# Patient Record
Sex: FEMALE | Race: ASIAN | NOT HISPANIC OR LATINO | URBAN - METROPOLITAN AREA
[De-identification: names, ages, dates, MRNs, and addresses within clinical notes are randomized per-mention and may not be internally consistent; named-entity substitution may affect disease eponyms.]

---

## 2018-05-11 ENCOUNTER — EMERGENCY (EMERGENCY)
Facility: HOSPITAL | Age: 68
LOS: 1 days | Discharge: HOME | End: 2018-05-11
Attending: EMERGENCY MEDICINE | Admitting: EMERGENCY MEDICINE
Payer: MEDICAID

## 2018-05-11 VITALS
SYSTOLIC BLOOD PRESSURE: 152 MMHG | DIASTOLIC BLOOD PRESSURE: 77 MMHG | TEMPERATURE: 99 F | HEART RATE: 80 BPM | RESPIRATION RATE: 16 BRPM | OXYGEN SATURATION: 98 %

## 2018-05-11 VITALS
OXYGEN SATURATION: 97 % | TEMPERATURE: 98 F | SYSTOLIC BLOOD PRESSURE: 158 MMHG | HEART RATE: 76 BPM | DIASTOLIC BLOOD PRESSURE: 78 MMHG | RESPIRATION RATE: 18 BRPM

## 2018-05-11 LAB
ALBUMIN SERPL ELPH-MCNC: 4.1 G/DL — SIGNIFICANT CHANGE UP (ref 3.3–5)
ALP SERPL-CCNC: 117 U/L — SIGNIFICANT CHANGE UP (ref 40–120)
ALT FLD-CCNC: 29 U/L — SIGNIFICANT CHANGE UP (ref 4–33)
APPEARANCE UR: CLEAR — SIGNIFICANT CHANGE UP
APTT BLD: 31.4 SEC — SIGNIFICANT CHANGE UP (ref 27.5–37.4)
AST SERPL-CCNC: 30 U/L — SIGNIFICANT CHANGE UP (ref 4–32)
BASE EXCESS BLDV CALC-SCNC: 7.6 MMOL/L — SIGNIFICANT CHANGE UP
BASOPHILS # BLD AUTO: 0.07 K/UL — SIGNIFICANT CHANGE UP (ref 0–0.2)
BASOPHILS NFR BLD AUTO: 0.7 % — SIGNIFICANT CHANGE UP (ref 0–2)
BILIRUB SERPL-MCNC: 0.4 MG/DL — SIGNIFICANT CHANGE UP (ref 0.2–1.2)
BILIRUB UR-MCNC: NEGATIVE — SIGNIFICANT CHANGE UP
BLOOD GAS VENOUS - CREATININE: 0.57 MG/DL — SIGNIFICANT CHANGE UP (ref 0.5–1.3)
BLOOD UR QL VISUAL: NEGATIVE — SIGNIFICANT CHANGE UP
BUN SERPL-MCNC: 17 MG/DL — SIGNIFICANT CHANGE UP (ref 7–23)
CALCIUM SERPL-MCNC: 9.4 MG/DL — SIGNIFICANT CHANGE UP (ref 8.4–10.5)
CHLORIDE BLDV-SCNC: 102 MMOL/L — SIGNIFICANT CHANGE UP (ref 96–108)
CHLORIDE SERPL-SCNC: 98 MMOL/L — SIGNIFICANT CHANGE UP (ref 98–107)
CO2 SERPL-SCNC: 33 MMOL/L — HIGH (ref 22–31)
COLOR SPEC: SIGNIFICANT CHANGE UP
CREAT SERPL-MCNC: 0.78 MG/DL — SIGNIFICANT CHANGE UP (ref 0.5–1.3)
EOSINOPHIL # BLD AUTO: 0.58 K/UL — HIGH (ref 0–0.5)
EOSINOPHIL NFR BLD AUTO: 6.2 % — HIGH (ref 0–6)
GAS PNL BLDV: 138 MMOL/L — SIGNIFICANT CHANGE UP (ref 136–146)
GLUCOSE BLDV-MCNC: 145 — HIGH (ref 70–99)
GLUCOSE SERPL-MCNC: 146 MG/DL — HIGH (ref 70–99)
GLUCOSE UR-MCNC: NEGATIVE — SIGNIFICANT CHANGE UP
HBA1C BLD-MCNC: 7.8 % — HIGH (ref 4–5.6)
HCO3 BLDV-SCNC: 27 MMOL/L — SIGNIFICANT CHANGE UP (ref 20–27)
HCT VFR BLD CALC: 44 % — SIGNIFICANT CHANGE UP (ref 34.5–45)
HCT VFR BLDV CALC: 46.7 % — HIGH (ref 34.5–45)
HGB BLD-MCNC: 13.5 G/DL — SIGNIFICANT CHANGE UP (ref 11.5–15.5)
HGB BLDV-MCNC: 15.1 G/DL — SIGNIFICANT CHANGE UP (ref 11.5–15.5)
IMM GRANULOCYTES # BLD AUTO: 0.02 # — SIGNIFICANT CHANGE UP
IMM GRANULOCYTES NFR BLD AUTO: 0.2 % — SIGNIFICANT CHANGE UP (ref 0–1.5)
INR BLD: 0.94 — SIGNIFICANT CHANGE UP (ref 0.88–1.17)
KETONES UR-MCNC: NEGATIVE — SIGNIFICANT CHANGE UP
LACTATE BLDV-MCNC: 1.4 MMOL/L — SIGNIFICANT CHANGE UP (ref 0.5–2)
LEUKOCYTE ESTERASE UR-ACNC: HIGH
LYMPHOCYTES # BLD AUTO: 2.23 K/UL — SIGNIFICANT CHANGE UP (ref 1–3.3)
LYMPHOCYTES # BLD AUTO: 23.8 % — SIGNIFICANT CHANGE UP (ref 13–44)
MCHC RBC-ENTMCNC: 27.2 PG — SIGNIFICANT CHANGE UP (ref 27–34)
MCHC RBC-ENTMCNC: 30.7 % — LOW (ref 32–36)
MCV RBC AUTO: 88.5 FL — SIGNIFICANT CHANGE UP (ref 80–100)
MONOCYTES # BLD AUTO: 0.65 K/UL — SIGNIFICANT CHANGE UP (ref 0–0.9)
MONOCYTES NFR BLD AUTO: 6.9 % — SIGNIFICANT CHANGE UP (ref 2–14)
NEUTROPHILS # BLD AUTO: 5.81 K/UL — SIGNIFICANT CHANGE UP (ref 1.8–7.4)
NEUTROPHILS NFR BLD AUTO: 62.2 % — SIGNIFICANT CHANGE UP (ref 43–77)
NITRITE UR-MCNC: NEGATIVE — SIGNIFICANT CHANGE UP
NON-SQ EPI CELLS # UR AUTO: <1 — SIGNIFICANT CHANGE UP
NRBC # FLD: 0 — SIGNIFICANT CHANGE UP
NT-PROBNP SERPL-SCNC: 558.8 PG/ML — SIGNIFICANT CHANGE UP
PCO2 BLDV: 70 MMHG — HIGH (ref 41–51)
PH BLDV: 7.31 PH — LOW (ref 7.32–7.43)
PH UR: 6 — SIGNIFICANT CHANGE UP (ref 4.6–8)
PLATELET # BLD AUTO: 264 K/UL — SIGNIFICANT CHANGE UP (ref 150–400)
PMV BLD: 10.9 FL — SIGNIFICANT CHANGE UP (ref 7–13)
PO2 BLDV: 23 MMHG — LOW (ref 35–40)
POTASSIUM BLDV-SCNC: 4.5 MMOL/L — SIGNIFICANT CHANGE UP (ref 3.4–4.5)
POTASSIUM SERPL-MCNC: 4.6 MMOL/L — SIGNIFICANT CHANGE UP (ref 3.5–5.3)
POTASSIUM SERPL-SCNC: 4.6 MMOL/L — SIGNIFICANT CHANGE UP (ref 3.5–5.3)
PROT SERPL-MCNC: 7.8 G/DL — SIGNIFICANT CHANGE UP (ref 6–8.3)
PROT UR-MCNC: NEGATIVE MG/DL — SIGNIFICANT CHANGE UP
PROTHROM AB SERPL-ACNC: 10.4 SEC — SIGNIFICANT CHANGE UP (ref 9.8–13.1)
RBC # BLD: 4.97 M/UL — SIGNIFICANT CHANGE UP (ref 3.8–5.2)
RBC # FLD: 12.5 % — SIGNIFICANT CHANGE UP (ref 10.3–14.5)
SAO2 % BLDV: 30.2 % — LOW (ref 60–85)
SODIUM SERPL-SCNC: 140 MMOL/L — SIGNIFICANT CHANGE UP (ref 135–145)
SP GR SPEC: 1.01 — SIGNIFICANT CHANGE UP (ref 1–1.04)
SQUAMOUS # UR AUTO: SIGNIFICANT CHANGE UP
UROBILINOGEN FLD QL: NORMAL MG/DL — SIGNIFICANT CHANGE UP
WBC # BLD: 9.36 K/UL — SIGNIFICANT CHANGE UP (ref 3.8–10.5)
WBC # FLD AUTO: 9.36 K/UL — SIGNIFICANT CHANGE UP (ref 3.8–10.5)
WBC UR QL: SIGNIFICANT CHANGE UP (ref 0–?)

## 2018-05-11 PROCEDURE — 99284 EMERGENCY DEPT VISIT MOD MDM: CPT

## 2018-05-11 PROCEDURE — 93971 EXTREMITY STUDY: CPT | Mod: 26,LT

## 2018-05-11 PROCEDURE — 71046 X-RAY EXAM CHEST 2 VIEWS: CPT | Mod: 26

## 2018-05-11 NOTE — ED ADULT TRIAGE NOTE - CHIEF COMPLAINT QUOTE
returned from [place] 2 weeks ago, c/o shortness of breath, intermittent dizziness, swollen left lower leg x 3 days, no pitting edema noted. Patient appears well in triage, no labor breathing.   Denies: chest pain, HA  Hx: asthma, blockage of the heart, DM.

## 2018-05-11 NOTE — ED PROVIDER NOTE - PMH
Coronary artery disease with other form of angina pectoris    Type 2 diabetes mellitus without complication, without long-term current use of insulin

## 2018-05-11 NOTE — ED ADULT NURSE NOTE - OBJECTIVE STATEMENT
Patient received AA&Ox3 - Sami speaking with daughter at bedside translating - c/o SOB and swelling to LLE x3 days since returning from [place] 2wks ago. VSS on RA. Patient denies chest pain, N/V, fever, chills, dyspnea, weakness, dizziness at this time. Patient ambulates independently, skin intact. 20g PIV in place to right AC, labs drawn. NAD noted - will continue to monitor.

## 2018-05-11 NOTE — ED PROVIDER NOTE - OBJECTIVE STATEMENT
68 yo with history of CAD - evaluated for CABG but declined, DM, HTN, presenting f 68 yo with history of CAD - evaluated for CABG but declined, DM, asthma presenting with SOB x months without acute worsening at baseline and LE swelling x 2 weeks; improved but persistent with calf pain. Pt recently started on DM meds but a new PMD upon arrival from [place] 2 [place] ago. Denies fevers, chills, cough, rhinorrhea, otorrhea, otalgia, nausea, vomiting, constipation, diarrhea, chest pain, shortness of breath or changes in urinary habits.

## 2018-05-11 NOTE — ED PROVIDER NOTE - ATTENDING CONTRIBUTION TO CARE
67F h/o asthma, CAD, DM presents for LLE edema for two weeks. Currently visiting from [place], recently saw a primary physician here and was started on medication for DM.  She also reports ongoing intermittent SOB, that has been unchanged for months, none currently. No cough or fever, no CP. On exam well appearing, nad, lungs clear, rrr, abd soft, 2+ pulses, trace R edema, no rash. CXR clear. U/s negative. Labs reassuring. Symptoms not c/w PE given ongoing and unchanged, no tachycardia or CP. Will need rpt u/s as outpatient.

## 2018-11-10 ENCOUNTER — EMERGENCY (EMERGENCY)
Facility: HOSPITAL | Age: 68
LOS: 0 days | Discharge: HOME | End: 2018-11-10
Attending: EMERGENCY MEDICINE
Payer: MEDICAID

## 2018-11-10 VITALS
DIASTOLIC BLOOD PRESSURE: 86 MMHG | SYSTOLIC BLOOD PRESSURE: 164 MMHG | WEIGHT: 171.96 LBS | HEIGHT: 65 IN | TEMPERATURE: 98 F | HEART RATE: 82 BPM | RESPIRATION RATE: 18 BRPM | OXYGEN SATURATION: 94 %

## 2018-11-10 DIAGNOSIS — B02.29 OTHER POSTHERPETIC NERVOUS SYSTEM INVOLVEMENT: ICD-10-CM

## 2018-11-10 DIAGNOSIS — M25.559 PAIN IN UNSPECIFIED HIP: ICD-10-CM

## 2018-11-10 PROCEDURE — 99283 EMERGENCY DEPT VISIT LOW MDM: CPT

## 2018-11-10 RX ORDER — ACETAMINOPHEN 500 MG
2 TABLET ORAL
Qty: 20 | Refills: 0
Start: 2018-11-10

## 2018-11-10 RX ORDER — IBUPROFEN 200 MG
600 TABLET ORAL ONCE
Qty: 0 | Refills: 0 | Status: COMPLETED | OUTPATIENT
Start: 2018-11-10 | End: 2018-11-10

## 2018-11-10 RX ORDER — GABAPENTIN 400 MG/1
100 CAPSULE ORAL ONCE
Qty: 0 | Refills: 0 | Status: COMPLETED | OUTPATIENT
Start: 2018-11-10 | End: 2018-11-10

## 2018-11-10 RX ORDER — ACETAMINOPHEN 500 MG
650 TABLET ORAL ONCE
Qty: 0 | Refills: 0 | Status: COMPLETED | OUTPATIENT
Start: 2018-11-10 | End: 2018-11-10

## 2018-11-10 RX ADMIN — GABAPENTIN 100 MILLIGRAM(S): 400 CAPSULE ORAL at 20:41

## 2018-11-10 RX ADMIN — Medication 600 MILLIGRAM(S): at 20:41

## 2018-11-10 RX ADMIN — Medication 650 MILLIGRAM(S): at 20:43

## 2018-11-10 NOTE — ED ADULT NURSE NOTE - CHIEF COMPLAINT QUOTE
Patient speaks Khmer only. Per daughter patient with left hip pain for 3 days. Patient was seen by rheumatologist 4 weeks ago for same complaint and got better with medication Denies trauma or injury

## 2018-11-10 NOTE — ED ADULT NURSE NOTE - NSIMPLEMENTINTERV_GEN_ALL_ED
Implemented All Fall Risk Interventions:  [place] to call system. Call bell, personal items and telephone within reach. Instruct patient to call for assistance. Room bathroom lighting operational. Non-slip footwear when patient is off stretcher. Physically safe environment: no spills, clutter or unnecessary equipment. Stretcher in lowest position, wheels locked, appropriate side rails in place. Provide visual cue, wrist band, yellow gown, etc. Monitor gait and stability. Monitor for mental status changes and reorient to person, place, and time. Review medications for side effects contributing to fall risk. Reinforce activity limits and safety measures with patient and family.

## 2018-11-10 NOTE — ED PROVIDER NOTE - OBJECTIVE STATEMENT
translated by daughter with pt consent. 67 y/o female hx htn, dm, cad s/p stents in september, recent diagnosis shingle 3 weeks ago c/o increasing pain to site of shingles over past 4-5 days. Saw dermatologist at the time. Left dermatomal rash lower back around to left groin has been drying up/improving s/p antivirals per daughter. Takes motrin for pain with some relief. Takes gabapentin at night for chronic foot pain. Denies any hip pain (despite triage, pain is along flank along rash), trauma, f/c, n/v, abd pain, cp, sob. No other symptoms. Has pcp f/u closely this week.    limited ros by language.

## 2018-11-10 NOTE — ED ADULT TRIAGE NOTE - CHIEF COMPLAINT QUOTE
Patient speaks Amharic only. Per daughter patient with left hip pain for 3 days. Patient was seen by rheumatologist 4 weeks ago for same complaint and got better with medication Denies trauma or injury

## 2018-11-10 NOTE — ED PROVIDER NOTE - MEDICAL DECISION MAKING DETAILS
pt likely with postherpetic neuralgia in setting of recent shingles. rash improving but with continued neuropathic pain. taking gabapentin at night, told can take in morning as well (drowsiness precautions given), tylenol/motrin and icing area with close f/u. return precautions for superinfection though no sign currently. no sign of hip injury or inflammation.

## 2018-11-11 PROBLEM — E11.9 TYPE 2 DIABETES MELLITUS WITHOUT COMPLICATIONS: Chronic | Status: ACTIVE | Noted: 2018-05-11

## 2018-11-11 PROBLEM — I25.118 ATHEROSCLEROTIC HEART DISEASE OF NATIVE CORONARY ARTERY WITH OTHER FORMS OF ANGINA PECTORIS: Chronic | Status: ACTIVE | Noted: 2018-05-11

## 2019-05-30 ENCOUNTER — EMERGENCY (EMERGENCY)
Facility: HOSPITAL | Age: 69
LOS: 0 days | Discharge: HOME | End: 2019-05-30
Attending: EMERGENCY MEDICINE
Payer: MEDICAID

## 2019-05-30 VITALS
TEMPERATURE: 98 F | HEIGHT: 65 IN | SYSTOLIC BLOOD PRESSURE: 148 MMHG | WEIGHT: 164.91 LBS | DIASTOLIC BLOOD PRESSURE: 72 MMHG | HEART RATE: 64 BPM | OXYGEN SATURATION: 96 % | RESPIRATION RATE: 18 BRPM

## 2019-05-30 VITALS
DIASTOLIC BLOOD PRESSURE: 72 MMHG | SYSTOLIC BLOOD PRESSURE: 143 MMHG | TEMPERATURE: 98 F | HEART RATE: 74 BPM | OXYGEN SATURATION: 98 % | RESPIRATION RATE: 18 BRPM

## 2019-05-30 DIAGNOSIS — S22.32XA FRACTURE OF ONE RIB, LEFT SIDE, INITIAL ENCOUNTER FOR CLOSED FRACTURE: ICD-10-CM

## 2019-05-30 DIAGNOSIS — E11.9 TYPE 2 DIABETES MELLITUS WITHOUT COMPLICATIONS: ICD-10-CM
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
## 2019-05-30 DIAGNOSIS — R07.81 PLEURODYNIA: ICD-10-CM
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
## 2019-05-30 LAB
ALBUMIN SERPL ELPH-MCNC: 2.9 G/DL — LOW (ref 3.3–5)
ALP SERPL-CCNC: 108 U/L — SIGNIFICANT CHANGE UP (ref 40–120)
ALT FLD-CCNC: 27 U/L — SIGNIFICANT CHANGE UP (ref 12–78)
ANION GAP SERPL CALC-SCNC: 6 MMOL/L — SIGNIFICANT CHANGE UP (ref 5–17)
APTT BLD: 25.4 SEC — LOW (ref 28.5–37)
AST SERPL-CCNC: 22 U/L — SIGNIFICANT CHANGE UP (ref 15–37)
BASOPHILS # BLD AUTO: 0.04 K/UL — SIGNIFICANT CHANGE UP (ref 0–0.2)
BASOPHILS NFR BLD AUTO: 0.5 % — SIGNIFICANT CHANGE UP (ref 0–2)
BILIRUB SERPL-MCNC: 0.3 MG/DL — SIGNIFICANT CHANGE UP (ref 0.2–1.2)
BUN SERPL-MCNC: 28 MG/DL — HIGH (ref 7–23)
CALCIUM SERPL-MCNC: 8.7 MG/DL — SIGNIFICANT CHANGE UP (ref 8.5–10.1)
CHLORIDE SERPL-SCNC: 105 MMOL/L — SIGNIFICANT CHANGE UP (ref 96–108)
CK MB BLD-MCNC: 2.5 % — SIGNIFICANT CHANGE UP (ref 0–3.5)
CK MB CFR SERPL CALC: 1.4 NG/ML — SIGNIFICANT CHANGE UP (ref 0.5–3.6)
CK SERPL-CCNC: 55 U/L — SIGNIFICANT CHANGE UP (ref 26–192)
CO2 SERPL-SCNC: 29 MMOL/L — SIGNIFICANT CHANGE UP (ref 22–31)
CREAT SERPL-MCNC: 0.93 MG/DL — SIGNIFICANT CHANGE UP (ref 0.5–1.3)
EOSINOPHIL # BLD AUTO: 0.25 K/UL — SIGNIFICANT CHANGE UP (ref 0–0.5)
EOSINOPHIL NFR BLD AUTO: 3.3 % — SIGNIFICANT CHANGE UP (ref 0–6)
GLUCOSE SERPL-MCNC: 178 MG/DL — HIGH (ref 70–99)
HCT VFR BLD CALC: 35.1 % — SIGNIFICANT CHANGE UP (ref 34.5–45)
HGB BLD-MCNC: 10.8 G/DL — LOW (ref 11.5–15.5)
IMM GRANULOCYTES NFR BLD AUTO: 0.4 % — SIGNIFICANT CHANGE UP (ref 0–1.5)
INR BLD: 0.94 RATIO — SIGNIFICANT CHANGE UP (ref 0.88–1.16)
LYMPHOCYTES # BLD AUTO: 1.71 K/UL — SIGNIFICANT CHANGE UP (ref 1–3.3)
LYMPHOCYTES # BLD AUTO: 22.6 % — SIGNIFICANT CHANGE UP (ref 13–44)
MCHC RBC-ENTMCNC: 28.4 PG — SIGNIFICANT CHANGE UP (ref 27–34)
MCHC RBC-ENTMCNC: 30.8 GM/DL — LOW (ref 32–36)
MCV RBC AUTO: 92.4 FL — SIGNIFICANT CHANGE UP (ref 80–100)
MONOCYTES # BLD AUTO: 0.58 K/UL — SIGNIFICANT CHANGE UP (ref 0–0.9)
MONOCYTES NFR BLD AUTO: 7.7 % — SIGNIFICANT CHANGE UP (ref 2–14)
NEUTROPHILS # BLD AUTO: 4.95 K/UL — SIGNIFICANT CHANGE UP (ref 1.8–7.4)
NEUTROPHILS NFR BLD AUTO: 65.5 % — SIGNIFICANT CHANGE UP (ref 43–77)
NRBC # BLD: 0 /100 WBCS — SIGNIFICANT CHANGE UP (ref 0–0)
PLATELET # BLD AUTO: 230 K/UL — SIGNIFICANT CHANGE UP (ref 150–400)
POTASSIUM SERPL-MCNC: 5.3 MMOL/L — SIGNIFICANT CHANGE UP (ref 3.5–5.3)
POTASSIUM SERPL-SCNC: 5.3 MMOL/L — SIGNIFICANT CHANGE UP (ref 3.5–5.3)
PROT SERPL-MCNC: 7.1 GM/DL — SIGNIFICANT CHANGE UP (ref 6–8.3)
PROTHROM AB SERPL-ACNC: 10.5 SEC — SIGNIFICANT CHANGE UP (ref 10–12.9)
RBC # BLD: 3.8 M/UL — SIGNIFICANT CHANGE UP (ref 3.8–5.2)
RBC # FLD: 13.2 % — SIGNIFICANT CHANGE UP (ref 10.3–14.5)
SODIUM SERPL-SCNC: 140 MMOL/L — SIGNIFICANT CHANGE UP (ref 135–145)
TROPONIN I SERPL-MCNC: 0.54 NG/ML — HIGH (ref 0.01–0.04)
WBC # BLD: 7.56 K/UL — SIGNIFICANT CHANGE UP (ref 3.8–10.5)
WBC # FLD AUTO: 7.56 K/UL — SIGNIFICANT CHANGE UP (ref 3.8–10.5)

## 2019-05-30 PROCEDURE — 71250 CT THORAX DX C-: CPT | Mod: 26

## 2019-05-30 PROCEDURE — 99285 EMERGENCY DEPT VISIT HI MDM: CPT

## 2019-05-30 PROCEDURE — 93010 ELECTROCARDIOGRAM REPORT: CPT

## 2019-05-30 PROCEDURE — 71045 X-RAY EXAM CHEST 1 VIEW: CPT | Mod: 26

## 2019-05-30 RX ORDER — OXYCODONE AND ACETAMINOPHEN 5; 325 MG/1; MG/1
1 TABLET ORAL ONCE
Refills: 0 | Status: DISCONTINUED | OUTPATIENT
Start: 2019-05-30 | End: 2019-05-30

## 2019-05-30 RX ADMIN — OXYCODONE AND ACETAMINOPHEN 1 TABLET(S): 5; 325 TABLET ORAL at 13:25

## 2019-05-30 RX ADMIN — OXYCODONE AND ACETAMINOPHEN 1 TABLET(S): 5; 325 TABLET ORAL at 13:55

## 2019-05-30 NOTE — ED PROVIDER NOTE - CONSTITUTIONAL, MLM
normal... Well appearing, well nourished, awake, alert, oriented to person, place, time/situation and in no apparent distress. Speaking in clear full sentences no nasal flaring no shoulders retractions no diaphoresis, smiling appears very comfortable

## 2019-05-30 NOTE — ED PROVIDER NOTE - NONTENDER LOCATION
left lower quadrant/right lower quadrant/periumbilical/umbilical/left costovertebral angle/right costovertebral angle/right upper quadrant/suprapubic/left upper quadrant

## 2019-05-30 NOTE — ED ADULT NURSE NOTE - OBJECTIVE STATEMENT
pt A&Ox3 c/o pain left ribs area x 2 days onset after getting massage, C/o dizziness. S/p Stent placement on the 12th of may at [facility]. PMH HTN, diabetes, Hyperlipidemia

## 2019-05-30 NOTE — ED ADULT TRIAGE NOTE - CHIEF COMPLAINT QUOTE
c/o pain l ribs area x 2 days onset after getting massage states l arm was pulled and pain began denies pain across chest area or shortness of breath

## 2019-05-30 NOTE — ED PROVIDER NOTE - PROGRESS NOTE DETAILS
Pt has been alert and oriented x 3 ate and tolerated lunch pt and daughter are given and explained all test reports and advised to follow up with her doctor and return if shortness or breaths or worsening pain. Pt had angio cath with two stents placed two weeks ago and trop above normal is unlikely due to the cardiac issue.

## 2019-05-30 NOTE — ED PROVIDER NOTE - OBJECTIVE STATEMENT
68 years old female with daughter c/o left lateral chest wall pain increases pain with breathing and left arm movements one day after went to the massage 3 days ago. Pt also had cardiac 2 stents placed couple of weeks ago. Pt denies headache, dizziness, blurred visions, light sensitivities, focal/distal weakness or numbness, cough, sob, nausea, vomiting, fever, chills, abd pain, dysuria or irregular bowel movements.

## 2019-06-28 ENCOUNTER — INPATIENT (INPATIENT)
Facility: HOSPITAL | Age: 69
LOS: 5 days | Discharge: HOME | End: 2019-07-04
Attending: INTERNAL MEDICINE | Admitting: INTERNAL MEDICINE
Payer: MEDICAID

## 2019-06-28 VITALS
TEMPERATURE: 99 F | OXYGEN SATURATION: 63 % | HEART RATE: 99 BPM | SYSTOLIC BLOOD PRESSURE: 199 MMHG | RESPIRATION RATE: 23 BRPM | DIASTOLIC BLOOD PRESSURE: 76 MMHG

## 2019-06-28 DIAGNOSIS — J45.41 MODERATE PERSISTENT ASTHMA WITH (ACUTE) EXACERBATION: ICD-10-CM

## 2019-06-28 DIAGNOSIS — I21.4 NON-ST ELEVATION (NSTEMI) MYOCARDIAL INFARCTION: ICD-10-CM

## 2019-06-28 DIAGNOSIS — E11.9 TYPE 2 DIABETES MELLITUS WITHOUT COMPLICATIONS: ICD-10-CM

## 2019-06-28 DIAGNOSIS — E55.9 VITAMIN D DEFICIENCY, UNSPECIFIED: ICD-10-CM

## 2019-06-28 DIAGNOSIS — I25.118 ATHEROSCLEROTIC HEART DISEASE OF NATIVE CORONARY ARTERY WITH OTHER FORMS OF ANGINA PECTORIS: ICD-10-CM

## 2019-06-28 DIAGNOSIS — I10 ESSENTIAL (PRIMARY) HYPERTENSION: ICD-10-CM

## 2019-06-28 PROBLEM — Z00.00 ENCOUNTER FOR PREVENTIVE HEALTH EXAMINATION: Status: ACTIVE | Noted: 2019-06-28

## 2019-06-28 LAB
ALBUMIN SERPL ELPH-MCNC: 3.5 G/DL — SIGNIFICANT CHANGE UP (ref 3.3–5)
ALP SERPL-CCNC: 94 U/L — SIGNIFICANT CHANGE UP (ref 40–120)
ALT FLD-CCNC: 26 U/L — SIGNIFICANT CHANGE UP (ref 12–78)
ANION GAP SERPL CALC-SCNC: 8 MMOL/L — SIGNIFICANT CHANGE UP (ref 5–17)
APTT BLD: 29 SEC — SIGNIFICANT CHANGE UP (ref 28.5–37)
AST SERPL-CCNC: 18 U/L — SIGNIFICANT CHANGE UP (ref 15–37)
BASE EXCESS BLDA CALC-SCNC: 4.1 MMOL/L — HIGH (ref -2–2)
BASE EXCESS BLDA CALC-SCNC: 4.1 MMOL/L — HIGH (ref -2–2)
BASOPHILS # BLD AUTO: 0.04 K/UL — SIGNIFICANT CHANGE UP (ref 0–0.2)
BASOPHILS NFR BLD AUTO: 0.3 % — SIGNIFICANT CHANGE UP (ref 0–2)
BILIRUB SERPL-MCNC: 0.4 MG/DL — SIGNIFICANT CHANGE UP (ref 0.2–1.2)
BLOOD GAS COMMENTS: SIGNIFICANT CHANGE UP
BLOOD GAS COMMENTS: SIGNIFICANT CHANGE UP
BLOOD GAS SOURCE: SIGNIFICANT CHANGE UP
BLOOD GAS SOURCE: SIGNIFICANT CHANGE UP
BUN SERPL-MCNC: 18 MG/DL — SIGNIFICANT CHANGE UP (ref 7–23)
CALCIUM SERPL-MCNC: 8.7 MG/DL — SIGNIFICANT CHANGE UP (ref 8.5–10.1)
CHLORIDE SERPL-SCNC: 96 MMOL/L — SIGNIFICANT CHANGE UP (ref 96–108)
CK MB BLD-MCNC: 2.9 % — SIGNIFICANT CHANGE UP (ref 0–3.5)
CK MB CFR SERPL CALC: 1.9 NG/ML — SIGNIFICANT CHANGE UP (ref 0.5–3.6)
CK SERPL-CCNC: 66 U/L — SIGNIFICANT CHANGE UP (ref 26–192)
CO2 SERPL-SCNC: 33 MMOL/L — HIGH (ref 22–31)
CREAT SERPL-MCNC: 1.06 MG/DL — SIGNIFICANT CHANGE UP (ref 0.5–1.3)
EOSINOPHIL # BLD AUTO: 0.1 K/UL — SIGNIFICANT CHANGE UP (ref 0–0.5)
EOSINOPHIL NFR BLD AUTO: 0.8 % — SIGNIFICANT CHANGE UP (ref 0–6)
GLUCOSE BLDC GLUCOMTR-MCNC: 374 MG/DL — HIGH (ref 70–99)
GLUCOSE SERPL-MCNC: 184 MG/DL — HIGH (ref 70–99)
HCO3 BLDA-SCNC: 32 MMOL/L — HIGH (ref 21–29)
HCO3 BLDA-SCNC: 33 MMOL/L — HIGH (ref 21–29)
HCT VFR BLD CALC: 42 % — SIGNIFICANT CHANGE UP (ref 34.5–45)
HGB BLD-MCNC: 12.9 G/DL — SIGNIFICANT CHANGE UP (ref 11.5–15.5)
HOROWITZ INDEX BLDA+IHG-RTO: 100 — SIGNIFICANT CHANGE UP
HOROWITZ INDEX BLDA+IHG-RTO: 40 — SIGNIFICANT CHANGE UP
IMM GRANULOCYTES NFR BLD AUTO: 1.4 % — SIGNIFICANT CHANGE UP (ref 0–1.5)
INR BLD: 0.96 RATIO — SIGNIFICANT CHANGE UP (ref 0.88–1.16)
LACTATE SERPL-SCNC: 2.3 MMOL/L — HIGH (ref 0.7–2)
LYMPHOCYTES # BLD AUTO: 29 % — SIGNIFICANT CHANGE UP (ref 13–44)
LYMPHOCYTES # BLD AUTO: 3.61 K/UL — HIGH (ref 1–3.3)
MCHC RBC-ENTMCNC: 27.8 PG — SIGNIFICANT CHANGE UP (ref 27–34)
MCHC RBC-ENTMCNC: 30.7 GM/DL — LOW (ref 32–36)
MCV RBC AUTO: 90.5 FL — SIGNIFICANT CHANGE UP (ref 80–100)
MONOCYTES # BLD AUTO: 0.88 K/UL — SIGNIFICANT CHANGE UP (ref 0–0.9)
MONOCYTES NFR BLD AUTO: 7.1 % — SIGNIFICANT CHANGE UP (ref 2–14)
NEUTROPHILS # BLD AUTO: 7.63 K/UL — HIGH (ref 1.8–7.4)
NEUTROPHILS NFR BLD AUTO: 61.4 % — SIGNIFICANT CHANGE UP (ref 43–77)
NRBC # BLD: 0 /100 WBCS — SIGNIFICANT CHANGE UP (ref 0–0)
NT-PROBNP SERPL-SCNC: 1349 PG/ML — HIGH (ref 0–125)
PCO2 BLDA: 69 MMHG — HIGH (ref 32–46)
PCO2 BLDA: 75 MMHG — CRITICAL HIGH (ref 32–46)
PH BLD: 7.26 — LOW (ref 7.35–7.45)
PH BLD: 7.29 — LOW (ref 7.35–7.45)
PLATELET # BLD AUTO: 266 K/UL — SIGNIFICANT CHANGE UP (ref 150–400)
PO2 BLDA: 121 MMHG — HIGH (ref 74–108)
PO2 BLDA: 425 MMHG — HIGH (ref 74–108)
POTASSIUM SERPL-MCNC: 4.7 MMOL/L — SIGNIFICANT CHANGE UP (ref 3.5–5.3)
POTASSIUM SERPL-SCNC: 4.7 MMOL/L — SIGNIFICANT CHANGE UP (ref 3.5–5.3)
PROT SERPL-MCNC: 7.9 GM/DL — SIGNIFICANT CHANGE UP (ref 6–8.3)
PROTHROM AB SERPL-ACNC: 10.7 SEC — SIGNIFICANT CHANGE UP (ref 10–12.9)
RBC # BLD: 4.64 M/UL — SIGNIFICANT CHANGE UP (ref 3.8–5.2)
RBC # FLD: 12 % — SIGNIFICANT CHANGE UP (ref 10.3–14.5)
SAO2 % BLDA: 98 % — HIGH (ref 92–96)
SAO2 % BLDA: 99 % — HIGH (ref 92–96)
SODIUM SERPL-SCNC: 137 MMOL/L — SIGNIFICANT CHANGE UP (ref 135–145)
TROPONIN I SERPL-MCNC: 0.15 NG/ML — HIGH (ref 0.01–0.04)
TROPONIN I SERPL-MCNC: 0.17 NG/ML — HIGH (ref 0.01–0.04)
WBC # BLD: 12.43 K/UL — HIGH (ref 3.8–10.5)
WBC # FLD AUTO: 12.43 K/UL — HIGH (ref 3.8–10.5)

## 2019-06-28 PROCEDURE — 93010 ELECTROCARDIOGRAM REPORT: CPT

## 2019-06-28 PROCEDURE — 99223 1ST HOSP IP/OBS HIGH 75: CPT

## 2019-06-28 PROCEDURE — 71045 X-RAY EXAM CHEST 1 VIEW: CPT | Mod: 26

## 2019-06-28 PROCEDURE — 99285 EMERGENCY DEPT VISIT HI MDM: CPT

## 2019-06-28 RX ORDER — AZITHROMYCIN 500 MG/1
500 TABLET, FILM COATED ORAL DAILY
Refills: 0 | Status: COMPLETED | OUTPATIENT
Start: 2019-06-28 | End: 2019-07-02

## 2019-06-28 RX ORDER — GABAPENTIN 400 MG/1
100 CAPSULE ORAL DAILY
Refills: 0 | Status: DISCONTINUED | OUTPATIENT
Start: 2019-06-28 | End: 2019-07-01

## 2019-06-28 RX ORDER — HEPARIN SODIUM 5000 [USP'U]/ML
5000 INJECTION INTRAVENOUS; SUBCUTANEOUS EVERY 12 HOURS
Refills: 0 | Status: DISCONTINUED | OUTPATIENT
Start: 2019-06-28 | End: 2019-07-04

## 2019-06-28 RX ORDER — RANOLAZINE 500 MG/1
1 TABLET, FILM COATED, EXTENDED RELEASE ORAL
Qty: 0 | Refills: 0 | DISCHARGE

## 2019-06-28 RX ORDER — TIOTROPIUM BROMIDE 18 UG/1
1 CAPSULE ORAL; RESPIRATORY (INHALATION) DAILY
Refills: 0 | Status: DISCONTINUED | OUTPATIENT
Start: 2019-06-28 | End: 2019-07-04

## 2019-06-28 RX ORDER — PANTOPRAZOLE SODIUM 20 MG/1
40 TABLET, DELAYED RELEASE ORAL
Refills: 0 | Status: DISCONTINUED | OUTPATIENT
Start: 2019-06-28 | End: 2019-07-04

## 2019-06-28 RX ORDER — CHOLECALCIFEROL (VITAMIN D3) 125 MCG
1000 CAPSULE ORAL DAILY
Refills: 0 | Status: DISCONTINUED | OUTPATIENT
Start: 2019-06-28 | End: 2019-07-04

## 2019-06-28 RX ORDER — METOPROLOL TARTRATE 50 MG
25 TABLET ORAL DAILY
Refills: 0 | Status: DISCONTINUED | OUTPATIENT
Start: 2019-06-28 | End: 2019-07-04

## 2019-06-28 RX ORDER — ATORVASTATIN CALCIUM 80 MG/1
40 TABLET, FILM COATED ORAL AT BEDTIME
Refills: 0 | Status: DISCONTINUED | OUTPATIENT
Start: 2019-06-28 | End: 2019-07-04

## 2019-06-28 RX ORDER — FUROSEMIDE 40 MG
40 TABLET ORAL ONCE
Refills: 0 | Status: COMPLETED | OUTPATIENT
Start: 2019-06-28 | End: 2019-06-28

## 2019-06-28 RX ORDER — MAGNESIUM SULFATE 500 MG/ML
2 VIAL (ML) INJECTION ONCE
Refills: 0 | Status: COMPLETED | OUTPATIENT
Start: 2019-06-28 | End: 2019-06-28

## 2019-06-28 RX ORDER — IPRATROPIUM/ALBUTEROL SULFATE 18-103MCG
3 AEROSOL WITH ADAPTER (GRAM) INHALATION EVERY 8 HOURS
Refills: 0 | Status: DISCONTINUED | OUTPATIENT
Start: 2019-06-28 | End: 2019-06-28

## 2019-06-28 RX ORDER — BUDESONIDE AND FORMOTEROL FUMARATE DIHYDRATE 160; 4.5 UG/1; UG/1
2 AEROSOL RESPIRATORY (INHALATION)
Refills: 0 | Status: DISCONTINUED | OUTPATIENT
Start: 2019-06-28 | End: 2019-07-04

## 2019-06-28 RX ORDER — INSULIN GLARGINE 100 [IU]/ML
22 INJECTION, SOLUTION SUBCUTANEOUS AT BEDTIME
Refills: 0 | Status: DISCONTINUED | OUTPATIENT
Start: 2019-06-28 | End: 2019-06-30

## 2019-06-28 RX ORDER — ASPIRIN/CALCIUM CARB/MAGNESIUM 324 MG
81 TABLET ORAL DAILY
Refills: 0 | Status: DISCONTINUED | OUTPATIENT
Start: 2019-06-28 | End: 2019-07-04

## 2019-06-28 RX ORDER — IPRATROPIUM/ALBUTEROL SULFATE 18-103MCG
3 AEROSOL WITH ADAPTER (GRAM) INHALATION
Refills: 0 | Status: COMPLETED | OUTPATIENT
Start: 2019-06-28 | End: 2019-06-28

## 2019-06-28 RX ORDER — IPRATROPIUM/ALBUTEROL SULFATE 18-103MCG
3 AEROSOL WITH ADAPTER (GRAM) INHALATION EVERY 6 HOURS
Refills: 0 | Status: DISCONTINUED | OUTPATIENT
Start: 2019-06-28 | End: 2019-06-30

## 2019-06-28 RX ADMIN — INSULIN GLARGINE 22 UNIT(S): 100 INJECTION, SOLUTION SUBCUTANEOUS at 22:19

## 2019-06-28 RX ADMIN — Medication 3 MILLILITER(S): at 23:36

## 2019-06-28 RX ADMIN — Medication 2 GRAM(S): at 12:45

## 2019-06-28 RX ADMIN — GABAPENTIN 100 MILLIGRAM(S): 400 CAPSULE ORAL at 22:18

## 2019-06-28 RX ADMIN — Medication 3 MILLILITER(S): at 11:15

## 2019-06-28 RX ADMIN — ATORVASTATIN CALCIUM 40 MILLIGRAM(S): 80 TABLET, FILM COATED ORAL at 22:18

## 2019-06-28 RX ADMIN — Medication 40 MILLIGRAM(S): at 22:19

## 2019-06-28 RX ADMIN — Medication 3 MILLILITER(S): at 14:39

## 2019-06-28 RX ADMIN — Medication 3 MILLILITER(S): at 11:38

## 2019-06-28 RX ADMIN — BUDESONIDE AND FORMOTEROL FUMARATE DIHYDRATE 2 PUFF(S): 160; 4.5 AEROSOL RESPIRATORY (INHALATION) at 18:00

## 2019-06-28 RX ADMIN — Medication 50 GRAM(S): at 11:20

## 2019-06-28 RX ADMIN — Medication 125 MILLIGRAM(S): at 11:20

## 2019-06-28 RX ADMIN — Medication 40 MILLIGRAM(S): at 14:34

## 2019-06-28 RX ADMIN — Medication 3 MILLILITER(S): at 11:23

## 2019-06-28 RX ADMIN — Medication 3 MILLILITER(S): at 17:22

## 2019-06-28 RX ADMIN — HEPARIN SODIUM 5000 UNIT(S): 5000 INJECTION INTRAVENOUS; SUBCUTANEOUS at 17:59

## 2019-06-28 RX ADMIN — Medication 600 MILLIGRAM(S): at 22:19

## 2019-06-28 RX ADMIN — AZITHROMYCIN 500 MILLIGRAM(S): 500 TABLET, FILM COATED ORAL at 17:59

## 2019-06-28 NOTE — ED PROVIDER NOTE - NONTENDER LOCATION
right upper quadrant/right lower quadrant/left upper quadrant/left lower quadrant/periumbilical/umbilical/suprapubic/left costovertebral angle/right costovertebral angle

## 2019-06-28 NOTE — ED PROVIDER NOTE - CONSTITUTIONAL, MLM
normal... Well appearing, well nourished, awake, alert, oriented to person, place, time/situation and + nasal flaring + shoulders retractions unable to speaking in full sentences

## 2019-06-28 NOTE — H&P ADULT - PROBLEM SELECTOR PLAN 1
- may d/c BIPAP and transition to nasal 02  - solumedrol 40 mg Q 8h ours  - Duonebs ~ 8 hours  - Symbicort Q12hrs

## 2019-06-28 NOTE — H&P ADULT - PROBLEM SELECTOR PLAN 3
- trop is mildly elevated likely due to demand ischemia  - would continue to trend  - cardiology eval as above

## 2019-06-28 NOTE — H&P ADULT - NSICDXPASTMEDICALHX_GEN_ALL_CORE_FT
PAST MEDICAL HISTORY:  Coronary artery disease with other form of angina pectoris     Hypertension     Type 2 diabetes mellitus without complication, without long-term current use of insulin

## 2019-06-28 NOTE — H&P ADULT - HISTORY OF PRESENT ILLNESS
67 y/o female with PMHx of Asthma, HLD, and Anxiety that presents to the ED after acute onset of shortness of breath.  Patient on

## 2019-06-28 NOTE — H&P ADULT - PROBLEM SELECTOR PLAN 2
- c/w ASA  - c/w Toprol XL  - obtain cardiac consult as patient has pending appointment with [facility] cardiology

## 2019-06-28 NOTE — H&P ADULT - NSHPPHYSICALEXAM_GEN_ALL_CORE
ICU Vital Signs Last 24 Hrs  T(C): 36.3 (28 Jun 2019 16:47), Max: 37.2 (28 Jun 2019 11:04)  T(F): 97.4 (28 Jun 2019 16:47), Max: 99 (28 Jun 2019 11:04)  HR: 62 (28 Jun 2019 17:50) (62 - 99)  BP: 121/67 (28 Jun 2019 16:47) (121/67 - 199/76)  RR: 18 (28 Jun 2019 16:47) (12 - 23)  SpO2: 96% (28 Jun 2019 17:50) (63% - 99%)

## 2019-06-28 NOTE — ED PROVIDER NOTE - OBJECTIVE STATEMENT
68 years old female here with son c/o productive cough sob for 4 days. Pt had cardiac stent placed 2 months ago. Pt sts sob increases this morning. Pt's son sts  pt has a hx of asthma never being et intubated. Pt denies headache, dizziness, blurred visions, light sensitivities, focal/distal weakness or numbness, chest pain, nausea, vomiting, fever, chills, abd pain, dysuria or irregular bowel movements.

## 2019-06-28 NOTE — ED PROVIDER NOTE - MUSCULOSKELETAL, MLM
Spine appears normal, range of motion is not limited, no muscle or joint tenderness No edema non tender to palp bilateral lower legs

## 2019-06-28 NOTE — ED PROVIDER NOTE - PMH
Coronary artery disease with other form of angina pectoris    Hypertension    Type 2 diabetes mellitus without complication, without long-term current use of insulin

## 2019-06-29 DIAGNOSIS — Z29.9 ENCOUNTER FOR PROPHYLACTIC MEASURES, UNSPECIFIED: ICD-10-CM

## 2019-06-29 DIAGNOSIS — J96.01 ACUTE RESPIRATORY FAILURE WITH HYPOXIA: ICD-10-CM

## 2019-06-29 LAB
ANION GAP SERPL CALC-SCNC: 7 MMOL/L — SIGNIFICANT CHANGE UP (ref 5–17)
BUN SERPL-MCNC: 32 MG/DL — HIGH (ref 7–23)
CALCIUM SERPL-MCNC: 9.1 MG/DL — SIGNIFICANT CHANGE UP (ref 8.5–10.1)
CHLORIDE SERPL-SCNC: 96 MMOL/L — SIGNIFICANT CHANGE UP (ref 96–108)
CO2 SERPL-SCNC: 33 MMOL/L — HIGH (ref 22–31)
CREAT SERPL-MCNC: 1.08 MG/DL — SIGNIFICANT CHANGE UP (ref 0.5–1.3)
GLUCOSE BLDC GLUCOMTR-MCNC: 386 MG/DL — HIGH (ref 70–99)
GLUCOSE BLDC GLUCOMTR-MCNC: 399 MG/DL — HIGH (ref 70–99)
GLUCOSE SERPL-MCNC: 219 MG/DL — HIGH (ref 70–99)
HCT VFR BLD CALC: 37.9 % — SIGNIFICANT CHANGE UP (ref 34.5–45)
HCV AB S/CO SERPL IA: 0.13 S/CO — SIGNIFICANT CHANGE UP (ref 0–0.99)
HCV AB SERPL-IMP: SIGNIFICANT CHANGE UP
HGB BLD-MCNC: 11.8 G/DL — SIGNIFICANT CHANGE UP (ref 11.5–15.5)
MAGNESIUM SERPL-MCNC: 2.5 MG/DL — SIGNIFICANT CHANGE UP (ref 1.6–2.6)
MCHC RBC-ENTMCNC: 27.9 PG — SIGNIFICANT CHANGE UP (ref 27–34)
MCHC RBC-ENTMCNC: 31.1 GM/DL — LOW (ref 32–36)
MCV RBC AUTO: 89.6 FL — SIGNIFICANT CHANGE UP (ref 80–100)
NRBC # BLD: 0 /100 WBCS — SIGNIFICANT CHANGE UP (ref 0–0)
PHOSPHATE SERPL-MCNC: 3.8 MG/DL — SIGNIFICANT CHANGE UP (ref 2.5–4.5)
PLATELET # BLD AUTO: 218 K/UL — SIGNIFICANT CHANGE UP (ref 150–400)
POTASSIUM SERPL-MCNC: 4.6 MMOL/L — SIGNIFICANT CHANGE UP (ref 3.5–5.3)
POTASSIUM SERPL-SCNC: 4.6 MMOL/L — SIGNIFICANT CHANGE UP (ref 3.5–5.3)
RBC # BLD: 4.23 M/UL — SIGNIFICANT CHANGE UP (ref 3.8–5.2)
RBC # FLD: 12 % — SIGNIFICANT CHANGE UP (ref 10.3–14.5)
SODIUM SERPL-SCNC: 136 MMOL/L — SIGNIFICANT CHANGE UP (ref 135–145)
TROPONIN I SERPL-MCNC: 0.14 NG/ML — HIGH (ref 0.01–0.04)
WBC # BLD: 9.25 K/UL — SIGNIFICANT CHANGE UP (ref 3.8–10.5)
WBC # FLD AUTO: 9.25 K/UL — SIGNIFICANT CHANGE UP (ref 3.8–10.5)

## 2019-06-29 PROCEDURE — 99233 SBSQ HOSP IP/OBS HIGH 50: CPT

## 2019-06-29 RX ORDER — DOCUSATE SODIUM 100 MG
100 CAPSULE ORAL THREE TIMES A DAY
Refills: 0 | Status: DISCONTINUED | OUTPATIENT
Start: 2019-06-29 | End: 2019-07-04

## 2019-06-29 RX ORDER — INSULIN LISPRO 100/ML
6 VIAL (ML) SUBCUTANEOUS ONCE
Refills: 0 | Status: COMPLETED | OUTPATIENT
Start: 2019-06-29 | End: 2019-06-29

## 2019-06-29 RX ORDER — HYDRALAZINE HCL 50 MG
10 TABLET ORAL EVERY 6 HOURS
Refills: 0 | Status: DISCONTINUED | OUTPATIENT
Start: 2019-06-29 | End: 2019-07-04

## 2019-06-29 RX ADMIN — GABAPENTIN 100 MILLIGRAM(S): 400 CAPSULE ORAL at 12:25

## 2019-06-29 RX ADMIN — BUDESONIDE AND FORMOTEROL FUMARATE DIHYDRATE 2 PUFF(S): 160; 4.5 AEROSOL RESPIRATORY (INHALATION) at 05:33

## 2019-06-29 RX ADMIN — AZITHROMYCIN 500 MILLIGRAM(S): 500 TABLET, FILM COATED ORAL at 12:25

## 2019-06-29 RX ADMIN — Medication 40 MILLIGRAM(S): at 05:34

## 2019-06-29 RX ADMIN — HEPARIN SODIUM 5000 UNIT(S): 5000 INJECTION INTRAVENOUS; SUBCUTANEOUS at 05:34

## 2019-06-29 RX ADMIN — Medication 1000 UNIT(S): at 12:25

## 2019-06-29 RX ADMIN — Medication 25 MILLIGRAM(S): at 05:35

## 2019-06-29 RX ADMIN — INSULIN GLARGINE 22 UNIT(S): 100 INJECTION, SOLUTION SUBCUTANEOUS at 21:43

## 2019-06-29 RX ADMIN — Medication 6 UNIT(S): at 23:48

## 2019-06-29 RX ADMIN — Medication 40 MILLIGRAM(S): at 17:47

## 2019-06-29 RX ADMIN — ATORVASTATIN CALCIUM 40 MILLIGRAM(S): 80 TABLET, FILM COATED ORAL at 21:42

## 2019-06-29 RX ADMIN — Medication 100 MILLIGRAM(S): at 21:42

## 2019-06-29 RX ADMIN — BUDESONIDE AND FORMOTEROL FUMARATE DIHYDRATE 2 PUFF(S): 160; 4.5 AEROSOL RESPIRATORY (INHALATION) at 17:46

## 2019-06-29 RX ADMIN — Medication 600 MILLIGRAM(S): at 05:34

## 2019-06-29 RX ADMIN — Medication 81 MILLIGRAM(S): at 12:25

## 2019-06-29 RX ADMIN — PANTOPRAZOLE SODIUM 40 MILLIGRAM(S): 20 TABLET, DELAYED RELEASE ORAL at 05:35

## 2019-06-29 RX ADMIN — Medication 3 MILLILITER(S): at 11:28

## 2019-06-29 RX ADMIN — Medication 3 MILLILITER(S): at 17:24

## 2019-06-29 RX ADMIN — Medication 3 MILLILITER(S): at 05:52

## 2019-06-29 RX ADMIN — HEPARIN SODIUM 5000 UNIT(S): 5000 INJECTION INTRAVENOUS; SUBCUTANEOUS at 17:46

## 2019-06-30 LAB
GLUCOSE BLDC GLUCOMTR-MCNC: 387 MG/DL — HIGH (ref 70–99)
GLUCOSE BLDC GLUCOMTR-MCNC: 450 MG/DL — CRITICAL HIGH (ref 70–99)
GLUCOSE BLDC GLUCOMTR-MCNC: 512 MG/DL — CRITICAL HIGH (ref 70–99)
GLUCOSE BLDC GLUCOMTR-MCNC: 546 MG/DL — CRITICAL HIGH (ref 70–99)
GLUCOSE BLDC GLUCOMTR-MCNC: 565 MG/DL — CRITICAL HIGH (ref 70–99)
GLUCOSE BLDC GLUCOMTR-MCNC: 586 MG/DL — CRITICAL HIGH (ref 70–99)

## 2019-06-30 PROCEDURE — 99232 SBSQ HOSP IP/OBS MODERATE 35: CPT

## 2019-06-30 RX ORDER — IPRATROPIUM/ALBUTEROL SULFATE 18-103MCG
3 AEROSOL WITH ADAPTER (GRAM) INHALATION EVERY 6 HOURS
Refills: 0 | Status: DISCONTINUED | OUTPATIENT
Start: 2019-06-30 | End: 2019-07-02

## 2019-06-30 RX ORDER — DEXTROSE 50 % IN WATER 50 %
15 SYRINGE (ML) INTRAVENOUS ONCE
Refills: 0 | Status: DISCONTINUED | OUTPATIENT
Start: 2019-06-30 | End: 2019-07-01

## 2019-06-30 RX ORDER — DEXTROSE 50 % IN WATER 50 %
25 SYRINGE (ML) INTRAVENOUS ONCE
Refills: 0 | Status: DISCONTINUED | OUTPATIENT
Start: 2019-06-30 | End: 2019-07-01

## 2019-06-30 RX ORDER — SODIUM CHLORIDE 9 MG/ML
1000 INJECTION, SOLUTION INTRAVENOUS
Refills: 0 | Status: DISCONTINUED | OUTPATIENT
Start: 2019-06-30 | End: 2019-07-01

## 2019-06-30 RX ORDER — ALBUTEROL 90 UG/1
1 AEROSOL, METERED ORAL EVERY 4 HOURS
Refills: 0 | Status: COMPLETED | OUTPATIENT
Start: 2019-06-30 | End: 2020-05-28

## 2019-06-30 RX ORDER — DEXTROSE 50 % IN WATER 50 %
12.5 SYRINGE (ML) INTRAVENOUS ONCE
Refills: 0 | Status: DISCONTINUED | OUTPATIENT
Start: 2019-06-30 | End: 2019-07-01

## 2019-06-30 RX ORDER — INSULIN GLARGINE 100 [IU]/ML
22 INJECTION, SOLUTION SUBCUTANEOUS
Qty: 0 | Refills: 0 | DISCHARGE

## 2019-06-30 RX ORDER — INSULIN LISPRO 100/ML
12 VIAL (ML) SUBCUTANEOUS ONCE
Refills: 0 | Status: COMPLETED | OUTPATIENT
Start: 2019-06-30 | End: 2019-06-30

## 2019-06-30 RX ORDER — INSULIN GLARGINE 100 [IU]/ML
26 INJECTION, SOLUTION SUBCUTANEOUS EVERY MORNING
Refills: 0 | Status: DISCONTINUED | OUTPATIENT
Start: 2019-06-30 | End: 2019-07-02

## 2019-06-30 RX ORDER — ALBUTEROL 90 UG/1
1 AEROSOL, METERED ORAL EVERY 4 HOURS
Refills: 0 | Status: DISCONTINUED | OUTPATIENT
Start: 2019-06-30 | End: 2019-07-02

## 2019-06-30 RX ORDER — INSULIN LISPRO 100/ML
VIAL (ML) SUBCUTANEOUS
Refills: 0 | Status: DISCONTINUED | OUTPATIENT
Start: 2019-06-30 | End: 2019-07-01

## 2019-06-30 RX ORDER — INSULIN GLARGINE 100 [IU]/ML
28 INJECTION, SOLUTION SUBCUTANEOUS AT BEDTIME
Refills: 0 | Status: DISCONTINUED | OUTPATIENT
Start: 2019-06-30 | End: 2019-07-02

## 2019-06-30 RX ORDER — GLUCAGON INJECTION, SOLUTION 0.5 MG/.1ML
1 INJECTION, SOLUTION SUBCUTANEOUS ONCE
Refills: 0 | Status: DISCONTINUED | OUTPATIENT
Start: 2019-06-30 | End: 2019-07-01

## 2019-06-30 RX ADMIN — ALBUTEROL 1 PUFF(S): 90 AEROSOL, METERED ORAL at 11:17

## 2019-06-30 RX ADMIN — PANTOPRAZOLE SODIUM 40 MILLIGRAM(S): 20 TABLET, DELAYED RELEASE ORAL at 06:16

## 2019-06-30 RX ADMIN — Medication 25 MILLIGRAM(S): at 06:16

## 2019-06-30 RX ADMIN — Medication 3 MILLILITER(S): at 17:51

## 2019-06-30 RX ADMIN — Medication 40 MILLIGRAM(S): at 06:15

## 2019-06-30 RX ADMIN — HEPARIN SODIUM 5000 UNIT(S): 5000 INJECTION INTRAVENOUS; SUBCUTANEOUS at 17:48

## 2019-06-30 RX ADMIN — INSULIN GLARGINE 28 UNIT(S): 100 INJECTION, SOLUTION SUBCUTANEOUS at 22:34

## 2019-06-30 RX ADMIN — BUDESONIDE AND FORMOTEROL FUMARATE DIHYDRATE 2 PUFF(S): 160; 4.5 AEROSOL RESPIRATORY (INHALATION) at 17:48

## 2019-06-30 RX ADMIN — AZITHROMYCIN 500 MILLIGRAM(S): 500 TABLET, FILM COATED ORAL at 11:11

## 2019-06-30 RX ADMIN — BUDESONIDE AND FORMOTEROL FUMARATE DIHYDRATE 2 PUFF(S): 160; 4.5 AEROSOL RESPIRATORY (INHALATION) at 06:16

## 2019-06-30 RX ADMIN — ALBUTEROL 1 PUFF(S): 90 AEROSOL, METERED ORAL at 17:51

## 2019-06-30 RX ADMIN — ATORVASTATIN CALCIUM 40 MILLIGRAM(S): 80 TABLET, FILM COATED ORAL at 22:36

## 2019-06-30 RX ADMIN — Medication 3 MILLILITER(S): at 05:30

## 2019-06-30 RX ADMIN — HEPARIN SODIUM 5000 UNIT(S): 5000 INJECTION INTRAVENOUS; SUBCUTANEOUS at 06:16

## 2019-06-30 RX ADMIN — Medication 100 MILLIGRAM(S): at 21:37

## 2019-06-30 RX ADMIN — Medication 10 MILLIGRAM(S): at 17:50

## 2019-06-30 RX ADMIN — Medication 81 MILLIGRAM(S): at 11:11

## 2019-06-30 RX ADMIN — ALBUTEROL 1 PUFF(S): 90 AEROSOL, METERED ORAL at 14:04

## 2019-06-30 RX ADMIN — Medication 100 MILLIGRAM(S): at 06:16

## 2019-06-30 RX ADMIN — Medication 1000 UNIT(S): at 11:11

## 2019-06-30 RX ADMIN — GABAPENTIN 100 MILLIGRAM(S): 400 CAPSULE ORAL at 11:11

## 2019-06-30 RX ADMIN — Medication 12 UNIT(S): at 21:32

## 2019-06-30 RX ADMIN — Medication 3 MILLILITER(S): at 23:59

## 2019-06-30 RX ADMIN — Medication 100 MILLIGRAM(S): at 14:04

## 2019-06-30 RX ADMIN — ALBUTEROL 1 PUFF(S): 90 AEROSOL, METERED ORAL at 22:49

## 2019-06-30 NOTE — PHYSICAL THERAPY INITIAL EVALUATION ADULT - ADDITIONAL COMMENTS
PT spoke to daughter Queta via phone regarding previous level of function and home environment. Pt lives in a private home with family with 2-3 steps to enter with B handrails, once inside there are no further steps to negotiate. Pt requires assistance with step negotiation as well as all ADLs. Pt has a rolling walker and a shower chair and home O2 (used prn). Pt with a reported history of multiple falls.

## 2019-06-30 NOTE — PHYSICAL THERAPY INITIAL EVALUATION ADULT - GAIT DEVIATIONS NOTED, PT EVAL
increased time in double stance/Poor moustapha, distance limited secondary to desat/decreased step length/decreased stride length/decreased weight-shifting ability/decreased moustapha

## 2019-06-30 NOTE — PHYSICAL THERAPY INITIAL EVALUATION ADULT - ACTIVE RANGE OF MOTION EXAMINATION, REHAB EVAL
bilateral lower extremity Active ROM was WNL (within normal limits)/yogi. upper extremity Active ROM was WNL (within normal limits)

## 2019-06-30 NOTE — PHYSICAL THERAPY INITIAL EVALUATION ADULT - TRANSFER SAFETY CONCERNS NOTED: SIT/STAND, REHAB EVAL
Poor eccentric control/decreased safety awareness/losing balance/decreased step length/decreased weight-shifting ability/decreased balance during turns

## 2019-06-30 NOTE — PHYSICAL THERAPY INITIAL EVALUATION ADULT - PERTINENT HX OF CURRENT PROBLEM, REHAB EVAL
69 y/o female with PMHx of Asthma, HLD, and Anxiety that presents to the ED after acute onset of shortness of breath. CXR no acute findings

## 2019-06-30 NOTE — PHYSICAL THERAPY INITIAL EVALUATION ADULT - PHYSICAL ASSIST/NONPHYSICAL ASSIST: GAIT, REHAB EVAL
supervision/verbal cues/minimal assistance with turns secondary to unable to self correct loss of balance/1 person assist

## 2019-06-30 NOTE — PHYSICAL THERAPY INITIAL EVALUATION ADULT - BALANCE TRAINING, PT EVAL
pt will increase sitting and standing static and dynamic balance by full grade for safety with ambulation, ADLs and transfers x6 weeks

## 2019-06-30 NOTE — PHYSICAL THERAPY INITIAL EVALUATION ADULT - IMPAIRMENTS FOUND, PT EVAL
on nasal canula/gait, locomotion, and balance/muscle strength/poor safety awareness/ventilation and respiration/gas exchange

## 2019-06-30 NOTE — PHYSICAL THERAPY INITIAL EVALUATION ADULT - GAIT TRAINING, PT EVAL
pt will be able to ambulate 600 feet with appropriate device for return to short distances in the community x6 weeks

## 2019-07-01 LAB
ANION GAP SERPL CALC-SCNC: 6 MMOL/L — SIGNIFICANT CHANGE UP (ref 5–17)
BUN SERPL-MCNC: 39 MG/DL — HIGH (ref 7–23)
CALCIUM SERPL-MCNC: 8.9 MG/DL — SIGNIFICANT CHANGE UP (ref 8.5–10.1)
CHLORIDE SERPL-SCNC: 100 MMOL/L — SIGNIFICANT CHANGE UP (ref 96–108)
CO2 SERPL-SCNC: 31 MMOL/L — SIGNIFICANT CHANGE UP (ref 22–31)
CREAT SERPL-MCNC: 1.05 MG/DL — SIGNIFICANT CHANGE UP (ref 0.5–1.3)
GLUCOSE BLDC GLUCOMTR-MCNC: 140 MG/DL — HIGH (ref 70–99)
GLUCOSE BLDC GLUCOMTR-MCNC: 272 MG/DL — HIGH (ref 70–99)
GLUCOSE BLDC GLUCOMTR-MCNC: 284 MG/DL — HIGH (ref 70–99)
GLUCOSE BLDC GLUCOMTR-MCNC: 410 MG/DL — HIGH (ref 70–99)
GLUCOSE BLDC GLUCOMTR-MCNC: 541 MG/DL — CRITICAL HIGH (ref 70–99)
GLUCOSE SERPL-MCNC: 129 MG/DL — HIGH (ref 70–99)
HBA1C BLD-MCNC: 6.6 % — HIGH (ref 4–5.6)
HCT VFR BLD CALC: 37.6 % — SIGNIFICANT CHANGE UP (ref 34.5–45)
HGB BLD-MCNC: 11.7 G/DL — SIGNIFICANT CHANGE UP (ref 11.5–15.5)
MCHC RBC-ENTMCNC: 27.7 PG — SIGNIFICANT CHANGE UP (ref 27–34)
MCHC RBC-ENTMCNC: 31.1 GM/DL — LOW (ref 32–36)
MCV RBC AUTO: 88.9 FL — SIGNIFICANT CHANGE UP (ref 80–100)
NRBC # BLD: 0 /100 WBCS — SIGNIFICANT CHANGE UP (ref 0–0)
PLATELET # BLD AUTO: 210 K/UL — SIGNIFICANT CHANGE UP (ref 150–400)
POTASSIUM SERPL-MCNC: 4.4 MMOL/L — SIGNIFICANT CHANGE UP (ref 3.5–5.3)
POTASSIUM SERPL-SCNC: 4.4 MMOL/L — SIGNIFICANT CHANGE UP (ref 3.5–5.3)
RBC # BLD: 4.23 M/UL — SIGNIFICANT CHANGE UP (ref 3.8–5.2)
RBC # FLD: 12.4 % — SIGNIFICANT CHANGE UP (ref 10.3–14.5)
SODIUM SERPL-SCNC: 137 MMOL/L — SIGNIFICANT CHANGE UP (ref 135–145)
WBC # BLD: 17.63 K/UL — HIGH (ref 3.8–10.5)
WBC # FLD AUTO: 17.63 K/UL — HIGH (ref 3.8–10.5)

## 2019-07-01 PROCEDURE — 99232 SBSQ HOSP IP/OBS MODERATE 35: CPT

## 2019-07-01 RX ORDER — GABAPENTIN 400 MG/1
100 CAPSULE ORAL EVERY 8 HOURS
Refills: 0 | Status: DISCONTINUED | OUTPATIENT
Start: 2019-07-01 | End: 2019-07-03

## 2019-07-01 RX ORDER — DEXTROSE 50 % IN WATER 50 %
25 SYRINGE (ML) INTRAVENOUS ONCE
Refills: 0 | Status: DISCONTINUED | OUTPATIENT
Start: 2019-07-01 | End: 2019-07-04

## 2019-07-01 RX ORDER — DEXTROSE 50 % IN WATER 50 %
12.5 SYRINGE (ML) INTRAVENOUS ONCE
Refills: 0 | Status: DISCONTINUED | OUTPATIENT
Start: 2019-07-01 | End: 2019-07-04

## 2019-07-01 RX ORDER — SODIUM CHLORIDE 9 MG/ML
1000 INJECTION, SOLUTION INTRAVENOUS
Refills: 0 | Status: DISCONTINUED | OUTPATIENT
Start: 2019-07-01 | End: 2019-07-04

## 2019-07-01 RX ORDER — INSULIN LISPRO 100/ML
7 VIAL (ML) SUBCUTANEOUS ONCE
Refills: 0 | Status: DISCONTINUED | OUTPATIENT
Start: 2019-07-01 | End: 2019-07-02

## 2019-07-01 RX ORDER — INSULIN LISPRO 100/ML
VIAL (ML) SUBCUTANEOUS AT BEDTIME
Refills: 0 | Status: DISCONTINUED | OUTPATIENT
Start: 2019-07-01 | End: 2019-07-04

## 2019-07-01 RX ORDER — INSULIN LISPRO 100/ML
VIAL (ML) SUBCUTANEOUS
Refills: 0 | Status: DISCONTINUED | OUTPATIENT
Start: 2019-07-01 | End: 2019-07-04

## 2019-07-01 RX ORDER — DEXTROSE 50 % IN WATER 50 %
15 SYRINGE (ML) INTRAVENOUS ONCE
Refills: 0 | Status: DISCONTINUED | OUTPATIENT
Start: 2019-07-01 | End: 2019-07-04

## 2019-07-01 RX ORDER — GLUCAGON INJECTION, SOLUTION 0.5 MG/.1ML
1 INJECTION, SOLUTION SUBCUTANEOUS ONCE
Refills: 0 | Status: DISCONTINUED | OUTPATIENT
Start: 2019-07-01 | End: 2019-07-04

## 2019-07-01 RX ADMIN — ATORVASTATIN CALCIUM 40 MILLIGRAM(S): 80 TABLET, FILM COATED ORAL at 21:41

## 2019-07-01 RX ADMIN — BUDESONIDE AND FORMOTEROL FUMARATE DIHYDRATE 2 PUFF(S): 160; 4.5 AEROSOL RESPIRATORY (INHALATION) at 05:38

## 2019-07-01 RX ADMIN — INSULIN GLARGINE 26 UNIT(S): 100 INJECTION, SOLUTION SUBCUTANEOUS at 08:38

## 2019-07-01 RX ADMIN — Medication 25 MILLIGRAM(S): at 05:38

## 2019-07-01 RX ADMIN — Medication 1000 UNIT(S): at 12:18

## 2019-07-01 RX ADMIN — Medication 81 MILLIGRAM(S): at 12:16

## 2019-07-01 RX ADMIN — Medication 100 MILLIGRAM(S): at 21:41

## 2019-07-01 RX ADMIN — PANTOPRAZOLE SODIUM 40 MILLIGRAM(S): 20 TABLET, DELAYED RELEASE ORAL at 06:23

## 2019-07-01 RX ADMIN — BUDESONIDE AND FORMOTEROL FUMARATE DIHYDRATE 2 PUFF(S): 160; 4.5 AEROSOL RESPIRATORY (INHALATION) at 17:58

## 2019-07-01 RX ADMIN — Medication 3 MILLILITER(S): at 23:39

## 2019-07-01 RX ADMIN — GABAPENTIN 100 MILLIGRAM(S): 400 CAPSULE ORAL at 21:41

## 2019-07-01 RX ADMIN — HEPARIN SODIUM 5000 UNIT(S): 5000 INJECTION INTRAVENOUS; SUBCUTANEOUS at 17:58

## 2019-07-01 RX ADMIN — Medication 100 MILLIGRAM(S): at 05:38

## 2019-07-01 RX ADMIN — AZITHROMYCIN 500 MILLIGRAM(S): 500 TABLET, FILM COATED ORAL at 12:16

## 2019-07-01 RX ADMIN — Medication 3 MILLILITER(S): at 06:14

## 2019-07-01 RX ADMIN — Medication 100 MILLIGRAM(S): at 13:24

## 2019-07-01 RX ADMIN — Medication 12: at 17:58

## 2019-07-01 RX ADMIN — Medication 2: at 21:41

## 2019-07-01 RX ADMIN — Medication 25 MILLIGRAM(S): at 12:16

## 2019-07-01 RX ADMIN — GABAPENTIN 100 MILLIGRAM(S): 400 CAPSULE ORAL at 13:24

## 2019-07-01 RX ADMIN — ALBUTEROL 1 PUFF(S): 90 AEROSOL, METERED ORAL at 05:37

## 2019-07-01 RX ADMIN — Medication 6: at 12:15

## 2019-07-01 RX ADMIN — Medication 3 MILLILITER(S): at 11:41

## 2019-07-01 RX ADMIN — ALBUTEROL 1 PUFF(S): 90 AEROSOL, METERED ORAL at 13:25

## 2019-07-01 RX ADMIN — INSULIN GLARGINE 28 UNIT(S): 100 INJECTION, SOLUTION SUBCUTANEOUS at 21:41

## 2019-07-01 RX ADMIN — Medication 20 MILLIGRAM(S): at 05:38

## 2019-07-01 RX ADMIN — ALBUTEROL 1 PUFF(S): 90 AEROSOL, METERED ORAL at 09:16

## 2019-07-01 RX ADMIN — ALBUTEROL 1 PUFF(S): 90 AEROSOL, METERED ORAL at 17:59

## 2019-07-01 RX ADMIN — HEPARIN SODIUM 5000 UNIT(S): 5000 INJECTION INTRAVENOUS; SUBCUTANEOUS at 05:38

## 2019-07-01 NOTE — PHARMACY COMMUNICATION NOTE - COMMENTS
Confirmed with MD that he would like to continue with the neurontin and the lyrica, and will monitor pt  closely.

## 2019-07-02 ENCOUNTER — TRANSCRIPTION ENCOUNTER (OUTPATIENT)
Age: 69
End: 2019-07-02

## 2019-07-02 LAB
GLUCOSE BLDC GLUCOMTR-MCNC: 159 MG/DL — HIGH (ref 70–99)
GLUCOSE BLDC GLUCOMTR-MCNC: 231 MG/DL — HIGH (ref 70–99)
GLUCOSE BLDC GLUCOMTR-MCNC: 289 MG/DL — HIGH (ref 70–99)
GLUCOSE BLDC GLUCOMTR-MCNC: 327 MG/DL — HIGH (ref 70–99)

## 2019-07-02 PROCEDURE — 99232 SBSQ HOSP IP/OBS MODERATE 35: CPT

## 2019-07-02 RX ORDER — DOCUSATE SODIUM 100 MG
1 CAPSULE ORAL
Qty: 0 | Refills: 0 | DISCHARGE
Start: 2019-07-02

## 2019-07-02 RX ORDER — INSULIN LISPRO 100/ML
7 VIAL (ML) SUBCUTANEOUS
Refills: 0 | Status: DISCONTINUED | OUTPATIENT
Start: 2019-07-02 | End: 2019-07-04

## 2019-07-02 RX ORDER — INSULIN GLARGINE 100 [IU]/ML
35 INJECTION, SOLUTION SUBCUTANEOUS EVERY MORNING
Refills: 0 | Status: DISCONTINUED | OUTPATIENT
Start: 2019-07-02 | End: 2019-07-04

## 2019-07-02 RX ORDER — BUDESONIDE AND FORMOTEROL FUMARATE DIHYDRATE 160; 4.5 UG/1; UG/1
2 AEROSOL RESPIRATORY (INHALATION)
Qty: 0 | Refills: 0 | DISCHARGE
Start: 2019-07-02

## 2019-07-02 RX ORDER — TIOTROPIUM BROMIDE 18 UG/1
1 CAPSULE ORAL; RESPIRATORY (INHALATION)
Qty: 0 | Refills: 0 | DISCHARGE
Start: 2019-07-02

## 2019-07-02 RX ORDER — FLUTICASONE PROPIONATE 220 MCG
220 AEROSOL WITH ADAPTER (GRAM) INHALATION
Qty: 0 | Refills: 0 | DISCHARGE

## 2019-07-02 RX ORDER — ALBUTEROL 90 UG/1
2 AEROSOL, METERED ORAL EVERY 6 HOURS
Refills: 0 | Status: DISCONTINUED | OUTPATIENT
Start: 2019-07-02 | End: 2019-07-04

## 2019-07-02 RX ADMIN — Medication 2 DROP(S): at 12:35

## 2019-07-02 RX ADMIN — Medication 25 MILLIGRAM(S): at 05:59

## 2019-07-02 RX ADMIN — ALBUTEROL 1 PUFF(S): 90 AEROSOL, METERED ORAL at 05:56

## 2019-07-02 RX ADMIN — Medication 3 MILLILITER(S): at 11:09

## 2019-07-02 RX ADMIN — ATORVASTATIN CALCIUM 40 MILLIGRAM(S): 80 TABLET, FILM COATED ORAL at 21:42

## 2019-07-02 RX ADMIN — Medication 2 DROP(S): at 17:14

## 2019-07-02 RX ADMIN — HEPARIN SODIUM 5000 UNIT(S): 5000 INJECTION INTRAVENOUS; SUBCUTANEOUS at 05:59

## 2019-07-02 RX ADMIN — Medication 3 MILLILITER(S): at 17:11

## 2019-07-02 RX ADMIN — ALBUTEROL 1 PUFF(S): 90 AEROSOL, METERED ORAL at 11:18

## 2019-07-02 RX ADMIN — ALBUTEROL 1 PUFF(S): 90 AEROSOL, METERED ORAL at 14:15

## 2019-07-02 RX ADMIN — Medication 2: at 08:43

## 2019-07-02 RX ADMIN — Medication 100 MILLIGRAM(S): at 14:15

## 2019-07-02 RX ADMIN — GABAPENTIN 100 MILLIGRAM(S): 400 CAPSULE ORAL at 21:42

## 2019-07-02 RX ADMIN — Medication 8: at 11:18

## 2019-07-02 RX ADMIN — HEPARIN SODIUM 5000 UNIT(S): 5000 INJECTION INTRAVENOUS; SUBCUTANEOUS at 17:14

## 2019-07-02 RX ADMIN — AZITHROMYCIN 500 MILLIGRAM(S): 500 TABLET, FILM COATED ORAL at 11:20

## 2019-07-02 RX ADMIN — Medication 7 UNIT(S): at 08:43

## 2019-07-02 RX ADMIN — Medication 100 MILLIGRAM(S): at 21:42

## 2019-07-02 RX ADMIN — ALBUTEROL 1 PUFF(S): 90 AEROSOL, METERED ORAL at 06:43

## 2019-07-02 RX ADMIN — PANTOPRAZOLE SODIUM 40 MILLIGRAM(S): 20 TABLET, DELAYED RELEASE ORAL at 05:59

## 2019-07-02 RX ADMIN — Medication 7 UNIT(S): at 17:15

## 2019-07-02 RX ADMIN — Medication 100 MILLIGRAM(S): at 05:59

## 2019-07-02 RX ADMIN — Medication 6: at 17:15

## 2019-07-02 RX ADMIN — BUDESONIDE AND FORMOTEROL FUMARATE DIHYDRATE 2 PUFF(S): 160; 4.5 AEROSOL RESPIRATORY (INHALATION) at 17:16

## 2019-07-02 RX ADMIN — Medication 1000 UNIT(S): at 11:20

## 2019-07-02 RX ADMIN — Medication 3 MILLILITER(S): at 05:28

## 2019-07-02 RX ADMIN — GABAPENTIN 100 MILLIGRAM(S): 400 CAPSULE ORAL at 14:15

## 2019-07-02 RX ADMIN — Medication 7 UNIT(S): at 11:21

## 2019-07-02 RX ADMIN — INSULIN GLARGINE 35 UNIT(S): 100 INJECTION, SOLUTION SUBCUTANEOUS at 08:43

## 2019-07-02 RX ADMIN — Medication 25 MILLIGRAM(S): at 11:20

## 2019-07-02 RX ADMIN — GABAPENTIN 100 MILLIGRAM(S): 400 CAPSULE ORAL at 05:59

## 2019-07-02 RX ADMIN — Medication 81 MILLIGRAM(S): at 11:20

## 2019-07-02 RX ADMIN — Medication 20 MILLIGRAM(S): at 05:59

## 2019-07-02 NOTE — DISCHARGE NOTE PROVIDER - CARE PROVIDER_API CALL
Scott Penaloza (MD)  Medicine  [address], [address]  [place] [address]  Phone: [phone]  Fax: [phone]  Follow Up Time: 1 week    PMD: Dr. Ivania Orosco,   Phone: (   )    -  Fax: (   )    -  Follow Up Time:     Arya Lindsey)  Internal Medicine  [address] [place] [address]  Phone: [phone]  Fax: [phone]  Follow Up Time: 1 week

## 2019-07-02 NOTE — DISCHARGE NOTE PROVIDER - PROVIDER TOKENS
PROVIDER:[TOKEN:[[id number]],FOLLOWUP:[1 week]],FREE:[LAST:[PMD: Dr. Ivania Orosco],PHONE:[(   )    -],FAX:[(   )    -]],PROVIDER:[TOKEN:[[id number]],FOLLOWUP:[1 week]]

## 2019-07-02 NOTE — CONSULT NOTE ADULT - SUBJECTIVE AND OBJECTIVE BOX
Patient is a 68y old  Female who presents with a chief complaint of shortness of breath (01 Jul 2019 19:27)      HPI:    Pt is a 68 y.o. Female with PMHx of Type 2 DM, HTN, CAD, HLD, Anxiety and Asthma presented w/ SOA. She was admitted for acute Asthma exacerbation and started on steroids. Her blood glucose was running in the 500's yesterday and Endocrine consulted for management of DM. Patient is currently resting, speaks little English. No family at bedside. Pt states of being diagnosed w/ Type 2 DM over 12 years. Per medical records, she is currently taking Lantus 22 units qhs and Metformin  mg daily. Her Hgb A1c was 6.6% on this admission. She denies of new complaints. Unable to obtain further history.     PAST MEDICAL & SURGICAL HISTORY:  Hypertension  Coronary artery disease with other form of angina pectoris  Type 2 diabetes mellitus without complication, without long-term current use of insulin  No significant past surgical history      Diabetes History:    FAMILY HISTORY:        Social History:    Allergies    No Known Allergies    Intolerances        MEDICATIONS  (STANDING):  ALBUTerol    90 MICROgram(s) HFA Inhaler 1 Puff(s) Inhalation every 4 hours  ALBUTerol/ipratropium for Nebulization 3 milliLiter(s) Nebulizer every 6 hours  aspirin enteric coated 81 milliGRAM(s) Oral daily  atorvastatin 40 milliGRAM(s) Oral at bedtime  azithromycin   Tablet 500 milliGRAM(s) Oral daily  buDESOnide 160 MICROgram(s)/formoterol 4.5 MICROgram(s) Inhaler 2 Puff(s) Inhalation two times a day  cholecalciferol 1000 Unit(s) Oral daily  dextrose 5%. 1000 milliLiter(s) (50 mL/Hr) IV Continuous <Continuous>  dextrose 50% Injectable 12.5 Gram(s) IV Push once  dextrose 50% Injectable 25 Gram(s) IV Push once  dextrose 50% Injectable 25 Gram(s) IV Push once  docusate sodium 100 milliGRAM(s) Oral three times a day  gabapentin 100 milliGRAM(s) Oral every 8 hours  heparin  Injectable 5000 Unit(s) SubCutaneous every 12 hours  insulin glargine Injectable (LANTUS) 35 Unit(s) SubCutaneous every morning  insulin lispro (HumaLOG) corrective regimen sliding scale   SubCutaneous three times a day before meals  insulin lispro (HumaLOG) corrective regimen sliding scale   SubCutaneous at bedtime  insulin lispro Injectable (HumaLOG) 7 Unit(s) SubCutaneous three times a day with meals  metoprolol succinate ER 25 milliGRAM(s) Oral daily  pantoprazole    Tablet 40 milliGRAM(s) Oral before breakfast  predniSONE   Tablet 20 milliGRAM(s) Oral daily  pregabalin 25 milliGRAM(s) Oral daily  tiotropium 18 MICROgram(s) Capsule 1 Capsule(s) Inhalation daily    MEDICATIONS  (PRN):  dextrose 40% Gel 15 Gram(s) Oral once PRN Blood Glucose LESS THAN 70 milliGRAM(s)/deciliter  glucagon  Injectable 1 milliGRAM(s) IntraMuscular once PRN Glucose LESS THAN 70 milligrams/deciliter  hydrALAZINE Injectable 10 milliGRAM(s) IV Push every 6 hours PRN SBP > 165bpm      REVIEW OF SYSTEMS:  Unable to obtain ROS.     T(C): 36.7 (07-02-19 @ 05:19), Max: 37.1 (07-01-19 @ 18:48)  HR: 78 (07-02-19 @ 05:28) (63 - 83)  BP: 157/76 (07-02-19 @ 05:19) (134/54 - 157/76)  RR: 18 (07-02-19 @ 05:19) (17 - 18)  SpO2: 98% (07-02-19 @ 05:28) (96% - 100%)  Wt(kg): --    PHYSICAL EXAM:  GENERAL: NAD, well-groomed, well-developed  HEAD:  Atraumatic, Normocephalic  NECK: Supple, No JVD, Normal thyroid  NERVOUS SYSTEM:  Alert & Oriented X3, Good concentration  CHEST/LUNG: Diminished bilateral breath sounds.   HEART: Regular rate and rhythm; No murmurs, rubs, or gallops  ABDOMEN: Soft, Nontender, Nondistended; Bowel sounds present  EXTREMITIES:  2+ Peripheral Pulses, No clubbing, cyanosis, or edema  LYMPH: No lymphadenopathy noted  SKIN: No rashes or lesions    CAPILLARY BLOOD GLUCOSE      POCT Blood Glucose.: 159 mg/dL (02 Jul 2019 08:42)  POCT Blood Glucose.: 272 mg/dL (01 Jul 2019 21:40)  POCT Blood Glucose.: 284 mg/dL (01 Jul 2019 20:00)  POCT Blood Glucose.: 410 mg/dL (01 Jul 2019 17:57)  POCT Blood Glucose.: 541 mg/dL (01 Jul 2019 12:15)                            11.7   17.63 )-----------( 210      ( 01 Jul 2019 07:23 )             37.6       CMP:  07-01 @ 07:23  SGPT --  Albumin --   Alk Phos --   Anion Gap 6   SGOT --   Total Bili --   BUN 39   Calcium Total 8.9   CO2 31   Chloride 100   Creatinine 1.05   eGFR if AA 62   eGFR if non AA 54   Glucose 129   Potassium 4.4   Protein --   Sodium 137      Thyroid Function Tests:      Diabetes Tests: 07-01 @ 10:44 HbA1c 6.6 C-Peptide --       Parathyroids:     Adrenals:       Radiology:
Patient is a 68y old  Female who presents with a chief complaint of sob    HPI: 68 year female with HTN, DM, Obesity, Stented CAD and Asthma(uses Ventolin only on regular basis).  Came with sob, cough, scant phlegm which started like a cold. Reports having sore throat, runny nose for 5-6 days, then sob, cough. Denies fever or chills.   In ED with Respiratory distress and low SPO2, got placed on BIPAP. Non smoker.    PAST MEDICAL & SURGICAL HISTORY:  Hypertension  Coronary artery disease with other form of angina pectoris  Type 2 diabetes mellitus without complication, without long-term current use of insulin  No significant past surgical history    FAMILY HISTORY: denies    SOCIAL HISTORY: non smoker    Allergies  No Known Allergies    MEDICATIONS  (STANDING):  ALBUTerol/ipratropium for Nebulization 3 milliLiter(s) Nebulizer every 8 hours  heparin  Injectable 5000 Unit(s) SubCutaneous every 12 hours  methylPREDNISolone sodium succinate Injectable 40 milliGRAM(s) IV Push every 8 hours  tiotropium 18 MICROgram(s) Capsule 1 Capsule(s) Inhalation daily    REVIEW OF SYSTEMS:    Constitutional:            No fever, weight loss or fatigue  HEENT:                      runny nose  Respiratory:                sob and cough.  Cardiovascular:           No chest pain, palpitations  Gastrointestinal:        No abdominal or epigastric pain. No N/V/diarrhea or hematemesis  Genitourinary:            constipation  SKIN:                           no rash  Musculoskeletal:        No joint pain or swelling  Extremities:                No swelling  Neurological:              No headaches  Psychiatric:                 No depression, anxiety    Vital Signs Last 24 Hrs  T(C): 37.2 (28 Jun 2019 11:04), Max: 37.2 (28 Jun 2019 11:04)  T(F): 99 (28 Jun 2019 11:04), Max: 99 (28 Jun 2019 11:04)  HR: 82 (28 Jun 2019 13:24) (82 - 99)  BP: 164/66 (28 Jun 2019 11:25) (164/66 - 199/76)  BP(mean): --  RR: 21 (28 Jun 2019 11:25) (21 - 23)  SpO2: 98% (28 Jun 2019 13:24) (63% - 99%)    PHYSICAL EXAM:  GEN:         Awake, responsive and comfortable.  HEENT:    Normal.    RESP:         decreased air entry.  CVS:             Regular rate and rhythm.   ABD:         Soft, non-tender, non-distended;   :             No costovertebral angle tenderness  SKIN:           Warm and dry.  EXTR:            No clubbing, cyanosis or edema  CNS:              Intact sensory and motor function.  PSYCH:        cooperative, no anxiety or depression    LABS:                        12.9   12.43 )-----------( 266      ( 28 Jun 2019 11:33 )             42.0     06-28    137  |  96  |  18  ----------------------------<  184<H>  4.7   |  33<H>  |  1.06    Ca    8.7      28 Jun 2019 11:33    TPro  7.9  /  Alb  3.5  /  TBili  0.4  /  DBili  x   /  AST  18  /  ALT  26  /  AlkPhos  94  06-28    PT/INR - ( 28 Jun 2019 11:33 )   PT: 10.7 sec;   INR: 0.96 ratio      PTT - ( 28 Jun 2019 11:33 )  PTT:29.0 sec  06-28 @ 12:07  pH: 7.26  pCO2: 75  pO2: 425  SaO2: 99    EKG: sinus rhythm    RADIOLOGY & ADDITIONAL STUDIES:  < from: Xray Chest 1 View-PORTABLE IMMEDIATE (06.28.19 @ 11:59) >    EXAM:  XR CHEST PORTABLE IMMED 1V                          PROCEDURE DATE:  06/28/2019      INTERPRETATION:  Single AP view of the chest    Comparison:05/30/2019    Clinical Indication:sob    FINDINGS/  IMPRESSION:No focal consolidation or pleural effusion. Calcification   overlies left lung apex. Heart size is within normal limits.    HEYDI DIOP M.D., ATTENDING RADIOLOGIST  This document has been electronically signed. Jun 28 2019 12:09PM      ASSESSMENT AND PLAN:  ·	Acute Hypercarbic Respiratory failure.  ·	Moderate persistent Asthma with acute exacerbation.  ·	Leukocytosis.  ·	Stented CAD.  ·	HTN.  ·	DM.  ·	Obesity.  ·	Suspect XIAO.    Supplemental O2 with PRN BIPAP.  Nebulizer and short course of steroids.  Will add antibiotics.  DVT prophylaxis.  BP and DM control.  PFT, Sleep study out pt.

## 2019-07-02 NOTE — DISCHARGE NOTE PROVIDER - HOSPITAL COURSE
67 y/o female with PMHx of Asthma, HLD, and Anxiety that presents to the ED after acute onset of shortness of breath. Impression was Asthma exacerbation. The patient was admitted to medical bed. Pulmonary was consulted and followed the patient. The patient was treated with oxygen, IV steroids and duonebs. The patient improved and IV steroids was transitioned to oral prednisone. The patient is recommended to have Rehab for unsteady gait and will be discharged to one. 69 y/o female with PMHx of Asthma, HLD, and Anxiety that presents to the ED after acute onset of shortness of breath. Impression was Asthma exacerbation. The patient was admitted to medical bed. Pulmonary was consulted and followed the patient. The patient was treated with oxygen, IV steroids and duonebs. The patient improved and IV steroids was transitioned to oral prednisone. The patient is ambulating without assistance. She will be discharged with outpatient PT.

## 2019-07-02 NOTE — CONSULT NOTE ADULT - ASSESSMENT
Steroid induced Hyperglycemia, Type 2 DM, Hgb A1c of 6.6% on this admission.  On Prednisone 20 mg daily.    - Switch Lantus to 35 units q am.  - Start Humalog 7 units TID AC.  - On Moderate SSI AC/HS.  - Monitor finger sticks and will adjust accordingly.     Thank you for allowing us to participate in patient's care.

## 2019-07-03 DIAGNOSIS — E09.9 DRUG OR CHEMICAL INDUCED DIABETES MELLITUS WITHOUT COMPLICATIONS: ICD-10-CM

## 2019-07-03 LAB
CULTURE RESULTS: SIGNIFICANT CHANGE UP
CULTURE RESULTS: SIGNIFICANT CHANGE UP
GLUCOSE BLDC GLUCOMTR-MCNC: 172 MG/DL — HIGH (ref 70–99)
GLUCOSE BLDC GLUCOMTR-MCNC: 180 MG/DL — HIGH (ref 70–99)
GLUCOSE BLDC GLUCOMTR-MCNC: 199 MG/DL — HIGH (ref 70–99)
GLUCOSE BLDC GLUCOMTR-MCNC: 235 MG/DL — HIGH (ref 70–99)
SPECIMEN SOURCE: SIGNIFICANT CHANGE UP
SPECIMEN SOURCE: SIGNIFICANT CHANGE UP

## 2019-07-03 PROCEDURE — 99232 SBSQ HOSP IP/OBS MODERATE 35: CPT

## 2019-07-03 RX ADMIN — HEPARIN SODIUM 5000 UNIT(S): 5000 INJECTION INTRAVENOUS; SUBCUTANEOUS at 05:46

## 2019-07-03 RX ADMIN — Medication 1000 UNIT(S): at 11:47

## 2019-07-03 RX ADMIN — Medication 2: at 17:08

## 2019-07-03 RX ADMIN — INSULIN GLARGINE 35 UNIT(S): 100 INJECTION, SOLUTION SUBCUTANEOUS at 08:33

## 2019-07-03 RX ADMIN — Medication 81 MILLIGRAM(S): at 11:47

## 2019-07-03 RX ADMIN — GABAPENTIN 100 MILLIGRAM(S): 400 CAPSULE ORAL at 05:46

## 2019-07-03 RX ADMIN — Medication 50 MILLIGRAM(S): at 22:12

## 2019-07-03 RX ADMIN — Medication 20 MILLIGRAM(S): at 05:46

## 2019-07-03 RX ADMIN — Medication 25 MILLIGRAM(S): at 05:46

## 2019-07-03 RX ADMIN — Medication 100 MILLIGRAM(S): at 05:46

## 2019-07-03 RX ADMIN — Medication 4: at 11:47

## 2019-07-03 RX ADMIN — Medication 100 MILLIGRAM(S): at 15:12

## 2019-07-03 RX ADMIN — HEPARIN SODIUM 5000 UNIT(S): 5000 INJECTION INTRAVENOUS; SUBCUTANEOUS at 17:09

## 2019-07-03 RX ADMIN — Medication 7 UNIT(S): at 17:08

## 2019-07-03 RX ADMIN — Medication 2 DROP(S): at 23:17

## 2019-07-03 RX ADMIN — Medication 7 UNIT(S): at 08:33

## 2019-07-03 RX ADMIN — ATORVASTATIN CALCIUM 40 MILLIGRAM(S): 80 TABLET, FILM COATED ORAL at 22:11

## 2019-07-03 RX ADMIN — ALBUTEROL 2 PUFF(S): 90 AEROSOL, METERED ORAL at 05:50

## 2019-07-03 RX ADMIN — Medication 2: at 08:32

## 2019-07-03 RX ADMIN — Medication 2 DROP(S): at 11:47

## 2019-07-03 RX ADMIN — Medication 100 MILLIGRAM(S): at 22:12

## 2019-07-03 RX ADMIN — Medication 7 UNIT(S): at 11:47

## 2019-07-03 RX ADMIN — PANTOPRAZOLE SODIUM 40 MILLIGRAM(S): 20 TABLET, DELAYED RELEASE ORAL at 05:46

## 2019-07-03 RX ADMIN — BUDESONIDE AND FORMOTEROL FUMARATE DIHYDRATE 2 PUFF(S): 160; 4.5 AEROSOL RESPIRATORY (INHALATION) at 17:09

## 2019-07-03 RX ADMIN — BUDESONIDE AND FORMOTEROL FUMARATE DIHYDRATE 2 PUFF(S): 160; 4.5 AEROSOL RESPIRATORY (INHALATION) at 05:53

## 2019-07-03 RX ADMIN — Medication 50 MILLIGRAM(S): at 15:12

## 2019-07-03 RX ADMIN — Medication 2 DROP(S): at 17:09

## 2019-07-03 RX ADMIN — Medication 25 MILLIGRAM(S): at 11:47

## 2019-07-03 RX ADMIN — Medication 2 DROP(S): at 05:45

## 2019-07-04 ENCOUNTER — TRANSCRIPTION ENCOUNTER (OUTPATIENT)
Age: 69
End: 2019-07-04

## 2019-07-04 VITALS
RESPIRATION RATE: 16 BRPM | TEMPERATURE: 99 F | HEART RATE: 74 BPM | DIASTOLIC BLOOD PRESSURE: 69 MMHG | SYSTOLIC BLOOD PRESSURE: 131 MMHG | OXYGEN SATURATION: 95 %

## 2019-07-04 LAB
GLUCOSE BLDC GLUCOMTR-MCNC: 178 MG/DL — HIGH (ref 70–99)
GLUCOSE BLDC GLUCOMTR-MCNC: 196 MG/DL — HIGH (ref 70–99)
GLUCOSE BLDC GLUCOMTR-MCNC: 309 MG/DL — HIGH (ref 70–99)

## 2019-07-04 PROCEDURE — 99239 HOSP IP/OBS DSCHRG MGMT >30: CPT

## 2019-07-04 RX ORDER — GABAPENTIN 400 MG/1
0 CAPSULE ORAL
Qty: 0 | Refills: 0 | DISCHARGE

## 2019-07-04 RX ORDER — ALBUTEROL 90 UG/1
2 AEROSOL, METERED ORAL
Qty: 0 | Refills: 0 | DISCHARGE

## 2019-07-04 RX ORDER — BUDESONIDE AND FORMOTEROL FUMARATE DIHYDRATE 160; 4.5 UG/1; UG/1
2 AEROSOL RESPIRATORY (INHALATION)
Qty: 1 | Refills: 0
Start: 2019-07-04 | End: 2019-08-02

## 2019-07-04 RX ORDER — TIOTROPIUM BROMIDE 18 UG/1
1 CAPSULE ORAL; RESPIRATORY (INHALATION)
Qty: 1 | Refills: 0
Start: 2019-07-04 | End: 2019-08-02

## 2019-07-04 RX ORDER — ALBUTEROL 90 UG/1
2 AEROSOL, METERED ORAL
Qty: 1 | Refills: 0
Start: 2019-07-04 | End: 2019-08-02

## 2019-07-04 RX ADMIN — BUDESONIDE AND FORMOTEROL FUMARATE DIHYDRATE 2 PUFF(S): 160; 4.5 AEROSOL RESPIRATORY (INHALATION) at 05:11

## 2019-07-04 RX ADMIN — Medication 8: at 12:17

## 2019-07-04 RX ADMIN — Medication 2: at 08:24

## 2019-07-04 RX ADMIN — Medication 25 MILLIGRAM(S): at 05:11

## 2019-07-04 RX ADMIN — Medication 81 MILLIGRAM(S): at 12:17

## 2019-07-04 RX ADMIN — Medication 2 DROP(S): at 05:11

## 2019-07-04 RX ADMIN — Medication 7 UNIT(S): at 16:59

## 2019-07-04 RX ADMIN — BUDESONIDE AND FORMOTEROL FUMARATE DIHYDRATE 2 PUFF(S): 160; 4.5 AEROSOL RESPIRATORY (INHALATION) at 17:00

## 2019-07-04 RX ADMIN — Medication 2: at 16:59

## 2019-07-04 RX ADMIN — INSULIN GLARGINE 35 UNIT(S): 100 INJECTION, SOLUTION SUBCUTANEOUS at 08:25

## 2019-07-04 RX ADMIN — HEPARIN SODIUM 5000 UNIT(S): 5000 INJECTION INTRAVENOUS; SUBCUTANEOUS at 17:00

## 2019-07-04 RX ADMIN — Medication 2 DROP(S): at 12:17

## 2019-07-04 RX ADMIN — Medication 2 DROP(S): at 17:00

## 2019-07-04 RX ADMIN — Medication 50 MILLIGRAM(S): at 05:11

## 2019-07-04 RX ADMIN — Medication 7 UNIT(S): at 12:17

## 2019-07-04 RX ADMIN — Medication 50 MILLIGRAM(S): at 14:13

## 2019-07-04 RX ADMIN — Medication 7 UNIT(S): at 08:24

## 2019-07-04 RX ADMIN — Medication 100 MILLIGRAM(S): at 05:11

## 2019-07-04 RX ADMIN — PANTOPRAZOLE SODIUM 40 MILLIGRAM(S): 20 TABLET, DELAYED RELEASE ORAL at 05:12

## 2019-07-04 RX ADMIN — Medication 20 MILLIGRAM(S): at 05:11

## 2019-07-04 RX ADMIN — Medication 100 MILLIGRAM(S): at 14:13

## 2019-07-04 RX ADMIN — HEPARIN SODIUM 5000 UNIT(S): 5000 INJECTION INTRAVENOUS; SUBCUTANEOUS at 05:11

## 2019-07-04 RX ADMIN — Medication 1000 UNIT(S): at 12:17

## 2019-07-04 NOTE — PROGRESS NOTE ADULT - PROBLEM SELECTOR PLAN 1
Secondary to Asthma  pt declines Bipap  Tolerating room air with normal mentation  continue with oxygen as needed.
Secondary to Asthma; resolved now  Tolerating room air with normal mentation
mainly steroid induced hyperglycemia as HbA1C is fair for her age   Continue with the same regimen while inpatient   once steroids are decreased expect better finger sticks   Patient can resume  home regimen upon discharge
steroid induced hyperglycemia   Continue with the same regimen while inpatient   once steroids are decreased expect better finger sticks   Patient should have strict diet control and do exercise as tolerated upon discharge
Secondary to Asthma  Tolerating room air with normal mentation  continue with oxygen as needed.

## 2019-07-04 NOTE — PROGRESS NOTE ADULT - PROBLEM SELECTOR PLAN 3
- c/w ASA  - c/w Toprol XL    Echo: EF 55-60%  -patient has pending appointment with [facility] cardiology
- c/w ASA  - c/w Toprol XL  -f/u Echo  -patient has pending appointment with [facility] cardiology
- c/w ASA  - c/w Toprol XL    Echo: EF 55-60%  -patient has pending appointment with [facility] cardiology
- c/w ASA  - c/w Toprol XL    Echo: EF 55-60%  -patient has pending appointment with [facility] cardiology
- c/w ASA  - c/w Toprol XL    Echo: EF 55-60%  -patient has pending appointment with [facility] cardiology
- c/w ASA  - c/w Toprol XL  -f/u Echo  -patient has pending appointment with [facility] cardiology

## 2019-07-04 NOTE — PROGRESS NOTE ADULT - PROBLEM SELECTOR PLAN 7
- resume oral Vitamin D replacement.

## 2019-07-04 NOTE — PROGRESS NOTE ADULT - PROBLEM SELECTOR PROBLEM 2
Moderate persistent asthma with exacerbation

## 2019-07-04 NOTE — PROGRESS NOTE ADULT - PROBLEM SELECTOR PLAN 6
- emily LIAO AC and HS  - hold metformin  - Lantus 22 U QHS
- emily LIAO AC and HS  - hold metformin  - Lantus 22 U QHS
Hga1c: 6.6%  Uncontrolled in the setting of Steroid treatment.   Endocrine consult appreciated.   continue with Lantus, FS monitoring w/ insulin s/s coverage.  Lyrica for neuropathy
Hga1c: 6.6%  Uncontrolled in the setting of Steroid treatment.   - accuchecks Q AC and HS  - hold metformin  - Lantus 22 U QHS
Hga1c: 6.6%  Uncontrolled in the setting of Steroid treatment.   Endocrine consult appreciated.   continue with Lantus, FS monitoring w/ insulin s/s coverage.
Hga1c: 6.6%  Uncontrolled in the setting of Steroid treatment.   Endocrine consult appreciated.   continue with Lantus, FS monitoring w/ insulin s/s coverage.  Lyrica for neuropathy

## 2019-07-04 NOTE — PROGRESS NOTE ADULT - PROBLEM SELECTOR PLAN 4
- trop is mildly elevated   -Most likely due to demand ischemia  -f/u Echo
- trop is mildly elevated   -Most likely due to demand ischemia  -f/u Echo
- trop is mildly elevated   -presumed demand ischemia

## 2019-07-04 NOTE — PROGRESS NOTE ADULT - PROBLEM SELECTOR PLAN 2
Bipap D/aminta   Pulmonary consult appreciated.   improving, will continue with tapering steroids.   cont w/ Duonebs, Symbicort and Spiriva
Pulmonary consult appreciated.   improving, will continue with tapering steroids.   cont w/ Duonebs, Symbicort and Spiriva

## 2019-07-04 NOTE — PROGRESS NOTE ADULT - SUBJECTIVE AND OBJECTIVE BOX
Patient is a 68y old  Female who presents with a chief complaint of shortness of breath (03 Jul 2019 13:46)      INTERVAL HPI/ OVERNIGHT EVENTS: Pt was seen and examined at bedside today, No significant overnight events, pt denies any wheezing, SOB     MEDICATIONS  (STANDING):  artificial  tears Solution 2 Drop(s) Both EYES four times a day  aspirin enteric coated 81 milliGRAM(s) Oral daily  atorvastatin 40 milliGRAM(s) Oral at bedtime  buDESOnide 160 MICROgram(s)/formoterol 4.5 MICROgram(s) Inhaler 2 Puff(s) Inhalation two times a day  cholecalciferol 1000 Unit(s) Oral daily  dextrose 5%. 1000 milliLiter(s) (50 mL/Hr) IV Continuous <Continuous>  dextrose 50% Injectable 12.5 Gram(s) IV Push once  dextrose 50% Injectable 25 Gram(s) IV Push once  dextrose 50% Injectable 25 Gram(s) IV Push once  docusate sodium 100 milliGRAM(s) Oral three times a day  heparin  Injectable 5000 Unit(s) SubCutaneous every 12 hours  insulin glargine Injectable (LANTUS) 35 Unit(s) SubCutaneous every morning  insulin lispro (HumaLOG) corrective regimen sliding scale   SubCutaneous three times a day before meals  insulin lispro (HumaLOG) corrective regimen sliding scale   SubCutaneous at bedtime  insulin lispro Injectable (HumaLOG) 7 Unit(s) SubCutaneous three times a day with meals  metoprolol succinate ER 25 milliGRAM(s) Oral daily  pantoprazole    Tablet 40 milliGRAM(s) Oral before breakfast  predniSONE   Tablet 20 milliGRAM(s) Oral daily  pregabalin 50 milliGRAM(s) Oral every 8 hours  tiotropium 18 MICROgram(s) Capsule 1 Capsule(s) Inhalation daily    MEDICATIONS  (PRN):  ALBUTerol    90 MICROgram(s) HFA Inhaler 2 Puff(s) Inhalation every 6 hours PRN Shortness of Breath and/or Wheezing  dextrose 40% Gel 15 Gram(s) Oral once PRN Blood Glucose LESS THAN 70 milliGRAM(s)/deciliter  glucagon  Injectable 1 milliGRAM(s) IntraMuscular once PRN Glucose LESS THAN 70 milligrams/deciliter  hydrALAZINE Injectable 10 milliGRAM(s) IV Push every 6 hours PRN SBP > 165bpm      Allergies    No Known Allergies    Intolerances        REVIEW OF SYSTEMS:    Unable to examine due to [ ] Encephalopathy [ ] Advanced Dementia [ ] Expressive Aphasia [ ] Non-verbal patient    CONSTITUTIONAL: No fever, NO generalized weakness/Fatigue, No weight loss  EYES: No eye pain, visual disturbances, or discharge  ENMT:  No difficulty hearing, tinnitus, vertigo; No sinus or throat pain  NECK: No pain or stiffness  RESPIRATORY: No shortness of breath,  cough, wheezing, sputum or hemoptysis   CARDIOVASCULAR: No chest pain, palpitations, or leg swelling  GASTROINTESTINAL: No abdominal pain. No nausea, vomiting, diarrhea or constipation. No melena or hematochezia.  GENITOURINARY: No dysuria, frequency, hematuria, or incontinence  NEUROLOGICAL: peripheral neuropathy, Unsteady gait, No headaches, Dizziness, memory loss, loss of strength, numbness, or tremors  SKIN: No itching, burning, rashes, or lesions   MUSCULOSKELETAL: No joint pain or swelling; No muscle, back, or extremity pain  PSYCHIATRIC: No depression, anxiety, mood swings, or difficulty sleeping  HEME/LYMPH: No easy bruising, or bleeding gums      Vital Signs Last 24 Hrs  T(C): 36.3 (04 Jul 2019 04:56), Max: 36.7 (03 Jul 2019 11:40)  T(F): 97.4 (04 Jul 2019 04:56), Max: 98.1 (03 Jul 2019 17:45)  HR: 67 (04 Jul 2019 04:56) (65 - 75)  BP: 163/79 (04 Jul 2019 04:56) (137/70 - 163/79)  BP(mean): --  RR: 17 (04 Jul 2019 04:56) (17 - 19)  SpO2: 98% (04 Jul 2019 04:56) (96% - 98%)    PHYSICAL EXAM:  GENERAL: NAD, well-developed, well-groomed  HEAD:  Atraumatic, Normocephalic  EYES: conjunctiva and sclera clear  ENMT: Moist mucous membranes  NECK: Supple, No JVD, Normal thyroid  CHEST/LUNG: Clear to Auscultation bilaterally; No rales, rhonchi, wheezing, or rubs  HEART: Regular rate and rhythm; No murmurs, rubs, or gallops  ABDOMEN: Soft, Nontender, Nondistended; Bowel sounds present  EXTREMITIES:  2+ Peripheral Pulses, No clubbing, cyanosis, or edema  SKIN: No rashes or lesions  NERVOUS SYSTEM:  Alert & Oriented X3, Good concentration; Motor Strength 5/5 B/L upper and lower extremities    LABS:              CAPILLARY BLOOD GLUCOSE      POCT Blood Glucose.: 196 mg/dL (04 Jul 2019 07:57)  POCT Blood Glucose.: 199 mg/dL (03 Jul 2019 21:17)  POCT Blood Glucose.: 172 mg/dL (03 Jul 2019 17:05)  POCT Blood Glucose.: 235 mg/dL (03 Jul 2019 11:16)        Culture - Blood (collected 06-28-19)  Source: .Blood  Final Report (07-03-19):    No growth at 5 days.    Culture - Blood (collected 06-28-19)  Source: .Blood  Final Report (07-03-19):    No growth at 5 days.        RADIOLOGY & ADDITIONAL TESTS:          Imaging Personally Reviewed:  [ ] YES  [ ] NO    Consultant(s) Notes Reviewed:  [ ] YES  [ ] NO    Care Discussed with Consultants/Other Providers [x ] YES  [ ] NO
Patient is a 68y old  Female who presents with a chief complaint of shortness of breath (28 Jun 2019 19:50)      INTERVAL HPI/ OVERNIGHT EVENTS: Pt was seen and examined at bedside today, Pt refused Bipap overnight,  pt denies any CP or SoB.      MEDICATIONS  (STANDING):  ALBUTerol/ipratropium for Nebulization 3 milliLiter(s) Nebulizer every 6 hours  aspirin enteric coated 81 milliGRAM(s) Oral daily  atorvastatin 40 milliGRAM(s) Oral at bedtime  azithromycin   Tablet 500 milliGRAM(s) Oral daily  buDESOnide 160 MICROgram(s)/formoterol 4.5 MICROgram(s) Inhaler 2 Puff(s) Inhalation two times a day  cholecalciferol 1000 Unit(s) Oral daily  gabapentin 100 milliGRAM(s) Oral daily  heparin  Injectable 5000 Unit(s) SubCutaneous every 12 hours  insulin glargine Injectable (LANTUS) 22 Unit(s) SubCutaneous at bedtime  methylPREDNISolone sodium succinate Injectable 40 milliGRAM(s) IV Push two times a day  metoprolol succinate ER 25 milliGRAM(s) Oral daily  pantoprazole    Tablet 40 milliGRAM(s) Oral before breakfast  tiotropium 18 MICROgram(s) Capsule 1 Capsule(s) Inhalation daily    MEDICATIONS  (PRN):  hydrALAZINE Injectable 10 milliGRAM(s) IV Push every 6 hours PRN SBP > 165bpm      Allergies    No Known Allergies    Intolerances        REVIEW OF SYSTEMS:    Unable to examine due to [ ] Encephalopathy [ ] Advanced Dementia [ ] Expressive Aphasia [ ] Non-verbal patient    CONSTITUTIONAL: No fever, NO generalized weakness/Fatigue, No weight loss  EYES: No eye pain, visual disturbances, or discharge  ENMT:  No difficulty hearing, tinnitus, vertigo; No sinus or throat pain  NECK: No pain or stiffness  RESPIRATORY: No shortness of breath,  cough, wheezing, sputum or hemoptysis   CARDIOVASCULAR: No chest pain, palpitations, or leg swelling  GASTROINTESTINAL: No abdominal pain. No nausea, vomiting, diarrhea or constipation. No melena or hematochezia.  GENITOURINARY: No dysuria, frequency, hematuria, or incontinence  NEUROLOGICAL: No headaches, Dizziness, memory loss, loss of strength, numbness, or tremors  SKIN: No itching, burning, rashes, or lesions   MUSCULOSKELETAL: No joint pain or swelling; No muscle, back, or extremity pain  PSYCHIATRIC: No depression, anxiety, mood swings, or difficulty sleeping  HEME/LYMPH: No easy bruising, or bleeding gums      Vital Signs Last 24 Hrs  T(C): 36 (29 Jun 2019 05:03), Max: 37.2 (28 Jun 2019 11:04)  T(F): 96.8 (29 Jun 2019 05:03), Max: 99 (28 Jun 2019 11:04)  HR: 64 (29 Jun 2019 05:03) (61 - 99)  BP: 186/68 (29 Jun 2019 05:03) (121/67 - 199/76)  BP(mean): --  RR: 18 (29 Jun 2019 05:03) (12 - 23)  SpO2: 93% (29 Jun 2019 05:03) (63% - 99%)    PHYSICAL EXAM:  GENERAL: NAD, well-developed, well-groomed  HEAD:  Atraumatic, Normocephalic  EYES: conjunctiva and sclera clear  ENMT: Moist mucous membranes  NECK: Supple, No JVD, Normal thyroid  CHEST/LUNG: Clear to Auscultation bilaterally; No rales, rhonchi, wheezing, or rubs  HEART: Regular rate and rhythm; No murmurs, rubs, or gallops  ABDOMEN: Soft, Nontender, Nondistended; Bowel sounds present  EXTREMITIES:  2+ Peripheral Pulses, No clubbing, cyanosis, or edema  SKIN: No rashes or lesions  NERVOUS SYSTEM:  Alert & Oriented X3, Good concentration; Motor Strength 5/5 B/L upper and lower extremities    LABS:                        11.8   9.25  )-----------( 218      ( 29 Jun 2019 07:13 )             37.9     06-29    136  |  96  |  32<H>  ----------------------------<  219<H>  4.6   |  33<H>  |  1.08    Ca    9.1      29 Jun 2019 07:13  Phos  3.8     06-29  Mg     2.5     06-29    TPro  7.9  /  Alb  3.5  /  TBili  0.4  /  DBili  x   /  AST  18  /  ALT  26  /  AlkPhos  94  06-28    PT/INR - ( 28 Jun 2019 11:33 )   PT: 10.7 sec;   INR: 0.96 ratio         PTT - ( 28 Jun 2019 11:33 )  PTT:29.0 sec    CAPILLARY BLOOD GLUCOSE      POCT Blood Glucose.: 374 mg/dL (28 Jun 2019 22:09)          RADIOLOGY & ADDITIONAL TESTS:          Imaging Personally Reviewed:  [ ] YES  [ ] NO    Consultant(s) Notes Reviewed:  [x ] YES  [ ] NO    Care Discussed with Consultants/Other Providers [x ] YES  [ ] NO
Patient is a 68y old  Female who presents with a chief complaint of shortness of breath (29 Jun 2019 18:04)      INTERVAL HPI/ OVERNIGHT EVENTS: Pt was seen and examined at bedside today, No significant overnight events, pt denies any CP, SOB or wheezing      MEDICATIONS  (STANDING):  ALBUTerol    90 MICROgram(s) HFA Inhaler 1 Puff(s) Inhalation every 4 hours  aspirin enteric coated 81 milliGRAM(s) Oral daily  atorvastatin 40 milliGRAM(s) Oral at bedtime  azithromycin   Tablet 500 milliGRAM(s) Oral daily  buDESOnide 160 MICROgram(s)/formoterol 4.5 MICROgram(s) Inhaler 2 Puff(s) Inhalation two times a day  cholecalciferol 1000 Unit(s) Oral daily  docusate sodium 100 milliGRAM(s) Oral three times a day  gabapentin 100 milliGRAM(s) Oral daily  heparin  Injectable 5000 Unit(s) SubCutaneous every 12 hours  insulin glargine Injectable (LANTUS) 22 Unit(s) SubCutaneous at bedtime  metoprolol succinate ER 25 milliGRAM(s) Oral daily  pantoprazole    Tablet 40 milliGRAM(s) Oral before breakfast  tiotropium 18 MICROgram(s) Capsule 1 Capsule(s) Inhalation daily    MEDICATIONS  (PRN):  hydrALAZINE Injectable 10 milliGRAM(s) IV Push every 6 hours PRN SBP > 165bpm      Allergies    No Known Allergies    Intolerances        REVIEW OF SYSTEMS:    Unable to examine due to [ ] Encephalopathy [ ] Advanced Dementia [ ] Expressive Aphasia [ ] Non-verbal patient    CONSTITUTIONAL: No fever, NO generalized weakness/Fatigue, No weight loss  EYES: No eye pain, visual disturbances, or discharge  ENMT:  No difficulty hearing, tinnitus, vertigo; No sinus or throat pain  NECK: No pain or stiffness  RESPIRATORY: No shortness of breath,  cough, wheezing, sputum or hemoptysis   CARDIOVASCULAR: No chest pain, palpitations, or leg swelling  GASTROINTESTINAL: No abdominal pain. No nausea, vomiting, diarrhea or constipation. No melena or hematochezia.  GENITOURINARY: No dysuria, frequency, hematuria, or incontinence  NEUROLOGICAL: No headaches, Dizziness, memory loss, loss of strength, numbness, or tremors  SKIN: No itching, burning, rashes, or lesions   MUSCULOSKELETAL: No joint pain or swelling; No muscle, back, or extremity pain  PSYCHIATRIC: No depression, anxiety, mood swings, or difficulty sleeping  HEME/LYMPH: No easy bruising, or bleeding gums      Vital Signs Last 24 Hrs  T(C): 36 (30 Jun 2019 05:35), Max: 36.4 (29 Jun 2019 12:46)  T(F): 96.8 (30 Jun 2019 05:35), Max: 97.6 (29 Jun 2019 12:46)  HR: 70 (30 Jun 2019 05:54) (61 - 85)  BP: 159/69 (30 Jun 2019 05:35) (127/77 - 160/69)  BP(mean): --  RR: 18 (30 Jun 2019 05:35) (18 - 18)  SpO2: 96% (30 Jun 2019 05:54) (92% - 98%)    PHYSICAL EXAM:  GENERAL: NAD, well-developed, well-groomed  HEAD:  Atraumatic, Normocephalic  EYES: conjunctiva and sclera clear  ENMT: Moist mucous membranes  NECK: Supple, No JVD, Normal thyroid  CHEST/LUNG: Clear to Auscultation bilaterally; No rales, rhonchi, wheezing, or rubs  HEART: Regular rate and rhythm; No murmurs, rubs, or gallops  ABDOMEN: Soft, Nontender, Nondistended; Bowel sounds present  EXTREMITIES:  2+ Peripheral Pulses, No clubbing, cyanosis, or edema  SKIN: No rashes or lesions  NERVOUS SYSTEM:  Alert & Oriented X3, Good concentration; Motor Strength 5/5 B/L upper and lower extremities    LABS:                        11.8   9.25  )-----------( 218      ( 29 Jun 2019 07:13 )             37.9     06-29    136  |  96  |  32<H>  ----------------------------<  219<H>  4.6   |  33<H>  |  1.08    Ca    9.1      29 Jun 2019 07:13  Phos  3.8     06-29  Mg     2.5     06-29          CAPILLARY BLOOD GLUCOSE      POCT Blood Glucose.: 386 mg/dL (29 Jun 2019 23:45)  POCT Blood Glucose.: 399 mg/dL (29 Jun 2019 21:41)        Culture - Blood (collected 06-28-19)  Source: .Blood  Preliminary Report (06-29-19):    No growth to date.    Culture - Blood (collected 06-28-19)  Source: .Blood  Preliminary Report (06-29-19):    No growth to date.        RADIOLOGY & ADDITIONAL TESTS:          Imaging Personally Reviewed:  [ ] YES  [ ] NO    Consultant(s) Notes Reviewed:  [x ] YES  [ ] NO    Care Discussed with Consultants/Other Providers [x ] YES  [ ] NO
INTERVAL HPI:   68 year female with HTN, DM, Obesity, Stented CAD and Asthma(uses Ventolin only on regular basis).  Came with sob, cough, scant phlegm which started like a cold. Reports having sore throat, runny nose for 5-6 days, then sob, cough. Denies fever or chills.   In ED with Respiratory distress and low SPO2, got placed on BIPAP. Non smoker.    OVERNIGHT EVENTS:  Feels better but still with constipation.    Vital Signs Last 24 Hrs  T(C): 36.7 (30 Jun 2019 11:39), Max: 36.7 (30 Jun 2019 11:39)  T(F): 98 (30 Jun 2019 11:39), Max: 98 (30 Jun 2019 11:39)  HR: 69 (30 Jun 2019 11:50) (61 - 82)  BP: 142/53 (30 Jun 2019 11:50) (127/77 - 160/69)  BP(mean): --  RR: 18 (30 Jun 2019 11:50) (16 - 18)  SpO2: 98% (30 Jun 2019 11:50) (92% - 98%)    PHYSICAL EXAM:  GEN:         Awake, responsive and comfortable.  HEENT:    Normal.    RESP:        no wheezing.  CVS:          Regular rate and rhythm.   ABD:         Soft, non-tender, non-distended;     MEDICATIONS  (STANDING):  ALBUTerol    90 MICROgram(s) HFA Inhaler 1 Puff(s) Inhalation every 4 hours  aspirin enteric coated 81 milliGRAM(s) Oral daily  atorvastatin 40 milliGRAM(s) Oral at bedtime  azithromycin   Tablet 500 milliGRAM(s) Oral daily  buDESOnide 160 MICROgram(s)/formoterol 4.5 MICROgram(s) Inhaler 2 Puff(s) Inhalation two times a day  cholecalciferol 1000 Unit(s) Oral daily  docusate sodium 100 milliGRAM(s) Oral three times a day  gabapentin 100 milliGRAM(s) Oral daily  heparin  Injectable 5000 Unit(s) SubCutaneous every 12 hours  insulin glargine Injectable (LANTUS) 22 Unit(s) SubCutaneous at bedtime  metoprolol succinate ER 25 milliGRAM(s) Oral daily  pantoprazole    Tablet 40 milliGRAM(s) Oral before breakfast  tiotropium 18 MICROgram(s) Capsule 1 Capsule(s) Inhalation daily    MEDICATIONS  (PRN):  hydrALAZINE Injectable 10 milliGRAM(s) IV Push every 6 hours PRN SBP > 165bpm    LABS:                        11.8   9.25  )-----------( 218      ( 29 Jun 2019 07:13 )             37.9     06-29    136  |  96  |  32<H>  ----------------------------<  219<H>  4.6   |  33<H>  |  1.08    Ca    9.1      29 Jun 2019 07:13  Phos  3.8     06-29  Mg     2.5     06-29 06-28 @ 15:17  pH: 7.29  pCO2: 69  pO2: 121  SaO2: 98  06-28 @ 12:07  pH: 7.26  pCO2: 75  pO2: 425  SaO2: 99    ASSESSMENT AND PLAN:  ·	Acute Hypercarbic Respiratory failure.  ·	Moderate persistent Asthma with acute exacerbation.  ·	Leukocytosis.  ·	Stented CAD.  ·	HTN.  ·	DM.  ·	Obesity.  ·	Suspect XIAO.    Continue Symbicort and nebulizer.  Continue O2 and antibiotics.  DC BIPAP.
INTERVAL HPI:  68 year female with HTN, DM, Obesity, Stented CAD and Asthma(uses Ventolin only on regular basis).  Came with sob, cough, scant phlegm which started like a cold. Reports having sore throat, runny nose for 5-6 days, then sob, cough. Denies fever or chills.   In ED with Respiratory distress and low SPO2, got placed on BIPAP. Non smoker.    OVERNIGHT EVENTS:  Awake, comfortable and feels better.    Vital Signs Last 24 Hrs  T(C): 36.8 (03 Jul 2019 04:32), Max: 36.9 (03 Jul 2019 00:01)  T(F): 98.2 (03 Jul 2019 04:32), Max: 98.5 (03 Jul 2019 00:01)  HR: 62 (03 Jul 2019 04:32) (62 - 81)  BP: 150/69 (03 Jul 2019 04:32) (132/67 - 156/77)  BP(mean): --  RR: 18 (03 Jul 2019 04:32) (17 - 18)  SpO2: 97% (03 Jul 2019 04:32) (97% - 100%)    PHYSICAL EXAM:  GEN:         Awake, responsive and comfortable.  HEENT:    Normal.    RESP:        no wheezing.  CVS:          Regular rate and rhythm.   ABD:         Soft, non-tender, non-distended;     MEDICATIONS  (STANDING):  artificial  tears Solution 2 Drop(s) Both EYES four times a day  aspirin enteric coated 81 milliGRAM(s) Oral daily  atorvastatin 40 milliGRAM(s) Oral at bedtime  buDESOnide 160 MICROgram(s)/formoterol 4.5 MICROgram(s) Inhaler 2 Puff(s) Inhalation two times a day  cholecalciferol 1000 Unit(s) Oral daily  dextrose 5%. 1000 milliLiter(s) (50 mL/Hr) IV Continuous <Continuous>  dextrose 50% Injectable 12.5 Gram(s) IV Push once  dextrose 50% Injectable 25 Gram(s) IV Push once  dextrose 50% Injectable 25 Gram(s) IV Push once  docusate sodium 100 milliGRAM(s) Oral three times a day  gabapentin 100 milliGRAM(s) Oral every 8 hours  heparin  Injectable 5000 Unit(s) SubCutaneous every 12 hours  insulin glargine Injectable (LANTUS) 35 Unit(s) SubCutaneous every morning  insulin lispro (HumaLOG) corrective regimen sliding scale   SubCutaneous three times a day before meals  insulin lispro (HumaLOG) corrective regimen sliding scale   SubCutaneous at bedtime  insulin lispro Injectable (HumaLOG) 7 Unit(s) SubCutaneous three times a day with meals  metoprolol succinate ER 25 milliGRAM(s) Oral daily  pantoprazole    Tablet 40 milliGRAM(s) Oral before breakfast  predniSONE   Tablet 20 milliGRAM(s) Oral daily  pregabalin 25 milliGRAM(s) Oral daily  tiotropium 18 MICROgram(s) Capsule 1 Capsule(s) Inhalation daily    MEDICATIONS  (PRN):  ALBUTerol    90 MICROgram(s) HFA Inhaler 2 Puff(s) Inhalation every 6 hours PRN Shortness of Breath and/or Wheezing  dextrose 40% Gel 15 Gram(s) Oral once PRN Blood Glucose LESS THAN 70 milliGRAM(s)/deciliter  glucagon  Injectable 1 milliGRAM(s) IntraMuscular once PRN Glucose LESS THAN 70 milligrams/deciliter  hydrALAZINE Injectable 10 milliGRAM(s) IV Push every 6 hours PRN SBP > 165bpm    06-28 @ 15:17  pH: 7.29  pCO2: 69  pO2: 121  SaO2: 98  06-28 @ 12:07  pH: 7.26  pCO2: 75  pO2: 425  SaO2: 99    ASSESSMENT AND PLAN:  ·	Acute Hypercarbic Respiratory failure.  ·	Moderate persistent Asthma with acute exacerbation.  ·	Leukocytosis.  ·	Stented CAD.  ·	HTN.  ·	DM.  ·	Obesity.  ·	Suspect XIAO.    Taper steroids over 5-7 days.  Continue Symbicort and nebulizer.  For YOON discharge.
INTERVAL HPI:  68 year female with HTN, DM, Obesity, Stented CAD and Asthma(uses Ventolin only on regular basis).  Came with sob, cough, scant phlegm which started like a cold. Reports having sore throat, runny nose for 5-6 days, then sob, cough. Denies fever or chills.   In ED with Respiratory distress and low SPO2, got placed on BIPAP. Non smoker.    OVERNIGHT EVENTS:  Feels better.    Vital Signs Last 24 Hrs  T(C): 36.2 (29 Jun 2019 17:20), Max: 36.8 (29 Jun 2019 00:46)  T(F): 97.2 (29 Jun 2019 17:20), Max: 98.2 (29 Jun 2019 00:46)  HR: 78 (29 Jun 2019 17:49) (61 - 85)  BP: 160/69 (29 Jun 2019 17:20) (138/65 - 186/68)  BP(mean): --  RR: 18 (29 Jun 2019 17:20) (18 - 18)  SpO2: 96% (29 Jun 2019 17:49) (93% - 97%)    PHYSICAL EXAM:  GEN:         Awake, responsive and comfortable.  HEENT:    Normal.    RESP:         no wheezing.  CVS:          Regular rate and rhythm.   ABD:         Soft, non-tender, non-distended;     MEDICATIONS  (STANDING):  ALBUTerol/ipratropium for Nebulization 3 milliLiter(s) Nebulizer every 6 hours  aspirin enteric coated 81 milliGRAM(s) Oral daily  atorvastatin 40 milliGRAM(s) Oral at bedtime  azithromycin   Tablet 500 milliGRAM(s) Oral daily  buDESOnide 160 MICROgram(s)/formoterol 4.5 MICROgram(s) Inhaler 2 Puff(s) Inhalation two times a day  cholecalciferol 1000 Unit(s) Oral daily  gabapentin 100 milliGRAM(s) Oral daily  heparin  Injectable 5000 Unit(s) SubCutaneous every 12 hours  insulin glargine Injectable (LANTUS) 22 Unit(s) SubCutaneous at bedtime  methylPREDNISolone sodium succinate Injectable 40 milliGRAM(s) IV Push two times a day  metoprolol succinate ER 25 milliGRAM(s) Oral daily  pantoprazole    Tablet 40 milliGRAM(s) Oral before breakfast  tiotropium 18 MICROgram(s) Capsule 1 Capsule(s) Inhalation daily    MEDICATIONS  (PRN):  hydrALAZINE Injectable 10 milliGRAM(s) IV Push every 6 hours PRN SBP > 165bpm    LABS:                        11.8   9.25  )-----------( 218      ( 29 Jun 2019 07:13 )             37.9     06-29    136  |  96  |  32<H>  ----------------------------<  219<H>  4.6   |  33<H>  |  1.08    Ca    9.1      29 Jun 2019 07:13  Phos  3.8     06-29  Mg     2.5     06-29    TPro  7.9  /  Alb  3.5  /  TBili  0.4  /  DBili  x   /  AST  18  /  ALT  26  /  AlkPhos  94  06-28    PT/INR - ( 28 Jun 2019 11:33 )   PT: 10.7 sec;   INR: 0.96 ratio      PTT - ( 28 Jun 2019 11:33 )  PTT:29.0 sec  06-28 @ 15:17  pH: 7.29  pCO2: 69  pO2: 121  SaO2: 98  06-28 @ 12:07  pH: 7.26  pCO2: 75  pO2: 425  SaO2: 99    ASSESSMENT AND PLAN:  ·	Acute Hypercarbic Respiratory failure.  ·	Moderate persistent Asthma with acute exacerbation.  ·	Leukocytosis.  ·	Stented CAD.  ·	HTN.  ·	DM.  ·	Obesity.  ·	Suspect XIAO.    Will start steroid taper.  DC BIPAP.  Continue O2, antibiotics.  DVT prophylaxis.  BP and DM control.  PFT, Sleep study out pt.
INTERVAL HPI:  68 year female with HTN, DM, Obesity, Stented CAD and Asthma(uses Ventolin only on regular basis).  Came with sob, cough, scant phlegm which started like a cold. Reports having sore throat, runny nose for 5-6 days, then sob, cough. Denies fever or chills.   In ED with Respiratory distress and low SPO2, got placed on BIPAP. Non smoker.    OVERNIGHT EVENTS:  Feels better.    Vital Signs Last 24 Hrs  T(C): 36.7 (02 Jul 2019 16:12), Max: 37.1 (01 Jul 2019 18:48)  T(F): 98 (02 Jul 2019 16:12), Max: 98.7 (01 Jul 2019 18:48)  HR: 78 (02 Jul 2019 16:12) (63 - 84)  BP: 156/77 (02 Jul 2019 16:12) (132/67 - 157/76)  BP(mean): 91 (02 Jul 2019 05:19) (80 - 91)  RR: 18 (02 Jul 2019 16:12) (17 - 18)  SpO2: 100% (02 Jul 2019 16:12) (96% - 100%)    PHYSICAL EXAM:  GEN:         Awake, responsive and comfortable.  HEENT:    Normal.    RESP:        no wheezing.  CVS:          Regular rate and rhythm.   ABD:         Soft, non-tender, non-distended;     MEDICATIONS  (STANDING):  ALBUTerol    90 MICROgram(s) HFA Inhaler 1 Puff(s) Inhalation every 4 hours  ALBUTerol/ipratropium for Nebulization 3 milliLiter(s) Nebulizer every 6 hours  artificial  tears Solution 2 Drop(s) Both EYES four times a day  aspirin enteric coated 81 milliGRAM(s) Oral daily  atorvastatin 40 milliGRAM(s) Oral at bedtime  buDESOnide 160 MICROgram(s)/formoterol 4.5 MICROgram(s) Inhaler 2 Puff(s) Inhalation two times a day  cholecalciferol 1000 Unit(s) Oral daily  dextrose 5%. 1000 milliLiter(s) (50 mL/Hr) IV Continuous <Continuous>  dextrose 50% Injectable 12.5 Gram(s) IV Push once  dextrose 50% Injectable 25 Gram(s) IV Push once  dextrose 50% Injectable 25 Gram(s) IV Push once  docusate sodium 100 milliGRAM(s) Oral three times a day  gabapentin 100 milliGRAM(s) Oral every 8 hours  heparin  Injectable 5000 Unit(s) SubCutaneous every 12 hours  insulin glargine Injectable (LANTUS) 35 Unit(s) SubCutaneous every morning  insulin lispro (HumaLOG) corrective regimen sliding scale   SubCutaneous three times a day before meals  insulin lispro (HumaLOG) corrective regimen sliding scale   SubCutaneous at bedtime  insulin lispro Injectable (HumaLOG) 7 Unit(s) SubCutaneous three times a day with meals  metoprolol succinate ER 25 milliGRAM(s) Oral daily  pantoprazole    Tablet 40 milliGRAM(s) Oral before breakfast  predniSONE   Tablet 20 milliGRAM(s) Oral daily  pregabalin 25 milliGRAM(s) Oral daily  tiotropium 18 MICROgram(s) Capsule 1 Capsule(s) Inhalation daily    MEDICATIONS  (PRN):  dextrose 40% Gel 15 Gram(s) Oral once PRN Blood Glucose LESS THAN 70 milliGRAM(s)/deciliter  glucagon  Injectable 1 milliGRAM(s) IntraMuscular once PRN Glucose LESS THAN 70 milligrams/deciliter  hydrALAZINE Injectable 10 milliGRAM(s) IV Push every 6 hours PRN SBP > 165bpm    LABS:                        11.7   17.63 )-----------( 210      ( 01 Jul 2019 07:23 )             37.6     07-01    137  |  100  |  39<H>  ----------------------------<  129<H>  4.4   |  31  |  1.05    Ca    8.9      01 Jul 2019 07:23    06-28 @ 15:17  pH: 7.29  pCO2: 69  pO2: 121  SaO2: 98  06-28 @ 12:07  pH: 7.26  pCO2: 75  pO2: 425  SaO2: 99    ASSESSMENT AND PLAN:  ·	Acute Hypercarbic Respiratory failure.  ·	Moderate persistent Asthma with acute exacerbation.  ·	Leukocytosis.  ·	Stented CAD.  ·	HTN.  ·	DM.  ·	Obesity.  ·	Suspect XIAO.    Taper steroids over 5-7 days.  Continue Symbicort and nebulizer.  For YOON discharge.
INTERVAL HPI:  68 year female with HTN, DM, Obesity, Stented CAD and Asthma(uses Ventolin only on regular basis).  Came with sob, cough, scant phlegm which started like a cold. Reports having sore throat, runny nose for 5-6 days, then sob, cough. Denies fever or chills.   In ED with Respiratory distress and low SPO2, got placed on BIPAP. Non smoker.    OVERNIGHT EVENTS:  Out of bed to chair, feels better.    Vital Signs Last 24 Hrs  T(C): 37.1 (01 Jul 2019 18:48), Max: 37.1 (01 Jul 2019 18:48)  T(F): 98.7 (01 Jul 2019 18:48), Max: 98.7 (01 Jul 2019 18:48)  HR: 71 (01 Jul 2019 18:48) (61 - 79)  BP: 149/70 (01 Jul 2019 18:48) (139/52 - 166/70)  BP(mean): --  RR: 18 (01 Jul 2019 18:48) (17 - 18)  SpO2: 99% (01 Jul 2019 18:48) (95% - 99%)    PHYSICAL EXAM:  GEN:         Awake, responsive and comfortable.  HEENT:    Normal.    RESP:       no wheezing.  CVS:          Regular rate and rhythm.   ABD:         Soft, non-tender, non-distended;     MEDICATIONS  (STANDING):  ALBUTerol    90 MICROgram(s) HFA Inhaler 1 Puff(s) Inhalation every 4 hours  ALBUTerol/ipratropium for Nebulization 3 milliLiter(s) Nebulizer every 6 hours  aspirin enteric coated 81 milliGRAM(s) Oral daily  atorvastatin 40 milliGRAM(s) Oral at bedtime  azithromycin   Tablet 500 milliGRAM(s) Oral daily  buDESOnide 160 MICROgram(s)/formoterol 4.5 MICROgram(s) Inhaler 2 Puff(s) Inhalation two times a day  cholecalciferol 1000 Unit(s) Oral daily  dextrose 5%. 1000 milliLiter(s) (50 mL/Hr) IV Continuous <Continuous>  dextrose 50% Injectable 12.5 Gram(s) IV Push once  dextrose 50% Injectable 25 Gram(s) IV Push once  dextrose 50% Injectable 25 Gram(s) IV Push once  docusate sodium 100 milliGRAM(s) Oral three times a day  gabapentin 100 milliGRAM(s) Oral every 8 hours  heparin  Injectable 5000 Unit(s) SubCutaneous every 12 hours  insulin glargine Injectable (LANTUS) 26 Unit(s) SubCutaneous every morning  insulin glargine Injectable (LANTUS) 28 Unit(s) SubCutaneous at bedtime  insulin lispro (HumaLOG) corrective regimen sliding scale   SubCutaneous three times a day before meals  insulin lispro (HumaLOG) corrective regimen sliding scale   SubCutaneous at bedtime  metoprolol succinate ER 25 milliGRAM(s) Oral daily  pantoprazole    Tablet 40 milliGRAM(s) Oral before breakfast  predniSONE   Tablet 20 milliGRAM(s) Oral daily  pregabalin 25 milliGRAM(s) Oral daily  tiotropium 18 MICROgram(s) Capsule 1 Capsule(s) Inhalation daily    MEDICATIONS  (PRN):  dextrose 40% Gel 15 Gram(s) Oral once PRN Blood Glucose LESS THAN 70 milliGRAM(s)/deciliter  glucagon  Injectable 1 milliGRAM(s) IntraMuscular once PRN Glucose LESS THAN 70 milligrams/deciliter  hydrALAZINE Injectable 10 milliGRAM(s) IV Push every 6 hours PRN SBP > 165bpm  insulin lispro Injectable (HumaLOG) 7 Unit(s) SubCutaneous once PRN FS>350    LABS:                        11.7   17.63 )-----------( 210      ( 01 Jul 2019 07:23 )             37.6     07-01    137  |  100  |  39<H>  ----------------------------<  129<H>  4.4   |  31  |  1.05    Ca    8.9      01 Jul 2019 07:23    06-28 @ 15:17  pH: 7.29  pCO2: 69  pO2: 121  SaO2: 98  06-28 @ 12:07  pH: 7.26  pCO2: 75  pO2: 425  SaO2: 99    ASSESSMENT AND PLAN:  ·	Acute Hypercarbic Respiratory failure.  ·	Moderate persistent Asthma with acute exacerbation.  ·	Leukocytosis.  ·	Stented CAD.  ·	HTN.  ·	DM.  ·	Obesity.  ·	Suspect XIAO.    Reduce steroids.  Continue Symbicort and nebulizer.  Continue O2 and antibiotics.  Being considered for YOON.
Patient is a 68y old  Female who presents with a chief complaint of shortness of breath (02 Jul 2019 09:17)      INTERVAL HPI/ OVERNIGHT EVENTS: Pt was seen and examined at bedside today, No significant overnight events, pt denies CP or SOB.      MEDICATIONS  (STANDING):  ALBUTerol    90 MICROgram(s) HFA Inhaler 1 Puff(s) Inhalation every 4 hours  ALBUTerol/ipratropium for Nebulization 3 milliLiter(s) Nebulizer every 6 hours  artificial  tears Solution 2 Drop(s) Both EYES four times a day  aspirin enteric coated 81 milliGRAM(s) Oral daily  atorvastatin 40 milliGRAM(s) Oral at bedtime  buDESOnide 160 MICROgram(s)/formoterol 4.5 MICROgram(s) Inhaler 2 Puff(s) Inhalation two times a day  cholecalciferol 1000 Unit(s) Oral daily  dextrose 5%. 1000 milliLiter(s) (50 mL/Hr) IV Continuous <Continuous>  dextrose 50% Injectable 12.5 Gram(s) IV Push once  dextrose 50% Injectable 25 Gram(s) IV Push once  dextrose 50% Injectable 25 Gram(s) IV Push once  docusate sodium 100 milliGRAM(s) Oral three times a day  gabapentin 100 milliGRAM(s) Oral every 8 hours  heparin  Injectable 5000 Unit(s) SubCutaneous every 12 hours  insulin glargine Injectable (LANTUS) 35 Unit(s) SubCutaneous every morning  insulin lispro (HumaLOG) corrective regimen sliding scale   SubCutaneous three times a day before meals  insulin lispro (HumaLOG) corrective regimen sliding scale   SubCutaneous at bedtime  insulin lispro Injectable (HumaLOG) 7 Unit(s) SubCutaneous three times a day with meals  metoprolol succinate ER 25 milliGRAM(s) Oral daily  pantoprazole    Tablet 40 milliGRAM(s) Oral before breakfast  predniSONE   Tablet 20 milliGRAM(s) Oral daily  pregabalin 25 milliGRAM(s) Oral daily  tiotropium 18 MICROgram(s) Capsule 1 Capsule(s) Inhalation daily    MEDICATIONS  (PRN):  dextrose 40% Gel 15 Gram(s) Oral once PRN Blood Glucose LESS THAN 70 milliGRAM(s)/deciliter  glucagon  Injectable 1 milliGRAM(s) IntraMuscular once PRN Glucose LESS THAN 70 milligrams/deciliter  hydrALAZINE Injectable 10 milliGRAM(s) IV Push every 6 hours PRN SBP > 165bpm      Allergies    No Known Allergies    Intolerances        REVIEW OF SYSTEMS:    Unable to examine due to [ ] Encephalopathy [ ] Advanced Dementia [ ] Expressive Aphasia [ ] Non-verbal patient    CONSTITUTIONAL: No fever, NO generalized weakness/Fatigue, No weight loss  EYES: dry eyes, No eye pain, visual disturbances, or discharge  ENMT:  No difficulty hearing, tinnitus, vertigo; No sinus or throat pain  NECK: No pain or stiffness  RESPIRATORY: No shortness of breath,  cough, wheezing, sputum or hemoptysis   CARDIOVASCULAR: No chest pain, palpitations, or leg swelling  GASTROINTESTINAL: No abdominal pain. No nausea, vomiting, diarrhea or constipation. No melena or hematochezia.  GENITOURINARY: No dysuria, frequency, hematuria, or incontinence  NEUROLOGICAL: No headaches, Dizziness, memory loss, loss of strength, numbness, or tremors  SKIN: No itching, burning, rashes, or lesions   MUSCULOSKELETAL: No joint pain or swelling; No muscle, back, or extremity pain  PSYCHIATRIC: No depression, anxiety, mood swings, or difficulty sleeping  HEME/LYMPH: No easy bruising, or bleeding gums      Vital Signs Last 24 Hrs  T(C): 36.6 (02 Jul 2019 15:18), Max: 37.1 (01 Jul 2019 18:48)  T(F): 97.8 (02 Jul 2019 15:18), Max: 98.7 (01 Jul 2019 18:48)  HR: 81 (02 Jul 2019 15:18) (63 - 84)  BP: 132/67 (02 Jul 2019 15:18) (132/67 - 157/76)  BP(mean): 91 (02 Jul 2019 05:19) (80 - 91)  RR: 17 (02 Jul 2019 15:18) (17 - 18)  SpO2: 97% (02 Jul 2019 15:18) (96% - 100%)    PHYSICAL EXAM:  GENERAL: NAD, well-developed, well-groomed  HEAD:  Atraumatic, Normocephalic  EYES: conjunctiva and sclera clear  ENMT: Moist mucous membranes  NECK: Supple, No JVD, Normal thyroid  CHEST/LUNG: Clear to Auscultation bilaterally; No rales, rhonchi, wheezing, or rubs  HEART: Regular rate and rhythm; No murmurs, rubs, or gallops  ABDOMEN: Soft, Nontender, Nondistended; Bowel sounds present  EXTREMITIES:  2+ Peripheral Pulses, No clubbing, cyanosis, or edema  SKIN: No rashes or lesions  NERVOUS SYSTEM:  Alert & Oriented X3, Good concentration; Motor Strength 5/5 B/L upper and lower extremities    LABS:                        11.7   17.63 )-----------( 210      ( 01 Jul 2019 07:23 )             37.6     07-01    137  |  100  |  39<H>  ----------------------------<  129<H>  4.4   |  31  |  1.05    Ca    8.9      01 Jul 2019 07:23          CAPILLARY BLOOD GLUCOSE      POCT Blood Glucose.: 327 mg/dL (02 Jul 2019 11:17)  POCT Blood Glucose.: 159 mg/dL (02 Jul 2019 08:42)  POCT Blood Glucose.: 272 mg/dL (01 Jul 2019 21:40)  POCT Blood Glucose.: 284 mg/dL (01 Jul 2019 20:00)  POCT Blood Glucose.: 410 mg/dL (01 Jul 2019 17:57)        Culture - Blood (collected 06-28-19)  Source: .Blood  Preliminary Report (06-29-19):    No growth to date.    Culture - Blood (collected 06-28-19)  Source: .Blood  Preliminary Report (06-29-19):    No growth to date.        RADIOLOGY & ADDITIONAL TESTS:          Imaging Personally Reviewed:  [ ] YES  [ ] NO    Consultant(s) Notes Reviewed:  [x ] YES  [ ] NO    Care Discussed with Consultants/Other Providers [x ] YES  [ ] NO
Patient is a 68y old  Female who presents with a chief complaint of shortness of breath (03 Jul 2019 11:21)      Interval History: finger sticks are in high 100's and low 200's   transfer to rehab   also on Lantus 35 units and prandial lispro 7 units and Prednisone with taper   dce shortness of breath     MEDICATIONS  (STANDING):  artificial  tears Solution 2 Drop(s) Both EYES four times a day  aspirin enteric coated 81 milliGRAM(s) Oral daily  atorvastatin 40 milliGRAM(s) Oral at bedtime  buDESOnide 160 MICROgram(s)/formoterol 4.5 MICROgram(s) Inhaler 2 Puff(s) Inhalation two times a day  cholecalciferol 1000 Unit(s) Oral daily  dextrose 5%. 1000 milliLiter(s) (50 mL/Hr) IV Continuous <Continuous>  dextrose 50% Injectable 12.5 Gram(s) IV Push once  dextrose 50% Injectable 25 Gram(s) IV Push once  dextrose 50% Injectable 25 Gram(s) IV Push once  docusate sodium 100 milliGRAM(s) Oral three times a day  gabapentin 100 milliGRAM(s) Oral every 8 hours  heparin  Injectable 5000 Unit(s) SubCutaneous every 12 hours  insulin glargine Injectable (LANTUS) 35 Unit(s) SubCutaneous every morning  insulin lispro (HumaLOG) corrective regimen sliding scale   SubCutaneous three times a day before meals  insulin lispro (HumaLOG) corrective regimen sliding scale   SubCutaneous at bedtime  insulin lispro Injectable (HumaLOG) 7 Unit(s) SubCutaneous three times a day with meals  metoprolol succinate ER 25 milliGRAM(s) Oral daily  pantoprazole    Tablet 40 milliGRAM(s) Oral before breakfast  predniSONE   Tablet 20 milliGRAM(s) Oral daily  pregabalin 25 milliGRAM(s) Oral daily  tiotropium 18 MICROgram(s) Capsule 1 Capsule(s) Inhalation daily    MEDICATIONS  (PRN):  ALBUTerol    90 MICROgram(s) HFA Inhaler 2 Puff(s) Inhalation every 6 hours PRN Shortness of Breath and/or Wheezing  dextrose 40% Gel 15 Gram(s) Oral once PRN Blood Glucose LESS THAN 70 milliGRAM(s)/deciliter  glucagon  Injectable 1 milliGRAM(s) IntraMuscular once PRN Glucose LESS THAN 70 milligrams/deciliter  hydrALAZINE Injectable 10 milliGRAM(s) IV Push every 6 hours PRN SBP > 165bpm      Allergies    No Known Allergies    Intolerances        REVIEW OF SYSTEMS:  CONSTITUTIONAL: no changes  EYES: No eye pain, visual disturbances, or discharge  ENMT:  No difficulty hearing, No sinus or throat pain  NECK: No pain or stiffness  RESPIRATORY: No cough, wheezing, chills or hemoptysis; No shortness of breath  CARDIOVASCULAR: No chest pain, palpitations or leg swelling  GASTROINTESTINAL: No abdominal or epigastric pain. No nausea, vomiting, or hematemesis; No diarrhea or constipation. No melena or hematochezia.  GENITOURINARY: No dysuria, frequency, hematuria, or incontinence  NEUROLOGICAL: No headaches, memory loss, loss of strength, numbness, or tremors  SKIN: No itching, burning, rashes, or lesions   ENDOCRINE: No heat or cold intolerance; No hair loss  MUSCULOSKELETAL: No joint pain or swelling; No muscle, back, or extremity pain  PSYCHIATRIC: No depression, anxiety, mood swings, or difficulty sleeping  HEME/LYMPH: No easy bruising, or bleeding gums  ALLERY AND IMMUNOLOGIC: No hives or eczema    Vital Signs Last 24 Hrs  T(C): 36.7 (03 Jul 2019 11:40), Max: 36.9 (03 Jul 2019 00:01)  T(F): 98 (03 Jul 2019 11:40), Max: 98.5 (03 Jul 2019 00:01)  HR: 75 (03 Jul 2019 11:40) (62 - 81)  BP: 147/77 (03 Jul 2019 11:40) (132/67 - 156/77)  BP(mean): --  RR: 18 (03 Jul 2019 11:40) (17 - 18)  SpO2: 97% (03 Jul 2019 11:40) (97% - 100%)    PHYSICAL EXAM:  GENERAL:   HEAD: Atraumatic, Normocephalic  EYES: PERRLA, conjunctiva and sclera clear  ENMT: No tonsillar erythema, exudates, or enlargement; Moist mucous membranes, Good dentition, No lesions  NECK: Supple, No JVD, Normal thyroid  NERVOUS SYSTEM:  Alert & Oriented X3, Good concentration; Motor Strength 5/5 B/L upper and lower extremities  CHEST/LUNG: Clear to auscultaion bilaterally; No rales, rhonchi, wheezing, or rubs  HEART: Regular rate and rhythm; No murmurs, rubs, or gallops  ABDOMEN: Soft, Nontender, Nondistended; Bowel sounds present  EXTREMITIES:  2+ Peripheral Pulses, no edema  SKIN: No rashes or lesions    LABS:        CAPILLARY BLOOD GLUCOSE      POCT Blood Glucose.: 235 mg/dL (03 Jul 2019 11:16)  POCT Blood Glucose.: 180 mg/dL (03 Jul 2019 08:10)  POCT Blood Glucose.: 231 mg/dL (02 Jul 2019 21:38)  POCT Blood Glucose.: 289 mg/dL (02 Jul 2019 17:13)    Lipid panel:           Thyroid:  Diabetes Tests:  Parathyroid Panel:  Adrenals:  RADIOLOGY & ADDITIONAL TESTS:    Imaging Personally Reviewed:  [ ] YES  [ ] NO    Consultant(s) Notes Reviewed:  [ ] YES  [ ] NO    Care Discussed with Consultants/Other Providers [ ] YES  [ ] NO
Patient is a 68y old  Female who presents with a chief complaint of shortness of breath (03 Jul 2019 13:18)      INTERVAL HPI/ OVERNIGHT EVENTS: Pt was seen and examined at bedside today, No significant overnight events, pt denies any CP and SOB. pt c/o neuropathic pain in b/l feet. Daughter at bedside and care plan was discussed.      MEDICATIONS  (STANDING):  artificial  tears Solution 2 Drop(s) Both EYES four times a day  aspirin enteric coated 81 milliGRAM(s) Oral daily  atorvastatin 40 milliGRAM(s) Oral at bedtime  buDESOnide 160 MICROgram(s)/formoterol 4.5 MICROgram(s) Inhaler 2 Puff(s) Inhalation two times a day  cholecalciferol 1000 Unit(s) Oral daily  dextrose 5%. 1000 milliLiter(s) (50 mL/Hr) IV Continuous <Continuous>  dextrose 50% Injectable 12.5 Gram(s) IV Push once  dextrose 50% Injectable 25 Gram(s) IV Push once  dextrose 50% Injectable 25 Gram(s) IV Push once  docusate sodium 100 milliGRAM(s) Oral three times a day  heparin  Injectable 5000 Unit(s) SubCutaneous every 12 hours  insulin glargine Injectable (LANTUS) 35 Unit(s) SubCutaneous every morning  insulin lispro (HumaLOG) corrective regimen sliding scale   SubCutaneous three times a day before meals  insulin lispro (HumaLOG) corrective regimen sliding scale   SubCutaneous at bedtime  insulin lispro Injectable (HumaLOG) 7 Unit(s) SubCutaneous three times a day with meals  metoprolol succinate ER 25 milliGRAM(s) Oral daily  pantoprazole    Tablet 40 milliGRAM(s) Oral before breakfast  predniSONE   Tablet 20 milliGRAM(s) Oral daily  pregabalin 50 milliGRAM(s) Oral every 8 hours  tiotropium 18 MICROgram(s) Capsule 1 Capsule(s) Inhalation daily    MEDICATIONS  (PRN):  ALBUTerol    90 MICROgram(s) HFA Inhaler 2 Puff(s) Inhalation every 6 hours PRN Shortness of Breath and/or Wheezing  dextrose 40% Gel 15 Gram(s) Oral once PRN Blood Glucose LESS THAN 70 milliGRAM(s)/deciliter  glucagon  Injectable 1 milliGRAM(s) IntraMuscular once PRN Glucose LESS THAN 70 milligrams/deciliter  hydrALAZINE Injectable 10 milliGRAM(s) IV Push every 6 hours PRN SBP > 165bpm      Allergies    No Known Allergies    Intolerances        REVIEW OF SYSTEMS:    Unable to examine due to [ ] Encephalopathy [ ] Advanced Dementia [ ] Expressive Aphasia [ ] Non-verbal patient    CONSTITUTIONAL: No fever, NO generalized weakness/Fatigue, No weight loss  EYES: No eye pain, visual disturbances, or discharge  ENMT:  No difficulty hearing, tinnitus, vertigo; No sinus or throat pain  NECK: No pain or stiffness  RESPIRATORY: No shortness of breath,  cough, wheezing, sputum or hemoptysis   CARDIOVASCULAR: No chest pain, palpitations, or leg swelling  GASTROINTESTINAL: No abdominal pain. No nausea, vomiting, diarrhea or constipation. No melena or hematochezia.  GENITOURINARY: No dysuria, frequency, hematuria, or incontinence  NEUROLOGICAL: No headaches, Dizziness, memory loss, loss of strength, numbness, or tremors  SKIN: No itching, burning, rashes, or lesions   MUSCULOSKELETAL: Burning neuropathy in b/l feet, No joint pain or swelling; No muscle, back, or extremity pain  PSYCHIATRIC: No depression, anxiety, mood swings, or difficulty sleeping  HEME/LYMPH: No easy bruising, or bleeding gums      Vital Signs Last 24 Hrs  T(C): 36.7 (03 Jul 2019 11:40), Max: 36.9 (03 Jul 2019 00:01)  T(F): 98 (03 Jul 2019 11:40), Max: 98.5 (03 Jul 2019 00:01)  HR: 75 (03 Jul 2019 11:40) (62 - 81)  BP: 147/77 (03 Jul 2019 11:40) (132/67 - 156/77)  BP(mean): --  RR: 18 (03 Jul 2019 11:40) (17 - 18)  SpO2: 97% (03 Jul 2019 11:40) (97% - 100%)    PHYSICAL EXAM:  GENERAL: NAD, well-developed, well-groomed  HEAD:  Atraumatic, Normocephalic  EYES: conjunctiva and sclera clear  ENMT: Moist mucous membranes  NECK: Supple, No JVD, Normal thyroid  CHEST/LUNG: Clear to Auscultation bilaterally; No rales, rhonchi, wheezing, or rubs  HEART: Regular rate and rhythm; No murmurs, rubs, or gallops  ABDOMEN: Soft, Nontender, Nondistended; Bowel sounds present  EXTREMITIES:  2+ Peripheral Pulses, No clubbing, cyanosis, or edema  SKIN: No rashes or lesions  NERVOUS SYSTEM:  Alert & Oriented X3, Good concentration; Motor Strength 5/5 B/L upper and lower extremities    LABS:              CAPILLARY BLOOD GLUCOSE      POCT Blood Glucose.: 235 mg/dL (03 Jul 2019 11:16)  POCT Blood Glucose.: 180 mg/dL (03 Jul 2019 08:10)  POCT Blood Glucose.: 231 mg/dL (02 Jul 2019 21:38)  POCT Blood Glucose.: 289 mg/dL (02 Jul 2019 17:13)        Culture - Blood (collected 06-28-19)  Source: .Blood  Preliminary Report (06-29-19):    No growth to date.    Culture - Blood (collected 06-28-19)  Source: .Blood  Preliminary Report (06-29-19):    No growth to date.        RADIOLOGY & ADDITIONAL TESTS:          Imaging Personally Reviewed:  [ ] YES  [ ] NO    Consultant(s) Notes Reviewed:  [x ] YES  [ ] NO    Care Discussed with Consultants/Other Providers [x ] YES  [ ] NO
Patient is a 68y old  Female who presents with a chief complaint of shortness of breath (04 Jul 2019 11:09)      Interval History: finger sticks are in low 300's especially after Prednisone   otherwise mainly in low 200's or high 100's   on Lantus 35 units and 7 units prandial lispro     MEDICATIONS  (STANDING):  artificial  tears Solution 2 Drop(s) Both EYES four times a day  aspirin enteric coated 81 milliGRAM(s) Oral daily  atorvastatin 40 milliGRAM(s) Oral at bedtime  buDESOnide 160 MICROgram(s)/formoterol 4.5 MICROgram(s) Inhaler 2 Puff(s) Inhalation two times a day  cholecalciferol 1000 Unit(s) Oral daily  dextrose 5%. 1000 milliLiter(s) (50 mL/Hr) IV Continuous <Continuous>  dextrose 50% Injectable 12.5 Gram(s) IV Push once  dextrose 50% Injectable 25 Gram(s) IV Push once  dextrose 50% Injectable 25 Gram(s) IV Push once  docusate sodium 100 milliGRAM(s) Oral three times a day  heparin  Injectable 5000 Unit(s) SubCutaneous every 12 hours  insulin glargine Injectable (LANTUS) 35 Unit(s) SubCutaneous every morning  insulin lispro (HumaLOG) corrective regimen sliding scale   SubCutaneous three times a day before meals  insulin lispro (HumaLOG) corrective regimen sliding scale   SubCutaneous at bedtime  insulin lispro Injectable (HumaLOG) 7 Unit(s) SubCutaneous three times a day with meals  metoprolol succinate ER 25 milliGRAM(s) Oral daily  pantoprazole    Tablet 40 milliGRAM(s) Oral before breakfast  predniSONE   Tablet 20 milliGRAM(s) Oral daily  pregabalin 50 milliGRAM(s) Oral every 8 hours  tiotropium 18 MICROgram(s) Capsule 1 Capsule(s) Inhalation daily    MEDICATIONS  (PRN):  ALBUTerol    90 MICROgram(s) HFA Inhaler 2 Puff(s) Inhalation every 6 hours PRN Shortness of Breath and/or Wheezing  dextrose 40% Gel 15 Gram(s) Oral once PRN Blood Glucose LESS THAN 70 milliGRAM(s)/deciliter  glucagon  Injectable 1 milliGRAM(s) IntraMuscular once PRN Glucose LESS THAN 70 milligrams/deciliter  hydrALAZINE Injectable 10 milliGRAM(s) IV Push every 6 hours PRN SBP > 165bpm      Allergies    No Known Allergies    Intolerances        REVIEW OF SYSTEMS:  CONSTITUTIONAL: no changes  EYES: No eye pain, visual disturbances, or discharge  ENMT:  No difficulty hearing, No sinus or throat pain  NECK: No pain or stiffness  RESPIRATORY: No cough, wheezing, chills or hemoptysis; No shortness of breath  CARDIOVASCULAR: No chest pain, palpitations or leg swelling  GASTROINTESTINAL: No abdominal or epigastric pain. No nausea, vomiting, or hematemesis; No diarrhea or constipation. No melena or hematochezia.  GENITOURINARY: No dysuria, frequency, hematuria, or incontinence  NEUROLOGICAL: No headaches, memory loss, loss of strength, numbness, or tremors  SKIN: No itching, burning, rashes, or lesions   ENDOCRINE: No heat or cold intolerance; No hair loss  MUSCULOSKELETAL: No joint pain or swelling; No muscle, back, or extremity pain  PSYCHIATRIC: No depression, anxiety, mood swings, or difficulty sleeping  HEME/LYMPH: No easy bruising, or bleeding gums  ALLERY AND IMMUNOLOGIC: No hives or eczema    Vital Signs Last 24 Hrs  T(C): 36.6 (04 Jul 2019 11:34), Max: 36.7 (03 Jul 2019 17:45)  T(F): 97.8 (04 Jul 2019 11:34), Max: 98.1 (03 Jul 2019 17:45)  HR: 64 (04 Jul 2019 11:34) (64 - 67)  BP: 152/65 (04 Jul 2019 11:34) (137/70 - 163/79)  BP(mean): --  RR: 17 (04 Jul 2019 11:34) (17 - 19)  SpO2: 97% (04 Jul 2019 11:34) (96% - 98%)    PHYSICAL EXAM:  GENERAL:   HEAD: Atraumatic, Normocephalic  EYES: PERRLA, conjunctiva and sclera clear  ENMT: No tonsillar erythema, exudates, or enlargement; Moist mucous membranes, Good dentition, No lesions  NECK: Supple, No JVD, Normal thyroid  NERVOUS SYSTEM:  Alert & Oriented X3, Good concentration; Motor Strength 5/5 B/L upper and lower extremities  CHEST/LUNG: Clear to auscultaion bilaterally; No rales, rhonchi, wheezing, or rubs  HEART: Regular rate and rhythm; No murmurs, rubs, or gallops  ABDOMEN: Soft, Nontender, Nondistended; Bowel sounds present  EXTREMITIES:  2+ Peripheral Pulses, no edema  SKIN: No rashes or lesions    LABS:        CAPILLARY BLOOD GLUCOSE      POCT Blood Glucose.: 309 mg/dL (04 Jul 2019 11:49)  POCT Blood Glucose.: 196 mg/dL (04 Jul 2019 07:57)  POCT Blood Glucose.: 199 mg/dL (03 Jul 2019 21:17)  POCT Blood Glucose.: 172 mg/dL (03 Jul 2019 17:05)    Lipid panel:           Thyroid:  Diabetes Tests:  Parathyroid Panel:  Adrenals:  RADIOLOGY & ADDITIONAL TESTS:    Imaging Personally Reviewed:  [ ] YES  [ ] NO    Consultant(s) Notes Reviewed:  [ ] YES  [ ] NO    Care Discussed with Consultants/Other Providers [ ] YES  [ ] NO
Patient is a 68y old  Female who presents with a chief complaint of shortness of breath (30 Jun 2019 15:32)      INTERVAL HPI/ OVERNIGHT EVENTS: Pt was seen and examined at bedside today, No significant overnight events, pt admits that breathing is back to baseline. Pt c/o neuropathy in b/l feet.      MEDICATIONS  (STANDING):  ALBUTerol    90 MICROgram(s) HFA Inhaler 1 Puff(s) Inhalation every 4 hours  ALBUTerol/ipratropium for Nebulization 3 milliLiter(s) Nebulizer every 6 hours  aspirin enteric coated 81 milliGRAM(s) Oral daily  atorvastatin 40 milliGRAM(s) Oral at bedtime  azithromycin   Tablet 500 milliGRAM(s) Oral daily  buDESOnide 160 MICROgram(s)/formoterol 4.5 MICROgram(s) Inhaler 2 Puff(s) Inhalation two times a day  cholecalciferol 1000 Unit(s) Oral daily  dextrose 5%. 1000 milliLiter(s) (50 mL/Hr) IV Continuous <Continuous>  dextrose 50% Injectable 12.5 Gram(s) IV Push once  dextrose 50% Injectable 25 Gram(s) IV Push once  dextrose 50% Injectable 25 Gram(s) IV Push once  docusate sodium 100 milliGRAM(s) Oral three times a day  gabapentin 100 milliGRAM(s) Oral every 8 hours  heparin  Injectable 5000 Unit(s) SubCutaneous every 12 hours  insulin glargine Injectable (LANTUS) 26 Unit(s) SubCutaneous every morning  insulin glargine Injectable (LANTUS) 28 Unit(s) SubCutaneous at bedtime  insulin lispro (HumaLOG) corrective regimen sliding scale   SubCutaneous three times a day before meals  insulin lispro (HumaLOG) corrective regimen sliding scale   SubCutaneous at bedtime  metoprolol succinate ER 25 milliGRAM(s) Oral daily  pantoprazole    Tablet 40 milliGRAM(s) Oral before breakfast  predniSONE   Tablet 20 milliGRAM(s) Oral daily  pregabalin 25 milliGRAM(s) Oral daily  tiotropium 18 MICROgram(s) Capsule 1 Capsule(s) Inhalation daily    MEDICATIONS  (PRN):  dextrose 40% Gel 15 Gram(s) Oral once PRN Blood Glucose LESS THAN 70 milliGRAM(s)/deciliter  glucagon  Injectable 1 milliGRAM(s) IntraMuscular once PRN Glucose LESS THAN 70 milligrams/deciliter  hydrALAZINE Injectable 10 milliGRAM(s) IV Push every 6 hours PRN SBP > 165bpm      Allergies    No Known Allergies    Intolerances        REVIEW OF SYSTEMS:    Unable to examine due to [ ] Encephalopathy [ ] Advanced Dementia [ ] Expressive Aphasia [ ] Non-verbal patient    CONSTITUTIONAL: No fever, NO generalized weakness/Fatigue, No weight loss  EYES: No eye pain, visual disturbances, or discharge  ENMT:  No difficulty hearing, tinnitus, vertigo; No sinus or throat pain  NECK: No pain or stiffness  RESPIRATORY: No shortness of breath,  cough, wheezing, sputum or hemoptysis   CARDIOVASCULAR: No chest pain, palpitations, or leg swelling  GASTROINTESTINAL: No abdominal pain. No nausea, vomiting, diarrhea or constipation. No melena or hematochezia.  GENITOURINARY: No dysuria, frequency, hematuria, or incontinence  NEUROLOGICAL: No headaches, Dizziness, memory loss, loss of strength, numbness, or tremors  SKIN: No itching, burning, rashes, or lesions   MUSCULOSKELETAL: b/l burning pain in b/l feet. No joint pain or swelling; No muscle, back, or extremity pain  PSYCHIATRIC: No depression, anxiety, mood swings, or difficulty sleeping  HEME/LYMPH: No easy bruising, or bleeding gums      Vital Signs Last 24 Hrs  T(C): 36.8 (01 Jul 2019 11:32), Max: 36.8 (30 Jun 2019 17:54)  T(F): 98.2 (01 Jul 2019 11:32), Max: 98.2 (30 Jun 2019 17:54)  HR: 79 (01 Jul 2019 11:32) (55 - 89)  BP: 150/65 (01 Jul 2019 11:32) (139/52 - 171/66)  BP(mean): --  RR: 18 (01 Jul 2019 11:32) (18 - 18)  SpO2: 96% (01 Jul 2019 11:32) (95% - 99%)    PHYSICAL EXAM:  GENERAL: NAD, well-developed, well-groomed  HEAD:  Atraumatic, Normocephalic  EYES: conjunctiva and sclera clear  ENMT: Moist mucous membranes  NECK: Supple, No JVD, Normal thyroid  CHEST/LUNG: Clear to Auscultation bilaterally; No rales, rhonchi, wheezing, or rubs  HEART: Regular rate and rhythm; No murmurs, rubs, or gallops  ABDOMEN: Soft, Nontender, Nondistended; Bowel sounds present  EXTREMITIES:  2+ Peripheral Pulses, No clubbing, cyanosis, or edema  SKIN: No rashes or lesions  NERVOUS SYSTEM:  Alert & Oriented X3, Good concentration; Motor Strength 5/5 B/L upper and lower extremities    LABS:                        11.7   17.63 )-----------( 210      ( 01 Jul 2019 07:23 )             37.6     07-01    137  |  100  |  39<H>  ----------------------------<  129<H>  4.4   |  31  |  1.05    Ca    8.9      01 Jul 2019 07:23          CAPILLARY BLOOD GLUCOSE      POCT Blood Glucose.: 541 mg/dL (01 Jul 2019 12:15)  POCT Blood Glucose.: 140 mg/dL (01 Jul 2019 07:57)  POCT Blood Glucose.: 387 mg/dL (30 Jun 2019 23:27)  POCT Blood Glucose.: 450 mg/dL (30 Jun 2019 23:25)  POCT Blood Glucose.: 512 mg/dL (30 Jun 2019 22:05)  POCT Blood Glucose.: 546 mg/dL (30 Jun 2019 22:04)  POCT Blood Glucose.: 586 mg/dL (30 Jun 2019 21:05)  POCT Blood Glucose.: 565 mg/dL (30 Jun 2019 21:04)        Culture - Blood (collected 06-28-19)  Source: .Blood  Preliminary Report (06-29-19):    No growth to date.    Culture - Blood (collected 06-28-19)  Source: .Blood  Preliminary Report (06-29-19):    No growth to date.        RADIOLOGY & ADDITIONAL TESTS:          Imaging Personally Reviewed:  [ ] YES  [ ] NO    Consultant(s) Notes Reviewed:  [x ] YES  [ ] NO    Care Discussed with Consultants/Other Providers [x ] YES  [ ] NO

## 2019-07-04 NOTE — PROGRESS NOTE ADULT - PROBLEM SELECTOR PLAN 5
- resume cardiac medications with appropriate holds
- resume cardiac medications with appropriate holds
cont w/ same mgmt
- resume cardiac medications with appropriate holds

## 2019-07-04 NOTE — PROGRESS NOTE ADULT - ASSESSMENT
67 y/o female with acute asthma exacerbation
diabetes - steroid induced hyperglycemia
steroid induced hyperglycemia   uncontrolled diabetes
69 y/o female with acute asthma exacerbation

## 2019-07-04 NOTE — PROGRESS NOTE ADULT - PROBLEM SELECTOR PROBLEM 7
Vitamin D deficiency

## 2019-07-04 NOTE — PROGRESS NOTE ADULT - PROBLEM SELECTOR PROBLEM 1
Acute respiratory failure with hypoxia and hypercapnia
Steroid-induced diabetes mellitus
Type 2 diabetes mellitus without complication, without long-term current use of insulin
Acute respiratory failure with hypoxia and hypercapnia

## 2019-07-04 NOTE — PROGRESS NOTE ADULT - PROBLEM SELECTOR PROBLEM 3
Coronary artery disease with other form of angina pectoris

## 2019-07-04 NOTE — PROGRESS NOTE ADULT - PROBLEM SELECTOR PLAN 8
DVTp: Heparin   Disposition: f/u PT consult
DVTp: Heparin   Disposition: STR pending acceptance.
DVTp: Heparin   Disposition: f/u PT consult
DVTp: Heparin   Disposition: STR pending acceptance.

## 2019-07-04 NOTE — PROGRESS NOTE ADULT - PROVIDER SPECIALTY LIST ADULT
Endocrinology
Endocrinology
Hospitalist
Pulmonology
Hospitalist

## 2019-07-04 NOTE — DISCHARGE NOTE NURSING/CASE MANAGEMENT/SOCIAL WORK - NSDCDPATPORTLINK_GEN_ALL_CORE
You can access the [facility] Patient Portal, offered by [facility], by registering with the following website: [email or web address]

## 2019-07-04 NOTE — PROGRESS NOTE ADULT - PROBLEM SELECTOR PROBLEM 4
NSTEMI (non-ST elevated myocardial infarction)

## 2019-07-09 ENCOUNTER — RECORD ABSTRACTING (OUTPATIENT)
Age: 69
End: 2019-07-09

## 2019-07-09 DIAGNOSIS — R06.89 OTHER ABNORMALITIES OF BREATHING: ICD-10-CM

## 2019-07-09 DIAGNOSIS — J96.02 ACUTE RESPIRATORY FAILURE WITH HYPERCAPNIA: ICD-10-CM

## 2019-07-09 DIAGNOSIS — J45.901 UNSPECIFIED ASTHMA WITH (ACUTE) EXACERBATION: ICD-10-CM

## 2019-07-09 DIAGNOSIS — E55.9 VITAMIN D DEFICIENCY, UNSPECIFIED: ICD-10-CM

## 2019-07-10 ENCOUNTER — RECORD ABSTRACTING (OUTPATIENT)
Age: 69
End: 2019-07-10

## 2019-07-10 RX ORDER — METHOCARBAMOL 750 MG/1
750 TABLET, FILM COATED ORAL TWICE DAILY
Refills: 0 | Status: ACTIVE | COMMUNITY

## 2019-07-10 RX ORDER — DOCUSATE SODIUM 100 MG/1
100 CAPSULE, LIQUID FILLED ORAL 3 TIMES DAILY
Refills: 0 | Status: ACTIVE | COMMUNITY

## 2019-07-15 DIAGNOSIS — I10 ESSENTIAL (PRIMARY) HYPERTENSION: ICD-10-CM

## 2019-07-15 DIAGNOSIS — I25.119 ATHEROSCLEROTIC HEART DISEASE OF NATIVE CORONARY ARTERY WITH UNSPECIFIED ANGINA PECTORIS: ICD-10-CM

## 2019-07-15 DIAGNOSIS — Z79.4 LONG TERM (CURRENT) USE OF INSULIN: ICD-10-CM

## 2019-07-15 DIAGNOSIS — J45.41 MODERATE PERSISTENT ASTHMA WITH (ACUTE) EXACERBATION: ICD-10-CM

## 2019-07-15 DIAGNOSIS — F41.9 ANXIETY DISORDER, UNSPECIFIED: ICD-10-CM

## 2019-07-15 DIAGNOSIS — R06.02 SHORTNESS OF BREATH: ICD-10-CM

## 2019-07-15 DIAGNOSIS — J96.01 ACUTE RESPIRATORY FAILURE WITH HYPOXIA: ICD-10-CM

## 2019-07-15 DIAGNOSIS — E66.9 OBESITY, UNSPECIFIED: ICD-10-CM

## 2019-07-15 DIAGNOSIS — E55.9 VITAMIN D DEFICIENCY, UNSPECIFIED: ICD-10-CM

## 2019-07-15 DIAGNOSIS — E78.5 HYPERLIPIDEMIA, UNSPECIFIED: ICD-10-CM

## 2019-07-15 DIAGNOSIS — Z79.82 LONG TERM (CURRENT) USE OF ASPIRIN: ICD-10-CM

## 2019-07-15 DIAGNOSIS — Z95.5 PRESENCE OF CORONARY ANGIOPLASTY IMPLANT AND GRAFT: ICD-10-CM

## 2019-07-15 DIAGNOSIS — J96.02 ACUTE RESPIRATORY FAILURE WITH HYPERCAPNIA: ICD-10-CM

## 2019-07-15 DIAGNOSIS — I24.8 OTHER FORMS OF ACUTE ISCHEMIC HEART DISEASE: ICD-10-CM

## 2019-07-15 DIAGNOSIS — E11.65 TYPE 2 DIABETES MELLITUS WITH HYPERGLYCEMIA: ICD-10-CM

## 2019-07-15 DIAGNOSIS — T38.0X5A ADVERSE EFFECT OF GLUCOCORTICOIDS AND SYNTHETIC ANALOGUES, INITIAL ENCOUNTER: ICD-10-CM

## 2019-07-23 ENCOUNTER — NON-APPOINTMENT (OUTPATIENT)
Age: 69
End: 2019-07-23

## 2019-07-23 ENCOUNTER — APPOINTMENT (OUTPATIENT)
Dept: CARDIOLOGY | Facility: CLINIC | Age: 69
End: 2019-07-23
Payer: MEDICAID

## 2019-07-23 VITALS
DIASTOLIC BLOOD PRESSURE: 65 MMHG | HEIGHT: 65 IN | BODY MASS INDEX: 27.82 KG/M2 | WEIGHT: 167 LBS | SYSTOLIC BLOOD PRESSURE: 99 MMHG | OXYGEN SATURATION: 93 % | HEART RATE: 62 BPM

## 2019-07-23 PROCEDURE — 99205 OFFICE O/P NEW HI 60 MIN: CPT

## 2019-07-23 PROCEDURE — 93000 ELECTROCARDIOGRAM COMPLETE: CPT

## 2019-07-24 PROBLEM — I10 ESSENTIAL (PRIMARY) HYPERTENSION: Chronic | Status: ACTIVE | Noted: 2019-06-28

## 2019-07-24 NOTE — PHYSICAL EXAM
[General Appearance - Well Developed] : well developed [Normal Appearance] : normal appearance [General Appearance - Well Nourished] : well nourished [Well Groomed] : well groomed [General Appearance - In No Acute Distress] : no acute distress [No Deformities] : no deformities [No Oral Pallor] : no oral pallor [Normal Oral Mucosa] : normal oral mucosa [No Oral Cyanosis] : no oral cyanosis [Normal Jugular Venous A Waves Present] : normal jugular venous A waves present [Normal Jugular Venous V Waves Present] : normal jugular venous V waves present [Heart Rate And Rhythm] : heart rate and rhythm were normal [No Jugular Venous Sin A Waves] : no jugular venous sin A waves [Murmurs] : no murmurs present [Heart Sounds] : normal S1 and S2 [Respiration, Rhythm And Depth] : normal respiratory rhythm and effort [Auscultation Breath Sounds / Voice Sounds] : lungs were clear to auscultation bilaterally [Exaggerated Use Of Accessory Muscles For Inspiration] : no accessory muscle use [Abdomen Soft] : soft [Abdomen Tenderness] : non-tender [Gait - Sufficient For Exercise Testing] : the gait was sufficient for exercise testing [Abdomen Mass (___ Cm)] : no abdominal mass palpated [Abnormal Walk] : normal gait [Cyanosis, Localized] : no localized cyanosis [Nail Clubbing] : no clubbing of the fingernails [] : no ischemic changes [Petechial Hemorrhages (___cm)] : no petechial hemorrhages [Oriented To Time, Place, And Person] : oriented to person, place, and time [Affect] : the affect was normal [Mood] : the mood was normal [No Anxiety] : not feeling anxious

## 2019-07-24 NOTE — DISCUSSION/SUMMARY
[FreeTextEntry1] : 68 year old woman with CAD PCI x 3 in May, preserved EF, HTN here to establish care\par \par 1. CAD- Cath report reviewed and she had Impella assisted multivessel PCI, She had Synergy stents placed to distal Left Main, proximal LAD and proximal LCX\par She was discharged home on both ASA and Brilinta 90 mg BID\par However she has not been taking the Brilinta for about 1 month for unclear reason\par Had TTE in June 2019 with normal LV function\par EKG reviewed and normal\par Restart Brilinta 90 mg BID immediately\par Daughter in Law understood she must take both ASA and Brilinta for 1 year\par Also on Ranexa 500 mg BID for chest pain\par 2. HTN- On Metoprolol 25 mg Daily, BP stable Continue present meds\par 3. HLD- On Lipitor 40 mg daily, condition is stable. Continue present meds\par 4. FU in 6 weeks

## 2019-07-24 NOTE — HISTORY OF PRESENT ILLNESS
[FreeTextEntry1] : Patient is a 68 year old woman, very poor historian, accompanied by daughter in law who present s to establish care. She was recently at [facility] after an abnormal stress test and underwent cardiac cath and multivessel PCI at that time\par 1. CAD- Cath report reviewed and she had Impella assisted multivessel PCI, She had Synergy stents placed to distal Left Main, proximal LAD and proximal LCX\par She was discharged home on both ASA and Brilinta 90 mg BID\par However she has not been taking the Brilinta for about 1 month for unclear reason\par Had TTE in June 2019 with normal LV function\par EKG reviewed and normal\par Restart Brilinta 90 mg BID immediately\par Daughter in Law understood she must take both ASA and Brilinta for 1 year\par Also on Ranexa 500 mg BID for chest pain\par 2. HTN- On Metoprolol 25 mg Daily, BP stable Continue present meds\par 3. HLD- On Lipitor 40 mg daily, condition is stable. Continue present meds\par

## 2019-08-05 ENCOUNTER — EMERGENCY (EMERGENCY)
Facility: HOSPITAL | Age: 69
LOS: 0 days | Discharge: HOME | End: 2019-08-05
Attending: EMERGENCY MEDICINE
Payer: MEDICAID

## 2019-08-05 VITALS
HEIGHT: 65 IN | WEIGHT: 169.98 LBS | TEMPERATURE: 99 F | OXYGEN SATURATION: 95 % | RESPIRATION RATE: 20 BRPM | SYSTOLIC BLOOD PRESSURE: 148 MMHG | DIASTOLIC BLOOD PRESSURE: 80 MMHG | HEART RATE: 71 BPM

## 2019-08-05 VITALS
DIASTOLIC BLOOD PRESSURE: 78 MMHG | HEART RATE: 70 BPM | RESPIRATION RATE: 18 BRPM | OXYGEN SATURATION: 99 % | TEMPERATURE: 98 F | SYSTOLIC BLOOD PRESSURE: 137 MMHG

## 2019-08-05 DIAGNOSIS — J45.909 UNSPECIFIED ASTHMA, UNCOMPLICATED: ICD-10-CM

## 2019-08-05 DIAGNOSIS — Z79.82 LONG TERM (CURRENT) USE OF ASPIRIN: ICD-10-CM

## 2019-08-05 DIAGNOSIS — R06.02 SHORTNESS OF BREATH: ICD-10-CM

## 2019-08-05 DIAGNOSIS — I25.10 ATHEROSCLEROTIC HEART DISEASE OF NATIVE CORONARY ARTERY WITHOUT ANGINA PECTORIS: ICD-10-CM

## 2019-08-05 DIAGNOSIS — I10 ESSENTIAL (PRIMARY) HYPERTENSION: ICD-10-CM

## 2019-08-05 DIAGNOSIS — E11.9 TYPE 2 DIABETES MELLITUS WITHOUT COMPLICATIONS: ICD-10-CM

## 2019-08-05 DIAGNOSIS — R05 COUGH: ICD-10-CM

## 2019-08-05 DIAGNOSIS — J45.901 UNSPECIFIED ASTHMA WITH (ACUTE) EXACERBATION: ICD-10-CM

## 2019-08-05 LAB
ALBUMIN SERPL ELPH-MCNC: 3.1 G/DL — LOW (ref 3.3–5)
ALP SERPL-CCNC: 108 U/L — SIGNIFICANT CHANGE UP (ref 40–120)
ALT FLD-CCNC: 29 U/L — SIGNIFICANT CHANGE UP (ref 12–78)
ANION GAP SERPL CALC-SCNC: 5 MMOL/L — SIGNIFICANT CHANGE UP (ref 5–17)
APTT BLD: 36.5 SEC — HIGH (ref 27.5–36.3)
AST SERPL-CCNC: 23 U/L — SIGNIFICANT CHANGE UP (ref 15–37)
BASOPHILS # BLD AUTO: 0.03 K/UL — SIGNIFICANT CHANGE UP (ref 0–0.2)
BASOPHILS NFR BLD AUTO: 0.4 % — SIGNIFICANT CHANGE UP (ref 0–2)
BILIRUB SERPL-MCNC: 0.3 MG/DL — SIGNIFICANT CHANGE UP (ref 0.2–1.2)
BUN SERPL-MCNC: 28 MG/DL — HIGH (ref 7–23)
CALCIUM SERPL-MCNC: 9 MG/DL — SIGNIFICANT CHANGE UP (ref 8.5–10.1)
CHLORIDE SERPL-SCNC: 106 MMOL/L — SIGNIFICANT CHANGE UP (ref 96–108)
CO2 SERPL-SCNC: 32 MMOL/L — HIGH (ref 22–31)
CREAT SERPL-MCNC: 1.06 MG/DL — SIGNIFICANT CHANGE UP (ref 0.5–1.3)
EOSINOPHIL # BLD AUTO: 0.17 K/UL — SIGNIFICANT CHANGE UP (ref 0–0.5)
EOSINOPHIL NFR BLD AUTO: 2.5 % — SIGNIFICANT CHANGE UP (ref 0–6)
GLUCOSE SERPL-MCNC: 116 MG/DL — HIGH (ref 70–99)
HCT VFR BLD CALC: 38.7 % — SIGNIFICANT CHANGE UP (ref 34.5–45)
HGB BLD-MCNC: 11.7 G/DL — SIGNIFICANT CHANGE UP (ref 11.5–15.5)
IMM GRANULOCYTES NFR BLD AUTO: 0.4 % — SIGNIFICANT CHANGE UP (ref 0–1.5)
INR BLD: 0.91 RATIO — SIGNIFICANT CHANGE UP (ref 0.88–1.16)
LYMPHOCYTES # BLD AUTO: 1.24 K/UL — SIGNIFICANT CHANGE UP (ref 1–3.3)
LYMPHOCYTES # BLD AUTO: 17.9 % — SIGNIFICANT CHANGE UP (ref 13–44)
MAGNESIUM SERPL-MCNC: 1.7 MG/DL — SIGNIFICANT CHANGE UP (ref 1.6–2.6)
MCHC RBC-ENTMCNC: 27.5 PG — SIGNIFICANT CHANGE UP (ref 27–34)
MCHC RBC-ENTMCNC: 30.2 GM/DL — LOW (ref 32–36)
MCV RBC AUTO: 90.8 FL — SIGNIFICANT CHANGE UP (ref 80–100)
MONOCYTES # BLD AUTO: 0.51 K/UL — SIGNIFICANT CHANGE UP (ref 0–0.9)
MONOCYTES NFR BLD AUTO: 7.4 % — SIGNIFICANT CHANGE UP (ref 2–14)
NEUTROPHILS # BLD AUTO: 4.94 K/UL — SIGNIFICANT CHANGE UP (ref 1.8–7.4)
NEUTROPHILS NFR BLD AUTO: 71.4 % — SIGNIFICANT CHANGE UP (ref 43–77)
NRBC # BLD: 0 /100 WBCS — SIGNIFICANT CHANGE UP (ref 0–0)
PLATELET # BLD AUTO: 222 K/UL — SIGNIFICANT CHANGE UP (ref 150–400)
POTASSIUM SERPL-MCNC: 5.6 MMOL/L — HIGH (ref 3.5–5.3)
POTASSIUM SERPL-SCNC: 5.6 MMOL/L — HIGH (ref 3.5–5.3)
PROT SERPL-MCNC: 7 GM/DL — SIGNIFICANT CHANGE UP (ref 6–8.3)
PROTHROM AB SERPL-ACNC: 10.2 SEC — SIGNIFICANT CHANGE UP (ref 10–12.9)
RBC # BLD: 4.26 M/UL — SIGNIFICANT CHANGE UP (ref 3.8–5.2)
RBC # FLD: 12.8 % — SIGNIFICANT CHANGE UP (ref 10.3–14.5)
SODIUM SERPL-SCNC: 143 MMOL/L — SIGNIFICANT CHANGE UP (ref 135–145)
WBC # BLD: 6.92 K/UL — SIGNIFICANT CHANGE UP (ref 3.8–10.5)
WBC # FLD AUTO: 6.92 K/UL — SIGNIFICANT CHANGE UP (ref 3.8–10.5)

## 2019-08-05 PROCEDURE — 93010 ELECTROCARDIOGRAM REPORT: CPT

## 2019-08-05 PROCEDURE — 71045 X-RAY EXAM CHEST 1 VIEW: CPT | Mod: 26

## 2019-08-05 PROCEDURE — 99285 EMERGENCY DEPT VISIT HI MDM: CPT

## 2019-08-05 RX ORDER — MAGNESIUM SULFATE 500 MG/ML
2 VIAL (ML) INJECTION ONCE
Refills: 0 | Status: COMPLETED | OUTPATIENT
Start: 2019-08-05 | End: 2019-08-05

## 2019-08-05 RX ORDER — ALBUTEROL 90 UG/1
2 AEROSOL, METERED ORAL
Qty: 1 | Refills: 0
Start: 2019-08-05 | End: 2019-09-03

## 2019-08-05 RX ORDER — IPRATROPIUM/ALBUTEROL SULFATE 18-103MCG
3 AEROSOL WITH ADAPTER (GRAM) INHALATION
Refills: 0 | Status: COMPLETED | OUTPATIENT
Start: 2019-08-05 | End: 2019-08-05

## 2019-08-05 RX ADMIN — Medication 3 MILLILITER(S): at 10:20

## 2019-08-05 RX ADMIN — Medication 125 MILLIGRAM(S): at 10:44

## 2019-08-05 RX ADMIN — Medication 50 GRAM(S): at 10:43

## 2019-08-05 RX ADMIN — Medication 3 MILLILITER(S): at 10:44

## 2019-08-05 NOTE — ED PROVIDER NOTE - MUSCULOSKELETAL, MLM
Spine appears normal, range of motion is not limited, no muscle or joint tenderness No legs swelling or tenderness bilaterally

## 2019-08-05 NOTE — ED PROVIDER NOTE - OBJECTIVE STATEMENT
68 years old female here with daughter c/o white sputum cough, sob, sob for three days. She sts mom has a hx of asthma was admitted for it one month ago. Pt denies headache, dizziness, blurred visions, light sensitivities, focal/distal weakness or numbness, chest pain, nausea, vomiting, fever, chills, abd pain, dysuria or irregular bowel movements. 68 years old female here with daughter c/o white sputum cough, sob, sob for three days. She sts mom has a hx of asthma was admitted for it and treated by Dr. Penaloza pulmonologist one month ago. Pt denies headache, dizziness, blurred visions, light sensitivities, focal/distal weakness or numbness, chest pain, nausea, vomiting, fever, chills, abd pain, dysuria or irregular bowel movements. Pt sts she has never being et intubated.

## 2019-08-05 NOTE — ED PROVIDER NOTE - CONSTITUTIONAL, MLM
normal... Well appearing, well nourished, awake, alert, oriented to person, place, time/situation and in no apparent distress. Speaking in clear full sentences no nasal flaring no shoulders retractions no diaphoresis, smiling appears comfortable sitting up in  the stretcher in a bright light room.

## 2019-08-05 NOTE — ED PROVIDER NOTE - PMH
Asthma    Coronary artery disease with other form of angina pectoris    Hypertension    Type 2 diabetes mellitus without complication, without long-term current use of insulin

## 2019-08-05 NOTE — ED PROVIDER NOTE - PROGRESS NOTE DETAILS
Pt is alert and oriented x 3 smiling sts she is breathing much better breath sounds are clear equal bilaterally, pt's daughter is given and explained all test reports and advised to follow up with her pmd and return if symptoms persist or worsen.

## 2019-08-05 NOTE — ED ADULT TRIAGE NOTE - CHIEF COMPLAINT QUOTE
Pt daughter states, " she has asthma and is having trouble breathing , c/o sore throat and itching with productive cough  "

## 2019-08-18 NOTE — PHYSICAL THERAPY INITIAL EVALUATION ADULT - PHYSICAL ASSIST/NONPHYSICAL ASSIST: SUPINE/SIT, REHAB EVAL
DATE: 8/14/2019 1:28 PM



EXAM: DIGITAL DIAGNOSTIC LT, BREAST LEFT



HISTORY:  further evaluation of a finding noted on her most recent screening

mammographic examination. On that examination an asymmetry was reported within

the left breast



COMPARISON: Prior mammographic imaging 7/22/2019, 7/17/2018, 7/13/2017



Full field left ML view and spot compression MLO views of the left breast were

obtained.



This study was interpreted with the benefit of Computerized Aided Detection

(CAD).





FINDINGS:



Breast Density: HETERO  The breast parenchyma Is heterogeneously dense, which

could reduce sensitivity of mammography. Breast parenchyma level C



Asymmetry persisted on the spot compression images.  Therefore ultrasound was

performed. 



ULTRASOUND FINDINGS: 

Targeted ultrasound of the mammographic area of concern was performed. 



9:30 position, 4 cm from the nipple: A 2 x 0.6 x 1.3 cm mass is seen with

peripheral hypoechoic rim and central echogenicity with trace internal flow,

morphologically suspicious for lymph node.



At the 3:00 position, 5 cm from the nipple a 0.5 x 0.5 x 0.5 cm peripherally

hypoechoic, centrally hyperechoic lesion is also seen, likely lymph node.



Prominent left axillary lymph nodes are also seen.

IMPRESSION: 

Left superior breast asymmetry, findings for which short interval follow-up

imaging is recommended.

Left breast nodular masses seen on ultrasound, likely representing lymph

nodes.  These can also be followed up on short interval follow-up imaging.



BI-RADS CATEGORY: 3 PROBABLY BENIGN FINDING(S)-SHORT INTERVAL FOLLOW-UP

SUGGESTED



RECOMMENDED FOLLOW-UP: 6M 6 MONTH FOLLOW-UP At that time, mammography and

ultrasound can be performed, to include spot compression views of the left

breast.



PQRS compliance statement: Patient information was entered into a reminder

system with a target due date for the next mammogram.



Mammography is a sensitive method for finding small breast cancers, but it

does not detect them all and is not a substitute for careful clinical

examination.  A negative mammogram does not negate a clinically suspicious

finding and should not result in delay in biopsying a clinically suspicious

abnormality.



"Our facility is accredited by the [employer] Mammography

Program."
supervision/verbal cues/1 person assist

## 2019-08-31 ENCOUNTER — RX RENEWAL (OUTPATIENT)
Age: 69
End: 2019-08-31

## 2019-09-01 ENCOUNTER — OUTPATIENT (OUTPATIENT)
Dept: OUTPATIENT SERVICES | Facility: HOSPITAL | Age: 69
LOS: 1 days | End: 2019-09-01

## 2019-09-06 ENCOUNTER — NON-APPOINTMENT (OUTPATIENT)
Age: 69
End: 2019-09-06

## 2019-09-06 ENCOUNTER — APPOINTMENT (OUTPATIENT)
Dept: CARDIOLOGY | Facility: CLINIC | Age: 69
End: 2019-09-06
Payer: MEDICAID

## 2019-09-06 VITALS
WEIGHT: 164 LBS | DIASTOLIC BLOOD PRESSURE: 80 MMHG | SYSTOLIC BLOOD PRESSURE: 125 MMHG | HEART RATE: 67 BPM | HEIGHT: 65 IN | BODY MASS INDEX: 27.32 KG/M2 | OXYGEN SATURATION: 97 %

## 2019-09-06 DIAGNOSIS — I21.4 NON-ST ELEVATION (NSTEMI) MYOCARDIAL INFARCTION: ICD-10-CM

## 2019-09-06 PROCEDURE — 99214 OFFICE O/P EST MOD 30 MIN: CPT

## 2019-09-06 PROCEDURE — 93000 ELECTROCARDIOGRAM COMPLETE: CPT

## 2019-09-06 RX ORDER — ASPIRIN 81 MG/1
81 TABLET, COATED ORAL DAILY
Qty: 90 | Refills: 2 | Status: DISCONTINUED | COMMUNITY
Start: 2019-08-31 | End: 2019-09-06

## 2019-09-06 NOTE — PHYSICAL EXAM
[Normal Appearance] : normal appearance [General Appearance - Well Developed] : well developed [Well Groomed] : well groomed [General Appearance - Well Nourished] : well nourished [No Deformities] : no deformities [General Appearance - In No Acute Distress] : no acute distress [Normal Oral Mucosa] : normal oral mucosa [No Oral Pallor] : no oral pallor [No Oral Cyanosis] : no oral cyanosis [Normal Jugular Venous A Waves Present] : normal jugular venous A waves present [Normal Jugular Venous V Waves Present] : normal jugular venous V waves present [No Jugular Venous Sin A Waves] : no jugular venous sin A waves [Respiration, Rhythm And Depth] : normal respiratory rhythm and effort [Exaggerated Use Of Accessory Muscles For Inspiration] : no accessory muscle use [Auscultation Breath Sounds / Voice Sounds] : lungs were clear to auscultation bilaterally [Heart Rate And Rhythm] : heart rate and rhythm were normal [Heart Sounds] : normal S1 and S2 [Abdomen Soft] : soft [Murmurs] : no murmurs present [Abdomen Tenderness] : non-tender [Abdomen Mass (___ Cm)] : no abdominal mass palpated [Abnormal Walk] : normal gait [Gait - Sufficient For Exercise Testing] : the gait was sufficient for exercise testing [Nail Clubbing] : no clubbing of the fingernails [Cyanosis, Localized] : no localized cyanosis [Petechial Hemorrhages (___cm)] : no petechial hemorrhages [] : no ischemic changes [Oriented To Time, Place, And Person] : oriented to person, place, and time [Affect] : the affect was normal [No Anxiety] : not feeling anxious [Mood] : the mood was normal

## 2019-09-06 NOTE — DISCUSSION/SUMMARY
[FreeTextEntry1] : 69 year old woman with CAD sp PCI normal LV function, HTN HLD here for followup\par \par 1. CAD- PCI to dLM, pLAD and LCX\par EKG reviewed and unchanged.\par Normal LV function\par On ASA and Brilinta. Condition is stable. Continue present meds\par 2. HTN- On Metoprolol- Condition stable. Continue present meds\par 3. HLD- On Lipitor, continue present meds\par 4. FU in 3 months

## 2019-09-06 NOTE — HISTORY OF PRESENT ILLNESS
[FreeTextEntry1] : Here for followup\par No new complaints\par Restarted on Brilinta at last visit\par 1. CAD- PCI to dLM, pLAD and LCX\par EKG reviewed and unchanged.\par Normal LV function\par On ASA and Brilinta. Condition is stable. Continue present meds\par 2. HTN- On Metoprolol- Condition stable. Continue present meds\par 3. HLD- On Lipitor, continue present meds

## 2019-09-09 ENCOUNTER — INPATIENT (INPATIENT)
Facility: HOSPITAL | Age: 69
LOS: 3 days | Discharge: ANOTHER HOSPITAL | End: 2019-09-13
Attending: INTERNAL MEDICINE | Admitting: INTERNAL MEDICINE
Payer: MEDICAID

## 2019-09-09 VITALS
TEMPERATURE: 98 F | SYSTOLIC BLOOD PRESSURE: 167 MMHG | HEIGHT: 64 IN | HEART RATE: 66 BPM | RESPIRATION RATE: 18 BRPM | WEIGHT: 169.98 LBS | DIASTOLIC BLOOD PRESSURE: 84 MMHG | OXYGEN SATURATION: 96 %

## 2019-09-09 DIAGNOSIS — S09.90XA UNSPECIFIED INJURY OF HEAD, INITIAL ENCOUNTER: ICD-10-CM

## 2019-09-09 DIAGNOSIS — I10 ESSENTIAL (PRIMARY) HYPERTENSION: ICD-10-CM

## 2019-09-09 DIAGNOSIS — J45.20 MILD INTERMITTENT ASTHMA, UNCOMPLICATED: ICD-10-CM

## 2019-09-09 DIAGNOSIS — I25.118 ATHEROSCLEROTIC HEART DISEASE OF NATIVE CORONARY ARTERY WITH OTHER FORMS OF ANGINA PECTORIS: ICD-10-CM

## 2019-09-09 DIAGNOSIS — E11.9 TYPE 2 DIABETES MELLITUS WITHOUT COMPLICATIONS: ICD-10-CM

## 2019-09-09 DIAGNOSIS — R55 SYNCOPE AND COLLAPSE: ICD-10-CM

## 2019-09-09 DIAGNOSIS — Z95.5 PRESENCE OF CORONARY ANGIOPLASTY IMPLANT AND GRAFT: Chronic | ICD-10-CM

## 2019-09-09 DIAGNOSIS — R26.81 UNSTEADINESS ON FEET: ICD-10-CM

## 2019-09-09 DIAGNOSIS — E87.5 HYPERKALEMIA: ICD-10-CM

## 2019-09-09 PROBLEM — J45.909 UNSPECIFIED ASTHMA, UNCOMPLICATED: Chronic | Status: ACTIVE | Noted: 2019-08-05

## 2019-09-09 LAB
ALBUMIN SERPL ELPH-MCNC: 3.3 G/DL — SIGNIFICANT CHANGE UP (ref 3.3–5)
ALP SERPL-CCNC: 100 U/L — SIGNIFICANT CHANGE UP (ref 40–120)
ALT FLD-CCNC: 37 U/L — SIGNIFICANT CHANGE UP (ref 12–78)
ANION GAP SERPL CALC-SCNC: 4 MMOL/L — LOW (ref 5–17)
APTT BLD: 32.3 SEC — SIGNIFICANT CHANGE UP (ref 27.5–36.3)
AST SERPL-CCNC: 26 U/L — SIGNIFICANT CHANGE UP (ref 15–37)
BILIRUB SERPL-MCNC: 0.3 MG/DL — SIGNIFICANT CHANGE UP (ref 0.2–1.2)
BUN SERPL-MCNC: 34 MG/DL — HIGH (ref 7–23)
CALCIUM SERPL-MCNC: 8.7 MG/DL — SIGNIFICANT CHANGE UP (ref 8.5–10.1)
CHLORIDE SERPL-SCNC: 105 MMOL/L — SIGNIFICANT CHANGE UP (ref 96–108)
CO2 SERPL-SCNC: 32 MMOL/L — HIGH (ref 22–31)
CREAT SERPL-MCNC: 1.05 MG/DL — SIGNIFICANT CHANGE UP (ref 0.5–1.3)
GLUCOSE BLDC GLUCOMTR-MCNC: 119 MG/DL — HIGH (ref 70–99)
GLUCOSE BLDC GLUCOMTR-MCNC: 149 MG/DL — HIGH (ref 70–99)
GLUCOSE SERPL-MCNC: 148 MG/DL — HIGH (ref 70–99)
HCT VFR BLD CALC: 38.3 % — SIGNIFICANT CHANGE UP (ref 34.5–45)
HGB BLD-MCNC: 11.9 G/DL — SIGNIFICANT CHANGE UP (ref 11.5–15.5)
INR BLD: 0.99 RATIO — SIGNIFICANT CHANGE UP (ref 0.88–1.16)
MCHC RBC-ENTMCNC: 27.4 PG — SIGNIFICANT CHANGE UP (ref 27–34)
MCHC RBC-ENTMCNC: 31.1 GM/DL — LOW (ref 32–36)
MCV RBC AUTO: 88.2 FL — SIGNIFICANT CHANGE UP (ref 80–100)
NRBC # BLD: 0 /100 WBCS — SIGNIFICANT CHANGE UP (ref 0–0)
NT-PROBNP SERPL-SCNC: 415 PG/ML — HIGH (ref 0–125)
PLATELET # BLD AUTO: 235 K/UL — SIGNIFICANT CHANGE UP (ref 150–400)
POTASSIUM SERPL-MCNC: 5.8 MMOL/L — HIGH (ref 3.5–5.3)
POTASSIUM SERPL-SCNC: 5.8 MMOL/L — HIGH (ref 3.5–5.3)
PROT SERPL-MCNC: 7 GM/DL — SIGNIFICANT CHANGE UP (ref 6–8.3)
PROTHROM AB SERPL-ACNC: 11.1 SEC — SIGNIFICANT CHANGE UP (ref 10–12.9)
RBC # BLD: 4.34 M/UL — SIGNIFICANT CHANGE UP (ref 3.8–5.2)
RBC # FLD: 13.2 % — SIGNIFICANT CHANGE UP (ref 10.3–14.5)
SODIUM SERPL-SCNC: 141 MMOL/L — SIGNIFICANT CHANGE UP (ref 135–145)
TROPONIN I SERPL-MCNC: 0.14 NG/ML — HIGH (ref 0.01–0.04)
WBC # BLD: 7.85 K/UL — SIGNIFICANT CHANGE UP (ref 3.8–10.5)
WBC # FLD AUTO: 7.85 K/UL — SIGNIFICANT CHANGE UP (ref 3.8–10.5)

## 2019-09-09 PROCEDURE — 99223 1ST HOSP IP/OBS HIGH 75: CPT

## 2019-09-09 PROCEDURE — 72131 CT LUMBAR SPINE W/O DYE: CPT | Mod: 26

## 2019-09-09 PROCEDURE — 70486 CT MAXILLOFACIAL W/O DYE: CPT | Mod: 26

## 2019-09-09 PROCEDURE — 99285 EMERGENCY DEPT VISIT HI MDM: CPT

## 2019-09-09 PROCEDURE — 71045 X-RAY EXAM CHEST 1 VIEW: CPT | Mod: 26

## 2019-09-09 PROCEDURE — 70450 CT HEAD/BRAIN W/O DYE: CPT | Mod: 26

## 2019-09-09 PROCEDURE — 72170 X-RAY EXAM OF PELVIS: CPT | Mod: 26

## 2019-09-09 PROCEDURE — 93010 ELECTROCARDIOGRAM REPORT: CPT

## 2019-09-09 RX ORDER — METOPROLOL TARTRATE 50 MG
25 TABLET ORAL DAILY
Refills: 0 | Status: DISCONTINUED | OUTPATIENT
Start: 2019-09-09 | End: 2019-09-13

## 2019-09-09 RX ORDER — INSULIN GLARGINE 100 [IU]/ML
28 INJECTION, SOLUTION SUBCUTANEOUS AT BEDTIME
Refills: 0 | Status: DISCONTINUED | OUTPATIENT
Start: 2019-09-09 | End: 2019-09-13

## 2019-09-09 RX ORDER — DOCUSATE SODIUM 100 MG
100 CAPSULE ORAL THREE TIMES A DAY
Refills: 0 | Status: DISCONTINUED | OUTPATIENT
Start: 2019-09-09 | End: 2019-09-13

## 2019-09-09 RX ORDER — PANTOPRAZOLE SODIUM 20 MG/1
40 TABLET, DELAYED RELEASE ORAL
Refills: 0 | Status: DISCONTINUED | OUTPATIENT
Start: 2019-09-09 | End: 2019-09-13

## 2019-09-09 RX ORDER — MORPHINE SULFATE 50 MG/1
2 CAPSULE, EXTENDED RELEASE ORAL ONCE
Refills: 0 | Status: DISCONTINUED | OUTPATIENT
Start: 2019-09-09 | End: 2019-09-09

## 2019-09-09 RX ORDER — ASPIRIN/CALCIUM CARB/MAGNESIUM 324 MG
81 TABLET ORAL DAILY
Refills: 0 | Status: DISCONTINUED | OUTPATIENT
Start: 2019-09-09 | End: 2019-09-13

## 2019-09-09 RX ORDER — BUDESONIDE AND FORMOTEROL FUMARATE DIHYDRATE 160; 4.5 UG/1; UG/1
2 AEROSOL RESPIRATORY (INHALATION)
Refills: 0 | Status: DISCONTINUED | OUTPATIENT
Start: 2019-09-09 | End: 2019-09-13

## 2019-09-09 RX ORDER — METFORMIN HYDROCHLORIDE 850 MG/1
500 TABLET ORAL
Refills: 0 | Status: DISCONTINUED | OUTPATIENT
Start: 2019-09-09 | End: 2019-09-12

## 2019-09-09 RX ORDER — TIOTROPIUM BROMIDE 18 UG/1
1 CAPSULE ORAL; RESPIRATORY (INHALATION) DAILY
Refills: 0 | Status: DISCONTINUED | OUTPATIENT
Start: 2019-09-09 | End: 2019-09-13

## 2019-09-09 RX ORDER — SORBITOL SOLUTION 70 %
30 SOLUTION, ORAL MISCELLANEOUS ONCE
Refills: 0 | Status: COMPLETED | OUTPATIENT
Start: 2019-09-09 | End: 2019-09-10

## 2019-09-09 RX ORDER — METHOCARBAMOL 500 MG/1
750 TABLET, FILM COATED ORAL
Refills: 0 | Status: DISCONTINUED | OUTPATIENT
Start: 2019-09-09 | End: 2019-09-13

## 2019-09-09 RX ORDER — ACETAMINOPHEN 500 MG
650 TABLET ORAL EVERY 6 HOURS
Refills: 0 | Status: DISCONTINUED | OUTPATIENT
Start: 2019-09-09 | End: 2019-09-13

## 2019-09-09 RX ORDER — ATORVASTATIN CALCIUM 80 MG/1
40 TABLET, FILM COATED ORAL AT BEDTIME
Refills: 0 | Status: DISCONTINUED | OUTPATIENT
Start: 2019-09-09 | End: 2019-09-13

## 2019-09-09 RX ORDER — ALBUTEROL 90 UG/1
2 AEROSOL, METERED ORAL EVERY 6 HOURS
Refills: 0 | Status: DISCONTINUED | OUTPATIENT
Start: 2019-09-09 | End: 2019-09-13

## 2019-09-09 RX ORDER — INSULIN GLARGINE 100 [IU]/ML
26 INJECTION, SOLUTION SUBCUTANEOUS EVERY MORNING
Refills: 0 | Status: DISCONTINUED | OUTPATIENT
Start: 2019-09-09 | End: 2019-09-13

## 2019-09-09 RX ADMIN — Medication 100 MILLIGRAM(S): at 23:17

## 2019-09-09 RX ADMIN — Medication 50 MILLIGRAM(S): at 23:17

## 2019-09-09 RX ADMIN — ATORVASTATIN CALCIUM 40 MILLIGRAM(S): 80 TABLET, FILM COATED ORAL at 23:17

## 2019-09-09 RX ADMIN — MORPHINE SULFATE 2 MILLIGRAM(S): 50 CAPSULE, EXTENDED RELEASE ORAL at 17:54

## 2019-09-09 NOTE — H&P ADULT - HISTORY OF PRESENT ILLNESS
The patient is a 69y Female complaining of fall  with no prodromal  ands no loss of conscionsness - she found to have  right periorbital edema and ecchymoses       · The patient is a 69y Female complaining of fall  with no prodromal  ands no loss of conscionsness - she found to have  right periorbital edema and ecchymoses there was a question whenther she had a syncope of not       ·

## 2019-09-09 NOTE — ED PROVIDER NOTE - ENMT, MLM
Chief Complaint   Patient presents with    Sports Physical     History   Gary Kerr is a 10 y.o. male who comes in today for well adolescent and/or school/sports physical. He is seen today accompanied by mother. Patient has not been seen in clinic since 2015 - previous patient of Dr. Olga Morrissey. Problems, doctor visits or illnesses since last visit: No - has been healthy  Parental concerns: Mom notes patient went to orthodontist to evaluate for braces and some suggestion of possible issues   with TMJ. Patient is having some headaches but is not sure if this is related. Follow up on previous concerns: none noted    Nutrition/Elimination   Eats regular meals including adequate fruits and vegetables: yes but doesn't try to eat healthy  Eats breakfast:  yes  Eats dinner with family:  yes  Drinks non-sweetened liquids:  Yes  Sugary Beverages:  yes 1-2 daily  Calcium source:  Yes - milk  Dietary supplements:  no  Elimination: normal    Sleep  Sleeps 9 hours per night  OSAS symptoms:  No    Behavior issues: No    Social/Family History  Changes since last visit:   Teen lives with mother, stepdad, sisters  Relationship with parents/siblings:  normal  Dad lives out of state - in [address] [place] [address] Drive:   Eats meals with family: Yes   Has family member/adult to turn to for help:  Yes   Is permitted and is able to make independent decisions: Yes  Education:   Grade:  10th grade - [employer]    Performance:  normal   Behavior/Attention:  normal   Homework:  normal  Eating:   Has concerns about body or appearance:  No             Attempts to lose weight by dieting, laxatives, or vomiting:  No  Activities:   Has friends:  Yes   At least 1 hour of physical activity/day:  Yes   Sports: Yes - indoor track, lacrosse   Screen time (except for homework) less than 2 hrs/day:  Yes    Has interests/participates in community activities/volunteers: Yes  Drugs (Substance use/abuse):    Uses tobacco/alcohol/drugs: No  Safety:   Home is free of violence:  Yes   Uses safety belts/safety equipment:  Yes   Has relationships free of violence:  Yes   Impaired/Distracted driving:  No  Sexuality   Has had oral sex:  No   Has had sexual intercourse (vaginal, anal): Yes  Suicidality/Mental Health:   Has ways to cope with stress: Yes    Displays self-confidence:  Yes    Has problems with sleep:  No    Gets depressed, anxious, or irritable/has mood swings:    No   Has thought about hurting self or considered suicide:  No     PHQ over the last two weeks 11/9/2018   Little interest or pleasure in doing things Not at all   Feeling down, depressed, irritable, or hopeless Not at all   Total Score PHQ 2 0     Confidentiality discussed:   With Teen:  yes   With Parent(s):  no    Review of Systems  A comprehensive review of systems was negative except for that written in the HPI. Immunization History   Administered Date(s) Administered    DTaP 2002, 2002, 2002, 02/24/2005, 08/14/2006    HPV (9-valent) 11/09/2018    Hep A Vaccine 2 Dose Schedule (Ped/Adol) 09/03/2015, 11/09/2018    Hep B Vaccine 2002, 2002, 01/31/2003    Influenza Vaccine 02/24/2005, 07/16/2006    MMR 01/31/2003, 08/14/2006    Meningococcal (MCV4O) Vaccine 11/09/2018    Meningococcal (MCV4P) Vaccine 08/22/2014    Pneumococcal Vaccine (Unspecified Type) 2002, 03/21/2005    Poliovirus vaccine 2002, 2002, 2002, 02/24/2005, 08/14/2006    Tdap 08/22/2014    Varicella Virus Vaccine 01/31/2003, 08/22/2014     Patient Active Problem List    Diagnosis Date Noted    BMI (body mass index), pediatric, 5% to less than 85% for age 11/09/2018     No Known Allergies  History reviewed. No pertinent family history.     Physical Examination  Vital Signs:    Visit Vitals  /80 (BP 1 Location: Right arm, BP Patient Position: Sitting)   Pulse 79   Temp 97.6 °F (36.4 °C) (Oral)   Ht 5' 8.5\" (1.74 m)   Wt 144 lb 3.2 oz (65.4 kg) SpO2 98%   BMI 21.61 kg/m²     Wt Readings from Last 3 Encounters:   11/09/18 144 lb 3.2 oz (65.4 kg) (55 %, Z= 0.14)*   09/01/15 125 lb 6.4 oz (56.9 kg) (77 %, Z= 0.75)*   08/22/14 (!) 107 lb 3.2 oz (48.6 kg) (71 %, Z= 0.55)*     * Growth percentiles are based on CDC (Boys, 2-20 Years) data. Ht Readings from Last 3 Encounters:   11/09/18 5' 8.5\" (1.74 m) (44 %, Z= -0.14)*   09/01/15 5' 1.42\" (1.56 m) (27 %, Z= -0.60)*   08/22/14 (!) 4' 11.84\" (1.52 m) (45 %, Z= -0.12)*     * Growth percentiles are based on CDC (Boys, 2-20 Years) data. Body mass index is 21.61 kg/m². 57 %ile (Z= 0.18) based on CDC (Boys, 2-20 Years) BMI-for-age based on BMI available as of 11/9/2018.  55 %ile (Z= 0.14) based on CDC (Boys, 2-20 Years) weight-for-age data using vitals from 11/9/2018.  44 %ile (Z= -0.14) based on [facility] (Boys, 2-20 Years) Stature-for-age data based on Stature recorded on 11/9/2018. General appearance: Alert, cooperative, no distress, appears stated age. Head: Normocephalic without obvious abnormality, atraumatic. Eyes: Conjunctivae/corneas clear. PERRL, EOM's intact. Fundi benign. Ears: Normal TM's and external ear canals. Nose: Nares normal. Septum midline. Mucosa normal. No drainage or sinus tenderness. Throat: Lips, mucosa, and tongue normal. Teeth and gums normal.  Oropharynx clear. Neck: Supple, symmetrical, trachea midline, no adenopathy, thyroid not enlarged, symmetric, no tenderness/mass/nodules. Back: Symmetric, no curvature. ROM normal. No CVA tenderness. Lungs: Clear to auscultation bilaterally. Heart: Regular rate and rhythm, S1, S2 normal, no murmur. Abdomen: soft, non-tender. Bowel sounds normal. No masses,  no hepatosplenomegaly. External genitalia:  Normal male. Bilaterally descended testes. No inguinal mass or swelling. Remigio stage 3. Extremities: No gross deformities, no cyanosis or edema.   Pulses: 2+ and symmetric  Skin: Skin color, texture, turgor normal. No rashes or lesions. Lymph nodes: Cervical, supraclavicular, and axillary nodes normal.  Neurologic: Alert and oriented X 3, normal strength and tone. Normal symmetric reflexes. Normal coordination and gait. Results for orders placed or performed in visit on 11/09/18   AMB POC VISUAL ACUITY SCREEN   Result Value Ref Range    Left eye 20/20     Right eye 20/32     Both eyes 20/20      Assessment and Plan:  Healthy 10 y.o. male meeting growth and developmental milestones. ICD-10-CM ICD-9-CM    1. Encounter for routine child health examination without abnormal findings Z00.129 V20.2    2. Vision test Z01.00 V72.0 AMB POC VISUAL ACUITY SCREEN   3. Encounter for immunization Z23 V03.89 MENINGOCOCCAL (MENVEO) CONJUGATE VACCINE, SEROGROUPS A, C, Y AND W-135 (TETRAVALENT), IM      HUMAN PAPILLOMA VIRUS NONAVALENT HPV 3 DOSE IM (GARDASIL 9)      HEPATITIS A VACCINE, PEDIATRIC/ADOLESCENT DOSAGE-2 DOSE SCHED., IM   4. Screening for depression Z13.31 V79.0 BEHAV ASSMT W/SCORE & DOCD/STAND INSTRUMENT   5. BMI (body mass index), pediatric, 5% to less than 85% for age Z76.54 V80.47    6. Bilateral impacted cerumen H61.23 380.4 REMOVE IMPACTED EAR WAX     Anticipatory Guidance: Discussed and/or gave a handout on well teen issues at this age including 9-5-2-1-0 healthy active living, importance of varied diet and minimizing junk food, physical activity, limiting screen time, regular dental care, seat belts/ sports protective gear/ helmet safety/ swimming safety, sunscreen, safe storage of any firearms in the home, healthy sexual awareness/relationships,  tobacco, alcohol and drug dangers, family time, rules/expectations, planning for after high school. Mom to follow up with oral surgeon if any further concerns for TMJ. Information for Dr. Jessica Houston provided. PRN ibuprofen for headache. PHQ, vision wnl today. OK to give vaccines today. Mom refused flu vaccine today despite discussion of benefits.  Form signed and scanned to media. Sports physical questionnaire was reviewed and form was completed. There is no contraindication to participation in sports    identified today. RTC in 2 mths for HPV #2 and 6 mths for HPV #3. RTC in a year for annual [address] [place] [address],[address]. The patient and mother were counseled regarding nutrition and physical activity. BMI is wnl and patient eating well. Will continue to monitor nutritional intake and incorporate physical activity into daily routine. Plan and evaluation (above) reviewed with parent(s) at visit. Parent(s) voiced understanding of plan and provided with time to ask/review questions. After Visit Summary (AVS) provided to parent(s) with additional instructions as needed/reviewed. Follow-up Disposition:  Return in 2 months (on 1/9/2019) for HPV # 2, 6 mths for HPV #3, a year for annual [address] [place] [address]. Airway patent, Nasal mucosa clear. Mouth with normal mucosa. Throat has no vesicles, no oropharyngeal exudates and uvula is midline.

## 2019-09-09 NOTE — ED ADULT NURSE NOTE - PAIN: DESCRIPTION (FREQUENCY/QUALITY)
Patient phoned stated unable to make provider's appointment due to Inpatient admitted to [facility] on 3/31/2018. She spoke with Dr. Meredith Lee over the phone along with the Hospitalist. She stated will call back with update. constant

## 2019-09-09 NOTE — H&P ADULT - NSICDXPASTMEDICALHX_GEN_ALL_CORE_FT
PAST MEDICAL HISTORY:  Asthma     Coronary artery disease with other form of angina pectoris     Hypertension     Type 2 diabetes mellitus without complication, without long-term current use of insulin

## 2019-09-09 NOTE — H&P ADULT - NSHPPHYSICALEXAM_GEN_ALL_CORE
ICU Vital Signs Last 24 Hrs  T(C): 36.8 (09 Sep 2019 15:09), Max: 36.8 (09 Sep 2019 15:09)  T(F): 98.2 (09 Sep 2019 15:09), Max: 98.2 (09 Sep 2019 15:09)  HR: 62 (09 Sep 2019 16:45) (62 - 66)  BP: 188/79 (09 Sep 2019 16:45) (167/84 - 188/79)  BP(mean): --  ABP: --  ABP(mean): --  RR: 18 (09 Sep 2019 16:45) (18 - 18)  SpO2: 96% (09 Sep 2019 16:45) (96% - 96%)  GENERAL: NAD well-developed  HEAD:   Normocephalic  POSITIVE right periorbital edema and ecchymoses   EYES: EOMI, PERRLA, conjunctiva and sclera clear  ENMT: No tonsillar erythema, exudates, or enlargement; Moist mucous membranes, Good dentition, No lesions  NECK: Supple, No JVD, Normal thyroid  NERVOUS SYSTEM:  Alert & Oriented X3, Good concentration; Motor Strength 5/5 B/L upper and lower extremities; DTRs 2+ intact and symmetric  CHEST/LUNG: Clear to percussion bilaterally; No rales, rhonchi, wheezing, or rubs  HEART: Regular rate and rhythm; No murmurs, rubs, or gallops  ABDOMEN: Soft, Nontender, Nondistended; Bowel sounds present  EXTREMITIES:  2+ Peripheral Pulses, No clubbing, cyanosis, or edema  LYMPH: No lymphadenopathy   SKIN: No rashes or lesions

## 2019-09-09 NOTE — ED PROVIDER NOTE - CARE PLAN
Principal Discharge DX:	Near syncope  Secondary Diagnosis:	Closed head injury  Secondary Diagnosis:	Vertigo  Secondary Diagnosis:	Fall

## 2019-09-09 NOTE — H&P ADULT - ASSESSMENT
69f with history of multiple medical problems with fall with no loss of conscionsness or syncope with traumatic eye with no fracture - will get physical therapy eval       IMPROVE VTE Individual Risk Assessment    RISK                                                          Points  [] Previous VTE                                           3  [] Thrombophilia                                        2  [] Lower limb paralysis                              2   [] Current Cancer                                       2   [] Immobilization > 24 hrs                        1  [] ICU/CCU stay > 24 hours                       1  [] Age > 60                                                   1    IMPROVE VTE Score:2

## 2019-09-09 NOTE — ED PROVIDER NOTE - CHPI ED SYMPTOMS NEG
no fever/no loss of consciousness/no numbness/no abrasion/no bleeding/no confusion/no vomiting/no tingling/no weakness

## 2019-09-09 NOTE — ED ADULT TRIAGE NOTE - CHIEF COMPLAINT QUOTE
pt tripped and fall on street today, no LOC, pt has hematoma/ laceration to right eyebrow and right knee pain, pt denies headache or dizziness, on asa 81mg and Plavix.

## 2019-09-09 NOTE — ED ADULT NURSE NOTE - NSIMPLEMENTINTERV_GEN_ALL_ED
Implemented All Fall Risk Interventions:  [place] to call system. Call bell, personal items and telephone within reach. Instruct patient to call for assistance. Room bathroom lighting operational. Non-slip footwear when patient is off stretcher. Physically safe environment: no spills, clutter or unnecessary equipment. Stretcher in lowest position, wheels locked, appropriate side rails in place. Provide visual cue, wrist band, yellow gown, etc. Monitor gait and stability. Monitor for mental status changes and reorient to person, place, and time. Review medications for side effects contributing to fall risk. Reinforce activity limits and safety measures with patient and family.

## 2019-09-10 LAB
GLUCOSE BLDC GLUCOMTR-MCNC: 122 MG/DL — HIGH (ref 70–99)
GLUCOSE BLDC GLUCOMTR-MCNC: 85 MG/DL — SIGNIFICANT CHANGE UP (ref 70–99)
POTASSIUM SERPL-MCNC: 5.1 MMOL/L — SIGNIFICANT CHANGE UP (ref 3.5–5.3)
POTASSIUM SERPL-SCNC: 5.1 MMOL/L — SIGNIFICANT CHANGE UP (ref 3.5–5.3)
TROPONIN I SERPL-MCNC: 0.12 NG/ML — HIGH (ref 0.01–0.04)
TROPONIN I SERPL-MCNC: 0.16 NG/ML — HIGH (ref 0.01–0.04)
TROPONIN I SERPL-MCNC: 0.17 NG/ML — HIGH (ref 0.01–0.04)

## 2019-09-10 PROCEDURE — 99233 SBSQ HOSP IP/OBS HIGH 50: CPT

## 2019-09-10 PROCEDURE — 99223 1ST HOSP IP/OBS HIGH 75: CPT

## 2019-09-10 RX ORDER — REGADENOSON 0.08 MG/ML
0.4 INJECTION, SOLUTION INTRAVENOUS ONCE
Refills: 0 | Status: COMPLETED | OUTPATIENT
Start: 2019-09-10 | End: 2019-09-11

## 2019-09-10 RX ADMIN — METFORMIN HYDROCHLORIDE 500 MILLIGRAM(S): 850 TABLET ORAL at 05:48

## 2019-09-10 RX ADMIN — TIOTROPIUM BROMIDE 1 CAPSULE(S): 18 CAPSULE ORAL; RESPIRATORY (INHALATION) at 14:05

## 2019-09-10 RX ADMIN — BUDESONIDE AND FORMOTEROL FUMARATE DIHYDRATE 2 PUFF(S): 160; 4.5 AEROSOL RESPIRATORY (INHALATION) at 05:47

## 2019-09-10 RX ADMIN — Medication 81 MILLIGRAM(S): at 14:02

## 2019-09-10 RX ADMIN — Medication 100 MILLIGRAM(S): at 05:48

## 2019-09-10 RX ADMIN — METHOCARBAMOL 750 MILLIGRAM(S): 500 TABLET, FILM COATED ORAL at 05:48

## 2019-09-10 RX ADMIN — INSULIN GLARGINE 26 UNIT(S): 100 INJECTION, SOLUTION SUBCUTANEOUS at 09:32

## 2019-09-10 RX ADMIN — Medication 25 MILLIGRAM(S): at 05:48

## 2019-09-10 RX ADMIN — Medication 650 MILLIGRAM(S): at 06:50

## 2019-09-10 RX ADMIN — Medication 50 MILLIGRAM(S): at 05:48

## 2019-09-10 RX ADMIN — INSULIN GLARGINE 28 UNIT(S): 100 INJECTION, SOLUTION SUBCUTANEOUS at 00:12

## 2019-09-10 RX ADMIN — METHOCARBAMOL 750 MILLIGRAM(S): 500 TABLET, FILM COATED ORAL at 17:54

## 2019-09-10 RX ADMIN — Medication 100 MILLIGRAM(S): at 21:10

## 2019-09-10 RX ADMIN — ATORVASTATIN CALCIUM 40 MILLIGRAM(S): 80 TABLET, FILM COATED ORAL at 21:10

## 2019-09-10 RX ADMIN — PANTOPRAZOLE SODIUM 40 MILLIGRAM(S): 20 TABLET, DELAYED RELEASE ORAL at 05:48

## 2019-09-10 RX ADMIN — Medication 50 MILLIGRAM(S): at 14:02

## 2019-09-10 RX ADMIN — INSULIN GLARGINE 28 UNIT(S): 100 INJECTION, SOLUTION SUBCUTANEOUS at 22:58

## 2019-09-10 RX ADMIN — Medication 50 MILLIGRAM(S): at 21:10

## 2019-09-10 RX ADMIN — Medication 650 MILLIGRAM(S): at 05:55

## 2019-09-10 RX ADMIN — Medication 30 MILLILITER(S): at 00:15

## 2019-09-10 RX ADMIN — Medication 100 MILLIGRAM(S): at 14:02

## 2019-09-10 RX ADMIN — BUDESONIDE AND FORMOTEROL FUMARATE DIHYDRATE 2 PUFF(S): 160; 4.5 AEROSOL RESPIRATORY (INHALATION) at 17:55

## 2019-09-10 NOTE — PROGRESS NOTE ADULT - ASSESSMENT
69 yrs old with history of multiple medical problems with fall with no loss of consciousness or syncope with traumatic eye with no fracture.    Fall:  - Mechanical fall  - Stable now, uses came occasionally, will get PT eval.    CVA:  - CT with Left thalamic and basal ganglia lacunar infarcts, possibly old?  - Continue ASA, statin.  - Neuro eval    HTN:  - BP acceptable  - Continue current meds    Elevated troponin:  - Continue ASA, statin.  - Preserved EF from few months ago  - Stress test in am    CAD:  - Continue ASA, statin.  - Preserved EF from few months ago  - Stress test in am      Hyperkalemia:  - Unclear etiology  - Low K diet  - Serum k in am    Closed head injury:  - Stable  - Wound care      DM:  - Continue current meds      Asthma:  - Stable, continue current med.

## 2019-09-10 NOTE — CONSULT NOTE ADULT - ASSESSMENT
70yo lady with a PMH of CAD s/p PCI (?hospital/date), HTN, HL, DM, anxiety, obesity, asthma w/ a hx of hypercarbic resp failure; admitted s/p ?syncope vs mechanical fall; no prodrome and no loss of conscionsness - she found to have  right periorbital edema and ecchymosis.  No tele events.  EKG normal.  Long mildly positive  Echo 6/2019 unremarkable.    -monitor on tele  -cont asa/statin/metoprolol  -nuclear stress test in AM to r/o ischemia  - 68yo lady with a PMH of CAD s/p PCI (?hospital/date), HTN, HL, DM, anxiety, obesity, asthma w/ a hx of hypercarbic resp failure; admitted s/p ?syncope vs mechanical fall; no prodrome and no loss of conscionsness - she found to have  right periorbital edema and ecchymosis.  No tele events.  EKG normal.  Long mildly positive  Echo 6/2019 unremarkable.    -monitor on tele  -cont asa/statin/metoprolol  -nuclear stress test in AM to r/o ischemia

## 2019-09-10 NOTE — PROGRESS NOTE ADULT - SUBJECTIVE AND OBJECTIVE BOX
Patient is a 69y old  Female who presents with a chief complaint of fall (10 Sep 2019 15:26)      INTERVAL HPI/OVERNIGHT EVENTS:  Pt was seen and examined, no acute events.    MEDICATIONS  (STANDING):  aspirin enteric coated 81 milliGRAM(s) Oral daily  atorvastatin 40 milliGRAM(s) Oral at bedtime  buDESOnide 160 MICROgram(s)/formoterol 4.5 MICROgram(s) Inhaler 2 Puff(s) Inhalation two times a day  docusate sodium 100 milliGRAM(s) Oral three times a day  insulin glargine Injectable (LANTUS) 28 Unit(s) SubCutaneous at bedtime  insulin glargine Injectable (LANTUS) 26 Unit(s) SubCutaneous every morning  metFORMIN 500 milliGRAM(s) Oral two times a day  methocarbamol 750 milliGRAM(s) Oral two times a day  metoprolol succinate ER 25 milliGRAM(s) Oral daily  pantoprazole    Tablet 40 milliGRAM(s) Oral before breakfast  pregabalin 50 milliGRAM(s) Oral every 8 hours  regadenoson Injectable 0.4 milliGRAM(s) IV Push once  tiotropium 18 MICROgram(s) Capsule 1 Capsule(s) Inhalation daily    MEDICATIONS  (PRN):  acetaminophen   Tablet .. 650 milliGRAM(s) Oral every 6 hours PRN Temp greater or equal to 38C (100.4F), Mild Pain (1 - 3)  ALBUTerol    90 MICROgram(s) HFA Inhaler 2 Puff(s) Inhalation every 6 hours PRN Shortness of Breath and/or Wheezing      Allergies  No Known Allergies        Vital Signs Last 24 Hrs  T(C): 36.6 (10 Sep 2019 10:57), Max: 36.6 (09 Sep 2019 22:52)  T(F): 97.9 (10 Sep 2019 10:57), Max: 97.9 (10 Sep 2019 10:57)  HR: 72 (10 Sep 2019 10:57) (61 - 72)  BP: 110/54 (10 Sep 2019 10:57) (110/54 - 159/71)  BP(mean): --  RR: 18 (10 Sep 2019 10:57) (17 - 18)  SpO2: 96% (10 Sep 2019 10:57) (95% - 99%)    PHYSICAL EXAM:  GENERAL: NAD  HEAD:  rt eye echymosis  EYES: PERRLA  NERVOUS SYSTEM:  A, O x 3, non focal  CHEST/LUNG: Clear  HEART: RRR  ABDOMEN: Soft, non tender  EXTREMITIES:  no edema      LABS:                        11.9   7.85  )-----------( 235      ( 09 Sep 2019 16:47 )             38.3     09-09    141  |  105  |  34<H>  ----------------------------<  148<H>  5.8<H>   |  32<H>  |  1.05    Ca    8.7      09 Sep 2019 16:47    TPro  7.0  /  Alb  3.3  /  TBili  0.3  /  DBili  x   /  AST  26  /  ALT  37  /  AlkPhos  100  09-09    PT/INR - ( 09 Sep 2019 16:47 )   PT: 11.1 sec;   INR: 0.99 ratio         PTT - ( 09 Sep 2019 16:47 )  PTT:32.3 sec    CAPILLARY BLOOD GLUCOSE      POCT Blood Glucose.: 111 mg/dL (10 Sep 2019 16:51)  POCT Blood Glucose.: 155 mg/dL (10 Sep 2019 11:28)  POCT Blood Glucose.: 85 mg/dL (10 Sep 2019 08:32)  POCT Blood Glucose.: 122 mg/dL (10 Sep 2019 00:14)  POCT Blood Glucose.: 149 mg/dL (09 Sep 2019 23:11)      RADIOLOGY & ADDITIONAL TESTS:    Imaging Personally Reviewed:  [ ] YES  [ ] NO    Consultant(s) Notes Reviewed:  [ ] YES  [ ] NO    Care Discussed with Consultants/Other Providers [ ] YES  [ ] NO

## 2019-09-11 LAB
ANION GAP SERPL CALC-SCNC: 6 MMOL/L — SIGNIFICANT CHANGE UP (ref 5–17)
BUN SERPL-MCNC: 37 MG/DL — HIGH (ref 7–23)
CALCIUM SERPL-MCNC: 8.3 MG/DL — LOW (ref 8.5–10.1)
CHLORIDE SERPL-SCNC: 106 MMOL/L — SIGNIFICANT CHANGE UP (ref 96–108)
CO2 SERPL-SCNC: 29 MMOL/L — SIGNIFICANT CHANGE UP (ref 22–31)
CREAT SERPL-MCNC: 0.96 MG/DL — SIGNIFICANT CHANGE UP (ref 0.5–1.3)
GLUCOSE BLDC GLUCOMTR-MCNC: 177 MG/DL — HIGH (ref 70–99)
GLUCOSE BLDC GLUCOMTR-MCNC: 208 MG/DL — HIGH (ref 70–99)
GLUCOSE SERPL-MCNC: 120 MG/DL — HIGH (ref 70–99)
HCT VFR BLD CALC: 37 % — SIGNIFICANT CHANGE UP (ref 34.5–45)
HGB BLD-MCNC: 11.4 G/DL — LOW (ref 11.5–15.5)
MCHC RBC-ENTMCNC: 27.3 PG — SIGNIFICANT CHANGE UP (ref 27–34)
MCHC RBC-ENTMCNC: 30.8 GM/DL — LOW (ref 32–36)
MCV RBC AUTO: 88.7 FL — SIGNIFICANT CHANGE UP (ref 80–100)
NRBC # BLD: 0 /100 WBCS — SIGNIFICANT CHANGE UP (ref 0–0)
PLATELET # BLD AUTO: 204 K/UL — SIGNIFICANT CHANGE UP (ref 150–400)
POTASSIUM SERPL-MCNC: 4.7 MMOL/L — SIGNIFICANT CHANGE UP (ref 3.5–5.3)
POTASSIUM SERPL-SCNC: 4.7 MMOL/L — SIGNIFICANT CHANGE UP (ref 3.5–5.3)
RBC # BLD: 4.17 M/UL — SIGNIFICANT CHANGE UP (ref 3.8–5.2)
RBC # FLD: 13 % — SIGNIFICANT CHANGE UP (ref 10.3–14.5)
SODIUM SERPL-SCNC: 141 MMOL/L — SIGNIFICANT CHANGE UP (ref 135–145)
WBC # BLD: 7.74 K/UL — SIGNIFICANT CHANGE UP (ref 3.8–10.5)
WBC # FLD AUTO: 7.74 K/UL — SIGNIFICANT CHANGE UP (ref 3.8–10.5)

## 2019-09-11 PROCEDURE — 99232 SBSQ HOSP IP/OBS MODERATE 35: CPT

## 2019-09-11 PROCEDURE — 78452 HT MUSCLE IMAGE SPECT MULT: CPT | Mod: 26

## 2019-09-11 PROCEDURE — 99233 SBSQ HOSP IP/OBS HIGH 50: CPT

## 2019-09-11 RX ORDER — ASPIRIN/CALCIUM CARB/MAGNESIUM 324 MG
81 TABLET ORAL DAILY
Refills: 0 | Status: DISCONTINUED | OUTPATIENT
Start: 2019-09-11 | End: 2019-09-11

## 2019-09-11 RX ORDER — OXYCODONE AND ACETAMINOPHEN 5; 325 MG/1; MG/1
2 TABLET ORAL EVERY 4 HOURS
Refills: 0 | Status: DISCONTINUED | OUTPATIENT
Start: 2019-09-11 | End: 2019-09-13

## 2019-09-11 RX ADMIN — METHOCARBAMOL 750 MILLIGRAM(S): 500 TABLET, FILM COATED ORAL at 17:48

## 2019-09-11 RX ADMIN — Medication 650 MILLIGRAM(S): at 20:50

## 2019-09-11 RX ADMIN — PANTOPRAZOLE SODIUM 40 MILLIGRAM(S): 20 TABLET, DELAYED RELEASE ORAL at 05:52

## 2019-09-11 RX ADMIN — Medication 100 MILLIGRAM(S): at 05:48

## 2019-09-11 RX ADMIN — BUDESONIDE AND FORMOTEROL FUMARATE DIHYDRATE 2 PUFF(S): 160; 4.5 AEROSOL RESPIRATORY (INHALATION) at 05:48

## 2019-09-11 RX ADMIN — Medication 50 MILLIGRAM(S): at 22:45

## 2019-09-11 RX ADMIN — Medication 81 MILLIGRAM(S): at 17:44

## 2019-09-11 RX ADMIN — TIOTROPIUM BROMIDE 1 CAPSULE(S): 18 CAPSULE ORAL; RESPIRATORY (INHALATION) at 17:46

## 2019-09-11 RX ADMIN — METHOCARBAMOL 750 MILLIGRAM(S): 500 TABLET, FILM COATED ORAL at 05:48

## 2019-09-11 RX ADMIN — Medication 25 MILLIGRAM(S): at 05:48

## 2019-09-11 RX ADMIN — BUDESONIDE AND FORMOTEROL FUMARATE DIHYDRATE 2 PUFF(S): 160; 4.5 AEROSOL RESPIRATORY (INHALATION) at 17:47

## 2019-09-11 RX ADMIN — ATORVASTATIN CALCIUM 40 MILLIGRAM(S): 80 TABLET, FILM COATED ORAL at 22:45

## 2019-09-11 RX ADMIN — Medication 100 MILLIGRAM(S): at 17:45

## 2019-09-11 RX ADMIN — REGADENOSON 0.4 MILLIGRAM(S): 0.08 INJECTION, SOLUTION INTRAVENOUS at 11:16

## 2019-09-11 RX ADMIN — Medication 100 MILLIGRAM(S): at 22:45

## 2019-09-11 RX ADMIN — INSULIN GLARGINE 28 UNIT(S): 100 INJECTION, SOLUTION SUBCUTANEOUS at 22:46

## 2019-09-11 RX ADMIN — Medication 50 MILLIGRAM(S): at 05:48

## 2019-09-11 RX ADMIN — METFORMIN HYDROCHLORIDE 500 MILLIGRAM(S): 850 TABLET ORAL at 17:47

## 2019-09-11 RX ADMIN — Medication 650 MILLIGRAM(S): at 19:50

## 2019-09-11 NOTE — PROGRESS NOTE ADULT - ASSESSMENT
69year old lady with a PMH of CAD s/p PCI (?hospital/date), HTN, HL, DM, anxiety, obesity, asthma w/ a hx of hypercarbic resp failure; admitted s/p ?syncope vs mechanical fall; no prodrome and no loss of conscionsness - she found to have  right periorbital edema and ecchymosis.  No tele events.  EKG normal.  Long mildly positive  Echo 6/2019 unremarkable.    -cont tele  -NM pharm stress test with small zone of mild ischemia involving apicoseptal wall. Will evaluate patient in Am and decide if cardiac cath is needed or can be completed as outpatient.  -cont asa/statin/metoprolol  -monitor weight/creat/electrolytes  -TSH  -check orthostatic BP

## 2019-09-11 NOTE — PROGRESS NOTE ADULT - SUBJECTIVE AND OBJECTIVE BOX
Patient is a 69y old  Female who presents with a chief complaint of fall (11 Sep 2019 16:28)      INTERVAL HPI/ OVERNIGHT EVENTS: Pt was seen and examined at bedside today, No significant overnight events, pt c/o pain in eye injury location and shoulder and back pain. No CP or SOB.      MEDICATIONS  (STANDING):  aspirin enteric coated 81 milliGRAM(s) Oral daily  atorvastatin 40 milliGRAM(s) Oral at bedtime  buDESOnide 160 MICROgram(s)/formoterol 4.5 MICROgram(s) Inhaler 2 Puff(s) Inhalation two times a day  docusate sodium 100 milliGRAM(s) Oral three times a day  insulin glargine Injectable (LANTUS) 28 Unit(s) SubCutaneous at bedtime  insulin glargine Injectable (LANTUS) 26 Unit(s) SubCutaneous every morning  metFORMIN 500 milliGRAM(s) Oral two times a day  methocarbamol 750 milliGRAM(s) Oral two times a day  metoprolol succinate ER 25 milliGRAM(s) Oral daily  pantoprazole    Tablet 40 milliGRAM(s) Oral before breakfast  pregabalin 50 milliGRAM(s) Oral every 8 hours  tiotropium 18 MICROgram(s) Capsule 1 Capsule(s) Inhalation daily    MEDICATIONS  (PRN):  acetaminophen   Tablet .. 650 milliGRAM(s) Oral every 6 hours PRN Temp greater or equal to 38C (100.4F), Mild Pain (1 - 3)  ALBUTerol    90 MICROgram(s) HFA Inhaler 2 Puff(s) Inhalation every 6 hours PRN Shortness of Breath and/or Wheezing  oxyCODONE    5 mG/acetaminophen 325 mG 2 Tablet(s) Oral every 4 hours PRN Severe Pain (7 - 10)      Allergies    No Known Allergies    Intolerances        REVIEW OF SYSTEMS:    Unable to examine due to [ ] Encephalopathy [ ] Advanced Dementia [ ] Expressive Aphasia [ ] Non-verbal patient    CONSTITUTIONAL: No fever, NO generalized weakness/Fatigue, No weight loss  EYES: positive right eye pain, No visual disturbances, or discharge  ENMT:  No difficulty hearing, tinnitus, vertigo; No sinus or throat pain  NECK: No pain or stiffness  RESPIRATORY: No shortness of breath,  cough, wheezing, sputum or hemoptysis   CARDIOVASCULAR: No chest pain, palpitations, or leg swelling  GASTROINTESTINAL: No abdominal pain. No nausea, vomiting, diarrhea or constipation. No melena or hematochezia.  GENITOURINARY: No dysuria, frequency, hematuria, or incontinence  NEUROLOGICAL: No headaches, Dizziness, memory loss, loss of strength, numbness, or tremors  SKIN: No itching, burning, rashes, or lesions   MUSCULOSKELETAL: Positive Right facial pain, back and shoulder pain.   PSYCHIATRIC: No depression, anxiety, mood swings, or difficulty sleeping  HEME/LYMPH: No easy bruising, or bleeding gums      Vital Signs Last 24 Hrs  T(C): 35.8 (11 Sep 2019 15:53), Max: 36.9 (11 Sep 2019 13:16)  T(F): 96.5 (11 Sep 2019 15:53), Max: 98.4 (11 Sep 2019 13:16)  HR: 69 (11 Sep 2019 15:53) (62 - 69)  BP: 144/74 (11 Sep 2019 15:53) (127/59 - 145/60)  BP(mean): --  RR: 18 (11 Sep 2019 15:53) (18 - 18)  SpO2: 97% (11 Sep 2019 15:53) (92% - 97%)    PHYSICAL EXAM:  GENERAL: NAD, well-developed, well-groomed  HEAD:  Atraumatic, Normocephalic  EYES: Right black eye, conjunctiva and sclera clear  ENMT: Moist mucous membranes  NECK: Supple, No JVD, Normal thyroid  CHEST/LUNG: Clear to Auscultation bilaterally; No rales, rhonchi, wheezing, or rubs  HEART: Regular rate and rhythm; No murmurs, rubs, or gallops  ABDOMEN: Soft, Nontender, Nondistended; Bowel sounds present  EXTREMITIES:  2+ Peripheral Pulses, No clubbing, cyanosis, or edema  SKIN: No rashes or lesions  NERVOUS SYSTEM:  Alert & Oriented X3, Good concentration; Motor Strength 5/5 B/L upper and lower extremities    LABS:                        11.4   7.74  )-----------( 204      ( 11 Sep 2019 06:29 )             37.0     09-11    141  |  106  |  37<H>  ----------------------------<  120<H>  4.7   |  29  |  0.96    Ca    8.3<L>      11 Sep 2019 06:29          CAPILLARY BLOOD GLUCOSE      POCT Blood Glucose.: 208 mg/dL (11 Sep 2019 17:42)  POCT Blood Glucose.: 148 mg/dL (11 Sep 2019 07:33)  POCT Blood Glucose.: 122 mg/dL (10 Sep 2019 22:57)          RADIOLOGY & ADDITIONAL TESTS:          Imaging Personally Reviewed:  [ ] YES  [ ] NO    Consultant(s) Notes Reviewed:  [x ] YES  [ ] NO    Care Discussed with Consultants/Other Providers [x ] YES  [ ] NO

## 2019-09-11 NOTE — CONSULT NOTE ADULT - SUBJECTIVE AND OBJECTIVE BOX
refer to consult 09/11/2019  no new  treatment recommended  EOM full, perrla, IOL in place and clear ou, retina attached , vision 10/20 od/os/ou
CARDIOLOGY CONSULT NOTE    Patient is a 69y Female with a known history of :  Hyperkalemia (E87.5)  Mild intermittent asthma, unspecified whether complicated (J45.20)  Type 2 diabetes mellitus without complication, without long-term current use of insulin (E11.9)  Coronary artery disease with other form of angina pectoris (I25.118)  Near syncope (R55)  Essential hypertension (I10)  Closed head injury, initial encounter (S09.90XA)  Unstable gait (R26.81)    HPI:  Poor historian... chart review:  70yo lady with a PMH of CAD s/p PCI (?hospital/date), HTN, HL, DM, anxiety, obesity, asthma w/ a hx of hypercarbic resp failure; admitted s/p ?syncope vs mechanical fall; no prodrome and no loss of conscionsness - she found to have  right periorbital edema and ecchymosis.  No tele events.  EKG normal.  Long mildly positive  Echo 6/2019 unremarkable.    REVIEW OF SYSTEMS:    CONSTITUTIONAL: No fever, weight loss, or fatigue  EYES: No eye pain, visual disturbances, or discharge  ENMT:  No difficulty hearing, tinnitus, vertigo; No sinus or throat pain  NECK: No pain or stiffness  BREASTS: No pain, masses, or nipple discharge  RESPIRATORY: No cough, wheezing, chills or hemoptysis; No shortness of breath  CARDIOVASCULAR: No chest pain, palpitations, dizziness, or leg swelling  GASTROINTESTINAL: No abdominal or epigastric pain. No nausea, vomiting, or hematemesis; No diarrhea or constipation. No melena or hematochezia.  GENITOURINARY: No dysuria, frequency, hematuria, or incontinence  NEUROLOGICAL: No headaches, memory loss, loss of strength, numbness, or tremors  SKIN: No itching, burning, rashes, or lesions   LYMPH NODES: No enlarged glands  ENDOCRINE: No heat or cold intolerance; No hair loss  MUSCULOSKELETAL: No joint pain or swelling; No muscle, back, or extremity pain  PSYCHIATRIC: No depression, anxiety, mood swings, or difficulty sleeping  HEME/LYMPH: No easy bruising, or bleeding gums  ALLERGY AND IMMUNOLOGIC: No hives or eczema    MEDICATIONS  (STANDING):  aspirin enteric coated 81 milliGRAM(s) Oral daily  atorvastatin 40 milliGRAM(s) Oral at bedtime  buDESOnide 160 MICROgram(s)/formoterol 4.5 MICROgram(s) Inhaler 2 Puff(s) Inhalation two times a day  docusate sodium 100 milliGRAM(s) Oral three times a day  insulin glargine Injectable (LANTUS) 28 Unit(s) SubCutaneous at bedtime  insulin glargine Injectable (LANTUS) 26 Unit(s) SubCutaneous every morning  metFORMIN 500 milliGRAM(s) Oral two times a day  methocarbamol 750 milliGRAM(s) Oral two times a day  metoprolol succinate ER 25 milliGRAM(s) Oral daily  pantoprazole    Tablet 40 milliGRAM(s) Oral before breakfast  pregabalin 50 milliGRAM(s) Oral every 8 hours  tiotropium 18 MICROgram(s) Capsule 1 Capsule(s) Inhalation daily    MEDICATIONS  (PRN):  acetaminophen   Tablet .. 650 milliGRAM(s) Oral every 6 hours PRN Temp greater or equal to 38C (100.4F), Mild Pain (1 - 3)  ALBUTerol    90 MICROgram(s) HFA Inhaler 2 Puff(s) Inhalation every 6 hours PRN Shortness of Breath and/or Wheezing      ALLERGIES: No Known Allergies      FAMILY HISTORY:      PHYSICAL EXAMINATION:  -----------------------------  T(C): 36.6 (09-10-19 @ 10:57), Max: 36.6 (09-09-19 @ 22:52)  HR: 72 (09-10-19 @ 10:57) (61 - 72)  BP: 110/54 (09-10-19 @ 10:57) (110/54 - 188/79)  RR: 18 (09-10-19 @ 10:57) (17 - 18)  SpO2: 96% (09-10-19 @ 10:57) (95% - 99%)  Wt(kg): --    09-09 @ 07:01  -  09-10 @ 07:00  --------------------------------------------------------  IN:    Oral Fluid: 480 mL  Total IN: 480 mL    OUT:  Total OUT: 0 mL    Total NET: 480 mL        Height (cm): 165.1 (09-09 @ 22:52)  Weight (kg): 74.6 (09-09 @ 22:52)  BMI (kg/m2): 27.4 (09-09 @ 22:52)  BSA (m2): 1.82 (09-09 @ 22:52)    Constitutional:  no acute distress.   Eyes: the conjunctiva exhibited no abnormalities and the eyelids demonstrated no xanthelasmas.   HEENT: normal oral mucosa, no oral pallor and no oral cyanosis.   Neck: normal jugular venous A waves present, normal jugular venous V waves present and no jugular venous nava A waves.   Pulmonary: no respiratory distress, normal respiratory rhythm and effort, no accessory muscle use and lungs were clear to auscultation bilaterally.   Cardiovascular: heart rate and rhythm were normal, normal S1 and S2 and no murmur, gallop, rub, heave or thrill are present.   Abdomen: soft, non-tender, no hepato-splenomegaly and no abdominal mass palpated.   Musculoskeletal: the gait could not be assessed..   Extremities: no clubbing of the fingernails, no localized cyanosis, no petechial hemorrhages and no ischemic changes.   Skin: normal skin color and pigmentation, no rash, no venous stasis, no skin lesions, no skin ulcer and no xanthoma was observed.   Psychiatric: oriented to person, place, and time, the affect was normal, the mood was normal and not feeling anxious.     LABS:   --------  09-09    141  |  105  |  34<H>  ----------------------------<  148<H>  5.8<H>   |  32<H>  |  1.05    Ca    8.7      09 Sep 2019 16:47    TPro  7.0  /  Alb  3.3  /  TBili  0.3  /  DBili  x   /  AST  26  /  ALT  37  /  AlkPhos  100  09-09                         11.9   7.85  )-----------( 235      ( 09 Sep 2019 16:47 )             38.3     PT/INR - ( 09 Sep 2019 16:47 )   PT: 11.1 sec;   INR: 0.99 ratio         PTT - ( 09 Sep 2019 16:47 )  PTT:32.3 sec  09-09 @ 16:47 BNP: 415 pg/mL    09-10 @ 07:52 CPK total:--, CKMB --, Troponin I - .159 ng/mL<H>  09-09 @ 23:48 CPK total:--, CKMB --, Troponin I - .165 ng/mL<H>  09-09 @ 16:47 CPK total:--, CKMB --, Troponin I - .137 ng/mL<H>          RADIOLOGY:  -----------------    ECG: NSR    < from: TTE Echo Doppler w/o Cont (06.30.19 @ 10:05) >   1. Left ventricular ejection fraction, by visual estimation, is 55 to   60%.   2. Normal global left ventricular systolic function.   3. Normal left ventricular internal cavity size.   4. Normal left ventricular size and wall thicknesses, with normal   systolic and diastolic function.   5. Normal right ventricular size and function.   6. The left atrium is normal in size.   7. Normal right atrial size.   8. The right atrium is normal in size.   9. There is no evidence of pericardial effusion.  10. Degenerative mitral valve.  11. Mild mitral annular calcification.  12. Mild mitral valve regurgitation.  13. Structurally normal tricuspid valve, with normal leaflet excursion.  14. Structurally normal pulmonic valve, with normal leaflet excursion.  15. The main pulmonary artery is normal in size.    < end of copied text >      < from: CT Maxillofacial No Cont (09.09.19 @ 17:16) >  CT head: No acute intracranial hemorrhage or mass effect.   CT maxillofacial: No acute facial bone fracture. Right periorbital   hematoma and soft tissue swelling.    < end of copied text >

## 2019-09-11 NOTE — PROGRESS NOTE ADULT - ASSESSMENT
69 yrs old with history of multiple medical problems with fall with no loss of consciousness or syncope with traumatic eye with no fracture.      Elevated troponin:  - Continue ASA, statin.  - Preserved EF from few months ago  - Stress test shows small apical area of ischemia   -Cardiology on board, f/u recommendations. ?Cardiac Cath     Fall:  - Mechanical fall  - Stable now, uses came occasionally,   - fall precautions, f/u PT eval.    Closed head injury:  - Stable, Black Right eye  - Wound care, analgesics as needed.     HTN:  - Continue current meds    DM:  - FS monitoring w/ insulin s/s coverage.     CVA:  - CT with Left thalamic and basal ganglia lacunar infarcts  - Continue ASA, statin.    CAD:  - Continue ASA, statin.    Hyperkalemia:  - Resolved       Asthma:  - Stable, continue current med.    DVTp: Heparin   Disposition: f/u PT evaluation, ?Cardiac Cath

## 2019-09-11 NOTE — PROGRESS NOTE ADULT - SUBJECTIVE AND OBJECTIVE BOX
Patient is a 69y old  Female who presents with a chief complaint of fall (10 Sep 2019 18:38)    PAST MEDICAL & SURGICAL HISTORY:  Asthma  Hypertension  Coronary artery disease with other form of angina pectoris  Type 2 diabetes mellitus without complication, without long-term current use of insulin  History of heart artery stent    INTERVAL HISTORY: Patient in bed, comfortable, alert and oriented, denies chest pain, SOB, palpitations.  	  MEDICATIONS:  MEDICATIONS  (STANDING):  aspirin enteric coated 81 milliGRAM(s) Oral daily  atorvastatin 40 milliGRAM(s) Oral at bedtime  buDESOnide 160 MICROgram(s)/formoterol 4.5 MICROgram(s) Inhaler 2 Puff(s) Inhalation two times a day  docusate sodium 100 milliGRAM(s) Oral three times a day  insulin glargine Injectable (LANTUS) 28 Unit(s) SubCutaneous at bedtime  insulin glargine Injectable (LANTUS) 26 Unit(s) SubCutaneous every morning  metFORMIN 500 milliGRAM(s) Oral two times a day  methocarbamol 750 milliGRAM(s) Oral two times a day  metoprolol succinate ER 25 milliGRAM(s) Oral daily  pantoprazole    Tablet 40 milliGRAM(s) Oral before breakfast  pregabalin 50 milliGRAM(s) Oral every 8 hours  tiotropium 18 MICROgram(s) Capsule 1 Capsule(s) Inhalation daily    MEDICATIONS  (PRN):  acetaminophen   Tablet .. 650 milliGRAM(s) Oral every 6 hours PRN Temp greater or equal to 38C (100.4F), Mild Pain (1 - 3)  ALBUTerol    90 MICROgram(s) HFA Inhaler 2 Puff(s) Inhalation every 6 hours PRN Shortness of Breath and/or Wheezing  oxyCODONE    5 mG/acetaminophen 325 mG 2 Tablet(s) Oral every 4 hours PRN Severe Pain (7 - 10)    Vitals:  T(F): 96.5 (09-11-19 @ 15:53), Max: 98.4 (09-11-19 @ 13:16)  HR: 69 (09-11-19 @ 15:53) (61 - 69)  BP: 144/74 (09-11-19 @ 15:53) (127/59 - 160/70)  RR: 18 (09-11-19 @ 15:53) (18 - 18)  SpO2: 97% (09-11-19 @ 15:53) (92% - 97%)  Wt(kg): --74.8 kg    09-10 @ 07:01  -  09-11 @ 07:00  --------------------------------------------------------  IN:    Oral Fluid: 120 mL  Total IN: 120 mL    OUT:    Voided: 350 mL  Total OUT: 350 mL    Total NET: -230 mL    Weight (kg): 74.6 (09-09 @ 22:52)  BMI (kg/m2): 27.4 (09-09 @ 22:52)    PHYSICAL EXAM:  Neuro: Awake, responsive, Right orbital ecchymosis   CV: S1 S2 RRR  Lungs: CTABL  GI: Soft, BS +, ND, NT  Extremities: mild edema    TELEMETRY: SR/SVT x 1  	    RADIOLOGY:     DIAGNOSTIC TESTING:    [ x] Echocardiogram:< from: TTE Echo Doppler w/o Cont (06.30.19 @ 10:05) >  Summary:   1. Left ventricular ejection fraction, by visual estimation, is 55 to   60%.   2. Normal global left ventricular systolic function.   3. Normal left ventricular internal cavity size.   4. Normal left ventricular size and wall thicknesses, with normal   systolic and diastolic function.   5. Normal right ventricular size and function.   6. The left atrium is normal in size.   7. Normal right atrial size.   8. The right atrium is normal in size.   9. There is no evidence of pericardial effusion.  10. Degenerative mitral valve.  11. Mild mitral annular calcification.  12. Mild mitral valve regurgitation.  13. Structurally normal tricuspid valve, with normal leaflet excursion.  14. Structurally normal pulmonic valve, with normal leaflet excursion.  15. The main pulmonary artery is normal in size.    < end of copied text >       [x ] Stress Test:  < from: NM Pharm Stress Test, Dual Isotope (09.11.19 @ 12:41) >  IMPRESSION: Abnormal SPECT Myocardial Perfusion Imaging.     Normal global left ventricular function with an ejection fraction of 60%   (Normal: 50% or greater).    The apical, apico-anterior, apicoseptal, inferoapical, & apicolateral   walls are akinetic.    There was a moderate zone of severely reduced tracer uptake in a fixed   pattern involving the entire apical, apico-anterior, apicoseptal,   inferoapical, & apicolateral walls consistent with prior myocardial   infarction.      There is a small zone of mild ischemia involving the apicoseptal wall.    < end of copied text >    LABS:	 	    CARDIAC MARKERS:  Troponin I, Serum: .123 ng/mL (09-10 @ 21:38)  Troponin I, Serum: .159 ng/mL (09-10 @ 07:52)  Troponin I, Serum: .165 ng/mL (09-09 @ 23:48)  Troponin I, Serum: .137 ng/mL (09-09 @ 16:47)    11 Sep 2019 06:29    141    |  106    |  37     ----------------------------<  120    4.7     |  29     |  0.96   10 Sep 2019 20:20    x      |  x      |  x      ----------------------------<  x      5.1     |  x      |  x      09 Sep 2019 16:47    141    |  105    |  34     ----------------------------<  148    5.8     |  32     |  1.05     Ca    8.3        11 Sep 2019 06:29    TPro  7.0    /  Alb  3.3    /  TBili  0.3    /  DBili  x      /  AST  26     /  ALT  37     /  AlkPhos  100    09 Sep 2019 16:47                          11.4   7.74  )-----------( 204      ( 11 Sep 2019 06:29 )             37.0 ,                       11.9   7.85  )-----------( 235      ( 09 Sep 2019 16:47 )             38.3   proBNP: Serum Pro-Brain Natriuretic Peptide: 415 pg/mL (09-09 @ 16:47)      INR: 0.99 ratio (09-09 @ 16:47) Patient is a 69y old  Female who presents with a chief complaint of fall (10 Sep 2019 18:38)    PAST MEDICAL & SURGICAL HISTORY:  Asthma  Hypertension  Coronary artery disease with other form of angina pectoris  Type 2 diabetes mellitus without complication, without long-term current use of insulin  History of heart artery stent    INTERVAL HISTORY: Patient in bed, comfortable, alert and oriented, denies chest pain, SOB, palpitations.  	  MEDICATIONS:  MEDICATIONS  (STANDING):  aspirin enteric coated 81 milliGRAM(s) Oral daily  atorvastatin 40 milliGRAM(s) Oral at bedtime  buDESOnide 160 MICROgram(s)/formoterol 4.5 MICROgram(s) Inhaler 2 Puff(s) Inhalation two times a day  docusate sodium 100 milliGRAM(s) Oral three times a day  insulin glargine Injectable (LANTUS) 28 Unit(s) SubCutaneous at bedtime  insulin glargine Injectable (LANTUS) 26 Unit(s) SubCutaneous every morning  metFORMIN 500 milliGRAM(s) Oral two times a day  methocarbamol 750 milliGRAM(s) Oral two times a day  metoprolol succinate ER 25 milliGRAM(s) Oral daily  pantoprazole    Tablet 40 milliGRAM(s) Oral before breakfast  pregabalin 50 milliGRAM(s) Oral every 8 hours  tiotropium 18 MICROgram(s) Capsule 1 Capsule(s) Inhalation daily    MEDICATIONS  (PRN):  acetaminophen   Tablet .. 650 milliGRAM(s) Oral every 6 hours PRN Temp greater or equal to 38C (100.4F), Mild Pain (1 - 3)  ALBUTerol    90 MICROgram(s) HFA Inhaler 2 Puff(s) Inhalation every 6 hours PRN Shortness of Breath and/or Wheezing  oxyCODONE    5 mG/acetaminophen 325 mG 2 Tablet(s) Oral every 4 hours PRN Severe Pain (7 - 10)    Vitals:  T(F): 96.5 (09-11-19 @ 15:53), Max: 98.4 (09-11-19 @ 13:16)  HR: 69 (09-11-19 @ 15:53) (61 - 69)  BP: 144/74 (09-11-19 @ 15:53) (127/59 - 160/70)  RR: 18 (09-11-19 @ 15:53) (18 - 18)  SpO2: 97% (09-11-19 @ 15:53) (92% - 97%)  Wt(kg): --74.8 kg    09-10 @ 07:01  -  09-11 @ 07:00  --------------------------------------------------------  IN:    Oral Fluid: 120 mL  Total IN: 120 mL    OUT:    Voided: 350 mL  Total OUT: 350 mL    Total NET: -230 mL    Weight (kg): 74.6 (09-09 @ 22:52)  BMI (kg/m2): 27.4 (09-09 @ 22:52)    PHYSICAL EXAM:  Neuro: Awake, responsive, Right orbital ecchymosis   CV: S1 S2 RRR  Lungs: CTABL  GI: Soft, BS +, ND, NT  Extremities: mild edema    TELEMETRY: SR/SVT x 1  	    RADIOLOGY:     DIAGNOSTIC TESTING:    [ x] Echocardiogram:< from: TTE Echo Doppler w/o Cont (06.30.19 @ 10:05) >  Summary:   1. Left ventricular ejection fraction, by visual estimation, is 55 to   60%.   2. Normal global left ventricular systolic function.   3. Normal left ventricular internal cavity size.   4. Normal left ventricular size and wall thicknesses, with normal   systolic and diastolic function.   5. Normal right ventricular size and function.   6. The left atrium is normal in size.   7. Normal right atrial size.   8. The right atrium is normal in size.   9. There is no evidence of pericardial effusion.  10. Degenerative mitral valve.  11. Mild mitral annular calcification.  12. Mild mitral valve regurgitation.  13. Structurally normal tricuspid valve, with normal leaflet excursion.  14. Structurally normal pulmonic valve, with normal leaflet excursion.  15. The main pulmonary artery is normal in size.    < end of copied text >     [x ] Stress Test:  < from: NM Pharm Stress Test, Dual Isotope (09.11.19 @ 12:41) >  IMPRESSION: Abnormal SPECT Myocardial Perfusion Imaging.     Normal global left ventricular function with an ejection fraction of 60%   (Normal: 50% or greater).    The apical, apico-anterior, apicoseptal, inferoapical, & apicolateral   walls are akinetic.    There was a moderate zone of severely reduced tracer uptake in a fixed   pattern involving the entire apical, apico-anterior, apicoseptal,   inferoapical, & apicolateral walls consistent with prior myocardial   infarction.      There is a small zone of mild ischemia involving the apicoseptal wall.    < end of copied text >    LABS:	 	    CARDIAC MARKERS:  Troponin I, Serum: .123 ng/mL (09-10 @ 21:38)  Troponin I, Serum: .159 ng/mL (09-10 @ 07:52)  Troponin I, Serum: .165 ng/mL (09-09 @ 23:48)  Troponin I, Serum: .137 ng/mL (09-09 @ 16:47)    11 Sep 2019 06:29    141    |  106    |  37     ----------------------------<  120    4.7     |  29     |  0.96   10 Sep 2019 20:20    x      |  x      |  x      ----------------------------<  x      5.1     |  x      |  x      09 Sep 2019 16:47    141    |  105    |  34     ----------------------------<  148    5.8     |  32     |  1.05     Ca    8.3        11 Sep 2019 06:29    TPro  7.0    /  Alb  3.3    /  TBili  0.3    /  DBili  x      /  AST  26     /  ALT  37     /  AlkPhos  100    09 Sep 2019 16:47                          11.4   7.74  )-----------( 204      ( 11 Sep 2019 06:29 )             37.0 ,                       11.9   7.85  )-----------( 235      ( 09 Sep 2019 16:47 )             38.3   proBNP: Serum Pro-Brain Natriuretic Peptide: 415 pg/mL (09-09 @ 16:47)      INR: 0.99 ratio (09-09 @ 16:47)

## 2019-09-11 NOTE — CONSULT NOTE ADULT - ASSESSMENT
Subjective Complaints:      Consult requested by ER doctor:                  Attending:     History of Present Illness:  Chief Complaint/Reason for Admission:  History of Present Illness:  HPI:  The patient is a 69y Female complaining of fall  with no prodromal  ands no loss of conscionsness - she found to have  right periorbital edema and ecchymoses there was a question whenther she had a syncope of not       · (09 Sep 2019 18:21)        PAST MEDICAL & SURGICAL HISTORY:  Asthma  Hypertension  Coronary artery disease with other form of angina pectoris  Type 2 diabetes mellitus without complication, without long-term current use of insulin  History of heart artery stent  69yFemale    MEDICATIONS  (STANDING):  aspirin enteric coated 81 milliGRAM(s) Oral daily  atorvastatin 40 milliGRAM(s) Oral at bedtime  buDESOnide 160 MICROgram(s)/formoterol 4.5 MICROgram(s) Inhaler 2 Puff(s) Inhalation two times a day  docusate sodium 100 milliGRAM(s) Oral three times a day  insulin glargine Injectable (LANTUS) 28 Unit(s) SubCutaneous at bedtime  insulin glargine Injectable (LANTUS) 26 Unit(s) SubCutaneous every morning  metFORMIN 500 milliGRAM(s) Oral two times a day  methocarbamol 750 milliGRAM(s) Oral two times a day  metoprolol succinate ER 25 milliGRAM(s) Oral daily  pantoprazole    Tablet 40 milliGRAM(s) Oral before breakfast  pregabalin 50 milliGRAM(s) Oral every 8 hours  tiotropium 18 MICROgram(s) Capsule 1 Capsule(s) Inhalation daily    MEDICATIONS  (PRN):  acetaminophen   Tablet .. 650 milliGRAM(s) Oral every 6 hours PRN Temp greater or equal to 38C (100.4F), Mild Pain (1 - 3)  ALBUTerol    90 MICROgram(s) HFA Inhaler 2 Puff(s) Inhalation every 6 hours PRN Shortness of Breath and/or Wheezing  oxyCODONE    5 mG/acetaminophen 325 mG 2 Tablet(s) Oral every 4 hours PRN Severe Pain (7 - 10)      Allergies    No Known Allergies    Intolerances      FAMILY HISTORY:      REVIEW OF SYSTEMS:  General:  No wt loss, fevers, chills, night sweats  Eyes:  Good vision, no reported pain  ENT:  No sore throat, pain, runny nose, dysphagia  CV:  No pain, palpitatioins, hypo/hypertension  Resp:  No dyspnea, cough, tachypnea, wheezing  GI:  No pain, nausea, vomiting, diarrhea, constipatiion  :  No pain, bleeding, incontinence, nocturia  Muscle:  No pain, weakness  Breast:  No pain, abscess, mass, discharge  Neuro:  No weakness, tingling, memory problems  Psych:  No fatigue, insomnia, mood problems, depression  Endocrine:  No polyuria, polydypsia, cold/heat intolerance  Heme:  No petechiae, ecchymosis, easy bruisability  Skin:  No rash, tattoos, scars, edema      Vital Signs Last 24 Hrs  T(C): 35.8 (11 Sep 2019 15:53), Max: 36.9 (11 Sep 2019 13:16)  T(F): 96.5 (11 Sep 2019 15:53), Max: 98.4 (11 Sep 2019 13:16)  HR: 69 (11 Sep 2019 15:53) (62 - 69)  BP: 144/74 (11 Sep 2019 15:53) (127/59 - 145/60)  BP(mean): --  RR: 18 (11 Sep 2019 15:53) (18 - 18)  SpO2: 97% (11 Sep 2019 15:53) (92% - 97%)    GENERAL PHYSICAL EXAM:  General:  Appears stated age, well-groomed, well-nourished, no distress  HEENT:  NC/AT, patent nares w/ pink mucosa, OP clear w/o lesions, PERRL, EOMI, conjunctivae clear, no thyromegaly, nodules, adenopathy, no JVD  Chest:  Full & symmetric excursion, no increased effort, breath sounds clear  Cardiovascular:  Regular rhythm, S1, S2, no murmur/rub/S3/S4, no carotid/femoral/abdominal bruit, radial/pedal pulses 2+, no edema  Abdomen:  Soft, non-tender, non-distended, normoactive bowel sounds, no HSM  Extremities:  Gait & station:   Digits:   Nails:   Joints, Bones, Muscles:   ROM:   Stability:  Skin:  No rash/erythema/ecchymoses/petechiae/wounds/abscess/warm/dry  Musculoskeletal:  Full ROM in all joints w/o swelling/tenderness/effusion    NEUROLOGICAL EXAM:  HENT:  Normocephalic head; atraumatic head.  Neck supple.  ENT: normal looking.  Mental State:    Alert.  Fully oriented to person, place and date.  Coherent.  Speech clear and intact.  Cooperative.  Responds appropriately.    Cranial Nerves:  II-XII:   Pupils round and reactive to light and accommodation.  Extraocular movements full.  Visual fields full (no homonymous hemianopsia).  Visual acuity wnl.  Facial symmetry intact.  Tongue midline.  Motor Functions:  Moves all extremities.  No pronator drift of UE.  Claps hand well.  Hand  intact bilaterally.  Ambulatory.    Sensory Functions:   Intact to touch and pinprick to face and extremities.    Reflexes:  Deep tendon reflexes normoactive to biceps, knees and ankles.  Babinski absent (present).  Cerebellar Testing:    Finger to nose intact.  Nystagmus absent.  Neurovascular: Carotid auscultation full without bruits.      LABS:                        11.4   7.74  )-----------( 204      ( 11 Sep 2019 06:29 )             37.0     09-11    141  |  106  |  37<H>  ----------------------------<  120<H>  4.7   |  29  |  0.96    Ca    8.3<L>      11 Sep 2019 06:29              RADIOLOGY & ADDITIONAL STUDIES:      Assessment & Opinion: events noted s/p fall syncope r face bruised eccymosis  ct head no acute path r  hx of htn dioabets  arm leg 4/5 sesnoory intact for cho doppler  eeg tsh b12 asa 81 mg     Recommendations:  Brain MRI.  Carotid doppler.  Echocardiogram.  EEG.   DVT prophylaxis as ordered.  Medications:

## 2019-09-12 LAB
GLUCOSE BLDC GLUCOMTR-MCNC: 142 MG/DL — HIGH (ref 70–99)
GLUCOSE BLDC GLUCOMTR-MCNC: 188 MG/DL — HIGH (ref 70–99)
GLUCOSE BLDC GLUCOMTR-MCNC: 88 MG/DL — SIGNIFICANT CHANGE UP (ref 70–99)
GLUCOSE BLDC GLUCOMTR-MCNC: 98 MG/DL — SIGNIFICANT CHANGE UP (ref 70–99)

## 2019-09-12 PROCEDURE — 93880 EXTRACRANIAL BILAT STUDY: CPT | Mod: 26

## 2019-09-12 PROCEDURE — 99232 SBSQ HOSP IP/OBS MODERATE 35: CPT

## 2019-09-12 PROCEDURE — 99233 SBSQ HOSP IP/OBS HIGH 50: CPT

## 2019-09-12 RX ADMIN — INSULIN GLARGINE 26 UNIT(S): 100 INJECTION, SOLUTION SUBCUTANEOUS at 12:14

## 2019-09-12 RX ADMIN — BUDESONIDE AND FORMOTEROL FUMARATE DIHYDRATE 2 PUFF(S): 160; 4.5 AEROSOL RESPIRATORY (INHALATION) at 18:40

## 2019-09-12 RX ADMIN — Medication 25 MILLIGRAM(S): at 06:30

## 2019-09-12 RX ADMIN — Medication 100 MILLIGRAM(S): at 06:30

## 2019-09-12 RX ADMIN — Medication 50 MILLIGRAM(S): at 21:53

## 2019-09-12 RX ADMIN — PANTOPRAZOLE SODIUM 40 MILLIGRAM(S): 20 TABLET, DELAYED RELEASE ORAL at 06:30

## 2019-09-12 RX ADMIN — METHOCARBAMOL 750 MILLIGRAM(S): 500 TABLET, FILM COATED ORAL at 18:41

## 2019-09-12 RX ADMIN — Medication 50 MILLIGRAM(S): at 06:30

## 2019-09-12 RX ADMIN — Medication 100 MILLIGRAM(S): at 12:16

## 2019-09-12 RX ADMIN — Medication 81 MILLIGRAM(S): at 12:13

## 2019-09-12 RX ADMIN — BUDESONIDE AND FORMOTEROL FUMARATE DIHYDRATE 2 PUFF(S): 160; 4.5 AEROSOL RESPIRATORY (INHALATION) at 06:30

## 2019-09-12 RX ADMIN — METFORMIN HYDROCHLORIDE 500 MILLIGRAM(S): 850 TABLET ORAL at 06:30

## 2019-09-12 RX ADMIN — ATORVASTATIN CALCIUM 40 MILLIGRAM(S): 80 TABLET, FILM COATED ORAL at 21:53

## 2019-09-12 RX ADMIN — INSULIN GLARGINE 28 UNIT(S): 100 INJECTION, SOLUTION SUBCUTANEOUS at 21:53

## 2019-09-12 RX ADMIN — Medication 100 MILLIGRAM(S): at 21:53

## 2019-09-12 RX ADMIN — METHOCARBAMOL 750 MILLIGRAM(S): 500 TABLET, FILM COATED ORAL at 06:30

## 2019-09-12 RX ADMIN — TIOTROPIUM BROMIDE 1 CAPSULE(S): 18 CAPSULE ORAL; RESPIRATORY (INHALATION) at 12:14

## 2019-09-12 NOTE — PROGRESS NOTE ADULT - ASSESSMENT
69year old lady with a PMH of CAD s/p PCI (?hospital/date), HTN, HL, DM, anxiety, obesity, asthma w/ a hx of hypercarbic resp failure; admitted s/p ?syncope vs mechanical fall; no prodrome and no loss of conscionsness - she found to have  right periorbital edema and ecchymosis.  No tele events.  EKG on admit normal.  Troponin mildly positive, downward trending   Echo: EF normal, mild MR    Plan  -cont tele  -NM pharm stress test with small zone of mild ischemia involving apicoseptal wall.   -cont asa/statin/metoprolol  -monitor weight/creat/electrolytes  -TSH  -check orthostatic BP 69year old lady with a PMH of CAD s/p PCI (?hospital/date), HTN, HL, DM, anxiety, obesity, asthma w/ a hx of hypercarbic resp failure; admitted s/p ?syncope vs mechanical fall; no prodrome and no loss of conscionsness - she found to have  right periorbital edema and ecchymosis.  No tele events.  EKG on admit normal.  Troponin mildly positive, downward trending   Echo: EF normal, mild MR    Plan  -cont tele  -NM pharm stress test with small zone of mild ischemia involving apicoseptal wall.   -cont asa/statin/metoprolol  - For cardiac cath in am, discussed with both pt/pt's daughter and both in agreement 69year old lady with a PMH of CAD s/p PCI (?hospital/date), HTN, HL, DM, anxiety, obesity, asthma w/ a hx of hypercarbic resp failure; admitted s/p ?syncope vs mechanical fall; no prodrome and no loss of conscionsness - she found to have  right periorbital edema and ecchymosis.  No tele events.  EKG on admit normal.  Troponin mildly positive, downward trending   Echo: EF normal, mild MR    Plan  -cont tele  -Nuclear pharm stress test with small zone of mild ischemia involving apicoseptal wall.   -cont asa/statin/metoprolol  - For cardiac cath in am, discussed with both pt/pt's daughter and both in agreement

## 2019-09-12 NOTE — PROGRESS NOTE ADULT - SUBJECTIVE AND OBJECTIVE BOX
Patient is a 69y old  Female who presents with a chief complaint of fall (11 Sep 2019 21:50)    PAST MEDICAL & SURGICAL HISTORY:  Asthma  Hypertension  Coronary artery disease with other form of angina pectoris  Type 2 diabetes mellitus without complication, without long-term current use of insulin  History of heart artery stent    INTERVAL HISTORY: resting in chair, not in any acute distress   	  MEDICATIONS:  MEDICATIONS  (STANDING):  aspirin enteric coated 81 milliGRAM(s) Oral daily  atorvastatin 40 milliGRAM(s) Oral at bedtime  buDESOnide 160 MICROgram(s)/formoterol 4.5 MICROgram(s) Inhaler 2 Puff(s) Inhalation two times a day  docusate sodium 100 milliGRAM(s) Oral three times a day  insulin glargine Injectable (LANTUS) 28 Unit(s) SubCutaneous at bedtime  insulin glargine Injectable (LANTUS) 26 Unit(s) SubCutaneous every morning  metFORMIN 500 milliGRAM(s) Oral two times a day  methocarbamol 750 milliGRAM(s) Oral two times a day  metoprolol succinate ER 25 milliGRAM(s) Oral daily  pantoprazole    Tablet 40 milliGRAM(s) Oral before breakfast  pregabalin 50 milliGRAM(s) Oral every 8 hours  tiotropium 18 MICROgram(s) Capsule 1 Capsule(s) Inhalation daily    MEDICATIONS  (PRN):  acetaminophen   Tablet .. 650 milliGRAM(s) Oral every 6 hours PRN Temp greater or equal to 38C (100.4F), Mild Pain (1 - 3)  ALBUTerol    90 MICROgram(s) HFA Inhaler 2 Puff(s) Inhalation every 6 hours PRN Shortness of Breath and/or Wheezing  oxyCODONE    5 mG/acetaminophen 325 mG 2 Tablet(s) Oral every 4 hours PRN Severe Pain (7 - 10)    Vitals:  T(F): 97.7 (09-12-19 @ 11:04), Max: 98.4 (09-11-19 @ 13:16)  HR: 66 (09-12-19 @ 11:04) (66 - 69)  BP: 138/57 (09-12-19 @ 11:04) (109/47 - 145/60)  RR: 18 (09-12-19 @ 11:04) (16 - 18)  SpO2: 97% (09-12-19 @ 11:04) (95% - 97%)    09-11 @ 07:01  -  09-12 @ 07:00  --------------------------------------------------------  IN:    Oral Fluid: 480 mL  Total IN: 480 mL    OUT:  Total OUT: 0 mL    Total NET: 480 mL    Weight (kg): 74.6 (09-09 @ 22:52)  BMI (kg/m2): 27.4 (09-09 @ 22:52)    PHYSICAL EXAM:  Neuro: Awake, responsive  CV: S1 S2 RRR  Lungs: CTABL  GI: Soft, BS +, ND, NT  Extremities: No edema    TELEMETRY: RSR    RADIOLOGY: < from: US Duplex Carotid Arteries Complete, Bilateral (09.12.19 @ 09:05) >  Mild to moderate calcified intimal plaque is present in both carotid   systems. Elevated blood flow velocities are encountered in the left   internal carotid artery. Antegrade flow is present in both vertebral   arteries.    IMPRESSION:    Greater than 70% LEFT internal carotid artery stenosis.    < end of copied text >  < from: Xray Chest 1 View-PORTABLE IMMEDIATE (09.09.19 @ 18:01) >  IMPRESSION:There are chronic lung changes with increased reticular   opacities. No focal consolidation or pleural effusion. Heart size is   within normal limits. There are multilevel degenerative changes of the   spine.    < end of copied text >    DIAGNOSTIC TESTING:    [x ] Echocardiogram:     < from: TTE Echo Doppler w/o Cont (06.30.19 @ 10:05) >   1. Left ventricular ejection fraction, by visual estimation, is 55 to   60%.   2. Normal global left ventricular systolic function.   3. Normal left ventricular internal cavity size.   4. Normal left ventricular size and wall thicknesses, with normal   systolic and diastolic function.   5. Normal right ventricular size and function.   6. The left atrium is normal in size.   7. Normal right atrial size.   8. The right atrium is normal in size.   9. There is no evidence of pericardial effusion.  10. Degenerative mitral valve.  11. Mild mitral annular calcification.  12. Mild mitral valve regurgitation.  13. Structurally normal tricuspid valve, with normal leaflet excursion.  14. Structurally normal pulmonic valve, with normal leaflet excursion.  15. The main pulmonary artery is normal in size.    < end of copied text >  [x ] Stress Test:  < from: NM Pharm Stress Test, Dual Isotope (09.11.19 @ 12:41) >  Abnormal SPECT Myocardial Perfusion Imaging.     Normal global left ventricular function with an ejection fraction of 60%   (Normal: 50% or greater).    The apical, apico-anterior, apicoseptal, inferoapical, & apicolateral   walls are akinetic.    There was a moderate zone of severely reduced tracer uptake in a fixed   pattern involving the entire apical, apico-anterior, apicoseptal,   inferoapical, & apicolateral walls consistent with prior myocardial   infarction.      There is a small zone of mild ischemia involving the apicoseptal wall.      < end of copied text >    LABS:	 	    CARDIAC MARKERS:  Troponin I, Serum: .123 ng/mL (09-10 @ 21:38)  Troponin I, Serum: .159 ng/mL (09-10 @ 07:52)  Troponin I, Serum: .165 ng/mL (09-09 @ 23:48)  Troponin I, Serum: .137 ng/mL (09-09 @ 16:47)    11 Sep 2019 06:29    141    |  106    |  37     ----------------------------<  120    4.7     |  29     |  0.96   10 Sep 2019 20:20    x      |  x      |  x      ----------------------------<  x      5.1     |  x      |  x      09 Sep 2019 16:47    141    |  105    |  34     ----------------------------<  148    5.8     |  32     |  1.05     Ca    8.3        11 Sep 2019 06:29                        11.4   7.74  )-----------( 204      ( 11 Sep 2019 06:29 )             37.0 ,                       11.9   7.85  )-----------( 235      ( 09 Sep 2019 16:47 )             38.3   proBNP: Serum Pro-Brain Natriuretic Peptide: 415 pg/mL (09-09 @ 16:47)    INR: 0.99 ratio (09-09 @ 16:47) Patient is a 69y old  Female who presents with a chief complaint of fall (11 Sep 2019 21:50)    PAST MEDICAL & SURGICAL HISTORY:  Asthma  Hypertension  Coronary artery disease with other form of angina pectoris  Type 2 diabetes mellitus without complication, without long-term current use of insulin  History of heart artery stent    INTERVAL HISTORY: resting in chair, not in any acute distress   	  MEDICATIONS:  MEDICATIONS  (STANDING):  aspirin enteric coated 81 milliGRAM(s) Oral daily  atorvastatin 40 milliGRAM(s) Oral at bedtime  buDESOnide 160 MICROgram(s)/formoterol 4.5 MICROgram(s) Inhaler 2 Puff(s) Inhalation two times a day  docusate sodium 100 milliGRAM(s) Oral three times a day  insulin glargine Injectable (LANTUS) 28 Unit(s) SubCutaneous at bedtime  insulin glargine Injectable (LANTUS) 26 Unit(s) SubCutaneous every morning  metFORMIN 500 milliGRAM(s) Oral two times a day  methocarbamol 750 milliGRAM(s) Oral two times a day  metoprolol succinate ER 25 milliGRAM(s) Oral daily  pantoprazole    Tablet 40 milliGRAM(s) Oral before breakfast  pregabalin 50 milliGRAM(s) Oral every 8 hours  tiotropium 18 MICROgram(s) Capsule 1 Capsule(s) Inhalation daily    MEDICATIONS  (PRN):  acetaminophen   Tablet .. 650 milliGRAM(s) Oral every 6 hours PRN Temp greater or equal to 38C (100.4F), Mild Pain (1 - 3)  ALBUTerol    90 MICROgram(s) HFA Inhaler 2 Puff(s) Inhalation every 6 hours PRN Shortness of Breath and/or Wheezing  oxyCODONE    5 mG/acetaminophen 325 mG 2 Tablet(s) Oral every 4 hours PRN Severe Pain (7 - 10)    Vitals:  T(F): 97.7 (09-12-19 @ 11:04), Max: 98.4 (09-11-19 @ 13:16)  HR: 66 (09-12-19 @ 11:04) (66 - 69)  BP: 138/57 (09-12-19 @ 11:04) (109/47 - 145/60)  RR: 18 (09-12-19 @ 11:04) (16 - 18)  SpO2: 97% (09-12-19 @ 11:04) (95% - 97%)    09-11 @ 07:01  -  09-12 @ 07:00  --------------------------------------------------------  IN:    Oral Fluid: 480 mL  Total IN: 480 mL    OUT:  Total OUT: 0 mL    Total NET: 480 mL    Weight (kg): 74.6 (09-09 @ 22:52)  BMI (kg/m2): 27.4 (09-09 @ 22:52)    PHYSICAL EXAM:  Neuro: Awake, responsive, right periorbital edema and ecchymosis.  CV: S1 S2 RRR  Lungs: CTABL  GI: Soft, BS +, ND, NT  Extremities: No edema    TELEMETRY: RSR    RADIOLOGY: < from: US Duplex Carotid Arteries Complete, Bilateral (09.12.19 @ 09:05) >  Mild to moderate calcified intimal plaque is present in both carotid   systems. Elevated blood flow velocities are encountered in the left   internal carotid artery. Antegrade flow is present in both vertebral   arteries.    IMPRESSION:    Greater than 70% LEFT internal carotid artery stenosis.    < end of copied text >  < from: Xray Chest 1 View-PORTABLE IMMEDIATE (09.09.19 @ 18:01) >  IMPRESSION:There are chronic lung changes with increased reticular   opacities. No focal consolidation or pleural effusion. Heart size is   within normal limits. There are multilevel degenerative changes of the   spine.    < end of copied text >    DIAGNOSTIC TESTING:    [x ] Echocardiogram:     < from: TTE Echo Doppler w/o Cont (06.30.19 @ 10:05) >   1. Left ventricular ejection fraction, by visual estimation, is 55 to   60%.   2. Normal global left ventricular systolic function.   3. Normal left ventricular internal cavity size.   4. Normal left ventricular size and wall thicknesses, with normal   systolic and diastolic function.   5. Normal right ventricular size and function.   6. The left atrium is normal in size.   7. Normal right atrial size.   8. The right atrium is normal in size.   9. There is no evidence of pericardial effusion.  10. Degenerative mitral valve.  11. Mild mitral annular calcification.  12. Mild mitral valve regurgitation.  13. Structurally normal tricuspid valve, with normal leaflet excursion.  14. Structurally normal pulmonic valve, with normal leaflet excursion.  15. The main pulmonary artery is normal in size.    < end of copied text >  [x ] Stress Test:  < from: NM Pharm Stress Test, Dual Isotope (09.11.19 @ 12:41) >  Abnormal SPECT Myocardial Perfusion Imaging.     Normal global left ventricular function with an ejection fraction of 60%   (Normal: 50% or greater).    The apical, apico-anterior, apicoseptal, inferoapical, & apicolateral   walls are akinetic.    There was a moderate zone of severely reduced tracer uptake in a fixed   pattern involving the entire apical, apico-anterior, apicoseptal,   inferoapical, & apicolateral walls consistent with prior myocardial   infarction.      There is a small zone of mild ischemia involving the apicoseptal wall.      < end of copied text >    LABS:	 	    CARDIAC MARKERS:  Troponin I, Serum: .123 ng/mL (09-10 @ 21:38)  Troponin I, Serum: .159 ng/mL (09-10 @ 07:52)  Troponin I, Serum: .165 ng/mL (09-09 @ 23:48)  Troponin I, Serum: .137 ng/mL (09-09 @ 16:47)    11 Sep 2019 06:29    141    |  106    |  37     ----------------------------<  120    4.7     |  29     |  0.96   10 Sep 2019 20:20    x      |  x      |  x      ----------------------------<  x      5.1     |  x      |  x      09 Sep 2019 16:47    141    |  105    |  34     ----------------------------<  148    5.8     |  32     |  1.05     Ca    8.3        11 Sep 2019 06:29                        11.4   7.74  )-----------( 204      ( 11 Sep 2019 06:29 )             37.0 ,                       11.9   7.85  )-----------( 235      ( 09 Sep 2019 16:47 )             38.3   proBNP: Serum Pro-Brain Natriuretic Peptide: 415 pg/mL (09-09 @ 16:47)    INR: 0.99 ratio (09-09 @ 16:47)

## 2019-09-12 NOTE — PROGRESS NOTE ADULT - ASSESSMENT
69 yrs old with history of multiple medical problems with fall with no loss of consciousness or syncope with traumatic eye with no fracture.      Elevated troponin:  - Continue ASA, statin.  - Preserved EF from few months ago  - Stress test shows small apical area of ischemia   -Cardiology on board  -Pt will be transferred for cardiac cath     Fall:  - Mechanical fall  - Stable now, uses cane occasionally,   - fall precautions    Closed head injury:  - Stable, Black Right eye  - opthalmology consult appreciated.   - EEG done, result pending   -  analgesics as needed.     HTN:  - Continue current meds    DM:  - FS monitoring w/ insulin s/s coverage.     CVA:  - CT with Left thalamic and basal ganglia lacunar infarcts  - Continue ASA, statin.    CAD:  - Continue ASA, statin.    PVD:   -Left Carotid occlusion 70%  -cont asa, statin    Hyperkalemia:  - Resolved     Asthma:  - Stable, continue current med.    DVTp: Heparin   Disposition: [facility] for Cardiac Cath

## 2019-09-12 NOTE — PROGRESS NOTE ADULT - ASSESSMENT
Subjective Complaints:  Historian:             Vital Signs Last 24 Hrs  T(C): 36.4 (12 Sep 2019 17:17), Max: 36.8 (11 Sep 2019 23:56)  T(F): 97.5 (12 Sep 2019 17:17), Max: 98.3 (11 Sep 2019 23:56)  HR: 63 (12 Sep 2019 17:17) (63 - 66)  BP: 146/71 (12 Sep 2019 17:17) (109/47 - 146/71)  BP(mean): --  RR: 18 (12 Sep 2019 17:17) (16 - 18)  SpO2: 95% (12 Sep 2019 17:17) (95% - 97%)    GENERAL PHYSICAL EXAM:  General:  Appears stated age, well-groomed, well-nourished, no distress  HEENT:  NC/AT, patent nares w/ pink mucosa, OP clear w/o lesions, PERRL, EOMI, conjunctivae clear, no thyromegaly, nodules, adenopathy, no JVD  Chest:  Full & symmetric excursion, no increased effort, breath sounds clear  Cardiovascular:  Regular rhythm, S1, S2, no murmur/rub/S3/S4, no carotid/femoral/abdominal bruit, radial/pedal pulses 2+, no edema  Abdomen:  Soft, non-tender, non-distended, normoactive bowel sounds, no HSM  Extremities:  Gait & station:   Digits:   Nails:   Joints, Bones, Muscles:   ROM:   Stability:  Skin:  No rash/erythema/ecchymoses/petechiae/wounds/abscess/warm/dry  Musculoskeletal:  Full ROM in all joints w/o swelling/tenderness/effusion        LABS:                        11.4   7.74  )-----------( 204      ( 11 Sep 2019 06:29 )             37.0     09-11    141  |  106  |  37<H>  ----------------------------<  120<H>  4.7   |  29  |  0.96    Ca    8.3<L>      11 Sep 2019 06:29            RADIOLOGY & ADDITIONAL STUDIES:        Neurology Progress Note:      Mental Status: awaek alert  spech fouent s/p  fall       Cranial Nerves: s/p fall r face bruised  no seziure eom intact      Motor:   arm leg 4/5         Sensory:intact      Cerebellar: intact      Gait:no ataxia       Assesment/Plan: s/p fall syncope r face bruised no fx  for cardiac cath, at j in am no headache  no seizure

## 2019-09-12 NOTE — PROGRESS NOTE ADULT - SUBJECTIVE AND OBJECTIVE BOX
Patient is a 69y old  Female who presents with a chief complaint of fall (12 Sep 2019 12:28)      INTERVAL HPI/ OVERNIGHT EVENTS: Pt was seen and examined at bedside today, No significant overnight events, pt admits to be unsteady on her feet, denies CP or SOB.      MEDICATIONS  (STANDING):  aspirin enteric coated 81 milliGRAM(s) Oral daily  atorvastatin 40 milliGRAM(s) Oral at bedtime  buDESOnide 160 MICROgram(s)/formoterol 4.5 MICROgram(s) Inhaler 2 Puff(s) Inhalation two times a day  docusate sodium 100 milliGRAM(s) Oral three times a day  insulin glargine Injectable (LANTUS) 28 Unit(s) SubCutaneous at bedtime  insulin glargine Injectable (LANTUS) 26 Unit(s) SubCutaneous every morning  methocarbamol 750 milliGRAM(s) Oral two times a day  metoprolol succinate ER 25 milliGRAM(s) Oral daily  pantoprazole    Tablet 40 milliGRAM(s) Oral before breakfast  pregabalin 50 milliGRAM(s) Oral every 8 hours  tiotropium 18 MICROgram(s) Capsule 1 Capsule(s) Inhalation daily    MEDICATIONS  (PRN):  acetaminophen   Tablet .. 650 milliGRAM(s) Oral every 6 hours PRN Temp greater or equal to 38C (100.4F), Mild Pain (1 - 3)  ALBUTerol    90 MICROgram(s) HFA Inhaler 2 Puff(s) Inhalation every 6 hours PRN Shortness of Breath and/or Wheezing  oxyCODONE    5 mG/acetaminophen 325 mG 2 Tablet(s) Oral every 4 hours PRN Severe Pain (7 - 10)      Allergies    No Known Allergies    Intolerances        REVIEW OF SYSTEMS:    Unable to examine due to [ ] Encephalopathy [ ] Advanced Dementia [ ] Expressive Aphasia [ ] Non-verbal patient    CONSTITUTIONAL: No fever, NO generalized weakness/Fatigue, No weight loss  EYES: No eye pain, visual disturbances, or discharge  ENMT:  No difficulty hearing, tinnitus, vertigo; No sinus or throat pain  NECK: No pain or stiffness  RESPIRATORY: No shortness of breath,  cough, wheezing, sputum or hemoptysis   CARDIOVASCULAR: No chest pain, palpitations, or leg swelling  GASTROINTESTINAL: No abdominal pain. No nausea, vomiting, diarrhea or constipation. No melena or hematochezia.  GENITOURINARY: No dysuria, frequency, hematuria, or incontinence  NEUROLOGICAL: unsteady gait, No headaches, Dizziness, memory loss, loss of strength, numbness, or tremors  SKIN: No itching, burning, rashes, or lesions   MUSCULOSKELETAL: No joint pain or swelling; No muscle, back, or extremity pain  PSYCHIATRIC: No depression, anxiety, mood swings, or difficulty sleeping  HEME/LYMPH: No easy bruising, or bleeding gums      Vital Signs Last 24 Hrs  T(C): 36.5 (12 Sep 2019 11:04), Max: 36.8 (11 Sep 2019 23:56)  T(F): 97.7 (12 Sep 2019 11:04), Max: 98.3 (11 Sep 2019 23:56)  HR: 66 (12 Sep 2019 11:04) (66 - 66)  BP: 138/57 (12 Sep 2019 11:04) (109/47 - 141/68)  BP(mean): --  RR: 18 (12 Sep 2019 11:04) (16 - 18)  SpO2: 97% (12 Sep 2019 11:04) (95% - 97%)    PHYSICAL EXAM:  GENERAL: NAD, well-developed, well-groomed  HEAD:  Atraumatic, Normocephalic  EYES: Right black eye, conjunctiva and sclera clear  ENMT: Moist mucous membranes  NECK: Supple, No JVD, Normal thyroid  CHEST/LUNG: Clear to Auscultation bilaterally; No rales, rhonchi, wheezing, or rubs  HEART: Regular rate and rhythm; No murmurs, rubs, or gallops  ABDOMEN: Soft, Nontender, Nondistended; Bowel sounds present  EXTREMITIES:  2+ Peripheral Pulses, No clubbing, cyanosis, or edema  SKIN: No rashes or lesions  NERVOUS SYSTEM:  Alert & Oriented X3, Good concentration; Motor Strength 5/5 B/L upper and lower extremities    LABS:                        11.4   7.74  )-----------( 204      ( 11 Sep 2019 06:29 )             37.0     09-11    141  |  106  |  37<H>  ----------------------------<  120<H>  4.7   |  29  |  0.96    Ca    8.3<L>      11 Sep 2019 06:29          CAPILLARY BLOOD GLUCOSE      POCT Blood Glucose.: 88 mg/dL (12 Sep 2019 16:28)  POCT Blood Glucose.: 188 mg/dL (12 Sep 2019 11:31)  POCT Blood Glucose.: 98 mg/dL (12 Sep 2019 07:51)  POCT Blood Glucose.: 177 mg/dL (11 Sep 2019 22:41)  POCT Blood Glucose.: 208 mg/dL (11 Sep 2019 17:42)          RADIOLOGY & ADDITIONAL TESTS:          Imaging Personally Reviewed:  [ ] YES  [ ] NO    Consultant(s) Notes Reviewed:  [x ] YES  [ ] NO    Care Discussed with Consultants/Other Providers [x ] YES  [ ] NO

## 2019-09-13 ENCOUNTER — INPATIENT (INPATIENT)
Facility: HOSPITAL | Age: 69
LOS: 2 days | Discharge: HOME | DRG: 282 | End: 2019-09-16
Attending: INTERNAL MEDICINE | Admitting: INTERNAL MEDICINE
Payer: MEDICAID

## 2019-09-13 ENCOUNTER — TRANSCRIPTION ENCOUNTER (OUTPATIENT)
Age: 69
End: 2019-09-13

## 2019-09-13 VITALS
DIASTOLIC BLOOD PRESSURE: 54 MMHG | TEMPERATURE: 98 F | RESPIRATION RATE: 18 BRPM | OXYGEN SATURATION: 95 % | SYSTOLIC BLOOD PRESSURE: 119 MMHG | HEART RATE: 67 BPM

## 2019-09-13 VITALS
HEIGHT: 65 IN | TEMPERATURE: 99 F | DIASTOLIC BLOOD PRESSURE: 74 MMHG | WEIGHT: 167.99 LBS | OXYGEN SATURATION: 99 % | SYSTOLIC BLOOD PRESSURE: 144 MMHG | RESPIRATION RATE: 17 BRPM | HEART RATE: 72 BPM

## 2019-09-13 DIAGNOSIS — Z95.5 PRESENCE OF CORONARY ANGIOPLASTY IMPLANT AND GRAFT: Chronic | ICD-10-CM

## 2019-09-13 DIAGNOSIS — I25.10 ATHEROSCLEROTIC HEART DISEASE OF NATIVE CORONARY ARTERY WITHOUT ANGINA PECTORIS: ICD-10-CM

## 2019-09-13 LAB
ANION GAP SERPL CALC-SCNC: 7 MMOL/L — SIGNIFICANT CHANGE UP (ref 5–17)
APTT BLD: 30.8 SEC — SIGNIFICANT CHANGE UP (ref 27.5–36.3)
BUN SERPL-MCNC: 50 MG/DL — HIGH (ref 7–23)
CALCIUM SERPL-MCNC: 8.4 MG/DL — LOW (ref 8.5–10.1)
CHLORIDE SERPL-SCNC: 109 MMOL/L — HIGH (ref 96–108)
CO2 SERPL-SCNC: 26 MMOL/L — SIGNIFICANT CHANGE UP (ref 22–31)
CREAT SERPL-MCNC: 1.11 MG/DL — SIGNIFICANT CHANGE UP (ref 0.5–1.3)
GLUCOSE BLDC GLUCOMTR-MCNC: 121 MG/DL — HIGH (ref 70–99)
GLUCOSE BLDC GLUCOMTR-MCNC: 151 MG/DL — HIGH (ref 70–99)
GLUCOSE BLDC GLUCOMTR-MCNC: 283 MG/DL — HIGH (ref 70–99)
GLUCOSE BLDC GLUCOMTR-MCNC: 89 MG/DL — SIGNIFICANT CHANGE UP (ref 70–99)
GLUCOSE SERPL-MCNC: 126 MG/DL — HIGH (ref 70–99)
HCT VFR BLD CALC: 36.8 % — SIGNIFICANT CHANGE UP (ref 34.5–45)
HGB BLD-MCNC: 11.4 G/DL — LOW (ref 11.5–15.5)
INR BLD: 0.94 RATIO — SIGNIFICANT CHANGE UP (ref 0.88–1.16)
MCHC RBC-ENTMCNC: 27.5 PG — SIGNIFICANT CHANGE UP (ref 27–34)
MCHC RBC-ENTMCNC: 31 GM/DL — LOW (ref 32–36)
MCV RBC AUTO: 88.9 FL — SIGNIFICANT CHANGE UP (ref 80–100)
NRBC # BLD: 0 /100 WBCS — SIGNIFICANT CHANGE UP (ref 0–0)
PLATELET # BLD AUTO: 196 K/UL — SIGNIFICANT CHANGE UP (ref 150–400)
POTASSIUM SERPL-MCNC: 4.9 MMOL/L — SIGNIFICANT CHANGE UP (ref 3.5–5.3)
POTASSIUM SERPL-SCNC: 4.9 MMOL/L — SIGNIFICANT CHANGE UP (ref 3.5–5.3)
PROTHROM AB SERPL-ACNC: 10.5 SEC — SIGNIFICANT CHANGE UP (ref 10–12.9)
RBC # BLD: 4.14 M/UL — SIGNIFICANT CHANGE UP (ref 3.8–5.2)
RBC # FLD: 13.3 % — SIGNIFICANT CHANGE UP (ref 10.3–14.5)
SODIUM SERPL-SCNC: 142 MMOL/L — SIGNIFICANT CHANGE UP (ref 135–145)
WBC # BLD: 7.82 K/UL — SIGNIFICANT CHANGE UP (ref 3.8–10.5)
WBC # FLD AUTO: 7.82 K/UL — SIGNIFICANT CHANGE UP (ref 3.8–10.5)

## 2019-09-13 PROCEDURE — 99239 HOSP IP/OBS DSCHRG MGMT >30: CPT

## 2019-09-13 PROCEDURE — 93010 ELECTROCARDIOGRAM REPORT: CPT

## 2019-09-13 PROCEDURE — 93454 CORONARY ARTERY ANGIO S&I: CPT | Mod: 26

## 2019-09-13 RX ORDER — INSULIN LISPRO 100/ML
VIAL (ML) SUBCUTANEOUS
Refills: 0 | Status: DISCONTINUED | OUTPATIENT
Start: 2019-09-13 | End: 2019-09-16

## 2019-09-13 RX ORDER — ATORVASTATIN CALCIUM 80 MG/1
1 TABLET, FILM COATED ORAL
Qty: 0 | Refills: 0 | DISCHARGE

## 2019-09-13 RX ORDER — ASPIRIN/CALCIUM CARB/MAGNESIUM 324 MG
81 TABLET ORAL DAILY
Refills: 0 | Status: DISCONTINUED | OUTPATIENT
Start: 2019-09-13 | End: 2019-09-16

## 2019-09-13 RX ORDER — INSULIN GLARGINE 100 [IU]/ML
12 INJECTION, SOLUTION SUBCUTANEOUS AT BEDTIME
Refills: 0 | Status: DISCONTINUED | OUTPATIENT
Start: 2019-09-13 | End: 2019-09-16

## 2019-09-13 RX ORDER — GABAPENTIN 400 MG/1
300 CAPSULE ORAL
Refills: 0 | Status: DISCONTINUED | OUTPATIENT
Start: 2019-09-13 | End: 2019-09-16

## 2019-09-13 RX ORDER — INFLUENZA VIRUS VACCINE 15; 15; 15; 15 UG/.5ML; UG/.5ML; UG/.5ML; UG/.5ML
0.5 SUSPENSION INTRAMUSCULAR ONCE
Refills: 0 | Status: DISCONTINUED | OUTPATIENT
Start: 2019-09-13 | End: 2019-09-16

## 2019-09-13 RX ORDER — FAMOTIDINE 10 MG/ML
20 INJECTION INTRAVENOUS EVERY 24 HOURS
Refills: 0 | Status: DISCONTINUED | OUTPATIENT
Start: 2019-09-13 | End: 2019-09-13

## 2019-09-13 RX ORDER — GLUCAGON INJECTION, SOLUTION 0.5 MG/.1ML
1 INJECTION, SOLUTION SUBCUTANEOUS ONCE
Refills: 0 | Status: DISCONTINUED | OUTPATIENT
Start: 2019-09-13 | End: 2019-09-16

## 2019-09-13 RX ORDER — INSULIN GLARGINE 100 [IU]/ML
0 INJECTION, SOLUTION SUBCUTANEOUS
Qty: 0 | Refills: 0 | DISCHARGE

## 2019-09-13 RX ORDER — METFORMIN HYDROCHLORIDE 850 MG/1
1 TABLET ORAL
Qty: 0 | Refills: 0 | DISCHARGE

## 2019-09-13 RX ORDER — FAMOTIDINE 10 MG/ML
20 INJECTION INTRAVENOUS DAILY
Refills: 0 | Status: DISCONTINUED | OUTPATIENT
Start: 2019-09-13 | End: 2019-09-16

## 2019-09-13 RX ORDER — ALBUTEROL 90 UG/1
2 AEROSOL, METERED ORAL EVERY 6 HOURS
Refills: 0 | Status: DISCONTINUED | OUTPATIENT
Start: 2019-09-13 | End: 2019-09-16

## 2019-09-13 RX ORDER — METOPROLOL TARTRATE 50 MG
1 TABLET ORAL
Qty: 0 | Refills: 0 | DISCHARGE

## 2019-09-13 RX ORDER — PANTOPRAZOLE SODIUM 20 MG/1
40 TABLET, DELAYED RELEASE ORAL
Refills: 0 | Status: DISCONTINUED | OUTPATIENT
Start: 2019-09-13 | End: 2019-09-16

## 2019-09-13 RX ORDER — TIOTROPIUM BROMIDE 18 UG/1
1 CAPSULE ORAL; RESPIRATORY (INHALATION) DAILY
Refills: 0 | Status: DISCONTINUED | OUTPATIENT
Start: 2019-09-13 | End: 2019-09-16

## 2019-09-13 RX ORDER — ASPIRIN/CALCIUM CARB/MAGNESIUM 324 MG
1 TABLET ORAL
Qty: 0 | Refills: 0 | DISCHARGE

## 2019-09-13 RX ORDER — METOPROLOL TARTRATE 50 MG
25 TABLET ORAL DAILY
Refills: 0 | Status: DISCONTINUED | OUTPATIENT
Start: 2019-09-13 | End: 2019-09-16

## 2019-09-13 RX ORDER — CHOLECALCIFEROL (VITAMIN D3) 125 MCG
1 CAPSULE ORAL
Qty: 0 | Refills: 0 | DISCHARGE

## 2019-09-13 RX ORDER — OMEPRAZOLE 10 MG/1
1 CAPSULE, DELAYED RELEASE ORAL
Qty: 0 | Refills: 0 | DISCHARGE

## 2019-09-13 RX ORDER — TICAGRELOR 90 MG/1
90 TABLET ORAL
Refills: 0 | Status: COMPLETED | OUTPATIENT
Start: 2019-09-13 | End: 2019-09-14

## 2019-09-13 RX ORDER — LISINOPRIL 2.5 MG/1
2.5 TABLET ORAL DAILY
Refills: 0 | Status: DISCONTINUED | OUTPATIENT
Start: 2019-09-13 | End: 2019-09-16

## 2019-09-13 RX ORDER — RANITIDINE HYDROCHLORIDE 150 MG/1
1 TABLET, FILM COATED ORAL
Qty: 0 | Refills: 0 | DISCHARGE

## 2019-09-13 RX ORDER — ATORVASTATIN CALCIUM 80 MG/1
40 TABLET, FILM COATED ORAL AT BEDTIME
Refills: 0 | Status: DISCONTINUED | OUTPATIENT
Start: 2019-09-13 | End: 2019-09-16

## 2019-09-13 RX ORDER — BUDESONIDE AND FORMOTEROL FUMARATE DIHYDRATE 160; 4.5 UG/1; UG/1
2 AEROSOL RESPIRATORY (INHALATION)
Refills: 0 | Status: DISCONTINUED | OUTPATIENT
Start: 2019-09-13 | End: 2019-09-16

## 2019-09-13 RX ORDER — SODIUM CHLORIDE 9 MG/ML
1000 INJECTION, SOLUTION INTRAVENOUS
Refills: 0 | Status: DISCONTINUED | OUTPATIENT
Start: 2019-09-13 | End: 2019-09-16

## 2019-09-13 RX ORDER — DEXTROSE 50 % IN WATER 50 %
15 SYRINGE (ML) INTRAVENOUS ONCE
Refills: 0 | Status: DISCONTINUED | OUTPATIENT
Start: 2019-09-13 | End: 2019-09-16

## 2019-09-13 RX ORDER — SODIUM CHLORIDE 9 MG/ML
250 INJECTION INTRAMUSCULAR; INTRAVENOUS; SUBCUTANEOUS ONCE
Refills: 0 | Status: COMPLETED | OUTPATIENT
Start: 2019-09-13 | End: 2019-09-14

## 2019-09-13 RX ORDER — DEXTROSE 50 % IN WATER 50 %
12.5 SYRINGE (ML) INTRAVENOUS ONCE
Refills: 0 | Status: DISCONTINUED | OUTPATIENT
Start: 2019-09-13 | End: 2019-09-16

## 2019-09-13 RX ORDER — METHOCARBAMOL 500 MG/1
1 TABLET, FILM COATED ORAL
Qty: 0 | Refills: 0 | DISCHARGE

## 2019-09-13 RX ADMIN — BUDESONIDE AND FORMOTEROL FUMARATE DIHYDRATE 2 PUFF(S): 160; 4.5 AEROSOL RESPIRATORY (INHALATION) at 17:40

## 2019-09-13 RX ADMIN — ATORVASTATIN CALCIUM 40 MILLIGRAM(S): 80 TABLET, FILM COATED ORAL at 23:33

## 2019-09-13 RX ADMIN — Medication 25 MILLIGRAM(S): at 05:17

## 2019-09-13 RX ADMIN — Medication 50 MILLIGRAM(S): at 05:17

## 2019-09-13 RX ADMIN — TICAGRELOR 90 MILLIGRAM(S): 90 TABLET ORAL at 17:40

## 2019-09-13 RX ADMIN — GABAPENTIN 300 MILLIGRAM(S): 400 CAPSULE ORAL at 17:40

## 2019-09-13 RX ADMIN — Medication 50 MILLIGRAM(S): at 23:33

## 2019-09-13 RX ADMIN — LISINOPRIL 2.5 MILLIGRAM(S): 2.5 TABLET ORAL at 17:40

## 2019-09-13 RX ADMIN — INSULIN GLARGINE 12 UNIT(S): 100 INJECTION, SOLUTION SUBCUTANEOUS at 23:33

## 2019-09-13 RX ADMIN — METHOCARBAMOL 750 MILLIGRAM(S): 500 TABLET, FILM COATED ORAL at 05:17

## 2019-09-13 RX ADMIN — FAMOTIDINE 20 MILLIGRAM(S): 10 INJECTION INTRAVENOUS at 17:40

## 2019-09-13 RX ADMIN — PANTOPRAZOLE SODIUM 40 MILLIGRAM(S): 20 TABLET, DELAYED RELEASE ORAL at 05:17

## 2019-09-13 RX ADMIN — Medication 100 MILLIGRAM(S): at 05:17

## 2019-09-13 RX ADMIN — Medication 81 MILLIGRAM(S): at 17:40

## 2019-09-13 NOTE — H&P CARDIOLOGY - REVIEW OF SYSTEMS
CONSTITUTIONAL: reports pain on right side of face, shoulders, and knees s/p fall 9/9/19  EYES/ENT: No visual changes;  No vertigo or throat pain   NECK: No pain or stiffness  RESPIRATORY: No cough, wheezing, hemoptysis; No shortness of breath  CARDIOVASCULAR: No chest pain or palpitations  GASTROINTESTINAL: No abdominal or epigastric pain. No nausea, vomiting, or hematemesis; No diarrhea or constipation. No melena or hematochezia.  GENITOURINARY: No dysuria, frequency or hematuria  NEUROLOGICAL: No numbness or weakness  SKIN: No itching, rashes

## 2019-09-13 NOTE — DISCHARGE NOTE PROVIDER - CARE PROVIDER_API CALL
Evette Rojo)  Cardiology; Interventional Cardiology  [address], [place] [id number]  Phone: [phone]  Fax: [phone]  Follow Up Time:

## 2019-09-13 NOTE — DISCHARGE NOTE PROVIDER - NSDCCPCAREPLAN_GEN_ALL_CORE_FT
PRINCIPAL DISCHARGE DIAGNOSIS  Diagnosis: Near syncope  Assessment and Plan of Treatment: Head/Facial CT: no acute changes.   EEG results pending.  Echo: Benign.   Presumed secondary to NSTEMI      SECONDARY DISCHARGE DIAGNOSES  Diagnosis: Cerebrovascular accident (CVA)  Assessment and Plan of Treatment: - CT with Left thalamic and basal ganglia lacunar infarcts  - Continue ASA, statin.    Diagnosis: NSTEMI (non-ST elevated myocardial infarction)  Assessment and Plan of Treatment: hx of CAD w/ Stents   - Continue ASA, statin.  - Echo: Preserved EF   - Stress test shows small apical area of ischemia   -Pt will be transferred for cardiac cath    Diagnosis: Fall  Assessment and Plan of Treatment: - fall precautions    Diagnosis: Closed head injury  Assessment and Plan of Treatment: Right eye evalutated with Opthalmology   No intervention indicated at this time.    Diagnosis: PVD (peripheral vascular disease)  Assessment and Plan of Treatment: -Left Carotid occlusion 70%  -cont asa, statin  -Vascular Surgery evaluation    Diagnosis: Hyperkalemia  Assessment and Plan of Treatment: Resolved.    Diagnosis: Type 2 diabetes mellitus without complication, without long-term current use of insulin  Assessment and Plan of Treatment: continue with glucose monitoring.   Lantus in am and qhs    Diagnosis: Mild intermittent asthma, unspecified whether complicated  Assessment and Plan of Treatment: Albuterol as needed.

## 2019-09-13 NOTE — DISCHARGE NOTE PROVIDER - HOSPITAL COURSE
69 yrs old with history of multiple medical problems with fall with no loss of consciousness or syncope with traumatic eye with no fracture.        The patient was admitted to Telemetry bed. Head and Face CT showed no acute changes. Neurology and Opthalmology were consulted and followed the patient. As per opthalmology retina is attached, no intervention required. EEG was done and results pending. Troponin were elevated on workup. Cardiology was consulted and followed the patient. The patient was further evaluated with Stress test that was significant for apical ischemia; Cardiac Catheterization was recommended; the patient will be transferred.

## 2019-09-13 NOTE — H&P CARDIOLOGY - RADIOLOGY RESULTS AND INTERPRETATION
< from: CT Head No Cont (09.09.19 @ 17:15) >    IMPRESSION:   CT head: No acute intracranial hemorrhage or mass effect.   CT maxillofacial: No acute facial bone fracture. Right periorbital   hematoma and soft tissue swelling.    < end of copied text >    < from: US Duplex Carotid Arteries Complete, Bilateral (09.12.19 @ 09:05) >  IMPRESSION:  Greater than 70% LEFT internal carotid artery stenosis.  < end of copied text >

## 2019-09-13 NOTE — H&P CARDIOLOGY - HISTORY OF PRESENT ILLNESS
This is a 69 yr old Chinese speaking female with PMH of HTN, Type 2 DM, CAD w/stents (dLM, pLAD and LCx per Allscripts note), asthma, vertigo who presented to [facility] 9/9/19 s/p fall. Per patient she did not lose consciousness. Right side of face ecchymotic and slightly swollen s/p fall. At [place], CT of head and facial bones were negative. Troponin trend 0.137//0.165//0.159//0.123. Patient had nuclear stress test performed 9/11 revealing EF 60%, apical, apico-anterior, apicoseptal, inferoapical and apicolateral wall akinesis as well as fixed defects in these areas consistent with prior MI; small zone of apicoseptal mild ischemia. Patient transferred to [facility] for LHC today in setting of NSTEMI. This is a 69 yr old Khmer speaking female with PMH of HTN, Type 2 DM, CAD w/stents (dLM, pLAD and LCx per Allscripts note), asthma, vertigo who presented to [facility] 9/9/19 s/p fall. Per patient she did not lose consciousness. Right side of face ecchymotic and slightly swollen s/p fall. At [place], CT of head and facial bones were negative. Troponin trend 0.137//0.165//0.159//0.123. Patient had nuclear stress test performed 9/11 revealing EF 60%, apical, apico-anterior, apicoseptal, inferoapical and apicolateral wall akinesis as well as fixed defects in these areas consistent with prior MI; small zone of apicoseptal mild ischemia. Patient transferred to [facility] for LHC today in setting of NSTEMI. Of note-patient has not received Brilinta since 9/9/19 AM dose.     service used:  #[id number] as well as daughter at bedside. This is a 69 yr old Estonian speaking female with PMH of HTN, Type 2 DM, CAD w/stents (dLM, pLAD and LCx per Allscripts note), asthma, vertigo, (no implantable devices) who presented to [facility] 9/9/19 s/p fall. Per patient she did not lose consciousness. Right side of face ecchymotic and slightly swollen s/p fall. At [place], CT of head and facial bones were negative. Troponin trend 0.137//0.165//0.159//0.123. Patient had nuclear stress test performed 9/11 revealing EF 60%, apical, apico-anterior, apicoseptal, inferoapical and apicolateral wall akinesis as well as fixed defects in these areas consistent with prior MI; small zone of apicoseptal mild ischemia. Patient transferred to [facility] for LHC today in setting of NSTEMI. Of note-patient has not received Brilinta since 9/9/19 AM dose.     service used:  #[id number] as well as daughter at bedside. This is a 69 yr old Tajik speaking female with PMH of HTN, Type 2 DM, CAD w/stents (dLM, pLAD and LCx per Allscripts note), asthma, vertigo, (no implantable devices) who presented to [facility] 9/9/19 s/p fall. Per patient she did not lose consciousness. Right side of face ecchymotic and slightly swollen s/p fall. At [place], CT of head and facial bones were negative. Troponin trend 0.137//0.165//0.159//0.123. Patient had nuclear stress test performed 9/11 revealing EF 60%, apical, apico-anterior, apicoseptal, inferoapical and apicolateral wall akinesis as well as fixed defects in these areas consistent with prior MI; small zone of apicoseptal mild ischemia. Patient transferred to [facility] for LHC today in setting of NSTEMI. Of note-patient has not received Brilinta since 9/9/19 AM dose.    [place]  service used:  #[id number] as well as daughter at bedside.

## 2019-09-14 LAB
ANION GAP SERPL CALC-SCNC: 11 MMOL/L — SIGNIFICANT CHANGE UP (ref 5–17)
BUN SERPL-MCNC: 38 MG/DL — HIGH (ref 7–23)
CALCIUM SERPL-MCNC: 9 MG/DL — SIGNIFICANT CHANGE UP (ref 8.4–10.5)
CHLORIDE SERPL-SCNC: 104 MMOL/L — SIGNIFICANT CHANGE UP (ref 96–108)
CO2 SERPL-SCNC: 25 MMOL/L — SIGNIFICANT CHANGE UP (ref 22–31)
CREAT SERPL-MCNC: 0.87 MG/DL — SIGNIFICANT CHANGE UP (ref 0.5–1.3)
GLUCOSE BLDC GLUCOMTR-MCNC: 176 MG/DL — HIGH (ref 70–99)
GLUCOSE BLDC GLUCOMTR-MCNC: 235 MG/DL — HIGH (ref 70–99)
GLUCOSE BLDC GLUCOMTR-MCNC: 257 MG/DL — HIGH (ref 70–99)
GLUCOSE SERPL-MCNC: 139 MG/DL — HIGH (ref 70–99)
HCT VFR BLD CALC: 36 % — SIGNIFICANT CHANGE UP (ref 34.5–45)
HGB BLD-MCNC: 11.2 G/DL — LOW (ref 11.5–15.5)
MCHC RBC-ENTMCNC: 27.7 PG — SIGNIFICANT CHANGE UP (ref 27–34)
MCHC RBC-ENTMCNC: 31.1 GM/DL — LOW (ref 32–36)
MCV RBC AUTO: 89.1 FL — SIGNIFICANT CHANGE UP (ref 80–100)
PLATELET # BLD AUTO: 201 K/UL — SIGNIFICANT CHANGE UP (ref 150–400)
POTASSIUM SERPL-MCNC: 4.8 MMOL/L — SIGNIFICANT CHANGE UP (ref 3.5–5.3)
POTASSIUM SERPL-SCNC: 4.8 MMOL/L — SIGNIFICANT CHANGE UP (ref 3.5–5.3)
RBC # BLD: 4.04 M/UL — SIGNIFICANT CHANGE UP (ref 3.8–5.2)
RBC # FLD: 13.3 % — SIGNIFICANT CHANGE UP (ref 10.3–14.5)
SODIUM SERPL-SCNC: 140 MMOL/L — SIGNIFICANT CHANGE UP (ref 135–145)
WBC # BLD: 7.64 K/UL — SIGNIFICANT CHANGE UP (ref 3.8–10.5)
WBC # FLD AUTO: 7.64 K/UL — SIGNIFICANT CHANGE UP (ref 3.8–10.5)

## 2019-09-14 PROCEDURE — 99254 IP/OBS CNSLTJ NEW/EST MOD 60: CPT

## 2019-09-14 RX ORDER — IPRATROPIUM/ALBUTEROL SULFATE 18-103MCG
3 AEROSOL WITH ADAPTER (GRAM) INHALATION EVERY 6 HOURS
Refills: 0 | Status: DISCONTINUED | OUTPATIENT
Start: 2019-09-14 | End: 2019-09-16

## 2019-09-14 RX ADMIN — FAMOTIDINE 20 MILLIGRAM(S): 10 INJECTION INTRAVENOUS at 11:29

## 2019-09-14 RX ADMIN — PANTOPRAZOLE SODIUM 40 MILLIGRAM(S): 20 TABLET, DELAYED RELEASE ORAL at 05:40

## 2019-09-14 RX ADMIN — SODIUM CHLORIDE 83.33 MILLILITER(S): 9 INJECTION INTRAMUSCULAR; INTRAVENOUS; SUBCUTANEOUS at 11:43

## 2019-09-14 RX ADMIN — LISINOPRIL 2.5 MILLIGRAM(S): 2.5 TABLET ORAL at 05:40

## 2019-09-14 RX ADMIN — Medication 3: at 11:49

## 2019-09-14 RX ADMIN — GABAPENTIN 300 MILLIGRAM(S): 400 CAPSULE ORAL at 17:03

## 2019-09-14 RX ADMIN — TIOTROPIUM BROMIDE 1 CAPSULE(S): 18 CAPSULE ORAL; RESPIRATORY (INHALATION) at 16:38

## 2019-09-14 RX ADMIN — BUDESONIDE AND FORMOTEROL FUMARATE DIHYDRATE 2 PUFF(S): 160; 4.5 AEROSOL RESPIRATORY (INHALATION) at 17:03

## 2019-09-14 RX ADMIN — TICAGRELOR 90 MILLIGRAM(S): 90 TABLET ORAL at 17:03

## 2019-09-14 RX ADMIN — Medication 1: at 16:39

## 2019-09-14 RX ADMIN — INSULIN GLARGINE 12 UNIT(S): 100 INJECTION, SOLUTION SUBCUTANEOUS at 21:31

## 2019-09-14 RX ADMIN — Medication 3 MILLILITER(S): at 17:02

## 2019-09-14 RX ADMIN — ATORVASTATIN CALCIUM 40 MILLIGRAM(S): 80 TABLET, FILM COATED ORAL at 21:31

## 2019-09-14 RX ADMIN — TICAGRELOR 90 MILLIGRAM(S): 90 TABLET ORAL at 05:40

## 2019-09-14 RX ADMIN — Medication 81 MILLIGRAM(S): at 11:29

## 2019-09-14 RX ADMIN — Medication 3 MILLILITER(S): at 23:17

## 2019-09-14 RX ADMIN — BUDESONIDE AND FORMOTEROL FUMARATE DIHYDRATE 2 PUFF(S): 160; 4.5 AEROSOL RESPIRATORY (INHALATION) at 05:40

## 2019-09-14 RX ADMIN — GABAPENTIN 300 MILLIGRAM(S): 400 CAPSULE ORAL at 05:40

## 2019-09-14 RX ADMIN — Medication 50 MILLIGRAM(S): at 21:31

## 2019-09-14 RX ADMIN — Medication 25 MILLIGRAM(S): at 05:40

## 2019-09-14 RX ADMIN — Medication 50 MILLIGRAM(S): at 05:40

## 2019-09-14 RX ADMIN — Medication 50 MILLIGRAM(S): at 16:14

## 2019-09-14 NOTE — PROGRESS NOTE ADULT - ASSESSMENT
69 yrs old with history of multiple medical problems with fall with no loss of consciousness or syncope with traumatic eye with no fracture.      Elevated troponin: s/p cardiac cath     Fall:  - Mechanical fall  - Stable now, uses cane occasionally,   - fall precautions, PT eval     Closed head injury:  - Stable,   -   HTN:  - Continue current meds    DM:  - FS monitoring w/ insulin s/s coverage.     CVA:  - CT with Left thalamic and basal ganglia lacunar infarcts  - Continue ASA, statin.    CAD:  - Continue ASA, statin.    PVD:   -Left Carotid occlusion 70%  -cont asa, statin    Hyperkalemia:  - Resolved     Asthma:  - Stable, continue current med.    DVTp: Heparin   Disposition: [place] for Cardiac Cath

## 2019-09-14 NOTE — CONSULT NOTE ADULT - SUBJECTIVE AND OBJECTIVE BOX
Pulmonary Consult Note    EMMANUEL GONZALEZ  MRN-[id number]    Chief Complaint: Patient is a 69y old  Female who presents with a chief complaint of     HPI:  69yFemale   Per chart review:  This is a 69 yr old Khmer speaking female with PMH of HTN, Type 2 DM, CAD w/stents (dLM, pLAD and LCx per Allscripts note), asthma, vertigo, (no implantable devices) who presented to [facility] 9/9/19 s/p fall. Per patient she did not lose consciousness. Right side of face ecchymotic and slightly swollen s/p fall. At [place], CT of head and facial bones were negative. Troponin trend 0.137//0.165//0.159//0.123. Patient had nuclear stress test performed 9/11 revealing EF 60%, apical, apico-anterior, apicoseptal, inferoapical and apicolateral wall akinesis as well as fixed defects in these areas consistent with prior MI; small zone of apicoseptal mild ischemia. Patient transferred to [facility] for LHC today in setting of NSTEMI. Of note-patient has not received Brilinta since 9/9/19 AM dose.      Patient feeling ok today, denies cp or sob, has some dry cough.    ROS:  -concerned about her blood sugar  All other systems reviewed and negative    PAST MEDICAL HISTORY: HEALTH ISSUES - PROBLEM Dx:  Asthma  obesity  DM  HTN    SOCIAL HISTORY: never smoker    ACTIVE MEDICATION LIST:  MEDICATIONS  (STANDING):  aspirin  chewable 81 milliGRAM(s) Oral daily  atorvastatin 40 milliGRAM(s) Oral at bedtime  buDESOnide 160 MICROgram(s)/formoterol 4.5 MICROgram(s) Inhaler 2 Puff(s) Inhalation two times a day  dextrose 5%. 1000 milliLiter(s) (50 mL/Hr) IV Continuous <Continuous>  dextrose 50% Injectable 12.5 Gram(s) IV Push once  famotidine    Tablet 20 milliGRAM(s) Oral daily  gabapentin 300 milliGRAM(s) Oral two times a day  influenza   Vaccine 0.5 milliLiter(s) IntraMuscular once  insulin glargine Injectable (LANTUS) 12 Unit(s) SubCutaneous at bedtime  insulin lispro (HumaLOG) corrective regimen sliding scale   SubCutaneous three times a day before meals  lisinopril 2.5 milliGRAM(s) Oral daily  metoprolol succinate ER 25 milliGRAM(s) Oral daily  pantoprazole    Tablet 40 milliGRAM(s) Oral before breakfast  pregabalin 50 milliGRAM(s) Oral every 8 hours  sodium chloride 0.9% Bolus 250 milliLiter(s) IV Bolus once  ticagrelor 90 milliGRAM(s) Oral two times a day  tiotropium 18 MICROgram(s) Capsule 1 Capsule(s) Inhalation daily    MEDICATIONS  (PRN):  ALBUTerol    90 MICROgram(s) HFA Inhaler 2 Puff(s) Inhalation every 6 hours PRN Shortness of Breath and/or Wheezing  dextrose 40% Gel 15 Gram(s) Oral once PRN Blood Glucose LESS THAN 70 milliGRAM(s)/deciliter  glucagon  Injectable 1 milliGRAM(s) IntraMuscular once PRN Glucose LESS THAN 70 milligrams/deciliter      EXAM:  Vital Signs Last 24 Hrs  T(C): 36.8 (14 Sep 2019 04:40), Max: 37.1 (13 Sep 2019 11:40)  T(F): 98.3 (14 Sep 2019 04:40), Max: 98.7 (13 Sep 2019 11:40)  HR: 62 (14 Sep 2019 04:40) (55 - 76)  BP: 122/68 (14 Sep 2019 04:40) (112/72 - 152/88)  BP(mean): 97 (13 Sep 2019 11:43) (97 - 97)  RR: 18 (14 Sep 2019 04:40) (15 - 18)  SpO2: 96% (14 Sep 2019 04:40) (95% - 99%)  GENERAL: No acute distress  NEURO: Alert and oriented x 3  LUNGS: Clear to auscultation bilaterally, no rales or wheezes  CV: S1/S2  ABDOMEN: +BS, nontender  EXTREMITIES: no clubbing, cyanosis    LABS/IMAGING: reviewed                        11.4   7.82  )-----------( 196      ( 13 Sep 2019 09:24 )             36.8   09-14    140  |  104  |  38<H>  ----------------------------<  139<H>  4.8   |  25  |  0.87    Ca    9.0      14 Sep 2019 06:48    < from: Xray Chest 1 View-PORTABLE IMMEDIATE (08.05.19 @ 12:20) >  EXAM:  XR CHEST PORTABLE IMMED 1V                            PROCEDURE DATE:  08/05/2019          INTERPRETATION:  PROCEDURE: AP view of the chest.    CLINICAL INFORMATION: Wheezing.    COMPARISON: 6/28/2019.    FINDINGS:    Lungs: The lungs are clear. Calcified granuloma is noted.  Heart: The heart is normal in size.  Mediastinum: The mediastinum is within normal limits.    IMPRESSION:    Clear lungs.    < end of copied text >      PROBLEM LIST:  69yFemale with HEALTH ISSUES - PROBLEM Dx:  Asthma  obesity  DM  HTN    RECS:  -Asthma not currently in exacerbation, cont symbicort and spiriva, will add duonebs Q6  -cardiology follow up  -incentive DARLIN bolton to chair    Thank you for this consultation, please feel free to call with any questions [phone]  Sarah Patel MD

## 2019-09-15 LAB
ANION GAP SERPL CALC-SCNC: 14 MMOL/L — SIGNIFICANT CHANGE UP (ref 5–17)
BUN SERPL-MCNC: 34 MG/DL — HIGH (ref 7–23)
CALCIUM SERPL-MCNC: 9 MG/DL — SIGNIFICANT CHANGE UP (ref 8.4–10.5)
CHLORIDE SERPL-SCNC: 104 MMOL/L — SIGNIFICANT CHANGE UP (ref 96–108)
CO2 SERPL-SCNC: 24 MMOL/L — SIGNIFICANT CHANGE UP (ref 22–31)
CREAT SERPL-MCNC: 1.04 MG/DL — SIGNIFICANT CHANGE UP (ref 0.5–1.3)
GLUCOSE BLDC GLUCOMTR-MCNC: 200 MG/DL — HIGH (ref 70–99)
GLUCOSE BLDC GLUCOMTR-MCNC: 210 MG/DL — HIGH (ref 70–99)
GLUCOSE BLDC GLUCOMTR-MCNC: 220 MG/DL — HIGH (ref 70–99)
GLUCOSE BLDC GLUCOMTR-MCNC: 302 MG/DL — HIGH (ref 70–99)
GLUCOSE BLDC GLUCOMTR-MCNC: 321 MG/DL — HIGH (ref 70–99)
GLUCOSE SERPL-MCNC: 166 MG/DL — HIGH (ref 70–99)
HCT VFR BLD CALC: 37.7 % — SIGNIFICANT CHANGE UP (ref 34.5–45)
HGB BLD-MCNC: 11.4 G/DL — LOW (ref 11.5–15.5)
MCHC RBC-ENTMCNC: 27.4 PG — SIGNIFICANT CHANGE UP (ref 27–34)
MCHC RBC-ENTMCNC: 30.2 GM/DL — LOW (ref 32–36)
MCV RBC AUTO: 90.6 FL — SIGNIFICANT CHANGE UP (ref 80–100)
PLATELET # BLD AUTO: 134 K/UL — LOW (ref 150–400)
POTASSIUM SERPL-MCNC: 4.9 MMOL/L — SIGNIFICANT CHANGE UP (ref 3.5–5.3)
POTASSIUM SERPL-SCNC: 4.9 MMOL/L — SIGNIFICANT CHANGE UP (ref 3.5–5.3)
RBC # BLD: 4.16 M/UL — SIGNIFICANT CHANGE UP (ref 3.8–5.2)
RBC # FLD: 13.4 % — SIGNIFICANT CHANGE UP (ref 10.3–14.5)
SODIUM SERPL-SCNC: 142 MMOL/L — SIGNIFICANT CHANGE UP (ref 135–145)
WBC # BLD: 7.32 K/UL — SIGNIFICANT CHANGE UP (ref 3.8–10.5)
WBC # FLD AUTO: 7.32 K/UL — SIGNIFICANT CHANGE UP (ref 3.8–10.5)

## 2019-09-15 PROCEDURE — 99232 SBSQ HOSP IP/OBS MODERATE 35: CPT

## 2019-09-15 RX ORDER — ACETAMINOPHEN 500 MG
650 TABLET ORAL ONCE
Refills: 0 | Status: COMPLETED | OUTPATIENT
Start: 2019-09-15 | End: 2019-09-15

## 2019-09-15 RX ADMIN — GABAPENTIN 300 MILLIGRAM(S): 400 CAPSULE ORAL at 17:17

## 2019-09-15 RX ADMIN — Medication 3 MILLILITER(S): at 17:17

## 2019-09-15 RX ADMIN — FAMOTIDINE 20 MILLIGRAM(S): 10 INJECTION INTRAVENOUS at 12:09

## 2019-09-15 RX ADMIN — Medication 50 MILLIGRAM(S): at 21:36

## 2019-09-15 RX ADMIN — Medication 3 MILLILITER(S): at 05:32

## 2019-09-15 RX ADMIN — Medication 3 MILLILITER(S): at 23:02

## 2019-09-15 RX ADMIN — Medication 25 MILLIGRAM(S): at 05:50

## 2019-09-15 RX ADMIN — PANTOPRAZOLE SODIUM 40 MILLIGRAM(S): 20 TABLET, DELAYED RELEASE ORAL at 05:33

## 2019-09-15 RX ADMIN — INSULIN GLARGINE 12 UNIT(S): 100 INJECTION, SOLUTION SUBCUTANEOUS at 23:05

## 2019-09-15 RX ADMIN — TIOTROPIUM BROMIDE 1 CAPSULE(S): 18 CAPSULE ORAL; RESPIRATORY (INHALATION) at 12:08

## 2019-09-15 RX ADMIN — LISINOPRIL 2.5 MILLIGRAM(S): 2.5 TABLET ORAL at 05:33

## 2019-09-15 RX ADMIN — Medication 81 MILLIGRAM(S): at 12:08

## 2019-09-15 RX ADMIN — GABAPENTIN 300 MILLIGRAM(S): 400 CAPSULE ORAL at 05:33

## 2019-09-15 RX ADMIN — Medication 2: at 12:09

## 2019-09-15 RX ADMIN — Medication 50 MILLIGRAM(S): at 05:32

## 2019-09-15 RX ADMIN — Medication 3 MILLILITER(S): at 12:08

## 2019-09-15 RX ADMIN — ATORVASTATIN CALCIUM 40 MILLIGRAM(S): 80 TABLET, FILM COATED ORAL at 21:36

## 2019-09-15 RX ADMIN — Medication 1: at 17:16

## 2019-09-15 RX ADMIN — Medication 50 MILLIGRAM(S): at 13:10

## 2019-09-15 RX ADMIN — Medication 2: at 08:04

## 2019-09-15 RX ADMIN — BUDESONIDE AND FORMOTEROL FUMARATE DIHYDRATE 2 PUFF(S): 160; 4.5 AEROSOL RESPIRATORY (INHALATION) at 17:17

## 2019-09-15 RX ADMIN — BUDESONIDE AND FORMOTEROL FUMARATE DIHYDRATE 2 PUFF(S): 160; 4.5 AEROSOL RESPIRATORY (INHALATION) at 05:33

## 2019-09-15 NOTE — PROGRESS NOTE ADULT - ASSESSMENT
69 yrs old with history of multiple medical problems with fall with no loss of consciousness or syncope with traumatic eye with no fracture.      Elevated troponin: s/p cardiac cath     Fall:  - Mechanical fall  - Stable now, uses cane occasionally,   - fall precautions, PT eval     Closed head injury:  - Stable,   -   HTN:  - Continue current meds    DM:  - FS monitoring w/ insulin s/s coverage.     CVA:  - CT with Left thalamic and basal ganglia lacunar infarcts  - Continue ASA, statin.    CAD:  - Continue ASA, statin.    PVD:   -Left Carotid occlusion 70%  -cont asa, statin    Hyperkalemia:  - Resolved     Asthma:  - Stable, continue current med.    DVTp: Heparin   Disposition: [place] for Cardiac Cath

## 2019-09-15 NOTE — PHYSICAL THERAPY INITIAL EVALUATION ADULT - GENERAL OBSERVATIONS, REHAB EVAL
68 y/o Swedish speaking F [facility] with no LOC, where nuclear stress test performed on 9/11 revealing EF 60%, apical, apico-anterior, apicoseptal, inferoapical and apicolateral wall akinesis as well as fixed defects in these areas consistent with prior MI; small zone of apicoseptal mild ischemia. Pt was transferred to [facility] for LHC in setting of NSTEM, s/p LHC on 9/13.

## 2019-09-15 NOTE — PHYSICAL THERAPY INITIAL EVALUATION ADULT - ADDITIONAL COMMENTS
Pt lives with her daughter in a private home which has 3 steps to enter with b/l HR. Pt was independent all ADLs and ambulation. Occasionally uses a cane for ambulation.

## 2019-09-15 NOTE — PHYSICAL THERAPY INITIAL EVALUATION ADULT - BALANCE TRAINING, PT EVAL
GOAL: Pt will improve static & dynamic standing balance by full grade to restore independence with mobility & related ADLs.

## 2019-09-15 NOTE — PHYSICAL THERAPY INITIAL EVALUATION ADULT - PERTINENT HX OF CURRENT PROBLEM, REHAB EVAL
xray 9/19 chest with chronic lung changes with increased reticular opacities; pelvis w/ atypical sclerosis overlying the L mid sacrum. xray 9/19 chest with chronic lung changes with increased reticular opacities; pelvis w/ atypical sclerosis overlying the L mid sacrum. As per SANDI Boggs PT can work with the pt.

## 2019-09-16 ENCOUNTER — TRANSCRIPTION ENCOUNTER (OUTPATIENT)
Age: 69
End: 2019-09-16

## 2019-09-16 VITALS
OXYGEN SATURATION: 97 % | DIASTOLIC BLOOD PRESSURE: 74 MMHG | RESPIRATION RATE: 18 BRPM | SYSTOLIC BLOOD PRESSURE: 118 MMHG | HEART RATE: 73 BPM | TEMPERATURE: 98 F

## 2019-09-16 LAB
GLUCOSE BLDC GLUCOMTR-MCNC: 163 MG/DL — HIGH (ref 70–99)
GLUCOSE BLDC GLUCOMTR-MCNC: 189 MG/DL — HIGH (ref 70–99)
GLUCOSE BLDC GLUCOMTR-MCNC: 299 MG/DL — HIGH (ref 70–99)

## 2019-09-16 PROCEDURE — 93454 CORONARY ARTERY ANGIO S&I: CPT

## 2019-09-16 PROCEDURE — 93306 TTE W/DOPPLER COMPLETE: CPT

## 2019-09-16 PROCEDURE — 82962 GLUCOSE BLOOD TEST: CPT

## 2019-09-16 PROCEDURE — 93005 ELECTROCARDIOGRAM TRACING: CPT

## 2019-09-16 PROCEDURE — 85027 COMPLETE CBC AUTOMATED: CPT

## 2019-09-16 PROCEDURE — C1887: CPT

## 2019-09-16 PROCEDURE — C1894: CPT

## 2019-09-16 PROCEDURE — 94640 AIRWAY INHALATION TREATMENT: CPT

## 2019-09-16 PROCEDURE — 93306 TTE W/DOPPLER COMPLETE: CPT | Mod: 26

## 2019-09-16 PROCEDURE — C1769: CPT

## 2019-09-16 PROCEDURE — 80048 BASIC METABOLIC PNL TOTAL CA: CPT

## 2019-09-16 PROCEDURE — 97162 PT EVAL MOD COMPLEX 30 MIN: CPT

## 2019-09-16 RX ORDER — ACETAMINOPHEN 500 MG
2 TABLET ORAL
Qty: 0 | Refills: 0 | DISCHARGE
Start: 2019-09-16

## 2019-09-16 RX ORDER — TICAGRELOR 90 MG/1
90 TABLET ORAL EVERY 12 HOURS
Refills: 0 | Status: DISCONTINUED | OUTPATIENT
Start: 2019-09-16 | End: 2019-09-16

## 2019-09-16 RX ORDER — ACETAMINOPHEN 500 MG
650 TABLET ORAL EVERY 6 HOURS
Refills: 0 | Status: DISCONTINUED | OUTPATIENT
Start: 2019-09-16 | End: 2019-09-16

## 2019-09-16 RX ADMIN — GABAPENTIN 300 MILLIGRAM(S): 400 CAPSULE ORAL at 06:02

## 2019-09-16 RX ADMIN — Medication 50 MILLIGRAM(S): at 06:02

## 2019-09-16 RX ADMIN — BUDESONIDE AND FORMOTEROL FUMARATE DIHYDRATE 2 PUFF(S): 160; 4.5 AEROSOL RESPIRATORY (INHALATION) at 17:29

## 2019-09-16 RX ADMIN — Medication 650 MILLIGRAM(S): at 13:30

## 2019-09-16 RX ADMIN — Medication 50 MILLIGRAM(S): at 14:20

## 2019-09-16 RX ADMIN — Medication 81 MILLIGRAM(S): at 12:17

## 2019-09-16 RX ADMIN — FAMOTIDINE 20 MILLIGRAM(S): 10 INJECTION INTRAVENOUS at 12:17

## 2019-09-16 RX ADMIN — Medication 3 MILLILITER(S): at 06:02

## 2019-09-16 RX ADMIN — Medication 1: at 17:25

## 2019-09-16 RX ADMIN — PANTOPRAZOLE SODIUM 40 MILLIGRAM(S): 20 TABLET, DELAYED RELEASE ORAL at 06:02

## 2019-09-16 RX ADMIN — Medication 25 MILLIGRAM(S): at 06:02

## 2019-09-16 RX ADMIN — TIOTROPIUM BROMIDE 1 CAPSULE(S): 18 CAPSULE ORAL; RESPIRATORY (INHALATION) at 12:17

## 2019-09-16 RX ADMIN — Medication 1: at 07:53

## 2019-09-16 RX ADMIN — Medication 3 MILLILITER(S): at 12:17

## 2019-09-16 RX ADMIN — GABAPENTIN 300 MILLIGRAM(S): 400 CAPSULE ORAL at 17:29

## 2019-09-16 RX ADMIN — BUDESONIDE AND FORMOTEROL FUMARATE DIHYDRATE 2 PUFF(S): 160; 4.5 AEROSOL RESPIRATORY (INHALATION) at 06:02

## 2019-09-16 RX ADMIN — Medication 650 MILLIGRAM(S): at 12:49

## 2019-09-16 RX ADMIN — LISINOPRIL 2.5 MILLIGRAM(S): 2.5 TABLET ORAL at 06:02

## 2019-09-16 RX ADMIN — Medication 3 MILLILITER(S): at 17:27

## 2019-09-16 RX ADMIN — Medication 3: at 12:17

## 2019-09-16 RX ADMIN — TICAGRELOR 90 MILLIGRAM(S): 90 TABLET ORAL at 17:29

## 2019-09-16 NOTE — DISCHARGE NOTE PROVIDER - HOSPITAL COURSE
This is a 69 yr old Georgian speaking female with PMH of HTN, Type 2 DM, CAD w/stents (dLM, pLAD and LCx per Allscripts note), asthma, vertigo, (no implantable devices) who presented to [facility] 9/9/19 s/p fall. Per patient she did not lose consciousness. Right side of face ecchymotic and slightly swollen s/p fall. At [place], CT of head and facial bones were negative. Troponin trend 0.137//0.165//0.159//0.123. Patient had nuclear stress test performed 9/11 revealing EF 60%, apical, apico-anterior, apicoseptal, inferoapical and apicolateral wall akinesis as well as fixed defects in these areas consistent with prior MI; small zone of apicoseptal mild ischemia. Patient transferred to [facility] for LHC today in setting of NSTEMI. S/P cath with triple vessel disease. refusing intervention/CABG.  Seen by pulm for asthma exac.  D/C to home with PT and rolling walker.

## 2019-09-16 NOTE — DISCHARGE NOTE PROVIDER - NSDCHC_MEDRECSTATUS_GEN_ALL_CORE
1930 Bedside and Verbal shift change report given to Baker Cabot, RN & Pedrito Schmitz RN (oncoming nurse) by Juan Francisco Reynolds RN (offgoing nurse). Report included the following information SBAR, Kardex, Intake/Output, MAR and Recent Results. Admission Reconciliation is Completed  Discharge Reconciliation is Completed

## 2019-09-16 NOTE — DISCHARGE NOTE NURSING/CASE MANAGEMENT/SOCIAL WORK - PATIENT PORTAL LINK FT
You can access the FollowMyHealth Patient Portal offered by [facility] by registering at the following website: [email or web address]. By joining [facility]’s FollowMyHealth portal, you will also be able to view your health information using other applications (apps) compatible with our system.

## 2019-09-16 NOTE — DISCHARGE NOTE PROVIDER - NSDCCPCAREPLAN_GEN_ALL_CORE_FT
PRINCIPAL DISCHARGE DIAGNOSIS  Diagnosis: Non-ST elevation MI (NSTEMI)  Assessment and Plan of Treatment: Call your doctor if you have unusual chest pain, pressure, or discomfort, shortness of breath, nausea, vomiting, burping, heartburn, tingling upper body parts, sweating, cold, clammy sking, racing heartbeat  Call 911 if you think you are having a heart attack  Take all cardiac medications as prescribed - notify your doctor if you have any side effects  Follow cardiac diet - avoid fatty & fried foods, don't eat too much red meat, eat lots of fruits & vegetables, dairy products should be low fat  Lose weight if you are overweight  Become more active with walking, gardening, or any other activity that gets you to move      SECONDARY DISCHARGE DIAGNOSES  Diagnosis: Asthma with COPD  Assessment and Plan of Treatment: Call your Health Care provider upon arrival home to make a follow up appointment within one week.  Take all inhalers as prescribed by your Health Care Provider.  Take steroids as prescribed by your Health Care Provider.  If your cough increases infrequency and severity and/or you have shortness of breath or increased shortness of breath call your Health Care Provider.  If you develop fever, chills, night sweats, malaise, and/or change in mental status call your Health care Provider.  Nutrition is very important.  Eat small frequent meals.  Increase your activity as tolerated.  Do not stay in bed all day  Pt. verbalized an understanding of all instructions.    Diagnosis: Fall  Assessment and Plan of Treatment: Discharge to home with PT and rolling walker.

## 2019-09-17 DIAGNOSIS — Z95.5 PRESENCE OF CORONARY ANGIOPLASTY IMPLANT AND GRAFT: ICD-10-CM

## 2019-09-17 DIAGNOSIS — E11.51 TYPE 2 DIABETES MELLITUS WITH DIABETIC PERIPHERAL ANGIOPATHY WITHOUT GANGRENE: ICD-10-CM

## 2019-09-17 DIAGNOSIS — Z86.73 PERSONAL HISTORY OF TRANSIENT ISCHEMIC ATTACK (TIA), AND CEREBRAL INFARCTION WITHOUT RESIDUAL DEFICITS: ICD-10-CM
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
## 2019-09-17 DIAGNOSIS — E66.9 OBESITY, UNSPECIFIED: ICD-10-CM

## 2019-09-17 DIAGNOSIS — R55 SYNCOPE AND COLLAPSE: ICD-10-CM

## 2019-09-17 DIAGNOSIS — I21.4 NON-ST ELEVATION (NSTEMI) MYOCARDIAL INFARCTION: ICD-10-CM

## 2019-09-17 DIAGNOSIS — I25.10 ATHEROSCLEROTIC HEART DISEASE OF NATIVE CORONARY ARTERY WITHOUT ANGINA PECTORIS: ICD-10-CM

## 2019-09-17 DIAGNOSIS — J45.20 MILD INTERMITTENT ASTHMA, UNCOMPLICATED: ICD-10-CM

## 2019-09-17 DIAGNOSIS — E87.5 HYPERKALEMIA: ICD-10-CM

## 2019-09-17 DIAGNOSIS — F41.9 ANXIETY DISORDER, UNSPECIFIED: ICD-10-CM

## 2019-09-17 DIAGNOSIS — Z79.4 LONG TERM (CURRENT) USE OF INSULIN: ICD-10-CM

## 2019-09-17 DIAGNOSIS — Z79.82 LONG TERM (CURRENT) USE OF ASPIRIN: ICD-10-CM

## 2019-09-17 DIAGNOSIS — Z71.89 OTHER SPECIFIED COUNSELING: ICD-10-CM

## 2019-09-17 DIAGNOSIS — I10 ESSENTIAL (PRIMARY) HYPERTENSION: ICD-10-CM

## 2019-09-17 DIAGNOSIS — I73.9 PERIPHERAL VASCULAR DISEASE, UNSPECIFIED: ICD-10-CM

## 2019-09-17 DIAGNOSIS — S00.83XA CONTUSION OF OTHER PART OF HEAD, INITIAL ENCOUNTER: ICD-10-CM

## 2019-11-04 ENCOUNTER — INPATIENT (INPATIENT)
Facility: HOSPITAL | Age: 69
LOS: 1 days | Discharge: HOME | End: 2019-11-06
Attending: INTERNAL MEDICINE | Admitting: INTERNAL MEDICINE
Payer: MEDICAID

## 2019-11-04 VITALS
OXYGEN SATURATION: 97 % | DIASTOLIC BLOOD PRESSURE: 64 MMHG | HEART RATE: 76 BPM | RESPIRATION RATE: 18 BRPM | SYSTOLIC BLOOD PRESSURE: 121 MMHG | TEMPERATURE: 98 F

## 2019-11-04 DIAGNOSIS — Z95.5 PRESENCE OF CORONARY ANGIOPLASTY IMPLANT AND GRAFT: Chronic | ICD-10-CM

## 2019-11-04 DIAGNOSIS — E11.40 TYPE 2 DIABETES MELLITUS WITH DIABETIC NEUROPATHY, UNSPECIFIED: ICD-10-CM

## 2019-11-04 DIAGNOSIS — E87.5 HYPERKALEMIA: ICD-10-CM

## 2019-11-04 DIAGNOSIS — R55 SYNCOPE AND COLLAPSE: ICD-10-CM

## 2019-11-04 DIAGNOSIS — I10 ESSENTIAL (PRIMARY) HYPERTENSION: ICD-10-CM

## 2019-11-04 DIAGNOSIS — R42 DIZZINESS AND GIDDINESS: ICD-10-CM

## 2019-11-04 DIAGNOSIS — I25.118 ATHEROSCLEROTIC HEART DISEASE OF NATIVE CORONARY ARTERY WITH OTHER FORMS OF ANGINA PECTORIS: ICD-10-CM

## 2019-11-04 DIAGNOSIS — J45.909 UNSPECIFIED ASTHMA, UNCOMPLICATED: ICD-10-CM

## 2019-11-04 DIAGNOSIS — Z29.9 ENCOUNTER FOR PROPHYLACTIC MEASURES, UNSPECIFIED: ICD-10-CM

## 2019-11-04 LAB
ALBUMIN SERPL ELPH-MCNC: 3.6 G/DL — SIGNIFICANT CHANGE UP (ref 3.3–5)
ALP SERPL-CCNC: 87 U/L — SIGNIFICANT CHANGE UP (ref 40–120)
ALT FLD-CCNC: 22 U/L — SIGNIFICANT CHANGE UP (ref 4–33)
ANION GAP SERPL CALC-SCNC: 12 MMO/L — SIGNIFICANT CHANGE UP (ref 7–14)
ANION GAP SERPL CALC-SCNC: 8 MMO/L — SIGNIFICANT CHANGE UP (ref 7–14)
APPEARANCE UR: CLEAR — SIGNIFICANT CHANGE UP
APTT BLD: 32.7 SEC — SIGNIFICANT CHANGE UP (ref 27.5–36.3)
AST SERPL-CCNC: 24 U/L — SIGNIFICANT CHANGE UP (ref 4–32)
BASOPHILS # BLD AUTO: 0.04 K/UL — SIGNIFICANT CHANGE UP (ref 0–0.2)
BASOPHILS NFR BLD AUTO: 0.5 % — SIGNIFICANT CHANGE UP (ref 0–2)
BILIRUB SERPL-MCNC: < 0.2 MG/DL — LOW (ref 0.2–1.2)
BILIRUB UR-MCNC: NEGATIVE — SIGNIFICANT CHANGE UP
BLOOD UR QL VISUAL: NEGATIVE — SIGNIFICANT CHANGE UP
BUN SERPL-MCNC: 24 MG/DL — HIGH (ref 7–23)
BUN SERPL-MCNC: 26 MG/DL — HIGH (ref 7–23)
CALCIUM SERPL-MCNC: 8.4 MG/DL — SIGNIFICANT CHANGE UP (ref 8.4–10.5)
CALCIUM SERPL-MCNC: 8.8 MG/DL — SIGNIFICANT CHANGE UP (ref 8.4–10.5)
CHLORIDE SERPL-SCNC: 102 MMOL/L — SIGNIFICANT CHANGE UP (ref 98–107)
CHLORIDE SERPL-SCNC: 103 MMOL/L — SIGNIFICANT CHANGE UP (ref 98–107)
CO2 SERPL-SCNC: 27 MMOL/L — SIGNIFICANT CHANGE UP (ref 22–31)
CO2 SERPL-SCNC: 31 MMOL/L — SIGNIFICANT CHANGE UP (ref 22–31)
COLOR SPEC: YELLOW — SIGNIFICANT CHANGE UP
CREAT SERPL-MCNC: 0.89 MG/DL — SIGNIFICANT CHANGE UP (ref 0.5–1.3)
CREAT SERPL-MCNC: 0.89 MG/DL — SIGNIFICANT CHANGE UP (ref 0.5–1.3)
EOSINOPHIL # BLD AUTO: 0.19 K/UL — SIGNIFICANT CHANGE UP (ref 0–0.5)
EOSINOPHIL NFR BLD AUTO: 2.5 % — SIGNIFICANT CHANGE UP (ref 0–6)
GLUCOSE BLDC GLUCOMTR-MCNC: 139 MG/DL — HIGH (ref 70–99)
GLUCOSE BLDC GLUCOMTR-MCNC: 240 MG/DL — HIGH (ref 70–99)
GLUCOSE SERPL-MCNC: 102 MG/DL — HIGH (ref 70–99)
GLUCOSE SERPL-MCNC: 140 MG/DL — HIGH (ref 70–99)
GLUCOSE UR-MCNC: NEGATIVE — SIGNIFICANT CHANGE UP
HCT VFR BLD CALC: 35.9 % — SIGNIFICANT CHANGE UP (ref 34.5–45)
HGB BLD-MCNC: 11 G/DL — LOW (ref 11.5–15.5)
IMM GRANULOCYTES NFR BLD AUTO: 0.3 % — SIGNIFICANT CHANGE UP (ref 0–1.5)
INR BLD: 0.97 — SIGNIFICANT CHANGE UP (ref 0.88–1.17)
KETONES UR-MCNC: NEGATIVE — SIGNIFICANT CHANGE UP
LEUKOCYTE ESTERASE UR-ACNC: NEGATIVE — SIGNIFICANT CHANGE UP
LYMPHOCYTES # BLD AUTO: 1.81 K/UL — SIGNIFICANT CHANGE UP (ref 1–3.3)
LYMPHOCYTES # BLD AUTO: 23.6 % — SIGNIFICANT CHANGE UP (ref 13–44)
MAGNESIUM SERPL-MCNC: 2 MG/DL — SIGNIFICANT CHANGE UP (ref 1.6–2.6)
MCHC RBC-ENTMCNC: 27.9 PG — SIGNIFICANT CHANGE UP (ref 27–34)
MCHC RBC-ENTMCNC: 30.6 % — LOW (ref 32–36)
MCV RBC AUTO: 91.1 FL — SIGNIFICANT CHANGE UP (ref 80–100)
MONOCYTES # BLD AUTO: 0.56 K/UL — SIGNIFICANT CHANGE UP (ref 0–0.9)
MONOCYTES NFR BLD AUTO: 7.3 % — SIGNIFICANT CHANGE UP (ref 2–14)
NEUTROPHILS # BLD AUTO: 5.04 K/UL — SIGNIFICANT CHANGE UP (ref 1.8–7.4)
NEUTROPHILS NFR BLD AUTO: 65.8 % — SIGNIFICANT CHANGE UP (ref 43–77)
NITRITE UR-MCNC: NEGATIVE — SIGNIFICANT CHANGE UP
NRBC # FLD: 0 K/UL — SIGNIFICANT CHANGE UP (ref 0–0)
NT-PROBNP SERPL-SCNC: 391.2 PG/ML — SIGNIFICANT CHANGE UP
PH UR: 6.5 — SIGNIFICANT CHANGE UP (ref 5–8)
PHOSPHATE SERPL-MCNC: 3 MG/DL — SIGNIFICANT CHANGE UP (ref 2.5–4.5)
PLATELET # BLD AUTO: 208 K/UL — SIGNIFICANT CHANGE UP (ref 150–400)
PMV BLD: 10.7 FL — SIGNIFICANT CHANGE UP (ref 7–13)
POTASSIUM SERPL-MCNC: 4.1 MMOL/L — SIGNIFICANT CHANGE UP (ref 3.5–5.3)
POTASSIUM SERPL-MCNC: 6.1 MMOL/L — HIGH (ref 3.5–5.3)
POTASSIUM SERPL-SCNC: 4.1 MMOL/L — SIGNIFICANT CHANGE UP (ref 3.5–5.3)
POTASSIUM SERPL-SCNC: 6.1 MMOL/L — HIGH (ref 3.5–5.3)
PROT SERPL-MCNC: 7.1 G/DL — SIGNIFICANT CHANGE UP (ref 6–8.3)
PROT UR-MCNC: NEGATIVE — SIGNIFICANT CHANGE UP
PROTHROM AB SERPL-ACNC: 10.8 SEC — SIGNIFICANT CHANGE UP (ref 9.8–13.1)
RBC # BLD: 3.94 M/UL — SIGNIFICANT CHANGE UP (ref 3.8–5.2)
RBC # FLD: 12.8 % — SIGNIFICANT CHANGE UP (ref 10.3–14.5)
SODIUM SERPL-SCNC: 141 MMOL/L — SIGNIFICANT CHANGE UP (ref 135–145)
SODIUM SERPL-SCNC: 142 MMOL/L — SIGNIFICANT CHANGE UP (ref 135–145)
SP GR SPEC: 1.01 — SIGNIFICANT CHANGE UP (ref 1–1.04)
TROPONIN T, HIGH SENSITIVITY: 8 NG/L — SIGNIFICANT CHANGE UP (ref ?–14)
TROPONIN T, HIGH SENSITIVITY: 9 NG/L — SIGNIFICANT CHANGE UP (ref ?–14)
UROBILINOGEN FLD QL: NORMAL — SIGNIFICANT CHANGE UP
WBC # BLD: 7.66 K/UL — SIGNIFICANT CHANGE UP (ref 3.8–10.5)
WBC # FLD AUTO: 7.66 K/UL — SIGNIFICANT CHANGE UP (ref 3.8–10.5)

## 2019-11-04 PROCEDURE — 71046 X-RAY EXAM CHEST 2 VIEWS: CPT | Mod: 26

## 2019-11-04 RX ORDER — ASPIRIN/CALCIUM CARB/MAGNESIUM 324 MG
81 TABLET ORAL DAILY
Refills: 0 | Status: DISCONTINUED | OUTPATIENT
Start: 2019-11-04 | End: 2019-11-06

## 2019-11-04 RX ORDER — GLUCAGON INJECTION, SOLUTION 0.5 MG/.1ML
1 INJECTION, SOLUTION SUBCUTANEOUS ONCE
Refills: 0 | Status: DISCONTINUED | OUTPATIENT
Start: 2019-11-04 | End: 2019-11-06

## 2019-11-04 RX ORDER — SODIUM CHLORIDE 9 MG/ML
1000 INJECTION INTRAMUSCULAR; INTRAVENOUS; SUBCUTANEOUS ONCE
Refills: 0 | Status: COMPLETED | OUTPATIENT
Start: 2019-11-04 | End: 2019-11-04

## 2019-11-04 RX ORDER — LISINOPRIL 2.5 MG/1
2.5 TABLET ORAL DAILY
Refills: 0 | Status: DISCONTINUED | OUTPATIENT
Start: 2019-11-04 | End: 2019-11-06

## 2019-11-04 RX ORDER — RANOLAZINE 500 MG/1
500 TABLET, FILM COATED, EXTENDED RELEASE ORAL
Refills: 0 | Status: DISCONTINUED | OUTPATIENT
Start: 2019-11-04 | End: 2019-11-06

## 2019-11-04 RX ORDER — INSULIN LISPRO 100/ML
VIAL (ML) SUBCUTANEOUS AT BEDTIME
Refills: 0 | Status: DISCONTINUED | OUTPATIENT
Start: 2019-11-04 | End: 2019-11-06

## 2019-11-04 RX ORDER — PANTOPRAZOLE SODIUM 20 MG/1
1 TABLET, DELAYED RELEASE ORAL
Qty: 0 | Refills: 0 | DISCHARGE

## 2019-11-04 RX ORDER — INSULIN GLARGINE 100 [IU]/ML
15 INJECTION, SOLUTION SUBCUTANEOUS EVERY MORNING
Refills: 0 | Status: DISCONTINUED | OUTPATIENT
Start: 2019-11-04 | End: 2019-11-06

## 2019-11-04 RX ORDER — INSULIN LISPRO 100/ML
VIAL (ML) SUBCUTANEOUS
Refills: 0 | Status: DISCONTINUED | OUTPATIENT
Start: 2019-11-04 | End: 2019-11-06

## 2019-11-04 RX ORDER — POLYETHYLENE GLYCOL 3350 17 G/17G
0 POWDER, FOR SOLUTION ORAL
Qty: 0 | Refills: 0 | DISCHARGE

## 2019-11-04 RX ORDER — METOPROLOL TARTRATE 50 MG
25 TABLET ORAL DAILY
Refills: 0 | Status: DISCONTINUED | OUTPATIENT
Start: 2019-11-04 | End: 2019-11-06

## 2019-11-04 RX ORDER — DEXTROSE 50 % IN WATER 50 %
15 SYRINGE (ML) INTRAVENOUS ONCE
Refills: 0 | Status: DISCONTINUED | OUTPATIENT
Start: 2019-11-04 | End: 2019-11-06

## 2019-11-04 RX ORDER — DEXTROSE 50 % IN WATER 50 %
50 SYRINGE (ML) INTRAVENOUS ONCE
Refills: 0 | Status: COMPLETED | OUTPATIENT
Start: 2019-11-04 | End: 2019-11-04

## 2019-11-04 RX ORDER — INSULIN GLARGINE 100 [IU]/ML
15 INJECTION, SOLUTION SUBCUTANEOUS AT BEDTIME
Refills: 0 | Status: DISCONTINUED | OUTPATIENT
Start: 2019-11-04 | End: 2019-11-06

## 2019-11-04 RX ORDER — ENOXAPARIN SODIUM 100 MG/ML
40 INJECTION SUBCUTANEOUS EVERY 24 HOURS
Refills: 0 | Status: DISCONTINUED | OUTPATIENT
Start: 2019-11-04 | End: 2019-11-06

## 2019-11-04 RX ORDER — INSULIN GLARGINE 100 [IU]/ML
30 INJECTION, SOLUTION SUBCUTANEOUS
Qty: 0 | Refills: 0 | DISCHARGE

## 2019-11-04 RX ORDER — ATORVASTATIN CALCIUM 80 MG/1
40 TABLET, FILM COATED ORAL AT BEDTIME
Refills: 0 | Status: DISCONTINUED | OUTPATIENT
Start: 2019-11-04 | End: 2019-11-06

## 2019-11-04 RX ORDER — DEXTROSE 50 % IN WATER 50 %
25 SYRINGE (ML) INTRAVENOUS ONCE
Refills: 0 | Status: DISCONTINUED | OUTPATIENT
Start: 2019-11-04 | End: 2019-11-06

## 2019-11-04 RX ORDER — BUDESONIDE AND FORMOTEROL FUMARATE DIHYDRATE 160; 4.5 UG/1; UG/1
2 AEROSOL RESPIRATORY (INHALATION)
Qty: 0 | Refills: 0 | DISCHARGE

## 2019-11-04 RX ORDER — SODIUM CHLORIDE 9 MG/ML
1000 INJECTION INTRAMUSCULAR; INTRAVENOUS; SUBCUTANEOUS
Refills: 0 | Status: COMPLETED | OUTPATIENT
Start: 2019-11-04 | End: 2019-11-04

## 2019-11-04 RX ORDER — ALBUTEROL 90 UG/1
2 AEROSOL, METERED ORAL
Qty: 0 | Refills: 0 | DISCHARGE

## 2019-11-04 RX ORDER — DEXTROSE 50 % IN WATER 50 %
12.5 SYRINGE (ML) INTRAVENOUS ONCE
Refills: 0 | Status: DISCONTINUED | OUTPATIENT
Start: 2019-11-04 | End: 2019-11-06

## 2019-11-04 RX ORDER — RANITIDINE HYDROCHLORIDE 150 MG/1
1 TABLET, FILM COATED ORAL
Qty: 0 | Refills: 0 | DISCHARGE

## 2019-11-04 RX ORDER — TICAGRELOR 90 MG/1
90 TABLET ORAL EVERY 12 HOURS
Refills: 0 | Status: DISCONTINUED | OUTPATIENT
Start: 2019-11-04 | End: 2019-11-06

## 2019-11-04 RX ORDER — ALBUTEROL 90 UG/1
2.5 AEROSOL, METERED ORAL ONCE
Refills: 0 | Status: COMPLETED | OUTPATIENT
Start: 2019-11-04 | End: 2019-11-04

## 2019-11-04 RX ORDER — ATORVASTATIN CALCIUM 80 MG/1
1 TABLET, FILM COATED ORAL
Qty: 0 | Refills: 0 | DISCHARGE

## 2019-11-04 RX ORDER — LIDOCAINE 4 G/100G
1 CREAM TOPICAL EVERY 24 HOURS
Refills: 0 | Status: DISCONTINUED | OUTPATIENT
Start: 2019-11-04 | End: 2019-11-06

## 2019-11-04 RX ORDER — INSULIN HUMAN 100 [IU]/ML
10 INJECTION, SOLUTION SUBCUTANEOUS ONCE
Refills: 0 | Status: COMPLETED | OUTPATIENT
Start: 2019-11-04 | End: 2019-11-04

## 2019-11-04 RX ORDER — TIOTROPIUM BROMIDE 18 UG/1
1 CAPSULE ORAL; RESPIRATORY (INHALATION)
Qty: 0 | Refills: 0 | DISCHARGE

## 2019-11-04 RX ORDER — GABAPENTIN 400 MG/1
300 CAPSULE ORAL EVERY 8 HOURS
Refills: 0 | Status: DISCONTINUED | OUTPATIENT
Start: 2019-11-04 | End: 2019-11-06

## 2019-11-04 RX ADMIN — Medication 25 MILLIGRAM(S): at 22:45

## 2019-11-04 RX ADMIN — ALBUTEROL 2.5 MILLIGRAM(S): 90 AEROSOL, METERED ORAL at 15:24

## 2019-11-04 RX ADMIN — GABAPENTIN 300 MILLIGRAM(S): 400 CAPSULE ORAL at 22:42

## 2019-11-04 RX ADMIN — Medication 81 MILLIGRAM(S): at 22:41

## 2019-11-04 RX ADMIN — LIDOCAINE 1 PATCH: 4 CREAM TOPICAL at 22:42

## 2019-11-04 RX ADMIN — INSULIN GLARGINE 15 UNIT(S): 100 INJECTION, SOLUTION SUBCUTANEOUS at 22:41

## 2019-11-04 RX ADMIN — ATORVASTATIN CALCIUM 40 MILLIGRAM(S): 80 TABLET, FILM COATED ORAL at 22:45

## 2019-11-04 RX ADMIN — SODIUM CHLORIDE 1000 MILLILITER(S): 9 INJECTION INTRAMUSCULAR; INTRAVENOUS; SUBCUTANEOUS at 15:37

## 2019-11-04 RX ADMIN — Medication 50 MILLILITER(S): at 15:24

## 2019-11-04 RX ADMIN — ENOXAPARIN SODIUM 40 MILLIGRAM(S): 100 INJECTION SUBCUTANEOUS at 22:45

## 2019-11-04 RX ADMIN — INSULIN HUMAN 10 UNIT(S): 100 INJECTION, SOLUTION SUBCUTANEOUS at 15:24

## 2019-11-04 RX ADMIN — TICAGRELOR 90 MILLIGRAM(S): 90 TABLET ORAL at 18:56

## 2019-11-04 RX ADMIN — Medication 0: at 22:42

## 2019-11-04 NOTE — H&P ADULT - RS GEN PE MLT RESP DETAILS PC
clear to auscultation bilaterally/respirations non-labored/breath sounds equal/airway patent/chest wall tenderness

## 2019-11-04 NOTE — ED PROVIDER NOTE - CLINICAL SUMMARY MEDICAL DECISION MAKING FREE TEXT BOX
70 yo female with CAD with stents presenting with lightheadedness for 1 mo, will evaluate with cardiac and electrolyte etiologies with cbc cmp trop, bnp, cxr, ekg, will reassess

## 2019-11-04 NOTE — ED ADULT NURSE REASSESSMENT NOTE - NS ED NURSE REASSESS COMMENT FT1
Pt a&o x4 denies pain or discomfort at this time.  Pt US guided IV not functioning. Pt currently refusing placement of new peripheral IV. MAR MD notified and made aware; NADINE MCINTOSH states no need for peripheral IV at this time.  Fluids unable to be administered pt provided PO fluids. NADINE MCINTOSH notified and aware. Pt admitted and awaiting bed assignment at this time. Vital signs as noted, call bell in reach, comfort measures provided. Will continue to monitor for comfort and safety.

## 2019-11-04 NOTE — ED PROVIDER NOTE - CARDIAC, MLM
Normal rate, regular rhythm.  Heart sounds S1, S2.  No murmurs, rubs or gallops. 2+ pitting edema BL LE

## 2019-11-04 NOTE — H&P ADULT - HISTORY OF PRESENT ILLNESS
70yo female with PMHx of HTN, DMT2, Asthma, CAD and PSHx of 3-4 stents presents with dizziness x 1month, worsening in the past 4-5days. Yesterday she stated that she felt lightheaded and like she was going to faint, but did not.  Pt stated that the dizziness is worse in the morning and with walking, but gets better with laying down. She had 1 syncopal episode last month d/t her dizziness and was admitted to [facility]. Pt also states that she experiences ESPINOSA with walking about 25ft. Pt also states that she has had Left arm pain x 4-5 months and a tingling, burning sensation in her hand and feet b/l.   Pt denies CP, SOB, HA, n/v, blurriness in vision, dizziness associated with micturition, abdominal pain 68yo female with PMHx of HTN, DMT2, Asthma, CAD and PSHx of 3-4 stents presents with dizziness x 1month, worsening in the past 4-5days. Yesterday she stated that she felt lightheaded and like she was going to faint, but did not.  Pt stated that the dizziness is worse in the morning and with walking, but gets better with laying down. She had 1 syncopal episode last month d/t her dizziness and was admitted to [facility]. Pt also states that she experiences ESPINOSA with walking about 25ft. Pt also states that she has had Left arm pain x 4-5 months and a tingling, burning sensation in her hand and feet b/l.   Pt denies CP (chest pain,) SOB, HA, n/v, blurriness in vision, dizziness associated with micturition, abdominal pain

## 2019-11-04 NOTE — ED ADULT NURSE NOTE - OBJECTIVE STATEMENT
Pt received to room 27. Pt comes to ED c/o dizziness X1 month and "frequent falls," last fall was 1 month ago due to syncope. Pt's daughter at bedside endorses pt has been dizzy for "a long time." States she took pt to her PMD and was told pt has a "bad heart." Pt has hx of "4-5 stents." Daughter also endorses pt's PMD stated pt's potassium was 5.8 and recommended them coming to ED. Pt's respirations are even & unlabored, lung sounds clear, heart sounds normal, abd is soft, non-distended. Pt has leg swelling, which has been stable. Denies chest pain, dyspnea, N/V/D, chills, fever, headache, palpitations. Pt is A&Ox4.

## 2019-11-04 NOTE — H&P ADULT - PROBLEM SELECTOR PLAN 2
tele monitor  Not a true hyperkalemia since first sample was hemolyzed. Repeat K+ 4.1 with NO EKG changes.

## 2019-11-04 NOTE — PROVIDER CONTACT NOTE (OTHER) - ASSESSMENT
VSS. Pt speaks Welsh.  Toni ID #[id number] translated and explained importance of having IV access. Pt replied they tried putting an IV 4x but could not get one in and refuses for another attempt at this time.

## 2019-11-04 NOTE — H&P ADULT - NSHPLABSRESULTS_GEN_ALL_CORE
EKG: NSR @75bpm, Q waves in Lead III, and AVF               11.0   7.66  )-----------( 208      ( 04 Nov 2019 11:45 )             35.9     11-04    141  |  102  |  26<H>  ----------------------------<  140<H>  6.1<H>   |  31  |  0.89    Ca    8.8      04 Nov 2019 11:45  Phos  3.0     11-04  Mg     2.0     11-04    TPro  7.1  /  Alb  3.6  /  TBili  < 0.2<L>  /  DBili  x   /  AST  24  /  ALT  22  /  AlkPhos  87  11-04    EXAM:  XR CHEST PA LAT 2V    PROCEDURE DATE:  Nov 4 2019   INTERPRETATION:  CLINICAL INDICATION: syncope  EXAM:  Frontal and lateral chest from 11/4/2019 at 1119. Compared to prior study   from 9/9/2019.  IMPRESSION:  No focal consolidations, pleural effusions, pneumothorax, or pulmonary   edema.  Stable cardiac and mediastinal silhouettes including aortic   calcifications and coronary stents.  Trachea midline.  Stable osteopenic osseous structures. Spinal degenerative change again   noted. EKG: NSR @ 75bpm Poor R-wave V1-V6, Q-waves III, AVF.             11.0   7.66  )-----------( 208      ( 04 Nov 2019 11:45 )             35.9     11-04    141  |  102  |  26<H>  ----------------------------<  140<H>  6.1<H>   |  31  |  0.89    Ca    8.8      04 Nov 2019 11:45  Phos  3.0     11-04  Mg     2.0     11-04    TPro  7.1  /  Alb  3.6  /  TBili  < 0.2<L>  /  DBili  x   /  AST  24  /  ALT  22  /  AlkPhos  87  11-04    EXAM:  XR CHEST PA LAT 2V    PROCEDURE DATE:  Nov 4 2019   INTERPRETATION:  CLINICAL INDICATION: syncope  EXAM:  Frontal and lateral chest from 11/4/2019 at 1119. Compared to prior study   from 9/9/2019.  IMPRESSION:  No focal consolidations, pleural effusions, pneumothorax, or pulmonary   edema.  Stable cardiac and mediastinal silhouettes including aortic   calcifications and coronary stents.  Trachea midline.  Stable osteopenic osseous structures. Spinal degenerative change again   noted.

## 2019-11-04 NOTE — ED ADULT NURSE NOTE - ED STAT RN HANDOFF DETAILS
Report given RN chanmi.  Pt a&ox4 and ambulatory with cane. Pt admitted to tele and has ESSU 2 assignment.  Pt is aware of plan of care and in NAD. Pt transported to essu 2.

## 2019-11-04 NOTE — ED PROVIDER NOTE - ATTENDING CONTRIBUTION TO CARE
I, Jennifer Cabot, MD, have performed a history and physical exam of the patient and discussed their management with the resident. I reviewed the resident's note and agree with the documented findings and plan of care. My medical decision making and observations are found above.    Cabot:  69F with PMH of HTN, HLD, DM, CAD with 4-5 stents, recent NSTEMI, asthma, vertigo here with 1 month of lightheadedness (not vertigo), with episodes of near syncope.  Reports LUE pain but no CP, no N/V/diaph, no F/C/D/cough.  + dysuria.  on exam, HDS, NAD, MMM but pale, eyes clear, lungs CTAB, heart sounds normal, abd soft, NT, ND, no CVAT, LEs without edema, wwp, skin normal temperature and mildly pale, neuro: alert and oriented, no focal deficits, radial pulses 2+.  DDx includes arrhythmia, infection, anemia, less likely ACS.  Will check basic labs, trop, UA, CXR, reassess, likely admit for cardiac monitoring.

## 2019-11-04 NOTE — ED PROVIDER NOTE - OBJECTIVE STATEMENT
68 yo female with pmhx HTN HLD DM CAD with 4-5 stents presenting with dizziness for 1 mo. Patient has had lightheaded for 1 mo, and has had syncopal episodes for 1-2 min. Saw PMD 2 weeks ago where K was 5.8, recommended to go to ED but did not at that time. Came in today due to worsening lightheadedness, feeling that she will fall/pass out. Was evaluated 2 mo ago for similar episode, was hospitalized for possible nstemi. Last cardio visit was 1 mo ago, where cardiologist stated left coronary was 60% blocked. Has leg swelling which has been stable.    Denies CP, SOB, recent travel, fevers, sick contacts, N/V

## 2019-11-04 NOTE — H&P ADULT - BACK EXAM
sacroiliac tenderness L/(pt has chronic back pain)/sacroiliac tenderness R normal/sacroiliac tenderness L/sacroiliac tenderness R/(pt has chronic back pain)

## 2019-11-04 NOTE — ED ADULT NURSE REASSESSMENT NOTE - NS ED NURSE REASSESS COMMENT FT1
Pt established IV not patent. Multiple IV insertion attempts by RN. MD at bedside placing US guided IV access.  Will medicate when IV is established.

## 2019-11-04 NOTE — H&P ADULT - ASSESSMENT
70yo female with PMHx of HTN, DMT2, Asthma, CAD and PSHx of 3-4 stents presents to the ED with dizziness x 1month, worsening in the past 4-5days and admitted to tele for dizziness w/u. 70yo female with PMHx of HTN, DMT2, Asthma, CAD and PSHx of 3-4 stents presents to the ED with dizziness x 1month, worsening in the past 4-5days admitted to tele for near syncope, r/o ACS.

## 2019-11-04 NOTE — H&P ADULT - NEUROLOGICAL DETAILS
normal strength/responds to verbal commands/sensation intact normal strength/responds to pain/alert and oriented x 3/sensation intact/responds to verbal commands

## 2019-11-05 LAB
ANION GAP SERPL CALC-SCNC: 9 MMO/L — SIGNIFICANT CHANGE UP (ref 7–14)
BUN SERPL-MCNC: 23 MG/DL — SIGNIFICANT CHANGE UP (ref 7–23)
CALCIUM SERPL-MCNC: 8.8 MG/DL — SIGNIFICANT CHANGE UP (ref 8.4–10.5)
CHLORIDE SERPL-SCNC: 106 MMOL/L — SIGNIFICANT CHANGE UP (ref 98–107)
CHOLEST SERPL-MCNC: 125 MG/DL — SIGNIFICANT CHANGE UP (ref 120–199)
CO2 SERPL-SCNC: 27 MMOL/L — SIGNIFICANT CHANGE UP (ref 22–31)
CREAT SERPL-MCNC: 0.84 MG/DL — SIGNIFICANT CHANGE UP (ref 0.5–1.3)
GLUCOSE BLDC GLUCOMTR-MCNC: 108 MG/DL — HIGH (ref 70–99)
GLUCOSE BLDC GLUCOMTR-MCNC: 118 MG/DL — HIGH (ref 70–99)
GLUCOSE BLDC GLUCOMTR-MCNC: 154 MG/DL — HIGH (ref 70–99)
GLUCOSE BLDC GLUCOMTR-MCNC: 243 MG/DL — HIGH (ref 70–99)
GLUCOSE BLDC GLUCOMTR-MCNC: 89 MG/DL — SIGNIFICANT CHANGE UP (ref 70–99)
GLUCOSE SERPL-MCNC: 101 MG/DL — HIGH (ref 70–99)
HBA1C BLD-MCNC: 6.2 % — HIGH (ref 4–5.6)
HCT VFR BLD CALC: 33.5 % — LOW (ref 34.5–45)
HDLC SERPL-MCNC: 74 MG/DL — HIGH (ref 45–65)
HGB BLD-MCNC: 10.3 G/DL — LOW (ref 11.5–15.5)
LIPID PNL WITH DIRECT LDL SERPL: 50 MG/DL — SIGNIFICANT CHANGE UP
MCHC RBC-ENTMCNC: 27.7 PG — SIGNIFICANT CHANGE UP (ref 27–34)
MCHC RBC-ENTMCNC: 30.7 % — LOW (ref 32–36)
MCV RBC AUTO: 90.1 FL — SIGNIFICANT CHANGE UP (ref 80–100)
NRBC # FLD: 0 K/UL — SIGNIFICANT CHANGE UP (ref 0–0)
PLATELET # BLD AUTO: 190 K/UL — SIGNIFICANT CHANGE UP (ref 150–400)
PMV BLD: 10.4 FL — SIGNIFICANT CHANGE UP (ref 7–13)
POTASSIUM SERPL-MCNC: 5 MMOL/L — SIGNIFICANT CHANGE UP (ref 3.5–5.3)
POTASSIUM SERPL-SCNC: 5 MMOL/L — SIGNIFICANT CHANGE UP (ref 3.5–5.3)
RBC # BLD: 3.72 M/UL — LOW (ref 3.8–5.2)
RBC # FLD: 12.8 % — SIGNIFICANT CHANGE UP (ref 10.3–14.5)
SODIUM SERPL-SCNC: 142 MMOL/L — SIGNIFICANT CHANGE UP (ref 135–145)
SPECIMEN SOURCE: SIGNIFICANT CHANGE UP
TRIGL SERPL-MCNC: 79 MG/DL — SIGNIFICANT CHANGE UP (ref 10–149)
TSH SERPL-MCNC: 2.03 UIU/ML — SIGNIFICANT CHANGE UP (ref 0.27–4.2)
WBC # BLD: 7.71 K/UL — SIGNIFICANT CHANGE UP (ref 3.8–10.5)
WBC # FLD AUTO: 7.71 K/UL — SIGNIFICANT CHANGE UP (ref 3.8–10.5)

## 2019-11-05 PROCEDURE — 99223 1ST HOSP IP/OBS HIGH 75: CPT

## 2019-11-05 RX ORDER — INFLUENZA VIRUS VACCINE 15; 15; 15; 15 UG/.5ML; UG/.5ML; UG/.5ML; UG/.5ML
0.5 SUSPENSION INTRAMUSCULAR ONCE
Refills: 0 | Status: DISCONTINUED | OUTPATIENT
Start: 2019-11-05 | End: 2019-11-06

## 2019-11-05 RX ADMIN — TICAGRELOR 90 MILLIGRAM(S): 90 TABLET ORAL at 18:00

## 2019-11-05 RX ADMIN — LIDOCAINE 1 PATCH: 4 CREAM TOPICAL at 06:22

## 2019-11-05 RX ADMIN — GABAPENTIN 300 MILLIGRAM(S): 400 CAPSULE ORAL at 06:21

## 2019-11-05 RX ADMIN — Medication 25 MILLIGRAM(S): at 06:22

## 2019-11-05 RX ADMIN — GABAPENTIN 300 MILLIGRAM(S): 400 CAPSULE ORAL at 21:59

## 2019-11-05 RX ADMIN — Medication 1: at 12:20

## 2019-11-05 RX ADMIN — RANOLAZINE 500 MILLIGRAM(S): 500 TABLET, FILM COATED, EXTENDED RELEASE ORAL at 06:21

## 2019-11-05 RX ADMIN — INSULIN GLARGINE 15 UNIT(S): 100 INJECTION, SOLUTION SUBCUTANEOUS at 21:59

## 2019-11-05 RX ADMIN — RANOLAZINE 500 MILLIGRAM(S): 500 TABLET, FILM COATED, EXTENDED RELEASE ORAL at 18:00

## 2019-11-05 RX ADMIN — ENOXAPARIN SODIUM 40 MILLIGRAM(S): 100 INJECTION SUBCUTANEOUS at 21:59

## 2019-11-05 RX ADMIN — LIDOCAINE 1 PATCH: 4 CREAM TOPICAL at 21:59

## 2019-11-05 RX ADMIN — Medication 81 MILLIGRAM(S): at 12:20

## 2019-11-05 RX ADMIN — GABAPENTIN 300 MILLIGRAM(S): 400 CAPSULE ORAL at 12:21

## 2019-11-05 RX ADMIN — LISINOPRIL 2.5 MILLIGRAM(S): 2.5 TABLET ORAL at 06:22

## 2019-11-05 RX ADMIN — INSULIN GLARGINE 15 UNIT(S): 100 INJECTION, SOLUTION SUBCUTANEOUS at 12:00

## 2019-11-05 RX ADMIN — ATORVASTATIN CALCIUM 40 MILLIGRAM(S): 80 TABLET, FILM COATED ORAL at 21:59

## 2019-11-05 RX ADMIN — Medication 50 MILLIGRAM(S): at 12:20

## 2019-11-05 RX ADMIN — TICAGRELOR 90 MILLIGRAM(S): 90 TABLET ORAL at 06:22

## 2019-11-05 NOTE — CONSULT NOTE ADULT - SUBJECTIVE AND OBJECTIVE BOX
Vascular Medicine Inpatient Consultation Note    HISTORY OF PRESENT ILLNESS:  Patient is a 68 yo woman with HTN, DM2, asthma, CAD s/p prior 3-4 stents presents with dizziness x 1 month.     worsening in the past 4-5days. Yesterday she stated that she felt lightheaded and like she was going to faint, but did not.  Pt stated that the dizziness is worse in the morning and with walking, but gets better with laying down. She had 1 syncopal episode last month d/t her dizziness and was admitted to [facility]. Pt also states that she experiences ESPINOSA with walking about 25ft. Pt also states that she has had Left arm pain x 4-5 months and a tingling, burning sensation in her hand and feet b/l.   Pt denies CP (chest pain,) SOB, HA, n/v, blurriness in vision, dizziness associated with micturition, abdominal pain (04 Nov 2019 16:44)          Allergies  No Known Drug Allergies    MEDICATIONS:  aspirin enteric coated 81 milliGRAM(s) Oral daily  enoxaparin Injectable 40 milliGRAM(s) SubCutaneous every 24 hours  lisinopril 2.5 milliGRAM(s) Oral daily  metoprolol succinate ER 25 milliGRAM(s) Oral daily  ranolazine 500 milliGRAM(s) Oral two times a day  ticagrelor 90 milliGRAM(s) Oral every 12 hours  gabapentin 300 milliGRAM(s) Oral every 8 hours  pregabalin 50 milliGRAM(s) Oral daily  atorvastatin 40 milliGRAM(s) Oral at bedtime  dextrose 40% Gel 15 Gram(s) Oral once PRN  dextrose 50% Injectable 12.5 Gram(s) IV Push once  dextrose 50% Injectable 25 Gram(s) IV Push once  dextrose 50% Injectable 25 Gram(s) IV Push once  glucagon  Injectable 1 milliGRAM(s) IntraMuscular once PRN  insulin glargine Injectable (LANTUS) 15 Unit(s) SubCutaneous every morning  insulin glargine Injectable (LANTUS) 15 Unit(s) SubCutaneous at bedtime  insulin lispro (HumaLOG) corrective regimen sliding scale   SubCutaneous three times a day before meals  insulin lispro (HumaLOG) corrective regimen sliding scale   SubCutaneous at bedtime  lidocaine   Patch 1 Patch Transdermal every 24 hours    PAST MEDICAL & SURGICAL HISTORY:  Asthma  Hypertension  Coronary artery disease with other form of angina pectoris  Type 2 diabetes mellitus without complication, without long-term current use of insulin  History of heart artery stent    FAMILY HISTORY:  FHx: myocardial infarction: Father    SOCIAL HISTORY:    [place]    REVIEW OF SYSTEMS:  As per HPI    PHYSICAL EXAM:  T(C): 36.8 (11-05-19 @ 13:45), Max: 36.9 (11-05-19 @ 07:30)  HR: 70 (11-05-19 @ 13:45) (66 - 82)  BP: 158/76 (11-05-19 @ 13:45) (104/49 - 171/77)  RR: 19 (11-05-19 @ 13:45) (19 - 24)  SpO2: 98% (11-05-19 @ 13:45) (98% - 99%)    Appearance: Normal	  HEENT:   Normal oral mucosa, PERRL, EOMI	  Lymphatic: No lymphadenopathy  Cardiovascular: Normal S1 S2, No JVD, No murmurs, No edema  Respiratory: Lungs clear to auscultation	  Psychiatry: A & O x 3, Mood & affect appropriate  Gastrointestinal:  Soft, Non-tender, + BS	  Skin: No rashes, No ecchymoses, No cyanosis	  Neurologic: Non-focal  Extremities: Normal range of motion, No clubbing, cyanosis or edema  Vascular: Peripheral pulses palpable 2+ bilaterally      LABS:	 	                          10.3   7.71  )-----------( 190      ( 05 Nov 2019 05:30 )             33.5     11-05    142  |  106  |  23  ----------------------------<  101<H>  5.0   |  27  |  0.84  11-04    142  |  103  |  24<H>  ----------------------------<  102<H>  4.1   |  27  |  0.89    Ca    8.8      05 Nov 2019 05:30  Ca    8.4      04 Nov 2019 16:45  Phos  3.0     11-04  Mg     2.0     11-04    TPro  7.1  /  Alb  3.6  /  TBili  < 0.2<L>  /  DBili  x   /  AST  24  /  ALT  22  /  AlkPhos  87  11-04      proBNP:   Lipid Profile: Cholesterol, [id number] mg/dL [120 - 199]  Direct LDL50 mg/dL  HDL Cholesterol, serum74 mg/dL<H> [45 - 65]  Triglycerides, serum79 mg/dL [10 - 149]    HgA1c: Hemoglobin A1C, Whole Blood: 6.2 % (11-05 @ 05:30)    TSH: Thyroid Stimulating Hormone, Serum: 2.03 uIU/mL (11-05-19 @ 05:30)    < from: Xray Chest 2 Views PA/Lat (11.04.19 @ 11:18) >  EXAM:  XR CHEST PA LAT 2V        PROCEDURE DATE:  Nov 4 2019         INTERPRETATION:  CLINICAL INDICATION: syncope    EXAM:  Frontal and lateral chest from 11/4/2019 at 1119. Compared to prior study   from 9/9/2019.    IMPRESSION:  No focal consolidations, pleural effusions, pneumothorax, or pulmonary   edema.    Stable cardiac and mediastinal silhouettes including aortic   calcifications and coronary stents.    Trachea midline.    Stable osteopenic osseous structures. Spinal degenerative change again   noted.      WANDY ARGUELLO M.D., ATTENDING RADIOLOGIST  This document has been electronically signed. Nov 4 2019  1:04PM      < from: Transthoracic Echocardiogram (09.16.19 @ 08:41) >  Patient name: EMMANUEL GONZALEZ  YOB: 1950   Age: 69 (F)   MR#: [id number]  Study Date: 9/16/2019  Location: [address] [place], [place] [id number]: Zacarias Porter RDCS  Study quality: Technically fair  Referring Physician: Asim Medina MD  Blood Pressure: 128/72 mmHg  Height: 165 cm  Weight: 75 kg  BSA: 1.8 m2  Heart Rate: 74 mmHg  ------------------------------------------------------------------------  PROCEDURE: Transthoracic echocardiogram with 2-D, M-Mode  and complete spectral and color flow Doppler.  INDICATION: Essential (primary) hypertension (I10)  ------------------------------------------------------------------------  Dimensions:    Normal Values:  LA:     3.5    2.0 - 4.0 cm  Ao:     3.2    2.0 - 3.8 cm  SEPTUM: 1.1    0.6 - 1.2 cm  PWT:    1.1    0.6 - 1.1 cm  LVIDd:  4.2    3.0 - 5.6 cm  LVIDs:  3.7    1.8 - 4.0 cm  Derived variables:  LVMI: 86 g/m2  RWT: 0.52  Fractional short: 12 %  EF (Visual Estimate): 70-75 %  Doppler Peak Velocity (m/sec): AoV=1.5  ------------------------------------------------------------------------  Observations:  Mitral Valve: Mitral annular calcification, otherwise  normal mitral valve. Minimal mitral regurgitation.  Aortic Valve/Aorta: Normal trileaflet aortic valve. Peak  transaortic valve gradient equals 10 mm Hg, estimated  aortic valve area equals 2.1 sqcm. Minimal aortic  regurgitation.  Peak left ventricular outflow tract  gradient equals 7 mm Hg.  Aortic Root: 3.2 cm.  Left Atrium: Normal left atrium.  LA volume index = 24  cc/m2.  Left Ventricle: Normal left ventricular systolic function.  No segmental wall motion abnormalities. Increased relative  wall thickness with normal left ventricular mass index,  consistent with concentric left ventricular remodeling.  Normal diastolic function  Right Heart: Normal right atrium. Normal right ventricular  size and function. Normal tricuspid valve. Minimal  tricuspid regurgitation. Pulmonic valve not well  visualized, probably normal. Minimal pulmonic  regurgitation.  Pericardium/Pleura: Normal pericardium with no pericardial  effusion.  Hemodynamic: Estimated right atrial pressure is 8 mm Hg.  Color Doppler demonstrates no evidence of a patent foramen  ovale.  ------------------------------------------------------------------------  Conclusions:  1. Mitral annular calcification, otherwise normal mitral  valve. Minimal mitral regurgitation.  2. Normal trileaflet aortic valve. Minimal aortic  regurgitation.  3. Normal left ventricular systolic function. No segmental  wallmotion abnormalities.  4. Normal diastolic function  5. Normal right ventricular size and function.  *** No previous Echo exam.  ------------------------------------------------------------------------  Confirmed on  9/16/2019 - 10:28:48 by Jude Orellana M.D.  ------------------------------------------------------------------------    < end of copied text >      < from: US Duplex Carotid Arteries Complete, Bilateral (09.12.19 @ 09:05) >    EXAM:  US DPLX CAROTIDS COMPL BI                            PROCEDURE DATE:  09/12/2019          INTERPRETATION:  CLINICAL INFORMATION: Syncope.    There is no prior ultrasound available for comparison at the time of this   report.    TECHNIQUE:    Color and spectral Doppler evaluation of the carotid arteries.    FINDINGS:    Blood flow velocities are as follows:    RIGHT (PSV in cm/s):        PROX CCA = 108; DIST CCA = 101; PROX ICA = 122; DIST ICA = 118; ECA   = 180          LEFT (PSV in cm/s):       PROX CCA = 143; DIST CCA = 208; PROX ICA = 310; DIST ICA = 68; ECA =   200     Measurement of carotid stenosis is based on velocity parameters that   correlate the residual internal carotid diameter with that of the more   distal vessel in accordance with a method such as the [place] American   Symptomatic Carotid Endarterectomy Trial (NASCET).    Mild to moderate calcified intimal plaque is present in both carotid   systems. Elevated blood flow velocities are encountered in the left   internal carotid artery. Antegrade flow is present in both vertebral   arteries.    IMPRESSION:    Greater than 70% LEFT internal carotid artery stenosis.      KADE LEDEZMA M.D., ATTENDING RADIOLOGIST  This document has been electronically signed. Sep 12 2019  9:12AM        < from: CT Head No Cont (09.09.19 @ 17:15) >    EXAM:  CT MAXILLOFACIAL                          EXAM:  CT BRAIN                            PROCEDURE DATE:  09/09/2019          INTERPRETATION:  CLINICAL INFORMATION: fall. . .    TECHNIQUE:   CT head: Sequential axial images were obtained from the vertex to the   skull base without intravenous contrast. Coronal and sagittal   reformations were obtained.   CT maxillofacial: Noncontrast CT scan of the maxillofacial bones was   performed. Thin section axial images with sagittal and coronal   reformations were obtained.    COMPARISON: None.    FINDINGS:    CT HEAD:  Left thalamic and basal ganglia lacunar infarcts. There is no acute   intracranial hemorrhage or mass effect. There are areas of hypodensity in   the bilateral hemispheric white matter suggesting chronic white matter   microvascular ischemic change. The ventricles and sulci are normal in   size for patient's age.     There is no extraaxial fluid collection.     There is no displaced calvarial fracture. The mastoid air cells are well   aerated.    CT MAXILLOFACIAL:    Right periorbital hematoma and soft tissue swelling.  No acute facial bone fractures are visualized.     Mild mucosal thickening in the paranasal sinuses.    Status post bilateral intraocular lens implants.     The soft tissues of the visualized aerodigestive tract are grossly   unremarkable.    IMPRESSION:   CT head: No acute intracranial hemorrhage or mass effect.   CT maxillofacial: No acute facial bone fracture. Right periorbital   hematoma and soft tissue swelling.      MARY JANE SILVA   This document has been electronically signed. Sep  9 2019  5:41PM

## 2019-11-05 NOTE — PROGRESS NOTE ADULT - ASSESSMENT
68yo female with PMHx of HTN, DMT2, Asthma, CAD and PSHx of 3-4 stents presents to the ED with dizziness x 1month, worsening in the past 4-5days admitted to tele for near syncope, r/o ACS.

## 2019-11-05 NOTE — PHYSICAL THERAPY INITIAL EVALUATION ADULT - PERTINENT HX OF CURRENT PROBLEM, REHAB EVAL
68yo female with PMHx of HTN, DMT2, Asthma, CAD and PSHx of 3-4 stents presents with dizziness x 1month, worsening in the past 4-5days.

## 2019-11-05 NOTE — PROGRESS NOTE ADULT - SUBJECTIVE AND OBJECTIVE BOX
SUBJECTIVE / OVERNIGHT EVENTS:pt denies chest pain, sob      MEDICATIONS  (STANDING):  aspirin enteric coated 81 milliGRAM(s) Oral daily  atorvastatin 40 milliGRAM(s) Oral at bedtime  dextrose 50% Injectable 12.5 Gram(s) IV Push once  dextrose 50% Injectable 25 Gram(s) IV Push once  dextrose 50% Injectable 25 Gram(s) IV Push once  enoxaparin Injectable 40 milliGRAM(s) SubCutaneous every 24 hours  gabapentin 300 milliGRAM(s) Oral every 8 hours  influenza   Vaccine 0.5 milliLiter(s) IntraMuscular once  insulin glargine Injectable (LANTUS) 15 Unit(s) SubCutaneous every morning  insulin glargine Injectable (LANTUS) 15 Unit(s) SubCutaneous at bedtime  insulin lispro (HumaLOG) corrective regimen sliding scale   SubCutaneous three times a day before meals  insulin lispro (HumaLOG) corrective regimen sliding scale   SubCutaneous at bedtime  lidocaine   Patch 1 Patch Transdermal every 24 hours  lisinopril 2.5 milliGRAM(s) Oral daily  metoprolol succinate ER 25 milliGRAM(s) Oral daily  pregabalin 50 milliGRAM(s) Oral daily  ranolazine 500 milliGRAM(s) Oral two times a day  ticagrelor 90 milliGRAM(s) Oral every 12 hours    MEDICATIONS  (PRN):  dextrose 40% Gel 15 Gram(s) Oral once PRN Blood Glucose LESS THAN 70 milliGRAM(s)/deciliter  glucagon  Injectable 1 milliGRAM(s) IntraMuscular once PRN Glucose LESS THAN 70 milligrams/deciliter    Vital Signs Last 24 Hrs  T(C): 36.8 (2019 17:45), Max: 36.9 (2019 07:30)  T(F): 98.2 (2019 17:45), Max: 98.5 (2019 07:30)  HR: 70 (2019 13:45) (66 - 70)  BP: 158/76 (2019 13:45) (104/49 - 171/77)  BP(mean): --  RR: 17 (2019 17:45) (17 - 22)  SpO2: 98% (2019 17:45) (98% - 99%)    CAPILLARY BLOOD GLUCOSE      POCT Blood Glucose.: 108 mg/dL (2019 21:32)  POCT Blood Glucose.: 243 mg/dL (2019 17:50)  POCT Blood Glucose.: 118 mg/dL (2019 16:56)  POCT Blood Glucose.: 154 mg/dL (2019 11:35)  POCT Blood Glucose.: 89 mg/dL (2019 07:30)    I&O's Summary      Constitutional: No fever, fatigue  Skin: No rash.  Eyes: No recent vision problems or eye pain.  ENT: No congestion, ear pain, or sore throat.  Cardiovascular: No chest pain or palpation.  Respiratory: No cough, shortness of breath, congestion, or wheezing.  Gastrointestinal: No abdominal pain, nausea, vomiting, or diarrhea.  Genitourinary: No dysuria.  Musculoskeletal: No joint swelling.  Neurologic: No headache.    PHYSICAL EXAM:  GENERAL: NAD  EYES: EOMI, PERRLA  NECK: Supple, No JVD  CHEST/LUNG: dec breath sounds rtbase   HEART:  S1 , S2 +  ABDOMEN: soft , b+  EXTREMITIES:  trace edema  NEUROLOGY:alert awake       LABS:                        10.3   7.71  )-----------( 190      ( 2019 05:30 )             33.5     11-05    142  |  106  |  23  ----------------------------<  101<H>  5.0   |  27  |  0.84    Ca    8.8      2019 05:30  Phos  3.0     11-  Mg     2.0     11-    TPro  7.1  /  Alb  3.6  /  TBili  < 0.2<L>  /  DBili  x   /  AST  24  /  ALT  22  /  AlkPhos  87  11-04    PT/INR - ( 2019 11:45 )   PT: 10.8 SEC;   INR: 0.97          PTT - ( 2019 11:45 )  PTT:32.7 SEC      Urinalysis Basic - ( 2019 14:11 )    Color: YELLOW / Appearance: CLEAR / S.015 / pH: 6.5  Gluc: NEGATIVE / Ketone: NEGATIVE  / Bili: NEGATIVE / Urobili: NORMAL   Blood: NEGATIVE / Protein: NEGATIVE / Nitrite: NEGATIVE   Leuk Esterase: NEGATIVE / RBC: x / WBC x   Sq Epi: x / Non Sq Epi: x / Bacteria: x        RADIOLOGY & ADDITIONAL TESTS:    Imaging Personally Reviewed:    Consultant(s) Notes Reviewed:      Care Discussed with Consultants/Other Providers:

## 2019-11-06 ENCOUNTER — TRANSCRIPTION ENCOUNTER (OUTPATIENT)
Age: 69
End: 2019-11-06

## 2019-11-06 VITALS
RESPIRATION RATE: 17 BRPM | SYSTOLIC BLOOD PRESSURE: 132 MMHG | DIASTOLIC BLOOD PRESSURE: 60 MMHG | TEMPERATURE: 98 F | HEART RATE: 70 BPM | OXYGEN SATURATION: 99 %

## 2019-11-06 LAB
BACTERIA UR CULT: SIGNIFICANT CHANGE UP
GLUCOSE BLDC GLUCOMTR-MCNC: 114 MG/DL — HIGH (ref 70–99)
GLUCOSE BLDC GLUCOMTR-MCNC: 121 MG/DL — HIGH (ref 70–99)
GLUCOSE BLDC GLUCOMTR-MCNC: 184 MG/DL — HIGH (ref 70–99)
GLUCOSE BLDC GLUCOMTR-MCNC: 48 MG/DL — LOW (ref 70–99)
GLUCOSE BLDC GLUCOMTR-MCNC: 82 MG/DL — SIGNIFICANT CHANGE UP (ref 70–99)

## 2019-11-06 RX ADMIN — Medication 81 MILLIGRAM(S): at 11:41

## 2019-11-06 RX ADMIN — Medication 25 MILLIGRAM(S): at 06:17

## 2019-11-06 RX ADMIN — RANOLAZINE 500 MILLIGRAM(S): 500 TABLET, FILM COATED, EXTENDED RELEASE ORAL at 06:19

## 2019-11-06 RX ADMIN — Medication 50 MILLIGRAM(S): at 11:41

## 2019-11-06 RX ADMIN — GABAPENTIN 300 MILLIGRAM(S): 400 CAPSULE ORAL at 06:17

## 2019-11-06 RX ADMIN — LISINOPRIL 2.5 MILLIGRAM(S): 2.5 TABLET ORAL at 06:18

## 2019-11-06 RX ADMIN — LIDOCAINE 1 PATCH: 4 CREAM TOPICAL at 06:44

## 2019-11-06 RX ADMIN — TICAGRELOR 90 MILLIGRAM(S): 90 TABLET ORAL at 06:17

## 2019-11-06 NOTE — DISCHARGE NOTE PROVIDER - NSFOLLOWUPCLINICS_GEN_ALL_ED_FT
[facility]  Endocrinology  [address] [place] [address]  Phone: [phone]  Fax:   Follow Up Time:

## 2019-11-06 NOTE — DIETITIAN INITIAL EVALUATION ADULT. - ADD RECOMMEND
1. Encourage & assist Pt with meals; Monitor PO diet tolerance; Honor food preferences;          2. Monitor labs, hydration status;

## 2019-11-06 NOTE — DISCHARGE NOTE PROVIDER - CARE PROVIDER_API CALL
Rocco Zeng (MD; PhD)  Cardiology; Internal Medicine; Vascular Medicine  [address] [place] [address], [facility] [address] [place] [address]  Phone: [phone]  Fax: [phone]  Follow Up Time: 2 weeks    Your Primary Care Provider,   Phone: (   )    -  Fax: (   )    -  Follow Up Time: 2 weeks Rocco Zeng; PhD)  Cardiology; Internal Medicine; Vascular Medicine  [address] [place] [address], [facility] [place] [address]  Phone: [phone]  Fax: [phone]  Follow Up Time: 2 weeks    Your Primary Care Provider,   Phone: (   )    -  Fax: (   )    -  Follow Up Time: 2 weeks    Finn Butt)  PhysicalRehab Medicine  [address] [place] [address]  Phone: [phone]  Fax: [phone]  Follow Up Time:

## 2019-11-06 NOTE — DISCHARGE NOTE PROVIDER - NSDCCPCAREPLAN_GEN_ALL_CORE_FT
PRINCIPAL DISCHARGE DIAGNOSIS  Diagnosis: Near syncope  Assessment and Plan of Treatment: Your symptoms were likely secondary to orthostatic hypotension, a drop in your pressure      SECONDARY DISCHARGE DIAGNOSES  Diagnosis: Hyperkalemia  Assessment and Plan of Treatment: Your potassium level was elevated on admission. Your labs were monitored closely and your levels trended down. PRINCIPAL DISCHARGE DIAGNOSIS  Diagnosis: Near syncope  Assessment and Plan of Treatment: Your symptoms were likely secondary to orthostatic hypotension, a drop in your pressure when you stand. You were given IV fluids and your symptoms resolved.  **Please follow up closely with your primary care provider and a cardiologist within 2 weeks of discharge for further care and management recommendations.      SECONDARY DISCHARGE DIAGNOSES  Diagnosis: Hypertension, unspecified type  Assessment and Plan of Treatment: Continue your blood pressure medication regimen as directed. Monitor for any visual changes, headaches or dizziness.  Monitor blood pressure regularly.  Follow up with your [facility] provider and a cardiologist for further management for high blood pressure.    Diagnosis: Hyperkalemia  Assessment and Plan of Treatment: Your potassium level was elevated on admission. Your labs were monitored closely and your levels trended down.

## 2019-11-06 NOTE — DISCHARGE NOTE PROVIDER - NSDCFUADDAPPT_GEN_ALL_CORE_FT
**Please follow up closely with your primary care provider and a cardiologist within 2 weeks of discharge for further care and management recommendations. If you do not have a cardiologist you can follow up with Dr. Rocco Zeng. You can call, [phone], to schedule an appointment. **Please follow up closely with your primary care provider and a cardiologist within 2 weeks of discharge for further care and management recommendations. If you do not have a cardiologist you can follow up with Dr. Rocco Zeng. You can call, [phone], to schedule an appointment.    **You can follow up with Dr. Finn Patten (Physiatrist) for pain management consultation and care recommendations. If you would like to schedule an appointment you can call, [phone].    **It is recommended that you follow up with an endocrinologist as an outpatient. You can call, [phone], to schedule and appointment at the endocrinology clinic and to establish care.

## 2019-11-06 NOTE — DIETITIAN INITIAL EVALUATION ADULT. - OTHER INFO
Pt 70 yo female with PMHx of HTN, DMT2, Asthma, CAD and PMHx of 3-4 stents presents to the ED with dizziness x 1month, worsening in the past 4-5days admitted to tele for near syncope, r/o ACS - per chart review. At time of visit Pt appears alert, oriented; Pt speaks Portuguese; Pt's daughter @ bed side worked as  & answered questions during interview. Pt's appetite not well for past several months reported. Food preferences discussed as well. No report of chewing/swallowing difficulty; no report of nausea, vomiting or diarrhea @ this time. Per daughter Pt's height: ~65" & Pt's weight: ~165#. No report of weight loss or weight changes PTA.  Of note Pt's HbA1c level 6.2% (11/5). At home Pt avoids salt & regular sugar reported. Better food options discussed; written materials provided as well. RDN answered concerns related to diet. RDN remains available, Pt & her daughter made aware.

## 2019-11-06 NOTE — DISCHARGE NOTE PROVIDER - PROVIDER TOKENS
PROVIDER:[TOKEN:[[id number]],FOLLOWUP:[2 weeks]],FREE:[LAST:[Your Primary Care Provider],PHONE:[(   )    -],FAX:[(   )    -],FOLLOWUP:[2 weeks]] PROVIDER:[TOKEN:[[id number]],FOLLOWUP:[2 weeks]],FREE:[LAST:[Your Primary Care Provider],PHONE:[(   )    -],FAX:[(   )    -],FOLLOWUP:[2 weeks]],PROVIDER:[TOKEN:[[id number]]]

## 2019-11-06 NOTE — DISCHARGE NOTE PROVIDER - NSDCMRMEDTOKEN_GEN_ALL_CORE_FT
alendronate 70 mg oral tablet: 1 tab(s) orally once a week  home  Aspirin Enteric Coated 81 mg oral delayed release tablet: 1 tab(s) orally once a day  home  Brilinta (ticagrelor) 90 mg oral tablet: 1 tab(s) orally 2 times a day  home  gabapentin 300 mg oral tablet: 1 tab(s) orally 2 times a day  home  Lantus 100 units/mL subcutaneous solution: 15 unit(s) subcutaneous 2 times a day  Lipitor 80 mg oral tablet: 1 tab(s) orally once a day  lisinopril 2.5 mg oral tablet: 1 tab(s) orally once a day  home  Lyrica 50 mg oral capsule: 1 cap(s) orally once a day  metFORMIN 500 mg oral tablet, extended release: 1 tab(s) orally 2 times a day  metoprolol succinate 25 mg oral tablet, extended release: 1 tab(s) orally once a day  home  Ranexa 500 mg oral tablet, extended release: 1 tab(s) orally 2 times a day

## 2019-11-06 NOTE — DIETITIAN INITIAL EVALUATION ADULT. - PROBLEM SELECTOR PLAN 1
tele monitor   cardiac enzymes x 2 neg  Orthostatic +, UA NEG: STARTED on IV NS @ 75cc/hr  DASH 1800 ADA diet   continue ASA, Brillinta, Metoprolol and Lisinopril.  PT eval

## 2019-11-06 NOTE — DISCHARGE NOTE PROVIDER - CARE PROVIDERS DIRECT ADDRESSES
,[email or web address],DirectAddress_Unknown ,[email or web address],DirectAddress_Unknown,DirectAddress_Unknown

## 2019-11-06 NOTE — DISCHARGE NOTE PROVIDER - HOSPITAL COURSE
68yo female with PMHx of HTN, DMT2, Asthma, CAD and PSHx of 3-4 stents presents to the ED with dizziness x 1month, worsening in the past 4-5days admitted to tele for near syncope, r/o ACS. Cardiac enzymes were negative and the patient was not hyperglycemic. Orthostatics were done and noted to be positive, which resolved with IVF. CXR neg, UA negative and HTN stable. All home medications were continued during admission. The patient was seen by cardiology and they determined that there is low suspicion for ACS at this time. On 11/6 this case was reviewed with Dr. Medina, the patient is medically stable and optimized for discharge. All medications were reviewed and there were no need for refills at this time. Patient instructed to have close follow up with cardiology and PMD.

## 2019-11-06 NOTE — DISCHARGE NOTE NURSING/CASE MANAGEMENT/SOCIAL WORK - NSDCFUADDAPPT_GEN_ALL_CORE_FT
**Please follow up closely with your primary care provider and a cardiologist within 2 weeks of discharge for further care and management recommendations. If you do not have a cardiologist you can follow up with Dr. Rocco Zeng. You can call, [phone], to schedule an appointment.

## 2019-11-06 NOTE — CHART NOTE - NSCHARTNOTEFT_GEN_A_CORE
On 11/6 this case was reviewed with Dr. Medina, the patient is medically stable and optimized for discharge. All medications were reviewed and prescriptions were sent to mutually agreed upon pharmacy.

## 2019-11-06 NOTE — PROVIDER CONTACT NOTE (OTHER) - ACTION/TREATMENT ORDERED:
OK to hold Lantus as per SANDI Ulloa. Give oral 4oz apple juice. Recheck sugar in 15mins. Will continue to monitor.

## 2019-11-06 NOTE — DIETITIAN INITIAL EVALUATION ADULT. - DIET TYPE
regular/DASH/TLC (sodium and cholesterol restricted diet)/Halal/consistent carbohydrate (no snacks) PO diet DASH/TLC (sodium and cholesterol restricted diet)/Glucerna Therapeutic Nutrition 8oz. 1x daily (will provide additional ~220kcals, ~10g protein);/Halal/supplement (specify)/consistent carbohydrate (no snacks)/regular

## 2019-11-06 NOTE — DISCHARGE NOTE NURSING/CASE MANAGEMENT/SOCIAL WORK - PATIENT PORTAL LINK FT
You can access the FollowMyHealth Patient Portal offered by [facility] by registering at the following website: [email or web address]. By joining [employer]’s FollowMyHealth portal, you will also be able to view your health information using other applications (apps) compatible with our system.

## 2019-11-06 NOTE — PROVIDER CONTACT NOTE (OTHER) - ACTION/TREATMENT ORDERED:
Awaiting provider call back. Will continue to monitor. OK as per Artemio JUNIOR. Will continue to monitor.

## 2019-12-18 ENCOUNTER — RX RENEWAL (OUTPATIENT)
Age: 69
End: 2019-12-18

## 2020-02-07 ENCOUNTER — APPOINTMENT (OUTPATIENT)
Dept: ENDOCRINOLOGY | Facility: CLINIC | Age: 70
End: 2020-02-07

## 2020-06-11 NOTE — ED PROVIDER NOTE - INTERPRETATION SERVICES DECLINED
-- DO NOT REPLY / DO NOT REPLY ALL --  -- Message is from the [facility]--    COVID-19 Universal Screening: Negative    Result Request  Type of Test / Lab: Blood   Date Test / Lab: 6/5/2020  Location:   Test / Lab ordered/authorized by: Dr. Fierro    Other comments: Patient called to check results; advised to speak with Becka isabel, please give patient a call back.    Preferred Delivery Method   Call back patient with results.  Phone number:  [phone]    Caller Information       Type Contact Phone    06/11/2020 10:15 AM Phone (Incoming) [place] (Self) [phone] (M)          Alternative phone number:     Turnaround time given to caller:   \"This message will be sent to [state Provider's name]. The clinical team will fulfill your request as soon as they review your message.\"   Patient/Caregiver requests family/friend to interpret.

## 2020-11-23 NOTE — PATIENT PROFILE ADULT - FUNCTIONAL SCREEN CURRENT LEVEL: BATHING, MLM
"Brooks Summers is a 73 year old male who is being evaluated via a billable video visit.      The patient has been notified of following:     \"This video visit will be conducted via a call between you and your physician/provider. We have found that certain health care needs can be provided without the need for a physical exam.  This service lets us provide the care you need with a short phone conversation.  If a prescription is necessary we can send it directly to your pharmacy.  If lab work is needed we can place an order for that and you can then stop by our lab to have the test done at a later time.    Video visits are billed at different rates depending on your insurance coverage. During this emergency period, for some insurers they may be billed the same as an in-person visit.  Please reach out to your insurance provider with any questions.    If during the course of the call the physician/provider feels a telephone visit is not appropriate, you will not be charged for this service.\"    Patient has given verbal consent for Telephone visit?  Yes    What phone number would you like to be contacted at? [phone]    How would you like to obtain your AVS? [facility]        CHIEF COMPLAINT:  Follow up for new onset seizures.      HISTORY OF PRESENT ILLNESS:  Video call for follow up. This patient is a 73-year-old male with a history of bicuspid valve, severe stenosis, status post AVR with prosthetic valve endocarditis, then mitral valve repair and pacemaker placed in 2013, on Coumadin, paroxysmal AFib, chronic thrombocytopenia, gout.  He presented with new-onset seizure in May 2020 and another seizure on 8/10/2020. The seizure was GTC with tongue biting then post ictal confusion. No incontinence. The seizures likely provoked by recent stress.       Interval history:  Since his last visit on 8/10/2020 he has not had any seizure. He started Keppra 250 mg bid. He was supposed to increase the Keppra to 500 mg bid but he " did not do so. Now he is on 250 mg bid. He denies any side effects of Keppra. He states he has what is seems to be restless leg syndrome for long time. He asked if the Keppra increased it. His wife was with him during this video visit. She states that his short memory is not as good as before. He is asking questions repeatedly and forget what they talks about. His mother had dementia. She passed away at her 90s.      Current Outpatient Medications   Medication Sig Dispense Refill     allopurinol (ZYLOPRIM) 100 MG tablet Take 1 tablet (100 mg) by mouth every evening 90 tablet 3     levETIRAcetam (KEPPRA) 500 MG tablet Take 1 tablet (500 mg) by mouth 2 times daily Start with 250 mg twice daily for 2 weeks, then 500 mg twice daily. 60 tablet 11     losartan (COZAAR) 25 MG tablet Take 0.5 tablets (12.5 mg) by mouth daily Take (1/2) tab daily 45 tablet 3     metoprolol succinate ER (TOPROL XL) 50 MG 24 hr tablet Take 1 tablet (50 mg) by mouth daily 90 tablet 3     Multiple Vitamins-Minerals (MULTIVITAMIN ADULTS 50+) TABS        PROVIDER DOSED MANAGED WARFARIN, COUMADIN, OUTPATIENT Per coumadin clinic       simvastatin (ZOCOR) 40 MG tablet TAKE 1 TABLET(40 MG) BY MOUTH AT BEDTIME 90 tablet 2     VITAMIN D, CHOLECALCIFEROL, PO Take 1,000 Units by mouth daily       warfarin ANTICOAGULANT (COUMADIN) 5 MG tablet 7.5 mg Tues/Thurs/Sun; 10 mg all other days or as directed by the Anticoagulation Clinic 160 tablet 0         PHYSICAL EXAMINATION: Is limited because of the nature of the video visits    Alert and oriented x3.  Speech fluent, appropriate.  EOMI, No facial weakness. No arm drift     REVIEW OF SYSTEMS: 8-point review of systems is essentially negative except what is mentioned in the HPI.      IMPRESSION:      New onset seizure, etiology unclear at this time.  He presented with new-onset seizure in May 2020 and another seizure in August 2020. He started on Keppra 250 mg bid. He did not increase the Keppra as scheduled  because they were confused about the dosing and the time of increase. His wife is concerned about his memory and that he is more forgetful. They are agreeable to more formal testing of the memory. He is doing well with no more seizures or side effect from the Keppra.      PLAN:   1.  increase Keppra to 500 mg bid.  2. Neuropsychological test for assessment of the memory  3.  Return to clinic in 3 months.     Korey Goldstein MD  PGY 3 neurology            Neurology Attending Note:    I was on the video with the patient and with the resident.  I have reviewed the labs and imaging results available.  I agree with the assessment and plan.    Pierce Westbrook MD   0 = independent

## 2021-01-07 ENCOUNTER — OUTPATIENT (OUTPATIENT)
Dept: OUTPATIENT SERVICES | Facility: HOSPITAL | Age: 71
LOS: 1 days | End: 2021-01-07
Payer: MEDICAID

## 2021-01-07 ENCOUNTER — APPOINTMENT (OUTPATIENT)
Dept: CARDIOLOGY | Facility: HOSPITAL | Age: 71
End: 2021-01-07

## 2021-01-07 ENCOUNTER — NON-APPOINTMENT (OUTPATIENT)
Age: 71
End: 2021-01-07

## 2021-01-07 VITALS — DIASTOLIC BLOOD PRESSURE: 83 MMHG | OXYGEN SATURATION: 95 % | SYSTOLIC BLOOD PRESSURE: 140 MMHG | HEART RATE: 66 BPM

## 2021-01-07 DIAGNOSIS — I25.10 ATHEROSCLEROTIC HEART DISEASE OF NATIVE CORONARY ARTERY WITHOUT ANGINA PECTORIS: ICD-10-CM

## 2021-01-07 DIAGNOSIS — Z95.5 PRESENCE OF CORONARY ANGIOPLASTY IMPLANT AND GRAFT: Chronic | ICD-10-CM

## 2021-01-07 PROCEDURE — 93005 ELECTROCARDIOGRAM TRACING: CPT

## 2021-01-07 PROCEDURE — G0463: CPT

## 2021-01-08 NOTE — ASSESSMENT
[FreeTextEntry1] : 70F w/ DM (last HgbA1c 6.2), asthma, HLD, HTN, CAD (2018 [facility] PCI to dLM, pLAD and LCX), preserved LV systolic function (2019), presented last year w/ syncope and underwent ischemic workup revealing areas of stenoses in marylou of prior stent. She declined CABG at that time and was medically managed. Currently, she has had intermittent shortness of breath that is attributed to her asthma as she has relief of her symptoms after inhaler use. She does not appear to have active chest pain or signs of heart failure.\par - con't medical management of her CAD, patient and family have declined surgical evaluation\par - increase lisinopril to 5mg daily\par - con't ASA, Brillinta, Statin, beta blocker\par - repeat HgA1c, BMP and lipid panel\par - f/u in 3 months; will consider escalating beta blocker dosage if tolerated

## 2021-01-08 NOTE — REVIEW OF SYSTEMS
[see HPI] : see HPI [Negative] : Heme/Lymph [Chest  Pressure] : no chest pressure [Chest Pain] : no chest pain [Lower Ext Edema] : no extremity edema [Leg Claudication] : no intermittent leg claudication [Palpitations] : no palpitations

## 2021-01-08 NOTE — PHYSICAL EXAM
[General Appearance - Well Developed] : well developed [Normal Appearance] : normal appearance [Well Groomed] : well groomed [General Appearance - Well Nourished] : well nourished [No Deformities] : no deformities [General Appearance - In No Acute Distress] : no acute distress [Normal Conjunctiva] : the conjunctiva exhibited no abnormalities [Eyelids - No Xanthelasma] : the eyelids demonstrated no xanthelasmas [Normal Jugular Venous A Waves Present] : normal jugular venous A waves present [Normal Jugular Venous V Waves Present] : normal jugular venous V waves present [No Jugular Venous Sin A Waves] : no jugular venous sin A waves [Heart Rate And Rhythm] : heart rate and rhythm were normal [Heart Sounds] : normal S1 and S2 [Murmurs] : no murmurs present [Respiration, Rhythm And Depth] : normal respiratory rhythm and effort [Exaggerated Use Of Accessory Muscles For Inspiration] : no accessory muscle use [Auscultation Breath Sounds / Voice Sounds] : lungs were clear to auscultation bilaterally [Abdomen Soft] : soft [Abdomen Tenderness] : non-tender [Abdomen Mass (___ Cm)] : no abdominal mass palpated [Abnormal Walk] : normal gait [Nail Clubbing] : no clubbing of the fingernails [Cyanosis, Localized] : no localized cyanosis [Petechial Hemorrhages (___cm)] : no petechial hemorrhages [Skin Color & Pigmentation] : normal skin color and pigmentation [] : no rash [No Venous Stasis] : no venous stasis [Skin Lesions] : no skin lesions [No Skin Ulcers] : no skin ulcer [No Xanthoma] : no  xanthoma was observed [Oriented To Time, Place, And Person] : oriented to person, place, and time [Affect] : the affect was normal [FreeTextEntry1] : Left arm SOLANGE due to pain w/ movement

## 2021-01-08 NOTE — REASON FOR VISIT
[Initial Evaluation] : an initial evaluation of [Family Member] : family member [FreeTextEntry1] : 70F w/ DM (last HgbA1c 6.2), asthma, HLD, HTN, CAD (2018 [facility] PCI to dLM, pLAD and LCX), presented last year w/ syncope and underwent ischemic workup revealing areas of stenoses in marylou of prior stent. She declined CABG at that time and was medically managed. She restarted DAPT last year w/ ASA and Brillinta and has been overall medically compliant. Throughout the year, she has had shortness of breath with minimal exertion, but resolved w/ inhalers. She has had left shoulder pain thought to be from arthritis. It is not associated w/ chest pain and is not made worse w/ exertion. She otherwise does not have further complaints. \par \par ECG today: NSR w/ q wave in V3\par Echo in 2019: normal LV systolic function, without RWMA

## 2021-01-08 NOTE — ASSESSMENT
[FreeTextEntry1] : 70F w/ DM (last HgbA1c 6.2), asthma, HLD, HTN, CAD (2018 [facility] PCI to dLM, pLAD and LCX), preserved LV systolic function (2019), presented last year w/ syncope and underwent ischemic workup revealing areas of stenoses in marylou of prior stent. She declined CABG at that time and was medically managed. Currently, she has had intermittent shortness of breath that is attributed to her asthma as she has relief of her symptoms after inhaler use. She does not appear to have active chest pain or signs of heart failure.\par - con't medical management of her CAD, patient and family have declined surgical evaluation\par - increase lisinopril to 5mg daily\par - con't ASA, Brillinta, Statin, beta blocker\par - repeat HgA1c, BMP and lipid panel\par - f/u in 3 months; will consider escalating beta blocker dosage if tolerated

## 2021-01-25 ENCOUNTER — OUTPATIENT (OUTPATIENT)
Dept: OUTPATIENT SERVICES | Facility: HOSPITAL | Age: 71
LOS: 1 days | End: 2021-01-25
Payer: MEDICAID

## 2021-01-25 ENCOUNTER — APPOINTMENT (OUTPATIENT)
Dept: PODIATRY | Facility: HOSPITAL | Age: 71
End: 2021-01-25
Payer: MEDICAID

## 2021-01-25 VITALS
HEIGHT: 65 IN | TEMPERATURE: 97.9 F | SYSTOLIC BLOOD PRESSURE: 136 MMHG | DIASTOLIC BLOOD PRESSURE: 82 MMHG | HEART RATE: 82 BPM | RESPIRATION RATE: 14 BRPM | WEIGHT: 162 LBS | BODY MASS INDEX: 26.99 KG/M2

## 2021-01-25 DIAGNOSIS — Z95.5 PRESENCE OF CORONARY ANGIOPLASTY IMPLANT AND GRAFT: Chronic | ICD-10-CM

## 2021-01-25 DIAGNOSIS — E11.9 TYPE 2 DIABETES MELLITUS WITHOUT COMPLICATIONS: ICD-10-CM

## 2021-01-25 DIAGNOSIS — M79.609 PAIN IN UNSPECIFIED LIMB: ICD-10-CM

## 2021-01-25 PROCEDURE — G0463: CPT

## 2021-01-25 PROCEDURE — 99202 OFFICE O/P NEW SF 15 MIN: CPT

## 2021-01-25 RX ORDER — PREGABALIN 50 MG/1
50 CAPSULE ORAL 3 TIMES DAILY
Refills: 0 | Status: DISCONTINUED | COMMUNITY
End: 2021-01-25

## 2021-01-25 NOTE — HISTORY OF PRESENT ILLNESS
[FreeTextEntry1] : Pt presents to clinic for diabetic foot eval. Has been a diabetic for a while. Complaining of neuropathy pain (burning and tingling) in her feet worse at night time. Denies any history of open wounds. Denies any other pedal complaints.

## 2021-02-17 ENCOUNTER — APPOINTMENT (OUTPATIENT)
Dept: PODIATRY | Facility: HOSPITAL | Age: 71
End: 2021-02-17

## 2021-03-10 ENCOUNTER — OUTPATIENT (OUTPATIENT)
Dept: OUTPATIENT SERVICES | Facility: HOSPITAL | Age: 71
LOS: 1 days | End: 2021-03-10
Payer: MEDICAID

## 2021-03-10 ENCOUNTER — APPOINTMENT (OUTPATIENT)
Dept: PODIATRY | Facility: HOSPITAL | Age: 71
End: 2021-03-10
Payer: MEDICAID

## 2021-03-10 VITALS
SYSTOLIC BLOOD PRESSURE: 123 MMHG | HEIGHT: 65 IN | WEIGHT: 165 LBS | TEMPERATURE: 97.1 F | DIASTOLIC BLOOD PRESSURE: 73 MMHG | HEART RATE: 79 BPM | BODY MASS INDEX: 27.49 KG/M2 | RESPIRATION RATE: 16 BRPM

## 2021-03-10 DIAGNOSIS — L60.0 INGROWING NAIL: ICD-10-CM

## 2021-03-10 DIAGNOSIS — M22.40 CHONDROMALACIA PATELLAE, UNSPECIFIED KNEE: ICD-10-CM

## 2021-03-10 DIAGNOSIS — E11.9 TYPE 2 DIABETES MELLITUS WITHOUT COMPLICATIONS: ICD-10-CM

## 2021-03-10 DIAGNOSIS — M79.609 PAIN IN UNSPECIFIED LIMB: ICD-10-CM

## 2021-03-10 DIAGNOSIS — Z95.5 PRESENCE OF CORONARY ANGIOPLASTY IMPLANT AND GRAFT: Chronic | ICD-10-CM

## 2021-03-10 DIAGNOSIS — M20.40 OTHER HAMMER TOE(S) (ACQUIRED), UNSPECIFIED FOOT: ICD-10-CM

## 2021-03-10 PROCEDURE — 99212 OFFICE O/P EST SF 10 MIN: CPT

## 2021-03-10 PROCEDURE — G0463: CPT

## 2021-03-10 RX ORDER — RANITIDINE 150 MG/1
150 TABLET ORAL
Refills: 0 | Status: DISCONTINUED | COMMUNITY
End: 2021-03-10

## 2021-03-10 NOTE — PHYSICAL EXAM
[Skin Color & Pigmentation] : normal skin color and pigmentation [2+] : left foot dorsalis pedis 2+ [de-identified] : no open wounds

## 2021-03-10 NOTE — HISTORY OF PRESENT ILLNESS
[FreeTextEntry1] : Pt returns to clinic for diabetic foot eval. Has been a diabetic for a while. Complaining of neuropathy pain (burning and tingling) in her feet worse at night time. Denies any history of open wounds. Denies any other pedal complaints. Patient would like to have diabetic shoes.

## 2021-03-10 NOTE — ASSESSMENT
[FreeTextEntry1] : 71 yo female patient with dystrophic toenails\par - pt seen and evaluated\par - pt given diabetic shoe forms to be filled out by patient \par - nails clipped x 10\par - Rx Ladies diabetic shoes with multidensity inserts\par - RTC 3 months

## 2021-03-16 ENCOUNTER — APPOINTMENT (OUTPATIENT)
Dept: OPHTHALMOLOGY | Facility: CLINIC | Age: 71
End: 2021-03-16

## 2021-04-07 NOTE — ED PROVIDER NOTE - LANGUAGE ASSISTANCE NEEDED
Left second message for patient to call back. Schedule an OV appt with [facility] for colon consult. Sent TTRL   No-Patient/Caregiver offered and refused free interpretation services.

## 2021-04-08 ENCOUNTER — OUTPATIENT (OUTPATIENT)
Dept: OUTPATIENT SERVICES | Facility: HOSPITAL | Age: 71
LOS: 1 days | End: 2021-04-08
Payer: MEDICAID

## 2021-04-08 ENCOUNTER — APPOINTMENT (OUTPATIENT)
Dept: CARDIOLOGY | Facility: HOSPITAL | Age: 71
End: 2021-04-08

## 2021-04-08 ENCOUNTER — NON-APPOINTMENT (OUTPATIENT)
Age: 71
End: 2021-04-08

## 2021-04-08 VITALS
WEIGHT: 160 LBS | OXYGEN SATURATION: 95 % | SYSTOLIC BLOOD PRESSURE: 154 MMHG | HEIGHT: 65 IN | DIASTOLIC BLOOD PRESSURE: 87 MMHG | BODY MASS INDEX: 26.66 KG/M2 | HEART RATE: 69 BPM

## 2021-04-08 DIAGNOSIS — Z95.5 PRESENCE OF CORONARY ANGIOPLASTY IMPLANT AND GRAFT: Chronic | ICD-10-CM

## 2021-04-08 DIAGNOSIS — I25.10 ATHEROSCLEROTIC HEART DISEASE OF NATIVE CORONARY ARTERY WITHOUT ANGINA PECTORIS: ICD-10-CM

## 2021-04-08 PROCEDURE — G0463: CPT

## 2021-04-08 PROCEDURE — 93005 ELECTROCARDIOGRAM TRACING: CPT

## 2021-04-08 RX ORDER — ERGOCALCIFEROL 1.25 MG/1
1.25 MG CAPSULE, LIQUID FILLED ORAL
Qty: 30 | Refills: 3 | Status: ACTIVE | COMMUNITY
Start: 1900-01-01 | End: 1900-01-01

## 2021-04-11 NOTE — REASON FOR VISIT
[Follow-Up - Clinic] : a clinic follow-up of [FreeTextEntry1] : 70F w/ DM (last HgbA1c 6.2), asthma, HLD, HTN, CAD (2018 [facility] PCI to dLM, pLAD and LCX), presented last year w/ syncope and underwent ischemic workup revealing areas of stenoses in marylou of prior stent. She declined CABG at that time and was medically managed. She restarted DAPT last year w/ ASA and Brillinta and has been overall medically compliant. Throughout the year, she has had shortness of breath with minimal exertion, but resolved w/ inhalers. She has had left shoulder pain thought to be from arthritis. It is not associated w/ chest pain and is not made worse w/ exertion. She presents for follow up from January. Her symptoms have not changed significantly, but she has noted that her SOB is more persistent. It again resolves with inhalers, but occurs more in the evening. She has to sleep with 3 pillows, but denies weight gain or leg swelling.\par \par Echo in 2019: normal LV systolic function, without RWMA \par Patient accompanied by family member.

## 2021-04-11 NOTE — ASSESSMENT
[FreeTextEntry1] : 70F w/ DM (last HgbA1c 6.2), asthma, HLD, HTN, [facility] ([facility], [facility]AD and LCX), presented last year w/ syncope and underwent ischemic workup revealing areas of stenoses in marylou of prior stent. She declined CABG at that time and was medically managed. She continues to be symptomatic, specifically with SOB. Although there is a component of asthma, she likely is in mild heart failure. We continued to discuss intervention on her coronary artery disease, but the patient and family would like to optimize medical therapy prior to discussion about surgery. \par - lasix 20mg \par - chem/bmp\par - RTC 2 weeks\par - con't remainder of GDMT; BP today elevated, but on home measurements, it has been in normal limits

## 2021-04-19 ENCOUNTER — APPOINTMENT (OUTPATIENT)
Dept: PODIATRY | Facility: HOSPITAL | Age: 71
End: 2021-04-19

## 2021-04-22 ENCOUNTER — APPOINTMENT (OUTPATIENT)
Dept: CARDIOLOGY | Facility: HOSPITAL | Age: 71
End: 2021-04-22

## 2021-07-13 ENCOUNTER — NON-APPOINTMENT (OUTPATIENT)
Age: 71
End: 2021-07-13

## 2021-07-13 ENCOUNTER — OUTPATIENT (OUTPATIENT)
Dept: OUTPATIENT SERVICES | Facility: HOSPITAL | Age: 71
LOS: 1 days | End: 2021-07-13
Payer: MEDICAID

## 2021-07-13 ENCOUNTER — APPOINTMENT (OUTPATIENT)
Dept: CARDIOLOGY | Facility: HOSPITAL | Age: 71
End: 2021-07-13

## 2021-07-13 VITALS
WEIGHT: 170 LBS | HEART RATE: 65 BPM | HEIGHT: 65 IN | OXYGEN SATURATION: 98 % | DIASTOLIC BLOOD PRESSURE: 94 MMHG | BODY MASS INDEX: 28.32 KG/M2 | SYSTOLIC BLOOD PRESSURE: 166 MMHG

## 2021-07-13 DIAGNOSIS — Z95.5 PRESENCE OF CORONARY ANGIOPLASTY IMPLANT AND GRAFT: Chronic | ICD-10-CM

## 2021-07-13 DIAGNOSIS — I25.10 ATHEROSCLEROTIC HEART DISEASE OF NATIVE CORONARY ARTERY WITHOUT ANGINA PECTORIS: ICD-10-CM

## 2021-07-13 DIAGNOSIS — J45.990 EXERCISE INDUCED BRONCHOSPASM: ICD-10-CM

## 2021-07-13 PROCEDURE — G0463: CPT

## 2021-07-13 PROCEDURE — 93005 ELECTROCARDIOGRAM TRACING: CPT

## 2021-07-13 NOTE — PHYSICAL EXAM
[No Acute Distress] : no acute distress [Frail] : frail [Normal Conjunctiva] : normal conjunctiva [Normal] : normal venous pressure, no carotid bruit [Normal S1, S2] : normal S1, S2 [Clear Lung Fields] : clear lung fields [Soft] : abdomen soft [Non Tender] : non-tender [Moves all extremities] : moves all extremities [Alert and Oriented] : alert and oriented

## 2021-07-14 LAB
ALBUMIN SERPL ELPH-MCNC: 4 G/DL
ALP BLD-CCNC: 128 U/L
ALT SERPL-CCNC: 47 U/L
ANION GAP SERPL CALC-SCNC: 14 MMOL/L
AST SERPL-CCNC: 39 U/L
BILIRUB SERPL-MCNC: 0.3 MG/DL
BUN SERPL-MCNC: 27 MG/DL
CALCIUM SERPL-MCNC: 9.8 MG/DL
CHLORIDE SERPL-SCNC: 104 MMOL/L
CO2 SERPL-SCNC: 25 MMOL/L
CREAT SERPL-MCNC: 0.93 MG/DL
GLUCOSE SERPL-MCNC: 134 MG/DL
POTASSIUM SERPL-SCNC: 6 MMOL/L
PROT SERPL-MCNC: 7.4 G/DL
SODIUM SERPL-SCNC: 143 MMOL/L

## 2021-07-16 ENCOUNTER — RX RENEWAL (OUTPATIENT)
Age: 71
End: 2021-07-16

## 2021-07-21 ENCOUNTER — LABORATORY RESULT (OUTPATIENT)
Age: 71
End: 2021-07-21

## 2021-07-26 ENCOUNTER — APPOINTMENT (OUTPATIENT)
Dept: PODIATRY | Facility: HOSPITAL | Age: 71
End: 2021-07-26

## 2021-07-26 ENCOUNTER — OUTPATIENT (OUTPATIENT)
Dept: OUTPATIENT SERVICES | Facility: HOSPITAL | Age: 71
LOS: 1 days | End: 2021-07-26
Payer: MEDICAID

## 2021-07-26 VITALS
BODY MASS INDEX: 28.32 KG/M2 | WEIGHT: 170 LBS | HEART RATE: 83 BPM | SYSTOLIC BLOOD PRESSURE: 151 MMHG | HEIGHT: 65 IN | DIASTOLIC BLOOD PRESSURE: 82 MMHG | RESPIRATION RATE: 16 BRPM | TEMPERATURE: 97 F

## 2021-07-26 DIAGNOSIS — J45.909 UNSPECIFIED ASTHMA, UNCOMPLICATED: ICD-10-CM

## 2021-07-26 DIAGNOSIS — Z87.898 PERSONAL HISTORY OF OTHER SPECIFIED CONDITIONS: ICD-10-CM

## 2021-07-26 DIAGNOSIS — M06.9 RHEUMATOID ARTHRITIS, UNSPECIFIED: ICD-10-CM

## 2021-07-26 DIAGNOSIS — L60.0 INGROWING NAIL: ICD-10-CM

## 2021-07-26 DIAGNOSIS — E11.9 TYPE 2 DIABETES MELLITUS WITHOUT COMPLICATIONS: ICD-10-CM

## 2021-07-26 DIAGNOSIS — I10 ESSENTIAL (PRIMARY) HYPERTENSION: ICD-10-CM

## 2021-07-26 DIAGNOSIS — Z95.5 PRESENCE OF CORONARY ANGIOPLASTY IMPLANT AND GRAFT: Chronic | ICD-10-CM

## 2021-07-26 DIAGNOSIS — J45.990 EXERCISE INDUCED BRONCHOSPASM: ICD-10-CM

## 2021-07-26 PROCEDURE — G0463: CPT

## 2021-07-26 RX ORDER — OMEPRAZOLE 40 MG/1
40 CAPSULE, DELAYED RELEASE ORAL DAILY
Refills: 0 | Status: DISCONTINUED | COMMUNITY
End: 2021-07-26

## 2021-07-30 ENCOUNTER — APPOINTMENT (OUTPATIENT)
Dept: CARDIOLOGY | Facility: CLINIC | Age: 71
End: 2021-07-30
Payer: MEDICAID

## 2021-07-30 DIAGNOSIS — I10 ESSENTIAL (PRIMARY) HYPERTENSION: ICD-10-CM

## 2021-07-30 DIAGNOSIS — E11.9 TYPE 2 DIABETES MELLITUS W/OUT COMPLICATIONS: ICD-10-CM

## 2021-07-30 DIAGNOSIS — R06.02 SHORTNESS OF BREATH: ICD-10-CM

## 2021-07-30 DIAGNOSIS — Z87.898 PERSONAL HISTORY OF OTHER SPECIFIED CONDITIONS: ICD-10-CM

## 2021-07-30 PROCEDURE — 93306 TTE W/DOPPLER COMPLETE: CPT

## 2021-08-03 LAB
MAGNESIUM SERPL-MCNC: 1.8 MG/DL
PHOSPHATE SERPL-MCNC: 3.9 MG/DL

## 2021-08-11 ENCOUNTER — APPOINTMENT (OUTPATIENT)
Age: 71
End: 2021-08-11
Payer: MEDICAID

## 2021-08-11 VITALS
RESPIRATION RATE: 16 BRPM | DIASTOLIC BLOOD PRESSURE: 70 MMHG | SYSTOLIC BLOOD PRESSURE: 110 MMHG | OXYGEN SATURATION: 97 % | TEMPERATURE: 97.7 F | WEIGHT: 150 LBS | HEIGHT: 65 IN | BODY MASS INDEX: 24.99 KG/M2 | HEART RATE: 78 BPM

## 2021-08-11 DIAGNOSIS — J45.40 MODERATE PERSISTENT ASTHMA, UNCOMPLICATED: ICD-10-CM

## 2021-08-11 PROCEDURE — 94618 PULMONARY STRESS TESTING: CPT

## 2021-08-11 PROCEDURE — 94727 GAS DIL/WSHOT DETER LNG VOL: CPT

## 2021-08-11 PROCEDURE — ZZZZZ: CPT

## 2021-08-11 PROCEDURE — 94729 DIFFUSING CAPACITY: CPT

## 2021-08-11 PROCEDURE — 99204 OFFICE O/P NEW MOD 45 MIN: CPT | Mod: 25

## 2021-08-11 PROCEDURE — 94010 BREATHING CAPACITY TEST: CPT

## 2021-08-11 NOTE — COUNSELING
[Needs reinforcement, provided] : Patient needs reinforcement on understanding of lifestyle changes and steps needed to achieve self management goal; reinforcement was provided
lower R/upper L

## 2021-08-11 NOTE — ASSESSMENT
[FreeTextEntry1] : 70 year old female presents for evaluation asthma, moderate persistent\par \par Trial of Trelegy Ellipta 100 daily \par Albuterol rescue 2 puffs only if needed\par \par Follow up 4-6 weeks

## 2021-08-11 NOTE — HISTORY OF PRESENT ILLNESS
[Never] : never [TextBox_4] : Patient is a 70 year old [place] female Hx asthma,. CAD s/p stent placement x 3, DM, HTN presents to [facility] for evaluation.  is accompanied by her daughter. Patient daughter reports she has had asthma her entire life.  She reports she does not understand how to use medications.  She has been using albuterol rescue inhaler around the clock.  She only uses Symbicort 160/4.5 when needed.  She is here for increased symptoms shortness of breath, cough.

## 2021-08-11 NOTE — PROCEDURE
[FreeTextEntry1] : PFT 8/11/21 personally reviewed moderate obstructive and restrictive ventilatory defect with moderate gas exchange abnormality\par \par 6 minute walk 8/11/21 personally reviewed no desaturation below 94%

## 2021-09-01 ENCOUNTER — APPOINTMENT (OUTPATIENT)
Dept: PULMONOLOGY | Facility: CLINIC | Age: 71
End: 2021-09-01

## 2021-09-02 NOTE — REVIEW OF SYSTEMS
[SOB] : shortness of breath [Dyspnea on exertion] : dyspnea during exertion [Lower Ext Edema] : lower extremity edema [Negative] : Psychiatric [Leg Claudication] : no intermittent leg claudication [Palpitations] : no palpitations [Orthopnea] : no orthopnea [PND] : no PND [Syncope] : no syncope [Cough] : no cough [Wheezing] : no wheezing [Coughing Up Blood] : no hemoptysis

## 2021-09-02 NOTE — HISTORY OF PRESENT ILLNESS
[FreeTextEntry1] : 70F w/ DM (last HgbA1c 6.2), asthma, HLD, HTN, CAD (2018 [facility] PCI to dLM, pLAD and LCX), presented last year w/ syncope and underwent ischemic workup revealing areas of stenoses in marylou of prior stent. She declined CABG at that time and was medically managed. She restarted DAPT last year w/ ASA and Brillinta and has been overall medically compliant. Throughout the year, she has had shortness of breath with minimal exertion, but resolved w/ inhalers. She has had left shoulder pain thought to be from arthritis. It is not associated w/ chest pain and is not made worse w/ exertion. She presents for follow up from April when she saw Dr. Rea at which time she was started on furosemide 20 for symptoms.\par \par Today she is accompanied by her daughter Javid who has agreed to interpret. She states that she ran out of the lasix as she was lost to follow up after april's visit. She states that she has a stable exercise tolerance of about 20 feet before she gets short of breath, and chest pressure. She does take inhalers and has been using them two times a day which helps with her shortness of breath. According to her daughter her symptoms havent changed too much. She needs to sleep with 2-3 pillows at night, which is stable from  April. She endorses palpitations in the morning when she wakes up. States that she hasn’t noticed worsening swelling of the legs and that her weight has been more or less neutral. We discussed CABG and she is again declining.\par \par Echo in 2019: normal LV systolic function, without RWMA \par Notably blood pressure 166/94 today. on repeat 154/90.

## 2021-09-02 NOTE — ADDENDUM
[FreeTextEntry1] : Patients K resulted in 6.0, ordered repeat and advised to come in immediately to have redrawn\par \par 7/23: Patient new result K 5.0, improved from 6.0. Will see in clinic

## 2021-09-07 PROBLEM — E11.9 DIABETES MELLITUS, TYPE 2: Status: ACTIVE | Noted: 2019-07-09

## 2021-09-07 PROBLEM — I10 HTN (HYPERTENSION): Status: ACTIVE | Noted: 2019-07-09

## 2021-09-07 PROBLEM — R06.02 SHORTNESS OF BREATH: Status: ACTIVE | Noted: 2021-09-07

## 2021-09-07 PROBLEM — Z87.898 HISTORY OF SHORTNESS OF BREATH: Status: RESOLVED | Noted: 2019-07-09 | Resolved: 2021-09-07

## 2021-09-21 RX ORDER — ASPIRIN ENTERIC COATED TABLETS 81 MG 81 MG/1
81 TABLET, DELAYED RELEASE ORAL DAILY
Qty: 90 | Refills: 3 | Status: ACTIVE | COMMUNITY
Start: 1900-01-01 | End: 1900-01-01

## 2021-09-21 RX ORDER — RANOLAZINE 500 MG/1
500 TABLET, EXTENDED RELEASE ORAL
Qty: 180 | Refills: 1 | Status: ACTIVE | COMMUNITY
Start: 2019-09-06 | End: 1900-01-01

## 2021-09-21 RX ORDER — METOPROLOL SUCCINATE 25 MG/1
25 TABLET, EXTENDED RELEASE ORAL DAILY
Qty: 90 | Refills: 1 | Status: ACTIVE | COMMUNITY
Start: 1900-01-01 | End: 1900-01-01

## 2021-09-21 RX ORDER — ATORVASTATIN CALCIUM 40 MG/1
40 TABLET, FILM COATED ORAL DAILY
Qty: 90 | Refills: 2 | Status: ACTIVE | COMMUNITY
Start: 1900-01-01 | End: 1900-01-01

## 2021-09-22 ENCOUNTER — APPOINTMENT (OUTPATIENT)
Dept: PULMONOLOGY | Facility: CLINIC | Age: 71
End: 2021-09-22

## 2021-09-27 NOTE — PHYSICAL EXAM
[2+] : left foot dorsalis pedis 2+ [Normal Foot/Ankle] : Both lower extremities were exposed and visualized. Standing exam demonstrates normal foot posture and alignment. Hindfoot exam shows no hindfoot valgus or varus [Skin Color & Pigmentation] : normal skin color and pigmentation [Skin Lesions] : no skin lesions [Ankle Swelling (On Exam)] : not present [Varicose Veins Of Lower Extremities] : not present [] : not present [de-identified] : no open wounds [FreeTextEntry1] : Diminished sensation to b/l feet a the level of midfoot

## 2021-09-27 NOTE — HISTORY OF PRESENT ILLNESS
[FreeTextEntry1] : Pt returns to clinic for diabetic foot eval. Has been a diabetic for a while. Complaining of neuropathy pain (burning and tingling) in her feet worse at night time- currently on Gabapentin. Denies any history of open wounds. Denies any other pedal complaints.

## 2021-09-27 NOTE — ASSESSMENT
[FreeTextEntry1] : 71 yo female patient with dystrophic toenails\par \par - pt seen and evaluated\par - aseptic nail debridement x 10 w/ sterile nippers\par - Rx Ladies diabetic shoes with multidensity inserts. Not covered by [facility] barb[facility], will contact insurance company for list of providers \par - educated on proper diabetic foot care \par - RTC 3 months

## 2021-10-18 ENCOUNTER — APPOINTMENT (OUTPATIENT)
Dept: PODIATRY | Facility: HOSPITAL | Age: 71
End: 2021-10-18

## 2021-11-08 NOTE — ED ADULT NURSE NOTE - SKIN TEMPERATURE MOISTURE
Detail Level: Zone
Initiate Treatment: Scar cream
Plan: Reminded pt to keep sunscreen on the area and moisturize regularly to help skin heal back to original color.
warm

## 2022-04-26 ENCOUNTER — RX RENEWAL (OUTPATIENT)
Age: 72
End: 2022-04-26

## 2022-04-27 ENCOUNTER — RX RENEWAL (OUTPATIENT)
Age: 72
End: 2022-04-27

## 2022-04-27 RX ORDER — LISINOPRIL 5 MG/1
5 TABLET ORAL
Qty: 90 | Refills: 1 | Status: ACTIVE | COMMUNITY
Start: 2019-09-06 | End: 1900-01-01

## 2022-04-27 RX ORDER — FLUTICASONE FUROATE, UMECLIDINIUM BROMIDE AND VILANTEROL TRIFENATATE 100; 62.5; 25 UG/1; UG/1; UG/1
100-62.5-25 POWDER RESPIRATORY (INHALATION)
Qty: 1 | Refills: 2 | Status: ACTIVE | COMMUNITY
Start: 2021-09-22 | End: 1900-01-01

## 2022-06-01 NOTE — PROGRESS NOTE ADULT - PROBLEM/PLAN-1
Chief Complaint   Patient presents with     Recheck Medication     Re-establishing care     Blood pressure 136/80, pulse 70, temperature 98.1  F (36.7  C), temperature source Oral, weight 63.3 kg (139 lb 8 oz), SpO2 97 %, not currently breastfeeding.    Raul Davis on 6/1/2022 at 2:37 PM    
DISPLAY PLAN FREE TEXT

## 2022-06-05 ENCOUNTER — INPATIENT (INPATIENT)
Facility: HOSPITAL | Age: 72
LOS: 7 days | Discharge: HOME | End: 2022-06-13
Attending: INTERNAL MEDICINE | Admitting: INTERNAL MEDICINE
Payer: MEDICAID

## 2022-06-05 VITALS
HEART RATE: 79 BPM | OXYGEN SATURATION: 96 % | DIASTOLIC BLOOD PRESSURE: 74 MMHG | RESPIRATION RATE: 20 BRPM | HEIGHT: 65 IN | SYSTOLIC BLOOD PRESSURE: 128 MMHG | TEMPERATURE: 101 F | WEIGHT: 169.98 LBS

## 2022-06-05 DIAGNOSIS — R41.82 ALTERED MENTAL STATUS, UNSPECIFIED: ICD-10-CM

## 2022-06-05 DIAGNOSIS — Z95.5 PRESENCE OF CORONARY ANGIOPLASTY IMPLANT AND GRAFT: Chronic | ICD-10-CM

## 2022-06-05 DIAGNOSIS — I10 ESSENTIAL (PRIMARY) HYPERTENSION: ICD-10-CM

## 2022-06-05 DIAGNOSIS — I25.10 ATHEROSCLEROTIC HEART DISEASE OF NATIVE CORONARY ARTERY WITHOUT ANGINA PECTORIS: ICD-10-CM

## 2022-06-05 DIAGNOSIS — G62.9 POLYNEUROPATHY, UNSPECIFIED: ICD-10-CM

## 2022-06-05 DIAGNOSIS — E11.9 TYPE 2 DIABETES MELLITUS WITHOUT COMPLICATIONS: ICD-10-CM

## 2022-06-05 DIAGNOSIS — N39.0 URINARY TRACT INFECTION, SITE NOT SPECIFIED: ICD-10-CM

## 2022-06-05 LAB
ALBUMIN SERPL ELPH-MCNC: 3 G/DL — LOW (ref 3.3–5)
ALP SERPL-CCNC: 92 U/L — SIGNIFICANT CHANGE UP (ref 40–120)
ALT FLD-CCNC: 43 U/L — SIGNIFICANT CHANGE UP (ref 12–78)
ANION GAP SERPL CALC-SCNC: 6 MMOL/L — SIGNIFICANT CHANGE UP (ref 5–17)
APPEARANCE UR: CLEAR — SIGNIFICANT CHANGE UP
AST SERPL-CCNC: 40 U/L — HIGH (ref 15–37)
BACTERIA # UR AUTO: ABNORMAL
BASOPHILS # BLD AUTO: 0.05 K/UL — SIGNIFICANT CHANGE UP (ref 0–0.2)
BASOPHILS NFR BLD AUTO: 0.4 % — SIGNIFICANT CHANGE UP (ref 0–2)
BILIRUB SERPL-MCNC: 0.5 MG/DL — SIGNIFICANT CHANGE UP (ref 0.2–1.2)
BILIRUB UR-MCNC: NEGATIVE — SIGNIFICANT CHANGE UP
BUN SERPL-MCNC: 25 MG/DL — HIGH (ref 7–23)
CALCIUM SERPL-MCNC: 9.1 MG/DL — SIGNIFICANT CHANGE UP (ref 8.5–10.1)
CHLORIDE SERPL-SCNC: 103 MMOL/L — SIGNIFICANT CHANGE UP (ref 96–108)
CO2 SERPL-SCNC: 25 MMOL/L — SIGNIFICANT CHANGE UP (ref 22–31)
COLOR SPEC: YELLOW — SIGNIFICANT CHANGE UP
CREAT SERPL-MCNC: 0.99 MG/DL — SIGNIFICANT CHANGE UP (ref 0.5–1.3)
DIFF PNL FLD: ABNORMAL
EGFR: 60 ML/MIN/1.73M2 — SIGNIFICANT CHANGE UP
EOSINOPHIL # BLD AUTO: 0.03 K/UL — SIGNIFICANT CHANGE UP (ref 0–0.5)
EOSINOPHIL NFR BLD AUTO: 0.2 % — SIGNIFICANT CHANGE UP (ref 0–6)
EPI CELLS # UR: SIGNIFICANT CHANGE UP
FLUAV AG NPH QL: SIGNIFICANT CHANGE UP
FLUBV AG NPH QL: SIGNIFICANT CHANGE UP
GLUCOSE BLDC GLUCOMTR-MCNC: 75 MG/DL — SIGNIFICANT CHANGE UP (ref 70–99)
GLUCOSE SERPL-MCNC: 156 MG/DL — HIGH (ref 70–99)
GLUCOSE UR QL: NEGATIVE MG/DL — SIGNIFICANT CHANGE UP
HCT VFR BLD CALC: 41.2 % — SIGNIFICANT CHANGE UP (ref 34.5–45)
HGB BLD-MCNC: 13.1 G/DL — SIGNIFICANT CHANGE UP (ref 11.5–15.5)
IMM GRANULOCYTES NFR BLD AUTO: 0.4 % — SIGNIFICANT CHANGE UP (ref 0–1.5)
KETONES UR-MCNC: NEGATIVE — SIGNIFICANT CHANGE UP
LACTATE SERPL-SCNC: 0.8 MMOL/L — SIGNIFICANT CHANGE UP (ref 0.7–2)
LEUKOCYTE ESTERASE UR-ACNC: ABNORMAL
LYMPHOCYTES # BLD AUTO: 1.03 K/UL — SIGNIFICANT CHANGE UP (ref 1–3.3)
LYMPHOCYTES # BLD AUTO: 7.5 % — LOW (ref 13–44)
MCHC RBC-ENTMCNC: 27.9 PG — SIGNIFICANT CHANGE UP (ref 27–34)
MCHC RBC-ENTMCNC: 31.8 G/DL — LOW (ref 32–36)
MCV RBC AUTO: 87.7 FL — SIGNIFICANT CHANGE UP (ref 80–100)
MONOCYTES # BLD AUTO: 0.79 K/UL — SIGNIFICANT CHANGE UP (ref 0–0.9)
MONOCYTES NFR BLD AUTO: 5.7 % — SIGNIFICANT CHANGE UP (ref 2–14)
NEUTROPHILS # BLD AUTO: 11.86 K/UL — HIGH (ref 1.8–7.4)
NEUTROPHILS NFR BLD AUTO: 85.8 % — HIGH (ref 43–77)
NITRITE UR-MCNC: POSITIVE
NRBC # BLD: 0 /100 WBCS — SIGNIFICANT CHANGE UP (ref 0–0)
PH UR: 7 — SIGNIFICANT CHANGE UP (ref 5–8)
PLATELET # BLD AUTO: 254 K/UL — SIGNIFICANT CHANGE UP (ref 150–400)
POTASSIUM SERPL-MCNC: 5.3 MMOL/L — SIGNIFICANT CHANGE UP (ref 3.5–5.3)
POTASSIUM SERPL-SCNC: 5.3 MMOL/L — SIGNIFICANT CHANGE UP (ref 3.5–5.3)
PROT SERPL-MCNC: 7.4 GM/DL — SIGNIFICANT CHANGE UP (ref 6–8.3)
PROT UR-MCNC: 30 MG/DL
RBC # BLD: 4.7 M/UL — SIGNIFICANT CHANGE UP (ref 3.8–5.2)
RBC # FLD: 13.2 % — SIGNIFICANT CHANGE UP (ref 10.3–14.5)
RBC CASTS # UR COMP ASSIST: ABNORMAL /HPF (ref 0–4)
SARS-COV-2 RNA SPEC QL NAA+PROBE: SIGNIFICANT CHANGE UP
SODIUM SERPL-SCNC: 134 MMOL/L — LOW (ref 135–145)
SP GR SPEC: 1 — LOW (ref 1.01–1.02)
UROBILINOGEN FLD QL: NEGATIVE MG/DL — SIGNIFICANT CHANGE UP
WBC # BLD: 13.81 K/UL — HIGH (ref 3.8–10.5)
WBC # FLD AUTO: 13.81 K/UL — HIGH (ref 3.8–10.5)
WBC UR QL: SIGNIFICANT CHANGE UP

## 2022-06-05 PROCEDURE — 70450 CT HEAD/BRAIN W/O DYE: CPT | Mod: 26,MC

## 2022-06-05 PROCEDURE — 71045 X-RAY EXAM CHEST 1 VIEW: CPT | Mod: 26

## 2022-06-05 PROCEDURE — 99285 EMERGENCY DEPT VISIT HI MDM: CPT

## 2022-06-05 RX ORDER — INSULIN GLARGINE 100 [IU]/ML
15 INJECTION, SOLUTION SUBCUTANEOUS AT BEDTIME
Refills: 0 | Status: DISCONTINUED | OUTPATIENT
Start: 2022-06-05 | End: 2022-06-13

## 2022-06-05 RX ORDER — GABAPENTIN 400 MG/1
100 CAPSULE ORAL THREE TIMES A DAY
Refills: 0 | Status: DISCONTINUED | OUTPATIENT
Start: 2022-06-05 | End: 2022-06-06

## 2022-06-05 RX ORDER — TICAGRELOR 90 MG/1
90 TABLET ORAL AT BEDTIME
Refills: 0 | Status: DISCONTINUED | OUTPATIENT
Start: 2022-06-05 | End: 2022-06-13

## 2022-06-05 RX ORDER — INSULIN LISPRO 100/ML
VIAL (ML) SUBCUTANEOUS
Refills: 0 | Status: DISCONTINUED | OUTPATIENT
Start: 2022-06-05 | End: 2022-06-13

## 2022-06-05 RX ORDER — CEFTRIAXONE 500 MG/1
1000 INJECTION, POWDER, FOR SOLUTION INTRAMUSCULAR; INTRAVENOUS ONCE
Refills: 0 | Status: COMPLETED | OUTPATIENT
Start: 2022-06-05 | End: 2022-06-05

## 2022-06-05 RX ORDER — CEFTRIAXONE 500 MG/1
1000 INJECTION, POWDER, FOR SOLUTION INTRAMUSCULAR; INTRAVENOUS EVERY 24 HOURS
Refills: 0 | Status: COMPLETED | OUTPATIENT
Start: 2022-06-05 | End: 2022-06-12

## 2022-06-05 RX ORDER — RANOLAZINE 500 MG/1
500 TABLET, FILM COATED, EXTENDED RELEASE ORAL
Refills: 0 | Status: DISCONTINUED | OUTPATIENT
Start: 2022-06-05 | End: 2022-06-13

## 2022-06-05 RX ORDER — ASPIRIN/CALCIUM CARB/MAGNESIUM 324 MG
81 TABLET ORAL DAILY
Refills: 0 | Status: DISCONTINUED | OUTPATIENT
Start: 2022-06-05 | End: 2022-06-13

## 2022-06-05 RX ORDER — GLUCAGON INJECTION, SOLUTION 0.5 MG/.1ML
1 INJECTION, SOLUTION SUBCUTANEOUS ONCE
Refills: 0 | Status: DISCONTINUED | OUTPATIENT
Start: 2022-06-05 | End: 2022-06-13

## 2022-06-05 RX ORDER — DEXTROSE 50 % IN WATER 50 %
25 SYRINGE (ML) INTRAVENOUS ONCE
Refills: 0 | Status: DISCONTINUED | OUTPATIENT
Start: 2022-06-05 | End: 2022-06-13

## 2022-06-05 RX ORDER — DEXTROSE 50 % IN WATER 50 %
12.5 SYRINGE (ML) INTRAVENOUS ONCE
Refills: 0 | Status: DISCONTINUED | OUTPATIENT
Start: 2022-06-05 | End: 2022-06-13

## 2022-06-05 RX ORDER — DEXTROSE 50 % IN WATER 50 %
15 SYRINGE (ML) INTRAVENOUS ONCE
Refills: 0 | Status: DISCONTINUED | OUTPATIENT
Start: 2022-06-05 | End: 2022-06-13

## 2022-06-05 RX ORDER — METOPROLOL TARTRATE 50 MG
25 TABLET ORAL DAILY
Refills: 0 | Status: DISCONTINUED | OUTPATIENT
Start: 2022-06-05 | End: 2022-06-13

## 2022-06-05 RX ORDER — SODIUM CHLORIDE 9 MG/ML
1000 INJECTION, SOLUTION INTRAVENOUS
Refills: 0 | Status: DISCONTINUED | OUTPATIENT
Start: 2022-06-05 | End: 2022-06-13

## 2022-06-05 RX ORDER — ATORVASTATIN CALCIUM 80 MG/1
80 TABLET, FILM COATED ORAL AT BEDTIME
Refills: 0 | Status: DISCONTINUED | OUTPATIENT
Start: 2022-06-05 | End: 2022-06-13

## 2022-06-05 RX ORDER — LISINOPRIL 2.5 MG/1
2.5 TABLET ORAL DAILY
Refills: 0 | Status: DISCONTINUED | OUTPATIENT
Start: 2022-06-05 | End: 2022-06-05

## 2022-06-05 RX ORDER — ACETAMINOPHEN 500 MG
650 TABLET ORAL ONCE
Refills: 0 | Status: COMPLETED | OUTPATIENT
Start: 2022-06-05 | End: 2022-06-05

## 2022-06-05 RX ORDER — LISINOPRIL 2.5 MG/1
2.5 TABLET ORAL DAILY
Refills: 0 | Status: DISCONTINUED | OUTPATIENT
Start: 2022-06-05 | End: 2022-06-13

## 2022-06-05 RX ADMIN — Medication 650 MILLIGRAM(S): at 14:56

## 2022-06-05 RX ADMIN — CEFTRIAXONE 100 MILLIGRAM(S): 500 INJECTION, POWDER, FOR SOLUTION INTRAMUSCULAR; INTRAVENOUS at 16:11

## 2022-06-05 RX ADMIN — GABAPENTIN 100 MILLIGRAM(S): 400 CAPSULE ORAL at 21:14

## 2022-06-05 RX ADMIN — TICAGRELOR 90 MILLIGRAM(S): 90 TABLET ORAL at 21:14

## 2022-06-05 RX ADMIN — ATORVASTATIN CALCIUM 80 MILLIGRAM(S): 80 TABLET, FILM COATED ORAL at 21:14

## 2022-06-05 NOTE — ED ADULT NURSE NOTE - CHIEF COMPLAINT QUOTE
Altered and increase urination H/O DM , given lisinopril, metoprolol prior to leaving house, stopped DM x 5 days, speaks Faroese and daughter  Queta Joshi

## 2022-06-05 NOTE — H&P ADULT - NSHPLABSRESULTS_GEN_ALL_CORE
LABS:                        13.1   13.81 )-----------( 254      ( 2022 14:38 )             41.2     06-05    134<L>  |  103  |  25<H>  ----------------------------<  156<H>  5.3   |  25  |  0.99    Ca    9.1      2022 14:38    TPro  7.4  /  Alb  3.0<L>  /  TBili  0.5  /  DBili  x   /  AST  40<H>  /  ALT  43  /  AlkPhos  92  06-05      Urinalysis Basic - ( 2022 15:40 )    Color: Yellow / Appearance: Clear / S.005 / pH: x  Gluc: x / Ketone: Negative  / Bili: Negative / Urobili: Negative mg/dL   Blood: x / Protein: 30 mg/dL / Nitrite: Positive   Leuk Esterase: Small / RBC: 3-5 /HPF / WBC 3-5   Sq Epi: x / Non Sq Epi: Occasional / Bacteria: TNTC

## 2022-06-05 NOTE — ED PROVIDER NOTE - PROGRESS NOTE DETAILS
W/u significant for: + leukocytosis to 13.8, lactate WNL, UA w/ + infection. CT brain w/o acute pathology. Given IV abx. Will admit to medicine (d/w Dr Clark). Pt, daughter updated to results, admission. They understand / agree w/ this plan.

## 2022-06-05 NOTE — ED PROVIDER NOTE - OBJECTIVE STATEMENT
71F w/ PMH HTN, DM, CAD BIB family d/t AMS, fever, UTI sx x 1 day. Pt speaks Arabic, son at bedside. Per son, pt lives w/ him but was staying w/ sister over weekend. She noted pt to be less alert / responsive than usual, and spending longer than usual in bathroom, having to call out and go check on her. He reports pt BP was elevated this afternoon, , they gave her medication and BP improved. Pt FS also elevated to 300s. He states pt typically takes her own medications, but believes she has not taken appropriately over past day or two. Pt w/ fever to 101F on ED arrival. Endorses + mild dysuria. Denies CP, SOB, cough, N/V/D/C, h/a, dizziness.     PMH as above, PSH PCI, NKDA, meds as listed. Pt is vaccinated against COVID. + recent travel.

## 2022-06-05 NOTE — H&P ADULT - HISTORY OF PRESENT ILLNESS
71F w/ PMH HTN, DM, CAD s/p s tent  palcement Neuropathy  BIB family d/t AMS, fever, UTI sx x 1 day. Pt speaks Tajik, son at bedside. Per son, pt lives w/ him but was staying w/ sister over weekend. She noted pt to be less alert / responsive than usual, and spending longer than usual in bathroom, having to call out and go check on her. He reports pt BP was elevated this afternoon, , they gave her medication and BP improved. Pt FS also elevated to 300s. He states pt typically takes her own medications, but believes she has not taken appropriately over past day or two. Pt w/ fever to 101F on ED arrival. Endorses + mild dysuria. Denies CP, SOB, cough, N/V/D/C, h/a, dizziness.     	PMH as above, PSH PCI, NKDA, meds as listed. Pt is vaccinated against COVID. + recent travel.    discussed  with  pt's  daughter  at  bedside  confirms  above  Hx   patient  with  leucocytosis  suspected  uti  admitted  for  further w/u  and  Rx

## 2022-06-05 NOTE — ED ADULT TRIAGE NOTE - CHIEF COMPLAINT QUOTE
Altered and increase urination H/O DM , given lisinopril, metoprolol prior to leaving house, stopped DM x 5 days, speaks Yi and daughter  Queta Joshi

## 2022-06-05 NOTE — H&P ADULT - NSHPPHYSICALEXAM_GEN_ALL_CORE
PHYSICAL EXAM:    GENERAL: NAD, well-groomed, well-developed  HEAD:  Atraumatic, Normocephalic  EYES: EOMI, PERRLA, conjunctiva and sclera clear  ENMT: No tonsillar erythema, exudates, or enlargement; Moist mucous membranes, , No lesions  NECK: Supple, No JVD, Normal thyroid  NERVOUS SYSTEM:  Alert & Oriented   no  foal deficits  CHEST/LUNG: Clear  bilaterally; No rales, rhonchi, wheezing, or rubs  HEART: Regular rate and rhythm; No murmurs, rubs, or gallops  ABDOMEN: Soft, mild  tenderness    LLQ Nondistended  no  guarding    EXTREMITIES:  + Peripheral Pulses, No clubbing, cyanosis, or edema  LYMPH: No lymphadenopathy noted   RECTAL: deferred     SKIN: No rashes or lesions

## 2022-06-05 NOTE — ED PROVIDER NOTE - CLINICAL SUMMARY MEDICAL DECISION MAKING FREE TEXT BOX
71F w/ PMH HTN, DM, CAD BIB family d/t fever, AMS and UTI sx x 1 day. + febrile on ED arrival. Well appearing, in NAD. Plan: Sepsis w/u + CT brain. Re-eval. 71F w/ PMH HTN, DM, CAD BIB family d/t fever, AMS and UTI sx x 1 day. + febrile on ED arrival. Well appearing, in NAD. Plan: Sepsis w/u + CT brain. Re-eval. Anticipate admission.

## 2022-06-05 NOTE — H&P ADULT - ASSESSMENT
IMPROVE VTE Individual Risk Assessment    RISK                                                                Points    [  ] Previous VTE                                                  3    [  ] Thrombophilia                                               2    [  ] Lower limb paralysis                                      2        (unable to hold up >15 seconds)      [  ] Current Cancer                                              2         (within 6 months)    [x  ] Immobilization > 24 hrs                                1    [  ] ICU/CCU stay > 24 hours                              1    [  x] Age > 60                                                      1    IMPROVE VTE Score __2_______    IMPROVE Score 0-1: Low Risk, No VTE prophylaxis required for most patients, encourage ambulation.   IMPROVE Score 2-3: At risk, pharmacologic VTE prophylaxis is indicated for most patients (in the absence of a contraindication)  IMPROVE Score > or = 4: High Risk, pharmacologic VTE prophylaxis is indicated for most patients (in the absence of a contraindication)

## 2022-06-06 LAB
A1C WITH ESTIMATED AVERAGE GLUCOSE RESULT: 8.4 % — HIGH (ref 4–5.6)
ALBUMIN SERPL ELPH-MCNC: 3 G/DL — LOW (ref 3.3–5)
ALP SERPL-CCNC: 89 U/L — SIGNIFICANT CHANGE UP (ref 40–120)
ALT FLD-CCNC: 43 U/L — SIGNIFICANT CHANGE UP (ref 12–78)
ANION GAP SERPL CALC-SCNC: 9 MMOL/L — SIGNIFICANT CHANGE UP (ref 5–17)
AST SERPL-CCNC: 43 U/L — HIGH (ref 15–37)
BILIRUB SERPL-MCNC: 0.5 MG/DL — SIGNIFICANT CHANGE UP (ref 0.2–1.2)
BUN SERPL-MCNC: 24 MG/DL — HIGH (ref 7–23)
CALCIUM SERPL-MCNC: 8.9 MG/DL — SIGNIFICANT CHANGE UP (ref 8.5–10.1)
CHLORIDE SERPL-SCNC: 100 MMOL/L — SIGNIFICANT CHANGE UP (ref 96–108)
CK SERPL-CCNC: 41 U/L — SIGNIFICANT CHANGE UP (ref 26–192)
CO2 SERPL-SCNC: 28 MMOL/L — SIGNIFICANT CHANGE UP (ref 22–31)
CREAT SERPL-MCNC: 1.01 MG/DL — SIGNIFICANT CHANGE UP (ref 0.5–1.3)
CULTURE RESULTS: SIGNIFICANT CHANGE UP
EGFR: 59 ML/MIN/1.73M2 — LOW
ESTIMATED AVERAGE GLUCOSE: 194 MG/DL — HIGH (ref 68–114)
GLUCOSE BLDC GLUCOMTR-MCNC: 112 MG/DL — HIGH (ref 70–99)
GLUCOSE BLDC GLUCOMTR-MCNC: 123 MG/DL — HIGH (ref 70–99)
GLUCOSE BLDC GLUCOMTR-MCNC: 176 MG/DL — HIGH (ref 70–99)
GLUCOSE BLDC GLUCOMTR-MCNC: 274 MG/DL — HIGH (ref 70–99)
GLUCOSE SERPL-MCNC: 139 MG/DL — HIGH (ref 70–99)
HCT VFR BLD CALC: 41.4 % — SIGNIFICANT CHANGE UP (ref 34.5–45)
HGB BLD-MCNC: 13 G/DL — SIGNIFICANT CHANGE UP (ref 11.5–15.5)
MCHC RBC-ENTMCNC: 27.8 PG — SIGNIFICANT CHANGE UP (ref 27–34)
MCHC RBC-ENTMCNC: 31.4 G/DL — LOW (ref 32–36)
MCV RBC AUTO: 88.5 FL — SIGNIFICANT CHANGE UP (ref 80–100)
NRBC # BLD: 0 /100 WBCS — SIGNIFICANT CHANGE UP (ref 0–0)
PLATELET # BLD AUTO: 235 K/UL — SIGNIFICANT CHANGE UP (ref 150–400)
POTASSIUM SERPL-MCNC: 4.5 MMOL/L — SIGNIFICANT CHANGE UP (ref 3.5–5.3)
POTASSIUM SERPL-SCNC: 4.5 MMOL/L — SIGNIFICANT CHANGE UP (ref 3.5–5.3)
PROT SERPL-MCNC: 7.4 GM/DL — SIGNIFICANT CHANGE UP (ref 6–8.3)
RBC # BLD: 4.68 M/UL — SIGNIFICANT CHANGE UP (ref 3.8–5.2)
RBC # FLD: 13.2 % — SIGNIFICANT CHANGE UP (ref 10.3–14.5)
SODIUM SERPL-SCNC: 137 MMOL/L — SIGNIFICANT CHANGE UP (ref 135–145)
SPECIMEN SOURCE: SIGNIFICANT CHANGE UP
WBC # BLD: 12.24 K/UL — HIGH (ref 3.8–10.5)
WBC # FLD AUTO: 12.24 K/UL — HIGH (ref 3.8–10.5)

## 2022-06-06 PROCEDURE — 76770 US EXAM ABDO BACK WALL COMP: CPT | Mod: 26

## 2022-06-06 RX ORDER — BUDESONIDE AND FORMOTEROL FUMARATE DIHYDRATE 160; 4.5 UG/1; UG/1
2 AEROSOL RESPIRATORY (INHALATION)
Refills: 0 | Status: DISCONTINUED | OUTPATIENT
Start: 2022-06-06 | End: 2022-06-13

## 2022-06-06 RX ORDER — INFLUENZA VIRUS VACCINE 15; 15; 15; 15 UG/.5ML; UG/.5ML; UG/.5ML; UG/.5ML
0.7 SUSPENSION INTRAMUSCULAR ONCE
Refills: 0 | Status: DISCONTINUED | OUTPATIENT
Start: 2022-06-06 | End: 2022-06-13

## 2022-06-06 RX ORDER — TIOTROPIUM BROMIDE 18 UG/1
1 CAPSULE ORAL; RESPIRATORY (INHALATION) DAILY
Refills: 0 | Status: DISCONTINUED | OUTPATIENT
Start: 2022-06-06 | End: 2022-06-13

## 2022-06-06 RX ADMIN — ATORVASTATIN CALCIUM 80 MILLIGRAM(S): 80 TABLET, FILM COATED ORAL at 22:01

## 2022-06-06 RX ADMIN — Medication 50 MILLIGRAM(S): at 14:49

## 2022-06-06 RX ADMIN — INSULIN GLARGINE 15 UNIT(S): 100 INJECTION, SOLUTION SUBCUTANEOUS at 22:01

## 2022-06-06 RX ADMIN — Medication 25 MILLIGRAM(S): at 05:11

## 2022-06-06 RX ADMIN — Medication 3: at 12:41

## 2022-06-06 RX ADMIN — Medication 81 MILLIGRAM(S): at 12:49

## 2022-06-06 RX ADMIN — BUDESONIDE AND FORMOTEROL FUMARATE DIHYDRATE 2 PUFF(S): 160; 4.5 AEROSOL RESPIRATORY (INHALATION) at 18:26

## 2022-06-06 RX ADMIN — CEFTRIAXONE 100 MILLIGRAM(S): 500 INJECTION, POWDER, FOR SOLUTION INTRAMUSCULAR; INTRAVENOUS at 12:49

## 2022-06-06 RX ADMIN — RANOLAZINE 500 MILLIGRAM(S): 500 TABLET, FILM COATED, EXTENDED RELEASE ORAL at 05:11

## 2022-06-06 RX ADMIN — LISINOPRIL 2.5 MILLIGRAM(S): 2.5 TABLET ORAL at 05:11

## 2022-06-06 RX ADMIN — TICAGRELOR 90 MILLIGRAM(S): 90 TABLET ORAL at 22:01

## 2022-06-06 RX ADMIN — TIOTROPIUM BROMIDE 1 CAPSULE(S): 18 CAPSULE ORAL; RESPIRATORY (INHALATION) at 18:26

## 2022-06-06 RX ADMIN — RANOLAZINE 500 MILLIGRAM(S): 500 TABLET, FILM COATED, EXTENDED RELEASE ORAL at 18:26

## 2022-06-06 RX ADMIN — GABAPENTIN 100 MILLIGRAM(S): 400 CAPSULE ORAL at 05:11

## 2022-06-06 NOTE — PROGRESS NOTE ADULT - SUBJECTIVE AND OBJECTIVE BOX
INTERVAL HPI/OVERNIGHT EVENTS:        REVIEW OF SYSTEMS:  CONSTITUTIONAL:  feels  well no  complaints via son translating  on phone)    NECK: No pain or stiffnes  RESPIRATORY: No SOB   CARDIOVASCULAR: No chest pain, palpitations, dizziness,   GASTROINTESTINAL: No abdominal pain. No nausea, vomiting,   NEUROLOGICAL: No headaches, no  blurry  vision no  dizziness  SKIN: No itching,   MUSCULOSKELETAL: No pain    MEDICATION:  aspirin enteric coated 81 milliGRAM(s) Oral daily  atorvastatin 80 milliGRAM(s) Oral at bedtime  cefTRIAXone   IVPB 1000 milliGRAM(s) IV Intermittent every 24 hours  dextrose 5%. 1000 milliLiter(s) IV Continuous <Continuous>  dextrose 5%. 1000 milliLiter(s) IV Continuous <Continuous>  dextrose 50% Injectable 25 Gram(s) IV Push once  dextrose 50% Injectable 12.5 Gram(s) IV Push once  dextrose 50% Injectable 25 Gram(s) IV Push once  dextrose Oral Gel 15 Gram(s) Oral once PRN  gabapentin 100 milliGRAM(s) Oral three times a day  glucagon  Injectable 1 milliGRAM(s) IntraMuscular once  influenza  Vaccine (HIGH DOSE) 0.7 milliLiter(s) IntraMuscular once  insulin glargine Injectable (LANTUS) 15 Unit(s) SubCutaneous at bedtime  insulin lispro (ADMELOG) corrective regimen sliding scale   SubCutaneous three times a day before meals  lisinopril 2.5 milliGRAM(s) Oral daily  metoprolol succinate ER 25 milliGRAM(s) Oral daily  pregabalin 50 milliGRAM(s) Oral daily  ranolazine 500 milliGRAM(s) Oral two times a day  ticagrelor 90 milliGRAM(s) Oral at bedtime    Vital Signs Last 24 Hrs  T(C): 37.2 (2022 05:06), Max: 38.4 (2022 13:09)  T(F): 99 (2022 05:06), Max: 101.1 (2022 13:09)  HR: 72 (2022 05:06) (62 - 79)  BP: 153/81 (2022 05:06) (107/62 - 153/81)  BP(mean): --  RR: 18 (2022 05:06) (17 - 20)  SpO2: 95% (2022 05:06) (95% - 98%)    PHYSICAL EXAM:  GENERAL: NAD, well-groomed, well-developed  HEENT : Conjuntivae  clear sclerae anicteric  NECK: Supple, No JVD, Normal thyroid  NERVOUS SYSTEM:  Alert oriented   no  focal  deficits;   CHEST/LUNG: Clear    HEART: Regular rate and rhythm; No murmurs, rubs, or gallops  ABDOMEN: Soft, Nontender, Nondistended; Bowel sounds present  EXTREMITIES:  no  edema no  tenderness  SKIN: No rashes   LABS:                        13.0   12.24 )-----------( 235      ( 2022 06:32 )             41.4         137  |  100  |  24<H>  ----------------------------<  139<H>  4.5   |  28  |  1.01    Ca    8.9      2022 06:32    TPro  7.4  /  Alb  3.0<L>  /  TBili  0.5  /  DBili  x   /  AST  43<H>  /  ALT  43  /  AlkPhos  89        Urinalysis Basic - ( 2022 15:40 )    Color: Yellow / Appearance: Clear / S.005 / pH: x  Gluc: x / Ketone: Negative  / Bili: Negative / Urobili: Negative mg/dL   Blood: x / Protein: 30 mg/dL / Nitrite: Positive   Leuk Esterase: Small / RBC: 3-5 /HPF / WBC 3-5   Sq Epi: x / Non Sq Epi: Occasional / Bacteria: TNTC      CAPILLARY BLOOD GLUCOSE      POCT Blood Glucose.: 112 mg/dL (2022 07:52)  POCT Blood Glucose.: 75 mg/dL (2022 21:23)      RADIOLOGY & ADDITIONAL TESTS:    Imaging reports  Personally Reviewed:  [ ] YES  [ ] NO    Consultant(s) Notes Reviewed:  [x ] YES  [ ] NO    Care Discussed with Consultants/Other Providers [x ] YES  [ ] NO  Problem/Plan - 1:  ·  Problem: AMS (altered mental status).   ·  Plan: check  tfts  urine  c/s. for  kidney bladder  sono    Problem/Plan - 2:  ·  Problem: Acute UTI.   ·  Plan: rocephin  empirically  check  kidney bladder  sono.    Problem/Plan - 3:  ·  Problem: CAD S/P percutaneous coronary angioplasty.   ·  Plan: brillinta asa metoprolol ranexa.    Problem/Plan - 4:  ·  Problem: HTN (hypertension).   ·  Plan: metoprolol  lisinopril.    Problem/Plan - 5:  ·  Problem: Acute UTI.   ·  Plan: rocephin kidney  bladder  sono.    Problem/Plan - 6:  ·  Problem: DM (diabetes mellitus).   ·  Plan: lantus  short  acting insulin  coverage  hold  metformin.    Problem/Plan - 7:  ·  Problem: Neuropathy.   ·  Plan: lyrica.

## 2022-06-06 NOTE — PATIENT PROFILE ADULT - FALL HARM RISK - HARM RISK INTERVENTIONS

## 2022-06-06 NOTE — PATIENT PROFILE ADULT - TELEPHONIC ID NUMBER OF THE INTERPRETER
Norah called clinic stating that telephone visit with Dr. Watts on Friday 3/5/21 at 1100 will work for her. She stated that she was calling to let DEBBIE Alcantar know that. Melisa Hutchins RN,BSN,OCN     [place]

## 2022-06-06 NOTE — PATIENT PROFILE ADULT - NSPROPOAURINARYCATHETER_GEN_A_NUR
no Followup with your Primary Care doctor and Pulmonologist, Dr. Maravilla ([phone])    Followup with Dr. Dunn, has percutaneous pinning and would need to followup in 2weeks for dressing change and pins will stay in 4 wks ([phone])

## 2022-06-07 LAB
ALBUMIN SERPL ELPH-MCNC: 2.6 G/DL — LOW (ref 3.3–5)
ALP SERPL-CCNC: 77 U/L — SIGNIFICANT CHANGE UP (ref 40–120)
ALT FLD-CCNC: 37 U/L — SIGNIFICANT CHANGE UP (ref 12–78)
ANION GAP SERPL CALC-SCNC: 7 MMOL/L — SIGNIFICANT CHANGE UP (ref 5–17)
AST SERPL-CCNC: 42 U/L — HIGH (ref 15–37)
BILIRUB SERPL-MCNC: 0.5 MG/DL — SIGNIFICANT CHANGE UP (ref 0.2–1.2)
BUN SERPL-MCNC: 28 MG/DL — HIGH (ref 7–23)
CALCIUM SERPL-MCNC: 8.6 MG/DL — SIGNIFICANT CHANGE UP (ref 8.5–10.1)
CHLORIDE SERPL-SCNC: 102 MMOL/L — SIGNIFICANT CHANGE UP (ref 96–108)
CO2 SERPL-SCNC: 27 MMOL/L — SIGNIFICANT CHANGE UP (ref 22–31)
CREAT SERPL-MCNC: 1.2 MG/DL — SIGNIFICANT CHANGE UP (ref 0.5–1.3)
EGFR: 48 ML/MIN/1.73M2 — LOW
GLUCOSE BLDC GLUCOMTR-MCNC: 155 MG/DL — HIGH (ref 70–99)
GLUCOSE BLDC GLUCOMTR-MCNC: 188 MG/DL — HIGH (ref 70–99)
GLUCOSE BLDC GLUCOMTR-MCNC: 189 MG/DL — HIGH (ref 70–99)
GLUCOSE BLDC GLUCOMTR-MCNC: 280 MG/DL — HIGH (ref 70–99)
GLUCOSE SERPL-MCNC: 150 MG/DL — HIGH (ref 70–99)
HCT VFR BLD CALC: 37.4 % — SIGNIFICANT CHANGE UP (ref 34.5–45)
HGB BLD-MCNC: 11.9 G/DL — SIGNIFICANT CHANGE UP (ref 11.5–15.5)
MCHC RBC-ENTMCNC: 28.2 PG — SIGNIFICANT CHANGE UP (ref 27–34)
MCHC RBC-ENTMCNC: 31.8 G/DL — LOW (ref 32–36)
MCV RBC AUTO: 88.6 FL — SIGNIFICANT CHANGE UP (ref 80–100)
NRBC # BLD: 0 /100 WBCS — SIGNIFICANT CHANGE UP (ref 0–0)
PLATELET # BLD AUTO: 205 K/UL — SIGNIFICANT CHANGE UP (ref 150–400)
POTASSIUM SERPL-MCNC: 4.3 MMOL/L — SIGNIFICANT CHANGE UP (ref 3.5–5.3)
POTASSIUM SERPL-SCNC: 4.3 MMOL/L — SIGNIFICANT CHANGE UP (ref 3.5–5.3)
PROT SERPL-MCNC: 6.8 GM/DL — SIGNIFICANT CHANGE UP (ref 6–8.3)
RBC # BLD: 4.22 M/UL — SIGNIFICANT CHANGE UP (ref 3.8–5.2)
RBC # FLD: 13.2 % — SIGNIFICANT CHANGE UP (ref 10.3–14.5)
SODIUM SERPL-SCNC: 136 MMOL/L — SIGNIFICANT CHANGE UP (ref 135–145)
WBC # BLD: 12.57 K/UL — HIGH (ref 3.8–10.5)
WBC # FLD AUTO: 12.57 K/UL — HIGH (ref 3.8–10.5)

## 2022-06-07 RX ORDER — LACTOBACILLUS ACIDOPHILUS 100MM CELL
1 CAPSULE ORAL
Refills: 0 | Status: DISCONTINUED | OUTPATIENT
Start: 2022-06-07 | End: 2022-06-13

## 2022-06-07 RX ADMIN — RANOLAZINE 500 MILLIGRAM(S): 500 TABLET, FILM COATED, EXTENDED RELEASE ORAL at 17:26

## 2022-06-07 RX ADMIN — Medication 1: at 17:24

## 2022-06-07 RX ADMIN — TIOTROPIUM BROMIDE 1 CAPSULE(S): 18 CAPSULE ORAL; RESPIRATORY (INHALATION) at 12:14

## 2022-06-07 RX ADMIN — Medication 1: at 12:05

## 2022-06-07 RX ADMIN — Medication 50 MILLIGRAM(S): at 12:14

## 2022-06-07 RX ADMIN — Medication 1: at 08:41

## 2022-06-07 RX ADMIN — Medication 81 MILLIGRAM(S): at 12:14

## 2022-06-07 RX ADMIN — CEFTRIAXONE 100 MILLIGRAM(S): 500 INJECTION, POWDER, FOR SOLUTION INTRAMUSCULAR; INTRAVENOUS at 12:14

## 2022-06-07 RX ADMIN — Medication 25 MILLIGRAM(S): at 05:21

## 2022-06-07 RX ADMIN — ATORVASTATIN CALCIUM 80 MILLIGRAM(S): 80 TABLET, FILM COATED ORAL at 21:15

## 2022-06-07 RX ADMIN — TICAGRELOR 90 MILLIGRAM(S): 90 TABLET ORAL at 21:16

## 2022-06-07 RX ADMIN — RANOLAZINE 500 MILLIGRAM(S): 500 TABLET, FILM COATED, EXTENDED RELEASE ORAL at 05:21

## 2022-06-07 RX ADMIN — INSULIN GLARGINE 15 UNIT(S): 100 INJECTION, SOLUTION SUBCUTANEOUS at 21:16

## 2022-06-07 RX ADMIN — BUDESONIDE AND FORMOTEROL FUMARATE DIHYDRATE 2 PUFF(S): 160; 4.5 AEROSOL RESPIRATORY (INHALATION) at 17:24

## 2022-06-07 RX ADMIN — BUDESONIDE AND FORMOTEROL FUMARATE DIHYDRATE 2 PUFF(S): 160; 4.5 AEROSOL RESPIRATORY (INHALATION) at 05:22

## 2022-06-07 RX ADMIN — LISINOPRIL 2.5 MILLIGRAM(S): 2.5 TABLET ORAL at 05:21

## 2022-06-07 NOTE — PHYSICAL THERAPY INITIAL EVALUATION ADULT - BALANCE TRAINING, PT EVAL
Pt will increase static/dynamic standing balance to good- to perform all functional mobility without LOB, by 2 weeks
no

## 2022-06-07 NOTE — PHYSICAL THERAPY INITIAL EVALUATION ADULT - ADDITIONAL COMMENTS
Per Pt, she ambulates with cane at home. Usually perform most ADL's without assist, requires assist for showering. Pt lives with son in private house.

## 2022-06-07 NOTE — PHYSICAL THERAPY INITIAL EVALUATION ADULT - PERTINENT HX OF CURRENT PROBLEM, REHAB EVAL
Pt vomited 2-3L approx of emesis into basin. Pt cleaned up, stated he feels 
much better. Per H&P, Pt is a 71F w/ PMH HTN, DM, CAD s/p stent  placement Neuropathy  BIB family d/t AMS, fever, UTI sx x 1 day.

## 2022-06-07 NOTE — PROGRESS NOTE ADULT - SUBJECTIVE AND OBJECTIVE BOX
INTERVAL HPI/OVERNIGHT EVENTS:        REVIEW OF SYSTEMS:  CONSTITUTIONAL: feels  improved no  complaints    NECK: No pain or stiffnes  RESPIRATORY: No SOB   CARDIOVASCULAR: No chest pain, palpitations, dizziness,   GASTROINTESTINAL: No abdominal pain. No nausea, vomiting,   NEUROLOGICAL: No headaches, no  blurry  vision no  dizziness  SKIN: No itching,   MUSCULOSKELETAL: No pain    MEDICATION:  aspirin enteric coated 81 milliGRAM(s) Oral daily  atorvastatin 80 milliGRAM(s) Oral at bedtime  budesonide  80 MICROgram(s)/formoterol 4.5 MICROgram(s) Inhaler 2 Puff(s) Inhalation two times a day  cefTRIAXone   IVPB 1000 milliGRAM(s) IV Intermittent every 24 hours  dextrose 5%. 1000 milliLiter(s) IV Continuous <Continuous>  dextrose 5%. 1000 milliLiter(s) IV Continuous <Continuous>  dextrose 50% Injectable 25 Gram(s) IV Push once  dextrose 50% Injectable 12.5 Gram(s) IV Push once  dextrose 50% Injectable 25 Gram(s) IV Push once  dextrose Oral Gel 15 Gram(s) Oral once PRN  glucagon  Injectable 1 milliGRAM(s) IntraMuscular once  influenza  Vaccine (HIGH DOSE) 0.7 milliLiter(s) IntraMuscular once  insulin glargine Injectable (LANTUS) 15 Unit(s) SubCutaneous at bedtime  insulin lispro (ADMELOG) corrective regimen sliding scale   SubCutaneous three times a day before meals  lisinopril 2.5 milliGRAM(s) Oral daily  metoprolol succinate ER 25 milliGRAM(s) Oral daily  pregabalin 50 milliGRAM(s) Oral daily  ranolazine 500 milliGRAM(s) Oral two times a day  ticagrelor 90 milliGRAM(s) Oral at bedtime  tiotropium 18 MICROgram(s) Capsule 1 Capsule(s) Inhalation daily    Vital Signs Last 24 Hrs  T(C): 36.9 (2022 04:51), Max: 37.5 (2022 17:09)  T(F): 98.4 (2022 04:51), Max: 99.5 (2022 17:09)  HR: 70 (2022 04:51) (66 - 77)  BP: 135/79 (2022 04:51) (105/67 - 135/79)  BP(mean): --  RR: 18 (2022 04:51) (18 - 18)  SpO2: 95% (2022 04:51) (94% - 96%)    PHYSICAL EXAM:  GENERAL: NAD, well-groomed, well-developed  HEENT : Conjuntivae  clear sclerae anicteric  NECK: Supple, No JVD, Normal thyroid  NERVOUS SYSTEM:  Alert oriented   no  focal  deficits;   CHEST/LUNG: Clear    HEART: Regular rate and rhythm; No murmurs, rubs, or gallops  ABDOMEN: Soft, Nontender, Nondistended; Bowel sounds present  EXTREMITIES:  no  edema no  tenderness  SKIN: No rashes   LABS:                        11.9   12.57 )-----------( 205      ( 2022 07:13 )             37.4     06-    136  |  102  |  28<H>  ----------------------------<  150<H>  4.3   |  27  |  1.20    Ca    8.6      2022 07:13    TPro  6.8  /  Alb  2.6<L>  /  TBili  0.5  /  DBili  x   /  AST  42<H>  /  ALT  37  /  AlkPhos  77  06-07      Urinalysis Basic - ( 2022 15:40 )    Color: Yellow / Appearance: Clear / S.005 / pH: x  Gluc: x / Ketone: Negative  / Bili: Negative / Urobili: Negative mg/dL   Blood: x / Protein: 30 mg/dL / Nitrite: Positive   Leuk Esterase: Small / RBC: 3-5 /HPF / WBC 3-5   Sq Epi: x / Non Sq Epi: Occasional / Bacteria: TNTC      CAPILLARY BLOOD GLUCOSE      POCT Blood Glucose.: 155 mg/dL (2022 07:46)  POCT Blood Glucose.: 176 mg/dL (2022 21:05)  POCT Blood Glucose.: 123 mg/dL (2022 16:17)  POCT Blood Glucose.: 274 mg/dL (2022 11:16)      RADIOLOGY & ADDITIONAL TESTS:    Imaging reports  Personally Reviewed:  [ ] YES  [ ] NO    Consultant(s) Notes Reviewed:  [ ] YES  [ ] NO    Care Discussed with Consultants/Other Providers [x ] YES  [ ] NO  Problem/Plan - 1:  ·  Problem: AMS (altered mental status).   ·      Problem/Plan - 2:  ·  Problem: Acute UTI.   ·  Plan: continue  rocephin  empirically  for  ID  evaluation    Problem/Plan - 3:  ·  Problem: CAD S/P percutaneous coronary angioplasty.   ·  Plan: brillinta asa metoprolol ranexa.    Problem/Plan - 4:  ·  Problem: HTN (hypertension).   ·  Plan: metoprolol  lisinopril.    Problem/Plan - 6:  ·  Problem: DM (diabetes mellitus).   ·  Plan: lantus  short  acting insulin  coverage  hold  metformin.    Problem/Plan - 7:  ·  Problem: Neuropathy.   ·  Plan: lyrica.       INTERVAL HPI/OVERNIGHT EVENTS:        REVIEW OF SYSTEMS:  CONSTITUTIONAL: feels  improved no  complaints    NECK: No pain or stiffnes  RESPIRATORY: No SOB   CARDIOVASCULAR: No chest pain, palpitations, dizziness,   GASTROINTESTINAL: No abdominal pain. No nausea, vomiting,   NEUROLOGICAL: No headaches, no  blurry  vision no  dizziness  SKIN: No itching,   MUSCULOSKELETAL: No pain    MEDICATION:  aspirin enteric coated 81 milliGRAM(s) Oral daily  atorvastatin 80 milliGRAM(s) Oral at bedtime  budesonide  80 MICROgram(s)/formoterol 4.5 MICROgram(s) Inhaler 2 Puff(s) Inhalation two times a day  cefTRIAXone   IVPB 1000 milliGRAM(s) IV Intermittent every 24 hours  dextrose 5%. 1000 milliLiter(s) IV Continuous <Continuous>  dextrose 5%. 1000 milliLiter(s) IV Continuous <Continuous>  dextrose 50% Injectable 25 Gram(s) IV Push once  dextrose 50% Injectable 12.5 Gram(s) IV Push once  dextrose 50% Injectable 25 Gram(s) IV Push once  dextrose Oral Gel 15 Gram(s) Oral once PRN  glucagon  Injectable 1 milliGRAM(s) IntraMuscular once  influenza  Vaccine (HIGH DOSE) 0.7 milliLiter(s) IntraMuscular once  insulin glargine Injectable (LANTUS) 15 Unit(s) SubCutaneous at bedtime  insulin lispro (ADMELOG) corrective regimen sliding scale   SubCutaneous three times a day before meals  lisinopril 2.5 milliGRAM(s) Oral daily  metoprolol succinate ER 25 milliGRAM(s) Oral daily  pregabalin 50 milliGRAM(s) Oral daily  ranolazine 500 milliGRAM(s) Oral two times a day  ticagrelor 90 milliGRAM(s) Oral at bedtime  tiotropium 18 MICROgram(s) Capsule 1 Capsule(s) Inhalation daily    Vital Signs Last 24 Hrs  T(C): 36.9 (2022 04:51), Max: 37.5 (2022 17:09)  T(F): 98.4 (2022 04:51), Max: 99.5 (2022 17:09)  HR: 70 (2022 04:51) (66 - 77)  BP: 135/79 (2022 04:51) (105/67 - 135/79)  BP(mean): --  RR: 18 (2022 04:51) (18 - 18)  SpO2: 95% (2022 04:51) (94% - 96%)    PHYSICAL EXAM:  GENERAL: NAD, well-groomed, well-developed  HEENT : Conjuntivae  clear sclerae anicteric  NECK: Supple, No JVD, Normal thyroid  NERVOUS SYSTEM:  Alert oriented   no  focal  deficits;   CHEST/LUNG: Clear    HEART: Regular rate and rhythm; No murmurs, rubs, or gallops  ABDOMEN: Soft, Nontender, Nondistended; Bowel sounds present  EXTREMITIES:  no  edema no  tenderness  SKIN: No rashes   LABS:                        11.9   12.57 )-----------( 205      ( 2022 07:13 )             37.4     06-    136  |  102  |  28<H>  ----------------------------<  150<H>  4.3   |  27  |  1.20    Ca    8.6      2022 07:13    TPro  6.8  /  Alb  2.6<L>  /  TBili  0.5  /  DBili  x   /  AST  42<H>  /  ALT  37  /  AlkPhos  77  06-07      Urinalysis Basic - ( 2022 15:40 )    Color: Yellow / Appearance: Clear / S.005 / pH: x  Gluc: x / Ketone: Negative  / Bili: Negative / Urobili: Negative mg/dL   Blood: x / Protein: 30 mg/dL / Nitrite: Positive   Leuk Esterase: Small / RBC: 3-5 /HPF / WBC 3-5   Sq Epi: x / Non Sq Epi: Occasional / Bacteria: TNTC      CAPILLARY BLOOD GLUCOSE      POCT Blood Glucose.: 155 mg/dL (2022 07:46)  POCT Blood Glucose.: 176 mg/dL (2022 21:05)  POCT Blood Glucose.: 123 mg/dL (2022 16:17)  POCT Blood Glucose.: 274 mg/dL (2022 11:16)      RADIOLOGY & ADDITIONAL TESTS:    Imaging reports  Personally Reviewed:  [ ] YES  [ ] NO    Consultant(s) Notes Reviewed:  [ ] YES  [ ] NO    Care Discussed with Consultants/Other Providers [x ] YES  [ ] NO  Problem/Plan - 1:  ·  Problem: AMS (altered mental status).   ·  improved  with  AB  ivf    Problem/Plan - 2:  ·  Problem: Acute UTI.   ·  Plan: continue  rocephin  empirically  for  ID  evaluation    Problem/Plan - 3:  ·  Problem: CAD S/P percutaneous coronary angioplasty.   ·  Plan: brillinta asa metoprolol ranexa.    Problem/Plan - 4:  ·  Problem: HTN (hypertension).   ·  Plan: metoprolol  lisinopril.    Problem/Plan - 6:  ·  Problem: DM (diabetes mellitus).   ·  Plan: lantus  short  acting insulin  coverage  hold  metformin.    Problem/Plan - 7:  ·  Problem: Neuropathy.   ·  Plan: lyrica.

## 2022-06-07 NOTE — CONSULT NOTE ADULT - SUBJECTIVE AND OBJECTIVE BOX
HPI:  72 y/o female with hx of Asthma, DM, Peripheral Neuropathy, HTN, CAD, s/p Angioplasty and Stent placement,               Allergies:    No Known Drug Allergies  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 (Short breath)    Intolerances - none        Social History:  Tobacco: negative  Alcohol: negative  Recent Travel:  Immunizations:  Lives at home with her son          MEDICATIONS  (STANDING):  aspirin enteric coated 81 milliGRAM(s) Oral daily  atorvastatin 80 milliGRAM(s) Oral at bedtime  budesonide  80 MICROgram(s)/formoterol 4.5 MICROgram(s) Inhaler 2 Puff(s) Inhalation two times a day  cefTRIAXone   IVPB 1000 milliGRAM(s) IV Intermittent every 24 hours  dextrose 5%. 1000 milliLiter(s) (50 mL/Hr) IV Continuous <Continuous>  dextrose 5%. 1000 milliLiter(s) (100 mL/Hr) IV Continuous <Continuous>  dextrose 50% Injectable 25 Gram(s) IV Push once  dextrose 50% Injectable 12.5 Gram(s) IV Push once  dextrose 50% Injectable 25 Gram(s) IV Push once  glucagon  Injectable 1 milliGRAM(s) IntraMuscular once  influenza  Vaccine (HIGH DOSE) 0.7 milliLiter(s) IntraMuscular once  insulin glargine Injectable (LANTUS) 15 Unit(s) SubCutaneous at bedtime  insulin lispro (ADMELOG) corrective regimen sliding scale   SubCutaneous three times a day before meals  lisinopril 2.5 milliGRAM(s) Oral daily  metoprolol succinate ER 25 milliGRAM(s) Oral daily  pregabalin 50 milliGRAM(s) Oral daily  ranolazine 500 milliGRAM(s) Oral two times a day  ticagrelor 90 milliGRAM(s) Oral at bedtime  tiotropium 18 MICROgram(s) Capsule 1 Capsule(s) Inhalation daily    MEDICATIONS  (PRN):  dextrose Oral Gel 15 Gram(s) Oral once PRN Blood Glucose LESS THAN 70 milliGRAM(s)/deciliter      LABS:  CBC Full  -  ( 2022 07:13 )  WBC Count : 12.57 K/uL  RBC Count : 4.22 M/uL  Hemoglobin : 11.9 g/dL  Hematocrit : 37.4 %  Platelet Count - Automated : 205 K/uL  Mean Cell Volume : 88.6 fl  Mean Cell Hemoglobin : 28.2 pg  Mean Cell Hemoglobin Concentration : 31.8 g/dL  Auto Neutrophil # : x  Auto Lymphocyte # : x  Auto Monocyte # : x  Auto Eosinophil # : x  Auto Basophil # : x  Auto Neutrophil % : x  Auto Lymphocyte % : x  Auto Monocyte % : x  Auto Eosinophil % : x  Auto Basophil % : x        136  |  102  |  28<H>  ----------------------------<  150<H>  4.3   |  27  |  1.20    Ca    8.6      2022 07:13    TPro  6.8  /  Alb  2.6<L>  /  TBili  0.5  /  DBili  x   /  AST  42<H>  /  ALT  37  /  AlkPhos  77  06-07    LIVER FUNCTIONS - ( 2022 07:13 )  Alb: 2.6 g/dL / Pro: 6.8 gm/dL / ALK PHOS: 77 U/L / ALT: 37 U/L / AST: 42 U/L / GGT: x           Urinalysis Basic - ( 2022 15:40 )  Color: Yellow / Appearance: Clear / S.005 / pH: x  Gluc: x / Ketone: Negative  / Bili: Negative / Urobili: Negative mg/dL   Blood: x / Protein: 30 mg/dL / Nitrite: Positive   Leuk Esterase: Small / RBC: 3-5 /HPF / WBC 3-5   Sq Epi: x / Non Sq Epi: Occasional / Bacteria: TNTC    CARDIAC MARKERS ( 2022 06:32 )  x     / x     / 41 U/L / x     / x          Lactate, Blood (22 @ 14:38)    Lactate, Blood: 0.8 mmol/L    A1C with Estimated Average Glucose (22 @ 09:34)    A1C with Estimated Average Glucose Result: 8.4: Method: Immunoassay    Estimated Average Glucose: 194:       MICROBIOLOGY:  Specimen Source: .Blood Blood-Peripheral ( @ 02:40)  Culture Results:   No growth to date. ( @ 02:40)    Specimen Source: Clean Catch Clean Catch (Midstream) ( @ 01:19)  Culture Results:   <10,000 CFU/mL Normal Urogenital Milli ( @ 01:19)    Specimen Source: .Blood Blood-Peripheral ( @ 21:08)  Culture Results:   No growth to date. ( @ 21:08)      Flu With COVID-19 By MOISES (22 @ 15:41)    SARS-CoV-2 Result: NotDetec: EUA/IVD    Influenza A Result: NotDetec: EUA/IVD    Influenza B Result: NotDetec: EUA/IVD    RADIOLOGY:    < from: US Kidney and Bladder (22 @ 10:29) >  ACC: [id number] EXAM:  US KIDNEYS AND BLADDER                        PROCEDURE DATE:  2022    INTERPRETATION:  CLINICAL INFORMATION: Urinary tract infection  COMPARISON: None available.  TECHNIQUE: Sonography of the kidneys and bladder.  FINDINGS:  Right kidney: 9.4 cm. No renal mass, hydronephrosis or calculi. 1.9 x 1.7   x 1.7 cm cyst.  Left kidney: 9.7 cm. No renal mass, hydronephrosis or calculi. 1.2 x 1.3   x 1.2 cm cyst.  Urinary bladder: Within normal limits. [id number] mL.  IMPRESSION:  No hydronephrosis.    < from: Xray Chest 1 View- PORTABLE-Urgent (22 @ 15:00) >  ACC: [id number] EXAM:  XR CHEST PORTABLE URGENT 1V                        PROCEDURE DATE:  2022    INTERPRETATION:  AP semierect chest on 2022 at 2:44 PM. Patient   has sepsis.  Shallow inspiration crowds the chest.  Heart magnified by technique.  Small granulomas in the apices again seen.  No acute finding or change from 2019.  IMPRESSION: No acute finding or change.      < from: CT Head No Cont (22 @ 14:48) >  ACC: [id number] EXAM:  CT BRAIN                        PROCEDURE DATE:  2022    INTERPRETATION:  INDICATIONS:  AMS  .  TECHNIQUE:  Serial axial images were obtained from the skull base to the   vertex without intravenous contrast. Coronal and sagittal reformatted   images were obtained.  COMPARISON EXAMINATION: 2019  FINDINGS:  VENTRICLES AND SULCI:  Prominent in size compatible with age-appropriate   volume loss  INTRA-AXIAL:  Bilateral basal ganglia and left thalamic chronic lacunae   are stable. Mild microvascular type white matter changes are again seen.   No mass effect or hemorrhage. No new areas of abnormal attenuation.  EXTRA-AXIAL:  No mass or collection is seen.  VISUALIZED SINUSES:  Mild mucosal thickening  VISUALIZED MASTOIDS:  Clear.  CALVARIUM:  Normal.  MISCELLANEOUS:  None.  IMPRESSION:  No mass effect, hemorrhage or evidence of acute intracranial pathology.        Vital Signs Last 24 Hrs  T(C): 36.9 (:), Max: 37.5 (2022 17:09)  T(F): 98.4 (:), Max: 99.5 (2022 17:09)  HR: 68 (:) (66 - 73)  BP: 96/60 (:) (96/60 - 135/79)  BP(mean): --  RR: 18 (:) (18 - 18)  SpO2: 94% (:) (94% - 96%)  Supplemental O2:    PHYSICAL EXAM    General:  72 y/o   female   HEENT:   Neck:  Heart:  Lungs:  Abdomen:  Extremities:  Skin:          I&O's Summary :    2022 07:01  -  2022 07:00  --------------------------------------------------------  IN: 540 mL / OUT: 0 mL / NET: 540 mL        Impression:    Suggestions: HPI:  72 y/o female with hx of Asthma, DM, Peripheral Neuropathy, HTN, CAD, s/p Angioplasty and Stent placement, admitted via the ER on 22 with reported altered MS and fever noted to be 101.1 in the ER.  According to patient's family she had spent the weekend with her daughter and was noted to be spending an increased amount of time in the [place] over the prior 2 days and c/o some dysuria.  She appeared less responsive to her family and thus they brought her to the ER to be evaluated.  She was noted to have fever to 101.1 and WBC's increased to 13,810 and Blood and Urine Cx's were obtained and she was begun empirically on Rocephin.  Further w/u with CT of the Head was done and Negative for any Acute changes and CXR was negative for any infiltrates.  Renal Sono was also done and reported with no obvious hydronephrosis or calculi but with Bilateral Renal Cysts.  U/a revealed only 3-5 WBC's, LE Positive, but many Bacteria noted and now Urine Cx is reported with <10,000 cfu.          Allergies:  No Known Drug Allergies  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 (Short breath)    Intolerances - none        Social History:  Tobacco: negative  Alcohol: negative  Recent Travel: ?  Immunizations:  Rec'd the COVID-19 Vaccine and a Booster dose  Lives at home with her son  Born in [place] - speaks Sinhala      MEDICATIONS  (STANDING):  aspirin enteric coated 81 milliGRAM(s) Oral daily  atorvastatin 80 milliGRAM(s) Oral at bedtime  budesonide  80 MICROgram(s)/formoterol 4.5 MICROgram(s) Inhaler 2 Puff(s) Inhalation two times a day  cefTRIAXone   IVPB 1000 milliGRAM(s) IV Intermittent every 24 hours  dextrose 5%. 1000 milliLiter(s) (50 mL/Hr) IV Continuous <Continuous>  dextrose 5%. 1000 milliLiter(s) (100 mL/Hr) IV Continuous <Continuous>  dextrose 50% Injectable 25 Gram(s) IV Push once  dextrose 50% Injectable 12.5 Gram(s) IV Push once  dextrose 50% Injectable 25 Gram(s) IV Push once  glucagon  Injectable 1 milliGRAM(s) IntraMuscular once  influenza  Vaccine (HIGH DOSE) 0.7 milliLiter(s) IntraMuscular once  insulin glargine Injectable (LANTUS) 15 Unit(s) SubCutaneous at bedtime  insulin lispro (ADMELOG) corrective regimen sliding scale   SubCutaneous three times a day before meals  lisinopril 2.5 milliGRAM(s) Oral daily  metoprolol succinate ER 25 milliGRAM(s) Oral daily  pregabalin 50 milliGRAM(s) Oral daily  ranolazine 500 milliGRAM(s) Oral two times a day  ticagrelor 90 milliGRAM(s) Oral at bedtime  tiotropium 18 MICROgram(s) Capsule 1 Capsule(s) Inhalation daily    MEDICATIONS  (PRN):  dextrose Oral Gel 15 Gram(s) Oral once PRN Blood Glucose LESS THAN 70 milliGRAM(s)/deciliter      LABS:  CBC Full  -  ( 2022 07:13 )  WBC Count : 12.57 K/uL  RBC Count : 4.22 M/uL  Hemoglobin : 11.9 g/dL  Hematocrit : 37.4 %  Platelet Count - Automated : 205 K/uL  Mean Cell Volume : 88.6 fl  Mean Cell Hemoglobin : 28.2 pg  Mean Cell Hemoglobin Concentration : 31.8 g/dL  Auto Neutrophil # : x  Auto Lymphocyte # : x  Auto Monocyte # : x  Auto Eosinophil # : x  Auto Basophil # : x  Auto Neutrophil % : x  Auto Lymphocyte % : x  Auto Monocyte % : x  Auto Eosinophil % : x  Auto Basophil % : x    06-07    136  |  102  |  28<H>  ----------------------------<  150<H>  4.3   |  27  |  1.20    Ca    8.6      2022 07:13    TPro  6.8  /  Alb  2.6<L>  /  TBili  0.5  /  DBili  x   /  AST  42<H>  /  ALT  37  /  AlkPhos  77  06-07    LIVER FUNCTIONS - ( 2022 07:13 )  Alb: 2.6 g/dL / Pro: 6.8 gm/dL / ALK PHOS: 77 U/L / ALT: 37 U/L / AST: 42 U/L / GGT: x           Urinalysis Basic - ( 2022 15:40 )  Color: Yellow / Appearance: Clear / S.005 / pH: x  Gluc: x / Ketone: Negative  / Bili: Negative / Urobili: Negative mg/dL   Blood: x / Protein: 30 mg/dL / Nitrite: Positive   Leuk Esterase: Small / RBC: 3-5 /HPF / WBC 3-5   Sq Epi: x / Non Sq Epi: Occasional / Bacteria: TNTC    CARDIAC MARKERS ( 2022 06:32 )  x     / x     / 41 U/L / x     / x          Lactate, Blood (22 @ 14:38)    Lactate, Blood: 0.8 mmol/L    A1C with Estimated Average Glucose (22 @ 09:34)    A1C with Estimated Average Glucose Result: 8.4: Method: Immunoassay    Estimated Average Glucose: 194:       MICROBIOLOGY:  Specimen Source: .Blood Blood-Peripheral ( @ 02:40)  Culture Results:   No growth to date. ( @ 02:40)    Specimen Source: Clean Catch Clean Catch (Midstream) ( @ 01:19)  Culture Results:   <10,000 CFU/mL Normal Urogenital Milli ( @ 01:19)    Specimen Source: .Blood Blood-Peripheral ( @ 21:08)  Culture Results:   No growth to date. ( @ 21:08)      Flu With COVID-19 By MOISES (22 @ 15:41)    SARS-CoV-2 Result: NotDetec: EUA/IVD    Influenza A Result: NotDetec: EUA/IVD    Influenza B Result: NotDetec: EUA/IVD    RADIOLOGY:    < from: US Kidney and Bladder (22 @ 10:29) >  ACC: [id number] EXAM:  US KIDNEYS AND BLADDER                        PROCEDURE DATE:  2022    INTERPRETATION:  CLINICAL INFORMATION: Urinary tract infection  COMPARISON: None available.  TECHNIQUE: Sonography of the kidneys and bladder.  FINDINGS:  Right kidney: 9.4 cm. No renal mass, hydronephrosis or calculi. 1.9 x 1.7   x 1.7 cm cyst.  Left kidney: 9.7 cm. No renal mass, hydronephrosis or calculi. 1.2 x 1.3   x 1.2 cm cyst.  Urinary bladder: Within normal limits. [id number] mL.  IMPRESSION:  No hydronephrosis.    < from: Xray Chest 1 View- PORTABLE-Urgent (22 @ 15:00) >  ACC: [id number] EXAM:  XR CHEST PORTABLE URGENT 1V                        PROCEDURE DATE:  2022    INTERPRETATION:  AP semierect chest on 2022 at 2:44 PM. Patient   has sepsis.  Shallow inspiration crowds the chest.  Heart magnified by technique.  Small granulomas in the apices again seen.  No acute finding or change from 2019.  IMPRESSION: No acute finding or change.      < from: CT Head No Cont (22 @ 14:48) >  ACC: [id number] EXAM:  CT BRAIN                        PROCEDURE DATE:  2022    INTERPRETATION:  INDICATIONS:  AMS  .  TECHNIQUE:  Serial axial images were obtained from the skull base to the   vertex without intravenous contrast. Coronal and sagittal reformatted   images were obtained.  COMPARISON EXAMINATION: 2019  FINDINGS:  VENTRICLES AND SULCI:  Prominent in size compatible with age-appropriate   volume loss  INTRA-AXIAL:  Bilateral basal ganglia and left thalamic chronic lacunae   are stable. Mild microvascular type white matter changes are again seen.   No mass effect or hemorrhage. No new areas of abnormal attenuation.  EXTRA-AXIAL:  No mass or collection is seen.  VISUALIZED SINUSES:  Mild mucosal thickening  VISUALIZED MASTOIDS:  Clear.  CALVARIUM:  Normal.  MISCELLANEOUS:  None.  IMPRESSION:  No mass effect, hemorrhage or evidence of acute intracranial pathology.        Vital Signs Last 24 Hrs  T(C): 36.9 (2022 11:26), Max: 37.5 (2022 17:09)  T(F): 98.4 (2022 11:26), Max: 99.5 (2022 17:09)  HR: 68 (2022 11:26) (66 - 73)  BP: 96/60 (2022 11:26) (96/60 - 135/79)  BP(mean): --  RR: 18 (2022 11:26) (18 - 18)  SpO2: 94% (2022 11:26) (94% - 96%)  Supplemental O2: on RA    PHYSICAL EXAM    General:  72 y/o  female awake, alert, pleasant and cooperative, in NAD  HEENT: conj pink, sclerae anicteric, PERRLA, no oral lesions noted  Neck: supple, no nodes noted  Heart: RR  Lungs: clear bilaterally  Abdomen: soft, BS+, nontender, no masses or HS -megaly detected  Back: no CVA or Spinal tenderness noted to palpation  Extremities: no edema LE's                    no calf tenderness noted  Skin:  warm, dry, no rash seen      I&O's Summary :    2022 07:01  -  2022 07:00  --------------------------------------------------------  IN: 540 mL / OUT: 0 mL / NET: 540 mL        Impression:  72 y/o female with hx of Asthma, HTN, CAD, s/o Angioplasty and Stent placemen. DM, Peripheral Neuropathy, admitted via the ER on 22 with          Suggestions:  Will therefore continue current ab rx withe Rocephin and follow-up Blood Cx's.  Monitor temps and labs closely.  Will add Bacid to rx while on ab's.  Thanks, will follow with you. HPI:  70 y/o female with hx of Asthma, DM, Peripheral Neuropathy, HTN, CAD, s/p Angioplasty and Stent placement, admitted via the ER on 22 with reported altered MS and fever noted to be 101.1 in the ER.  According to patient's family she had spent the weekend with her daughter and was noted to be spending an increased amount of time in the [place] over the prior 2 days and c/o some dysuria.  She appeared less responsive to her family and thus they brought her to the ER to be evaluated.  She was noted to have fever to 101.1 and WBC's increased to 13,810 and Blood and Urine Cx's were obtained and she was begun empirically on Rocephin.  Further w/u with CT of the Head was done and Negative for any Acute changes and CXR was negative for any infiltrates.  Renal Sono was also done and reported with no obvious hydronephrosis or calculi but with Bilateral Renal Cysts.  U/a revealed only 3-5 WBC's, LE Positive, but many Bacteria noted and now Urine Cx is reported with <10,000 cfu.          Allergies:  No Known Drug Allergies  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 (Short breath)    Intolerances - none        Social History:  Tobacco: negative  Alcohol: negative  Recent Travel: ?  Immunizations:  Rec'd the COVID-19 Vaccine and a Booster dose  Lives at home with her son  Born in [place] - speaks [place]ic      MEDICATIONS  (STANDING):  aspirin enteric coated 81 milliGRAM(s) Oral daily  atorvastatin 80 milliGRAM(s) Oral at bedtime  budesonide  80 MICROgram(s)/formoterol 4.5 MICROgram(s) Inhaler 2 Puff(s) Inhalation two times a day  cefTRIAXone   IVPB 1000 milliGRAM(s) IV Intermittent every 24 hours  dextrose 5%. 1000 milliLiter(s) (50 mL/Hr) IV Continuous <Continuous>  dextrose 5%. 1000 milliLiter(s) (100 mL/Hr) IV Continuous <Continuous>  dextrose 50% Injectable 25 Gram(s) IV Push once  dextrose 50% Injectable 12.5 Gram(s) IV Push once  dextrose 50% Injectable 25 Gram(s) IV Push once  glucagon  Injectable 1 milliGRAM(s) IntraMuscular once  influenza  Vaccine (HIGH DOSE) 0.7 milliLiter(s) IntraMuscular once  insulin glargine Injectable (LANTUS) 15 Unit(s) SubCutaneous at bedtime  insulin lispro (ADMELOG) corrective regimen sliding scale   SubCutaneous three times a day before meals  lisinopril 2.5 milliGRAM(s) Oral daily  metoprolol succinate ER 25 milliGRAM(s) Oral daily  pregabalin 50 milliGRAM(s) Oral daily  ranolazine 500 milliGRAM(s) Oral two times a day  ticagrelor 90 milliGRAM(s) Oral at bedtime  tiotropium 18 MICROgram(s) Capsule 1 Capsule(s) Inhalation daily    MEDICATIONS  (PRN):  dextrose Oral Gel 15 Gram(s) Oral once PRN Blood Glucose LESS THAN 70 milliGRAM(s)/deciliter      LABS:  CBC Full  -  ( 2022 07:13 )  WBC Count : 12.57 K/uL  RBC Count : 4.22 M/uL  Hemoglobin : 11.9 g/dL  Hematocrit : 37.4 %  Platelet Count - Automated : 205 K/uL  Mean Cell Volume : 88.6 fl  Mean Cell Hemoglobin : 28.2 pg  Mean Cell Hemoglobin Concentration : 31.8 g/dL  Auto Neutrophil # : x  Auto Lymphocyte # : x  Auto Monocyte # : x  Auto Eosinophil # : x  Auto Basophil # : x  Auto Neutrophil % : x  Auto Lymphocyte % : x  Auto Monocyte % : x  Auto Eosinophil % : x  Auto Basophil % : x    06-07    136  |  102  |  28<H>  ----------------------------<  150<H>  4.3   |  27  |  1.20    Ca    8.6      2022 07:13    TPro  6.8  /  Alb  2.6<L>  /  TBili  0.5  /  DBili  x   /  AST  42<H>  /  ALT  37  /  AlkPhos  77  06-07    LIVER FUNCTIONS - ( 2022 07:13 )  Alb: 2.6 g/dL / Pro: 6.8 gm/dL / ALK PHOS: 77 U/L / ALT: 37 U/L / AST: 42 U/L / GGT: x           Urinalysis Basic - ( 2022 15:40 )  Color: Yellow / Appearance: Clear / S.005 / pH: x  Gluc: x / Ketone: Negative  / Bili: Negative / Urobili: Negative mg/dL   Blood: x / Protein: 30 mg/dL / Nitrite: Positive   Leuk Esterase: Small / RBC: 3-5 /HPF / WBC 3-5   Sq Epi: x / Non Sq Epi: Occasional / Bacteria: TNTC    CARDIAC MARKERS ( 2022 06:32 )  x     / x     / 41 U/L / x     / x          Lactate, Blood (22 @ 14:38)    Lactate, Blood: 0.8 mmol/L    A1C with Estimated Average Glucose (22 @ 09:34)    A1C with Estimated Average Glucose Result: 8.4: Method: Immunoassay    Estimated Average Glucose: 194:       MICROBIOLOGY:  Specimen Source: .Blood Blood-Peripheral ( @ 02:40)  Culture Results:   No growth to date. ( @ 02:40)    Specimen Source: Clean Catch Clean Catch (Midstream) ( @ 01:19)  Culture Results:   <10,000 CFU/mL Normal Urogenital Milli ( @ 01:19)    Specimen Source: .Blood Blood-Peripheral ( @ 21:08)  Culture Results:   No growth to date. ( @ 21:08)      Flu With COVID-19 By MOISES (22 @ 15:41)    SARS-CoV-2 Result: NotDetec: EUA/IVD    Influenza A Result: NotDetec: EUA/IVD    Influenza B Result: NotDetec: EUA/IVD    RADIOLOGY:    < from: US Kidney and Bladder (22 @ 10:29) >  ACC: [id number] EXAM:  US KIDNEYS AND BLADDER                        PROCEDURE DATE:  2022    INTERPRETATION:  CLINICAL INFORMATION: Urinary tract infection  COMPARISON: None available.  TECHNIQUE: Sonography of the kidneys and bladder.  FINDINGS:  Right kidney: 9.4 cm. No renal mass, hydronephrosis or calculi. 1.9 x 1.7   x 1.7 cm cyst.  Left kidney: 9.7 cm. No renal mass, hydronephrosis or calculi. 1.2 x 1.3   x 1.2 cm cyst.  Urinary bladder: Within normal limits. [id number] mL.  IMPRESSION:  No hydronephrosis.    < from: Xray Chest 1 View- PORTABLE-Urgent (22 @ 15:00) >  ACC: [id number] EXAM:  XR CHEST PORTABLE URGENT 1V                        PROCEDURE DATE:  2022    INTERPRETATION:  AP semierect chest on 2022 at 2:44 PM. Patient   has sepsis.  Shallow inspiration crowds the chest.  Heart magnified by technique.  Small granulomas in the apices again seen.  No acute finding or change from 2019.  IMPRESSION: No acute finding or change.      < from: CT Head No Cont (22 @ 14:48) >  ACC: [id number] EXAM:  CT BRAIN                        PROCEDURE DATE:  2022    INTERPRETATION:  INDICATIONS:  AMS  .  TECHNIQUE:  Serial axial images were obtained from the skull base to the   vertex without intravenous contrast. Coronal and sagittal reformatted   images were obtained.  COMPARISON EXAMINATION: 2019  FINDINGS:  VENTRICLES AND SULCI:  Prominent in size compatible with age-appropriate   volume loss  INTRA-AXIAL:  Bilateral basal ganglia and left thalamic chronic lacunae   are stable. Mild microvascular type white matter changes are again seen.   No mass effect or hemorrhage. No new areas of abnormal attenuation.  EXTRA-AXIAL:  No mass or collection is seen.  VISUALIZED SINUSES:  Mild mucosal thickening  VISUALIZED MASTOIDS:  Clear.  CALVARIUM:  Normal.  MISCELLANEOUS:  None.  IMPRESSION:  No mass effect, hemorrhage or evidence of acute intracranial pathology.        Vital Signs Last 24 Hrs  T(C): 36.9 (2022 11:26), Max: 37.5 (2022 17:09)  T(F): 98.4 (2022 11:26), Max: 99.5 (2022 17:09)  HR: 68 (2022 11:26) (66 - 73)  BP: 96/60 (2022 11:26) (96/60 - 135/79)  BP(mean): --  RR: 18 (2022 11:26) (18 - 18)  SpO2: 94% (2022 11:26) (94% - 96%)  Supplemental O2: on RA    PHYSICAL EXAM    General:  70 y/o  female awake, alert, pleasant and cooperative, in NAD  HEENT: conj pink, sclerae anicteric, PERRLA, no oral lesions noted  Neck: supple, no nodes noted  Heart: RR  Lungs: clear bilaterally  Abdomen: soft, BS+, nontender, no masses or HS -megaly detected  Back: no CVA or Spinal tenderness noted to palpation  Extremities: no edema LE's                    no calf tenderness noted  Skin:  warm, dry, no rash seen      I&O's Summary :    2022 07:01  -  2022 07:00  --------------------------------------------------------  IN: 540 mL / OUT: 0 mL / NET: 540 mL        Impression:  70 y/o female with hx of Asthma, HTN, CAD, s/o Angioplasty and Stent placement, DM, Peripheral Neuropathy, admitted via the ER on 22 with reported altered MS.  According to patient's family she had spent the weekend with her daughter and was noted to be going to the [place] frequently over the prior 2 days and c/o some dysuria.  She appeared less responsive to her family and they brought her to the ER to be evaluated.  She was noted to be febrile to 101.1 and WBC's were increased to 13,810 and Blood and Urine Cx's were obtained and she was begun empirically on Rocephin.  Further w/u with CT of the Head was done and reported negative for any acute changes and CXR was negative for any infiltrate.  Renal Sono was also done and reported with no obvious hydronephrosis or calculi but with Bilateral Renal Cysts.  U/a revealed 3-5 WBC's and LE Positive and was noted with many bacteria although the Urine Cx subsequently was reported with <10,000 cfu. - ? specimen obtained after Ab rx was initiated ??    Suggestions:  Will therefore continue current ab rx withe Rocephin  for Sepsis due to presumed  source and follow-up Blood Cx's.  Monitor temps and labs closely.  Will add Bacid to rx while on ab's.  Thanks, will follow with you.

## 2022-06-07 NOTE — PHYSICAL THERAPY INITIAL EVALUATION ADULT - PHYSICAL ASSIST/NONPHYSICAL ASSIST: STAND/SIT, REHAB EVAL
PROCEDURES:  Closed reduction, dislocation, hip, with anesthesia 04-May-2020 14:09:50  Chava Raygoza
PROCEDURES:  Repair, dislocation, hip, prosthetic, with anesthesia 01-May-2020 11:26:03  Camelia Teixeira
supervision/verbal cues

## 2022-06-08 LAB
ALBUMIN SERPL ELPH-MCNC: 2.5 G/DL — LOW (ref 3.3–5)
ALP SERPL-CCNC: 121 U/L — HIGH (ref 40–120)
ALT FLD-CCNC: 35 U/L — SIGNIFICANT CHANGE UP (ref 12–78)
ANION GAP SERPL CALC-SCNC: 6 MMOL/L — SIGNIFICANT CHANGE UP (ref 5–17)
AST SERPL-CCNC: 36 U/L — SIGNIFICANT CHANGE UP (ref 15–37)
BILIRUB SERPL-MCNC: 0.5 MG/DL — SIGNIFICANT CHANGE UP (ref 0.2–1.2)
BUN SERPL-MCNC: 36 MG/DL — HIGH (ref 7–23)
CALCIUM SERPL-MCNC: 8.4 MG/DL — LOW (ref 8.5–10.1)
CHLORIDE SERPL-SCNC: 105 MMOL/L — SIGNIFICANT CHANGE UP (ref 96–108)
CO2 SERPL-SCNC: 27 MMOL/L — SIGNIFICANT CHANGE UP (ref 22–31)
CREAT SERPL-MCNC: 1.1 MG/DL — SIGNIFICANT CHANGE UP (ref 0.5–1.3)
CRP SERPL-MCNC: 6 MG/L — HIGH
EGFR: 53 ML/MIN/1.73M2 — LOW
ERYTHROCYTE [SEDIMENTATION RATE] IN BLOOD: 25 MM/HR — HIGH (ref 0–20)
GLUCOSE BLDC GLUCOMTR-MCNC: 172 MG/DL — HIGH (ref 70–99)
GLUCOSE BLDC GLUCOMTR-MCNC: 182 MG/DL — HIGH (ref 70–99)
GLUCOSE BLDC GLUCOMTR-MCNC: 189 MG/DL — HIGH (ref 70–99)
GLUCOSE BLDC GLUCOMTR-MCNC: 212 MG/DL — HIGH (ref 70–99)
GLUCOSE SERPL-MCNC: 179 MG/DL — HIGH (ref 70–99)
HCT VFR BLD CALC: 35.8 % — SIGNIFICANT CHANGE UP (ref 34.5–45)
HGB BLD-MCNC: 11.4 G/DL — LOW (ref 11.5–15.5)
MCHC RBC-ENTMCNC: 28.2 PG — SIGNIFICANT CHANGE UP (ref 27–34)
MCHC RBC-ENTMCNC: 31.8 G/DL — LOW (ref 32–36)
MCV RBC AUTO: 88.6 FL — SIGNIFICANT CHANGE UP (ref 80–100)
NRBC # BLD: 0 /100 WBCS — SIGNIFICANT CHANGE UP (ref 0–0)
PLATELET # BLD AUTO: 203 K/UL — SIGNIFICANT CHANGE UP (ref 150–400)
POTASSIUM SERPL-MCNC: 4.5 MMOL/L — SIGNIFICANT CHANGE UP (ref 3.5–5.3)
POTASSIUM SERPL-SCNC: 4.5 MMOL/L — SIGNIFICANT CHANGE UP (ref 3.5–5.3)
PROCALCITONIN SERPL-MCNC: 0.13 NG/ML — HIGH (ref 0.02–0.1)
PROT SERPL-MCNC: 6.6 GM/DL — SIGNIFICANT CHANGE UP (ref 6–8.3)
RBC # BLD: 4.04 M/UL — SIGNIFICANT CHANGE UP (ref 3.8–5.2)
RBC # FLD: 12.9 % — SIGNIFICANT CHANGE UP (ref 10.3–14.5)
SODIUM SERPL-SCNC: 138 MMOL/L — SIGNIFICANT CHANGE UP (ref 135–145)
WBC # BLD: 8.3 K/UL — SIGNIFICANT CHANGE UP (ref 3.8–10.5)
WBC # FLD AUTO: 8.3 K/UL — SIGNIFICANT CHANGE UP (ref 3.8–10.5)

## 2022-06-08 RX ADMIN — CEFTRIAXONE 100 MILLIGRAM(S): 500 INJECTION, POWDER, FOR SOLUTION INTRAMUSCULAR; INTRAVENOUS at 11:51

## 2022-06-08 RX ADMIN — Medication 25 MILLIGRAM(S): at 05:15

## 2022-06-08 RX ADMIN — BUDESONIDE AND FORMOTEROL FUMARATE DIHYDRATE 2 PUFF(S): 160; 4.5 AEROSOL RESPIRATORY (INHALATION) at 17:18

## 2022-06-08 RX ADMIN — Medication 1: at 11:28

## 2022-06-08 RX ADMIN — ATORVASTATIN CALCIUM 80 MILLIGRAM(S): 80 TABLET, FILM COATED ORAL at 21:34

## 2022-06-08 RX ADMIN — TIOTROPIUM BROMIDE 1 CAPSULE(S): 18 CAPSULE ORAL; RESPIRATORY (INHALATION) at 11:52

## 2022-06-08 RX ADMIN — Medication 50 MILLIGRAM(S): at 11:51

## 2022-06-08 RX ADMIN — TICAGRELOR 90 MILLIGRAM(S): 90 TABLET ORAL at 21:35

## 2022-06-08 RX ADMIN — INSULIN GLARGINE 15 UNIT(S): 100 INJECTION, SOLUTION SUBCUTANEOUS at 21:35

## 2022-06-08 RX ADMIN — Medication 1 TABLET(S): at 17:18

## 2022-06-08 RX ADMIN — RANOLAZINE 500 MILLIGRAM(S): 500 TABLET, FILM COATED, EXTENDED RELEASE ORAL at 17:18

## 2022-06-08 RX ADMIN — Medication 1: at 08:30

## 2022-06-08 RX ADMIN — RANOLAZINE 500 MILLIGRAM(S): 500 TABLET, FILM COATED, EXTENDED RELEASE ORAL at 05:15

## 2022-06-08 RX ADMIN — Medication 1 TABLET(S): at 05:15

## 2022-06-08 RX ADMIN — Medication 81 MILLIGRAM(S): at 11:51

## 2022-06-08 RX ADMIN — Medication 1: at 16:48

## 2022-06-08 RX ADMIN — LISINOPRIL 2.5 MILLIGRAM(S): 2.5 TABLET ORAL at 05:15

## 2022-06-08 RX ADMIN — BUDESONIDE AND FORMOTEROL FUMARATE DIHYDRATE 2 PUFF(S): 160; 4.5 AEROSOL RESPIRATORY (INHALATION) at 05:15

## 2022-06-08 NOTE — PROGRESS NOTE ADULT - SUBJECTIVE AND OBJECTIVE BOX
INTERVAL HPI/OVERNIGHT EVENTS:        REVIEW OF SYSTEMS:  CONSTITUTIONAL:  c/o L  shoulder  pain    NECK: No pain or stiffnes  RESPIRATORY: No SOB   CARDIOVASCULAR: No chest pain, palpitations, dizziness,   GASTROINTESTINAL: No abdominal pain. No nausea, vomiting,   NEUROLOGICAL: No headaches, no  blurry  vision no  dizziness  SKIN: No itching,   MUSCULOSKELETAL:     MEDICATION:  aspirin enteric coated 81 milliGRAM(s) Oral daily  atorvastatin 80 milliGRAM(s) Oral at bedtime  budesonide  80 MICROgram(s)/formoterol 4.5 MICROgram(s) Inhaler 2 Puff(s) Inhalation two times a day  cefTRIAXone   IVPB 1000 milliGRAM(s) IV Intermittent every 24 hours  dextrose 5%. 1000 milliLiter(s) IV Continuous <Continuous>  dextrose 5%. 1000 milliLiter(s) IV Continuous <Continuous>  dextrose 50% Injectable 25 Gram(s) IV Push once  dextrose 50% Injectable 12.5 Gram(s) IV Push once  dextrose 50% Injectable 25 Gram(s) IV Push once  dextrose Oral Gel 15 Gram(s) Oral once PRN  glucagon  Injectable 1 milliGRAM(s) IntraMuscular once  influenza  Vaccine (HIGH DOSE) 0.7 milliLiter(s) IntraMuscular once  insulin glargine Injectable (LANTUS) 15 Unit(s) SubCutaneous at bedtime  insulin lispro (ADMELOG) corrective regimen sliding scale   SubCutaneous three times a day before meals  lactobacillus acidophilus 1 Tablet(s) Oral two times a day  lisinopril 2.5 milliGRAM(s) Oral daily  metoprolol succinate ER 25 milliGRAM(s) Oral daily  pregabalin 50 milliGRAM(s) Oral daily  ranolazine 500 milliGRAM(s) Oral two times a day  ticagrelor 90 milliGRAM(s) Oral at bedtime  tiotropium 18 MICROgram(s) Capsule 1 Capsule(s) Inhalation daily    Vital Signs Last 24 Hrs  T(C): 36.4 (08 Jun 2022 05:25), Max: 36.9 (07 Jun 2022 11:26)  T(F): 97.6 (08 Jun 2022 05:25), Max: 98.4 (07 Jun 2022 11:26)  HR: 61 (08 Jun 2022 05:25) (61 - 77)  BP: 110/74 (08 Jun 2022 05:25) (96/60 - 153/81)  BP(mean): --  RR: 18 (08 Jun 2022 05:25) (18 - 18)  SpO2: 95% (08 Jun 2022 05:25) (94% - 96%)    PHYSICAL EXAM:  GENERAL: NAD, well-groomed, well-developed  HEENT : Conjuntivae  clear sclerae anicteric  NECK: Supple, No JVD, Normal thyroid  NERVOUS SYSTEM:  Alert oriented   no  focal  deficits;   CHEST/LUNG: Clear    HEART: Regular rate and rhythm; No murmurs, rubs, or gallops  ABDOMEN: Soft, Nontender, Nondistended; Bowel sounds present  EXTREMITIES:   tenderness  L  shoulder  laterally  decreased  ROM  in  abduction  SKIN: No rashes   LABS:                        11.4   8.30  )-----------( 203      ( 08 Jun 2022 07:01 )             35.8     06-08    138  |  105  |  36<H>  ----------------------------<  179<H>  4.5   |  27  |  1.10    Ca    8.4<L>      08 Jun 2022 07:01    TPro  6.6  /  Alb  2.5<L>  /  TBili  0.5  /  DBili  x   /  AST  36  /  ALT  35  /  AlkPhos  121<H>  06-08        CAPILLARY BLOOD GLUCOSE      POCT Blood Glucose.: 172 mg/dL (08 Jun 2022 08:15)  POCT Blood Glucose.: 280 mg/dL (07 Jun 2022 21:04)  POCT Blood Glucose.: 188 mg/dL (07 Jun 2022 16:33)  POCT Blood Glucose.: 189 mg/dL (07 Jun 2022 11:12)      RADIOLOGY & ADDITIONAL TESTS:    Imaging reports  Personally Reviewed:  [ ] YES  [ ] NO    Consultant(s) Notes Reviewed:  [ x] YES  [ ] NO    Care Discussed with Consultants/Other Providers [ x] YES  [ ] NO  Problem/Plan - 1:  ·  Problem: AMS (altered mental status).   ·  improved  with  AB  ivf    Problem/Plan - 2:  ·  Problem: Acute UTI.   ·  Plan: continue  rocephin  empirically  for  ID  evaluation appreciated    Problem/Plan - 3:  ·  Problem: CAD S/P percutaneous coronary angioplasty.   ·  Plan: brillinta asa metoprolol ranexa.    Problem/Plan - 4:  ·  Problem: HTN (hypertension).   ·  Plan: metoprolol  lisinopril.    Problem/Plan - 6:  ·  Problem: DM (diabetes mellitus).   ·  Plan: lantus  short  acting insulin  coverage  hold  metformin.    Problem/Plan - 7:  ·  Problem: Neuropathy.   ·  Plan: lyrica.   rt  shoulder  pain  tylenol  check  XR

## 2022-06-08 NOTE — DIETITIAN INITIAL EVALUATION ADULT - PERTINENT LABORATORY DATA
06-08    138  |  105  |  36<H>  ----------------------------<  179<H>  4.5   |  27  |  1.10    Ca    8.4<L>      08 Jun 2022 07:01    TPro  6.6  /  Alb  2.5<L>  /  TBili  0.5  /  DBili  x   /  AST  36  /  ALT  35  /  AlkPhos  121<H>  06-08  POCT Blood Glucose.: 189 mg/dL (06-08-22); 06-07 POCT: 155, 189, 188, 280  A1C with Estimated Average Glucose Result: 8.4 %, 194(06-06-22)

## 2022-06-08 NOTE — DIETITIAN INITIAL EVALUATION ADULT - OTHER INFO
Pt lives c son & daughter-in-law; also has supportive daughter who is involved. Able to obtain limited information from pt via audio ; Gerber #[id number]; as pt confused; also spoke to daughter Queta via phone ([phone]).  Pt lives c son & daughter-in-law; also has supportive daughter who is involved.  Pt takes Metformin & basal insulin to control BG at home.  No reports of any N/V/C/D or chew/swallowing difficulty.  Wt has been stable & appetite is usually good.  Reviewed CHO foods & beverages & importance of CHO portion control c daughter.

## 2022-06-08 NOTE — PROGRESS NOTE ADULT - SUBJECTIVE AND OBJECTIVE BOX
TMAX - 98.3    On day # 4 Rocephin    Vital Signs Last 24 Hrs  T(C): 36.8 (08 Jun 2022 17:04), Max: 36.8 (07 Jun 2022 23:08)  T(F): 98.3 (08 Jun 2022 17:04), Max: 98.3 (07 Jun 2022 23:08)  HR: 62 (08 Jun 2022 17:04) (61 - 77)  BP: 139/73 (08 Jun 2022 17:04) (108/68 - 139/73)  RR: 18 (08 Jun 2022 17:04) (17 - 18)  SpO2: 96% (08 Jun 2022 17:04) (95% - 98%)  Supplemental O2:  on RA     Awake, alert, no c/o pain or SOB.  Appetite is reportedly good.    PHYSICAL EXAM  General:  70 y/o  female awake, alert, pleasant and cooperative, in NAD  HEENT: conj pink, sclerae anicteric, PERRLA, no oral lesions noted, but mucosa slightly dry  Neck: supple, no nodes noted  Heart: RR  Lungs: clear bilaterally  Abdomen: soft, BS+, nontender, no masses or HS -megaly detected  Back: no CVA or Spinal tenderness noted to palpation  Extremities: no edema LE's                    no calf tenderness noted  Skin:  warm, dry, no rash seen       I&O's Summary :    07 Jun 2022 07:01  -  08 Jun 2022 07:00  --------------------------------------------------------  IN: 240 mL / OUT: 0 mL / NET: 240 mL      LABS:  CBC Full  -  ( 08 Jun 2022 07:01 )  WBC Count : 8.30 K/uL  RBC Count : 4.04 M/uL  Hemoglobin : 11.4 g/dL  Hematocrit : 35.8 %  Platelet Count - Automated : 203 K/uL  Mean Cell Volume : 88.6 fl  Mean Cell Hemoglobin : 28.2 pg  Mean Cell Hemoglobin Concentration : 31.8 g/dL  Auto Neutrophil # : x  Auto Lymphocyte # : x  Auto Monocyte # : x  Auto Eosinophil # : x  Auto Basophil # : x  Auto Neutrophil % : x  Auto Lymphocyte % : x  Auto Monocyte % : x  Auto Eosinophil % : x  Auto Basophil % : x    06-08    138  |  105  |  36<H>  ----------------------------<  179<H>  4.5   |  27  |  1.10    Ca    8.4<L>      08 Jun 2022 07:01    TPro  6.6  /  Alb  2.5<L>  /  TBili  0.5  /  DBili  x   /  AST  36  /  ALT  35  /  AlkPhos  121<H>  06-08    LIVER FUNCTIONS - ( 08 Jun 2022 07:01 )  Alb: 2.5 g/dL / Pro: 6.6 gm/dL / ALK PHOS: 121 U/L / ALT: 35 U/L / AST: 36 U/L / GGT: x           Sedimentation Rate, Erythrocyte: 25 mm/hr (06-08 @ 07:01)    C-Reactive Protein, Serum (06.08.22 @ 09:20)    C-Reactive Protein, Serum: 6 mg/L    Procalcitonin, Serum (06.08.22 @ 09:20)    Procalcitonin, Serum: 0.13:     Lactate, Blood (06.05.22 @ 14:38)    Lactate, Blood: 0.8 mmol/L    A1C with Estimated Average Glucose (06.06.22 @ 09:34)    A1C with Estimated Average Glucose Result: 8.4: Method: Immunoassay    Estimated Average Glucose: 194:       MICROBIOLOGY:  Specimen Source: .Blood Blood-Peripheral (06-06 @ 02:40)  Culture Results:   No growth to date. (06-06 @ 02:40)    Specimen Source: Clean Catch Clean Catch (Midstream) (06-06 @ 01:19)  Culture Results:   <10,000 CFU/mL Normal Urogenital Milli (06-06 @ 01:19)    Specimen Source: .Blood Blood-Peripheral (06-05 @ 21:08)  Culture Results:   No growth to date. (06-05 @ 21:08)      Flu With COVID-19 By MOISES (06.05.22 @ 15:41)    SARS-CoV-2 Result: NotDetec: EUA/IVD    Influenza A Result: NotDetec: EUA/IVD    Influenza B Result: NotDetec: EUA/IVD      RADIOLOGY:  < from: US Kidney and Bladder (06.06.22 @ 10:29) >  ACC: [id number] EXAM:  US KIDNEYS AND BLADDER                        PROCEDURE DATE:  06/06/2022    INTERPRETATION:  CLINICAL INFORMATION: Urinary tract infection  COMPARISON: None available.  TECHNIQUE: Sonography of the kidneys and bladder.  FINDINGS:  Right kidney: 9.4 cm. No renal mass, hydronephrosis or calculi. 1.9 x 1.7   x 1.7 cm cyst.  Left kidney: 9.7 cm. No renal mass, hydronephrosis or calculi. 1.2 x 1.3   x 1.2 cm cyst.  Urinary bladder: Within normal limits. [id number] mL.  IMPRESSION:  No hydronephrosis.    < from: Xray Chest 1 View- PORTABLE-Urgent (06.05.22 @ 15:00) >  ACC: [id number] EXAM:  XR CHEST PORTABLE URGENT 1V                        PROCEDURE DATE:  06/05/2022    INTERPRETATION:  AP semierect chest on June 5, 2022 at 2:44 PM. Patient   has sepsis.  Shallow inspiration crowds the chest.  Heart magnified by technique.  Small granulomas in the apices again seen.  No acute finding or change from November 4, 2019.  IMPRESSION: No acute finding or change.      < from: CT Head No Cont (06.05.22 @ 14:48) >  ACC: [id number] EXAM:  CT BRAIN                        PROCEDURE DATE:  06/05/2022    INTERPRETATION:  INDICATIONS:  AMS  .  TECHNIQUE:  Serial axial images were obtained from the skull base to the   vertex without intravenous contrast. Coronal and sagittal reformatted   images were obtained.  COMPARISON EXAMINATION: 9/9/2019  FINDINGS:  VENTRICLES AND SULCI:  Prominent in size compatible with age-appropriate   volume loss  INTRA-AXIAL:  Bilateral basal ganglia and left thalamic chronic lacunae   are stable. Mild microvascular type white matter changes are again seen.   No mass effect or hemorrhage. No new areas of abnormal attenuation.  EXTRA-AXIAL:  No mass or collection is seen.  VISUALIZED SINUSES:  Mild mucosal thickening  VISUALIZED MASTOIDS:  Clear.  CALVARIUM:  Normal.  MISCELLANEOUS:  None.  IMPRESSION:  No mass effect, hemorrhage or evidence of acute intracranial pathology.      Impression:  70 y/o female with hx of Asthma, HTN, HLD, CAD, s/p Angioplasty and Stent placement, DM, Peripheral Neuropathy, admitted via the ER on 6/5/22 with reported altered MS and fever noted to be 101.1 in the ER.  According to patient's family she had spent the weekend with her daughter and was noted to be spending an increased amount of time in the BR over the prior 2 days and c/o some dysuria.  She appeared less responsive to her family and thus they brought her to the ER to be evaluated.  She was noted to have fever to 101.1 and WBC's increased to 13,810 and Blood and Urine Cx's were obtained and she was begun empirically on Rocephin.  Further w/u with CT of the Head was done and Negative for any acute changes and CXR was negative for any infiltrates.  Renal Sono was also done and reported with no obvious hydronephrosis or calculi but with Bilateral Renal Cysts.  U/a revealed only 3-5 WBC's, but is LE Positive and  with many Bacteria noted although Urine Cx subsequently was reported with <10,000 cfu - ? specimen obtained after Ab rx initiated ?   Patient remains afebrile now and Blood Cx's are negative thus far. WBC's have decreased now.      Suggestions:   Will therefore continue current ab rx with Rocephin and follow-up Blood Cx's.  Monitor temps and labs closely.  Continue Bacid  rx while on ab's.

## 2022-06-08 NOTE — DIETITIAN INITIAL EVALUATION ADULT - PERTINENT MEDS FT
Brilinta, Rocephin, Lantus, Admelog sliding scale, Bacid, Lipitor, Zestril, Toprol XL, Lyrica, Ranexa

## 2022-06-08 NOTE — DIETITIAN INITIAL EVALUATION ADULT - OTHER CALCULATIONS
Ht (cm):  165.1  Wt (kg):   72.4 (6/5)  BMI:  26.6  IBW:  56.8 kg  %IBW:  127%  UBW: stable  %UBW: 100%

## 2022-06-09 LAB
GLUCOSE BLDC GLUCOMTR-MCNC: 183 MG/DL — HIGH (ref 70–99)
GLUCOSE BLDC GLUCOMTR-MCNC: 206 MG/DL — HIGH (ref 70–99)
GLUCOSE BLDC GLUCOMTR-MCNC: 206 MG/DL — HIGH (ref 70–99)
GLUCOSE BLDC GLUCOMTR-MCNC: 273 MG/DL — HIGH (ref 70–99)
HCT VFR BLD CALC: 36.6 % — SIGNIFICANT CHANGE UP (ref 34.5–45)
HGB BLD-MCNC: 12.1 G/DL — SIGNIFICANT CHANGE UP (ref 11.5–15.5)
MCHC RBC-ENTMCNC: 28.8 PG — SIGNIFICANT CHANGE UP (ref 27–34)
MCHC RBC-ENTMCNC: 33.1 G/DL — SIGNIFICANT CHANGE UP (ref 32–36)
MCV RBC AUTO: 87.1 FL — SIGNIFICANT CHANGE UP (ref 80–100)
NRBC # BLD: 0 /100 WBCS — SIGNIFICANT CHANGE UP (ref 0–0)
PLATELET # BLD AUTO: 251 K/UL — SIGNIFICANT CHANGE UP (ref 150–400)
RBC # BLD: 4.2 M/UL — SIGNIFICANT CHANGE UP (ref 3.8–5.2)
RBC # FLD: 12.9 % — SIGNIFICANT CHANGE UP (ref 10.3–14.5)
WBC # BLD: 7.7 K/UL — SIGNIFICANT CHANGE UP (ref 3.8–10.5)
WBC # FLD AUTO: 7.7 K/UL — SIGNIFICANT CHANGE UP (ref 3.8–10.5)

## 2022-06-09 RX ADMIN — RANOLAZINE 500 MILLIGRAM(S): 500 TABLET, FILM COATED, EXTENDED RELEASE ORAL at 17:29

## 2022-06-09 RX ADMIN — Medication 2: at 17:28

## 2022-06-09 RX ADMIN — TIOTROPIUM BROMIDE 1 CAPSULE(S): 18 CAPSULE ORAL; RESPIRATORY (INHALATION) at 12:01

## 2022-06-09 RX ADMIN — INSULIN GLARGINE 15 UNIT(S): 100 INJECTION, SOLUTION SUBCUTANEOUS at 21:50

## 2022-06-09 RX ADMIN — Medication 25 MILLIGRAM(S): at 05:20

## 2022-06-09 RX ADMIN — TICAGRELOR 90 MILLIGRAM(S): 90 TABLET ORAL at 21:47

## 2022-06-09 RX ADMIN — CEFTRIAXONE 100 MILLIGRAM(S): 500 INJECTION, POWDER, FOR SOLUTION INTRAMUSCULAR; INTRAVENOUS at 11:58

## 2022-06-09 RX ADMIN — LISINOPRIL 2.5 MILLIGRAM(S): 2.5 TABLET ORAL at 05:20

## 2022-06-09 RX ADMIN — Medication 1: at 08:06

## 2022-06-09 RX ADMIN — Medication 1 TABLET(S): at 17:29

## 2022-06-09 RX ADMIN — Medication 50 MILLIGRAM(S): at 12:01

## 2022-06-09 RX ADMIN — BUDESONIDE AND FORMOTEROL FUMARATE DIHYDRATE 2 PUFF(S): 160; 4.5 AEROSOL RESPIRATORY (INHALATION) at 17:30

## 2022-06-09 RX ADMIN — RANOLAZINE 500 MILLIGRAM(S): 500 TABLET, FILM COATED, EXTENDED RELEASE ORAL at 05:20

## 2022-06-09 RX ADMIN — ATORVASTATIN CALCIUM 80 MILLIGRAM(S): 80 TABLET, FILM COATED ORAL at 21:47

## 2022-06-09 RX ADMIN — Medication 2: at 11:58

## 2022-06-09 RX ADMIN — Medication 1 TABLET(S): at 05:20

## 2022-06-09 RX ADMIN — Medication 81 MILLIGRAM(S): at 12:02

## 2022-06-09 RX ADMIN — BUDESONIDE AND FORMOTEROL FUMARATE DIHYDRATE 2 PUFF(S): 160; 4.5 AEROSOL RESPIRATORY (INHALATION) at 05:20

## 2022-06-09 NOTE — PROGRESS NOTE ADULT - SUBJECTIVE AND OBJECTIVE BOX
INTERVAL HPI/OVERNIGHT EVENTS:        REVIEW OF SYSTEMS:  CONSTITUTIONAL:  no  complaints    NECK: No pain or stiffnes  RESPIRATORY: No SOB   CARDIOVASCULAR: No chest pain, palpitations, dizziness,   GASTROINTESTINAL: No abdominal pain. No nausea, vomiting,   NEUROLOGICAL: No headaches, no  blurry  vision no  dizziness  SKIN: No itching,   MUSCULOSKELETAL: mild  l  shoulder  pain    MEDICATION:  aspirin enteric coated 81 milliGRAM(s) Oral daily  atorvastatin 80 milliGRAM(s) Oral at bedtime  budesonide  80 MICROgram(s)/formoterol 4.5 MICROgram(s) Inhaler 2 Puff(s) Inhalation two times a day  cefTRIAXone   IVPB 1000 milliGRAM(s) IV Intermittent every 24 hours  dextrose 5%. 1000 milliLiter(s) IV Continuous <Continuous>  dextrose 5%. 1000 milliLiter(s) IV Continuous <Continuous>  dextrose 50% Injectable 25 Gram(s) IV Push once  dextrose 50% Injectable 12.5 Gram(s) IV Push once  dextrose 50% Injectable 25 Gram(s) IV Push once  dextrose Oral Gel 15 Gram(s) Oral once PRN  glucagon  Injectable 1 milliGRAM(s) IntraMuscular once  influenza  Vaccine (HIGH DOSE) 0.7 milliLiter(s) IntraMuscular once  insulin glargine Injectable (LANTUS) 15 Unit(s) SubCutaneous at bedtime  insulin lispro (ADMELOG) corrective regimen sliding scale   SubCutaneous three times a day before meals  lactobacillus acidophilus 1 Tablet(s) Oral two times a day  lisinopril 2.5 milliGRAM(s) Oral daily  metoprolol succinate ER 25 milliGRAM(s) Oral daily  pregabalin 50 milliGRAM(s) Oral daily  ranolazine 500 milliGRAM(s) Oral two times a day  ticagrelor 90 milliGRAM(s) Oral at bedtime  tiotropium 18 MICROgram(s) Capsule 1 Capsule(s) Inhalation daily    Vital Signs Last 24 Hrs  T(C): 37.1 (09 Jun 2022 05:06), Max: 37.1 (09 Jun 2022 05:06)  T(F): 98.8 (09 Jun 2022 05:06), Max: 98.8 (09 Jun 2022 05:06)  HR: 70 (09 Jun 2022 05:06) (62 - 71)  BP: 144/76 (09 Jun 2022 05:06) (108/68 - 144/76)  BP(mean): --  RR: 18 (09 Jun 2022 05:06) (17 - 18)  SpO2: 98% (09 Jun 2022 05:06) (96% - 98%)    PHYSICAL EXAM:  GENERAL: NAD, well-groomed, well-developed  HEENT : Conjuntivae  clear sclerae anicteric  NECK: Supple, No JVD, Normal thyroid  NERVOUS SYSTEM:  Alert oriented   no  focal  deficits;   CHEST/LUNG: Clear    HEART: Regular rate and rhythm; No murmurs, rubs, or gallops  ABDOMEN: Soft, Nontender, Nondistended; Bowel sounds present  EXTREMITIES:  no  edema no  tenderness  SKIN: No rashes   LABS:                        12.1   7.70  )-----------( 251      ( 09 Jun 2022 07:08 )             36.6     06-08    138  |  105  |  36<H>  ----------------------------<  179<H>  4.5   |  27  |  1.10    Ca    8.4<L>      08 Jun 2022 07:01    TPro  6.6  /  Alb  2.5<L>  /  TBili  0.5  /  DBili  x   /  AST  36  /  ALT  35  /  AlkPhos  121<H>  06-08        CAPILLARY BLOOD GLUCOSE      POCT Blood Glucose.: 183 mg/dL (09 Jun 2022 07:35)  POCT Blood Glucose.: 212 mg/dL (08 Jun 2022 21:16)  POCT Blood Glucose.: 182 mg/dL (08 Jun 2022 16:32)  POCT Blood Glucose.: 189 mg/dL (08 Jun 2022 11:06)      RADIOLOGY & ADDITIONAL TESTS:    Imaging reports  Personally Reviewed:  [ ] YES  [ ] NO    Consultant(s) Notes Reviewed:  [x ] YES  [ ] NO    Care Discussed with Consultants/Other Providers [x ] YES  [ ] NO  Problem/Plan - 1:  ·  Problem: AMS (altered mental status).   ·  improved  with  AB  ivf    Problem/Plan - 2:  ·  Problem: Acute UTI.   ·  Plan: continue  rocephin  empirically  for  ID  evaluation appreciated    Problem/Plan - 3:  ·  Problem: CAD S/P percutaneous coronary angioplasty.   ·  Plan: brillinta asa metoprolol ranexa.    Problem/Plan - 4:  ·  Problem: HTN (hypertension).   ·  Plan: metoprolol  lisinopril.    Problem/Plan - 6:  ·  Problem: DM (diabetes mellitus).   ·  Plan: lantus  short  acting insulin  coverage  hold  metformin.    Problem/Plan - 7:  ·  Problem: Neuropathy.   ·  Plan: lyrica.   rt  shoulder  pain  tylenol  check  XR

## 2022-06-09 NOTE — PROGRESS NOTE ADULT - SUBJECTIVE AND OBJECTIVE BOX
TMAX - 98.8    On day # 5 Rocephin    Vital Signs Last 24 Hrs  T(C): 37 (09 Jun 2022 11:16), Max: 37.1 (09 Jun 2022 05:06)  T(F): 98.6 (09 Jun 2022 11:16), Max: 98.8 (09 Jun 2022 05:06)  HR: 66 (09 Jun 2022 14:40) (62 - 71)  BP: 123/63 (09 Jun 2022 14:40) (106/63 - 144/76)  RR: 17 (09 Jun 2022 11:16) (17 - 18)  SpO2: 97% (09 Jun 2022 14:40) (95% - 98%)  Supplemental O2:  on RA    Awake, alert, no c/o cp or SOB but using her inhaler.  C/o Lt Shoulder pain now and with difficulty raising her Lt arm.  No c/o abdominal pain and no N/V/D.    PHYSICAL EXAM  General:  72 y/o  female awake, alert, pleasant and cooperative, in NAD  HEENT: conj pink, sclerae anicteric, PERRLA, no oral lesions noted, but mucosa slightly dry  Neck: supple, no nodes noted  Heart: RR  Lungs: clear bilaterally  Abdomen: soft, BS+, nontender, no masses or HS -megaly detected  Back: no CVA or Spinal tenderness noted to palpation  Extremities: no edema LE's                    no calf tenderness noted                     Limited ROM of the LUE now  with inability to raise the Lt arm and with some tenderness to palpation over the Lt shoulder and Lt upper arm - no erythema noted and no swelling noted  Skin:  warm, dry, no rash seen    I&O's Summary :    08 Jun 2022 07:01  -  09 Jun 2022 07:00  --------------------------------------------------------  IN: 280 mL / OUT: 0 mL / NET: 280 mL    09 Jun 2022 07:01  -  09 Jun 2022 16:00  --------------------------------------------------------  IN: 660 mL / OUT: 0 mL / NET: 660 mL      LABS:  CBC Full  -  ( 09 Jun 2022 07:08 )  WBC Count : 7.70 K/uL  RBC Count : 4.20 M/uL  Hemoglobin : 12.1 g/dL  Hematocrit : 36.6 %  Platelet Count - Automated : 251 K/uL  Mean Cell Volume : 87.1 fl  Mean Cell Hemoglobin : 28.8 pg  Mean Cell Hemoglobin Concentration : 33.1 g/dL  Auto Neutrophil # : x  Auto Lymphocyte # : x  Auto Monocyte # : x  Auto Eosinophil # : x  Auto Basophil # : x  Auto Neutrophil % : x  Auto Lymphocyte % : x  Auto Monocyte % : x  Auto Eosinophil % : x  Auto Basophil % : x    06-08    138  |  105  |  36<H>  ----------------------------<  179<H>  4.5   |  27  |  1.10    Ca    8.4<L>      08 Jun 2022 07:01    TPro  6.6  /  Alb  2.5<L>  /  TBili  0.5  /  DBili  x   /  AST  36  /  ALT  35  /  AlkPhos  121<H>  06-08    LIVER FUNCTIONS - ( 08 Jun 2022 07:01 )  Alb: 2.5 g/dL / Pro: 6.6 gm/dL / ALK PHOS: 121 U/L / ALT: 35 U/L / AST: 36 U/L / GGT: x           Sedimentation Rate, Erythrocyte: 25 mm/hr (06-08 @ 07:01)    C-Reactive Protein, Serum (06.08.22 @ 09:20)    C-Reactive Protein, Serum: 6 mg/L    Procalcitonin, Serum (06.08.22 @ 09:20)    Procalcitonin, Serum: 0.13:     Lactate, Blood (06.05.22 @ 14:38)    Lactate, Blood: 0.8 mmol/L    A1C with Estimated Average Glucose (06.06.22 @ 09:34)    A1C with Estimated Average Glucose Result: 8.4: Method: Immunoassay    Estimated Average Glucose: 194:       MICROBIOLOGY:  Specimen Source: .Blood Blood-Peripheral (06-06 @ 02:40)  Culture Results:   No growth to date. (06-06 @ 02:40)    Specimen Source: Clean Catch Clean Catch (Midstream) (06-06 @ 01:19)  Culture Results:   <10,000 CFU/mL Normal Urogenital Milli (06-06 @ 01:19)    Specimen Source: .Blood Blood-Peripheral (06-05 @ 21:08)  Culture Results:   No growth to date. (06-05 @ 21:08)      Flu With COVID-19 By MOISES (06.05.22 @ 15:41)    SARS-CoV-2 Result: NotDetec: EUA/IVD    Influenza A Result: NotDetec: EUA/IVD    Influenza B Result: NotDetec: EUA/IVD      RADIOLOGY:  < from: US Kidney and Bladder (06.06.22 @ 10:29) >  ACC: [id number] EXAM:  US KIDNEYS AND BLADDER                        PROCEDURE DATE:  06/06/2022    INTERPRETATION:  CLINICAL INFORMATION: Urinary tract infection  COMPARISON: None available.  TECHNIQUE: Sonography of the kidneys and bladder.  FINDINGS:  Right kidney: 9.4 cm. No renal mass, hydronephrosis or calculi. 1.9 x 1.7   x 1.7 cm cyst.  Left kidney: 9.7 cm. No renal mass, hydronephrosis or calculi. 1.2 x 1.3   x 1.2 cm cyst.  Urinary bladder: Within normal limits. Plqoxd12 mL.  IMPRESSION:  No hydronephrosis.    < from: Xray Chest 1 View- PORTABLE-Urgent (06.05.22 @ 15:00) >  ACC: [id number] EXAM:  XR CHEST PORTABLE URGENT 1V                        PROCEDURE DATE:  06/05/2022    INTERPRETATION:  AP semierect chest on June 5, 2022 at 2:44 PM. Patient   has sepsis.  Shallow inspiration crowds the chest.  Heart magnified by technique.  Small granulomas in the apices again seen.  No acute finding or change from November 4, 2019.  IMPRESSION: No acute finding or change.      < from: CT Head No Cont (06.05.22 @ 14:48) >  ACC: [id number] EXAM:  CT BRAIN                        PROCEDURE DATE:  06/05/2022    INTERPRETATION:  INDICATIONS:  AMS  .  TECHNIQUE:  Serial axial images were obtained from the skull base to the   vertex without intravenous contrast. Coronal and sagittal reformatted   images were obtained.  COMPARISON EXAMINATION: 9/9/2019  FINDINGS:  VENTRICLES AND SULCI:  Prominent in size compatible with age-appropriate   volume loss  INTRA-AXIAL:  Bilateral basal ganglia and left thalamic chronic lacunae   are stable. Mild microvascular type white matter changes are again seen.   No mass effect or hemorrhage. No new areas of abnormal attenuation.  EXTRA-AXIAL:  No mass or collection is seen.  VISUALIZED SINUSES:  Mild mucosal thickening  VISUALIZED MASTOIDS:  Clear.  CALVARIUM:  Normal.  MISCELLANEOUS:  None.  IMPRESSION:  No mass effect, hemorrhage or evidence of acute intracranial pathology.    Impression: 72 y/o female with hx of Asthma, HTN, HLD, CAD, s/p Angioplasty and Stent placement, DM, Peripheral Neuropathy, admitted via the ER on 6/5/22 with reported altered MS and fever noted to be 101.1 in the ER.  According to patient's family she had spent the weekend with her daughter and was noted to be spending an increased amount of time in the [place] over the prior 2 days and c/o some dysuria.  She appeared less responsive to her family and thus they brought her to the ER to be evaluated.  She was noted to have fever to 101.1 and WBC's increased to 13,810 and Blood and Urine Cx's were obtained and she was begun empirically on Rocephin.  Further w/u with CT of the Head was done and Negative for any acute changes and CXR was negative for any infiltrates.  Renal Sono was also done and reported with no obvious hydronephrosis or calculi but with Bilateral Renal Cysts.  U/a revealed only 3-5 WBC's, but is LE Positive and  with many Bacteria noted although Urine Cx subsequently was reported with <10,000 cfu - ? specimen obtained after Ab rx initiated ?   Patient remains afebrile now and Blood Cx's are negative thus far. WBC's have decreased now.  Today with c/o Lt shoulder pain and noted limited ROM of the LUE.    Suggestions:  Will therefore continue current ab rx with Rocephin for presumed UTI and follow-up Blood Cx's.  Monitor temps and labs closely.  Continue Bacid  rx while on ab's.   Will request Xray of the Lt shoulder to further evaluate c/o pain and limited ROM.  Discussed with patient at the bedside.

## 2022-06-10 LAB
ALBUMIN SERPL ELPH-MCNC: 2.5 G/DL — LOW (ref 3.3–5)
ALP SERPL-CCNC: 154 U/L — HIGH (ref 40–120)
ALT FLD-CCNC: 37 U/L — SIGNIFICANT CHANGE UP (ref 12–78)
ANION GAP SERPL CALC-SCNC: 6 MMOL/L — SIGNIFICANT CHANGE UP (ref 5–17)
AST SERPL-CCNC: 35 U/L — SIGNIFICANT CHANGE UP (ref 15–37)
BILIRUB SERPL-MCNC: 0.3 MG/DL — SIGNIFICANT CHANGE UP (ref 0.2–1.2)
BUN SERPL-MCNC: 31 MG/DL — HIGH (ref 7–23)
CALCIUM SERPL-MCNC: 8.2 MG/DL — LOW (ref 8.5–10.1)
CHLORIDE SERPL-SCNC: 106 MMOL/L — SIGNIFICANT CHANGE UP (ref 96–108)
CO2 SERPL-SCNC: 25 MMOL/L — SIGNIFICANT CHANGE UP (ref 22–31)
CREAT SERPL-MCNC: 1.07 MG/DL — SIGNIFICANT CHANGE UP (ref 0.5–1.3)
CRP SERPL-MCNC: <3 MG/L — SIGNIFICANT CHANGE UP
CULTURE RESULTS: SIGNIFICANT CHANGE UP
EGFR: 55 ML/MIN/1.73M2 — LOW
ERYTHROCYTE [SEDIMENTATION RATE] IN BLOOD: 23 MM/HR — HIGH (ref 0–20)
GLUCOSE BLDC GLUCOMTR-MCNC: 157 MG/DL — HIGH (ref 70–99)
GLUCOSE BLDC GLUCOMTR-MCNC: 197 MG/DL — HIGH (ref 70–99)
GLUCOSE BLDC GLUCOMTR-MCNC: 253 MG/DL — HIGH (ref 70–99)
GLUCOSE BLDC GLUCOMTR-MCNC: 264 MG/DL — HIGH (ref 70–99)
GLUCOSE SERPL-MCNC: 270 MG/DL — HIGH (ref 70–99)
HCT VFR BLD CALC: 36.1 % — SIGNIFICANT CHANGE UP (ref 34.5–45)
HGB BLD-MCNC: 11.2 G/DL — LOW (ref 11.5–15.5)
MCHC RBC-ENTMCNC: 27.7 PG — SIGNIFICANT CHANGE UP (ref 27–34)
MCHC RBC-ENTMCNC: 31 G/DL — LOW (ref 32–36)
MCV RBC AUTO: 89.1 FL — SIGNIFICANT CHANGE UP (ref 80–100)
NRBC # BLD: 0 /100 WBCS — SIGNIFICANT CHANGE UP (ref 0–0)
PLATELET # BLD AUTO: 244 K/UL — SIGNIFICANT CHANGE UP (ref 150–400)
POTASSIUM SERPL-MCNC: 4.8 MMOL/L — SIGNIFICANT CHANGE UP (ref 3.5–5.3)
POTASSIUM SERPL-SCNC: 4.8 MMOL/L — SIGNIFICANT CHANGE UP (ref 3.5–5.3)
PROT SERPL-MCNC: 6.8 GM/DL — SIGNIFICANT CHANGE UP (ref 6–8.3)
RBC # BLD: 4.05 M/UL — SIGNIFICANT CHANGE UP (ref 3.8–5.2)
RBC # FLD: 12.8 % — SIGNIFICANT CHANGE UP (ref 10.3–14.5)
SODIUM SERPL-SCNC: 137 MMOL/L — SIGNIFICANT CHANGE UP (ref 135–145)
SPECIMEN SOURCE: SIGNIFICANT CHANGE UP
WBC # BLD: 8.14 K/UL — SIGNIFICANT CHANGE UP (ref 3.8–10.5)
WBC # FLD AUTO: 8.14 K/UL — SIGNIFICANT CHANGE UP (ref 3.8–10.5)

## 2022-06-10 PROCEDURE — 73030 X-RAY EXAM OF SHOULDER: CPT | Mod: 26,LT

## 2022-06-10 RX ADMIN — RANOLAZINE 500 MILLIGRAM(S): 500 TABLET, FILM COATED, EXTENDED RELEASE ORAL at 05:46

## 2022-06-10 RX ADMIN — Medication 1: at 12:36

## 2022-06-10 RX ADMIN — TICAGRELOR 90 MILLIGRAM(S): 90 TABLET ORAL at 21:43

## 2022-06-10 RX ADMIN — RANOLAZINE 500 MILLIGRAM(S): 500 TABLET, FILM COATED, EXTENDED RELEASE ORAL at 17:19

## 2022-06-10 RX ADMIN — Medication 1 TABLET(S): at 17:19

## 2022-06-10 RX ADMIN — Medication 50 MILLIGRAM(S): at 12:35

## 2022-06-10 RX ADMIN — Medication 3: at 08:32

## 2022-06-10 RX ADMIN — BUDESONIDE AND FORMOTEROL FUMARATE DIHYDRATE 2 PUFF(S): 160; 4.5 AEROSOL RESPIRATORY (INHALATION) at 17:21

## 2022-06-10 RX ADMIN — INSULIN GLARGINE 15 UNIT(S): 100 INJECTION, SOLUTION SUBCUTANEOUS at 21:49

## 2022-06-10 RX ADMIN — BUDESONIDE AND FORMOTEROL FUMARATE DIHYDRATE 2 PUFF(S): 160; 4.5 AEROSOL RESPIRATORY (INHALATION) at 05:46

## 2022-06-10 RX ADMIN — Medication 1: at 17:17

## 2022-06-10 RX ADMIN — Medication 81 MILLIGRAM(S): at 12:35

## 2022-06-10 RX ADMIN — LISINOPRIL 2.5 MILLIGRAM(S): 2.5 TABLET ORAL at 05:46

## 2022-06-10 RX ADMIN — CEFTRIAXONE 100 MILLIGRAM(S): 500 INJECTION, POWDER, FOR SOLUTION INTRAMUSCULAR; INTRAVENOUS at 12:36

## 2022-06-10 RX ADMIN — Medication 25 MILLIGRAM(S): at 05:46

## 2022-06-10 RX ADMIN — Medication 1 TABLET(S): at 05:46

## 2022-06-10 RX ADMIN — ATORVASTATIN CALCIUM 80 MILLIGRAM(S): 80 TABLET, FILM COATED ORAL at 21:45

## 2022-06-10 RX ADMIN — TIOTROPIUM BROMIDE 1 CAPSULE(S): 18 CAPSULE ORAL; RESPIRATORY (INHALATION) at 12:40

## 2022-06-10 NOTE — PROGRESS NOTE ADULT - SUBJECTIVE AND OBJECTIVE BOX
INTERVAL HPI/OVERNIGHT EVENTS:    patient  off floor  for  testing    REVIEW OF SYSTEMS:  CONSTITUTIONAL:  reported  with  continued  L  shoulder  pain       MEDICATION:  aspirin enteric coated 81 milliGRAM(s) Oral daily  atorvastatin 80 milliGRAM(s) Oral at bedtime  budesonide  80 MICROgram(s)/formoterol 4.5 MICROgram(s) Inhaler 2 Puff(s) Inhalation two times a day  cefTRIAXone   IVPB 1000 milliGRAM(s) IV Intermittent every 24 hours  dextrose 5%. 1000 milliLiter(s) IV Continuous <Continuous>  dextrose 5%. 1000 milliLiter(s) IV Continuous <Continuous>  dextrose 50% Injectable 25 Gram(s) IV Push once  dextrose 50% Injectable 12.5 Gram(s) IV Push once  dextrose 50% Injectable 25 Gram(s) IV Push once  dextrose Oral Gel 15 Gram(s) Oral once PRN  glucagon  Injectable 1 milliGRAM(s) IntraMuscular once  influenza  Vaccine (HIGH DOSE) 0.7 milliLiter(s) IntraMuscular once  insulin glargine Injectable (LANTUS) 15 Unit(s) SubCutaneous at bedtime  insulin lispro (ADMELOG) corrective regimen sliding scale   SubCutaneous three times a day before meals  lactobacillus acidophilus 1 Tablet(s) Oral two times a day  lisinopril 2.5 milliGRAM(s) Oral daily  metoprolol succinate ER 25 milliGRAM(s) Oral daily  pregabalin 50 milliGRAM(s) Oral daily  ranolazine 500 milliGRAM(s) Oral two times a day  ticagrelor 90 milliGRAM(s) Oral at bedtime  tiotropium 18 MICROgram(s) Capsule 1 Capsule(s) Inhalation daily    Vital Signs Last 24 Hrs  T(C): 36.4 (10 Seamus 2022 05:07), Max: 37 (09 Jun 2022 11:16)  T(F): 97.5 (10 Seamus 2022 05:07), Max: 98.6 (09 Jun 2022 11:16)  HR: 64 (10 Seamus 2022 05:07) (64 - 66)  BP: 125/72 (10 Seamus 2022 05:07) (106/63 - 129/75)  BP(mean): --  RR: 17 (10 Seamus 2022 05:07) (17 - 18)  SpO2: 95% (10 Seamus 2022 05:07) (94% - 97%)    LABS:                        11.2   8.14  )-----------( 244      ( 10 Seamus 2022 06:49 )             36.1     06-10    137  |  106  |  31<H>  ----------------------------<  270<H>  4.8   |  25  |  1.07    Ca    8.2<L>      10 Seamus 2022 06:49    TPro  6.8  /  Alb  2.5<L>  /  TBili  0.3  /  DBili  x   /  AST  35  /  ALT  37  /  AlkPhos  154<H>  06-10        CAPILLARY BLOOD GLUCOSE      POCT Blood Glucose.: 264 mg/dL (10 Seamus 2022 07:47)  POCT Blood Glucose.: 273 mg/dL (09 Jun 2022 21:25)  POCT Blood Glucose.: 206 mg/dL (09 Jun 2022 16:39)  POCT Blood Glucose.: 206 mg/dL (09 Jun 2022 11:21)      RADIOLOGY & ADDITIONAL TESTS:    Imaging reports  Personally Reviewed:  [ ] YES  [ ] NO    Consultant(s) Notes Reviewed:  [x ] YES  [ ] NO  Problem/Plan - 1:  ·  Problem: AMS (altered mental status).   ·  improved  with  AB  ivf    Problem/Plan - 2:  ·  Problem: Acute UTI.   ·  Plan: continue  rocephin  empirically  for  ID  evaluation appreciated    Problem/Plan - 3:  ·  Problem: CAD S/P percutaneous coronary angioplasty.   ·  Plan: brillinta asa metoprolol ranexa.    Problem/Plan - 4:  ·  Problem: HTN (hypertension).   ·  Plan: metoprolol  lisinopril.    Problem/Plan - 6:  ·  Problem: DM (diabetes mellitus).   ·  Plan: lantus  short  acting insulin  coverage  hold  metformin.    Problem/Plan - 7:  ·  Problem: Neuropathy.   ·  Plan: lyrica.   rt  shoulder  pain  tylenol  check  XR      Care Discussed with Consultants/Other Providers [ ] YES  [ ] NO

## 2022-06-10 NOTE — PROGRESS NOTE ADULT - SUBJECTIVE AND OBJECTIVE BOX
TMAX - 98.8    On day # 6 Rocephin    Vital Signs Last 24 Hrs  T(C): 36.4 (10 Seamus 2022 11:11), Max: 36.7 (09 Jun 2022 17:04)  T(F): 97.5 (10 Saemus 2022 11:11), Max: 98 (09 Jun 2022 17:04)  HR: 61 (10 Seamus 2022 11:11) (61 - 65)  BP: 113/81 (10 Seamus 2022 11:11) (113/81 - 129/75)  RR: 17 (10 Seamus 2022 11:11) (17 - 18)  SpO2: 97% (10 Seamus 2022 11:11) (94% - 97%)  Supplemental O2:  on RA    Awake, alert, still with c/o pain in the Lt shoulder and difficulty raising the LUE, but otherwise feels better.    PHYSICAL EXAM  General:  70 y/o  female awake, alert, pleasant and cooperative, in NAD  HEENT: conj pink, sclerae anicteric, PERRLA, no oral lesions noted, but mucosa slightly dry  Neck: supple, no nodes noted  Heart: RR  Lungs: clear bilaterally  Abdomen: soft, BS+, nontender, no masses or HS -megaly detected  Back: no CVA or Spinal tenderness noted to palpation  Extremities: no edema LE's                    no calf tenderness noted                     Limited ROM of the LUE now  with inability to raise the Lt arm and with some tenderness to palpation over the Lt shoulder and Lt upper arm - no erythema noted and no swelling noted  Skin:  warm, dry, no rash seen    I&O's Summary :    09 Jun 2022 07:01  -  10 Seamus 2022 07:00  --------------------------------------------------------  IN: 900 mL / OUT: 0 mL / NET: 900 mL      LABS:  CBC Full  -  ( 10 Seamus 2022 06:49 )  WBC Count : 8.14 K/uL  RBC Count : 4.05 M/uL  Hemoglobin : 11.2 g/dL  Hematocrit : 36.1 %  Platelet Count - Automated : 244 K/uL  Mean Cell Volume : 89.1 fl  Mean Cell Hemoglobin : 27.7 pg  Mean Cell Hemoglobin Concentration : 31.0 g/dL  Auto Neutrophil # : x  Auto Lymphocyte # : x  Auto Monocyte # : x  Auto Eosinophil # : x  Auto Basophil # : x  Auto Neutrophil % : x  Auto Lymphocyte % : x  Auto Monocyte % : x  Auto Eosinophil % : x  Auto Basophil % : x    06-10    137  |  106  |  31<H>  ----------------------------<  270<H>  4.8   |  25  |  1.07    Ca    8.2<L>      10 Seamus 2022 06:49    TPro  6.8  /  Alb  2.5<L>  /  TBili  0.3  /  DBili  x   /  AST  35  /  ALT  37  /  AlkPhos  154<H>  06-10    LIVER FUNCTIONS - ( 10 Seamus 2022 06:49 )  Alb: 2.5 g/dL / Pro: 6.8 gm/dL / ALK PHOS: 154 U/L / ALT: 37 U/L / AST: 35 U/L / GGT: x           Sedimentation Rate, Erythrocyte: 23 mm/hr (06-10 @ 06:49)  Sedimentation Rate, Erythrocyte: 25 mm/hr (06-08 @ 07:01)    C-Reactive Protein, Serum (06.10.22 @ 08:58)    C-Reactive Protein, Serum: <3: Test Repeated mg/L    C-Reactive Protein, Serum (06.08.22 @ 09:20)    C-Reactive Protein, Serum: 6 mg/L      Procalcitonin, Serum (06.08.22 @ 09:20)    Procalcitonin, Serum: 0.13:     Lactate, Blood (06.05.22 @ 14:38)    Lactate, Blood: 0.8 mmol/L    A1C with Estimated Average Glucose (06.06.22 @ 09:34)    A1C with Estimated Average Glucose Result: 8.4: Method: Immunoassay    Estimated Average Glucose: 194:     MICROBIOLOGY:  Specimen Source: .Blood Blood-Peripheral (06-06 @ 02:40)  Culture Results:   No growth to date. (06-06 @ 02:40)    Specimen Source: Clean Catch Clean Catch (Midstream) (06-06 @ 01:19)  Culture Results:   <10,000 CFU/mL Normal Urogenital Milli (06-06 @ 01:19)    Specimen Source: .Blood Blood-Peripheral (06-05 @ 21:08)  Culture Results:   No growth to date. (06-05 @ 21:08)      Flu With COVID-19 By MOISES (06.05.22 @ 15:41)    SARS-CoV-2 Result: NotDetec: EUA/IVD    Influenza A Result: NotDetec: EUA/IVD    Influenza B Result: NotDetec: EUA/IVD    RADIOLOGY:  < from: US Kidney and Bladder (06.06.22 @ 10:29) >  ACC: [id number] EXAM:  US KIDNEYS AND BLADDER                        PROCEDURE DATE:  06/06/2022    INTERPRETATION:  CLINICAL INFORMATION: Urinary tract infection  COMPARISON: None available.  TECHNIQUE: Sonography of the kidneys and bladder.  FINDINGS:  Right kidney: 9.4 cm. No renal mass, hydronephrosis or calculi. 1.9 x 1.7   x 1.7 cm cyst.  Left kidney: 9.7 cm. No renal mass, hydronephrosis or calculi. 1.2 x 1.3   x 1.2 cm cyst.  Urinary bladder: Within normal limits. [id number] mL.  IMPRESSION:  No hydronephrosis.    < from: Xray Chest 1 View- PORTABLE-Urgent (06.05.22 @ 15:00) >  ACC: [id number] EXAM:  XR CHEST PORTABLE URGENT 1V                        PROCEDURE DATE:  06/05/2022    INTERPRETATION:  AP semierect chest on June 5, 2022 at 2:44 PM. Patient   has sepsis.  Shallow inspiration crowds the chest.  Heart magnified by technique.  Small granulomas in the apices again seen.  No acute finding or change from November 4, 2019.  IMPRESSION: No acute finding or change.      < from: CT Head No Cont (06.05.22 @ 14:48) >  ACC: [id number] EXAM:  CT BRAIN                        PROCEDURE DATE:  06/05/2022    INTERPRETATION:  INDICATIONS:  AMS  .  TECHNIQUE:  Serial axial images were obtained from the skull base to the   vertex without intravenous contrast. Coronal and sagittal reformatted   images were obtained.  COMPARISON EXAMINATION: 9/9/2019  FINDINGS:  VENTRICLES AND SULCI:  Prominent in size compatible with age-appropriate   volume loss  INTRA-AXIAL:  Bilateral basal ganglia and left thalamic chronic lacunae   are stable. Mild microvascular type white matter changes are again seen.   No mass effect or hemorrhage. No new areas of abnormal attenuation.  EXTRA-AXIAL:  No mass or collection is seen.  VISUALIZED SINUSES:  Mild mucosal thickening  VISUALIZED MASTOIDS:  Clear.  CALVARIUM:  Normal.  MISCELLANEOUS:  None.  IMPRESSION:  No mass effect, hemorrhage or evidence of acute intracranial pathology.    Impression: 70 y/o female with hx of Asthma, HTN, HLD, CAD, s/p Angioplasty and Stent placement, DM, Peripheral Neuropathy, admitted via the ER on 6/5/22 with reported altered MS and fever noted to be 101.1 in the ER.  According to patient's family she had spent the weekend with her daughter and was noted to be spending an increased amount of time in the BR over the prior 2 days and c/o some dysuria.  She appeared less responsive to her family and thus they brought her to the ER to be evaluated.  She was noted to have fever to 101.1 and WBC's increased to 13,810 and Blood and Urine Cx's were obtained and she was begun empirically on Rocephin.  Further w/u with CT of the Head was done and Negative for any acute changes and CXR was negative for any infiltrates.  Renal Sono was also done and reported with no obvious hydronephrosis or calculi but with Bilateral Renal Cysts.  U/a revealed only 3-5 WBC's, but is LE Positive and  with many Bacteria noted although Urine Cx subsequently was reported with <10,000 cfu - ? specimen obtained after Ab rx initiated ?   Patient remains afebrile now and Blood Cx's are negative thus far. WBC's have decreased now.  Onset of Lt shoulder pain noted on 6/9/22  and noted with limited ROM of the LUE.    Suggestions:  Will therefore continue current ab rx with Rocephin for presumed UTI  and plan to complete 7 days of rx then likely d/c and observe.  Follow-up Blood Cx's.  Monitor temps and labs closely.  Continue Bacid  rx while on ab's.   Xray of the Lt shoulder done today with results pending.  Discussed with patient at the bedside.

## 2022-06-11 LAB
CULTURE RESULTS: SIGNIFICANT CHANGE UP
GLUCOSE BLDC GLUCOMTR-MCNC: 114 MG/DL — HIGH (ref 70–99)
GLUCOSE BLDC GLUCOMTR-MCNC: 183 MG/DL — HIGH (ref 70–99)
GLUCOSE BLDC GLUCOMTR-MCNC: 211 MG/DL — HIGH (ref 70–99)
GLUCOSE BLDC GLUCOMTR-MCNC: 301 MG/DL — HIGH (ref 70–99)
SPECIMEN SOURCE: SIGNIFICANT CHANGE UP

## 2022-06-11 RX ADMIN — Medication 1 TABLET(S): at 05:49

## 2022-06-11 RX ADMIN — Medication 2: at 09:14

## 2022-06-11 RX ADMIN — INSULIN GLARGINE 15 UNIT(S): 100 INJECTION, SOLUTION SUBCUTANEOUS at 22:10

## 2022-06-11 RX ADMIN — BUDESONIDE AND FORMOTEROL FUMARATE DIHYDRATE 2 PUFF(S): 160; 4.5 AEROSOL RESPIRATORY (INHALATION) at 05:52

## 2022-06-11 RX ADMIN — RANOLAZINE 500 MILLIGRAM(S): 500 TABLET, FILM COATED, EXTENDED RELEASE ORAL at 19:06

## 2022-06-11 RX ADMIN — LISINOPRIL 2.5 MILLIGRAM(S): 2.5 TABLET ORAL at 05:50

## 2022-06-11 RX ADMIN — Medication 50 MILLIGRAM(S): at 13:16

## 2022-06-11 RX ADMIN — RANOLAZINE 500 MILLIGRAM(S): 500 TABLET, FILM COATED, EXTENDED RELEASE ORAL at 05:50

## 2022-06-11 RX ADMIN — Medication 25 MILLIGRAM(S): at 05:50

## 2022-06-11 RX ADMIN — CEFTRIAXONE 100 MILLIGRAM(S): 500 INJECTION, POWDER, FOR SOLUTION INTRAMUSCULAR; INTRAVENOUS at 13:16

## 2022-06-11 RX ADMIN — TIOTROPIUM BROMIDE 1 CAPSULE(S): 18 CAPSULE ORAL; RESPIRATORY (INHALATION) at 13:09

## 2022-06-11 RX ADMIN — Medication 1 TABLET(S): at 19:06

## 2022-06-11 RX ADMIN — Medication 4: at 11:00

## 2022-06-11 RX ADMIN — ATORVASTATIN CALCIUM 80 MILLIGRAM(S): 80 TABLET, FILM COATED ORAL at 22:09

## 2022-06-11 RX ADMIN — Medication 81 MILLIGRAM(S): at 13:10

## 2022-06-11 RX ADMIN — TICAGRELOR 90 MILLIGRAM(S): 90 TABLET ORAL at 22:08

## 2022-06-11 NOTE — PROGRESS NOTE ADULT - SUBJECTIVE AND OBJECTIVE BOX
TMAX - 98.3    On day # 7 Rocephin    Vital Signs Last 24 Hrs  T(C): 36.8 (11 Jun 2022 12:49), Max: 36.8 (11 Jun 2022 12:49)  T(F): 98.3 (11 Jun 2022 12:49), Max: 98.3 (11 Jun 2022 12:49)  HR: 63 (11 Jun 2022 12:49) (61 - 64)  BP: 143/82 (11 Jun 2022 12:49) (133/85 - 150/85)  RR: 18 (11 Jun 2022 12:49) (18 - 18)  SpO2: 96% (11 Jun 2022 12:49) (96% - 97%)  Supplemental O2:  on RA    Awake, alert, still with c/o Lt shoulder pain and limited ROM but otherwise comfortable.  Appears to be voiding frequently though.  O2 Sat's remain between 95 - 97% on RA.    PHYSICAL EXAM  General:  70 y/o  female awake, alert, pleasant and cooperative, in NAD  HEENT: conj pink, sclerae anicteric, PERRLA, no oral lesions noted, but mucosa slightly dry  Neck: supple, no nodes noted  Heart: RR  Lungs: clear bilaterally  Abdomen: soft, BS+, nontender, no masses or HS -megaly detected  Back: no CVA or Spinal tenderness noted to palpation  Extremities: no edema LE's                    no calf tenderness noted                     Limited ROM of the LUE now  with inability to raise the Lt arm and with some tenderness to palpation over the Lt shoulder and Lt upper arm - no erythema noted and no swelling noted  Skin:  warm, dry, no rash seen    I&O's Summary :    10 Seamus 2022 07:01  -  11 Jun 2022 07:00  --------------------------------------------------------  IN: 880 mL / OUT: 7 mL / NET: 873 mL      LABS:  CBC Full  -  ( 10 Seamus 2022 06:49 )  WBC Count : 8.14 K/uL  RBC Count : 4.05 M/uL  Hemoglobin : 11.2 g/dL  Hematocrit : 36.1 %  Platelet Count - Automated : 244 K/uL  Mean Cell Volume : 89.1 fl  Mean Cell Hemoglobin : 27.7 pg  Mean Cell Hemoglobin Concentration : 31.0 g/dL  Auto Neutrophil # : x  Auto Lymphocyte # : x  Auto Monocyte # : x  Auto Eosinophil # : x  Auto Basophil # : x  Auto Neutrophil % : x  Auto Lymphocyte % : x  Auto Monocyte % : x  Auto Eosinophil % : x  Auto Basophil % : x    06-10  137  |  106  |  31<H>  ----------------------------<  270<H>  4.8   |  25  |  1.07    Ca    8.2<L>      10 Seamus 2022 06:49    TPro  6.8  /  Alb  2.5<L>  /  TBili  0.3  /  DBili  x   /  AST  35  /  ALT  37  /  AlkPhos  154<H>  06-10    LIVER FUNCTIONS - ( 10 Seamus 2022 06:49 )  Alb: 2.5 g/dL / Pro: 6.8 gm/dL / ALK PHOS: 154 U/L / ALT: 37 U/L / AST: 35 U/L / GGT: x           Sedimentation Rate, Erythrocyte: 23 mm/hr (06-10 @ 06:49)  Sedimentation Rate, Erythrocyte: 25 mm/hr (06-08 @ 07:01)    C-Reactive Protein, Serum (06.10.22 @ 08:58)    C-Reactive Protein, Serum: <3: Test Repeated mg/L    C-Reactive Protein, Serum (06.08.22 @ 09:20)    C-Reactive Protein, Serum: 6 mg/L      Procalcitonin, Serum (06.08.22 @ 09:20)    Procalcitonin, Serum: 0.13:     Lactate, Blood (06.05.22 @ 14:38)    Lactate, Blood: 0.8 mmol/L    A1C with Estimated Average Glucose (06.06.22 @ 09:34)    A1C with Estimated Average Glucose Result: 8.4: Method: Immunoassay    Estimated Average Glucose: 194:       MICROBIOLOGY:  Specimen Source: .Blood Blood-Peripheral (06-06 @ 02:40)  Culture Results:   No Growth Final (06-06 @ 02:40)    Specimen Source: Clean Catch Clean Catch (Midstream) (06-06 @ 01:19)  Culture Results:   <10,000 CFU/mL Normal Urogenital Milli (06-06 @ 01:19)    Specimen Source: .Blood Blood-Peripheral (06-05 @ 21:08)  Culture Results:   No Growth Final (06-05 @ 21:08)      Flu With COVID-19 By MOISES (06.05.22 @ 15:41)    SARS-CoV-2 Result: NotDetec: EUA/IVD    Influenza A Result: NotDetec: EUA/IVD    Influenza B Result: NotDetec: EUA/IVD        RADIOLOGY:  < from: Xray Shoulder 2 Views, Left (06.10.22 @ 09:51) >  ACC: [id number] EXAM:  XR SHOULDER COMP MIN 2V LT                        PROCEDURE DATE:  06/10/2022    INTERPRETATION:  Radiographs of the LEFT shoulder  CLINICAL INFORMATION: LEFT shoulder Pain.  TECHNIQUE:  Frontal views in internal and external rotation. Shoulder  FINDINGS:   No prior examinations are available for review.  No fracture, dislocation or destructive bone lesion.  The humeral head is located within glenoid fossa.  The acromioclavicular joint is aligned and intact.  No pathologic calcifications or soft tissue abnormalities.  The visualized lung upper zone and ribs are within normal limits..  IMPRESSION:    No acute radiographic osseous pathology.  If pain persists despite conservative therapy and soft tissue internal   derangement or occult fracture is clinically suspected follow-up MRI   recommended.    < from: US Kidney and Bladder (06.06.22 @ 10:29) >  ACC: [id number] EXAM:  US KIDNEYS AND BLADDER                        PROCEDURE DATE:  06/06/2022    INTERPRETATION:  CLINICAL INFORMATION: Urinary tract infection  COMPARISON: None available.  TECHNIQUE: Sonography of the kidneys and bladder.  FINDINGS:  Right kidney: 9.4 cm. No renal mass, hydronephrosis or calculi. 1.9 x 1.7   x 1.7 cm cyst.  Left kidney: 9.7 cm. No renal mass, hydronephrosis or calculi. 1.2 x 1.3   x 1.2 cm cyst.  Urinary bladder: Within normal limits. [id number] mL.  IMPRESSION:  No hydronephrosis.    < from: Xray Chest 1 View- PORTABLE-Urgent (06.05.22 @ 15:00) >  ACC: [id number] EXAM:  XR CHEST PORTABLE URGENT 1V                        PROCEDURE DATE:  06/05/2022    INTERPRETATION:  AP semierect chest on June 5, 2022 at 2:44 PM. Patient   has sepsis.  Shallow inspiration crowds the chest.  Heart magnified by technique.  Small granulomas in the apices again seen.  No acute finding or change from November 4, 2019.  IMPRESSION: No acute finding or change.    < from: CT Head No Cont (06.05.22 @ 14:48) >  ACC: [id number] EXAM:  CT BRAIN                        PROCEDURE DATE:  06/05/2022    INTERPRETATION:  INDICATIONS:  AMS  .  TECHNIQUE:  Serial axial images were obtained from the skull base to the   vertex without intravenous contrast. Coronal and sagittal reformatted   images were obtained.  COMPARISON EXAMINATION: 9/9/2019  FINDINGS:  VENTRICLES AND SULCI:  Prominent in size compatible with age-appropriate   volume loss  INTRA-AXIAL:  Bilateral basal ganglia and left thalamic chronic lacunae   are stable. Mild microvascular type white matter changes are again seen.   No mass effect or hemorrhage. No new areas of abnormal attenuation.  EXTRA-AXIAL:  No mass or collection is seen.  VISUALIZED SINUSES:  Mild mucosal thickening  VISUALIZED MASTOIDS:  Clear.  CALVARIUM:  Normal.  MISCELLANEOUS:  None.  IMPRESSION:  No mass effect, hemorrhage or evidence of acute intracranial pathology.    Impression:  70 y/o female with hx of Asthma, HTN, HLD, CAD, s/p Angioplasty and Stent placement, DM, Peripheral Neuropathy, admitted via the ER on 6/5/22 with reported altered MS and fever noted to be 101.1 in the ER.  According to patient's family she had spent the weekend with her daughter and was noted to be spending an increased amount of time in the [place] over the prior 2 days and c/o some dysuria.  She appeared less responsive to her family and thus they brought her to the ER to be evaluated.  She was noted to have fever to 101.1 and WBC's increased to 13,810 and Blood and Urine Cx's were obtained and she was begun empirically on Rocephin.  Further w/u with CT of the Head was done and Negative for any acute changes and CXR was negative for any infiltrates.  Renal Sono was also done and reported with no obvious hydronephrosis or calculi but with Bilateral Renal Cysts.  U/a revealed only 3-5 WBC's, but is LE Positive and  with many Bacteria noted although Urine Cx subsequently was reported with <10,000 cfu - ? specimen obtained after Ab rx initiated ?   Patient remains afebrile now and Blood Cx's are finalized now as negative.  WBC's have decreased.  Onset of Lt shoulder pain noted on 6/9/22  and noted with limited ROM of the LUE.   XRay of the Lt Shoulder done 6/10/22  reported with no arturo  abnormality seen.    Suggestions:  Will therefore continue current ab rx with Rocephin for presumed UTI  and plan to complete 7 days of rx then likely d/c and observe.  Follow-up Blood Cx's.  Monitor temps and labs closely.  Continue Bacid  rx while on ab's.    Consider further w/u with ? MRI Lt Shoulder / ? Ortho evaluation ?

## 2022-06-12 ENCOUNTER — TRANSCRIPTION ENCOUNTER (OUTPATIENT)
Age: 72
End: 2022-06-12

## 2022-06-12 LAB
FLUAV AG NPH QL: SIGNIFICANT CHANGE UP
FLUBV AG NPH QL: SIGNIFICANT CHANGE UP
GLUCOSE BLDC GLUCOMTR-MCNC: 190 MG/DL — HIGH (ref 70–99)
GLUCOSE BLDC GLUCOMTR-MCNC: 211 MG/DL — HIGH (ref 70–99)
GLUCOSE BLDC GLUCOMTR-MCNC: 243 MG/DL — HIGH (ref 70–99)
GLUCOSE BLDC GLUCOMTR-MCNC: 316 MG/DL — HIGH (ref 70–99)
SARS-COV-2 RNA SPEC QL NAA+PROBE: SIGNIFICANT CHANGE UP

## 2022-06-12 RX ORDER — GABAPENTIN 400 MG/1
1 CAPSULE ORAL
Qty: 0 | Refills: 0 | DISCHARGE

## 2022-06-12 RX ORDER — ACETAMINOPHEN 500 MG
2 TABLET ORAL
Qty: 20 | Refills: 0
Start: 2022-06-12 | End: 2022-06-16

## 2022-06-12 RX ADMIN — RANOLAZINE 500 MILLIGRAM(S): 500 TABLET, FILM COATED, EXTENDED RELEASE ORAL at 18:07

## 2022-06-12 RX ADMIN — Medication 1 TABLET(S): at 18:07

## 2022-06-12 RX ADMIN — ATORVASTATIN CALCIUM 80 MILLIGRAM(S): 80 TABLET, FILM COATED ORAL at 21:32

## 2022-06-12 RX ADMIN — Medication 2: at 18:04

## 2022-06-12 RX ADMIN — TICAGRELOR 90 MILLIGRAM(S): 90 TABLET ORAL at 21:32

## 2022-06-12 RX ADMIN — Medication 1 TABLET(S): at 05:22

## 2022-06-12 RX ADMIN — Medication 81 MILLIGRAM(S): at 12:32

## 2022-06-12 RX ADMIN — BUDESONIDE AND FORMOTEROL FUMARATE DIHYDRATE 2 PUFF(S): 160; 4.5 AEROSOL RESPIRATORY (INHALATION) at 05:22

## 2022-06-12 RX ADMIN — INSULIN GLARGINE 15 UNIT(S): 100 INJECTION, SOLUTION SUBCUTANEOUS at 21:32

## 2022-06-12 RX ADMIN — BUDESONIDE AND FORMOTEROL FUMARATE DIHYDRATE 2 PUFF(S): 160; 4.5 AEROSOL RESPIRATORY (INHALATION) at 18:08

## 2022-06-12 RX ADMIN — Medication 50 MILLIGRAM(S): at 12:34

## 2022-06-12 RX ADMIN — LISINOPRIL 2.5 MILLIGRAM(S): 2.5 TABLET ORAL at 05:22

## 2022-06-12 RX ADMIN — Medication 2: at 12:36

## 2022-06-12 RX ADMIN — TIOTROPIUM BROMIDE 1 CAPSULE(S): 18 CAPSULE ORAL; RESPIRATORY (INHALATION) at 13:21

## 2022-06-12 RX ADMIN — Medication 1: at 08:13

## 2022-06-12 RX ADMIN — CEFTRIAXONE 100 MILLIGRAM(S): 500 INJECTION, POWDER, FOR SOLUTION INTRAMUSCULAR; INTRAVENOUS at 13:21

## 2022-06-12 NOTE — DISCHARGE NOTE PROVIDER - HOSPITAL COURSE
patient  treated  empirically  for  sepsis  with  suspected  uti  had  good  clinical improvement  with  resolution  of  leucocytosis  and normalization  of  mental  status  to  baseline   hopital  course  +  for  L  shoulder  pain  with  negative  CXR  and  relief  with  tylenol appetite  good  ambulating  freely  no  chest pain no  siob  diascharged  home patient  treated  empirically  for  sepsis  with  suspected  uti  had  good  clinical improvement  with  resolution  of  leucocytosis  and normalization  of  mental  status  to  baseline   hopital  course  +  for  L  shoulder  pain  with  negative  CXR  and  relief  with  tylenol appetite  good  ambulating  freely  no  chest pain no  sob  discharged  home .

## 2022-06-12 NOTE — PROGRESS NOTE ADULT - SUBJECTIVE AND OBJECTIVE BOX
INTERVAL HPI/OVERNIGHT EVENTS:        REVIEW OF SYSTEMS:  CONSTITUTIONAL:feels  well   no  complaints    NECK: No pain or stiffnes  RESPIRATORY: No SOB   CARDIOVASCULAR: No chest pain, palpitations, dizziness,   GASTROINTESTINAL: No abdominal pain. No nausea, vomiting,   NEUROLOGICAL: No headaches, no  blurry  vision no  dizziness  SKIN: No itching,   MUSCULOSKELETAL: No pain    MEDICATION:  aspirin enteric coated 81 milliGRAM(s) Oral daily  atorvastatin 80 milliGRAM(s) Oral at bedtime  budesonide  80 MICROgram(s)/formoterol 4.5 MICROgram(s) Inhaler 2 Puff(s) Inhalation two times a day  cefTRIAXone   IVPB 1000 milliGRAM(s) IV Intermittent every 24 hours  dextrose 5%. 1000 milliLiter(s) IV Continuous <Continuous>  dextrose 5%. 1000 milliLiter(s) IV Continuous <Continuous>  dextrose 50% Injectable 25 Gram(s) IV Push once  dextrose 50% Injectable 12.5 Gram(s) IV Push once  dextrose 50% Injectable 25 Gram(s) IV Push once  dextrose Oral Gel 15 Gram(s) Oral once PRN  glucagon  Injectable 1 milliGRAM(s) IntraMuscular once  influenza  Vaccine (HIGH DOSE) 0.7 milliLiter(s) IntraMuscular once  insulin glargine Injectable (LANTUS) 15 Unit(s) SubCutaneous at bedtime  insulin lispro (ADMELOG) corrective regimen sliding scale   SubCutaneous three times a day before meals  lactobacillus acidophilus 1 Tablet(s) Oral two times a day  lisinopril 2.5 milliGRAM(s) Oral daily  metoprolol succinate ER 25 milliGRAM(s) Oral daily  pregabalin 50 milliGRAM(s) Oral daily  ranolazine 500 milliGRAM(s) Oral two times a day  ticagrelor 90 milliGRAM(s) Oral at bedtime  tiotropium 18 MICROgram(s) Capsule 1 Capsule(s) Inhalation daily    Vital Signs Last 24 Hrs  T(C): 36.9 (12 Jun 2022 05:15), Max: 37.1 (11 Jun 2022 21:00)  T(F): 98.5 (12 Jun 2022 05:15), Max: 98.7 (11 Jun 2022 21:00)  HR: 56 (12 Jun 2022 05:15) (53 - 65)  BP: 125/84 (12 Jun 2022 05:15) (100/63 - 174/84)  BP(mean): --  RR: 18 (12 Jun 2022 05:15) (18 - 18)  SpO2: 97% (12 Jun 2022 05:15) (95% - 98%)    PHYSICAL EXAM:  GENERAL: NAD, well-groomed, well-developed  HEENT : Conjuntivae  clear sclerae anicteric  NECK: Supple, No JVD, Normal thyroid  NERVOUS SYSTEM:  Alert oriented   no  focal  deficits;   CHEST/LUNG: Clear    HEART: Regular rate and rhythm; No murmurs, rubs, or gallops  ABDOMEN: Soft, Nontender, Nondistended; Bowel sounds present  EXTREMITIES:  no  edema mild  tenderness  laterally   from  SKIN: No rashes   LABS:              CAPILLARY BLOOD GLUCOSE      POCT Blood Glucose.: 211 mg/dL (12 Jun 2022 11:31)  POCT Blood Glucose.: 190 mg/dL (12 Jun 2022 08:01)  POCT Blood Glucose.: 183 mg/dL (11 Jun 2022 21:07)  POCT Blood Glucose.: 114 mg/dL (11 Jun 2022 16:32)  POCT Blood Glucose.: 301 mg/dL (11 Jun 2022 12:00)      RADIOLOGY & ADDITIONAL TESTS:    Imaging reports  Personally Reviewed:  [ ] YES  [ ] NO    Consultant(s) Notes Reviewed:  [x ] YES  [ ] NO    Care Discussed with Consultants/Other Providers [ x] YES  [ ] NO  Problem/Plan - 1:  ·  Problem: AMS (altered mental status).   ·  improved  with  AB  ivf    Problem/Plan - 2:  ·  Problem: Acute UTI.   ·  Plan: continue  rocephin  empirically  for  ID  evaluation appreciated    Problem/Plan - 3:  ·  Problem: CAD S/P percutaneous coronary angioplasty.   ·  Plan: brillinta asa metoprolol ranexa.    Problem/Plan - 4:  ·  Problem: HTN (hypertension).   ·  Plan: metoprolol  lisinopril.    Problem/Plan - 6:  ·  Problem: DM (diabetes mellitus).   ·  Plan: lantus  short  acting insulin  coverage  hold  metformin.    Problem/Plan - 7:  ·  Problem: Neuropathy.   ·  Plan: lyrica.   rt  shoulder  pain  tylenol  XR no  fx  no  acute  findings           INTERVAL HPI/OVERNIGHT EVENTS:        REVIEW OF SYSTEMS:  CONSTITUTIONAL:feels  well   no  complaints    NECK: No pain or stiffnes  RESPIRATORY: No SOB   CARDIOVASCULAR: No chest pain, palpitations, dizziness,   GASTROINTESTINAL: No abdominal pain. No nausea, vomiting,   NEUROLOGICAL: No headaches, no  blurry  vision no  dizziness  SKIN: No itching,   MUSCULOSKELETAL: No pain    MEDICATION:  aspirin enteric coated 81 milliGRAM(s) Oral daily  atorvastatin 80 milliGRAM(s) Oral at bedtime  budesonide  80 MICROgram(s)/formoterol 4.5 MICROgram(s) Inhaler 2 Puff(s) Inhalation two times a day  cefTRIAXone   IVPB 1000 milliGRAM(s) IV Intermittent every 24 hours  dextrose 5%. 1000 milliLiter(s) IV Continuous <Continuous>  dextrose 5%. 1000 milliLiter(s) IV Continuous <Continuous>  dextrose 50% Injectable 25 Gram(s) IV Push once  dextrose 50% Injectable 12.5 Gram(s) IV Push once  dextrose 50% Injectable 25 Gram(s) IV Push once  dextrose Oral Gel 15 Gram(s) Oral once PRN  glucagon  Injectable 1 milliGRAM(s) IntraMuscular once  influenza  Vaccine (HIGH DOSE) 0.7 milliLiter(s) IntraMuscular once  insulin glargine Injectable (LANTUS) 15 Unit(s) SubCutaneous at bedtime  insulin lispro (ADMELOG) corrective regimen sliding scale   SubCutaneous three times a day before meals  lactobacillus acidophilus 1 Tablet(s) Oral two times a day  lisinopril 2.5 milliGRAM(s) Oral daily  metoprolol succinate ER 25 milliGRAM(s) Oral daily  pregabalin 50 milliGRAM(s) Oral daily  ranolazine 500 milliGRAM(s) Oral two times a day  ticagrelor 90 milliGRAM(s) Oral at bedtime  tiotropium 18 MICROgram(s) Capsule 1 Capsule(s) Inhalation daily    Vital Signs Last 24 Hrs  T(C): 36.9 (12 Jun 2022 05:15), Max: 37.1 (11 Jun 2022 21:00)  T(F): 98.5 (12 Jun 2022 05:15), Max: 98.7 (11 Jun 2022 21:00)  HR: 56 (12 Jun 2022 05:15) (53 - 65)  BP: 125/84 (12 Jun 2022 05:15) (100/63 - 174/84)  BP(mean): --  RR: 18 (12 Jun 2022 05:15) (18 - 18)  SpO2: 97% (12 Jun 2022 05:15) (95% - 98%)    PHYSICAL EXAM:  GENERAL: NAD, well-groomed, well-developed  HEENT : Conjuntivae  clear sclerae anicteric  NECK: Supple, No JVD, Normal thyroid  NERVOUS SYSTEM:  Alert oriented   no  focal  deficits;   CHEST/LUNG: Clear    HEART: Regular rate and rhythm; No murmurs, rubs, or gallops  ABDOMEN: Soft, Nontender, Nondistended; Bowel sounds present  EXTREMITIES:  no  edema mild  tenderness  laterally   from  SKIN: No rashes   LABS:              CAPILLARY BLOOD GLUCOSE      POCT Blood Glucose.: 211 mg/dL (12 Jun 2022 11:31)  POCT Blood Glucose.: 190 mg/dL (12 Jun 2022 08:01)  POCT Blood Glucose.: 183 mg/dL (11 Jun 2022 21:07)  POCT Blood Glucose.: 114 mg/dL (11 Jun 2022 16:32)  POCT Blood Glucose.: 301 mg/dL (11 Jun 2022 12:00)      RADIOLOGY & ADDITIONAL TESTS:    Imaging reports  Personally Reviewed:  [ ] YES  [ ] NO    Consultant(s) Notes Reviewed:  [x ] YES  [ ] NO    Care Discussed with Consultants/Other Providers [ x] YES  [ ] NO  Problem/Plan - 1:  ·  Problem: AMS (altered mental status).   ·  improved  with  AB  ivf    Problem/Plan - 2:  ·  Problem: Acute UTI.   ·  Plan: continue  rocephin  empirically  for  ID  evaluation appreciated    Problem/Plan - 3:  ·  Problem: CAD S/P percutaneous coronary angioplasty.   ·  Plan: brillinta asa metoprolol ranexa.    Problem/Plan - 4:  ·  Problem: HTN (hypertension).   ·  Plan: metoprolol  lisinopril.    Problem/Plan - 6:  ·  Problem: DM (diabetes mellitus).   ·  Plan: lantus  short  acting insulin  coverage  hold  metformin.    Problem/Plan - 7:  ·  Problem: Neuropathy.   ·  Plan: lyrica.   rt  shoulder  pain  tylenol  XR no  fx  no  acute  findings  discharge  home      add  was not  able  to  examine  patient  6/11/22 as  patient  was  off  floor  for  tesitng  chart  reviewed  discussed  with  RN

## 2022-06-12 NOTE — DISCHARGE NOTE PROVIDER - CARE PROVIDER_API CALL
ALPHONSO eMERG I get medical clearanceENCY dEPARTMENT encounter:    Formerly named [facility] EMERGENCY DEPARTMENT  [address] [facility] [address]  [phone]    CHIEF COMPLAINT:    Chief Complaint   Patient presents with   • Chest Pain (Adult)       HPI:    Maisha Collins is a 55 year old female presenting with episodes of palpitations over the last week. She will feel dizzy during this and feeling a fluttering in her chest as well as a \"fullness.\" She states 15 years ago she had 2 episodes of this that self resolved and head negative investigation by her physicians. She states she stopped the herbal she was taking and it had resolved and she felt this was the original etiology. One week ago mostly when she was sitting for roughly 3 hours the symptoms were intermittent and would last seconds. They were nonexertional. She had some sensation of this this morning when she was packing her close for a trip. When she was driving on the highway she felt as if she was about to syncopize and pulled over. She felt extremely weak and again these symptoms ensued. During the time she spoke with me she states she felt very mild flutter symptoms. She denies any chest pain, vomiting, diarrhea, fevers. Only herbal she takes is tumeric.    ALLERGIES:    ALLERGIES:  No Known Allergies    CURRENT MEDICATIONS:    No current facility-administered medications for this encounter.      Current Outpatient Prescriptions   Medication Sig Dispense Refill   • levothyroxine (SYNTHROID, LEVOTHROID) 50 MCG tablet Take 1 tablet by mouth daily. 90 tablet 1   • Probiotic Product (PROBIOTIC DAILY PO) Take  by mouth.     • fish oil-omega-3 fatty acids 1000 MG CAPS Take  by mouth.     • vitamin - therapeutic multivitamins w/minerals (CENTRUM SILVER,THERA-M) TABS Take 1 tablet by mouth daily.     • MAGNESIUM OXIDE PO Take  by mouth.          PROBLEM LIST:  Patient Active Problem List   Diagnosis   • Mitral valve prolapse   •  Cough   • Plantar fascial fibromatosis   • Borderline high cholesterol   • Acquired hypothyroidism        PAST MEDICAL HISTORY:    Past Medical History:   Diagnosis Date   • Acquired hypothyroidism 2/20/2017   • Borderline high cholesterol 2/20/2017   • Cerebrospinal fluid leak 01/01/1999    self-corrected   • Colon polyp 02/18/2013   • Internal hemorrhoid     mild   • Menopausal state    • Mitral valve prolapse    • Plantar fasciitis of right foot    • Thyroid nodule 01/01/2000       SURGICAL HISTORY:    Past Surgical History:   Procedure Laterality Date   • COLONOSCOPY  02/18/2013       SOCIAL HISTORY:    Social History     Social History   • Marital status:      Spouse name: N/A   • Number of children: N/A   • Years of education: N/A     Social History Main Topics   • Smoking status: Never Smoker   • Smokeless tobacco: Never Used   • Alcohol use 1.0 - 1.5 oz/week     2 - 3 Standard drinks or equivalent per week   • Drug use: No   • Sexual activity: Not on file     Other Topics Concern   • Not on file     Social History Narrative       FAMILY HISTORY:    Family History   Problem Relation Age of Onset   • Arthritis Mother    • Heart disease Mother    • High blood pressure Mother    • Heart disease Father    • High blood pressure Father    • Vision loss Father      macular degeneration   • Heart disease Brother      arrhythmia       REVIEW OF SYSTEMS:    As above per the HPI.    Constitutional: Negative for fever and chills.   Skin: Negative for rash.   HEENT: Negative for eye drainage, ear pain, sore throat.  Respiratory: Negative cough, wheezing or shortness of breath.    Cardiovascular: Negative for chest pain or chest pressure. See history of present illness  Gastrointestinal: Non tender, no melena, hematochezia  Genitourinary: No dysuria, hematuria  Extremities:  Negative for joint swelling or joint pain.  Endocrine: Negative for heat or cold intolerance.    Neurologic:  No focal weakness or  pmd,   Phone: (   )    -  Fax: (   )    -  Follow Up Time:    numbness.      PHYSICAL EXAM    ED Triage Vitals   BP --    Pulse --    Resp --    Temp --    SpO2 --      Constitutional:   Alert, cooperative, conversive in no acute distress.   Integument:  No rash or lesion.   HEENT:  Sclera and conjunctiva are normal bilaterally.   Neck: Trachea is midline.  C-Spine: FROM.  No posterior midline tenderness, paraspinal tenderness or spasm.   Respiratory:  CTA.  No rales, rhonchi or wheezes.  Cardiovascular:  RRR without murmur, rub, gallop.    Abdomen: NDNT, no hepatosplenomegaly, NABS  Musculoskeletal: Ext full ROM, non tender, no bony abnormalities  Back:  Normal alignment. No CVA or midline bony tenderness.    Neurologic:  Cranial nerve II-XII grossly intact, no focal weakness or numbness    RADIOLOGY AND LAB RESULTS:  No results found for this visit on 05/18/17.  No orders to display       There were no vitals filed for this visit.   Medications - No data to display  Labs Reviewed - No data to display    EKG INTERPRETATION:  Time performed: 13:22  Interpreted by: ED physician  Rhythm: a run of PVCs mixed in with a normal sinus rhythm.  Rate: Normal / min  ST Segments: No significant ST elevation or depression            ED COURSE & MEDICAL DECISION MAKING:   Patient with palpitations and during her ER stay had what appears to be a short run of ventricular tachycardia that repeated itself.She was hemodynamically within normal limits with very minimal symptoms during this episode.    Investigation was within normal limits. I spoke with Dr. Saez who agrees admission is appropriate for further cardiac trending, and potential further investigation such as echocardiogram. I endorsed the patient to Dr. Blake who will continue care in the hospital. Currently she is admitted as an observation admission. Pacer pads placed.      Decision to admit time was time that admission order was placed.        DIAGNOSIS:  Ventricular tachycardia             Nic Ruvalcaba MD  05/18/17 1935        Nic Ruvalcaba MD  05/18/17 1736     pmd,   Phone: (   )    -  Fax: (   )    -  Follow Up Time: 2 weeks

## 2022-06-12 NOTE — DISCHARGE NOTE PROVIDER - PROVIDER TOKENS
FREE:[LAST:[pmd],PHONE:[(   )    -],FAX:[(   )    -]] FREE:[LAST:[pmd],PHONE:[(   )    -],FAX:[(   )    -],FOLLOWUP:[2 weeks]]

## 2022-06-12 NOTE — DISCHARGE NOTE PROVIDER - NSDCFUADDAPPT_GEN_ALL_CORE_FT
It is important to see your primary physician as well as the physicians noted below within the next week to perform a comprehensive medical review.  Call their offices for an appointment as soon as you leave the hospital.  You will also need to see them for renewal of your medications.  If you do not have a primary physician, contact the [facility] Physician Referral Service at [phone].  To obtain your results, you can access the Follow"[employer] Patient Portal at [email or web address] Company Limited.  Your medical issues appear to be stable at this time, but if your symptoms recur or worsen, contact your physicians and/or return to the hospital if necessary.  If you encounter any issues or questions with your medication, call your physicians before stopping the medication.

## 2022-06-12 NOTE — DISCHARGE NOTE PROVIDER - NSDCCPCAREPLAN_GEN_ALL_CORE_FT
PRINCIPAL DISCHARGE DIAGNOSIS  Diagnosis: Sepsis secondary to UTI  Assessment and Plan of Treatment:        PRINCIPAL DISCHARGE DIAGNOSIS  Diagnosis: Sepsis secondary to UTI  Assessment and Plan of Treatment: Completed course of antibiotics while in hospital. Please follow up with your primary care provider upon discharge.

## 2022-06-12 NOTE — DISCHARGE NOTE PROVIDER - NSDCMRMEDTOKEN_GEN_ALL_CORE_FT
alendronate 70 mg oral tablet: 1 tab(s) orally once a week  home  Aspirin Enteric Coated 81 mg oral delayed release tablet: 1 tab(s) orally once a day  home  Brilinta (ticagrelor) 90 mg oral tablet: 1 tab(s) orally 2 times a day  home  Lantus 100 units/mL subcutaneous solution: 15 unit(s) subcutaneous 2 times a day  Lipitor 80 mg oral tablet: 1 tab(s) orally once a day  lisinopril 2.5 mg oral tablet: 1 tab(s) orally once a day  home  Lyrica 50 mg oral capsule: 1 cap(s) orally once a day  metFORMIN 500 mg oral tablet, extended release: 1 tab(s) orally 2 times a day  metoprolol succinate 25 mg oral tablet, extended release: 1 tab(s) orally once a day  home  Ranexa 500 mg oral tablet, extended release: 1 tab(s) orally 2 times a day  Tylenol 8 Hour 650 mg oral tablet, extended release: 2 tab(s) orally 2 times a day    alendronate 70 mg oral tablet: 1 tab(s) orally once a week  home  Aspirin Enteric Coated 81 mg oral delayed release tablet: 1 tab(s) orally once a day  home  Brilinta (ticagrelor) 90 mg oral tablet: 1 tab(s) orally 2 times a day  home  capsaicin 0.025% topical cream: Apply topically to affected area once a day  Lantus 100 units/mL subcutaneous solution: 15 unit(s) subcutaneous 2 times a day  Lipitor 80 mg oral tablet: 1 tab(s) orally once a day  lisinopril 2.5 mg oral tablet: 1 tab(s) orally once a day  home  Lyrica 50 mg oral capsule: 1 cap(s) orally once a day  metFORMIN 500 mg oral tablet, extended release: 1 tab(s) orally 2 times a day  metoprolol succinate 25 mg oral tablet, extended release: 1 tab(s) orally once a day  home  Ranexa 500 mg oral tablet, extended release: 1 tab(s) orally 2 times a day  Tylenol 8 Hour 650 mg oral tablet, extended release: 2 tab(s) orally 2 times a day

## 2022-06-13 ENCOUNTER — TRANSCRIPTION ENCOUNTER (OUTPATIENT)
Age: 72
End: 2022-06-13

## 2022-06-13 VITALS
OXYGEN SATURATION: 96 % | RESPIRATION RATE: 17 BRPM | SYSTOLIC BLOOD PRESSURE: 156 MMHG | HEART RATE: 66 BPM | DIASTOLIC BLOOD PRESSURE: 79 MMHG | TEMPERATURE: 100 F

## 2022-06-13 LAB
GLUCOSE BLDC GLUCOMTR-MCNC: 238 MG/DL — HIGH (ref 70–99)
GLUCOSE BLDC GLUCOMTR-MCNC: 259 MG/DL — HIGH (ref 70–99)
GLUCOSE BLDC GLUCOMTR-MCNC: 352 MG/DL — HIGH (ref 70–99)

## 2022-06-13 RX ORDER — LIDOCAINE AND PRILOCAINE CREAM 25; 25 MG/G; MG/G
1 CREAM TOPICAL DAILY
Refills: 0 | Status: DISCONTINUED | OUTPATIENT
Start: 2022-06-13 | End: 2022-06-13

## 2022-06-13 RX ORDER — CAPSAICIN 0.025 %
1 CREAM (GRAM) TOPICAL
Qty: 0 | Refills: 0 | DISCHARGE
Start: 2022-06-13

## 2022-06-13 RX ORDER — CAPSAICIN 0.025 %
1 CREAM (GRAM) TOPICAL
Refills: 0 | Status: DISCONTINUED | OUTPATIENT
Start: 2022-06-13 | End: 2022-06-13

## 2022-06-13 RX ADMIN — Medication 81 MILLIGRAM(S): at 11:47

## 2022-06-13 RX ADMIN — Medication 25 MILLIGRAM(S): at 05:54

## 2022-06-13 RX ADMIN — TIOTROPIUM BROMIDE 1 CAPSULE(S): 18 CAPSULE ORAL; RESPIRATORY (INHALATION) at 11:47

## 2022-06-13 RX ADMIN — Medication 1 TABLET(S): at 17:23

## 2022-06-13 RX ADMIN — LISINOPRIL 2.5 MILLIGRAM(S): 2.5 TABLET ORAL at 05:53

## 2022-06-13 RX ADMIN — Medication 1 APPLICATION(S): at 11:46

## 2022-06-13 RX ADMIN — RANOLAZINE 500 MILLIGRAM(S): 500 TABLET, FILM COATED, EXTENDED RELEASE ORAL at 05:53

## 2022-06-13 RX ADMIN — Medication 5: at 11:45

## 2022-06-13 RX ADMIN — Medication 1 TABLET(S): at 05:53

## 2022-06-13 RX ADMIN — BUDESONIDE AND FORMOTEROL FUMARATE DIHYDRATE 2 PUFF(S): 160; 4.5 AEROSOL RESPIRATORY (INHALATION) at 05:55

## 2022-06-13 RX ADMIN — BUDESONIDE AND FORMOTEROL FUMARATE DIHYDRATE 2 PUFF(S): 160; 4.5 AEROSOL RESPIRATORY (INHALATION) at 17:22

## 2022-06-13 RX ADMIN — Medication 2: at 16:58

## 2022-06-13 RX ADMIN — Medication 3: at 08:20

## 2022-06-13 RX ADMIN — RANOLAZINE 500 MILLIGRAM(S): 500 TABLET, FILM COATED, EXTENDED RELEASE ORAL at 17:23

## 2022-06-13 NOTE — DISCHARGE NOTE NURSING/CASE MANAGEMENT/SOCIAL WORK - NSDCFUADDAPPT_GEN_ALL_CORE_FT
It is important to see your primary physician as well as the physicians noted below within the next week to perform a comprehensive medical review.  Call their offices for an appointment as soon as you leave the hospital.  You will also need to see them for renewal of your medications.  If you do not have a primary physician, contact the [facility] Physician Referral Service at [phone].  To obtain your results, you can access the [employer] Patient Portal at [email or web address].  Your medical issues appear to be stable at this time, but if your symptoms recur or worsen, contact your physicians and/or return to the hospital if necessary.  If you encounter any issues or questions with your medication, call your physicians before stopping the medication.

## 2022-06-13 NOTE — PROGRESS NOTE ADULT - PROVIDER SPECIALTY LIST ADULT
Infectious Disease
Internal Medicine
Infectious Disease
Internal Medicine

## 2022-06-13 NOTE — PROGRESS NOTE ADULT - SUBJECTIVE AND OBJECTIVE BOX
INTERVAL HPI/OVERNIGHT EVENTS:    not  able  to  be  discharged  6/11/22    REVIEW OF SYSTEMS:  CONSTITUTIONAL:   continuea  with  L  shoulder  pain requests  "cream"    NECK: No pain or stiffnes  RESPIRATORY: No SOB   CARDIOVASCULAR: No chest pain, palpitations, dizziness,   GASTROINTESTINAL: No abdominal pain. No nausea, vomiting,   NEUROLOGICAL: No headaches, no  blurry  vision no  dizziness  SKIN: No itching,   MUSCULOSKELETAL: No pain    MEDICATION:  aspirin enteric coated 81 milliGRAM(s) Oral daily  atorvastatin 80 milliGRAM(s) Oral at bedtime  budesonide  80 MICROgram(s)/formoterol 4.5 MICROgram(s) Inhaler 2 Puff(s) Inhalation two times a day  dextrose 5%. 1000 milliLiter(s) IV Continuous <Continuous>  dextrose 5%. 1000 milliLiter(s) IV Continuous <Continuous>  dextrose 50% Injectable 25 Gram(s) IV Push once  dextrose 50% Injectable 12.5 Gram(s) IV Push once  dextrose 50% Injectable 25 Gram(s) IV Push once  dextrose Oral Gel 15 Gram(s) Oral once PRN  glucagon  Injectable 1 milliGRAM(s) IntraMuscular once  influenza  Vaccine (HIGH DOSE) 0.7 milliLiter(s) IntraMuscular once  insulin glargine Injectable (LANTUS) 15 Unit(s) SubCutaneous at bedtime  insulin lispro (ADMELOG) corrective regimen sliding scale   SubCutaneous three times a day before meals  lactobacillus acidophilus 1 Tablet(s) Oral two times a day  lisinopril 2.5 milliGRAM(s) Oral daily  metoprolol succinate ER 25 milliGRAM(s) Oral daily  ranolazine 500 milliGRAM(s) Oral two times a day  ticagrelor 90 milliGRAM(s) Oral at bedtime  tiotropium 18 MICROgram(s) Capsule 1 Capsule(s) Inhalation daily    Vital Signs Last 24 Hrs  T(C): 36.9 (13 Jun 2022 05:00), Max: 36.9 (13 Jun 2022 05:00)  T(F): 98.4 (13 Jun 2022 05:00), Max: 98.4 (13 Jun 2022 05:00)  HR: 63 (13 Jun 2022 05:00) (55 - 66)  BP: 149/77 (13 Jun 2022 05:00) (149/77 - 166/81)  BP(mean): --  RR: 18 (13 Jun 2022 05:00) (17 - 18)  SpO2: 95% (13 Jun 2022 05:00) (95% - 97%)    PHYSICAL EXAM:  GENERAL: NAD, well-groomed, well-developed  HEENT : Conjuntivae  clear sclerae anicteric  NECK: Supple, No JVD, Normal thyroid  NERVOUS SYSTEM:  Alert oriented   no  focal  deficits;   CHEST/LUNG: Clear    HEART: Regular rate and rhythm; No murmurs, rubs, or gallops  ABDOMEN: Soft, Nontender, Nondistended; Bowel sounds present  EXTREMITIES:  no  edema no  tenderness  SKIN: No rashes   LABS:              CAPILLARY BLOOD GLUCOSE      POCT Blood Glucose.: 259 mg/dL (13 Jun 2022 07:48)  POCT Blood Glucose.: 316 mg/dL (12 Jun 2022 21:07)  POCT Blood Glucose.: 243 mg/dL (12 Jun 2022 16:21)  POCT Blood Glucose.: 211 mg/dL (12 Jun 2022 11:31)      RADIOLOGY & ADDITIONAL TESTS:    Imaging reports  Personally Reviewed:  [ ] YES  [ ] NO    Consultant(s) Notes Reviewed:  [x ] YES  [ ] NO    Care Discussed with Consultants/Other Providers [ x] YES  [ ] NO  Problem/Plan - 1:  ·  Problem: AMS (altered mental status).   ·  improved  with  AB  ivf    Problem/Plan - 2:  ·  Problem: Acute UTI.   ·  Plan: continue  rocephin  empirically  for  ID  evaluation appreciated    Problem/Plan - 3:  ·  Problem: CAD S/P percutaneous coronary angioplasty.   ·  Plan: brillinta asa metoprolol ranexa.    Problem/Plan - 4:  ·  Problem: HTN (hypertension).   ·  Plan: metoprolol  lisinopril.    Problem/Plan - 6:  ·  Problem: DM (diabetes mellitus).   ·  Plan: lantus  short  acting insulin  coverage  hold  metformin.    Problem/Plan - 7:  ·  Problem: Neuropathy.   ·  Plan: lyrica.   rt  shoulder  pain  tylenol  XR no  fx  no  acute  findings  lidocaine  creawmm    discharge  home

## 2022-06-13 NOTE — DISCHARGE NOTE NURSING/CASE MANAGEMENT/SOCIAL WORK - PATIENT PORTAL LINK FT
You can access the FollowMyHealth Patient Portal offered by [facility] by registering at the following website: [email or web address]. By joining [employer]’s FollowMyHealth portal, you will also be able to view your health information using other applications (apps) compatible with our system.

## 2022-06-13 NOTE — DISCHARGE NOTE NURSING/CASE MANAGEMENT/SOCIAL WORK - NSDCPEFALRISK_GEN_ALL_CORE
For information on Fall & Injury Prevention, visit: [email or web address] OR  [email or web address] OR  https://www.cdc.gov/steadi/patient.html

## 2022-06-15 ENCOUNTER — NON-APPOINTMENT (OUTPATIENT)
Age: 72
End: 2022-06-15

## 2022-06-17 DIAGNOSIS — E11.40 TYPE 2 DIABETES MELLITUS WITH DIABETIC NEUROPATHY, UNSPECIFIED: ICD-10-CM

## 2022-06-17 DIAGNOSIS — Z79.82 LONG TERM (CURRENT) USE OF ASPIRIN: ICD-10-CM

## 2022-06-17 DIAGNOSIS — I25.10 ATHEROSCLEROTIC HEART DISEASE OF NATIVE CORONARY ARTERY WITHOUT ANGINA PECTORIS: ICD-10-CM

## 2022-06-17 DIAGNOSIS — R50.9 FEVER, UNSPECIFIED: ICD-10-CM

## 2022-06-17 DIAGNOSIS — J45.909 UNSPECIFIED ASTHMA, UNCOMPLICATED: ICD-10-CM

## 2022-06-17 DIAGNOSIS — I10 ESSENTIAL (PRIMARY) HYPERTENSION: ICD-10-CM

## 2022-06-17 DIAGNOSIS — Z79.84 LONG TERM (CURRENT) USE OF ORAL HYPOGLYCEMIC DRUGS: ICD-10-CM

## 2022-06-17 DIAGNOSIS — N39.0 URINARY TRACT INFECTION, SITE NOT SPECIFIED: ICD-10-CM

## 2022-06-17 DIAGNOSIS — A41.9 SEPSIS, UNSPECIFIED ORGANISM: ICD-10-CM

## 2022-06-17 DIAGNOSIS — Z95.5 PRESENCE OF CORONARY ANGIOPLASTY IMPLANT AND GRAFT: ICD-10-CM

## 2022-06-30 ENCOUNTER — APPOINTMENT (OUTPATIENT)
Dept: CARDIOLOGY | Facility: HOSPITAL | Age: 72
End: 2022-06-30

## 2022-06-30 ENCOUNTER — NON-APPOINTMENT (OUTPATIENT)
Age: 72
End: 2022-06-30

## 2022-06-30 ENCOUNTER — OUTPATIENT (OUTPATIENT)
Dept: OUTPATIENT SERVICES | Facility: HOSPITAL | Age: 72
LOS: 1 days | End: 2022-06-30
Payer: MEDICAID

## 2022-06-30 VITALS
HEART RATE: 69 BPM | DIASTOLIC BLOOD PRESSURE: 80 MMHG | WEIGHT: 165 LBS | HEIGHT: 65 IN | SYSTOLIC BLOOD PRESSURE: 128 MMHG | RESPIRATION RATE: 14 BRPM | BODY MASS INDEX: 27.49 KG/M2 | OXYGEN SATURATION: 97 %

## 2022-06-30 DIAGNOSIS — Z95.5 PRESENCE OF CORONARY ANGIOPLASTY IMPLANT AND GRAFT: Chronic | ICD-10-CM

## 2022-06-30 DIAGNOSIS — I25.10 ATHEROSCLEROTIC HEART DISEASE OF NATIVE CORONARY ARTERY WITHOUT ANGINA PECTORIS: ICD-10-CM

## 2022-06-30 PROCEDURE — G0463: CPT

## 2022-06-30 PROCEDURE — 93005 ELECTROCARDIOGRAM TRACING: CPT

## 2022-06-30 RX ORDER — FUROSEMIDE 20 MG/1
20 TABLET ORAL DAILY
Qty: 30 | Refills: 1 | Status: DISCONTINUED | COMMUNITY
Start: 2021-04-08 | End: 2022-06-30

## 2022-06-30 RX ORDER — TICAGRELOR 90 MG/1
90 TABLET ORAL
Qty: 180 | Refills: 2 | Status: ACTIVE | COMMUNITY
Start: 2019-09-06 | End: 1900-01-01

## 2022-06-30 NOTE — REASON FOR VISIT
[FreeTextEntry1] : 70F w/ DM, CAD (2018 w/ multiple PCIs, cath here showing in-stent stenosis at multiple locations, did not want CABG) who was recently admitted for UTI and AMS. She is here for follow up for multiple falls. They are not associated with lightheadedness, palpitations, chest pain, sob, orthopnea, PND. Her home SBP readings have been between 120-130. Echo from 2021 reviewed, showed normal LV function and mildly thickened AV valve.

## 2022-06-30 NOTE — ASSESSMENT
[FreeTextEntry1] : 70F w/ DM, CAD (2018 w/ multiple PCIs, cath here showing in-stent stenosis at multiple locations, did not want CABG) who was recently admitted for UTI and AMS. She is here for follow up for multiple falls, which are likely related to weakness in the setting of her recent UTI. She otherwise is doing well and tolerating her GDMT.\par \par - stopped lasix since her discharge; fluid management per PCP\par - con't DAPT, statin, ACE-i\par - RTC in 1 year or PRN

## 2022-08-10 ENCOUNTER — INPATIENT (INPATIENT)
Facility: HOSPITAL | Age: 72
LOS: 1 days | Discharge: HOME | End: 2022-08-12
Attending: HOSPITALIST | Admitting: HOSPITALIST

## 2022-08-10 VITALS
TEMPERATURE: 97 F | HEART RATE: 65 BPM | SYSTOLIC BLOOD PRESSURE: 177 MMHG | RESPIRATION RATE: 18 BRPM | WEIGHT: 154.98 LBS | OXYGEN SATURATION: 97 % | DIASTOLIC BLOOD PRESSURE: 104 MMHG | HEIGHT: 65 IN

## 2022-08-10 DIAGNOSIS — Z95.5 PRESENCE OF CORONARY ANGIOPLASTY IMPLANT AND GRAFT: Chronic | ICD-10-CM

## 2022-08-10 LAB
ALBUMIN SERPL ELPH-MCNC: 3.2 G/DL — LOW (ref 3.3–5)
ALP SERPL-CCNC: 127 U/L — HIGH (ref 40–120)
ALT FLD-CCNC: 48 U/L — SIGNIFICANT CHANGE UP (ref 12–78)
AMMONIA BLD-MCNC: 19 UMOL/L — SIGNIFICANT CHANGE UP (ref 11–32)
ANION GAP SERPL CALC-SCNC: 7 MMOL/L — SIGNIFICANT CHANGE UP (ref 5–17)
APPEARANCE UR: CLEAR — SIGNIFICANT CHANGE UP
APTT BLD: 26 SEC — LOW (ref 27.5–35.5)
AST SERPL-CCNC: 38 U/L — HIGH (ref 15–37)
BACTERIA # UR AUTO: ABNORMAL
BASOPHILS # BLD AUTO: 0.04 K/UL — SIGNIFICANT CHANGE UP (ref 0–0.2)
BASOPHILS NFR BLD AUTO: 0.5 % — SIGNIFICANT CHANGE UP (ref 0–2)
BILIRUB SERPL-MCNC: 0.4 MG/DL — SIGNIFICANT CHANGE UP (ref 0.2–1.2)
BILIRUB UR-MCNC: NEGATIVE — SIGNIFICANT CHANGE UP
BUN SERPL-MCNC: 50 MG/DL — HIGH (ref 7–23)
CALCIUM SERPL-MCNC: 8.8 MG/DL — SIGNIFICANT CHANGE UP (ref 8.5–10.1)
CHLORIDE SERPL-SCNC: 110 MMOL/L — HIGH (ref 96–108)
CO2 SERPL-SCNC: 24 MMOL/L — SIGNIFICANT CHANGE UP (ref 22–31)
COLOR SPEC: YELLOW — SIGNIFICANT CHANGE UP
CREAT SERPL-MCNC: 1.32 MG/DL — HIGH (ref 0.5–1.3)
DIFF PNL FLD: NEGATIVE — SIGNIFICANT CHANGE UP
EGFR: 43 ML/MIN/1.73M2 — LOW
EOSINOPHIL # BLD AUTO: 0.22 K/UL — SIGNIFICANT CHANGE UP (ref 0–0.5)
EOSINOPHIL NFR BLD AUTO: 2.8 % — SIGNIFICANT CHANGE UP (ref 0–6)
FLUAV AG NPH QL: SIGNIFICANT CHANGE UP
FLUBV AG NPH QL: SIGNIFICANT CHANGE UP
GLUCOSE BLDC GLUCOMTR-MCNC: 108 MG/DL — HIGH (ref 70–99)
GLUCOSE BLDC GLUCOMTR-MCNC: 114 MG/DL — HIGH (ref 70–99)
GLUCOSE BLDC GLUCOMTR-MCNC: 166 MG/DL — HIGH (ref 70–99)
GLUCOSE BLDC GLUCOMTR-MCNC: 170 MG/DL — HIGH (ref 70–99)
GLUCOSE BLDC GLUCOMTR-MCNC: 185 MG/DL — HIGH (ref 70–99)
GLUCOSE BLDC GLUCOMTR-MCNC: 58 MG/DL — LOW (ref 70–99)
GLUCOSE BLDC GLUCOMTR-MCNC: 61 MG/DL — LOW (ref 70–99)
GLUCOSE SERPL-MCNC: 73 MG/DL — SIGNIFICANT CHANGE UP (ref 70–99)
GLUCOSE UR QL: NEGATIVE MG/DL — SIGNIFICANT CHANGE UP
HCT VFR BLD CALC: 37.7 % — SIGNIFICANT CHANGE UP (ref 34.5–45)
HGB BLD-MCNC: 12 G/DL — SIGNIFICANT CHANGE UP (ref 11.5–15.5)
IMM GRANULOCYTES NFR BLD AUTO: 0.5 % — SIGNIFICANT CHANGE UP (ref 0–1.5)
INR BLD: 0.96 RATIO — SIGNIFICANT CHANGE UP (ref 0.88–1.16)
KETONES UR-MCNC: NEGATIVE — SIGNIFICANT CHANGE UP
LACTATE SERPL-SCNC: 0.8 MMOL/L — SIGNIFICANT CHANGE UP (ref 0.7–2)
LEUKOCYTE ESTERASE UR-ACNC: ABNORMAL
LYMPHOCYTES # BLD AUTO: 1.26 K/UL — SIGNIFICANT CHANGE UP (ref 1–3.3)
LYMPHOCYTES # BLD AUTO: 16 % — SIGNIFICANT CHANGE UP (ref 13–44)
MCHC RBC-ENTMCNC: 28.6 PG — SIGNIFICANT CHANGE UP (ref 27–34)
MCHC RBC-ENTMCNC: 31.8 G/DL — LOW (ref 32–36)
MCV RBC AUTO: 89.8 FL — SIGNIFICANT CHANGE UP (ref 80–100)
MONOCYTES # BLD AUTO: 0.54 K/UL — SIGNIFICANT CHANGE UP (ref 0–0.9)
MONOCYTES NFR BLD AUTO: 6.8 % — SIGNIFICANT CHANGE UP (ref 2–14)
NEUTROPHILS # BLD AUTO: 5.79 K/UL — SIGNIFICANT CHANGE UP (ref 1.8–7.4)
NEUTROPHILS NFR BLD AUTO: 73.4 % — SIGNIFICANT CHANGE UP (ref 43–77)
NITRITE UR-MCNC: POSITIVE
NRBC # BLD: 0 /100 WBCS — SIGNIFICANT CHANGE UP (ref 0–0)
PH UR: 6 — SIGNIFICANT CHANGE UP (ref 5–8)
PLATELET # BLD AUTO: 249 K/UL — SIGNIFICANT CHANGE UP (ref 150–400)
POTASSIUM SERPL-MCNC: 4.8 MMOL/L — SIGNIFICANT CHANGE UP (ref 3.5–5.3)
POTASSIUM SERPL-SCNC: 4.8 MMOL/L — SIGNIFICANT CHANGE UP (ref 3.5–5.3)
PROT SERPL-MCNC: 7.9 GM/DL — SIGNIFICANT CHANGE UP (ref 6–8.3)
PROT UR-MCNC: 15 MG/DL
PROTHROM AB SERPL-ACNC: 11.5 SEC — SIGNIFICANT CHANGE UP (ref 10.5–13.4)
RBC # BLD: 4.2 M/UL — SIGNIFICANT CHANGE UP (ref 3.8–5.2)
RBC # FLD: 13.2 % — SIGNIFICANT CHANGE UP (ref 10.3–14.5)
RBC CASTS # UR COMP ASSIST: SIGNIFICANT CHANGE UP /HPF (ref 0–4)
SARS-COV-2 RNA SPEC QL NAA+PROBE: SIGNIFICANT CHANGE UP
SODIUM SERPL-SCNC: 141 MMOL/L — SIGNIFICANT CHANGE UP (ref 135–145)
SP GR SPEC: 1.01 — SIGNIFICANT CHANGE UP (ref 1.01–1.02)
TROPONIN I, HIGH SENSITIVITY RESULT: 148.6 NG/L — HIGH
TROPONIN I, HIGH SENSITIVITY RESULT: 157 NG/L — HIGH
UROBILINOGEN FLD QL: NEGATIVE MG/DL — SIGNIFICANT CHANGE UP
WBC # BLD: 7.89 K/UL — SIGNIFICANT CHANGE UP (ref 3.8–10.5)
WBC # FLD AUTO: 7.89 K/UL — SIGNIFICANT CHANGE UP (ref 3.8–10.5)
WBC UR QL: SIGNIFICANT CHANGE UP

## 2022-08-10 PROCEDURE — 71045 X-RAY EXAM CHEST 1 VIEW: CPT | Mod: 26

## 2022-08-10 PROCEDURE — 99222 1ST HOSP IP/OBS MODERATE 55: CPT

## 2022-08-10 PROCEDURE — 70450 CT HEAD/BRAIN W/O DYE: CPT | Mod: 26,MC

## 2022-08-10 PROCEDURE — 99285 EMERGENCY DEPT VISIT HI MDM: CPT

## 2022-08-10 PROCEDURE — 93010 ELECTROCARDIOGRAM REPORT: CPT

## 2022-08-10 RX ORDER — HYDRALAZINE HCL 50 MG
10 TABLET ORAL THREE TIMES A DAY
Refills: 0 | Status: DISCONTINUED | OUTPATIENT
Start: 2022-08-10 | End: 2022-08-11

## 2022-08-10 RX ORDER — DEXTROSE 50 % IN WATER 50 %
25 SYRINGE (ML) INTRAVENOUS ONCE
Refills: 0 | Status: DISCONTINUED | OUTPATIENT
Start: 2022-08-10 | End: 2022-08-12

## 2022-08-10 RX ORDER — LISINOPRIL 2.5 MG/1
2.5 TABLET ORAL DAILY
Refills: 0 | Status: DISCONTINUED | OUTPATIENT
Start: 2022-08-10 | End: 2022-08-12

## 2022-08-10 RX ORDER — ATORVASTATIN CALCIUM 80 MG/1
1 TABLET, FILM COATED ORAL
Qty: 0 | Refills: 0 | DISCHARGE

## 2022-08-10 RX ORDER — DEXTROSE 50 % IN WATER 50 %
15 SYRINGE (ML) INTRAVENOUS ONCE
Refills: 0 | Status: DISCONTINUED | OUTPATIENT
Start: 2022-08-10 | End: 2022-08-12

## 2022-08-10 RX ORDER — SENNOSIDES/DOCUSATE SODIUM 8.6MG-50MG
0 TABLET ORAL
Qty: 0 | Refills: 0 | DISCHARGE

## 2022-08-10 RX ORDER — DEXTROSE 50 % IN WATER 50 %
12.5 SYRINGE (ML) INTRAVENOUS ONCE
Refills: 0 | Status: COMPLETED | OUTPATIENT
Start: 2022-08-10 | End: 2022-08-10

## 2022-08-10 RX ORDER — TICAGRELOR 90 MG/1
90 TABLET ORAL
Refills: 0 | Status: DISCONTINUED | OUTPATIENT
Start: 2022-08-10 | End: 2022-08-10

## 2022-08-10 RX ORDER — GLUCAGON INJECTION, SOLUTION 0.5 MG/.1ML
1 INJECTION, SOLUTION SUBCUTANEOUS ONCE
Refills: 0 | Status: DISCONTINUED | OUTPATIENT
Start: 2022-08-10 | End: 2022-08-12

## 2022-08-10 RX ORDER — INSULIN LISPRO 100/ML
VIAL (ML) SUBCUTANEOUS
Refills: 0 | Status: DISCONTINUED | OUTPATIENT
Start: 2022-08-10 | End: 2022-08-11

## 2022-08-10 RX ORDER — TICAGRELOR 90 MG/1
60 TABLET ORAL EVERY 12 HOURS
Refills: 0 | Status: DISCONTINUED | OUTPATIENT
Start: 2022-08-10 | End: 2022-08-12

## 2022-08-10 RX ORDER — METOPROLOL TARTRATE 50 MG
1 TABLET ORAL
Qty: 0 | Refills: 0 | DISCHARGE

## 2022-08-10 RX ORDER — POLYETHYLENE GLYCOL 3350 17 G/17G
17 POWDER, FOR SOLUTION ORAL
Refills: 0 | Status: DISCONTINUED | OUTPATIENT
Start: 2022-08-10 | End: 2022-08-12

## 2022-08-10 RX ORDER — SODIUM CHLORIDE 9 MG/ML
1000 INJECTION, SOLUTION INTRAVENOUS
Refills: 0 | Status: DISCONTINUED | OUTPATIENT
Start: 2022-08-10 | End: 2022-08-12

## 2022-08-10 RX ORDER — TICAGRELOR 90 MG/1
60 TABLET ORAL EVERY 12 HOURS
Refills: 0 | Status: DISCONTINUED | OUTPATIENT
Start: 2022-08-10 | End: 2022-08-10

## 2022-08-10 RX ORDER — METOPROLOL TARTRATE 50 MG
12.5 TABLET ORAL
Refills: 0 | Status: DISCONTINUED | OUTPATIENT
Start: 2022-08-10 | End: 2022-08-12

## 2022-08-10 RX ORDER — METOPROLOL TARTRATE 50 MG
0.5 TABLET ORAL
Qty: 0 | Refills: 0 | DISCHARGE

## 2022-08-10 RX ORDER — GABAPENTIN 400 MG/1
0 CAPSULE ORAL
Qty: 0 | Refills: 0 | DISCHARGE

## 2022-08-10 RX ORDER — CEFTRIAXONE 500 MG/1
1000 INJECTION, POWDER, FOR SOLUTION INTRAMUSCULAR; INTRAVENOUS ONCE
Refills: 0 | Status: COMPLETED | OUTPATIENT
Start: 2022-08-10 | End: 2022-08-10

## 2022-08-10 RX ORDER — INSULIN GLARGINE 100 [IU]/ML
8 INJECTION, SOLUTION SUBCUTANEOUS AT BEDTIME
Refills: 0 | Status: DISCONTINUED | OUTPATIENT
Start: 2022-08-10 | End: 2022-08-11

## 2022-08-10 RX ORDER — ASPIRIN/CALCIUM CARB/MAGNESIUM 324 MG
81 TABLET ORAL DAILY
Refills: 0 | Status: DISCONTINUED | OUTPATIENT
Start: 2022-08-10 | End: 2022-08-12

## 2022-08-10 RX ORDER — DEXTROSE 50 % IN WATER 50 %
12.5 SYRINGE (ML) INTRAVENOUS ONCE
Refills: 0 | Status: DISCONTINUED | OUTPATIENT
Start: 2022-08-10 | End: 2022-08-12

## 2022-08-10 RX ORDER — HEPARIN SODIUM 5000 [USP'U]/ML
5000 INJECTION INTRAVENOUS; SUBCUTANEOUS EVERY 12 HOURS
Refills: 0 | Status: DISCONTINUED | OUTPATIENT
Start: 2022-08-10 | End: 2022-08-12

## 2022-08-10 RX ORDER — METOPROLOL TARTRATE 50 MG
0 TABLET ORAL
Qty: 0 | Refills: 0 | DISCHARGE

## 2022-08-10 RX ORDER — LISINOPRIL 2.5 MG/1
1 TABLET ORAL
Qty: 0 | Refills: 0 | DISCHARGE

## 2022-08-10 RX ORDER — RANOLAZINE 500 MG/1
500 TABLET, FILM COATED, EXTENDED RELEASE ORAL
Refills: 0 | Status: DISCONTINUED | OUTPATIENT
Start: 2022-08-10 | End: 2022-08-12

## 2022-08-10 RX ORDER — RANOLAZINE 500 MG/1
1 TABLET, FILM COATED, EXTENDED RELEASE ORAL
Qty: 0 | Refills: 0 | DISCHARGE

## 2022-08-10 RX ORDER — ATORVASTATIN CALCIUM 80 MG/1
80 TABLET, FILM COATED ORAL AT BEDTIME
Refills: 0 | Status: DISCONTINUED | OUTPATIENT
Start: 2022-08-10 | End: 2022-08-12

## 2022-08-10 RX ORDER — ATORVASTATIN CALCIUM 80 MG/1
40 TABLET, FILM COATED ORAL AT BEDTIME
Refills: 0 | Status: DISCONTINUED | OUTPATIENT
Start: 2022-08-10 | End: 2022-08-10

## 2022-08-10 RX ORDER — METOPROLOL TARTRATE 50 MG
12.5 TABLET ORAL DAILY
Refills: 0 | Status: DISCONTINUED | OUTPATIENT
Start: 2022-08-10 | End: 2022-08-10

## 2022-08-10 RX ORDER — SODIUM CHLORIDE 9 MG/ML
1000 INJECTION INTRAMUSCULAR; INTRAVENOUS; SUBCUTANEOUS
Refills: 0 | Status: DISCONTINUED | OUTPATIENT
Start: 2022-08-10 | End: 2022-08-12

## 2022-08-10 RX ORDER — METFORMIN HYDROCHLORIDE 850 MG/1
1 TABLET ORAL
Qty: 0 | Refills: 0 | DISCHARGE

## 2022-08-10 RX ORDER — GABAPENTIN 400 MG/1
100 CAPSULE ORAL AT BEDTIME
Refills: 0 | Status: DISCONTINUED | OUTPATIENT
Start: 2022-08-10 | End: 2022-08-12

## 2022-08-10 RX ADMIN — Medication 12.5 GRAM(S): at 06:51

## 2022-08-10 RX ADMIN — HEPARIN SODIUM 5000 UNIT(S): 5000 INJECTION INTRAVENOUS; SUBCUTANEOUS at 19:34

## 2022-08-10 RX ADMIN — SODIUM CHLORIDE 50 MILLILITER(S): 9 INJECTION INTRAMUSCULAR; INTRAVENOUS; SUBCUTANEOUS at 20:17

## 2022-08-10 RX ADMIN — INSULIN GLARGINE 8 UNIT(S): 100 INJECTION, SOLUTION SUBCUTANEOUS at 21:23

## 2022-08-10 RX ADMIN — Medication 1: at 12:11

## 2022-08-10 RX ADMIN — LISINOPRIL 2.5 MILLIGRAM(S): 2.5 TABLET ORAL at 12:11

## 2022-08-10 RX ADMIN — RANOLAZINE 500 MILLIGRAM(S): 500 TABLET, FILM COATED, EXTENDED RELEASE ORAL at 21:24

## 2022-08-10 RX ADMIN — Medication 12.5 MILLIGRAM(S): at 19:02

## 2022-08-10 RX ADMIN — TICAGRELOR 60 MILLIGRAM(S): 90 TABLET ORAL at 21:28

## 2022-08-10 RX ADMIN — Medication 81 MILLIGRAM(S): at 12:11

## 2022-08-10 RX ADMIN — GABAPENTIN 100 MILLIGRAM(S): 400 CAPSULE ORAL at 21:24

## 2022-08-10 RX ADMIN — Medication 12.5 MILLIGRAM(S): at 09:17

## 2022-08-10 RX ADMIN — CEFTRIAXONE 100 MILLIGRAM(S): 500 INJECTION, POWDER, FOR SOLUTION INTRAMUSCULAR; INTRAVENOUS at 07:31

## 2022-08-10 RX ADMIN — POLYETHYLENE GLYCOL 3350 17 GRAM(S): 17 POWDER, FOR SOLUTION ORAL at 19:07

## 2022-08-10 RX ADMIN — ATORVASTATIN CALCIUM 80 MILLIGRAM(S): 80 TABLET, FILM COATED ORAL at 21:24

## 2022-08-10 RX ADMIN — Medication 10 MILLIGRAM(S): at 19:07

## 2022-08-10 NOTE — H&P ADULT - NSHPLABSRESULTS_GEN_ALL_CORE
LABS:                        12.0   7.89  )-----------( 249      ( 10 Aug 2022 04:50 )             37.7     08-10    141  |  110<H>  |  50<H>  ----------------------------<  73  4.8   |  24  |  1.32<H>    Ca    8.8      10 Aug 2022 04:50    TPro  7.9  /  Alb  3.2<L>  /  TBili  0.4  /  DBili  x   /  AST  38<H>  /  ALT  48  /  AlkPhos  127<H>  08-10    PT/INR - ( 10 Aug 2022 06:20 )   PT: 11.5 sec;   INR: 0.96 ratio         PTT - ( 10 Aug 2022 06:20 )  PTT:26.0 sec      Urinalysis Basic - ( 10 Aug 2022 06:07 )    Color: Yellow / Appearance: Clear / S.010 / pH: x  Gluc: x / Ketone: Negative  / Bili: Negative / Urobili: Negative mg/dL   Blood: x / Protein: 15 mg/dL / Nitrite: Positive   Leuk Esterase: Trace / RBC: 0-2 /HPF / WBC 3-5   Sq Epi: x / Non Sq Epi: x / Bacteria: TNTC      Lactate, Blood: 0.8 mmol/L (08-10 @ 04:50)

## 2022-08-10 NOTE — ED ADULT NURSE NOTE - OBJECTIVE STATEMENT
BIBA with daughter at bedside. As per daughter, pt has a hx of T2DM and was unarousable approximately  03:20. As per report fs 77 IN FLIED. On arrival to ED, Pt A&Ox4, moving all extremities, ambulates with assist x2 and follows commands.

## 2022-08-10 NOTE — H&P ADULT - HISTORY OF PRESENT ILLNESS
71 yr  w/      PMH DM,  HTN/  HLD, CAD, cath i 2019,.  with 3  vessel disease       arrived  at er with  lethargy    Per daughter, pt typically gets up to pray around 0300, but this AM, did not get up like normal and seemed more confused / lethargic, less responsive.    Hx similar  event  a few months ago 2/2 UTI.   pt  has  c/c dizziness, intermittent,   c/c    L shoulder pain  and   chronic   sob       Denies F/C, h/a, cough, abd pain, vomiting, diarrhea,  dysuria  . Recent diagnosis of Hep C, has not yet had f/u appt

## 2022-08-10 NOTE — ED ADULT NURSE NOTE - CHIEF COMPLAINT QUOTE
Patient BIB [facility]. Family states she was non verbal. FS in field was 77. 2 cups of orange juice were given. Denies pain. PMH 4 cardiac stents, hepatitis C, htn, hld, dm. Triage fs108.

## 2022-08-10 NOTE — ED ADULT TRIAGE NOTE - NSWEIGHTCALCTOOLDRUG_GEN_A_CORE
Psychotherapy Provided: Individual Psychotherapy 45 minutes     Length of time in session: 45 minutes, follow up in 2 week    Encounter Diagnosis     ICD-10-CM    1  Bipolar I disorder, most recent episode depressed, in [facility] ([facility]  F31 [id number]        Goals addressed in session: Goal 1     Pain:      none    0    Current suicide risk : Low     D- ct shared that she and  are still having ups and downs  Ct  accused her of cheating and ct was going to move out but  did not want that too happen  Ct reassured  that she is not cheating but she is spending too much time with best friend who is male  Ct is emotionally connected to him and does not see anything wrong with that  Ct is encouraged to let best friend get more independent because he relies on ct too much  A- ct presented with increased anxiety  Ct will go on vacation in 2 weeks  Ct was verbal and engaged in session  Ct sleep is inconsistent  P- ct will attend session, take med's as instructed, and use techniques to manage moods  Behavioral Health Treatment Plan [facility]: Diagnosis and Treatment Plan explained to yde Medicine relates understanding diagnosis and is agreeable to Treatment Plan   Yes
 used

## 2022-08-10 NOTE — ED PROVIDER NOTE - PROGRESS NOTE DETAILS
W/u significant for: + UTI, + trop 157, ECG w/o evidence acute ischemic changes, BUN / Cr 50/1.3 (baseline Cr 1.2-0.9) Will admit to medicine (d/w Dr Laws) Pt, daughter updated to results, admission. They understand / agree w/ this plan.

## 2022-08-10 NOTE — H&P ADULT - NSHPPHYSICALEXAM_GEN_ALL_CORE
PHYSICAL EXAMINATION:  Vital Signs Last 24 Hrs  T(C): 36.4 (10 Aug 2022 07:05), Max: 36.4 (10 Aug 2022 07:05)  T(F): 97.5 (10 Aug 2022 07:05), Max: 97.5 (10 Aug 2022 07:05)  HR: 63 (10 Aug 2022 07:05) (53 - 65)  BP: 173/85 (10 Aug 2022 07:05) (140/74 - 177/104)  BP(mean): --  RR: 18 (10 Aug 2022 07:05) (17 - 18)  SpO2: 100% (10 Aug 2022 07:05) (97% - 100%)    Parameters below as of 10 Aug 2022 07:05  Patient On (Oxygen Delivery Method): room air      CAPILLARY BLOOD GLUCOSE      POCT Blood Glucose.: 114 mg/dL (10 Aug 2022 08:19)  POCT Blood Glucose.: 61 mg/dL (10 Aug 2022 07:49)  POCT Blood Glucose.: 58 mg/dL (10 Aug 2022 07:47)  POCT Blood Glucose.: 108 mg/dL (10 Aug 2022 04:15)        GENERAL: NAD, well-groomed,  HEAD:  atraumatic, normocephalic  EYES: sclera anicteric  ENMT: mucous membranes moist  NECK: supple, No JVD  CHEST/LUNG: clear to auscultation bilaterally;    no      rales   ,   no rhonchi,   HEART: normal S1, S2  ABDOMEN: BS+, soft, ND, NT   EXTREMITIES:    no    edema    b/l LEs  NEURO: awake, ,     moves all extremities  SKIN: no     rash

## 2022-08-10 NOTE — PATIENT PROFILE ADULT - FALL HARM RISK - UNIVERSAL INTERVENTIONS
Bed in lowest position, wheels locked, appropriate side rails in place/Call bell, personal items and telephone in reach/Instruct patient to call for assistance before getting out of bed or chair/Non-slip footwear when patient is out of bed/[place] to call system/Physically safe environment - no spills, clutter or unnecessary equipment/Purposeful Proactive Rounding/Room/bathroom lighting operational, light cord in reach

## 2022-08-10 NOTE — PATIENT PROFILE ADULT - STATED REASON FOR ADMISSION
found lethargic with low blood sugar at home found lethargic with low blood sugar at homeprofile translated via daughter  Queta

## 2022-08-10 NOTE — ED ADULT NURSE NOTE - NSIMPLEMENTINTERV_GEN_ALL_ED
Implemented All Fall Risk Interventions:  [place] to call system. Call bell, personal items and telephone within reach. Instruct patient to call for assistance. Room bathroom lighting operational. Non-slip footwear when patient is off stretcher. Physically safe environment: no spills, clutter or unnecessary equipment. Stretcher in lowest position, wheels locked, appropriate side rails in place. Provide visual cue, wrist band, yellow gown, etc. Monitor gait and stability. Monitor for mental status changes and reorient to person, place, and time. Review medications for side effects contributing to fall risk. Reinforce activity limits and safety measures with patient and family.

## 2022-08-10 NOTE — ED ADULT TRIAGE NOTE - CHIEF COMPLAINT QUOTE
Patient BIB [facility]. Family states she was non verbal. FS in field was 77. 2 cups of orange juice were given. Denies pain. PMH 4 cardiac stents, hepatitis C, htn, hld, dm. Triage fs108.

## 2022-08-10 NOTE — ED PROVIDER NOTE - OBJECTIVE STATEMENT
71F w/ PMH DM, CAD BIBEMS accompanied by daughter d/t AMS. Per daughter, pt typically gets up to pray around 0300, but this AM, did not get up like normal and seemed more confused / lethargic, less responsive. Hx similar a few months ago 2/2 UTI. Per daughter, pt complains of dizziness, intermittent, x some time. Pt also complains of L shoulder pain, x some time. Pt w/ chronic SOB 2/2 asthma hx, per daughter. Denies F/C, h/a, cough, abd pain, vomiting, diarrhea, foul smelling urine. Recent diagnosis of Hep C, has not yet had f/u appt.     PMH as above, PSH none, NKDA, meds as listed. 71F w/ PMH DM, CAD BIBEMS accompanied by daughter d/t AMS. Per daughter, pt typically gets up to pray around 0300, but this AM, did not get up like normal and seemed more confused / lethargic, less responsive. Hx similar a few months ago 2/2 UTI. Per daughter, pt complains of dizziness, intermittent, x some time. Pt also complains of L shoulder pain, x some time. Pt w/ chronic SOB 2/2 asthma hx, per daughter. Denies F/C, h/a, cough, abd pain, vomiting, diarrhea, foul smelling urine. Recent diagnosis of Hep C, has not yet had f/u appt. Daughter reports constipation, no BM x 4 days.     PMH as above, PSH none, NKDA, meds as listed.

## 2022-08-10 NOTE — ED PROVIDER NOTE - CLINICAL SUMMARY MEDICAL DECISION MAKING FREE TEXT BOX
71F w/ PMH DM, CAD, Hep C, asthma BIBEMS d/t AMS noted by family this AM. AF, VSS. Well appearing, in NAD. Plan: CBC, CMP, trop, lactate, ammonia, CT brain, CXR, ECG, coag panel, Flu/COVID, UA/C. Re-eval.

## 2022-08-10 NOTE — H&P ADULT - ASSESSMENT
71 yr  w/      PMH DM,  HTN/  HLD, CAD, cath i 2019,.  with 3  vessel disease       arrived  at er with  lethargy    Per daughter, pt typically gets up to pray around 0300, but this AM, did not get up like normal and seemed more confused / lethargic, less responsive.    Hx similar  event  a few months ago 2/2 UTI.   pt  has  c/c dizziness, intermittent,   c/c    L shoulder pain  and   chronic   sob       Denies F/C, h/a, cough, abd pain, vomiting, diarrhea,  dysuria  . Recent diagnosis of Hep C, has not yet had f/u appt        admitted  with  weakness  CT head. normal   elevated troponin . not from mi/  from  c/c ischemia   HTN/  HLD/ CAD.  with 3 vessel  disease    on asa/ lipitor, Brilinta /  ranexa/  ranexa/  hydralazine   DM,  follow  fs   on  lantus,  will  lower  dose  mild  ALY,   on  iv fluids   on dvt  ppx   echo  ordered/  card  eval    see   below, cardiac cath  report  from 2019       rad< from: Cardiac Cath Lab - Adult (09.13.19 @ 13:41) >  VENTRICLES: EF by echo was 55 %.  CORONARY VESSELS: The coronarycirculation is right dominant.  LM:   --  Distal left main: There was a 20 % stenosis at the site of a  prior stent.  LAD:   --  Ostial LAD: There was a stenosis at the site of a prior stent.  --  Proximal LAD: There was a discrete 30 % stenosis at the site of a prior  stent.  --  Mid LAD: There was a 100 % stenosis at the site of a prior stent.  CX:   --  Ostial circumflex: There was a 80 % stenosis at the site of a  prior stent.  --  Proximal circumflex: There was a stenosis at the site of a prior stent.  --  Mid circumflex: There was a diffuse 80 % stenosis.  --  OM1: There was a tubular 70 % stenosis.  --  OM2: There was a tubular 70 % stenosis.  RCA:   --  Ostial RCA: There was a stenosis at the site of a prior stent.  --  Proximal RCA: There was a tubular 30 % stenosis at the site of a prior  stent.  --  Mid RCA: There was a stenosis at the site of a prior stent.  --  Distal RCA: There was a stenosis at the site of a prior stent.  COMPLICATIONS: There were no complications.  DIAGNOSTIC RECOMMENDATIONS: Continue aggressive medical therapy for triple  vessel CAD.  Strongly encourage Brilinta compliance.  < end of copied text >   71 yr  w/      PMH DM,  HTN/  HLD, CAD, cath i 2019,.  with 3  vessel disease       arrived  at er with  lethargy    Per daughter, pt typically gets up to pray around 0300, but this AM, did not get up like normal and seemed more confused / lethargic, less responsive.    Hx similar  event  a few months ago 2/2 UTI.   pt  has  c/c dizziness, intermittent,   c/c    L shoulder pain  and   chronic   sob       Denies   fevers/ chills/  h/a, cough, abd pain, vomiting, diarrhea,  dysuria   Recent diagnosis of Hep C, has not yet had f/u appt            *admitted  with  weakness  CT head. normal    *  elevated troponin . not from mi/  from  c/c ischemia  will trend  troponin    *  HTN/  HLD    *  CAD.  with 3 vessel  disease  per  daughter  p twas  advised to  have  CABG, however  pt   has  refused and  opted  for med  rx    on asa/ lipitor, Brilinta /  ranexa/  loptressor/   hydralazine   *  DM,  follow  fs   on  lantus,  40  u  given  at  10.30 pm. last  night /  will  lower  dose   monitor  fs.  as  she  had  hypogycemia/  will  titatre  accordingly,  when fs   is gigher   * mild  ALY,   on  iv fluids   *  h/o Hep C + ,  with  lild  lft  elevation    has  appt with  gi  in  sept   *  on dvt  ppx/  s/q  heparin    echo  ordered/  card  eval      see   below, cardiac cath  report  from 2019     rad< from: Cardiac Cath Lab - Adult (09.13.19 @ 13:41) >  VENTRICLES: EF by echo was 55 %.  CORONARY VESSELS: The coronarycirculation is right dominant.  LM:   --  Distal left main: There was a 20 % stenosis at the site of a  prior stent.  LAD:   --  Ostial LAD: There was a stenosis at the site of a prior stent.  --  Proximal LAD: There was a discrete 30 % stenosis at the site of a prior  stent.  --  Mid LAD: There was a 100 % stenosis at the site of a prior stent.  CX:   --  Ostial circumflex: There was a 80 % stenosis at the site of a  prior stent.  --  Proximal circumflex: There was a stenosis at the site of a prior stent.  --  Mid circumflex: There was a diffuse 80 % stenosis.  --  OM1: There was a tubular 70 % stenosis.  --  OM2: There was a tubular 70 % stenosis.  RCA:   --  Ostial RCA: There was a stenosis at the site of a prior stent.  --  Proximal RCA: There was a tubular 30 % stenosis at the site of a prior  stent.  --  Mid RCA: There was a stenosis at the site of a prior stent.  --  Distal RCA: There was a stenosis at the site of a prior stent.  COMPLICATIONS: There were no complications.  DIAGNOSTIC RECOMMENDATIONS: Continue aggressive medical therapy for triple  vessel CAD.  Strongly encourage Brilinta compliance.  < end of copied text >   71 yr  w/      PMH DM,  HTN/  HLD, CAD, cath i 2019,.  with 3  vessel disease       arrived  at er with  lethargy    Per daughter, pt typically gets up to pray around 0300, but this AM, did not get up like normal and seemed more confused / lethargic, less responsive.    Hx similar  event  a few months ago 2/2 UTI.   pt  has  c/c dizziness, intermittent,   c/c    L shoulder pain  and   chronic   sob       Denies   fevers/ chills/  h/a, cough, abd pain, vomiting, diarrhea,  dysuria   Recent diagnosis of Hep C, has not yet had f/u appt            *admitted  with  weakness  denies  cp/sob  CT head. normal    *  elevated troponin . not from mi/  from  c/c ischemia  will trend  troponin    *  HTN/  HLD    *  CAD.  with 3 vessel  disease  per  daughter  p twas  advised to  have  CABG, however  pt   has  refused and  opted  for med  rx    on asa/ lipitor, Brilinta /  ranexa/  loptressor/   hydralazine   *  DM,  follow  fs   on  lantus,  40  u  given  at  10.30 pm. last  night /  will  lower  dose   monitor  fs.  as  she  had  hypogycemia/  will  titatre  accordingly,  when fs   is gigher   * mild  ALY,   on  iv fluids   *  h/o Hep C + ,  with  lild  lft  elevation    has  appt with  gi  in  sept   *  on dvt  ppx/  s/q  heparin    echo  ordered/  card  eval      see   below, cardiac cath  report  from 2019     rad< from: Cardiac Cath Lab - Adult (09.13.19 @ 13:41) >  VENTRICLES: EF by echo was 55 %.  CORONARY VESSELS: The coronarycirculation is right dominant.  LM:   --  Distal left main: There was a 20 % stenosis at the site of a  prior stent.  LAD:   --  Ostial LAD: There was a stenosis at the site of a prior stent.  --  Proximal LAD: There was a discrete 30 % stenosis at the site of a prior  stent.  --  Mid LAD: There was a 100 % stenosis at the site of a prior stent.  CX:   --  Ostial circumflex: There was a 80 % stenosis at the site of a  prior stent.  --  Proximal circumflex: There was a stenosis at the site of a prior stent.  --  Mid circumflex: There was a diffuse 80 % stenosis.  --  OM1: There was a tubular 70 % stenosis.  --  OM2: There was a tubular 70 % stenosis.  RCA:   --  Ostial RCA: There was a stenosis at the site of a prior stent.  --  Proximal RCA: There was a tubular 30 % stenosis at the site of a prior  stent.  --  Mid RCA: There was a stenosis at the site of a prior stent.  --  Distal RCA: There was a stenosis at the site of a prior stent.  COMPLICATIONS: There were no complications.  DIAGNOSTIC RECOMMENDATIONS: Continue aggressive medical therapy for triple  vessel CAD.  Strongly encourage Brilinta compliance.  < end of copied text >

## 2022-08-10 NOTE — H&P ADULT - NSHPREVIEWOFSYSTEMS_GEN_ALL_CORE
REVIEW OF SYSTEMS:  GEN: no fever,    no chills  RESP: no SOB,   no cough  CVS: no chest pain,   no palpitations  GI: no abdominal pain,   no nausea,   no vomiting,    constipation,   no diarrhea  : no dysuria,   no frequency  NEURO: no headache,   no dizziness  PSYCH: no depression,   not anxious  Derm : no rash

## 2022-08-10 NOTE — ED ADULT NURSE REASSESSMENT NOTE - NS ED NURSE REASSESS COMMENT FT1
pt received awake, alert, orient x 3, fs- 58, dextrose 12.5mg ivp given /79, hr- 64, PA Tish made aware.

## 2022-08-11 ENCOUNTER — TRANSCRIPTION ENCOUNTER (OUTPATIENT)
Age: 72
End: 2022-08-11

## 2022-08-11 LAB
A1C WITH ESTIMATED AVERAGE GLUCOSE RESULT: 6.6 % — HIGH (ref 4–5.6)
ANION GAP SERPL CALC-SCNC: 4 MMOL/L — LOW (ref 5–17)
BUN SERPL-MCNC: 44 MG/DL — HIGH (ref 7–23)
CALCIUM SERPL-MCNC: 8.6 MG/DL — SIGNIFICANT CHANGE UP (ref 8.5–10.1)
CHLORIDE SERPL-SCNC: 109 MMOL/L — HIGH (ref 96–108)
CO2 SERPL-SCNC: 27 MMOL/L — SIGNIFICANT CHANGE UP (ref 22–31)
CREAT SERPL-MCNC: 1.07 MG/DL — SIGNIFICANT CHANGE UP (ref 0.5–1.3)
EGFR: 55 ML/MIN/1.73M2 — LOW
ESTIMATED AVERAGE GLUCOSE: 143 MG/DL — HIGH (ref 68–114)
GLUCOSE BLDC GLUCOMTR-MCNC: 106 MG/DL — HIGH (ref 70–99)
GLUCOSE BLDC GLUCOMTR-MCNC: 130 MG/DL — HIGH (ref 70–99)
GLUCOSE BLDC GLUCOMTR-MCNC: 135 MG/DL — HIGH (ref 70–99)
GLUCOSE BLDC GLUCOMTR-MCNC: 155 MG/DL — HIGH (ref 70–99)
GLUCOSE BLDC GLUCOMTR-MCNC: 52 MG/DL — CRITICAL LOW (ref 70–99)
GLUCOSE SERPL-MCNC: 49 MG/DL — CRITICAL LOW (ref 70–99)
HCT VFR BLD CALC: 35.3 % — SIGNIFICANT CHANGE UP (ref 34.5–45)
HGB BLD-MCNC: 11.1 G/DL — LOW (ref 11.5–15.5)
MCHC RBC-ENTMCNC: 28.4 PG — SIGNIFICANT CHANGE UP (ref 27–34)
MCHC RBC-ENTMCNC: 31.4 G/DL — LOW (ref 32–36)
MCV RBC AUTO: 90.3 FL — SIGNIFICANT CHANGE UP (ref 80–100)
NRBC # BLD: 0 /100 WBCS — SIGNIFICANT CHANGE UP (ref 0–0)
PLATELET # BLD AUTO: 230 K/UL — SIGNIFICANT CHANGE UP (ref 150–400)
POTASSIUM SERPL-MCNC: 4.6 MMOL/L — SIGNIFICANT CHANGE UP (ref 3.5–5.3)
POTASSIUM SERPL-SCNC: 4.6 MMOL/L — SIGNIFICANT CHANGE UP (ref 3.5–5.3)
RBC # BLD: 3.91 M/UL — SIGNIFICANT CHANGE UP (ref 3.8–5.2)
RBC # FLD: 13.1 % — SIGNIFICANT CHANGE UP (ref 10.3–14.5)
SODIUM SERPL-SCNC: 140 MMOL/L — SIGNIFICANT CHANGE UP (ref 135–145)
WBC # BLD: 7.08 K/UL — SIGNIFICANT CHANGE UP (ref 3.8–10.5)
WBC # FLD AUTO: 7.08 K/UL — SIGNIFICANT CHANGE UP (ref 3.8–10.5)

## 2022-08-11 PROCEDURE — 99239 HOSP IP/OBS DSCHRG MGMT >30: CPT

## 2022-08-11 RX ORDER — CEFTRIAXONE 500 MG/1
1000 INJECTION, POWDER, FOR SOLUTION INTRAMUSCULAR; INTRAVENOUS EVERY 24 HOURS
Refills: 0 | Status: DISCONTINUED | OUTPATIENT
Start: 2022-08-11 | End: 2022-08-12

## 2022-08-11 RX ORDER — INSULIN GLARGINE 100 [IU]/ML
15 INJECTION, SOLUTION SUBCUTANEOUS
Qty: 0 | Refills: 0 | DISCHARGE

## 2022-08-11 RX ORDER — INSULIN GLARGINE 100 [IU]/ML
10 INJECTION, SOLUTION SUBCUTANEOUS
Qty: 0 | Refills: 0 | DISCHARGE

## 2022-08-11 RX ORDER — FLUTICASONE PROPIONATE 220 MCG
0 AEROSOL WITH ADAPTER (GRAM) INHALATION
Qty: 0 | Refills: 0 | DISCHARGE

## 2022-08-11 RX ORDER — DEXTROSE 50 % IN WATER 50 %
12.5 SYRINGE (ML) INTRAVENOUS ONCE
Refills: 0 | Status: COMPLETED | OUTPATIENT
Start: 2022-08-11 | End: 2022-08-11

## 2022-08-11 RX ADMIN — TICAGRELOR 60 MILLIGRAM(S): 90 TABLET ORAL at 09:51

## 2022-08-11 RX ADMIN — RANOLAZINE 500 MILLIGRAM(S): 500 TABLET, FILM COATED, EXTENDED RELEASE ORAL at 17:55

## 2022-08-11 RX ADMIN — LISINOPRIL 2.5 MILLIGRAM(S): 2.5 TABLET ORAL at 05:37

## 2022-08-11 RX ADMIN — ATORVASTATIN CALCIUM 80 MILLIGRAM(S): 80 TABLET, FILM COATED ORAL at 21:54

## 2022-08-11 RX ADMIN — Medication 10 MILLIGRAM(S): at 05:37

## 2022-08-11 RX ADMIN — POLYETHYLENE GLYCOL 3350 17 GRAM(S): 17 POWDER, FOR SOLUTION ORAL at 05:36

## 2022-08-11 RX ADMIN — TICAGRELOR 60 MILLIGRAM(S): 90 TABLET ORAL at 18:47

## 2022-08-11 RX ADMIN — SODIUM CHLORIDE 50 MILLILITER(S): 9 INJECTION INTRAMUSCULAR; INTRAVENOUS; SUBCUTANEOUS at 09:49

## 2022-08-11 RX ADMIN — Medication 12.5 GRAM(S): at 07:20

## 2022-08-11 RX ADMIN — CEFTRIAXONE 100 MILLIGRAM(S): 500 INJECTION, POWDER, FOR SOLUTION INTRAMUSCULAR; INTRAVENOUS at 14:52

## 2022-08-11 RX ADMIN — RANOLAZINE 500 MILLIGRAM(S): 500 TABLET, FILM COATED, EXTENDED RELEASE ORAL at 05:37

## 2022-08-11 RX ADMIN — GABAPENTIN 100 MILLIGRAM(S): 400 CAPSULE ORAL at 22:06

## 2022-08-11 RX ADMIN — HEPARIN SODIUM 5000 UNIT(S): 5000 INJECTION INTRAVENOUS; SUBCUTANEOUS at 05:36

## 2022-08-11 RX ADMIN — POLYETHYLENE GLYCOL 3350 17 GRAM(S): 17 POWDER, FOR SOLUTION ORAL at 17:54

## 2022-08-11 RX ADMIN — Medication 12.5 MILLIGRAM(S): at 17:55

## 2022-08-11 RX ADMIN — Medication 81 MILLIGRAM(S): at 12:06

## 2022-08-11 RX ADMIN — Medication 12.5 MILLIGRAM(S): at 05:37

## 2022-08-11 RX ADMIN — HEPARIN SODIUM 5000 UNIT(S): 5000 INJECTION INTRAVENOUS; SUBCUTANEOUS at 17:54

## 2022-08-11 NOTE — PROGRESS NOTE ADULT - SUBJECTIVE AND OBJECTIVE BOX
Patient seen and  examined at bedside  Indonesian  used  patient reports right leg pain, left shoulder pain down to her hand, reports chest pain and shortness of breath resolved  reports constipation  vitals stable  diet advanced  afebrile  xr of  shoulder noted  patient follows with Dr. lake cardiology at [facility]    Review of Systems:  General:denies fever chills, headache, weakness  HEENT: denies blurry vision,diffculty swallowing, difficulty hearing, tinnitus  Cardiovascular: denies chest pain  ,palpitations  Pulmonary:denies shortness of breath, cough, wheezing, hemoptysis  Gastrointestinal: denies abdominal pain, constipation, diarrhea,nausea , vomiting, hematochezia  : denies hematuria, dysuria, or incontinence  Neurological: denies weakness, numbness , tingling, dizziness, tremors  MSK: denies muscle pain, difficulty ambulating, swelling, back pain +left shoulder pain  skin: denies skin rash, itching, burning, or  skin lesions  Psychiatrical: denies mood disturbances, anxierty, feeling depressed, depression , or difficulty sleeping    Objective:  Vitals  T(C): 36.7 (08-11-22 @ 11:00), Max: 36.8 (08-10-22 @ 14:35)  HR: 62 (08-11-22 @ 11:00) (55 - 64)  BP: 109/72 (08-11-22 @ 11:00) (109/72 - 156/87)  RR: 18 (08-11-22 @ 11:00) (16 - 18)  SpO2: 99% (08-11-22 @ 11:00) (96% - 99%)    Physical Exam:  General: comfortable, no acute distress, well nourished  HEENT: Atraumatic, no LAD, trachea midline, PERRLA  Cardiovascular: normal s1s2, no murmurs, gallops or fricition rubs  Pulmonary: clear to ausculation Bilaterally, no wheezing , rhonchi  Gastrointestinal: soft non tender non distended, no masses felt, no organomegally  Muscloskeletal: no lower extremity edema, intact bilateral lower extremity pulses +++ left shoulder tenderness  Neurological: CN II-12 intact. No focal weakness  Psychiatrical: normal mood, cooperative  SKIN: no rash, lesions or ulcers    Labs:                          11.1   7.08  )-----------( 230      ( 11 Aug 2022 05:20 )             35.3     08-11    140  |  109<H>  |  44<H>  ----------------------------<  49<LL>  4.6   |  27  |  1.07    Ca    8.6      11 Aug 2022 05:20    TPro  7.9  /  Alb  3.2<L>  /  TBili  0.4  /  DBili  x   /  AST  38<H>  /  ALT  48  /  AlkPhos  127<H>  08-10    LIVER FUNCTIONS - ( 10 Aug 2022 04:50 )  Alb: 3.2 g/dL / Pro: 7.9 gm/dL / ALK PHOS: 127 U/L / ALT: 48 U/L / AST: 38 U/L / GGT: x           PT/INR - ( 10 Aug 2022 06:20 )   PT: 11.5 sec;   INR: 0.96 ratio         PTT - ( 10 Aug 2022 06:20 )  PTT:26.0 sec      Active Medications  MEDICATIONS  (STANDING):  aspirin enteric coated 81 milliGRAM(s) Oral daily  atorvastatin 80 milliGRAM(s) Oral at bedtime  dextrose 5%. 1000 milliLiter(s) (100 mL/Hr) IV Continuous <Continuous>  dextrose 5%. 1000 milliLiter(s) (50 mL/Hr) IV Continuous <Continuous>  dextrose 50% Injectable 25 Gram(s) IV Push once  dextrose 50% Injectable 12.5 Gram(s) IV Push once  dextrose 50% Injectable 25 Gram(s) IV Push once  gabapentin 100 milliGRAM(s) Oral at bedtime  glucagon  Injectable 1 milliGRAM(s) IntraMuscular once  heparin   Injectable 5000 Unit(s) SubCutaneous every 12 hours  lisinopril 2.5 milliGRAM(s) Oral daily  metoprolol tartrate 12.5 milliGRAM(s) Oral two times a day  polyethylene glycol 3350 17 Gram(s) Oral two times a day  ranolazine 500 milliGRAM(s) Oral two times a day  sodium chloride 0.9%. 1000 milliLiter(s) (50 mL/Hr) IV Continuous <Continuous>  ticagrelor 60 milliGRAM(s) Oral every 12 hours    MEDICATIONS  (PRN):  dextrose Oral Gel 15 Gram(s) Oral once PRN Blood Glucose LESS THAN 70 milliGRAM(s)/deciliter  dextrose Oral Gel 15 Gram(s) Oral once PRN Blood Glucose LESS THAN 70 milliGRAM(s)/deciliter

## 2022-08-11 NOTE — DISCHARGE NOTE PROVIDER - NSDCFUADDAPPT_GEN_ALL_CORE_FT
Please followup with Dr. Rea  The absolute treatment you need is a CABG Please followup with Dr. Rea  The absolute treatment you need is a CABG    It is important to see your primary physician as well as the physicians noted below within the next week to perform a comprehensive medical review.  Call their offices for an appointment as soon as you leave the hospital.  You will also need to see them for renewal of your medications.  If you do not have a primary physician, contact the [facility] Physician Referral Service at [phone].  To obtain your results, you can access the [email or web address] Patient Portal at [email or web address].  Your medical issues appear to be stable at this time, but if your symptoms recur or worsen, contact your physicians and/or return to the hospital if necessary.  If you encounter any issues or questions with your medication, call your physicians before stopping the medication.

## 2022-08-11 NOTE — DISCHARGE NOTE PROVIDER - NSDCMRMEDTOKEN_GEN_ALL_CORE_FT
alendronate 70 mg oral tablet: 1 tab(s) orally once a week  home  Aspirin Enteric Coated 81 mg oral delayed release tablet: 1 tab(s) orally once a day  home  atorvastatin 40 mg oral tablet: 1 tab(s) orally once a day  Brilinta (ticagrelor) 90 mg oral tablet: 1 tab(s) orally 2 times a day  home  gabapentin 300 mg oral capsule: orally 2 times a day  Lantus 100 units/mL subcutaneous solution: 10 unit(s) subcutaneous 2 times a day  Lipitor 80 mg oral tablet: 1 tab(s) orally once a day  lisinopril 2.5 mg oral tablet: 1 tab(s) orally once a day  home  Lyrica 50 mg oral capsule: 1 cap(s) orally once a day  metFORMIN 500 mg oral tablet, extended release: 1 tab(s) orally 2 times a day  Metoprolol Tartrate 25 mg oral tablet: 0.5 tab  orally once a day  ranolazine 500 mg oral tablet, extended release: 1 tab(s) orally 2 times a day

## 2022-08-11 NOTE — DISCHARGE NOTE PROVIDER - HOSPITAL COURSE
71 yr  w/PMH DM,  HTN/  HLD, CAD, cath i 2019,.  with 3  vessel disease arrived  at er with  lethargy.  dizziness, intermittent,   c/c    L shoulder pain  and   chronic   sob   Recent diagnosis of Hep C, has not yet had f/u appt    Weakness unresponsive likely due to hypoglycemia  sugars noted at 53   patient reports chrnoically low blood sugars  trops plateau  ct head negative  plan:  with cardiac hx will check echocardiogram also if patient refused cabg do not utility in this test    HTN/  HLD  CAD.  with 3 vessel  disease  per  daughter  p twas  advised to  have  CABG, however  pt   has  refused and  opted  for med  rx   on asa/ lipitor, Brilinta /  ranexa/  loptressor/   hydralazine    hypoglycemia  hb 1ac is 6.6 will need to lower lantus      h/o Hep C + ,  with  lild  lft  elevation   has  appt with  gi  in  sept   on dvt  ppx/  s/q  heparin       PATIENT  AND DAUGHTER REFUSE CABG

## 2022-08-11 NOTE — DISCHARGE NOTE PROVIDER - NSDCCPCAREPLAN_GEN_ALL_CORE_FT
PRINCIPAL DISCHARGE DIAGNOSIS  Diagnosis: Altered mental status  Assessment and Plan of Treatment: your confusion was likely due to dehydration and low blood sugar  please ensure you eat enough to cover your insulin  if you are planning to eat less, please take less insulin

## 2022-08-11 NOTE — PROGRESS NOTE ADULT - ASSESSMENT
71 yr  w/PMH DM,  HTN/  HLD, CAD, cath i 2019,.  with 3  vessel disease arrived  at er with  lethargy.  dizziness, intermittent,   c/c    L shoulder pain  and   chronic   sob   Recent diagnosis of Hep C, has not yet had f/u appt    Weakness unresponsive likely due to hypoglycemia  sugars noted at 53   patient reports chrnoically low blood sugars  trops plateau  ct head negative  plan:  with cardiac hx will check echocardiogram also if patient refused cabg do not utility in this test    HTN/  HLD  CAD.  with 3 vessel  disease  per  daughter  p twas  advised to  have  CABG, however  pt   has  refused and  opted  for med  rx   on asa/ lipitor, Brilinta /  ranexa/  loptressor/   hydralazine    hypoglycemia  hb 1ac is 6.6 will need to lower lantus      h/o Hep C + ,  with  lild  lft  elevation   has  appt with  gi  in  sept   on dvt  ppx/  s/q  heparin    echo  ordered/  card  eval

## 2022-08-12 ENCOUNTER — TRANSCRIPTION ENCOUNTER (OUTPATIENT)
Age: 72
End: 2022-08-12

## 2022-08-12 VITALS
TEMPERATURE: 98 F | DIASTOLIC BLOOD PRESSURE: 83 MMHG | SYSTOLIC BLOOD PRESSURE: 147 MMHG | OXYGEN SATURATION: 98 % | HEART RATE: 60 BPM | RESPIRATION RATE: 18 BRPM

## 2022-08-12 LAB
-  AMIKACIN: SIGNIFICANT CHANGE UP
-  AMOXICILLIN/CLAVULANIC ACID: SIGNIFICANT CHANGE UP
-  AMPICILLIN/SULBACTAM: SIGNIFICANT CHANGE UP
-  AMPICILLIN: SIGNIFICANT CHANGE UP
-  AZTREONAM: SIGNIFICANT CHANGE UP
-  CEFAZOLIN: SIGNIFICANT CHANGE UP
-  CEFEPIME: SIGNIFICANT CHANGE UP
-  CEFOXITIN: SIGNIFICANT CHANGE UP
-  CEFTRIAXONE: SIGNIFICANT CHANGE UP
-  CIPROFLOXACIN: SIGNIFICANT CHANGE UP
-  ERTAPENEM: SIGNIFICANT CHANGE UP
-  GENTAMICIN: SIGNIFICANT CHANGE UP
-  IMIPENEM: SIGNIFICANT CHANGE UP
-  LEVOFLOXACIN: SIGNIFICANT CHANGE UP
-  MEROPENEM: SIGNIFICANT CHANGE UP
-  NITROFURANTOIN: SIGNIFICANT CHANGE UP
-  PIPERACILLIN/TAZOBACTAM: SIGNIFICANT CHANGE UP
-  TIGECYCLINE: SIGNIFICANT CHANGE UP
-  TOBRAMYCIN: SIGNIFICANT CHANGE UP
-  TRIMETHOPRIM/SULFAMETHOXAZOLE: SIGNIFICANT CHANGE UP
CULTURE RESULTS: SIGNIFICANT CHANGE UP
GLUCOSE BLDC GLUCOMTR-MCNC: 121 MG/DL — HIGH (ref 70–99)
METHOD TYPE: SIGNIFICANT CHANGE UP
ORGANISM # SPEC MICROSCOPIC CNT: SIGNIFICANT CHANGE UP
ORGANISM # SPEC MICROSCOPIC CNT: SIGNIFICANT CHANGE UP
SPECIMEN SOURCE: SIGNIFICANT CHANGE UP

## 2022-08-12 PROCEDURE — 99239 HOSP IP/OBS DSCHRG MGMT >30: CPT

## 2022-08-12 RX ADMIN — Medication 81 MILLIGRAM(S): at 10:24

## 2022-08-12 RX ADMIN — TICAGRELOR 60 MILLIGRAM(S): 90 TABLET ORAL at 05:14

## 2022-08-12 RX ADMIN — HEPARIN SODIUM 5000 UNIT(S): 5000 INJECTION INTRAVENOUS; SUBCUTANEOUS at 05:13

## 2022-08-12 RX ADMIN — RANOLAZINE 500 MILLIGRAM(S): 500 TABLET, FILM COATED, EXTENDED RELEASE ORAL at 05:15

## 2022-08-12 NOTE — DISCHARGE NOTE NURSING/CASE MANAGEMENT/SOCIAL WORK - NSDCPEFALRISK_GEN_ALL_CORE
For information on Fall & Injury Prevention, visit: [email or web address] OR  [email or web address] OR  https://www.cdc.gov/steadi/patient.html

## 2022-08-12 NOTE — DISCHARGE NOTE NURSING/CASE MANAGEMENT/SOCIAL WORK - PATIENT PORTAL LINK FT
You can access the FollowMyHealth Patient Portal offered by [facility] by registering at the following website: [email or web address]. By joining [facility]’s FollowMyHealth portal, you will also be able to view your health information using other applications (apps) compatible with our system.

## 2022-08-16 NOTE — DISCHARGE NOTE PROVIDER - CARE PROVIDERS DIRECT ADDRESSES
,DirectAddress_Unknown
Pt w/ anterior loss of bolus to the chin with liquid consistencies. Poor bolus containment throughout due to poor coordination, with spillage also to the floor of mouth

## 2022-08-18 DIAGNOSIS — I10 ESSENTIAL (PRIMARY) HYPERTENSION: ICD-10-CM

## 2022-08-18 DIAGNOSIS — E86.0 DEHYDRATION: ICD-10-CM

## 2022-08-18 DIAGNOSIS — E11.649 TYPE 2 DIABETES MELLITUS WITH HYPOGLYCEMIA WITHOUT COMA: ICD-10-CM

## 2022-08-18 DIAGNOSIS — G93.41 METABOLIC ENCEPHALOPATHY: ICD-10-CM

## 2022-08-18 DIAGNOSIS — I25.10 ATHEROSCLEROTIC HEART DISEASE OF NATIVE CORONARY ARTERY WITHOUT ANGINA PECTORIS: ICD-10-CM

## 2022-08-18 DIAGNOSIS — J45.909 UNSPECIFIED ASTHMA, UNCOMPLICATED: ICD-10-CM

## 2022-08-18 DIAGNOSIS — N17.9 ACUTE KIDNEY FAILURE, UNSPECIFIED: ICD-10-CM

## 2022-08-18 DIAGNOSIS — R41.82 ALTERED MENTAL STATUS, UNSPECIFIED: ICD-10-CM

## 2022-08-18 DIAGNOSIS — E78.5 HYPERLIPIDEMIA, UNSPECIFIED: ICD-10-CM

## 2022-09-18 ENCOUNTER — EMERGENCY (EMERGENCY)
Facility: HOSPITAL | Age: 72
LOS: 0 days | Discharge: HOME | End: 2022-09-18
Attending: EMERGENCY MEDICINE

## 2022-09-18 VITALS
TEMPERATURE: 98 F | DIASTOLIC BLOOD PRESSURE: 79 MMHG | OXYGEN SATURATION: 95 % | SYSTOLIC BLOOD PRESSURE: 115 MMHG | RESPIRATION RATE: 17 BRPM | HEART RATE: 83 BPM

## 2022-09-18 VITALS
OXYGEN SATURATION: 99 % | RESPIRATION RATE: 16 BRPM | HEART RATE: 79 BPM | TEMPERATURE: 98 F | SYSTOLIC BLOOD PRESSURE: 140 MMHG | DIASTOLIC BLOOD PRESSURE: 70 MMHG | HEIGHT: 63 IN

## 2022-09-18 DIAGNOSIS — Z79.84 LONG TERM (CURRENT) USE OF ORAL HYPOGLYCEMIC DRUGS: ICD-10-CM

## 2022-09-18 DIAGNOSIS — R42 DIZZINESS AND GIDDINESS: ICD-10-CM

## 2022-09-18 DIAGNOSIS — Z79.4 LONG TERM (CURRENT) USE OF INSULIN: ICD-10-CM

## 2022-09-18 DIAGNOSIS — J45.909 UNSPECIFIED ASTHMA, UNCOMPLICATED: ICD-10-CM

## 2022-09-18 DIAGNOSIS — S00.86XA INSECT BITE (NONVENOMOUS) OF OTHER PART OF HEAD, INITIAL ENCOUNTER: ICD-10-CM
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
## 2022-09-18 DIAGNOSIS — Z91.013 ALLERGY TO SEAFOOD: ICD-10-CM

## 2022-09-18 DIAGNOSIS — R07.89 OTHER CHEST PAIN: ICD-10-CM
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
## 2022-09-18 DIAGNOSIS — Z79.82 LONG TERM (CURRENT) USE OF ASPIRIN: ICD-10-CM

## 2022-09-18 DIAGNOSIS — R79.89 OTHER SPECIFIED ABNORMAL FINDINGS OF BLOOD CHEMISTRY: ICD-10-CM

## 2022-09-18 DIAGNOSIS — I10 ESSENTIAL (PRIMARY) HYPERTENSION: ICD-10-CM

## 2022-09-18 DIAGNOSIS — Z95.5 PRESENCE OF CORONARY ANGIOPLASTY IMPLANT AND GRAFT: ICD-10-CM

## 2022-09-18 DIAGNOSIS — Z95.5 PRESENCE OF CORONARY ANGIOPLASTY IMPLANT AND GRAFT: Chronic | ICD-10-CM

## 2022-09-18 DIAGNOSIS — E11.9 TYPE 2 DIABETES MELLITUS WITHOUT COMPLICATIONS: ICD-10-CM

## 2022-09-18 DIAGNOSIS — Z20.822 CONTACT WITH AND (SUSPECTED) EXPOSURE TO COVID-19: ICD-10-CM

## 2022-09-18 DIAGNOSIS — E78.00 PURE HYPERCHOLESTEROLEMIA, UNSPECIFIED: ICD-10-CM

## 2022-09-18 DIAGNOSIS — I25.10 ATHEROSCLEROTIC HEART DISEASE OF NATIVE CORONARY ARTERY WITHOUT ANGINA PECTORIS: ICD-10-CM

## 2022-09-18 LAB
ALBUMIN SERPL ELPH-MCNC: 3.3 G/DL — SIGNIFICANT CHANGE UP (ref 3.3–5)
ALP SERPL-CCNC: 95 U/L — SIGNIFICANT CHANGE UP (ref 40–120)
ALT FLD-CCNC: 46 U/L — SIGNIFICANT CHANGE UP (ref 12–78)
ANION GAP SERPL CALC-SCNC: 4 MMOL/L — LOW (ref 5–17)
APTT BLD: 32.2 SEC — SIGNIFICANT CHANGE UP (ref 27.5–35.5)
AST SERPL-CCNC: 36 U/L — SIGNIFICANT CHANGE UP (ref 15–37)
BASOPHILS # BLD AUTO: 0.03 K/UL — SIGNIFICANT CHANGE UP (ref 0–0.2)
BASOPHILS NFR BLD AUTO: 0.4 % — SIGNIFICANT CHANGE UP (ref 0–2)
BILIRUB SERPL-MCNC: 0.4 MG/DL — SIGNIFICANT CHANGE UP (ref 0.2–1.2)
BUN SERPL-MCNC: 27 MG/DL — HIGH (ref 7–23)
CALCIUM SERPL-MCNC: 9.1 MG/DL — SIGNIFICANT CHANGE UP (ref 8.5–10.1)
CHLORIDE SERPL-SCNC: 106 MMOL/L — SIGNIFICANT CHANGE UP (ref 96–108)
CO2 SERPL-SCNC: 27 MMOL/L — SIGNIFICANT CHANGE UP (ref 22–31)
CREAT SERPL-MCNC: 1.1 MG/DL — SIGNIFICANT CHANGE UP (ref 0.5–1.3)
EGFR: 53 ML/MIN/1.73M2 — LOW
EOSINOPHIL # BLD AUTO: 0.24 K/UL — SIGNIFICANT CHANGE UP (ref 0–0.5)
EOSINOPHIL NFR BLD AUTO: 3.5 % — SIGNIFICANT CHANGE UP (ref 0–6)
FLUAV AG NPH QL: SIGNIFICANT CHANGE UP
FLUBV AG NPH QL: SIGNIFICANT CHANGE UP
GLUCOSE SERPL-MCNC: 88 MG/DL — SIGNIFICANT CHANGE UP (ref 70–99)
HCT VFR BLD CALC: 38.8 % — SIGNIFICANT CHANGE UP (ref 34.5–45)
HGB BLD-MCNC: 12.3 G/DL — SIGNIFICANT CHANGE UP (ref 11.5–15.5)
IMM GRANULOCYTES NFR BLD AUTO: 0.1 % — SIGNIFICANT CHANGE UP (ref 0–0.9)
INR BLD: 1.02 RATIO — SIGNIFICANT CHANGE UP (ref 0.88–1.16)
LYMPHOCYTES # BLD AUTO: 2.12 K/UL — SIGNIFICANT CHANGE UP (ref 1–3.3)
LYMPHOCYTES # BLD AUTO: 31 % — SIGNIFICANT CHANGE UP (ref 13–44)
MAGNESIUM SERPL-MCNC: 1.7 MG/DL — SIGNIFICANT CHANGE UP (ref 1.6–2.6)
MCHC RBC-ENTMCNC: 28.2 PG — SIGNIFICANT CHANGE UP (ref 27–34)
MCHC RBC-ENTMCNC: 31.7 G/DL — LOW (ref 32–36)
MCV RBC AUTO: 89 FL — SIGNIFICANT CHANGE UP (ref 80–100)
MONOCYTES # BLD AUTO: 0.54 K/UL — SIGNIFICANT CHANGE UP (ref 0–0.9)
MONOCYTES NFR BLD AUTO: 7.9 % — SIGNIFICANT CHANGE UP (ref 2–14)
NEUTROPHILS # BLD AUTO: 3.9 K/UL — SIGNIFICANT CHANGE UP (ref 1.8–7.4)
NEUTROPHILS NFR BLD AUTO: 57.1 % — SIGNIFICANT CHANGE UP (ref 43–77)
NRBC # BLD: 0 /100 WBCS — SIGNIFICANT CHANGE UP (ref 0–0)
PLATELET # BLD AUTO: 219 K/UL — SIGNIFICANT CHANGE UP (ref 150–400)
POTASSIUM SERPL-MCNC: 5 MMOL/L — SIGNIFICANT CHANGE UP (ref 3.5–5.3)
POTASSIUM SERPL-SCNC: 5 MMOL/L — SIGNIFICANT CHANGE UP (ref 3.5–5.3)
PROT SERPL-MCNC: 7.9 GM/DL — SIGNIFICANT CHANGE UP (ref 6–8.3)
PROTHROM AB SERPL-ACNC: 12.2 SEC — SIGNIFICANT CHANGE UP (ref 10.5–13.4)
RBC # BLD: 4.36 M/UL — SIGNIFICANT CHANGE UP (ref 3.8–5.2)
RBC # FLD: 12.6 % — SIGNIFICANT CHANGE UP (ref 10.3–14.5)
SARS-COV-2 RNA SPEC QL NAA+PROBE: SIGNIFICANT CHANGE UP
SODIUM SERPL-SCNC: 137 MMOL/L — SIGNIFICANT CHANGE UP (ref 135–145)
TROPONIN I, HIGH SENSITIVITY RESULT: 145.8 NG/L — HIGH
TROPONIN I, HIGH SENSITIVITY RESULT: 162 NG/L — HIGH
WBC # BLD: 6.84 K/UL — SIGNIFICANT CHANGE UP (ref 3.8–10.5)
WBC # FLD AUTO: 6.84 K/UL — SIGNIFICANT CHANGE UP (ref 3.8–10.5)

## 2022-09-18 PROCEDURE — 99285 EMERGENCY DEPT VISIT HI MDM: CPT

## 2022-09-18 PROCEDURE — 70450 CT HEAD/BRAIN W/O DYE: CPT | Mod: 26,MA

## 2022-09-18 PROCEDURE — 93010 ELECTROCARDIOGRAM REPORT: CPT

## 2022-09-18 PROCEDURE — 71046 X-RAY EXAM CHEST 2 VIEWS: CPT | Mod: 26

## 2022-09-18 RX ORDER — TRAMADOL HYDROCHLORIDE 50 MG/1
50 TABLET ORAL ONCE
Refills: 0 | Status: DISCONTINUED | OUTPATIENT
Start: 2022-09-18 | End: 2022-09-18

## 2022-09-18 RX ORDER — ASPIRIN/CALCIUM CARB/MAGNESIUM 324 MG
162 TABLET ORAL ONCE
Refills: 0 | Status: COMPLETED | OUTPATIENT
Start: 2022-09-18 | End: 2022-09-18

## 2022-09-18 RX ORDER — CEPHALEXIN 500 MG
1 CAPSULE ORAL
Qty: 28 | Refills: 0
Start: 2022-09-18 | End: 2022-09-24

## 2022-09-18 RX ADMIN — Medication 162 MILLIGRAM(S): at 19:35

## 2022-09-18 RX ADMIN — TRAMADOL HYDROCHLORIDE 50 MILLIGRAM(S): 50 TABLET ORAL at 17:54

## 2022-09-18 NOTE — ED ADULT NURSE NOTE - OBJECTIVE STATEMENT
Pt pw continuous bleeding to pin size opening on face ( On Brilinta for 1 stent), pt also c/o passing out and dizziness. h/o DM2, STENT

## 2022-09-18 NOTE — ED PROVIDER NOTE - ATTENDING APP SHARED VISIT CONTRIBUTION OF CARE
I have personally seen and examined this pt I have participated in the care of this pt I have made amendments to the documentation where appropriate and otherwise hx, exam and plan as documented by the ACP. Pt is here for a wound to the right face and now pt sts she is also c/o left upper back, radiates to left shoulder left chest and left arm increases with movements for 10 days without sob.
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
## 2022-09-18 NOTE — ED PROVIDER NOTE - NSFOLLOWUPINSTRUCTIONS_ED_ALL_ED_FT
Rest, drink plenty of fluids.  Advance activity as tolerated.  Continue all previously prescribed medications as directed.  Follow up with your primary care physician in 48-72 hours- bring copies of your results.  Return to the ER for worsening or persistent symptoms, and/or ANY NEW OR CONCERNING SYMPTOMS. If you have issues obtaining follow up, please call: [phone] to obtain a doctor or specialist who takes your insurance in your area.  You may call [phone] to make an appointment with the internal medicine clinic.

## 2022-09-18 NOTE — ED PROVIDER NOTE - PATIENT PORTAL LINK FT
You can access the FollowMyHealth Patient Portal offered by [facility] by registering at the following website: [email or web address]. By joining [facility]’s FollowMyHealth portal, you will also be able to view your health information using other applications (apps) compatible with our system.

## 2022-09-18 NOTE — ED PROVIDER NOTE - CLINICAL SUMMARY MEDICAL DECISION MAKING FREE TEXT BOX
73 y/o female with DM, HTn, high chol, asthma, cad with stents here with right cheek bleeding since last night possibly due to insect bite. Pt also reports having dizziness and chest pain x 10 days.   Will check labs including troponin, ekg, cxr ordered. 73 y/o female with DM, HTn, high chol, asthma, cad with stents here with right cheek bleeding since last night possibly due to insect bite. Bleeding control with surgicel.  Pt also reports having dizziness and chest pain x 10 days.   Will check labs including troponin, ekg, cxr ordered.  Labs reviewed, pt notable with elevated troponin of 162. Reviewed previous troponins in the past and pt had history of elevated troponin ranging from 140s-150s. EKG no ischemic. pt currently denies any chest pain, dyspnea. NAD. Will repeat second troponin.   Second troponin 145.8. 71 y/o female with DM, HTn, high chol, asthma, cad with stents here with right cheek bleeding since last night possibly due to insect bite. Bleeding control with surgicel.  Pt also reports having dizziness and chest pain x 10 days.   Will check labs including troponin, ekg, cxr ordered.  Labs reviewed, pt notable with elevated troponin of 162. Reviewed previous troponin in the past and pt had history of elevated troponin ranging from 140s-150s. EKG no ischemic. CXR reviewed.  pt currently denies any chest pain, dyspnea. NAD. Will repeat second troponin.   Second troponin 145.8 which is lower than prior.  Pt NAD. Will discharge pt home and advised to follow up with PMD. will cover with abx for insect bite.
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
## 2022-09-18 NOTE — ED ADULT NURSE REASSESSMENT NOTE - NS ED NURSE REASSESS COMMENT FT1
covering for primary nurse, Pt is siting up on the bed, in no sign of distress, wound seen on the right cheek, in no sign of distress
Pt awaiting placement delay do to acuity and volume. Pts son left pt states he was tired of waiting. Dsg to face in place D&I

## 2022-09-18 NOTE — ED PROVIDER NOTE - NS ED ROS FT
CONSTITUTIONAL: No fever, no chills, no fatigue  EYES: No visual changes  ENT: No ear pain, no sore throat  CARDIOVASCULAR: No chest pain, no palpitations  RESPIRATORY: No cough, no SOB  GI: No abdominal pain, no nausea, no vomiting, no constipation, no diarrhea  GENITOURINARY: No dysuria, no frequency, no hematuria  MUSKULOSKELETAL: No backpain, no joint pain, no myalgias  SKIN: No rash  NEURO: No headache    ALL OTHER SYSTEMS NEGATIVE.

## 2022-09-18 NOTE — ED PROVIDER NOTE - CARE PLAN
Principal Discharge DX:	Chest pain   1 Principal Discharge DX:	Chest pain  Secondary Diagnosis:	Insect bite

## 2022-09-18 NOTE — ED PROVIDER NOTE - PHYSICAL EXAMINATION
GEN: Awake, alert, interactive, NAD.  HEAD AND NECK: NC/AT. Airway patent. Neck supple.   EYES:  Clear b/l. EOMI. PERRL.   ENT: Moist mucus membranes. (+) mild bleeding from puncture wound on right cheek  CARDIAC: Regular rate, regular rhythm. No evident pedal edema.    RESP/CHEST: Normal respiratory effort with no use of accessory muscles or retractions. Clear throughout on auscultation.  ABD: soft, non-distended, non-tender. No rebound, no guarding.   BACK: No midline spinal TTP. No CVAT.   EXTREMITIES: Moving all extremities with no apparent deformities.   SKIN: Warm, dry, intact normal color. No rash.   NEURO: AOx3, CN II-XII grossly intact, no focal deficits.   PSYCH: Appropriate mood and affect.

## 2022-09-18 NOTE — ED PROVIDER NOTE - NSDCPRINTRESULTS_ED_ALL_ED
Oxybutynin refill approved and e-scribed to [facility] per request.   Vaginal Estrogen refill approved and sent to [facility] pharmacy.   Patient requests all Lab, Cardiology, and Radiology Results on their Discharge Instructions

## 2022-09-18 NOTE — ED PROVIDER NOTE - OBJECTIVE STATEMENT
73 y/o female with DM, HTN, high chol, Asthma, CAD with stents on asa and brillanta here with right sided cheek bleeding noted last night. Pt states she woke up last night to go to the bathroom and noted bleeding from her right cheek. Denies any trauma, pain , or itchiness . Pt thinks she might have been bit by mosquito. Pt states has been bleeding nonstop since last night. Pt also reports having dizziness since last night  and having chest pain radiating to the left for the past 10 days. Dizziness is when she gets up to walk. Denies headache, vomiting, abdominal pain

## 2022-09-27 NOTE — PATIENT PROFILE ADULT - BRADEN NUTRITION
Thank you! Thank you for allowing me to care for you in the emergency department. It is my goal to provide you with excellent care. If you have not received excellent quality care, please ask to speak to the nurse manager. Please fill out the survey that will come to you by mail or email since we listen to your feedback! Below you will find a list of your tests from today's visit. Should you have any questions, please do not hesitate to call the emergency department. Labs  No results found for this or any previous visit (from the past 12 hour(s)). Radiologic Studies  No orders to display     CT Results  (Last 48 hours)      None          CXR Results  (Last 48 hours)      None          ------------------------------------------------------------------------------------------------------------  The exam and treatment you received in the Emergency Department were for an urgent problem and are not intended as complete care. It is important that you follow-up with a doctor, nurse practitioner, or physician assistant to:  (1) confirm your diagnosis,  (2) re-evaluation of changes in your illness and treatment, and  (3) for ongoing care. Please take your discharge instructions with you when you go to your follow-up appointment. If you have any problem arranging a follow-up appointment, contact the Emergency Department. If your symptoms become worse or you do not improve as expected and you are unable to reach your health care provider, please return to the Emergency Department. We are available 24 hours a day. If a prescription has been provided, please have it filled as soon as possible to prevent a delay in treatment. If you have any questions or reservations about taking the medication due to side effects or interactions with other medications, please call your primary care provider or contact the ER.
(3) adequate

## 2022-10-18 NOTE — ED ADULT TRIAGE NOTE - TEMPERATURE IN CELSIUS (DEGREES C)
36.9 [Well-appearing] : well-appearing [Normocephalic] : normocephalic [No dysmorphic facial features] : no dysmorphic facial features [No ocular abnormalities] : no ocular abnormalities [Neck supple] : neck supple [Lungs clear] : lungs clear [Heart sounds regular in rate and rhythm] : heart sounds regular in rate and rhythm [Soft] : soft [No organomegaly] : no organomegaly [Straight] : straight [No deformities] : no deformities [Alert] : alert [Well related, good eye contact] : well related, good eye contact [Conversant] : conversant [Normal speech and language] : normal speech and language [Follows instructions well] : follows instructions well [VFF] : VFF [Pupils reactive to light and accommodation] : pupils reactive to light and accommodation [Full extraocular movements] : full extraocular movements [No nystagmus] : no nystagmus [No papilledema] : no papilledema [Normal facial sensation to light touch] : normal facial sensation to light touch [No facial asymmetry or weakness] : no facial asymmetry or weakness [Gross hearing intact] : gross hearing intact [Equal palate elevation] : equal palate elevation [Good shoulder shrug] : good shoulder shrug [Normal tongue movement] : normal tongue movement [Midline tongue, no fasciculations] : midline tongue, no fasciculations [R handed] : R handed [Normal axial and appendicular muscle tone] : normal axial and appendicular muscle tone [Gets up on table without difficulty] : gets up on table without difficulty [No pronator drift] : no pronator drift [Normal finger tapping and fine finger movements] : normal finger tapping and fine finger movements [No abnormal involuntary movements] : no abnormal involuntary movements [5/5 strength in proximal and distal muscles of arms and legs] : 5/5 strength in proximal and distal muscles of arms and legs [Walks and runs well] : walks and runs well [Able to walk on heels] : able to walk on heels [Able to walk on toes] : able to walk on toes [2+ biceps] : 2+ biceps [Triceps] : triceps [Knee jerks] : knee jerks [Ankle jerks] : ankle jerks [No ankle clonus] : no ankle clonus [Bilaterally] : bilaterally [Localizes LT and temperature] : localizes LT and temperature [No dysmetria on FTNT] : no dysmetria on FTNT [Good walking balance] : good walking balance [Normal gait] : normal gait [Able to tandem well] : able to tandem well [Negative Romberg] : negative Romberg [de-identified] : cooperative, pleasant [de-identified] : patches of  round hair loss: 1 in forehead, several in arms and legs and back where she has hair [de-identified] : Fluent

## 2022-11-07 NOTE — PROVIDER CONTACT NOTE (CRITICAL VALUE NOTIFICATION) - NAME OF MD/NP/PA/DO NOTIFIED:
dr [email or web address]
SANDI Perez
Mustarde Flap Text: The defect edges were debeveled with a #15 scalpel blade.  Given the size, depth and location of the defect and the proximity to free margins a Mustarde flap was deemed most appropriate.  Using a sterile surgical marker, an appropriate flap was drawn incorporating the defect. The area thus outlined was incised with a #15 scalpel blade.  The skin margins were undermined to an appropriate distance in all directions utilizing iris scissors.
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
## 2023-02-09 ENCOUNTER — APPOINTMENT (OUTPATIENT)
Dept: GASTROENTEROLOGY | Facility: CLINIC | Age: 73
End: 2023-02-09
Payer: COMMERCIAL

## 2023-02-09 PROCEDURE — 99204 OFFICE O/P NEW MOD 45 MIN: CPT

## 2023-02-09 PROCEDURE — 99072 ADDL SUPL MATRL&STAF TM PHE: CPT

## 2023-02-09 NOTE — ASSESSMENT
[FreeTextEntry1] : Hep C tx explained at length \par \par To get labs/ TB/Hep B \par \par The symptoms of IBS-C include abdominal pain and discomfort, along with changes in bowel function.  Bloating and/or gas also may happen. Changes in bowel function may include straining, infrequent stools, hard or lumpy stools, and/or a feeling that the bowel does not empty completely. Some people may feel as if there is a “blockage” preventing them from passing stools. They may need to press on a part of their body or change body position to help them complete their bowel movement. How often a person passes stool, or the way it appears, may be different when abdominal discomfort is happening. With IBS-C, abdominal discomfort often improves after a bowel movement. In most cases, symptoms are ongoing (chronic), but they may come and go.\par \par \par The cause of IBS-C is not known. Some experts think that it relates to changes in how the intestines move and contract, or changes in how the gut senses pain. In some patients, IBS-C may happen after a past infection in the gut. It could also be related to changes in the messages between the brain and the intestines. There is evidence that bacteria which are normally found in the gut, or changes to the composition of those bacteria, play a role. In addition, researchers are looking into possible roles of genetics and/or changes in the immune system.\par \par We discussed this at length.\par \par \par Trial Linzess 72\par \par I spent 45 minutes reviewing the patients records prior to arrival, with patient , and reviewing records after visit. All prior testing reviewed at length. All questions were answered.\par \par

## 2023-02-09 NOTE — PHYSICAL EXAM

## 2023-02-09 NOTE — HISTORY OF PRESENT ILLNESS
[FreeTextEntry1] : Apperantly found to have hep C \par \par Long IBS-C \par \par Refusing ANY colon screening - risk of Cancer explained at length to daughter /PT

## 2023-02-15 LAB
HBV CORE IGG+IGM SER QL: REACTIVE
HBV CORE IGM SER QL: NONREACTIVE
HBV SURFACE AB SER QL: NONREACTIVE
HBV SURFACE AB SERPL IA-ACNC: <3 MIU/ML
HBV SURFACE AG SER QL: NONREACTIVE
HCV RNA SERPL NAA DL=5-ACNC: NORMAL IU/ML
HCV RNA SERPL NAA+PROBE-LOG IU: 5.44 LOGIU/ML
HEPB DNA PCR INT: NOT DETECTED
HEPB DNA PCR LOG: NOT DETECTED LOGIU/ML
HEPC RNA INTERP: DETECTED
M TB IFN-G BLD-IMP: NEGATIVE
QUANTIFERON TB PLUS MITOGEN MINUS NIL: >10 IU/ML
QUANTIFERON TB PLUS NIL: 0.02 IU/ML
QUANTIFERON TB PLUS TB1 MINUS NIL: 0.02 IU/ML
QUANTIFERON TB PLUS TB2 MINUS NIL: 0.02 IU/ML

## 2023-02-16 LAB — HCV GENTYP BLD NAA+PROBE: 3

## 2023-03-08 ENCOUNTER — APPOINTMENT (OUTPATIENT)
Dept: HEPATOLOGY | Facility: CLINIC | Age: 73
End: 2023-03-08

## 2023-03-15 ENCOUNTER — APPOINTMENT (OUTPATIENT)
Dept: GASTROENTEROLOGY | Facility: CLINIC | Age: 73
End: 2023-03-15
Payer: COMMERCIAL

## 2023-03-15 VITALS
TEMPERATURE: 97.8 F | DIASTOLIC BLOOD PRESSURE: 81 MMHG | HEART RATE: 81 BPM | SYSTOLIC BLOOD PRESSURE: 119 MMHG | BODY MASS INDEX: 25.99 KG/M2 | WEIGHT: 156 LBS | HEIGHT: 65 IN | OXYGEN SATURATION: 95 %

## 2023-03-15 PROCEDURE — 99214 OFFICE O/P EST MOD 30 MIN: CPT

## 2023-03-15 PROCEDURE — 99072 ADDL SUPL MATRL&STAF TM PHE: CPT

## 2023-03-15 NOTE — PHYSICAL EXAM
[Alert] : alert [Normal Voice/Communication] : normal voice/communication [Healthy Appearing] : healthy appearing [No Acute Distress] : no acute distress [Sclera] : the sclera and conjunctiva were normal [Hearing Threshold Finger Rub Not Hand] : hearing was normal [Normal Lips/Gums] : the lips and gums were normal [Oropharynx] : the oropharynx was normal [Normal Appearance] : the appearance of the neck was normal [No Neck Mass] : no neck mass was observed [No Respiratory Distress] : no respiratory distress [No Acc Muscle Use] : no accessory muscle use [Respiration, Rhythm And Depth] : normal respiratory rhythm and effort [Auscultation Breath Sounds / Voice Sounds] : lungs were clear to auscultation bilaterally [Heart Rate And Rhythm] : heart rate was normal and rhythm regular [Normal S1, S2] : normal S1 and S2 [Murmurs] : no murmurs [Abdomen Tenderness] : non-tender [Bowel Sounds] : normal bowel sounds [No Masses] : no abdominal mass palpated [Abdomen Soft] : soft [] : no hepatosplenomegaly [Oriented To Time, Place, And Person] : oriented to person, place, and time

## 2023-03-15 NOTE — ASSESSMENT
[FreeTextEntry1] : Hep C tx explained at length \par \par Hep C positive she will begin treatment \par \par The symptoms of IBS-C include abdominal pain and discomfort, along with changes in bowel function. Bloating and/or gas also may happen. Changes in bowel function may include straining, infrequent stools, hard or lumpy stools, and/or a feeling that the bowel does not empty completely. Some people may feel as if there is a “blockage” preventing them from passing stools. They may need to press on a part of their body or change body position to help them complete their bowel movement. How often a person passes stool, or the way it appears, may be different when abdominal discomfort is happening. With IBS-C, abdominal discomfort often improves after a bowel movement. In most cases, symptoms are ongoing (chronic), but they may come and go.\par \par \par The cause of IBS-C is not known. Some experts think that it relates to changes in how the intestines move and contract, or changes in how the gut senses pain. In some patients, IBS-C may happen after a past infection in the gut. It could also be related to changes in the messages between the brain and the intestines. There is evidence that bacteria which are normally found in the gut, or changes to the composition of those bacteria, play a role. In addition, researchers are looking into possible roles of genetics and/or changes in the immune system.\par \par We discussed this at length.\par \par Patient will continue to take Linzess 72\par \par I spent 30  minutes reviewing the patients records prior to arrival, with patient , and reviewing records after visit. All prior testing reviewed at length. All questions were answered.\par \par

## 2023-03-15 NOTE — HISTORY OF PRESENT ILLNESS
[FreeTextEntry1] : Apperantly found to have hep C \par \par Long IBS-C \par \par Refusing ANY colon screening - risk of Cancer explained at length to daughter /PT \par

## 2023-05-19 NOTE — DISCHARGE NOTE NURSING/CASE MANAGEMENT/SOCIAL WORK - NSDCFUADDAPPT_GEN_ALL_CORE_FT
Follow up with your doctor in 1 week.
If you are a smoker, it is important for your health to stop smoking. Please be aware that second hand smoke is also harmful.

## 2023-05-25 NOTE — PATIENT PROFILE ADULT - NSPROHMDIABETBLDGLCTARGET_GEN_A_NUR
[Family Member] : family member [de-identified] :  Aortic valve replacement utilizing a 23-mm OnX mechanical prosthetic valve,  Exclusion of left atrial appendage with an AtriClip  [de-identified] : 5/12/23  known

## 2023-06-03 NOTE — ED CLERICAL - NS ED CLERK UNITS
ATTENDING NOTE      I have personally seen and examined this patient. I have fully participated in the care of this patient. I have reviewed and agree with all pertinent clinical information including history, physical exam, labs, radiographic studies and the plan. I have also reviewed and agree with the medications, allergies and past medical history sections for this patient.    HPI: 34 year old female with no known past medical history presents to the ED complaining of foot pain. Patient states that around 4:00am today she tripped on the pavement and now has pain and swelling to her left ankle.     Physical Exam    Vitals:    06/03/23 0953 06/03/23 1254   BP: (!) 145/98 (!) 140/75   BP Location: RUE - Right upper extremity    Patient Position: Sitting/High-Felder's    Pulse: (!) 105 89   Resp: 18 17   Temp: 98.1 °F (36.7 °C)    TempSrc: Oral    SpO2: 95% 97%   LMP: 05/23/2023        Labs & Imaging:    Admission on 06/03/2023, Discharged on 06/03/2023   Component Date Value   • COLOR - POINT OF CARE 06/03/2023 Yellow    • APPEARANCE, URINALYSIS -* 06/03/2023 Clear    • GLUCOSE, URINALYSIS - PO* 06/03/2023 Negative    • BILIRUBIN, URINALYSIS - * 06/03/2023 Negative    • KETONES, URINALYSIS - PO* 06/03/2023 Trace (A)    • SPECIFIC GRAVITY, URINAL* 06/03/2023 1.020    • OCCULT BLOOD, URINALYSIS* 06/03/2023 Negative    • PH, URINALYSIS - POINT O* 06/03/2023 7.0    • PROTEIN, URINALYSIS - PO* 06/03/2023 Trace (A)    • UROBILINOGEN, URINALYSIS* 06/03/2023 1.0    • NITRITE, URINALYSIS - PO* 06/03/2023 Negative    • WBC ESTERASE, URINALYSIS* 06/03/2023 Negative    • HCG, URINE - POINT OF CA* 06/03/2023 Negative       XR TIBIA FIBULA 2 VIEWS LEFT   Final Result    FINDINGS with IMPRESSION:   Normal appearance of proximal tibia and fibula, normal knee.                  Electronically Signed by: RILEY CASAS M.D.    Signed on: 6/3/2023 12:41 PM    Workstation ID: [id number]      XR ANKLE MIN 3 VIEWS LEFT   Final Result    FINDINGS with IMPRESSION:    2 mm displaced oblique acute fracture of distal fibula and associated   lateral soft tissue swelling.  Normal distal tibia and talus.         Consider x-ray of tibia and fibula to exclude occult fracture more   proximally, which sometimes accompanies this type of fracture.            Electronically Signed by: RILEY CASAS M.D.    Signed on: 6/3/2023 10:27 AM    Workstation ID: [id number]          ED Course  ED Course as of 06/03/23 1403   Sat Jun 03, 2023   1300 Sent message to on call ortho Dr. Price, awaiting response. [EC]   1313 Pt states pain has improved with toradol [EC]   1315 Dr. Price agrees that post mold splint and f/u outpatient ortho is appropriate. [EC]      ED Course User Index  [EC] Lori Marroquin PA-C         Medical Decision Making:   MDM    Pertinent Labs & Imaging studies reviewed.    Multiple differential diagnoses were considered. The patient / caregivers were apprised of diagnostic / treatment options including alternate modes of care, in addition to risks and benefits, for this medical condition. Based on this discussion the patient / caregiver agrees with this chosen diagnostic and treatment plan.    Diagnosis:  1. Closed fracture of distal end of left fibula, unspecified fracture morphology, initial encounter    2. Fall on same level from slipping, tripping or stumbling, initial encounter        Disposition  Disposition        Discharge 6/3/2023  1:34 PM  Sharonda Ashley discharge to home/self care.        Plan:  I have reviewed the notes and assessments performed by the NP and agree with their documentation of the patient's care.    On 6/3/2023, ISUN scribed the services personally performed by Dr. Hearn    The documentation recorded by the scribe accurately and completely reflects the service(s) I personally performed and the decisions made by me.          Ihsan Hearn MD  06/03/23 7820     tele [id number]

## 2023-08-30 ENCOUNTER — RX RENEWAL (OUTPATIENT)
Age: 73
End: 2023-08-30

## 2023-09-14 ENCOUNTER — EMERGENCY (EMERGENCY)
Facility: HOSPITAL | Age: 73
LOS: 0 days | Discharge: HOME | End: 2023-09-14
Attending: EMERGENCY MEDICINE
Payer: COMMERCIAL

## 2023-09-14 VITALS
TEMPERATURE: 98 F | OXYGEN SATURATION: 96 % | HEART RATE: 67 BPM | WEIGHT: 164.91 LBS | DIASTOLIC BLOOD PRESSURE: 69 MMHG | SYSTOLIC BLOOD PRESSURE: 135 MMHG | HEIGHT: 65 IN

## 2023-09-14 VITALS
HEART RATE: 64 BPM | RESPIRATION RATE: 16 BRPM | DIASTOLIC BLOOD PRESSURE: 76 MMHG | OXYGEN SATURATION: 97 % | SYSTOLIC BLOOD PRESSURE: 151 MMHG

## 2023-09-14 DIAGNOSIS — S92.515A NONDISPLACED FRACTURE OF PROXIMAL PHALANX OF LEFT LESSER TOE(S), INITIAL ENCOUNTER FOR CLOSED FRACTURE: ICD-10-CM

## 2023-09-14 DIAGNOSIS — Z86.79 PERSONAL HISTORY OF OTHER DISEASES OF THE CIRCULATORY SYSTEM: ICD-10-CM

## 2023-09-14 DIAGNOSIS — I10 ESSENTIAL (PRIMARY) HYPERTENSION: ICD-10-CM

## 2023-09-14 DIAGNOSIS — Z79.4 LONG TERM (CURRENT) USE OF INSULIN: ICD-10-CM
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
## 2023-09-14 DIAGNOSIS — E11.9 TYPE 2 DIABETES MELLITUS WITHOUT COMPLICATIONS: ICD-10-CM

## 2023-09-14 DIAGNOSIS — M79.675 PAIN IN LEFT TOE(S): ICD-10-CM

## 2023-09-14 DIAGNOSIS — Z79.82 LONG TERM (CURRENT) USE OF ASPIRIN: ICD-10-CM

## 2023-09-14 DIAGNOSIS — Z91.013 ALLERGY TO SEAFOOD: ICD-10-CM
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
## 2023-09-14 DIAGNOSIS — Z95.5 PRESENCE OF CORONARY ANGIOPLASTY IMPLANT AND GRAFT: ICD-10-CM

## 2023-09-14 DIAGNOSIS — J45.909 UNSPECIFIED ASTHMA, UNCOMPLICATED: ICD-10-CM

## 2023-09-14 DIAGNOSIS — Z79.84 LONG TERM (CURRENT) USE OF ORAL HYPOGLYCEMIC DRUGS: ICD-10-CM

## 2023-09-14 DIAGNOSIS — Z95.5 PRESENCE OF CORONARY ANGIOPLASTY IMPLANT AND GRAFT: Chronic | ICD-10-CM

## 2023-09-14 DIAGNOSIS — M79.89 OTHER SPECIFIED SOFT TISSUE DISORDERS: ICD-10-CM

## 2023-09-14 PROCEDURE — 73660 X-RAY EXAM OF TOE(S): CPT | Mod: 26,59,LT

## 2023-09-14 PROCEDURE — 99284 EMERGENCY DEPT VISIT MOD MDM: CPT | Mod: 25

## 2023-09-14 PROCEDURE — 73700 CT LOWER EXTREMITY W/O DYE: CPT | Mod: 26,LT,MA

## 2023-09-14 PROCEDURE — 29505 APPLICATION LONG LEG SPLINT: CPT | Mod: LT

## 2023-09-14 PROCEDURE — 73630 X-RAY EXAM OF FOOT: CPT | Mod: 26,LT

## 2023-09-14 RX ORDER — IBUPROFEN 200 MG
600 TABLET ORAL ONCE
Refills: 0 | Status: COMPLETED | OUTPATIENT
Start: 2023-09-14 | End: 2023-09-14

## 2023-09-14 RX ORDER — OXYCODONE HYDROCHLORIDE 5 MG/1
1 TABLET ORAL
Qty: 15 | Refills: 0
Start: 2023-09-14 | End: 2023-09-18

## 2023-09-14 RX ADMIN — Medication 600 MILLIGRAM(S): at 19:02

## 2023-09-14 NOTE — ED PROVIDER NOTE - OBJECTIVE STATEMENT
This patient is a 73 year old woman who presents to the ER c/o left foot pain and pain to toes s/p fall 2 days ago.  She fell while standing on a flat surface in the bathroom.  No LOC.  No head strike.  Patient c/o 6/10 pain at rest but worsens when bearing weight to 10/10.  Her daughter who is at bedside translating states that the patient has been unable to ambulate.  They have tried applying tumeric to the foot for swelling.  Patient also recently did emma.  No fever.  Daughter has been giving advil for pain last dose was last night.

## 2023-09-14 NOTE — ED PROVIDER NOTE - NSFOLLOWUPINSTRUCTIONS_ED_ALL_ED_FT
1) Take tylenol or motrin for pain  2) Take prescription medication as instructed  3) Follow-up with podiatry  4) Follow up with your primary care doctor  5) Return to the ER for worsening or concerning symptoms

## 2023-09-14 NOTE — ED ADULT NURSE REASSESSMENT NOTE - NS ED NURSE REASSESS COMMENT FT1
Pt returned from CT via stretcher, resting in bed in NAD. VSS as documented, family at bedside. Awaiting results, plan of care ongoing. Safety measures maintained.

## 2023-09-14 NOTE — ED ADULT NURSE NOTE - OBJECTIVE STATEMENT
patient alert and oriented x4, came in for foot pain. pt family member at bedside states pt fell 2x days ago in the bathroom injuring her left foot. upon assessment left foot is swollen with ecchymosis. pt states is has become difficult to ambulate due to discomfort. pt denies losing consciousness, hitting head or blood thinner use. pmh of CAD, HTN, DM, HLD and arthritis. pt denies numbness, tingling, dizziness, chest pain, SOB.

## 2023-09-14 NOTE — ED PROVIDER NOTE - CLINICAL SUMMARY MEDICAL DECISION MAKING FREE TEXT BOX
Foot and toe pain s/p fall unable to bear weight or ambulate significant bruising to dorsum and sole of foot and toes.  R/O Fracture.  Will give ice pack, medication for pain, check Xray and Re-eval.

## 2023-09-14 NOTE — ED PROVIDER NOTE - PATIENT PORTAL LINK FT
You can access the FollowMyHealth Patient Portal offered by [facility] by registering at the following website: [email or web address]. By joining [employer]’s FollowMyHealth portal, you will also be able to view your health information using other applications (apps) compatible with our system.

## 2023-09-14 NOTE — ED ADULT NURSE NOTE - NSFALLRISKINTERV_ED_ALL_ED
Assistance OOB with selected safe patient handling equipment if applicable/Assistance with ambulation/Communicate fall risk and risk factors to all staff, patient, and family/Monitor gait and stability/Provide visual cue: yellow wristband, yellow gown, etc/Reinforce activity limits and safety measures with patient and family/Call bell, personal items and telephone in reach/Instruct patient to call for assistance before getting out of bed/chair/stretcher/Non-slip footwear applied when patient is off stretcher/[place] to call system/Physically safe environment - no spills, clutter or unnecessary equipment/Purposeful Proactive Rounding/Room/bathroom lighting operational, light cord in reach

## 2023-09-14 NOTE — ED ADULT TRIAGE NOTE - CHIEF COMPLAINT QUOTE
pt states she tripped and fell in the bathroom 2 days ago and injured her left foot. denies head injury or loc. c/o left foot pain and swelling. c/o difficulty walking. history of htn, dm and arthritis.

## 2023-09-14 NOTE — ED PROVIDER NOTE - CARE PROVIDER_API CALL
Kevin Herbert  Podiatric Medicine and Surgery  [address] [place] [address], [address]  [place] [address]  Phone: [phone]  Fax: [phone]  Follow Up Time:

## 2023-09-26 ENCOUNTER — RX RENEWAL (OUTPATIENT)
Age: 73
End: 2023-09-26

## 2023-10-27 ENCOUNTER — RX RENEWAL (OUTPATIENT)
Age: 73
End: 2023-10-27

## 2023-12-05 NOTE — ED ADULT TRIAGE NOTE - AS TEMP SITE
Subjective:    Patient ID:  Frank Gay Jr. is a 65 y.o. male patient here for evaluation Follow-up      History of Present Illness:  Cardiology follow-up, sinus bradycardia, asymptomatic.  Echo in the past with EF of 65%, grade 2 diastolic dysfunction.  Moderate MR.  Patient remains asymptomatic.  No significant dyspnea.  No dizziness no syncope presyncope.     Undergoing recent treatment for prostate cancer.        Review of patient's allergies indicates:   Allergen Reactions    Celebrex  [celecoxib] Blisters, Dermatitis, Edema, Hallucinations, Hives, Itching and Swelling    Dairy aid  [lactase]      Other reaction(s): stomach cramps  Other reaction(s): Itching  Other reaction(s): Diarrhea    Eliquis  [apixaban] Blisters, Edema, Hallucinations, Other (See Comments) and Swelling    Influenza a (h1n1) vac 09 (pf)     Lactose        Past Medical History:   Diagnosis Date    Arthritis     Cancer     PROSTATE    Elevated PSA     Gout, unspecified     Personal history of venous thrombosis and embolism     After trauma in the 70's, he had a DVT with a PE    Pulmonary embolism     Sleep apnea     RESOLVED WITH UPPP AND SEPTOPLASTY/ NO MACHINE     Past Surgical History:   Procedure Laterality Date    ADENOIDECTOMY      APPENDECTOMY      BIOPSY, PROSTATE, USING PROSTATE MAPPING N/A 11/8/2023    Procedure: BIOPSY, PROSTATE, USING PROSTATE MAPPING;  Surgeon: Juan Calhoun MD;  Location: [facility];  Service: Urology;  Laterality: N/A;    ELBOW SURGERY  05/2016    EYE SURGERY      Eyelid    JOINT REPLACEMENT Left 2013    KNEE    KNEE SURGERY      left knee    periodontal  03/2018    PROSTATE BIOPSY  2018    TONSILLECTOMY      TOTAL KNEE ARTHROPLASTY Right 12/18/2019    Procedure: ARTHROPLASTY, KNEE, TOTAL-DEPUY-SIGMA;  Surgeon: Ken Amezcua III, MD;  Location: [facility];  Service: Orthopedics;  Laterality: Right;    UVULOPALATOPHARYNGOPLASTY      AND SEPTOPLASTY     Social History     Tobacco Use    Smoking status: Never     Smokeless tobacco: Former     Quit date: 7/1/2017   Substance Use Topics    Alcohol use: No    Drug use: No        Review of Systems:    As noted in HPI in addition      REVIEW OF SYSTEMS  Review of Systems   Constitutional: Negative for decreased appetite, diaphoresis, night sweats, weight gain and weight loss.   HENT:  Negative for nosebleeds and odynophagia.    Eyes:  Negative for double vision and photophobia.   Cardiovascular:  Negative for chest pain, claudication, cyanosis, dyspnea on exertion, irregular heartbeat, leg swelling, near-syncope, orthopnea, palpitations, paroxysmal nocturnal dyspnea and syncope.   Respiratory:  Negative for cough, hemoptysis, shortness of breath and wheezing.    Hematologic/Lymphatic: Negative for adenopathy.   Skin:  Negative for flushing, skin cancer and suspicious lesions.   Musculoskeletal:  Negative for gout, myalgias and neck pain.   Gastrointestinal:  Negative for abdominal pain, heartburn, hematemesis and hematochezia.   Genitourinary:  Negative for bladder incontinence, hesitancy and nocturia.   Neurological:  Negative for focal weakness, headaches, light-headedness and paresthesias.   Psychiatric/Behavioral:  Negative for memory loss and substance abuse.               Objective:        Vitals:    12/05/23 0956   BP: 111/60   Pulse: (!) 48       Lab Results   Component Value Date    WBC 8.48 11/22/2023    HGB 12.6 (L) 11/22/2023    HCT 37.7 (L) 11/22/2023     11/22/2023    CHOL 188 07/22/2023    TRIG 78 07/22/2023    HDL 50 07/22/2023    ALT 12 11/22/2023    AST 14 11/22/2023     11/22/2023    K 3.9 11/22/2023     11/22/2023    CREATININE 0.8 11/22/2023    CREATININE 0.8 11/22/2023    BUN 17 11/22/2023    CO2 30 (H) 11/22/2023    TSH 1.10 06/08/2009    PSA 5.7 (H) 01/02/2020    INR 1.0 10/16/2023    HGBA1C 5.4 07/22/2023        ECHOCARDIOGRAM RESULTS  Results for orders placed in visit on 10/31/23    Echo    Interpretation Summary    Left Ventricle:  The left ventricle is normal in size. Normal wall thickness. Normal wall motion. There is normal systolic function with a visually estimated ejection fraction of 60 - 65%. Grade II diastolic dysfunction.    Right Ventricle: Normal right ventricular cavity size. Wall thickness is normal. Right ventricle wall motion  is normal. Systolic function is normal.    Left Atrium: Left atrium is mildly dilated.    Mitral Valve: There is moderate regurgitation.    Pulmonary Artery: There is borderline elevated pulmonary hypertension. The estimated pulmonary artery systolic pressure is 30 mmHg.    IVC/SVC: Normal venous pressure at 3 mmHg.        CURRENT/PREVIOUS VISIT EKG  Results for orders placed or performed in visit on 10/20/23   IN OFFICE EKG 12-LEAD (to [facility])    Collection Time: 10/20/23  8:32 AM    Narrative    Test Reason : Z01.818,R00.1,    Vent. Rate : 046 BPM     Atrial Rate : 046 BPM     P-R Int : 166 ms          QRS Dur : 090 ms      QT Int : 460 ms       P-R-T Axes : 064 039 064 degrees     QTc Int : 402 ms    Sinus bradycardia  Otherwise normal ECG  When compared with ECG of 16-OCT-2023 09:38,  No significant change was found  Confirmed by Brittany Berger MD ([id number]) on 10/21/2023 7:48:27 AM    Referred By: ZORAIDA QUINTANILLA           Confirmed By:Brittany Berger MD     No valid procedures specified.   No results found for this or any previous visit.    No valid procedures specified.    PHYSICAL EXAM  CONSTITUTIONAL: Well built, well nourished in no apparent distress  NECK: no carotid bruit, no JVD  LUNGS: CTA  CHEST WALL: no tenderness,  HEART: regular rate and rhythm, S1, S2 normal, no murmur, click, rub or gallop   ABDOMEN: soft, non-tender; bowel sounds normal; no masses,  no organomegaly  EXTREMITIES: Extremities normal, no edema, no calf tenderness noted  NEURO: AAO X 3    I HAVE REVIEWED :    The vital signs, nurses notes, and all the pertinent radiology and labs.         Current Outpatient Medications    Medication Instructions    abiraterone (ZYTIGA) 250 mg, Oral, Daily    ammonium lactate (LAC-HYDRIN) 12 % lotion Topical (Top), 2 times daily    bicalutamide (CASODEX) 50 MG Tab TAKE 1 TABLET(50 MG) BY MOUTH EVERY DAY. START TAKING NOW TO LOWER TESTOSTERONE    LIDOcaine-TETRAcaine 7-7 % Crea APPLY 2 GRAMS TO THE AFFECTED AREAS 3-4 TIMES DAILY,    predniSONE (DELTASONE) 5 mg, Oral, Daily, Start with Abiraterone    triamcinolone acetonide 0.1% (KENALOG) 0.1 % cream Topical (Top), 2 times daily          Assessment:   Sinus bradycardia, asymptomatic.  Echo and Holter monitor otherwise stable, EF preserved, grade 2 diastolic dysfunction, moderate MR.  No evidence of high-grade SA AV marly block.  Patient denies dizziness, syncope/presyncope    Recent diagnosis prostate cancer    Plan:   Meds reviewed and reconciled.  Recommend no changes.  Labs follow-up primary care, urology.  Six-month          No follow-ups on file.        oral

## 2023-12-29 ENCOUNTER — RX RENEWAL (OUTPATIENT)
Age: 73
End: 2023-12-29

## 2024-01-02 ENCOUNTER — EMERGENCY (EMERGENCY)
Facility: HOSPITAL | Age: 74
LOS: 0 days | Discharge: HOME | End: 2024-01-02
Attending: EMERGENCY MEDICINE
Payer: COMMERCIAL

## 2024-01-02 VITALS
OXYGEN SATURATION: 99 % | RESPIRATION RATE: 18 BRPM | SYSTOLIC BLOOD PRESSURE: 172 MMHG | TEMPERATURE: 98 F | DIASTOLIC BLOOD PRESSURE: 78 MMHG | HEIGHT: 66 IN | HEART RATE: 86 BPM | WEIGHT: 169.98 LBS

## 2024-01-02 VITALS
OXYGEN SATURATION: 99 % | HEART RATE: 67 BPM | RESPIRATION RATE: 20 BRPM | TEMPERATURE: 98 F | DIASTOLIC BLOOD PRESSURE: 88 MMHG | SYSTOLIC BLOOD PRESSURE: 171 MMHG

## 2024-01-02 DIAGNOSIS — Z95.5 PRESENCE OF CORONARY ANGIOPLASTY IMPLANT AND GRAFT: Chronic | ICD-10-CM
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
## 2024-01-02 DIAGNOSIS — S22.32XA FRACTURE OF ONE RIB, LEFT SIDE, INITIAL ENCOUNTER FOR CLOSED FRACTURE: ICD-10-CM

## 2024-01-02 DIAGNOSIS — R07.81 PLEURODYNIA: ICD-10-CM
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
## 2024-01-02 DIAGNOSIS — M25.512 PAIN IN LEFT SHOULDER: ICD-10-CM

## 2024-01-02 DIAGNOSIS — S42.192A FRACTURE OF OTHER PART OF SCAPULA, LEFT SHOULDER, INITIAL ENCOUNTER FOR CLOSED FRACTURE: ICD-10-CM

## 2024-01-02 PROCEDURE — 71250 CT THORAX DX C-: CPT | Mod: 26,MA

## 2024-01-02 PROCEDURE — 73030 X-RAY EXAM OF SHOULDER: CPT | Mod: 26,LT

## 2024-01-02 PROCEDURE — 99285 EMERGENCY DEPT VISIT HI MDM: CPT

## 2024-01-02 PROCEDURE — 71101 X-RAY EXAM UNILAT RIBS/CHEST: CPT | Mod: 26,LT

## 2024-01-02 PROCEDURE — 73200 CT UPPER EXTREMITY W/O DYE: CPT | Mod: 26,LT,MA

## 2024-01-02 RX ORDER — IBUPROFEN 200 MG
1 TABLET ORAL
Qty: 15 | Refills: 0
Start: 2024-01-02 | End: 2024-01-06

## 2024-01-02 RX ORDER — ATORVASTATIN CALCIUM 80 MG/1
1 TABLET, FILM COATED ORAL
Qty: 0 | Refills: 0 | DISCHARGE

## 2024-01-02 RX ORDER — ASPIRIN/CALCIUM CARB/MAGNESIUM 324 MG
1 TABLET ORAL
Qty: 0 | Refills: 0 | DISCHARGE

## 2024-01-02 RX ORDER — ALENDRONATE SODIUM 70 MG/1
1 TABLET ORAL
Qty: 0 | Refills: 0 | DISCHARGE

## 2024-01-02 RX ORDER — TICAGRELOR 90 MG/1
1 TABLET ORAL
Qty: 0 | Refills: 0 | DISCHARGE

## 2024-01-02 NOTE — ED PROVIDER NOTE - PROGRESS NOTE DETAILS
Pt was seen and treated by Dr. Siegel ct chest and left shoulder + questionable 7 the rib fx and left inferior scapular fx. Pt remains alert and oriented x 3 daughter are given and explained all test reports and advised to follow up with the orthopedist and return if symptoms persist or worsen. Pt has good, clear equal bilaterally no distress left arm sling is placed. pt is smiling appears very comfortable.

## 2024-01-02 NOTE — ED PROCEDURE NOTE - CPROC ED TIME OUT STATEMENT1
“Patient's name, , procedure and correct site were confirmed during the [place] Timeout.” “Patient's name, , procedure and correct site were confirmed during the [place] Timeout.”

## 2024-01-02 NOTE — ED PROVIDER NOTE - CLINICAL SUMMARY MEDICAL DECISION MAKING FREE TEXT BOX
Left shoulder pain and left sided rib pain s/p fall.  No crepitus on exam, limited ROM of left arm and reproducible left chest wall tenderness.  R/O Rib fracture, shoulder fracture.  Will give medication for pain, check Xray and Re-eval.

## 2024-01-02 NOTE — ED ADULT TRIAGE NOTE - CHIEF COMPLAINT QUOTE
pt states she fell out of  her walker yesterday. injured her left shoulder. c/o shoulder pain and left rib pain. denies head injury or loc.  take asprin daily. history of htn, dm.

## 2024-01-02 NOTE — ED PROVIDER NOTE - OBJECTIVE STATEMENT
This patient is a 73 year old woman right hand dominant who presents to the ER c/o left shoulder pain and left sided rib pain s/p fall yesterday.  Patient states that she fell out of her seated walker.  No LOC no head strike,  She is not on anticoagulation medication.  She has not taken any medication for pain.  Patient currently reporting constant 10/10 pain.

## 2024-01-02 NOTE — ED PROVIDER NOTE - CARE PROVIDER_API CALL
Anthony Jordan  Orthopaedic Surgery  [address], [address]  [place] [address]  Phone: [phone]  Fax: [phone]  Follow Up Time: 1-3 Days   Anthony Jordan  Orthopaedic Surgery  [address] [facility], [address]  [place] [address]  Phone: [phone]  Fax: [phone]  Follow Up Time: 1-3 Days

## 2024-01-02 NOTE — ED PROCEDURE NOTE - NS_EDPROVIDERDISPOUSERTYPE_ED_A_ED
[FreeTextEntry1] : COVID 19 testing was performed.\par  \par Advised test results may take 3-7 days to return. \par Discussed diagnostic accuracy and possibility of false negative results\par Instructed to self quarantine and must remain quarantined x 7 days since sy/sx first appeared and no fever for 3 days without antifever medications.  \par Stated that self quarantine means to avoid all public areas and people. \par All patients close contacts should also self quarantine.  \par Close contacts with comorbidities at higher risk of COVID disease.  \par Social distancing once quarantine is completed.\par  \par If high risk symptoms of chest discomfort, severe shortness of breath at rest, worsening shortness of breath with minimal exertion, new or worsening wheezing, dizziness then instructed to seek immediate medical evaluation\par  \par A letter for work and patient education form was provided.\par  \par The above plan was reviewed with the patient.\par All questions have been answered.\par Instructed to call if any new symptoms\par \par LYDIA Voiced understanding.\par   Attending Attestation (For Attendings USE Only)...

## 2024-01-02 NOTE — ED ADULT NURSE NOTE - NSFALLUNIVINTERV_ED_ALL_ED
Bed/Stretcher in lowest position, wheels locked, appropriate side rails in place/Call bell, personal items and telephone in reach/Instruct patient to call for assistance before getting out of bed/chair/stretcher/Non-slip footwear applied when patient is off stretcher/[place] to call system/Physically safe environment - no spills, clutter or unnecessary equipment/Purposeful proactive rounding/Room/bathroom lighting operational, light cord in reach Bed/Stretcher in lowest position, wheels locked, appropriate side rails in place/Call bell, personal items and telephone in reach/Instruct patient to call for assistance before getting out of bed/chair/stretcher/Non-slip footwear applied when patient is off stretcher/[place] to call system/Physically safe environment - no spills, clutter or unnecessary equipment/Purposeful proactive rounding/Room/bathroom lighting operational, light cord in reach

## 2024-01-02 NOTE — ED ADULT NURSE NOTE - OBJECTIVE STATEMENT
Patient s/p mechanical fall yesterday, c/o L shoulder pain and L rib pain. Denies head injury or LOC.

## 2024-01-02 NOTE — ED PROVIDER NOTE - PATIENT PORTAL LINK FT
You can access the FollowMyHealth Patient Portal offered by [facility] by registering at the following website: [email or web address]. By joining [employer]’s FollowMyHealth portal, you will also be able to view your health information using other applications (apps) compatible with our system. You can access the FollowMyHealth Patient Portal offered by [facility] by registering at the following website: [email or web address]. By joining [employer]’s FollowMyHealth portal, you will also be able to view your health information using other applications (apps) compatible with our system.

## 2024-01-02 NOTE — ED PROVIDER NOTE - PROVIDER TOKENS
PROVIDER:[TOKEN:[[id number]],FOLLOWUP:[1-3 Days]] PROVIDER:[TOKEN:[[id number]],FOLLOWUP:[1-3 Days]]

## 2024-01-05 NOTE — PATIENT PROFILE ADULT - HAS THE PATIENT BEEN ADMITTED FROM SHORT TERM REHAB?
The access site was successfully closed using a (DEVICE ANGSL VIP 6FR 70CM HMST BIOABSORBABLE [facility] - [id number]) closure device. no

## 2024-01-14 NOTE — H&P ADULT - PROBLEM SELECTOR PLAN 1
pt c/o throat pain since monday denies fever
None known in lifetime
tele monitor   cardiac enzymes x 2 neg  Orthostatic +, UA NEG: STARTED on IV NS @ 75cc/hr  DASH 1800 ADA diet   continue ASA, Brillinta, Metoprolol and Lisinopril.  PT eval

## 2024-01-29 ENCOUNTER — APPOINTMENT (OUTPATIENT)
Dept: ORTHOPEDIC SURGERY | Facility: CLINIC | Age: 74
End: 2024-01-29

## 2024-01-30 ENCOUNTER — APPOINTMENT (OUTPATIENT)
Dept: ORTHOPEDIC SURGERY | Facility: CLINIC | Age: 74
End: 2024-01-30
Payer: COMMERCIAL

## 2024-01-30 VITALS — HEIGHT: 65 IN | WEIGHT: 156 LBS | BODY MASS INDEX: 25.99 KG/M2

## 2024-01-30 DIAGNOSIS — R29.898 OTHER SYMPTOMS AND SIGNS INVOLVING THE MUSCULOSKELETAL SYSTEM: ICD-10-CM

## 2024-01-30 DIAGNOSIS — S42.115A NONDISPLACED FRACTURE OF BODY OF SCAPULA, LEFT SHOULDER, INITIAL ENCOUNTER FOR CLOSED FRACTURE: ICD-10-CM

## 2024-01-30 DIAGNOSIS — S49.92XA UNSPECIFIED INJURY OF LEFT SHOULDER AND UPPER ARM, INITIAL ENCOUNTER: ICD-10-CM

## 2024-01-30 PROCEDURE — 99204 OFFICE O/P NEW MOD 45 MIN: CPT

## 2024-01-30 RX ORDER — METFORMIN HYDROCHLORIDE 625 MG/1
TABLET ORAL
Refills: 0 | Status: ACTIVE | COMMUNITY

## 2024-01-30 NOTE — HISTORY OF PRESENT ILLNESS
[Sudden] : sudden [4] : 4 [Sharp] : sharp [Frequent] : frequent [de-identified] : This is Ms. EMMANUEL GONZALEZ  a LHD 73 year old female who comes in today complaining of left shoulder pain since falling on 1/1/24. Pain is anterior, difficulty lifting overhead.  [] : no [FreeTextEntry3] : 1/1/24 [de-identified] : [facility] [de-identified] : xrays

## 2024-01-30 NOTE — DATA REVIEWED
[CT Scan] : CT scan [Left] : left [Shoulder] : shoulder [Report was reviewed and noted in the chart] : The report was reviewed and noted in the chart [I independently reviewed and interpreted images and report] : I independently reviewed and interpreted images and report [I reviewed the films/CD] : I reviewed the films/CD [FreeTextEntry1] : proximal  humerus migration, scap fx

## 2024-01-30 NOTE — IMAGING
[de-identified] :   ----------------------------------------------------------------------------   Left shoulder exam:    Skin: no significant pertinent finding  Inspection: no obvious deformity, no obvious masses, no swelling, no effusion, no atrophy  ROM:     FF: 80 A vs 160 P     ER: 70p     IR: s1  Tenderness:     (+) Anterior/Biceps:     (+) Posterior     (neg) Lateral     (neg) Trapezius     (+) Scapula     (neg) AC joint     (neg) Crepitus with ROM  Additional tests:  + drop arm     (neg) Neer's     (neg)Hawkin's     (neg) Crespo's     (neg) Speed     (neg) Cross chest adduction  Strength:     FF: 3/5     ER: 3/5     IR: 5-/5     Biceps: 5/5     Triceps: 5/5     Distal: 5/5  Neuro: In tact to light touch throughout  Vascularity: Extremity warm and well perfused   [Left] : left shoulder [Outside films reviewed] : Outside films reviewed [There are no fractures, subluxations or dislocations. No significant abnormalities are seen] : There are no fractures, subluxations or dislocations. No significant abnormalities are seen [AC Joint Arthrosis] : AC Joint Arthrosis

## 2024-01-30 NOTE — DISCUSSION/SUMMARY
[de-identified] : Discussed options, - likely acute on chronic left shoulder rotator cuff tear - mri to eval sling Tylenol prn Obtain MRI left shoulder f/u p MRI  ----------------------------------------------------------------------------  The patient was advised that an advanced diagnostic/imaging study (MRI/CT/etc) will be ordered to evaluate potential pathology in the affected area(s).  The patient was further advised to follow up in the office to review the results of the study and determine further management that may be indicated.  ----------------------------------------------------------------------------  All relevant imaging studies pertinent to today's visit, including x-rays, MRI's and/or other advanced imaging studies (CT/etc) were independently interpreted and reviewed with the patient as needed. Implications of the studies together with the patient's clinical picture were discussed to formulate a working diagnosis and management options were detailed.  The patient was advised of the diagnosis.  The natural history of the pathology was explained in full. All questions were answered.  The risks and benefits of conservative and interventional treatment alternatives were explained to the patient

## 2024-02-02 ENCOUNTER — RX RENEWAL (OUTPATIENT)
Age: 74
End: 2024-02-02

## 2024-02-07 NOTE — ED CLERICAL - NS ED CLERK UNITS
SHEY Christianson   [facility]  [address] [address] [address]  [phone]      PATIENT NAME: Ana Maria  : 1979  DATE: 24  MRN: [id number]      Billing Provider: SHEY Christianson  Level of Service:   Patient PCP Information       Provider PCP Type    Oliva Echeverria NP General            Reason for Visit / Chief Complaint: Nasal Congestion and Headache (Symptoms X2 wks.)       Update PCP  Update Chief Complaint         History of Present Illness / Problem Focused Workflow     44 year old female presents with complaints of head congestion, HA, sinus pressure  Symptoms started about 2 weeks ago and have gotten worse      Review of Systems     Review of Systems   Constitutional:  Positive for fatigue. Negative for fever.   HENT:  Positive for congestion and sinus pressure. Negative for ear pain and sore throat.    Respiratory:  Positive for cough. Negative for shortness of breath.    Cardiovascular:  Negative for palpitations.   Gastrointestinal:  Negative for abdominal pain, constipation and diarrhea.   Endocrine: Negative for polydipsia and polyuria.   Musculoskeletal:  Negative for gait problem.   Neurological:  Negative for dizziness, weakness and headaches.   Psychiatric/Behavioral:  Negative for agitation and dysphoric mood.        Medical / Social / Family History     Past Medical History:   Diagnosis Date    Depression     Hyperlipidemia     Hypertension     Migraine        Past Surgical History:   Procedure Laterality Date    HERNIA REPAIR  2019    umbilical hernia repair:     HYSTERECTOMY  2018    total    PRE-MALIGNANT / BENIGN SKIN LESION EXCISION      sebaceous cyst    TUBAL LIGATION         Social History  Ms.  reports that she has never smoked. She has never been exposed to tobacco smoke. She has never used smokeless tobacco. She reports that she does not drink alcohol and does not use drugs.    Family  History  Ms.'s family history includes COPD in her maternal grandfather; Hypertension in her father and mother; Lung cancer in her paternal grandfather; Migraines in her daughter, maternal grandmother, mother, and sister; No Known Problems in her daughter, daughter, and paternal grandmother.    Medications and Allergies     Medications  Outpatient Medications Marked as Taking for the 2/5/24 encounter (Office Visit) with Abbie Palm FNP   Medication Sig Dispense Refill    chlorthalidone (HYGROTEN) 25 MG Tab Take 1 tablet (25 mg total) by mouth every morning. With food 90 tablet 1    FLUoxetine 20 MG capsule Take 20 mg by mouth once daily.      ibuprofen (ADVIL,MOTRIN) 800 MG tablet Take 1 tablet (800 mg total) by mouth 2 (two) times daily as needed for Pain. 40 tablet 0    metFORMIN (GLUCOPHAGE-XR) 500 MG ER 24hr tablet Take 500 mg by mouth once daily.      ondansetron (ZOFRAN-ODT) 8 MG TbDL Take 8 mg by mouth daily as needed.      rizatriptan (MAXALT-MLT) 10 MG disintegrating tablet TAKE 1 TABLET BY MOUTH at onset OF HEADACHE AS DIRECTED, MAY REPEAT every 2 hours UNTIL RELIEF UP TO a maximum OF 3 tablets in 24 HOURS 12 tablet 5    rosuvastatin (CRESTOR) 5 MG tablet Take 1 tablet (5 mg total) by mouth once daily. 90 tablet 1    telmisartan (MICARDIS) 40 MG Tab Take 1 tablet (40 mg total) by mouth once daily. 90 tablet 1    topiramate (TOPAMAX) 50 MG tablet          Allergies  Review of patient's allergies indicates:   Allergen Reactions    Azithromycin Itching    Betadine antiseptic gauze Hives       Physical Examination     Vitals:    02/05/24 1544   BP: 122/80   Pulse: 75   Resp: 18   Temp: 97.8 °F (36.6 °C)     Physical Exam  Constitutional:       General: She is not in acute distress.  HENT:      Head: Normocephalic.      Ears:      Comments: Redness to bilat TM     Nose: Congestion present.      Mouth/Throat:      Mouth: Mucous membranes are moist.      Pharynx: Posterior oropharyngeal erythema present. No  RF oropharyngeal exudate.   Eyes:      Extraocular Movements: Extraocular movements intact.   Cardiovascular:      Rate and Rhythm: Normal rate.   Pulmonary:      Effort: Pulmonary effort is normal. No respiratory distress.      Breath sounds: No wheezing.   Abdominal:      General: Bowel sounds are normal.      Palpations: Abdomen is soft.   Musculoskeletal:         General: Normal range of motion.      Cervical back: Normal range of motion.   Skin:     General: Skin is warm.   Neurological:      Mental Status: She is alert.   Psychiatric:         Behavior: Behavior normal.           Imaging / Labs     Office Visit on 02/05/2024   Component Date Value Ref Range Status    SARS Coronavirus 2 Antigen 02/05/2024 Positive (A)  Negative Final    Rapid Influenza A Ag 02/05/2024 Negative  Negative Final    Rapid Influenza B Ag 02/05/2024 Negative  Negative Final     Acceptable 02/05/2024 Yes   Final     X-ray Shoulder 2 or More Views Right  Narrative: EXAMINATION:  XR SHOULDER COMPLETE 2 OR MORE VIEWS RIGHT    CLINICAL HISTORY:  Pain in right shoulder    COMPARISON:  None    TECHNIQUE:  Frontal views of the right shoulder were obtained in internal and external rotation as well as axillary view of the right shoulder.    FINDINGS:  No convincing acute fracture or dislocation demonstrated. No concerning radiopaque foreign body visualized.  Impression: As above.    Point of Service: [facility]    Electronically signed by: Richard Cano  Date:    11/08/2023  Time:    16:23  X-Ray Forearm Right  Narrative: EXAMINATION:  XR FOREARM RIGHT    CLINICAL HISTORY:  Pain in right arm    COMPARISON:  None    TECHNIQUE:  Frontal and lateral views of the right forearm.    FINDINGS:  No convincing acute fracture or dislocation demonstrated. No concerning radiopaque foreign body visualized.  Impression: As above.    Point of Service: [facility]    Electronically signed by: Richard  [id number] Rachael  Date:    11/08/2023  Time:    16:09  X-Ray Elbow 2 Views Right  Narrative: EXAMINATION:  XR ELBOW 2 VIEWS RIGHT    CLINICAL HISTORY:  Pain in right arm    COMPARISON:  None    TECHNIQUE:  Frontal and lateral views of the right elbow.    FINDINGS:  No convincing acute fracture or dislocation demonstrated. No concerning radiopaque foreign body visualized.  Impression: As above.    Point of Service: [facility]    Electronically signed by: Richard Cano  Date:    11/08/2023  Time:    16:07      Assessment and Plan (including Health Maintenance)      Problem List  Smart Sets  Document Outside HM   :    Health Maintenance Due   Topic Date Due    Hepatitis C Screening  Never done    COVID-19 Vaccine (1) Never done    HIV Screening  Never done    Mammogram  Never done    Hemoglobin A1c (Diabetic Prevention Screening)  Never done    Colorectal Cancer Screening  Never done    Influenza Vaccine (1) Never done       Problem List Items Addressed This Visit          ENT    Pansinusitis    Current Assessment & Plan     Head congestion, sinus pressure  Start augmentin po BID  Rest. Increase fluids as tolerated          Relevant Medications    methylPREDNISolone (MEDROL DOSEPACK) 4 mg tablet    amoxicillin-clavulanate 875-125mg (AUGMENTIN) 875-125 mg per tablet    dextromethorphan-guaiFENesin (MUCINEX DM) 60-1,200 mg per 12 hr tablet       ID    COVID - Primary    Current Assessment & Plan     Head congestion, sinus pressure, cough  Positive for covid  Start OTC cold meds that are safe for HTN  Rest. Increase fluids as tolerated  Isolate from others          Other Visit Diagnoses       Nasal congestion        Relevant Medications    methylPREDNISolone (MEDROL DOSEPACK) 4 mg tablet    dextromethorphan-guaiFENesin (MUCINEX DM) 60-1,200 mg per 12 hr tablet    Other Relevant Orders    POCT SARS-COV2 (COVID) with Flu Antigen (Completed)            Health Maintenance Topics with due status: Not Due       Topic Last  261-W/2D Completion Date    TETANUS VACCINE 06/28/2022    Lipid Panel 10/24/2022       No future appointments.       Signature:  SHEY Christianson  [facility] [address] [place] [address]  [phone]    Date of encounter: 2/5/24

## 2024-02-23 ENCOUNTER — APPOINTMENT (OUTPATIENT)
Dept: ORTHOPEDIC SURGERY | Facility: CLINIC | Age: 74
End: 2024-02-23

## 2024-02-27 ENCOUNTER — RX RENEWAL (OUTPATIENT)
Age: 74
End: 2024-02-27

## 2024-03-27 ENCOUNTER — APPOINTMENT (OUTPATIENT)
Dept: GASTROENTEROLOGY | Facility: CLINIC | Age: 74
End: 2024-03-27
Payer: COMMERCIAL

## 2024-03-27 VITALS
BODY MASS INDEX: 26.33 KG/M2 | SYSTOLIC BLOOD PRESSURE: 131 MMHG | OXYGEN SATURATION: 95 % | RESPIRATION RATE: 15 BRPM | DIASTOLIC BLOOD PRESSURE: 66 MMHG | HEIGHT: 65 IN | WEIGHT: 158 LBS | HEART RATE: 77 BPM | TEMPERATURE: 97.3 F

## 2024-03-27 DIAGNOSIS — K59.09 OTHER CONSTIPATION: ICD-10-CM

## 2024-03-27 DIAGNOSIS — K58.1 IRRITABLE BOWEL SYNDROME WITH CONSTIPATION: ICD-10-CM

## 2024-03-27 PROCEDURE — G2211 COMPLEX E/M VISIT ADD ON: CPT

## 2024-03-27 PROCEDURE — 99214 OFFICE O/P EST MOD 30 MIN: CPT

## 2024-03-27 NOTE — REASON FOR VISIT
[Follow-up] : a follow-up of an existing diagnosis [Family Member] : family member [FreeTextEntry1] : Hepatitis C

## 2024-03-27 NOTE — PHYSICAL EXAM
[Alert] : alert [Normal Voice/Communication] : normal voice/communication [Healthy Appearing] : healthy appearing [No Acute Distress] : no acute distress [Sclera] : the sclera and conjunctiva were normal [Hearing Threshold Finger Rub Not [place]] : hearing was normal [Normal Lips/Gums] : the lips and gums were normal [Oropharynx] : the oropharynx was normal [Normal Appearance] : the appearance of the neck was normal [No Neck Mass] : no neck mass was observed [No Respiratory Distress] : no respiratory distress [No Acc Muscle Use] : no accessory muscle use [Respiration, Rhythm And Depth] : normal respiratory rhythm and effort [Auscultation Breath Sounds / Voice Sounds] : lungs were clear to auscultation bilaterally [Heart Rate And Rhythm] : heart rate was normal and rhythm regular [Normal S1, S2] : normal S1 and S2 [Murmurs] : no murmurs [Bowel Sounds] : normal bowel sounds [Abdomen Tenderness] : non-tender [No Masses] : no abdominal mass palpated [Abdomen Soft] : soft [] : no hepatosplenomegaly [Oriented To Time, Place, And Person] : oriented to person, place, and time

## 2024-03-27 NOTE — ASSESSMENT
[FreeTextEntry1] : Will arrange for pt to have Hepatitis C treatment. Patient strongly advised to consider diagnostic colonoscopy. Patient adamantly expressed that Cologuard is the only test that she will do for screening. Educated patient that Cologuard is of limited use for routine polyps. Despite discussion in great length of risks and benefits, patient continues to refuse the screening colonoscopy. Will arrange for pt to have cologuard. Pt expressed understanding and agrees with the plan.  The symptoms of IBS-C include abdominal pain and discomfort, along with changes in bowel function. Bloating and/or gas also may happen. Changes in bowel function may include straining, infrequent stools, hard or lumpy tools, and/or a feeling that the bowel does not empty completely. Some people may feel as if there is a "blockage" preventing them from passing stools. They may need to press on a part of their body or change body position to help them complete their bowel movement. How often a person passes stool, or the way it appears, may be different when abdominal discomfort is happening. With IBS-C, abdominal discomfort often improves after a bowel movement. In most cases, symptoms are ongoing (chronic), but they may come and go. The cause of IBS-C is not known. Some experts think that it relates to changes in how the intestines move and contract, or changes in how the gut senses pain. In some patients, IBS-C may happen after a past infection in the gut. It could also be related to changes in the messages between the brain and the intestines. There is evidence that bacteria which are normally found in the gut, or changes to the composition of those bacteria, play a role. In addition, researchers are looking into possible roles of genetics and/or changes in the immune system. We discussed this at length.  The causes of constipation were discussed at length. We discussed: Eat three meals each day. Do not skip meals. Gradually increase the amount of FIBER in your diet. Choose more whole grain breads, cereals, and rice. Select more raw fruits and vegetables and eat the peel, if appropriate. Read food labels and look for the "dietary fiber" content of foods. Good sources have 2 grams of fiber or more. Drink six to eight glasses of water each day. Limit highly refined and processed foods.  I spent 35 minutes reviewing the patient's records prior to arrival, with the patient, and reviewing records after the visit. All prior testing reviewed at length. Patient verbalized understanding of all information provided. All questions answered and reviewed.  Robert Brunner, MD

## 2024-03-27 NOTE — HISTORY OF PRESENT ILLNESS
I spoke with the patient he says that his breathing is feeling much better with albuterol.  He cannot tell much difference when he is taking the Lasix, however.    I recommend that he take Lasix only as needed for swelling.  Instructed him to contact us if his breathing gets worse, and at that point I would consider heart catheterization given his mildly abnormal stress test.    SORAYA Singer MD FAC, [facility]  Interventional and General Cardiology     [FreeTextEntry1] : Patient is a 74 y/o female with a PMHx of Respiratory Failure, Asthma, CAD, IBS-C (currently on Linzess), Chronic Constipation, Type 2 Diabetes Mellitus, Hepatitis C, Hypertension, NSTEMI, and hypercapnia, BIB daughter who presents for Hepatitis C treatment. Daughter reports that the pharmacy never received the order for the Hepatitis C treatment. Bloodwork performed on 2/9/2023 was positive for Hepatitis C Virus. Pt denies abdominal pain, constipation. Pt has never had an EGD or screening colonoscopy. Pt declines screening colonoscopy at this time and would like to have a Cologuard instead.

## 2024-04-03 ENCOUNTER — OUTPATIENT (OUTPATIENT)
Dept: OUTPATIENT SERVICES | Facility: HOSPITAL | Age: 74
LOS: 1 days | End: 2024-04-03

## 2024-04-03 ENCOUNTER — APPOINTMENT (OUTPATIENT)
Dept: TRANSPLANT | Facility: CLINIC | Age: 74
End: 2024-04-03

## 2024-04-03 ENCOUNTER — NON-APPOINTMENT (OUTPATIENT)
Age: 74
End: 2024-04-03

## 2024-04-04 ENCOUNTER — APPOINTMENT (OUTPATIENT)
Dept: PULMONOLOGY | Facility: CLINIC | Age: 74
End: 2024-04-04
Payer: COMMERCIAL

## 2024-04-04 VITALS
BODY MASS INDEX: 26.82 KG/M2 | DIASTOLIC BLOOD PRESSURE: 82 MMHG | HEART RATE: 78 BPM | WEIGHT: 161 LBS | RESPIRATION RATE: 16 BRPM | SYSTOLIC BLOOD PRESSURE: 146 MMHG | OXYGEN SATURATION: 95 % | HEIGHT: 65 IN

## 2024-04-04 DIAGNOSIS — J45.909 UNSPECIFIED ASTHMA, UNCOMPLICATED: ICD-10-CM

## 2024-04-04 DIAGNOSIS — I25.10 ATHEROSCLEROTIC HEART DISEASE OF NATIVE CORONARY ARTERY W/OUT ANGINA PECTORIS: ICD-10-CM

## 2024-04-04 PROCEDURE — 99203 OFFICE O/P NEW LOW 30 MIN: CPT

## 2024-04-04 NOTE — PHYSICAL EXAM
[No Acute Distress] : no acute distress [Normal Rate/Rhythm] : normal rate/rhythm [Normal S1, S2] : normal s1, s2 [No Resp Distress] : no resp distress [Clear to Auscultation Bilaterally] : clear to auscultation bilaterally [No Edema] : no edema [Oriented x3] : oriented x3 [Normal Affect] : normal affect

## 2024-04-04 NOTE — ASSESSMENT
[FreeTextEntry1] : 73F with HTN, DM, CAD s/p 3 stents, doesnt follow [facility] cardio, hx of asthma who presents to [facility] care. last seen by Dr. Eric in 2021, She is on trelegy 100 once a week and albuterol prn. Not adherent to nasal sprays or allergy meds.  Typically, asthma exacerbates during allergy season and in the wintertime. lungs are clear on exam and recent CT is ok.  Plan: Trelegy once a day and albuterol prn post nasal drip and allergic rhinorrhea should be treated with antihistamine and flonase/azelastine sprays.  Refer to Dr. Jay Lisker for cardiac management follow up annually or sooner as needed call us if there is an exacerbation of sx.

## 2024-04-04 NOTE — REASON FOR VISIT
[Initial] : an initial visit [Asthma] : asthma [Shortness of Breath] : shortness of breath [Family Member] : family member

## 2024-04-04 NOTE — HISTORY OF PRESENT ILLNESS
[TextBox_4] : 73F with hx of Asthma, CAD 3 stents, DM, HTN who presents to establish care. Previously saw Dr. Eric in 2021 for pulm management. trelegy once per week and albuterol. she has seasonal allergies but is not using antihistamines or nasal sprays, previously prescrived by pcp. she has not followed with cardio for a few yrs. reports chest pains and tightness at rest, dyspnea on minimal exertion. ambulates with walker no cough  or sputum.   Daughter provided translation.

## 2024-04-09 RX ORDER — VELPATASVIR AND SOFOSBUVIR 100; 400 MG/1; MG/1
400-100 TABLET, FILM COATED ORAL DAILY
Qty: 30 | Refills: 2 | Status: ACTIVE | COMMUNITY
Start: 2024-04-03 | End: 1900-01-01

## 2024-04-16 ENCOUNTER — APPOINTMENT (OUTPATIENT)
Dept: UROLOGY | Facility: CLINIC | Age: 74
End: 2024-04-16
Payer: COMMERCIAL

## 2024-04-16 DIAGNOSIS — N39.41 URGE INCONTINENCE: ICD-10-CM

## 2024-04-16 PROCEDURE — 99204 OFFICE O/P NEW MOD 45 MIN: CPT

## 2024-04-16 PROCEDURE — 51798 US URINE CAPACITY MEASURE: CPT

## 2024-04-16 RX ORDER — MIRABEGRON 50 MG/1
50 TABLET, FILM COATED, EXTENDED RELEASE ORAL
Qty: 1 | Refills: 6 | Status: ACTIVE | COMMUNITY
Start: 2024-04-16 | End: 1900-01-01

## 2024-04-16 NOTE — REASON FOR VISIT
[TextEntry] : 73-year-old female who presents for urinary frequency, urgency, urge incontinence  Patient is with her daughter today who provides translation.  She has been dealing with these issues for the last year.  She wears a brief period she voids every 1 hour and has nocturia 2-3 times.  She reports urinary urgency and urge incontinence.  She denies gross hematuria.  She denies straining or incomplete bladder emptying.  She gets about 2 UTIs per year which resolved with antibiotics.  She drinks minimal water throughout the day taking sips with her medicines and she does drink tea  She is a G5, P5 via vaginal delivery.  She had a hysterectomy in the 1970s as she was done with childbearing.  She denies a vaginal bulge.  She denies any vaginal symptoms.  She has severe constipation for which she was prescribed Linzess.  She will have bowel movements every 4 to 5 days.  Her daughter makes her a tea drink which is the main thing that helps her with her bowels.  Her history is remarkable for lower back pain with weakness in her legs that require use of a cane.  She is limited from a mobility standpoint.  She is a type II diabetic and it is well-controlled.  She also has a history of CAD status post PCI with stent.  She is on Brilinta  PVR minimal

## 2024-04-16 NOTE — PHYSICAL EXAM
[Normal Appearance] : normal appearance [Well Groomed] : well groomed [General Appearance - In No Acute Distress] : no acute distress [Abdomen Soft] : soft [Abdomen Tenderness] : non-tender [] : no rash [No Focal Deficits] : no focal deficits [Oriented To Time, Place, And Person] : oriented to person, place, and time [Affect] : the affect was normal [Mood] : the mood was normal

## 2024-04-16 NOTE — ASSESSMENT
[FreeTextEntry1] : 73-year-old female who presents for OAB with urge incontinence  We had a long discussion about the impact of her bowels on her urinary symptoms.  She does not drink fluids throughout the day which is likely contributing to her constipation.  She will try to find things that she enjoys to drink, suggested trying a flavored seltzer but has no added sugar.  We also discussed the role of medications and I sent mirabegron 50 mg to the pharmacy for her.  She will let me know if there is an issue with insurance coverage.  Return to clinic in 6 weeks to reassess

## 2024-05-06 ENCOUNTER — NON-APPOINTMENT (OUTPATIENT)
Age: 74
End: 2024-05-06

## 2024-05-14 ENCOUNTER — RX RENEWAL (OUTPATIENT)
Age: 74
End: 2024-05-14

## 2024-06-13 ENCOUNTER — RX RENEWAL (OUTPATIENT)
Age: 74
End: 2024-06-13

## 2024-06-13 RX ORDER — LINACLOTIDE 72 UG/1
72 CAPSULE, GELATIN COATED ORAL
Qty: 30 | Refills: 0 | Status: ACTIVE | COMMUNITY
Start: 2023-02-28 | End: 1900-01-01

## 2024-06-26 DIAGNOSIS — B19.20 UNSPECIFIED VIRAL HEPATITIS C W/OUT HEPATIC COMA: ICD-10-CM

## 2024-07-18 ENCOUNTER — RX RENEWAL (OUTPATIENT)
Age: 74
End: 2024-07-18

## 2024-08-04 ENCOUNTER — INPATIENT (INPATIENT)
Facility: HOSPITAL | Age: 74
LOS: 1 days | Discharge: HOME | End: 2024-08-06
Attending: INTERNAL MEDICINE | Admitting: INTERNAL MEDICINE
Payer: MEDICAID

## 2024-08-04 VITALS
DIASTOLIC BLOOD PRESSURE: 72 MMHG | SYSTOLIC BLOOD PRESSURE: 116 MMHG | RESPIRATION RATE: 17 BRPM | HEIGHT: 65 IN | TEMPERATURE: 99 F | OXYGEN SATURATION: 94 % | WEIGHT: 166.89 LBS | HEART RATE: 84 BPM

## 2024-08-04 DIAGNOSIS — I10 ESSENTIAL (PRIMARY) HYPERTENSION: ICD-10-CM

## 2024-08-04 DIAGNOSIS — I25.10 ATHEROSCLEROTIC HEART DISEASE OF NATIVE CORONARY ARTERY WITHOUT ANGINA PECTORIS: ICD-10-CM

## 2024-08-04 DIAGNOSIS — E78.5 HYPERLIPIDEMIA, UNSPECIFIED: ICD-10-CM

## 2024-08-04 DIAGNOSIS — N39.0 URINARY TRACT INFECTION, SITE NOT SPECIFIED: ICD-10-CM

## 2024-08-04 DIAGNOSIS — M19.90 UNSPECIFIED OSTEOARTHRITIS, UNSPECIFIED SITE: ICD-10-CM

## 2024-08-04 DIAGNOSIS — Z79.84 LONG TERM (CURRENT) USE OF ORAL HYPOGLYCEMIC DRUGS: ICD-10-CM

## 2024-08-04 DIAGNOSIS — J45.909 UNSPECIFIED ASTHMA, UNCOMPLICATED: ICD-10-CM

## 2024-08-04 DIAGNOSIS — Z95.5 PRESENCE OF CORONARY ANGIOPLASTY IMPLANT AND GRAFT: ICD-10-CM

## 2024-08-04 DIAGNOSIS — Z79.82 LONG TERM (CURRENT) USE OF ASPIRIN: ICD-10-CM

## 2024-08-04 DIAGNOSIS — R19.7 DIARRHEA, UNSPECIFIED: ICD-10-CM

## 2024-08-04 DIAGNOSIS — Z95.5 PRESENCE OF CORONARY ANGIOPLASTY IMPLANT AND GRAFT: Chronic | ICD-10-CM

## 2024-08-04 DIAGNOSIS — K52.9 NONINFECTIVE GASTROENTERITIS AND COLITIS, UNSPECIFIED: ICD-10-CM

## 2024-08-04 DIAGNOSIS — E11.9 TYPE 2 DIABETES MELLITUS WITHOUT COMPLICATIONS: ICD-10-CM

## 2024-08-04 DIAGNOSIS — Z79.4 LONG TERM (CURRENT) USE OF INSULIN: ICD-10-CM

## 2024-08-04 LAB
ALBUMIN SERPL ELPH-MCNC: 3.2 G/DL — LOW (ref 3.3–5)
ALP SERPL-CCNC: 92 U/L — SIGNIFICANT CHANGE UP (ref 40–120)
ALT FLD-CCNC: 17 U/L — SIGNIFICANT CHANGE UP (ref 12–78)
ANION GAP SERPL CALC-SCNC: 6 MMOL/L — SIGNIFICANT CHANGE UP (ref 5–17)
APPEARANCE UR: CLEAR — SIGNIFICANT CHANGE UP
AST SERPL-CCNC: 16 U/L — SIGNIFICANT CHANGE UP (ref 15–37)
BACTERIA # UR AUTO: ABNORMAL /HPF
BASOPHILS # BLD AUTO: 0.03 K/UL — SIGNIFICANT CHANGE UP (ref 0–0.2)
BASOPHILS NFR BLD AUTO: 0.3 % — SIGNIFICANT CHANGE UP (ref 0–2)
BILIRUB SERPL-MCNC: 0.4 MG/DL — SIGNIFICANT CHANGE UP (ref 0.2–1.2)
BILIRUB UR-MCNC: NEGATIVE — SIGNIFICANT CHANGE UP
BUN SERPL-MCNC: 31 MG/DL — HIGH (ref 7–23)
CALCIUM SERPL-MCNC: 9.3 MG/DL — SIGNIFICANT CHANGE UP (ref 8.5–10.1)
CHLORIDE SERPL-SCNC: 104 MMOL/L — SIGNIFICANT CHANGE UP (ref 96–108)
CO2 SERPL-SCNC: 28 MMOL/L — SIGNIFICANT CHANGE UP (ref 22–31)
COLOR SPEC: YELLOW — SIGNIFICANT CHANGE UP
CREAT SERPL-MCNC: 1.14 MG/DL — SIGNIFICANT CHANGE UP (ref 0.5–1.3)
DIFF PNL FLD: NEGATIVE — SIGNIFICANT CHANGE UP
EGFR: 50 ML/MIN/1.73M2 — LOW
EOSINOPHIL # BLD AUTO: 0.04 K/UL — SIGNIFICANT CHANGE UP (ref 0–0.5)
EOSINOPHIL NFR BLD AUTO: 0.4 % — SIGNIFICANT CHANGE UP (ref 0–6)
EPI CELLS # UR: PRESENT
GLUCOSE SERPL-MCNC: 75 MG/DL — SIGNIFICANT CHANGE UP (ref 70–99)
GLUCOSE UR QL: NEGATIVE MG/DL — SIGNIFICANT CHANGE UP
HCT VFR BLD CALC: 37.2 % — SIGNIFICANT CHANGE UP (ref 34.5–45)
HGB BLD-MCNC: 11.7 G/DL — SIGNIFICANT CHANGE UP (ref 11.5–15.5)
IMM GRANULOCYTES NFR BLD AUTO: 0.3 % — SIGNIFICANT CHANGE UP (ref 0–0.9)
KETONES UR-MCNC: NEGATIVE MG/DL — SIGNIFICANT CHANGE UP
LACTATE SERPL-SCNC: 1 MMOL/L — SIGNIFICANT CHANGE UP (ref 0.7–2)
LEUKOCYTE ESTERASE UR-ACNC: ABNORMAL
LYMPHOCYTES # BLD AUTO: 1.5 K/UL — SIGNIFICANT CHANGE UP (ref 1–3.3)
LYMPHOCYTES # BLD AUTO: 13.6 % — SIGNIFICANT CHANGE UP (ref 13–44)
MAGNESIUM SERPL-MCNC: 1.7 MG/DL — SIGNIFICANT CHANGE UP (ref 1.6–2.6)
MCHC RBC-ENTMCNC: 28.6 PG — SIGNIFICANT CHANGE UP (ref 27–34)
MCHC RBC-ENTMCNC: 31.5 G/DL — LOW (ref 32–36)
MCV RBC AUTO: 91 FL — SIGNIFICANT CHANGE UP (ref 80–100)
MONOCYTES # BLD AUTO: 0.7 K/UL — SIGNIFICANT CHANGE UP (ref 0–0.9)
MONOCYTES NFR BLD AUTO: 6.4 % — SIGNIFICANT CHANGE UP (ref 2–14)
NEUTROPHILS # BLD AUTO: 8.71 K/UL — HIGH (ref 1.8–7.4)
NEUTROPHILS NFR BLD AUTO: 79 % — HIGH (ref 43–77)
NITRITE UR-MCNC: POSITIVE
NRBC # BLD: 0 /100 WBCS — SIGNIFICANT CHANGE UP (ref 0–0)
PH UR: 6 — SIGNIFICANT CHANGE UP (ref 5–8)
PLATELET # BLD AUTO: 155 K/UL — SIGNIFICANT CHANGE UP (ref 150–400)
POTASSIUM SERPL-MCNC: 4.7 MMOL/L — SIGNIFICANT CHANGE UP (ref 3.5–5.3)
POTASSIUM SERPL-SCNC: 4.7 MMOL/L — SIGNIFICANT CHANGE UP (ref 3.5–5.3)
PROT SERPL-MCNC: 7.2 GM/DL — SIGNIFICANT CHANGE UP (ref 6–8.3)
PROT UR-MCNC: NEGATIVE MG/DL — SIGNIFICANT CHANGE UP
RBC # BLD: 4.09 M/UL — SIGNIFICANT CHANGE UP (ref 3.8–5.2)
RBC # FLD: 12.6 % — SIGNIFICANT CHANGE UP (ref 10.3–14.5)
RBC CASTS # UR COMP ASSIST: 0 /HPF — SIGNIFICANT CHANGE UP (ref 0–4)
SODIUM SERPL-SCNC: 138 MMOL/L — SIGNIFICANT CHANGE UP (ref 135–145)
SP GR SPEC: 1.02 — SIGNIFICANT CHANGE UP (ref 1–1.03)
UROBILINOGEN FLD QL: 0.2 MG/DL — SIGNIFICANT CHANGE UP (ref 0.2–1)
WBC # BLD: 11.01 K/UL — HIGH (ref 3.8–10.5)
WBC # FLD AUTO: 11.01 K/UL — HIGH (ref 3.8–10.5)
WBC UR QL: 6 /HPF — HIGH (ref 0–5)

## 2024-08-04 PROCEDURE — 99222 1ST HOSP IP/OBS MODERATE 55: CPT

## 2024-08-04 PROCEDURE — 99223 1ST HOSP IP/OBS HIGH 75: CPT

## 2024-08-04 PROCEDURE — 74177 CT ABD & PELVIS W/CONTRAST: CPT | Mod: 26,MC

## 2024-08-04 PROCEDURE — 99285 EMERGENCY DEPT VISIT HI MDM: CPT

## 2024-08-04 PROCEDURE — 93010 ELECTROCARDIOGRAM REPORT: CPT

## 2024-08-04 RX ORDER — INSULIN LISPRO 100/ML
VIAL (ML) SUBCUTANEOUS
Refills: 0 | Status: DISCONTINUED | OUTPATIENT
Start: 2024-08-04 | End: 2024-08-06

## 2024-08-04 RX ORDER — BACTERIOSTATIC SODIUM CHLORIDE 0.9 %
1000 VIAL (ML) INJECTION ONCE
Refills: 0 | Status: COMPLETED | OUTPATIENT
Start: 2024-08-04 | End: 2024-08-04

## 2024-08-04 RX ORDER — DEXTROSE 4 G
15 TABLET,CHEWABLE ORAL ONCE
Refills: 0 | Status: DISCONTINUED | OUTPATIENT
Start: 2024-08-04 | End: 2024-08-06

## 2024-08-04 RX ORDER — ONDANSETRON HCL/PF 4 MG/2 ML
4 VIAL (ML) INJECTION EVERY 8 HOURS
Refills: 0 | Status: DISCONTINUED | OUTPATIENT
Start: 2024-08-04 | End: 2024-08-06

## 2024-08-04 RX ORDER — MELATONIN 3 MG
3 TABLET ORAL AT BEDTIME
Refills: 0 | Status: DISCONTINUED | OUTPATIENT
Start: 2024-08-04 | End: 2024-08-06

## 2024-08-04 RX ORDER — INSULIN GLARGINE-YFGN 100 [IU]/ML
30 INJECTION, SOLUTION SUBCUTANEOUS
Refills: 0 | DISCHARGE

## 2024-08-04 RX ORDER — MAGNESIUM, ALUMINUM HYDROXIDE 200-225/5
30 SUSPENSION, ORAL (FINAL DOSE FORM) ORAL EVERY 4 HOURS
Refills: 0 | Status: DISCONTINUED | OUTPATIENT
Start: 2024-08-04 | End: 2024-08-06

## 2024-08-04 RX ORDER — LISINOPRIL 10 MG/1
1 TABLET ORAL
Refills: 0 | DISCHARGE

## 2024-08-04 RX ORDER — DEXTROSE MONOHYDRATE, SODIUM CHLORIDE, SODIUM LACTATE, CALCIUM CHLORIDE, MAGNESIUM CHLORIDE 1.5; 538; 448; 18.4; 5.08 G/100ML; MG/100ML; MG/100ML; MG/100ML; MG/100ML
1000 SOLUTION INTRAPERITONEAL
Refills: 0 | Status: DISCONTINUED | OUTPATIENT
Start: 2024-08-04 | End: 2024-08-06

## 2024-08-04 RX ORDER — BACTERIOSTATIC SODIUM CHLORIDE 0.9 %
1000 VIAL (ML) INJECTION
Refills: 0 | Status: DISCONTINUED | OUTPATIENT
Start: 2024-08-04 | End: 2024-08-06

## 2024-08-04 RX ORDER — GLUCAGON INJECTION, SOLUTION 0.5 MG/.1ML
1 INJECTION, SOLUTION SUBCUTANEOUS ONCE
Refills: 0 | Status: DISCONTINUED | OUTPATIENT
Start: 2024-08-04 | End: 2024-08-06

## 2024-08-04 RX ORDER — ACETAMINOPHEN 500 MG
650 TABLET ORAL EVERY 6 HOURS
Refills: 0 | Status: DISCONTINUED | OUTPATIENT
Start: 2024-08-04 | End: 2024-08-06

## 2024-08-04 RX ORDER — HEPARIN SODIUM 1000 [USP'U]/ML
5000 INJECTION, SOLUTION INTRAVENOUS; SUBCUTANEOUS EVERY 12 HOURS
Refills: 0 | Status: DISCONTINUED | OUTPATIENT
Start: 2024-08-04 | End: 2024-08-06

## 2024-08-04 RX ORDER — DEXTROSE 4 G
25 TABLET,CHEWABLE ORAL ONCE
Refills: 0 | Status: DISCONTINUED | OUTPATIENT
Start: 2024-08-04 | End: 2024-08-06

## 2024-08-04 RX ORDER — INSULIN GLARGINE-YFGN 100 [IU]/ML
30 INJECTION, SOLUTION SUBCUTANEOUS AT BEDTIME
Refills: 0 | Status: DISCONTINUED | OUTPATIENT
Start: 2024-08-05 | End: 2024-08-06

## 2024-08-04 RX ADMIN — Medication 75 MILLILITER(S): at 23:31

## 2024-08-04 RX ADMIN — Medication 1000 MILLILITER(S): at 18:59

## 2024-08-04 NOTE — ED PROVIDER NOTE - CLINICAL SUMMARY MEDICAL DECISION MAKING FREE TEXT BOX
73F PMH HTN, HLD, DM, CAD s/p PCI, asthma, arthritis pw constant bowel movements x1 day as well as reported high K+ on lab draw w/ PMD 2wks ago. Afebrile, VSS. Exam as noted in PE. Plan for CBC, CMP, mag, lipase, lactate, UA/C, CT AP. Give IVF. Re-eval. 73F PMH HTN, HLD, DM, CAD s/p PCI, asthma, arthritis pw constant bowel movements x1 day as well as reported high K+ on lab draw w/ PMD 2wks ago. Afebrile, VSS. Exam as noted in PE. ECG NSR w/o evidence hyperkalemic changes. Plan for CBC, CMP, mag, lipase, lactate, UA/C, CT AP. Give IVF. Re-eval.  W/u significant for: K+ WNL. CT AP w/ + pancolitis. Pt w/ soiled diaper w/ diarrheal BMs x6 since ED arrival. Will admit to medicine (d/w Dr Beth). Pt, daughter updated to results, admission. They understand / agree w/ this plan. 73F PMH HTN, HLD, DM, CAD s/p PCI, asthma, arthritis pw constant bowel movements x1 day as well as reported high K+ on lab draw w/ PMD 2wks ago. Afebrile, VSS. Exam as noted in PE. ECG NSR w/o evidence hyperkalemic changes. Plan for CBC, CMP, mag, lipase, lactate, UA/C, CT AP. Give IVF. Re-eval.  W/u significant for: K+ WNL. + mild leukocytosis to 11, CT AP w/ + pancolitis. Pt w/ soiled diaper w/ diarrheal BMs x6 since ED arrival. Will admit to medicine (d/w Dr Beth). Pt, daughter updated to results, admission. They understand / agree w/ this plan.

## 2024-08-04 NOTE — ED ADULT NURSE NOTE - ED STAT RN HANDOFF DETAILS 2
Report given 1C RN Krista. Report endorsed to oncoming RN. Safety checks compld this shift/Safety rounds completed hourly.  IV site checked Q2+remains WDL. Meds given as ord with no s/s of adverse RXNs. Fall +skin precs in place. Any issues endorsed to oncoming RN for follow up.

## 2024-08-04 NOTE — ED ADULT TRIAGE NOTE - CHIEF COMPLAINT QUOTE
Patient sent to ED from PCP for abnormal lab for high potassium. Patient also constipation.   hx HTN, stents on aspirin, HLD

## 2024-08-04 NOTE — ED ADULT NURSE REASSESSMENT NOTE - NS ED NURSE REASSESS COMMENT FT1
Pt returned from CT via stretcher in NAD. Pt with 4 episodes of diarrhea on this shift, cleaned and changed accordingly. Continuous SPO2 and cardiac monitoring in place, pending results.

## 2024-08-04 NOTE — ED ADULT NURSE NOTE - OBJECTIVE STATEMENT
Patient sent to ED from PCP for abnormal lab for high potassium. Patient also constipation.   hx HTN, stents on aspirin, HLD Patient sent to ED from PCP for abnormal lab for high potassium. Patient also constipation. as stated in triage  hx HTN, stents on aspirin, HLD  Patient and daughter arrived and going to bathroom on multiple occasions. Daughter demanding diapers, towels, using clorox wipes to clean hand Patient sent to ED from PCP for abnormal lab for high potassium. Patient also constipation. as stated in triage  hx HTN, stents on aspirin, HLD  Patient and daughter arrived --- going to bathroom on multiple occasions. Daughter demanding diapers, towels, using Clorox wipes to clean hand and reoriented on risks. Daughter upset and screaming "why did I bring her to the hospital if I have to clean her". Attempted to complete stat ECG and cardiac monitor but demanding to be cleaned and washed multiple times prior to allowing us to care for her. Explained emergency priorities. ECG/cardiac monitor completed. Further discussion of reasons for coming to ED daughter states was advised to come 2 weeks ago for abnormal potassium but didn't want to because wanted to wait for endocrine appointment next month.  but now that she is going and going all day she brought her to hospital. After speaking to doctor daughter left stating to admit her because she won't be back until tomorrow  Dr. Shin explained plan of care and labs/exams  patient has cell phone if she needs to communicate with family

## 2024-08-04 NOTE — ED ADULT NURSE REASSESSMENT NOTE - NS ED NURSE REASSESS COMMENT FT1
Straight cath performed per order, output of ~300mL clear yellow urine. Samples sent per order. Pt provided with food and water, changed and repositioned in bed. Spoke with pt daughter regarding plan of care. Awaiting further orders for dispo.

## 2024-08-04 NOTE — ED PROVIDER NOTE - CROS ED ROS STATEMENT
Date of Service: 07/24/2018    SUBJECTIVE: A 68-year-old gentleman with:  1.  Pan ulcerative colitis.  2.  Large polypoid mass at 35 cm from the anal verge in the left colon.    Please see my detailed note.  The patient since then has essentially done well in addition to his Lialda, he is on 40 mg of Prednisone.  His inflammatory markers including both CRP and sed rate are completely normalized a few days ago.  He is completely asymptomatic at this stage.  I am tapering his Prednisone down to 30 mg a day for 4 weeks and then will keep him on 20 mg.  Will talk to him again and see how things are.  Once he is better in the next 6-8 weeks we will repeat a colonoscopy on him to assess disease activity and possibly even polypectomy.  The patient is in agreement with this plan.      Dictated By: Kee James MD  Signing Provider: MD LETICIA Ley/brian ([id number])  DD: 07/24/2018 16:03:55 TD: 07/25/2018 12:22:45    Copy Sent To:     cc: Pedro Pelletier MD  
all other ROS negative except as per HPI

## 2024-08-04 NOTE — ED ADULT NURSE NOTE - NSFALLRISKINTERV_ED_ALL_ED
Assistance OOB with selected safe patient handling equipment if applicable/Assistance with ambulation/Communicate fall risk and risk factors to all staff, patient, and family/Monitor gait and stability/Provide visual cue: yellow wristband, yellow gown, etc/Reinforce activity limits and safety measures with patient and family/Call bell, personal items and telephone in reach/Instruct patient to call for assistance before getting out of bed/chair/stretcher/Non-slip footwear applied when patient is off stretcher/[place] to call system/Physically safe environment - no spills, clutter or unnecessary equipment/Purposeful Proactive Rounding/Room/bathroom lighting operational, light cord in reach

## 2024-08-04 NOTE — ED PROVIDER NOTE - OBJECTIVE STATEMENT
73F PMH HTN, HLD, DM, CAD s/p PCI, asthma, arthritis pw constant bowel movements x 1 day. Per daughter, pt w/ hx constipation, but w/ normal BM past week. Since this AM, pt on toilet 'every 2min' moving bowels. Non-diarrheal, w/o blood. Pt denies F/C, h/a, dizziness, CP, SOB, cough, abd pain, rectal pain, back pain, N/V, UTI sx. Daughter reports normal PO intake yesterday, no new / spoiled foods. States pt has not yet eaten today. Denies giving laxatives / enemas. Daughter reports saw PMD 2wks ago, called b/c K+ elevated and told to go to ED. Daughter decided to wait to f/u w/ Endocrine next week. Per daughter, pt w/ long hx rectal prolapse, but has been 'out all day' d/t constant BMs.     PMH as above, PSH as above, NKDA, Meds as listed.

## 2024-08-05 DIAGNOSIS — R19.7 DIARRHEA, UNSPECIFIED: ICD-10-CM

## 2024-08-05 DIAGNOSIS — I10 ESSENTIAL (PRIMARY) HYPERTENSION: ICD-10-CM

## 2024-08-05 DIAGNOSIS — E11.9 TYPE 2 DIABETES MELLITUS WITHOUT COMPLICATIONS: ICD-10-CM

## 2024-08-05 DIAGNOSIS — N39.0 URINARY TRACT INFECTION, SITE NOT SPECIFIED: ICD-10-CM

## 2024-08-05 LAB
A1C WITH ESTIMATED AVERAGE GLUCOSE RESULT: 7.5 % — HIGH (ref 4–5.6)
ALBUMIN SERPL ELPH-MCNC: 2.8 G/DL — LOW (ref 3.3–5)
ALP SERPL-CCNC: 79 U/L — SIGNIFICANT CHANGE UP (ref 40–120)
ALT FLD-CCNC: 16 U/L — SIGNIFICANT CHANGE UP (ref 12–78)
ANION GAP SERPL CALC-SCNC: 8 MMOL/L — SIGNIFICANT CHANGE UP (ref 5–17)
AST SERPL-CCNC: 15 U/L — SIGNIFICANT CHANGE UP (ref 15–37)
BILIRUB SERPL-MCNC: 0.3 MG/DL — SIGNIFICANT CHANGE UP (ref 0.2–1.2)
BUN SERPL-MCNC: 25 MG/DL — HIGH (ref 7–23)
CALCIUM SERPL-MCNC: 8.6 MG/DL — SIGNIFICANT CHANGE UP (ref 8.5–10.1)
CHLORIDE SERPL-SCNC: 107 MMOL/L — SIGNIFICANT CHANGE UP (ref 96–108)
CHOLEST SERPL-MCNC: 124 MG/DL — SIGNIFICANT CHANGE UP
CO2 SERPL-SCNC: 24 MMOL/L — SIGNIFICANT CHANGE UP (ref 22–31)
CREAT SERPL-MCNC: 1.03 MG/DL — SIGNIFICANT CHANGE UP (ref 0.5–1.3)
EGFR: 57 ML/MIN/1.73M2 — LOW
ESTIMATED AVERAGE GLUCOSE: 169 MG/DL — HIGH (ref 68–114)
GLUCOSE BLDC GLUCOMTR-MCNC: 120 MG/DL — HIGH (ref 70–99)
GLUCOSE BLDC GLUCOMTR-MCNC: 133 MG/DL — HIGH (ref 70–99)
GLUCOSE BLDC GLUCOMTR-MCNC: 162 MG/DL — HIGH (ref 70–99)
GLUCOSE BLDC GLUCOMTR-MCNC: 174 MG/DL — HIGH (ref 70–99)
GLUCOSE BLDC GLUCOMTR-MCNC: 69 MG/DL — LOW (ref 70–99)
GLUCOSE BLDC GLUCOMTR-MCNC: 78 MG/DL — SIGNIFICANT CHANGE UP (ref 70–99)
GLUCOSE BLDC GLUCOMTR-MCNC: 91 MG/DL — SIGNIFICANT CHANGE UP (ref 70–99)
GLUCOSE SERPL-MCNC: 199 MG/DL — HIGH (ref 70–99)
HCT VFR BLD CALC: 37.6 % — SIGNIFICANT CHANGE UP (ref 34.5–45)
HDLC SERPL-MCNC: 65 MG/DL — SIGNIFICANT CHANGE UP
HGB BLD-MCNC: 11.6 G/DL — SIGNIFICANT CHANGE UP (ref 11.5–15.5)
LIPID PNL WITH DIRECT LDL SERPL: 46 MG/DL — SIGNIFICANT CHANGE UP
MCHC RBC-ENTMCNC: 28.4 PG — SIGNIFICANT CHANGE UP (ref 27–34)
MCHC RBC-ENTMCNC: 30.9 G/DL — LOW (ref 32–36)
MCV RBC AUTO: 92.2 FL — SIGNIFICANT CHANGE UP (ref 80–100)
NON HDL CHOLESTEROL: 60 MG/DL — SIGNIFICANT CHANGE UP
NRBC # BLD: 0 /100 WBCS — SIGNIFICANT CHANGE UP (ref 0–0)
PLATELET # BLD AUTO: 150 K/UL — SIGNIFICANT CHANGE UP (ref 150–400)
POTASSIUM SERPL-MCNC: 4.2 MMOL/L — SIGNIFICANT CHANGE UP (ref 3.5–5.3)
POTASSIUM SERPL-SCNC: 4.2 MMOL/L — SIGNIFICANT CHANGE UP (ref 3.5–5.3)
PROT SERPL-MCNC: 6.7 GM/DL — SIGNIFICANT CHANGE UP (ref 6–8.3)
RBC # BLD: 4.08 M/UL — SIGNIFICANT CHANGE UP (ref 3.8–5.2)
RBC # FLD: 12.8 % — SIGNIFICANT CHANGE UP (ref 10.3–14.5)
SODIUM SERPL-SCNC: 139 MMOL/L — SIGNIFICANT CHANGE UP (ref 135–145)
TRIGL SERPL-MCNC: 64 MG/DL — SIGNIFICANT CHANGE UP
WBC # BLD: 7.67 K/UL — SIGNIFICANT CHANGE UP (ref 3.8–10.5)
WBC # FLD AUTO: 7.67 K/UL — SIGNIFICANT CHANGE UP (ref 3.8–10.5)

## 2024-08-05 PROCEDURE — 99232 SBSQ HOSP IP/OBS MODERATE 35: CPT

## 2024-08-05 RX ORDER — ATORVASTATIN CALCIUM 40 MG/1
40 TABLET, FILM COATED ORAL AT BEDTIME
Refills: 0 | Status: DISCONTINUED | OUTPATIENT
Start: 2024-08-05 | End: 2024-08-06

## 2024-08-05 RX ORDER — ASPIRIN 500 MG
81 TABLET ORAL DAILY
Refills: 0 | Status: DISCONTINUED | OUTPATIENT
Start: 2024-08-05 | End: 2024-08-06

## 2024-08-05 RX ORDER — DEXTROSE 4 G
15 TABLET,CHEWABLE ORAL ONCE
Refills: 0 | Status: COMPLETED | OUTPATIENT
Start: 2024-08-05 | End: 2024-08-05

## 2024-08-05 RX ORDER — GABAPENTIN 400 MG/1
300 CAPSULE ORAL
Refills: 0 | Status: DISCONTINUED | OUTPATIENT
Start: 2024-08-05 | End: 2024-08-06

## 2024-08-05 RX ORDER — DEXTROSE 4 G
25 TABLET,CHEWABLE ORAL ONCE
Refills: 0 | Status: COMPLETED | OUTPATIENT
Start: 2024-08-05 | End: 2024-08-05

## 2024-08-05 RX ORDER — LISINOPRIL 10 MG/1
10 TABLET ORAL DAILY
Refills: 0 | Status: DISCONTINUED | OUTPATIENT
Start: 2024-08-05 | End: 2024-08-06

## 2024-08-05 RX ORDER — LOPERAMIDE HYDROCHLORIDE 2 MG/1
2 CAPSULE ORAL ONCE
Refills: 0 | Status: DISCONTINUED | OUTPATIENT
Start: 2024-08-05 | End: 2024-08-06

## 2024-08-05 RX ADMIN — INSULIN GLARGINE-YFGN 30 UNIT(S): 100 INJECTION, SOLUTION SUBCUTANEOUS at 21:47

## 2024-08-05 RX ADMIN — LISINOPRIL 10 MILLIGRAM(S): 10 TABLET ORAL at 05:29

## 2024-08-05 RX ADMIN — GABAPENTIN 300 MILLIGRAM(S): 400 CAPSULE ORAL at 17:00

## 2024-08-05 RX ADMIN — GABAPENTIN 300 MILLIGRAM(S): 400 CAPSULE ORAL at 05:29

## 2024-08-05 RX ADMIN — Medication 15 GRAM(S): at 04:36

## 2024-08-05 RX ADMIN — HEPARIN SODIUM 5000 UNIT(S): 1000 INJECTION, SOLUTION INTRAVENOUS; SUBCUTANEOUS at 05:23

## 2024-08-05 RX ADMIN — Medication 2: at 17:01

## 2024-08-05 RX ADMIN — Medication 100 MILLIGRAM(S): at 05:23

## 2024-08-05 RX ADMIN — Medication 2: at 08:20

## 2024-08-05 RX ADMIN — HEPARIN SODIUM 5000 UNIT(S): 1000 INJECTION, SOLUTION INTRAVENOUS; SUBCUTANEOUS at 17:00

## 2024-08-05 RX ADMIN — Medication 15 GRAM(S): at 03:40

## 2024-08-05 RX ADMIN — Medication 81 MILLIGRAM(S): at 10:56

## 2024-08-05 RX ADMIN — ATORVASTATIN CALCIUM 40 MILLIGRAM(S): 40 TABLET, FILM COATED ORAL at 21:47

## 2024-08-05 RX ADMIN — Medication 75 MILLILITER(S): at 21:48

## 2024-08-05 NOTE — H&P ADULT - PROBLEM SELECTOR PLAN 1
1 day history of multiple episodes of diarrhea  CT-ABD: Pancolitis.  - IV-NS   - Loperamide   - F/U Stool studies

## 2024-08-05 NOTE — PROGRESS NOTE ADULT - ASSESSMENT
73 year old female with a PMH of HTN, DM, CAD s/p PCI asthma, arthritis presents to the ED for diarrhea. As per daughter, acute onset of multiple non-bloody diarrhea started in the morning. Denies trying any new or spoiled food, recent antibiotic use or recent travel. Only change, has been taking lokelma  10mg for the past 3 days which was prescribed by PMD for hyperkalemia. In the ED patient continue to have multiple episodes of diarrhea. Patient has a history of constipation, but has not been using any laxatives, enemas or any other stool softeners. Denies abdominal pain, nausea, vomiting, fever, chills, chest pain, palpitation, headache, dizziness or any other acute symptoms. Labs remarkable for WBC:11. UA (+) nitrite, trace-Leuk esterase, WBC:6, too many bacteria. Vitals stable. Afebrile    CT-ABD: Pancolitis.      Problem/Plan - 1:  ·  Problem: Intractable diarrhea from pancolitis.    cont IVF and loperamide.   Pending stool studies.   encourage oral fluids.   Trend labs.     Problem/Plan - 2:  ·  Problem: Acute UTI. no dysuria.   ·  Plan: UA (+) nitrite, trace-Leuk esterase, WBC:6, too many bacteria.  Mild Leukocytosis. Afebrile   cont antibiotic. Follow urine culture.     Problem/Plan - 3:  ·  Problem: Hypertension.   controlled. cont current management. Monitor.     Problem/Plan - 4:  ·  Problem: DM type 2.   Low normal morning blood sugars and then improved.   cont current regimen. Cont correctional insulin. Follow finger sticks     FC  DVT ppx: HSQ    Time spent by me managing the patient including but not limited to reviewing the chart, discussion with the nurse, physical exam and assessment and plan is 38 mins

## 2024-08-05 NOTE — H&P ADULT - NSHPPHYSICALEXAM_GEN_ALL_CORE
GENERAL: NAD, comfortable in bed   HEAD:  Atraumatic, Normocephalic  EYES: EOMI, PERRL, conjunctiva and sclera clear  LUNG: Clear to auscultation bilaterally; No wheezes, rales or rhonchi  HEART: Regular rate and rhythm; No murmurs, rubs, or gallops  ABDOMEN: +BS, Soft, Nontender, Nondistended  EXTREMITIES:  2+ Peripheral Pulses, No clubbing, cyanosis, or edema  PSYCH: normal mood and affect  NEUROLOGY: AAOx3, non-focal

## 2024-08-05 NOTE — H&P ADULT - NSHPLABSRESULTS_GEN_ALL_CORE
11.7   11.01 )-----------( 155      ( 04 Aug 2024 18:45 )             37.2     138  |  104  |  31<H>  ----------------------------<  75     08-04  4.7   |  28  |  1.14    Ca    9.3      04 Aug 2024 18:45  Mg     1.7     08-    TPro  7.2  /  Alb  3.2<L>  /  TBili  0.4  /  DBili  x   /  AST  16  /  ALT  17  /  AlkPhos  92  08-        Urinalysis Basic - ( 04 Aug 2024 22:36 )  Color: Yellow / Appearance: Clear / S.022 / pH: 6.0  Gluc: Negative mg/dL / Ketone: Negative mg/dL  / Bili: Negative / Urobili: 0.2 mg/dL   Blood: Negative / Protein: Negative mg/dL / Nitrite: Positive   Leuk Esterase: Trace / RBC: 0 /HPF / WBC 6 /HPF   Sq Epi: x / Bacteria: Too Numerous to count /HPF  Hyaline Casts: x/WBC Casts: x        LOWER CHEST: Within normal limits.    LIVER: Within normal limits.  BILE DUCTS: Normal caliber.  GALLBLADDER: Within normal limits.  SPLEEN: Within normal limits.  PANCREAS: Within normal limits.  ADRENALS: Within normal limits.  KIDNEYS/URETERS: Symmetric enhancement without hydronephrosis or significant perinephric stranding. Tiny nonobstructing left intrarenal calculus and bilateral renal cyst.    BLADDER: Within normal limits.  REPRODUCTIVE ORGANS: Hysterectomy.    BOWEL: No bowel obstruction. Appendix there is normal. Diffuse colonic wall thickening with stranding in the pericolonic fat most notable on the left. No pneumatosis.  PERITONEUM: No ascites. No free air or intraperitoneal collection.  VESSELS: Atherosclerotic changes of the aorta and iliac vessels. Patent mesenteric vasculature.  RETROPERITONEUM/LYMPH NODES: No lymphadenopathy.  ABDOMINAL WALL: Within normal limits.  BONES: Generalized osteopenia. Degenerative changes.    IMPRESSION:  Pancolitis.

## 2024-08-05 NOTE — H&P ADULT - ASSESSMENT
73 year old female with a PMH of HTN, DM, CAD s/p PCI asthma, arthritis presents to the ED for diarrhea. As per daughter, acute onset of multiple non-bloody diarrhea started in the morning. Denies trying any new or spoiled food, recent antibiotic use or recent travel. Only change, has been taking lokelma  10mg for the past 3 days which was prescribed by PMD for hyperkalemia. In the ED patient continue to have multiple episodes of diarrhea. Patient has a history of constipation, but has not been using any laxatives, enemas or any other stool softeners. Denies abdominal pain, nausea, vomiting, fever, chills, chest pain, palpitation, headache, dizziness or any other acute symptoms. Labs remarkable for WBC:11. UA (+) nitrite, trace-Leuk esterase, WBC:6, too many bacteria. Vitals stable. Afebrile    CT-ABD: Pancolitis.

## 2024-08-05 NOTE — H&P ADULT - PROBLEM SELECTOR PLAN 2
UA (+) nitrite, trace-Leuk esterase, WBC:6, too many bacteria.  Mild Leukocytosis. Afebrile   - IV-NS   - Ceftriaxone   - F/U culture

## 2024-08-05 NOTE — PROGRESS NOTE ADULT - SUBJECTIVE AND OBJECTIVE BOX
CHIEF COMPLAINT: Abd pain and diarrhea reported  no vomiting  no fever  no chest pain or sob  no active gross bleeding   no report of any dysuria.   (communicated with the patient in [place])    PHYSICAL EXAM:    GENERAL: Moderately built, no acute distress   CHEST/LUNG:  No wheezing, no crackles   HEART: Regular rate and rhythm; No murmurs  ABDOMEN: Soft, Nontender, Nondistended; Bowel sounds present  EXTREMITIES:  No clubbing, cyanosis, or edema  NERVOUS SYSTEM:  Grossly non focal.  Psychiatry: AAO x 3, mood is appropriate       OBJECTIVE DATA:   Vital Signs Last 24 Hrs  T(C): 37 (05 Aug 2024 11:38), Max: 37.1 (04 Aug 2024 17:14)  T(F): 98.6 (05 Aug 2024 11:38), Max: 98.8 (04 Aug 2024 17:14)  HR: 66 (05 Aug 2024 11:38) (60 - 84)  BP: 150/51 (05 Aug 2024 11:38) (116/72 - 153/57)  BP(mean): --  RR: 28 (05 Aug 2024 11:38) (17 - 28)  SpO2: 99% (05 Aug 2024 11:38) (94% - 99%)    Parameters below as of 05 Aug 2024 08:23  Patient On (Oxygen Delivery Method): room air     Daily Height in cm: 165.1 (04 Aug 2024 17:14)    LABS:                        11.6   7.67  )-----------( 150      ( 05 Aug 2024 07:36 )             37.6             08-05    139  |  107  |  25<H>  ----------------------------<  199<H>  4.2   |  24  |  1.03    Ca    8.6      05 Aug 2024 07:36  Mg     1.7     08-04    TPro  6.7  /  Alb  2.8<L>  /  TBili  0.3  /  DBili  x   /  AST  15  /  ALT  16  /  AlkPhos  79  08-05                Urinalysis Basic - ( 05 Aug 2024 07:36 )    Color: x / Appearance: x / SG: x / pH: x  Gluc: 199 mg/dL / Ketone: x  / Bili: x / Urobili: x   Blood: x / Protein: x / Nitrite: x   Leuk Esterase: x / RBC: x / WBC x   Sq Epi: x / Non Sq Epi: x / Bacteria: x            CAPILLARY BLOOD GLUCOSE      POCT Blood Glucose.: 120 mg/dL (05 Aug 2024 12:40)    < from: CT Abdomen and Pelvis w/ IV Cont (08.04.24 @ 20:17) >  IMPRESSION:  Pancolitis.    < end of copied text >      MEDICATIONS  (STANDING):  aspirin enteric coated 81 milliGRAM(s) Oral daily  atorvastatin 40 milliGRAM(s) Oral at bedtime  cefTRIAXone   IVPB 1000 milliGRAM(s) IV Intermittent every 24 hours  dextrose 5%. 1000 milliLiter(s) (50 mL/Hr) IV Continuous <Continuous>  dextrose 50% Injectable 25 Gram(s) IV Push once  gabapentin 300 milliGRAM(s) Oral two times a day  glucagon  Injectable 1 milliGRAM(s) IntraMuscular once  heparin   Injectable 5000 Unit(s) SubCutaneous every 12 hours  insulin glargine Injectable (LANTUS) 30 Unit(s) SubCutaneous at bedtime  insulin lispro (ADMELOG) corrective regimen sliding scale   SubCutaneous three times a day before meals  lisinopril 10 milliGRAM(s) Oral daily  loperamide 2 milliGRAM(s) Oral once  sodium chloride 0.9%. 1000 milliLiter(s) (75 mL/Hr) IV Continuous <Continuous>    MEDICATIONS  (PRN):  acetaminophen     Tablet .. 650 milliGRAM(s) Oral every 6 hours PRN Temp greater or equal to 38C (100.4F), Mild Pain (1 - 3)  aluminum hydroxide/magnesium hydroxide/simethicone Suspension 30 milliLiter(s) Oral every 4 hours PRN Dyspepsia  dextrose Oral Gel 15 Gram(s) Oral once PRN Blood Glucose LESS THAN 70 milliGRAM(s)/deciliter  melatonin 3 milliGRAM(s) Oral at bedtime PRN Insomnia  ondansetron Injectable 4 milliGRAM(s) IV Push every 8 hours PRN Nausea and/or Vomiting

## 2024-08-06 ENCOUNTER — TRANSCRIPTION ENCOUNTER (OUTPATIENT)
Age: 74
End: 2024-08-06

## 2024-08-06 VITALS
OXYGEN SATURATION: 96 % | DIASTOLIC BLOOD PRESSURE: 82 MMHG | HEART RATE: 68 BPM | TEMPERATURE: 99 F | SYSTOLIC BLOOD PRESSURE: 159 MMHG | RESPIRATION RATE: 18 BRPM

## 2024-08-06 LAB
ALBUMIN SERPL ELPH-MCNC: 2.7 G/DL — LOW (ref 3.3–5)
ALP SERPL-CCNC: 98 U/L — SIGNIFICANT CHANGE UP (ref 40–120)
ALT FLD-CCNC: 17 U/L — SIGNIFICANT CHANGE UP (ref 12–78)
ANION GAP SERPL CALC-SCNC: 4 MMOL/L — LOW (ref 5–17)
AST SERPL-CCNC: 17 U/L — SIGNIFICANT CHANGE UP (ref 15–37)
BILIRUB SERPL-MCNC: 0.3 MG/DL — SIGNIFICANT CHANGE UP (ref 0.2–1.2)
BUN SERPL-MCNC: 19 MG/DL — SIGNIFICANT CHANGE UP (ref 7–23)
CALCIUM SERPL-MCNC: 9 MG/DL — SIGNIFICANT CHANGE UP (ref 8.5–10.1)
CHLORIDE SERPL-SCNC: 110 MMOL/L — HIGH (ref 96–108)
CO2 SERPL-SCNC: 28 MMOL/L — SIGNIFICANT CHANGE UP (ref 22–31)
CREAT SERPL-MCNC: 0.81 MG/DL — SIGNIFICANT CHANGE UP (ref 0.5–1.3)
EGFR: 76 ML/MIN/1.73M2 — SIGNIFICANT CHANGE UP
GLUCOSE BLDC GLUCOMTR-MCNC: 100 MG/DL — HIGH (ref 70–99)
GLUCOSE BLDC GLUCOMTR-MCNC: 170 MG/DL — HIGH (ref 70–99)
GLUCOSE BLDC GLUCOMTR-MCNC: 205 MG/DL — HIGH (ref 70–99)
GLUCOSE SERPL-MCNC: 96 MG/DL — SIGNIFICANT CHANGE UP (ref 70–99)
HCT VFR BLD CALC: 35.8 % — SIGNIFICANT CHANGE UP (ref 34.5–45)
HGB BLD-MCNC: 11 G/DL — LOW (ref 11.5–15.5)
MCHC RBC-ENTMCNC: 28.1 PG — SIGNIFICANT CHANGE UP (ref 27–34)
MCHC RBC-ENTMCNC: 30.7 G/DL — LOW (ref 32–36)
MCV RBC AUTO: 91.3 FL — SIGNIFICANT CHANGE UP (ref 80–100)
NRBC # BLD: 0 /100 WBCS — SIGNIFICANT CHANGE UP (ref 0–0)
PLATELET # BLD AUTO: 140 K/UL — LOW (ref 150–400)
POTASSIUM SERPL-MCNC: 4.5 MMOL/L — SIGNIFICANT CHANGE UP (ref 3.5–5.3)
POTASSIUM SERPL-SCNC: 4.5 MMOL/L — SIGNIFICANT CHANGE UP (ref 3.5–5.3)
PROT SERPL-MCNC: 6.5 GM/DL — SIGNIFICANT CHANGE UP (ref 6–8.3)
RBC # BLD: 3.92 M/UL — SIGNIFICANT CHANGE UP (ref 3.8–5.2)
RBC # FLD: 12.7 % — SIGNIFICANT CHANGE UP (ref 10.3–14.5)
SODIUM SERPL-SCNC: 142 MMOL/L — SIGNIFICANT CHANGE UP (ref 135–145)
WBC # BLD: 5.77 K/UL — SIGNIFICANT CHANGE UP (ref 3.8–10.5)
WBC # FLD AUTO: 5.77 K/UL — SIGNIFICANT CHANGE UP (ref 3.8–10.5)

## 2024-08-06 PROCEDURE — 99239 HOSP IP/OBS DSCHRG MGMT >30: CPT

## 2024-08-06 RX ORDER — CIPROFLOXACIN 500 MG/1
1 TABLET, FILM COATED ORAL
Qty: 10 | Refills: 0
Start: 2024-08-06 | End: 2024-08-10

## 2024-08-06 RX ORDER — METRONIDAZOLE 500 MG/1
1 TABLET ORAL
Qty: 10 | Refills: 0
Start: 2024-08-06 | End: 2024-08-10

## 2024-08-06 RX ORDER — METRONIDAZOLE 500 MG/1
500 TABLET ORAL EVERY 12 HOURS
Refills: 0 | Status: DISCONTINUED | OUTPATIENT
Start: 2024-08-06 | End: 2024-08-06

## 2024-08-06 RX ORDER — CIPROFLOXACIN 500 MG/1
500 TABLET, FILM COATED ORAL EVERY 12 HOURS
Refills: 0 | Status: DISCONTINUED | OUTPATIENT
Start: 2024-08-06 | End: 2024-08-06

## 2024-08-06 RX ADMIN — CIPROFLOXACIN 500 MILLIGRAM(S): 500 TABLET, FILM COATED ORAL at 11:37

## 2024-08-06 RX ADMIN — Medication 81 MILLIGRAM(S): at 11:37

## 2024-08-06 RX ADMIN — Medication 4: at 11:57

## 2024-08-06 RX ADMIN — HEPARIN SODIUM 5000 UNIT(S): 1000 INJECTION, SOLUTION INTRAVENOUS; SUBCUTANEOUS at 06:01

## 2024-08-06 RX ADMIN — Medication 100 MILLIGRAM(S): at 08:31

## 2024-08-06 RX ADMIN — Medication 2: at 17:13

## 2024-08-06 RX ADMIN — GABAPENTIN 300 MILLIGRAM(S): 400 CAPSULE ORAL at 06:02

## 2024-08-06 RX ADMIN — LISINOPRIL 10 MILLIGRAM(S): 10 TABLET ORAL at 06:02

## 2024-08-06 NOTE — DISCHARGE NOTE NURSING/CASE MANAGEMENT/SOCIAL WORK - NSDCPEFALRISK_GEN_ALL_CORE
For information on Fall & Injury Prevention, visit: [email or web address] OR  [email or web address] OR  https://www.cdc.gov/steadi/patient.html

## 2024-08-06 NOTE — DISCHARGE NOTE NURSING/CASE MANAGEMENT/SOCIAL WORK - PATIENT PORTAL LINK FT
You can access the FollowMyHealth Patient Portal offered by [facility] by registering at the following website: [email or web address]. By joining [facility]’s FollowMyHealth portal, you will also be able to view your health information using other applications (apps) compatible with our system.

## 2024-08-06 NOTE — DISCHARGE NOTE PROVIDER - HOSPITAL COURSE
73 year old female with a PMH of HTN, DM, CAD s/p PCI asthma, arthritis presents to the ED for diarrhea. As per daughter, acute onset of multiple non-bloody diarrhea started in the morning. Denies trying any new or spoiled food, recent antibiotic use or recent travel. Only change, has been taking lokelma  10mg for the past 3 days which was prescribed by PMD for hyperkalemia. In the ED patient continue to have multiple episodes of diarrhea. Patient has a history of constipation, but has not been using any laxatives, enemas or any other stool softeners. Denies abdominal pain, nausea, vomiting, fever, chills, chest pain, palpitation, headache, dizziness or any other acute symptoms. Labs remarkable for WBC:11. UA (+) nitrite, trace-Leuk esterase, WBC:6, too many bacteria. Vitals stable. Afebrile    CT-ABD: Pancolitis.      Problem/Plan - 1:  ·  Problem: Intractable diarrhea from pancolitis.    diarrhea improved. abd pain improved.   tolerating oral diet and no n/v.   No fever  No stool sample available to be sent to the lab.   Poor iv access. received 2 doses of iv ceftriaxone.   Give 5 days of cipro and flagyl . called and updated daughter on phone about discharge plan and advised to have PCP follow up in a week.     Problem/Plan - 2:  ·  Problem: Acute UTI. no dysuria.   urine culture in progress.   Will give oral antibiotic to finish course at home.     Problem/Plan - 3:  ·  Problem: Hypertension.   controlled. cont current management. Monitor.     Problem/Plan - 4:  ·  Problem: DM type 2.   controlled. cont home regimen.     Seen and examined by me today. Vitals stable.   I have discussed all the inpatient radiographic findings with the patient and stressed that patient follows with the PCP for further outpatient care. I have educated patient to use [employer] patient portal (as instructed on the discharge paperwork) to access medical records outside the hospital.   All questions welcomed and answered appropriately. Patient verbalized understanding of post discharge physician's follows up and discharge instructions.   DC time spent by me face to face excluding billable procedures 38 mins

## 2024-08-06 NOTE — DISCHARGE NOTE PROVIDER - NSDCMRMEDTOKEN_GEN_ALL_CORE_FT
alendronate 70 mg oral tablet: 1 tab(s) orally once a week  home  Aspirin Enteric Coated 81 mg oral delayed release tablet: 1 tab(s) orally once a day  home  atorvastatin 40 mg oral tablet: 1 tab(s) orally once a day  Brilinta (ticagrelor) 90 mg oral tablet: 1 tab(s) orally 2 times a day  home  ciprofloxacin 500 mg oral tablet: 1 tab(s) orally every 12 hours  gabapentin 300 mg oral capsule: orally 2 times a day  Insulin Glargine: 30 unit(s) once a day (at bedtime)  lisinopril 10 mg oral tablet: 1 tab(s) orally once a day  metroNIDAZOLE 500 mg oral tablet: 1 tab(s) orally every 12 hours

## 2024-08-06 NOTE — DISCHARGE NOTE PROVIDER - NSDCFUADDINST_GEN_ALL_CORE_FT
1) It is important to see your primary physician within next 7-10 days to perform a comprehensive medical review.  Call their offices for an appointment as soon as you leave the hospital.  If you do not have a primary physician or unable to reach your PCP, contact the [facility] Physician Referral Service at [phone].  Your medical issues appear to be stable at this time, but if your symptoms recur or worsen, contact your physicians and/or return to the hospital if necessary.  If you encounter any issues or questions with your medication, call your physicians before stopping the medication.    2) Please access [facility] Patient portal (as instructed on the discharge paperwork) to access your medical records at any time after discharge.

## 2024-08-08 LAB
-  AMPICILLIN/SULBACTAM: SIGNIFICANT CHANGE UP
-  AMPICILLIN: SIGNIFICANT CHANGE UP
-  AZTREONAM: SIGNIFICANT CHANGE UP
-  CEFAZOLIN: SIGNIFICANT CHANGE UP
-  CEFEPIME: SIGNIFICANT CHANGE UP
-  CEFTRIAXONE: SIGNIFICANT CHANGE UP
-  CEFUROXIME: SIGNIFICANT CHANGE UP
-  CIPROFLOXACIN: SIGNIFICANT CHANGE UP
-  ERTAPENEM: SIGNIFICANT CHANGE UP
-  GENTAMICIN: SIGNIFICANT CHANGE UP
-  IMIPENEM: SIGNIFICANT CHANGE UP
-  LEVOFLOXACIN: SIGNIFICANT CHANGE UP
-  MEROPENEM: SIGNIFICANT CHANGE UP
-  NITROFURANTOIN: SIGNIFICANT CHANGE UP
-  PIPERACILLIN/TAZOBACTAM: SIGNIFICANT CHANGE UP
-  TOBRAMYCIN: SIGNIFICANT CHANGE UP
-  TRIMETHOPRIM/SULFAMETHOXAZOLE: SIGNIFICANT CHANGE UP
CULTURE RESULTS: ABNORMAL
METHOD TYPE: SIGNIFICANT CHANGE UP
ORGANISM # SPEC MICROSCOPIC CNT: ABNORMAL
ORGANISM # SPEC MICROSCOPIC CNT: SIGNIFICANT CHANGE UP
SPECIMEN SOURCE: SIGNIFICANT CHANGE UP

## 2024-08-12 RX ORDER — SULFAMETHOXAZOLE AND TRIMETHOPRIM 400; 80 MG/1; MG/1
1 TABLET ORAL
Qty: 6 | Refills: 0
Start: 2024-08-12 | End: 2024-08-14

## 2024-08-14 ENCOUNTER — RX RENEWAL (OUTPATIENT)
Age: 74
End: 2024-08-14

## 2024-09-10 ENCOUNTER — RX RENEWAL (OUTPATIENT)
Age: 74
End: 2024-09-10

## 2024-09-24 ENCOUNTER — INPATIENT (INPATIENT)
Facility: HOSPITAL | Age: 74
LOS: 5 days | Discharge: HOME | End: 2024-09-30
Attending: INTERNAL MEDICINE | Admitting: INTERNAL MEDICINE
Payer: MEDICAID

## 2024-09-24 VITALS
SYSTOLIC BLOOD PRESSURE: 136 MMHG | DIASTOLIC BLOOD PRESSURE: 76 MMHG | WEIGHT: 164.91 LBS | HEART RATE: 66 BPM | OXYGEN SATURATION: 98 % | RESPIRATION RATE: 18 BRPM | HEIGHT: 65 IN

## 2024-09-24 DIAGNOSIS — Z95.5 PRESENCE OF CORONARY ANGIOPLASTY IMPLANT AND GRAFT: Chronic | ICD-10-CM

## 2024-09-24 DIAGNOSIS — I25.10 ATHEROSCLEROTIC HEART DISEASE OF NATIVE CORONARY ARTERY WITHOUT ANGINA PECTORIS: ICD-10-CM

## 2024-09-24 DIAGNOSIS — E11.649 TYPE 2 DIABETES MELLITUS WITH HYPOGLYCEMIA WITHOUT COMA: ICD-10-CM

## 2024-09-24 DIAGNOSIS — I10 ESSENTIAL (PRIMARY) HYPERTENSION: ICD-10-CM

## 2024-09-24 DIAGNOSIS — E78.5 HYPERLIPIDEMIA, UNSPECIFIED: ICD-10-CM

## 2024-09-24 DIAGNOSIS — R41.82 ALTERED MENTAL STATUS, UNSPECIFIED: ICD-10-CM

## 2024-09-24 LAB
ALBUMIN SERPL ELPH-MCNC: 3 G/DL — LOW (ref 3.3–5)
ALP SERPL-CCNC: 117 U/L — SIGNIFICANT CHANGE UP (ref 40–120)
ALT FLD-CCNC: 20 U/L — SIGNIFICANT CHANGE UP (ref 12–78)
ANION GAP SERPL CALC-SCNC: 5 MMOL/L — SIGNIFICANT CHANGE UP (ref 5–17)
APPEARANCE UR: CLEAR — SIGNIFICANT CHANGE UP
AST SERPL-CCNC: 18 U/L — SIGNIFICANT CHANGE UP (ref 15–37)
BACTERIA # UR AUTO: ABNORMAL /HPF
BASOPHILS # BLD AUTO: 0.02 K/UL — SIGNIFICANT CHANGE UP (ref 0–0.2)
BASOPHILS NFR BLD AUTO: 0.2 % — SIGNIFICANT CHANGE UP (ref 0–2)
BILIRUB SERPL-MCNC: 0.4 MG/DL — SIGNIFICANT CHANGE UP (ref 0.2–1.2)
BILIRUB UR-MCNC: NEGATIVE — SIGNIFICANT CHANGE UP
BUN SERPL-MCNC: 33 MG/DL — HIGH (ref 7–23)
CALCIUM SERPL-MCNC: 9.5 MG/DL — SIGNIFICANT CHANGE UP (ref 8.5–10.1)
CHLORIDE SERPL-SCNC: 106 MMOL/L — SIGNIFICANT CHANGE UP (ref 96–108)
CO2 SERPL-SCNC: 27 MMOL/L — SIGNIFICANT CHANGE UP (ref 22–31)
COLOR SPEC: YELLOW — SIGNIFICANT CHANGE UP
CREAT SERPL-MCNC: 1.14 MG/DL — SIGNIFICANT CHANGE UP (ref 0.5–1.3)
DIFF PNL FLD: NEGATIVE — SIGNIFICANT CHANGE UP
EGFR: 50 ML/MIN/1.73M2 — LOW
EOSINOPHIL # BLD AUTO: 0.04 K/UL — SIGNIFICANT CHANGE UP (ref 0–0.5)
EOSINOPHIL NFR BLD AUTO: 0.4 % — SIGNIFICANT CHANGE UP (ref 0–6)
FLUAV AG NPH QL: SIGNIFICANT CHANGE UP
FLUBV AG NPH QL: SIGNIFICANT CHANGE UP
GLUCOSE BLDC GLUCOMTR-MCNC: 35 MG/DL — CRITICAL LOW (ref 70–99)
GLUCOSE SERPL-MCNC: 170 MG/DL — HIGH (ref 70–99)
GLUCOSE UR QL: NEGATIVE MG/DL — SIGNIFICANT CHANGE UP
HCT VFR BLD CALC: 38 % — SIGNIFICANT CHANGE UP (ref 34.5–45)
HGB BLD-MCNC: 11.5 G/DL — SIGNIFICANT CHANGE UP (ref 11.5–15.5)
IMM GRANULOCYTES NFR BLD AUTO: 0.4 % — SIGNIFICANT CHANGE UP (ref 0–0.9)
KETONES UR-MCNC: NEGATIVE MG/DL — SIGNIFICANT CHANGE UP
LEUKOCYTE ESTERASE UR-ACNC: ABNORMAL
LYMPHOCYTES # BLD AUTO: 1.19 K/UL — SIGNIFICANT CHANGE UP (ref 1–3.3)
LYMPHOCYTES # BLD AUTO: 11.5 % — LOW (ref 13–44)
MCHC RBC-ENTMCNC: 28.3 PG — SIGNIFICANT CHANGE UP (ref 27–34)
MCHC RBC-ENTMCNC: 30.3 G/DL — LOW (ref 32–36)
MCV RBC AUTO: 93.4 FL — SIGNIFICANT CHANGE UP (ref 80–100)
MONOCYTES # BLD AUTO: 0.53 K/UL — SIGNIFICANT CHANGE UP (ref 0–0.9)
MONOCYTES NFR BLD AUTO: 5.1 % — SIGNIFICANT CHANGE UP (ref 2–14)
NEUTROPHILS # BLD AUTO: 8.57 K/UL — HIGH (ref 1.8–7.4)
NEUTROPHILS NFR BLD AUTO: 82.4 % — HIGH (ref 43–77)
NITRITE UR-MCNC: POSITIVE
NRBC # BLD: 0 /100 WBCS — SIGNIFICANT CHANGE UP (ref 0–0)
PH UR: 6 — SIGNIFICANT CHANGE UP (ref 5–8)
PLATELET # BLD AUTO: 126 K/UL — LOW (ref 150–400)
POTASSIUM SERPL-MCNC: 5.1 MMOL/L — SIGNIFICANT CHANGE UP (ref 3.5–5.3)
POTASSIUM SERPL-SCNC: 5.1 MMOL/L — SIGNIFICANT CHANGE UP (ref 3.5–5.3)
PROT SERPL-MCNC: 7 GM/DL — SIGNIFICANT CHANGE UP (ref 6–8.3)
PROT UR-MCNC: 30 MG/DL
RBC # BLD: 4.07 M/UL — SIGNIFICANT CHANGE UP (ref 3.8–5.2)
RBC # FLD: 13.4 % — SIGNIFICANT CHANGE UP (ref 10.3–14.5)
RBC CASTS # UR COMP ASSIST: 1 /HPF — SIGNIFICANT CHANGE UP (ref 0–4)
SARS-COV-2 RNA SPEC QL NAA+PROBE: SIGNIFICANT CHANGE UP
SODIUM SERPL-SCNC: 138 MMOL/L — SIGNIFICANT CHANGE UP (ref 135–145)
SP GR SPEC: 1.01 — SIGNIFICANT CHANGE UP (ref 1–1.03)
UROBILINOGEN FLD QL: 0.2 MG/DL — SIGNIFICANT CHANGE UP (ref 0.2–1)
WBC # BLD: 10.39 K/UL — SIGNIFICANT CHANGE UP (ref 3.8–10.5)
WBC # FLD AUTO: 10.39 K/UL — SIGNIFICANT CHANGE UP (ref 3.8–10.5)
WBC UR QL: 3 /HPF — SIGNIFICANT CHANGE UP (ref 0–5)

## 2024-09-24 PROCEDURE — 99285 EMERGENCY DEPT VISIT HI MDM: CPT

## 2024-09-24 PROCEDURE — 99222 1ST HOSP IP/OBS MODERATE 55: CPT

## 2024-09-24 RX ORDER — FLUTICASONE FUROATE, UMECLIDINIUM BROMIDE AND VILANTEROL TRIFENATATE 100; 62.5; 25 UG/1; UG/1; UG/1
1 POWDER RESPIRATORY (INHALATION)
Refills: 0 | DISCHARGE

## 2024-09-24 RX ORDER — ALCOHOL ANTISEPTIC PADS
12.5 PADS, MEDICATED (EA) TOPICAL ONCE
Refills: 0 | Status: COMPLETED | OUTPATIENT
Start: 2024-09-24 | End: 2024-09-25

## 2024-09-24 RX ORDER — ALCOHOL ANTISEPTIC PADS
15 PADS, MEDICATED (EA) TOPICAL ONCE
Refills: 0 | Status: DISCONTINUED | OUTPATIENT
Start: 2024-09-24 | End: 2024-09-30

## 2024-09-24 RX ORDER — SODIUM CHLORIDE IRRIG SOLUTION 0.9 %
1000 SOLUTION, IRRIGATION IRRIGATION
Refills: 0 | Status: COMPLETED | OUTPATIENT
Start: 2024-09-24 | End: 2024-09-24

## 2024-09-24 RX ORDER — ALCOHOL ANTISEPTIC PADS
12.5 PADS, MEDICATED (EA) TOPICAL ONCE
Refills: 0 | Status: DISCONTINUED | OUTPATIENT
Start: 2024-09-24 | End: 2024-09-30

## 2024-09-24 RX ORDER — ASPIRIN 325 MG
81 TABLET ORAL DAILY
Refills: 0 | Status: DISCONTINUED | OUTPATIENT
Start: 2024-09-24 | End: 2024-09-30

## 2024-09-24 RX ORDER — RANOLAZINE 500 MG/1
500 TABLET, EXTENDED RELEASE ORAL
Refills: 0 | DISCHARGE

## 2024-09-24 RX ORDER — RANOLAZINE 500 MG/1
500 TABLET, EXTENDED RELEASE ORAL
Refills: 0 | Status: DISCONTINUED | OUTPATIENT
Start: 2024-09-24 | End: 2024-09-30

## 2024-09-24 RX ORDER — FLUTICASONE PROPION/SALMETEROL 100-50 MCG
1 BLISTER, WITH INHALATION DEVICE INHALATION
Refills: 0 | Status: DISCONTINUED | OUTPATIENT
Start: 2024-09-24 | End: 2024-09-30

## 2024-09-24 RX ORDER — SODIUM CHLORIDE IRRIG SOLUTION 0.9 %
1000 SOLUTION, IRRIGATION IRRIGATION
Refills: 0 | Status: DISCONTINUED | OUTPATIENT
Start: 2024-09-24 | End: 2024-09-30

## 2024-09-24 RX ORDER — ACETAMINOPHEN 325 MG
650 TABLET ORAL EVERY 6 HOURS
Refills: 0 | Status: DISCONTINUED | OUTPATIENT
Start: 2024-09-24 | End: 2024-09-30

## 2024-09-24 RX ORDER — METFORMIN HCL 500 MG
1 TABLET ORAL
Refills: 0 | DISCHARGE

## 2024-09-24 RX ORDER — ALCOHOL ANTISEPTIC PADS
25 PADS, MEDICATED (EA) TOPICAL ONCE
Refills: 0 | Status: DISCONTINUED | OUTPATIENT
Start: 2024-09-24 | End: 2024-09-30

## 2024-09-24 RX ORDER — GABAPENTIN 800 MG/1
300 TABLET, FILM COATED ORAL
Refills: 0 | Status: DISCONTINUED | OUTPATIENT
Start: 2024-09-24 | End: 2024-09-30

## 2024-09-24 RX ORDER — ENOXAPARIN SODIUM 150 MG/ML
40 INJECTION SUBCUTANEOUS EVERY 24 HOURS
Refills: 0 | Status: DISCONTINUED | OUTPATIENT
Start: 2024-09-24 | End: 2024-09-27

## 2024-09-24 RX ORDER — 5-HYDROXYTRYPTOPHAN (5-HTP) 100 MG
3 TABLET,DISINTEGRATING ORAL AT BEDTIME
Refills: 0 | Status: DISCONTINUED | OUTPATIENT
Start: 2024-09-24 | End: 2024-09-30

## 2024-09-24 RX ORDER — GLUCAGON INJECTION, SOLUTION 0.5 MG/.1ML
1 INJECTION, SOLUTION SUBCUTANEOUS ONCE
Refills: 0 | Status: COMPLETED | OUTPATIENT
Start: 2024-09-24

## 2024-09-24 RX ORDER — TIOTROPIUM BROMIDE INHALATION SPRAY 3.12 UG/1
2 SPRAY, METERED RESPIRATORY (INHALATION) DAILY
Refills: 0 | Status: DISCONTINUED | OUTPATIENT
Start: 2024-09-24 | End: 2024-09-30

## 2024-09-24 RX ORDER — ATORVASTATIN CALCIUM 10 MG/1
40 TABLET, FILM COATED ORAL AT BEDTIME
Refills: 0 | Status: DISCONTINUED | OUTPATIENT
Start: 2024-09-24 | End: 2024-09-30

## 2024-09-24 RX ORDER — GLUCAGON INJECTION, SOLUTION 0.5 MG/.1ML
1 INJECTION, SOLUTION SUBCUTANEOUS ONCE
Refills: 0 | Status: COMPLETED | OUTPATIENT
Start: 2024-09-24 | End: 2024-09-24

## 2024-09-24 RX ADMIN — TIOTROPIUM BROMIDE INHALATION SPRAY 2 PUFF(S): 3.12 SPRAY, METERED RESPIRATORY (INHALATION) at 18:19

## 2024-09-24 RX ADMIN — Medication 1 TABLET(S): at 09:38

## 2024-09-24 RX ADMIN — GABAPENTIN 300 MILLIGRAM(S): 800 TABLET, FILM COATED ORAL at 18:18

## 2024-09-24 RX ADMIN — Medication 20 MILLIGRAM(S): at 12:17

## 2024-09-24 RX ADMIN — Medication 1000 MILLILITER(S): at 10:28

## 2024-09-24 RX ADMIN — ATORVASTATIN CALCIUM 40 MILLIGRAM(S): 10 TABLET, FILM COATED ORAL at 22:46

## 2024-09-24 RX ADMIN — Medication 1 DOSE(S): at 18:19

## 2024-09-24 RX ADMIN — RANOLAZINE 500 MILLIGRAM(S): 500 TABLET, EXTENDED RELEASE ORAL at 18:16

## 2024-09-24 RX ADMIN — Medication 81 MILLIGRAM(S): at 12:17

## 2024-09-24 RX ADMIN — Medication 500 MILLILITER(S): at 06:08

## 2024-09-24 NOTE — H&P ADULT - ASSESSMENT
74 years old female with h/o HTN, HLD, CAD s/p PCI, asthma, DM was brought in to ED with low blood sugar with AMS. Per daughter, patient has been having low blood sugar in early monring for last 3 days with confusion. No recent change in insulin regimens ( on Semglee 18 unit qAM). No nausea, vomiting or diarrhea. No fever or chills  Hemodynamically stable, afebrile, sat well at RA. Hypoglycemic to 55 in triage. No leukocytosis, plt 126, Cr 1.14. Flu/COVID negative

## 2024-09-24 NOTE — ED ADULT TRIAGE NOTE - CHIEF COMPLAINT QUOTE
BIBA, AMS,  per EMS FS53, EMS gave 1mg glucagon po at 0335.    pt received Lantus-18units at 8pm,  pt has been eating/drinking.  (PMH-DM, asthma,  HTN, stents x 3),  per son,  pt is at baseline mental status at this time.  FS in triage-55, 2 juices given.

## 2024-09-24 NOTE — H&P ADULT - HISTORY OF PRESENT ILLNESS
talked to daughter Queta ( [phone])  74 years old female with h/o HTN, HLD, CAD s/p PCI, asthma, DM was brought in to ED with low blood sugar with AMS. Per daughter, patient has been having low blood sugar in early monring for last 3 days with confusion. No recent change in insulin regimens ( on Semglee 18 unit qAM). No nausea, vomiting or diarrhea. No fever or chills  Hemodynamically stable, afebrile, sat well at RA. Hypoglycemic to 55 in triage. No leukocytosis, plt 126, Cr 1.14. Flu/COVID negative

## 2024-09-24 NOTE — H&P ADULT - NSHPPHYSICALEXAM_GEN_ALL_CORE
CONSTITUTIONAL: alert and cooperative, no acute distress  EYES: PERRL, no scleral icterus  ENT: Mucosa moist, tongue normal  NECK: Neck supple, trachea midline, non-tender  CARDIAC: Normal S1 and S2. Regular rate and rhythms. No Pedal edema. Peripheral pulses intact  LUNGS: Equal air entry both lungs. No rales, rhonchi, wheezing. Normal respiratory effort.   ABDOMEN: Soft, nondistended, nontender. No guarding or rebound tenderness. No hepatomegaly or splenomegaly. Bowel sound normal.   MUSCULOSKELETAL: Normocephalic, atraumatic. No significant deformity or joint abnormality  NEUROLOGICAL: No gross motor or sensory deficits  SKIN: no lesions or eruptions. Normal turgor  PSYCHIATRIC: A&O x 3, appropriate mood and affect

## 2024-09-24 NOTE — ED PROVIDER NOTE - CLINICAL SUMMARY MEDICAL DECISION MAKING FREE TEXT BOX
75 yo F With hypoglycemia, possibly secondary to accidental insulin dose before bed, possible uti, doubt viral syndrome  -finger stick q hr, cbc, cmp, ua/cx, flu/covid, d5 1/2 ns drip  -f/u results, reeval

## 2024-09-24 NOTE — PATIENT PROFILE ADULT - FALL HARM RISK - UNIVERSAL INTERVENTIONS
Bed in lowest position, wheels locked, appropriate side rails in place/Call bell, personal items and telephone in reach/Instruct patient to call for assistance before getting out of bed or chair/Non-slip footwear when patient is out of bed/[place] to call system/Physically safe environment - no spills, clutter or unnecessary equipment/Purposeful Proactive Rounding/Room/bathroom lighting operational, light cord in reach

## 2024-09-24 NOTE — ED ADULT NURSE NOTE - OBJECTIVE STATEMENT
BIBA, AMS,  per EMS FS53, EMS gave 1mg glucagon po at 0335.    pt received Lantus-18units at 8pm,   pt is at baseline mental status at this time.  FS in triage-55, 2 juices given. PMH-DM, asthma,  HTN, stents x 3

## 2024-09-24 NOTE — ED PROVIDER NOTE - PROGRESS NOTE DETAILS
pt signed out to me from Dr Awad, pt presented with confusion and hypoglycemia, given dextrose in the field, currently on dextrose infusion, pending ua, bs improved, will reasess and dispo ua positive for UTI, antibiotics ordered, pt's daughter is at the bedside, both updated, her daughter does express concern that for the last three mornings pt has been waking up confused and hypoglycemic, last night pt reports that she may have accidently taken extra insulin

## 2024-09-24 NOTE — ED PROVIDER NOTE - OBJECTIVE STATEMENT
Pt. prefers that  translate Italian:  73 yo F with hypoglycemia.  Pt. thinks she may have taken her nightly insulin twice tonight by accident.  Pt. has no specific complaints.  EMS was called because family found her in bed confused, and EMS found sugar to be 53 and glucagon was given with improvement.  Pt. is currently drinking juice and eating in ER without problem, back to normal MS,  at bedside corroborates history.  ROS: negative for fever, cough, headache, chest pain, shortness of breath, abd pain, nausea, vomiting, diarrhea, rash, paresthesia, and weakness--all other systems reviewed are negative.   PMH: HTN, DM, CAD s/p PCI asthma, arthritis; Meds: See EMR for list; SH: Denies smoking/drinking/drug use

## 2024-09-24 NOTE — H&P ADULT - PROBLEM SELECTOR PLAN 2
on Semglee 18 unit qAM at home  hypoglycemic for last 3 days in early morning ( glucose 40s)  Hold lantus. Check A1c, if A1c is acceptable, patient might not need insulin outpatient. Also on metformin and pioglitazone at home  POC glucose q6hr and hypoglycemia protocol  Outpatient Endo follow up ( following with Endo in clinic)

## 2024-09-24 NOTE — ED PROVIDER NOTE - PHYSICAL EXAMINATION
Vitals: WNL  Gen: AAOx3, NAD, sitting comfortably in stretcher, responsive to questions,  at bedside   Head: ncat, perrla, eomi b/l  Neck: supple, no lymphadenopathy, no midline deviation  Heart: rrr, no m/r/g  Lungs: CTA b/l, no rales/ronchi/wheezes  Abd: soft, nontender, non-distended, no rebound or guarding  Ext: no clubbing/cyanosis/edema  Neuro: sensation and muscle strength intact b/l

## 2024-09-24 NOTE — H&P ADULT - PROBLEM SELECTOR PLAN 1
metabolic encephalopathy due to hypoglycemia  mental status at baseline  monitor glucose q6hr, hypoglycemia protocol

## 2024-09-25 DIAGNOSIS — E87.5 HYPERKALEMIA: ICD-10-CM

## 2024-09-25 DIAGNOSIS — J45.909 UNSPECIFIED ASTHMA, UNCOMPLICATED: ICD-10-CM

## 2024-09-25 LAB
ALBUMIN SERPL ELPH-MCNC: 2.7 G/DL — LOW (ref 3.3–5)
ALP SERPL-CCNC: 115 U/L — SIGNIFICANT CHANGE UP (ref 40–120)
ALT FLD-CCNC: 19 U/L — SIGNIFICANT CHANGE UP (ref 12–78)
ANION GAP SERPL CALC-SCNC: 4 MMOL/L — LOW (ref 5–17)
AST SERPL-CCNC: 18 U/L — SIGNIFICANT CHANGE UP (ref 15–37)
BILIRUB SERPL-MCNC: 0.3 MG/DL — SIGNIFICANT CHANGE UP (ref 0.2–1.2)
BUN SERPL-MCNC: 27 MG/DL — HIGH (ref 7–23)
CALCIUM SERPL-MCNC: 9.2 MG/DL — SIGNIFICANT CHANGE UP (ref 8.5–10.1)
CHLORIDE SERPL-SCNC: 108 MMOL/L — SIGNIFICANT CHANGE UP (ref 96–108)
CO2 SERPL-SCNC: 26 MMOL/L — SIGNIFICANT CHANGE UP (ref 22–31)
CREAT SERPL-MCNC: 1.24 MG/DL — SIGNIFICANT CHANGE UP (ref 0.5–1.3)
EGFR: 45 ML/MIN/1.73M2 — LOW
GLUCOSE BLDC GLUCOMTR-MCNC: 100 MG/DL — HIGH (ref 70–99)
GLUCOSE BLDC GLUCOMTR-MCNC: 132 MG/DL — HIGH (ref 70–99)
GLUCOSE BLDC GLUCOMTR-MCNC: 158 MG/DL — HIGH (ref 70–99)
GLUCOSE SERPL-MCNC: 91 MG/DL — SIGNIFICANT CHANGE UP (ref 70–99)
HCT VFR BLD CALC: 35.8 % — SIGNIFICANT CHANGE UP (ref 34.5–45)
HGB BLD-MCNC: 11 G/DL — LOW (ref 11.5–15.5)
MAGNESIUM SERPL-MCNC: 1.6 MG/DL — SIGNIFICANT CHANGE UP (ref 1.6–2.6)
MCHC RBC-ENTMCNC: 28.1 PG — SIGNIFICANT CHANGE UP (ref 27–34)
MCHC RBC-ENTMCNC: 30.7 G/DL — LOW (ref 32–36)
MCV RBC AUTO: 91.3 FL — SIGNIFICANT CHANGE UP (ref 80–100)
NRBC # BLD: 0 /100 WBCS — SIGNIFICANT CHANGE UP (ref 0–0)
PHOSPHATE SERPL-MCNC: 4.7 MG/DL — HIGH (ref 2.5–4.5)
PLATELET # BLD AUTO: 93 K/UL — LOW (ref 150–400)
POTASSIUM SERPL-MCNC: 5.5 MMOL/L — HIGH (ref 3.5–5.3)
POTASSIUM SERPL-SCNC: 5.5 MMOL/L — HIGH (ref 3.5–5.3)
PROT SERPL-MCNC: 6.6 GM/DL — SIGNIFICANT CHANGE UP (ref 6–8.3)
RBC # BLD: 3.92 M/UL — SIGNIFICANT CHANGE UP (ref 3.8–5.2)
RBC # FLD: 13.5 % — SIGNIFICANT CHANGE UP (ref 10.3–14.5)
SODIUM SERPL-SCNC: 138 MMOL/L — SIGNIFICANT CHANGE UP (ref 135–145)
TSH SERPL-MCNC: 2.45 UIU/ML — SIGNIFICANT CHANGE UP (ref 0.36–3.74)
WBC # BLD: 5.5 K/UL — SIGNIFICANT CHANGE UP (ref 3.8–10.5)
WBC # FLD AUTO: 5.5 K/UL — SIGNIFICANT CHANGE UP (ref 3.8–10.5)

## 2024-09-25 PROCEDURE — 99233 SBSQ HOSP IP/OBS HIGH 50: CPT

## 2024-09-25 RX ORDER — SODIUM ZIRCONIUM CYCLOSILICATE 10 G/10G
10 POWDER, FOR SUSPENSION ORAL ONCE
Refills: 0 | Status: COMPLETED | OUTPATIENT
Start: 2024-09-25 | End: 2024-09-25

## 2024-09-25 RX ADMIN — TIOTROPIUM BROMIDE INHALATION SPRAY 2 PUFF(S): 3.12 SPRAY, METERED RESPIRATORY (INHALATION) at 11:23

## 2024-09-25 RX ADMIN — Medication 1 DOSE(S): at 06:58

## 2024-09-25 RX ADMIN — ATORVASTATIN CALCIUM 40 MILLIGRAM(S): 10 TABLET, FILM COATED ORAL at 21:44

## 2024-09-25 RX ADMIN — RANOLAZINE 500 MILLIGRAM(S): 500 TABLET, EXTENDED RELEASE ORAL at 17:29

## 2024-09-25 RX ADMIN — SODIUM ZIRCONIUM CYCLOSILICATE 10 GRAM(S): 10 POWDER, FOR SUSPENSION ORAL at 11:18

## 2024-09-25 RX ADMIN — ENOXAPARIN SODIUM 40 MILLIGRAM(S): 150 INJECTION SUBCUTANEOUS at 05:21

## 2024-09-25 RX ADMIN — Medication 81 MILLIGRAM(S): at 11:19

## 2024-09-25 RX ADMIN — Medication 20 MILLIGRAM(S): at 05:17

## 2024-09-25 RX ADMIN — RANOLAZINE 500 MILLIGRAM(S): 500 TABLET, EXTENDED RELEASE ORAL at 05:17

## 2024-09-25 RX ADMIN — GABAPENTIN 300 MILLIGRAM(S): 800 TABLET, FILM COATED ORAL at 17:27

## 2024-09-25 RX ADMIN — GABAPENTIN 300 MILLIGRAM(S): 800 TABLET, FILM COATED ORAL at 05:17

## 2024-09-25 RX ADMIN — Medication 12.5 GRAM(S): at 00:12

## 2024-09-25 NOTE — PROGRESS NOTE ADULT - PROBLEM SELECTOR PLAN 1
metabolic encephalopathy due to hypoglycemia  Denies urinary symptoms. Denies shortness of breath/cough  mental status at baseline  continue to monitor glucose q6hr, hypoglycemia protocol No

## 2024-09-25 NOTE — PROGRESS NOTE ADULT - PROBLEM SELECTOR PLAN 3
monitor BP and titrate BP med  C/w with lisinopril and ranexa  f/u BMP, if remains hyperkalemic, recommend to discontinue ACE

## 2024-09-25 NOTE — PROGRESS NOTE ADULT - PROBLEM SELECTOR PLAN 2
on Semglee 18 unit qAM at home. Also on metformin and pioglitazone at home  hypoglycemic for last 3 days in early morning ( glucose 40s)  A1C 7.5 (8/24)  Hold lantus.   Review glucose for next 24hrs. If tolerating PO and FS within acceptable range, can discontinue on PO regimen, will dc insulin  POC glucose q6hr and hypoglycemia protocol  Outpatient Endo follow up ( following with Endo in clinic)

## 2024-09-26 LAB
ANION GAP SERPL CALC-SCNC: 2 MMOL/L — LOW (ref 5–17)
BUN SERPL-MCNC: 27 MG/DL — HIGH (ref 7–23)
CALCIUM SERPL-MCNC: 8.9 MG/DL — SIGNIFICANT CHANGE UP (ref 8.5–10.1)
CHLORIDE SERPL-SCNC: 107 MMOL/L — SIGNIFICANT CHANGE UP (ref 96–108)
CO2 SERPL-SCNC: 29 MMOL/L — SIGNIFICANT CHANGE UP (ref 22–31)
CREAT SERPL-MCNC: 1 MG/DL — SIGNIFICANT CHANGE UP (ref 0.5–1.3)
EGFR: 58 ML/MIN/1.73M2 — LOW
GLUCOSE BLDC GLUCOMTR-MCNC: 134 MG/DL — HIGH (ref 70–99)
GLUCOSE BLDC GLUCOMTR-MCNC: 145 MG/DL — HIGH (ref 70–99)
GLUCOSE BLDC GLUCOMTR-MCNC: 177 MG/DL — HIGH (ref 70–99)
GLUCOSE BLDC GLUCOMTR-MCNC: 184 MG/DL — HIGH (ref 70–99)
GLUCOSE BLDC GLUCOMTR-MCNC: 225 MG/DL — HIGH (ref 70–99)
GLUCOSE SERPL-MCNC: 152 MG/DL — HIGH (ref 70–99)
HCT VFR BLD CALC: 35.7 % — SIGNIFICANT CHANGE UP (ref 34.5–45)
HCV RNA SPEC NAA+PROBE-LOG IU: SIGNIFICANT CHANGE UP
HCV RNA SPEC NAA+PROBE-LOG IU: SIGNIFICANT CHANGE UP LOGIU/ML
HGB BLD-MCNC: 11 G/DL — LOW (ref 11.5–15.5)
MAGNESIUM SERPL-MCNC: 1.7 MG/DL — SIGNIFICANT CHANGE UP (ref 1.6–2.6)
MCHC RBC-ENTMCNC: 28.4 PG — SIGNIFICANT CHANGE UP (ref 27–34)
MCHC RBC-ENTMCNC: 30.8 G/DL — LOW (ref 32–36)
MCV RBC AUTO: 92.2 FL — SIGNIFICANT CHANGE UP (ref 80–100)
NRBC # BLD: 0 /100 WBCS — SIGNIFICANT CHANGE UP (ref 0–0)
PHOSPHATE SERPL-MCNC: 3.9 MG/DL — SIGNIFICANT CHANGE UP (ref 2.5–4.5)
PLATELET # BLD AUTO: 122 K/UL — LOW (ref 150–400)
POTASSIUM SERPL-MCNC: 5.4 MMOL/L — HIGH (ref 3.5–5.3)
POTASSIUM SERPL-MCNC: 5.8 MMOL/L — HIGH (ref 3.5–5.3)
POTASSIUM SERPL-SCNC: 5.4 MMOL/L — HIGH (ref 3.5–5.3)
POTASSIUM SERPL-SCNC: 5.8 MMOL/L — HIGH (ref 3.5–5.3)
RBC # BLD: 3.87 M/UL — SIGNIFICANT CHANGE UP (ref 3.8–5.2)
RBC # FLD: 13.5 % — SIGNIFICANT CHANGE UP (ref 10.3–14.5)
SODIUM SERPL-SCNC: 138 MMOL/L — SIGNIFICANT CHANGE UP (ref 135–145)
WBC # BLD: 5.1 K/UL — SIGNIFICANT CHANGE UP (ref 3.8–10.5)
WBC # FLD AUTO: 5.1 K/UL — SIGNIFICANT CHANGE UP (ref 3.8–10.5)

## 2024-09-26 PROCEDURE — 99232 SBSQ HOSP IP/OBS MODERATE 35: CPT

## 2024-09-26 RX ORDER — SENNOSIDES 8.6 MG
2 TABLET ORAL AT BEDTIME
Refills: 0 | Status: DISCONTINUED | OUTPATIENT
Start: 2024-09-26 | End: 2024-09-30

## 2024-09-26 RX ORDER — SODIUM ZIRCONIUM CYCLOSILICATE 10 G/10G
10 POWDER, FOR SUSPENSION ORAL ONCE
Refills: 0 | Status: COMPLETED | OUTPATIENT
Start: 2024-09-26 | End: 2024-09-27

## 2024-09-26 RX ORDER — SODIUM ZIRCONIUM CYCLOSILICATE 10 G/10G
10 POWDER, FOR SUSPENSION ORAL ONCE
Refills: 0 | Status: COMPLETED | OUTPATIENT
Start: 2024-09-26 | End: 2024-09-26

## 2024-09-26 RX ORDER — SODIUM CHLORIDE 0.9 % (FLUSH) 0.9 %
1000 SYRINGE (ML) INJECTION
Refills: 0 | Status: DISCONTINUED | OUTPATIENT
Start: 2024-09-26 | End: 2024-09-30

## 2024-09-26 RX ADMIN — ENOXAPARIN SODIUM 40 MILLIGRAM(S): 150 INJECTION SUBCUTANEOUS at 06:03

## 2024-09-26 RX ADMIN — Medication 1 DOSE(S): at 06:03

## 2024-09-26 RX ADMIN — TIOTROPIUM BROMIDE INHALATION SPRAY 2 PUFF(S): 3.12 SPRAY, METERED RESPIRATORY (INHALATION) at 13:11

## 2024-09-26 RX ADMIN — ATORVASTATIN CALCIUM 40 MILLIGRAM(S): 10 TABLET, FILM COATED ORAL at 21:15

## 2024-09-26 RX ADMIN — Medication 81 MILLIGRAM(S): at 11:09

## 2024-09-26 RX ADMIN — RANOLAZINE 500 MILLIGRAM(S): 500 TABLET, EXTENDED RELEASE ORAL at 06:02

## 2024-09-26 RX ADMIN — GABAPENTIN 300 MILLIGRAM(S): 800 TABLET, FILM COATED ORAL at 17:17

## 2024-09-26 RX ADMIN — Medication 20 MILLIGRAM(S): at 06:01

## 2024-09-26 RX ADMIN — GABAPENTIN 300 MILLIGRAM(S): 800 TABLET, FILM COATED ORAL at 06:02

## 2024-09-26 RX ADMIN — Medication 2 TABLET(S): at 21:15

## 2024-09-26 RX ADMIN — Medication 1 DOSE(S): at 17:17

## 2024-09-26 RX ADMIN — Medication 17 GRAM(S): at 18:37

## 2024-09-26 RX ADMIN — SODIUM ZIRCONIUM CYCLOSILICATE 10 GRAM(S): 10 POWDER, FOR SUSPENSION ORAL at 09:36

## 2024-09-26 RX ADMIN — RANOLAZINE 500 MILLIGRAM(S): 500 TABLET, EXTENDED RELEASE ORAL at 17:16

## 2024-09-26 NOTE — PROGRESS NOTE ADULT - ASSESSMENT
74 years old female with h/o HTN, HLD, CAD s/p PCI, asthma, DM was brought in to ED with low blood sugar with AMS. Per daughter, patient has been having low blood sugar in early monring for last 3 days with confusion. No recent change in insulin regimens ( on Semglee 18 unit qAM). No nausea, vomiting or diarrhea. No fever or chills  Hemodynamically stable, afebrile, sat well at RA. Hypoglycemic to 55 in triage. No leukocytosis, plt 126, Cr 1.14. Flu/COVID negative    # Acute metabolic encephalopathy due to hypoglycemia - Denies urinary symptoms. Denies shortness of breath/cough  mental status at baseline per friends at bedside.  continue to monitor glucose q6hr, hypoglycemia protocol.  FS stable without standing insulin.  Would expect eventual d/c home on SSI      # Type 2 diabetes mellitus with hypoglycemia.   ·  Plan: on Semglee 18 unit qAM at home. Also on metformin and pioglitazone at home  hypoglycemic for last 3 days in early morning ( glucose 40s)  A1C 7.5 (8/24)  Hold lantus.   Review glucose for next 24hrs. If tolerating PO and FS within acceptable range, can discontinue on PO regimen, will dc insulin  POC glucose q6hr and hypoglycemia protocol  Outpatient Endo follow up ( following with Endo in clinic).    #  Benign essential HTN.   ·  Plan: monitor BP and titrate BP med.  D/c ACEI in setting of hyperkalemia    # Asymptomatic Bacteruria - pt asymptomatic.  UA with few WBCs.  UCx noted.  As pt asymptomatic, would observe off Abx.  If she develops fever or s/s of active infection, will reconsider.      #  Hyperkalemia.   ·  Plan: on ACEi  lokelma 10G X1  f/u BMP in AM     Problem/Plan - 5:  ·  Problem: Hyperlipidemia, unspecified.   ·  Plan: on atorvastatin 40mg hs.     Problem/Plan - 6:  ·  Problem: CAD (coronary artery disease).   ·  Plan: on aspirin, statin, BB,  also on ranolazine 500mg bid.     Problem/Plan - 7:  ·  Problem: Asthma.   ·  Plan: c/w advair/spiriva.    # Inpatient DVT Prophylaxis - Lovenox subcut    Probable d/c home 9/27 if FS remain stable, and K wnl

## 2024-09-27 LAB
-  AMPICILLIN/SULBACTAM: SIGNIFICANT CHANGE UP
-  AMPICILLIN: SIGNIFICANT CHANGE UP
-  AZTREONAM: SIGNIFICANT CHANGE UP
-  CEFAZOLIN: SIGNIFICANT CHANGE UP
-  CEFEPIME: SIGNIFICANT CHANGE UP
-  CEFTRIAXONE: SIGNIFICANT CHANGE UP
-  CEFUROXIME: SIGNIFICANT CHANGE UP
-  CIPROFLOXACIN: SIGNIFICANT CHANGE UP
-  ERTAPENEM: SIGNIFICANT CHANGE UP
-  GENTAMICIN: SIGNIFICANT CHANGE UP
-  IMIPENEM: SIGNIFICANT CHANGE UP
-  LEVOFLOXACIN: SIGNIFICANT CHANGE UP
-  MEROPENEM: SIGNIFICANT CHANGE UP
-  NITROFURANTOIN: SIGNIFICANT CHANGE UP
-  PIPERACILLIN/TAZOBACTAM: SIGNIFICANT CHANGE UP
-  TOBRAMYCIN: SIGNIFICANT CHANGE UP
-  TRIMETHOPRIM/SULFAMETHOXAZOLE: SIGNIFICANT CHANGE UP
ANION GAP SERPL CALC-SCNC: 5 MMOL/L — SIGNIFICANT CHANGE UP (ref 5–17)
BUN SERPL-MCNC: 27 MG/DL — HIGH (ref 7–23)
CALCIUM SERPL-MCNC: 9 MG/DL — SIGNIFICANT CHANGE UP (ref 8.5–10.1)
CHLORIDE SERPL-SCNC: 106 MMOL/L — SIGNIFICANT CHANGE UP (ref 96–108)
CO2 SERPL-SCNC: 28 MMOL/L — SIGNIFICANT CHANGE UP (ref 22–31)
CREAT SERPL-MCNC: 1.07 MG/DL — SIGNIFICANT CHANGE UP (ref 0.5–1.3)
CULTURE RESULTS: ABNORMAL
EGFR: 54 ML/MIN/1.73M2 — LOW
GLUCOSE BLDC GLUCOMTR-MCNC: 181 MG/DL — HIGH (ref 70–99)
GLUCOSE BLDC GLUCOMTR-MCNC: 196 MG/DL — HIGH (ref 70–99)
GLUCOSE BLDC GLUCOMTR-MCNC: 282 MG/DL — HIGH (ref 70–99)
GLUCOSE BLDC GLUCOMTR-MCNC: 290 MG/DL — HIGH (ref 70–99)
GLUCOSE BLDC GLUCOMTR-MCNC: 295 MG/DL — HIGH (ref 70–99)
GLUCOSE SERPL-MCNC: 201 MG/DL — HIGH (ref 70–99)
HCT VFR BLD CALC: 35.6 % — SIGNIFICANT CHANGE UP (ref 34.5–45)
HGB BLD-MCNC: 10.8 G/DL — LOW (ref 11.5–15.5)
MCHC RBC-ENTMCNC: 28.2 PG — SIGNIFICANT CHANGE UP (ref 27–34)
MCHC RBC-ENTMCNC: 30.3 G/DL — LOW (ref 32–36)
MCV RBC AUTO: 93 FL — SIGNIFICANT CHANGE UP (ref 80–100)
METHOD TYPE: SIGNIFICANT CHANGE UP
NRBC # BLD: 0 /100 WBCS — SIGNIFICANT CHANGE UP (ref 0–0)
ORGANISM # SPEC MICROSCOPIC CNT: ABNORMAL
ORGANISM # SPEC MICROSCOPIC CNT: SIGNIFICANT CHANGE UP
PLATELET # BLD AUTO: 131 K/UL — LOW (ref 150–400)
POTASSIUM SERPL-MCNC: 5.2 MMOL/L — SIGNIFICANT CHANGE UP (ref 3.5–5.3)
POTASSIUM SERPL-MCNC: 5.6 MMOL/L — HIGH (ref 3.5–5.3)
POTASSIUM SERPL-SCNC: 5.2 MMOL/L — SIGNIFICANT CHANGE UP (ref 3.5–5.3)
POTASSIUM SERPL-SCNC: 5.6 MMOL/L — HIGH (ref 3.5–5.3)
RBC # BLD: 3.83 M/UL — SIGNIFICANT CHANGE UP (ref 3.8–5.2)
RBC # FLD: 13.4 % — SIGNIFICANT CHANGE UP (ref 10.3–14.5)
SODIUM SERPL-SCNC: 139 MMOL/L — SIGNIFICANT CHANGE UP (ref 135–145)
SPECIMEN SOURCE: SIGNIFICANT CHANGE UP
WBC # BLD: 4.8 K/UL — SIGNIFICANT CHANGE UP (ref 3.8–10.5)
WBC # FLD AUTO: 4.8 K/UL — SIGNIFICANT CHANGE UP (ref 3.8–10.5)

## 2024-09-27 PROCEDURE — 93010 ELECTROCARDIOGRAM REPORT: CPT

## 2024-09-27 PROCEDURE — 99232 SBSQ HOSP IP/OBS MODERATE 35: CPT

## 2024-09-27 RX ORDER — SODIUM ZIRCONIUM CYCLOSILICATE 10 G/10G
10 POWDER, FOR SUSPENSION ORAL ONCE
Refills: 0 | Status: COMPLETED | OUTPATIENT
Start: 2024-09-27 | End: 2024-09-27

## 2024-09-27 RX ADMIN — Medication 100 MILLILITER(S): at 05:39

## 2024-09-27 RX ADMIN — TIOTROPIUM BROMIDE INHALATION SPRAY 2 PUFF(S): 3.12 SPRAY, METERED RESPIRATORY (INHALATION) at 11:47

## 2024-09-27 RX ADMIN — GABAPENTIN 300 MILLIGRAM(S): 800 TABLET, FILM COATED ORAL at 17:25

## 2024-09-27 RX ADMIN — Medication 1 DOSE(S): at 05:23

## 2024-09-27 RX ADMIN — Medication 81 MILLIGRAM(S): at 11:46

## 2024-09-27 RX ADMIN — ATORVASTATIN CALCIUM 40 MILLIGRAM(S): 10 TABLET, FILM COATED ORAL at 23:22

## 2024-09-27 RX ADMIN — Medication 17 GRAM(S): at 11:46

## 2024-09-27 RX ADMIN — RANOLAZINE 500 MILLIGRAM(S): 500 TABLET, EXTENDED RELEASE ORAL at 17:25

## 2024-09-27 RX ADMIN — GABAPENTIN 300 MILLIGRAM(S): 800 TABLET, FILM COATED ORAL at 05:23

## 2024-09-27 RX ADMIN — SODIUM ZIRCONIUM CYCLOSILICATE 10 GRAM(S): 10 POWDER, FOR SUSPENSION ORAL at 09:39

## 2024-09-27 RX ADMIN — Medication 2 TABLET(S): at 23:22

## 2024-09-27 RX ADMIN — ENOXAPARIN SODIUM 40 MILLIGRAM(S): 150 INJECTION SUBCUTANEOUS at 05:22

## 2024-09-27 RX ADMIN — SODIUM ZIRCONIUM CYCLOSILICATE 10 GRAM(S): 10 POWDER, FOR SUSPENSION ORAL at 05:22

## 2024-09-27 RX ADMIN — RANOLAZINE 500 MILLIGRAM(S): 500 TABLET, EXTENDED RELEASE ORAL at 05:22

## 2024-09-27 RX ADMIN — Medication 1 DOSE(S): at 17:25

## 2024-09-27 NOTE — PROGRESS NOTE ADULT - ASSESSMENT
74 years old female with h/o HTN, HLD, CAD s/p PCI, asthma, DM was brought in to ED with low blood sugar with AMS. Per daughter, patient has been having low blood sugar in early monring for last 3 days with confusion. No recent change in insulin regimens ( on Semglee 18 unit qAM). No nausea, vomiting or diarrhea. No fever or chills  Hemodynamically stable, afebrile, sat well at RA. Hypoglycemic to 55 in triage. No leukocytosis, plt 126, Cr 1.14. Flu/COVID negative    # Acute metabolic encephalopathy due to hypoglycemia - Denies urinary symptoms. Denies shortness of breath/cough  mental status at baseline per friends at bedside.  continue to monitor glucose q6hr, hypoglycemia protocol.  FS stable without standing insulin.  Would expect eventual d/c home on SSI      # Type 2 diabetes mellitus with hypoglycemia.   ·  Plan: on Semglee 18 unit qAM at home. Also on metformin and pioglitazone at home  hypoglycemic for last 3 days in early morning ( glucose 40s)  A1C 7.5 (8/24)  Hold lantus.   Review glucose for next 24hrs. If tolerating PO and FS within acceptable range, can discontinue on PO regimen, will dc insulin  POC glucose q6hr and hypoglycemia protocol  Outpatient Endo follow up ( following with Endo in clinic).    #  Benign essential HTN.   ·  Plan: monitor BP and titrate BP med.  D/c ACEI in setting of hyperkalemia    # Asymptomatic Bacteruria - pt asymptomatic.  UA with few WBCs.  UCx noted.  As pt asymptomatic, would observe off Abx.  If she develops fever or s/s of active infection, will reconsider.      #  Hyperkalemia - persistent hyperkalemia discussed with nephrology.  LIkely multifactorial with ACEI, Lovenox, Bactrim.  D/c'd ACEI, Lovenox.  REpeat K stable.    f/u BMP in AM     Problem/Plan - 5:  ·  Problem: Hyperlipidemia, unspecified.   ·  Plan: on atorvastatin 40mg hs.     Problem/Plan - 6:  ·  Problem: CAD (coronary artery disease).   ·  Plan: on aspirin, statin, BB,  also on ranolazine 500mg bid.     Problem/Plan - 7:  ·  Problem: Asthma.   ·  Plan: c/w advair/spiriva.    # Inpatient DVT Prophylaxis - ICDs    Possible d/c in am if K stable

## 2024-09-27 NOTE — PHYSICAL THERAPY INITIAL EVALUATION ADULT - PERTINENT HX OF CURRENT PROBLEM, REHAB EVAL
74 years old female with h/o HTN, HLD, CAD s/p PCI, asthma, DM was brought in to ED with low blood sugar with AMS. Per daughter, patient has been having low blood sugar in early monring for last 3 days with confusion. No recent change in insulin regimens ( on Semglee 18 unit qAM). No nausea, vomiting or diarrhea. No fever or chills

## 2024-09-27 NOTE — CONSULT NOTE ADULT - SUBJECTIVE AND OBJECTIVE BOX
Patient chart reviewed, full consult to follow.   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         Patient chart reviewed, full consult to follow.   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 [facility] NEPHROLOGY SERVICES, [facility]  NEPHROLOGY AND HYPERTENSION  [address]  [address]  [place] [address]  [phone]    MD DELMIS BARRIGA MD YELENA ROSENBERG, MD BINNY KOSHY, MD CHRISTOPHER CAPUTO, MD EDWARD BOVER, MD      Information from chart:  "Patient is a 74y old  Female who presents with a chief complaint of hypoglycemia, AMS (27 Sep 2024 09:05)    HPI:  talked to daughter Queta ( [phone])  74 years old female with h/o HTN, HLD, CAD s/p PCI, asthma, DM was brought in to ED with low blood sugar with AMS. Per daughter, patient has been having low blood sugar in early monring for last 3 days with confusion. No recent change in insulin regimens ( on Semglee 18 unit qAM). No nausea, vomiting or diarrhea. No fever or chills  Hemodynamically stable, afebrile, sat well at RA. Hypoglycemic to 55 in triage. No leukocytosis, plt 126, Cr 1.14. Flu/COVID negative (24 Sep 2024 11:35)   "    PAST MEDICAL & SURGICAL HISTORY:  Type 2 diabetes mellitus without complication, without long-term current use of insulin      Coronary artery disease with other form of angina pectoris      Hypertension      Asthma      History of heart artery stent        FAMILY HISTORY:  FHx: myocardial infarction  Father      Allergies    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 (Short breath)  No Known Drug Allergies    Intolerances      Home Medications:  alendronate 70 mg oral tablet: 1 tab(s) orally once a week  home (04 Aug 2024 23:05)  Aspirin Enteric Coated 81 mg oral delayed release tablet: 1 tab(s) orally once a day  home (04 Aug 2024 23:05)  atorvastatin 40 mg oral tablet: 1 tab(s) orally once a day (04 Aug 2024 23:05)  gabapentin 300 mg oral capsule: orally 2 times a day (04 Aug 2024 23:05)  Insulin Glargine: 18 unit(s) once a day (24 Sep 2024 10:28)  lisinopril 20 mg oral tablet: 1 tab(s) orally once a day (24 Sep 2024 10:31)  MetFORMIN (Eqv-Glucophage XR) 500 mg oral tablet, extended release: 1 tab(s) orally once a day (24 Sep 2024 10:31)  pioglitazone 15 mg oral tablet: 1 tab(s) orally once a day (24 Sep 2024 10:31)  ranolazine 500 mg oral granule, extended release: 500 milligram(s) orally 2 times a day (24 Sep 2024 10:31)  Trelegy Ellipta 100 mcg-62.5 mcg-25 mcg/inh inhalation powder: 1 puff(s) inhaled once a day (24 Sep 2024 10:31)    MEDICATIONS  (STANDING):  aspirin enteric coated 81 milliGRAM(s) Oral daily  atorvastatin 40 milliGRAM(s) Oral at bedtime  dextrose 5%. 1000 milliLiter(s) (100 mL/Hr) IV Continuous <Continuous>  dextrose 5%. 1000 milliLiter(s) (50 mL/Hr) IV Continuous <Continuous>  dextrose 50% Injectable 25 Gram(s) IV Push once  dextrose 50% Injectable 12.5 Gram(s) IV Push once  dextrose 50% Injectable 25 Gram(s) IV Push once  dextrose Oral Gel 15 Gram(s) Oral once  enoxaparin Injectable 40 milliGRAM(s) SubCutaneous every 24 hours  fluticasone propionate/ salmeterol 100-50 MICROgram(s) Diskus 1 Dose(s) Inhalation two times a day  gabapentin 300 milliGRAM(s) Oral two times a day  polyethylene glycol 3350 17 Gram(s) Oral daily  ranolazine 500 milliGRAM(s) Oral two times a day  senna 2 Tablet(s) Oral at bedtime  sodium chloride 0.9%. 1000 milliLiter(s) (100 mL/Hr) IV Continuous <Continuous>  tiotropium 2.5 MICROgram(s) Inhaler 2 Puff(s) Inhalation daily    MEDICATIONS  (PRN):  acetaminophen     Tablet .. 650 milliGRAM(s) Oral every 6 hours PRN Mild Pain (1 - 3), Moderate Pain (4 - 6)  melatonin 3 milliGRAM(s) Oral at bedtime PRN Insomnia    Vital Signs Last 24 Hrs  T(C): 36.9 (27 Sep 2024 16:34), Max: 37.1 (27 Sep 2024 12:01)  T(F): 98.5 (27 Sep 2024 16:34), Max: 98.8 (27 Sep 2024 12:01)  HR: 75 (27 Sep 2024 16:34) (65 - 75)  BP: 120/70 (27 Sep 2024 16:34) (116/61 - 172/80)  BP(mean): --  RR: 18 (27 Sep 2024 16:34) (17 - 18)  SpO2: 95% (27 Sep 2024 16:34) (95% - 99%)    Parameters below as of 27 Sep 2024 12:01  Patient On (Oxygen Delivery Method): room air        Daily     Daily     09-27-24 @ 07:01  -  09-27-24 @ 19:08  --------------------------------------------------------  IN: 640 mL / OUT: 0 mL / NET: 640 mL      CAPILLARY BLOOD GLUCOSE      POCT Blood Glucose.: 295 mg/dL (27 Sep 2024 15:49)  POCT Blood Glucose.: 282 mg/dL (27 Sep 2024 11:51)  POCT Blood Glucose.: 196 mg/dL (27 Sep 2024 08:15)  POCT Blood Glucose.: 181 mg/dL (27 Sep 2024 05:32)  POCT Blood Glucose.: 225 mg/dL (26 Sep 2024 23:42)    PHYSICAL EXAM:      T(C): 36.9 (09-27-24 @ 16:34), Max: 37.1 (09-27-24 @ 12:01)  HR: 75 (09-27-24 @ 16:34) (65 - 75)  BP: 120/70 (09-27-24 @ 16:34) (116/61 - 172/80)  RR: 18 (09-27-24 @ 16:34) (17 - 18)  SpO2: 95% (09-27-24 @ 16:34) (95% - 99%)  Wt(kg): --  Lungs clear  Heart S1S2  Abd soft NT ND  Extremities:   tr edema              09-27    x   |  x   |  x   ----------------------------<  x   5.2   |  x   |  x     Ca    9.0      27 Sep 2024 06:07  Phos  3.9     09-26  Mg     1.7     09-26                            10.8   4.80  )-----------( 131      ( 27 Sep 2024 06:07 )             35.6     Creatinine Trend: 1.07<--, 1.00<--, 1.24<--, 1.14<--  Urinalysis Basic - ( 27 Sep 2024 06:07 )    Color: x / Appearance: x / SG: x / pH: x  Gluc: 201 mg/dL / Ketone: x  / Bili: x / Urobili: x   Blood: x / Protein: x / Nitrite: x   Leuk Esterase: x / RBC: x / WBC x   Sq Epi: x / Non Sq Epi: x / Bacteria: x            Assessment   Hyperkalemia with relatively preserved GFR;   Synergistic Lisinopril, Lovenox and bactrim with 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

## 2024-09-27 NOTE — PHYSICAL THERAPY INITIAL EVALUATION ADULT - ADDITIONAL COMMENTS
Pt lives in a pvt house with no stairs to negotiate. Daughter helps with adls. Pt ambulates with use of a SC.

## 2024-09-27 NOTE — PHYSICAL THERAPY INITIAL EVALUATION ADULT - DISCHARGE PLANNER MADE AWARE
Mirvaso Counseling: Mirvaso is a topical medication which can decrease superficial blood flow where applied. Side effects are uncommon and include stinging, redness and allergic reactions. yes

## 2024-09-28 ENCOUNTER — TRANSCRIPTION ENCOUNTER (OUTPATIENT)
Age: 74
End: 2024-09-28

## 2024-09-28 LAB
ANION GAP SERPL CALC-SCNC: 5 MMOL/L — SIGNIFICANT CHANGE UP (ref 5–17)
BUN SERPL-MCNC: 27 MG/DL — HIGH (ref 7–23)
CALCIUM SERPL-MCNC: 8.8 MG/DL — SIGNIFICANT CHANGE UP (ref 8.5–10.1)
CHLORIDE SERPL-SCNC: 106 MMOL/L — SIGNIFICANT CHANGE UP (ref 96–108)
CO2 SERPL-SCNC: 28 MMOL/L — SIGNIFICANT CHANGE UP (ref 22–31)
CREAT ?TM UR-MCNC: 57 MG/DL — SIGNIFICANT CHANGE UP
CREAT SERPL-MCNC: 1.01 MG/DL — SIGNIFICANT CHANGE UP (ref 0.5–1.3)
EGFR: 58 ML/MIN/1.73M2 — LOW
GLUCOSE BLDC GLUCOMTR-MCNC: 207 MG/DL — HIGH (ref 70–99)
GLUCOSE BLDC GLUCOMTR-MCNC: 216 MG/DL — HIGH (ref 70–99)
GLUCOSE BLDC GLUCOMTR-MCNC: 251 MG/DL — HIGH (ref 70–99)
GLUCOSE BLDC GLUCOMTR-MCNC: 284 MG/DL — HIGH (ref 70–99)
GLUCOSE BLDC GLUCOMTR-MCNC: 305 MG/DL — HIGH (ref 70–99)
GLUCOSE BLDC GLUCOMTR-MCNC: 314 MG/DL — HIGH (ref 70–99)
GLUCOSE SERPL-MCNC: 254 MG/DL — HIGH (ref 70–99)
POTASSIUM SERPL-MCNC: 4.9 MMOL/L — SIGNIFICANT CHANGE UP (ref 3.5–5.3)
POTASSIUM SERPL-SCNC: 4.9 MMOL/L — SIGNIFICANT CHANGE UP (ref 3.5–5.3)
POTASSIUM UR-SCNC: 51.1 MMOL/L — SIGNIFICANT CHANGE UP
SODIUM SERPL-SCNC: 139 MMOL/L — SIGNIFICANT CHANGE UP (ref 135–145)

## 2024-09-28 PROCEDURE — 99232 SBSQ HOSP IP/OBS MODERATE 35: CPT

## 2024-09-28 RX ORDER — INSULIN LISPRO 100/ML
4 VIAL (ML) SUBCUTANEOUS
Qty: 1 | Refills: 0
Start: 2024-09-28

## 2024-09-28 RX ORDER — INSULIN LISPRO 100/ML
4 VIAL (ML) SUBCUTANEOUS
Refills: 0 | Status: DISCONTINUED | OUTPATIENT
Start: 2024-09-28 | End: 2024-09-30

## 2024-09-28 RX ORDER — PIOGLITAZONE HYDROCHLORIDE 15 MG/1
1 TABLET ORAL
Refills: 0 | DISCHARGE

## 2024-09-28 RX ORDER — INSULIN LISPRO 100/ML
VIAL (ML) SUBCUTANEOUS
Refills: 0 | Status: DISCONTINUED | OUTPATIENT
Start: 2024-09-28 | End: 2024-09-30

## 2024-09-28 RX ORDER — INSULIN GLARGINE 300 U/ML
8 INJECTION, SOLUTION SUBCUTANEOUS ONCE
Refills: 0 | Status: COMPLETED | OUTPATIENT
Start: 2024-09-28 | End: 2024-09-28

## 2024-09-28 RX ORDER — GLUCAGON INJECTION, SOLUTION 0.5 MG/.1ML
1 INJECTION, SOLUTION SUBCUTANEOUS ONCE
Refills: 0 | Status: DISCONTINUED | OUTPATIENT
Start: 2024-09-28 | End: 2024-09-30

## 2024-09-28 RX ORDER — INSULIN GLARGINE 300 U/ML
8 INJECTION, SOLUTION SUBCUTANEOUS EVERY MORNING
Refills: 0 | Status: DISCONTINUED | OUTPATIENT
Start: 2024-09-29 | End: 2024-09-29

## 2024-09-28 RX ADMIN — GABAPENTIN 300 MILLIGRAM(S): 800 TABLET, FILM COATED ORAL at 18:10

## 2024-09-28 RX ADMIN — TIOTROPIUM BROMIDE INHALATION SPRAY 2 PUFF(S): 3.12 SPRAY, METERED RESPIRATORY (INHALATION) at 11:20

## 2024-09-28 RX ADMIN — Medication 2: at 15:41

## 2024-09-28 RX ADMIN — RANOLAZINE 500 MILLIGRAM(S): 500 TABLET, EXTENDED RELEASE ORAL at 06:03

## 2024-09-28 RX ADMIN — Medication 1 DOSE(S): at 06:06

## 2024-09-28 RX ADMIN — Medication 81 MILLIGRAM(S): at 11:19

## 2024-09-28 RX ADMIN — Medication 650 MILLIGRAM(S): at 10:20

## 2024-09-28 RX ADMIN — ATORVASTATIN CALCIUM 40 MILLIGRAM(S): 10 TABLET, FILM COATED ORAL at 22:13

## 2024-09-28 RX ADMIN — RANOLAZINE 500 MILLIGRAM(S): 500 TABLET, EXTENDED RELEASE ORAL at 18:11

## 2024-09-28 RX ADMIN — Medication 650 MILLIGRAM(S): at 09:13

## 2024-09-28 RX ADMIN — GABAPENTIN 300 MILLIGRAM(S): 800 TABLET, FILM COATED ORAL at 06:03

## 2024-09-28 RX ADMIN — Medication 4 UNIT(S): at 15:41

## 2024-09-28 RX ADMIN — Medication 2 TABLET(S): at 22:13

## 2024-09-28 RX ADMIN — Medication 1 DOSE(S): at 18:11

## 2024-09-28 RX ADMIN — Medication 4: at 11:20

## 2024-09-28 RX ADMIN — INSULIN GLARGINE 8 UNIT(S): 300 INJECTION, SOLUTION SUBCUTANEOUS at 13:26

## 2024-09-28 NOTE — DISCHARGE NOTE PROVIDER - NSDCFUADDINST_GEN_ALL_CORE_FT
It is important to see your primary physician as well as any specialty physicians within the next week to perform a comprehensive medical review.  Call their offices for an appointment as soon as you leave the hospital.  You will also need to see them for renewal of your medications.  If have any difficulty following with a physician, contact the [facility] [phone] or via [email or web address].   To obtain your results, you can access the [facility] [facility] Patient Portal at [email or web address].  Your medical issues appear to be stable at this time, but if your symptoms recur or worsen, contact your physicians and/or return to the hospital if necessary.  If you encounter any issues or questions with your medication, call your physicians before stopping the medication.  Do not drive.  Limit your diet to 2 grams of sodium daily.

## 2024-09-28 NOTE — DISCHARGE NOTE PROVIDER - NSDCCPCAREPLAN_GEN_ALL_CORE_FT
PRINCIPAL DISCHARGE DIAGNOSIS  Diagnosis: Acute metabolic encephalopathy  Assessment and Plan of Treatment:       SECONDARY DISCHARGE DIAGNOSES  Diagnosis: Benign essential HTN  Assessment and Plan of Treatment:     Diagnosis: Asthma  Assessment and Plan of Treatment:     Diagnosis: Type 2 diabetes mellitus with hypoglycemia  Assessment and Plan of Treatment:     Diagnosis: CAD (coronary artery disease)  Assessment and Plan of Treatment:     Diagnosis: Hypokalemia  Assessment and Plan of Treatment:

## 2024-09-28 NOTE — DISCHARGE NOTE PROVIDER - NSDCFUADDAPPT_GEN_ALL_CORE_FT
APPTS ARE READY TO BE MADE: [X] YES    Best Family or Patient Contact (if needed):    Additional Information about above appointments (if needed):    1:   2:   3:     Other comments or requests:    APPTS ARE READY TO BE MADE: [X] YES    Best Family or Patient Contact (if needed):    Additional Information about above appointments (if needed):    1:   2:   3:     Other comments or requests:           Patient was outreached but did not answer. A voicemail was left for the patient to return our call.    10/7-Patient was outreached but did not answer. A voicemail was left for the patient to return our call.    10/8-Patient was outreached 3 times all numbers but did not answer. A voicemail was left for the patient to return our call

## 2024-09-28 NOTE — DISCHARGE NOTE PROVIDER - HOSPITAL COURSE
74 years old female with h/o HTN, HLD, CAD s/p PCI, asthma, DM was brought in to ED with low blood sugar with AMS. Per daughter, patient has been having low blood sugar in early monring for last 3 days with confusion. No recent change in insulin regimens ( on Semglee 18 unit qAM). No nausea, vomiting or diarrhea. No fever or chills  Hemodynamically stable, afebrile, sat well at RA. Hypoglycemic to 55 in triage. No leukocytosis, plt 126, Cr 1.14. Flu/COVID negative    #  Hyperkalemia - persistent hyperkalemia discussed with nephrology.  LIkely multifactorial with ACEI, Lovenox, Bactrim.  D/c'd ACEI, Lovenox.  REpeat K stable.  f/u BMP   # Acute metabolic encephalopathy due to hypoglycemia - Denies urinary symptoms. Denies shortness of breath/cough  # Type 2 diabetes mellitus with hypoglycemia.   ·  Plan: on Semglee 18 unit qAM at home. Also on metformin and pioglitazone at home.  Will decrease Semglee to 10 units qam.  D/c Pioglitazone due to risk of hypoglycemia.   Outpatient Endo follow up ( following with Endo in clinic).  #  Benign essential HTN.  ·  Plan: monitor BP and titrate BP med.  D/c ACEI in setting of hyperkalemia  # Asymptomatic Bacteruria - pt asymptomatic.  UA with few WBCs.  UCx noted.  As pt asymptomatic, would observe off Abx.  If she develops fever or s/s of active infection, will reconsider.    # Hyperlipidemia - diet modification.  Continue Statin.  # CAD (coronary artery disease).  ·  Plan: on aspirin, statin, BB, also on ranolazine 500mg bid.  # Asthma -not in exacerbation.  ·  Plan: c/w advair/spiriva.  # Inpatient DVT Prophylaxis - ICDs    Possible d/c if K stable  Disposition: Stable for discharge.  Outpatient followup discussed.  Total time spent on discharge is  35  minutes.   74 years old female with h/o HTN, HLD, CAD s/p PCI, asthma, DM was brought in to ED with low blood sugar with AMS. Per daughter, patient has been having low blood sugar in early monring for last 3 days with confusion. No recent change in insulin regimens ( on Semglee 18 unit qAM). No nausea, vomiting or diarrhea. No fever or chills  Hemodynamically stable, afebrile, sat well at RA. Hypoglycemic to 55 in triage. No leukocytosis, plt 126, Cr 1.14. Flu/COVID negative    #  Hyperkalemia - persistent hyperkalemia discussed with nephrology.  LIkely multifactorial with ACEI, Lovenox, Bactrim.  D/c'd ACEI, Lovenox.  REpeat K stable.  # Acute metabolic encephalopathy due to hypoglycemia - Denies urinary symptoms. Denies shortness of breath/cough  # Type 2 diabetes mellitus with hypoglycemia.   ·  Plan: on Semglee 18 unit qAM at home. Also on metformin and pioglitazone at home. Adjusted insulin.  D/c Pioglitazone due to risk of hypoglycemia.   Outpatient Endo follow up ( following with Endo in clinic).  #  Benign essential HTN.  ·  Plan: monitor BP and titrate BP med.  D/c ACEI in setting of hyperkalemia  # Asymptomatic Bacteruria - pt asymptomatic.  UA with few WBCs.  UCx noted.  As pt asymptomatic, would observe off Abx.  If she develops fever or s/s of active infection, will reconsider.    # Hyperlipidemia - diet modification.  Continue Statin.  # CAD (coronary artery disease).  ·  Plan: on aspirin, statin, BB, also on ranolazine 500mg bid.  # Asthma -not in exacerbation.  ·  Plan: c/w advair/spiriva.  # Inpatient DVT Prophylaxis - ICDs    Stable for d/c home.    Disposition: Stable for discharge.  Outpatient followup discussed.  Total time spent on discharge is  35  minutes.

## 2024-09-28 NOTE — DISCHARGE NOTE PROVIDER - CARE PROVIDER_API CALL
Primary MD,   Phone: (   )    -  Fax: (   )    -  Follow Up Time:     Endocrinology,   Phone: (   )    -  Fax: (   )    -  Follow Up Time:

## 2024-09-28 NOTE — DISCHARGE NOTE PROVIDER - NSDCMRMEDTOKEN_GEN_ALL_CORE_FT
alendronate 70 mg oral tablet: 1 tab(s) orally once a week  home  Aspirin Enteric Coated 81 mg oral delayed release tablet: 1 tab(s) orally once a day  home  atorvastatin 40 mg oral tablet: 1 tab(s) orally once a day  gabapentin 300 mg oral capsule: orally 2 times a day  HumaLOG KwikPen 100 units/mL injectable solution: 4 unit(s) subcutaneous 3 times a day (before meals) as needed for only if fingerstick greater than 250 4 units subcut  Insulin Glargine: 10 unit(s) subcutaneous once a day  MetFORMIN (Eqv-Glucophage XR) 500 mg oral tablet, extended release: 1 tab(s) orally once a day  ranolazine 500 mg oral granule, extended release: 500 milligram(s) orally 2 times a day  Trelegy Ellipta 100 mcg-62.5 mcg-25 mcg/inh inhalation powder: 1 puff(s) inhaled once a day   alendronate 70 mg oral tablet: 1 tab(s) orally once a week  home  Aspirin Enteric Coated 81 mg oral delayed release tablet: 1 tab(s) orally once a day  home  atorvastatin 40 mg oral tablet: 1 tab(s) orally once a day  gabapentin 300 mg oral capsule: orally 2 times a day  HumaLOG KwikPen 100 units/mL injectable solution: 4 unit(s) subcutaneous 3 times a day (before meals) as needed for only if fingerstick greater than 250 4 units subcut  Insulin Glargine: 12 unit(s) subcutaneous once a day  MetFORMIN (Eqv-Glucophage XR) 500 mg oral tablet, extended release: 1 tab(s) orally once a day  ranolazine 500 mg oral granule, extended release: 500 milligram(s) orally 2 times a day  Trelegy Ellipta 100 mcg-62.5 mcg-25 mcg/inh inhalation powder: 1 puff(s) inhaled once a day   alendronate 70 mg oral tablet: 1 tab(s) orally once a week  home  Aspirin Enteric Coated 81 mg oral delayed release tablet: 1 tab(s) orally once a day  home  atorvastatin 40 mg oral tablet: 1 tab(s) orally once a day  gabapentin 300 mg oral capsule: orally 2 times a day  HumaLOG KwikPen 100 units/mL injectable solution: 4 unit(s) subcutaneous 3 times a day (before meals) as needed for only if fingerstick greater than 250 4 units subcut  Insulin Aspart PenFill 100 units/mL injectable solution: 4 unit(s) subcutaneous 4 times a day (before meals and at bedtime) only if fingerstick greater than 250  Insulin Glargine: 12 unit(s) subcutaneous once a day  MetFORMIN (Eqv-Glucophage XR) 500 mg oral tablet, extended release: 1 tab(s) orally once a day  ranolazine 500 mg oral granule, extended release: 500 milligram(s) orally 2 times a day  Trelegy Ellipta 100 mcg-62.5 mcg-25 mcg/inh inhalation powder: 1 puff(s) inhaled once a day

## 2024-09-28 NOTE — DISCHARGE NOTE PROVIDER - PROVIDER TOKENS
FREE:[LAST:[Primary MD],PHONE:[(   )    -],FAX:[(   )    -]],FREE:[LAST:[Endocrinology],PHONE:[(   )    -],FAX:[(   )    -]]

## 2024-09-28 NOTE — PROGRESS NOTE ADULT - ASSESSMENT
74 years old female with h/o HTN, HLD, CAD s/p PCI, asthma, DM was brought in to ED with low blood sugar with AMS. Per daughter, patient has been having low blood sugar in early monring for last 3 days with confusion. No recent change in insulin regimens ( on Semglee 18 unit qAM). No nausea, vomiting or diarrhea. No fever or chills  Hemodynamically stable, afebrile, sat well at RA. Hypoglycemic to 55 in triage. No leukocytosis, plt 126, Cr 1.14. Flu/COVID negative    #  Hyperkalemia - persistent hyperkalemia discussed with nephrology.  LIkely multifactorial with ACEI, Lovenox, Bactrim.  D/c'd ACEI, Lovenox.  REpeat K stable.  f/u BMP   # Acute metabolic encephalopathy due to hypoglycemia - Denies urinary symptoms. Denies shortness of breath/cough  # Type 2 diabetes mellitus with hypoglycemia.   ·  Plan: on Semglee 18 unit qAM at home. Also on metformin and pioglitazone at home.  Will decrease Semglee to 10 units qam.  D/c Pioglitazone due to risk of hypoglycemia.   Outpatient Endo follow up ( following with Endo in clinic).  #  Benign essential HTN.  ·  Plan: monitor BP and titrate BP med.  D/c ACEI in setting of hyperkalemia  # Asymptomatic Bacteruria - pt asymptomatic.  UA with few WBCs.  UCx noted.  As pt asymptomatic, would observe off Abx.  If she develops fever or s/s of active infection, will reconsider.    # Hyperlipidemia - diet modification.  Continue Statin.  # CAD (coronary artery disease).  ·  Plan: on aspirin, statin, BB, also on ranolazine 500mg bid.  # Asthma -not in exacerbation.  ·  Plan: c/w advair/spiriva.  # Inpatient DVT Prophylaxis - ICDs    Possible d/c if K stable 74 years old female with h/o HTN, HLD, CAD s/p PCI, asthma, DM was brought in to ED with low blood sugar with AMS. Per daughter, patient has been having low blood sugar in early monring for last 3 days with confusion. No recent change in insulin regimens ( on Semglee 18 unit qAM). No nausea, vomiting or diarrhea. No fever or chills  Hemodynamically stable, afebrile, sat well at RA. Hypoglycemic to 55 in triage. No leukocytosis, plt 126, Cr 1.14. Flu/COVID negative    #  Hyperkalemia - persistent hyperkalemia discussed with nephrology.  LIkely multifactorial with ACEI, Lovenox, Bactrim.  D/c'd ACEI, Lovenox.  REpeat K stable.  f/u BMP   # Acute metabolic encephalopathy due to hypoglycemia - Denies urinary symptoms. Denies shortness of breath/cough  # Type 2 diabetes mellitus with hypoglycemia.   ·  Plan: on Semglee 18 unit qAM at home. Also on metformin and pioglitazone at home.  Will decrease Semglee to 10 units qam.  D/c Pioglitazone due to risk of hypoglycemia.   Outpatient Endo follow up ( following with Endo in clinic).  #  Benign essential HTN.  ·  Plan: monitor BP and titrate BP med.  D/c ACEI in setting of hyperkalemia  # Asymptomatic Bacteruria - pt asymptomatic.  UA with few WBCs.  UCx noted.  As pt asymptomatic, would observe off Abx.  If she develops fever or s/s of active infection, will reconsider.    # Hyperlipidemia - diet modification.  Continue Statin.  # CAD (coronary artery disease).  ·  Plan: on aspirin, statin, BB, also on ranolazine 500mg bid.  # Asthma -not in exacerbation.  ·  Plan: c/w advair/spiriva.  # Inpatient DVT Prophylaxis - ICDs    I was unable to leave a message with Queta at [phone]

## 2024-09-29 LAB
GLUCOSE BLDC GLUCOMTR-MCNC: 113 MG/DL — HIGH (ref 70–99)
GLUCOSE BLDC GLUCOMTR-MCNC: 246 MG/DL — HIGH (ref 70–99)
GLUCOSE BLDC GLUCOMTR-MCNC: 282 MG/DL — HIGH (ref 70–99)
GLUCOSE BLDC GLUCOMTR-MCNC: 285 MG/DL — HIGH (ref 70–99)

## 2024-09-29 PROCEDURE — 99232 SBSQ HOSP IP/OBS MODERATE 35: CPT

## 2024-09-29 RX ORDER — INSULIN GLARGINE 300 U/ML
12 INJECTION, SOLUTION SUBCUTANEOUS
Refills: 0 | Status: DISCONTINUED | OUTPATIENT
Start: 2024-09-29 | End: 2024-09-30

## 2024-09-29 RX ORDER — CETIRIZINE HYDROCHLORIDE 10 MG/1
10 TABLET ORAL DAILY
Refills: 0 | Status: DISCONTINUED | OUTPATIENT
Start: 2024-09-29 | End: 2024-09-30

## 2024-09-29 RX ADMIN — ATORVASTATIN CALCIUM 40 MILLIGRAM(S): 10 TABLET, FILM COATED ORAL at 22:22

## 2024-09-29 RX ADMIN — Medication 4 UNIT(S): at 11:57

## 2024-09-29 RX ADMIN — Medication 3: at 11:57

## 2024-09-29 RX ADMIN — CETIRIZINE HYDROCHLORIDE 10 MILLIGRAM(S): 10 TABLET ORAL at 11:57

## 2024-09-29 RX ADMIN — INSULIN GLARGINE 8 UNIT(S): 300 INJECTION, SOLUTION SUBCUTANEOUS at 08:51

## 2024-09-29 RX ADMIN — GABAPENTIN 300 MILLIGRAM(S): 800 TABLET, FILM COATED ORAL at 17:58

## 2024-09-29 RX ADMIN — RANOLAZINE 500 MILLIGRAM(S): 500 TABLET, EXTENDED RELEASE ORAL at 17:58

## 2024-09-29 RX ADMIN — Medication 2: at 08:58

## 2024-09-29 RX ADMIN — Medication 4 UNIT(S): at 08:50

## 2024-09-29 RX ADMIN — RANOLAZINE 500 MILLIGRAM(S): 500 TABLET, EXTENDED RELEASE ORAL at 05:34

## 2024-09-29 RX ADMIN — Medication 1 DOSE(S): at 17:58

## 2024-09-29 RX ADMIN — TIOTROPIUM BROMIDE INHALATION SPRAY 2 PUFF(S): 3.12 SPRAY, METERED RESPIRATORY (INHALATION) at 12:02

## 2024-09-29 RX ADMIN — GABAPENTIN 300 MILLIGRAM(S): 800 TABLET, FILM COATED ORAL at 05:34

## 2024-09-29 RX ADMIN — Medication 1 DOSE(S): at 05:34

## 2024-09-29 RX ADMIN — Medication 81 MILLIGRAM(S): at 11:57

## 2024-09-29 RX ADMIN — Medication 2 TABLET(S): at 22:22

## 2024-09-29 NOTE — PROGRESS NOTE ADULT - ASSESSMENT
74 years old female with h/o HTN, HLD, CAD s/p PCI, asthma, DM was brought in to ED with low blood sugar with AMS. Per daughter, patient has been having low blood sugar in early monring for last 3 days with confusion. No recent change in insulin regimens ( on Semglee 18 unit qAM). No nausea, vomiting or diarrhea. No fever or chills  Hemodynamically stable, afebrile, sat well at RA. Hypoglycemic to 55 in triage. No leukocytosis, plt 126, Cr 1.14. Flu/COVID negative    #  Hyperkalemia - persistent hyperkalemia discussed with nephrology.  LIkely multifactorial with ACEI, Lovenox, Bactrim.  D/c'd ACEI, Lovenox.  REpeat K stable.  f/u BMP   # Acute metabolic encephalopathy due to hypoglycemia - Denies urinary symptoms. Denies shortness of breath/cough  # Type 2 diabetes mellitus with hypoglycemia.   ·  Plan: on Semglee 18 unit qAM at home. Also on metformin and pioglitazone at home. Semglee to 10 units qam.  D/c Pioglitazone due to risk of hypoglycemia.   Outpatient Endo follow up ( following with Endo in clinic).  #  Benign essential HTN.  ·  Plan: monitor BP and titrate BP med.  D/c ACEI in setting of hyperkalemia  # Asymptomatic Bacteruria - pt asymptomatic.  UA with few WBCs.  UCx noted.  As pt asymptomatic, would observe off Abx.  If she develops fever or s/s of active infection, will reconsider.    # Hyperlipidemia - diet modification.  Continue Statin.  # CAD (coronary artery disease).  ·  Plan: on aspirin, statin, BB, also on ranolazine 500mg bid.  # Asthma -not in exacerbation.  ·  Plan: c/w advair/spiriva.  # Inpatient DVT Prophylaxis - ICDs    I was unable to leave a message with Queta at [phone] on 9/28

## 2024-09-30 ENCOUNTER — TRANSCRIPTION ENCOUNTER (OUTPATIENT)
Age: 74
End: 2024-09-30

## 2024-09-30 VITALS
OXYGEN SATURATION: 97 % | RESPIRATION RATE: 17 BRPM | DIASTOLIC BLOOD PRESSURE: 64 MMHG | SYSTOLIC BLOOD PRESSURE: 155 MMHG | TEMPERATURE: 98 F | HEART RATE: 68 BPM

## 2024-09-30 LAB
GLUCOSE BLDC GLUCOMTR-MCNC: 200 MG/DL — HIGH (ref 70–99)
GLUCOSE BLDC GLUCOMTR-MCNC: 203 MG/DL — HIGH (ref 70–99)

## 2024-09-30 PROCEDURE — 99232 SBSQ HOSP IP/OBS MODERATE 35: CPT

## 2024-09-30 RX ADMIN — Medication 4 UNIT(S): at 12:09

## 2024-09-30 RX ADMIN — Medication 17 GRAM(S): at 12:10

## 2024-09-30 RX ADMIN — Medication 1 DOSE(S): at 06:08

## 2024-09-30 RX ADMIN — Medication 1: at 12:08

## 2024-09-30 RX ADMIN — RANOLAZINE 500 MILLIGRAM(S): 500 TABLET, EXTENDED RELEASE ORAL at 06:07

## 2024-09-30 RX ADMIN — INSULIN GLARGINE 12 UNIT(S): 300 INJECTION, SOLUTION SUBCUTANEOUS at 08:34

## 2024-09-30 RX ADMIN — Medication 2: at 08:35

## 2024-09-30 RX ADMIN — TIOTROPIUM BROMIDE INHALATION SPRAY 2 PUFF(S): 3.12 SPRAY, METERED RESPIRATORY (INHALATION) at 12:16

## 2024-09-30 RX ADMIN — Medication 81 MILLIGRAM(S): at 12:11

## 2024-09-30 RX ADMIN — Medication 4 UNIT(S): at 08:34

## 2024-09-30 RX ADMIN — GABAPENTIN 300 MILLIGRAM(S): 800 TABLET, FILM COATED ORAL at 06:07

## 2024-09-30 NOTE — PROGRESS NOTE ADULT - REASON FOR ADMISSION
hypoglycemia, AMS

## 2024-09-30 NOTE — PROGRESS NOTE ADULT - SUBJECTIVE AND OBJECTIVE BOX
Patient: EMMANUEL GONZALEZ [id number] 74y Female                            Hospitalist Attending Note    No complaints.  Taking po intake.    ____________________PHYSICAL EXAM:  GENERAL:  NAD alert, interactive.   HEENT: NCAT  CARDIOVASCULAR:  S1, S2  LUNGS: CTAB  ABDOMEN:  soft, (-) tenderness, (-) distension, (+) bowel sounds, (-) guarding, (-) rebound (-) rigidity  EXTREMITIES:  no cyanosis / clubbing / edema.   ____________________    VITALS:  Vital Signs Last 24 Hrs  T(C): 36.8 (29 Sep 2024 11:50), Max: 36.9 (28 Sep 2024 17:01)  T(F): 98.3 (29 Sep 2024 11:50), Max: 98.5 (28 Sep 2024 17:01)  HR: 62 (29 Sep 2024 11:50) (62 - 71)  BP: 150/79 (29 Sep 2024 11:50) (129/74 - 150/79)  BP(mean): --  RR: 17 (29 Sep 2024 11:50) (17 - 18)  SpO2: 95% (29 Sep 2024 11:50) (94% - 96%)    Parameters below as of 29 Sep 2024 11:50  Patient On (Oxygen Delivery Method): room air     Daily     Daily   CAPILLARY BLOOD GLUCOSE      POCT Blood Glucose.: 282 mg/dL (29 Sep 2024 11:09)  POCT Blood Glucose.: 246 mg/dL (29 Sep 2024 07:45)  POCT Blood Glucose.: 305 mg/dL (28 Sep 2024 22:18)    I&O's Summary      LABS:    09-28    139  |  106  |  27[H]  ----------------------------<  254[H]  4.9   |  28  |  1.01    Ca    8.8      28 Sep 2024 13:29          Urinalysis Basic - ( 28 Sep 2024 13:29 )    Color: x / Appearance: x / SG: x / pH: x  Gluc: 254 mg/dL / Ketone: x  / Bili: x / Urobili: x   Blood: x / Protein: x / Nitrite: x   Leuk Esterase: x / RBC: x / WBC x   Sq Epi: x / Non Sq Epi: x / Bacteria: x              MEDICATIONS:  acetaminophen     Tablet .. 650 milliGRAM(s) Oral every 6 hours PRN  aspirin enteric coated 81 milliGRAM(s) Oral daily  atorvastatin 40 milliGRAM(s) Oral at bedtime  cetirizine 10 milliGRAM(s) Oral daily  dextrose 5%. 1000 milliLiter(s) IV Continuous <Continuous>  dextrose 5%. 1000 milliLiter(s) IV Continuous <Continuous>  dextrose 50% Injectable 25 Gram(s) IV Push once  dextrose 50% Injectable 12.5 Gram(s) IV Push once  dextrose 50% Injectable 25 Gram(s) IV Push once  dextrose Oral Gel 15 Gram(s) Oral once  fluticasone propionate/ salmeterol 100-50 MICROgram(s) Diskus 1 Dose(s) Inhalation two times a day  gabapentin 300 milliGRAM(s) Oral two times a day  glucagon  Injectable 1 milliGRAM(s) IntraMuscular once  insulin glargine Injectable (LANTUS) 8 Unit(s) SubCutaneous every morning  insulin lispro (ADMELOG) corrective regimen sliding scale   SubCutaneous three times a day before meals  insulin lispro Injectable (ADMELOG) 4 Unit(s) SubCutaneous before lunch  insulin lispro Injectable (ADMELOG) 4 Unit(s) SubCutaneous before dinner  insulin lispro Injectable (ADMELOG) 4 Unit(s) SubCutaneous before breakfast  melatonin 3 milliGRAM(s) Oral at bedtime PRN  polyethylene glycol 3350 17 Gram(s) Oral daily  ranolazine 500 milliGRAM(s) Oral two times a day  senna 2 Tablet(s) Oral at bedtime  sodium chloride 0.9%. 1000 milliLiter(s) IV Continuous <Continuous>  tiotropium 2.5 MICROgram(s) Inhaler 2 Puff(s) Inhalation daily    
                          Patient: EMMANUEL GONZALEZ [id number] 74y Female                            Hospitalist Attending Note    No complaints.  Taking po intake.  Seen with  [id number]    ____________________PHYSICAL EXAM:  GENERAL:  NAD alert, interactive.   HEENT: NCAT  CARDIOVASCULAR:  S1, S2  LUNGS: CTAB  ABDOMEN:  soft, (-) tenderness, (-) distension, (+) bowel sounds, (-) guarding, (-) rebound (-) rigidity  EXTREMITIES:  no cyanosis / clubbing / edema.   ____________________    VITALS:  Vital Signs Last 24 Hrs  T(C): 36.8 (29 Sep 2024 11:50), Max: 36.9 (28 Sep 2024 17:01)  T(F): 98.3 (29 Sep 2024 11:50), Max: 98.5 (28 Sep 2024 17:01)  HR: 62 (29 Sep 2024 11:50) (62 - 71)  BP: 150/79 (29 Sep 2024 11:50) (129/74 - 150/79)  BP(mean): --  RR: 17 (29 Sep 2024 11:50) (17 - 18)  SpO2: 95% (29 Sep 2024 11:50) (94% - 96%)    Parameters below as of 29 Sep 2024 11:50  Patient On (Oxygen Delivery Method): room air     Daily     Daily   CAPILLARY BLOOD GLUCOSE      POCT Blood Glucose.: 282 mg/dL (29 Sep 2024 11:09)  POCT Blood Glucose.: 246 mg/dL (29 Sep 2024 07:45)  POCT Blood Glucose.: 305 mg/dL (28 Sep 2024 22:18)    I&O's Summary      LABS:    09-28    139  |  106  |  27[H]  ----------------------------<  254[H]  4.9   |  28  |  1.01    Ca    8.8      28 Sep 2024 13:29          Urinalysis Basic - ( 28 Sep 2024 13:29 )    Color: x / Appearance: x / SG: x / pH: x  Gluc: 254 mg/dL / Ketone: x  / Bili: x / Urobili: x   Blood: x / Protein: x / Nitrite: x   Leuk Esterase: x / RBC: x / WBC x   Sq Epi: x / Non Sq Epi: x / Bacteria: x              MEDICATIONS:  acetaminophen     Tablet .. 650 milliGRAM(s) Oral every 6 hours PRN  aspirin enteric coated 81 milliGRAM(s) Oral daily  atorvastatin 40 milliGRAM(s) Oral at bedtime  cetirizine 10 milliGRAM(s) Oral daily  dextrose 5%. 1000 milliLiter(s) IV Continuous <Continuous>  dextrose 5%. 1000 milliLiter(s) IV Continuous <Continuous>  dextrose 50% Injectable 25 Gram(s) IV Push once  dextrose 50% Injectable 12.5 Gram(s) IV Push once  dextrose 50% Injectable 25 Gram(s) IV Push once  dextrose Oral Gel 15 Gram(s) Oral once  fluticasone propionate/ salmeterol 100-50 MICROgram(s) Diskus 1 Dose(s) Inhalation two times a day  gabapentin 300 milliGRAM(s) Oral two times a day  glucagon  Injectable 1 milliGRAM(s) IntraMuscular once  insulin glargine Injectable (LANTUS) 8 Unit(s) SubCutaneous every morning  insulin lispro (ADMELOG) corrective regimen sliding scale   SubCutaneous three times a day before meals  insulin lispro Injectable (ADMELOG) 4 Unit(s) SubCutaneous before lunch  insulin lispro Injectable (ADMELOG) 4 Unit(s) SubCutaneous before dinner  insulin lispro Injectable (ADMELOG) 4 Unit(s) SubCutaneous before breakfast  melatonin 3 milliGRAM(s) Oral at bedtime PRN  polyethylene glycol 3350 17 Gram(s) Oral daily  ranolazine 500 milliGRAM(s) Oral two times a day  senna 2 Tablet(s) Oral at bedtime  sodium chloride 0.9%. 1000 milliLiter(s) IV Continuous <Continuous>  tiotropium 2.5 MICROgram(s) Inhaler 2 Puff(s) Inhalation daily    
                          Patient: EMMANUEL GONZALEZ [id number] 74y Female                            Hospitalist Attending Note    No complaints.  Taking po intake.    ____________________PHYSICAL EXAM:  GENERAL:  NAD alert, interactive.   HEENT: NCAT  CARDIOVASCULAR:  S1, S2  LUNGS: CTAB  ABDOMEN:  soft, (-) tenderness, (-) distension, (+) bowel sounds, (-) guarding, (-) rebound (-) rigidity  EXTREMITIES:  no cyanosis / clubbing / edema.   ____________________      VITALS:  Vital Signs Last 24 Hrs  T(C): 36.5 (28 Sep 2024 11:38), Max: 36.9 (27 Sep 2024 16:34)  T(F): 97.7 (28 Sep 2024 11:38), Max: 98.5 (27 Sep 2024 16:34)  HR: 67 (28 Sep 2024 11:38) (63 - 75)  BP: 151/79 (28 Sep 2024 11:38) (120/70 - 151/79)  BP(mean): --  RR: 17 (28 Sep 2024 11:38) (17 - 18)  SpO2: 95% (28 Sep 2024 11:38) (95% - 95%)    Parameters below as of 28 Sep 2024 11:38  Patient On (Oxygen Delivery Method): room air     Daily     Daily   CAPILLARY BLOOD GLUCOSE      POCT Blood Glucose.: 251 mg/dL (28 Sep 2024 13:20)  POCT Blood Glucose.: 314 mg/dL (28 Sep 2024 10:58)  POCT Blood Glucose.: 216 mg/dL (28 Sep 2024 06:07)  POCT Blood Glucose.: 284 mg/dL (28 Sep 2024 00:16)  POCT Blood Glucose.: 290 mg/dL (27 Sep 2024 21:29)  POCT Blood Glucose.: 295 mg/dL (27 Sep 2024 15:49)    I&O's Summary    27 Sep 2024 07:01  -  28 Sep 2024 07:00  --------------------------------------------------------  IN: 740 mL / OUT: 0 mL / NET: 740 mL        LABS:                        10.8   4.80  )-----------( 131      ( 27 Sep 2024 06:07 )             35.6     09-27    x   |  x   |  x   ----------------------------<  x   5.2   |  x   |  x     Ca    9.0      27 Sep 2024 06:07          Urinalysis Basic - ( 27 Sep 2024 06:07 )    Color: x / Appearance: x / SG: x / pH: x  Gluc: 201 mg/dL / Ketone: x  / Bili: x / Urobili: x   Blood: x / Protein: x / Nitrite: x   Leuk Esterase: x / RBC: x / WBC x   Sq Epi: x / Non Sq Epi: x / Bacteria: x              MEDICATIONS:  acetaminophen     Tablet .. 650 milliGRAM(s) Oral every 6 hours PRN  aspirin enteric coated 81 milliGRAM(s) Oral daily  atorvastatin 40 milliGRAM(s) Oral at bedtime  dextrose 5%. 1000 milliLiter(s) IV Continuous <Continuous>  dextrose 5%. 1000 milliLiter(s) IV Continuous <Continuous>  dextrose 50% Injectable 12.5 Gram(s) IV Push once  dextrose 50% Injectable 25 Gram(s) IV Push once  dextrose 50% Injectable 25 Gram(s) IV Push once  dextrose Oral Gel 15 Gram(s) Oral once  fluticasone propionate/ salmeterol 100-50 MICROgram(s) Diskus 1 Dose(s) Inhalation two times a day  gabapentin 300 milliGRAM(s) Oral two times a day  glucagon  Injectable 1 milliGRAM(s) IntraMuscular once  insulin glargine Injectable (LANTUS) 8 Unit(s) SubCutaneous every morning  insulin lispro (ADMELOG) corrective regimen sliding scale   SubCutaneous three times a day before meals  insulin lispro Injectable (ADMELOG) 4 Unit(s) SubCutaneous before lunch  insulin lispro Injectable (ADMELOG) 4 Unit(s) SubCutaneous before dinner  insulin lispro Injectable (ADMELOG) 4 Unit(s) SubCutaneous before breakfast  melatonin 3 milliGRAM(s) Oral at bedtime PRN  polyethylene glycol 3350 17 Gram(s) Oral daily  ranolazine 500 milliGRAM(s) Oral two times a day  senna 2 Tablet(s) Oral at bedtime  sodium chloride 0.9%. 1000 milliLiter(s) IV Continuous <Continuous>  tiotropium 2.5 MICROgram(s) Inhaler 2 Puff(s) Inhalation daily    
PROGRESS NOTE:     Patient is a 74y old  Female who presents with a chief complaint of hypoglycemia, AMS (24 Sep 2024 11:35)          SUBJECTIVE & OBJECTIVE:   Pt seen and examined at bedside in AM    no overnight events.       REVIEW OF SYSTEMS: remaining ROS negative     PHYSICAL EXAM:  VITALS:  Vital Signs Last 24 Hrs  T(C): 36.8 (25 Sep 2024 05:02), Max: 37.1 (24 Sep 2024 13:28)  T(F): 98.3 (25 Sep 2024 05:02), Max: 98.8 (24 Sep 2024 13:28)  HR: 76 (25 Sep 2024 05:02) (76 - 84)  BP: 137/79 (25 Sep 2024 05:02) (137/79 - 174/86)  BP(mean): --  RR: 17 (25 Sep 2024 05:02) (17 - 19)  SpO2: 97% (25 Sep 2024 05:02) (95% - 98%)    Parameters below as of 25 Sep 2024 05:02  Patient On (Oxygen Delivery Method): room air          GENERAL: NAD,  no increased WOB  HEAD:  Atraumatic, Normocephalic  EYES: EOMI, conjunctiva and sclera clear  ENMT: Moist mucous membranes  NECK: Supple, No JVD  NERVOUS SYSTEM:  Alert & Oriented  CHEST/LUNG: Clear to auscultation bilaterally; No rales, rhonchi, wheezing  HEART: Regular rate and rhythm  ABDOMEN: Soft, Nontender, Nondistended; Bowel sounds present  EXTREMITIES:  No clubbing, cyanosis, calf tenderness or edema b/l      MEDICATIONS  (STANDING):  aspirin enteric coated 81 milliGRAM(s) Oral daily  atorvastatin 40 milliGRAM(s) Oral at bedtime  dextrose 5%. 1000 milliLiter(s) (50 mL/Hr) IV Continuous <Continuous>  dextrose 5%. 1000 milliLiter(s) (100 mL/Hr) IV Continuous <Continuous>  dextrose 50% Injectable 12.5 Gram(s) IV Push once  dextrose 50% Injectable 25 Gram(s) IV Push once  dextrose 50% Injectable 25 Gram(s) IV Push once  dextrose Oral Gel 15 Gram(s) Oral once  enoxaparin Injectable 40 milliGRAM(s) SubCutaneous every 24 hours  fluticasone propionate/ salmeterol 100-50 MICROgram(s) Diskus 1 Dose(s) Inhalation two times a day  gabapentin 300 milliGRAM(s) Oral two times a day  lisinopril 20 milliGRAM(s) Oral daily  ranolazine 500 milliGRAM(s) Oral two times a day  tiotropium 2.5 MICROgram(s) Inhaler 2 Puff(s) Inhalation daily    MEDICATIONS  (PRN):  acetaminophen     Tablet .. 650 milliGRAM(s) Oral every 6 hours PRN Mild Pain (1 - 3), Moderate Pain (4 - 6)  melatonin 3 milliGRAM(s) Oral at bedtime PRN Insomnia      Allergies    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 (Short breath)  No Known Drug Allergies    Intolerances              LABS:                           11.0   5.50  )-----------( 93       ( 25 Sep 2024 06:17 )             35.8     09-25    138  |  108  |  27[H]  ----------------------------<  91  5.5[H]   |  26  |  1.24    Ca    9.2      25 Sep 2024 06:17  Phos  4.7     09-25  Mg     1.6     09-25    TPro  6.6  /  Alb  2.7[L]  /  TBili  0.3  /  DBili  x   /  AST  18  /  ALT  19  /  AlkPhos  115  09-25      Urinalysis Basic - ( 25 Sep 2024 06:17 )    Color: x / Appearance: x / SG: x / pH: x  Gluc: 91 mg/dL / Ketone: x  / Bili: x / Urobili: x   Blood: x / Protein: x / Nitrite: x   Leuk Esterase: x / RBC: x / WBC x   Sq Epi: x / Non Sq Epi: x / Bacteria: x      CAPILLARY BLOOD GLUCOSE      POCT Blood Glucose.: 100 mg/dL (25 Sep 2024 06:06)  POCT Blood Glucose.: 158 mg/dL (25 Sep 2024 00:15)  POCT Blood Glucose.: 35 mg/dL (24 Sep 2024 23:54)                RECENT CULTURES:          RADIOLOGY & ADDITIONAL TESTS:    
                          Patient: EMMANUEL GONZALEZ [id number] 74y Female                            Hospitalist Attending Note    Seen this afternoon with [place]  [id number].  Several friends at bedside.   No complaints.     ____________________PHYSICAL EXAM:  GENERAL:  NAD Alert and Oriented x to person, place  HEENT: NCAT  CARDIOVASCULAR:  S1, S2  LUNGS: CTAB  ABDOMEN:  soft, (-) tenderness, (-) distension, (+) bowel sounds, (-) guarding, (-) rebound (-) rigidity  EXTREMITIES:  no cyanosis / clubbing / edema.   ____________________       VITALS:  Vital Signs Last 24 Hrs  T(C): 36.7 (26 Sep 2024 17:56), Max: 36.7 (26 Sep 2024 17:56)  T(F): 98 (26 Sep 2024 17:56), Max: 98 (26 Sep 2024 17:56)  HR: 70 (26 Sep 2024 17:56) (67 - 71)  BP: 159/88 (26 Sep 2024 17:56) (115/90 - 159/88)  BP(mean): --  RR: 17 (26 Sep 2024 17:56) (17 - 18)  SpO2: 96% (26 Sep 2024 17:56) (95% - 96%)    Parameters below as of 26 Sep 2024 11:27  Patient On (Oxygen Delivery Method): room air     Daily     Daily   CAPILLARY BLOOD GLUCOSE      POCT Blood Glucose.: 177 mg/dL (26 Sep 2024 17:27)  POCT Blood Glucose.: 184 mg/dL (26 Sep 2024 11:35)  POCT Blood Glucose.: 145 mg/dL (26 Sep 2024 05:59)  POCT Blood Glucose.: 134 mg/dL (26 Sep 2024 00:24)    I&O's Summary      HISTORY:  PAST MEDICAL & SURGICAL HISTORY:  Type 2 diabetes mellitus without complication, without long-term current use of insulin      Coronary artery disease with other form of angina pectoris      Hypertension      Asthma      History of heart artery stent      Allergies    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 (Short breath)  No Known Drug Allergies    Intolerances       LABS:                        11.0   5.10  )-----------( 122      ( 26 Sep 2024 07:07 )             35.7       Culture - Urine (collected 24 Sep 2024 07:35)  Source: Clean Catch Clean Catch (Midstream)  Preliminary Report (25 Sep 2024 15:24):    >100,000 CFU/ml Escherichia coli    <10,000 CFU/ml Normal Urogenital lina present    09-26    x   |  x   |  x   ----------------------------<  x   5.4[H]   |  x   |  x     Ca    8.9      26 Sep 2024 07:07  Phos  3.9     09-26  Mg     1.7     09-26    TPro  6.6  /  Alb  2.7[L]  /  TBili  0.3  /  DBili  x   /  AST  18  /  ALT  19  /  AlkPhos  115  09-25      LIVER FUNCTIONS - ( 25 Sep 2024 06:17 )  Alb: 2.7 g/dL / Pro: 6.6 gm/dL / ALK PHOS: 115 U/L / ALT: 19 U/L / AST: 18 U/L / GGT: x           Urinalysis Basic - ( 26 Sep 2024 07:07 )    Color: x / Appearance: x / SG: x / pH: x  Gluc: 152 mg/dL / Ketone: x  / Bili: x / Urobili: x   Blood: x / Protein: x / Nitrite: x   Leuk Esterase: x / RBC: x / WBC x   Sq Epi: x / Non Sq Epi: x / Bacteria: x          Culture - Urine (collected 24 Sep 2024 07:35)  Source: Clean Catch Clean Catch (Midstream)  Preliminary Report (25 Sep 2024 15:24):    >100,000 CFU/ml Escherichia coli    <10,000 CFU/ml Normal Urogenital lina present          MEDICATIONS:  MEDICATIONS  (STANDING):  aspirin enteric coated 81 milliGRAM(s) Oral daily  atorvastatin 40 milliGRAM(s) Oral at bedtime  dextrose 5%. 1000 milliLiter(s) (50 mL/Hr) IV Continuous <Continuous>  dextrose 5%. 1000 milliLiter(s) (100 mL/Hr) IV Continuous <Continuous>  dextrose 50% Injectable 12.5 Gram(s) IV Push once  dextrose 50% Injectable 25 Gram(s) IV Push once  dextrose 50% Injectable 25 Gram(s) IV Push once  dextrose Oral Gel 15 Gram(s) Oral once  enoxaparin Injectable 40 milliGRAM(s) SubCutaneous every 24 hours  fluticasone propionate/ salmeterol 100-50 MICROgram(s) Diskus 1 Dose(s) Inhalation two times a day  gabapentin 300 milliGRAM(s) Oral two times a day  polyethylene glycol 3350 17 Gram(s) Oral daily  ranolazine 500 milliGRAM(s) Oral two times a day  senna 2 Tablet(s) Oral at bedtime  sodium chloride 0.9%. 1000 milliLiter(s) (100 mL/Hr) IV Continuous <Continuous>  sodium zirconium cyclosilicate 10 Gram(s) Oral once  tiotropium 2.5 MICROgram(s) Inhaler 2 Puff(s) Inhalation daily    MEDICATIONS  (PRN):  acetaminophen     Tablet .. 650 milliGRAM(s) Oral every 6 hours PRN Mild Pain (1 - 3), Moderate Pain (4 - 6)  melatonin 3 milliGRAM(s) Oral at bedtime PRN Insomnia  
                          Patient: EMMANUEL GONZALEZ [id number] 74y Female                            Hospitalist Attending Note    Seen with [place]  [id number].  Several friends at bedside.   No complaints.     ____________________PHYSICAL EXAM:  GENERAL:  NAD Alert and Oriented x to person, place  HEENT: NCAT  CARDIOVASCULAR:  S1, S2  LUNGS: CTAB  ABDOMEN:  soft, (-) tenderness, (-) distension, (+) bowel sounds, (-) guarding, (-) rebound (-) rigidity  EXTREMITIES:  no cyanosis / clubbing / edema.   ____________________    VITALS:  Vital Signs Last 24 Hrs  T(C): 36.9 (27 Sep 2024 16:34), Max: 37.1 (27 Sep 2024 12:01)  T(F): 98.5 (27 Sep 2024 16:34), Max: 98.8 (27 Sep 2024 12:01)  HR: 75 (27 Sep 2024 16:34) (65 - 75)  BP: 120/70 (27 Sep 2024 16:34) (116/61 - 172/80)  BP(mean): --  RR: 18 (27 Sep 2024 16:34) (17 - 18)  SpO2: 95% (27 Sep 2024 16:34) (95% - 99%)    Parameters below as of 27 Sep 2024 12:01  Patient On (Oxygen Delivery Method): room air     Daily     Daily   CAPILLARY BLOOD GLUCOSE      POCT Blood Glucose.: 295 mg/dL (27 Sep 2024 15:49)  POCT Blood Glucose.: 282 mg/dL (27 Sep 2024 11:51)  POCT Blood Glucose.: 196 mg/dL (27 Sep 2024 08:15)  POCT Blood Glucose.: 181 mg/dL (27 Sep 2024 05:32)  POCT Blood Glucose.: 225 mg/dL (26 Sep 2024 23:42)    I&O's Summary    27 Sep 2024 07:01  -  27 Sep 2024 20:36  --------------------------------------------------------  IN: 640 mL / OUT: 0 mL / NET: 640 mL        LABS:                        10.8   4.80  )-----------( 131      ( 27 Sep 2024 06:07 )             35.6     09-27    x   |  x   |  x   ----------------------------<  x   5.2   |  x   |  x     Ca    9.0      27 Sep 2024 06:07  Phos  3.9     09-26  Mg     1.7     09-26          Urinalysis Basic - ( 27 Sep 2024 06:07 )    Color: x / Appearance: x / SG: x / pH: x  Gluc: 201 mg/dL / Ketone: x  / Bili: x / Urobili: x   Blood: x / Protein: x / Nitrite: x   Leuk Esterase: x / RBC: x / WBC x   Sq Epi: x / Non Sq Epi: x / Bacteria: x              MEDICATIONS:  acetaminophen     Tablet .. 650 milliGRAM(s) Oral every 6 hours PRN  aspirin enteric coated 81 milliGRAM(s) Oral daily  atorvastatin 40 milliGRAM(s) Oral at bedtime  dextrose 5%. 1000 milliLiter(s) IV Continuous <Continuous>  dextrose 5%. 1000 milliLiter(s) IV Continuous <Continuous>  dextrose 50% Injectable 25 Gram(s) IV Push once  dextrose 50% Injectable 25 Gram(s) IV Push once  dextrose 50% Injectable 12.5 Gram(s) IV Push once  dextrose Oral Gel 15 Gram(s) Oral once  enoxaparin Injectable 40 milliGRAM(s) SubCutaneous every 24 hours  fluticasone propionate/ salmeterol 100-50 MICROgram(s) Diskus 1 Dose(s) Inhalation two times a day  gabapentin 300 milliGRAM(s) Oral two times a day  melatonin 3 milliGRAM(s) Oral at bedtime PRN  polyethylene glycol 3350 17 Gram(s) Oral daily  ranolazine 500 milliGRAM(s) Oral two times a day  senna 2 Tablet(s) Oral at bedtime  sodium chloride 0.9%. 1000 milliLiter(s) IV Continuous <Continuous>  tiotropium 2.5 MICROgram(s) Inhaler 2 Puff(s) Inhalation daily

## 2024-09-30 NOTE — PROGRESS NOTE ADULT - ASSESSMENT
74 years old female with h/o HTN, HLD, CAD s/p PCI, asthma, DM was brought in to ED with low blood sugar with AMS. Per daughter, patient has been having low blood sugar in early monring for last 3 days with confusion. No recent change in insulin regimens ( on Semglee 18 unit qAM). No nausea, vomiting or diarrhea. No fever or chills  Hemodynamically stable, afebrile, sat well at RA. Hypoglycemic to 55 in triage. No leukocytosis, plt 126, Cr 1.14. Flu/COVID negative    #  Hyperkalemia - persistent hyperkalemia discussed with nephrology.  LIkely multifactorial with ACEI, Lovenox, Bactrim.  D/c'd ACEI, Lovenox.  REpeat K stable.  # Acute metabolic encephalopathy due to hypoglycemia - Denies urinary symptoms. Denies shortness of breath/cough  # Type 2 diabetes mellitus with hypoglycemia.   ·  Plan: on Semglee 18 unit qAM at home. Also on metformin and pioglitazone at home. Adjusted insulin.  D/c Pioglitazone due to risk of hypoglycemia.   Outpatient Endo follow up ( following with Endo in clinic).  #  Benign essential HTN.  ·  Plan: monitor BP and titrate BP med.  D/c ACEI in setting of hyperkalemia  # Asymptomatic Bacteruria - pt asymptomatic.  UA with few WBCs.  UCx noted.  As pt asymptomatic, would observe off Abx.  If she develops fever or s/s of active infection, will reconsider.    # Hyperlipidemia - diet modification.  Continue Statin.  # CAD (coronary artery disease).  ·  Plan: on aspirin, statin, BB, also on ranolazine 500mg bid.  # Asthma -not in exacerbation.  ·  Plan: c/w advair/spiriva.  # Inpatient DVT Prophylaxis - ICDs    Stable for d/c home.

## 2024-09-30 NOTE — DISCHARGE NOTE NURSING/CASE MANAGEMENT/SOCIAL WORK - PATIENT PORTAL LINK FT
You can access the FollowMyHealth Patient Portal offered by Health system by registering at the following website: [email or web address]. By joining [employer]’s FollowMyHealth portal, you will also be able to view your health information using other applications (apps) compatible with our system.

## 2024-10-01 RX ORDER — INSULIN ASPART INJECTION 100 [IU]/ML
4 INJECTION, SOLUTION SUBCUTANEOUS
Qty: 1 | Refills: 0
Start: 2024-10-01

## 2024-10-07 NOTE — ED ADULT TRIAGE NOTE - ESI TRIAGE ACUITY LEVEL, MLM
----- Message from Penny K sent at 10/7/2024 11:36 AM CDT -----  Regarding: stat pacemaker pt  Patient is an Inpatient. An Inpatient MRI is needed and tentatively scheduled for ASAP today . Please place MRI Cardiology orders using Order Set EP CAR MRI Cardiac Implantable Electronic Device (CIED).   Patient has a Biotron[employer] device.   The MRI department has In person programming.    Thank you please let me know when orders are in  
Order is in  
Patient is an MRI conditional per [employer]. Cam will be in the area around 2-3pm and cam be there for the MRI  
3

## 2024-10-08 DIAGNOSIS — E87.5 HYPERKALEMIA: ICD-10-CM

## 2024-10-08 DIAGNOSIS — I10 ESSENTIAL (PRIMARY) HYPERTENSION: ICD-10-CM

## 2024-10-08 DIAGNOSIS — E11.649 TYPE 2 DIABETES MELLITUS WITH HYPOGLYCEMIA WITHOUT COMA: ICD-10-CM

## 2024-10-08 DIAGNOSIS — R82.71 BACTERIURIA: ICD-10-CM

## 2024-10-08 DIAGNOSIS — Z98.61 CORONARY ANGIOPLASTY STATUS: ICD-10-CM

## 2024-10-08 DIAGNOSIS — J45.909 UNSPECIFIED ASTHMA, UNCOMPLICATED: ICD-10-CM

## 2024-10-08 DIAGNOSIS — Z79.4 LONG TERM (CURRENT) USE OF INSULIN: ICD-10-CM

## 2024-10-08 DIAGNOSIS — Z11.52 ENCOUNTER FOR SCREENING FOR COVID-19: ICD-10-CM

## 2024-10-08 DIAGNOSIS — I25.10 ATHEROSCLEROTIC HEART DISEASE OF NATIVE CORONARY ARTERY WITHOUT ANGINA PECTORIS: ICD-10-CM

## 2024-10-08 DIAGNOSIS — G93.41 METABOLIC ENCEPHALOPATHY: ICD-10-CM

## 2024-10-08 DIAGNOSIS — Z79.82 LONG TERM (CURRENT) USE OF ASPIRIN: ICD-10-CM

## 2024-10-08 DIAGNOSIS — E78.5 HYPERLIPIDEMIA, UNSPECIFIED: ICD-10-CM

## 2024-10-28 ENCOUNTER — RX RENEWAL (OUTPATIENT)
Age: 74
End: 2024-10-28

## 2024-10-29 NOTE — PATIENT PROFILE ADULT - NSPROSPHOSPCHAPLAINYN_GEN_A_NUR
Patient is readmission, previously discharged home with [facility]   Patient states she no longer has [facility] and feels like she does not need it. Therapy to see pt while admitted      ** do not discharge without speaking to care coordination**     10/29/24 1007   Discharge Planning   Living Arrangements Spouse/significant other   Support Systems Spouse/significant other;Children;Family members   Assistance Needed walker and rolator   Type of Residence Private residence   Number of Stairs to Enter Residence 0   Number of Stairs Within Residence 0   Do you have animals or pets at home? No   Who is requesting discharge planning? Provider   Expected Discharge Disposition Home Health   Does the patient need discharge transport arranged? No   Financial Resource Strain   How hard is it for you to pay for the very basics like food, housing, medical care, and heating? Not hard   Housing Stability   In the last 12 months, was there a time when you were not able to pay the mortgage or rent on time? N   In the past 12 months, how many times have you moved where you were living? 0   At any time in the past 12 months, were you homeless or living in a shelter (including now)? N   Transportation Needs   In the past 12 months, has lack of transportation kept you from medical appointments or from getting medications? no   In the past 12 months, has lack of transportation kept you from meetings, work, or from getting things needed for daily living? No   Patient Choice   Provider Choice list and CMS website (https://medicare.gov/care-compare#search) for post-acute Quality and Resource Measure Data were provided and reviewed with: Patient   Patient / Family choosing to utilize agency / facility established prior to hospitalization Yes        no

## 2024-12-04 NOTE — ED ADULT NURSE NOTE - PAIN: PRESENCE, MLM
I called and left a message for the patient explaining that I reviewed the HIDA scan.  It is essentially normal with an ejection fraction of 65%.  The CT she had initially suggested some possible hyperenhancement, but the ultrasound was unremarkable outside of a little bit of sludge within the gallbladder.  Clinically no real symptoms consistent with this and as such I do not think there is any reason to pursue cholecystectomy at this time.  If she is having further problems she may follow-up in the office for further evaluation.    Martin Calvo MD  General and Endoscopic Surgery  [facility]    [address], [address]  [place], [place], [address]  P: [phone]  F: [phone]     complains of pain/discomfort

## 2024-12-30 ENCOUNTER — RX RENEWAL (OUTPATIENT)
Age: 74
End: 2024-12-30

## 2025-01-31 ENCOUNTER — RX RENEWAL (OUTPATIENT)
Age: 75
End: 2025-01-31

## 2025-02-07 NOTE — PHYSICAL THERAPY INITIAL EVALUATION ADULT - AMBULATION SKILLS, REHAB EVAL
1-2 muscle (51783)     Aquatic Therex (86057)    Dry Needle 3+ muscle (62536)     Iontophoresis (60752)    VASO (91932)     Ultrasound (09887)    Group Therapy (80377)     Estim Attended (31143)    Canalith Repositioning (77699)     Physical Performance Test (52136)    Custom orthotic ()     Other:    Other:    Total Timed Code Tx Minutes 38 3       Total Treatment Minutes 38        Charge Justification:  (76116) THERAPEUTIC EXERCISE - Provided verbal/tactile cueing for activities related to strengthening, flexibility, endurance, ROM performed to prevent loss of range of motion, maintain or improve muscular strength or increase flexibility, following either an injury or surgery.   (57732) HOME EXERCISE PROGRAM - Reviewed/Progressed HEP activities related to strengthening, flexibility, endurance, ROM performed to prevent loss of range of motion, maintain or improve muscular strength or increase flexibility, following either an injury or surgery.  (19009) MANUAL THERAPY -  Manual therapy techniques, 1 or more regions, each 15 minutes (Mobilization/manipulation, manual lymphatic drainage, manual traction) for the purpose of modulating pain, promoting relaxation,  increasing ROM, reducing/eliminating soft tissue swelling/inflammation/restriction, improving soft tissue extensibility and allowing for proper ROM for normal function with self care, mobility, lifting and ambulation    GOALS     Patient stated goal: to feel better, more function out of L arm  [] Progressing: [x] Met: [] Not Met: [] Adjusted    Therapist goals for Patient:   Short Term Goals: To be achieved in: 2 weeks  1. Independent in HEP and progression per patient tolerance, in order to prevent re-injury.   [] Progressing: [x] Met: [] Not Met: [] Adjusted  2. Patient will have a decrease in pain to <2-3/10 to facilitate improvement in movement, function, and ADLs as indicated by Functional Deficits.  [] Progressing: [x] Met: [] Not Met: []  needs device/independent

## 2025-03-28 NOTE — DIETITIAN INITIAL EVALUATION ADULT - NSPROEDALEARNER_GEN_A_NUR
prosthesis. AC joint arthrosis is slightly  more severe than previous. Type I acromion again identified.     IMPRESSION:  1.  Unchanged appearance of the well located reverse total shoulder arthroplasty  without evidence of hardware complication.  2.  Similar slender rim of heterotopic ossifications about the superior to  lateral aspect of the humeral prosthesis  3.  AC joint arthrosis is slightly more severe.  4.  Aortic and coronary artery calcifications.             Glenoid looks stable.  No evidence of significant notching.  There is radiolucency around the humeral prosthesis and cement mantle.  Indicating some loosening there.  Severe AC arthritis is also noted.     Lab Results   Component Value Date    WBC 7.4 03/14/2025    HGB 12.6 03/14/2025    HCT 38.5 03/14/2025    MCV 88.3 03/14/2025     03/14/2025     Lab Results   Component Value Date/Time     03/14/2025 09:17 AM    K 4.4 03/14/2025 09:17 AM     03/14/2025 09:17 AM    CO2 23 03/14/2025 09:17 AM    BUN 20 03/14/2025 09:17 AM    CREATININE 0.80 03/14/2025 09:17 AM    GLUCOSE 106 03/14/2025 09:17 AM    CALCIUM 9.3 03/14/2025 09:17 AM        A/Plan: No diagnosis found. 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 *      I had a long discussion with the patient regarding the natural history of the problem, as well as treatment options.     Treatment:   The patient desires a revision left reverse total shoulder replacement including humeral component and possible glenosphere exchange.  Cannot rule out the possibility of removal of hardware and cement spacer placement..  I talked with them extensively about the risks, benefits, reasonable expectations and expected recovery time as well as possible complications including but not limited to bleeding, infection, wear of the components requiring revision, neurovascular injury,  family

## 2025-04-04 ENCOUNTER — EMERGENCY (EMERGENCY)
Facility: HOSPITAL | Age: 75
LOS: 0 days | Discharge: ANOTHER HOSPITAL | End: 2025-04-04
Attending: EMERGENCY MEDICINE
Payer: MEDICAID

## 2025-04-04 ENCOUNTER — TRANSCRIPTION ENCOUNTER (OUTPATIENT)
Age: 75
End: 2025-04-04

## 2025-04-04 ENCOUNTER — INPATIENT (INPATIENT)
Facility: HOSPITAL | Age: 75
LOS: 0 days | Discharge: LEFT BEFORE DISCHARGE | DRG: 948 | End: 2025-04-04
Attending: INTERNAL MEDICINE | Admitting: INTERNAL MEDICINE
Payer: MEDICAID

## 2025-04-04 VITALS
TEMPERATURE: 98 F | HEART RATE: 66 BPM | DIASTOLIC BLOOD PRESSURE: 92 MMHG | OXYGEN SATURATION: 98 % | RESPIRATION RATE: 23 BRPM | SYSTOLIC BLOOD PRESSURE: 196 MMHG

## 2025-04-04 VITALS
SYSTOLIC BLOOD PRESSURE: 160 MMHG | DIASTOLIC BLOOD PRESSURE: 89 MMHG | RESPIRATION RATE: 16 BRPM | OXYGEN SATURATION: 93 % | WEIGHT: 169.98 LBS | HEART RATE: 76 BPM | TEMPERATURE: 98 F | HEIGHT: 66 IN

## 2025-04-04 VITALS
OXYGEN SATURATION: 99 % | RESPIRATION RATE: 19 BRPM | HEIGHT: 66 IN | WEIGHT: 169.76 LBS | SYSTOLIC BLOOD PRESSURE: 215 MMHG | TEMPERATURE: 98 F | DIASTOLIC BLOOD PRESSURE: 99 MMHG | HEART RATE: 67 BPM

## 2025-04-04 VITALS
HEART RATE: 78 BPM | OXYGEN SATURATION: 99 % | RESPIRATION RATE: 17 BRPM | TEMPERATURE: 99 F | SYSTOLIC BLOOD PRESSURE: 211 MMHG | DIASTOLIC BLOOD PRESSURE: 97 MMHG

## 2025-04-04 DIAGNOSIS — E16.2 HYPOGLYCEMIA, UNSPECIFIED: ICD-10-CM

## 2025-04-04 DIAGNOSIS — E11.649 TYPE 2 DIABETES MELLITUS WITH HYPOGLYCEMIA WITHOUT COMA: ICD-10-CM

## 2025-04-04 DIAGNOSIS — R79.89 OTHER SPECIFIED ABNORMAL FINDINGS OF BLOOD CHEMISTRY: ICD-10-CM

## 2025-04-04 DIAGNOSIS — E78.5 HYPERLIPIDEMIA, UNSPECIFIED: ICD-10-CM

## 2025-04-04 DIAGNOSIS — Z79.4 LONG TERM (CURRENT) USE OF INSULIN: ICD-10-CM

## 2025-04-04 DIAGNOSIS — I25.2 OLD MYOCARDIAL INFARCTION: ICD-10-CM

## 2025-04-04 DIAGNOSIS — Z95.5 PRESENCE OF CORONARY ANGIOPLASTY IMPLANT AND GRAFT: Chronic | ICD-10-CM

## 2025-04-04 DIAGNOSIS — I24.9 ACUTE ISCHEMIC HEART DISEASE, UNSPECIFIED: ICD-10-CM

## 2025-04-04 LAB
ALBUMIN SERPL ELPH-MCNC: 3.5 G/DL — SIGNIFICANT CHANGE UP (ref 3.3–5)
ALBUMIN SERPL ELPH-MCNC: 3.5 G/DL — SIGNIFICANT CHANGE UP (ref 3.3–5)
ALP SERPL-CCNC: 112 U/L — SIGNIFICANT CHANGE UP (ref 40–120)
ALP SERPL-CCNC: 126 U/L — HIGH (ref 40–120)
ALT FLD-CCNC: 12 U/L — SIGNIFICANT CHANGE UP (ref 10–45)
ALT FLD-CCNC: 16 U/L — SIGNIFICANT CHANGE UP (ref 12–78)
ANION GAP SERPL CALC-SCNC: 19 MMOL/L — HIGH (ref 5–17)
ANION GAP SERPL CALC-SCNC: 6 MMOL/L — SIGNIFICANT CHANGE UP (ref 5–17)
APPEARANCE UR: CLEAR — SIGNIFICANT CHANGE UP
APTT BLD: 148.7 SEC — CRITICAL HIGH (ref 24.5–35.6)
APTT BLD: 32.5 SEC — SIGNIFICANT CHANGE UP (ref 24.5–35.6)
AST SERPL-CCNC: 19 U/L — SIGNIFICANT CHANGE UP (ref 15–37)
AST SERPL-CCNC: 27 U/L — SIGNIFICANT CHANGE UP (ref 10–40)
BACTERIA # UR AUTO: ABNORMAL /HPF
BASOPHILS # BLD AUTO: 0.05 K/UL — SIGNIFICANT CHANGE UP (ref 0–0.2)
BASOPHILS # BLD AUTO: 0.05 K/UL — SIGNIFICANT CHANGE UP (ref 0–0.2)
BASOPHILS NFR BLD AUTO: 0.5 % — SIGNIFICANT CHANGE UP (ref 0–2)
BASOPHILS NFR BLD AUTO: 0.6 % — SIGNIFICANT CHANGE UP (ref 0–2)
BILIRUB SERPL-MCNC: 0.4 MG/DL — SIGNIFICANT CHANGE UP (ref 0.2–1.2)
BILIRUB SERPL-MCNC: 0.5 MG/DL — SIGNIFICANT CHANGE UP (ref 0.2–1.2)
BILIRUB UR-MCNC: NEGATIVE — SIGNIFICANT CHANGE UP
BUN SERPL-MCNC: 30 MG/DL — HIGH (ref 7–23)
BUN SERPL-MCNC: 31 MG/DL — HIGH (ref 7–23)
CALCIUM SERPL-MCNC: 9.3 MG/DL — SIGNIFICANT CHANGE UP (ref 8.4–10.5)
CALCIUM SERPL-MCNC: 9.4 MG/DL — SIGNIFICANT CHANGE UP (ref 8.5–10.1)
CHLORIDE SERPL-SCNC: 101 MMOL/L — SIGNIFICANT CHANGE UP (ref 96–108)
CHLORIDE SERPL-SCNC: 108 MMOL/L — SIGNIFICANT CHANGE UP (ref 96–108)
CO2 SERPL-SCNC: 15 MMOL/L — LOW (ref 22–31)
CO2 SERPL-SCNC: 27 MMOL/L — SIGNIFICANT CHANGE UP (ref 22–31)
COLOR SPEC: YELLOW — SIGNIFICANT CHANGE UP
CREAT SERPL-MCNC: 0.82 MG/DL — SIGNIFICANT CHANGE UP (ref 0.5–1.3)
CREAT SERPL-MCNC: 1.09 MG/DL — SIGNIFICANT CHANGE UP (ref 0.5–1.3)
DIFF PNL FLD: NEGATIVE — SIGNIFICANT CHANGE UP
EGFR: 53 ML/MIN/1.73M2 — LOW
EGFR: 53 ML/MIN/1.73M2 — LOW
EGFR: 74 ML/MIN/1.73M2 — SIGNIFICANT CHANGE UP
EGFR: 74 ML/MIN/1.73M2 — SIGNIFICANT CHANGE UP
EOSINOPHIL # BLD AUTO: 0.08 K/UL — SIGNIFICANT CHANGE UP (ref 0–0.5)
EOSINOPHIL # BLD AUTO: 0.17 K/UL — SIGNIFICANT CHANGE UP (ref 0–0.5)
EOSINOPHIL NFR BLD AUTO: 0.9 % — SIGNIFICANT CHANGE UP (ref 0–6)
EOSINOPHIL NFR BLD AUTO: 1.6 % — SIGNIFICANT CHANGE UP (ref 0–6)
EPI CELLS # UR: PRESENT
FLUAV AG NPH QL: SIGNIFICANT CHANGE UP
FLUBV AG NPH QL: SIGNIFICANT CHANGE UP
GAS PNL BLDV: SIGNIFICANT CHANGE UP
GLUCOSE SERPL-MCNC: 149 MG/DL — HIGH (ref 70–99)
GLUCOSE SERPL-MCNC: 25 MG/DL — CRITICAL LOW (ref 70–99)
GLUCOSE UR QL: NEGATIVE MG/DL — SIGNIFICANT CHANGE UP
HCT VFR BLD CALC: 40.5 % — SIGNIFICANT CHANGE UP (ref 34.5–45)
HCT VFR BLD CALC: 42.1 % — SIGNIFICANT CHANGE UP (ref 34.5–45)
HGB BLD-MCNC: 12.8 G/DL — SIGNIFICANT CHANGE UP (ref 11.5–15.5)
HGB BLD-MCNC: 12.8 G/DL — SIGNIFICANT CHANGE UP (ref 11.5–15.5)
IMM GRANULOCYTES NFR BLD AUTO: 0.6 % — SIGNIFICANT CHANGE UP (ref 0–0.9)
IMM GRANULOCYTES NFR BLD AUTO: 0.6 % — SIGNIFICANT CHANGE UP (ref 0–0.9)
INR BLD: 0.93 RATIO — SIGNIFICANT CHANGE UP (ref 0.85–1.16)
INR BLD: 0.97 RATIO — SIGNIFICANT CHANGE UP (ref 0.85–1.16)
KETONES UR-MCNC: NEGATIVE MG/DL — SIGNIFICANT CHANGE UP
LEUKOCYTE ESTERASE UR-ACNC: ABNORMAL
LYMPHOCYTES # BLD AUTO: 1.99 K/UL — SIGNIFICANT CHANGE UP (ref 1–3.3)
LYMPHOCYTES # BLD AUTO: 19.7 % — SIGNIFICANT CHANGE UP (ref 13–44)
LYMPHOCYTES # BLD AUTO: 2.11 K/UL — SIGNIFICANT CHANGE UP (ref 1–3.3)
LYMPHOCYTES # BLD AUTO: 22 % — SIGNIFICANT CHANGE UP (ref 13–44)
MCHC RBC-ENTMCNC: 28.1 PG — SIGNIFICANT CHANGE UP (ref 27–34)
MCHC RBC-ENTMCNC: 28.1 PG — SIGNIFICANT CHANGE UP (ref 27–34)
MCHC RBC-ENTMCNC: 30.4 G/DL — LOW (ref 32–36)
MCHC RBC-ENTMCNC: 31.6 G/DL — LOW (ref 32–36)
MCV RBC AUTO: 89 FL — SIGNIFICANT CHANGE UP (ref 80–100)
MCV RBC AUTO: 92.3 FL — SIGNIFICANT CHANGE UP (ref 80–100)
MONOCYTES # BLD AUTO: 0.67 K/UL — SIGNIFICANT CHANGE UP (ref 0–0.9)
MONOCYTES # BLD AUTO: 0.85 K/UL — SIGNIFICANT CHANGE UP (ref 0–0.9)
MONOCYTES NFR BLD AUTO: 7.4 % — SIGNIFICANT CHANGE UP (ref 2–14)
MONOCYTES NFR BLD AUTO: 7.9 % — SIGNIFICANT CHANGE UP (ref 2–14)
NEUTROPHILS # BLD AUTO: 6.19 K/UL — SIGNIFICANT CHANGE UP (ref 1.8–7.4)
NEUTROPHILS # BLD AUTO: 7.49 K/UL — HIGH (ref 1.8–7.4)
NEUTROPHILS NFR BLD AUTO: 68.5 % — SIGNIFICANT CHANGE UP (ref 43–77)
NEUTROPHILS NFR BLD AUTO: 69.7 % — SIGNIFICANT CHANGE UP (ref 43–77)
NRBC BLD AUTO-RTO: 0 /100 WBCS — SIGNIFICANT CHANGE UP (ref 0–0)
NRBC BLD AUTO-RTO: 0 /100 WBCS — SIGNIFICANT CHANGE UP (ref 0–0)
PH UR: 6 — SIGNIFICANT CHANGE UP (ref 5–8)
PLATELET # BLD AUTO: 275 K/UL — SIGNIFICANT CHANGE UP (ref 150–400)
POTASSIUM SERPL-MCNC: 4.9 MMOL/L — SIGNIFICANT CHANGE UP (ref 3.5–5.3)
POTASSIUM SERPL-MCNC: 7.3 MMOL/L — CRITICAL HIGH (ref 3.5–5.3)
POTASSIUM SERPL-SCNC: 4.9 MMOL/L — SIGNIFICANT CHANGE UP (ref 3.5–5.3)
POTASSIUM SERPL-SCNC: 7.3 MMOL/L — CRITICAL HIGH (ref 3.5–5.3)
PROT SERPL-MCNC: 7.7 G/DL — SIGNIFICANT CHANGE UP (ref 6–8.3)
PROT SERPL-MCNC: 8.7 GM/DL — HIGH (ref 6–8.3)
PROT UR-MCNC: 100 MG/DL
PROTHROM AB SERPL-ACNC: 10.5 SEC — SIGNIFICANT CHANGE UP (ref 9.9–13.4)
PROTHROM AB SERPL-ACNC: 11.1 SEC — SIGNIFICANT CHANGE UP (ref 9.9–13.4)
RBC # BLD: 4.55 M/UL — SIGNIFICANT CHANGE UP (ref 3.8–5.2)
RBC # BLD: 4.56 M/UL — SIGNIFICANT CHANGE UP (ref 3.8–5.2)
RBC # FLD: 12.6 % — SIGNIFICANT CHANGE UP (ref 10.3–14.5)
RBC # FLD: 12.9 % — SIGNIFICANT CHANGE UP (ref 10.3–14.5)
RSV RNA NPH QL NAA+NON-PROBE: SIGNIFICANT CHANGE UP
SARS-COV-2 RNA SPEC QL NAA+PROBE: SIGNIFICANT CHANGE UP
SODIUM SERPL-SCNC: 135 MMOL/L — SIGNIFICANT CHANGE UP (ref 135–145)
SODIUM SERPL-SCNC: 141 MMOL/L — SIGNIFICANT CHANGE UP (ref 135–145)
SOURCE RESPIRATORY: SIGNIFICANT CHANGE UP
SP GR SPEC: 1.01 — SIGNIFICANT CHANGE UP (ref 1–1.03)
TROPONIN I, HIGH SENSITIVITY RESULT: 255.6 NG/L — HIGH
TROPONIN I, HIGH SENSITIVITY RESULT: 847.6 NG/L — HIGH
TROPONIN T, HIGH SENSITIVITY RESULT: 72 NG/L — HIGH (ref 0–51)
UROBILINOGEN FLD QL: 0.2 MG/DL — SIGNIFICANT CHANGE UP (ref 0.2–1)
WBC # BLD: 10.73 K/UL — HIGH (ref 3.8–10.5)
WBC # BLD: 9.03 K/UL — SIGNIFICANT CHANGE UP (ref 3.8–10.5)
WBC # FLD AUTO: 10.73 K/UL — HIGH (ref 3.8–10.5)
WBC # FLD AUTO: 9.03 K/UL — SIGNIFICANT CHANGE UP (ref 3.8–10.5)
WBC UR QL: 5 /HPF — SIGNIFICANT CHANGE UP (ref 0–5)

## 2025-04-04 PROCEDURE — 85730 THROMBOPLASTIN TIME PARTIAL: CPT

## 2025-04-04 PROCEDURE — 99291 CRITICAL CARE FIRST HOUR: CPT

## 2025-04-04 PROCEDURE — 82330 ASSAY OF CALCIUM: CPT

## 2025-04-04 PROCEDURE — 96374 THER/PROPH/DIAG INJ IV PUSH: CPT

## 2025-04-04 PROCEDURE — 71045 X-RAY EXAM CHEST 1 VIEW: CPT | Mod: 26

## 2025-04-04 PROCEDURE — 85014 HEMATOCRIT: CPT

## 2025-04-04 PROCEDURE — 71045 X-RAY EXAM CHEST 1 VIEW: CPT

## 2025-04-04 PROCEDURE — 82947 ASSAY GLUCOSE BLOOD QUANT: CPT

## 2025-04-04 PROCEDURE — 83605 ASSAY OF LACTIC ACID: CPT

## 2025-04-04 PROCEDURE — 84295 ASSAY OF SERUM SODIUM: CPT

## 2025-04-04 PROCEDURE — 99223 1ST HOSP IP/OBS HIGH 75: CPT | Mod: GC

## 2025-04-04 PROCEDURE — 80053 COMPREHEN METABOLIC PANEL: CPT

## 2025-04-04 PROCEDURE — 93010 ELECTROCARDIOGRAM REPORT: CPT

## 2025-04-04 PROCEDURE — 99285 EMERGENCY DEPT VISIT HI MDM: CPT

## 2025-04-04 PROCEDURE — 84484 ASSAY OF TROPONIN QUANT: CPT

## 2025-04-04 PROCEDURE — 99291 CRITICAL CARE FIRST HOUR: CPT | Mod: 25

## 2025-04-04 PROCEDURE — 93005 ELECTROCARDIOGRAM TRACING: CPT

## 2025-04-04 PROCEDURE — T1013: CPT

## 2025-04-04 PROCEDURE — 85610 PROTHROMBIN TIME: CPT

## 2025-04-04 PROCEDURE — 84132 ASSAY OF SERUM POTASSIUM: CPT

## 2025-04-04 PROCEDURE — 85025 COMPLETE CBC W/AUTO DIFF WBC: CPT

## 2025-04-04 PROCEDURE — 85018 HEMOGLOBIN: CPT

## 2025-04-04 PROCEDURE — 82803 BLOOD GASES ANY COMBINATION: CPT

## 2025-04-04 PROCEDURE — 82435 ASSAY OF BLOOD CHLORIDE: CPT

## 2025-04-04 PROCEDURE — 99053 MED SERV 10PM-8AM 24 HR FAC: CPT

## 2025-04-04 PROCEDURE — 71045 X-RAY EXAM CHEST 1 VIEW: CPT | Mod: 26,77

## 2025-04-04 PROCEDURE — 93010 ELECTROCARDIOGRAM REPORT: CPT | Mod: 76

## 2025-04-04 RX ORDER — HEPARIN SODIUM 1000 [USP'U]/ML
4700 INJECTION INTRAVENOUS; SUBCUTANEOUS ONCE
Refills: 0 | Status: COMPLETED | OUTPATIENT
Start: 2025-04-04 | End: 2025-04-04

## 2025-04-04 RX ORDER — FUROSEMIDE 10 MG/ML
40 INJECTION INTRAMUSCULAR; INTRAVENOUS ONCE
Refills: 0 | Status: COMPLETED | OUTPATIENT
Start: 2025-04-04 | End: 2025-04-04

## 2025-04-04 RX ORDER — B1/B2/B3/B5/B6/B12/VIT C/FOLIC 500-0.5 MG
1 TABLET ORAL
Refills: 0 | DISCHARGE

## 2025-04-04 RX ORDER — HYDROCHLOROTHIAZIDE 50 MG/1
1 TABLET ORAL
Refills: 0 | DISCHARGE

## 2025-04-04 RX ORDER — TICAGRELOR 90 MG/1
180 TABLET ORAL ONCE
Refills: 0 | Status: COMPLETED | OUTPATIENT
Start: 2025-04-04 | End: 2025-04-04

## 2025-04-04 RX ORDER — RANOLAZINE 1000 MG/1
500 TABLET, FILM COATED, EXTENDED RELEASE ORAL
Refills: 0 | Status: DISCONTINUED | OUTPATIENT
Start: 2025-04-04 | End: 2025-04-04

## 2025-04-04 RX ORDER — DEXTROSE 50 % IN WATER 50 %
50 SYRINGE (ML) INTRAVENOUS ONCE
Refills: 0 | Status: COMPLETED | OUTPATIENT
Start: 2025-04-04 | End: 2025-04-04

## 2025-04-04 RX ORDER — LINACLOTIDE 290 UG/1
1 CAPSULE, GELATIN COATED ORAL
Refills: 0 | DISCHARGE

## 2025-04-04 RX ORDER — METOPROLOL SUCCINATE 50 MG/1
1 TABLET, EXTENDED RELEASE ORAL
Refills: 0 | DISCHARGE

## 2025-04-04 RX ORDER — FLUTICASONE FUROATE, UMECLIDINIUM BROMIDE AND VILANTEROL TRIFENATATE 100; 62.5; 25 UG/1; UG/1; UG/1
1 POWDER RESPIRATORY (INHALATION)
Refills: 0 | DISCHARGE

## 2025-04-04 RX ORDER — HEPARIN SODIUM 1000 [USP'U]/ML
INJECTION INTRAVENOUS; SUBCUTANEOUS
Qty: 25000 | Refills: 0 | Status: DISCONTINUED | OUTPATIENT
Start: 2025-04-04 | End: 2025-04-04

## 2025-04-04 RX ORDER — LISINOPRIL 5 MG/1
1 TABLET ORAL
Refills: 0 | DISCHARGE

## 2025-04-04 RX ORDER — NITROGLYCERIN 20 MG/G
0.4 OINTMENT TOPICAL
Refills: 0 | Status: DISCONTINUED | OUTPATIENT
Start: 2025-04-04 | End: 2025-04-04

## 2025-04-04 RX ORDER — INSULIN ASPART 100 [IU]/ML
15 INJECTION, SOLUTION INTRAVENOUS; SUBCUTANEOUS
Refills: 0 | DISCHARGE

## 2025-04-04 RX ORDER — GABAPENTIN 400 MG/1
1 CAPSULE ORAL
Refills: 0 | DISCHARGE

## 2025-04-04 RX ORDER — ERGOCALCIFEROL 1.25 MG/1
1 CAPSULE ORAL
Refills: 0 | DISCHARGE

## 2025-04-04 RX ORDER — RANOLAZINE 1000 MG/1
1 TABLET, FILM COATED, EXTENDED RELEASE ORAL
Refills: 0 | DISCHARGE

## 2025-04-04 RX ORDER — METFORMIN HYDROCHLORIDE 850 MG/1
1 TABLET ORAL
Refills: 0 | DISCHARGE

## 2025-04-04 RX ORDER — ASPIRIN 325 MG
1 TABLET ORAL
Refills: 0 | DISCHARGE

## 2025-04-04 RX ORDER — ASPIRIN 325 MG
324 TABLET ORAL ONCE
Refills: 0 | Status: COMPLETED | OUTPATIENT
Start: 2025-04-04 | End: 2025-04-04

## 2025-04-04 RX ORDER — ALBUTEROL SULFATE 2.5 MG/3ML
2 VIAL, NEBULIZER (ML) INHALATION
Refills: 0 | DISCHARGE

## 2025-04-04 RX ORDER — HEPARIN SODIUM 1000 [USP'U]/ML
4700 INJECTION INTRAVENOUS; SUBCUTANEOUS EVERY 6 HOURS
Refills: 0 | Status: DISCONTINUED | OUTPATIENT
Start: 2025-04-04 | End: 2025-04-04

## 2025-04-04 RX ORDER — CARVEDILOL 3.12 MG/1
12.5 TABLET, FILM COATED ORAL EVERY 12 HOURS
Refills: 0 | Status: DISCONTINUED | OUTPATIENT
Start: 2025-04-04 | End: 2025-04-04

## 2025-04-04 RX ORDER — ISOSORBDIE DINITRATE 30 MG/1
20 TABLET ORAL THREE TIMES A DAY
Refills: 0 | Status: DISCONTINUED | OUTPATIENT
Start: 2025-04-04 | End: 2025-04-04

## 2025-04-04 RX ORDER — HEPARIN SODIUM 1000 [USP'U]/ML
4400 INJECTION INTRAVENOUS; SUBCUTANEOUS EVERY 6 HOURS
Refills: 0 | Status: DISCONTINUED | OUTPATIENT
Start: 2025-04-04 | End: 2025-04-04

## 2025-04-04 RX ADMIN — NITROGLYCERIN 0.4 MILLIGRAM(S): 20 OINTMENT TOPICAL at 14:25

## 2025-04-04 RX ADMIN — TICAGRELOR 180 MILLIGRAM(S): 90 TABLET ORAL at 12:15

## 2025-04-04 RX ADMIN — Medication 324 MILLIGRAM(S): at 10:08

## 2025-04-04 RX ADMIN — HEPARIN SODIUM 4700 UNIT(S): 1000 INJECTION INTRAVENOUS; SUBCUTANEOUS at 12:15

## 2025-04-04 RX ADMIN — Medication 50 MILLILITER(S): at 06:38

## 2025-04-04 RX ADMIN — HEPARIN SODIUM 900 UNIT(S)/HR: 1000 INJECTION INTRAVENOUS; SUBCUTANEOUS at 15:03

## 2025-04-04 RX ADMIN — FUROSEMIDE 40 MILLIGRAM(S): 10 INJECTION INTRAMUSCULAR; INTRAVENOUS at 14:23

## 2025-04-04 RX ADMIN — HEPARIN SODIUM 950 UNIT(S)/HR: 1000 INJECTION INTRAVENOUS; SUBCUTANEOUS at 12:15

## 2025-04-04 NOTE — CONSULT NOTE ADULT - SUBJECTIVE AND OBJECTIVE BOX
Cardiology Consult Note   [Please check [email or web address] password: [email or web address]" for cardiology service schedule and contact information]    HPI:      PAST MEDICAL & SURGICAL HISTORY:  Type 2 diabetes mellitus without complication, without long-term current use of insulin      Coronary artery disease with other form of angina pectoris      Hypertension      Asthma      History of heart artery stent        FAMILY HISTORY:  FHx: myocardial infarction  Father      SOCIAL HISTORY:  unchanged    MEDICATIONS:                    -------------------------------------------------------------------------------------------  PHYSICAL EXAM:  T(C): 36.5 (04-04-25 @ 13:20), Max: 36.5 (04-04-25 @ 12:27)  HR: 67 (04-04-25 @ 13:20) (58 - 76)  BP: 215/99 (04-04-25 @ 13:20) (112/65 - 215/99)  RR: 19 (04-04-25 @ 13:20) (16 - 28)  SpO2: 99% (04-04-25 @ 13:20) (93% - 99%)  Wt(kg): --  I&O's Summary      GENERAL: NAD  HEAD: Atraumatic, Normocephalic.  ENT: Moist mucous membranes.  NECK: Supple, No JVD.  CHEST/LUNG: Clear to auscultation bilaterally; No rales, rhonchi, wheezing, or rubs. Unlabored respirations.  HEART: Regular rate and rhythm; No murmurs, rubs, or gallops.  ABDOMEN: Bowel sounds present; Soft, Nontender, Nondistended.   EXTREMITIES:  2+ Peripheral Pulses, brisk capillary refill. No clubbing, cyanosis, or edema.    -------------------------------------------------------------------------------------------  LABS:                          12.8   10.73 )-----------( 287      ( 04 Apr 2025 06:35 )             40.5     04-04    141  |  108  |  31[H]  ----------------------------<  25[LL]  4.9   |  27  |  1.09    Ca    9.4      04 Apr 2025 06:35    TPro  8.7[H]  /  Alb  3.5  /  TBili  0.5  /  DBili  x   /  AST  19  /  ALT  16  /  AlkPhos  126[H]  04-04    PT/INR - ( 04 Apr 2025 06:35 )   PT: 10.5 sec;   INR: 0.93 ratio         PTT - ( 04 Apr 2025 06:35 )  PTT:32.5 sec         HPI: 75 yo F w/ PMHx of CAD s/p stents 5 years ago, HLD, HTN, asthma, and T2DM on insulin presenting as a transfer from [facility] for evaluation for unstable angina versus NSTEMI. Patient arrived to [facility] after her daughter found her unresponsive. She had taken an extra dose of her insulin last night  (4/3, ~11 PM) without checking her blood glucose. EMS found patient hypoglycemic and administered D10. Patient arrived diaphoretic with blood glucose of 39, but had improvement in mental status after receiving D50.  Patient started complaining of ongoing chest pain and was found to have troponin I 255.6 (07:41) uptrending to 847.6 (11:10). She was started on heparin (bolus/drip), loaded on Brilinta, and loaded on aspirin.  Patient was transferred as an NSTEMI. On arrival to the [facility] ED, patient was alert and oriented with /99. She was unable to provide a complete history but stated that her chest pain has been ongoing for 6 months to a year. She currently has chest discomfort and endorses shortness of breath. No headache.     In 2019, patient had a cath showing:   LM:   --  Distal left main: There was a 20 % stenosis at the site of a  prior stent.  LAD:   --  Ostial LAD: There was a stenosis at the site of a prior stent.  --  Proximal LAD: There was a discrete 30 % stenosis at the site of a prior  stent.  --  Mid LAD: There was a 100 % stenosis at the site of a prior stent.  CX:   --  Ostial circumflex: There was a 80 % stenosis at the site of a  prior stent.  --  Proximal circumflex: There was a stenosis at the site of a prior stent.  --  Mid circumflex: There was a diffuse 80 % stenosis.  --  OM1: There was a tubular 70 % stenosis.  --  OM2: There was a tubular 70 % stenosis.  RCA:   --  Ostial RCA: There was a stenosis at the site of a prior stent.  --  Proximal RCA: There was a tubular 30 % stenosis at the site of a prior  stent.  --  Mid RCA: There was a stenosis at the site of a prior stent.  --  Distal RCA: There was a stenosis at the site of a prior stent.      PMH: CAD, HTN, HLD, T2DM, asthma     PSH: cardiac stents placed 5 years ago     Home meds: insulin aspart 4 units 4x/day, humalog 4 units 3x/day, glargine 12 units daily, metformin 500mg daily, ASA 81mg daily, atorvastatin 40mg daily, ranolazine 500mg BID, gabapentin 300mg BID, alendronate 70mg once per week, Trelegy Ellipta 1 puff daily     Allergies: NKDA    Family history: myocardial infarction (father)     Social history: lives with daughter, uses a wheelchair at baseline, has never smoked     ROS:   General: no fever, chills  HEENT: no nasal congestion, cough, rhinorrhea, sore throat, headache, changes in vision  Cardio: + chest discomfort, no palpitations, pallor  Pulm: + shortness of breath  GI: + constipation, no vomiting, diarrhea, abdominal pain  /Renal: no dysuria, foul smelling urine, increased frequency, flank pain  MSK: no back or extremity pain, no edema, joint pain or swelling, gait changes  Endo: no temperature intolerance  Heme: no bruising or abnormal bleeding  Skin: no rash    -------------------------------------------------------------------------------------------  PHYSICAL EXAM:  T(C): 36.5 (04-04-25 @ 13:20), Max: 36.5 (04-04-25 @ 12:27)  HR: 67 (04-04-25 @ 13:20) (58 - 76)  BP: 215/99 (04-04-25 @ 13:20) (112/65 - 215/99)  RR: 19 (04-04-25 @ 13:20) (16 - 28)  SpO2: 99% (04-04-25 @ 13:20) (93% - 99%)  Wt(kg): --  I&O's Summary    GENERAL: NAD  HEAD: Atraumatic, Normocephalic.  ENT: Moist mucous membranes.  NECK: Supple, No JVD.  CHEST/LUNG: Clear to auscultation bilaterally; No rales, rhonchi, wheezing, or rubs. Unlabored respirations.  HEART: Regular rate and rhythm; No murmurs, rubs, or gallops.  ABDOMEN: Soft, Nontender, Nondistended.   EXTREMITIES:  2+ Peripheral Pulses, brisk capillary refill. No clubbing, cyanosis, or edema.  NEURO: A&Ox3, no focal deficits     -------------------------------------------------------------------------------------------  LABS:                          12.8   10.73 )-----------( 287      ( 04 Apr 2025 06:35 )             40.5     04-04    141  |  108  |  31[H]  ----------------------------<  25[LL]  4.9   |  27  |  1.09    Ca    9.4      04 Apr 2025 06:35    TPro  8.7[H]  /  Alb  3.5  /  TBili  0.5  /  DBili  x   /  AST  19  /  ALT  16  /  AlkPhos  126[H]  04-04    PT/INR - ( 04 Apr 2025 06:35 )   PT: 10.5 sec;   INR: 0.93 ratio         PTT - ( 04 Apr 2025 06:35 )  PTT:32.5 sec         HPI: 73 yo F w/ PMHx of CAD s/p stents 5 years ago, HLD, HTN, asthma, and T2DM on insulin presenting as a transfer from [facility] for evaluation for unstable angina versus NSTEMI. Patient arrived to [facility] after her daughter found her unresponsive. She had taken an extra dose of her insulin last night  (4/3, ~11 PM) without checking her blood glucose. EMS found patient hypoglycemic and administered D10. Patient arrived diaphoretic with blood glucose of 39, but had improvement in mental status after receiving D50.  Patient started complaining of ongoing chest pain and was found to have troponin I 255.6 (07:41) uptrending to 847.6 (11:10). She was started on heparin (bolus/drip), loaded on Brilinta, and loaded on aspirin.  Patient was transferred as an NSTEMI. On arrival to the [facility] ED, patient was alert and oriented with /99. She was unable to provide a complete history but stated that her chest pain has been ongoing for 6 months to a year. She currently has chest discomfort and endorses shortness of breath. No headache.     In 2019, patient had a cath showing:   LM:   --  Distal left main: There was a 20 % stenosis at the site of a  prior stent.  LAD:   --  Ostial LAD: There was a stenosis at the site of a prior stent.  --  Proximal LAD: There was a discrete 30 % stenosis at the site of a prior  stent.  --  Mid LAD: There was a 100 % stenosis at the site of a prior stent.  CX:   --  Ostial circumflex: There was a 80 % stenosis at the site of a  prior stent.  --  Proximal circumflex: There was a stenosis at the site of a prior stent.  --  Mid circumflex: There was a diffuse 80 % stenosis.  --  OM1: There was a tubular 70 % stenosis.  --  OM2: There was a tubular 70 % stenosis.  RCA:   --  Ostial RCA: There was a stenosis at the site of a prior stent.  --  Proximal RCA: There was a tubular 30 % stenosis at the site of a prior  stent.  --  Mid RCA: There was a stenosis at the site of a prior stent.  --  Distal RCA: There was a stenosis at the site of a prior stent.      PMH: CAD, HTN, HLD, T2DM, asthma     PSH: cardiac stents placed 5 years ago     Home meds: insulin aspart 4 units 4x/day, humalog 4 units 3x/day, glargine 12 units daily, metformin 500mg daily, ASA 81mg daily, atorvastatin 40mg daily, ranolazine 500mg BID, gabapentin 300mg BID, alendronate 70mg once per week, Trelegy Ellipta 1 puff daily     Allergies: NKDA    Family history: myocardial infarction (father)     Social history: lives with daughter, uses a wheelchair at baseline, has never smoked     ROS:   General: no fever, chills  HEENT: no nasal congestion, cough, rhinorrhea, sore throat, headache, changes in vision  Cardio: + chest discomfort, no palpitations, pallor  Pulm: + shortness of breath  GI: + constipation, no vomiting, diarrhea, abdominal pain  /Renal: no dysuria, foul smelling urine, increased frequency, flank pain  MSK: no back or extremity pain, no edema, joint pain or swelling, gait changes  Endo: no temperature intolerance  Heme: no bruising or abnormal bleeding  Skin: no rash    -------------------------------------------------------------------------------------------  PHYSICAL EXAM:  T(C): 36.5 (04-04-25 @ 13:20), Max: 36.5 (04-04-25 @ 12:27)  HR: 67 (04-04-25 @ 13:20) (58 - 76)  BP: 215/99 (04-04-25 @ 13:20) (112/65 - 215/99)  RR: 19 (04-04-25 @ 13:20) (16 - 28)  SpO2: 99% (04-04-25 @ 13:20) (93% - 99%)  Wt(kg): --  I&O's Summary    GENERAL: NAD  HEAD: Atraumatic, Normocephalic.  ENT: Moist mucous membranes.  NECK: Supple, No JVD.  CHEST/LUNG: Clear to auscultation bilaterally; No rales, rhonchi, wheezing, or rubs. Soft basilar crackles at base . Unlabored respirations.  HEART: Regular rate and rhythm; No murmurs, rubs, or gallops.  ABDOMEN: Soft, Nontender, Nondistended.   EXTREMITIES:  2+ Peripheral Pulses, brisk capillary refill. No clubbing, cyanosis, trace edema.  NEURO: A&Ox3, no focal deficits     -------------------------------------------------------------------------------------------  LABS:                          12.8   10.73 )-----------( 287      ( 04 Apr 2025 06:35 )             40.5     04-04    141  |  108  |  31[H]  ----------------------------<  25[LL]  4.9   |  27  |  1.09    Ca    9.4      04 Apr 2025 06:35    TPro  8.7[H]  /  Alb  3.5  /  TBili  0.5  /  DBili  x   /  AST  19  /  ALT  16  /  AlkPhos  126[H]  04-04    PT/INR - ( 04 Apr 2025 06:35 )   PT: 10.5 sec;   INR: 0.93 ratio         PTT - ( 04 Apr 2025 06:35 )  PTT:32.5 sec

## 2025-04-04 NOTE — ED ADULT TRIAGE NOTE - CHIEF COMPLAINT QUOTE
BIBA from home as per EMS pt Blood sugar was 41 , diaphoretic and unresponsive, D10W was given 150 ml by EMS, FS in triage 39. pt awake. Romanian speaking. IV g 18 right arm h/o DM

## 2025-04-04 NOTE — ED PROVIDER NOTE - CLINICAL SUMMARY MEDICAL DECISION MAKING FREE TEXT BOX
Pt with hypoglycemia  -otherwise evaluated in ED  - awaiting results on labs and signed out to Dr Michele pending results and workup for hypoglycemia.

## 2025-04-04 NOTE — CONSULT NOTE ADULT - ASSESSMENT
73 y/o F with PMH of CAD s/p stents 5 years ago, HTN, HLD, T2DM, and asthma presenting with ongoing chest discomfort and shortness of breath following an episode of loss of consciousness secondary to hypoglycemia due to overdose of insulin. Found to have elevated troponins of 255 -> 847, elevated BP of 215/99, and EKG ___. Cardiology consulted for evaluation of unstable angina vs NSTEMI.     EKG:   Cath: September 2019: 20% distal LM, 30% prox LAD, 100% mid LAD, 80% ostial Cx, 80% mid Cx, 70% OM1, 70% OM2, 30% prox RCA   JEWEL: 5  FE: 124    Impression: Patient with elevated troponin (255 ->847) w/ EKG showing ____ concerning for NSTEMI vs cardiac injury secondary to hypoglycemic episode or HTN. Patient's persistently elevated blood pressures (SBP>200) could be contributing to her elevated troponin. Patient will likely need cardiac catheterization during admission, will obtain TTE and monitor to see if her chest pain improves with BP control.     1. NSTEMI     Recommendations:   --Please start isosorbide dinitrate 30mg TID  --Please start carvedilol 12.5mg BID  --Please start lasix 40mg   --Please start ranolazine 500mg BID   --Please ensure patient loaded with   --Please ensure patient loaded with 180 of Brillinta, then 90 BID to start 24 hours later (Can do 600 of Plavix if patients bleeding risk is higher)  --c/w heparin gtt, please bolus to achieve ptt per ACS protcol  --Please start atorvastatin 80mg qhs   --Please trend troponin and CKMB until downtrending, please repeat EKG with every troponin   --TTE ordered    --Check lipid panel, lipoprotein a and a1c.    --Please monitor on telemetry  --K>4, Mg>2  --Strict ins and outs  --Daily standing weights       Marlene Ovidio MS3   75 y/o F with PMH of CAD s/p stents 5 years ago, HTN, HLD, T2DM, and asthma presenting with ongoing chest discomfort and shortness of breath following an episode of loss of consciousness secondary to hypoglycemia due to overdose of insulin. Found to have elevated troponins of 255 -> 847, elevated BP of 215/99, and EKG with ST depressions in I, AVL, V5, V6. Cardiology consulted for evaluation of unstable angina vs NSTEMI.     EKG: NSR, ST depressions in leads I, AVL, V5, and V6  Cath: September 2019: 20% distal LM, 30% prox LAD, 100% mid LAD, 80% ostial Cx, 80% mid Cx, 70% OM1, 70% OM2, 30% prox RCA   JEWEL: 5  FE: 124    Impression: Patient with elevated troponin (255 ->847) w/ EKG showing ST depressions in I, AVL, V5, V6 concerning for acute myocardial injury secondary to hypoglycemic episode or severe HTN. Plaque rupture cannot be excluded so will monitor to see if her chest pain improves with better BP control and determine need for cardiac catheterization.     1. NSTEMI     Recommendations:   --Please start isosorbide dinitrate 30mg TID  --Please start carvedilol 12.5mg BID  --Please start lasix 40mg   --Please start ranolazine 500mg BID   --Please ensure patient loaded with   --Please ensure patient loaded with 180 of Brillinta, then 90 BID to start 24 hours later (Can do 600 of Plavix if patients bleeding risk is higher)  --c/w heparin gtt, please bolus to achieve ptt per ACS protcol  --Please start atorvastatin 80mg qhs   --Please trend troponin and CKMB until downtrending, please repeat EKG with every troponin   --TTE ordered    --Check lipid panel, lipoprotein a and a1c.    --Please monitor on telemetry  --K>4, Mg>2  --Strict ins and outs  --Daily standing weights       Marlene Ovidio MS3   73 y/o F with PMH of CAD s/p stents 5 years ago, HTN, HLD, T2DM, and asthma presenting with ongoing chest discomfort and shortness of breath following an episode of loss of consciousness secondary to hypoglycemia due to overdose of insulin. Found to have elevated troponins of 255 -> 847, elevated BP of 215/99, and EKG with ST depressions in I, AVL, V5, V6. Cardiology consulted for evaluation of elevated troponins.     EKG: NSR, ST depressions in leads I, AVL, V5, and V6  Cath: September 2019: 20% distal LM, 30% prox LAD, 100% mid LAD, 80% ostial Cx, 80% mid Cx, 70% OM1, 70% OM2, 30% prox RCA   JEWEL: 5  FE: 124    Impression: Patient with elevated troponin (255 ->847) w/ EKG showing ST depressions in I, AVL, V5, V6 concerning for acute myocardial injury secondary to hypoglycemic episode or severe HTN. Plaque rupture cannot be excluded so will monitor to see if her chest pain improves with better BP control and at this time no urgent indication for left heart catheterization.     1. Elevated troponin  2. Chest pain  3. Shortness of breath  4. Hypertension     Recommendations:   --Please obtain home med rec and start home nitrate and beta blocker   --S/p 1 dose Lasix 40mg stat, will reassess volume need daily   --Please start home ranolazine 500mg BID   --Please ensure patient loaded with   --Please ensure patient loaded with 180 of Brillinta, then 90 BID to start 24 hours later (Can do 600 of Plavix if patients bleeding risk is higher)  --c/w heparin gtt, please bolus to achieve ptt per ACS protcol  --Please start atorvastatin 80mg qhs   --Please trend troponin and CKMB until downtrending, please repeat EKG with every troponin   --TTE ordered    --Check lipid panel and a1c.    --Please monitor on telemetry  --K>4, Mg>2  --Strict ins and outs  --Daily standing weights       Recommendations are not final until cosigned by an Attending  Please contact Cardiology with any questions or clinical concerns.   Please check [email or web address] password: [email or web address]" for cardiology service schedule and contact information    Marlene Nolan MS3  Angel Jimenez MD   Cardiology Fellow

## 2025-04-04 NOTE — CONSULT NOTE ADULT - ATTENDING COMMENTS
74 year old woman with known extensive coronary artery disease not well suited to revascularization few years ago, managed on anti-anginal medications presented to [facility] after daughter found unresponsive and in ER demonstrated profound hypoglycemia and improved with IV glucose. Then complained of chest pain and troponin elevated and transferred to this ER. She is agitated and hypertensive to 215/99. Suspect troponin elevation related to non-ischemic myocardial injury in association with profound hypoglycemia and hypertension and there is considerable potential for demand ischemia as she has extensive multivessel coronary disease. Seek to calm agitation, control blood pressure, but would not aggressively administer medications. Determine what home medications and adjust if needed, but avoid major changes. Obtain cardiac echo. Would not pursue early invasive procedures.    To contact call Cardiology Fellow or Attending as listed on [email or web address] password: [email or web address].

## 2025-04-04 NOTE — ED ADULT NURSE NOTE - NSFALLRISKINTERV_ED_ALL_ED
Assistance OOB with selected safe patient handling equipment if applicable/Assistance with ambulation/Communicate fall risk and risk factors to all staff, patient, and family/Monitor gait and stability/Monitor for mental status changes and reorient to person, place, and time, as needed/Move patient closer to nursing station/within visual sight of ED staff/Provide patient with walking aids/Provide visual cue: yellow wristband, yellow gown, etc/Reinforce activity limits and safety measures with patient and family/Toileting schedule using arm’s reach rule for commode and bathroom/Use of alarms - bed, stretcher, chair and/or video monitoring/Call bell, personal items and telephone in reach/Instruct patient to call for assistance before getting out of bed/chair/stretcher/Non-slip footwear applied when patient is off stretcher/[place] to call system/Physically safe environment - no spills, clutter or unnecessary equipment/Purposeful Proactive Rounding/Room/bathroom lighting operational, light cord in reach

## 2025-04-04 NOTE — DISCHARGE NOTE PROVIDER - NSDCMRMEDTOKEN_GEN_ALL_CORE_FT
Albuterol (Eqv-Proventil HFA) 90 mcg/inh inhalation aerosol: 2 puff(s) inhaled every 6 hours as needed for  shortness of breath and/or wheezing  aspirin 81 mg oral tablet, chewable: 1 tab(s) chewed once a day  atorvastatin 40 mg oral tablet: 1 tab(s) orally once a day  gabapentin 300 mg oral capsule: 1 cap(s) orally 2 times a day  hydroCHLOROthiazide 12.5 mg oral capsule: 1 cap(s) orally once a day  insulin aspart 100 units/mL subcutaneous solution: 15 unit(s) subcutaneous once a day (at bedtime)  Linzess 145 mcg oral capsule: 1 cap(s) orally once a day  lisinopril 10 mg oral tablet: 1 tab(s) orally once a day  MetFORMIN (Eqv-Glucophage XR) 500 mg oral tablet, extended release: 1 tab(s) orally 2 times a day  metoprolol succinate 25 mg oral tablet, extended release: 1 tab(s) orally once a day  Multiple Vitamins oral tablet: 1 tab(s) orally once a day  ranolazine 500 mg oral tablet, extended release: 1 tab(s) orally 2 times a day  Trelegy Ellipta 100 mcg-62.5 mcg-25 mcg/inh inhalation powder: 1 puff(s) inhaled once a day  Vitamin D2 1.25 mg (50,000 intl units) oral capsule: 1 cap(s) orally once a week

## 2025-04-04 NOTE — ED ADULT NURSE NOTE - NS ED NURSE DISCH DISPOSITION
normal...
AMA (saw a physician/midlevel provider and clinician was able to provide reasons for staying for treatment & form is signed)

## 2025-04-04 NOTE — ED PROVIDER NOTE - CLINICAL SUMMARY MEDICAL DECISION MAKING FREE TEXT BOX
73 yo F w/ PMHx of diabetes on insulin, HLD, HTN (unspecified medications), CAD s/p stent (5 years ago) presents as a transfer from [facility] evaluation for concern for unstable angina versus NSTEMI.  [facility] daughter found patient unresponsive.  Last night, patient had taken an extra dose of her insulin (4/3, ~11 PM) because she felt that she was hypoglycemic (did not check her blood glucose).  EMS found patient hypoglycemic and received D10.  However blood glucose was 39 and patient received D50.  Patient arrived diaphoretic and responsive, but had improvement in mental status after receiving D50.  Patient started complaining of ongoing chest pain and was found to have troponin I 255.6 (07:41) uptrending to 847.6 (11:10).  Patient was started on heparin (bolus/drip), loaded on Brilinta, and loaded on aspirin.  Patient was transferred as an NSTEMI.  EMS reports that patient's blood pressures were elevated (SBP greater than 200s) throughout transit that was not treated by any antihypertensives at [place].  On arrival to the ED, patient is not able to provide adequate history, but is alert and oriented.    Vital signs remarkable for /99.    GENERAL: mild acute distress, endomorphic body habitus  HEENT: atraumatic, normocephalic, vision grossly intact, EOMI, no conjunctivitis or discharge, hearing grossly intact, no nasal discharge or epistaxis, clear pharynx  CV: regular rate, normal rhythm, normal S1/S2, no murmurs/rubs, no cyanosis  PULM: normal work of breathing, normal O2 saturation on RA, clear breath sounds in b/l upper/lower lung fields, no crackles/rales/rhonchi/wheezing  GI: soft/non-tender/nondistended abdomen, no guarding or rebound tenderness, no palpable masses  : no CVA tenderness  NEURO: A&Ox3, follows commands, normal speech, no focal motor or sensory deficits  MSK: no joint tenderness/swelling/erythema, ranging all extremities with no appreciable loss of ROM  EXT: no peripheral edema, no calf tenderness, no redness or swelling  SKIN: warm, dry, and intact, no rashes  PSYCH: appropriate mood and affect    Concern for ACS versus cardiac injury secondary to hypoglycemia/hypotension.  Plan to continue heparin drip, call cardiology, and obtain repeat set of labs including coags.

## 2025-04-04 NOTE — ED ADULT NURSE NOTE - OBJECTIVE STATEMENT
Patient presents to ED for hypoglycemia from home. As per EMS, Blood sugar was 41, and patient was diaphoretic and unresponsive, D10W was given by EMS. FS in triage 39. Patient lethargic but arousable, minimally verbal, primarily Hungarian speaking. After administration of D50, patient able to verbalize giving herself extra insulin because "she felt like her sugar was high". She reports she did not check BGL and did not eat anything. PMH:DM

## 2025-04-04 NOTE — CHART NOTE - NSCHARTNOTEFT_GEN_A_CORE
Was called by RN due to patient wanting to sign out AMA. Asked patient why she wanted to leave, reports she wants to go home    Patient is AAO x 3. I explained at length to the patient the risks of signing out AMA including but not limited to harm, injury or death. I explained the risks, benefits and alternatives to treatment as well as the attendant risks of refusing treatment at this time to patient, son, and daughter in law. I offered to answer any questions and fully answered any such questions with colleague present to witness. We believe that the patient fully understands what has been explained and answered. I informed hospitalist Dr. Best of this, aware. Patient signed form to sign out AMA and accepts responsibility for any and all results of this decision.

## 2025-04-04 NOTE — DISCHARGE NOTE PROVIDER - PROVIDER TOKENS
FREE:[LAST:[PCP/cardiologist],PHONE:[(   )    -],FAX:[(   )    -],ADDRESS:[PCP/cardiologist],FOLLOWUP:[1-3 days]]

## 2025-04-04 NOTE — DISCHARGE NOTE PROVIDER - NSDCCPCAREPLAN_GEN_ALL_CORE_FT
PRINCIPAL DISCHARGE DIAGNOSIS  Diagnosis: Elevated troponin  Assessment and Plan of Treatment: Transferred to [facility] for cardiac angiogram. Pt decided to leave AMA. Followup with PCP or cardiologist as soon as possible for cardiac problems including uncontrolled high blood pressure. If these issues go untreated, you are at risk for complications not limited to fatal heart rhythms and death.

## 2025-04-04 NOTE — ED PROVIDER NOTE - PROGRESS NOTE DETAILS
Signout from Dr. Hassan, hypoglycemia concern secondary to insulin overdose. Plan to monitor labs and vitals. Abnormal troponins, repeat increased, ongoing chest pain. Plan to transfer for NSTEMI vs Unstable angina concern. Abnormal troponins, repeat increased, ongoing chest pain. Plan to transfer for NSTEMI vs Unstable angina concern. Discussed plan with patient ( Francisca Contreras [id number]) and daughter over phone ([phone]).    73yo F Hx of MI 6 years prior in [place], diabetes, HLD coming in with low sugar levels secondary to accidental insulin overdose concern. Patient reports syncopal episode initially believed to be related to insulin overdose. Reports ongoing chest pain for 1 year.

## 2025-04-04 NOTE — ED PROVIDER NOTE - WR ORDER ID 1
Physical Therapy    Visit Type: initial evaluation and treatment  SUBJECTIVE  Patient agreed to participate in therapy this date.  Patient reports she has been using a cane the last 3-4 weeks due to episodes of spinning. She notes spinning is worst when washing hair, tying shoes, rolling in/out of bed and when standing with her eyes closed. Spinning sensation lasts a few seconds and resolves. She is avoiding activities that make her dizzy to try to avoid falling.      Patient noting intense itching sensation intermittently throughout session. RN came to assess, no rash and itching seems to resolve, then return in waves. Denies Shortness of breath, difficulty swallowing. RN aware and continuing to monitor.  Patient / Family Goal: return home and return to previous functional status    Pain   Patient reports pain is not an issue/concern.     OBJECTIVE     Cognitive Status   Functional Communication   - Overall Status: within functional limits    Patient Activity Tolerance: 1 to 2 activity to rest         Bed Mobility  - Sit to supine: moderate assist, 2 persons  Patient suddenly feeling unwell after vertebral artery screen, ortho PA notified, RN present to assess, and total assist to return to supine.   Transfers  Assistive devices: gait belt, 1 person, straight cane  - Sit to stand: minimal assist  - Stand to sit: minimal assist  Unsteady, denies dizziness.     Ambulation / Gait  - Assistive device: gait belt, 1 person, single point cane and no assistive device  - Distance (feet unless otherwise indicated): 20, 30  - Assist Level: minimal assist  - Surface: even  - Description: unsteady  Minimal assist, unsteady, short step length, reports feeling unsteady and a little lightheaded intermittently. /10mmHg.     Interventions     Neuromuscular Re-Education: Given reports of dizzy spells, writer started screening for BPPV. Saccadic movements noted to be delayed. Smooth pursuits intact.    Modified sharp oscar:  negative  Alar ligament test: negative bilaterally  Vertebral artery screen: positive on both sides    In vertebral artery screen position (max rotation and extension of neck), patient asked to count backwards from fifteen. When counting, patient suddenly appeared vacant, kept trailing off, forgetting numbers, reports feeling unwell but cannot tell writer why. Starts to get postural drift to left side and posteriorly, then reports intense feeling of fatigue and needs writer to repeatedly remind her to open her eyes. Same occurred on repeat testing and on contralateral testing. Patient notes sudden difficulty counting and notes she normally does not have a hard time. Test tried in Welsh and English counting in case of language difficulty with same result. Patient then reports she needs to lay down. Once laying down, RN in room to assess and complete stroke screen. Patient again asked to count backwards from 10 and completes with a little extra time but can fully finish task accurately. MD and ortho PA notified.     Further vestibular testing deferred due to concern for positive vertebral artery test.   Skilled input: Verbal instruction/cues and tactile instruction/cues  Verbal Consent: Writer verbally educated and received verbal consent for hand placement, positioning of patient, and techniques to be performed today from patient for clothing adjustments for techniques, hand placement and palpation for techniques and therapist position for techniques as described above and how they are pertinent to the patient's plan of care.  Home Exercise Program/Education Materials: Patient educated on Positioning, AD use, Assist level, Pain response and management techniques, relevant anatomy and role in symptoms, Benefits of mobility, Therapy goals/progress to date, Discharge recommendation, Therapy role/purpose, Normal tissue healing/remodeling and Activity recommendations.         ASSESSMENT   Patient will benefit from  skilled therapy to address listed impairments and functional limitations.  Interferring components: decreased activity tolerance, medical status limitations, weight bearing status and surgical precautions    Summary of function and discharge needs based on today's assessment:   - Current level of function: slightly below baseline level of function   - Therapy needs at discharge: therapy 5 or more times per week   - Activities of daily living (ADLs) requiring support at discharge: bed mobility, transfers, ambulation and stairs   - Impairments that require further therapy intervention: activity tolerance, balance, strength, pain, safety awareness, coordination and ROM    AM-PAC  - Generalized Prior Level of Function: IND/MOD I ([facility] 22-24)       Key: MOD A=moderate assistance, IND/MOD I=independent/modified independent  - Generalized Current Level of Function     - Current Mobility Score: 18       AM-PAC Scoring Key= >21 Modified Independent; 20-21 Supervision; 18-19 Minimal assist; 13-18 Moderate assist; 9-12 Max assist; <9 Total assist    Patient s/p right shoulder revision, also with concern for possible vestibular dysfunction based on history. During vertebral artery screen, patient with red flag symptoms, testing deferred and MD notified. Also concern for possible PE as patient hypoxic, tachycardic and recent surgery. MD notified and will assess. Patient unsafe for home currently as she lives alone and needs assist for walking and transfers. She does not feel safe returning alone. We discussed IRP vs subacute rehab facility. Patient prefers subacute rehab facility for slower pacing and lower intensity. Writer reached out to ortho PA to clarify conflicting orders, per Nino PA, limit shoulder ROM, wear sling always. Writer requested orders be placed to avoid confusion.        • Predicted patient presentation: Moderate (evolving) - Patient comorbidities and complexities, as defined above, may have varying impact on  steady progress for prescribed plan of care.    PLAN   Suggestions for next session as indicated: Progress gait with cane, transfers, and standing balance  PT Frequency: 3-5 x per week      PT/OT Mobility Equipment for Discharge: owns cane        GOALS  Review Date: 7/21/2023  Long Term Goals: (to be met by time of discharge from hospital)  Sit to supine: Patient will complete sit to supine modified independent.  Supine to sit: Patient will complete supine to sit modified independent.  Sit to stand: Patient will complete sit to stand transfer with 2-wheeled walker, modified independent.   Stand to sit: Patient will complete stand to sit transfer with 2-wheeled walker, modified independent.   Ambulation (even): Patient will ambulate on even surface for 120 feet with 2-wheeled walker, modified independent.     Documented in the chart in the following areas: Assessment/Plan.      Patient at End of Session:   Location: in bed  Safety measures: alarm system in place/re-engaged and lines intact  Handoff to: nurse      Therapy procedure time and total treatment time can be found documented on the Time Entry flowsheet   [id number]

## 2025-04-04 NOTE — ED ADULT NURSE NOTE - NSFALLHARMRISKINTERV_ED_ALL_ED
Assistance OOB with selected safe patient handling equipment if applicable/Assistance with ambulation/Communicate risk of Fall with Harm to all staff, patient, and family/Monitor gait and stability/Provide visual cue: red socks, yellow wristband, yellow gown, etc/Reinforce activity limits and safety measures with patient and family/Bed in lowest position, wheels locked, appropriate side rails in place/Call bell, personal items and telephone in reach/Instruct patient to call for assistance before getting out of bed/chair/stretcher/Non-slip footwear applied when patient is off stretcher/[place] to call system/Physically safe environment - no spills, clutter or unnecessary equipment/Purposeful Proactive Rounding/Room/bathroom lighting operational, light cord in reach

## 2025-04-04 NOTE — ED PROVIDER NOTE - OBJECTIVE STATEMENT
74-year-old female with past medical history of diabetes on insulin, hyperlipidemia, otherwise presenting to the ED due to hypoglycemia.  Daughter states that mother had insulin late last night around 11 PM and has not eaten since had insulin dose.  Otherwise has not had any major complaints other than some bodyaches recently.

## 2025-04-04 NOTE — CONSULT NOTE ADULT - TIME BILLING
troponin elevation, known extensive coronary disease, hypertensive urgency, congestive heart failure with previously known preserved systolic function troponin elevation, known extensive coronary disease, hypertensive urgency

## 2025-04-04 NOTE — ED PROVIDER NOTE - PROGRESS NOTE DETAILS
Yamilka PGY3: Cardiology concern for unstable angina/NSTEMI versus cardiomyopathy secondary to hypoglycemic episode.  Also concern for persistently elevated blood pressures (SBP greater than 200s) contributing to elevated/uptrending troponins.  Patient last had cardiac catheterization 5 years ago and has been having 6 months of intermittent chest pain.  Patient will likely need cardiac catheterization during admission -urgency based on echocardiogram.  Plan to continue heparin drip.  Per EMS, patient already received aspirin load, Brilinta 180 mg, and heparin bolus prior to starting heparin drip on arrival.  Admitted to unattached hospitalist. Patient declines the second intravenous access by the resident.  He explained everything to her as well as the reason. Yamilka PGY3: Patient agitated removing pulse oximetry stating that a doctor told her she is ready to go home. Refusing US IV placement for blood work as she already has IV in RUE (even though it does not pull back). Patient asking for inhaler treatment as she is feeling SOB. After discussions w/ cardiology, recommending against albuterol inhaler as trying to avoid inducing tachycardia in the setting of uptredning troponins. Patient is satting >95% on room air after replacing pulse oximeter. Patient was given nitro and lasix for trace B lines on lung fields, active chest pain, and elevated blood pressures.     Called daughter and son over the phone who were unable to convince patient to allow US IV placement in case disagreement was based on language barrier. Family requesting some water for patient that was given, but patient still refusing US IV for blood work. Decision made to wait for son to arrive so that patient would be less agitated and amenable to receiving a second IV for blood work and more appropriate access in the setting of heparin ggt. Pending bed placement based on urgent TTE.

## 2025-04-04 NOTE — ED ADULT NURSE NOTE - CHIEF COMPLAINT QUOTE
BIBA from home as per EMS pt Blood sugar was 41 , diaphoretic and unresponsive, D10W was given 150 ml by EMS, FS in triage 39. pt awake. Spanish speaking. IV g 18 right arm h/o DM

## 2025-04-04 NOTE — ED PROVIDER NOTE - ATTENDING CONTRIBUTION TO CARE
At this is 74-year-old female accepted as a transfer by cardiology.  Last cath she had multivessel disease and is on Ranexa.  She went to the other hospital for confusion related to hypoglycemia and she also endorsed months of chest discomfort and they did a cardiac enzyme which is uptrending.  Awake alert talking no acute distress.  Regular rate and rhythm clear lungs nontender abdomen.  No leg edema.  It is really unclear what the cause of the patient's uptrending cardiac enzymes has been.  She is a very bad historian despite the [employer] .  I do not suspect that the hypoglycemia was an external cause and the thing that caused her to have myocardial injury however it is also possible that the chest pain and the hypoglycemia completely independent.  I had a very lengthy discussion with the cardiology fellow and cardiology attending and they were concerned about the patient's elevated blood pressure and recommended diuresis and nitroglycerin.  Will do so.  Continue the heparin drip.  She received antiplatelet agents at the sending facility.  Admit to the telemetry and stat echo.

## 2025-04-04 NOTE — ED ADULT NURSE NOTE - OBJECTIVE STATEMENT
74 yr old female with h/o diabetes, cardiac, high bp Cardiology concern for unstable angina/NSTEMI versus cardiomyopathy secondary to hypoglycemic episode.  Also concern for persistently elevated blood pressures (SBP greater than 200s) contributing to elevated/uptrending troponins.  Patient last had cardiac catheterization 5 years ago and has been having 6 months of intermittent chest pain.  Patient will likely need cardiac catheterization during admission -urgency based on echocardiogram.  Plan to continue heparin drip.  Per EMS, patient already received aspirin load, Brilinta 180 mg, and heparin bolus prior to starting heparin drip on. pt being very difficult refusing another IV was tx from [place]. not being very cooperative.

## 2025-04-04 NOTE — DISCHARGE NOTE PROVIDER - CARE PROVIDER_API CALL
PCP/cardiologist,   PCP/cardiologist  Phone: (   )    -  Fax: (   )    -  Follow Up Time: 1-3 days

## 2025-04-04 NOTE — ED PROVIDER NOTE - NS ED MD DISPO SPECIAL CONSIDERATION1
Brief Postoperative Note  ______________________________________________________________    Patient: Indigo Arrioal  YOB: 1983  MRN: [id number]  Date of Procedure: 3/28/2018    Pre-Op Diagnosis: RIGHT ANKLE FRACTURE    Post-Op Diagnosis: Same       Procedure(s):  RIGHT ANKLE OPEN REDUCTION INTERNAL FIXATION -- SYNTHES    Anesthesia: General    Surgeon(s):  DO Mickey Nicholson DO Fay Ape DO [place], None

## 2025-05-05 NOTE — PATIENT PROFILE ADULT - FUNCTIONAL ASSESSMENT - BASIC MOBILITY SCORE.
What Type Of Note Output Would You Prefer (Optional)?: Standard Output Hpi Title: Evaluation of Skin Lesions How Severe Are Your Spot(S)?: mild Have Your Spot(S) Been Treated In The Past?: has not been treated 24

## 2025-05-16 NOTE — ED ADULT TRIAGE NOTE - PAIN RATING/NUMBER SCALE (0-10): ACTIVITY
05/16/25 1710   Readmission Assessment   Number of Days since last admission? 8-30 days   Previous Disposition Home with Family   Who is being Interviewed Caregiver  (wife)   What was the patient's/caregiver's perception as to why they think they needed to return back to the hospital? Other (Comment)  (Patient woke up on May 15th and could not get out of bed)   Did you visit your Primary Care Physician after you left the hospital, before you returned this time? Yes   Did you see a specialist, such as Cardiac, Pulmonary, Orthopedic Physician, etc. after you left the hospital? No   Who advised the patient to return to the hospital? Self-referral   Does the patient report anything that got in the way of taking their medications? No   In our efforts to provide the best possible care to you and others like you, can you think of anything that we could have done to help you after you left the hospital the first time, so that you might not have needed to return so soon? Other (Comment)  (\"probably not\" or \"maybe they took him off anitbiotics too soon\")        3

## 2025-05-30 ENCOUNTER — INPATIENT (INPATIENT)
Facility: HOSPITAL | Age: 75
LOS: 40 days | Discharge: NURSING, REHAB OR LONG-TERM CARE | End: 2025-07-10
Attending: STUDENT IN AN ORGANIZED HEALTH CARE EDUCATION/TRAINING PROGRAM | Admitting: STUDENT IN AN ORGANIZED HEALTH CARE EDUCATION/TRAINING PROGRAM
Payer: MEDICAID

## 2025-05-30 VITALS
HEIGHT: 62 IN | SYSTOLIC BLOOD PRESSURE: 121 MMHG | RESPIRATION RATE: 19 BRPM | HEART RATE: 80 BPM | DIASTOLIC BLOOD PRESSURE: 68 MMHG | TEMPERATURE: 98 F | OXYGEN SATURATION: 100 % | WEIGHT: 154.98 LBS

## 2025-05-30 DIAGNOSIS — Z95.5 PRESENCE OF CORONARY ANGIOPLASTY IMPLANT AND GRAFT: Chronic | ICD-10-CM

## 2025-05-30 LAB
ALBUMIN SERPL ELPH-MCNC: 3.2 G/DL — LOW (ref 3.3–5)
ALP SERPL-CCNC: 90 U/L — SIGNIFICANT CHANGE UP (ref 40–120)
ALT FLD-CCNC: 15 U/L — SIGNIFICANT CHANGE UP (ref 12–78)
ANION GAP SERPL CALC-SCNC: 8 MMOL/L — SIGNIFICANT CHANGE UP (ref 5–17)
ANION GAP SERPL CALC-SCNC: 8 MMOL/L — SIGNIFICANT CHANGE UP (ref 5–17)
AST SERPL-CCNC: 12 U/L — LOW (ref 15–37)
BASOPHILS # BLD AUTO: 0.03 K/UL — SIGNIFICANT CHANGE UP (ref 0–0.2)
BASOPHILS # BLD AUTO: 0.04 K/UL — SIGNIFICANT CHANGE UP (ref 0–0.2)
BASOPHILS NFR BLD AUTO: 0.3 % — SIGNIFICANT CHANGE UP (ref 0–2)
BASOPHILS NFR BLD AUTO: 0.4 % — SIGNIFICANT CHANGE UP (ref 0–2)
BILIRUB SERPL-MCNC: 0.5 MG/DL — SIGNIFICANT CHANGE UP (ref 0.2–1.2)
BUN SERPL-MCNC: 69 MG/DL — HIGH (ref 7–23)
BUN SERPL-MCNC: 70 MG/DL — HIGH (ref 7–23)
CALCIUM SERPL-MCNC: 8.2 MG/DL — LOW (ref 8.5–10.1)
CALCIUM SERPL-MCNC: 8.5 MG/DL — SIGNIFICANT CHANGE UP (ref 8.5–10.1)
CHLORIDE SERPL-SCNC: 100 MMOL/L — SIGNIFICANT CHANGE UP (ref 96–108)
CHLORIDE SERPL-SCNC: 105 MMOL/L — SIGNIFICANT CHANGE UP (ref 96–108)
CO2 SERPL-SCNC: 24 MMOL/L — SIGNIFICANT CHANGE UP (ref 22–31)
CO2 SERPL-SCNC: 24 MMOL/L — SIGNIFICANT CHANGE UP (ref 22–31)
CREAT SERPL-MCNC: 1.85 MG/DL — HIGH (ref 0.5–1.3)
CREAT SERPL-MCNC: 2.02 MG/DL — HIGH (ref 0.5–1.3)
EGFR: 25 ML/MIN/1.73M2 — LOW
EGFR: 25 ML/MIN/1.73M2 — LOW
EGFR: 28 ML/MIN/1.73M2 — LOW
EGFR: 28 ML/MIN/1.73M2 — LOW
EOSINOPHIL # BLD AUTO: 0.08 K/UL — SIGNIFICANT CHANGE UP (ref 0–0.5)
EOSINOPHIL # BLD AUTO: 0.08 K/UL — SIGNIFICANT CHANGE UP (ref 0–0.5)
EOSINOPHIL NFR BLD AUTO: 0.8 % — SIGNIFICANT CHANGE UP (ref 0–6)
EOSINOPHIL NFR BLD AUTO: 0.9 % — SIGNIFICANT CHANGE UP (ref 0–6)
FLUAV AG NPH QL: SIGNIFICANT CHANGE UP
FLUBV AG NPH QL: SIGNIFICANT CHANGE UP
GAS PNL BLDV: SIGNIFICANT CHANGE UP
GLUCOSE SERPL-MCNC: 305 MG/DL — HIGH (ref 70–99)
GLUCOSE SERPL-MCNC: 510 MG/DL — CRITICAL HIGH (ref 70–99)
HCT VFR BLD CALC: 32.4 % — LOW (ref 34.5–45)
HCT VFR BLD CALC: 32.9 % — LOW (ref 34.5–45)
HGB BLD-MCNC: 10.1 G/DL — LOW (ref 11.5–15.5)
HGB BLD-MCNC: 10.3 G/DL — LOW (ref 11.5–15.5)
IMM GRANULOCYTES NFR BLD AUTO: 0.6 % — SIGNIFICANT CHANGE UP (ref 0–0.9)
IMM GRANULOCYTES NFR BLD AUTO: 0.6 % — SIGNIFICANT CHANGE UP (ref 0–0.9)
LYMPHOCYTES # BLD AUTO: 1.38 K/UL — SIGNIFICANT CHANGE UP (ref 1–3.3)
LYMPHOCYTES # BLD AUTO: 1.57 K/UL — SIGNIFICANT CHANGE UP (ref 1–3.3)
LYMPHOCYTES # BLD AUTO: 14.9 % — SIGNIFICANT CHANGE UP (ref 13–44)
LYMPHOCYTES # BLD AUTO: 16.2 % — SIGNIFICANT CHANGE UP (ref 13–44)
MAGNESIUM SERPL-MCNC: 2.9 MG/DL — HIGH (ref 1.6–2.6)
MCHC RBC-ENTMCNC: 28.3 PG — SIGNIFICANT CHANGE UP (ref 27–34)
MCHC RBC-ENTMCNC: 28.3 PG — SIGNIFICANT CHANGE UP (ref 27–34)
MCHC RBC-ENTMCNC: 31.2 G/DL — LOW (ref 32–36)
MCHC RBC-ENTMCNC: 31.3 G/DL — LOW (ref 32–36)
MCV RBC AUTO: 90.4 FL — SIGNIFICANT CHANGE UP (ref 80–100)
MCV RBC AUTO: 90.8 FL — SIGNIFICANT CHANGE UP (ref 80–100)
MONOCYTES # BLD AUTO: 0.72 K/UL — SIGNIFICANT CHANGE UP (ref 0–0.9)
MONOCYTES # BLD AUTO: 0.91 K/UL — HIGH (ref 0–0.9)
MONOCYTES NFR BLD AUTO: 7.8 % — SIGNIFICANT CHANGE UP (ref 2–14)
MONOCYTES NFR BLD AUTO: 9.4 % — SIGNIFICANT CHANGE UP (ref 2–14)
NEUTROPHILS # BLD AUTO: 6.97 K/UL — SIGNIFICANT CHANGE UP (ref 1.8–7.4)
NEUTROPHILS # BLD AUTO: 7.06 K/UL — SIGNIFICANT CHANGE UP (ref 1.8–7.4)
NEUTROPHILS NFR BLD AUTO: 72.6 % — SIGNIFICANT CHANGE UP (ref 43–77)
NEUTROPHILS NFR BLD AUTO: 75.5 % — SIGNIFICANT CHANGE UP (ref 43–77)
NRBC BLD AUTO-RTO: 0 /100 WBCS — SIGNIFICANT CHANGE UP (ref 0–0)
NRBC BLD AUTO-RTO: 0 /100 WBCS — SIGNIFICANT CHANGE UP (ref 0–0)
PLATELET # BLD AUTO: 217 K/UL — SIGNIFICANT CHANGE UP (ref 150–400)
PLATELET # BLD AUTO: 218 K/UL — SIGNIFICANT CHANGE UP (ref 150–400)
POTASSIUM SERPL-MCNC: 5.4 MMOL/L — HIGH (ref 3.5–5.3)
POTASSIUM SERPL-MCNC: 6.3 MMOL/L — CRITICAL HIGH (ref 3.5–5.3)
POTASSIUM SERPL-SCNC: 5.4 MMOL/L — HIGH (ref 3.5–5.3)
POTASSIUM SERPL-SCNC: 6.3 MMOL/L — CRITICAL HIGH (ref 3.5–5.3)
PROT SERPL-MCNC: 8 GM/DL — SIGNIFICANT CHANGE UP (ref 6–8.3)
RBC # BLD: 3.57 M/UL — LOW (ref 3.8–5.2)
RBC # BLD: 3.64 M/UL — LOW (ref 3.8–5.2)
RBC # FLD: 13.1 % — SIGNIFICANT CHANGE UP (ref 10.3–14.5)
RBC # FLD: 13.2 % — SIGNIFICANT CHANGE UP (ref 10.3–14.5)
RSV RNA NPH QL NAA+NON-PROBE: SIGNIFICANT CHANGE UP
SARS-COV-2 RNA SPEC QL NAA+PROBE: SIGNIFICANT CHANGE UP
SODIUM SERPL-SCNC: 132 MMOL/L — LOW (ref 135–145)
SODIUM SERPL-SCNC: 137 MMOL/L — SIGNIFICANT CHANGE UP (ref 135–145)
SOURCE RESPIRATORY: SIGNIFICANT CHANGE UP
WBC # BLD: 9.24 K/UL — SIGNIFICANT CHANGE UP (ref 3.8–10.5)
WBC # BLD: 9.72 K/UL — SIGNIFICANT CHANGE UP (ref 3.8–10.5)
WBC # FLD AUTO: 9.24 K/UL — SIGNIFICANT CHANGE UP (ref 3.8–10.5)
WBC # FLD AUTO: 9.72 K/UL — SIGNIFICANT CHANGE UP (ref 3.8–10.5)

## 2025-05-30 PROCEDURE — 99222 1ST HOSP IP/OBS MODERATE 55: CPT

## 2025-05-30 PROCEDURE — 71045 X-RAY EXAM CHEST 1 VIEW: CPT | Mod: 26

## 2025-05-30 PROCEDURE — 99285 EMERGENCY DEPT VISIT HI MDM: CPT

## 2025-05-30 PROCEDURE — 93010 ELECTROCARDIOGRAM REPORT: CPT

## 2025-05-30 PROCEDURE — 70450 CT HEAD/BRAIN W/O DYE: CPT | Mod: 26

## 2025-05-30 PROCEDURE — 72170 X-RAY EXAM OF PELVIS: CPT | Mod: 26

## 2025-05-30 RX ORDER — DEXTROSE 50 % IN WATER 50 %
12.5 SYRINGE (ML) INTRAVENOUS ONCE
Refills: 0 | Status: DISCONTINUED | OUTPATIENT
Start: 2025-05-30 | End: 2025-06-02

## 2025-05-30 RX ORDER — B1/B2/B3/B5/B6/B12/VIT C/FOLIC 500-0.5 MG
1 TABLET ORAL DAILY
Refills: 0 | Status: DISCONTINUED | OUTPATIENT
Start: 2025-05-30 | End: 2025-07-03

## 2025-05-30 RX ORDER — MAGNESIUM, ALUMINUM HYDROXIDE 200-200 MG
30 TABLET,CHEWABLE ORAL EVERY 4 HOURS
Refills: 0 | Status: DISCONTINUED | OUTPATIENT
Start: 2025-05-30 | End: 2025-06-02

## 2025-05-30 RX ORDER — DEXTROSE 50 % IN WATER 50 %
15 SYRINGE (ML) INTRAVENOUS ONCE
Refills: 0 | Status: DISCONTINUED | OUTPATIENT
Start: 2025-05-30 | End: 2025-06-02

## 2025-05-30 RX ORDER — ALBUTEROL SULFATE 2.5 MG/3ML
2 VIAL, NEBULIZER (ML) INHALATION EVERY 6 HOURS
Refills: 0 | Status: DISCONTINUED | OUTPATIENT
Start: 2025-05-30 | End: 2025-06-02

## 2025-05-30 RX ORDER — SODIUM CHLORIDE 9 G/1000ML
1000 INJECTION, SOLUTION INTRAVENOUS
Refills: 0 | Status: DISCONTINUED | OUTPATIENT
Start: 2025-05-30 | End: 2025-06-02

## 2025-05-30 RX ORDER — ONDANSETRON HCL/PF 4 MG/2 ML
4 VIAL (ML) INJECTION EVERY 8 HOURS
Refills: 0 | Status: DISCONTINUED | OUTPATIENT
Start: 2025-05-30 | End: 2025-07-03

## 2025-05-30 RX ORDER — INSULIN GLARGINE-YFGN 100 [IU]/ML
15 INJECTION, SOLUTION SUBCUTANEOUS AT BEDTIME
Refills: 0 | Status: DISCONTINUED | OUTPATIENT
Start: 2025-05-30 | End: 2025-06-05

## 2025-05-30 RX ORDER — GABAPENTIN 400 MG/1
300 CAPSULE ORAL
Refills: 0 | Status: DISCONTINUED | OUTPATIENT
Start: 2025-05-30 | End: 2025-06-11

## 2025-05-30 RX ORDER — GLUCAGON 3 MG/1
1 POWDER NASAL ONCE
Refills: 0 | Status: DISCONTINUED | OUTPATIENT
Start: 2025-05-30 | End: 2025-06-02

## 2025-05-30 RX ORDER — INSULIN LISPRO 100 U/ML
INJECTION, SOLUTION INTRAVENOUS; SUBCUTANEOUS
Refills: 0 | Status: DISCONTINUED | OUTPATIENT
Start: 2025-05-30 | End: 2025-06-02

## 2025-05-30 RX ORDER — ASPIRIN 325 MG
81 TABLET ORAL DAILY
Refills: 0 | Status: DISCONTINUED | OUTPATIENT
Start: 2025-05-30 | End: 2025-06-02

## 2025-05-30 RX ORDER — INSULIN LISPRO 100 U/ML
INJECTION, SOLUTION INTRAVENOUS; SUBCUTANEOUS AT BEDTIME
Refills: 0 | Status: DISCONTINUED | OUTPATIENT
Start: 2025-05-30 | End: 2025-06-02

## 2025-05-30 RX ORDER — DEXTROSE 50 % IN WATER 50 %
25 SYRINGE (ML) INTRAVENOUS ONCE
Refills: 0 | Status: DISCONTINUED | OUTPATIENT
Start: 2025-05-30 | End: 2025-06-02

## 2025-05-30 RX ORDER — ACETAMINOPHEN 500 MG/5ML
650 LIQUID (ML) ORAL EVERY 6 HOURS
Refills: 0 | Status: DISCONTINUED | OUTPATIENT
Start: 2025-05-30 | End: 2025-07-10

## 2025-05-30 RX ORDER — OXYCODONE HYDROCHLORIDE 30 MG/1
5 TABLET ORAL ONCE
Refills: 0 | Status: DISCONTINUED | OUTPATIENT
Start: 2025-05-30 | End: 2025-05-30

## 2025-05-30 RX ORDER — MELATONIN 5 MG
3 TABLET ORAL AT BEDTIME
Refills: 0 | Status: DISCONTINUED | OUTPATIENT
Start: 2025-05-30 | End: 2025-06-02

## 2025-05-30 RX ORDER — METOPROLOL SUCCINATE 50 MG/1
25 TABLET, EXTENDED RELEASE ORAL DAILY
Refills: 0 | Status: DISCONTINUED | OUTPATIENT
Start: 2025-05-30 | End: 2025-06-02

## 2025-05-30 RX ORDER — SODIUM CHLORIDE 9 G/1000ML
1000 INJECTION, SOLUTION INTRAVENOUS ONCE
Refills: 0 | Status: COMPLETED | OUTPATIENT
Start: 2025-05-30 | End: 2025-05-30

## 2025-05-30 RX ORDER — RANOLAZINE 1000 MG/1
500 TABLET, FILM COATED, EXTENDED RELEASE ORAL
Refills: 0 | Status: DISCONTINUED | OUTPATIENT
Start: 2025-05-30 | End: 2025-07-10

## 2025-05-30 RX ORDER — TIOTROPIUM BROMIDE INHALATION SPRAY 3.12 UG/1
2 SPRAY, METERED RESPIRATORY (INHALATION) DAILY
Refills: 0 | Status: DISCONTINUED | OUTPATIENT
Start: 2025-05-30 | End: 2025-06-02

## 2025-05-30 RX ORDER — ATORVASTATIN CALCIUM 80 MG/1
40 TABLET, FILM COATED ORAL AT BEDTIME
Refills: 0 | Status: DISCONTINUED | OUTPATIENT
Start: 2025-05-30 | End: 2025-07-10

## 2025-05-30 RX ADMIN — SODIUM CHLORIDE 1000 MILLILITER(S): 9 INJECTION, SOLUTION INTRAVENOUS at 18:28

## 2025-05-30 RX ADMIN — Medication 1000 MILLILITER(S): at 19:55

## 2025-05-30 RX ADMIN — Medication 10 UNIT(S): at 20:06

## 2025-05-30 RX ADMIN — Medication 1000 MILLILITER(S): at 20:55

## 2025-05-30 RX ADMIN — OXYCODONE HYDROCHLORIDE 5 MILLIGRAM(S): 30 TABLET ORAL at 21:58

## 2025-05-30 RX ADMIN — OXYCODONE HYDROCHLORIDE 5 MILLIGRAM(S): 30 TABLET ORAL at 21:28

## 2025-05-30 NOTE — ED PROVIDER NOTE - PROGRESS NOTE DETAILS
Labs reviewed, +for hyperglycemia and hyperkalemia. Patient given 10mg IVP reg insulin with improvement in sugar and potassium on repeat BMP. Also with improving ALY. Still having difficulty ambulating and significant pain from shoulder fx. Will plan for admission for IVF and supportive care, possible YOON. Bryn Finley, DO

## 2025-05-30 NOTE — ED ADULT NURSE NOTE - OBJECTIVE STATEMENT
As per pt's daughter she fell on tuesday and "broke right arm" - presents to ED for being more emotional since fall and daughter states she is scared to ambulate since falling, not tolerating PO as well. Pt in R sling with extensive bruising.

## 2025-05-30 NOTE — H&P ADULT - NSICDXPASTMEDICALHX_GEN_ALL_CORE_FT
PAST MEDICAL HISTORY:  CAD S/P percutaneous coronary angioplasty     HLD (hyperlipidemia)     HTN (hypertension)     Insulin dependent type 2 diabetes mellitus

## 2025-05-30 NOTE — H&P ADULT - NSHPLABSRESULTS_GEN_ALL_CORE
10.3   9.24  )-----------( 218      ( 30 May 2025 19:00 )             32.9     137  |  105  |  69[H]  ----------------------------<  305[H]     05-30  5.4[H]   |  24  |  1.85[H]    Ca    8.2[L]      30 May 2025 21:24  Mg     2.9     05-30    TPro  8.0  /  Alb  3.2[L]  /  TBili  0.5  /  DBili  x   /  AST  12[L]  /  ALT  15  /  AlkPhos  90  05-30    18:15 - VBG - pH: 7.26  | pCO2: 58    | pO2: 34    | Lactate: 1.60     CT head without contrast 5/30/25  IMPRESSION:  Motion limited.   No gross acute intracranial hemorrhage, mass effect, or midline shift.

## 2025-05-30 NOTE — ED PROVIDER NOTE - CLINICAL SUMMARY MEDICAL DECISION MAKING FREE TEXT BOX
CHLOE Carrion: Patient is 74-year-old female PMH HTN, DM on metformin, CAD with stents, CVA arrives via EMS 9 1 called by daughter with complaint of low blood pressure at home.  Patient was seen at [facility] 3 days ago after a fall where she was found to have a "right arm impacted fracture of the right humeral surgical neck with mild comminution" per xray read from [facility].  Daughter states patient has been taking Oxycodone around-the-clock for pain, when the oxy wears off she screams with any movement.  Daughter also states that patient is refusing to walk, has been incontinent of urine in the bed, normally walks with walker but when family tries to stand her up she refuses to bear weight on bilateral legs.  Daughter states only imaging @ [facility] was x-ray of the chest and x-ray of the arm.  Patient currently denies HA, dizziness, CP, SOB, ABD pain.   Patient is nontoxic but uncomfortable appearing.  PE as above pertinent for lungs CTA B/L, heart RRR, abdomen soft, nondistended, non-TTP.  Right upper arm is ecchymotic with edema. No abrasions, bruising, step offs to back.   DDx concerning for but not limited to DKA vs HHNKS vs dehydration vs UTI vs viral syndrome.  Low concern for ACS in the absence of chest pain.  Low concern for pneumonia in the absence of adventitious breath sounds.  Will order EKG, labs, chest x-ray, pelvic x-ray 2/2 patient refusing to ambulate, flu/COVID, and reassess.  Disposition Home vs admission pending results and reassessment.

## 2025-05-30 NOTE — H&P ADULT - TIME BILLING
coordination of care with ER physician, ER RN, and pharmacist, obtaining history, performing a physical examination, reviewing and interpreting labs and imaging, ordering further studies and tests, explaining the diagnosis and treatment plan to patient and daughter-in-law at bedside, attempting to complete medication reconciliation to the best of my ability, and documentation as above.

## 2025-05-30 NOTE — H&P ADULT - HISTORY OF PRESENT ILLNESS
Tiffanie Joshi is a 74 year old female with PMHx of HTN, HLD, IDDM2, CAD (s/p PCI), and recent right humerus fracture (placed in sling 3 days ago) who presented to the ED on 5/30/25 for complaints of elevated blood glucose. Tiffanie Joshi is a 74 year old female with PMHx of HTN, HLD, IDDM2, CAD (s/p PCI), and recent right humerus fracture (placed in sling 3 days ago) who presented to the ED on 5/30/25 for complaints of elevated blood glucose.    Patient is Yakut-speaking but declined  services and preferred for daughter-in-law at bedside to translate. As per daughter-in-law, patient fell four days ago and landed on her right arm. Went to [facility] at that time and found to have right humerus fracture and placed in sling. Discharged home. Since then, patient has been refusing to get out of bed and not eating much. Will drink a little juice every now and then. Daughter checked her blood glucose around 5PM and it was > 400 so brought to the ER for further evaluation. Baseline functional status is ambulates with walker and dependent with all ADLs. Lives at home with daughter.    In the ED, VSS. Hgb 10.3, sodium 132, potassium 6.3, BUN 70, Cr 2.02, blood glucose 510. VBG 7.29 / 58 / 34 / 26. COVID/influenza/RSV negative. CT head without acute intracranial findings. Received 1L NS bolus, 1L LR bolus, and insulin 20 U IV.  Tiffanie Joshi is a 74 year old female with PMHx of HTN, HLD, IDDM2, CAD (s/p PCI), and recent right humerus fracture (placed in sling 3 days ago) who presented to the ED on 5/30/25 for complaints of elevated blood glucose.    Patient is Kyrgyz-speaking but declined  services and preferred for daughter-in-law at bedside to translate. As per daughter-in-law, patient fell three days ago and landed on her right shoulder. Went to [facility] at that time and found to have right humerus fracture and placed in sling. Discharged home with outpatient orthopedic surgery follow up. Since then, patient has been refusing to get out of bed and not eating much. Will drink a little juice every now and then. Daughter checked her blood glucose around 5PM and it was > 400 so brought to the ER for further evaluation. Baseline functional status is ambulates with walker and dependent with all ADLs. Lives at home with daughter.    In the ED, VSS. Hgb 10.3, sodium 132, potassium 6.3, BUN 70, Cr 2.02, blood glucose 510. VBG 7.29 / 58 / 34 / 26. COVID/influenza/RSV negative. CT head without acute intracranial findings. Received 1L NS bolus, 1L LR bolus, and insulin 20 U IV.

## 2025-05-30 NOTE — ED ADULT NURSE NOTE - NSICDXFAMILYHX_GEN_ALL_CORE_FT
Patient called stating she did the [employer] and it was mailed yesterday 8/7/21   FAMILY HISTORY:  FHx: myocardial infarction, Father

## 2025-05-30 NOTE — ED ADULT TRIAGE NOTE - CHIEF COMPLAINT QUOTE
Pt biba from home with c/o hypotension and hyperglycemia ( EMS [facility])  H/O: HTN, DM, STENTS, STROKE. R arm fx (pw with sling)  EMS: [facility] NS ( 20 l HAND)

## 2025-05-30 NOTE — ED PROVIDER NOTE - ATTENDING APP SHARED VISIT CONTRIBUTION OF CARE
73 y/o F with PMH HTN, DM, CAD s/p stents, CVA here today s/p hypotensive episode. Patient recently at OSH after a fall and found to have  R humeral fracture. Since then, she has been taking oxy for pain but is has not been helping and she is mostly bedbound and refusing to walk.     In the ED, she is HD stable. Her fingerstick is elevated.    On exam, she appears to have some dry mucus membranes. R upper arm swollen with bruising secondary to fracture. Heart RRR. Lungs CTA. She appears to be ranging lower extremities.    Exam as above.    Patient appears dehydrated, will bolus fluids. Fingerstick elevated. Plan for labs to r/o DKA, CXR to r/o infiltrate, urine to eval for infectious sources.    Plan for admission as patient not ambulating and likely needs YOON placement.

## 2025-05-30 NOTE — ED PROVIDER NOTE - PHYSICAL EXAMINATION
CONSTITUTIONAL: A&O x3, uncomfortable appearing  HEAD: Normocephalic, atraumatic.   EYES: clear sclera and conjunctiva, PERRL, EOM intact.   NECK:  Airway patent. Neck Supple.   CARDIAC: RRR, normal S1/2, no murmurs, rubs, gallops. CW non-TTP, no CW deformity.  RESPIRATORY: CTA B/L, good aeration. No wheezing, rales, adventitious breath sounds. No accessory muscle use.    ABDOMEN: soft, non-distended; non-TTP, No RUQ/RLQ/LUQ/LLQ pain, no rebound tenderness or guarding,  BS + x4 quadrants, no pulsating masses, scars. No CVA tenderness.  PERIPHERAL VASCULAR: No edema of feet and ankles.  No calf tenderness. Pulses 2+ B/L.   MSK: Moving all extremities. Right upper arm is ecchymotic with edema. No C/T/L spinal pain, no abrasions, bruising, step offs to back. Pelvis nontender to palpation,   SKIN: Warm, dry, color WNL, no turgor, erythema or rashes. Cap refill < 2 sec.  NEURO: A&Ox3,  no focal deficits.

## 2025-05-30 NOTE — H&P ADULT - NSHPPHYSICALEXAM_GEN_ALL_CORE
T(C): 36.7 (05-30-25 @ 23:22), Max: 36.7 (05-30-25 @ 17:45)  HR: 84 (05-30-25 @ 23:22) (80 - 89)  BP: 109/57 (05-30-25 @ 23:22) (109/57 - 149/86)  RR: 17 (05-30-25 @ 23:22) (14 - 19)  SpO2: 93% (05-30-25 @ 23:22) (93% - 100%)    CONSTITUTIONAL: Well groomed  EYES: PERRLA and symmetric, EOMI  ENMT: Oral mucosa with moist membranes  RESP: No respiratory distress, no use of accessory muscles; CTA b/l  CV: RRR  GI: Soft, NT, ND  MSK: R. shoulder in sling  SKIN: R. shoulder extending down to forearm with extensive ecchymosis (unchanged since fall as per daughter-in-law)

## 2025-05-30 NOTE — H&P ADULT - ASSESSMENT
Medication reconciliation completed using discharge med rec from [facility] on 4/4/25 as patient does not know med list. Please call her pharmacy for med list in AM.    Plan of care discussed with daughter-in-law at bedside. Tiffanie Joshi is a 74 year old female with PMHx of HTN, HLD, IDDM2, CAD (s/p PCI), and recent right humerus fracture (placed in sling 3 days ago) who presented to the ED on 5/30/25 for complaints of elevated blood glucose and admitted for ALY and physical deconditioning.    ALY, suspect etiology prerenal secondary to decreased PO intake, improving  Hyperkalemia secondary to above, improving  Daughter-in-law reports patient has not been eating much x  4 days, will drink a little juice every now and then   Potassium 6.3, BUN 70, and Cr 2.02 on admission, baseline Cr ~1  S/p 1L NS bolus and 1L LR bolus in the ED  Avoid nephrotoxins, renally dose meds  Encourage PO hydration  Monitor potassium and renal function    IDDM2 with hyperglycemia  Last known A1c 7.5 (on 8/5/24), blood glucose 510 on admission, VBG 7.29 / 58 / 34 / 26  S/p 1L NS bolus, 1L LR bolus and insulin 20 U IV in the ED  POC qac and qhs, PTA insulin aspart 15 U qhs reordered as Lantus 15 U qhs after discussion with pharmacist, moderate dose SSI qac and qhs started, metformin 500 mg BID held due to ALY  Blood glucose goal < 180, f/u A1c    Hypertonic hyponatremia secondary to hyperglycemia  Corrected sodium 139 on admission  Anticipate improvement in sodium levels with tight glycemic control    Physical deconditioning secondary to R. humerus fracture  Daughter-in-law reports patient fell at home and landed on her right arm four days ago  Went to [facility] at that time, found to have right humerus fracture and arm placed in sling  Has not gotten out of bed since then  Baseline functional status is ambulates with walker and dependent with all ADLs  COVID/influenza/RSV negative, CT head without acute intracranial findings  PT/OT consulted  Attempted to call orthopedic surgery x 4 in regards to possible intervention which can be provided to patient but did not receive call back      Chronic medical conditions:  HTN: PTA metoprolol succinate ER 25 mg, lisinopril 10 mg and HCTZ 12.5 mg held due to ALY  HLD: PTA atorvastatin 40 mg  CAD (s/p PCI): PTA ASA 81 mg, ranolazine  mg BID  Asthma: PTA Trelegy reordered as Advair and Spiriva  Normocytic anemia, chronic: hgb 10.3 on admission, baseline appears to fluctuate, no signs of active bleeding    Medication reconciliation completed using discharge med rec from [facility] on 4/4/25 as patient does not know med list. Please call her pharmacy for med list in AM.    Plan of care discussed with daughter-in-law at bedside. Tiffanie Joshi is a 74 year old female with PMHx of HTN, HLD, IDDM2, CAD (s/p PCI), and recent right humerus fracture (placed in sling 3 days ago) who presented to the ED on 5/30/25 for complaints of elevated blood glucose and admitted for ALY and physical deconditioning.    ALY, suspect etiology prerenal secondary to decreased PO intake, improving  Hyperkalemia secondary to above, improving  Daughter-in-law reports patient has not been eating much x  4 days, will drink a little juice every now and then   Potassium 6.3, BUN 70, and Cr 2.02 on admission, baseline Cr ~1  S/p 1L NS bolus and 1L LR bolus in the ED  Avoid nephrotoxins, renally dose meds  Encourage PO hydration  Monitor potassium and renal function    IDDM2 with hyperglycemia  Last known A1c 7.5 (on 8/5/24), blood glucose 510 on admission, VBG 7.29 / 58 / 34 / 26  S/p 1L NS bolus, 1L LR bolus and insulin 20 U IV in the ED  POC qac and qhs, PTA insulin aspart 15 U qhs reordered as Lantus 15 U qhs after discussion with pharmacist, moderate dose SSI qac and qhs started, metformin 500 mg BID held due to ALY  Blood glucose goal < 180, f/u A1c    Hypertonic hyponatremia secondary to hyperglycemia  Corrected sodium 139 on admission  Anticipate improvement in sodium levels with tight glycemic control    Physical deconditioning secondary to R. humerus fracture  Daughter-in-law reports patient fell at home and landed on her right arm three days ago  Went to [facility] at that time, found to have right humerus fracture and arm placed in sling  Has not gotten out of bed since then  Baseline functional status is ambulates with walker and dependent with all ADLs  COVID/influenza/RSV negative, CT head without acute intracranial findings  PT/OT consulted  Attempted to call orthopedic surgery x 4 in regards to possible intervention which can be provided to patient but did not receive call back      Chronic medical conditions:  HTN: PTA metoprolol succinate ER 25 mg, lisinopril 10 mg and HCTZ 12.5 mg held due to ALY  HLD: PTA atorvastatin 40 mg  CAD (s/p PCI): PTA ASA 81 mg, ranolazine  mg BID  Asthma: PTA Trelegy reordered as Advair and Spiriva  Normocytic anemia, chronic: hgb 10.3 on admission, baseline appears to fluctuate, no signs of active bleeding    Medication reconciliation completed using discharge med rec from [facility] on 4/4/25 as patient does not know med list. Please call her pharmacy for med list in AM.    Plan of care discussed with daughter-in-law at bedside. Tiffanie Joshi is a 74 year old female with PMHx of HTN, HLD, IDDM2, CAD (s/p PCI), and recent right humerus fracture (placed in sling 3 days ago) who presented to the ED on 5/30/25 for complaints of elevated blood glucose and admitted for ALY and physical deconditioning.    ALY, suspect etiology prerenal secondary to decreased PO intake, improving  Hyperkalemia secondary to above, improving  Daughter-in-law reports patient has not been eating much x  4 days, will drink a little juice every now and then   Potassium 6.3, BUN 70, and Cr 2.02 on admission, baseline Cr ~1  S/p 1L NS bolus and 1L LR bolus in the ED  Avoid nephrotoxins, renally dose meds  Encourage PO hydration  Monitor potassium and renal function    IDDM2 with hyperglycemia  Last known A1c 7.5 (on 8/5/24), blood glucose 510 on admission, VBG 7.29 / 58 / 34 / 26  S/p 1L NS bolus, 1L LR bolus and insulin 20 U IV in the ED  POC qac and qhs, PTA insulin aspart 15 U qhs reordered as Lantus 15 U qhs after discussion with pharmacist, moderate dose SSI qac and qhs started, metformin 500 mg BID held due to ALY  Blood glucose goal < 180, f/u A1c    Hypertonic hyponatremia secondary to hyperglycemia  Corrected sodium 139 on admission  Anticipate improvement in sodium levels with tight glycemic control    Physical deconditioning secondary to R. humerus fracture  Daughter-in-law reports patient fell at home and landed on her right arm three days ago  Went to [facility] at that time, found to have right humerus fracture and arm placed in sling  Has not gotten out of bed since then  Baseline functional status is ambulates with walker and dependent with all ADLs  COVID/influenza/RSV negative, CT head without acute intracranial findings  F/u CT R. shoulder  PT/OT consulted  Attempted to call orthopedic surgery x 4 in regards to possible intervention which can be provided to patient but did not receive call back      Chronic medical conditions:  HTN: PTA metoprolol succinate ER 25 mg, lisinopril 10 mg and HCTZ 12.5 mg held due to ALY  HLD: PTA atorvastatin 40 mg  CAD (s/p PCI): PTA ASA 81 mg, ranolazine  mg BID  Asthma: PTA Trelegy reordered as Advair and Spiriva  Normocytic anemia, chronic: hgb 10.3 on admission, baseline appears to fluctuate, no signs of active bleeding    Medication reconciliation completed using discharge med rec from [facility] on 4/4/25 as patient does not know med list. Please call her pharmacy for med list in AM.    Plan of care discussed with daughter-in-law at bedside.

## 2025-05-31 DIAGNOSIS — E87.1 HYPO-OSMOLALITY AND HYPONATREMIA: ICD-10-CM
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
## 2025-05-31 DIAGNOSIS — E11.65 TYPE 2 DIABETES MELLITUS WITH HYPERGLYCEMIA: ICD-10-CM

## 2025-05-31 DIAGNOSIS — I25.10 ATHEROSCLEROTIC HEART DISEASE OF NATIVE CORONARY ARTERY WITHOUT ANGINA PECTORIS: ICD-10-CM

## 2025-05-31 DIAGNOSIS — E78.5 HYPERLIPIDEMIA, UNSPECIFIED: ICD-10-CM

## 2025-05-31 DIAGNOSIS — I10 ESSENTIAL (PRIMARY) HYPERTENSION: ICD-10-CM

## 2025-05-31 DIAGNOSIS — R53.81 OTHER MALAISE: ICD-10-CM

## 2025-05-31 DIAGNOSIS — N17.9 ACUTE KIDNEY FAILURE, UNSPECIFIED: ICD-10-CM

## 2025-05-31 DIAGNOSIS — E87.5 HYPERKALEMIA: ICD-10-CM

## 2025-05-31 LAB
A1C WITH ESTIMATED AVERAGE GLUCOSE RESULT: 10.2 % — HIGH (ref 4–5.6)
ANION GAP SERPL CALC-SCNC: 5 MMOL/L — SIGNIFICANT CHANGE UP (ref 5–17)
APPEARANCE UR: CLEAR — SIGNIFICANT CHANGE UP
BILIRUB UR-MCNC: NEGATIVE — SIGNIFICANT CHANGE UP
BUN SERPL-MCNC: 64 MG/DL — HIGH (ref 7–23)
CALCIUM SERPL-MCNC: 8.9 MG/DL — SIGNIFICANT CHANGE UP (ref 8.5–10.1)
CHLORIDE SERPL-SCNC: 107 MMOL/L — SIGNIFICANT CHANGE UP (ref 96–108)
CO2 SERPL-SCNC: 21 MMOL/L — LOW (ref 22–31)
COLOR SPEC: YELLOW — SIGNIFICANT CHANGE UP
CREAT SERPL-MCNC: 1.54 MG/DL — HIGH (ref 0.5–1.3)
DIFF PNL FLD: NEGATIVE — SIGNIFICANT CHANGE UP
EGFR: 35 ML/MIN/1.73M2 — LOW
EGFR: 35 ML/MIN/1.73M2 — LOW
ESTIMATED AVERAGE GLUCOSE: 246 MG/DL — HIGH (ref 68–114)
FLUAV AG NPH QL: SIGNIFICANT CHANGE UP
FLUBV AG NPH QL: SIGNIFICANT CHANGE UP
GLUCOSE BLDC GLUCOMTR-MCNC: 165 MG/DL — HIGH (ref 70–99)
GLUCOSE BLDC GLUCOMTR-MCNC: 172 MG/DL — HIGH (ref 70–99)
GLUCOSE BLDC GLUCOMTR-MCNC: 221 MG/DL — HIGH (ref 70–99)
GLUCOSE BLDC GLUCOMTR-MCNC: 234 MG/DL — HIGH (ref 70–99)
GLUCOSE BLDC GLUCOMTR-MCNC: 307 MG/DL — HIGH (ref 70–99)
GLUCOSE SERPL-MCNC: 262 MG/DL — HIGH (ref 70–99)
GLUCOSE UR QL: >=1000 MG/DL
HCT VFR BLD CALC: 29.6 % — LOW (ref 34.5–45)
HGB BLD-MCNC: 9.5 G/DL — LOW (ref 11.5–15.5)
KETONES UR QL: NEGATIVE MG/DL — SIGNIFICANT CHANGE UP
LEUKOCYTE ESTERASE UR-ACNC: ABNORMAL
MAGNESIUM SERPL-MCNC: 2.8 MG/DL — HIGH (ref 1.6–2.6)
MCHC RBC-ENTMCNC: 29 PG — SIGNIFICANT CHANGE UP (ref 27–34)
MCHC RBC-ENTMCNC: 32.1 G/DL — SIGNIFICANT CHANGE UP (ref 32–36)
MCV RBC AUTO: 90.2 FL — SIGNIFICANT CHANGE UP (ref 80–100)
NITRITE UR-MCNC: POSITIVE
NRBC BLD AUTO-RTO: 0 /100 WBCS — SIGNIFICANT CHANGE UP (ref 0–0)
PH UR: 5.5 — SIGNIFICANT CHANGE UP (ref 5–8)
PHOSPHATE SERPL-MCNC: 3.7 MG/DL — SIGNIFICANT CHANGE UP (ref 2.5–4.5)
PLATELET # BLD AUTO: 215 K/UL — SIGNIFICANT CHANGE UP (ref 150–400)
POTASSIUM SERPL-MCNC: 5.7 MMOL/L — HIGH (ref 3.5–5.3)
POTASSIUM SERPL-SCNC: 5.7 MMOL/L — HIGH (ref 3.5–5.3)
PROT UR-MCNC: SIGNIFICANT CHANGE UP MG/DL
RBC # BLD: 3.28 M/UL — LOW (ref 3.8–5.2)
RBC # FLD: 13.2 % — SIGNIFICANT CHANGE UP (ref 10.3–14.5)
RSV RNA NPH QL NAA+NON-PROBE: SIGNIFICANT CHANGE UP
SARS-COV-2 RNA SPEC QL NAA+PROBE: SIGNIFICANT CHANGE UP
SODIUM SERPL-SCNC: 133 MMOL/L — LOW (ref 135–145)
SOURCE RESPIRATORY: SIGNIFICANT CHANGE UP
SP GR SPEC: 1.02 — SIGNIFICANT CHANGE UP (ref 1–1.03)
UROBILINOGEN FLD QL: 0.2 MG/DL — SIGNIFICANT CHANGE UP (ref 0.2–1)
WBC # BLD: 9.54 K/UL — SIGNIFICANT CHANGE UP (ref 3.8–10.5)
WBC # FLD AUTO: 9.54 K/UL — SIGNIFICANT CHANGE UP (ref 3.8–10.5)

## 2025-05-31 PROCEDURE — 99232 SBSQ HOSP IP/OBS MODERATE 35: CPT

## 2025-05-31 RX ORDER — OLANZAPINE 10 MG/1
2.5 TABLET ORAL ONCE
Refills: 0 | Status: COMPLETED | OUTPATIENT
Start: 2025-05-31 | End: 2025-05-31

## 2025-05-31 RX ORDER — OXYCODONE HYDROCHLORIDE 30 MG/1
5 TABLET ORAL EVERY 8 HOURS
Refills: 0 | Status: DISCONTINUED | OUTPATIENT
Start: 2025-05-31 | End: 2025-06-02

## 2025-05-31 RX ORDER — BISACODYL 5 MG
10 TABLET, DELAYED RELEASE (ENTERIC COATED) ORAL ONCE
Refills: 0 | Status: COMPLETED | OUTPATIENT
Start: 2025-05-31 | End: 2025-05-31

## 2025-05-31 RX ORDER — IOHEXOL 350 MG/ML
30 INJECTION, SOLUTION INTRAVENOUS ONCE
Refills: 0 | Status: COMPLETED | OUTPATIENT
Start: 2025-05-31 | End: 2025-06-01

## 2025-05-31 RX ORDER — POLYETHYLENE GLYCOL 3350 17 G/17G
17 POWDER, FOR SOLUTION ORAL DAILY
Refills: 0 | Status: DISCONTINUED | OUTPATIENT
Start: 2025-05-31 | End: 2025-07-10

## 2025-05-31 RX ORDER — SODIUM CHLORIDE 9 G/1000ML
1000 INJECTION, SOLUTION INTRAVENOUS
Refills: 0 | Status: DISCONTINUED | OUTPATIENT
Start: 2025-05-31 | End: 2025-06-02

## 2025-05-31 RX ORDER — SODIUM ZIRCONIUM CYCLOSILICATE 5 G/5G
10 POWDER, FOR SUSPENSION ORAL ONCE
Refills: 0 | Status: COMPLETED | OUTPATIENT
Start: 2025-05-31 | End: 2025-05-31

## 2025-05-31 RX ORDER — SENNA 187 MG
2 TABLET ORAL AT BEDTIME
Refills: 0 | Status: DISCONTINUED | OUTPATIENT
Start: 2025-05-31 | End: 2025-07-10

## 2025-05-31 RX ADMIN — ATORVASTATIN CALCIUM 40 MILLIGRAM(S): 80 TABLET, FILM COATED ORAL at 21:20

## 2025-05-31 RX ADMIN — INSULIN GLARGINE-YFGN 15 UNIT(S): 100 INJECTION, SOLUTION SUBCUTANEOUS at 00:24

## 2025-05-31 RX ADMIN — Medication 1 TABLET(S): at 11:48

## 2025-05-31 RX ADMIN — Medication 3 MILLIGRAM(S): at 21:20

## 2025-05-31 RX ADMIN — RANOLAZINE 500 MILLIGRAM(S): 1000 TABLET, FILM COATED, EXTENDED RELEASE ORAL at 17:01

## 2025-05-31 RX ADMIN — Medication 650 MILLIGRAM(S): at 09:27

## 2025-05-31 RX ADMIN — Medication 2 TABLET(S): at 21:20

## 2025-05-31 RX ADMIN — SODIUM ZIRCONIUM CYCLOSILICATE 10 GRAM(S): 5 POWDER, FOR SUSPENSION ORAL at 15:12

## 2025-05-31 RX ADMIN — Medication 10 MILLIGRAM(S): at 11:47

## 2025-05-31 RX ADMIN — Medication 81 MILLIGRAM(S): at 11:47

## 2025-05-31 RX ADMIN — SODIUM CHLORIDE 75 MILLILITER(S): 9 INJECTION, SOLUTION INTRAVENOUS at 11:47

## 2025-05-31 RX ADMIN — SODIUM CHLORIDE 75 MILLILITER(S): 9 INJECTION, SOLUTION INTRAVENOUS at 21:18

## 2025-05-31 RX ADMIN — POLYETHYLENE GLYCOL 3350 17 GRAM(S): 17 POWDER, FOR SOLUTION ORAL at 11:47

## 2025-05-31 RX ADMIN — INSULIN LISPRO 2: 100 INJECTION, SOLUTION INTRAVENOUS; SUBCUTANEOUS at 16:51

## 2025-05-31 RX ADMIN — INSULIN LISPRO 4: 100 INJECTION, SOLUTION INTRAVENOUS; SUBCUTANEOUS at 08:33

## 2025-05-31 RX ADMIN — METOPROLOL SUCCINATE 25 MILLIGRAM(S): 50 TABLET, EXTENDED RELEASE ORAL at 07:01

## 2025-05-31 RX ADMIN — GABAPENTIN 300 MILLIGRAM(S): 400 CAPSULE ORAL at 17:01

## 2025-05-31 RX ADMIN — Medication 1 DOSE(S): at 17:01

## 2025-05-31 RX ADMIN — Medication 650 MILLIGRAM(S): at 11:00

## 2025-05-31 RX ADMIN — GABAPENTIN 300 MILLIGRAM(S): 400 CAPSULE ORAL at 07:01

## 2025-05-31 RX ADMIN — RANOLAZINE 500 MILLIGRAM(S): 1000 TABLET, FILM COATED, EXTENDED RELEASE ORAL at 07:03

## 2025-05-31 RX ADMIN — TIOTROPIUM BROMIDE INHALATION SPRAY 2 PUFF(S): 3.12 SPRAY, METERED RESPIRATORY (INHALATION) at 14:47

## 2025-05-31 RX ADMIN — OLANZAPINE 2.5 MILLIGRAM(S): 10 TABLET ORAL at 02:09

## 2025-05-31 RX ADMIN — INSULIN LISPRO 4: 100 INJECTION, SOLUTION INTRAVENOUS; SUBCUTANEOUS at 11:48

## 2025-05-31 RX ADMIN — INSULIN GLARGINE-YFGN 15 UNIT(S): 100 INJECTION, SOLUTION SUBCUTANEOUS at 21:18

## 2025-05-31 RX ADMIN — Medication 1 DOSE(S): at 07:05

## 2025-05-31 NOTE — PATIENT PROFILE ADULT - FALL HARM RISK - HARM RISK INTERVENTIONS
Assistance with ambulation/Assistance OOB with selected safe patient handling equipment/Communicate Risk of Fall with Harm to all staff/Reinforce activity limits and safety measures with patient and family/Tailored Fall Risk Interventions/Visual Cue: Yellow wristband and red socks/Bed in lowest position, wheels locked, appropriate side rails in place/Call bell, personal items and telephone in reach/Instruct patient to call for assistance before getting out of bed or chair/Non-slip footwear when patient is out of bed/[place] to call system/Physically safe environment - no spills, clutter or unnecessary equipment/Purposeful Proactive Rounding/Room/bathroom lighting operational, light cord in reach Assistance with ambulation/Assistance OOB with selected safe patient handling equipment/Communicate Risk of Fall with Harm to all staff/Discuss with provider need for PT consult/Monitor for mental status changes/Monitor gait and stability/Move patient closer to nurses' station/Provide patient with walking aids - walker, cane, crutches/Reinforce activity limits and safety measures with patient and family/Reorient to person, place and time as needed/Tailored Fall Risk Interventions/Toileting schedule using arm’s reach rule for commode and bathroom/Use of alarms - bed, chair and/or voice tab/Visual Cue: Yellow wristband and red socks/Bed in lowest position, wheels locked, appropriate side rails in place/Call bell, personal items and telephone in reach/Instruct patient to call for assistance before getting out of bed or chair/Non-slip footwear when patient is out of bed/[place] to call system/Physically safe environment - no spills, clutter or unnecessary equipment/Purposeful Proactive Rounding/Room/bathroom lighting operational, light cord in reach

## 2025-05-31 NOTE — OCCUPATIONAL THERAPY INITIAL EVALUATION ADULT - STRENGTHENING, PT EVAL
Pt will show improvement in BUE and LUE strength by 1-2 grades in order to improve balance and the performance of ADLs and functional transfers.

## 2025-05-31 NOTE — PROGRESS NOTE ADULT - ASSESSMENT
Tiffanie Joshi is a 74 year old female with PMHx of HTN, HLD, IDDM2, CAD (s/p PCI), and recent right humerus fracture (placed in sling 3 days ago) who presented to the ED on 5/30/25 for complaints of elevated blood glucose and admitted for ALY and physical deconditioning.    #LAY, suspect etiology prerenal secondary to decreased PO intake, improving  #Hyperkalemia secondary to above, improving  Daughter-in-law reports patient has not been eating much x  4 days, will drink a little juice every now and then   Potassium 6.3, BUN 70, and Cr 2.02 on admission, baseline Cr ~1  S/p 1L NS bolus and 1L LR bolus in the ED  Avoid nephrotoxins, renally dose meds  Encourage PO hydration  Monitor potassium and renal function    #low grade fever   T 100.5 5/31, CXR without acute findings, UA not c/w UTI, flu/covid negative   Will try to add on RVP  Monitor off antibiotics     #IDDM2 with hyperglycemia  Last known A1c 7.5 (on 8/5/24), blood glucose 510 on admission, VBG 7.29 / 58 / 34 / 26  S/p 1L NS bolus, 1L LR bolus and insulin 20 U IV in the ED  POC qac and qhs, PTA insulin aspart 15 U qhs reordered as Lantus 15 U qhs after discussion with pharmacist, moderate dose SSI qac and qhs started, metformin 500 mg BID held due to ALY  Blood glucose goal < 180, f/u A1c    #Hypertonic hyponatremia secondary to hyperglycemia  Corrected sodium 139 on admission  Anticipate improvement in sodium levels with tight glycemic control    #Physical deconditioning secondary to R. humerus fracture  Daughter-in-law reports patient fell at home and landed on her right arm three days ago  Went to [facility] at that time, found to have right humerus fracture and arm placed in sling  Has not gotten out of bed since then  Baseline functional status is ambulates with walker and dependent with all ADLs  COVID/influenza/RSV negative, CT head without acute intracranial findings  F/u CT R. shoulder  PT/OT consulted      Chronic medical conditions:  HTN: PTA metoprolol succinate ER 25 mg, lisinopril 10 mg and HCTZ 12.5 mg held due to ALY  HLD: PTA atorvastatin 40 mg  CAD (s/p PCI): PTA ASA 81 mg, ranolazine  mg BID  Asthma: PTA Trelegy reordered as Advair and Spiriva  Normocytic anemia, chronic: hgb 10.3 on admission, baseline appears to fluctuate, no signs of active bleeding    Medication reconciliation completed using discharge med rec from [facility] on 4/4/25 as patient does not know med list. Please call her pharmacy for med list in AM.    Plan of care discussed with daughter-in-law at bedside. Tiffanie Joshi is a 74 year old female with PMHx of HTN, HLD, IDDM2, CAD (s/p PCI), and recent right humerus fracture (placed in sling 3 days ago) who presented to the ED on 5/30/25 for complaints of elevated blood glucose and admitted for ALY and physical deconditioning.    #ALY, suspect etiology prerenal secondary to decreased PO intake, improving  #Hyperkalemia secondary to above, improving  Daughter-in-law reports patient has not been eating much x  4 days, will drink a little juice every now and then   Potassium 6.3, BUN 70, and Cr 2.02 on admission, baseline Cr ~1  S/p 1L NS bolus and 1L LR bolus in the ED  MIVF for now as patient has decreased PO    #Constipation   Per dtr, no BM x 10 days. Abdomen soft non tender   Will get CTAP to r/o obstruction   Will try suppository as patient has decreased PO and family does not think she'll drink lactulose     #low grade fever   T 100.5 5/31, CXR without acute findings, UA not c/w UTI, flu/covid negative   Will try to add on RVP--called lab they will try with a new swab  Will get CTAP to r/o stercoral colitis given constipation  Monitor off antibiotics     #IDDM2 with hyperglycemia  Last known A1c 7.5 (on 8/5/24), blood glucose 510 on admission, VBG 7.29 / 58 / 34 / 26  S/p 1L NS bolus, 1L LR bolus and insulin 20 U IV in the ED  POC qac and qhs, PTA insulin aspart 15 U qhs reordered as Lantus 15 U qhs after discussion with pharmacist, moderate dose SSI qac and qhs started, metformin 500 mg BID held due to ALY  Blood glucose goal < 180, f/u A1c    #Hypertonic hyponatremia secondary to hyperglycemia  Corrected sodium 139 on admission  Anticipate improvement in sodium levels with tight glycemic control    #Physical deconditioning secondary to R. humerus fracture  Daughter-in-law reports patient fell at home and landed on her right arm three days ago  Went to [facility] at that time, found to have right humerus fracture and arm placed in sling  Has not gotten out of bed since then  Baseline functional status is ambulates with walker and dependent with all ADLs  COVID/influenza/RSV negative, CT head without acute intracranial findings  F/u CT R. shoulder  PT/OT consulted      Chronic medical conditions:  HTN: PTA metoprolol succinate ER 25 mg, lisinopril 10 mg and HCTZ 12.5 mg held due to ALY  HLD: PTA atorvastatin 40 mg  CAD (s/p PCI): PTA ASA 81 mg, ranolazine  mg BID  Asthma: PTA Trelegy reordered as Advair and Spiriva  Normocytic anemia, chronic: hgb 10.3 on admission, baseline appears to fluctuate, no signs of active bleeding    Medication reconciliation completed using discharge med rec from [facility] on 4/4/25 as patient does not know med list. Please call her pharmacy for med list in AM.    Plan of care discussed with daughter and son at bedside

## 2025-05-31 NOTE — CHART NOTE - NSCHARTNOTEFT_GEN_A_CORE
Confidential Drug Utilization Report  Search Terms: Tiffanie Joshi, 1950Search Date: 05/31/2025 04:04:02 AM  Searching on behalf of: Myself  The Drug Utilization Report below displays all of the controlled substance prescriptions, if any, that your patient has filled in the last twelve months. The information displayed on this report is compiled from pharmacy submissions to the Department, and accurately reflects the information as submitted by the pharmacies.    This report was requested by: Mary Kemp | Reference #: [id number]    Practitioner Count: 1  Pharmacy Count: 1  Current Opioid Prescriptions: 1  Current Benzodiazepine Prescriptions: 0  Current Stimulant Prescriptions: 0      Patient Demographic Information (PDI)       PDI	First Name	Last Name	Birth Date	Gender	Street Address	[facility]	Zip Code  [place]	Tiffanie Joshi	1950	Female	[address]	[id number]    Prescription Information      PDI Filter:    PDI	My Rx	Current Rx	Drug Type	Rx Written	Rx Dispensed	Drug	Quantity	Days Supply	Prescriber Name	Prescriber JOSE #  A	N	Y	O	05/28/2025	05/28/2025	oxycodone hcl (ir) 5 mg tablet	15	4	Cherelle Delgado	[id number]  Payment Method Insurance  Dispenser [facility]    * - Details of Drug Type : O = Opioid, B = Benzodiazepine, S = Stimulant    * - Drugs marked with an asterisk are compound drugs. If the compound drug is made up of more than one controlled substance, then each controlled substance will be a separate row in the table.

## 2025-05-31 NOTE — PROGRESS NOTE ADULT - SUBJECTIVE AND OBJECTIVE BOX
Patient is a 74y old  Female who presents with a chief complaint of ALY, physical deconditioning (30 May 2025 23:43)      INTERVAL HPI/OVERNIGHT EVENTS:  low grade fever 100.5 this AM  seen and examined at bedside     MEDICATIONS  (STANDING):  aspirin  chewable 81 milliGRAM(s) Oral daily  atorvastatin 40 milliGRAM(s) Oral at bedtime  dextrose 5%. 1000 milliLiter(s) (50 mL/Hr) IV Continuous <Continuous>  dextrose 5%. 1000 milliLiter(s) (100 mL/Hr) IV Continuous <Continuous>  dextrose 50% Injectable 25 Gram(s) IV Push once  dextrose 50% Injectable 12.5 Gram(s) IV Push once  dextrose 50% Injectable 25 Gram(s) IV Push once  fluticasone propionate/ salmeterol 100-50 MICROgram(s) Diskus 1 Dose(s) Inhalation two times a day  gabapentin 300 milliGRAM(s) Oral two times a day  glucagon  Injectable 1 milliGRAM(s) IntraMuscular once  insulin glargine Injectable (LANTUS) 15 Unit(s) SubCutaneous at bedtime  insulin lispro (ADMELOG) corrective regimen sliding scale   SubCutaneous three times a day before meals  insulin lispro (ADMELOG) corrective regimen sliding scale   SubCutaneous at bedtime  metoprolol succinate ER 25 milliGRAM(s) Oral daily  multivitamin 1 Tablet(s) Oral daily  ranolazine 500 milliGRAM(s) Oral two times a day  senna 2 Tablet(s) Oral at bedtime  tiotropium 2.5 MICROgram(s) Inhaler 2 Puff(s) Inhalation daily    MEDICATIONS  (PRN):  acetaminophen     Tablet .. 650 milliGRAM(s) Oral every 6 hours PRN Temp greater or equal to 38C (100.4F), Mild Pain (1 - 3)  albuterol    90 MICROgram(s) HFA Inhaler 2 Puff(s) Inhalation every 6 hours PRN for shortness of breath and/or wheezing  aluminum hydroxide/magnesium hydroxide/simethicone Suspension 30 milliLiter(s) Oral every 4 hours PRN Dyspepsia  dextrose Oral Gel 15 Gram(s) Oral once PRN Blood Glucose LESS THAN 70 milliGRAM(s)/deciliter  melatonin 3 milliGRAM(s) Oral at bedtime PRN Insomnia  ondansetron Injectable 4 milliGRAM(s) IV Push every 8 hours PRN Nausea and/or Vomiting  oxyCODONE    IR 5 milliGRAM(s) Oral every 8 hours PRN for severe pain  polyethylene glycol 3350 17 Gram(s) Oral daily PRN Constipation      Allergies    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 (Short breath)  No Known Drug Allergies    Intolerances        REVIEW OF SYSTEMS:  CONSTITUTIONAL: No fever, weight loss, or fatigue  EYES: No eye pain, visual disturbances, or discharge  ENMT:  No difficulty hearing, tinnitus, vertigo; No sinus or throat pain  NECK: No pain or stiffness  BREASTS: No pain, masses, or nipple discharge  RESPIRATORY: No cough, wheezing, chills or hemoptysis; No shortness of breath  CARDIOVASCULAR: No chest pain, palpitations, dizziness, or leg swelling  GASTROINTESTINAL: No abdominal or epigastric pain. No nausea, vomiting, or hematemesis; No diarrhea or constipation. No melena or hematochezia.  GENITOURINARY: No dysuria, frequency, hematuria, or incontinence  NEUROLOGICAL: No headaches, memory loss, loss of strength, numbness, or tremors  SKIN: No itching, burning, rashes, or lesions   LYMPH NODES: No enlarged glands  ENDOCRINE: No heat or cold intolerance; No hair loss  MUSCULOSKELETAL: No joint pain or swelling; No muscle, back, or extremity pain  PSYCHIATRIC: No depression, anxiety, mood swings, or difficulty sleeping  HEME/LYMPH: No easy bruising, or bleeding gums  ALLERGY AND IMMUNOLOGIC: No hives or eczema    Vital Signs Last 24 Hrs  T(C): 38.1 (31 May 2025 09:28), Max: 38.1 (31 May 2025 09:28)  T(F): 100.5 (31 May 2025 09:28), Max: 100.5 (31 May 2025 09:28)  HR: 74 (31 May 2025 09:05) (58 - 89)  BP: 113/65 (31 May 2025 09:05) (109/57 - 160/84)  BP(mean): 73 (31 May 2025 00:31) (73 - 102)  RR: 18 (31 May 2025 09:05) (14 - 19)  SpO2: 94% (31 May 2025 06:46) (93% - 100%)    Parameters below as of 31 May 2025 00:31  Patient On (Oxygen Delivery Method): room air        PHYSICAL EXAM:  GENERAL: NAD, well-groomed, well-developed  HEAD:  Atraumatic, Normocephalic  EYES: EOMI, PERRLA, conjunctiva and sclera clear  ENMT: No tonsillar erythema, exudates, or enlargement; Moist mucous membranes, Good dentition, No lesions  NECK: Supple, No JVD, Normal thyroid  NERVOUS SYSTEM:  Alert & Oriented X3, Good concentration; Motor Strength 5/5 B/L upper and lower extremities; DTRs 2+ intact and symmetric  CHEST/LUNG: Clear to percussion bilaterally; No rales, rhonchi, wheezing, or rubs  HEART: Regular rate and rhythm; No murmurs, rubs, or gallops  ABDOMEN: Soft, Nontender, Nondistended; Bowel sounds present  EXTREMITIES:  2+ Peripheral Pulses, No clubbing, cyanosis, or edema  LYMPH: No lymphadenopathy noted  SKIN: No rashes or lesions    LABS:                        10.3   9.24  )-----------( 218      ( 30 May 2025 19:00 )             32.9         137  |  105  |  69[H]  ----------------------------<  305[H]  5.4[H]   |  24  |  1.85[H]    Ca    8.2[L]      30 May 2025 21:24  Mg     2.9         TPro  8.0  /  Alb  3.2[L]  /  TBili  0.5  /  DBili  x   /  AST  12[L]  /  ALT  15  /  AlkPhos  90        Urinalysis Basic - ( 31 May 2025 04:30 )    Color: Yellow / Appearance: Clear / S.024 / pH: x  Gluc: x / Ketone: x  / Bili: Negative / Urobili: 0.2 mg/dL   Blood: x / Protein: Trace mg/dL / Nitrite: Positive   Leuk Esterase: Trace / RBC: 1 /HPF / WBC 2 /HPF   Sq Epi: x / Non Sq Epi: x / Bacteria: Many /HPF      CAPILLARY BLOOD GLUCOSE      POCT Blood Glucose.: 234 mg/dL (31 May 2025 08:17)  POCT Blood Glucose.: 307 mg/dL (31 May 2025 00:03)  POCT Blood Glucose.: 447 mg/dL (30 May 2025 19:57)  POCT Blood Glucose.: 472 mg/dL (30 May 2025 19:54)  POCT Blood Glucose.: 486 mg/dL (30 May 2025 17:47)  POCT Blood Glucose.: 490 mg/dL (30 May 2025 17:46)      RADIOLOGY & ADDITIONAL TESTS:    Imaging Personally Reviewed:  [ X] YES  [ ] NO    Consultant(s) Notes Reviewed:  [ X] YES  [ ] NO    Care Discussed with Consultants/Other Providers [X ] YES  [ ] NO Patient is a 74y old  Female who presents with a chief complaint of ALY, physical deconditioning (30 May 2025 23:43)      INTERVAL HPI/OVERNIGHT EVENTS:  low grade fever 100.5 this AM  seen and examined at bedside   daughter and son updated at bedside     MEDICATIONS  (STANDING):  aspirin  chewable 81 milliGRAM(s) Oral daily  atorvastatin 40 milliGRAM(s) Oral at bedtime  dextrose 5%. 1000 milliLiter(s) (50 mL/Hr) IV Continuous <Continuous>  dextrose 5%. 1000 milliLiter(s) (100 mL/Hr) IV Continuous <Continuous>  dextrose 50% Injectable 25 Gram(s) IV Push once  dextrose 50% Injectable 12.5 Gram(s) IV Push once  dextrose 50% Injectable 25 Gram(s) IV Push once  fluticasone propionate/ salmeterol 100-50 MICROgram(s) Diskus 1 Dose(s) Inhalation two times a day  gabapentin 300 milliGRAM(s) Oral two times a day  glucagon  Injectable 1 milliGRAM(s) IntraMuscular once  insulin glargine Injectable (LANTUS) 15 Unit(s) SubCutaneous at bedtime  insulin lispro (ADMELOG) corrective regimen sliding scale   SubCutaneous three times a day before meals  insulin lispro (ADMELOG) corrective regimen sliding scale   SubCutaneous at bedtime  metoprolol succinate ER 25 milliGRAM(s) Oral daily  multivitamin 1 Tablet(s) Oral daily  ranolazine 500 milliGRAM(s) Oral two times a day  senna 2 Tablet(s) Oral at bedtime  tiotropium 2.5 MICROgram(s) Inhaler 2 Puff(s) Inhalation daily    MEDICATIONS  (PRN):  acetaminophen     Tablet .. 650 milliGRAM(s) Oral every 6 hours PRN Temp greater or equal to 38C (100.4F), Mild Pain (1 - 3)  albuterol    90 MICROgram(s) HFA Inhaler 2 Puff(s) Inhalation every 6 hours PRN for shortness of breath and/or wheezing  aluminum hydroxide/magnesium hydroxide/simethicone Suspension 30 milliLiter(s) Oral every 4 hours PRN Dyspepsia  dextrose Oral Gel 15 Gram(s) Oral once PRN Blood Glucose LESS THAN 70 milliGRAM(s)/deciliter  melatonin 3 milliGRAM(s) Oral at bedtime PRN Insomnia  ondansetron Injectable 4 milliGRAM(s) IV Push every 8 hours PRN Nausea and/or Vomiting  oxyCODONE    IR 5 milliGRAM(s) Oral every 8 hours PRN for severe pain  polyethylene glycol 3350 17 Gram(s) Oral daily PRN Constipation      Allergies    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 (Short breath)  No Known Drug Allergies    Intolerances        REVIEW OF SYSTEMS:  sleeping, unable to assess    Vital Signs Last 24 Hrs  T(C): 38.1 (31 May 2025 09:28), Max: 38.1 (31 May 2025 09:28)  T(F): 100.5 (31 May 2025 09:28), Max: 100.5 (31 May 2025 09:28)  HR: 74 (31 May 2025 09:05) (58 - 89)  BP: 113/65 (31 May 2025 09:05) (109/57 - 160/84)  BP(mean): 73 (31 May 2025 00:31) (73 - 102)  RR: 18 (31 May 2025 09:05) (14 - 19)  SpO2: 94% (31 May 2025 06:46) (93% - 100%)    Parameters below as of 31 May 2025 00:31  Patient On (Oxygen Delivery Method): room air        PHYSICAL EXAM:  GENERAL: NAD, well-groomed, well-developed  HEAD:  Atraumatic, Normocephalic  EYES: EOMI, sclera non-icteric  ENMT:  dry mucous membrnes  NERVOUS SYSTEM:  Sleeping, awakens to name   CHEST/LUNG: Clear to percussion bilaterally; No rales, rhonchi, wheezing, or rubs  HEART: Regular rate and rhythm; No murmurs, rubs, or gallops  ABDOMEN: Soft, Nontender, Nondistended; Bowel sounds present  EXTREMITIES:  no Le edema   SKIN: +healing bruises R shoulder     LABS:                        10.3   9.24  )-----------( 218      ( 30 May 2025 19:00 )             32.9     05-30    137  |  105  |  69[H]  ----------------------------<  305[H]  5.4[H]   |  24  |  1.85[H]    Ca    8.2[L]      30 May 2025 21:24  Mg     2.9         TPro  8.0  /  Alb  3.2[L]  /  TBili  0.5  /  DBili  x   /  AST  12[L]  /  ALT  15  /  AlkPhos  90  05-30      Urinalysis Basic - ( 31 May 2025 04:30 )    Color: Yellow / Appearance: Clear / S.024 / pH: x  Gluc: x / Ketone: x  / Bili: Negative / Urobili: 0.2 mg/dL   Blood: x / Protein: Trace mg/dL / Nitrite: Positive   Leuk Esterase: Trace / RBC: 1 /HPF / WBC 2 /HPF   Sq Epi: x / Non Sq Epi: x / Bacteria: Many /HPF      CAPILLARY BLOOD GLUCOSE      POCT Blood Glucose.: 234 mg/dL (31 May 2025 08:17)  POCT Blood Glucose.: 307 mg/dL (31 May 2025 00:03)  POCT Blood Glucose.: 447 mg/dL (30 May 2025 19:57)  POCT Blood Glucose.: 472 mg/dL (30 May 2025 19:54)  POCT Blood Glucose.: 486 mg/dL (30 May 2025 17:47)  POCT Blood Glucose.: 490 mg/dL (30 May 2025 17:46)      RADIOLOGY & ADDITIONAL TESTS:    Imaging Personally Reviewed:  [ X] YES  [ ] NO    Consultant(s) Notes Reviewed:  [ X] YES  [ ] NO    Care Discussed with Consultants/Other Providers [X ] YES  [ ] NO

## 2025-05-31 NOTE — OCCUPATIONAL THERAPY INITIAL EVALUATION ADULT - GENERAL OBSERVATIONS, REHAB EVAL
Chart reviewed. Pt encountered semi-supine in bed, NAD, +LESLEYE radha dugan. A&Ox1. Pt's son Emily Valentin present at bed side provided Icelandic Interpretation. PT Arya present. Family agreeable to OT bri.

## 2025-06-01 LAB
ANION GAP SERPL CALC-SCNC: 0 MMOL/L — LOW (ref 5–17)
APPEARANCE UR: ABNORMAL
BACTERIA # UR AUTO: ABNORMAL /HPF
BILIRUB UR-MCNC: NEGATIVE — SIGNIFICANT CHANGE UP
BUN SERPL-MCNC: 49 MG/DL — HIGH (ref 7–23)
CALCIUM SERPL-MCNC: 8.8 MG/DL — SIGNIFICANT CHANGE UP (ref 8.5–10.1)
CHLORIDE SERPL-SCNC: 107 MMOL/L — SIGNIFICANT CHANGE UP (ref 96–108)
CO2 SERPL-SCNC: 28 MMOL/L — SIGNIFICANT CHANGE UP (ref 22–31)
COLOR SPEC: YELLOW — SIGNIFICANT CHANGE UP
CREAT SERPL-MCNC: 1.15 MG/DL — SIGNIFICANT CHANGE UP (ref 0.5–1.3)
DIFF PNL FLD: NEGATIVE — SIGNIFICANT CHANGE UP
EGFR: 49 ML/MIN/1.73M2 — LOW
EGFR: 49 ML/MIN/1.73M2 — LOW
EPI CELLS # UR: PRESENT
GLUCOSE BLDC GLUCOMTR-MCNC: 145 MG/DL — HIGH (ref 70–99)
GLUCOSE BLDC GLUCOMTR-MCNC: 156 MG/DL — HIGH (ref 70–99)
GLUCOSE BLDC GLUCOMTR-MCNC: 164 MG/DL — HIGH (ref 70–99)
GLUCOSE BLDC GLUCOMTR-MCNC: 167 MG/DL — HIGH (ref 70–99)
GLUCOSE BLDC GLUCOMTR-MCNC: 197 MG/DL — HIGH (ref 70–99)
GLUCOSE BLDC GLUCOMTR-MCNC: 209 MG/DL — HIGH (ref 70–99)
GLUCOSE SERPL-MCNC: 175 MG/DL — HIGH (ref 70–99)
GLUCOSE UR QL: >=1000 MG/DL
HCT VFR BLD CALC: 33.5 % — LOW (ref 34.5–45)
HGB BLD-MCNC: 10.6 G/DL — LOW (ref 11.5–15.5)
KETONES UR QL: NEGATIVE MG/DL — SIGNIFICANT CHANGE UP
LEUKOCYTE ESTERASE UR-ACNC: ABNORMAL
MAGNESIUM SERPL-MCNC: 2.6 MG/DL — SIGNIFICANT CHANGE UP (ref 1.6–2.6)
MCHC RBC-ENTMCNC: 28.4 PG — SIGNIFICANT CHANGE UP (ref 27–34)
MCHC RBC-ENTMCNC: 31.6 G/DL — LOW (ref 32–36)
MCV RBC AUTO: 89.8 FL — SIGNIFICANT CHANGE UP (ref 80–100)
NITRITE UR-MCNC: NEGATIVE — SIGNIFICANT CHANGE UP
NRBC BLD AUTO-RTO: 0 /100 WBCS — SIGNIFICANT CHANGE UP (ref 0–0)
PH UR: 5 — SIGNIFICANT CHANGE UP (ref 5–8)
PHOSPHATE SERPL-MCNC: 3.5 MG/DL — SIGNIFICANT CHANGE UP (ref 2.5–4.5)
PLATELET # BLD AUTO: 225 K/UL — SIGNIFICANT CHANGE UP (ref 150–400)
POTASSIUM SERPL-MCNC: 5.2 MMOL/L — SIGNIFICANT CHANGE UP (ref 3.5–5.3)
POTASSIUM SERPL-SCNC: 5.2 MMOL/L — SIGNIFICANT CHANGE UP (ref 3.5–5.3)
PROT UR-MCNC: NEGATIVE MG/DL — SIGNIFICANT CHANGE UP
RBC # BLD: 3.73 M/UL — LOW (ref 3.8–5.2)
RBC # FLD: 13.4 % — SIGNIFICANT CHANGE UP (ref 10.3–14.5)
RBC CASTS # UR COMP ASSIST: 0 /HPF — SIGNIFICANT CHANGE UP (ref 0–4)
SODIUM SERPL-SCNC: 135 MMOL/L — SIGNIFICANT CHANGE UP (ref 135–145)
SP GR SPEC: 1.02 — SIGNIFICANT CHANGE UP (ref 1–1.03)
UROBILINOGEN FLD QL: 1 MG/DL — SIGNIFICANT CHANGE UP (ref 0.2–1)
WBC # BLD: 9.31 K/UL — SIGNIFICANT CHANGE UP (ref 3.8–10.5)
WBC # FLD AUTO: 9.31 K/UL — SIGNIFICANT CHANGE UP (ref 3.8–10.5)
WBC UR QL: 20 /HPF — HIGH (ref 0–5)

## 2025-06-01 PROCEDURE — 71045 X-RAY EXAM CHEST 1 VIEW: CPT | Mod: 26

## 2025-06-01 PROCEDURE — 99232 SBSQ HOSP IP/OBS MODERATE 35: CPT

## 2025-06-01 PROCEDURE — 74176 CT ABD & PELVIS W/O CONTRAST: CPT | Mod: 26

## 2025-06-01 PROCEDURE — 73200 CT UPPER EXTREMITY W/O DYE: CPT | Mod: 26,RT

## 2025-06-01 PROCEDURE — 99291 CRITICAL CARE FIRST HOUR: CPT

## 2025-06-01 PROCEDURE — 36415 COLL VENOUS BLD VENIPUNCTURE: CPT

## 2025-06-01 RX ORDER — MAGNESIUM CITRATE
296 SOLUTION, ORAL ORAL ONCE
Refills: 0 | Status: COMPLETED | OUTPATIENT
Start: 2025-06-01 | End: 2025-06-01

## 2025-06-01 RX ORDER — ACETAMINOPHEN 500 MG/5ML
1000 LIQUID (ML) ORAL ONCE
Refills: 0 | Status: COMPLETED | OUTPATIENT
Start: 2025-06-01 | End: 2025-06-01

## 2025-06-01 RX ORDER — CEFTRIAXONE 500 MG/1
1000 INJECTION, POWDER, FOR SOLUTION INTRAMUSCULAR; INTRAVENOUS EVERY 24 HOURS
Refills: 0 | Status: DISCONTINUED | OUTPATIENT
Start: 2025-06-01 | End: 2025-06-02

## 2025-06-01 RX ORDER — VANCOMYCIN HCL IN 5 % DEXTROSE 1.5G/250ML
1000 PLASTIC BAG, INJECTION (ML) INTRAVENOUS ONCE
Refills: 0 | Status: COMPLETED | OUTPATIENT
Start: 2025-06-01 | End: 2025-06-02

## 2025-06-01 RX ADMIN — Medication 81 MILLIGRAM(S): at 11:30

## 2025-06-01 RX ADMIN — Medication 1000 MILLILITER(S): at 23:40

## 2025-06-01 RX ADMIN — Medication 1 DOSE(S): at 17:14

## 2025-06-01 RX ADMIN — CEFTRIAXONE 100 MILLIGRAM(S): 500 INJECTION, POWDER, FOR SOLUTION INTRAMUSCULAR; INTRAVENOUS at 22:47

## 2025-06-01 RX ADMIN — Medication 1 DOSE(S): at 05:11

## 2025-06-01 RX ADMIN — Medication 400 MILLIGRAM(S): at 21:33

## 2025-06-01 RX ADMIN — METOPROLOL SUCCINATE 25 MILLIGRAM(S): 50 TABLET, EXTENDED RELEASE ORAL at 05:11

## 2025-06-01 RX ADMIN — INSULIN LISPRO 2: 100 INJECTION, SOLUTION INTRAVENOUS; SUBCUTANEOUS at 08:25

## 2025-06-01 RX ADMIN — POLYETHYLENE GLYCOL 3350 17 GRAM(S): 17 POWDER, FOR SOLUTION ORAL at 07:30

## 2025-06-01 RX ADMIN — Medication 650 MILLIGRAM(S): at 18:13

## 2025-06-01 RX ADMIN — Medication 650 MILLIGRAM(S): at 17:13

## 2025-06-01 RX ADMIN — Medication 1000 MILLIGRAM(S): at 22:30

## 2025-06-01 RX ADMIN — GABAPENTIN 300 MILLIGRAM(S): 400 CAPSULE ORAL at 05:11

## 2025-06-01 RX ADMIN — INSULIN LISPRO 2: 100 INJECTION, SOLUTION INTRAVENOUS; SUBCUTANEOUS at 11:37

## 2025-06-01 RX ADMIN — INSULIN LISPRO 4: 100 INJECTION, SOLUTION INTRAVENOUS; SUBCUTANEOUS at 17:11

## 2025-06-01 RX ADMIN — TIOTROPIUM BROMIDE INHALATION SPRAY 2 PUFF(S): 3.12 SPRAY, METERED RESPIRATORY (INHALATION) at 11:31

## 2025-06-01 RX ADMIN — GABAPENTIN 300 MILLIGRAM(S): 400 CAPSULE ORAL at 17:11

## 2025-06-01 RX ADMIN — RANOLAZINE 500 MILLIGRAM(S): 1000 TABLET, FILM COATED, EXTENDED RELEASE ORAL at 05:11

## 2025-06-01 RX ADMIN — Medication 1 TABLET(S): at 11:30

## 2025-06-01 RX ADMIN — RANOLAZINE 500 MILLIGRAM(S): 1000 TABLET, FILM COATED, EXTENDED RELEASE ORAL at 17:11

## 2025-06-01 RX ADMIN — Medication 296 MILLILITER(S): at 16:20

## 2025-06-01 RX ADMIN — IOHEXOL 30 MILLILITER(S): 350 INJECTION, SOLUTION INTRAVENOUS at 11:30

## 2025-06-01 NOTE — PROGRESS NOTE ADULT - ASSESSMENT
Tiffanie Joshi is a 74 year old female with PMHx of HTN, HLD, IDDM2, CAD (s/p PCI), and recent right humerus fracture (placed in sling 3 days ago) who presented to the ED on 5/30/25 for complaints of elevated blood glucose and admitted for ALY and physical deconditioning.    #ALY, suspect etiology prerenal secondary to decreased PO intake, improving  #Hyperkalemia secondary to above, improving  Daughter-in-law reports patient has not been eating much x  4 days, will drink a little juice every now and then   Potassium 6.3, BUN 70, and Cr 2.02 on admission, baseline Cr ~1  S/p 1L NS bolus and 1L LR bolus in the ED  MIVF for now as patient has decreased PO    #Constipation   Per dtr, no BM x 10 days. Abdomen soft non tender   Will get CTAP to r/o obstruction   Will try suppository as patient has decreased PO and family does not think she'll drink lactulose     #low grade fever   T 100.5 5/31, CXR without acute findings, UA not c/w UTI, flu/covid negative   Will try to add on RVP--called lab they will try with a new swab  Will get CTAP to r/o stercoral colitis given constipation  Monitor off antibiotics     #IDDM2 with hyperglycemia  Last known A1c 7.5 (on 8/5/24), blood glucose 510 on admission, VBG 7.29 / 58 / 34 / 26  S/p 1L NS bolus, 1L LR bolus and insulin 20 U IV in the ED  POC qac and qhs, PTA insulin aspart 15 U qhs reordered as Lantus 15 U qhs after discussion with pharmacist, moderate dose SSI qac and qhs started, metformin 500 mg BID held due to ALY  Blood glucose goal < 180, f/u A1c    #Hypertonic hyponatremia secondary to hyperglycemia  Corrected sodium 139 on admission  Anticipate improvement in sodium levels with tight glycemic control    #Physical deconditioning secondary to R. humerus fracture  Daughter-in-law reports patient fell at home and landed on her right arm three days ago  Went to [facility] at that time, found to have right humerus fracture and arm placed in sling  Has not gotten out of bed since then  Baseline functional status is ambulates with walker and dependent with all ADLs  COVID/influenza/RSV negative, CT head without acute intracranial findings  F/u CT R. shoulder  PT/OT consulted      Chronic medical conditions:  HTN: PTA metoprolol succinate ER 25 mg, lisinopril 10 mg and HCTZ 12.5 mg held due to ALY  HLD: PTA atorvastatin 40 mg  CAD (s/p PCI): PTA ASA 81 mg, ranolazine  mg BID  Asthma: PTA Trelegy reordered as Advair and Spiriva  Normocytic anemia, chronic: hgb 10.3 on admission, baseline appears to fluctuate, no signs of active bleeding    Medication reconciliation completed using discharge med rec from [facility] on 4/4/25 as patient does not know med list. Please call her pharmacy for med list in AM.    Plan of care discussed with daughter and son at bedside Tiffanie Joshi is a 74 year old female with PMHx of HTN, HLD, IDDM2, CAD (s/p PCI), and recent right humerus fracture (placed in sling 3 days ago) who presented to the ED on 5/30/25 for complaints of elevated blood glucose and admitted for ALY and physical deconditioning.    #ALY, suspect etiology prerenal secondary to decreased PO intake, improving  #Hyperkalemia secondary to above, improving  Daughter-in-law reports patient has not been eating much x  4 days, will drink a little juice every now and then   Potassium 6.3, BUN 70, and Cr 2.02 on admission, baseline Cr ~1  S/p 1L NS bolus and 1L LR bolus in the ED  MIVF for now as patient has decreased PO    #Constipation   Per dtr, no BM x 10 days. Abdomen soft non tender   Will get CTAP to r/o obstruction   Will try mag citrate today     #low grade fever   T 100.5 5/31, CXR without acute findings, UA not c/w UTI, flu/covid negative   Will try to add on RVP--called lab they will try with a new swab  Will get CTAP to r/o stercoral colitis given constipation  Monitor off antibiotics     #IDDM2 with hyperglycemia  Last known A1c 7.5 (on 8/5/24), blood glucose 510 on admission, VBG 7.29 / 58 / 34 / 26  S/p 1L NS bolus, 1L LR bolus and insulin 20 U IV in the ED  POC qac and qhs, PTA insulin aspart 15 U qhs reordered as Lantus 15 U qhs after discussion with pharmacist, moderate dose SSI qac and qhs started, metformin 500 mg BID held due to ALY  Blood glucose goal < 180, f/u A1c    #Hypertonic hyponatremia secondary to hyperglycemia  Corrected sodium 139 on admission  Anticipate improvement in sodium levels with tight glycemic control    #Physical deconditioning secondary to R. humerus fracture  Daughter-in-law reports patient fell at home and landed on her right arm three days ago  Went to [facility] at that time, found to have right humerus fracture and arm placed in sling  Has not gotten out of bed since then  Baseline functional status is ambulates with walker and dependent with all ADLs  COVID/influenza/RSV negative, CT head without acute intracranial findings  F/u CT R. shoulder  PT/OT consulted      Chronic medical conditions:  HTN: PTA metoprolol succinate ER 25 mg, lisinopril 10 mg and HCTZ 12.5 mg held due to ALY  HLD: PTA atorvastatin 40 mg  CAD (s/p PCI): PTA ASA 81 mg, ranolazine  mg BID  Asthma: PTA Trelegy reordered as Advair and Spiriva  Normocytic anemia, chronic: hgb 10.3 on admission, baseline appears to fluctuate, no signs of active bleeding      Plan of care discussed with daughter at bedside

## 2025-06-01 NOTE — CHART NOTE - NSCHARTNOTEFT_GEN_A_CORE
Medicine Hospitalist PA    Requested by RN to obtain blood specimen from patient. Pt difficult stick, unsuccessful attempts from phlebotomist. Obtained blood from left dorsal hand.  Pt tolerated procedure well. No hematoma or bleeding noted. Specimen labeled at bedside and given to the RN.

## 2025-06-01 NOTE — PROGRESS NOTE ADULT - SUBJECTIVE AND OBJECTIVE BOX
Patient is a 74y old  Female who presents with a chief complaint of ALY, physical deconditioning (31 May 2025 10:10)      INTERVAL HPI/OVERNIGHT EVENTS:  afebrile nothing acute overnight   seen and examined at bedside     MEDICATIONS  (STANDING):  aspirin  chewable 81 milliGRAM(s) Oral daily  atorvastatin 40 milliGRAM(s) Oral at bedtime  dextrose 5%. 1000 milliLiter(s) (100 mL/Hr) IV Continuous <Continuous>  dextrose 5%. 1000 milliLiter(s) (50 mL/Hr) IV Continuous <Continuous>  dextrose 50% Injectable 25 Gram(s) IV Push once  dextrose 50% Injectable 12.5 Gram(s) IV Push once  dextrose 50% Injectable 25 Gram(s) IV Push once  fluticasone propionate/ salmeterol 100-50 MICROgram(s) Diskus 1 Dose(s) Inhalation two times a day  gabapentin 300 milliGRAM(s) Oral two times a day  glucagon  Injectable 1 milliGRAM(s) IntraMuscular once  insulin glargine Injectable (LANTUS) 15 Unit(s) SubCutaneous at bedtime  insulin lispro (ADMELOG) corrective regimen sliding scale   SubCutaneous three times a day before meals  insulin lispro (ADMELOG) corrective regimen sliding scale   SubCutaneous at bedtime  iohexol 300 mG (iodine)/mL Oral Solution 30 milliLiter(s) Oral once  lactated ringers. 1000 milliLiter(s) (75 mL/Hr) IV Continuous <Continuous>  metoprolol succinate ER 25 milliGRAM(s) Oral daily  multivitamin 1 Tablet(s) Oral daily  ranolazine 500 milliGRAM(s) Oral two times a day  senna 2 Tablet(s) Oral at bedtime  tiotropium 2.5 MICROgram(s) Inhaler 2 Puff(s) Inhalation daily    MEDICATIONS  (PRN):  acetaminophen     Tablet .. 650 milliGRAM(s) Oral every 6 hours PRN Temp greater or equal to 38C (100.4F), Mild Pain (1 - 3)  albuterol    90 MICROgram(s) HFA Inhaler 2 Puff(s) Inhalation every 6 hours PRN for shortness of breath and/or wheezing  aluminum hydroxide/magnesium hydroxide/simethicone Suspension 30 milliLiter(s) Oral every 4 hours PRN Dyspepsia  dextrose Oral Gel 15 Gram(s) Oral once PRN Blood Glucose LESS THAN 70 milliGRAM(s)/deciliter  melatonin 3 milliGRAM(s) Oral at bedtime PRN Insomnia  ondansetron Injectable 4 milliGRAM(s) IV Push every 8 hours PRN Nausea and/or Vomiting  oxyCODONE    IR 5 milliGRAM(s) Oral every 8 hours PRN for severe pain  polyethylene glycol 3350 17 Gram(s) Oral daily PRN Constipation      Allergies    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 (Short breath)  No Known Drug Allergies    Intolerances        REVIEW OF SYSTEMS:  CONSTITUTIONAL: No fever, weight loss, or fatigue  EYES: No eye pain, visual disturbances, or discharge  ENMT:  No difficulty hearing, tinnitus, vertigo; No sinus or throat pain  NECK: No pain or stiffness  BREASTS: No pain, masses, or nipple discharge  RESPIRATORY: No cough, wheezing, chills or hemoptysis; No shortness of breath  CARDIOVASCULAR: No chest pain, palpitations, dizziness, or leg swelling  GASTROINTESTINAL: No abdominal or epigastric pain. No nausea, vomiting, or hematemesis; No diarrhea or constipation. No melena or hematochezia.  GENITOURINARY: No dysuria, frequency, hematuria, or incontinence  NEUROLOGICAL: No headaches, memory loss, loss of strength, numbness, or tremors  SKIN: No itching, burning, rashes, or lesions   LYMPH NODES: No enlarged glands  ENDOCRINE: No heat or cold intolerance; No hair loss  MUSCULOSKELETAL: No joint pain or swelling; No muscle, back, or extremity pain  PSYCHIATRIC: No depression, anxiety, mood swings, or difficulty sleeping  HEME/LYMPH: No easy bruising, or bleeding gums  ALLERGY AND IMMUNOLOGIC: No hives or eczema    Vital Signs Last 24 Hrs  T(C): 36.9 (01 Jun 2025 05:40), Max: 37.2 (31 May 2025 23:35)  T(F): 98.4 (01 Jun 2025 05:40), Max: 99 (31 May 2025 23:35)  HR: 68 (31 May 2025 23:35) (68 - 69)  BP: 141/64 (01 Jun 2025 05:40) (105/66 - 141/64)  BP(mean): --  RR: 18 (01 Jun 2025 05:40) (18 - 18)  SpO2: 97% (01 Jun 2025 05:40) (94% - 97%)    Parameters below as of 01 Jun 2025 05:40  Patient On (Oxygen Delivery Method): room air        PHYSICAL EXAM:  GENERAL: NAD, well-groomed, well-developed  HEAD:  Atraumatic, Normocephalic  EYES: EOMI, sclera non-icteric  ENMT:  dry mucous membrnes  NERVOUS SYSTEM:  Sleeping, awakens to name   CHEST/LUNG: Clear to percussion bilaterally; No rales, rhonchi, wheezing, or rubs  HEART: Regular rate and rhythm; No murmurs, rubs, or gallops  ABDOMEN: Soft, Nontender, Nondistended; Bowel sounds present  EXTREMITIES:  no Le edema   SKIN: +healing bruises R shoulder     LABS:                        9.5    9.54  )-----------( 215      ( 31 May 2025 12:05 )             29.6     05-31    133[L]  |  107  |  64[H]  ----------------------------<  262[H]  5.7[H]   |  21[L]  |  1.54[H]    Ca    8.9      31 May 2025 05:55  Phos  3.7     05-31  Mg     2.8     05-31    TPro  8.0  /  Alb  3.2[L]  /  TBili  0.5  /  DBili  x   /  AST  12[L]  /  ALT  15  /  AlkPhos  90  05-30      Urinalysis Basic - ( 31 May 2025 05:55 )    Color: x / Appearance: x / SG: x / pH: x  Gluc: 262 mg/dL / Ketone: x  / Bili: x / Urobili: x   Blood: x / Protein: x / Nitrite: x   Leuk Esterase: x / RBC: x / WBC x   Sq Epi: x / Non Sq Epi: x / Bacteria: x      CAPILLARY BLOOD GLUCOSE      POCT Blood Glucose.: 164 mg/dL (01 Jun 2025 08:09)  POCT Blood Glucose.: 172 mg/dL (31 May 2025 21:12)  POCT Blood Glucose.: 165 mg/dL (31 May 2025 16:46)  POCT Blood Glucose.: 221 mg/dL (31 May 2025 11:19)      RADIOLOGY & ADDITIONAL TESTS:    Imaging Personally Reviewed:  [ X] YES  [ ] NO    Consultant(s) Notes Reviewed:  [ X] YES  [ ] NO    Care Discussed with Consultants/Other Providers [X ] YES  [ ] NO Patient is a 74y old  Female who presents with a chief complaint of ALY, physical deconditioning (31 May 2025 10:10)      INTERVAL HPI/OVERNIGHT EVENTS:  afebrile nothing acute overnight   seen and examined at bedside   dtr at bedside prefers to interpret, states patient is now at baseline mental status       MEDICATIONS  (STANDING):  aspirin  chewable 81 milliGRAM(s) Oral daily  atorvastatin 40 milliGRAM(s) Oral at bedtime  dextrose 5%. 1000 milliLiter(s) (100 mL/Hr) IV Continuous <Continuous>  dextrose 5%. 1000 milliLiter(s) (50 mL/Hr) IV Continuous <Continuous>  dextrose 50% Injectable 25 Gram(s) IV Push once  dextrose 50% Injectable 12.5 Gram(s) IV Push once  dextrose 50% Injectable 25 Gram(s) IV Push once  fluticasone propionate/ salmeterol 100-50 MICROgram(s) Diskus 1 Dose(s) Inhalation two times a day  gabapentin 300 milliGRAM(s) Oral two times a day  glucagon  Injectable 1 milliGRAM(s) IntraMuscular once  insulin glargine Injectable (LANTUS) 15 Unit(s) SubCutaneous at bedtime  insulin lispro (ADMELOG) corrective regimen sliding scale   SubCutaneous three times a day before meals  insulin lispro (ADMELOG) corrective regimen sliding scale   SubCutaneous at bedtime  iohexol 300 mG (iodine)/mL Oral Solution 30 milliLiter(s) Oral once  lactated ringers. 1000 milliLiter(s) (75 mL/Hr) IV Continuous <Continuous>  metoprolol succinate ER 25 milliGRAM(s) Oral daily  multivitamin 1 Tablet(s) Oral daily  ranolazine 500 milliGRAM(s) Oral two times a day  senna 2 Tablet(s) Oral at bedtime  tiotropium 2.5 MICROgram(s) Inhaler 2 Puff(s) Inhalation daily    MEDICATIONS  (PRN):  acetaminophen     Tablet .. 650 milliGRAM(s) Oral every 6 hours PRN Temp greater or equal to 38C (100.4F), Mild Pain (1 - 3)  albuterol    90 MICROgram(s) HFA Inhaler 2 Puff(s) Inhalation every 6 hours PRN for shortness of breath and/or wheezing  aluminum hydroxide/magnesium hydroxide/simethicone Suspension 30 milliLiter(s) Oral every 4 hours PRN Dyspepsia  dextrose Oral Gel 15 Gram(s) Oral once PRN Blood Glucose LESS THAN 70 milliGRAM(s)/deciliter  melatonin 3 milliGRAM(s) Oral at bedtime PRN Insomnia  ondansetron Injectable 4 milliGRAM(s) IV Push every 8 hours PRN Nausea and/or Vomiting  oxyCODONE    IR 5 milliGRAM(s) Oral every 8 hours PRN for severe pain  polyethylene glycol 3350 17 Gram(s) Oral daily PRN Constipation      Allergies    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 (Short breath)  No Known Drug Allergies    Intolerances        REVIEW OF SYSTEMS:  +R arm pain   +burning R leg chronic   no n/v/d no CP SOBn     Vital Signs Last 24 Hrs  T(C): 36.9 (01 Jun 2025 05:40), Max: 37.2 (31 May 2025 23:35)  T(F): 98.4 (01 Jun 2025 05:40), Max: 99 (31 May 2025 23:35)  HR: 68 (31 May 2025 23:35) (68 - 69)  BP: 141/64 (01 Jun 2025 05:40) (105/66 - 141/64)  BP(mean): --  RR: 18 (01 Jun 2025 05:40) (18 - 18)  SpO2: 97% (01 Jun 2025 05:40) (94% - 97%)    Parameters below as of 01 Jun 2025 05:40  Patient On (Oxygen Delivery Method): room air        PHYSICAL EXAM:  GENERAL: NAD, well-groomed, well-developed  HEAD:  Atraumatic, Normocephalic  EYES: EOMI, sclera non-icteric  ENMT:  dry mucous membranes  NERVOUS SYSTEM:  alert and oriented to name and situation, answers questions appropriately   CHEST/LUNG: Clear to percussion bilaterally; No rales, rhonchi, wheezing, or rubs  HEART: Regular rate and rhythm; No murmurs, rubs, or gallops  ABDOMEN: Soft, Nontender, Nondistended; Bowel sounds present  EXTREMITIES:  no Le edema   SKIN: +healing bruises R shoulder     LABS:                        9.5    9.54  )-----------( 215      ( 31 May 2025 12:05 )             29.6     05-31    133[L]  |  107  |  64[H]  ----------------------------<  262[H]  5.7[H]   |  21[L]  |  1.54[H]    Ca    8.9      31 May 2025 05:55  Phos  3.7     05-31  Mg     2.8     05-31    TPro  8.0  /  Alb  3.2[L]  /  TBili  0.5  /  DBili  x   /  AST  12[L]  /  ALT  15  /  AlkPhos  90  05-30      Urinalysis Basic - ( 31 May 2025 05:55 )    Color: x / Appearance: x / SG: x / pH: x  Gluc: 262 mg/dL / Ketone: x  / Bili: x / Urobili: x   Blood: x / Protein: x / Nitrite: x   Leuk Esterase: x / RBC: x / WBC x   Sq Epi: x / Non Sq Epi: x / Bacteria: x      CAPILLARY BLOOD GLUCOSE      POCT Blood Glucose.: 164 mg/dL (01 Jun 2025 08:09)  POCT Blood Glucose.: 172 mg/dL (31 May 2025 21:12)  POCT Blood Glucose.: 165 mg/dL (31 May 2025 16:46)  POCT Blood Glucose.: 221 mg/dL (31 May 2025 11:19)      RADIOLOGY & ADDITIONAL TESTS:    Imaging Personally Reviewed:  [ X] YES  [ ] NO    Consultant(s) Notes Reviewed:  [ X] YES  [ ] NO    Care Discussed with Consultants/Other Providers [X ] YES  [ ] NO

## 2025-06-01 NOTE — CHART NOTE - NSCHARTNOTEFT_GEN_A_CORE
EVENT: Received telephone call from RN that pt's temperature is 103.2 Rectally    HPI: Tiffanie Joshi is a 74 year old female with PMHx of HTN, HLD, IDDM2, CAD (s/p PCI), and recent right humerus fracture (placed in sling 3 days ago) who presented to the ED on 5/30/25 for complaints of elevated blood glucose and admitted for ALY and physical deconditioning.    SUBJECTIVE: n/a    OBJECTIVE:  Vital Signs Last 24 Hrs  T(C): 39.6 (01 Jun 2025 21:09), Max: 39.6 (01 Jun 2025 21:09)  T(F): 103.2 (01 Jun 2025 21:09), Max: 103.2 (01 Jun 2025 21:09)  HR: 83 (01 Jun 2025 21:09) (64 - 83)  BP: 108/55 (01 Jun 2025 21:09) (102/64 - 150/69)  BP(mean): --  RR: 20 (01 Jun 2025 21:09) (18 - 20)  SpO2: 95% (01 Jun 2025 21:09) (70% - 100%)    Parameters below as of 01 Jun 2025 21:09  Patient On (Oxygen Delivery Method): nasal cannula  O2 Flow (L/min): 2      PHYSICAL EXAM:  Neuro: Awake, in NAD  Cardiovascular: + S1, S2, no murmurs, rubs, or bruits  Respiratory: clear to auscultation bilaterally with good air entry   GI: Abdomen soft, non-tender, bowel sounds present   : Non distended;   Skin: warm and dry; no rash      LABS:                        10.6   9.31  )-----------( 225      ( 01 Jun 2025 13:24 )             33.5     06-01    135  |  107  |  49[H]  ----------------------------<  175[H]  5.2   |  28  |  1.15    Ca    8.8      01 Jun 2025 13:24  Phos  3.5     06-01  Mg     2.6     06-01          EKG:   IMAGING:    ASSESSMENT/PROBLEM: Fever of 103.2 degree rectally        PLAN:   1. Blood culture x 2, UA with microscopic, CXR urgent, cooling blanket ordered stat  2. Ofirmev 1000 mg, IV x 1 dose ordered  3. Case discussed with nocturnist, Dr Kemp  4. Ceftriaxone 1000 mg, & Vancomycin 1000 mg, IV x 1 dose ordered  5. Cont to monitor temperature  6. Will endorse to day NP/PA to f/u on above Labs  7. Cont present care/treatment  8. Supportive care EVENT: Received telephone call from RN that pt's temperature is 103.2 Rectally    HPI: Tiffanie Joshi is a 74 year old female with PMHx of HTN, HLD, IDDM2, CAD (s/p PCI), and recent right humerus fracture (placed in sling 3 days ago) who presented to the ED on 5/30/25 for complaints of elevated blood glucose and admitted for ALY and physical deconditioning.    SUBJECTIVE: n/a    OBJECTIVE:  Vital Signs Last 24 Hrs  T(C): 39.6 (01 Jun 2025 21:09), Max: 39.6 (01 Jun 2025 21:09)  T(F): 103.2 (01 Jun 2025 21:09), Max: 103.2 (01 Jun 2025 21:09)  HR: 83 (01 Jun 2025 21:09) (64 - 83)  BP: 108/55 (01 Jun 2025 21:09) (102/64 - 150/69)  BP(mean): --  RR: 20 (01 Jun 2025 21:09) (18 - 20)  SpO2: 95% (01 Jun 2025 21:09) (70% - 100%)    Parameters below as of 01 Jun 2025 21:09  Patient On (Oxygen Delivery Method): nasal cannula  O2 Flow (L/min): 2      PHYSICAL EXAM:  Neuro: Awake, in NAD  Cardiovascular: + S1, S2, no murmurs, rubs, or bruits  Respiratory: clear to auscultation bilaterally with good air entry   GI: Abdomen soft, non-tender, bowel sounds present   : Non distended;   Skin: warm and dry; no rash      LABS:                        10.6   9.31  )-----------( 225      ( 01 Jun 2025 13:24 )             33.5     06-01    135  |  107  |  49[H]  ----------------------------<  175[H]  5.2   |  28  |  1.15    Ca    8.8      01 Jun 2025 13:24  Phos  3.5     06-01  Mg     2.6     06-01          EKG:   IMAGING:    ASSESSMENT/PROBLEM: Fever of 103.2 degree rectally        PLAN:   1. Blood culture x 2, UA with microscopic, CXR urgent, cooling blanket ordered stat  2. Ofirmev 1000 mg, IV x 1 dose ordered  3. Case discussed with nocturnist, Dr Kemp  4. Ceftriaxone 1000 mg, & Vancomycin 1000 mg, IV x 1 dose ordered  5. Cont to monitor temperature  6. Will endorse to day NP/PA to f/u on above Labs  7. Cont present care/treatment  8. Supportive care    . Total time spent was > than  35 mins assessing presenting problems of acute illness that poses high probability of life threatening deterioration or end organ damage/dysfunction.  Medical decision making including Initiating plan of care, reviewing data, reviewing radiology, direct patient bedside evaluation and interpretation of vital signs, ordering medication/s and writing this note.

## 2025-06-01 NOTE — RAPID RESPONSE TEAM SUMMARY - NSSITUATIONBACKGROUNDRRT_GEN_ALL_CORE
Tiffanie Joshi is a 74 year old female with PMHx of HTN, HLD, IDDM2, CAD (s/p PCI), and recent right humerus fracture (placed in sling 3 days ago) who presented to the ED on 5/30/25 for complaints of elevated blood glucose and admitted for ALY and physical deconditioning. On 6/01 @ 21:09 hours pt had a rectal temperature of 103.2. Blood Bc x 2, UA & UCx , CXR was ordered stat & pt was started on Ceftriaxone & Vancomycin. Ofermev 1000 mg, IV x 1 dose was also given. Temperature was decreased to 102.2.Ice packs in place

## 2025-06-02 LAB
-  CTX-M RESISTANCE MARKER: SIGNIFICANT CHANGE UP
-  ESBL: SIGNIFICANT CHANGE UP
ALBUMIN SERPL ELPH-MCNC: 2.2 G/DL — LOW (ref 3.3–5)
ALP SERPL-CCNC: 72 U/L — SIGNIFICANT CHANGE UP (ref 40–120)
ALT FLD-CCNC: 15 U/L — SIGNIFICANT CHANGE UP (ref 12–78)
AMMONIA BLD-MCNC: 42 UMOL/L — HIGH (ref 11–32)
ANION GAP SERPL CALC-SCNC: 10 MMOL/L — SIGNIFICANT CHANGE UP (ref 5–17)
ANION GAP SERPL CALC-SCNC: 5 MMOL/L — SIGNIFICANT CHANGE UP (ref 5–17)
ANION GAP SERPL CALC-SCNC: 8 MMOL/L — SIGNIFICANT CHANGE UP (ref 5–17)
ANION GAP SERPL CALC-SCNC: 9 MMOL/L — SIGNIFICANT CHANGE UP (ref 5–17)
ANISOCYTOSIS BLD QL: SLIGHT — SIGNIFICANT CHANGE UP
APTT BLD: 26.7 SEC — SIGNIFICANT CHANGE UP (ref 26.1–36.8)
APTT BLD: 74.6 SEC — HIGH (ref 26.1–36.8)
APTT BLD: 96.2 SEC — HIGH (ref 26.1–36.8)
AST SERPL-CCNC: 30 U/L — SIGNIFICANT CHANGE UP (ref 15–37)
BASE EXCESS BLDA CALC-SCNC: -6.5 MMOL/L — LOW (ref -2–3)
BASOPHILS # BLD AUTO: 0 K/UL — SIGNIFICANT CHANGE UP (ref 0–0.2)
BASOPHILS # BLD AUTO: 0.04 K/UL — SIGNIFICANT CHANGE UP (ref 0–0.2)
BASOPHILS NFR BLD AUTO: 0 % — SIGNIFICANT CHANGE UP (ref 0–2)
BASOPHILS NFR BLD AUTO: 0.4 % — SIGNIFICANT CHANGE UP (ref 0–2)
BILIRUB SERPL-MCNC: 1.4 MG/DL — HIGH (ref 0.2–1.2)
BLOOD GAS COMMENTS ARTERIAL: SIGNIFICANT CHANGE UP
BUN SERPL-MCNC: 55 MG/DL — HIGH (ref 7–23)
BUN SERPL-MCNC: 56 MG/DL — HIGH (ref 7–23)
BUN SERPL-MCNC: 58 MG/DL — HIGH (ref 7–23)
BUN SERPL-MCNC: 59 MG/DL — HIGH (ref 7–23)
CALCIUM SERPL-MCNC: 7.8 MG/DL — LOW (ref 8.5–10.1)
CALCIUM SERPL-MCNC: 8 MG/DL — LOW (ref 8.5–10.1)
CALCIUM SERPL-MCNC: 8.3 MG/DL — LOW (ref 8.5–10.1)
CALCIUM SERPL-MCNC: 9.8 MG/DL — SIGNIFICANT CHANGE UP (ref 8.5–10.1)
CHLORIDE SERPL-SCNC: 107 MMOL/L — SIGNIFICANT CHANGE UP (ref 96–108)
CHLORIDE SERPL-SCNC: 107 MMOL/L — SIGNIFICANT CHANGE UP (ref 96–108)
CHLORIDE SERPL-SCNC: 109 MMOL/L — HIGH (ref 96–108)
CHLORIDE SERPL-SCNC: 110 MMOL/L — HIGH (ref 96–108)
CO2 BLDA-SCNC: 25 MMOL/L — HIGH (ref 19–24)
CO2 SERPL-SCNC: 20 MMOL/L — LOW (ref 22–31)
CO2 SERPL-SCNC: 20 MMOL/L — LOW (ref 22–31)
CO2 SERPL-SCNC: 21 MMOL/L — LOW (ref 22–31)
CO2 SERPL-SCNC: 24 MMOL/L — SIGNIFICANT CHANGE UP (ref 22–31)
CREAT SERPL-MCNC: 1.7 MG/DL — HIGH (ref 0.5–1.3)
CREAT SERPL-MCNC: 1.71 MG/DL — HIGH (ref 0.5–1.3)
CREAT SERPL-MCNC: 1.8 MG/DL — HIGH (ref 0.5–1.3)
CREAT SERPL-MCNC: 1.95 MG/DL — HIGH (ref 0.5–1.3)
E COLI DNA BLD POS QL NAA+NON-PROBE: SIGNIFICANT CHANGE UP
EGFR: 26 ML/MIN/1.73M2 — LOW
EGFR: 26 ML/MIN/1.73M2 — LOW
EGFR: 29 ML/MIN/1.73M2 — LOW
EGFR: 29 ML/MIN/1.73M2 — LOW
EGFR: 31 ML/MIN/1.73M2 — LOW
EOSINOPHIL # BLD AUTO: 0 K/UL — SIGNIFICANT CHANGE UP (ref 0–0.5)
EOSINOPHIL # BLD AUTO: 0.21 K/UL — SIGNIFICANT CHANGE UP (ref 0–0.5)
EOSINOPHIL NFR BLD AUTO: 0 % — SIGNIFICANT CHANGE UP (ref 0–6)
EOSINOPHIL NFR BLD AUTO: 2.1 % — SIGNIFICANT CHANGE UP (ref 0–6)
GAS PNL BLDA: SIGNIFICANT CHANGE UP
GLUCOSE BLDC GLUCOMTR-MCNC: 109 MG/DL — HIGH (ref 70–99)
GLUCOSE BLDC GLUCOMTR-MCNC: 151 MG/DL — HIGH (ref 70–99)
GLUCOSE BLDC GLUCOMTR-MCNC: 180 MG/DL — HIGH (ref 70–99)
GLUCOSE BLDC GLUCOMTR-MCNC: 249 MG/DL — HIGH (ref 70–99)
GLUCOSE BLDC GLUCOMTR-MCNC: 330 MG/DL — HIGH (ref 70–99)
GLUCOSE SERPL-MCNC: 139 MG/DL — HIGH (ref 70–99)
GLUCOSE SERPL-MCNC: 174 MG/DL — HIGH (ref 70–99)
GLUCOSE SERPL-MCNC: 191 MG/DL — HIGH (ref 70–99)
GLUCOSE SERPL-MCNC: 307 MG/DL — HIGH (ref 70–99)
GRAM STN FLD: ABNORMAL
GRAM STN FLD: ABNORMAL
HCO3 BLDA-SCNC: 23 MMOL/L — SIGNIFICANT CHANGE UP (ref 21–28)
HCT VFR BLD CALC: 30.9 % — LOW (ref 34.5–45)
HCT VFR BLD CALC: 31.8 % — LOW (ref 34.5–45)
HCT VFR BLD CALC: 32 % — LOW (ref 34.5–45)
HCT VFR BLD CALC: 32.1 % — LOW (ref 34.5–45)
HCT VFR BLD CALC: 33.5 % — LOW (ref 34.5–45)
HGB BLD-MCNC: 10.2 G/DL — LOW (ref 11.5–15.5)
HGB BLD-MCNC: 9.5 G/DL — LOW (ref 11.5–15.5)
HGB BLD-MCNC: 9.5 G/DL — LOW (ref 11.5–15.5)
HGB BLD-MCNC: 9.7 G/DL — LOW (ref 11.5–15.5)
HGB BLD-MCNC: 9.8 G/DL — LOW (ref 11.5–15.5)
HOROWITZ INDEX BLDA+IHG-RTO: 60 — SIGNIFICANT CHANGE UP
IMM GRANULOCYTES NFR BLD AUTO: 0.4 % — SIGNIFICANT CHANGE UP (ref 0–0.9)
INR BLD: 1.15 RATIO — SIGNIFICANT CHANGE UP (ref 0.85–1.16)
INR BLD: 1.29 RATIO — HIGH (ref 0.85–1.16)
LACTATE SERPL-SCNC: 1.8 MMOL/L — SIGNIFICANT CHANGE UP (ref 0.7–2)
LACTATE SERPL-SCNC: 2.8 MMOL/L — HIGH (ref 0.7–2)
LACTATE SERPL-SCNC: 6.6 MMOL/L — CRITICAL HIGH (ref 0.7–2)
LEGIONELLA AG UR QL: NEGATIVE — SIGNIFICANT CHANGE UP
LG PLATELETS BLD QL AUTO: SLIGHT — SIGNIFICANT CHANGE UP
LYMPHOCYTES # BLD AUTO: 0.23 K/UL — LOW (ref 1–3.3)
LYMPHOCYTES # BLD AUTO: 0.33 K/UL — LOW (ref 1–3.3)
LYMPHOCYTES # BLD AUTO: 1 % — LOW (ref 13–44)
LYMPHOCYTES # BLD AUTO: 3.3 % — LOW (ref 13–44)
MACROCYTES BLD QL: SLIGHT — SIGNIFICANT CHANGE UP
MAGNESIUM SERPL-MCNC: 2.4 MG/DL — SIGNIFICANT CHANGE UP (ref 1.6–2.6)
MAGNESIUM SERPL-MCNC: 2.4 MG/DL — SIGNIFICANT CHANGE UP (ref 1.6–2.6)
MAGNESIUM SERPL-MCNC: 2.5 MG/DL — SIGNIFICANT CHANGE UP (ref 1.6–2.6)
MANUAL SMEAR VERIFICATION: SIGNIFICANT CHANGE UP
MCHC RBC-ENTMCNC: 27.5 PG — SIGNIFICANT CHANGE UP (ref 27–34)
MCHC RBC-ENTMCNC: 28.1 PG — SIGNIFICANT CHANGE UP (ref 27–34)
MCHC RBC-ENTMCNC: 28.3 PG — SIGNIFICANT CHANGE UP (ref 27–34)
MCHC RBC-ENTMCNC: 28.3 PG — SIGNIFICANT CHANGE UP (ref 27–34)
MCHC RBC-ENTMCNC: 28.4 PG — SIGNIFICANT CHANGE UP (ref 27–34)
MCHC RBC-ENTMCNC: 29.6 G/DL — LOW (ref 32–36)
MCHC RBC-ENTMCNC: 30.3 G/DL — LOW (ref 32–36)
MCHC RBC-ENTMCNC: 30.4 G/DL — LOW (ref 32–36)
MCHC RBC-ENTMCNC: 30.7 G/DL — LOW (ref 32–36)
MCHC RBC-ENTMCNC: 30.8 G/DL — LOW (ref 32–36)
MCV RBC AUTO: 91.4 FL — SIGNIFICANT CHANGE UP (ref 80–100)
MCV RBC AUTO: 91.9 FL — SIGNIFICANT CHANGE UP (ref 80–100)
MCV RBC AUTO: 93 FL — SIGNIFICANT CHANGE UP (ref 80–100)
MCV RBC AUTO: 93.3 FL — SIGNIFICANT CHANGE UP (ref 80–100)
MCV RBC AUTO: 93.3 FL — SIGNIFICANT CHANGE UP (ref 80–100)
METHOD TYPE: SIGNIFICANT CHANGE UP
MICROCYTES BLD QL: SLIGHT — SIGNIFICANT CHANGE UP
MONOCYTES # BLD AUTO: 0.05 K/UL — SIGNIFICANT CHANGE UP (ref 0–0.9)
MONOCYTES # BLD AUTO: 0.7 K/UL — SIGNIFICANT CHANGE UP (ref 0–0.9)
MONOCYTES NFR BLD AUTO: 0.5 % — LOW (ref 2–14)
MONOCYTES NFR BLD AUTO: 3 % — SIGNIFICANT CHANGE UP (ref 2–14)
MRSA PCR RESULT.: SIGNIFICANT CHANGE UP
NEUTROPHILS # BLD AUTO: 22.46 K/UL — HIGH (ref 1.8–7.4)
NEUTROPHILS # BLD AUTO: 9.41 K/UL — HIGH (ref 1.8–7.4)
NEUTROPHILS NFR BLD AUTO: 93 % — HIGH (ref 43–77)
NEUTROPHILS NFR BLD AUTO: 93.3 % — HIGH (ref 43–77)
NEUTS BAND # BLD: 3 % — SIGNIFICANT CHANGE UP (ref 0–8)
NEUTS BAND NFR BLD: 3 % — SIGNIFICANT CHANGE UP (ref 0–8)
NEUTS HYPERSEG # BLD: PRESENT — SIGNIFICANT CHANGE UP
NRBC # BLD: 0 /100 WBCS — SIGNIFICANT CHANGE UP (ref 0–0)
NRBC BLD AUTO-RTO: 0 /100 WBCS — SIGNIFICANT CHANGE UP (ref 0–0)
NRBC BLD AUTO-RTO: SIGNIFICANT CHANGE UP /100 WBCS (ref 0–0)
NRBC BLD-RTO: 0 /100 WBCS — SIGNIFICANT CHANGE UP (ref 0–0)
PCO2 BLDA: 63 MMHG — HIGH (ref 32–46)
PH BLDA: 7.17 — CRITICAL LOW (ref 7.35–7.45)
PHOSPHATE SERPL-MCNC: 3.9 MG/DL — SIGNIFICANT CHANGE UP (ref 2.5–4.5)
PHOSPHATE SERPL-MCNC: 4.6 MG/DL — HIGH (ref 2.5–4.5)
PLAT MORPH BLD: NORMAL — SIGNIFICANT CHANGE UP
PLATELET # BLD AUTO: 195 K/UL — SIGNIFICANT CHANGE UP (ref 150–400)
PLATELET # BLD AUTO: 208 K/UL — SIGNIFICANT CHANGE UP (ref 150–400)
PLATELET # BLD AUTO: 214 K/UL — SIGNIFICANT CHANGE UP (ref 150–400)
PLATELET # BLD AUTO: 223 K/UL — SIGNIFICANT CHANGE UP (ref 150–400)
PLATELET # BLD AUTO: 238 K/UL — SIGNIFICANT CHANGE UP (ref 150–400)
PO2 BLDA: 86 MMHG — SIGNIFICANT CHANGE UP (ref 83–108)
POTASSIUM SERPL-MCNC: 5.3 MMOL/L — SIGNIFICANT CHANGE UP (ref 3.5–5.3)
POTASSIUM SERPL-MCNC: 5.3 MMOL/L — SIGNIFICANT CHANGE UP (ref 3.5–5.3)
POTASSIUM SERPL-MCNC: 5.4 MMOL/L — HIGH (ref 3.5–5.3)
POTASSIUM SERPL-MCNC: 5.8 MMOL/L — HIGH (ref 3.5–5.3)
POTASSIUM SERPL-SCNC: 5.3 MMOL/L — SIGNIFICANT CHANGE UP (ref 3.5–5.3)
POTASSIUM SERPL-SCNC: 5.3 MMOL/L — SIGNIFICANT CHANGE UP (ref 3.5–5.3)
POTASSIUM SERPL-SCNC: 5.4 MMOL/L — HIGH (ref 3.5–5.3)
POTASSIUM SERPL-SCNC: 5.8 MMOL/L — HIGH (ref 3.5–5.3)
PROT SERPL-MCNC: 6.2 GM/DL — SIGNIFICANT CHANGE UP (ref 6–8.3)
PROTHROM AB SERPL-ACNC: 13.3 SEC — SIGNIFICANT CHANGE UP (ref 9.9–13.4)
PROTHROM AB SERPL-ACNC: 14.5 SEC — HIGH (ref 9.9–13.4)
RBC # BLD: 3.38 M/UL — LOW (ref 3.8–5.2)
RBC # BLD: 3.43 M/UL — LOW (ref 3.8–5.2)
RBC # BLD: 3.45 M/UL — LOW (ref 3.8–5.2)
RBC # BLD: 3.46 M/UL — LOW (ref 3.8–5.2)
RBC # BLD: 3.59 M/UL — LOW (ref 3.8–5.2)
RBC # FLD: 13.5 % — SIGNIFICANT CHANGE UP (ref 10.3–14.5)
RBC # FLD: 13.6 % — SIGNIFICANT CHANGE UP (ref 10.3–14.5)
RBC # FLD: 13.7 % — SIGNIFICANT CHANGE UP (ref 10.3–14.5)
RBC BLD AUTO: ABNORMAL
S AUREUS DNA NOSE QL NAA+PROBE: SIGNIFICANT CHANGE UP
S PNEUM AG UR QL: NEGATIVE — SIGNIFICANT CHANGE UP
SAO2 % BLDA: 97.1 % — SIGNIFICANT CHANGE UP (ref 94–98)
SODIUM SERPL-SCNC: 135 MMOL/L — SIGNIFICANT CHANGE UP (ref 135–145)
SODIUM SERPL-SCNC: 136 MMOL/L — SIGNIFICANT CHANGE UP (ref 135–145)
SODIUM SERPL-SCNC: 138 MMOL/L — SIGNIFICANT CHANGE UP (ref 135–145)
SODIUM SERPL-SCNC: 141 MMOL/L — SIGNIFICANT CHANGE UP (ref 135–145)
SPECIMEN SOURCE: SIGNIFICANT CHANGE UP
SPECIMEN SOURCE: SIGNIFICANT CHANGE UP
TOXIC GRANULES BLD QL SMEAR: PRESENT — SIGNIFICANT CHANGE UP
TROPONIN I, HIGH SENSITIVITY RESULT: 3228.9 NG/L — HIGH
TROPONIN I, HIGH SENSITIVITY RESULT: 3927.6 NG/L — HIGH
WBC # BLD: 10.08 K/UL — SIGNIFICANT CHANGE UP (ref 3.8–10.5)
WBC # BLD: 23.4 K/UL — HIGH (ref 3.8–10.5)
WBC # BLD: 29.38 K/UL — HIGH (ref 3.8–10.5)
WBC # BLD: 29.95 K/UL — HIGH (ref 3.8–10.5)
WBC # BLD: 32.85 K/UL — HIGH (ref 3.8–10.5)
WBC # FLD AUTO: 10.08 K/UL — SIGNIFICANT CHANGE UP (ref 3.8–10.5)
WBC # FLD AUTO: 23.4 K/UL — HIGH (ref 3.8–10.5)
WBC # FLD AUTO: 29.38 K/UL — HIGH (ref 3.8–10.5)
WBC # FLD AUTO: 29.95 K/UL — HIGH (ref 3.8–10.5)
WBC # FLD AUTO: 32.85 K/UL — HIGH (ref 3.8–10.5)

## 2025-06-02 PROCEDURE — 71045 X-RAY EXAM CHEST 1 VIEW: CPT | Mod: 26,76

## 2025-06-02 PROCEDURE — 99223 1ST HOSP IP/OBS HIGH 75: CPT

## 2025-06-02 PROCEDURE — 99222 1ST HOSP IP/OBS MODERATE 55: CPT

## 2025-06-02 PROCEDURE — 93010 ELECTROCARDIOGRAM REPORT: CPT | Mod: 76

## 2025-06-02 PROCEDURE — 92950 HEART/LUNG RESUSCITATION CPR: CPT

## 2025-06-02 PROCEDURE — 99291 CRITICAL CARE FIRST HOUR: CPT | Mod: 25

## 2025-06-02 RX ORDER — ASPIRIN 325 MG
81 TABLET ORAL DAILY
Refills: 0 | Status: DISCONTINUED | OUTPATIENT
Start: 2025-06-02 | End: 2025-07-10

## 2025-06-02 RX ORDER — FUROSEMIDE 10 MG/ML
20 INJECTION INTRAMUSCULAR; INTRAVENOUS ONCE
Refills: 0 | Status: COMPLETED | OUTPATIENT
Start: 2025-06-02 | End: 2025-06-02

## 2025-06-02 RX ORDER — INSULIN LISPRO 100 U/ML
INJECTION, SOLUTION INTRAVENOUS; SUBCUTANEOUS EVERY 6 HOURS
Refills: 0 | Status: DISCONTINUED | OUTPATIENT
Start: 2025-06-02 | End: 2025-06-05

## 2025-06-02 RX ORDER — MEROPENEM 1 G/30ML
1000 INJECTION INTRAVENOUS EVERY 12 HOURS
Refills: 0 | Status: DISCONTINUED | OUTPATIENT
Start: 2025-06-02 | End: 2025-06-04

## 2025-06-02 RX ORDER — PIPERACILLIN-TAZO-DEXTROSE,ISO 3.375G/5
3.38 IV SOLUTION, PIGGYBACK PREMIX FROZEN(ML) INTRAVENOUS ONCE
Refills: 0 | Status: COMPLETED | OUTPATIENT
Start: 2025-06-02 | End: 2025-06-02

## 2025-06-02 RX ORDER — INSULIN LISPRO 100 U/ML
INJECTION, SOLUTION INTRAVENOUS; SUBCUTANEOUS
Refills: 0 | Status: DISCONTINUED | OUTPATIENT
Start: 2025-06-02 | End: 2025-06-02

## 2025-06-02 RX ORDER — PIPERACILLIN-TAZO-DEXTROSE,ISO 3.375G/5
3.38 IV SOLUTION, PIGGYBACK PREMIX FROZEN(ML) INTRAVENOUS EVERY 8 HOURS
Refills: 0 | Status: DISCONTINUED | OUTPATIENT
Start: 2025-06-02 | End: 2025-06-02

## 2025-06-02 RX ORDER — HEPARIN SODIUM 1000 [USP'U]/ML
INJECTION INTRAVENOUS; SUBCUTANEOUS
Qty: 25000 | Refills: 0 | Status: DISCONTINUED | OUTPATIENT
Start: 2025-06-02 | End: 2025-06-03

## 2025-06-02 RX ORDER — DEXTROSE 50 % IN WATER 50 %
50 SYRINGE (ML) INTRAVENOUS ONCE
Refills: 0 | Status: COMPLETED | OUTPATIENT
Start: 2025-06-02 | End: 2025-06-02

## 2025-06-02 RX ORDER — FENTANYL CITRATE-0.9 % NACL/PF 100MCG/2ML
0.5 SYRINGE (ML) INTRAVENOUS
Qty: 2500 | Refills: 0 | Status: DISCONTINUED | OUTPATIENT
Start: 2025-06-02 | End: 2025-06-04

## 2025-06-02 RX ORDER — PROPOFOL 10 MG/ML
10 INJECTION, EMULSION INTRAVENOUS
Qty: 1000 | Refills: 0 | Status: DISCONTINUED | OUTPATIENT
Start: 2025-06-02 | End: 2025-06-04

## 2025-06-02 RX ORDER — CALCIUM GLUCONATE 20 MG/ML
1 INJECTION, SOLUTION INTRAVENOUS ONCE
Refills: 0 | Status: COMPLETED | OUTPATIENT
Start: 2025-06-02 | End: 2025-06-02

## 2025-06-02 RX ORDER — INSULIN LISPRO 100 U/ML
INJECTION, SOLUTION INTRAVENOUS; SUBCUTANEOUS EVERY 6 HOURS
Refills: 0 | Status: DISCONTINUED | OUTPATIENT
Start: 2025-06-02 | End: 2025-06-02

## 2025-06-02 RX ORDER — HEPARIN SODIUM 1000 [USP'U]/ML
3800 INJECTION INTRAVENOUS; SUBCUTANEOUS EVERY 6 HOURS
Refills: 0 | Status: DISCONTINUED | OUTPATIENT
Start: 2025-06-02 | End: 2025-06-04

## 2025-06-02 RX ORDER — CALCIUM GLUCONATE 20 MG/ML
1 INJECTION, SOLUTION INTRAVENOUS ONCE
Refills: 0 | Status: DISCONTINUED | OUTPATIENT
Start: 2025-06-02 | End: 2025-06-02

## 2025-06-02 RX ORDER — PIPERACILLIN-TAZO-DEXTROSE,ISO 3.375G/5
3.38 IV SOLUTION, PIGGYBACK PREMIX FROZEN(ML) INTRAVENOUS ONCE
Refills: 0 | Status: DISCONTINUED | OUTPATIENT
Start: 2025-06-02 | End: 2025-06-02

## 2025-06-02 RX ORDER — NOREPINEPHRINE BITARTRATE 8 MG
0.04 SOLUTION INTRAVENOUS
Qty: 8 | Refills: 0 | Status: DISCONTINUED | OUTPATIENT
Start: 2025-06-02 | End: 2025-06-03

## 2025-06-02 RX ADMIN — INSULIN LISPRO 4: 100 INJECTION, SOLUTION INTRAVENOUS; SUBCUTANEOUS at 05:35

## 2025-06-02 RX ADMIN — Medication 200 GRAM(S): at 05:04

## 2025-06-02 RX ADMIN — Medication 5 UNIT(S): at 03:48

## 2025-06-02 RX ADMIN — Medication 1 TABLET(S): at 11:41

## 2025-06-02 RX ADMIN — PROPOFOL 4.22 MICROGRAM(S)/KG/MIN: 10 INJECTION, EMULSION INTRAVENOUS at 01:30

## 2025-06-02 RX ADMIN — HEPARIN SODIUM 750 UNIT(S)/HR: 1000 INJECTION INTRAVENOUS; SUBCUTANEOUS at 07:28

## 2025-06-02 RX ADMIN — Medication 81 MILLIGRAM(S): at 11:41

## 2025-06-02 RX ADMIN — MEROPENEM 100 MILLIGRAM(S): 1 INJECTION INTRAVENOUS at 17:16

## 2025-06-02 RX ADMIN — Medication 25 GRAM(S): at 12:42

## 2025-06-02 RX ADMIN — HEPARIN SODIUM 750 UNIT(S)/HR: 1000 INJECTION INTRAVENOUS; SUBCUTANEOUS at 04:03

## 2025-06-02 RX ADMIN — NOREPINEPHRINE BITARTRATE 5.27 MICROGRAM(S)/KG/MIN: 8 SOLUTION at 01:29

## 2025-06-02 RX ADMIN — HEPARIN SODIUM 550 UNIT(S)/HR: 1000 INJECTION INTRAVENOUS; SUBCUTANEOUS at 12:40

## 2025-06-02 RX ADMIN — ATORVASTATIN CALCIUM 40 MILLIGRAM(S): 80 TABLET, FILM COATED ORAL at 22:06

## 2025-06-02 RX ADMIN — CALCIUM GLUCONATE 100 GRAM(S): 20 INJECTION, SOLUTION INTRAVENOUS at 03:50

## 2025-06-02 RX ADMIN — HEPARIN SODIUM 0 UNIT(S)/HR: 1000 INJECTION INTRAVENOUS; SUBCUTANEOUS at 11:38

## 2025-06-02 RX ADMIN — Medication 1 APPLICATION(S): at 05:23

## 2025-06-02 RX ADMIN — PROPOFOL 4.22 MICROGRAM(S)/KG/MIN: 10 INJECTION, EMULSION INTRAVENOUS at 18:54

## 2025-06-02 RX ADMIN — INSULIN GLARGINE-YFGN 15 UNIT(S): 100 INJECTION, SOLUTION SUBCUTANEOUS at 22:15

## 2025-06-02 RX ADMIN — GABAPENTIN 300 MILLIGRAM(S): 400 CAPSULE ORAL at 22:06

## 2025-06-02 RX ADMIN — INSULIN LISPRO 2: 100 INJECTION, SOLUTION INTRAVENOUS; SUBCUTANEOUS at 17:19

## 2025-06-02 RX ADMIN — NOREPINEPHRINE BITARTRATE 5.27 MICROGRAM(S)/KG/MIN: 8 SOLUTION at 17:19

## 2025-06-02 RX ADMIN — Medication 3.24 MICROGRAM(S)/KG/HR: at 20:03

## 2025-06-02 RX ADMIN — Medication 15 MILLILITER(S): at 05:15

## 2025-06-02 RX ADMIN — Medication 250 MILLIGRAM(S): at 03:51

## 2025-06-02 RX ADMIN — Medication 3.24 MICROGRAM(S)/KG/HR: at 05:23

## 2025-06-02 RX ADMIN — HEPARIN SODIUM 500 UNIT(S)/HR: 1000 INJECTION INTRAVENOUS; SUBCUTANEOUS at 19:39

## 2025-06-02 RX ADMIN — FUROSEMIDE 20 MILLIGRAM(S): 10 INJECTION INTRAMUSCULAR; INTRAVENOUS at 05:04

## 2025-06-02 RX ADMIN — Medication 50 MILLILITER(S): at 03:50

## 2025-06-02 RX ADMIN — PROPOFOL 4.22 MICROGRAM(S)/KG/MIN: 10 INJECTION, EMULSION INTRAVENOUS at 20:03

## 2025-06-02 RX ADMIN — HEPARIN SODIUM 550 UNIT(S)/HR: 1000 INJECTION INTRAVENOUS; SUBCUTANEOUS at 19:17

## 2025-06-02 RX ADMIN — PROPOFOL 4.22 MICROGRAM(S)/KG/MIN: 10 INJECTION, EMULSION INTRAVENOUS at 07:36

## 2025-06-02 RX ADMIN — Medication 15 MILLILITER(S): at 19:00

## 2025-06-02 RX ADMIN — INSULIN LISPRO 4: 100 INJECTION, SOLUTION INTRAVENOUS; SUBCUTANEOUS at 11:57

## 2025-06-02 RX ADMIN — NOREPINEPHRINE BITARTRATE 5.27 MICROGRAM(S)/KG/MIN: 8 SOLUTION at 20:03

## 2025-06-02 NOTE — PROCEDURE NOTE - ADDITIONAL PROCEDURE DETAILS
Date of entry of this note is equal to the date of services rendered.  Procedural time noninclusive of other E/M time  Dx: Septic shock, UTI, AMS

## 2025-06-02 NOTE — CONSULT NOTE ADULT - ASSESSMENT
A/P-   74 year old female with PMHx of HTN, HLD, IDDM2, CAD (s/p PCI), and recent right humerus fracture (placed in sling 3 days ago) who presented to the ED on 5/30/25 for complaints of elevated blood glucose.   As per med history provided by family  patient fell three days ago and landed on her right shoulder. Went to [facility] at that time and found to have right humerus fracture and placed in sling. Discharged home with outpatient orthopedic surgery follow up. Since then, patient has been refusing to get out of bed and not eating much.   In the ED, VSS. Hgb 10.3, sodium 132, potassium 6.3, BUN 70, Cr 2.02, blood glucose 510. VBG 7.29 / 58 / 34 / 26. COVID/influenza/RSV negative. CT head without acute intracranial findings. Received 1L NS bolus, 1L LR bolus, and insulin 20 U IV.  (30 May 2025 23:43)    s/p  RRT last night   for AMS, hypotension, fever of 103  and was started on sepsis w/u given IVF bolus, Abx with CTX and Vanco. Persistently hypotensive and obtunded. Started on levophed infusion. Urgently transferred to ICU.   was also intubated for airway protection    Infectious Disease consultation requested this afternoon 6/2 to help with antibiotic management  for ESBL bacteremia    Intubated   sedated  on pressors for hypotention  afebrile today  yesterday 103 t-max  + leukocytosis of 29K  Blood cx- ESBL GNR by PCR x 2  UA from 5/31-pos for nitrates  urine cx-pending  DUANE  elevated cardiac enzymes    per med records had ESBL e.coli UTI in august and sept 2024 and ID was not consulted       Impression-  septic shock on pressors  E.coli ESBL bacteremia source most likely  in light of positive nitrates in UA   DUANE  stercoral colitis   Right humerous fracture with hematoma    Plan-  advise meropenem for the ESBL bacteremia and sepsis  check 2 more blood cx   follow ID and sens of the blood cx  follow urine cx result  trend wbc  as per Ortho no surgical intervention at this time  vent and BP management as per ICU team.  cardiology is on board as well  rest of the medical management as per ICU team.    All labs and imaging and chart notes reviewed.     All above discussed with patient's family and they verbalize full understanding of all above and agree with above plan of care.    Thank you for this consultation.    Time spent including chart review and patient exam and review of labs and imaging and discussions with medical team and  patient's family members 60 minutes.    Tariq Fraga MD  Infectious Disease Attending    for any questions please do not hesitate to contact me either via teams or by calling [phone]

## 2025-06-02 NOTE — CONSULT NOTE ADULT - SUBJECTIVE AND OBJECTIVE BOX
Date of entry of this note is equal to date of services rendered    BRIEF HOSPITAL COURSE: 74F with PMHx of HTN, HLD, DM2, CAD s/p PCI and recent right humerus fracture (placed in sling 3 days ago) presented to the ED 5/30 with hyperglycemia, admitted with ALY to F suspected pre-renal d/t poor PO intake and physical deconditioning s/p humerus Fx.       INTERVAL EVENTS:   RRT for AMS, hypotension. Pt obtunded on my arrival, hypotensive 70s/40s, Marty stick 4cc push, LR pressure bagging in Urgently XF to ICU and intubated for airway protection.     PAST MEDICAL & SURGICAL HISTORY:  HTN (hypertension)      HLD (hyperlipidemia)      CAD S/P percutaneous coronary angioplasty      Insulin dependent type 2 diabetes mellitus      History of heart artery stent        Allergies    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 (Short breath)  No Known Drug Allergies    Intolerances      FAMILY HISTORY:  FHx: myocardial infarction  Father        REVIEW OF SYSTEMS:     [ ] A ten-point review of systems was otherwise negative except as noted.  [] Due to altered mental status/intubation, subjective information were not able to be obtained from the patient. History was obtained, to the extent possible, from review of the chart and collateral sources of information.      Mode: AC/ CMV (Assist Control/ Continuous Mandatory Ventilation)  RR (machine): 14  TV (machine): 350  FiO2: 50  PEEP: 6  ITime: 1  MAP: 11  PIP: 29    Vital Signs Last 24 Hrs  T(C): 37.6 (02 Jun 2025 00:14), Max: 39.6 (01 Jun 2025 21:09)  T(F): 99.7 (02 Jun 2025 00:14), Max: 103.2 (01 Jun 2025 21:09)  HR: 81 (02 Jun 2025 02:30) (64 - 88)  BP: 101/47 (02 Jun 2025 02:30) (51/35 - 150/69)  BP(mean): 65 (02 Jun 2025 02:30) (40 - 79)  RR: 28 (02 Jun 2025 02:30) (15 - 37)  SpO2: 98% (02 Jun 2025 02:30) (70% - 100%)    Parameters below as of 01 Jun 2025 23:25  Patient On (Oxygen Delivery Method): nasal cannula, 2L      ABG - ( 02 Jun 2025 01:37 )  pH, Arterial: 7.32  pH, Blood: x     /  pCO2: 46    /  pO2: 120   / HCO3: 24    / Base Excess: -2.5  /  SaO2: 98.6              I&O's Detail    31 May 2025 07:01  -  01 Jun 2025 07:00  --------------------------------------------------------  IN:    Lactated Ringers: 900 mL    Oral Fluid: 400 mL  Total IN: 1300 mL    OUT:    Voided (mL): 1050 mL  Total OUT: 1050 mL    Total NET: 250 mL    LABS:                        9.5    23.40 )-----------( 208      ( 02 Jun 2025 02:30 )             30.9     06-02    138  |  109[H]  |  55[H]  ----------------------------<  174[H]  5.4[H]   |  21[L]  |  1.71[H]    Ca    8.0[L]      02 Jun 2025 02:30  Phos  3.5     06-01  Mg     2.6     06-01        PT/INR - ( 01 Jun 2025 00:20 )   PT: 13.3 sec;   INR: 1.15 ratio       PTT - ( 01 Jun 2025 00:20 )  PTT:26.7 sec Urinalysis Basic - ( 02 Jun 2025 02:30 )    Color: x / Appearance: x / SG: x / pH: x  Gluc: 174 mg/dL / Ketone: x  / Bili: x / Urobili: x   Blood: x / Protein: x / Nitrite: x   Leuk Esterase: x / RBC: x / WBC x   Sq Epi: x / Non Sq Epi: x / Bacteria: x      CULTURES:      PHYSICAL EXAM:  General: Ill appearing  HEENT: Pupils equal, reactive to light.  Symmetric.  PULM: Clear to auscultation bilaterally  CVS: Regular rate and rhythm  ABD: Soft, nondistended, nontender  EXT: No edema, nontender, Warm   NEURO: Alert, oriented. Sensation intact. No focal deficits.      RADIOLOGY:                    Date of entry of this note is equal to date of services rendered    BRIEF HOSPITAL COURSE: 74F with PMHx of HTN, HLD, DM2, CAD s/p PCI and recent right humerus fracture (placed in sling 3 days ago) presented to the ED 5/30 with hyperglycemia, admitted with ALY to F suspected pre-renal d/t poor PO intake and physical deconditioning s/p humerus Fx.     INTERVAL EVENTS:   RRT for AMS, hypotension. Pt obtunded on my arrival, hypotensive 70s/40s, Marty stick 4cc push, LR pressure bagging in Urgently XF to ICU and intubated for airway protection.     PAST MEDICAL & SURGICAL HISTORY:  HTN (hypertension)  HLD (hyperlipidemia)  CAD S/P percutaneous coronary angioplasty  Insulin dependent type 2 diabetes mellitus  History of heart artery stent    Allergies  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 (Short breath)  No Known Drug Allergies    Intolerances    FAMILY HISTORY:  FHx: myocardial infarction  Father    REVIEW OF SYSTEMS:     [ ] A ten-point review of systems was otherwise negative except as noted.  [X] Due to altered mental status/intubation, subjective information were not able to be obtained from the patient. History was obtained, to the extent possible, from review of the chart and collateral sources of information.    Mode: AC/ CMV (Assist Control/ Continuous Mandatory Ventilation)  RR (machine): 14  TV (machine): 350  FiO2: 50  PEEP: 6  ITime: 1  MAP: 11  PIP: 29    Vital Signs Last 24 Hrs  T(C): 37.6 (02 Jun 2025 00:14), Max: 39.6 (01 Jun 2025 21:09)  T(F): 99.7 (02 Jun 2025 00:14), Max: 103.2 (01 Jun 2025 21:09)  HR: 81 (02 Jun 2025 02:30) (64 - 88)  BP: 101/47 (02 Jun 2025 02:30) (51/35 - 150/69)  BP(mean): 65 (02 Jun 2025 02:30) (40 - 79)  RR: 28 (02 Jun 2025 02:30) (15 - 37)  SpO2: 98% (02 Jun 2025 02:30) (70% - 100%)    Parameters below as of 01 Jun 2025 23:25  Patient On (Oxygen Delivery Method): nasal cannula, 2L    ABG - ( 02 Jun 2025 01:37 )  pH, Arterial: 7.32  pH, Blood: x     /  pCO2: 46    /  pO2: 120   / HCO3: 24    / Base Excess: -2.5  /  SaO2: 98.6        I&O's Detail    31 May 2025 07:01  -  01 Jun 2025 07:00  --------------------------------------------------------  IN:    Lactated Ringers: 900 mL    Oral Fluid: 400 mL  Total IN: 1300 mL    OUT:    Voided (mL): 1050 mL  Total OUT: 1050 mL    Total NET: 250 mL    LABS:                        9.5    23.40 )-----------( 208      ( 02 Jun 2025 02:30 )             30.9     06-02    138  |  109[H]  |  55[H]  ----------------------------<  174[H]  5.4[H]   |  21[L]  |  1.71[H]    Ca    8.0[L]      02 Jun 2025 02:30  Phos  3.5     06-01  Mg     2.6     06-01        PT/INR - ( 01 Jun 2025 00:20 )   PT: 13.3 sec;   INR: 1.15 ratio       PTT - ( 01 Jun 2025 00:20 )  PTT:26.7 sec Urinalysis Basic - ( 02 Jun 2025 02:30 )    Color: x / Appearance: x / SG: x / pH: x  Gluc: 174 mg/dL / Ketone: x  / Bili: x / Urobili: x   Blood: x / Protein: x / Nitrite: x   Leuk Esterase: x / RBC: x / WBC x   Sq Epi: x / Non Sq Epi: x / Bacteria: x      CULTURES:      PHYSICAL EXAM:  General: Ill appearing  HEENT: Pupils equal, reactive to light.  Symmetric.  PULM: Clear to auscultation bilaterally  CVS: Regular rate and rhythm  ABD: Soft, nondistended, nontender  EXT: No edema, nontender, Warm   NEURO: Alert, oriented. Sensation intact. No focal deficits.      RADIOLOGY:   < from: CT Abdomen and Pelvis w/ Oral Cont (06.01.25 @ 14:37) >  IMPRESSION:    Large amount retained fecal material in the rectum with mild rectal wall   thickening and mild infiltration of the perirectal fat suspicious for   developing stercoral colitis.    --- End of Report ---    < end of copied text >

## 2025-06-02 NOTE — CONSULT NOTE ADULT - SUBJECTIVE AND OBJECTIVE BOX
Patient is a 74yFemale RHD community ambulator with assistive devices who presents to  ED w/ a c/o of hyperglycemia and ALY after MF and R proximal humerus fracture 3 days ago.   Family state jona was not moving or eating much after the fall, she is deconditioned and was admitted for hyperglycemai, ALY and failure to thrive  On 6/1 patient was intubated for airway protection, sepsis workup initiated and CT shoulder performed showing expected hematoma, bleeding s/p prox humerus fx and being on heparin at present  Denies HS/LOC.   Denies any numbness or tingling. Denies having any other pain elsewhere.   No other orthopedic concerns at this time.      Type 2 diabetes mellitus without complication, without long-term current use of insulin    Coronary artery disease with other form of angina pectoris    Hypertension    Asthma    Aortic stenosis    Mitral regurgitation    HTN (hypertension)    HLD (hyperlipidemia)    CAD S/P percutaneous coronary angioplasty    Insulin dependent type 2 diabetes mellitus            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 (Short breath)  No Known Drug Allergies      PHYSICAL EXAM:  T(C): 35.9 (06-02-25 @ 08:00), Max: 39.6 (06-01-25 @ 21:09)  HR: 76 (06-02-25 @ 09:30) (72 - 88)  BP: 128/63 (06-02-25 @ 09:30) (51/35 - 150/69)  RR: 16 (06-02-25 @ 09:30) (15 - 37)  SpO2: 100% (06-02-25 @ 09:30) (70% - 100%)    Gen: NAD, Resting comfortably          RUE:  Skin intact, with diffuse ecchymosis and edam about eh right shoulder and proximal humerus 2/2 fall, prox humerus fx and AC  unable to assess, motor, nerve or AROM due to intubated and sedated status  + Radial Pulse  Compartments soft and compressible  No forearm or arm tenderness    Secondary Survey:   unable to assess pain or sensation to the other extremities due to intubated status, per nursing patient moves all extremities without pain and will respond when on sedation break. Prior to intubation no complaints of other pain per nursing team    Spine: No bony tenderness. No palpable stepoffs.       A/P: 74F with 4 part proximal humerus fracture s/p MF 3 days ago and now newly septic, intubated and sedated, low suspicion of septic joint; the noted bleeding, edema on the CT scan likely secondary to her fall, bone bleeding and the heparin that she requires at this time due to illness  Analgesia  NWB in Sling as able given condition and need for appropriate access by ICU team, once extubated would advice sling to the right shoulder  Ice and elevate as tolerated  DVT ppx- SCD/heparin per the icu  Advanced imaging: CT shoulder reviewed, plain films ordered to be completed once patient is stable enough to have them do  PT/OT as tolerated once patient is stable condition to do so  No acute orthopedic surgical intervention indicated at this time  Orthopedically stable for Discharge once medically cleared  D/w attending in regard to the above plan; will updated as needed

## 2025-06-02 NOTE — PROCEDURE NOTE - ADDITIONAL PROCEDURE DETAILS
Date of entry of this note is equal to the date of services rendered.  Procedural time noninclusive of other E/M time  Dx: AMS, Septic Shock Date of entry of this note is equal to the date of services rendered.  Procedural time noninclusive of other E/M time  Dx: AMS, Septic Shock, UTI

## 2025-06-02 NOTE — CONSULT NOTE ADULT - ASSESSMENT
RRT tonight for AMS, hypotension, fever of 103 rectally. Pt admitted to [facility] with recent R humurus Fx, FFT...  Tonight pt febrile, started on sepsis w/u given IVF bolus, Abx with CTX and Vanco. Persistently hypotensive and obtunded. Started on levophed infusion. Urgently transferred to ICU.     Intubated for airway protection. Vent settings 350/14/+6/50%. Will get ABG and assess oxygenation/ventilation.   Moderate dose levophed infusion for septic shock. Septic w/u tonight with UA grossly positive, pyuria.   Started on empiric broad spectrum Abx  Propofol for sedation with RASS -1 to -2.  Hollis placed for strict I/Os    ***          RRT tonight for AMS, hypotension, fever of 103 rectally. Pt admitted to F with recent R humurus Fx, ALY, dehydration, hyperkalemia.   Tonight pt febrile 103 rectally, started on sepsis w/u given IVF bolus, Abx with CTX and Vanco. Persistently hypotensive and obtunded. Started on levophed infusion. Urgently transferred to ICU.     Intubated for airway protection. Vent settings 350/14/+6/50%. Will get ABG and assess oxygenation/ventilation.   Moderate dose levophed infusion for septic shock. Septic w/u tonight with UA grossly positive, pyuria. CXR with right opacity? aspiration?  Started on empiric abx with CTX and Vanco x1 dose. Will escalate to Zosyn. BCX pending, UCx pending. RVP negative. MRSA sent.   WBC uptrending tonight, Lactate 2.8, Septic shock 2/2 uro sepsis? Escalating doses with poor access LIJ CVC placed.   Propofol for sedation with RASS -1 to -2. Multimodal pain control.   NGT to IWS.  Pre-renal ALY with hyperkalemia Rx with insulin/dextrose/Cagluc. Hollis placed for strict I/Os, dark concentrated urine. Will continue maintenance IVF. POCUS A line predominant, IVC collapsable   If shock worsens will consider adding stress dose steroids. Fixed dose vasopressin.  Troponin leak from 800 -> 3927 tonight, EKG without DEXTER. Cardio called overnight. Will start Heparin gtt for NSTEMI for now  NPO with Protonix for ppx  Insulin with lantus for hyperglycemia    Full Code confirmed with patients daughter. Called Queta and updated her on the events of tonight.   Case and plan discussed with Dr. Munir BERG      CRITICAL CARE TIME SPENT: 34 minutes of critical care time spent providing medical care for patient's acute illness/conditions that impairs at least one vital organ system and/or poses a high risk of imminent or life threatening deterioration in the patient's condition. It includes time spent evaluating and treating the patient's acute illness as well as time spent reviewing labs, radiology, discussing goals of care with patient and/or patient's family, and discussing the case with a multidisciplinary team, in an effort to prevent further life threatening deterioration or end organ damage. This time is independent of any procedures performed.          RRT tonight for AMS, hypotension, fever of 103 rectally. Pt admitted to F with recent R humurus Fx, ALY, dehydration, hyperkalemia.   Tonight pt febrile 103 rectally, started on sepsis w/u given IVF bolus, Abx with CTX and Vanco. Persistently hypotensive and obtunded. Started on levophed infusion. Urgently transferred to ICU.     Intubated for airway protection. Vent settings 350/14/+6/50%. Will get ABG and assess oxygenation/ventilation.   Moderate dose levophed infusion for septic shock. Septic w/u tonight with UA grossly positive, pyuria. CXR with right opacity? aspiration?  Started on empiric abx with CTX and Vanco x1 dose. Will escalate to Zosyn. BCX pending, UCx pending. RVP negative. MRSA sent.   WBC uptrending tonight, Lactate 2.8, Septic shock 2/2 uro sepsis? Escalating doses with poor access LIJ CVC placed.   Propofol for sedation with RASS -1 to -2. Will add fentanyl gtt for analgesia. Was on multimodal pain control for humerus Fx previously  NGT to IWS.  Pre-renal ALY with hyperkalemia Rx with insulin/dextrose/Cagluc. Hollis placed for strict I/Os, dark concentrated urine. Will continue maintenance IVF. POCUS A line predominant, IVC collapsable   If shock worsens will consider adding stress dose steroids. Fixed dose vasopressin.  Troponin leak from 800 -> 3927 tonight, EKG without DEXTER. Cardio called overnight. Will start Heparin gtt for NSTEMI for now  NPO with Protonix for ppx  Insulin with lantus for hyperglycemia    Full Code confirmed with patients daughter. Called Queta and updated her on the events of tonight.   Case and plan discussed with Dr. Munir BERG      CRITICAL CARE TIME SPENT: 34 minutes of critical care time spent providing medical care for patient's acute illness/conditions that impairs at least one vital organ system and/or poses a high risk of imminent or life threatening deterioration in the patient's condition. It includes time spent evaluating and treating the patient's acute illness as well as time spent reviewing labs, radiology, discussing goals of care with patient and/or patient's family, and discussing the case with a multidisciplinary team, in an effort to prevent further life threatening deterioration or end organ damage. This time is independent of any procedures performed.

## 2025-06-02 NOTE — CONSULT NOTE ADULT - SUBJECTIVE AND OBJECTIVE BOX
HPI:  Tiffanie Joshi is a 74 year old female with PMHx of HTN, HLD, IDDM2, CAD (s/p PCI), and recent right humerus fracture (placed in sling 3 days ago) who presented to the ED on 5/30/25 for complaints of elevated blood glucose.    Patient is Armenian-speaking but declined  services and preferred for daughter-in-law at bedside to translate. As per daughter-in-law, patient fell three days ago and landed on her right shoulder. Went to [facility] at that time and found to have right humerus fracture and placed in sling. Discharged home with outpatient orthopedic surgery follow up. Since then, patient has been refusing to get out of bed and not eating much. Will drink a little juice every now and then. Daughter checked her blood glucose around 5PM and it was > 400 so brought to the ER for further evaluation. Baseline functional status is ambulates with walker and dependent with all ADLs. Lives at home with daughter.    In the ED, VSS. Hgb 10.3, sodium 132, potassium 6.3, BUN 70, Cr 2.02, blood glucose 510. VBG 7.29 / 58 / 34 / 26. COVID/influenza/RSV negative. CT head without acute intracranial findings. Received 1L NS bolus, 1L LR bolus, and insulin 20 U IV.  (30 May 2025 23:43)      PAST MEDICAL & SURGICAL HISTORY:  HTN (hypertension)      HLD (hyperlipidemia)      CAD S/P percutaneous coronary angioplasty      Insulin dependent type 2 diabetes mellitus      History of heart artery stent          Allergies    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 (Short breath)  No Known Drug Allergies    Intolerances        ANTIMICROBIALS:  meropenem  IVPB 1000 every 12 hours      OTHER MEDS:  acetaminophen     Tablet .. 650 milliGRAM(s) Oral every 6 hours PRN  aspirin  chewable 81 milliGRAM(s) Oral daily  atorvastatin 40 milliGRAM(s) Oral at bedtime  chlorhexidine 0.12% Liquid 15 milliLiter(s) Oral Mucosa every 12 hours  chlorhexidine 2% Cloths 1 Application(s) Topical <User Schedule>  fentaNYL   Infusion. 0.5 MICROgram(s)/kG/Hr IV Continuous <Continuous>  gabapentin 300 milliGRAM(s) Oral two times a day  heparin   Injectable 3800 Unit(s) IV Push every 6 hours PRN  heparin  Infusion.  Unit(s)/Hr IV Continuous <Continuous>  insulin glargine Injectable (LANTUS) 15 Unit(s) SubCutaneous at bedtime  insulin lispro (ADMELOG) corrective regimen sliding scale   SubCutaneous every 6 hours  multivitamin 1 Tablet(s) Oral daily  norepinephrine Infusion 0.04 MICROgram(s)/kG/Min IV Continuous <Continuous>  ondansetron Injectable 4 milliGRAM(s) IV Push every 8 hours PRN  polyethylene glycol 3350 17 Gram(s) Oral daily PRN  propofol Infusion 10 MICROgram(s)/kG/Min IV Continuous <Continuous>  ranolazine 500 milliGRAM(s) Oral two times a day  senna 2 Tablet(s) Oral at bedtime      SOCIAL HISTORY:    Marital Status:    Occupation:   Lives with:     Substance Use (street drugs):   Tobacco Usage:    Alcohol Usage: Social EtOH    FAMILY HISTORY:  FHx: myocardial infarction  Father        ROS:  Unobtainable because:   All other systems negative     Constitutional: no fever, no chills, no weight loss, no night sweats  Eye: no eye pain, no redness, no vision changes  ENT:  no sore throat, no rhinorrhea  Cardiovascular:  no chest pain, no palpitation  Respiratory:  no SOB, no cough  GI:  no abd pain, no vomiting, no diarrhea  urinary: no dysuria, no hematuria, no flank pain  : no  discharge or bleeding  musculoskeletal:  no joint pain, no joint swelling  skin:  no rash  neurology:  no headache, no seizure, no change in mental status  psych: no anxiety, no depression     Physical Exam:    General:    NAD, non toxic  Head: atraumatic, normocephalic  Eyes: normal sclera and conjunctiva  ENT:   no oropharyngeal lesions, no LAD, neck supple  Cardio:    regular S1,S2, no murmur  Respiratory:   clear to auscultation b/l, no wheezing  abd:   soft, BS +, not tender, no hepatosplenomegaly  :     no CVAT, no suprapubic tenderness, no marcus  Musculoskeletal : no joint swelling, no edema  Skin:    no rash  vascular:  normal pulses  Neurologic:     no focal deficits  psych: normal affect, no suicidal ideation      Drug Dosing Weight  Height (cm): 157.5 (30 May 2025 17:45)  Weight (kg): 64.8 (02 Jun 2025 00:30)  BMI (kg/m2): 26.1 (02 Jun 2025 00:30)  BSA (m2): 1.66 (02 Jun 2025 00:30)    Vital Signs Last 24 Hrs  T(F): 96.3 (06-02-25 @ 12:00), Max: 103.2 (06-01-25 @ 21:09)    Vital Signs Last 24 Hrs  HR: 82 (06-02-25 @ 13:06) (72 - 88)  BP: 104/70 (06-02-25 @ 13:06) (51/35 - 150/69)  RR: 14 (06-02-25 @ 13:06)  SpO2: 100% (06-02-25 @ 13:06) (70% - 100%)  Wt(kg): --                          10.2   29.95 )-----------( 223      ( 02 Jun 2025 15:20 )             33.5       06-02    136  |  107  |  56[H]  ----------------------------<  307[H]  5.3   |  20[L]  |  1.80[H]    Ca    8.3[L]      02 Jun 2025 04:30  Phos  4.6     06-02  Mg     2.4     06-02    TPro  6.2  /  Alb  2.2[L]  /  TBili  1.4[H]  /  DBili  x   /  AST  30  /  ALT  15  /  AlkPhos  72  06-02      Urinalysis Basic - ( 02 Jun 2025 04:30 )    Color: x / Appearance: x / SG: x / pH: x  Gluc: 307 mg/dL / Ketone: x  / Bili: x / Urobili: x   Blood: x / Protein: x / Nitrite: x   Leuk Esterase: x / RBC: x / WBC x   Sq Epi: x / Non Sq Epi: x / Bacteria: x        MICROBIOLOGY:  v  Blood Blood-Peripheral  06-01-25   Growth in aerobic bottle: Gram Negative Rods  Growth in anaerobic bottle: Gram Negative Rods  --    Growth in aerobic bottle: Gram Negative Rods  Growth in anaerobic bottle: Gram Negative Rods      Blood Blood-Peripheral  06-01-25   Growth in aerobic bottle: Gram Negative Rods  Growth in anaerobic bottle: Gram Negative Rods  Direct identification is available within approximately 3-5  hours either by Blood Panel Multiplexed PCR or Direct  MALDI-TOF. Details: [email or web address]  --  Blood Culture PCR                  RADIOLOGY:       History obtained from medical chart and ICU team as patient is currently intubated in ICU.    HPI:  PAtient  is a 74 year old female with PMHx of HTN, HLD, IDDM2, CAD (s/p PCI), and recent right humerus fracture (placed in sling 3 days ago) who presented to the ED on 5/30/25 for complaints of elevated blood glucose.   As per med history provided by family  patient fell three days ago and landed on her right shoulder. Went to [facility] at that time and found to have right humerus fracture and placed in sling. Discharged home with outpatient orthopedic surgery follow up. Since then, patient has been refusing to get out of bed and not eating much. Will drink a little juice every now and then. Daughter checked her blood glucose around 5PM and it was > 400 so brought to the ER for further evaluation. Baseline functional status is ambulates with walker and dependent with all ADLs. Lives at home with daughter.  In the ED, VSS. Hgb 10.3, sodium 132, potassium 6.3, BUN 70, Cr 2.02, blood glucose 510. VBG 7.29 / 58 / 34 / 26. COVID/influenza/RSV negative. CT head without acute intracranial findings. Received 1L NS bolus, 1L LR bolus, and insulin 20 U IV.  (30 May 2025 23:43)      Infectious Disease consultation requested this afternoon 6/2 to help with antibiotic management  for ESBL bacteremia    Patient seen and examined in ICU  her family is at her bedside  she is intubated     as per med records and ICU team RRT last night  for AMS, hypotension, fever of 103 rectally and was started on sepsis w/u given IVF bolus, Abx with CTX and Vanco. Persistently hypotensive and obtunded. Started on levophed infusion. Urgently transferred to ICU.   was also intubated for airway protection    she had  another RRT today while in cat scan     currently she is on the vent  has OGT   has left IJ TLC  is on pressors for hypotention          PAST MEDICAL & SURGICAL HISTORY:  HTN (hypertension)      HLD (hyperlipidemia)      CAD S/P percutaneous coronary angioplasty      Insulin dependent type 2 diabetes mellitus      History of heart artery stent          Allergies    No Known Drug Allergies    Intolerances        ANTIMICROBIALS:  meropenem  IVPB 1000 every 12 hours      OTHER MEDS:  acetaminophen     Tablet .. 650 milliGRAM(s) Oral every 6 hours PRN  aspirin  chewable 81 milliGRAM(s) Oral daily  atorvastatin 40 milliGRAM(s) Oral at bedtime  chlorhexidine 0.12% Liquid 15 milliLiter(s) Oral Mucosa every 12 hours  chlorhexidine 2% Cloths 1 Application(s) Topical <User Schedule>  fentaNYL   Infusion. 0.5 MICROgram(s)/kG/Hr IV Continuous <Continuous>  gabapentin 300 milliGRAM(s) Oral two times a day  heparin   Injectable 3800 Unit(s) IV Push every 6 hours PRN  heparin  Infusion.  Unit(s)/Hr IV Continuous <Continuous>  insulin glargine Injectable (LANTUS) 15 Unit(s) SubCutaneous at bedtime  insulin lispro (ADMELOG) corrective regimen sliding scale   SubCutaneous every 6 hours  multivitamin 1 Tablet(s) Oral daily  norepinephrine Infusion 0.04 MICROgram(s)/kG/Min IV Continuous <Continuous>  ondansetron Injectable 4 milliGRAM(s) IV Push every 8 hours PRN  polyethylene glycol 3350 17 Gram(s) Oral daily PRN  propofol Infusion 10 MICROgram(s)/kG/Min IV Continuous <Continuous>  ranolazine 500 milliGRAM(s) Oral two times a day  senna 2 Tablet(s) Oral at bedtime      SOCIAL HISTORY:    Lives with: family      FAMILY HISTORY:  FHx: myocardial infarction  Father        ROS:  Unobtainable because:   unable as patient is intubated and sedated    Physical Exam:    General:    on the vent and sedated  Head: atraumatic, normocephalic  Eyes: normal sclera and conjunctiva  ENT:   ET and OGT present   Cardio:    regular S1,S2 tachycardic  Respiratory:   breath sounds heard b/l is on the vent  abd:   soft, BS +, no distention  :     + marcus  Musculoskeletal : right shoulder and arm with echymosis   Skin:    no rash  Neurologic:     sedated      Drug Dosing Weight  Height (cm): 157.5 (30 May 2025 17:45)  Weight (kg): 64.8 (02 Jun 2025 00:30)  BMI (kg/m2): 26.1 (02 Jun 2025 00:30)  BSA (m2): 1.66 (02 Jun 2025 00:30)    Vital Signs Last 24 Hrs  T(F): 96.3 (06-02-25 @ 12:00), Max: 103.2 (06-01-25 @ 21:09)    Vital Signs Last 24 Hrs  HR: 82 (06-02-25 @ 13:06) (72 - 88)  BP: 104/70 (06-02-25 @ 13:06) (51/35 - 150/69)  RR: 14 (06-02-25 @ 13:06)  SpO2: 100% (06-02-25 @ 13:06) (70% - 100%)  Wt(kg): --                          10.2   29.95 )-----------( 223      ( 02 Jun 2025 15:20 )             33.5       06-02    136  |  107  |  56[H]  ----------------------------<  307[H]  5.3   |  20[L]  |  1.80[H]    Ca    8.3[L]      02 Jun 2025 04:30  Phos  4.6     06-02  Mg     2.4     06-02    TPro  6.2  /  Alb  2.2[L]  /  TBili  1.4[H]  /  DBili  x   /  AST  30  /  ALT  15  /  AlkPhos  72  06-02      Urinalysis Basic - ( 02 Jun 2025 04:30 )    Color: x / Appearance: x / SG: x / pH: x  Gluc: 307 mg/dL / Ketone: x  / Bili: x / Urobili: x   Blood: x / Protein: x / Nitrite: x   Leuk Esterase: x / RBC: x / WBC x   Sq Epi: x / Non Sq Epi: x / Bacteria: x        MICROBIOLOGY:    Blood Blood-Peripheral  06-01-25   Growth in aerobic bottle: Gram Negative Rods  Growth in anaerobic bottle: Gram Negative Rods  --    Growth in aerobic bottle: Gram Negative Rods  Growth in anaerobic bottle: Gram Negative Rods      Blood Blood-Peripheral  06-01-25   Growth in aerobic bottle: Gram Negative Rods  Growth in anaerobic bottle: Gram Negative Rods  Direct identification is available within approximately 3-5  hours either by Blood Panel Multiplexed PCR or Direct  MALDI-TOF. Details: [email or web address]  --  Blood Culture PCR        RADIOLOGY:      < from: Xray Chest 1 View- PORTABLE-Urgent (Xray Chest 1 View- PORTABLE-Urgent .) (06.01.25 @ 22:08) >  ACC: [id number] EXAM:  XR CHEST PORTABLE URGENT 1V   ORDERED BY: MANISH AGUILAR     PROCEDURE DATE:  06/01/2025          INTERPRETATION:  DATE OF STUDY: 6/1/25    PRIOR:5/30/25    CLINICAL INDICATION: Fever    TECHNIQUE: AP radiograph of the chest.    FINDINGS:  Heart size is difficult to assess on this AP projection.  No focal consolidation. No pleural effusion or pneumothorax.    IMPRESSION:    No radiographic evidence for acute cardiopulmonary disease.    --- End of Report ---      REGINALD MCINTOSH; Attending Radiologist  This document has been electronically signed. Jun 2 2025  8:03AM      < from: CT Abdomen and Pelvis w/ Oral Cont (06.01.25 @ 14:37) >    ACC: [id number] EXAM:  CT ABDOMEN AND PELVIS OC   ORDERED BY: TIBURCIO GUTHRIE     PROCEDURE DATE:  06/01/2025          INTERPRETATION:  CLINICAL INFORMATION: Constipation for 10 days with new   low-grade fever. Evaluate for obstruction versus ileus.    COMPARISON: CT abdomen/pelvis 8/4/2024    CONTRAST/COMPLICATIONS:  IV Contrast: NONE  Oral Contrast: Omnipaque 300  .    PROCEDURE:  CT of the Abdomen and Pelvis was performed.  Sagittal and coronal reformats were performed.    FINDINGS:  LOWER CHEST: Trace bilateral pleural effusions. Linear atelectasis or   scarring at the lung bases. Small hiatal hernia. Coronary artery   calcifications.    LIVER: Within normal limits.  BILE DUCTS: Normal caliber.  GALLBLADDER: Within normal limits.  SPLEEN: Within normal limits.  PANCREAS: Within normal limits.  ADRENALS: Within normal limits.  KIDNEYS/URETERS: Fullness of the renal collecting systems which can be   secondary to a distended urinary bladder. 5 mm nonobstructing calculus in   the lower pole of the left kidney. Bilateral renal cysts.    BLADDER: Distended with a normal caliber wall.  REPRODUCTIVE ORGANS: Hysterectomy.    BOWEL: Large amount retained fecal material in the rectum with a small to   moderate amount retained fecal material in the remainder of the colon.   Mild infiltration of the perirectal fat posteriorly. Small bowel loops   normal in caliber. Mild rectal wall thickening. Appendix is normal.  PERITONEUM/RETROPERITONEUM: Small amount of free fluid at the anterior   aspect of the left lower quadrant.  VESSELS: Atherosclerotic changes. Abdominal aorta normal in caliber.  LYMPH NODES: No lymphadenopathy.  ABDOMINAL WALL: Unremarkable.  BONES: Degenerative changes in the spine. Old bilateral rib fractures.    IMPRESSION:    Large amount retained fecal material in the rectum with mild rectal wall   thickening and mild infiltration of the perirectal fat suspicious for   developing stercoral colitis.    --- End of Report ---      JESS CRUZ MD  This document has been electronically signed. Jun 1 2025  3:27PM    < end of copied text >      < from: CT Shoulder No Cont, Right (06.01.25 @ 14:37) >    ACC: [id number] EXAM:  CT SHOULDER ONLY RT   ORDERED BY: STU GARCIA     PROCEDURE DATE:  06/01/2025          INTERPRETATION:  CT of the right shoulder    CLINICAL INFORMATION: Severe pain. Recent humerus fracture. Extensive   ecchymosis.  TECHNIQUE:Axial CT images were obtained of the right shoulder with   coronal and sagittal reconstructions. No contrast was administered. Three   dimensional reconstructions were performed.    FINDINGS:    Comminuted multipart proximal humerus fracture with impaction of the   humeral diaphyseal fracture fragment within anteriorly aligned with the   articulating humeral head which is mildly rotated medially and   posteriorly. There are displaced greater and lesser tuberosity fracture   fragments. There is a glenohumeral joint effusion. There is diffuse   surrounding ill-defined hemorrhage and edema about the glenohumeral joint   and within the adjacent musculature. Acromioclavicular joint is intact.   There is mild cervical and thoracic spondylosis. There are vascular   calcifications. Partially imaged lungs are intact.    IMPRESSION:    Comminuted proximal humeral fracture with surrounding ill-defined   hemorrhage and edema.    --- End of Report ---            SARAH KASPER MD; Attending Radiologist  This document has been electronically signed. Jun 2 2025  9:27AM    < end of copied text >

## 2025-06-02 NOTE — CONSULT NOTE ADULT - ASSESSMENT
Septic Shock  Elevated cardiac enzymes in setting of severe hypotension/ARF, likely type II MI.    The chart has been reviewed but the patient has not yet been examined.  Full note to follow. Septic Shock  Elevated cardiac enzymes in setting of severe hypotension/ARF, likely type II MI.  Known CAD (s/p PCI many years ago)  HTN  HLD    Suggest:  Supportive measures and antibiotics as per critical care team.  On ASA, statin for known ASHD.  On pressors and IVF, check TTE to assess LVEF.  Consider ischemia evaluation after medical stabilization and extubation.

## 2025-06-02 NOTE — CONSULT NOTE ADULT - SUBJECTIVE AND OBJECTIVE BOX
CARDIOLOGY CONSULTATION NOTE                                                                             EMMANUEL GONZALEZ is a 74y Female with PMHx of HTN, HLD, IDDM2, CAD (s/p PCI in 2020?), and recent right humerus fracture (placed in sling 3 days ago) who presented to the ED on 5/30/25 for complaints of elevated blood glucose.  Patient is Arabic-speaking. As per chart notes, patient fell three days prior to admission and landed on her right shoulder. Went to [facility] at that time and found to have right humerus fracture and placed in sling. Discharged home with outpatient orthopedic surgery follow up. After that, patient refused to get out of bed or eat. Daughter checked her blood glucose around 5PM and it was > 400 so brought to the ER for further evaluation. Baseline functional status is ambulates with walker and dependent with all ADLs. Lives at home with daughter.  In the ED, VSS. Hgb 10.3, sodium 132, potassium 6.3, BUN 70, Cr 2.02, blood glucose 510. VBG 7.29 / 58 / 34 / 26. COVID/influenza/RSV negative. CT head without acute intracranial findings. Received 1L NS bolus, 1L LR bolus, and insulin 20 U IV.    On evening of 6/1, spiked temp to 103.2 and became acutely hypotensive despite IVF. RRT was called due to acute obtundation and she was intubated and resuscitated..  Review of chart reveals similar events in 4/25 when patient was transferred to [facility] as an NSTEMI with chest pain and elevated cardiac enzymes. Patient left hospital several hours after arrival, AMA.  REVIEW OF SYSTEMS: -----------------------------  CONSTITUTIONAL: No fever, weight loss, or fatigue EYES: No eye pain, visual disturbances, or discharge ENMT:  No difficulty hearing, tinnitus, vertigo; No sinus or throat pain NECK: No pain or stiffness BREASTS: No pain, masses, or nipple discharge RESPIRATORY: No cough, wheezing, chills or hemoptysis; No shortness of breath CARDIOVASCULAR: See HPI GASTROINTESTINAL: No abdominal or epigastric pain. No nausea, vomiting, or hematemesis; No diarrhea or constipation. No melena or hematochezia. GENITOURINARY: No dysuria, frequency, hematuria, or incontinence NEUROLOGICAL: No headaches, memory loss, loss of strength, numbness, or tremors SKIN: No itching, burning, rashes, or lesions  LYMPH NODES: No enlarged glands ENDOCRINE: No heat or cold intolerance; No hair loss MUSCULOSKELETAL: No joint pain or swelling; No muscle, back, or extremity pain PSYCHIATRIC: No depression, anxiety, mood swings, or difficulty sleeping HEME/LYMPH: No easy bruising, or bleeding gums ALLERGY AND IMMUNOLOGIC: No hives or eczema  Home Medications: Albuterol (Eqv-Proventil HFA) 90 mcg/inh inhalation aerosol: 2 puff(s) inhaled every 6 hours as needed for  shortness of breath and/or wheezing (04 Apr 2025 16:21) aspirin 81 mg oral tablet, chewable: 1 tab(s) chewed once a day (04 Apr 2025 16:21) atorvastatin 40 mg oral tablet: 1 tab(s) orally once a day (04 Apr 2025 16:21) gabapentin 300 mg oral capsule: 1 cap(s) orally 2 times a day (04 Apr 2025 16:21) hydroCHLOROthiazide 12.5 mg oral capsule: 1 cap(s) orally once a day (04 Apr 2025 16:21) insulin aspart 100 units/mL subcutaneous solution: 15 unit(s) subcutaneous once a day (at bedtime) (04 Apr 2025 16:21) Linzess 145 mcg oral capsule: 1 cap(s) orally once a day (04 Apr 2025 16:21) lisinopril 10 mg oral tablet: 1 tab(s) orally once a day (04 Apr 2025 16:21) MetFORMIN (Eqv-Glucophage XR) 500 mg oral tablet, extended release: 1 tab(s) orally 2 times a day (04 Apr 2025 16:21) metoprolol succinate 25 mg oral tablet, extended release: 1 tab(s) orally once a day (04 Apr 2025 16:21) Multiple Vitamins oral tablet: 1 tab(s) orally once a day (04 Apr 2025 16:21) oxyCODONE 5 mg oral tablet: 1 tab(s) orally every 8 hours as needed for  severe pain written on 5/28/25, total of 15 pills (31 May 2025 04:05) ranolazine 500 mg oral tablet, extended release: 1 tab(s) orally 2 times a day (04 Apr 2025 16:21) Trelegy Ellipta 100 mcg-62.5 mcg-25 mcg/inh inhalation powder: 1 puff(s) inhaled once a day (04 Apr 2025 16:21) Vitamin D2 1.25 mg (50,000 intl units) oral capsule: 1 cap(s) orally once a week (04 Apr 2025 16:21)   MEDICATIONS  (STANDING): aspirin  chewable 81 milliGRAM(s) Oral daily atorvastatin 40 milliGRAM(s) Oral at bedtime chlorhexidine 0.12% Liquid 15 milliLiter(s) Oral Mucosa every 12 hours chlorhexidine 2% Cloths 1 Application(s) Topical <User Schedule> fentaNYL   Infusion. 0.5 MICROgram(s)/kG/Hr (3.24 mL/Hr) IV Continuous <Continuous> gabapentin 300 milliGRAM(s) Oral two times a day heparin  Infusion.  Unit(s)/Hr (7.5 mL/Hr) IV Continuous <Continuous> insulin glargine Injectable (LANTUS) 15 Unit(s) SubCutaneous at bedtime insulin lispro (ADMELOG) corrective regimen sliding scale   SubCutaneous every 6 hours multivitamin 1 Tablet(s) Oral daily norepinephrine Infusion 0.04 MICROgram(s)/kG/Min (5.27 mL/Hr) IV Continuous <Continuous> piperacillin/tazobactam IVPB.. 3.375 Gram(s) IV Intermittent every 8 hours propofol Infusion 10 MICROgram(s)/kG/Min (4.22 mL/Hr) IV Continuous <Continuous> ranolazine 500 milliGRAM(s) Oral two times a day senna 2 Tablet(s) Oral at bedtime   ALLERGIES: 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 (Short breath) No Known Drug Allergies   FAMILY HISTORY: FHx: myocardial infarction Father    PHYSICAL EXAMINATION: ----------------------------- T(C): 35.9 (06-02-25 @ 08:00), Max: 39.6 (06-01-25 @ 21:09) HR: 76 (06-02-25 @ 09:30) (72 - 88) BP: 128/63 (06-02-25 @ 09:30) (51/35 - 150/69) RR: 16 (06-02-25 @ 09:30) (15 - 37) SpO2: 100% (06-02-25 @ 09:30) (70% - 100%) Wt(kg): --  06-01 @ 07:01  -  06-02 @ 07:00 -------------------------------------------------------- IN: Total IN: 0 mL  OUT:   Indwelling Catheter - Urethral (mL): 340 mL   Voided (mL): 550 mL Total OUT: 890 mL  Total NET: -890 mL   06-02 @ 07:01  -  06-02 @ 10:55 -------------------------------------------------------- IN:   FentaNYL: 19.5 mL   Heparin Infusion: 22.5 mL   Norepinephrine: 28.8 mL   Propofol: 35.1 mL Total IN: 105.9 mL  OUT:   Indwelling Catheter - Urethral (mL): 475 mL Total OUT: 475 mL  Total NET: -369.1 mL     Weight (kg): 64.8 (06-02 @ 00:30)  Constitutional: well developed, normal appearance, well groomed, well nourished, no deformities and no acute distress.  Eyes: the conjunctiva exhibited no abnormalities and the eyelids demonstrated no xanthelasmas.  HEENT: normal oral mucosa, no oral pallor and no oral cyanosis.  Neck: normal jugular venous A waves present, normal jugular venous V waves present and no jugular venous nava A waves.  Pulmonary: no respiratory distress, normal respiratory rhythm and effort, no accessory muscle use and lungs were clear to auscultation bilaterally.  Cardiovascular: heart rate and rhythm were normal, normal S1 and S2 and no murmur, gallop, rub, heave or thrill are present.  Abdomen: soft, non-tender, no hepato-splenomegaly and no abdominal mass palpated.  Musculoskeletal: the gait could not be assessed..  Extremities: no clubbing of the fingernails, no localized cyanosis, no petechial hemorrhages and no ischemic changes.  Skin: normal skin color and pigmentation, no rash, no venous stasis, no skin lesions, no skin ulcer and no xanthoma was observed.  Psychiatric: oriented to person, place, and time, the affect was normal, the mood was normal and not feeling anxious.   ECG: ------- < from: 12 Lead ECG (06.02.25 @ 03:45) >  Ventricular Rate 82 BPM  Atrial Rate 82 BPM  P-R Interval 156 ms  QRS Duration 96 ms  Q-T Interval 376 ms  QTC Calculation(Bazett) 439 ms  P Axis 83 degrees  R Axis 18 degrees  T Axis 109 degrees  Diagnosis Line Normal sinus rhythm Inferior infarct , age undetermined Cannot rule out Anterior infarct , age undetermined T wave abnormality, consider lateral ischemia Abnormal ECG  < end of copied text >    LABS:  -------- 06-02  136  |  107  |  56[H] ----------------------------<  307[H] 5.3   |  20[L]  |  1.80[H]  Ca    8.3[L]      02 Jun 2025 04:30 Phos  4.6     06-02 Mg     2.4     06-02  TPro  6.2  /  Alb  2.2[L]  /  TBili  1.4[H]  /  DBili  x   /  AST  30  /  ALT  15  /  AlkPhos  72  06-02                       9.8   32.85 )-----------( 238      ( 02 Jun 2025 10:00 )            31.8    PT/INR - ( 01 Jun 2025 00:20 )   PT: 13.3 sec;   INR: 1.15 ratio      PTT - ( 02 Jun 2025 10:00 )  PTT:96.2 sec      RADIOLOGY REPORTS: ----------------------------- < from: Xray Chest 1 View- PORTABLE-Urgent (Xray Chest 1 View- PORTABLE-Urgent .) (06.01.25 @ 22:08) > ACC: [id number] EXAM:  XR CHEST PORTABLE URGENT 1V   ORDERED BY: MANISH AGUILAR   PROCEDURE DATE:  06/01/2025      INTERPRETATION:  DATE OF STUDY: 6/1/25  PRIOR:5/30/25  CLINICAL INDICATION: Fever  TECHNIQUE: AP radiograph of the chest.  FINDINGS: Heart size is difficult to assess on this AP projection. No focal consolidation. No pleural effusion or pneumothorax.  IMPRESSION:  No radiographic evidence for acute cardiopulmonary disease.  < end of copied text >  PRIOR CARDIAC CATHETERIZATION: ------------------------------------------  In 2019, patient had a cath showing:  LM:   --  Distal left main: There was a 20 % stenosis at the site of a prior stent. LAD:   --  Ostial LAD: There was a stenosis at the site of a prior stent. --  Proximal LAD: There was a discrete 30 % stenosis at the site of a prior stent. --  Mid LAD: There was a 100 % stenosis at the site of a prior stent. CX:   --  Ostial circumflex: There was a 80 % stenosis at the site of a prior stent. --  Proximal circumflex: There was a stenosis at the site of a prior stent. --  Mid circumflex: There was a diffuse 80 % stenosis. --  OM1: There was a tubular 70 % stenosis. --  OM2: There was a tubular 70 % stenosis. RCA:   --  Ostial RCA: There was a stenosis at the site of a prior stent. --  Proximal RCA: There was a tubular 30 % stenosis at the site of a prior stent. --  Mid RCA: There was a stenosis at the site of a prior stent. --  Distal RCA: There was a stenosis at the site of a prior stent.   ECHOCARDIOGRAM: ---------------------------      CARDIOLOGY CONSULTATION NOTE                                                                             EMMANUEL GONZALEZ is a 74y Female with PMHx of HTN, HLD, IDDM2, CAD (s/p PCI in 2020?), and recent right humerus fracture (placed in sling 3 days ago) who presented to the ED on 5/30/25 for complaints of elevated blood glucose.  Patient is Hebrew-speaking. As per chart notes, patient fell three days prior to admission and landed on her right shoulder. Went to [facility] at that time and found to have right humerus fracture and placed in sling. Discharged home with outpatient orthopedic surgery follow up. After that, patient refused to get out of bed or eat. Daughter checked her blood glucose around 5PM and it was > 400 so brought to the ER for further evaluation. Baseline functional status is ambulates with walker and dependent with all ADLs. Lives at home with daughter.  In the ED, VSS. Hgb 10.3, sodium 132, potassium 6.3, BUN 70, Cr 2.02, blood glucose 510. VBG 7.29 / 58 / 34 / 26. COVID/influenza/RSV negative. CT head without acute intracranial findings. Received 1L NS bolus, 1L LR bolus, and insulin 20 U IV.    On evening of 6/1, spiked temp to 103.2 and became acutely hypotensive despite IVF. RRT was called due to acute obtundation and she was intubated and resuscitated..  Review of chart reveals similar events in 4/25 when patient was transferred to [facility] as an NSTEMI with chest pain and elevated cardiac enzymes. Patient left hospital several hours after arrival, AMA.  REVIEW OF SYSTEMS: -----------------------------  CONSTITUTIONAL: No fever, weight loss, or fatigue EYES: No eye pain, visual disturbances, or discharge ENMT:  No difficulty hearing, tinnitus, vertigo; No sinus or throat pain NECK: No pain or stiffness BREASTS: No pain, masses, or nipple discharge RESPIRATORY: No cough, wheezing, chills or hemoptysis; No shortness of breath CARDIOVASCULAR: See HPI GASTROINTESTINAL: No abdominal or epigastric pain. No nausea, vomiting, or hematemesis; No diarrhea or constipation. No melena or hematochezia. GENITOURINARY: No dysuria, frequency, hematuria, or incontinence NEUROLOGICAL: No headaches, memory loss, loss of strength, numbness, or tremors SKIN: No itching, burning, rashes, or lesions  LYMPH NODES: No enlarged glands ENDOCRINE: No heat or cold intolerance; No hair loss MUSCULOSKELETAL: No joint pain or swelling; No muscle, back, or extremity pain PSYCHIATRIC: No depression, anxiety, mood swings, or difficulty sleeping HEME/LYMPH: No easy bruising, or bleeding gums ALLERGY AND IMMUNOLOGIC: No hives or eczema  Home Medications: Albuterol (Eqv-Proventil HFA) 90 mcg/inh inhalation aerosol: 2 puff(s) inhaled every 6 hours as needed for  shortness of breath and/or wheezing (04 Apr 2025 16:21) aspirin 81 mg oral tablet, chewable: 1 tab(s) chewed once a day (04 Apr 2025 16:21) atorvastatin 40 mg oral tablet: 1 tab(s) orally once a day (04 Apr 2025 16:21) gabapentin 300 mg oral capsule: 1 cap(s) orally 2 times a day (04 Apr 2025 16:21) hydroCHLOROthiazide 12.5 mg oral capsule: 1 cap(s) orally once a day (04 Apr 2025 16:21) insulin aspart 100 units/mL subcutaneous solution: 15 unit(s) subcutaneous once a day (at bedtime) (04 Apr 2025 16:21) Linzess 145 mcg oral capsule: 1 cap(s) orally once a day (04 Apr 2025 16:21) lisinopril 10 mg oral tablet: 1 tab(s) orally once a day (04 Apr 2025 16:21) MetFORMIN (Eqv-Glucophage XR) 500 mg oral tablet, extended release: 1 tab(s) orally 2 times a day (04 Apr 2025 16:21) metoprolol succinate 25 mg oral tablet, extended release: 1 tab(s) orally once a day (04 Apr 2025 16:21) Multiple Vitamins oral tablet: 1 tab(s) orally once a day (04 Apr 2025 16:21) oxyCODONE 5 mg oral tablet: 1 tab(s) orally every 8 hours as needed for  severe pain written on 5/28/25, total of 15 pills (31 May 2025 04:05) ranolazine 500 mg oral tablet, extended release: 1 tab(s) orally 2 times a day (04 Apr 2025 16:21) Trelegy Ellipta 100 mcg-62.5 mcg-25 mcg/inh inhalation powder: 1 puff(s) inhaled once a day (04 Apr 2025 16:21) Vitamin D2 1.25 mg (50,000 intl units) oral capsule: 1 cap(s) orally once a week (04 Apr 2025 16:21)   MEDICATIONS  (STANDING): aspirin  chewable 81 milliGRAM(s) Oral daily atorvastatin 40 milliGRAM(s) Oral at bedtime chlorhexidine 0.12% Liquid 15 milliLiter(s) Oral Mucosa every 12 hours chlorhexidine 2% Cloths 1 Application(s) Topical <User Schedule> fentaNYL   Infusion. 0.5 MICROgram(s)/kG/Hr (3.24 mL/Hr) IV Continuous <Continuous> gabapentin 300 milliGRAM(s) Oral two times a day heparin  Infusion.  Unit(s)/Hr (7.5 mL/Hr) IV Continuous <Continuous> insulin glargine Injectable (LANTUS) 15 Unit(s) SubCutaneous at bedtime insulin lispro (ADMELOG) corrective regimen sliding scale   SubCutaneous every 6 hours multivitamin 1 Tablet(s) Oral daily norepinephrine Infusion 0.04 MICROgram(s)/kG/Min (5.27 mL/Hr) IV Continuous <Continuous> piperacillin/tazobactam IVPB.. 3.375 Gram(s) IV Intermittent every 8 hours propofol Infusion 10 MICROgram(s)/kG/Min (4.22 mL/Hr) IV Continuous <Continuous> ranolazine 500 milliGRAM(s) Oral two times a day senna 2 Tablet(s) Oral at bedtime   ALLERGIES: 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 (Short breath) No Known Drug Allergies   FAMILY HISTORY: FHx: myocardial infarction Father    PHYSICAL EXAMINATION: ----------------------------- T(C): 35.9 (06-02-25 @ 08:00), Max: 39.6 (06-01-25 @ 21:09) HR: 76 (06-02-25 @ 09:30) (72 - 88) BP: 128/63 (06-02-25 @ 09:30) (51/35 - 150/69) RR: 16 (06-02-25 @ 09:30) (15 - 37) SpO2: 100% (06-02-25 @ 09:30) (70% - 100%) Wt(kg): --  06-01 @ 07:01  -  06-02 @ 07:00 -------------------------------------------------------- IN: Total IN: 0 mL  OUT:   Indwelling Catheter - Urethral (mL): 340 mL   Voided (mL): 550 mL Total OUT: 890 mL  Total NET: -890 mL   06-02 @ 07:01  -  06-02 @ 10:55 -------------------------------------------------------- IN:   FentaNYL: 19.5 mL   Heparin Infusion: 22.5 mL   Norepinephrine: 28.8 mL   Propofol: 35.1 mL Total IN: 105.9 mL  OUT:   Indwelling Catheter - Urethral (mL): 475 mL Total OUT: 475 mL  Total NET: -369.1 mL     Weight (kg): 64.8 (06-02 @ 00:30)  Constitutional: well developed, normal appearance, well groomed, well nourished, no deformities and no acute distress.  Eyes: the conjunctiva exhibited no abnormalities and the eyelids demonstrated no xanthelasmas.  HEENT: normal oral mucosa, no oral pallor and no oral cyanosis.  Neck: normal jugular venous A waves present, normal jugular venous V waves present and no jugular venous nava A waves.  Pulmonary: no respiratory distress, normal respiratory rhythm and effort, no accessory muscle use and lungs were clear to auscultation bilaterally.  Cardiovascular: heart rate and rhythm were normal, normal S1 and S2 and no murmur, gallop, rub, heave or thrill are present.  Abdomen: soft, non-tender, no hepato-splenomegaly and no abdominal mass palpated.  Musculoskeletal: the gait could not be assessed..  Extremities: no clubbing of the fingernails, no localized cyanosis, no petechial hemorrhages and no ischemic changes.  Skin: normal skin color and pigmentation, no rash, no venous stasis, no skin lesions, no skin ulcer and no xanthoma was observed.  Psychiatric: oriented to person, place, and time, the affect was normal, the mood was normal and not feeling anxious.   ECG: ------- < from: 12 Lead ECG (06.02.25 @ 03:45) >  Ventricular Rate 82 BPM  Atrial Rate 82 BPM  P-R Interval 156 ms  QRS Duration 96 ms  Q-T Interval 376 ms  QTC Calculation(Bazett) 439 ms  P Axis 83 degrees  R Axis 18 degrees  T Axis 109 degrees  Diagnosis Line Normal sinus rhythm Inferior infarct , age undetermined Cannot rule out Anterior infarct , age undetermined T wave abnormality, consider lateral ischemia Abnormal ECG  < end of copied text >    LABS:  -------- 06-02  136  |  107  |  56[H] ----------------------------<  307[H] 5.3   |  20[L]  |  1.80[H]  Ca    8.3[L]      02 Jun 2025 04:30 Phos  4.6     06-02 Mg     2.4     06-02  TPro  6.2  /  Alb  2.2[L]  /  TBili  1.4[H]  /  DBili  x   /  AST  30  /  ALT  15  /  AlkPhos  72  06-02                       9.8   32.85 )-----------( 238      ( 02 Jun 2025 10:00 )            31.8    PT/INR - ( 01 Jun 2025 00:20 )   PT: 13.3 sec;   INR: 1.15 ratio      PTT - ( 02 Jun 2025 10:00 )  PTT:96.2 sec      RADIOLOGY REPORTS: ----------------------------- < from: Xray Chest 1 View- PORTABLE-Urgent (Xray Chest 1 View- PORTABLE-Urgent .) (06.01.25 @ 22:08) > ACC: [id number] EXAM:  XR CHEST PORTABLE URGENT 1V   ORDERED BY: MANISH AGUILAR   PROCEDURE DATE:  06/01/2025      INTERPRETATION:  DATE OF STUDY: 6/1/25  PRIOR:5/30/25  CLINICAL INDICATION: Fever  TECHNIQUE: AP radiograph of the chest.  FINDINGS: Heart size is difficult to assess on this AP projection. No focal consolidation. No pleural effusion or pneumothorax.  IMPRESSION:  No radiographic evidence for acute cardiopulmonary disease.  < end of copied text >  PRIOR CARDIAC CATHETERIZATION: ------------------------------------------  In 2019, patient had a cath showing:  LM:   --  Distal left main: There was a 20 % stenosis at the site of a prior stent. LAD:   --  Ostial LAD: There was a stenosis at the site of a prior stent. --  Proximal LAD: There was a discrete 30 % stenosis at the site of a prior stent. --  Mid LAD: There was a 100 % stenosis at the site of a prior stent. CX:   --  Ostial circumflex: There was a 80 % stenosis at the site of a prior stent. --  Proximal circumflex: There was a stenosis at the site of a prior stent. --  Mid circumflex: There was a diffuse 80 % stenosis. --  OM1: There was a tubular 70 % stenosis. --  OM2: There was a tubular 70 % stenosis. RCA:   --  Ostial RCA: There was a stenosis at the site of a prior stent. --  Proximal RCA: There was a tubular 30 % stenosis at the site of a prior stent. --  Mid RCA: There was a stenosis at the site of a prior stent. --  Distal RCA: There was a stenosis at the site of a prior stent.   ECHOCARDIOGRAM: --------------------------- pending

## 2025-06-02 NOTE — CHART NOTE - NSCHARTNOTEFT_GEN_A_CORE
Code Blue called overhead to CT scan, alerted by staff that it is most likely our patient in ICU 2 during her CT abd/pelvis. Myself and attending went downstairs to assess  situation. Upon arrival hospitalist and team running code at this time. See code sheet/note by team running code. When arrived active ACLS in place with 10 mins in progress, appeared to be in narrow complex during pulse check at one point but pulse was not felt by team. Airway checked; at proper lip line, easy to ambu and +etco2 and chest rise. Requested US from IR to look for cardiac contractility during next pulse check. Next pulse check with same narrow complex, US showing cardiac contractility. Patient with ROSC, suggestive worsening progression into cardiac event. CT scan aborted, transferred back up to ICU. STAT labs, EKG, CXR ordered. Family updated.

## 2025-06-02 NOTE — CHART NOTE - NSCHARTNOTEFT_GEN_A_CORE
Received call from primary RN around 9:30PM regarding Temp 103.2. Other vitals: HR 83, /55, RR 20, SpO2 95% on 2L NC. At that time, sepsis work up ordered including blood cultures x 2, U/A/ urine culture, and chest x-ray. Cooling blanket ordered and ice packs to be placed on patient. Empirically started on ceftriaxone 1 g IV and vancomycin 1 g IV x 1 for now. Acetaminophen 1 g IV administered.     Urinalysis Basic - ( 2025 22:20 )  Color: Yellow / Appearance: Cloudy / S.018 / pH: 5.0  Gluc: >=1000 mg/dL / Ketone: x  / Bili: Negative / Urobili: 1.0 mg/dL   Blood: Negative / Protein: Negative mg/dL / Nitrite: Negative   Leuk Esterase: Moderate / RBC: 0 /HPF / WBC 20 /HPF   Sq Epi: x / Bacteria: Many /HPF  Hyaline Casts: x/WBC Casts: x    Responded to RRT called at 11:31PM for hypotension. BP 72/50 MAP 57 with repeat BP 65/41 MAP 49 despite IVF running wide open. Patient known to me from admission on 25. Chart reviewed. Patient seen and examined at bedside. Lethargic, not responsive to sternal rub or painful stimuli, and does not open eyes to name call. Discussed with ICU PA who accepted patient for higher level of care. Transferred to ICU with myself, medicine PA, ICU PAs, ICU RN, and floor RN at bedside. Phenylephrine push administered and norepinephrine gtt started by ICU team at this time. Plan to intubate for airway protection. Daughter updated. Received call from primary RN around 9:30PM regarding Temp 103.2. Other vitals: HR 83, /55, RR 20, SpO2 95% on 2L NC. At that time, sepsis work up ordered including blood cultures x 2, U/A, urine culture, and chest x-ray. Cooling blanket ordered and ice packs to be placed on patient. Empirically started on ceftriaxone 1 g IV and vancomycin 1 g IV x 1 for now. Acetaminophen 1 g IV administered.     Urinalysis Basic - ( 2025 22:20 )  Color: Yellow / Appearance: Cloudy / S.018 / pH: 5.0  Gluc: >=1000 mg/dL / Ketone: x  / Bili: Negative / Urobili: 1.0 mg/dL   Blood: Negative / Protein: Negative mg/dL / Nitrite: Negative   Leuk Esterase: Moderate / RBC: 0 /HPF / WBC 20 /HPF   Sq Epi: x / Bacteria: Many /HPF  Hyaline Casts: x/WBC Casts: x    Responded to RRT called at 11:31PM for hypotension. BP 72/50 MAP 57 with repeat BP 65/41 MAP 49 despite IVF running wide open. Patient known to me from admission on 25. Chart reviewed. Patient seen and examined at bedside. Lethargic, not responsive to sternal rub or painful stimuli, and does not open eyes to name call. Discussed with ICU PA who accepted patient for higher level of care. Transferred to ICU with myself, medicine PA, ICU PAs, ICU RN, and floor RN at bedside. Phenylephrine push administered and norepinephrine gtt started by ICU team at this time. Plan to intubate for airway protection. Daughter updated. Received call from primary RN around 9:30PM regarding Temp 103.2. Other vitals: HR 83, /55, RR 20, SpO2 95% on 2L NC. At that time, sepsis work up ordered including blood cultures x 2, U/A, urine culture, and chest x-ray. Cooling blanket ordered and ice packs to be placed on patient. Empirically started on ceftriaxone 1 g IV and vancomycin 1 g IV x 1 for now. Acetaminophen 1 g IV administered.     Urinalysis Basic - ( 2025 22:20 )  Color: Yellow / Appearance: Cloudy / S.018 / pH: 5.0  Gluc: >=1000 mg/dL / Ketone: x  / Bili: Negative / Urobili: 1.0 mg/dL   Blood: Negative / Protein: Negative mg/dL / Nitrite: Negative   Leuk Esterase: Moderate / RBC: 0 /HPF / WBC 20 /HPF   Sq Epi: x / Bacteria: Many /HPF  Hyaline Casts: x/WBC Casts: x    Responded to RRT called at 11:31PM for hypotension. BP 72/50 MAP 57 with repeat BP 65/41 MAP 49 despite IVF running wide open. Patient known to me from admission on 25. Chart reviewed. Patient seen and examined at bedside. Obtunded, not responsive to sternal rub or painful stimuli, and does not open eyes to name call. Discussed with ICU PA who accepted patient for higher level of care. Transferred to ICU with myself, medicine PA, ICU PAs, ICU RN, and floor RN at bedside. Phenylephrine push administered and norepinephrine gtt started by ICU team at this time. Plan to intubate for airway protection. Daughter updated.

## 2025-06-02 NOTE — CONSULT NOTE ADULT - CRITICAL CARE ATTENDING COMMENT
74-year-old female with hypertension, hyperlipidemia, 2 diabetes, CAD status post multiple stents with a recent right humerus fracture roughly 3 days ago following a traumatic fall admitted to the medical ICU for altered mental status and hypotension.  Concern for septic shock secondary to colitis found to have ESBL E. coli bacteremia.  Hospital course further complicated by cardiac arrest of unclear origin with ROSC after 10 minutes of CPR.    Neuro: Patient currently sedated with propofol.  Maintain RASS between 0 to -1.  Will plan for CT head in the next 48 hours to assess for anoxic brain injury  Cardiovascular: Patient currently in distributive shock secondary to E. coli bacteremia most likely from colitis.  Continue with vasopressors maintain MAP greater than 65.  Elevated troponin concerning for NSTEMI type II however given cardiac arrest concerned that patient's NSTEMI has progressed to worsening myocardial infarction.  Will continue to trend troponins and repeat EKG.  Follow cardiology input.  Continue with heparin drip for ACS treatment  Pulmonary: Patient intubated for airway protection.  Titrate ventilator settings based on your blood gas.  Maintain O2 saturation above 90%  GI: Concern for stercoral colitis based on CT abdomen pelvis.  Continue with aggressive bowel regimen.  Concern for possible source of the patient's E. coli bacteremia.  Monitor for bowel movements.  Renal: Patient in ALY most likely ATN from shock state.  Strict I's and O's.  Monitor electrolytes and replete as needed.  ID: Change antibiotics to meropenem given ESBL coli in the blood.  Repeat blood cultures in 48 hours.  Endo: Insulin sliding scale  Heme: Continue with heparin drip  MSK: Patient with a known history of right humeral fracture consulted orthopedics no plan for intervention at this time.  Ethics: Patient is full code overall prognosis is poor

## 2025-06-03 LAB
ALBUMIN SERPL ELPH-MCNC: 1.9 G/DL — LOW (ref 3.3–5)
ALBUMIN SERPL ELPH-MCNC: 1.9 G/DL — LOW (ref 3.3–5)
ALBUMIN SERPL ELPH-MCNC: 2 G/DL — LOW (ref 3.3–5)
ALP SERPL-CCNC: 102 U/L — SIGNIFICANT CHANGE UP (ref 40–120)
ALP SERPL-CCNC: 102 U/L — SIGNIFICANT CHANGE UP (ref 40–120)
ALP SERPL-CCNC: 77 U/L — SIGNIFICANT CHANGE UP (ref 40–120)
ALT FLD-CCNC: 25 U/L — SIGNIFICANT CHANGE UP (ref 12–78)
ALT FLD-CCNC: 27 U/L — SIGNIFICANT CHANGE UP (ref 12–78)
ALT FLD-CCNC: 28 U/L — SIGNIFICANT CHANGE UP (ref 12–78)
ANION GAP SERPL CALC-SCNC: 10 MMOL/L — SIGNIFICANT CHANGE UP (ref 5–17)
ANION GAP SERPL CALC-SCNC: 7 MMOL/L — SIGNIFICANT CHANGE UP (ref 5–17)
ANION GAP SERPL CALC-SCNC: 7 MMOL/L — SIGNIFICANT CHANGE UP (ref 5–17)
APTT BLD: 65.6 SEC — HIGH (ref 26.1–36.8)
APTT BLD: 68.4 SEC — HIGH (ref 26.1–36.8)
AST SERPL-CCNC: 47 U/L — HIGH (ref 15–37)
AST SERPL-CCNC: 50 U/L — HIGH (ref 15–37)
AST SERPL-CCNC: 54 U/L — HIGH (ref 15–37)
BASE EXCESS BLDA CALC-SCNC: -3.9 MMOL/L — LOW (ref -2–3)
BASOPHILS # BLD AUTO: 0.13 K/UL — SIGNIFICANT CHANGE UP (ref 0–0.2)
BASOPHILS NFR BLD AUTO: 0.6 % — SIGNIFICANT CHANGE UP (ref 0–2)
BILIRUB DIRECT SERPL-MCNC: 0.4 MG/DL — HIGH (ref 0–0.3)
BILIRUB INDIRECT FLD-MCNC: 0.5 MG/DL — SIGNIFICANT CHANGE UP (ref 0.2–1)
BILIRUB SERPL-MCNC: 0.9 MG/DL — SIGNIFICANT CHANGE UP (ref 0.2–1.2)
BILIRUB SERPL-MCNC: 1.1 MG/DL — SIGNIFICANT CHANGE UP (ref 0.2–1.2)
BILIRUB SERPL-MCNC: 1.2 MG/DL — SIGNIFICANT CHANGE UP (ref 0.2–1.2)
BLOOD GAS COMMENTS ARTERIAL: SIGNIFICANT CHANGE UP
BUN SERPL-MCNC: 56 MG/DL — HIGH (ref 7–23)
BUN SERPL-MCNC: 61 MG/DL — HIGH (ref 7–23)
BUN SERPL-MCNC: 62 MG/DL — HIGH (ref 7–23)
CALCIUM SERPL-MCNC: 8.6 MG/DL — SIGNIFICANT CHANGE UP (ref 8.5–10.1)
CALCIUM SERPL-MCNC: 8.7 MG/DL — SIGNIFICANT CHANGE UP (ref 8.5–10.1)
CALCIUM SERPL-MCNC: 8.8 MG/DL — SIGNIFICANT CHANGE UP (ref 8.5–10.1)
CHLORIDE SERPL-SCNC: 106 MMOL/L — SIGNIFICANT CHANGE UP (ref 96–108)
CHLORIDE SERPL-SCNC: 107 MMOL/L — SIGNIFICANT CHANGE UP (ref 96–108)
CHLORIDE SERPL-SCNC: 109 MMOL/L — HIGH (ref 96–108)
CK SERPL-CCNC: 246 U/L — HIGH (ref 26–192)
CO2 BLDA-SCNC: 23 MMOL/L — SIGNIFICANT CHANGE UP (ref 19–24)
CO2 SERPL-SCNC: 22 MMOL/L — SIGNIFICANT CHANGE UP (ref 22–31)
CO2 SERPL-SCNC: 24 MMOL/L — SIGNIFICANT CHANGE UP (ref 22–31)
CO2 SERPL-SCNC: 25 MMOL/L — SIGNIFICANT CHANGE UP (ref 22–31)
CREAT SERPL-MCNC: 1.68 MG/DL — HIGH (ref 0.5–1.3)
CREAT SERPL-MCNC: 1.8 MG/DL — HIGH (ref 0.5–1.3)
CREAT SERPL-MCNC: 1.85 MG/DL — HIGH (ref 0.5–1.3)
EGFR: 28 ML/MIN/1.73M2 — LOW
EGFR: 28 ML/MIN/1.73M2 — LOW
EGFR: 29 ML/MIN/1.73M2 — LOW
EGFR: 29 ML/MIN/1.73M2 — LOW
EGFR: 31 ML/MIN/1.73M2 — LOW
EGFR: 31 ML/MIN/1.73M2 — LOW
EOSINOPHIL # BLD AUTO: 0.08 K/UL — SIGNIFICANT CHANGE UP (ref 0–0.5)
EOSINOPHIL NFR BLD AUTO: 0.4 % — SIGNIFICANT CHANGE UP (ref 0–6)
GAS PNL BLDA: SIGNIFICANT CHANGE UP
GLUCOSE BLDC GLUCOMTR-MCNC: 117 MG/DL — HIGH (ref 70–99)
GLUCOSE BLDC GLUCOMTR-MCNC: 153 MG/DL — HIGH (ref 70–99)
GLUCOSE BLDC GLUCOMTR-MCNC: 178 MG/DL — HIGH (ref 70–99)
GLUCOSE BLDC GLUCOMTR-MCNC: 198 MG/DL — HIGH (ref 70–99)
GLUCOSE BLDC GLUCOMTR-MCNC: 217 MG/DL — HIGH (ref 70–99)
GLUCOSE BLDC GLUCOMTR-MCNC: 258 MG/DL — HIGH (ref 70–99)
GLUCOSE SERPL-MCNC: 148 MG/DL — HIGH (ref 70–99)
GLUCOSE SERPL-MCNC: 169 MG/DL — HIGH (ref 70–99)
GLUCOSE SERPL-MCNC: 247 MG/DL — HIGH (ref 70–99)
HCO3 BLDA-SCNC: 22 MMOL/L — SIGNIFICANT CHANGE UP (ref 21–28)
HCT VFR BLD CALC: 32.7 % — LOW (ref 34.5–45)
HGB BLD-MCNC: 9.9 G/DL — LOW (ref 11.5–15.5)
HOROWITZ INDEX BLDA+IHG-RTO: 40 — SIGNIFICANT CHANGE UP
IMM GRANULOCYTES NFR BLD AUTO: 4.4 % — HIGH (ref 0–0.9)
LACTATE SERPL-SCNC: 2.1 MMOL/L — HIGH (ref 0.7–2)
LYMPHOCYTES # BLD AUTO: 0.72 K/UL — LOW (ref 1–3.3)
LYMPHOCYTES # BLD AUTO: 3.6 % — LOW (ref 13–44)
MAGNESIUM SERPL-MCNC: 2.5 MG/DL — SIGNIFICANT CHANGE UP (ref 1.6–2.6)
MAGNESIUM SERPL-MCNC: 2.5 MG/DL — SIGNIFICANT CHANGE UP (ref 1.6–2.6)
MCHC RBC-ENTMCNC: 27.8 PG — SIGNIFICANT CHANGE UP (ref 27–34)
MCHC RBC-ENTMCNC: 30.3 G/DL — LOW (ref 32–36)
MCV RBC AUTO: 91.9 FL — SIGNIFICANT CHANGE UP (ref 80–100)
MONOCYTES # BLD AUTO: 0.63 K/UL — SIGNIFICANT CHANGE UP (ref 0–0.9)
MONOCYTES NFR BLD AUTO: 3.1 % — SIGNIFICANT CHANGE UP (ref 2–14)
NEUTROPHILS # BLD AUTO: 17.6 K/UL — HIGH (ref 1.8–7.4)
NEUTROPHILS NFR BLD AUTO: 87.9 % — HIGH (ref 43–77)
NRBC BLD AUTO-RTO: 0 /100 WBCS — SIGNIFICANT CHANGE UP (ref 0–0)
PCO2 BLDA: 40 MMHG — SIGNIFICANT CHANGE UP (ref 32–46)
PH BLDA: 7.34 — LOW (ref 7.35–7.45)
PHOSPHATE SERPL-MCNC: 3.9 MG/DL — SIGNIFICANT CHANGE UP (ref 2.5–4.5)
PHOSPHATE SERPL-MCNC: 5.1 MG/DL — HIGH (ref 2.5–4.5)
PLATELET # BLD AUTO: 226 K/UL — SIGNIFICANT CHANGE UP (ref 150–400)
PO2 BLDA: 132 MMHG — HIGH (ref 83–108)
POTASSIUM SERPL-MCNC: 5 MMOL/L — SIGNIFICANT CHANGE UP (ref 3.5–5.3)
POTASSIUM SERPL-MCNC: 6.1 MMOL/L — HIGH (ref 3.5–5.3)
POTASSIUM SERPL-MCNC: 6.4 MMOL/L — CRITICAL HIGH (ref 3.5–5.3)
POTASSIUM SERPL-SCNC: 5 MMOL/L — SIGNIFICANT CHANGE UP (ref 3.5–5.3)
POTASSIUM SERPL-SCNC: 6.1 MMOL/L — HIGH (ref 3.5–5.3)
POTASSIUM SERPL-SCNC: 6.4 MMOL/L — CRITICAL HIGH (ref 3.5–5.3)
PROT SERPL-MCNC: 6.5 GM/DL — SIGNIFICANT CHANGE UP (ref 6–8.3)
PROT SERPL-MCNC: 6.5 GM/DL — SIGNIFICANT CHANGE UP (ref 6–8.3)
PROT SERPL-MCNC: 6.6 GM/DL — SIGNIFICANT CHANGE UP (ref 6–8.3)
RBC # BLD: 3.56 M/UL — LOW (ref 3.8–5.2)
RBC # FLD: 13.8 % — SIGNIFICANT CHANGE UP (ref 10.3–14.5)
SAO2 % BLDA: 98.9 % — HIGH (ref 94–98)
SODIUM SERPL-SCNC: 138 MMOL/L — SIGNIFICANT CHANGE UP (ref 135–145)
SODIUM SERPL-SCNC: 139 MMOL/L — SIGNIFICANT CHANGE UP (ref 135–145)
SODIUM SERPL-SCNC: 140 MMOL/L — SIGNIFICANT CHANGE UP (ref 135–145)
TROPONIN I, HIGH SENSITIVITY RESULT: 4451.4 NG/L — HIGH
WBC # BLD: 20.05 K/UL — HIGH (ref 3.8–10.5)
WBC # FLD AUTO: 20.05 K/UL — HIGH (ref 3.8–10.5)

## 2025-06-03 PROCEDURE — 36410 VNPNXR 3YR/> PHY/QHP DX/THER: CPT | Mod: 59

## 2025-06-03 PROCEDURE — 93010 ELECTROCARDIOGRAM REPORT: CPT

## 2025-06-03 PROCEDURE — 36620 INSERTION CATHETER ARTERY: CPT

## 2025-06-03 PROCEDURE — 99233 SBSQ HOSP IP/OBS HIGH 50: CPT

## 2025-06-03 PROCEDURE — 99291 CRITICAL CARE FIRST HOUR: CPT | Mod: 25

## 2025-06-03 PROCEDURE — 76937 US GUIDE VASCULAR ACCESS: CPT | Mod: 26,59

## 2025-06-03 RX ORDER — CALCIUM GLUCONATE 20 MG/ML
1 INJECTION, SOLUTION INTRAVENOUS ONCE
Refills: 0 | Status: COMPLETED | OUTPATIENT
Start: 2025-06-03 | End: 2025-06-03

## 2025-06-03 RX ORDER — SODIUM ZIRCONIUM CYCLOSILICATE 5 G/5G
5 POWDER, FOR SUSPENSION ORAL EVERY 8 HOURS
Refills: 0 | Status: DISCONTINUED | OUTPATIENT
Start: 2025-06-03 | End: 2025-06-04

## 2025-06-03 RX ORDER — DEXTROSE 50 % IN WATER 50 %
50 SYRINGE (ML) INTRAVENOUS ONCE
Refills: 0 | Status: COMPLETED | OUTPATIENT
Start: 2025-06-03 | End: 2025-06-03

## 2025-06-03 RX ORDER — DEXTROSE 50 % IN WATER 50 %
25 SYRINGE (ML) INTRAVENOUS ONCE
Refills: 0 | Status: DISCONTINUED | OUTPATIENT
Start: 2025-06-03 | End: 2025-06-05

## 2025-06-03 RX ORDER — NOREPINEPHRINE BITARTRATE 8 MG
0.05 SOLUTION INTRAVENOUS
Qty: 16 | Refills: 0 | Status: DISCONTINUED | OUTPATIENT
Start: 2025-06-03 | End: 2025-06-06

## 2025-06-03 RX ORDER — VASOPRESSIN 20 [USP'U]/ML
0.04 INJECTION INTRAVENOUS
Qty: 40 | Refills: 0 | Status: DISCONTINUED | OUTPATIENT
Start: 2025-06-03 | End: 2025-06-04

## 2025-06-03 RX ORDER — SODIUM ZIRCONIUM CYCLOSILICATE 5 G/5G
10 POWDER, FOR SUSPENSION ORAL ONCE
Refills: 0 | Status: COMPLETED | OUTPATIENT
Start: 2025-06-03 | End: 2025-06-03

## 2025-06-03 RX ORDER — SODIUM CHLORIDE 9 G/1000ML
1000 INJECTION, SOLUTION INTRAVENOUS
Refills: 0 | Status: DISCONTINUED | OUTPATIENT
Start: 2025-06-03 | End: 2025-06-05

## 2025-06-03 RX ADMIN — Medication 3.24 MICROGRAM(S)/KG/HR: at 19:29

## 2025-06-03 RX ADMIN — PROPOFOL 4.22 MICROGRAM(S)/KG/MIN: 10 INJECTION, EMULSION INTRAVENOUS at 07:55

## 2025-06-03 RX ADMIN — MEROPENEM 100 MILLIGRAM(S): 1 INJECTION INTRAVENOUS at 05:00

## 2025-06-03 RX ADMIN — Medication 650 MILLIGRAM(S): at 12:45

## 2025-06-03 RX ADMIN — NOREPINEPHRINE BITARTRATE 5.27 MICROGRAM(S)/KG/MIN: 8 SOLUTION at 04:52

## 2025-06-03 RX ADMIN — SODIUM ZIRCONIUM CYCLOSILICATE 5 GRAM(S): 5 POWDER, FOR SUSPENSION ORAL at 20:12

## 2025-06-03 RX ADMIN — Medication 1 TABLET(S): at 07:55

## 2025-06-03 RX ADMIN — INSULIN LISPRO 2: 100 INJECTION, SOLUTION INTRAVENOUS; SUBCUTANEOUS at 23:07

## 2025-06-03 RX ADMIN — Medication 5 UNIT(S): at 04:46

## 2025-06-03 RX ADMIN — HEPARIN SODIUM 500 UNIT(S)/HR: 1000 INJECTION INTRAVENOUS; SUBCUTANEOUS at 19:28

## 2025-06-03 RX ADMIN — Medication 50 MILLILITER(S): at 11:30

## 2025-06-03 RX ADMIN — VASOPRESSIN 6 UNIT(S)/MIN: 20 INJECTION INTRAVENOUS at 19:29

## 2025-06-03 RX ADMIN — Medication 81 MILLIGRAM(S): at 07:54

## 2025-06-03 RX ADMIN — HEPARIN SODIUM 500 UNIT(S)/HR: 1000 INJECTION INTRAVENOUS; SUBCUTANEOUS at 07:58

## 2025-06-03 RX ADMIN — RANOLAZINE 500 MILLIGRAM(S): 1000 TABLET, FILM COATED, EXTENDED RELEASE ORAL at 17:14

## 2025-06-03 RX ADMIN — Medication 50 MILLILITER(S): at 04:43

## 2025-06-03 RX ADMIN — PROPOFOL 4.22 MICROGRAM(S)/KG/MIN: 10 INJECTION, EMULSION INTRAVENOUS at 23:13

## 2025-06-03 RX ADMIN — Medication 650 MILLIGRAM(S): at 05:54

## 2025-06-03 RX ADMIN — PROPOFOL 4.22 MICROGRAM(S)/KG/MIN: 10 INJECTION, EMULSION INTRAVENOUS at 19:29

## 2025-06-03 RX ADMIN — NOREPINEPHRINE BITARTRATE 5.27 MICROGRAM(S)/KG/MIN: 8 SOLUTION at 20:12

## 2025-06-03 RX ADMIN — INSULIN GLARGINE-YFGN 15 UNIT(S): 100 INJECTION, SOLUTION SUBCUTANEOUS at 23:03

## 2025-06-03 RX ADMIN — GABAPENTIN 300 MILLIGRAM(S): 400 CAPSULE ORAL at 17:14

## 2025-06-03 RX ADMIN — NOREPINEPHRINE BITARTRATE 3.04 MICROGRAM(S)/KG/MIN: 8 SOLUTION at 22:17

## 2025-06-03 RX ADMIN — Medication 1 APPLICATION(S): at 05:54

## 2025-06-03 RX ADMIN — MEROPENEM 100 MILLIGRAM(S): 1 INJECTION INTRAVENOUS at 17:13

## 2025-06-03 RX ADMIN — Medication 650 MILLIGRAM(S): at 13:47

## 2025-06-03 RX ADMIN — SODIUM ZIRCONIUM CYCLOSILICATE 5 GRAM(S): 5 POWDER, FOR SUSPENSION ORAL at 12:45

## 2025-06-03 RX ADMIN — CALCIUM GLUCONATE 100 GRAM(S): 20 INJECTION, SOLUTION INTRAVENOUS at 04:48

## 2025-06-03 RX ADMIN — GABAPENTIN 300 MILLIGRAM(S): 400 CAPSULE ORAL at 05:04

## 2025-06-03 RX ADMIN — ATORVASTATIN CALCIUM 40 MILLIGRAM(S): 80 TABLET, FILM COATED ORAL at 21:19

## 2025-06-03 RX ADMIN — VASOPRESSIN 6 UNIT(S)/MIN: 20 INJECTION INTRAVENOUS at 17:14

## 2025-06-03 RX ADMIN — INSULIN LISPRO 6: 100 INJECTION, SOLUTION INTRAVENOUS; SUBCUTANEOUS at 12:59

## 2025-06-03 RX ADMIN — Medication 15 MILLILITER(S): at 17:15

## 2025-06-03 RX ADMIN — Medication 3.24 MICROGRAM(S)/KG/HR: at 18:06

## 2025-06-03 RX ADMIN — HEPARIN SODIUM 500 UNIT(S)/HR: 1000 INJECTION INTRAVENOUS; SUBCUTANEOUS at 11:34

## 2025-06-03 RX ADMIN — Medication 5 UNIT(S): at 11:29

## 2025-06-03 RX ADMIN — RANOLAZINE 500 MILLIGRAM(S): 1000 TABLET, FILM COATED, EXTENDED RELEASE ORAL at 05:00

## 2025-06-03 RX ADMIN — Medication 650 MILLIGRAM(S): at 07:14

## 2025-06-03 RX ADMIN — Medication 15 MILLILITER(S): at 04:53

## 2025-06-03 RX ADMIN — VASOPRESSIN 6 UNIT(S)/MIN: 20 INJECTION INTRAVENOUS at 06:28

## 2025-06-03 RX ADMIN — HEPARIN SODIUM 500 UNIT(S)/HR: 1000 INJECTION INTRAVENOUS; SUBCUTANEOUS at 02:00

## 2025-06-03 RX ADMIN — NOREPINEPHRINE BITARTRATE 5.27 MICROGRAM(S)/KG/MIN: 8 SOLUTION at 19:29

## 2025-06-03 RX ADMIN — SODIUM ZIRCONIUM CYCLOSILICATE 10 GRAM(S): 5 POWDER, FOR SUSPENSION ORAL at 04:43

## 2025-06-03 RX ADMIN — NOREPINEPHRINE BITARTRATE 5.27 MICROGRAM(S)/KG/MIN: 8 SOLUTION at 11:29

## 2025-06-03 NOTE — CONSULT NOTE ADULT - SUBJECTIVE AND OBJECTIVE BOX
[facility] NEPHROLOGY SERVICES, [facility]  NEPHROLOGY AND HYPERTENSION  [address]  [address]  [place] [address]  [phone]    MD DELMIS BARRIGA MD YELENA ROSENBERG, MD BINNY KOSHY, MD CHRISTOPHER CAPUTO, MD EDWARD BOVER, MD          Patient events noted    MEDICATIONS  (STANDING):  aspirin  chewable 81 milliGRAM(s) Oral daily  atorvastatin 40 milliGRAM(s) Oral at bedtime  chlorhexidine 0.12% Liquid 15 milliLiter(s) Oral Mucosa every 12 hours  chlorhexidine 2% Cloths 1 Application(s) Topical <User Schedule>  dextrose 5%. 1000 milliLiter(s) (100 mL/Hr) IV Continuous <Continuous>  dextrose 50% Injectable 25 Gram(s) IV Push once  fentaNYL   Infusion. 0.5 MICROgram(s)/kG/Hr (3.24 mL/Hr) IV Continuous <Continuous>  gabapentin 300 milliGRAM(s) Oral two times a day  heparin  Infusion.  Unit(s)/Hr (7.5 mL/Hr) IV Continuous <Continuous>  insulin glargine Injectable (LANTUS) 15 Unit(s) SubCutaneous at bedtime  insulin lispro (ADMELOG) corrective regimen sliding scale   SubCutaneous every 6 hours  meropenem  IVPB 1000 milliGRAM(s) IV Intermittent every 12 hours  multivitamin 1 Tablet(s) Oral daily  norepinephrine Infusion 0.05 MICROgram(s)/kG/Min (3.04 mL/Hr) IV Continuous <Continuous>  propofol Infusion 10 MICROgram(s)/kG/Min (4.22 mL/Hr) IV Continuous <Continuous>  ranolazine 500 milliGRAM(s) Oral two times a day  senna 2 Tablet(s) Oral at bedtime  sodium zirconium cyclosilicate 5 Gram(s) Oral every 8 hours  vasopressin Infusion. 0.04 Unit(s)/Min (6 mL/Hr) IV Continuous <Continuous>    MEDICATIONS  (PRN):  acetaminophen     Tablet .. 650 milliGRAM(s) Oral every 6 hours PRN Temp greater or equal to 38C (100.4F), Mild Pain (1 - 3)  heparin   Injectable 3800 Unit(s) IV Push every 6 hours PRN For aPTT less than 40  ondansetron Injectable 4 milliGRAM(s) IV Push every 8 hours PRN Nausea and/or Vomiting  polyethylene glycol 3350 17 Gram(s) Oral daily PRN Constipation      06-02-25 @ 07:01  -  06-03-25 @ 07:00  --------------------------------------------------------  IN: 1550.1 mL / OUT: 1855 mL / NET: -304.9 mL    06-03-25 @ 07:01  -  06-03-25 @ 22:08  --------------------------------------------------------  IN: 1097.2 mL / OUT: 1280 mL / NET: -182.8 mL      PHYSICAL EXAM:      T(C): 37.6 (06-03-25 @ 20:00), Max: 38.6 (06-03-25 @ 08:05)  HR: 97 (06-03-25 @ 22:00) (97 - 126)  BP: 107/68 (06-03-25 @ 13:30) (64/44 - 170/83)  RR: 22 (06-03-25 @ 22:00) (3 - 26)  SpO2: 100% (06-03-25 @ 22:00) (79% - 100%)  Wt(kg): --  Lungs clear  Heart S1S2  Abd soft NT ND  Extremities:   tr edema                                    9.9    20.05 )-----------( 226      ( 03 Jun 2025 03:50 )             32.7     06-03    140  |  109[H]  |  56[H]  ----------------------------<  169[H]  5.0   |  24  |  1.68[H]    Ca    8.7      03 Jun 2025 15:00  Phos  3.9     06-03  Mg     2.5     06-03    TPro  6.6  /  Alb  1.9[L]  /  TBili  0.9  /  DBili  0.4[H]  /  AST  47[H]  /  ALT  25  /  AlkPhos  102  06-03    ABG - ( 03 Jun 2025 09:00 )  pH, Arterial: 7.34  pH, Blood: x     /  pCO2: 40    /  pO2: 132   / HCO3: 22    / Base Excess: -3.9  /  SaO2: 98.9              LIVER FUNCTIONS - ( 03 Jun 2025 15:00 )  Alb: 1.9 g/dL / Pro: 6.6 gm/dL / ALK PHOS: 102 U/L / ALT: 25 U/L / AST: 47 U/L / GGT: x           Creatinine Trend: 1.68<--, 1.80<--, 1.85<--, 1.95<--, 1.80<--, 1.71<--  Mode: AC/ CMV (Assist Control/ Continuous Mandatory Ventilation)  RR (machine): 22  TV (machine): 350  FiO2: 40  PEEP: 6  ITime: 1  MAP: 10  PIP: 22        Assessment   ALY ischemic ATN  Hyperkalemia     Plan:  Medical rx K  Will follow course  Discussed with family at bedside     Randy Lunsford MD

## 2025-06-03 NOTE — PROGRESS NOTE ADULT - SUBJECTIVE AND OBJECTIVE BOX
Patient is a 74y old  Female who presents with a chief complaint of ALY, physical deconditioning (03 Jun 2025 10:20)      Subjective: No new complaints. Denies HA, lightheadedness, dizziness, CP, palpitation, SOB, abdominal pain, and N/V.        MEDICATIONS  (STANDING):  aspirin  chewable 81 milliGRAM(s) Oral daily  atorvastatin 40 milliGRAM(s) Oral at bedtime  chlorhexidine 0.12% Liquid 15 milliLiter(s) Oral Mucosa every 12 hours  chlorhexidine 2% Cloths 1 Application(s) Topical <User Schedule>  dextrose 5%. 1000 milliLiter(s) (100 mL/Hr) IV Continuous <Continuous>  dextrose 50% Injectable 25 Gram(s) IV Push once  fentaNYL   Infusion. 0.5 MICROgram(s)/kG/Hr (3.24 mL/Hr) IV Continuous <Continuous>  gabapentin 300 milliGRAM(s) Oral two times a day  heparin  Infusion.  Unit(s)/Hr (7.5 mL/Hr) IV Continuous <Continuous>  insulin glargine Injectable (LANTUS) 15 Unit(s) SubCutaneous at bedtime  insulin lispro (ADMELOG) corrective regimen sliding scale   SubCutaneous every 6 hours  meropenem  IVPB 1000 milliGRAM(s) IV Intermittent every 12 hours  multivitamin 1 Tablet(s) Oral daily  norepinephrine Infusion 0.04 MICROgram(s)/kG/Min (5.27 mL/Hr) IV Continuous <Continuous>  propofol Infusion 10 MICROgram(s)/kG/Min (4.22 mL/Hr) IV Continuous <Continuous>  ranolazine 500 milliGRAM(s) Oral two times a day  senna 2 Tablet(s) Oral at bedtime  sodium zirconium cyclosilicate 5 Gram(s) Oral every 8 hours  vasopressin Infusion. 0.04 Unit(s)/Min (6 mL/Hr) IV Continuous <Continuous>    MEDICATIONS  (PRN):  acetaminophen     Tablet .. 650 milliGRAM(s) Oral every 6 hours PRN Temp greater or equal to 38C (100.4F), Mild Pain (1 - 3)  heparin   Injectable 3800 Unit(s) IV Push every 6 hours PRN For aPTT less than 40  ondansetron Injectable 4 milliGRAM(s) IV Push every 8 hours PRN Nausea and/or Vomiting  polyethylene glycol 3350 17 Gram(s) Oral daily PRN Constipation      Vital Signs Last 24 Hrs  T(C): 38 (03 Jun 2025 12:03), Max: 38.6 (03 Jun 2025 08:05)  T(F): 100.4 (03 Jun 2025 12:03), Max: 101.5 (03 Jun 2025 08:05)  HR: 115 (03 Jun 2025 13:10) (73 - 132)  BP: 107/65 (03 Jun 2025 13:10) (64/44 - 170/83)  BP(mean): 79 (03 Jun 2025 13:10) (52 - 130)  RR: 24 (03 Jun 2025 13:10) (6 - 28)  SpO2: 98% (03 Jun 2025 13:10) (79% - 100%)    Parameters below as of 03 Jun 2025 07:30  Patient On (Oxygen Delivery Method): ventilator      I&O's Detail    02 Jun 2025 07:01  -  03 Jun 2025 07:00  --------------------------------------------------------  IN:    Enteral Tube Flush: 225 mL    FentaNYL: 87 mL    Glucerna 1.5: 300 mL    Heparin Infusion: 123 mL    IV PiggyBack: 250 mL    Norepinephrine: 406.5 mL    Propofol: 146.6 mL    Vasopressin (Organ Donation): 12 mL  Total IN: 1550.1 mL    OUT:    Indwelling Catheter - Urethral (mL): 1855 mL  Total OUT: 1855 mL    Total NET: -304.9 mL      03 Jun 2025 07:01  -  03 Jun 2025 13:44  --------------------------------------------------------  IN:    FentaNYL: 45.5 mL    Glucerna 1.5: 120 mL    Heparin Infusion: 35 mL    Norepinephrine: 182.3 mL    Propofol: 26.6 mL    Vasopressin (Organ Donation): 42 mL  Total IN: 451.4 mL    OUT:    Indwelling Catheter - Urethral (mL): 250 mL  Total OUT: 250 mL    Total NET: 201.4 mL          Physical Exam:  GENERAL: Lying in bed, intubated, sedated +ET tube  HEART: +tachycardia, distant heart sound  PULMONARY: ventilator respirations, good airways movement  Abd: soft, non distended    TELEMETRY: -120s                            9.9    20.05 )-----------( 226      ( 03 Jun 2025 03:50 )             32.7     PT/INR - ( 02 Jun 2025 15:20 )   PT: 14.5 sec;   INR: 1.29 ratio         PTT - ( 03 Jun 2025 09:00 )  PTT:68.4 sec  06-03    138  |  106  |  62[H]  ----------------------------<  247[H]  6.1[H]   |  22  |  1.80[H]    Ca    8.8      03 Jun 2025 09:00  Phos  5.1     06-03  Mg     2.5     06-03    TPro  6.5  /  Alb  1.9[L]  /  TBili  1.1  /  DBili  x   /  AST  50[H]  /  ALT  27  /  AlkPhos  102  06-03               Patient is a 74y old  Female who presents with a chief complaint of ALY, physical deconditioning (03 Jun 2025 10:20)      Subjective: patient intubated on ventilator support, currently sedated      MEDICATIONS  (STANDING):  aspirin  chewable 81 milliGRAM(s) Oral daily  atorvastatin 40 milliGRAM(s) Oral at bedtime  chlorhexidine 0.12% Liquid 15 milliLiter(s) Oral Mucosa every 12 hours  chlorhexidine 2% Cloths 1 Application(s) Topical <User Schedule>  dextrose 5%. 1000 milliLiter(s) (100 mL/Hr) IV Continuous <Continuous>  dextrose 50% Injectable 25 Gram(s) IV Push once  fentaNYL   Infusion. 0.5 MICROgram(s)/kG/Hr (3.24 mL/Hr) IV Continuous <Continuous>  gabapentin 300 milliGRAM(s) Oral two times a day  heparin  Infusion.  Unit(s)/Hr (7.5 mL/Hr) IV Continuous <Continuous>  insulin glargine Injectable (LANTUS) 15 Unit(s) SubCutaneous at bedtime  insulin lispro (ADMELOG) corrective regimen sliding scale   SubCutaneous every 6 hours  meropenem  IVPB 1000 milliGRAM(s) IV Intermittent every 12 hours  multivitamin 1 Tablet(s) Oral daily  norepinephrine Infusion 0.04 MICROgram(s)/kG/Min (5.27 mL/Hr) IV Continuous <Continuous>  propofol Infusion 10 MICROgram(s)/kG/Min (4.22 mL/Hr) IV Continuous <Continuous>  ranolazine 500 milliGRAM(s) Oral two times a day  senna 2 Tablet(s) Oral at bedtime  sodium zirconium cyclosilicate 5 Gram(s) Oral every 8 hours  vasopressin Infusion. 0.04 Unit(s)/Min (6 mL/Hr) IV Continuous <Continuous>    MEDICATIONS  (PRN):  acetaminophen     Tablet .. 650 milliGRAM(s) Oral every 6 hours PRN Temp greater or equal to 38C (100.4F), Mild Pain (1 - 3)  heparin   Injectable 3800 Unit(s) IV Push every 6 hours PRN For aPTT less than 40  ondansetron Injectable 4 milliGRAM(s) IV Push every 8 hours PRN Nausea and/or Vomiting  polyethylene glycol 3350 17 Gram(s) Oral daily PRN Constipation      Vital Signs Last 24 Hrs  T(C): 38 (03 Jun 2025 12:03), Max: 38.6 (03 Jun 2025 08:05)  T(F): 100.4 (03 Jun 2025 12:03), Max: 101.5 (03 Jun 2025 08:05)  HR: 115 (03 Jun 2025 13:10) (73 - 132)  BP: 107/65 (03 Jun 2025 13:10) (64/44 - 170/83)  BP(mean): 79 (03 Jun 2025 13:10) (52 - 130)  RR: 24 (03 Jun 2025 13:10) (6 - 28)  SpO2: 98% (03 Jun 2025 13:10) (79% - 100%)    Parameters below as of 03 Jun 2025 07:30  Patient On (Oxygen Delivery Method): ventilator      I&O's Detail    02 Jun 2025 07:01  -  03 Jun 2025 07:00  --------------------------------------------------------  IN:    Enteral Tube Flush: 225 mL    FentaNYL: 87 mL    Glucerna 1.5: 300 mL    Heparin Infusion: 123 mL    IV PiggyBack: 250 mL    Norepinephrine: 406.5 mL    Propofol: 146.6 mL    Vasopressin (Organ Donation): 12 mL  Total IN: 1550.1 mL    OUT:    Indwelling Catheter - Urethral (mL): 1855 mL  Total OUT: 1855 mL    Total NET: -304.9 mL      03 Jun 2025 07:01  -  03 Jun 2025 13:44  --------------------------------------------------------  IN:    FentaNYL: 45.5 mL    Glucerna 1.5: 120 mL    Heparin Infusion: 35 mL    Norepinephrine: 182.3 mL    Propofol: 26.6 mL    Vasopressin (Organ Donation): 42 mL  Total IN: 451.4 mL    OUT:    Indwelling Catheter - Urethral (mL): 250 mL  Total OUT: 250 mL    Total NET: 201.4 mL          Physical Exam:  GENERAL: Lying in bed, intubated, sedated +ET tube  HEART: +tachycardia, distant heart sound  PULMONARY: ventilator respirations, good airways movement  Abd: soft, non distended    TELEMETRY: -120s                            9.9    20.05 )-----------( 226      ( 03 Jun 2025 03:50 )             32.7     PT/INR - ( 02 Jun 2025 15:20 )   PT: 14.5 sec;   INR: 1.29 ratio         PTT - ( 03 Jun 2025 09:00 )  PTT:68.4 sec  06-03    138  |  106  |  62[H]  ----------------------------<  247[H]  6.1[H]   |  22  |  1.80[H]    Ca    8.8      03 Jun 2025 09:00  Phos  5.1     06-03  Mg     2.5     06-03    TPro  6.5  /  Alb  1.9[L]  /  TBili  1.1  /  DBili  x   /  AST  50[H]  /  ALT  27  /  AlkPhos  102  06-03               Patient is a 74y old  Female who presents with a chief complaint of ALY, physical deconditioning (03 Jun 2025 10:20)      Subjective: patient intubated on ventilator support, currently sedated on multiple pressors.      MEDICATIONS  (STANDING):  aspirin  chewable 81 milliGRAM(s) Oral daily  atorvastatin 40 milliGRAM(s) Oral at bedtime  chlorhexidine 0.12% Liquid 15 milliLiter(s) Oral Mucosa every 12 hours  chlorhexidine 2% Cloths 1 Application(s) Topical <User Schedule>  dextrose 5%. 1000 milliLiter(s) (100 mL/Hr) IV Continuous <Continuous>  dextrose 50% Injectable 25 Gram(s) IV Push once  fentaNYL   Infusion. 0.5 MICROgram(s)/kG/Hr (3.24 mL/Hr) IV Continuous <Continuous>  gabapentin 300 milliGRAM(s) Oral two times a day  heparin  Infusion.  Unit(s)/Hr (7.5 mL/Hr) IV Continuous <Continuous>  insulin glargine Injectable (LANTUS) 15 Unit(s) SubCutaneous at bedtime  insulin lispro (ADMELOG) corrective regimen sliding scale   SubCutaneous every 6 hours  meropenem  IVPB 1000 milliGRAM(s) IV Intermittent every 12 hours  multivitamin 1 Tablet(s) Oral daily  norepinephrine Infusion 0.04 MICROgram(s)/kG/Min (5.27 mL/Hr) IV Continuous <Continuous>  propofol Infusion 10 MICROgram(s)/kG/Min (4.22 mL/Hr) IV Continuous <Continuous>  ranolazine 500 milliGRAM(s) Oral two times a day  senna 2 Tablet(s) Oral at bedtime  sodium zirconium cyclosilicate 5 Gram(s) Oral every 8 hours  vasopressin Infusion. 0.04 Unit(s)/Min (6 mL/Hr) IV Continuous <Continuous>    MEDICATIONS  (PRN):  acetaminophen     Tablet .. 650 milliGRAM(s) Oral every 6 hours PRN Temp greater or equal to 38C (100.4F), Mild Pain (1 - 3)  heparin   Injectable 3800 Unit(s) IV Push every 6 hours PRN For aPTT less than 40  ondansetron Injectable 4 milliGRAM(s) IV Push every 8 hours PRN Nausea and/or Vomiting  polyethylene glycol 3350 17 Gram(s) Oral daily PRN Constipation      Vital Signs Last 24 Hrs  T(C): 38 (03 Jun 2025 12:03), Max: 38.6 (03 Jun 2025 08:05)  T(F): 100.4 (03 Jun 2025 12:03), Max: 101.5 (03 Jun 2025 08:05)  HR: 115 (03 Jun 2025 13:10) (73 - 132)  BP: 107/65 (03 Jun 2025 13:10) (64/44 - 170/83)  BP(mean): 79 (03 Jun 2025 13:10) (52 - 130)  RR: 24 (03 Jun 2025 13:10) (6 - 28)  SpO2: 98% (03 Jun 2025 13:10) (79% - 100%)    Parameters below as of 03 Jun 2025 07:30  Patient On (Oxygen Delivery Method): ventilator      I&O's Detail    02 Jun 2025 07:01  -  03 Jun 2025 07:00  --------------------------------------------------------  IN:    Enteral Tube Flush: 225 mL    FentaNYL: 87 mL    Glucerna 1.5: 300 mL    Heparin Infusion: 123 mL    IV PiggyBack: 250 mL    Norepinephrine: 406.5 mL    Propofol: 146.6 mL    Vasopressin (Organ Donation): 12 mL  Total IN: 1550.1 mL    OUT:    Indwelling Catheter - Urethral (mL): 1855 mL  Total OUT: 1855 mL    Total NET: -304.9 mL      03 Jun 2025 07:01  -  03 Jun 2025 13:44  --------------------------------------------------------  IN:    FentaNYL: 45.5 mL    Glucerna 1.5: 120 mL    Heparin Infusion: 35 mL    Norepinephrine: 182.3 mL    Propofol: 26.6 mL    Vasopressin (Organ Donation): 42 mL  Total IN: 451.4 mL    OUT:    Indwelling Catheter - Urethral (mL): 250 mL  Total OUT: 250 mL    Total NET: 201.4 mL          Physical Exam:  GENERAL: Lying in bed, intubated, sedated +ET tube  HEART: +tachycardia, distant heart sound  PULMONARY: ventilator respirations, good airways movement  Abd: soft, non distended    TELEMETRY: -120s                            9.9    20.05 )-----------( 226      ( 03 Jun 2025 03:50 )             32.7     PT/INR - ( 02 Jun 2025 15:20 )   PT: 14.5 sec;   INR: 1.29 ratio         PTT - ( 03 Jun 2025 09:00 )  PTT:68.4 sec  06-03    138  |  106  |  62[H]  ----------------------------<  247[H]  6.1[H]   |  22  |  1.80[H]    Ca    8.8      03 Jun 2025 09:00  Phos  5.1     06-03  Mg     2.5     06-03    TPro  6.5  /  Alb  1.9[L]  /  TBili  1.1  /  DBili  x   /  AST  50[H]  /  ALT  27  /  AlkPhos  102  06-03

## 2025-06-03 NOTE — PROGRESS NOTE ADULT - SUBJECTIVE AND OBJECTIVE BOX
CHIEF COMPLAINT:    Interval Events: No acute events overnight     REVIEW OF SYSTEMS:  CONSTITUTIONAL: No fever, chills, night sweats, or fatigue  EYES: No eye pain, visual disturbances, or discharge  ENMT:  No difficulty hearing, tinnitus, vertigo; No sinus or throat pain  NECK: No pain or stiffness  BREASTS: No pain, masses, or nipple discharge  RESPIRATORY: No cough, wheezing, or hemoptysis; No shortness of breath  CARDIOVASCULAR: No chest pain, palpitations, dizziness, or leg swelling  GASTROINTESTINAL: No abdominal or epigastric pain. No nausea, vomiting, or hematemesis; No diarrhea or constipation. No melena or hematochezia.  GENITOURINARY: No dysuria, frequency, hematuria, or incontinence  NEUROLOGICAL: No headaches, memory loss, loss of strength, numbness, or tremors  SKIN: No itching, burning, rashes, or lesions   LYMPH NODES: No enlarged glands  ENDOCRINE: No heat or cold intolerance; No hair loss  MUSCULOSKELETAL: No joint pain or swelling; No muscle, back, or extremity pain  PSYCHIATRIC: No depression, anxiety, mood swings, or difficulty sleeping  HEME/LYMPH: No easy bruising, or bleeding gums  ALLERY AND IMMUNOLOGIC: No hives or eczema          OBJECTIVE:  ICU Vital Signs Last 24 Hrs  T(C): 38 (03 Jun 2025 12:03), Max: 38.6 (03 Jun 2025 08:05)  T(F): 100.4 (03 Jun 2025 12:03), Max: 101.5 (03 Jun 2025 08:05)  HR: 115 (03 Jun 2025 14:30) (104 - 132)  BP: 107/68 (03 Jun 2025 13:30) (64/44 - 170/83)  BP(mean): 79 (03 Jun 2025 13:30) (52 - 130)  ABP: 139/51 (03 Jun 2025 14:30) (106/42 - 139/51)  ABP(mean): 80 (03 Jun 2025 14:30) (62 - 80)  RR: 22 (03 Jun 2025 14:30) (6 - 28)  SpO2: 96% (03 Jun 2025 14:30) (79% - 100%)    O2 Parameters below as of 03 Jun 2025 07:30  Patient On (Oxygen Delivery Method): ventilator          Mode: AC/ CMV (Assist Control/ Continuous Mandatory Ventilation), RR (machine): 22, TV (machine): 350, FiO2: 40, PEEP: 6, ITime: 0.9, MAP: 9, PIP: 20    06-02 @ 07:01  -  06-03 @ 07:00  --------------------------------------------------------  IN: 1550.1 mL / OUT: 1855 mL / NET: -304.9 mL    06-03 @ 07:01 - 06-03 @ 15:18  --------------------------------------------------------  IN: 485.5 mL / OUT: 600 mL / NET: -114.5 mL      CAPILLARY BLOOD GLUCOSE      POCT Blood Glucose.: 258 mg/dL (03 Jun 2025 12:48)      PHYSICAL EXAM:  GENERAL: NAD, well-groomed, well-developed  EYES: EOMI, PERRLA, conjunctiva and sclera clear  ENMT: ET tube in place   NECK: Supple, No JVD, Normal thyroid  CHEST/LUNG: Clear to auscultation bilaterally; No rales, rhonchi, wheezing, or rubs  HEART: Regular rate and rhythm; No murmurs, rubs, or gallops  ABDOMEN: Soft, Nontender, Nondistended; Bowel sounds present  VASCULAR:  2+ Peripheral Pulses, No clubbing, cyanosis, or edema  LYMPH: No lymphadenopathy noted  SKIN: No rashes or lesions  NERVOUS SYSTEM:  Intubated and Sedated     LINES:    HOSPITAL MEDICATIONS:  aspirin  chewable 81 milliGRAM(s) Oral daily  heparin  Infusion.  Unit(s)/Hr IV Continuous <Continuous>    meropenem  IVPB 1000 milliGRAM(s) IV Intermittent every 12 hours    norepinephrine Infusion 0.04 MICROgram(s)/kG/Min IV Continuous <Continuous>  ranolazine 500 milliGRAM(s) Oral two times a day    atorvastatin 40 milliGRAM(s) Oral at bedtime  dextrose 50% Injectable 25 Gram(s) IV Push once  insulin glargine Injectable (LANTUS) 15 Unit(s) SubCutaneous at bedtime  insulin lispro (ADMELOG) corrective regimen sliding scale   SubCutaneous every 6 hours  vasopressin Infusion. 0.04 Unit(s)/Min IV Continuous <Continuous>      acetaminophen     Tablet .. 650 milliGRAM(s) Oral every 6 hours PRN  fentaNYL   Infusion. 0.5 MICROgram(s)/kG/Hr IV Continuous <Continuous>  gabapentin 300 milliGRAM(s) Oral two times a day  ondansetron Injectable 4 milliGRAM(s) IV Push every 8 hours PRN  propofol Infusion 10 MICROgram(s)/kG/Min IV Continuous <Continuous>    polyethylene glycol 3350 17 Gram(s) Oral daily PRN  senna 2 Tablet(s) Oral at bedtime        dextrose 5%. 1000 milliLiter(s) IV Continuous <Continuous>  multivitamin 1 Tablet(s) Oral daily      chlorhexidine 0.12% Liquid 15 milliLiter(s) Oral Mucosa every 12 hours  chlorhexidine 2% Cloths 1 Application(s) Topical <User Schedule>    sodium zirconium cyclosilicate 5 Gram(s) Oral every 8 hours      LABS:                        9.9    20.05 )-----------( 226      ( 03 Jun 2025 03:50 )             32.7     Hgb Trend: 9.9<--, 10.2<--, 9.8<--, 9.7<--, 9.5<--  06-03    138  |  106  |  62[H]  ----------------------------<  247[H]  6.1[H]   |  22  |  1.80[H]    Ca    8.8      03 Jun 2025 09:00  Phos  5.1     06-03  Mg     2.5     06-03    TPro  6.5  /  Alb  1.9[L]  /  TBili  1.1  /  DBili  x   /  AST  50[H]  /  ALT  27  /  AlkPhos  102  06-03    Creatinine Trend: 1.80<--, 1.85<--, 1.95<--, 1.80<--, 1.71<--, 1.15<--  PT/INR - ( 02 Jun 2025 15:20 )   PT: 14.5 sec;   INR: 1.29 ratio         PTT - ( 03 Jun 2025 09:00 )  PTT:68.4 sec  Urinalysis Basic - ( 03 Jun 2025 09:00 )    Color: x / Appearance: x / SG: x / pH: x  Gluc: 247 mg/dL / Ketone: x  / Bili: x / Urobili: x   Blood: x / Protein: x / Nitrite: x   Leuk Esterase: x / RBC: x / WBC x   Sq Epi: x / Non Sq Epi: x / Bacteria: x      Arterial Blood Gas:  06-03 @ 09:00  7.34/40/132/22/98.9/-3.9  ABG lactate: --  Arterial Blood Gas:  06-02 @ 19:47  7.37/45/179/26/99.0/0.4  ABG lactate: --  Arterial Blood Gas:  06-02 @ 15:13  7.17/63/86/23/97.1/-6.5  ABG lactate: --  Arterial Blood Gas:  06-02 @ 01:37  7.32/46/120/24/98.6/-2.5  ABG lactate: --        MICROBIOLOGY:     RADIOLOGY:  [ ] Reviewed and interpreted by me    EKG:

## 2025-06-03 NOTE — PROGRESS NOTE ADULT - ASSESSMENT
A/P-   74 year old female with PMHx of HTN, HLD, IDDM2, CAD (s/p PCI), and recent right humerus fracture (placed in sling 3 days ago) who presented to the ED on 5/30/25 for complaints of elevated blood glucose.   As per med history provided by family  patient fell three days ago and landed on her right shoulder. Went to [facility] at that time and found to have right humerus fracture and placed in sling. Discharged home with outpatient orthopedic surgery follow up. Since then, patient has been refusing to get out of bed and not eating much.   In the ED, VSS. Hgb 10.3, sodium 132, potassium 6.3, BUN 70, Cr 2.02, blood glucose 510. VBG 7.29 / 58 / 34 / 26. COVID/influenza/RSV negative. CT head without acute intracranial findings. Received 1L NS bolus, 1L LR bolus, and insulin 20 U IV.  (30 May 2025 23:43)    s/p  RRT last night   for AMS, hypotension, fever of 103  and was started on sepsis w/u given IVF bolus, Abx with CTX and Vanco. Persistently hypotensive and obtunded. Started on levophed infusion. Urgently transferred to ICU.   was also intubated for airway protection    Infectious Disease consultation requested this afternoon 6/2 to help with antibiotic management  for ESBL bacteremia    Intubated   sedated  on pressors for hypotention  afebrile today  yesterday 103 t-max  + leukocytosis of 29K  Blood cx- ESBL GNR by PCR x 2  UA from 5/31-pos for nitrates  urine cx-pending  DUANE  elevated cardiac enzymes    per med records had ESBL e.coli UTI in august and sept 2024 and ID was not consulted     6/3-  remains Intubated and sedated  on pressors for hypotention  temp of 101.5 today  + leukocytosis trending down to 20k  creatinine slightly better today  Blood cx- ESBL GNR by PCR x 2  UA from 5/31-pos for nitrates  urine cx->100K e.coli  DUANE  elevated cardiac enzymes        Impression-  septic shock on pressors  E.coli ESBL bacteremia source most likely  in light of positive nitrates in UA and urine cx is E.coli   DUANE  stercoral colitis   Right humerous fracture with hematoma    Plan-  continue with  meropenem for the ESBL bacteremia and sepsis day #2  await 2 more blood cx.  follow ID and sens of the blood cx and urine cx.  trend wbc  as per Ortho no surgical intervention at this time for the right humerus fracture and hematoma  vent and BP management as per ICU team.  cardiology is on board as well  rest of the medical management as per ICU team.    All labs and imaging and chart notes reviewed.     All above discussed with patient's family and they verbalize full understanding of all above and agree with above plan of care.    Thank you for this consultation.    Time spent including chart review and patient exam and review of labs and imaging and discussions with medical team and  patient's family members 60 minutes.    D/W Intensivist .    Tariq Fraga MD  Infectious Disease Attending    for any questions please do not hesitate to contact me either via teams or by calling [phone]

## 2025-06-03 NOTE — PROGRESS NOTE ADULT - ASSESSMENT
74 year old female with PMHx of HTN, HLD, IDDM2, CAD (s/p PCI), and recent right humerus fracture (placed in sling 3 days ago) who presented to the ED on 5/30/25 for complaints of elevated blood glucose.  As per med history provided by family  patient fell three days ago and landed on her right shoulder. Went to [facility] at that time and found to have right humerus fracture and placed in sling. Discharged home with outpatient orthopedic surgery follow up. Since then, patient has been refusing to get out of bed and not eating much.   In the ED, VSS. Hgb 10.3, sodium 132, potassium 6.3, BUN 70, Cr 2.02, blood glucose 510. VBG 7.29 / 58 / 34 / 26. COVID/influenza/RSV negative. CT head without acute intracranial findings. Received 1L NS bolus, 1L LR bolus, and insulin 20 U IV.  (30 May 2025 23:43)    s/p code blue 6/2/25  s/p  RRT 6/1/25 23:58 last night for AMS, hypotension SBP 62, fever of 103 and was started on sepsis w/u given IVF bolus, Abx with CTX and Vanco. Persistently hypotensive and obtunded. Started on levophed infusion. Urgently transferred to ICU.   was also intubated for airway protection      1) s/p Cardiac Arrest   2) Septic Shock ESBL E.Coli  3) Elevated cardiac enzymes in setting of severe hypotension/ARF, likely type II MI.  4) Known CAD (s/p PCI many years ago)  5) HTN  6) HLD    Suggest:    On ASA, statin for known ASHD.  On pressors and IVF, check TTE to assess LVEF.  Consider ischemia evaluation after medical stabilization and extubation.  Supportive measures and antibiotics as per critical care team.     74 year old female with PMHx of HTN, HLD, IDDM2, CAD (s/p PCI), and recent right humerus fracture (placed in sling 3 days ago) who presented to the ED on 5/30/25 for complaints of elevated blood glucose.  As per med history provided by family  patient fell three days ago and landed on her right shoulder. Went to [facility] at that time and found to have right humerus fracture and placed in sling. Discharged home with outpatient orthopedic surgery follow up. Since then, patient has been refusing to get out of bed and not eating much.   In the ED, VSS. Hgb 10.3, sodium 132, potassium 6.3, BUN 70, Cr 2.02, blood glucose 510. VBG 7.29 / 58 / 34 / 26. COVID/influenza/RSV negative. CT head without acute intracranial findings. Received 1L NS bolus, 1L LR bolus, and insulin 20 U IV.  (30 May 2025 23:43)    s/p code blue 6/2/25 w/ ROSC after 10mins of CPR  s/p  RRT 6/1/25 23:58 last night for AMS, hypotension SBP 62, fever of 103 and was started on sepsis w/u given IVF bolus, Abx with CTX and Vanco. Persistently hypotensive and obtunded. Started on levophed infusion. Urgently transferred to ICU.   was also intubated for airway protection      1) s/p Cardiac Arrest 6/2/25  2) Septic Shock ESBL E.Coli  3) Elevated cardiac enzymes in setting of severe hypotension/ARF, likely type II MI.  4) Known CAD (s/p PCI many years ago)  5) HTN  6) HLD    Suggest:    On ASA, statin for known ASHD.  On pressors and IVF, check TTE to assess LVEF.  Consider ischemia evaluation after medical stabilization and extubation.  Supportive measures and antibiotics as per critical care team.     74 year old female with PMHx of HTN, HLD, IDDM2, CAD (s/p PCI), and recent right humerus fracture (placed in sling 3 days ago) who presented to the ED on 5/30/25 for complaints of elevated blood glucose.  As per med history provided by family  patient fell three days ago and landed on her right shoulder. Went to [facility] at that time and found to have right humerus fracture and placed in sling. Discharged home with outpatient orthopedic surgery follow up. Since then, patient has been refusing to get out of bed and not eating much.   In the ED, VSS. Hgb 10.3, sodium 132, potassium 6.3, BUN 70, Cr 2.02, blood glucose 510. VBG 7.29 / 58 / 34 / 26. COVID/influenza/RSV negative. CT head without acute intracranial findings. Received 1L NS bolus, 1L LR bolus, and insulin 20 U IV.  (30 May 2025 23:43)    s/p code blue 6/2/25 w/ ROSC after 10mins of CPR  s/p  RRT 6/1/25 23:58 for AMS, hypotension SBP 62, fever of 103 and was started on sepsis w/u given IVF bolus, Abx with CTX and Vanco. Persistently hypotensive and obtunded. Started on levophed infusion. Urgently transferred to ICU.   was also intubated for airway protection      1) s/p Cardiac Arrest 6/2/25  2) Septic Shock ESBL E.Coli  3) Elevated cardiac enzymes in setting of severe hypotension/ARF, likely type II MI.  4) Known CAD (s/p PCI many years ago)  5) HTN  6) HLD    Suggest:    On ASA, statin for known ASHD.  On pressors and IVF, check TTE to assess LVEF.  [place] remains quite guarded given multisystem failure.  Supportive measures and antibiotics as per critical care team.

## 2025-06-03 NOTE — PROGRESS NOTE ADULT - ASSESSMENT
74-year-old female with hypertension, hyperlipidemia, 2 diabetes, CAD status post multiple stents with a recent right humerus fracture roughly 3 days ago following a traumatic fall admitted to the medical ICU for altered mental status and hypotension.  Concern for septic shock secondary to colitis found to have ESBL E. coli bacteremia.  Hospital course further complicated by cardiac arrest of unclear origin with ROSC after 10 minutes of CPR.    Neuro: Patient currently sedated with propofol and fentanyl.  Maintain RASS between 0 to -1.    Cardiovascular: Patient currently in distributive shock secondary to E. coli bacteremia most likely from colitis.  Continue with vasopressors maintain MAP greater than 65.  Elevated troponin concerning for NSTEMI type II however given cardiac arrest concerned that patient's NSTEMI has progressed to worsening myocardial infarction.  Will continue to trend troponins.  Follow cardiology input.  Continue with heparin drip for ACS treatment  Pulmonary: Patient intubated for airway protection.  Titrate ventilator settings based on your blood gas.  Maintain O2 saturation above 90%  GI: Concern for stercoral colitis based on CT abdomen pelvis.  Continue with aggressive bowel regimen.  Concern for possible source of the patient's E. coli bacteremia.  Monitor for bowel movements.  Renal: Patient in ALY most likely ATN from shock state.  Strict I's and O's.  Monitor electrolytes and replete as needed.  ID: Continue with meropenem given ESBL coli in the blood.  Repeat blood cultures in AM  Endo: Insulin sliding scale  Heme: Continue with heparin drip for 48 hours, will discontinue today.   MSK: Patient with a known history of right humeral fracture consulted orthopedics no plan for intervention at this time.  Ethics: Patient is full code overall prognosis is poor.

## 2025-06-03 NOTE — PROGRESS NOTE ADULT - NS ATTEND AMEND GEN_ALL_CORE FT
I have reviewed the examination and notes written by nurse practitioner and have made amendments where needed. We have discussed in detail the findings and plans for this patient.

## 2025-06-03 NOTE — PROGRESS NOTE ADULT - SUBJECTIVE AND OBJECTIVE BOX
[facility]  INFECTIOUS DISEASES   [address] [place] [address]  Tel: [phone]     Fax: [phone]  ==============================================================================  MD Jason Booth, DO Stephanie Olmedo, CHLOE Pope M.D  ==============================================================================      EMMANUEL GONZALEZ  [id number]  74y ([birth year])      Interval History:    ROS:    [ ] Unobtainable because:  [ ] All other systems negative except as noted    Constitutional: no fever, no chills  Head: no trauma  Eyes: no vision changes, no eye pain  ENT:  no sore throat, no rhinorrhea  Cardiovascular:  no chest pain, no palpitation  Respiratory:  no SOB, no cough  GI:  no abd pain, no vomiting, no diarrhea  urinary: no dysuria, no hematuria, no flank pain  musculoskeletal:  no joint pain, no joint swelling  skin:  no rash  neurology:  no headache, no seizure, no change in mental status  psych: no anxiety, no depression         Allergies  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 (Short breath)  No Known Drug Allergies        ANTIMICROBIALS:  meropenem  IVPB 1000 every 12 hours        Physical Exam:  Vital Signs Last 24 Hrs  T(C): 38.6 (03 Jun 2025 08:05), Max: 38.6 (03 Jun 2025 08:05)  T(F): 101.5 (03 Jun 2025 08:05), Max: 101.5 (03 Jun 2025 08:05)  HR: 110 (03 Jun 2025 09:30) (72 - 132)  BP: 147/75 (03 Jun 2025 09:30) (64/44 - 170/83)  BP(mean): 94 (03 Jun 2025 09:30) (52 - 130)  RR: 22 (03 Jun 2025 09:30) (6 - 28)  SpO2: 100% (03 Jun 2025 09:30) (79% - 100%)    Parameters below as of 03 Jun 2025 07:30  Patient On (Oxygen Delivery Method): ventilator        06-02-25 @ 07:01  -  06-03-25 @ 07:00  --------------------------------------------------------  IN: 1550.1 mL / OUT: 1855 mL / NET: -304.9 mL    06-03-25 @ 07:01  -  06-03-25 @ 10:21  --------------------------------------------------------  IN: 183.4 mL / OUT: 250 mL / NET: -66.6 mL      General:    NAD,  non toxic  Head: atraumatic, normocephalic  Eye: normal sclera and conjunctiva  ENT:    no oral lesions, neck supple  Cardio:     regular S1, S2,  no murmur  Respiratory:    clear b/l,    no wheezing  abd:     soft,   BS +,   no tenderness  :   no CVAT,  no suprapubic tenderness,   no  marcus  Musculoskeletal:   no joint swelling,   no edema  vascular: no central lines, +PIV   Skin:    no rash  Neurologic:     no focal deficit  psych: normal affect    WBC Count: 20.05 K/uL (06-03 @ 03:50)  WBC Count: 29.95 K/uL (06-02 @ 15:20)  WBC Count: 32.85 K/uL (06-02 @ 10:00)  WBC Count: 29.38 K/uL (06-02 @ 04:30)  WBC Count: 23.40 K/uL (06-02 @ 02:30)  WBC Count: 9.31 K/uL (06-01 @ 13:24)  WBC Count: 10.08 K/uL (06-01 @ 00:20)                            9.9    20.05 )-----------( 226      ( 03 Jun 2025 03:50 )             32.7       06-03    139  |  107  |  61[H]  ----------------------------<  148[H]  6.4[HH]   |  25  |  1.85[H]    Ca    8.6      03 Jun 2025 03:50  Phos  5.1     06-03  Mg     2.5     06-03    TPro  6.5  /  Alb  2.0[L]  /  TBili  1.2  /  DBili  x   /  AST  54[H]  /  ALT  28  /  AlkPhos  77  06-03      Urinalysis Basic - ( 03 Jun 2025 03:50 )    Color: x / Appearance: x / SG: x / pH: x  Gluc: 148 mg/dL / Ketone: x  / Bili: x / Urobili: x   Blood: x / Protein: x / Nitrite: x   Leuk Esterase: x / RBC: x / WBC x   Sq Epi: x / Non Sq Epi: x / Bacteria: x          Creatinine Trend: 1.85<--, 1.95<--, 1.80<--, 1.71<--, 1.15<--, 1.70<--  Lactate, Blood: 2.1 mmol/L (06-03-25 @ 03:50)  Blood Gas Arterial, Lactate: 1.60 mmol/L (06-02-25 @ 19:47)  Lactate, Blood: 6.6 mmol/L (06-02-25 @ 15:20)  Lactate, Blood: 1.8 mmol/L (06-02-25 @ 04:30)  Blood Gas Arterial, Lactate: 1.20 mmol/L (06-02-25 @ 01:37)      MICROBIOLOGY:  v  Catheterized Catheterized  06-02-25   >100,000 CFU/ml Escherichia coli  --  --      Blood Blood-Peripheral  06-01-25   Growth in aerobic bottle: Gram Negative Rods  Growth in anaerobic bottle: Gram Negative Rods  --    Growth in aerobic bottle: Gram Negative Rods  Growth in anaerobic bottle: Gram Negative Rods      Blood Blood-Peripheral  06-01-25   Growth in aerobic bottle: Gram Negative Rods  Growth in anaerobic bottle: Gram Negative Rods  Direct identification is available within approximately 3-5  hours either by Blood Panel Multiplexed PCR or Direct  MALDI-TOF. Details: [email or web address]  --  Blood Culture PCR                            SARS-CoV-2 Result: NotDetec (05-31-25 @ 15:45)  SARS-CoV-2 Result: NotDetec (05-30-25 @ 18:17)        RADIOLOGY:   [facility]  INFECTIOUS DISEASES   [address] [place] [address]  Tel: [phone]     Fax: [phone]  ==============================================================================  MD Jason Booth, DO Stephanie Olmedo, CHLOE Pope M.D  ==============================================================================      EMMANUEL GONZALEZ  [id number]  74y ([birth year])      Interval History:    Patient seen and examined in ICU.  remains on the vent and sedated.    on 2 pressors    +fever    ROS:    [ x] Unobtainable because:  unable as patient is intubated and sedate      Allergies  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 (Short breath)  No Known Drug Allergies        ANTIMICROBIALS:  meropenem  IVPB 1000 every 12 hours        Physical Exam:  Vital Signs Last 24 Hrs  T(C): 38.6 (03 Jun 2025 08:05), Max: 38.6 (03 Jun 2025 08:05)  T(F): 101.5 (03 Jun 2025 08:05), Max: 101.5 (03 Jun 2025 08:05)  HR: 110 (03 Jun 2025 09:30) (72 - 132)  BP: 147/75 (03 Jun 2025 09:30) (64/44 - 170/83)  BP(mean): 94 (03 Jun 2025 09:30) (52 - 130)  RR: 22 (03 Jun 2025 09:30) (6 - 28)  SpO2: 100% (03 Jun 2025 09:30) (79% - 100%)    Parameters below as of 03 Jun 2025 07:30  Patient On (Oxygen Delivery Method): ventilator        06-02-25 @ 07:01  -  06-03-25 @ 07:00  --------------------------------------------------------  IN: 1550.1 mL / OUT: 1855 mL / NET: -304.9 mL    06-03-25 @ 07:01  -  06-03-25 @ 10:21  --------------------------------------------------------  IN: 183.4 mL / OUT: 250 mL / NET: -66.6 mL      General:    on the vent and sedated  Head: atraumatic, normocephalic  Eyes: normal sclera and conjunctiva  ENT:   ET and OGT present   Cardio:    regular S1,S2 tachycardic  Respiratory:   breath sounds heard b/l is on the vent  abd:   soft, BS +, no distention  :     + marcus  Musculoskeletal : right shoulder and arm with echymosis   Skin:    no rash  Neurologic:     sedated    WBC Count: 20.05 K/uL (06-03 @ 03:50)  WBC Count: 29.95 K/uL (06-02 @ 15:20)  WBC Count: 32.85 K/uL (06-02 @ 10:00)  WBC Count: 29.38 K/uL (06-02 @ 04:30)  WBC Count: 23.40 K/uL (06-02 @ 02:30)  WBC Count: 9.31 K/uL (06-01 @ 13:24)  WBC Count: 10.08 K/uL (06-01 @ 00:20)                            9.9    20.05 )-----------( 226      ( 03 Jun 2025 03:50 )             32.7       06-03    139  |  107  |  61[H]  ----------------------------<  148[H]  6.4[HH]   |  25  |  1.85[H]    Ca    8.6      03 Jun 2025 03:50  Phos  5.1     06-03  Mg     2.5     06-03    TPro  6.5  /  Alb  2.0[L]  /  TBili  1.2  /  DBili  x   /  AST  54[H]  /  ALT  28  /  AlkPhos  77  06-03      Urinalysis Basic - ( 03 Jun 2025 03:50 )    Color: x / Appearance: x / SG: x / pH: x  Gluc: 148 mg/dL / Ketone: x  / Bili: x / Urobili: x   Blood: x / Protein: x / Nitrite: x   Leuk Esterase: x / RBC: x / WBC x   Sq Epi: x / Non Sq Epi: x / Bacteria: x        Creatinine Trend: 1.85<--, 1.95<--, 1.80<--, 1.71<--, 1.15<--, 1.70<--  Lactate, Blood: 2.1 mmol/L (06-03-25 @ 03:50)  Blood Gas Arterial, Lactate: 1.60 mmol/L (06-02-25 @ 19:47)  Lactate, Blood: 6.6 mmol/L (06-02-25 @ 15:20)  Lactate, Blood: 1.8 mmol/L (06-02-25 @ 04:30)  Blood Gas Arterial, Lactate: 1.20 mmol/L (06-02-25 @ 01:37)      MICROBIOLOGY:    Catheterized Catheterized  06-02-25   >100,000 CFU/ml Escherichia coli  --  --      Blood Blood-Peripheral  06-01-25   Growth in aerobic bottle: Gram Negative Rods  Growth in anaerobic bottle: Gram Negative Rods  --    Growth in aerobic bottle: Gram Negative Rods  Growth in anaerobic bottle: Gram Negative Rods      Blood Blood-Peripheral  06-01-25   Growth in aerobic bottle: Gram Negative Rods  Growth in anaerobic bottle: Gram Negative Rods  Direct identification is available within approximately 3-5  hours either by Blood Panel Multiplexed PCR or Direct  MALDI-TOF. Details: [email or web address]  --  Blood Culture PCR        SARS-CoV-2 Result: NotDetec (05-31-25 @ 15:45)  SARS-CoV-2 Result: NotDetec (05-30-25 @ 18:17)        RADIOLOGY:  < from: Xray Chest 1 View- PORTABLE-Urgent (Xray Chest 1 View- PORTABLE-Urgent .) (06.01.25 @ 22:08) >  ACC: [id number] EXAM:  XR CHEST PORTABLE URGENT 1V   ORDERED BY: MANISH AGUILAR     PROCEDURE DATE:  06/01/2025          INTERPRETATION:  DATE OF STUDY: 6/1/25    PRIOR:5/30/25    CLINICAL INDICATION: Fever    TECHNIQUE: AP radiograph of the chest.    FINDINGS:  Heart size is difficult to assess on this AP projection.  No focal consolidation. No pleural effusion or pneumothorax.    IMPRESSION:    No radiographic evidence for acute cardiopulmonary disease.    --- End of Report ---        REGINALD MCINTOSH; Attending Radiologist  This document has been electronically signed. Jun 2 2025  8:03AM    < end of copied text >

## 2025-06-04 ENCOUNTER — RESULT REVIEW (OUTPATIENT)
Age: 75
End: 2025-06-04

## 2025-06-04 LAB
-  AMPICILLIN/SULBACTAM: SIGNIFICANT CHANGE UP
-  AMPICILLIN/SULBACTAM: SIGNIFICANT CHANGE UP
-  AMPICILLIN: SIGNIFICANT CHANGE UP
-  AMPICILLIN: SIGNIFICANT CHANGE UP
-  AZTREONAM: SIGNIFICANT CHANGE UP
-  AZTREONAM: SIGNIFICANT CHANGE UP
-  CEFAZOLIN: SIGNIFICANT CHANGE UP
-  CEFAZOLIN: SIGNIFICANT CHANGE UP
-  CEFEPIME: SIGNIFICANT CHANGE UP
-  CEFEPIME: SIGNIFICANT CHANGE UP
-  CEFTRIAXONE: SIGNIFICANT CHANGE UP
-  CEFTRIAXONE: SIGNIFICANT CHANGE UP
-  CEFUROXIME: SIGNIFICANT CHANGE UP
-  CIPROFLOXACIN: SIGNIFICANT CHANGE UP
-  CIPROFLOXACIN: SIGNIFICANT CHANGE UP
-  ERTAPENEM: SIGNIFICANT CHANGE UP
-  ERTAPENEM: SIGNIFICANT CHANGE UP
-  GENTAMICIN: SIGNIFICANT CHANGE UP
-  GENTAMICIN: SIGNIFICANT CHANGE UP
-  IMIPENEM: SIGNIFICANT CHANGE UP
-  IMIPENEM: SIGNIFICANT CHANGE UP
-  LEVOFLOXACIN: SIGNIFICANT CHANGE UP
-  LEVOFLOXACIN: SIGNIFICANT CHANGE UP
-  MEROPENEM: SIGNIFICANT CHANGE UP
-  MEROPENEM: SIGNIFICANT CHANGE UP
-  NITROFURANTOIN: SIGNIFICANT CHANGE UP
-  PIPERACILLIN/TAZOBACTAM: SIGNIFICANT CHANGE UP
-  PIPERACILLIN/TAZOBACTAM: SIGNIFICANT CHANGE UP
-  TIGECYCLINE: SIGNIFICANT CHANGE UP
-  TIGECYCLINE: SIGNIFICANT CHANGE UP
-  TOBRAMYCIN: SIGNIFICANT CHANGE UP
-  TOBRAMYCIN: SIGNIFICANT CHANGE UP
-  TRIMETHOPRIM/SULFAMETHOXAZOLE: SIGNIFICANT CHANGE UP
-  TRIMETHOPRIM/SULFAMETHOXAZOLE: SIGNIFICANT CHANGE UP
ALBUMIN SERPL ELPH-MCNC: 1.8 G/DL — LOW (ref 3.3–5)
ALP SERPL-CCNC: 108 U/L — SIGNIFICANT CHANGE UP (ref 40–120)
ALT FLD-CCNC: 24 U/L — SIGNIFICANT CHANGE UP (ref 12–78)
ANION GAP SERPL CALC-SCNC: 7 MMOL/L — SIGNIFICANT CHANGE UP (ref 5–17)
APTT BLD: 62.3 SEC — HIGH (ref 26.1–36.8)
AST SERPL-CCNC: 39 U/L — HIGH (ref 15–37)
BILIRUB SERPL-MCNC: 1 MG/DL — SIGNIFICANT CHANGE UP (ref 0.2–1.2)
BUN SERPL-MCNC: 53 MG/DL — HIGH (ref 7–23)
CALCIUM SERPL-MCNC: 8.3 MG/DL — LOW (ref 8.5–10.1)
CHLORIDE SERPL-SCNC: 110 MMOL/L — HIGH (ref 96–108)
CK MB BLD-MCNC: 0.9 % — SIGNIFICANT CHANGE UP (ref 0–3.5)
CK MB CFR SERPL CALC: 1.7 NG/ML — SIGNIFICANT CHANGE UP (ref 0.5–3.6)
CK SERPL-CCNC: 196 U/L — HIGH (ref 26–192)
CO2 SERPL-SCNC: 25 MMOL/L — SIGNIFICANT CHANGE UP (ref 22–31)
CREAT SERPL-MCNC: 1.35 MG/DL — HIGH (ref 0.5–1.3)
CULTURE RESULTS: ABNORMAL
EGFR: 41 ML/MIN/1.73M2 — LOW
EGFR: 41 ML/MIN/1.73M2 — LOW
GAS PNL BLDA: SIGNIFICANT CHANGE UP
GLUCOSE BLDC GLUCOMTR-MCNC: 126 MG/DL — HIGH (ref 70–99)
GLUCOSE BLDC GLUCOMTR-MCNC: 193 MG/DL — HIGH (ref 70–99)
GLUCOSE BLDC GLUCOMTR-MCNC: 218 MG/DL — HIGH (ref 70–99)
GLUCOSE BLDC GLUCOMTR-MCNC: 245 MG/DL — HIGH (ref 70–99)
GLUCOSE SERPL-MCNC: 143 MG/DL — HIGH (ref 70–99)
GRAM STN FLD: ABNORMAL
GRAM STN FLD: ABNORMAL
HCT VFR BLD CALC: 29.5 % — LOW (ref 34.5–45)
HGB BLD-MCNC: 9.3 G/DL — LOW (ref 11.5–15.5)
MAGNESIUM SERPL-MCNC: 2.5 MG/DL — SIGNIFICANT CHANGE UP (ref 1.6–2.6)
MCHC RBC-ENTMCNC: 28.4 PG — SIGNIFICANT CHANGE UP (ref 27–34)
MCHC RBC-ENTMCNC: 31.5 G/DL — LOW (ref 32–36)
MCV RBC AUTO: 89.9 FL — SIGNIFICANT CHANGE UP (ref 80–100)
METHOD TYPE: SIGNIFICANT CHANGE UP
METHOD TYPE: SIGNIFICANT CHANGE UP
NRBC BLD AUTO-RTO: 0 /100 WBCS — SIGNIFICANT CHANGE UP (ref 0–0)
ORGANISM # SPEC MICROSCOPIC CNT: ABNORMAL
ORGANISM # SPEC MICROSCOPIC CNT: SIGNIFICANT CHANGE UP
ORGANISM # SPEC MICROSCOPIC CNT: SIGNIFICANT CHANGE UP
PHOSPHATE SERPL-MCNC: 3.6 MG/DL — SIGNIFICANT CHANGE UP (ref 2.5–4.5)
PLATELET # BLD AUTO: 229 K/UL — SIGNIFICANT CHANGE UP (ref 150–400)
POTASSIUM SERPL-MCNC: 4.8 MMOL/L — SIGNIFICANT CHANGE UP (ref 3.5–5.3)
POTASSIUM SERPL-SCNC: 4.8 MMOL/L — SIGNIFICANT CHANGE UP (ref 3.5–5.3)
PROT SERPL-MCNC: 6.3 GM/DL — SIGNIFICANT CHANGE UP (ref 6–8.3)
RBC # BLD: 3.28 M/UL — LOW (ref 3.8–5.2)
RBC # FLD: 13.9 % — SIGNIFICANT CHANGE UP (ref 10.3–14.5)
SODIUM SERPL-SCNC: 142 MMOL/L — SIGNIFICANT CHANGE UP (ref 135–145)
SPECIMEN SOURCE: SIGNIFICANT CHANGE UP
TROPONIN I, HIGH SENSITIVITY RESULT: 2809.5 NG/L — HIGH
WBC # BLD: 17.27 K/UL — HIGH (ref 3.8–10.5)
WBC # FLD AUTO: 17.27 K/UL — HIGH (ref 3.8–10.5)

## 2025-06-04 PROCEDURE — 99233 SBSQ HOSP IP/OBS HIGH 50: CPT

## 2025-06-04 PROCEDURE — 93308 TTE F-UP OR LMTD: CPT | Mod: 26

## 2025-06-04 PROCEDURE — 76604 US EXAM CHEST: CPT | Mod: 26

## 2025-06-04 PROCEDURE — 93356 MYOCRD STRAIN IMG SPCKL TRCK: CPT

## 2025-06-04 PROCEDURE — G0545: CPT

## 2025-06-04 PROCEDURE — 99232 SBSQ HOSP IP/OBS MODERATE 35: CPT

## 2025-06-04 PROCEDURE — 99291 CRITICAL CARE FIRST HOUR: CPT

## 2025-06-04 PROCEDURE — 93306 TTE W/DOPPLER COMPLETE: CPT | Mod: 26

## 2025-06-04 RX ORDER — HEPARIN SODIUM 1000 [USP'U]/ML
5000 INJECTION INTRAVENOUS; SUBCUTANEOUS EVERY 8 HOURS
Refills: 0 | Status: DISCONTINUED | OUTPATIENT
Start: 2025-06-04 | End: 2025-06-17

## 2025-06-04 RX ORDER — HYDROCORTISONE 20 MG
100 TABLET ORAL EVERY 8 HOURS
Refills: 0 | Status: DISCONTINUED | OUTPATIENT
Start: 2025-06-04 | End: 2025-06-06

## 2025-06-04 RX ORDER — DEXMEDETOMIDINE HYDROCHLORIDE IN SODIUM CHLORIDE 4 UG/ML
0.5 INJECTION INTRAVENOUS
Qty: 200 | Refills: 0 | Status: DISCONTINUED | OUTPATIENT
Start: 2025-06-04 | End: 2025-06-06

## 2025-06-04 RX ORDER — MEROPENEM 1 G/30ML
2000 INJECTION INTRAVENOUS EVERY 12 HOURS
Refills: 0 | Status: DISCONTINUED | OUTPATIENT
Start: 2025-06-04 | End: 2025-06-11

## 2025-06-04 RX ORDER — SODIUM CHLORIDE 9 G/1000ML
500 INJECTION, SOLUTION INTRAVENOUS ONCE
Refills: 0 | Status: COMPLETED | OUTPATIENT
Start: 2025-06-04 | End: 2025-06-04

## 2025-06-04 RX ADMIN — NOREPINEPHRINE BITARTRATE 3.04 MICROGRAM(S)/KG/MIN: 8 SOLUTION at 19:27

## 2025-06-04 RX ADMIN — Medication 650 MILLIGRAM(S): at 08:03

## 2025-06-04 RX ADMIN — MEROPENEM 280 MILLIGRAM(S): 1 INJECTION INTRAVENOUS at 16:56

## 2025-06-04 RX ADMIN — RANOLAZINE 500 MILLIGRAM(S): 1000 TABLET, FILM COATED, EXTENDED RELEASE ORAL at 05:24

## 2025-06-04 RX ADMIN — HEPARIN SODIUM 5000 UNIT(S): 1000 INJECTION INTRAVENOUS; SUBCUTANEOUS at 14:05

## 2025-06-04 RX ADMIN — HEPARIN SODIUM 5000 UNIT(S): 1000 INJECTION INTRAVENOUS; SUBCUTANEOUS at 23:01

## 2025-06-04 RX ADMIN — Medication 15 MILLILITER(S): at 16:25

## 2025-06-04 RX ADMIN — GABAPENTIN 300 MILLIGRAM(S): 400 CAPSULE ORAL at 16:25

## 2025-06-04 RX ADMIN — INSULIN GLARGINE-YFGN 15 UNIT(S): 100 INJECTION, SOLUTION SUBCUTANEOUS at 23:01

## 2025-06-04 RX ADMIN — RANOLAZINE 500 MILLIGRAM(S): 1000 TABLET, FILM COATED, EXTENDED RELEASE ORAL at 16:25

## 2025-06-04 RX ADMIN — NOREPINEPHRINE BITARTRATE 3.04 MICROGRAM(S)/KG/MIN: 8 SOLUTION at 16:35

## 2025-06-04 RX ADMIN — SODIUM ZIRCONIUM CYCLOSILICATE 5 GRAM(S): 5 POWDER, FOR SUSPENSION ORAL at 04:59

## 2025-06-04 RX ADMIN — Medication 81 MILLIGRAM(S): at 08:03

## 2025-06-04 RX ADMIN — INSULIN LISPRO 4: 100 INJECTION, SOLUTION INTRAVENOUS; SUBCUTANEOUS at 11:38

## 2025-06-04 RX ADMIN — Medication 15 MILLILITER(S): at 05:11

## 2025-06-04 RX ADMIN — ATORVASTATIN CALCIUM 40 MILLIGRAM(S): 80 TABLET, FILM COATED ORAL at 23:01

## 2025-06-04 RX ADMIN — MEROPENEM 100 MILLIGRAM(S): 1 INJECTION INTRAVENOUS at 05:11

## 2025-06-04 RX ADMIN — INSULIN LISPRO 4: 100 INJECTION, SOLUTION INTRAVENOUS; SUBCUTANEOUS at 23:00

## 2025-06-04 RX ADMIN — DEXMEDETOMIDINE HYDROCHLORIDE IN SODIUM CHLORIDE 8.1 MICROGRAM(S)/KG/HR: 4 INJECTION INTRAVENOUS at 16:00

## 2025-06-04 RX ADMIN — Medication 1 TABLET(S): at 08:03

## 2025-06-04 RX ADMIN — Medication 100 MILLIGRAM(S): at 23:01

## 2025-06-04 RX ADMIN — Medication 650 MILLIGRAM(S): at 09:07

## 2025-06-04 RX ADMIN — GABAPENTIN 300 MILLIGRAM(S): 400 CAPSULE ORAL at 05:11

## 2025-06-04 RX ADMIN — DEXMEDETOMIDINE HYDROCHLORIDE IN SODIUM CHLORIDE 8.1 MICROGRAM(S)/KG/HR: 4 INJECTION INTRAVENOUS at 19:27

## 2025-06-04 RX ADMIN — INSULIN LISPRO 2: 100 INJECTION, SOLUTION INTRAVENOUS; SUBCUTANEOUS at 05:11

## 2025-06-04 RX ADMIN — SODIUM CHLORIDE 500 MILLILITER(S): 9 INJECTION, SOLUTION INTRAVENOUS at 09:48

## 2025-06-04 RX ADMIN — Medication 1 APPLICATION(S): at 05:18

## 2025-06-04 RX ADMIN — VASOPRESSIN 6 UNIT(S)/MIN: 20 INJECTION INTRAVENOUS at 06:55

## 2025-06-04 RX ADMIN — Medication 3.24 MICROGRAM(S)/KG/HR: at 04:44

## 2025-06-04 RX ADMIN — Medication 100 MILLIGRAM(S): at 14:05

## 2025-06-04 NOTE — PROGRESS NOTE ADULT - SUBJECTIVE AND OBJECTIVE BOX
[facility] NEPHROLOGY SERVICES, [facility]  NEPHROLOGY AND HYPERTENSION  [address]  [address]  [place] [address]  [phone]    MD DELMIS BARRIGA MD YELENA ROSENBERG, MD BINNY KOSHY, MD CHRISTOPHER CAPUTO, MD EDWARD BOVER, MD          Patient events noted    MEDICATIONS  (STANDING):  aspirin  chewable 81 milliGRAM(s) Oral daily  atorvastatin 40 milliGRAM(s) Oral at bedtime  chlorhexidine 0.12% Liquid 15 milliLiter(s) Oral Mucosa every 12 hours  chlorhexidine 2% Cloths 1 Application(s) Topical <User Schedule>  dexMEDEtomidine Infusion 0.5 MICROgram(s)/kG/Hr (8.1 mL/Hr) IV Continuous <Continuous>  dextrose 5%. 1000 milliLiter(s) (100 mL/Hr) IV Continuous <Continuous>  dextrose 50% Injectable 25 Gram(s) IV Push once  gabapentin 300 milliGRAM(s) Oral two times a day  heparin   Injectable 5000 Unit(s) SubCutaneous every 8 hours  hydrocortisone sodium succinate Injectable 100 milliGRAM(s) IV Push every 8 hours  insulin glargine Injectable (LANTUS) 15 Unit(s) SubCutaneous at bedtime  insulin lispro (ADMELOG) corrective regimen sliding scale   SubCutaneous every 6 hours  meropenem  IVPB 2000 milliGRAM(s) IV Intermittent every 12 hours  multivitamin 1 Tablet(s) Oral daily  norepinephrine Infusion 0.05 MICROgram(s)/kG/Min (3.04 mL/Hr) IV Continuous <Continuous>  ranolazine 500 milliGRAM(s) Oral two times a day  senna 2 Tablet(s) Oral at bedtime    MEDICATIONS  (PRN):  acetaminophen     Tablet .. 650 milliGRAM(s) Oral every 6 hours PRN Temp greater or equal to 38C (100.4F), Mild Pain (1 - 3)  ondansetron Injectable 4 milliGRAM(s) IV Push every 8 hours PRN Nausea and/or Vomiting  polyethylene glycol 3350 17 Gram(s) Oral daily PRN Constipation      06-03-25 @ 07:01  -  06-04-25 @ 07:00  --------------------------------------------------------  IN: 1929.2 mL / OUT: 2065 mL / NET: -135.8 mL    06-04-25 @ 07:01  -  06-04-25 @ 22:53  --------------------------------------------------------  IN: 1366 mL / OUT: 975 mL / NET: 391 mL      PHYSICAL EXAM:      T(C): 37.7 (06-04-25 @ 21:30), Max: 38.3 (06-04-25 @ 07:30)  HR: 94 (06-04-25 @ 21:30) (83 - 109)  BP: --  RR: 24 (06-04-25 @ 21:30) (15 - 29)  SpO2: 99% (06-04-25 @ 21:30) (94% - 100%)  Wt(kg): --  Lungs clear  Heart S1S2  Abd soft NT ND  Extremities:   tr edema                                    9.3    17.27 )-----------( 229      ( 04 Jun 2025 03:05 )             29.5     06-04    142  |  110[H]  |  53[H]  ----------------------------<  143[H]  4.8   |  25  |  1.35[H]    Ca    8.3[L]      04 Jun 2025 03:05  Phos  3.6     06-04  Mg     2.5     06-04    TPro  6.3  /  Alb  1.8[L]  /  TBili  1.0  /  DBili  x   /  AST  39[H]  /  ALT  24  /  AlkPhos  108  06-04    ABG - ( 04 Jun 2025 12:04 )  pH, Arterial: 7.41  pH, Blood: x     /  pCO2: 42    /  pO2: 137   / HCO3: 27    / Base Excess: 1.7   /  SaO2: 99.0              LIVER FUNCTIONS - ( 04 Jun 2025 03:05 )  Alb: 1.8 g/dL / Pro: 6.3 gm/dL / ALK PHOS: 108 U/L / ALT: 24 U/L / AST: 39 U/L / GGT: x           Creatinine Trend: 1.35<--, 1.68<--, 1.80<--, 1.85<--, 1.95<--, 1.80<--  Mode: AC/ CMV (Assist Control/ Continuous Mandatory Ventilation)  RR (machine): 22  TV (machine): 350  FiO2: 40  PEEP: 6  ITime: 1  MAP: 9  PIP: 19    Assessment   ALY ischemic ATN  Improving trend    Plan:  Will follow course  Discussed with family at bedside       Randy Lunsford MD

## 2025-06-04 NOTE — DIETITIAN INITIAL EVALUATION ADULT - NSFNSGIIOFT_GEN_A_CORE
06-03-25 @ 07:01  -  06-04-25 @ 07:00  --------------------------------------------------------  OUT:  Total OUT: 0 mL    Total NET: 860 mL      06-04-25 @ 07:01  -  06-04-25 @ 10:15  --------------------------------------------------------  OUT:  Total OUT: 0 mL    Total NET: 150 mL

## 2025-06-04 NOTE — PROGRESS NOTE ADULT - ASSESSMENT
74F with HTN, HLD, DM2, CAD s/p PCI (2019), and recent right humerus fracture presented 5/30/25 with hyperglycemia (glucose 510), likely due to reduced PO intake after fall. Developed ALY (BUN 70, Cr 2.02) and hyperkalemia (K 6.3).  On 6/1/25 developed septic shock from ESBL E. coli bacteremia requiring pressors.  Suffered cardiac arrest 6/2/25 with ROSC after 10 minutes CPR.  Now intubated in ICU.  Elevated cardiac enzymes suggest type II MI secondary to hypotension.    Plan:  -Cardiac enzyme elevation appears to be primarily driven by sepsis as pt is bacteremic, hypotensive with lactic acidosis. No significant ST-T wave changes, no focal WMA observed on POCUS, trop s/p peak with no CKMB elevation  -S/p hep gtt, continue asa and statin  -Pending formal TTE  -Patient had recent abnormal stress test July 2024 (at [facility]), with PCI placed 2019 ([place]) certainly possible that there is a type II MI with hypotension and sepsis  -Would treat the current more pressing issue of sepsis for now and attempt to wean pressors and vent as tolerated  -Prognosis still guarded, however, may have eventual cardiac cath if current issues resolve (follows with Dr. Juan Manuel Whatley as an outpatient)  -Supportive care and antibiotics per critical care ongoing  -No current cardiac needs inpatient, continue current treatment of sepsis. Call back if needed  Discussed with daughter at bedside  Discussed with Dr. Chawla     74F with HTN, HLD, DM2, CAD s/p PCI (2019), and recent right humerus fracture presented 5/30/25 with hyperglycemia (glucose 510), likely due to reduced PO intake after fall. Developed ALY (BUN 70, Cr 2.02) and hyperkalemia (K 6.3).  On 6/1/25 developed septic shock from ESBL E. coli bacteremia requiring pressors.  Suffered cardiac arrest 6/2/25 with ROSC after 10 minutes CPR.  Now intubated in ICU.  Elevated cardiac enzymes suggest type II MI secondary to hypotension.    Plan:  -Cardiac enzyme elevation appears to be primarily driven by sepsis as pt is bacteremic, hypotensive with lactic acidosis. No significant ST-T wave changes, no focal WMA observed on POCUS, trop s/p peak with no CKMB elevation  -S/p hep gtt, continue asa and statin  -Pending formal TTE  -Patient had recent abnormal stress test July 2024 (at [facility]), with PCI placed 2019 ([place]) certainly possible that there is a type II MI with hypotension and sepsis  -Would treat the current more pressing issue of sepsis for now and attempt to wean pressors and vent as tolerated  -Prognosis still guarded, however, may have eventual cardiac cath if current issues resolve (follows with Dr. Juan Manuel Whatley as an outpatient)  -Supportive care and antibiotics per critical care ongoing  -Continue current treatment of sepsis. Call back if needed  Discussed with daughter at bedside  Discussed with Dr. Chawla     74F with HTN, HLD, DM2, CAD s/p PCI (2019), and recent right humerus fracture presented 5/30/25 with hyperglycemia (glucose 510), likely due to reduced PO intake after fall. Developed ALY (BUN 70, Cr 2.02) and hyperkalemia (K 6.3).  On 6/1/25 developed septic shock from ESBL E. coli bacteremia requiring pressors.  Suffered cardiac arrest 6/2/25 with ROSC after 10 minutes CPR.  Now intubated in ICU.  Elevated cardiac enzymes suggest type II MI secondary to hypotension.    Plan:  -Cardiac enzyme elevation appears to be primarily driven by sepsis as pt is bacteremic, hypotensive with lactic acidosis. No significant ST-T wave changes, no focal WMA observed on POCUS, trop s/p peak with no CKMB elevation  -S/p hep gtt, continue asa and statin  -Pending formal TTE  -Patient had recent abnormal stress test July 2024 (at [facility]), with PCI placed 2019 ([place]) certainly possible that there is a type II MI with hypotension and sepsis  -Would treat the current more pressing issue of sepsis for now and attempt to wean pressors and vent as tolerated  -Prognosis still guarded,   -Supportive care and antibiotics per critical care ongoing  -Continue current treatment of sepsis. Call back if needed  Discussed with daughter at bedside  Discussed with Dr. Chawla

## 2025-06-04 NOTE — PROGRESS NOTE ADULT - SUBJECTIVE AND OBJECTIVE BOX
CHIEF COMPLAINT:    Interval Events:    REVIEW OF SYSTEMS:  CONSTITUTIONAL: No fever, chills, night sweats, or fatigue  EYES: No eye pain, visual disturbances, or discharge  ENMT:  No difficulty hearing, tinnitus, vertigo; No sinus or throat pain  NECK: No pain or stiffness  BREASTS: No pain, masses, or nipple discharge  RESPIRATORY: No cough, wheezing, or hemoptysis; No shortness of breath  CARDIOVASCULAR: No chest pain, palpitations, dizziness, or leg swelling  GASTROINTESTINAL: No abdominal or epigastric pain. No nausea, vomiting, or hematemesis; No diarrhea or constipation. No melena or hematochezia.  GENITOURINARY: No dysuria, frequency, hematuria, or incontinence  NEUROLOGICAL: No headaches, memory loss, loss of strength, numbness, or tremors  SKIN: No itching, burning, rashes, or lesions   LYMPH NODES: No enlarged glands  ENDOCRINE: No heat or cold intolerance; No hair loss  MUSCULOSKELETAL: No joint pain or swelling; No muscle, back, or extremity pain  PSYCHIATRIC: No depression, anxiety, mood swings, or difficulty sleeping  HEME/LYMPH: No easy bruising, or bleeding gums  ALLERY AND IMMUNOLOGIC: No hives or eczema    [ ] Unable to perform ROS due to ____      OBJECTIVE:  ICU Vital Signs Last 24 Hrs  T(C): 37.7 (04 Jun 2025 12:30), Max: 38.3 (04 Jun 2025 07:30)  T(F): 99.9 (04 Jun 2025 12:30), Max: 101 (04 Jun 2025 07:30)  HR: 91 (04 Jun 2025 12:30) (83 - 121)  BP: 107/68 (03 Jun 2025 13:30) (107/65 - 107/68)  BP(mean): 79 (03 Jun 2025 13:30) (79 - 79)  ABP: 110/37 (04 Jun 2025 12:30) (104/44 - 165/58)  ABP(mean): 63 (04 Jun 2025 12:30) (62 - 98)  RR: 20 (04 Jun 2025 12:30) (3 - 26)  SpO2: 98% (04 Jun 2025 12:30) (92% - 100%)    O2 Parameters below as of 04 Jun 2025 07:30  Patient On (Oxygen Delivery Method): ventilator          Mode: CPAP with PS, FiO2: 40, PEEP: 6, PS: 5, MAP: 8    06-03 @ 07:01 - 06-04 @ 07:00  --------------------------------------------------------  IN: 1929.2 mL / OUT: 2065 mL / NET: -135.8 mL    06-04 @ 07:01 - 06-04 @ 12:52  --------------------------------------------------------  IN: 721 mL / OUT: 160 mL / NET: 561 mL      CAPILLARY BLOOD GLUCOSE      POCT Blood Glucose.: 218 mg/dL (04 Jun 2025 11:26)      PHYSICAL EXAM:  GENERAL: NAD, well-groomed, well-developed  HEAD:  Atraumatic, Normocephalic  EYES: EOMI, PERRLA, conjunctiva and sclera clear  ENMT: No tonsillar erythema, exudates, or enlargement; Moist mucous membranes, Good dentition, No lesions  NECK: Supple, No JVD, Normal thyroid  CHEST/LUNG: Clear to auscultation bilaterally; No rales, rhonchi, wheezing, or rubs  HEART: Regular rate and rhythm; No murmurs, rubs, or gallops  ABDOMEN: Soft, Nontender, Nondistended; Bowel sounds present  VASCULAR:  2+ Peripheral Pulses, No clubbing, cyanosis, or edema  LYMPH: No lymphadenopathy noted  SKIN: No rashes or lesions  NERVOUS SYSTEM:  Alert & Oriented X3, Good concentration; Motor Strength 5/5 B/L upper and lower extremities; DTRs 2+ intact and symmetric    LINES:    HOSPITAL MEDICATIONS:  aspirin  chewable 81 milliGRAM(s) Oral daily  heparin   Injectable 5000 Unit(s) SubCutaneous every 8 hours    meropenem  IVPB 2000 milliGRAM(s) IV Intermittent every 12 hours    norepinephrine Infusion 0.05 MICROgram(s)/kG/Min IV Continuous <Continuous>  ranolazine 500 milliGRAM(s) Oral two times a day    atorvastatin 40 milliGRAM(s) Oral at bedtime  dextrose 50% Injectable 25 Gram(s) IV Push once  hydrocortisone sodium succinate Injectable 100 milliGRAM(s) IV Push every 8 hours  insulin glargine Injectable (LANTUS) 15 Unit(s) SubCutaneous at bedtime  insulin lispro (ADMELOG) corrective regimen sliding scale   SubCutaneous every 6 hours  vasopressin Infusion. 0.04 Unit(s)/Min IV Continuous <Continuous>      acetaminophen     Tablet .. 650 milliGRAM(s) Oral every 6 hours PRN  fentaNYL   Infusion. 0.5 MICROgram(s)/kG/Hr IV Continuous <Continuous>  gabapentin 300 milliGRAM(s) Oral two times a day  ondansetron Injectable 4 milliGRAM(s) IV Push every 8 hours PRN  propofol Infusion 10 MICROgram(s)/kG/Min IV Continuous <Continuous>    polyethylene glycol 3350 17 Gram(s) Oral daily PRN  senna 2 Tablet(s) Oral at bedtime        dextrose 5%. 1000 milliLiter(s) IV Continuous <Continuous>  multivitamin 1 Tablet(s) Oral daily      chlorhexidine 0.12% Liquid 15 milliLiter(s) Oral Mucosa every 12 hours  chlorhexidine 2% Cloths 1 Application(s) Topical <User Schedule>        LABS:                        9.3    17.27 )-----------( 229      ( 04 Jun 2025 03:05 )             29.5     Hgb Trend: 9.3<--, 9.9<--, 10.2<--, 9.8<--, 9.7<--  06-04    142  |  110[H]  |  53[H]  ----------------------------<  143[H]  4.8   |  25  |  1.35[H]    Ca    8.3[L]      04 Jun 2025 03:05  Phos  3.6     06-04  Mg     2.5     06-04    TPro  6.3  /  Alb  1.8[L]  /  TBili  1.0  /  DBili  x   /  AST  39[H]  /  ALT  24  /  AlkPhos  108  06-04    Creatinine Trend: 1.35<--, 1.68<--, 1.80<--, 1.85<--, 1.95<--, 1.80<--  PT/INR - ( 02 Jun 2025 15:20 )   PT: 14.5 sec;   INR: 1.29 ratio         PTT - ( 04 Jun 2025 03:05 )  PTT:62.3 sec  Urinalysis Basic - ( 04 Jun 2025 03:05 )    Color: x / Appearance: x / SG: x / pH: x  Gluc: 143 mg/dL / Ketone: x  / Bili: x / Urobili: x   Blood: x / Protein: x / Nitrite: x   Leuk Esterase: x / RBC: x / WBC x   Sq Epi: x / Non Sq Epi: x / Bacteria: x      Arterial Blood Gas:  06-04 @ 12:04  7.41/42/137/27/99.0/1.7  ABG lactate: --  Arterial Blood Gas:  06-03 @ 09:00  7.34/40/132/22/98.9/-3.9  ABG lactate: --  Arterial Blood Gas:  06-02 @ 19:47  7.37/45/179/26/99.0/0.4  ABG lactate: --  Arterial Blood Gas:  06-02 @ 15:13  7.17/63/86/23/97.1/-6.5  ABG lactate: --        MICROBIOLOGY:     RADIOLOGY:  [ ] Reviewed and interpreted by me    EKG: CHIEF COMPLAINT:    Interval Events: No acute events overnight.     REVIEW OF SYSTEMS:    Unable to perform ROS due to sedation      OBJECTIVE:  ICU Vital Signs Last 24 Hrs  T(C): 37.7 (04 Jun 2025 12:30), Max: 38.3 (04 Jun 2025 07:30)  T(F): 99.9 (04 Jun 2025 12:30), Max: 101 (04 Jun 2025 07:30)  HR: 91 (04 Jun 2025 12:30) (83 - 121)  BP: 107/68 (03 Jun 2025 13:30) (107/65 - 107/68)  BP(mean): 79 (03 Jun 2025 13:30) (79 - 79)  ABP: 110/37 (04 Jun 2025 12:30) (104/44 - 165/58)  ABP(mean): 63 (04 Jun 2025 12:30) (62 - 98)  RR: 20 (04 Jun 2025 12:30) (3 - 26)  SpO2: 98% (04 Jun 2025 12:30) (92% - 100%)    O2 Parameters below as of 04 Jun 2025 07:30  Patient On (Oxygen Delivery Method): ventilator          Mode: CPAP with PS, FiO2: 40, PEEP: 6, PS: 5, MAP: 8    06-03 @ 07:01 - 06-04 @ 07:00  --------------------------------------------------------  IN: 1929.2 mL / OUT: 2065 mL / NET: -135.8 mL    06-04 @ 07:01  -  06-04 @ 12:52  --------------------------------------------------------  IN: 721 mL / OUT: 160 mL / NET: 561 mL      CAPILLARY BLOOD GLUCOSE      POCT Blood Glucose.: 218 mg/dL (04 Jun 2025 11:26)      PHYSICAL EXAM:  GENERAL: NAD, well-groomed, well-developed  EYES: EOMI, PERRLA, conjunctiva and sclera clear  ENMT: No tonsillar erythema, exudates, or enlargement; Moist mucous membranes, Good dentition, No lesions  NECK: Supple, No JVD, Normal thyroid  CHEST/LUNG: Clear to auscultation bilaterally; No rales, rhonchi, wheezing, or rubs  HEART: Regular rate and rhythm; No murmurs, rubs, or gallops  ABDOMEN: Soft, Nontender, Nondistended; Bowel sounds present  VASCULAR:  2+ Peripheral Pulses, No clubbing, cyanosis, or edema  LYMPH: No lymphadenopathy noted  SKIN: No rashes or lesions  NERVOUS SYSTEM:  Intubated and sedated     LINES:    HOSPITAL MEDICATIONS:  aspirin  chewable 81 milliGRAM(s) Oral daily  heparin   Injectable 5000 Unit(s) SubCutaneous every 8 hours    meropenem  IVPB 2000 milliGRAM(s) IV Intermittent every 12 hours    norepinephrine Infusion 0.05 MICROgram(s)/kG/Min IV Continuous <Continuous>  ranolazine 500 milliGRAM(s) Oral two times a day    atorvastatin 40 milliGRAM(s) Oral at bedtime  dextrose 50% Injectable 25 Gram(s) IV Push once  hydrocortisone sodium succinate Injectable 100 milliGRAM(s) IV Push every 8 hours  insulin glargine Injectable (LANTUS) 15 Unit(s) SubCutaneous at bedtime  insulin lispro (ADMELOG) corrective regimen sliding scale   SubCutaneous every 6 hours  vasopressin Infusion. 0.04 Unit(s)/Min IV Continuous <Continuous>      acetaminophen     Tablet .. 650 milliGRAM(s) Oral every 6 hours PRN  fentaNYL   Infusion. 0.5 MICROgram(s)/kG/Hr IV Continuous <Continuous>  gabapentin 300 milliGRAM(s) Oral two times a day  ondansetron Injectable 4 milliGRAM(s) IV Push every 8 hours PRN  propofol Infusion 10 MICROgram(s)/kG/Min IV Continuous <Continuous>    polyethylene glycol 3350 17 Gram(s) Oral daily PRN  senna 2 Tablet(s) Oral at bedtime        dextrose 5%. 1000 milliLiter(s) IV Continuous <Continuous>  multivitamin 1 Tablet(s) Oral daily      chlorhexidine 0.12% Liquid 15 milliLiter(s) Oral Mucosa every 12 hours  chlorhexidine 2% Cloths 1 Application(s) Topical <User Schedule>        LABS:                        9.3    17.27 )-----------( 229      ( 04 Jun 2025 03:05 )             29.5     Hgb Trend: 9.3<--, 9.9<--, 10.2<--, 9.8<--, 9.7<--  06-04    142  |  110[H]  |  53[H]  ----------------------------<  143[H]  4.8   |  25  |  1.35[H]    Ca    8.3[L]      04 Jun 2025 03:05  Phos  3.6     06-04  Mg     2.5     06-04    TPro  6.3  /  Alb  1.8[L]  /  TBili  1.0  /  DBili  x   /  AST  39[H]  /  ALT  24  /  AlkPhos  108  06-04    Creatinine Trend: 1.35<--, 1.68<--, 1.80<--, 1.85<--, 1.95<--, 1.80<--  PT/INR - ( 02 Jun 2025 15:20 )   PT: 14.5 sec;   INR: 1.29 ratio         PTT - ( 04 Jun 2025 03:05 )  PTT:62.3 sec  Urinalysis Basic - ( 04 Jun 2025 03:05 )    Color: x / Appearance: x / SG: x / pH: x  Gluc: 143 mg/dL / Ketone: x  / Bili: x / Urobili: x   Blood: x / Protein: x / Nitrite: x   Leuk Esterase: x / RBC: x / WBC x   Sq Epi: x / Non Sq Epi: x / Bacteria: x      Arterial Blood Gas:  06-04 @ 12:04  7.41/42/137/27/99.0/1.7  ABG lactate: --  Arterial Blood Gas:  06-03 @ 09:00  7.34/40/132/22/98.9/-3.9  ABG lactate: --  Arterial Blood Gas:  06-02 @ 19:47  7.37/45/179/26/99.0/0.4  ABG lactate: --  Arterial Blood Gas:  06-02 @ 15:13  7.17/63/86/23/97.1/-6.5  ABG lactate: --        MICROBIOLOGY:     RADIOLOGY:  [ ] Reviewed and interpreted by me    EKG:

## 2025-06-04 NOTE — DIETITIAN INITIAL EVALUATION ADULT - OTHER INFO
Pt seen on vent, on NGT feeding of Glucerna 1.5 @ goal as ordered of 50 ml/hr x 18 hours=900 ml, 1350 calories, 74 grams protein, 683 ml free water.  Several BMS noted 0n 06/03, meds include polyethylene glycol.  Pt is on propofol, received 91 ml (06/03)=100.1 calories.  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  Per outpatient medication review, pt was on metformin and insulin Aspart(rapid acting insulin)15 units @ bedtime.   Per chart review, pt c recent humerus fracture 3 days PTA, s/p traumatic fall, adm to ICU for AMS, hypotension, concern for septic shock secondary to colitis, c E. coli bacteremia, concern for stercoral colitis, hospital course further complicated by cardiac arrest, NSTEMI, ALY, s/p hyperkalemia.  Pt is full code, overall prognosis noted to be poor.   Pt seen on vent, on NGT feeding of Glucerna 1.5 @ goal as ordered of 50 ml/hr x 18 hours=900 ml, 1350 calories, 74 grams protein, 683 ml free water.  Several BMS noted 0n 06/03, s/p Lokelma for hyperkalemia per RN, on polyethylene prn.  Pt is on propofol, received 91 ml (06/03)=100.1 calories.  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  Per outpatient medication review, pt was on metformin and insulin Aspart(rapid acting insulin)15 units @ bedtime.   Per chart review, pt c recent humerus fracture 3 days PTA, s/p traumatic fall, adm to ICU for AMS, hypotension, concern for septic shock secondary to colitis, c E. coli bacteremia, concern for stercoral colitis, hospital course further complicated by cardiac arrest, NSTEMI, ALY, s/p hyperkalemia.  Pt is full code, overall prognosis noted to be poor.   Pt seen on vent, on NGT feeding of Glucerna 1.5 @ goal as ordered of 50 ml/hr x 18 hours=900 ml, 1350 calories, 74 grams protein, 683 ml free water.  Several BMS noted 0n 06/03, s/p Lokelma for hyperkalemia per RN, on polyethylene prn.  Pt is on propofol, received 91 ml (06/03)=100.1 calories.  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  Per outpatient medication review, pt was on metformin and insulin Aspart(rapid acting insulin)15 units @ bedtime.  The use of Vitamin D2, 50,000 IU 1x/week noted.  Per chart review, pt c recent humerus fracture 3 days PTA, s/p traumatic fall, adm to ICU for AMS, hypotension, concern for septic shock secondary to colitis, c E. coli bacteremia, concern for stercoral colitis, hospital course further complicated by cardiac arrest, NSTEMI, ALY, s/p hyperkalemia.  Pt is full code, overall prognosis noted to be poor.

## 2025-06-04 NOTE — PROGRESS NOTE ADULT - NS ATTEND AMEND GEN_ALL_CORE FT
Remains vented and febrile, unable to wean.  Cv stable, likely septic shock.  No active cardiology issues, please reconsult PRN.

## 2025-06-04 NOTE — DIETITIAN INITIAL EVALUATION ADULT - NS FNS DIET ORDER
Diet, NPO with Tube Feed:   Tube Feeding Modality: Nasogastric  Glucerna 1.5 Daryl  Total Volume for 24 Hours (mL): 900  Intermittent  Starting Tube Feed Rate {mL per Hour}: 10  Increase Tube Feed Rate by (mL): 10    Every 4 hours  Until Goal Tube Feed Rate (mL per Hour): 50  Tube Feeding Hours ON: 18  Tube Feeding OFF (Hours): 6  Tube Feed Start Time: 17:00 (06-02-25 @ 08:00) [Active]

## 2025-06-04 NOTE — PROGRESS NOTE ADULT - ASSESSMENT
74-year-old female with hypertension, hyperlipidemia, 2 diabetes, CAD status post multiple stents with a recent right humerus fracture roughly 3 days ago following a traumatic fall admitted to the medical ICU for altered mental status and hypotension.  Concern for septic shock secondary to colitis found to have ESBL E. coli bacteremia.  Hospital course further complicated by cardiac arrest of unclear origin with ROSC after 10 minutes of CPR.    Neuro: Patient currently sedated with propofol and fentanyl.  Maintain RASS between 0 to -1.  SAT today   Cardiovascular: Patient currently in distributive shock secondary to E. coli bacteremia most likely from colitis.  Continue with vasopressors maintain MAP greater than 65.  Trop peaked, off of Heprin gtt. Order TTE for formal eval of cardiac function. Appreciate cardiology input.   Pulmonary: Patient intubated for airway protection.  Titrate ventilator settings based on your blood gas.  Maintain O2 saturation above 90%  GI: Concern for stercoral colitis based on CT abdomen pelvis.  Continue with aggressive bowel regimen.  Concern for possible source of the patient's E. coli bacteremia.  Monitor for bowel movements. tube feed as tolerated   Renal: Patient in ALY most likely ATN from shock state.  Strict I's and O's.  Monitor electrolytes and replete as needed.  ID: Continue with meropenem given ESBL coli in the blood.  Repeat blood cultures pending   Endo: Insulin sliding scale. Start on Stress dose steroids, solucortef 100mg q8h IV  Heme: hep SQ for VTE PPX   MSK: Patient with a known history of right humeral fracture consulted orthopedics no plan for intervention at this time.  Ethics: Patient is full code overall prognosis is guarded

## 2025-06-04 NOTE — DIETITIAN INITIAL EVALUATION ADULT - PERTINENT LABORATORY DATA
06-04    142  |  110[H]  |  53[H]  ----------------------------<  143[H]  4.8   |  25  |  1.35[H]    Ca    8.3[L]      04 Jun 2025 03:05  Phos  3.6     06-04  Mg     2.5     06-04    TPro  6.3  /  Alb  1.8[L]  /  TBili  1.0  /  DBili  x   /  AST  39[H]  /  ALT  24  /  AlkPhos  108  06-04  POCT Blood Glucose.: 193 mg/dL (06-04-25 @ 05:04)  A1C with Estimated Average Glucose Result: 10.2 %<H> (05-31-25 @ 05:55)  A1C with Estimated Average Glucose Result: 7.5 % <H>(08-05-24 @ 07:36)

## 2025-06-04 NOTE — DIETITIAN INITIAL EVALUATION ADULT - PERTINENT MEDS FT
MEDICATIONS  (STANDING):  aspirin  chewable 81 milliGRAM(s) Oral daily  atorvastatin 40 milliGRAM(s) Oral at bedtime  chlorhexidine 0.12% Liquid 15 milliLiter(s) Oral Mucosa every 12 hours  chlorhexidine 2% Cloths 1 Application(s) Topical <User Schedule>  dextrose 5%. 1000 milliLiter(s) (100 mL/Hr) IV Continuous <Continuous>  dextrose 50% Injectable 25 Gram(s) IV Push once  fentaNYL   Infusion. 0.5 MICROgram(s)/kG/Hr (3.24 mL/Hr) IV Continuous <Continuous>  gabapentin 300 milliGRAM(s) Oral two times a day  heparin   Injectable 5000 Unit(s) SubCutaneous every 8 hours  hydrocortisone sodium succinate Injectable 100 milliGRAM(s) IV Push every 8 hours  insulin glargine Injectable (LANTUS) 15 Unit(s) SubCutaneous at bedtime  insulin lispro (ADMELOG) corrective regimen sliding scale   SubCutaneous every 6 hours  meropenem  IVPB 2000 milliGRAM(s) IV Intermittent every 12 hours  multivitamin 1 Tablet(s) Oral daily  norepinephrine Infusion 0.05 MICROgram(s)/kG/Min (3.04 mL/Hr) IV Continuous <Continuous>  propofol Infusion 10 MICROgram(s)/kG/Min (4.22 mL/Hr) IV Continuous <Continuous>  ranolazine 500 milliGRAM(s) Oral two times a day  senna 2 Tablet(s) Oral at bedtime  vasopressin Infusion. 0.04 Unit(s)/Min (6 mL/Hr) IV Continuous <Continuous>    MEDICATIONS  (PRN):  acetaminophen     Tablet .. 650 milliGRAM(s) Oral every 6 hours PRN Temp greater or equal to 38C (100.4F), Mild Pain (1 - 3)  ondansetron Injectable 4 milliGRAM(s) IV Push every 8 hours PRN Nausea and/or Vomiting  polyethylene glycol 3350 17 Gram(s) Oral daily PRN Constipation

## 2025-06-04 NOTE — CHART NOTE - NSCHARTNOTEFT_GEN_A_CORE
:  ARFAEL Scott dr    Indication:  SHOCK    PROCEDURE:  [x ] LIMITED ECHO  [x ] LIMITED CHEST  [ ] LIMITED RETROPERITONEAL  [ ] LIMITED ABDOMINAL  [ ] LIMITED DVT  [ ] NEEDLE GUIDANCE VASCULAR  [ ] NEEDLE GUIDANCE THORACENTESIS  [ ] NEEDLE GUIDANCE PARACENTESIS  [ ] NEEDLE GUIDANCE PERICARDIOCENTESIS  [ ] OTHER    FINDINGS:  Chest: A-line predominant, normal aeration pattern bilat. No effusions bilat.    ECHO: LV>RV with grossly normal LV systolic function with RV free wall dyskinesis without septal bowing or flattening   No pericardial effusion  IVC: 1.3, indeterminate  LVOT/VTi: 19    INTERPRETATION:  grossly nl lung exam  grossly nl LV systolic fx with RV free wall dyskinesis but preserved function. unclear if old infarct.    will fluid challenge       images uploaded to [employer] :  RAFAEL Scott dr    Indication:  SHOCK    PROCEDURE:  [x ] LIMITED ECHO  [x ] LIMITED CHEST  [ ] LIMITED RETROPERITONEAL  [ ] LIMITED ABDOMINAL  [ ] LIMITED DVT  [ ] NEEDLE GUIDANCE VASCULAR  [ ] NEEDLE GUIDANCE THORACENTESIS  [ ] NEEDLE GUIDANCE PARACENTESIS  [ ] NEEDLE GUIDANCE PERICARDIOCENTESIS  [ ] OTHER    FINDINGS:  Chest: A-line predominant, normal aeration pattern bilat. No effusions bilat.    ECHO: LV>RV with grossly normal LV systolic function with RV free wall dyskinesis without septal bowing or flattening   No pericardial effusion  IVC: 1.3, indeterminate  LVOT/VTi: 19    INTERPRETATION:  grossly nl lung exam  grossly nl LV systolic fx with RV free wall dyskinesis but preserved function. unclear if old infarct.    will fluid challenge       Images uploaded on [employer]    Attending Attestation:  I have personally reviewed the images and agree with the interpretation above by the resident/fellow/ACP  Santy Lofton MD  Attending  Pulmonary & Critical Care Medicine

## 2025-06-04 NOTE — PROGRESS NOTE ADULT - NS ATTEND AMEND GEN_ALL_CORE FT
All labs and cultures and imaging and pertinent chart notes reviewed by me.    case d/w Np Honorio at length and agree with her assessment and plan.    6/4:   remains intubated, weaning process in progress, on pressors, continues having fevers, T 101 this morning,   leukocytosis is better 17.27, Cr better 1.35, UC and BCs grew ESBL E. Coli, repeat BCs are pending, Meropenem IV continued. If not improvement in pt's fevers tomorrow, most likely CT a/p will be obtained.         Impression-  septic shock on pressors  E.coli ESBL bacteremia source most likely  in light of positive nitrates in UA and urine cx is E.coli   DUANE  stercoral colitis   Right humerous fracture with hematoma    Plan-  continue IV meropenem for the ESBL bacteremia and sepsis day #3  awaiting for repeat BCs  trend pt's temperatures and WBC  wean off pressors and vent as able, per ICU  possible CT a/p tomorrow if no improvement in pt's fevers   cardiology input is appreciated       Tariq Fraga MD  Infectious Disease Attending    for any questions please do not hesitate to contact me either via teams or by calling [phone]

## 2025-06-04 NOTE — PROGRESS NOTE ADULT - SUBJECTIVE AND OBJECTIVE BOX
Patient is a 74y old  Female who presents with a chief complaint of ALY, physical deconditioning (03 Jun 2025 15:54)    INTERVAL HISTORY:  TELEMETRY:     PAST MEDICAL & SURGICAL HISTORY:  HTN (hypertension)  HLD (hyperlipidemia)  CAD S/P percutaneous coronary angioplasty  Insulin dependent type 2 diabetes mellitus  History of heart artery stent      MEDICATIONS:  MEDICATIONS  (STANDING):  aspirin  chewable 81 milliGRAM(s) Oral daily  atorvastatin 40 milliGRAM(s) Oral at bedtime  chlorhexidine 0.12% Liquid 15 milliLiter(s) Oral Mucosa every 12 hours  chlorhexidine 2% Cloths 1 Application(s) Topical <User Schedule>  dextrose 5%. 1000 milliLiter(s) (100 mL/Hr) IV Continuous <Continuous>  dextrose 50% Injectable 25 Gram(s) IV Push once  fentaNYL   Infusion. 0.5 MICROgram(s)/kG/Hr (3.24 mL/Hr) IV Continuous <Continuous>  gabapentin 300 milliGRAM(s) Oral two times a day  insulin glargine Injectable (LANTUS) 15 Unit(s) SubCutaneous at bedtime  insulin lispro (ADMELOG) corrective regimen sliding scale   SubCutaneous every 6 hours  meropenem  IVPB 1000 milliGRAM(s) IV Intermittent every 12 hours  multivitamin 1 Tablet(s) Oral daily  norepinephrine Infusion 0.05 MICROgram(s)/kG/Min (3.04 mL/Hr) IV Continuous <Continuous>  propofol Infusion 10 MICROgram(s)/kG/Min (4.22 mL/Hr) IV Continuous <Continuous>  ranolazine 500 milliGRAM(s) Oral two times a day  senna 2 Tablet(s) Oral at bedtime  sodium zirconium cyclosilicate 5 Gram(s) Oral every 8 hours  vasopressin Infusion. 0.04 Unit(s)/Min (6 mL/Hr) IV Continuous <Continuous>    MEDICATIONS  (PRN):  acetaminophen     Tablet .. 650 milliGRAM(s) Oral every 6 hours PRN Temp greater or equal to 38C (100.4F), Mild Pain (1 - 3)  heparin   Injectable 3800 Unit(s) IV Push every 6 hours PRN For aPTT less than 40  ondansetron Injectable 4 milliGRAM(s) IV Push every 8 hours PRN Nausea and/or Vomiting  polyethylene glycol 3350 17 Gram(s) Oral daily PRN Constipation      Vitals:  T(F): 98.2 (06-04-25 @ 03:30), Max: 101.5 (06-03-25 @ 08:05)  HR: 97 (06-04-25 @ 07:30) (93 - 121)  BP: 107/68 (06-03-25 @ 13:30) (96/60 - 148/75)  RR: 22 (06-04-25 @ 07:30) (3 - 26)  SpO2: 100% (06-04-25 @ 07:30) (79% - 100%)  Wt(kg): --    06-03 @ 07:01  -  06-04 @ 07:00  --------------------------------------------------------  IN:    FentaNYL: 156 mL    Glucerna 1.5: 860 mL    Heparin Infusion: 82 mL    IV PiggyBack: 100 mL    Norepinephrine: 389 mL    Norepinephrine: 107 mL    Propofol: 91.2 mL    Vasopressin (Organ Donation): 144 mL  Total IN: 1929.2 mL    OUT:    Indwelling Catheter - Urethral (mL): 2025 mL  Total OUT: 2025 mL    Total NET: -95.8 mL      06-04 @ 07:01  -  06-04 @ 07:48  --------------------------------------------------------  IN:    FentaNYL: 6.5 mL    Glucerna 1.5: 50 mL    Norepinephrine: 9.1 mL    Propofol: 3.8 mL    Vasopressin (Organ Donation): 6 mL  Total IN: 75.4 mL    OUT:  Total OUT: 0 mL    Total NET: 75.4 mL        Weight (kg): 64.8 (06-02 @ 00:30)    PHYSICAL EXAM:  Neuro: Awake, responsive  CV: + S1 S2 RRR  Lungs: CTABL  GI: Soft, BS x4, ND, NT  Extremities: No edema  	    ECG:      DIAGNOSTIC TESTING:    [ ] Echocardiogram:   [ ] Cardiac Catheterization:  [ ] Stress Test:      LABS:	 	    CARDIAC MARKERS:          04 Jun 2025 03:05    142    |  110    |  53     ----------------------------<  143    4.8     |  25     |  1.35   03 Jun 2025 15:00    140    |  109    |  56     ----------------------------<  169    5.0     |  24     |  1.68   03 Jun 2025 09:00    138    |  106    |  62     ----------------------------<  247    6.1     |  22     |  1.80     Ca    8.3        04 Jun 2025 03:05  Phos  3.6       04 Jun 2025 03:05  Mg     2.5       04 Jun 2025 03:05    TPro  6.3    /  Alb  1.8    /  TBili  1.0    /  DBili  x      /  AST  39     /  ALT  24     /  AlkPhos  108    04 Jun 2025 03:05                          9.3    17.27 )-----------( 229      ( 04 Jun 2025 03:05 )             29.5 ,                       9.9    20.05 )-----------( 226      ( 03 Jun 2025 03:50 )             32.7 ,                       10.2   29.95 )-----------( 223      ( 02 Jun 2025 15:20 )             33.5 ,                       9.8    32.85 )-----------( 238      ( 02 Jun 2025 10:00 )             31.8 ,                       9.7    29.38 )-----------( 214      ( 02 Jun 2025 04:30 )             32.0 ,                       9.5    23.40 )-----------( 208      ( 02 Jun 2025 02:30 )             30.9 ,                       10.6   9.31  )-----------( 225      ( 01 Jun 2025 13:24 )             33.5   proBNP:   Lipid Profile: 08-05 @ 07:36  HDL/Total Cholesterol: --  HDL Chol:              65 mg/dL  Serum Chol:            124 mg/dL  Direct LDL:            --  Triglycerides:         64 mg/dL    HgA1c: Hemoglobin A1C, Whole Blood: 6.2 % (11-05 @ 05:30)  Hemoglobin A1C, Whole Blood: 6.6 % (07-01 @ 10:44)  Hemoglobin A1C, Whole Blood: 7.8 % (05-11 @ 11:48)    TSH: Thyroid Stimulating Hormone, Serum: 2.450 uIU/mL (09-25 @ 06:17)             Patient is a 74y old  Female who presents with a chief complaint of ALY, physical deconditioning (03 Jun 2025 15:54)    INTERVAL HISTORY: No overnight events, still sedated and intubated on pressors  TELEMETRY: Sinus tach, rare isolated PVCs    PAST MEDICAL & SURGICAL HISTORY:  HTN (hypertension)  HLD (hyperlipidemia)  CAD S/P percutaneous coronary angioplasty  Insulin dependent type 2 diabetes mellitus  History of heart artery stent      MEDICATIONS:  MEDICATIONS  (STANDING):  aspirin  chewable 81 milliGRAM(s) Oral daily  atorvastatin 40 milliGRAM(s) Oral at bedtime  chlorhexidine 0.12% Liquid 15 milliLiter(s) Oral Mucosa every 12 hours  chlorhexidine 2% Cloths 1 Application(s) Topical <User Schedule>  dextrose 5%. 1000 milliLiter(s) (100 mL/Hr) IV Continuous <Continuous>  dextrose 50% Injectable 25 Gram(s) IV Push once  fentaNYL   Infusion. 0.5 MICROgram(s)/kG/Hr (3.24 mL/Hr) IV Continuous <Continuous>  gabapentin 300 milliGRAM(s) Oral two times a day  insulin glargine Injectable (LANTUS) 15 Unit(s) SubCutaneous at bedtime  insulin lispro (ADMELOG) corrective regimen sliding scale   SubCutaneous every 6 hours  meropenem  IVPB 1000 milliGRAM(s) IV Intermittent every 12 hours  multivitamin 1 Tablet(s) Oral daily  norepinephrine Infusion 0.05 MICROgram(s)/kG/Min (3.04 mL/Hr) IV Continuous <Continuous>  propofol Infusion 10 MICROgram(s)/kG/Min (4.22 mL/Hr) IV Continuous <Continuous>  ranolazine 500 milliGRAM(s) Oral two times a day  senna 2 Tablet(s) Oral at bedtime  sodium zirconium cyclosilicate 5 Gram(s) Oral every 8 hours  vasopressin Infusion. 0.04 Unit(s)/Min (6 mL/Hr) IV Continuous <Continuous>    MEDICATIONS  (PRN):  acetaminophen     Tablet .. 650 milliGRAM(s) Oral every 6 hours PRN Temp greater or equal to 38C (100.4F), Mild Pain (1 - 3)  heparin   Injectable 3800 Unit(s) IV Push every 6 hours PRN For aPTT less than 40  ondansetron Injectable 4 milliGRAM(s) IV Push every 8 hours PRN Nausea and/or Vomiting  polyethylene glycol 3350 17 Gram(s) Oral daily PRN Constipation      Vitals:  T(F): 98.2 (06-04-25 @ 03:30), Max: 101.5 (06-03-25 @ 08:05)  HR: 97 (06-04-25 @ 07:30) (93 - 121)  BP: 107/68 (06-03-25 @ 13:30) (96/60 - 148/75)  RR: 22 (06-04-25 @ 07:30) (3 - 26)  SpO2: 100% (06-04-25 @ 07:30) (79% - 100%)  Wt(kg): --    06-03 @ 07:01  -  06-04 @ 07:00  --------------------------------------------------------  IN:    FentaNYL: 156 mL    Glucerna 1.5: 860 mL    Heparin Infusion: 82 mL    IV PiggyBack: 100 mL    Norepinephrine: 389 mL    Norepinephrine: 107 mL    Propofol: 91.2 mL    Vasopressin (Organ Donation): 144 mL  Total IN: 1929.2 mL    OUT:    Indwelling Catheter - Urethral (mL): 2025 mL  Total OUT: 2025 mL    Total NET: -95.8 mL      06-04 @ 07:01  -  06-04 @ 07:48  --------------------------------------------------------  IN:    FentaNYL: 6.5 mL    Glucerna 1.5: 50 mL    Norepinephrine: 9.1 mL    Propofol: 3.8 mL    Vasopressin (Organ Donation): 6 mL  Total IN: 75.4 mL    OUT:  Total OUT: 0 mL    Total NET: 75.4 mL        Weight (kg): 64.8 (06-02 @ 00:30)    PHYSICAL EXAM:  Neuro: Awake, responsive  CV: + S1 S2 RRR  Lungs: CTABL  GI: Soft, BS x4, ND, NT  Extremities: No edema  	    ECG:      DIAGNOSTIC TESTING:    [ ] Echocardiogram:   [ ] Cardiac Catheterization:  [ ] Stress Test:      LABS:	 	    CARDIAC MARKERS:          04 Jun 2025 03:05    142    |  110    |  53     ----------------------------<  143    4.8     |  25     |  1.35   03 Jun 2025 15:00    140    |  109    |  56     ----------------------------<  169    5.0     |  24     |  1.68   03 Jun 2025 09:00    138    |  106    |  62     ----------------------------<  247    6.1     |  22     |  1.80     Ca    8.3        04 Jun 2025 03:05  Phos  3.6       04 Jun 2025 03:05  Mg     2.5       04 Jun 2025 03:05    TPro  6.3    /  Alb  1.8    /  TBili  1.0    /  DBili  x      /  AST  39     /  ALT  24     /  AlkPhos  108    04 Jun 2025 03:05                          9.3    17.27 )-----------( 229      ( 04 Jun 2025 03:05 )             29.5 ,                       9.9    20.05 )-----------( 226      ( 03 Jun 2025 03:50 )             32.7 ,                       10.2   29.95 )-----------( 223      ( 02 Jun 2025 15:20 )             33.5 ,                       9.8    32.85 )-----------( 238      ( 02 Jun 2025 10:00 )             31.8 ,                       9.7    29.38 )-----------( 214      ( 02 Jun 2025 04:30 )             32.0 ,                       9.5    23.40 )-----------( 208      ( 02 Jun 2025 02:30 )             30.9 ,                       10.6   9.31  )-----------( 225      ( 01 Jun 2025 13:24 )             33.5   proBNP:   Lipid Profile: 08-05 @ 07:36  HDL/Total Cholesterol: --  HDL Chol:              65 mg/dL  Serum Chol:            124 mg/dL  Direct LDL:            --  Triglycerides:         64 mg/dL    HgA1c: Hemoglobin A1C, Whole Blood: 6.2 % (11-05 @ 05:30)  Hemoglobin A1C, Whole Blood: 6.6 % (07-01 @ 10:44)  Hemoglobin A1C, Whole Blood: 7.8 % (05-11 @ 11:48)    TSH: Thyroid Stimulating Hormone, Serum: 2.450 uIU/mL (09-25 @ 06:17)             Patient is a 74y old  Female who presents with a chief complaint of ALY, physical deconditioning (03 Jun 2025 15:54)    INTERVAL HISTORY: No overnight events, still sedated and intubated on pressors  TELEMETRY: Sinus tach, rare isolated PVCs    PAST MEDICAL & SURGICAL HISTORY:  HTN (hypertension)  HLD (hyperlipidemia)  CAD S/P percutaneous coronary angioplasty  Insulin dependent type 2 diabetes mellitus  History of heart artery stent      MEDICATIONS:  MEDICATIONS  (STANDING):  aspirin  chewable 81 milliGRAM(s) Oral daily  atorvastatin 40 milliGRAM(s) Oral at bedtime  chlorhexidine 0.12% Liquid 15 milliLiter(s) Oral Mucosa every 12 hours  chlorhexidine 2% Cloths 1 Application(s) Topical <User Schedule>  dextrose 5%. 1000 milliLiter(s) (100 mL/Hr) IV Continuous <Continuous>  dextrose 50% Injectable 25 Gram(s) IV Push once  fentaNYL   Infusion. 0.5 MICROgram(s)/kG/Hr (3.24 mL/Hr) IV Continuous <Continuous>  gabapentin 300 milliGRAM(s) Oral two times a day  insulin glargine Injectable (LANTUS) 15 Unit(s) SubCutaneous at bedtime  insulin lispro (ADMELOG) corrective regimen sliding scale   SubCutaneous every 6 hours  meropenem  IVPB 1000 milliGRAM(s) IV Intermittent every 12 hours  multivitamin 1 Tablet(s) Oral daily  norepinephrine Infusion 0.05 MICROgram(s)/kG/Min (3.04 mL/Hr) IV Continuous <Continuous>  propofol Infusion 10 MICROgram(s)/kG/Min (4.22 mL/Hr) IV Continuous <Continuous>  ranolazine 500 milliGRAM(s) Oral two times a day  senna 2 Tablet(s) Oral at bedtime  sodium zirconium cyclosilicate 5 Gram(s) Oral every 8 hours  vasopressin Infusion. 0.04 Unit(s)/Min (6 mL/Hr) IV Continuous <Continuous>    MEDICATIONS  (PRN):  acetaminophen     Tablet .. 650 milliGRAM(s) Oral every 6 hours PRN Temp greater or equal to 38C (100.4F), Mild Pain (1 - 3)  heparin   Injectable 3800 Unit(s) IV Push every 6 hours PRN For aPTT less than 40  ondansetron Injectable 4 milliGRAM(s) IV Push every 8 hours PRN Nausea and/or Vomiting  polyethylene glycol 3350 17 Gram(s) Oral daily PRN Constipation      Vitals:  T(F): 98.2 (06-04-25 @ 03:30), Max: 101.5 (06-03-25 @ 08:05)  HR: 97 (06-04-25 @ 07:30) (93 - 121)  BP: 107/68 (06-03-25 @ 13:30) (96/60 - 148/75)  RR: 22 (06-04-25 @ 07:30) (3 - 26)  SpO2: 100% (06-04-25 @ 07:30) (79% - 100%)  Wt(kg): --    06-03 @ 07:01  -  06-04 @ 07:00  --------------------------------------------------------  IN:    FentaNYL: 156 mL    Glucerna 1.5: 860 mL    Heparin Infusion: 82 mL    IV PiggyBack: 100 mL    Norepinephrine: 389 mL    Norepinephrine: 107 mL    Propofol: 91.2 mL    Vasopressin (Organ Donation): 144 mL  Total IN: 1929.2 mL    OUT:    Indwelling Catheter - Urethral (mL): 2025 mL  Total OUT: 2025 mL    Total NET: -95.8 mL      06-04 @ 07:01  -  06-04 @ 07:48  --------------------------------------------------------  IN:    FentaNYL: 6.5 mL    Glucerna 1.5: 50 mL    Norepinephrine: 9.1 mL    Propofol: 3.8 mL    Vasopressin (Organ Donation): 6 mL  Total IN: 75.4 mL    OUT:  Total OUT: 0 mL    Total NET: 75.4 mL        Weight (kg): 64.8 (06-02 @ 00:30)    PHYSICAL EXAM:  Neuro: Intubated sedated  CV: + S1 S2 RRR  Lungs: CTABL  GI: Soft, BS x4, ND, NT  Extremities: No edema  	    ECG:      DIAGNOSTIC TESTING:    [ ] Echocardiogram:   [ ] Cardiac Catheterization:  [ ] Stress Test:      LABS:	 	    CARDIAC MARKERS:          04 Jun 2025 03:05    142    |  110    |  53     ----------------------------<  143    4.8     |  25     |  1.35   03 Jun 2025 15:00    140    |  109    |  56     ----------------------------<  169    5.0     |  24     |  1.68   03 Jun 2025 09:00    138    |  106    |  62     ----------------------------<  247    6.1     |  22     |  1.80     Ca    8.3        04 Jun 2025 03:05  Phos  3.6       04 Jun 2025 03:05  Mg     2.5       04 Jun 2025 03:05    TPro  6.3    /  Alb  1.8    /  TBili  1.0    /  DBili  x      /  AST  39     /  ALT  24     /  AlkPhos  108    04 Jun 2025 03:05                          9.3    17.27 )-----------( 229      ( 04 Jun 2025 03:05 )             29.5 ,                       9.9    20.05 )-----------( 226      ( 03 Jun 2025 03:50 )             32.7 ,                       10.2   29.95 )-----------( 223      ( 02 Jun 2025 15:20 )             33.5 ,                       9.8    32.85 )-----------( 238      ( 02 Jun 2025 10:00 )             31.8 ,                       9.7    29.38 )-----------( 214      ( 02 Jun 2025 04:30 )             32.0 ,                       9.5    23.40 )-----------( 208      ( 02 Jun 2025 02:30 )             30.9 ,                       10.6   9.31  )-----------( 225      ( 01 Jun 2025 13:24 )             33.5   proBNP:   Lipid Profile: 08-05 @ 07:36  HDL/Total Cholesterol: --  HDL Chol:              65 mg/dL  Serum Chol:            124 mg/dL  Direct LDL:            --  Triglycerides:         64 mg/dL    HgA1c: Hemoglobin A1C, Whole Blood: 6.2 % (11-05 @ 05:30)  Hemoglobin A1C, Whole Blood: 6.6 % (07-01 @ 10:44)  Hemoglobin A1C, Whole Blood: 7.8 % (05-11 @ 11:48)    TSH: Thyroid Stimulating Hormone, Serum: 2.450 uIU/mL (09-25 @ 06:17)

## 2025-06-04 NOTE — DIETITIAN INITIAL EVALUATION ADULT - PHYSCIAL ASSESSMENT
Pt without visible findings of muscle/fat loss.  Per previous adm RD note:  06/08/22, wt. 72.4 kg, height: 5'5"  Current adm wt. 05/31, 70.4 kg  Height: 5'2"/overweight

## 2025-06-04 NOTE — DIETITIAN INITIAL EVALUATION ADULT - OTHER CALCULATIONS
05/31, 70.4 kg-> 06/02, 64.8 kg(ICU adm drug dose wt.), wt. loss 5.6 kg noted, likely due to s/p furosemide therapy   06/03, 64.6 kg(daily wt.)

## 2025-06-04 NOTE — PROGRESS NOTE ADULT - SUBJECTIVE AND OBJECTIVE BOX
EMMANUEL GONZALEZ  MRN-[id number]  74y (1950)    Follow Up:  ESBL E. Coli Bacteremia and UTI    Interval History: The pt was seen and examined earlier in ICU, not in acute distress, remains intubated, weaning process is in progress, continues having fevers, T 101 this morning, leukocytosis better 17.27.    PAST MEDICAL & SURGICAL HISTORY:  HTN (hypertension)      HLD (hyperlipidemia)      CAD S/P percutaneous coronary angioplasty      Insulin dependent type 2 diabetes mellitus      History of heart artery stent          ROS:    [x ] Unobtainable because: intubated   [ ] All other systems negative    Constitutional: no fever, no chills  Head: no trauma  Eyes: no vision changes, no eye pain  ENT:  no sore throat, no rhinorrhea  Cardiovascular:  no chest pain, no palpitation  Respiratory:  no SOB, no cough  GI:  no abd pain, no vomiting, no diarrhea  urinary: no dysuria, no hematuria, no flank pain  musculoskeletal:  no joint pain, no joint swelling  skin:  no rash  neurology:  no headache, no seizure, no change in mental status  psych: no anxiety, no depression         Allergies  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 (Short breath)  No Known Drug Allergies        ANTIMICROBIALS:  meropenem  IVPB 2000 every 12 hours      OTHER MEDS:  acetaminophen     Tablet .. 650 milliGRAM(s) Oral every 6 hours PRN  aspirin  chewable 81 milliGRAM(s) Oral daily  atorvastatin 40 milliGRAM(s) Oral at bedtime  chlorhexidine 0.12% Liquid 15 milliLiter(s) Oral Mucosa every 12 hours  chlorhexidine 2% Cloths 1 Application(s) Topical <User Schedule>  dexMEDEtomidine Infusion 0.5 MICROgram(s)/kG/Hr IV Continuous <Continuous>  dextrose 5%. 1000 milliLiter(s) IV Continuous <Continuous>  dextrose 50% Injectable 25 Gram(s) IV Push once  gabapentin 300 milliGRAM(s) Oral two times a day  heparin   Injectable 5000 Unit(s) SubCutaneous every 8 hours  hydrocortisone sodium succinate Injectable 100 milliGRAM(s) IV Push every 8 hours  insulin glargine Injectable (LANTUS) 15 Unit(s) SubCutaneous at bedtime  insulin lispro (ADMELOG) corrective regimen sliding scale   SubCutaneous every 6 hours  multivitamin 1 Tablet(s) Oral daily  norepinephrine Infusion 0.05 MICROgram(s)/kG/Min IV Continuous <Continuous>  ondansetron Injectable 4 milliGRAM(s) IV Push every 8 hours PRN  polyethylene glycol 3350 17 Gram(s) Oral daily PRN  ranolazine 500 milliGRAM(s) Oral two times a day  senna 2 Tablet(s) Oral at bedtime  vasopressin Infusion. 0.04 Unit(s)/Min IV Continuous <Continuous>      Vital Signs Last 24 Hrs  T(C): 37.7 (04 Jun 2025 15:30), Max: 38.3 (04 Jun 2025 07:30)  T(F): 99.9 (04 Jun 2025 15:30), Max: 101 (04 Jun 2025 07:30)  HR: 85 (04 Jun 2025 15:30) (83 - 121)  BP: --  BP(mean): --  RR: 20 (04 Jun 2025 15:30) (15 - 26)  SpO2: 99% (04 Jun 2025 15:30) (92% - 100%)    Parameters below as of 04 Jun 2025 07:30  Patient On (Oxygen Delivery Method): ventilator        Physical Exam:  General: on the vent and on mild sedation  Head: atraumatic, normocephalic  Eyes: normal sclera and conjunctiva  ENT:   ET and OGT present   Cardio: S1,S2 tachycardic  Respiratory:   breath sounds heard b/l is on the vent  abd:   soft, BS +, no distention  :     + marcus  Musculoskeletal : right shoulder and arm with echymosis   Skin:    no rash, left IJ central line c/d/i, right groin A-line c/d/i  Neurologic: sedated  Psych: unable     WBC Count: 17.27 K/uL (06-04 @ 03:05)  WBC Count: 20.05 K/uL (06-03 @ 03:50)  WBC Count: 29.95 K/uL (06-02 @ 15:20)  WBC Count: 32.85 K/uL (06-02 @ 10:00)  WBC Count: 29.38 K/uL (06-02 @ 04:30)  WBC Count: 23.40 K/uL (06-02 @ 02:30)  WBC Count: 9.31 K/uL (06-01 @ 13:24)                            9.3    17.27 )-----------( 229      ( 04 Jun 2025 03:05 )             29.5       06-04    142  |  110[H]  |  53[H]  ----------------------------<  143[H]  4.8   |  25  |  1.35[H]    Ca    8.3[L]      04 Jun 2025 03:05  Phos  3.6     06-04  Mg     2.5     06-04    TPro  6.3  /  Alb  1.8[L]  /  TBili  1.0  /  DBili  x   /  AST  39[H]  /  ALT  24  /  AlkPhos  108  06-04      Urinalysis Basic - ( 04 Jun 2025 03:05 )    Color: x / Appearance: x / SG: x / pH: x  Gluc: 143 mg/dL / Ketone: x  / Bili: x / Urobili: x   Blood: x / Protein: x / Nitrite: x   Leuk Esterase: x / RBC: x / WBC x   Sq Epi: x / Non Sq Epi: x / Bacteria: x        Creatinine Trend: 1.35<--, 1.68<--, 1.80<--, 1.85<--, 1.95<--, 1.80<--  Blood Gas Arterial, Lactate: 0.70 mmol/L (06-04-25 @ 12:04)  Lactate, Blood: 2.1 mmol/L (06-03-25 @ 03:50)  Blood Gas Arterial, Lactate: 1.60 mmol/L (06-02-25 @ 19:47)      MICROBIOLOGY:  v  Catheterized Catheterized  06-02-25   >100,000 CFU/ml Escherichia coli ESBL  --  Escherichia coli ESBL      Blood Blood-Peripheral  06-01-25   Growth in aerobic and anaerobic bottles: Escherichia coli ESBL  See previous culture [id number]  --    Growth in aerobic bottle: Gram Negative Rods  Growth in anaerobic bottle: Gram Negative Rods      Blood Blood-Peripheral  06-01-25   Growth in aerobic and anaerobic bottles: Escherichia coli ESBL  Direct identification is available within approximately 3-5  hours either by Blood Panel Multiplexed PCR or Direct  MALDI-TOF. Details: [email or web address]  --  Blood Culture PCR  Escherichia coli ESBL    SARS-CoV-2 Result: NotDetec (05-31-25 @ 15:45)  SARS-CoV-2 Result: NotDetec (05-30-25 @ 18:17)      RADIOLOGY:     EMMANUEL GONZALEZ  MRN-[id number]  74y (1950)    Follow Up:  ESBL E. Coli Bacteremia and UTI    Interval History: The pt was seen and examined earlier in ICU, not in acute distress, remains intubated, weaning process is in progress, continues having fevers, T 101 this morning, leukocytosis better 17.27.    PAST MEDICAL & SURGICAL HISTORY:  HTN (hypertension)      HLD (hyperlipidemia)      CAD S/P percutaneous coronary angioplasty      Insulin dependent type 2 diabetes mellitus      History of heart artery stent          ROS:    [x ] Unobtainable because: intubated   [ ] All other systems negative    Constitutional: no fever, no chills  Head: no trauma  Eyes: no vision changes, no eye pain  ENT:  no sore throat, no rhinorrhea  Cardiovascular:  no chest pain, no palpitation  Respiratory:  no SOB, no cough  GI:  no abd pain, no vomiting, no diarrhea  urinary: no dysuria, no hematuria, no flank pain  musculoskeletal:  no joint pain, no joint swelling  skin:  no rash  neurology:  no headache, no seizure, no change in mental status  psych: no anxiety, no depression         Allergies  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 (Short breath)  No Known Drug Allergies        ANTIMICROBIALS:  meropenem  IVPB 2000 every 12 hours      OTHER MEDS:  acetaminophen     Tablet .. 650 milliGRAM(s) Oral every 6 hours PRN  aspirin  chewable 81 milliGRAM(s) Oral daily  atorvastatin 40 milliGRAM(s) Oral at bedtime  chlorhexidine 0.12% Liquid 15 milliLiter(s) Oral Mucosa every 12 hours  chlorhexidine 2% Cloths 1 Application(s) Topical <User Schedule>  dexMEDEtomidine Infusion 0.5 MICROgram(s)/kG/Hr IV Continuous <Continuous>  dextrose 5%. 1000 milliLiter(s) IV Continuous <Continuous>  dextrose 50% Injectable 25 Gram(s) IV Push once  gabapentin 300 milliGRAM(s) Oral two times a day  heparin   Injectable 5000 Unit(s) SubCutaneous every 8 hours  hydrocortisone sodium succinate Injectable 100 milliGRAM(s) IV Push every 8 hours  insulin glargine Injectable (LANTUS) 15 Unit(s) SubCutaneous at bedtime  insulin lispro (ADMELOG) corrective regimen sliding scale   SubCutaneous every 6 hours  multivitamin 1 Tablet(s) Oral daily  norepinephrine Infusion 0.05 MICROgram(s)/kG/Min IV Continuous <Continuous>  ondansetron Injectable 4 milliGRAM(s) IV Push every 8 hours PRN  polyethylene glycol 3350 17 Gram(s) Oral daily PRN  ranolazine 500 milliGRAM(s) Oral two times a day  senna 2 Tablet(s) Oral at bedtime  vasopressin Infusion. 0.04 Unit(s)/Min IV Continuous <Continuous>      Vital Signs Last 24 Hrs  T(C): 37.7 (04 Jun 2025 15:30), Max: 38.3 (04 Jun 2025 07:30)  T(F): 99.9 (04 Jun 2025 15:30), Max: 101 (04 Jun 2025 07:30)  HR: 85 (04 Jun 2025 15:30) (83 - 121)  BP: --  BP(mean): --  RR: 20 (04 Jun 2025 15:30) (15 - 26)  SpO2: 99% (04 Jun 2025 15:30) (92% - 100%)    Parameters below as of 04 Jun 2025 07:30  Patient On (Oxygen Delivery Method): ventilator        Physical Exam:  General: on the vent and on mild sedation  Head: atraumatic, normocephalic  Eyes: normal sclera and conjunctiva  ENT:   ET and OGT present   Cardio: S1,S2 tachycardic  Respiratory:   breath sounds heard b/l is on the vent  abd:   soft, BS +, no distention  :     + marcus  Musculoskeletal : right shoulder and arm with echymosis   Skin:    no rash, left IJ central line c/d/i, right groin A-line c/d/i  Neurologic: sedated  Psych: unable     WBC Count: 17.27 K/uL (06-04 @ 03:05)  WBC Count: 20.05 K/uL (06-03 @ 03:50)  WBC Count: 29.95 K/uL (06-02 @ 15:20)  WBC Count: 32.85 K/uL (06-02 @ 10:00)  WBC Count: 29.38 K/uL (06-02 @ 04:30)  WBC Count: 23.40 K/uL (06-02 @ 02:30)  WBC Count: 9.31 K/uL (06-01 @ 13:24)                            9.3    17.27 )-----------( 229      ( 04 Jun 2025 03:05 )             29.5       06-04    142  |  110[H]  |  53[H]  ----------------------------<  143[H]  4.8   |  25  |  1.35[H]    Ca    8.3[L]      04 Jun 2025 03:05  Phos  3.6     06-04  Mg     2.5     06-04    TPro  6.3  /  Alb  1.8[L]  /  TBili  1.0  /  DBili  x   /  AST  39[H]  /  ALT  24  /  AlkPhos  108  06-04      Urinalysis Basic - ( 04 Jun 2025 03:05 )    Color: x / Appearance: x / SG: x / pH: x  Gluc: 143 mg/dL / Ketone: x  / Bili: x / Urobili: x   Blood: x / Protein: x / Nitrite: x   Leuk Esterase: x / RBC: x / WBC x   Sq Epi: x / Non Sq Epi: x / Bacteria: x        Creatinine Trend: 1.35<--, 1.68<--, 1.80<--, 1.85<--, 1.95<--, 1.80<--  Blood Gas Arterial, Lactate: 0.70 mmol/L (06-04-25 @ 12:04)  Lactate, Blood: 2.1 mmol/L (06-03-25 @ 03:50)  Blood Gas Arterial, Lactate: 1.60 mmol/L (06-02-25 @ 19:47)      MICROBIOLOGY:    Catheterized Catheterized  06-02-25   >100,000 CFU/ml Escherichia coli ESBL  --  Escherichia coli ESBL      Blood Blood-Peripheral  06-01-25   Growth in aerobic and anaerobic bottles: Escherichia coli ESBL  See previous culture [id number]  --    Growth in aerobic bottle: Gram Negative Rods  Growth in anaerobic bottle: Gram Negative Rods      Blood Blood-Peripheral  06-01-25   Growth in aerobic and anaerobic bottles: Escherichia coli ESBL  Direct identification is available within approximately 3-5  hours either by Blood Panel Multiplexed PCR or Direct  MALDI-TOF. Details: [email or web address]  --  Blood Culture PCR  Escherichia coli ESBL    SARS-CoV-2 Result: NotDetec (05-31-25 @ 15:45)  SARS-CoV-2 Result: NotDetec (05-30-25 @ 18:17)      RADIOLOGY:

## 2025-06-05 LAB
ALBUMIN SERPL ELPH-MCNC: 1.8 G/DL — LOW (ref 3.3–5)
ALP SERPL-CCNC: 129 U/L — HIGH (ref 40–120)
ALT FLD-CCNC: 20 U/L — SIGNIFICANT CHANGE UP (ref 12–78)
ANION GAP SERPL CALC-SCNC: 6 MMOL/L — SIGNIFICANT CHANGE UP (ref 5–17)
ANISOCYTOSIS BLD QL: SLIGHT — SIGNIFICANT CHANGE UP
AST SERPL-CCNC: 31 U/L — SIGNIFICANT CHANGE UP (ref 15–37)
BASOPHILS # BLD AUTO: 0 K/UL — SIGNIFICANT CHANGE UP (ref 0–0.2)
BASOPHILS NFR BLD AUTO: 0 % — SIGNIFICANT CHANGE UP (ref 0–2)
BILIRUB SERPL-MCNC: 0.7 MG/DL — SIGNIFICANT CHANGE UP (ref 0.2–1.2)
BUN SERPL-MCNC: 52 MG/DL — HIGH (ref 7–23)
CALCIUM SERPL-MCNC: 8.2 MG/DL — LOW (ref 8.5–10.1)
CHLORIDE SERPL-SCNC: 110 MMOL/L — HIGH (ref 96–108)
CO2 SERPL-SCNC: 29 MMOL/L — SIGNIFICANT CHANGE UP (ref 22–31)
CREAT SERPL-MCNC: 1.19 MG/DL — SIGNIFICANT CHANGE UP (ref 0.5–1.3)
EGFR: 47 ML/MIN/1.73M2 — LOW
EGFR: 47 ML/MIN/1.73M2 — LOW
EOSINOPHIL # BLD AUTO: 0 K/UL — SIGNIFICANT CHANGE UP (ref 0–0.5)
EOSINOPHIL NFR BLD AUTO: 0 % — SIGNIFICANT CHANGE UP (ref 0–6)
GAS PNL BLDA: SIGNIFICANT CHANGE UP
GLUCOSE BLDC GLUCOMTR-MCNC: 181 MG/DL — HIGH (ref 70–99)
GLUCOSE BLDC GLUCOMTR-MCNC: 247 MG/DL — HIGH (ref 70–99)
GLUCOSE BLDC GLUCOMTR-MCNC: 277 MG/DL — HIGH (ref 70–99)
GLUCOSE BLDC GLUCOMTR-MCNC: 278 MG/DL — HIGH (ref 70–99)
GLUCOSE BLDC GLUCOMTR-MCNC: 292 MG/DL — HIGH (ref 70–99)
GLUCOSE BLDC GLUCOMTR-MCNC: 313 MG/DL — HIGH (ref 70–99)
GLUCOSE SERPL-MCNC: 195 MG/DL — HIGH (ref 70–99)
HCT VFR BLD CALC: 28.2 % — LOW (ref 34.5–45)
HGB BLD-MCNC: 8.8 G/DL — LOW (ref 11.5–15.5)
HYPOCHROMIA BLD QL: SLIGHT — SIGNIFICANT CHANGE UP
LG PLATELETS BLD QL AUTO: SLIGHT — SIGNIFICANT CHANGE UP
LYMPHOCYTES # BLD AUTO: 0.33 K/UL — LOW (ref 1–3.3)
LYMPHOCYTES # BLD AUTO: 2 % — LOW (ref 13–44)
MACROCYTES BLD QL: SLIGHT — SIGNIFICANT CHANGE UP
MAGNESIUM SERPL-MCNC: 2.6 MG/DL — SIGNIFICANT CHANGE UP (ref 1.6–2.6)
MANUAL SMEAR VERIFICATION: SIGNIFICANT CHANGE UP
MCHC RBC-ENTMCNC: 28.1 PG — SIGNIFICANT CHANGE UP (ref 27–34)
MCHC RBC-ENTMCNC: 31.2 G/DL — LOW (ref 32–36)
MCV RBC AUTO: 90.1 FL — SIGNIFICANT CHANGE UP (ref 80–100)
MONOCYTES # BLD AUTO: 0.33 K/UL — SIGNIFICANT CHANGE UP (ref 0–0.9)
MONOCYTES NFR BLD AUTO: 2 % — SIGNIFICANT CHANGE UP (ref 2–14)
NEUTROPHILS # BLD AUTO: 15.77 K/UL — HIGH (ref 1.8–7.4)
NEUTROPHILS NFR BLD AUTO: 94 % — HIGH (ref 43–77)
NEUTS BAND # BLD: 2 % — SIGNIFICANT CHANGE UP (ref 0–8)
NEUTS BAND NFR BLD: 2 % — SIGNIFICANT CHANGE UP (ref 0–8)
NEUTS HYPERSEG # BLD: PRESENT — SIGNIFICANT CHANGE UP
NRBC # BLD: 0 /100 WBCS — SIGNIFICANT CHANGE UP (ref 0–0)
NRBC BLD AUTO-RTO: SIGNIFICANT CHANGE UP /100 WBCS (ref 0–0)
NRBC BLD-RTO: 0 /100 WBCS — SIGNIFICANT CHANGE UP (ref 0–0)
PHOSPHATE SERPL-MCNC: 3.7 MG/DL — SIGNIFICANT CHANGE UP (ref 2.5–4.5)
PLAT MORPH BLD: NORMAL — SIGNIFICANT CHANGE UP
PLATELET # BLD AUTO: 190 K/UL — SIGNIFICANT CHANGE UP (ref 150–400)
POIKILOCYTOSIS BLD QL AUTO: SLIGHT — SIGNIFICANT CHANGE UP
POLYCHROMASIA BLD QL SMEAR: SLIGHT — SIGNIFICANT CHANGE UP
POTASSIUM SERPL-MCNC: 4.4 MMOL/L — SIGNIFICANT CHANGE UP (ref 3.5–5.3)
POTASSIUM SERPL-SCNC: 4.4 MMOL/L — SIGNIFICANT CHANGE UP (ref 3.5–5.3)
PROCALCITONIN SERPL-MCNC: 2.41 NG/ML — HIGH (ref 0.02–0.1)
PROT SERPL-MCNC: 6.4 GM/DL — SIGNIFICANT CHANGE UP (ref 6–8.3)
RBC # BLD: 3.13 M/UL — LOW (ref 3.8–5.2)
RBC # FLD: 14 % — SIGNIFICANT CHANGE UP (ref 10.3–14.5)
RBC BLD AUTO: ABNORMAL
SODIUM SERPL-SCNC: 145 MMOL/L — SIGNIFICANT CHANGE UP (ref 135–145)
TOXIC GRANULES BLD QL SMEAR: PRESENT — SIGNIFICANT CHANGE UP
TROPONIN I, HIGH SENSITIVITY RESULT: 1850.8 NG/L — HIGH
WBC # BLD: 16.43 K/UL — HIGH (ref 3.8–10.5)
WBC # FLD AUTO: 16.43 K/UL — HIGH (ref 3.8–10.5)

## 2025-06-05 PROCEDURE — 99291 CRITICAL CARE FIRST HOUR: CPT

## 2025-06-05 RX ORDER — INSULIN GLARGINE-YFGN 100 [IU]/ML
25 INJECTION, SOLUTION SUBCUTANEOUS AT BEDTIME
Refills: 0 | Status: DISCONTINUED | OUTPATIENT
Start: 2025-06-05 | End: 2025-06-08

## 2025-06-05 RX ORDER — INSULIN LISPRO 100 U/ML
INJECTION, SOLUTION INTRAVENOUS; SUBCUTANEOUS EVERY 4 HOURS
Refills: 0 | Status: DISCONTINUED | OUTPATIENT
Start: 2025-06-05 | End: 2025-06-08

## 2025-06-05 RX ORDER — FENTANYL CITRATE-0.9 % NACL/PF 100MCG/2ML
100 SYRINGE (ML) INTRAVENOUS ONCE
Refills: 0 | Status: DISCONTINUED | OUTPATIENT
Start: 2025-06-05 | End: 2025-06-05

## 2025-06-05 RX ADMIN — Medication 15 MILLILITER(S): at 17:44

## 2025-06-05 RX ADMIN — Medication 650 MILLIGRAM(S): at 05:37

## 2025-06-05 RX ADMIN — INSULIN LISPRO 6: 100 INJECTION, SOLUTION INTRAVENOUS; SUBCUTANEOUS at 05:43

## 2025-06-05 RX ADMIN — INSULIN GLARGINE-YFGN 25 UNIT(S): 100 INJECTION, SOLUTION SUBCUTANEOUS at 22:57

## 2025-06-05 RX ADMIN — RANOLAZINE 500 MILLIGRAM(S): 1000 TABLET, FILM COATED, EXTENDED RELEASE ORAL at 17:44

## 2025-06-05 RX ADMIN — HEPARIN SODIUM 5000 UNIT(S): 1000 INJECTION INTRAVENOUS; SUBCUTANEOUS at 13:14

## 2025-06-05 RX ADMIN — DEXMEDETOMIDINE HYDROCHLORIDE IN SODIUM CHLORIDE 8.1 MICROGRAM(S)/KG/HR: 4 INJECTION INTRAVENOUS at 17:43

## 2025-06-05 RX ADMIN — HEPARIN SODIUM 5000 UNIT(S): 1000 INJECTION INTRAVENOUS; SUBCUTANEOUS at 22:55

## 2025-06-05 RX ADMIN — MEROPENEM 280 MILLIGRAM(S): 1 INJECTION INTRAVENOUS at 17:44

## 2025-06-05 RX ADMIN — Medication 81 MILLIGRAM(S): at 13:14

## 2025-06-05 RX ADMIN — Medication 15 MILLILITER(S): at 05:37

## 2025-06-05 RX ADMIN — GABAPENTIN 300 MILLIGRAM(S): 400 CAPSULE ORAL at 05:37

## 2025-06-05 RX ADMIN — Medication 1 TABLET(S): at 13:14

## 2025-06-05 RX ADMIN — DEXMEDETOMIDINE HYDROCHLORIDE IN SODIUM CHLORIDE 8.1 MICROGRAM(S)/KG/HR: 4 INJECTION INTRAVENOUS at 04:55

## 2025-06-05 RX ADMIN — Medication 650 MILLIGRAM(S): at 06:37

## 2025-06-05 RX ADMIN — ATORVASTATIN CALCIUM 40 MILLIGRAM(S): 80 TABLET, FILM COATED ORAL at 22:54

## 2025-06-05 RX ADMIN — Medication 2 TABLET(S): at 22:54

## 2025-06-05 RX ADMIN — Medication 100 MICROGRAM(S): at 22:09

## 2025-06-05 RX ADMIN — Medication 100 MILLIGRAM(S): at 05:38

## 2025-06-05 RX ADMIN — RANOLAZINE 500 MILLIGRAM(S): 1000 TABLET, FILM COATED, EXTENDED RELEASE ORAL at 05:37

## 2025-06-05 RX ADMIN — INSULIN LISPRO 6: 100 INJECTION, SOLUTION INTRAVENOUS; SUBCUTANEOUS at 22:55

## 2025-06-05 RX ADMIN — NOREPINEPHRINE BITARTRATE 3.04 MICROGRAM(S)/KG/MIN: 8 SOLUTION at 19:23

## 2025-06-05 RX ADMIN — Medication 100 MICROGRAM(S): at 23:09

## 2025-06-05 RX ADMIN — Medication 100 MILLIGRAM(S): at 22:55

## 2025-06-05 RX ADMIN — Medication 100 MILLIGRAM(S): at 13:15

## 2025-06-05 RX ADMIN — DEXMEDETOMIDINE HYDROCHLORIDE IN SODIUM CHLORIDE 8.1 MICROGRAM(S)/KG/HR: 4 INJECTION INTRAVENOUS at 19:23

## 2025-06-05 RX ADMIN — Medication 1 APPLICATION(S): at 05:37

## 2025-06-05 RX ADMIN — MEROPENEM 280 MILLIGRAM(S): 1 INJECTION INTRAVENOUS at 05:36

## 2025-06-05 RX ADMIN — HEPARIN SODIUM 5000 UNIT(S): 1000 INJECTION INTRAVENOUS; SUBCUTANEOUS at 05:38

## 2025-06-05 RX ADMIN — GABAPENTIN 300 MILLIGRAM(S): 400 CAPSULE ORAL at 17:44

## 2025-06-05 RX ADMIN — INSULIN LISPRO 8: 100 INJECTION, SOLUTION INTRAVENOUS; SUBCUTANEOUS at 13:15

## 2025-06-05 NOTE — PROGRESS NOTE ADULT - ASSESSMENT
74-year-old female with hypertension, hyperlipidemia, 2 diabetes, CAD status post multiple stents with a recent right humerus fracture roughly 3 days ago following a traumatic fall admitted to the medical ICU for altered mental status and hypotension.  Concern for septic shock secondary to colitis found to have ESBL E. coli bacteremia.  Hospital course further complicated by cardiac arrest of unclear origin with ROSC after 10 minutes of CPR.    Neuro: Patient currently sedated with precedex.  Maintain RASS between 0 to -1.  SAT as tolerated   Cardiovascular: Patient currently in distributive shock secondary to E. coli bacteremia most likely from colitis.  Continue with vasopressors maintain MAP greater than 65.  Trop peaked, off of Heprin gtt. TTE shows EF of 55% with WMA of the left ventricle, normal RV. Appreciate cardiology input.   Pulmonary: Patient intubated for airway protection.  Titrate ventilator settings based on your blood gas.  Maintain O2 saturation above 90%. SBT as tolerated   GI: Concern for stercoral colitis based on CT abdomen pelvis.  Continue with aggressive bowel regimen. Monitor for bowel movements. tube feed as tolerated   Renal: Patient in ALY most likely ATN from shock state.  Strict I's and O's.  Monitor electrolytes and replete as needed.  ID: Continue with meropenem given ESBL coli in the blood.  Repeat blood cultures NGTD. Source of bacteremia from UTI  Endo: Insulin sliding scale. c/w Stress dose steroids, solucortef 100mg q8h IV. Increase Lantus to 25U QHS  Heme: hep SQ for VTE PPX   MSK: Patient with a known history of right humeral fracture consulted orthopedics no plan for intervention at this time.  Ethics: Patient is full code overall prognosis is guarded

## 2025-06-05 NOTE — PROGRESS NOTE ADULT - ASSESSMENT
A/P-   74 year old female with PMHx of HTN, HLD, IDDM2, CAD (s/p PCI), and recent right humerus fracture (placed in sling 3 days ago) who presented to the ED on 5/30/25 for complaints of elevated blood glucose.   As per med history provided by family  patient fell three days ago and landed on her right shoulder. Went to [facility] at that time and found to have right humerus fracture and placed in sling. Discharged home with outpatient orthopedic surgery follow up. Since then, patient has been refusing to get out of bed and not eating much.   In the ED, VSS. Hgb 10.3, sodium 132, potassium 6.3, BUN 70, Cr 2.02, blood glucose 510. VBG 7.29 / 58 / 34 / 26. COVID/influenza/RSV negative. CT head without acute intracranial findings. Received 1L NS bolus, 1L LR bolus, and insulin 20 U IV.  (30 May 2025 23:43)    s/p  RRT last night   for AMS, hypotension, fever of 103  and was started on sepsis w/u given IVF bolus, Abx with CTX and Vanco. Persistently hypotensive and obtunded. Started on levophed infusion. Urgently transferred to ICU.   was also intubated for airway protection    Infectious Disease consultation requested this afternoon 6/2 to help with antibiotic management  for ESBL bacteremia    Intubated   sedated  on pressors for hypotention  afebrile today  yesterday 103 t-max  + leukocytosis of 29K  Blood cx- ESBL GNR by PCR x 2  UA from 5/31-pos for nitrates  urine cx-pending  DUANE  elevated cardiac enzymes    per med records had ESBL e.coli UTI in august and sept 2024 and ID was not consulted     6/3-  remains Intubated and sedated  on pressors for hypotention  temp of 101.5 today  + leukocytosis trending down to 20k  creatinine slightly better today  Blood cx- ESBL GNR by PCR x 2  UA from 5/31-pos for nitrates  urine cx->100K e.coli  DUANE  elevated cardiac enzymes    6/4: seen in ICU earlier, remains intubated, weaning process in progress, on pressors, continues having fevers, T 101 this morning, leukocytosis is better 17.27, Cr better 1.35, UC and BCs grew ESBL E. Coli, repeat BCs are pending, Meropenem IV continued. If not improvement in pt's fevers tomorrow, most likely CT a/p will be obtained.   6/5: remains in ICU, intubated and sedated, on pressors, Temp persistent 100-100.2, WBC better 16.43, Cr normalized, LFTs ok, repeat BCs NGTD, procalcitonin 2.41, TTE performed - Left ventricular regional wall motion abnormalities present. Meropenem IV continued.           Impression-  septic shock on pressors  E.coli ESBL bacteremia source most likely  in light of positive nitrates in UA and urine cx is E.coli   DUANE  stercoral colitis   Right humerous fracture with hematoma    Plan-  continue IV meropenem for the ESBL bacteremia and sepsis day #4  follow repeat BCs - NGTD  trend pt's temperatures and WBC  wean off pressors and vent as able, per ICU  cardiology input is appreciated   no surgical intervention planned at the moment    Discussed with ICU team   discussed with the family at the bedside  A/P-   74 year old female with PMHx of HTN, HLD, IDDM2, CAD (s/p PCI), and recent right humerus fracture (placed in sling 3 days ago) who presented to the ED on 5/30/25 for complaints of elevated blood glucose.   As per med history provided by family  patient fell three days ago and landed on her right shoulder. Went to [facility] at that time and found to have right humerus fracture and placed in sling. Discharged home with outpatient orthopedic surgery follow up. Since then, patient has been refusing to get out of bed and not eating much.   In the ED, VSS. Hgb 10.3, sodium 132, potassium 6.3, BUN 70, Cr 2.02, blood glucose 510. VBG 7.29 / 58 / 34 / 26. COVID/influenza/RSV negative. CT head without acute intracranial findings. Received 1L NS bolus, 1L LR bolus, and insulin 20 U IV.  (30 May 2025 23:43)    s/p  RRT last night   for AMS, hypotension, fever of 103  and was started on sepsis w/u given IVF bolus, Abx with CTX and Vanco. Persistently hypotensive and obtunded. Started on levophed infusion. Urgently transferred to ICU.   was also intubated for airway protection    Infectious Disease consultation requested this afternoon 6/2 to help with antibiotic management  for ESBL bacteremia    Intubated   sedated  on pressors for hypotension  afebrile today  yesterday 103 t-max  + leukocytosis of 29K  Blood cx- ESBL GNR by PCR x 2  UA from 5/31-pos for nitrates  urine cx-pending  DUANE  elevated cardiac enzymes    per med records had ESBL e.coli UTI in august and sept 2024 and ID was not consulted     6/3-  remains Intubated and sedated  on pressors for hypotension  temp of 101.5 today  + leukocytosis trending down to 20k  creatinine slightly better today  Blood cx- ESBL GNR by PCR x 2  UA from 5/31-pos for nitrates  urine cx->100K e.coli  DUANE  elevated cardiac enzymes    6/4: seen in ICU earlier, remains intubated, weaning process in progress, on pressors, continues having fevers, T 101 this morning, leukocytosis is better 17.27, Cr better 1.35, UC and BCs grew ESBL E. Coli, repeat BCs are pending, Meropenem IV continued. If not improvement in pt's fevers tomorrow, most likely CT a/p will be obtained.   6/5: remains in ICU, intubated and sedated, on pressors, Temp persistent 100-100.2, WBC better 16.43, Cr normalized, LFTs ok, repeat BCs NGTD, procalcitonin 2.41, TTE performed - Left ventricular regional wall motion abnormalities present. Meropenem IV continued.           Impression-  septic shock on pressors  E.coli ESBL bacteremia source most likely    Acute respiratory failure requiring intubation   stercoral colitis   Right humerus fracture with hematoma    Plan-  continue IV meropenem for the ESBL bacteremia and sepsis day #4  follow repeat BCs - NGTD  trend pt's temperatures and WBC  wean off pressors and vent as able, per ICU  cardiology input is appreciated   no surgical intervention planned at the moment    Discussed with ICU team   discussed with the family at the bedside

## 2025-06-05 NOTE — PROGRESS NOTE ADULT - SUBJECTIVE AND OBJECTIVE BOX
CHIEF COMPLAINT:    Interval Events: No acute events overnight. patient able to follow commands on Precedex    REVIEW OF SYSTEMS:     Unable to perform ROS due to sedation       OBJECTIVE:  ICU Vital Signs Last 24 Hrs  T(C): 37.7 (05 Jun 2025 13:00), Max: 38.2 (05 Jun 2025 05:00)  T(F): 99.9 (05 Jun 2025 13:00), Max: 100.8 (05 Jun 2025 05:00)  HR: 78 (05 Jun 2025 13:00) (76 - 101)  BP: 140/69 (05 Jun 2025 07:30) (95/74 - 177/82)  BP(mean): 90 (05 Jun 2025 07:30) (81 - 110)  ABP: 124/49 (05 Jun 2025 13:00) (101/52 - 177/66)  ABP(mean): 77 (05 Jun 2025 13:00) (64 - 117)  RR: 22 (05 Jun 2025 13:00) (17 - 32)  SpO2: 98% (05 Jun 2025 13:00) (95% - 100%)      Mode: AC/ CMV (Assist Control/ Continuous Mandatory Ventilation), RR (machine): 22, TV (machine): 350, FiO2: 30, PEEP: 6, ITime: 1, MAP: 11, PIP: 23    06-04 @ 07:01  -  06-05 @ 07:00  --------------------------------------------------------  IN: 2071.5 mL / OUT: 1675 mL / NET: 396.5 mL    06-05 @ 07:01  -  06-05 @ 13:53  --------------------------------------------------------  IN: 345.5 mL / OUT: 0 mL / NET: 345.5 mL      CAPILLARY BLOOD GLUCOSE      POCT Blood Glucose.: 313 mg/dL (05 Jun 2025 12:59)      PHYSICAL EXAM:  GENERAL: NAD, well-groomed, well-developed  EYES: EOMI, PERRLA, conjunctiva and sclera clear  ENMT: ET tube in place   CHEST/LUNG: Clear to auscultation bilaterally; No rales, rhonchi, wheezing, or rubs  HEART: Regular rate and rhythm; No murmurs, rubs, or gallops  ABDOMEN: Soft, Nontender, Nondistended; Bowel sounds present  VASCULAR:  2+ Peripheral Pulses, No clubbing, cyanosis, or edema  LYMPH: No lymphadenopathy noted  SKIN: No rashes or lesions  NERVOUS SYSTEM:  Intubated and sedated     LINES:    HOSPITAL MEDICATIONS:  aspirin  chewable 81 milliGRAM(s) Oral daily  heparin   Injectable 5000 Unit(s) SubCutaneous every 8 hours    meropenem  IVPB 2000 milliGRAM(s) IV Intermittent every 12 hours    norepinephrine Infusion 0.05 MICROgram(s)/kG/Min IV Continuous <Continuous>  ranolazine 500 milliGRAM(s) Oral two times a day    atorvastatin 40 milliGRAM(s) Oral at bedtime  hydrocortisone sodium succinate Injectable 100 milliGRAM(s) IV Push every 8 hours  insulin glargine Injectable (LANTUS) 15 Unit(s) SubCutaneous at bedtime  insulin lispro (ADMELOG) corrective regimen sliding scale   SubCutaneous every 4 hours      acetaminophen     Tablet .. 650 milliGRAM(s) Oral every 6 hours PRN  dexMEDEtomidine Infusion 0.5 MICROgram(s)/kG/Hr IV Continuous <Continuous>  gabapentin 300 milliGRAM(s) Oral two times a day  ondansetron Injectable 4 milliGRAM(s) IV Push every 8 hours PRN    polyethylene glycol 3350 17 Gram(s) Oral daily PRN  senna 2 Tablet(s) Oral at bedtime        multivitamin 1 Tablet(s) Oral daily      chlorhexidine 0.12% Liquid 15 milliLiter(s) Oral Mucosa every 12 hours  chlorhexidine 2% Cloths 1 Application(s) Topical <User Schedule>        LABS:                        8.8    16.43 )-----------( 190      ( 05 Jun 2025 02:10 )             28.2     Hgb Trend: 8.8<--, 9.3<--, 9.9<--, 10.2<--, 9.8<--  06-05    145  |  110[H]  |  52[H]  ----------------------------<  195[H]  4.4   |  29  |  1.19    Ca    8.2[L]      05 Jun 2025 02:10  Phos  3.7     06-05  Mg     2.6     06-05    TPro  6.4  /  Alb  1.8[L]  /  TBili  0.7  /  DBili  x   /  AST  31  /  ALT  20  /  AlkPhos  129[H]  06-05    Creatinine Trend: 1.19<--, 1.35<--, 1.68<--, 1.80<--, 1.85<--, 1.95<--  PTT - ( 04 Jun 2025 03:05 )  PTT:62.3 sec  Urinalysis Basic - ( 05 Jun 2025 02:10 )    Color: x / Appearance: x / SG: x / pH: x  Gluc: 195 mg/dL / Ketone: x  / Bili: x / Urobili: x   Blood: x / Protein: x / Nitrite: x   Leuk Esterase: x / RBC: x / WBC x   Sq Epi: x / Non Sq Epi: x / Bacteria: x      Arterial Blood Gas:  06-05 @ 04:00  7.39/46/78/28/97.7/2.5  ABG lactate: --  Arterial Blood Gas:  06-04 @ 12:04  7.41/42/137/27/99.0/1.7  ABG lactate: --        MICROBIOLOGY:     RADIOLOGY:  [ ] Reviewed and interpreted by me    EKG:

## 2025-06-05 NOTE — PROGRESS NOTE ADULT - NS ATTEND AMEND GEN_ALL_CORE FT
I have reviewed all pertinent clinical information and agree with the NP's note.  Any new labs, recent cultures, new imaging (if applicable) and vitals have been reviewed today.  All necessary adjustments to management have been made.  Agree with the above assessment and plan.    continue IV meropenem for the ESBL bacteremia and sepsis day #4  follow repeat BCs - NGTD  trend pt's temperatures and WBC  wean off pressors and vent as able, per ICU  cardiology input is appreciated   no surgical intervention planned at the moment    Jason Aiken DO  Chief, Infectious Disease at [facility]  Reachable via Microsoft Teams or [place] office: [phone]  Weekdays: After 5pm, please call [phone] for all inquiries and new consults  Weekends: Message on-call infectious disease physician via teams ([email or web address] [email or web address])

## 2025-06-05 NOTE — PROGRESS NOTE ADULT - SUBJECTIVE AND OBJECTIVE BOX
EMMANUEL GONZALEZ  [id number]  74y (1950)    Follow Up:  ESBL E. Coli urosepsis     Interval History: The pt was seen and examined earlier in ICU, not in acute distress, pt is not awake or alert, intubated, on precedex, pt's  is present. Pt's temp is consistent 100-100.2, WBC better 16.43.    PAST MEDICAL & SURGICAL HISTORY:  HTN (hypertension)      HLD (hyperlipidemia)      CAD S/P percutaneous coronary angioplasty      Insulin dependent type 2 diabetes mellitus      History of heart artery stent          ROS:    [x ] Unobtainable because: intubated and sedated   [ ] All other systems negative    Constitutional: no fever, no chills  Head: no trauma  Eyes: no vision changes, no eye pain  ENT:  no sore throat, no rhinorrhea  Cardiovascular:  no chest pain, no palpitation  Respiratory:  no SOB, no cough  GI:  no abd pain, no vomiting, no diarrhea  urinary: no dysuria, no hematuria, no flank pain  musculoskeletal:  no joint pain, no joint swelling  skin:  no rash  neurology:  no headache, no seizure, no change in mental status  psych: no anxiety, no depression         Allergies  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 (Short breath)  No Known Drug Allergies        ANTIMICROBIALS:  meropenem  IVPB 2000 every 12 hours      OTHER MEDS:  acetaminophen     Tablet .. 650 milliGRAM(s) Oral every 6 hours PRN  aspirin  chewable 81 milliGRAM(s) Oral daily  atorvastatin 40 milliGRAM(s) Oral at bedtime  chlorhexidine 0.12% Liquid 15 milliLiter(s) Oral Mucosa every 12 hours  chlorhexidine 2% Cloths 1 Application(s) Topical <User Schedule>  dexMEDEtomidine Infusion 0.5 MICROgram(s)/kG/Hr IV Continuous <Continuous>  gabapentin 300 milliGRAM(s) Oral two times a day  heparin   Injectable 5000 Unit(s) SubCutaneous every 8 hours  hydrocortisone sodium succinate Injectable 100 milliGRAM(s) IV Push every 8 hours  insulin glargine Injectable (LANTUS) 25 Unit(s) SubCutaneous at bedtime  insulin lispro (ADMELOG) corrective regimen sliding scale   SubCutaneous every 4 hours  multivitamin 1 Tablet(s) Oral daily  norepinephrine Infusion 0.05 MICROgram(s)/kG/Min IV Continuous <Continuous>  ondansetron Injectable 4 milliGRAM(s) IV Push every 8 hours PRN  polyethylene glycol 3350 17 Gram(s) Oral daily PRN  ranolazine 500 milliGRAM(s) Oral two times a day  senna 2 Tablet(s) Oral at bedtime      Vital Signs Last 24 Hrs  T(C): 37.8 (05 Jun 2025 16:30), Max: 38.2 (05 Jun 2025 05:00)  T(F): 100 (05 Jun 2025 16:30), Max: 100.8 (05 Jun 2025 05:00)  HR: 74 (05 Jun 2025 16:30) (73 - 101)  BP: 140/69 (05 Jun 2025 07:30) (95/74 - 177/82)  BP(mean): 90 (05 Jun 2025 07:30) (81 - 110)  RR: 22 (05 Jun 2025 16:30) (22 - 32)  SpO2: 99% (05 Jun 2025 16:30) (95% - 100%)        Physical Exam:  General: on the vent and sedated   Head: atraumatic, normocephalic  Eyes: normal sclera and conjunctiva  ENT:   ET and OGT present   Cardio: S1,S2 regular   Respiratory:   breath sounds heard b/l is on the vent, no wheezes   abd:   soft, BS +, no distention  :     + marcus  Musculoskeletal : right shoulder and arm with echymosis   Skin:  no rash, left IJ central line c/d/i, right groin A-line c/d/i  Neurologic: sedated  Psych: unable     WBC Count: 16.43 K/uL (06-05 @ 02:10)  WBC Count: 17.27 K/uL (06-04 @ 03:05)  WBC Count: 20.05 K/uL (06-03 @ 03:50)  WBC Count: 29.95 K/uL (06-02 @ 15:20)  WBC Count: 32.85 K/uL (06-02 @ 10:00)  WBC Count: 29.38 K/uL (06-02 @ 04:30)  WBC Count: 23.40 K/uL (06-02 @ 02:30)                            8.8    16.43 )-----------( 190      ( 05 Jun 2025 02:10 )             28.2       06-05    145  |  110[H]  |  52[H]  ----------------------------<  195[H]  4.4   |  29  |  1.19    Ca    8.2[L]      05 Jun 2025 02:10  Phos  3.7     06-05  Mg     2.6     06-05    TPro  6.4  /  Alb  1.8[L]  /  TBili  0.7  /  DBili  x   /  AST  31  /  ALT  20  /  AlkPhos  129[H]  06-05      Urinalysis Basic - ( 05 Jun 2025 02:10 )    Color: x / Appearance: x / SG: x / pH: x  Gluc: 195 mg/dL / Ketone: x  / Bili: x / Urobili: x   Blood: x / Protein: x / Nitrite: x   Leuk Esterase: x / RBC: x / WBC x   Sq Epi: x / Non Sq Epi: x / Bacteria: x        Creatinine Trend: 1.19<--, 1.35<--, 1.68<--, 1.80<--, 1.85<--, 1.95<--  Blood Gas Arterial, Lactate: 0.80 mmol/L (06-05-25 @ 04:00)  Blood Gas Arterial, Lactate: 0.70 mmol/L (06-04-25 @ 12:04)      MICROBIOLOGY:  v  Blood Blood-Peripheral  06-04-25   No growth at 24 hours  --  --      Catheterized Catheterized  06-02-25   >100,000 CFU/ml Escherichia coli ESBL  --  Escherichia coli ESBL      Blood Blood-Peripheral  06-01-25   Growth in aerobic and anaerobic bottles: Escherichia coli ESBL  See previous culture [id number]  --    Growth in aerobic bottle: Gram Negative Rods  Growth in anaerobic bottle: Gram Negative Rods      Blood Blood-Peripheral  06-01-25   Growth in aerobic and anaerobic bottles: Escherichia coli ESBL  Direct identification is available within approximately 3-5  hours either by Blood Panel Multiplexed PCR or Direct  MALDI-TOF. Details: [email or web address]  --  Blood Culture PCR  Escherichia coli ESBL      Procalcitonin: 2.41 (06-05-25 @ 02:10)        RADIOLOGY:  < from: TTE Echo Complete w/o Contrast w/ Doppler (06.04.25 @ 19:01) >  CONCLUSIONS:     1. Technically difficult image quality.   2. Left ventricular cavity is normal in size. Left ventricular wall   thickness is normal. Left ventricular systolic function is normal with an   ejection fraction visually estimated at 50 to 55 %. Regional wall motion   abnormalities present.   3. Mid inferolateral segment, mid anterolateral segment, and apical   lateral segment are abnormal.   4. The left ventricular diastolic function is indeterminate, with normal   left ventricular filling pressure.   5. Normal right ventricular cavity size and normal right ventricular   systolic function.   6. Left atrium is normal in size.   7. The right atrium is normal in size.   8. Mild mitral regurgitation.   9. Mild tricuspid regurgitation.  10. There is mild calcification of themitral valve annulus.  11. Trace pulmonic regurgitation.  12. Estimated pulmonary artery systolic pressure is 33 mmHg, consistent   with normal pulmonary artery pressure.      < end of copied text >

## 2025-06-06 LAB
ALBUMIN SERPL ELPH-MCNC: 1.7 G/DL — LOW (ref 3.3–5)
ALP SERPL-CCNC: 129 U/L — HIGH (ref 40–120)
ALT FLD-CCNC: 16 U/L — SIGNIFICANT CHANGE UP (ref 12–78)
ANION GAP SERPL CALC-SCNC: 2 MMOL/L — LOW (ref 5–17)
AST SERPL-CCNC: 25 U/L — SIGNIFICANT CHANGE UP (ref 15–37)
BASOPHILS # BLD AUTO: 0.06 K/UL — SIGNIFICANT CHANGE UP (ref 0–0.2)
BASOPHILS NFR BLD AUTO: 0.5 % — SIGNIFICANT CHANGE UP (ref 0–2)
BILIRUB SERPL-MCNC: 0.3 MG/DL — SIGNIFICANT CHANGE UP (ref 0.2–1.2)
BUN SERPL-MCNC: 60 MG/DL — HIGH (ref 7–23)
CALCIUM SERPL-MCNC: 7.6 MG/DL — LOW (ref 8.5–10.1)
CHLORIDE SERPL-SCNC: 116 MMOL/L — HIGH (ref 96–108)
CO2 SERPL-SCNC: 29 MMOL/L — SIGNIFICANT CHANGE UP (ref 22–31)
CREAT SERPL-MCNC: 1.13 MG/DL — SIGNIFICANT CHANGE UP (ref 0.5–1.3)
EGFR: 50 ML/MIN/1.73M2 — LOW
EGFR: 50 ML/MIN/1.73M2 — LOW
EOSINOPHIL # BLD AUTO: 0 K/UL — SIGNIFICANT CHANGE UP (ref 0–0.5)
EOSINOPHIL NFR BLD AUTO: 0 % — SIGNIFICANT CHANGE UP (ref 0–6)
GAS PNL BLDA: SIGNIFICANT CHANGE UP
GLUCOSE BLDC GLUCOMTR-MCNC: 106 MG/DL — HIGH (ref 70–99)
GLUCOSE BLDC GLUCOMTR-MCNC: 124 MG/DL — HIGH (ref 70–99)
GLUCOSE BLDC GLUCOMTR-MCNC: 187 MG/DL — HIGH (ref 70–99)
GLUCOSE BLDC GLUCOMTR-MCNC: 189 MG/DL — HIGH (ref 70–99)
GLUCOSE BLDC GLUCOMTR-MCNC: 203 MG/DL — HIGH (ref 70–99)
GLUCOSE BLDC GLUCOMTR-MCNC: 238 MG/DL — HIGH (ref 70–99)
GLUCOSE SERPL-MCNC: 196 MG/DL — HIGH (ref 70–99)
HCT VFR BLD CALC: 27.9 % — LOW (ref 34.5–45)
HGB BLD-MCNC: 8.4 G/DL — LOW (ref 11.5–15.5)
IMM GRANULOCYTES NFR BLD AUTO: 1.3 % — HIGH (ref 0–0.9)
LYMPHOCYTES # BLD AUTO: 0.51 K/UL — LOW (ref 1–3.3)
LYMPHOCYTES # BLD AUTO: 4.6 % — LOW (ref 13–44)
MAGNESIUM SERPL-MCNC: 2.7 MG/DL — HIGH (ref 1.6–2.6)
MCHC RBC-ENTMCNC: 27.3 PG — SIGNIFICANT CHANGE UP (ref 27–34)
MCHC RBC-ENTMCNC: 30.1 G/DL — LOW (ref 32–36)
MCV RBC AUTO: 90.6 FL — SIGNIFICANT CHANGE UP (ref 80–100)
MONOCYTES # BLD AUTO: 0.41 K/UL — SIGNIFICANT CHANGE UP (ref 0–0.9)
MONOCYTES NFR BLD AUTO: 3.7 % — SIGNIFICANT CHANGE UP (ref 2–14)
NEUTROPHILS # BLD AUTO: 10.07 K/UL — HIGH (ref 1.8–7.4)
NEUTROPHILS NFR BLD AUTO: 89.9 % — HIGH (ref 43–77)
NRBC BLD AUTO-RTO: 0 /100 WBCS — SIGNIFICANT CHANGE UP (ref 0–0)
PHOSPHATE SERPL-MCNC: 3 MG/DL — SIGNIFICANT CHANGE UP (ref 2.5–4.5)
PLATELET # BLD AUTO: 160 K/UL — SIGNIFICANT CHANGE UP (ref 150–400)
POTASSIUM SERPL-MCNC: 4.1 MMOL/L — SIGNIFICANT CHANGE UP (ref 3.5–5.3)
POTASSIUM SERPL-SCNC: 4.1 MMOL/L — SIGNIFICANT CHANGE UP (ref 3.5–5.3)
PROT SERPL-MCNC: 6.1 GM/DL — SIGNIFICANT CHANGE UP (ref 6–8.3)
RBC # BLD: 3.08 M/UL — LOW (ref 3.8–5.2)
RBC # FLD: 14 % — SIGNIFICANT CHANGE UP (ref 10.3–14.5)
SODIUM SERPL-SCNC: 147 MMOL/L — HIGH (ref 135–145)
WBC # BLD: 11.19 K/UL — HIGH (ref 3.8–10.5)
WBC # FLD AUTO: 11.19 K/UL — HIGH (ref 3.8–10.5)

## 2025-06-06 PROCEDURE — 71045 X-RAY EXAM CHEST 1 VIEW: CPT | Mod: 26

## 2025-06-06 PROCEDURE — 99232 SBSQ HOSP IP/OBS MODERATE 35: CPT

## 2025-06-06 PROCEDURE — G0545: CPT

## 2025-06-06 PROCEDURE — 99291 CRITICAL CARE FIRST HOUR: CPT

## 2025-06-06 RX ORDER — ACETAMINOPHEN 500 MG/5ML
1000 LIQUID (ML) ORAL ONCE
Refills: 0 | Status: COMPLETED | OUTPATIENT
Start: 2025-06-06 | End: 2025-06-06

## 2025-06-06 RX ORDER — LABETALOL HYDROCHLORIDE 200 MG/1
10 TABLET, FILM COATED ORAL ONCE
Refills: 0 | Status: COMPLETED | OUTPATIENT
Start: 2025-06-06 | End: 2025-06-06

## 2025-06-06 RX ORDER — HYDROMORPHONE/SOD CHLOR,ISO/PF 2 MG/10 ML
0.25 SYRINGE (ML) INJECTION ONCE
Refills: 0 | Status: DISCONTINUED | OUTPATIENT
Start: 2025-06-06 | End: 2025-06-06

## 2025-06-06 RX ORDER — MIDODRINE HYDROCHLORIDE 5 MG/1
10 TABLET ORAL EVERY 8 HOURS
Refills: 0 | Status: DISCONTINUED | OUTPATIENT
Start: 2025-06-06 | End: 2025-06-07

## 2025-06-06 RX ADMIN — INSULIN LISPRO 4: 100 INJECTION, SOLUTION INTRAVENOUS; SUBCUTANEOUS at 01:36

## 2025-06-06 RX ADMIN — Medication 4 MILLIGRAM(S): at 14:09

## 2025-06-06 RX ADMIN — DEXMEDETOMIDINE HYDROCHLORIDE IN SODIUM CHLORIDE 8.1 MICROGRAM(S)/KG/HR: 4 INJECTION INTRAVENOUS at 07:21

## 2025-06-06 RX ADMIN — MEROPENEM 280 MILLIGRAM(S): 1 INJECTION INTRAVENOUS at 17:20

## 2025-06-06 RX ADMIN — HEPARIN SODIUM 5000 UNIT(S): 1000 INJECTION INTRAVENOUS; SUBCUTANEOUS at 05:22

## 2025-06-06 RX ADMIN — Medication 4 MILLIGRAM(S): at 19:49

## 2025-06-06 RX ADMIN — Medication 1000 MILLIGRAM(S): at 20:43

## 2025-06-06 RX ADMIN — GABAPENTIN 300 MILLIGRAM(S): 400 CAPSULE ORAL at 05:22

## 2025-06-06 RX ADMIN — RANOLAZINE 500 MILLIGRAM(S): 1000 TABLET, FILM COATED, EXTENDED RELEASE ORAL at 05:21

## 2025-06-06 RX ADMIN — HEPARIN SODIUM 5000 UNIT(S): 1000 INJECTION INTRAVENOUS; SUBCUTANEOUS at 13:48

## 2025-06-06 RX ADMIN — Medication 1000 MILLIGRAM(S): at 10:50

## 2025-06-06 RX ADMIN — MEROPENEM 280 MILLIGRAM(S): 1 INJECTION INTRAVENOUS at 05:21

## 2025-06-06 RX ADMIN — MIDODRINE HYDROCHLORIDE 10 MILLIGRAM(S): 5 TABLET ORAL at 05:22

## 2025-06-06 RX ADMIN — Medication 10 MILLIGRAM(S): at 10:38

## 2025-06-06 RX ADMIN — Medication 100 MILLIGRAM(S): at 05:21

## 2025-06-06 RX ADMIN — Medication 650 MILLIGRAM(S): at 06:22

## 2025-06-06 RX ADMIN — Medication 400 MILLIGRAM(S): at 10:38

## 2025-06-06 RX ADMIN — INSULIN LISPRO 2: 100 INJECTION, SOLUTION INTRAVENOUS; SUBCUTANEOUS at 13:49

## 2025-06-06 RX ADMIN — HEPARIN SODIUM 5000 UNIT(S): 1000 INJECTION INTRAVENOUS; SUBCUTANEOUS at 21:31

## 2025-06-06 RX ADMIN — Medication 650 MILLIGRAM(S): at 05:22

## 2025-06-06 RX ADMIN — INSULIN LISPRO 2: 100 INJECTION, SOLUTION INTRAVENOUS; SUBCUTANEOUS at 05:23

## 2025-06-06 RX ADMIN — INSULIN LISPRO 4: 100 INJECTION, SOLUTION INTRAVENOUS; SUBCUTANEOUS at 10:37

## 2025-06-06 RX ADMIN — Medication 1 APPLICATION(S): at 05:23

## 2025-06-06 RX ADMIN — Medication 1 TABLET(S): at 13:49

## 2025-06-06 RX ADMIN — Medication 81 MILLIGRAM(S): at 13:48

## 2025-06-06 RX ADMIN — DEXMEDETOMIDINE HYDROCHLORIDE IN SODIUM CHLORIDE 8.1 MICROGRAM(S)/KG/HR: 4 INJECTION INTRAVENOUS at 01:16

## 2025-06-06 RX ADMIN — Medication 0.25 MILLIGRAM(S): at 22:45

## 2025-06-06 RX ADMIN — Medication 15 MILLILITER(S): at 05:21

## 2025-06-06 RX ADMIN — Medication 400 MILLIGRAM(S): at 19:43

## 2025-06-06 RX ADMIN — Medication 0.25 MILLIGRAM(S): at 23:45

## 2025-06-06 NOTE — PROGRESS NOTE ADULT - ASSESSMENT
74-year-old female with hypertension, hyperlipidemia, 2 diabetes, CAD status post multiple stents with a recent right humerus fracture roughly 3 days ago following a traumatic fall admitted to the medical ICU for altered mental status and hypotension.  Concern for septic shock secondary to colitis found to have ESBL E. coli bacteremia.  Hospital course further complicated by cardiac arrest of unclear origin with ROSC after 10 minutes of CPR.    Neuro: Patient currently sedated with precedex.  Maintain RASS between 0 to -1.  SAT as tolerated   Cardiovascular: Patient currently in distributive shock secondary to E. coli bacteremia most likely from colitis.  Continue with vasopressors maintain MAP greater than 65.  Trop peaked, off of Heprin gtt. TTE shows EF of 55% with WMA of the left ventricle, normal RV. Appreciate cardiology input.   Pulmonary: extubated successfully  GI: Concern for stercoral colitis based on CT abdomen pelvis.  Continue with aggressive bowel regimen. Monitor for bowel movements. tube feed as tolerated   Renal: Patient in ALY most likely ATN from shock state.  Strict I's and O's.  Monitor electrolytes and replete as needed.  ID: Continue with meropenem given ESBL coli in the blood.  Repeat blood cultures NGTD. Source of bacteremia from UTI  Endo: Insulin sliding scale. c/w Stress dose steroids, solucortef 100mg q8h IV. Increase Lantus to 25U QHS  Heme: hep SQ for VTE PPX   MSK: Patient with a known history of right humeral fracture consulted orthopedics no plan for intervention at this time.  Ethics: Patient is full code overall prognosis is guarded family updated at bedside

## 2025-06-06 NOTE — PROGRESS NOTE ADULT - SUBJECTIVE AND OBJECTIVE BOX
CC: Patient is a 74y old  Female who presents with a chief complaint of ALY, physical deconditioning (06 Jun 2025 11:05)      ## HPI:HPI:  Tiffanie Joshi is a 74 year old female with PMHx of HTN, HLD, IDDM2, CAD (s/p PCI), and recent right humerus fracture (placed in sling 3 days ago) who presented to the ED on 5/30/25 for complaints of elevated blood glucose.    Patient is Hungarian-speaking but declined  services and preferred for daughter-in-law at bedside to translate. As per daughter-in-law, patient fell three days ago and landed on her right shoulder. Went to [facility] at that time and found to have right humerus fracture and placed in sling. Discharged home with outpatient orthopedic surgery follow up. Since then, patient has been refusing to get out of bed and not eating much. Will drink a little juice every now and then. Daughter checked her blood glucose around 5PM and it was > 400 so brought to the ER for further evaluation. Baseline functional status is ambulates with walker and dependent with all ADLs. Lives at home with daughter.    In the ED, VSS. Hgb 10.3, sodium 132, potassium 6.3, BUN 70, Cr 2.02, blood glucose 510. VBG 7.29 / 58 / 34 / 26. COVID/influenza/RSV negative. CT head without acute intracranial findings. Received 1L NS bolus, 1L LR bolus, and insulin 20 U IV.  (30 May 2025 23:43)      O/N: extubated successfully this am    ## ROS:  denies complaints  ## EXAM  ICU Vital Signs Last 24 Hrs  T(C): 36.1 (06 Jun 2025 08:55), Max: 38.3 (06 Jun 2025 04:30)  T(F): 97 (06 Jun 2025 08:55), Max: 100.9 (06 Jun 2025 04:30)  HR: 64 (06 Jun 2025 11:30) (57 - 89)  BP: 136/60 (06 Jun 2025 07:00) (136/58 - 148/69)  BP(mean): 82 (06 Jun 2025 07:00) (80 - 92)  ABP: 135/41 (06 Jun 2025 11:30) (94/34 - 193/69)  ABP(mean): 75 (06 Jun 2025 11:30) (53 - 118)  RR: 25 (06 Jun 2025 11:30) (18 - 32)  SpO2: 98% (06 Jun 2025 11:30) (94% - 100%)    O2 Parameters below as of 06 Jun 2025 08:59  Patient On (Oxygen Delivery Method): nasal cannula  O2 Flow (L/min): 2        CON : NAD  EENT : EOMI, MMM  NECK : Full ROM  RESP : CTAB no increased WOB  CARD : rrr no m/r/g  ABD : S NT ND NABS no rebound  EXT : No edema  NEURO : AAOX3   ## Labs:  Lab Results:  CBC  CBC Full  -  ( 06 Jun 2025 02:52 )  WBC Count : 11.19 K/uL  RBC Count : 3.08 M/uL  Hemoglobin : 8.4 g/dL  Hematocrit : 27.9 %  Platelet Count - Automated : 160 K/uL  Mean Cell Volume : 90.6 fl  Mean Cell Hemoglobin : 27.3 pg  Mean Cell Hemoglobin Concentration : 30.1 g/dL  Auto Neutrophil # : x  Auto Lymphocyte # : x  Auto Monocyte # : x  Auto Eosinophil # : x  Auto Basophil # : x  Auto Neutrophil % : x  Auto Lymphocyte % : x  Auto Monocyte % : x  Auto Eosinophil % : x  Auto Basophil % : x    .		Differential:	[] Automated		[] Manual  Chemistry                        8.4    11.19 )-----------( 160      ( 06 Jun 2025 02:52 )             27.9     06-06    147[H]  |  116[H]  |  60[H]  ----------------------------<  196[H]  4.1   |  29  |  1.13    Ca    7.6[L]      06 Jun 2025 02:52  Phos  3.0     06-06  Mg     2.7     06-06    TPro  6.1  /  Alb  1.7[L]  /  TBili  0.3  /  DBili  x   /  AST  25  /  ALT  16  /  AlkPhos  129[H]  06-06    LIVER FUNCTIONS - ( 06 Jun 2025 02:52 )  Alb: 1.7 g/dL / Pro: 6.1 gm/dL / ALK PHOS: 129 U/L / ALT: 16 U/L / AST: 25 U/L / GGT: x             Urinalysis Basic - ( 06 Jun 2025 02:52 )    Color: x / Appearance: x / SG: x / pH: x  Gluc: 196 mg/dL / Ketone: x  / Bili: x / Urobili: x   Blood: x / Protein: x / Nitrite: x   Leuk Esterase: x / RBC: x / WBC x   Sq Epi: x / Non Sq Epi: x / Bacteria: x        ABG - ( 06 Jun 2025 07:57 )  pH, Arterial: 7.39  pH, Blood: x     /  pCO2: 48    /  pO2: 86    / HCO3: 29    / Base Excess: 3.5   /  SaO2: 97.4          MICROBIOLOGY/CULTURES:  Culture Results:   No growth at 48 Hours (06-04 @ 03:05)  Culture Results:   No growth at 48 Hours (06-04 @ 03:05)  Culture Results:   >100,000 CFU/ml Escherichia coli ESBL (06-02 @ 00:30)  Culture Results:   Growth in aerobic and anaerobic bottles: Escherichia coli ESBL  See previous culture [id number] (06-01 @ 21:45)  Culture Results:   Growth in aerobic and anaerobic bottles: Escherichia coli ESBL  Direct identification is available within approximately 3-5  hours either by Blood Panel Multiplexed PCR or Direct  MALDI-TOF. Details: [email or web address] (06-01 @ 21:30)          ## Medications:  MEDICATIONS  (STANDING):  aspirin  chewable 81 milliGRAM(s) Oral daily  atorvastatin 40 milliGRAM(s) Oral at bedtime  chlorhexidine 2% Cloths 1 Application(s) Topical <User Schedule>  gabapentin 300 milliGRAM(s) Oral two times a day  heparin   Injectable 5000 Unit(s) SubCutaneous every 8 hours  insulin glargine Injectable (LANTUS) 25 Unit(s) SubCutaneous at bedtime  insulin lispro (ADMELOG) corrective regimen sliding scale   SubCutaneous every 4 hours  meropenem  IVPB 2000 milliGRAM(s) IV Intermittent every 12 hours  midodrine 10 milliGRAM(s) Oral every 8 hours  multivitamin 1 Tablet(s) Oral daily  ranolazine 500 milliGRAM(s) Oral two times a day  senna 2 Tablet(s) Oral at bedtime    ## O/E:I&O's Summary    05 Jun 2025 07:01  -  06 Jun 2025 07:00  --------------------------------------------------------  IN: 1507.9 mL / OUT: 1280 mL / NET: 227.9 mL    06 Jun 2025 07:01  -  06 Jun 2025 12:44  --------------------------------------------------------  IN: 153.2 mL / OUT: 90 mL / NET: 63.2 mL        POCUS :   DVT PPX hep  ## Code status:  Goals of care discussion: [] yes [ ] no  [x] full code  [ ] DNR  [ ] DNI  [ ] CHAYITOST

## 2025-06-06 NOTE — PROGRESS NOTE ADULT - SUBJECTIVE AND OBJECTIVE BOX
[facility]  INFECTIOUS DISEASES   [address] [place] [address]  Tel: [phone]     Fax: [phone]  ==============================================================================  MD Jason Booth, DO Stephanie Olmedo, CHLOE Pope M.D  ==============================================================================      EMMANUEL GONZALEZ  [id number]  74y ([birth year])      Interval History:    ROS:    [ ] Unobtainable because:  [ ] All other systems negative except as noted    Constitutional: no fever, no chills  Head: no trauma  Eyes: no vision changes, no eye pain  ENT:  no sore throat, no rhinorrhea  Cardiovascular:  no chest pain, no palpitation  Respiratory:  no SOB, no cough  GI:  no abd pain, no vomiting, no diarrhea  urinary: no dysuria, no hematuria, no flank pain  musculoskeletal:  no joint pain, no joint swelling  skin:  no rash  neurology:  no headache, no seizure, no change in mental status  psych: no anxiety, no depression         Allergies  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 (Short breath)  No Known Drug Allergies        ANTIMICROBIALS:  meropenem  IVPB 2000 every 12 hours        Physical Exam:  Vital Signs Last 24 Hrs  T(C): 36.1 (06 Jun 2025 08:55), Max: 38.3 (06 Jun 2025 04:30)  T(F): 97 (06 Jun 2025 08:55), Max: 100.9 (06 Jun 2025 04:30)  HR: 65 (06 Jun 2025 10:30) (57 - 89)  BP: 136/60 (06 Jun 2025 07:00) (136/58 - 148/69)  BP(mean): 82 (06 Jun 2025 07:00) (80 - 92)  RR: 26 (06 Jun 2025 10:30) (18 - 30)  SpO2: 97% (06 Jun 2025 10:30) (94% - 100%)    Parameters below as of 06 Jun 2025 08:59  Patient On (Oxygen Delivery Method): nasal cannula  O2 Flow (L/min): 2      06-05-25 @ 07:01  -  06-06-25 @ 07:00  --------------------------------------------------------  IN: 1507.9 mL / OUT: 1280 mL / NET: 227.9 mL    06-06-25 @ 07:01  -  06-06-25 @ 11:05  --------------------------------------------------------  IN: 53.2 mL / OUT: 90 mL / NET: -36.8 mL      General:    NAD,  non toxic  Head: atraumatic, normocephalic  Eye: normal sclera and conjunctiva  ENT:    no oral lesions, neck supple  Cardio:     regular S1, S2,  no murmur  Respiratory:    clear b/l,    no wheezing  abd:     soft,   BS +,   no tenderness  :   no CVAT,  no suprapubic tenderness,   no  marcus  Musculoskeletal:   no joint swelling,   no edema  vascular: no central lines, +PIV   Skin:    no rash  Neurologic:     no focal deficit  psych: normal affect    WBC Count: 11.19 K/uL (06-06 @ 02:52)  WBC Count: 16.43 K/uL (06-05 @ 02:10)  WBC Count: 17.27 K/uL (06-04 @ 03:05)  WBC Count: 20.05 K/uL (06-03 @ 03:50)  WBC Count: 29.95 K/uL (06-02 @ 15:20)  WBC Count: 32.85 K/uL (06-02 @ 10:00)  WBC Count: 29.38 K/uL (06-02 @ 04:30)                            8.4    11.19 )-----------( 160      ( 06 Jun 2025 02:52 )             27.9       06-06    147[H]  |  116[H]  |  60[H]  ----------------------------<  196[H]  4.1   |  29  |  1.13    Ca    7.6[L]      06 Jun 2025 02:52  Phos  3.0     06-06  Mg     2.7     06-06    TPro  6.1  /  Alb  1.7[L]  /  TBili  0.3  /  DBili  x   /  AST  25  /  ALT  16  /  AlkPhos  129[H]  06-06      Urinalysis Basic - ( 06 Jun 2025 02:52 )    Color: x / Appearance: x / SG: x / pH: x  Gluc: 196 mg/dL / Ketone: x  / Bili: x / Urobili: x   Blood: x / Protein: x / Nitrite: x   Leuk Esterase: x / RBC: x / WBC x   Sq Epi: x / Non Sq Epi: x / Bacteria: x          Creatinine Trend: 1.13<--, 1.19<--, 1.35<--, 1.68<--, 1.80<--, 1.85<--  Blood Gas Arterial, Lactate: 1.00 mmol/L (06-06-25 @ 07:57)  Blood Gas Arterial, Lactate: 0.80 mmol/L (06-05-25 @ 04:00)  Blood Gas Arterial, Lactate: 0.70 mmol/L (06-04-25 @ 12:04)      MICROBIOLOGY:  v  Blood Blood-Peripheral  06-04-25   No growth at 48 Hours  --  --      Catheterized Catheterized  06-02-25   >100,000 CFU/ml Escherichia coli ESBL  --  Escherichia coli ESBL      Blood Blood-Peripheral  06-01-25   Growth in aerobic and anaerobic bottles: Escherichia coli ESBL  See previous culture [id number]  --    Growth in aerobic bottle: Gram Negative Rods  Growth in anaerobic bottle: Gram Negative Rods      Blood Blood-Peripheral  06-01-25   Growth in aerobic and anaerobic bottles: Escherichia coli ESBL  Direct identification is available within approximately 3-5  hours either by Blood Panel Multiplexed PCR or Direct  MALDI-TOF. Details: [email or web address]  --  Blood Culture PCR  Escherichia coli ESBL                          Procalcitonin: 2.41 (06-05-25 @ 02:10)          RADIOLOGY:   [facility]  INFECTIOUS DISEASES   [address] [place] [address]  Tel: [phone]     Fax: [phone]  ==============================================================================  MD Jason Booth, DO Stephanie Olmedo, CHLOE Pope M.D  ==============================================================================      EMMANUEL GONZALEZ  [id number]  74y ([birth year])      Interval History:  Patient seen and examined this morning in ICU.  she is s/p extubation.  she is awake can follow simple commands and can answer questions.    does c/o some sorethroat most likely sore from ET tube that was recently removed        ROS:    [ ] Unobtainable because:  [ x] All other systems negative except as noted    Constitutional: no fever, no chills  Head: no trauma  Eyes: no vision changes, no eye pain  ENT:  no sore throat, no rhinorrhea  Cardiovascular:  no chest pain, no palpitation  Respiratory:  no SOB, no cough  GI:  no abd pain, no vomiting, no diarrhea  urinary: no dysuria, no hematuria, no flank pain  musculoskeletal:  no joint pain, no joint swelling  skin:  no rash  neurology:  no headache         Allergies  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 (Short breath)  No Known Drug Allergies        ANTIMICROBIALS:  meropenem  IVPB 2000 every 12 hours        Physical Exam:  Vital Signs Last 24 Hrs  T(C): 36.1 (06 Jun 2025 08:55), Max: 38.3 (06 Jun 2025 04:30)  T(F): 97 (06 Jun 2025 08:55), Max: 100.9 (06 Jun 2025 04:30)  HR: 65 (06 Jun 2025 10:30) (57 - 89)  BP: 136/60 (06 Jun 2025 07:00) (136/58 - 148/69)  BP(mean): 82 (06 Jun 2025 07:00) (80 - 92)  RR: 26 (06 Jun 2025 10:30) (18 - 30)  SpO2: 97% (06 Jun 2025 10:30) (94% - 100%)    Parameters below as of 06 Jun 2025 08:59  Patient On (Oxygen Delivery Method): nasal cannula  O2 Flow (L/min): 2      06-05-25 @ 07:01  -  06-06-25 @ 07:00  --------------------------------------------------------  IN: 1507.9 mL / OUT: 1280 mL / NET: 227.9 mL    06-06-25 @ 07:01  -  06-06-25 @ 11:05  --------------------------------------------------------  IN: 53.2 mL / OUT: 90 mL / NET: -36.8 mL      Physical Exam:  General: extubated and awake  Head: atraumatic, normocephalic  Eyes: normal sclera and conjunctiva  ENT:   no oropharyngeal lesions, supple neck  Cardio: S1,S2 regular   Respiratory:   breath sounds heard b/l , no wheezes   abd:   soft, BS +, no distention, no tenderness  :     + marcus  Musculoskeletal : right shoulder and arm with echymosis   Skin:  no rash  Neurologic: awake, alert , can follow simple commands and answer some questions  Psych: calm      WBC Count: 11.19 K/uL (06-06 @ 02:52)  WBC Count: 16.43 K/uL (06-05 @ 02:10)  WBC Count: 17.27 K/uL (06-04 @ 03:05)  WBC Count: 20.05 K/uL (06-03 @ 03:50)  WBC Count: 29.95 K/uL (06-02 @ 15:20)  WBC Count: 32.85 K/uL (06-02 @ 10:00)  WBC Count: 29.38 K/uL (06-02 @ 04:30)                            8.4    11.19 )-----------( 160      ( 06 Jun 2025 02:52 )             27.9       06-06    147[H]  |  116[H]  |  60[H]  ----------------------------<  196[H]  4.1   |  29  |  1.13    Ca    7.6[L]      06 Jun 2025 02:52  Phos  3.0     06-06  Mg     2.7     06-06    TPro  6.1  /  Alb  1.7[L]  /  TBili  0.3  /  DBili  x   /  AST  25  /  ALT  16  /  AlkPhos  129[H]  06-06      Urinalysis Basic - ( 06 Jun 2025 02:52 )    Color: x / Appearance: x / SG: x / pH: x  Gluc: 196 mg/dL / Ketone: x  / Bili: x / Urobili: x   Blood: x / Protein: x / Nitrite: x   Leuk Esterase: x / RBC: x / WBC x   Sq Epi: x / Non Sq Epi: x / Bacteria: x          Creatinine Trend: 1.13<--, 1.19<--, 1.35<--, 1.68<--, 1.80<--, 1.85<--  Blood Gas Arterial, Lactate: 1.00 mmol/L (06-06-25 @ 07:57)  Blood Gas Arterial, Lactate: 0.80 mmol/L (06-05-25 @ 04:00)  Blood Gas Arterial, Lactate: 0.70 mmol/L (06-04-25 @ 12:04)      MICROBIOLOGY:    Blood Blood-Peripheral  06-04-25   No growth at 48 Hours  --  --      Catheterized Catheterized  06-02-25   >100,000 CFU/ml Escherichia coli ESBL  --  Escherichia coli ESBL      Blood Blood-Peripheral  06-01-25   Growth in aerobic and anaerobic bottles: Escherichia coli ESBL  See previous culture [id number]  --    Growth in aerobic bottle: Gram Negative Rods  Growth in anaerobic bottle: Gram Negative Rods      Blood Blood-Peripheral  06-01-25   Growth in aerobic and anaerobic bottles: Escherichia coli ESBL  Direct identification is available within approximately 3-5  hours either by Blood Panel Multiplexed PCR or Direct  MALDI-TOF. Details: [email or web address]  --  Blood Culture PCR  Escherichia coli ESBL      Procalcitonin: 2.41 (06-05-25 @ 02:10)          RADIOLOGY:

## 2025-06-06 NOTE — PROVIDER CONTACT NOTE (CHANGE IN STATUS NOTIFICATION) - ACTION/TREATMENT ORDERED:
PT suctioned, o2 increased via NC, NGT placed on LWS, HOB increased to 90 degrees, provided called to bedside, Zofran to be ordered with stat CXR, monitoring ongoing.

## 2025-06-06 NOTE — PROGRESS NOTE ADULT - ASSESSMENT
A/P-   74 year old female with PMHx of HTN, HLD, IDDM2, CAD (s/p PCI), and recent right humerus fracture (placed in sling 3 days ago) who presented to the ED on 5/30/25 for complaints of elevated blood glucose.   As per med history provided by family  patient fell three days ago and landed on her right shoulder. Went to [facility] at that time and found to have right humerus fracture and placed in sling. Discharged home with outpatient orthopedic surgery follow up. Since then, patient has been refusing to get out of bed and not eating much.   In the ED, VSS. Hgb 10.3, sodium 132, potassium 6.3, BUN 70, Cr 2.02, blood glucose 510. VBG 7.29 / 58 / 34 / 26. COVID/influenza/RSV negative. CT head without acute intracranial findings. Received 1L NS bolus, 1L LR bolus, and insulin 20 U IV.  (30 May 2025 23:43)    s/p  RRT last night   for AMS, hypotension, fever of 103  and was started on sepsis w/u given IVF bolus, Abx with CTX and Vanco. Persistently hypotensive and obtunded. Started on levophed infusion. Urgently transferred to ICU.   was also intubated for airway protection    Infectious Disease consultation requested this afternoon 6/2 to help with antibiotic management  for ESBL bacteremia    Intubated   sedated  on pressors for hypotension  afebrile today  yesterday 103 t-max  + leukocytosis of 29K  Blood cx- ESBL GNR by PCR x 2  UA from 5/31-pos for nitrates  urine cx-pending  DUANE  elevated cardiac enzymes    per med records had ESBL e.coli UTI in august and sept 2024 and ID was not consulted     6/3-  remains Intubated and sedated  on pressors for hypotension  temp of 101.5 today  + leukocytosis trending down to 20k  creatinine slightly better today  Blood cx- ESBL GNR by PCR x 2  UA from 5/31-pos for nitrates  urine cx->100K e.coli  DUANE  elevated cardiac enzymes    6/4: seen in ICU earlier, remains intubated, weaning process in progress, on pressors, continues having fevers, T 101 this morning, leukocytosis is better 17.27, Cr better 1.35, UC and BCs grew ESBL E. Coli, repeat BCs are pending, Meropenem IV continued. If not improvement in pt's fevers tomorrow, most likely CT a/p will be obtained.   6/5: remains in ICU, intubated and sedated, on pressors, Temp persistent 100-100.2, WBC better 16.43, Cr normalized, LFTs ok, repeat BCs NGTD, procalcitonin 2.41, TTE performed - Left ventricular regional wall motion abnormalities present. Meropenem IV continued.     6/6-  extubated  awake  afebrile now but early am had temp of 100.6  Leukocytosis almost resolved 11K today  Blood cx- ESBL GNR by PCR x 2  UA from 5/31-pos for nitrates  urine cx->100K e.coli  repeat blood cx- NGTD x 2 from 6/4              Impression-  septic shock - resolved  E.coli ESBL bacteremia source most likely    Acute respiratory failure requiring intubation - now extubated  stercoral colitis   Right humerus fracture with hematoma    Plan-  continue IV meropenem for the ESBL bacteremia and sepsis day #5  advise to continue with meropenem for 3-5 more days.  trend pt's temperatures and WBC  if spikes temp again check 2 more sets of blood cx.  as per ortho no surgical intervention for right humerus fracture  rest of the medical management per ICU team.    All labs and imaging and chart notes reviewed.     All above discussed with patient and her family at bedside.    d/w ICU team.      Tariq Fraga MD  Infectious Disease Attending    for any questions please do not hesitate to contact me either via teams or by calling [phone]

## 2025-06-07 LAB
ALBUMIN SERPL ELPH-MCNC: 1.8 G/DL — LOW (ref 3.3–5)
ALP SERPL-CCNC: 102 U/L — SIGNIFICANT CHANGE UP (ref 40–120)
ALT FLD-CCNC: 16 U/L — SIGNIFICANT CHANGE UP (ref 12–78)
ANION GAP SERPL CALC-SCNC: 4 MMOL/L — LOW (ref 5–17)
AST SERPL-CCNC: 25 U/L — SIGNIFICANT CHANGE UP (ref 15–37)
BASOPHILS # BLD AUTO: 0.07 K/UL — SIGNIFICANT CHANGE UP (ref 0–0.2)
BASOPHILS NFR BLD AUTO: 0.6 % — SIGNIFICANT CHANGE UP (ref 0–2)
BILIRUB SERPL-MCNC: 0.4 MG/DL — SIGNIFICANT CHANGE UP (ref 0.2–1.2)
BUN SERPL-MCNC: 66 MG/DL — HIGH (ref 7–23)
CALCIUM SERPL-MCNC: 7.8 MG/DL — LOW (ref 8.5–10.1)
CHLORIDE SERPL-SCNC: 118 MMOL/L — HIGH (ref 96–108)
CO2 SERPL-SCNC: 29 MMOL/L — SIGNIFICANT CHANGE UP (ref 22–31)
CREAT SERPL-MCNC: 1.04 MG/DL — SIGNIFICANT CHANGE UP (ref 0.5–1.3)
EGFR: 56 ML/MIN/1.73M2 — LOW
EGFR: 56 ML/MIN/1.73M2 — LOW
EOSINOPHIL # BLD AUTO: 0.01 K/UL — SIGNIFICANT CHANGE UP (ref 0–0.5)
EOSINOPHIL NFR BLD AUTO: 0.1 % — SIGNIFICANT CHANGE UP (ref 0–6)
GLUCOSE BLDC GLUCOMTR-MCNC: 101 MG/DL — HIGH (ref 70–99)
GLUCOSE BLDC GLUCOMTR-MCNC: 119 MG/DL — HIGH (ref 70–99)
GLUCOSE BLDC GLUCOMTR-MCNC: 155 MG/DL — HIGH (ref 70–99)
GLUCOSE BLDC GLUCOMTR-MCNC: 69 MG/DL — LOW (ref 70–99)
GLUCOSE BLDC GLUCOMTR-MCNC: 79 MG/DL — SIGNIFICANT CHANGE UP (ref 70–99)
GLUCOSE BLDC GLUCOMTR-MCNC: 93 MG/DL — SIGNIFICANT CHANGE UP (ref 70–99)
GLUCOSE BLDC GLUCOMTR-MCNC: 97 MG/DL — SIGNIFICANT CHANGE UP (ref 70–99)
GLUCOSE SERPL-MCNC: 73 MG/DL — SIGNIFICANT CHANGE UP (ref 70–99)
HCT VFR BLD CALC: 29.5 % — LOW (ref 34.5–45)
HGB BLD-MCNC: 8.8 G/DL — LOW (ref 11.5–15.5)
IMM GRANULOCYTES NFR BLD AUTO: 3 % — HIGH (ref 0–0.9)
LYMPHOCYTES # BLD AUTO: 1.41 K/UL — SIGNIFICANT CHANGE UP (ref 1–3.3)
LYMPHOCYTES # BLD AUTO: 12 % — LOW (ref 13–44)
MAGNESIUM SERPL-MCNC: 2.9 MG/DL — HIGH (ref 1.6–2.6)
MCHC RBC-ENTMCNC: 27.8 PG — SIGNIFICANT CHANGE UP (ref 27–34)
MCHC RBC-ENTMCNC: 29.8 G/DL — LOW (ref 32–36)
MCV RBC AUTO: 93.1 FL — SIGNIFICANT CHANGE UP (ref 80–100)
MONOCYTES # BLD AUTO: 1 K/UL — HIGH (ref 0–0.9)
MONOCYTES NFR BLD AUTO: 8.5 % — SIGNIFICANT CHANGE UP (ref 2–14)
NEUTROPHILS # BLD AUTO: 8.88 K/UL — HIGH (ref 1.8–7.4)
NEUTROPHILS NFR BLD AUTO: 75.8 % — SIGNIFICANT CHANGE UP (ref 43–77)
NRBC BLD AUTO-RTO: 0 /100 WBCS — SIGNIFICANT CHANGE UP (ref 0–0)
PHOSPHATE SERPL-MCNC: 4.5 MG/DL — SIGNIFICANT CHANGE UP (ref 2.5–4.5)
PLATELET # BLD AUTO: 172 K/UL — SIGNIFICANT CHANGE UP (ref 150–400)
POTASSIUM SERPL-MCNC: 3.5 MMOL/L — SIGNIFICANT CHANGE UP (ref 3.5–5.3)
POTASSIUM SERPL-SCNC: 3.5 MMOL/L — SIGNIFICANT CHANGE UP (ref 3.5–5.3)
PROT SERPL-MCNC: 5.9 GM/DL — LOW (ref 6–8.3)
RBC # BLD: 3.17 M/UL — LOW (ref 3.8–5.2)
RBC # FLD: 13.9 % — SIGNIFICANT CHANGE UP (ref 10.3–14.5)
SODIUM SERPL-SCNC: 151 MMOL/L — HIGH (ref 135–145)
WBC # BLD: 11.72 K/UL — HIGH (ref 3.8–10.5)
WBC # FLD AUTO: 11.72 K/UL — HIGH (ref 3.8–10.5)

## 2025-06-07 PROCEDURE — 99291 CRITICAL CARE FIRST HOUR: CPT

## 2025-06-07 RX ORDER — HYDROMORPHONE/SOD CHLOR,ISO/PF 2 MG/10 ML
0.25 SYRINGE (ML) INJECTION ONCE
Refills: 0 | Status: DISCONTINUED | OUTPATIENT
Start: 2025-06-07 | End: 2025-06-07

## 2025-06-07 RX ORDER — DEXTROSE 50 % IN WATER 50 %
50 SYRINGE (ML) INTRAVENOUS ONCE
Refills: 0 | Status: COMPLETED | OUTPATIENT
Start: 2025-06-07 | End: 2025-06-07

## 2025-06-07 RX ORDER — KETOROLAC TROMETHAMINE 30 MG/ML
15 INJECTION, SOLUTION INTRAMUSCULAR; INTRAVENOUS ONCE
Refills: 0 | Status: DISCONTINUED | OUTPATIENT
Start: 2025-06-07 | End: 2025-06-07

## 2025-06-07 RX ADMIN — Medication 100 MILLIEQUIVALENT(S): at 05:58

## 2025-06-07 RX ADMIN — Medication 100 MILLIEQUIVALENT(S): at 07:09

## 2025-06-07 RX ADMIN — Medication 1 TABLET(S): at 11:22

## 2025-06-07 RX ADMIN — Medication 100 MILLIEQUIVALENT(S): at 06:35

## 2025-06-07 RX ADMIN — HEPARIN SODIUM 5000 UNIT(S): 1000 INJECTION INTRAVENOUS; SUBCUTANEOUS at 14:25

## 2025-06-07 RX ADMIN — MEROPENEM 280 MILLIGRAM(S): 1 INJECTION INTRAVENOUS at 17:30

## 2025-06-07 RX ADMIN — Medication 50 MILLILITER(S): at 05:58

## 2025-06-07 RX ADMIN — HEPARIN SODIUM 5000 UNIT(S): 1000 INJECTION INTRAVENOUS; SUBCUTANEOUS at 21:33

## 2025-06-07 RX ADMIN — Medication 81 MILLIGRAM(S): at 11:22

## 2025-06-07 RX ADMIN — Medication 0.25 MILLIGRAM(S): at 04:55

## 2025-06-07 RX ADMIN — KETOROLAC TROMETHAMINE 15 MILLIGRAM(S): 30 INJECTION, SOLUTION INTRAMUSCULAR; INTRAVENOUS at 09:46

## 2025-06-07 RX ADMIN — GABAPENTIN 300 MILLIGRAM(S): 400 CAPSULE ORAL at 17:30

## 2025-06-07 RX ADMIN — HEPARIN SODIUM 5000 UNIT(S): 1000 INJECTION INTRAVENOUS; SUBCUTANEOUS at 05:59

## 2025-06-07 RX ADMIN — Medication 650 MILLIGRAM(S): at 21:42

## 2025-06-07 RX ADMIN — Medication 650 MILLIGRAM(S): at 22:12

## 2025-06-07 RX ADMIN — RANOLAZINE 500 MILLIGRAM(S): 1000 TABLET, FILM COATED, EXTENDED RELEASE ORAL at 17:31

## 2025-06-07 RX ADMIN — Medication 0.25 MILLIGRAM(S): at 05:55

## 2025-06-07 RX ADMIN — KETOROLAC TROMETHAMINE 15 MILLIGRAM(S): 30 INJECTION, SOLUTION INTRAMUSCULAR; INTRAVENOUS at 10:00

## 2025-06-07 RX ADMIN — MEROPENEM 280 MILLIGRAM(S): 1 INJECTION INTRAVENOUS at 05:58

## 2025-06-07 RX ADMIN — Medication 1 APPLICATION(S): at 05:59

## 2025-06-07 RX ADMIN — ATORVASTATIN CALCIUM 40 MILLIGRAM(S): 80 TABLET, FILM COATED ORAL at 21:34

## 2025-06-07 RX ADMIN — Medication 2 TABLET(S): at 21:34

## 2025-06-07 NOTE — PROGRESS NOTE ADULT - SUBJECTIVE AND OBJECTIVE BOX
CC: Patient is a 74y old  Female who presents with a chief complaint of ALY, physical deconditioning (06 Jun 2025 12:43)      ## HPI:HPI:  Tiffanie Joshi is a 74 year old female with PMHx of HTN, HLD, IDDM2, CAD (s/p PCI), and recent right humerus fracture (placed in sling 3 days ago) who presented to the ED on 5/30/25 for complaints of elevated blood glucose.    Patient is Kazakh-speaking but declined  services and preferred for daughter-in-law at bedside to translate. As per daughter-in-law, patient fell three days ago and landed on her right shoulder. Went to [facility] at that time and found to have right humerus fracture and placed in sling. Discharged home with outpatient orthopedic surgery follow up. Since then, patient has been refusing to get out of bed and not eating much. Will drink a little juice every now and then. Daughter checked her blood glucose around 5PM and it was > 400 so brought to the ER for further evaluation. Baseline functional status is ambulates with walker and dependent with all ADLs. Lives at home with daughter.    In the ED, VSS. Hgb 10.3, sodium 132, potassium 6.3, BUN 70, Cr 2.02, blood glucose 510. VBG 7.29 / 58 / 34 / 26. COVID/influenza/RSV negative. CT head without acute intracranial findings. Received 1L NS bolus, 1L LR bolus, and insulin 20 U IV.  (30 May 2025 23:43)      O/N: extubated day 2 doing well. episode of urinary retention    ## ROS:  CONSTITUTIONAL:  no weight loss or night sweats  HEENT:  Eyes:  No diplopia or blurred vision. ENT:  No earache, sore throat or runny nose.  CARDIOVASCULAR:  No pressure, squeezing, tightness, heaviness or aching about the chest, neck, axilla or epigastrium.  RESPIRATORY:  No cough, shortness of breath, PND or orthopnea.  GASTROINTESTINAL:  No nausea, vomiting or diarrhea.  GENITOURINARY:  No dysuria, frequency or urgency.  MUSCULOSKELETAL:  No joint pain  SKIN:  No change in skin, hair or nails.      ## EXAM  ICU Vital Signs Last 24 Hrs  T(C): 36 (07 Jun 2025 07:34), Max: 36.4 (07 Jun 2025 04:30)  T(F): 96.8 (07 Jun 2025 07:34), Max: 97.5 (07 Jun 2025 04:30)  HR: 64 (07 Jun 2025 11:00) (56 - 82)  BP: 140/57 (07 Jun 2025 11:00) (121/59 - 210/77)  BP(mean): 83 (07 Jun 2025 11:00) (77 - 113)  ABP: 143/56 (07 Jun 2025 10:30) (122/43 - 222/94)  ABP(mean): 86 (07 Jun 2025 10:30) (69 - 141)  RR: 18 (07 Jun 2025 11:00) (13 - 31)  SpO2: 98% (07 Jun 2025 11:00) (87% - 100%)    O2 Parameters below as of 07 Jun 2025 07:35  Patient On (Oxygen Delivery Method): nasal cannula  O2 Flow (L/min): 2        CON : NAD  EENT : EOMI, MMM  NECK : Full ROM  RESP : CTAB no increased WOB  CARD : rrr no m/r/g  ABD : S NT ND NABS no rebound  EXT : No edema, brusing to right shoulder  NEURO : AAOX3   ## Labs:  Lab Results:  CBC  CBC Full  -  ( 07 Jun 2025 02:42 )  WBC Count : 11.72 K/uL  RBC Count : 3.17 M/uL  Hemoglobin : 8.8 g/dL  Hematocrit : 29.5 %  Platelet Count - Automated : 172 K/uL  Mean Cell Volume : 93.1 fl  Mean Cell Hemoglobin : 27.8 pg  Mean Cell Hemoglobin Concentration : 29.8 g/dL  Auto Neutrophil # : x  Auto Lymphocyte # : x  Auto Monocyte # : x  Auto Eosinophil # : x  Auto Basophil # : x  Auto Neutrophil % : x  Auto Lymphocyte % : x  Auto Monocyte % : x  Auto Eosinophil % : x  Auto Basophil % : x    .		Differential:	[] Automated		[] Manual  Chemistry                        8.8    11.72 )-----------( 172      ( 07 Jun 2025 02:42 )             29.5     06-07    151[H]  |  118[H]  |  66[H]  ----------------------------<  73  3.5   |  29  |  1.04    Ca    7.8[L]      07 Jun 2025 02:42  Phos  4.5     06-07  Mg     2.9     06-07    TPro  5.9[L]  /  Alb  1.8[L]  /  TBili  0.4  /  DBili  x   /  AST  25  /  ALT  16  /  AlkPhos  102  06-07    LIVER FUNCTIONS - ( 07 Jun 2025 02:42 )  Alb: 1.8 g/dL / Pro: 5.9 gm/dL / ALK PHOS: 102 U/L / ALT: 16 U/L / AST: 25 U/L / GGT: x             Urinalysis Basic - ( 07 Jun 2025 02:42 )    Color: x / Appearance: x / SG: x / pH: x  Gluc: 73 mg/dL / Ketone: x  / Bili: x / Urobili: x   Blood: x / Protein: x / Nitrite: x   Leuk Esterase: x / RBC: x / WBC x   Sq Epi: x / Non Sq Epi: x / Bacteria: x        ABG - ( 06 Jun 2025 07:57 )  pH, Arterial: 7.39  pH, Blood: x     /  pCO2: 48    /  pO2: 86    / HCO3: 29    / Base Excess: 3.5   /  SaO2: 97.4                      MICROBIOLOGY/CULTURES:  Culture Results:   No growth at 72 Hours (06-04 @ 03:05)  Culture Results:   No growth at 72 Hours (06-04 @ 03:05)  Culture Results:   >100,000 CFU/ml Escherichia coli ESBL (06-02 @ 00:30)  Culture Results:   Growth in aerobic and anaerobic bottles: Escherichia coli ESBL  See previous culture [id number] (06-01 @ 21:45)  Culture Results:   Growth in aerobic and anaerobic bottles: Escherichia coli ESBL  Direct identification is available within approximately 3-5  hours either by Blood Panel Multiplexed PCR or Direct  MALDI-TOF. Details: [email or web address] (06-01 @ 21:30)      RADIOLOGY RESULTS:        ## Medications:  MEDICATIONS  (STANDING):  aspirin  chewable 81 milliGRAM(s) Oral daily  atorvastatin 40 milliGRAM(s) Oral at bedtime  chlorhexidine 2% Cloths 1 Application(s) Topical <User Schedule>  gabapentin 300 milliGRAM(s) Oral two times a day  heparin   Injectable 5000 Unit(s) SubCutaneous every 8 hours  insulin glargine Injectable (LANTUS) 25 Unit(s) SubCutaneous at bedtime  insulin lispro (ADMELOG) corrective regimen sliding scale   SubCutaneous every 4 hours  meropenem  IVPB 2000 milliGRAM(s) IV Intermittent every 12 hours  midodrine 10 milliGRAM(s) Oral every 8 hours  multivitamin 1 Tablet(s) Oral daily  ranolazine 500 milliGRAM(s) Oral two times a day  senna 2 Tablet(s) Oral at bedtime    ## O/E:I&O's Summary    06 Jun 2025 07:01  -  07 Jun 2025 07:00  --------------------------------------------------------  IN: 593.2 mL / OUT: 1020 mL / NET: -426.8 mL        POCUS :   DVT PPX hep  ## Code status:  Goals of care discussion: [] yes [ ] no  [x] full code  [ ] DNR  [ ] DNI  [ ] KENDRA

## 2025-06-07 NOTE — PROGRESS NOTE ADULT - ASSESSMENT
ALY ischemic ATN  Hypernatremia    Increase PO fluid intake. Improved renal indices. D/w pt's daughter at bedside.   PRN Potassium supplementation. Will follow electrolytes and renal function trend.

## 2025-06-07 NOTE — PROGRESS NOTE ADULT - SUBJECTIVE AND OBJECTIVE BOX
[facility]                                                       Dr. Porras, Dr. Lunsford, Dr. Lopez, Dr. Hoskins, Dr. Olea                                      [facility], [facility], [address]                                                 [address] [place] [address]                                      Ph: [phone]  Fax: [phone]                                         Ph: [phone]  Fax: [phone]      Patient is a 74y old  Female who presents with a chief complaint of ALY, physical deconditioning (07 Jun 2025 13:22)  Patient seen in follow up for hypernatremia.        PAST MEDICAL HISTORY:  Type 2 diabetes mellitus without complication, without long-term current use of insulin    Coronary artery disease with other form of angina pectoris    Hypertension    Asthma    Aortic stenosis    Mitral regurgitation    HTN (hypertension)    HLD (hyperlipidemia)    CAD S/P percutaneous coronary angioplasty    Insulin dependent type 2 diabetes mellitus      MEDICATIONS  (STANDING):  aspirin  chewable 81 milliGRAM(s) Oral daily  atorvastatin 40 milliGRAM(s) Oral at bedtime  chlorhexidine 2% Cloths 1 Application(s) Topical <User Schedule>  gabapentin 300 milliGRAM(s) Oral two times a day  heparin   Injectable 5000 Unit(s) SubCutaneous every 8 hours  insulin glargine Injectable (LANTUS) 25 Unit(s) SubCutaneous at bedtime  insulin lispro (ADMELOG) corrective regimen sliding scale   SubCutaneous every 4 hours  meropenem  IVPB 2000 milliGRAM(s) IV Intermittent every 12 hours  multivitamin 1 Tablet(s) Oral daily  ranolazine 500 milliGRAM(s) Oral two times a day  senna 2 Tablet(s) Oral at bedtime    MEDICATIONS  (PRN):  acetaminophen     Tablet .. 650 milliGRAM(s) Oral every 6 hours PRN Temp greater or equal to 38C (100.4F), Mild Pain (1 - 3)  ondansetron Injectable 4 milliGRAM(s) IV Push every 8 hours PRN Nausea and/or Vomiting  polyethylene glycol 3350 17 Gram(s) Oral daily PRN Constipation    T(C): 36 (06-07-25 @ 07:34), Max: 36.4 (06-07-25 @ 04:30)  HR: 69 (06-07-25 @ 20:00) (56 - 82)  BP: 133/58 (06-07-25 @ 20:00) (104/53 - 210/77)  RR: 18 (06-07-25 @ 20:00) (13 - 32)  SpO2: 96% (06-07-25 @ 20:00) (87% - 100%)  Wt(kg): --  I&O's Detail    06 Jun 2025 07:01  -  07 Jun 2025 07:00  --------------------------------------------------------  IN:    Dexmedetomidine: 3.2 mL    Glucerna 1.5: 50 mL    IV PiggyBack: 140 mL    IV PiggyBack: 400 mL  Total IN: 593.2 mL    OUT:    Indwelling Catheter - Urethral (mL): 90 mL    Intermittent Catheterization - Urethral (mL): 500 mL    Nasogastric/Oral tube (mL): 430 mL  Total OUT: 1020 mL    Total NET: -426.8 mL          PHYSICAL EXAM:  General: No distress  Respiratory: b/l air entry  Cardiovascular: S1 S2  Gastrointestinal: soft  Extremities:  no edema                              8.8    11.72 )-----------( 172      ( 07 Jun 2025 02:42 )             29.5     06-07    151[H]  |  118[H]  |  66[H]  ----------------------------<  73  3.5   |  29  |  1.04    Ca    7.8[L]      07 Jun 2025 02:42  Phos  4.5     06-07  Mg     2.9     06-07    TPro  5.9[L]  /  Alb  1.8[L]  /  TBili  0.4  /  DBili  x   /  AST  25  /  ALT  16  /  AlkPhos  102  06-07        LIVER FUNCTIONS - ( 07 Jun 2025 02:42 )  Alb: 1.8 g/dL / Pro: 5.9 gm/dL / ALK PHOS: 102 U/L / ALT: 16 U/L / AST: 25 U/L / GGT: x           Urinalysis Basic - ( 07 Jun 2025 02:42 )    Color: x / Appearance: x / SG: x / pH: x  Gluc: 73 mg/dL / Ketone: x  / Bili: x / Urobili: x   Blood: x / Protein: x / Nitrite: x   Leuk Esterase: x / RBC: x / WBC x   Sq Epi: x / Non Sq Epi: x / Bacteria: x      ABG - ( 06 Jun 2025 07:57 )  pH, Arterial: 7.39  pH, Blood: x     /  pCO2: 48    /  pO2: 86    / HCO3: 29    / Base Excess: 3.5   /  SaO2: 97.4              Sodium, Serum: 151 (06-07 @ 02:42)  Sodium, Serum: 147 (06-06 @ 02:52)  Sodium, Serum: 145 (06-05 @ 02:10)  Sodium, Serum: 142 (06-04 @ 03:05)    Creatinine, Serum: 1.04 (06-07 @ 02:42)  Creatinine, Serum: 1.13 (06-06 @ 02:52)  Creatinine, Serum: 1.19 (06-05 @ 02:10)  Creatinine, Serum: 1.35 (06-04 @ 03:05)    Potassium, Serum: 3.5 (06-07 @ 02:42)  Potassium, Serum: 4.1 (06-06 @ 02:52)  Potassium, Serum: 4.4 (06-05 @ 02:10)  Potassium, Serum: 4.8 (06-04 @ 03:05)    Hemoglobin: 8.8 (06-07 @ 02:42)  Hemoglobin: 8.4 (06-06 @ 02:52)  Hemoglobin: 8.8 (06-05 @ 02:10)  Hemoglobin: 9.3 (06-04 @ 03:05)

## 2025-06-07 NOTE — PROGRESS NOTE ADULT - ASSESSMENT
74-year-old female with hypertension, hyperlipidemia, 2 diabetes, CAD status post multiple stents with a recent right humerus fracture roughly 3 days ago following a traumatic fall admitted to the medical ICU for altered mental status and hypotension.  Concern for septic shock secondary to colitis found to have ESBL E. coli bacteremia.  Hospital course further complicated by cardiac arrest of unclear origin with ROSC after 10 minutes of CPR.    Neuro: awake alert  Cardiovascular: Patient currently in distributive shock secondary to E. coli bacteremia most likely from colitis.  Continue with vasopressors maintain MAP greater than 65.  Trop peaked, off of Heprin gtt. TTE shows EF of 55% with WMA of the left ventricle, normal RV. Appreciate cardiology input.   Pulmonary: extubated successfully, doing well  GI: Concern for stercoral colitis based on CT abdomen pelvis.  Continue with aggressive bowel regimen. Monitor for bowel movements. speech adn swallow eval pending  Renal: Patient in ALY most likely ATN from shock state.  Strict I's and O's.  Monitor electrolytes and replete as needed.  ID: Continue with meropenem given ESBL coli in the blood.  Repeat blood cultures NGTD. Source of bacteremia from UTI cont for 3-5 more days id following  Endo: Insulin sliding scale. c/w Stress dose steroids, solucortef 100mg q8h IV. Increase Lantus to 25U QHS  Heme: hep SQ for VTE PPX   MSK: Patient with a known history of right humeral fracture consulted orthopedics no plan for intervention at this time.  Ethics: Patient is full code overall prognosis is improved family updated at bedside

## 2025-06-08 LAB
ALBUMIN SERPL ELPH-MCNC: 2 G/DL — LOW (ref 3.3–5)
ALBUMIN SERPL ELPH-MCNC: 2.1 G/DL — LOW (ref 3.3–5)
ALP SERPL-CCNC: 92 U/L — SIGNIFICANT CHANGE UP (ref 40–120)
ALP SERPL-CCNC: 93 U/L — SIGNIFICANT CHANGE UP (ref 40–120)
ALT FLD-CCNC: 17 U/L — SIGNIFICANT CHANGE UP (ref 12–78)
ALT FLD-CCNC: 17 U/L — SIGNIFICANT CHANGE UP (ref 12–78)
ANION GAP SERPL CALC-SCNC: 5 MMOL/L — SIGNIFICANT CHANGE UP (ref 5–17)
ANION GAP SERPL CALC-SCNC: 5 MMOL/L — SIGNIFICANT CHANGE UP (ref 5–17)
AST SERPL-CCNC: 33 U/L — SIGNIFICANT CHANGE UP (ref 15–37)
AST SERPL-CCNC: 40 U/L — HIGH (ref 15–37)
BILIRUB SERPL-MCNC: 0.8 MG/DL — SIGNIFICANT CHANGE UP (ref 0.2–1.2)
BILIRUB SERPL-MCNC: 0.8 MG/DL — SIGNIFICANT CHANGE UP (ref 0.2–1.2)
BUN SERPL-MCNC: 64 MG/DL — HIGH (ref 7–23)
BUN SERPL-MCNC: 66 MG/DL — HIGH (ref 7–23)
CALCIUM SERPL-MCNC: 8.4 MG/DL — LOW (ref 8.5–10.1)
CALCIUM SERPL-MCNC: 9 MG/DL — SIGNIFICANT CHANGE UP (ref 8.5–10.1)
CHLORIDE SERPL-SCNC: 109 MMOL/L — HIGH (ref 96–108)
CHLORIDE SERPL-SCNC: 112 MMOL/L — HIGH (ref 96–108)
CO2 SERPL-SCNC: 27 MMOL/L — SIGNIFICANT CHANGE UP (ref 22–31)
CO2 SERPL-SCNC: 29 MMOL/L — SIGNIFICANT CHANGE UP (ref 22–31)
CREAT SERPL-MCNC: 1.05 MG/DL — SIGNIFICANT CHANGE UP (ref 0.5–1.3)
CREAT SERPL-MCNC: 1.07 MG/DL — SIGNIFICANT CHANGE UP (ref 0.5–1.3)
EGFR: 54 ML/MIN/1.73M2 — LOW
EGFR: 54 ML/MIN/1.73M2 — LOW
EGFR: 55 ML/MIN/1.73M2 — LOW
EGFR: 55 ML/MIN/1.73M2 — LOW
GLUCOSE BLDC GLUCOMTR-MCNC: 146 MG/DL — HIGH (ref 70–99)
GLUCOSE BLDC GLUCOMTR-MCNC: 149 MG/DL — HIGH (ref 70–99)
GLUCOSE BLDC GLUCOMTR-MCNC: 189 MG/DL — HIGH (ref 70–99)
GLUCOSE BLDC GLUCOMTR-MCNC: 228 MG/DL — HIGH (ref 70–99)
GLUCOSE BLDC GLUCOMTR-MCNC: 238 MG/DL — HIGH (ref 70–99)
GLUCOSE SERPL-MCNC: 175 MG/DL — HIGH (ref 70–99)
GLUCOSE SERPL-MCNC: 210 MG/DL — HIGH (ref 70–99)
HCT VFR BLD CALC: 28.4 % — LOW (ref 34.5–45)
HGB BLD-MCNC: 8.5 G/DL — LOW (ref 11.5–15.5)
MAGNESIUM SERPL-MCNC: 2.7 MG/DL — HIGH (ref 1.6–2.6)
MAGNESIUM SERPL-MCNC: 2.8 MG/DL — HIGH (ref 1.6–2.6)
MCHC RBC-ENTMCNC: 28 PG — SIGNIFICANT CHANGE UP (ref 27–34)
MCHC RBC-ENTMCNC: 29.9 G/DL — LOW (ref 32–36)
MCV RBC AUTO: 93.4 FL — SIGNIFICANT CHANGE UP (ref 80–100)
NRBC BLD AUTO-RTO: 0 /100 WBCS — SIGNIFICANT CHANGE UP (ref 0–0)
PHOSPHATE SERPL-MCNC: 4.3 MG/DL — SIGNIFICANT CHANGE UP (ref 2.5–4.5)
PHOSPHATE SERPL-MCNC: 4.6 MG/DL — HIGH (ref 2.5–4.5)
PLATELET # BLD AUTO: 221 K/UL — SIGNIFICANT CHANGE UP (ref 150–400)
POTASSIUM SERPL-MCNC: 4.8 MMOL/L — SIGNIFICANT CHANGE UP (ref 3.5–5.3)
POTASSIUM SERPL-MCNC: 6 MMOL/L — HIGH (ref 3.5–5.3)
POTASSIUM SERPL-SCNC: 4.8 MMOL/L — SIGNIFICANT CHANGE UP (ref 3.5–5.3)
POTASSIUM SERPL-SCNC: 6 MMOL/L — HIGH (ref 3.5–5.3)
PROT SERPL-MCNC: 6.5 GM/DL — SIGNIFICANT CHANGE UP (ref 6–8.3)
PROT SERPL-MCNC: 6.5 GM/DL — SIGNIFICANT CHANGE UP (ref 6–8.3)
RBC # BLD: 3.04 M/UL — LOW (ref 3.8–5.2)
RBC # FLD: 14 % — SIGNIFICANT CHANGE UP (ref 10.3–14.5)
SODIUM SERPL-SCNC: 143 MMOL/L — SIGNIFICANT CHANGE UP (ref 135–145)
SODIUM SERPL-SCNC: 144 MMOL/L — SIGNIFICANT CHANGE UP (ref 135–145)
WBC # BLD: 11.64 K/UL — HIGH (ref 3.8–10.5)
WBC # FLD AUTO: 11.64 K/UL — HIGH (ref 3.8–10.5)

## 2025-06-08 PROCEDURE — 99233 SBSQ HOSP IP/OBS HIGH 50: CPT

## 2025-06-08 RX ORDER — DEXTROSE 50 % IN WATER 50 %
15 SYRINGE (ML) INTRAVENOUS ONCE
Refills: 0 | Status: DISCONTINUED | OUTPATIENT
Start: 2025-06-08 | End: 2025-06-12

## 2025-06-08 RX ORDER — INSULIN GLARGINE-YFGN 100 [IU]/ML
25 INJECTION, SOLUTION SUBCUTANEOUS AT BEDTIME
Refills: 0 | Status: DISCONTINUED | OUTPATIENT
Start: 2025-06-08 | End: 2025-06-16

## 2025-06-08 RX ORDER — DEXTROSE 50 % IN WATER 50 %
25 SYRINGE (ML) INTRAVENOUS ONCE
Refills: 0 | Status: DISCONTINUED | OUTPATIENT
Start: 2025-06-08 | End: 2025-06-12

## 2025-06-08 RX ORDER — KETOROLAC TROMETHAMINE 30 MG/ML
15 INJECTION, SOLUTION INTRAMUSCULAR; INTRAVENOUS EVERY 8 HOURS
Refills: 0 | Status: DISCONTINUED | OUTPATIENT
Start: 2025-06-08 | End: 2025-06-09

## 2025-06-08 RX ORDER — INSULIN LISPRO 100 U/ML
INJECTION, SOLUTION INTRAVENOUS; SUBCUTANEOUS AT BEDTIME
Refills: 0 | Status: DISCONTINUED | OUTPATIENT
Start: 2025-06-08 | End: 2025-06-12

## 2025-06-08 RX ORDER — ACETAMINOPHEN 500 MG/5ML
650 LIQUID (ML) ORAL EVERY 6 HOURS
Refills: 0 | Status: DISCONTINUED | OUTPATIENT
Start: 2025-06-08 | End: 2025-07-10

## 2025-06-08 RX ORDER — INSULIN LISPRO 100 U/ML
INJECTION, SOLUTION INTRAVENOUS; SUBCUTANEOUS
Refills: 0 | Status: DISCONTINUED | OUTPATIENT
Start: 2025-06-08 | End: 2025-06-12

## 2025-06-08 RX ADMIN — INSULIN LISPRO 2: 100 INJECTION, SOLUTION INTRAVENOUS; SUBCUTANEOUS at 11:11

## 2025-06-08 RX ADMIN — INSULIN LISPRO 1: 100 INJECTION, SOLUTION INTRAVENOUS; SUBCUTANEOUS at 16:55

## 2025-06-08 RX ADMIN — HEPARIN SODIUM 5000 UNIT(S): 1000 INJECTION INTRAVENOUS; SUBCUTANEOUS at 06:27

## 2025-06-08 RX ADMIN — GABAPENTIN 300 MILLIGRAM(S): 400 CAPSULE ORAL at 17:04

## 2025-06-08 RX ADMIN — HEPARIN SODIUM 5000 UNIT(S): 1000 INJECTION INTRAVENOUS; SUBCUTANEOUS at 22:01

## 2025-06-08 RX ADMIN — INSULIN GLARGINE-YFGN 25 UNIT(S): 100 INJECTION, SOLUTION SUBCUTANEOUS at 22:03

## 2025-06-08 RX ADMIN — RANOLAZINE 500 MILLIGRAM(S): 1000 TABLET, FILM COATED, EXTENDED RELEASE ORAL at 17:04

## 2025-06-08 RX ADMIN — HEPARIN SODIUM 5000 UNIT(S): 1000 INJECTION INTRAVENOUS; SUBCUTANEOUS at 13:57

## 2025-06-08 RX ADMIN — KETOROLAC TROMETHAMINE 15 MILLIGRAM(S): 30 INJECTION, SOLUTION INTRAMUSCULAR; INTRAVENOUS at 11:25

## 2025-06-08 RX ADMIN — MEROPENEM 280 MILLIGRAM(S): 1 INJECTION INTRAVENOUS at 17:03

## 2025-06-08 RX ADMIN — ATORVASTATIN CALCIUM 40 MILLIGRAM(S): 80 TABLET, FILM COATED ORAL at 22:01

## 2025-06-08 RX ADMIN — Medication 81 MILLIGRAM(S): at 11:11

## 2025-06-08 RX ADMIN — Medication 2 TABLET(S): at 22:01

## 2025-06-08 RX ADMIN — Medication 1 TABLET(S): at 11:11

## 2025-06-08 RX ADMIN — Medication 1 APPLICATION(S): at 06:26

## 2025-06-08 RX ADMIN — RANOLAZINE 500 MILLIGRAM(S): 1000 TABLET, FILM COATED, EXTENDED RELEASE ORAL at 05:59

## 2025-06-08 RX ADMIN — KETOROLAC TROMETHAMINE 15 MILLIGRAM(S): 30 INJECTION, SOLUTION INTRAMUSCULAR; INTRAVENOUS at 11:10

## 2025-06-08 RX ADMIN — MEROPENEM 280 MILLIGRAM(S): 1 INJECTION INTRAVENOUS at 05:58

## 2025-06-08 RX ADMIN — GABAPENTIN 300 MILLIGRAM(S): 400 CAPSULE ORAL at 05:56

## 2025-06-08 NOTE — CHART NOTE - NSCHARTNOTEFT_GEN_A_CORE
ICU/CCU Transfer Note    Transfer from: ICU/ CCU  Transfer to: medicine   Accepting physician: Dr Cooper   Case discussed with: Dr Alba       MICU COURSE:  74 year old female with PMHx of HTN, HLD, IDDM2, CAD (s/p PCI), and recent right humerus fracture (placed in sling 3 days ago) who admitted on 5/30/25 for elevated blood glucose.  Patient was treated at [facility] for humerus fracture , placed in sling and discharge for outpatient orthopedic surgery follow u.   ON 6/1 RRT for hypotension , fever 103.2, Patient transferred to ICU and subsequently intubated for airway protection, shock state   6/2 Cardiac arrest while in CT  - ACLS for 10 minutes with ROSC.   NSTEMI treated with heparin ggt , no intervention   Patient found to be ecoli ESBL bacteremia .   Id consulted started on meropenum   extubated 6/6.   Now stable for transfer to medical floor     ASSESSMENT & PLAN:   ===================  ##Neuro:  - awake alert   - acetaminophen     Tablet .. 650 PRN  - continue gabapentin  - Toradol  PRN for pain     ##PULM:  - extubated 6/6   - now on 2 L nasal cannula     ##CV:  - NSTEMI- type II MI 2/2 hypotension   - completed heparin ggt 48 hours   - continue statin   - continue aspirin and ranexa   - cardiology consulted -- no further intervention     ##GI:  - tolerating PO diet   - polyethylene glycol 3350 17 PRN  - senna 2    ##:  - voiding , but occasional urinary retention   - straight cath as needed     ##RENAL:   - monitor and replace electrolytes as needed     ##ENDO:  - sliding scale insulin and lantus     ##ID:   - ESBL e coli bacteremia /UTI  - repeat cultures from 6/4 NGTD     - continue meropenem  IVPB 2000- today is day #7 - duration will be decided by ID     ##MSC:   - physical therapy   - ortho consulted -  4 part proximal humerus fracture- no immediate intervention follow up outpatient       CONSULTS: acetaminophen     Tablet .. 650 milliGRAM(s) Oral every 6 hours PRN  acetaminophen     Tablet .. 650 milliGRAM(s) Oral every 6 hours PRN  aspirin  chewable 81 milliGRAM(s) Oral daily  atorvastatin 40 milliGRAM(s) Oral at bedtime  chlorhexidine 2% Cloths 1 Application(s) Topical <User Schedule>  dextrose 50% Injectable 25 Gram(s) IV Push once  dextrose Oral Gel 15 Gram(s) Oral once PRN  gabapentin 300 milliGRAM(s) Oral two times a day  heparin   Injectable 5000 Unit(s) SubCutaneous every 8 hours  insulin glargine Injectable (LANTUS) 25 Unit(s) SubCutaneous at bedtime  insulin lispro (ADMELOG) corrective regimen sliding scale   SubCutaneous three times a day before meals  insulin lispro (ADMELOG) corrective regimen sliding scale   SubCutaneous at bedtime  ketorolac   Injectable 15 milliGRAM(s) IV Push every 8 hours PRN  meropenem  IVPB 2000 milliGRAM(s) IV Intermittent every 12 hours  multivitamin 1 Tablet(s) Oral daily  ondansetron Injectable 4 milliGRAM(s) IV Push every 8 hours PRN  polyethylene glycol 3350 17 Gram(s) Oral daily PRN  ranolazine 500 milliGRAM(s) Oral two times a day  senna 2 Tablet(s) Oral at bedtime          VITALS  ========  Vital Signs Last 24 Hrs  T(C): 36.2 (08 Jun 2025 07:26), Max: 36.8 (08 Jun 2025 06:30)  T(F): 97.2 (08 Jun 2025 07:26), Max: 98.2 (08 Jun 2025 06:30)  HR: 72 (08 Jun 2025 10:00) (66 - 81)  BP: 145/60 (08 Jun 2025 10:00) (104/53 - 147/64)  BP(mean): 86 (08 Jun 2025 10:00) (69 - 94)  RR: 23 (08 Jun 2025 10:00) (17 - 31)  SpO2: 97% (08 Jun 2025 10:00) (88% - 100%)    Parameters below as of 08 Jun 2025 07:15  Patient On (Oxygen Delivery Method): nasal cannula  O2 Flow (L/min): 2    I&O's Summary    07 Jun 2025 07:01  -  08 Jun 2025 07:00  --------------------------------------------------------  IN: 0 mL / OUT: 450 mL / NET: -450 mL          LABS                                            8.5                   Neurophils% (auto):   x      (06-08 @ 06:51):    11.64)-----------(221          Lymphocytes% (auto):  x                                             28.4                   Eosinphils% (auto):   x        Manual%: Neutrophils x    ; Lymphocytes x    ; Eosinophils x    ; Bands%: x    ; Blasts x                                    144    |  112    |  66                  Calcium: 9.0   / iCa: x      (06-08 @ 06:51)    ----------------------------<  175       Magnesium: 2.8                              6.0     |  27     |  1.05             Phosphorous: 4.3      TPro  6.5    /  Alb  2.0    /  TBili  0.8    /  DBili  x      /  AST  40     /  ALT  17     /  AlkPhos  92     08 Jun 2025 06:51

## 2025-06-08 NOTE — PROGRESS NOTE ADULT - SUBJECTIVE AND OBJECTIVE BOX
CC: Patient is a 74y old  Female who presents with a chief complaint of ALY, physical deconditioning (08 Jun 2025 10:45)      ## HPI:HPI:  Tiffanie Joshi is a 74 year old female with PMHx of HTN, HLD, IDDM2, CAD (s/p PCI), and recent right humerus fracture (placed in sling 3 days ago) who presented to the ED on 5/30/25 for complaints of elevated blood glucose.    Patient is Uzbek-speaking but declined  services and preferred for daughter-in-law at bedside to translate. As per daughter-in-law, patient fell three days ago and landed on her right shoulder. Went to [facility] at that time and found to have right humerus fracture and placed in sling. Discharged home with outpatient orthopedic surgery follow up. Since then, patient has been refusing to get out of bed and not eating much. Will drink a little juice every now and then. Daughter checked her blood glucose around 5PM and it was > 400 so brought to the ER for further evaluation. Baseline functional status is ambulates with walker and dependent with all ADLs. Lives at home with daughter.    In the ED, VSS. Hgb 10.3, sodium 132, potassium 6.3, BUN 70, Cr 2.02, blood glucose 510. VBG 7.29 / 58 / 34 / 26. COVID/influenza/RSV negative. CT head without acute intracranial findings. Received 1L NS bolus, 1L LR bolus, and insulin 20 U IV.  (30 May 2025 23:43)      O/N: extubated successfully    ## ROS:  complains of right shoulder pain. breathing is improved  ## EXAM  ICU Vital Signs Last 24 Hrs  T(C): 36.2 (08 Jun 2025 07:26), Max: 36.8 (08 Jun 2025 06:30)  T(F): 97.2 (08 Jun 2025 07:26), Max: 98.2 (08 Jun 2025 06:30)  HR: 72 (08 Jun 2025 10:00) (66 - 81)  BP: 145/60 (08 Jun 2025 10:00) (104/53 - 147/64)  BP(mean): 86 (08 Jun 2025 10:00) (69 - 94)  ABP: --  ABP(mean): --  RR: 23 (08 Jun 2025 10:00) (17 - 31)  SpO2: 97% (08 Jun 2025 10:00) (88% - 100%)    O2 Parameters below as of 08 Jun 2025 07:15  Patient On (Oxygen Delivery Method): nasal cannula  O2 Flow (L/min): 2        CON : NAD  EENT : EOMI, MMM  NECK : Full ROM  RESP : CTAB no increased WOB  CARD : rrr no m/r/g  ABD : S NT ND NABS no rebound  EXT : No edema, bruising to right shoulder  NEURO : AAOX3   ## Labs:  Lab Results:  CBC  CBC Full  -  ( 08 Jun 2025 06:51 )  WBC Count : 11.64 K/uL  RBC Count : 3.04 M/uL  Hemoglobin : 8.5 g/dL  Hematocrit : 28.4 %  Platelet Count - Automated : 221 K/uL  Mean Cell Volume : 93.4 fl  Mean Cell Hemoglobin : 28.0 pg  Mean Cell Hemoglobin Concentration : 29.9 g/dL  Auto Neutrophil # : x  Auto Lymphocyte # : x  Auto Monocyte # : x  Auto Eosinophil # : x  Auto Basophil # : x  Auto Neutrophil % : x  Auto Lymphocyte % : x  Auto Monocyte % : x  Auto Eosinophil % : x  Auto Basophil % : x    .		Differential:	[] Automated		[] Manual  Chemistry                        8.5    11.64 )-----------( 221      ( 08 Jun 2025 06:51 )             28.4     06-08    143  |  109[H]  |  64[H]  ----------------------------<  210[H]  4.8   |  29  |  1.07    Ca    8.4[L]      08 Jun 2025 10:10  Phos  4.6     06-08  Mg     2.7     06-08    TPro  6.5  /  Alb  2.1[L]  /  TBili  0.8  /  DBili  x   /  AST  33  /  ALT  17  /  AlkPhos  93  06-08    LIVER FUNCTIONS - ( 08 Jun 2025 10:10 )  Alb: 2.1 g/dL / Pro: 6.5 gm/dL / ALK PHOS: 93 U/L / ALT: 17 U/L / AST: 33 U/L / GGT: x             Urinalysis Basic - ( 08 Jun 2025 10:10 )    Color: x / Appearance: x / SG: x / pH: x  Gluc: 210 mg/dL / Ketone: x  / Bili: x / Urobili: x   Blood: x / Protein: x / Nitrite: x   Leuk Esterase: x / RBC: x / WBC x   Sq Epi: x / Non Sq Epi: x / Bacteria: x      MICROBIOLOGY/CULTURES:  Culture Results:   No growth at 4 days (06-04 @ 03:05)  Culture Results:   No growth at 4 days (06-04 @ 03:05)  Culture Results:   >100,000 CFU/ml Escherichia coli ESBL (06-02 @ 00:30)  Culture Results:   Growth in aerobic and anaerobic bottles: Escherichia coli ESBL  See previous culture [id number] (06-01 @ 21:45)  Culture Results:   Growth in aerobic and anaerobic bottles: Escherichia coli ESBL  Direct identification is available within approximately 3-5  hours either by Blood Panel Multiplexed PCR or Direct  MALDI-TOF. Details: [email or web address] (06-01 @ 21:30)          ## Medications:  MEDICATIONS  (STANDING):  aspirin  chewable 81 milliGRAM(s) Oral daily  atorvastatin 40 milliGRAM(s) Oral at bedtime  chlorhexidine 2% Cloths 1 Application(s) Topical <User Schedule>  dextrose 50% Injectable 25 Gram(s) IV Push once  gabapentin 300 milliGRAM(s) Oral two times a day  heparin   Injectable 5000 Unit(s) SubCutaneous every 8 hours  insulin glargine Injectable (LANTUS) 25 Unit(s) SubCutaneous at bedtime  insulin lispro (ADMELOG) corrective regimen sliding scale   SubCutaneous three times a day before meals  insulin lispro (ADMELOG) corrective regimen sliding scale   SubCutaneous at bedtime  meropenem  IVPB 2000 milliGRAM(s) IV Intermittent every 12 hours  multivitamin 1 Tablet(s) Oral daily  ranolazine 500 milliGRAM(s) Oral two times a day  senna 2 Tablet(s) Oral at bedtime    ## O/E:I&O's Summary    07 Jun 2025 07:01  -  08 Jun 2025 07:00  --------------------------------------------------------  IN: 0 mL / OUT: 450 mL / NET: -450 mL        POCUS :   DVT PPX hep  ## Code status:  Goals of care discussion: [] yes [ ] no  [x] full code  [ ] DNR  [ ] DNI  [ ] MOLST

## 2025-06-08 NOTE — PROGRESS NOTE ADULT - ASSESSMENT
ALY ischemic ATN  Hypernatremia    06/07/25: Increase PO fluid intake. Improved renal indices. D/w pt's daughter at bedside.   PRN Potassium supplementation. Will follow electrolytes and renal function trend.   06/08/25: Sodium level improving. Recheck potassium level (hemolyzed sample)

## 2025-06-08 NOTE — PROGRESS NOTE ADULT - ASSESSMENT
74-year-old female with hypertension, hyperlipidemia, 2 diabetes, CAD status post multiple stents with a recent right humerus fracture roughly 3 days ago following a traumatic fall admitted to the medical ICU for altered mental status and hypotension.  Concern for septic shock secondary to colitis found to have ESBL E. coli bacteremia.  Hospital course further complicated by cardiac arrest of unclear origin with ROSC after 10 minutes of CPR.    Neuro: awake alert  Cardiovascular: Patient currently in distributive shock secondary to E. coli bacteremia most likely from colitis.  Continue with vasopressors maintain MAP greater than 65.  Trop peaked, off of Heprin gtt. TTE shows EF of 55% with WMA of the left ventricle, normal RV. Appreciate cardiology input.   Pulmonary: extubated successfully, doing well  GI: Concern for stercoral colitis based on CT abdomen pelvis.  Continue with aggressive bowel regimen. Monitor for bowel movements. speech and swallow eval pending  Renal: Patient in ALY most likely ATN from shock state.  Strict I's and O's.  Monitor electrolytes and replete as needed.  ID: Continue with meropenem given ESBL coli in the blood.  Repeat blood cultures NGTD. Source of bacteremia from UTI cont for 2-4 more days id following  Endo: Insulin sliding scale. c/w Stress dose steroids, solucortef 100mg q8h IV. Increase Lantus to 25U QHS  Heme: hep SQ for VTE PPX   MSK: Patient with a known history of right humeral fracture consulted orthopedics no plan for intervention at this time.  Ethics: Patient is full code overall prognosis is improved family updated at bedside, tansfer to [facility]

## 2025-06-08 NOTE — PROGRESS NOTE ADULT - SUBJECTIVE AND OBJECTIVE BOX
[facility]                                                       Dr. Porras, Dr. Lunsford, Dr. Lopez, Dr. Hoskins, Dr. Olea                                      [facility], [facility], [address]                                                 [address] [place] [address]                                      Ph: [phone]  Fax: [phone]                                         Ph: [phone]  Fax: [phone]      Patient is a 74y old  Female who presents with a chief complaint of ALY, physical deconditioning (07 Jun 2025 13:22)  Patient seen in follow up for hypernatremia.        PAST MEDICAL HISTORY:  Type 2 diabetes mellitus without complication, without long-term current use of insulin    Coronary artery disease with other form of angina pectoris    Hypertension    Asthma    Aortic stenosis    Mitral regurgitation    HTN (hypertension)    HLD (hyperlipidemia)    CAD S/P percutaneous coronary angioplasty    Insulin dependent type 2 diabetes mellitus      MEDICATIONS  (STANDING):  aspirin  chewable 81 milliGRAM(s) Oral daily  atorvastatin 40 milliGRAM(s) Oral at bedtime  chlorhexidine 2% Cloths 1 Application(s) Topical <User Schedule>  dextrose 50% Injectable 25 Gram(s) IV Push once  gabapentin 300 milliGRAM(s) Oral two times a day  heparin   Injectable 5000 Unit(s) SubCutaneous every 8 hours  insulin glargine Injectable (LANTUS) 25 Unit(s) SubCutaneous at bedtime  insulin lispro (ADMELOG) corrective regimen sliding scale   SubCutaneous three times a day before meals  insulin lispro (ADMELOG) corrective regimen sliding scale   SubCutaneous at bedtime  meropenem  IVPB 2000 milliGRAM(s) IV Intermittent every 12 hours  multivitamin 1 Tablet(s) Oral daily  ranolazine 500 milliGRAM(s) Oral two times a day  senna 2 Tablet(s) Oral at bedtime    MEDICATIONS  (PRN):  acetaminophen     Tablet .. 650 milliGRAM(s) Oral every 6 hours PRN Temp greater or equal to 38C (100.4F), Mild Pain (1 - 3)  acetaminophen     Tablet .. 650 milliGRAM(s) Oral every 6 hours PRN Mild Pain (1 - 3)  dextrose Oral Gel 15 Gram(s) Oral once PRN Blood Glucose LESS THAN 70 milliGRAM(s)/deciliter  ketorolac   Injectable 15 milliGRAM(s) IV Push every 8 hours PRN Severe Pain (7 - 10)  ondansetron Injectable 4 milliGRAM(s) IV Push every 8 hours PRN Nausea and/or Vomiting  polyethylene glycol 3350 17 Gram(s) Oral daily PRN Constipation    T(C): 36.2 (06-08-25 @ 07:26), Max: 36.8 (06-08-25 @ 06:30)  HR: 72 (06-08-25 @ 10:00) (56 - 82)  BP: 145/60 (06-08-25 @ 10:00) (104/53 - 210/77)  RR: 23 (06-08-25 @ 10:00)  SpO2: 97% (06-08-25 @ 10:00)  Wt(kg): --  I&O's Detail    07 Jun 2025 07:01  -  08 Jun 2025 07:00  --------------------------------------------------------  IN:  Total IN: 0 mL    OUT:    Voided (mL): 450 mL  Total OUT: 450 mL    Total NET: -450 mL            PHYSICAL EXAM:  General: No distress  Respiratory: b/l air entry  Cardiovascular: S1 S2  Gastrointestinal: soft  Extremities:  no edema                 LABORATORY:                        8.5    11.64 )-----------( 221      ( 08 Jun 2025 06:51 )             28.4     06-08    144  |  112[H]  |  66[H]  ----------------------------<  175[H]  6.0[H]   |  27  |  1.05    Ca    9.0      08 Jun 2025 06:51  Phos  4.3     06-08  Mg     2.8     06-08    TPro  6.5  /  Alb  2.0[L]  /  TBili  0.8  /  DBili  x   /  AST  40[H]  /  ALT  17  /  AlkPhos  92  06-08    Sodium: 144 mmol/L (06-08 @ 06:51)  Sodium: 151 mmol/L (06-07 @ 02:42)    Potassium: 6.0 mmol/L (06-08 @ 06:51)  Potassium: 3.5 mmol/L (06-07 @ 02:42)    Hemoglobin: 8.5 g/dL (06-08 @ 06:51)  Hemoglobin: 8.8 g/dL (06-07 @ 02:42)  Hemoglobin: 8.4 g/dL (06-06 @ 02:52)    Creatinine, Serum 1.05 (06-08 @ 06:51)  Creatinine, Serum 1.04 (06-07 @ 02:42)  Creatinine, Serum 1.13 (06-06 @ 02:52)        LIVER FUNCTIONS - ( 08 Jun 2025 06:51 )  Alb: 2.0 g/dL / Pro: 6.5 gm/dL / ALK PHOS: 92 U/L / ALT: 17 U/L / AST: 40 U/L / GGT: x           Urinalysis Basic - ( 08 Jun 2025 06:51 )    Color: x / Appearance: x / SG: x / pH: x  Gluc: 175 mg/dL / Ketone: x  / Bili: x / Urobili: x   Blood: x / Protein: x / Nitrite: x   Leuk Esterase: x / RBC: x / WBC x   Sq Epi: x / Non Sq Epi: x / Bacteria: x

## 2025-06-09 LAB
ALBUMIN SERPL ELPH-MCNC: 2 G/DL — LOW (ref 3.3–5)
ALP SERPL-CCNC: 87 U/L — SIGNIFICANT CHANGE UP (ref 40–120)
ALT FLD-CCNC: 19 U/L — SIGNIFICANT CHANGE UP (ref 12–78)
ANION GAP SERPL CALC-SCNC: 3 MMOL/L — LOW (ref 5–17)
AST SERPL-CCNC: 51 U/L — HIGH (ref 15–37)
BASOPHILS # BLD AUTO: 0.04 K/UL — SIGNIFICANT CHANGE UP (ref 0–0.2)
BASOPHILS NFR BLD AUTO: 0.3 % — SIGNIFICANT CHANGE UP (ref 0–2)
BILIRUB SERPL-MCNC: 0.8 MG/DL — SIGNIFICANT CHANGE UP (ref 0.2–1.2)
BUN SERPL-MCNC: 66 MG/DL — HIGH (ref 7–23)
CALCIUM SERPL-MCNC: 8.6 MG/DL — SIGNIFICANT CHANGE UP (ref 8.5–10.1)
CHLORIDE SERPL-SCNC: 108 MMOL/L — SIGNIFICANT CHANGE UP (ref 96–108)
CO2 SERPL-SCNC: 31 MMOL/L — SIGNIFICANT CHANGE UP (ref 22–31)
CREAT SERPL-MCNC: 1.05 MG/DL — SIGNIFICANT CHANGE UP (ref 0.5–1.3)
CULTURE RESULTS: SIGNIFICANT CHANGE UP
CULTURE RESULTS: SIGNIFICANT CHANGE UP
EGFR: 55 ML/MIN/1.73M2 — LOW
EGFR: 55 ML/MIN/1.73M2 — LOW
EOSINOPHIL # BLD AUTO: 0.07 K/UL — SIGNIFICANT CHANGE UP (ref 0–0.5)
EOSINOPHIL NFR BLD AUTO: 0.6 % — SIGNIFICANT CHANGE UP (ref 0–6)
GLUCOSE BLDC GLUCOMTR-MCNC: 148 MG/DL — HIGH (ref 70–99)
GLUCOSE BLDC GLUCOMTR-MCNC: 168 MG/DL — HIGH (ref 70–99)
GLUCOSE BLDC GLUCOMTR-MCNC: 170 MG/DL — HIGH (ref 70–99)
GLUCOSE BLDC GLUCOMTR-MCNC: 198 MG/DL — HIGH (ref 70–99)
GLUCOSE SERPL-MCNC: 160 MG/DL — HIGH (ref 70–99)
HCT VFR BLD CALC: 28.9 % — LOW (ref 34.5–45)
HGB BLD-MCNC: 8.6 G/DL — LOW (ref 11.5–15.5)
IMM GRANULOCYTES NFR BLD AUTO: 3.9 % — HIGH (ref 0–0.9)
LYMPHOCYTES # BLD AUTO: 1.1 K/UL — SIGNIFICANT CHANGE UP (ref 1–3.3)
LYMPHOCYTES # BLD AUTO: 9.1 % — LOW (ref 13–44)
MAGNESIUM SERPL-MCNC: 2.8 MG/DL — HIGH (ref 1.6–2.6)
MCHC RBC-ENTMCNC: 27.8 PG — SIGNIFICANT CHANGE UP (ref 27–34)
MCHC RBC-ENTMCNC: 29.8 G/DL — LOW (ref 32–36)
MCV RBC AUTO: 93.5 FL — SIGNIFICANT CHANGE UP (ref 80–100)
MONOCYTES # BLD AUTO: 0.75 K/UL — SIGNIFICANT CHANGE UP (ref 0–0.9)
MONOCYTES NFR BLD AUTO: 6.2 % — SIGNIFICANT CHANGE UP (ref 2–14)
NEUTROPHILS # BLD AUTO: 9.69 K/UL — HIGH (ref 1.8–7.4)
NEUTROPHILS NFR BLD AUTO: 79.9 % — HIGH (ref 43–77)
NRBC BLD AUTO-RTO: 1 /100 WBCS — HIGH (ref 0–0)
PHOSPHATE SERPL-MCNC: 4.2 MG/DL — SIGNIFICANT CHANGE UP (ref 2.5–4.5)
PLATELET # BLD AUTO: 293 K/UL — SIGNIFICANT CHANGE UP (ref 150–400)
POTASSIUM SERPL-MCNC: 4.9 MMOL/L — SIGNIFICANT CHANGE UP (ref 3.5–5.3)
POTASSIUM SERPL-SCNC: 4.9 MMOL/L — SIGNIFICANT CHANGE UP (ref 3.5–5.3)
PROT SERPL-MCNC: 6.5 GM/DL — SIGNIFICANT CHANGE UP (ref 6–8.3)
RBC # BLD: 3.09 M/UL — LOW (ref 3.8–5.2)
RBC # FLD: 13.9 % — SIGNIFICANT CHANGE UP (ref 10.3–14.5)
SODIUM SERPL-SCNC: 142 MMOL/L — SIGNIFICANT CHANGE UP (ref 135–145)
SPECIMEN SOURCE: SIGNIFICANT CHANGE UP
SPECIMEN SOURCE: SIGNIFICANT CHANGE UP
WBC # BLD: 12.12 K/UL — HIGH (ref 3.8–10.5)
WBC # FLD AUTO: 12.12 K/UL — HIGH (ref 3.8–10.5)

## 2025-06-09 PROCEDURE — 99232 SBSQ HOSP IP/OBS MODERATE 35: CPT

## 2025-06-09 PROCEDURE — 99233 SBSQ HOSP IP/OBS HIGH 50: CPT

## 2025-06-09 RX ORDER — METOPROLOL SUCCINATE 50 MG/1
25 TABLET, EXTENDED RELEASE ORAL DAILY
Refills: 0 | Status: DISCONTINUED | OUTPATIENT
Start: 2025-06-09 | End: 2025-06-11

## 2025-06-09 RX ADMIN — RANOLAZINE 500 MILLIGRAM(S): 1000 TABLET, FILM COATED, EXTENDED RELEASE ORAL at 05:22

## 2025-06-09 RX ADMIN — INSULIN GLARGINE-YFGN 25 UNIT(S): 100 INJECTION, SOLUTION SUBCUTANEOUS at 21:35

## 2025-06-09 RX ADMIN — ATORVASTATIN CALCIUM 40 MILLIGRAM(S): 80 TABLET, FILM COATED ORAL at 22:03

## 2025-06-09 RX ADMIN — Medication 2 TABLET(S): at 22:03

## 2025-06-09 RX ADMIN — HEPARIN SODIUM 5000 UNIT(S): 1000 INJECTION INTRAVENOUS; SUBCUTANEOUS at 13:12

## 2025-06-09 RX ADMIN — GABAPENTIN 300 MILLIGRAM(S): 400 CAPSULE ORAL at 05:22

## 2025-06-09 RX ADMIN — Medication 1 TABLET(S): at 12:02

## 2025-06-09 RX ADMIN — Medication 1 APPLICATION(S): at 05:19

## 2025-06-09 RX ADMIN — RANOLAZINE 500 MILLIGRAM(S): 1000 TABLET, FILM COATED, EXTENDED RELEASE ORAL at 18:52

## 2025-06-09 RX ADMIN — MEROPENEM 280 MILLIGRAM(S): 1 INJECTION INTRAVENOUS at 05:15

## 2025-06-09 RX ADMIN — Medication 81 MILLIGRAM(S): at 12:02

## 2025-06-09 RX ADMIN — INSULIN LISPRO 1: 100 INJECTION, SOLUTION INTRAVENOUS; SUBCUTANEOUS at 08:17

## 2025-06-09 RX ADMIN — HEPARIN SODIUM 5000 UNIT(S): 1000 INJECTION INTRAVENOUS; SUBCUTANEOUS at 05:19

## 2025-06-09 RX ADMIN — MEROPENEM 280 MILLIGRAM(S): 1 INJECTION INTRAVENOUS at 18:53

## 2025-06-09 RX ADMIN — INSULIN LISPRO 1: 100 INJECTION, SOLUTION INTRAVENOUS; SUBCUTANEOUS at 16:55

## 2025-06-09 RX ADMIN — GABAPENTIN 300 MILLIGRAM(S): 400 CAPSULE ORAL at 18:52

## 2025-06-09 RX ADMIN — HEPARIN SODIUM 5000 UNIT(S): 1000 INJECTION INTRAVENOUS; SUBCUTANEOUS at 22:03

## 2025-06-09 NOTE — PROGRESS NOTE ADULT - ASSESSMENT
74-year-old female with hypertension, hyperlipidemia, 2 diabetes, CAD status post multiple stents with a recent right humerus fracture roughly 3 days PTA following a traumatic fall admitted to the medical ICU for altered mental status and hypotension.  Concern for septic shock secondary to colitis found to have ESBL E. coli bacteremia.  Hospital course further complicated by cardiac arrest of unclear origin with ROSC after 10 minutes of CPR.    Septic shock d/t UTI w/ ESBL bacteremia   -s/p pressors in ICU, weaned off   -s/p IVF  -Blood culture 6/1 w/ ESBL Ecoli   -Urine culture 6/2 w/ ESBL Ecoli   -Repeat blood cultures 6/4 NGTD  -Continue Meropenem   -ID following     ALY d/t ischemic ATN   -avoid nephrotoxic agents   -Cr improved     Cardiac arrest in setting of sepsis   -ROSC after 10 minutes of CPR  -TTE  shows EF of 55% with regional WMA present, normal RV  -Appreciate cardiology input.     Hyperkalemia secondary to ALY  -now resolved     DM2 with hyperglycemia  -Hgb A1c 10.2   -c/w ISS  -c/w Lantus 25units qhs   -monitor fingersticks     Right humerus fracture d/t fall  -RUE sling  -PT/OT     HTN  -holding lisinopril 10 mg and HCTZ 12.5 mg  -restart metoprolol succinate ER 25 mg  -monitor BP    CAD w/ history of PCI  -continue ASA 81 mg, ranolazine  mg BID  -restart metoprolol succinate ER 25 mg    POC d/w daughter in law    74-year-old female with hypertension, hyperlipidemia, 2 diabetes, CAD status post multiple stents with a recent right humerus fracture roughly 3 days PTA following a traumatic fall admitted to the medical ICU for altered mental status and hypotension.  Concern for septic shock secondary to colitis found to have ESBL E. coli bacteremia.  Hospital course further complicated by cardiac arrest of unclear origin with ROSC after 10 minutes of CPR.    Septic shock d/t UTI w/ ESBL bacteremia   -s/p pressors in ICU, weaned off   -s/p IVF  -s/p stress dose steroids   -Blood culture 6/1 w/ ESBL Ecoli   -Urine culture 6/2 w/ ESBL Ecoli   -Repeat blood cultures 6/4 NGTD  -Continue Meropenem   -ID following     ALY d/t ischemic ATN   -avoid nephrotoxic agents   -Cr improved     Cardiac arrest in setting of sepsis   -ROSC after 10 minutes of CPR  -TTE  shows EF of 55% with regional WMA present, normal RV  -Appreciate cardiology input.     Hyperkalemia secondary to ALY  -now resolved     DM2 with hyperglycemia  -Hgb A1c 10.2   -c/w ISS  -c/w Lantus 25units qhs   -monitor fingersticks     Right humerus fracture d/t fall  -RUE sling  -consulted orthopedics, no plan for intervention at this time  -pain control   -PT/OT     HTN  -holding lisinopril 10 mg and HCTZ 12.5 mg  -restart metoprolol succinate ER 25 mg  -monitor BP    CAD w/ history of PCI  -continue ASA 81 mg, ranolazine  mg BID  -restart metoprolol succinate ER 25 mg    HSQ    POC d/w daughter in law

## 2025-06-09 NOTE — PROGRESS NOTE ADULT - ASSESSMENT
A/P-   74 year old female with PMHx of HTN, HLD, IDDM2, CAD (s/p PCI), and recent right humerus fracture (placed in sling 3 days ago) who presented to the ED on 5/30/25 for complaints of elevated blood glucose.   As per med history provided by family  patient fell three days ago and landed on her right shoulder. Went to [facility] at that time and found to have right humerus fracture and placed in sling. Discharged home with outpatient orthopedic surgery follow up. Since then, patient has been refusing to get out of bed and not eating much.   In the ED, VSS. Hgb 10.3, sodium 132, potassium 6.3, BUN 70, Cr 2.02, blood glucose 510. VBG 7.29 / 58 / 34 / 26. COVID/influenza/RSV negative. CT head without acute intracranial findings. Received 1L NS bolus, 1L LR bolus, and insulin 20 U IV.  (30 May 2025 23:43)    s/p  RRT last night   for AMS, hypotension, fever of 103  and was started on sepsis w/u given IVF bolus, Abx with CTX and Vanco. Persistently hypotensive and obtunded. Started on levophed infusion. Urgently transferred to ICU.   was also intubated for airway protection    Infectious Disease consultation requested this afternoon 6/2 to help with antibiotic management  for ESBL bacteremia    Intubated   sedated  on pressors for hypotension  afebrile today  yesterday 103 t-max  + leukocytosis of 29K  Blood cx- ESBL GNR by PCR x 2  UA from 5/31-pos for nitrates  urine cx-pending  DUANE  elevated cardiac enzymes    per med records had ESBL e.coli UTI in august and sept 2024 and ID was not consulted     6/3-  remains Intubated and sedated  on pressors for hypotension  temp of 101.5 today  + leukocytosis trending down to 20k  creatinine slightly better today  Blood cx- ESBL GNR by PCR x 2  UA from 5/31-pos for nitrates  urine cx->100K e.coli  DUANE  elevated cardiac enzymes    6/4: seen in ICU earlier, remains intubated, weaning process in progress, on pressors, continues having fevers, T 101 this morning, leukocytosis is better 17.27, Cr better 1.35, UC and BCs grew ESBL E. Coli, repeat BCs are pending, Meropenem IV continued. If not improvement in pt's fevers tomorrow, most likely CT a/p will be obtained.   6/5: remains in ICU, intubated and sedated, on pressors, Temp persistent 100-100.2, WBC better 16.43, Cr normalized, LFTs ok, repeat BCs NGTD, procalcitonin 2.41, TTE performed - Left ventricular regional wall motion abnormalities present. Meropenem IV continued.     6/6-  extubated  awake  afebrile now but early am had temp of 100.6  Leukocytosis almost resolved 11K today  Blood cx- ESBL GNR by PCR x 2  UA from 5/31-pos for nitrates  urine cx->100K e.coli  repeat blood cx- NGTD x 2 from 6/4 6/9-  downgraded to medical floor  Afebrile  Leukocytosis 12 k today  Blood cx- ESBL GNR by PCR x 2  UA from 5/31-pos for nitrates  urine cx->100K e.coli  repeat blood cx- NGTD x 2 from 6/4    Impression-  septic shock - resolved  E.coli ESBL bacteremia sec to   source   Acute respiratory failure requiring intubation - later  extubated  stercoral colitis   Right humerus fracture with hematoma    Plan-  continue IV meropenem for the ESBL bacteremia and sepsis day #8  advise to d/c meropnem after tomm dose.  trend pt's temperatures and WBC  as per ortho no surgical intervention for right humerus fracture  rest of the medical management as per medicine team.    All labs and imaging and chart notes reviewed.     All above discussed with patient and her family at bedside.      Tariq Fraga MD  Infectious Disease Attending    for any questions please do not hesitate to contact me either via teams or by calling [phone]

## 2025-06-09 NOTE — SWALLOW BEDSIDE ASSESSMENT ADULT - SWALLOW EVAL: DIAGNOSIS
patient presented with oropharyngeal dysphagia for puree, soft solid, and thin liquid. oral phase marked by fair labial seal/stripping utensil, adequate oral containment, prolonged mastication/bolus formation for soft/moist, and slowed anterior posterior transport with mild lingual stasis. +initiation of pharyngeal swallow trigger with laryngeal excursion to palpation without evidence of impaired airway protection.

## 2025-06-09 NOTE — PROGRESS NOTE ADULT - SUBJECTIVE AND OBJECTIVE BOX
PROGRESS NOTE:     Patient is a 74y old  Female who presents with a chief complaint of ALY, physical deconditioning (09 Jun 2025 11:14)      SUBJECTIVE / OVERNIGHT EVENTS: no acute events.     ADDITIONAL REVIEW OF SYSTEMS:    MEDICATIONS  (STANDING):  aspirin  chewable 81 milliGRAM(s) Oral daily  atorvastatin 40 milliGRAM(s) Oral at bedtime  chlorhexidine 2% Cloths 1 Application(s) Topical <User Schedule>  dextrose 50% Injectable 25 Gram(s) IV Push once  gabapentin 300 milliGRAM(s) Oral two times a day  heparin   Injectable 5000 Unit(s) SubCutaneous every 8 hours  insulin glargine Injectable (LANTUS) 25 Unit(s) SubCutaneous at bedtime  insulin lispro (ADMELOG) corrective regimen sliding scale   SubCutaneous three times a day before meals  insulin lispro (ADMELOG) corrective regimen sliding scale   SubCutaneous at bedtime  meropenem  IVPB 2000 milliGRAM(s) IV Intermittent every 12 hours  multivitamin 1 Tablet(s) Oral daily  ranolazine 500 milliGRAM(s) Oral two times a day  senna 2 Tablet(s) Oral at bedtime    MEDICATIONS  (PRN):  acetaminophen     Tablet .. 650 milliGRAM(s) Oral every 6 hours PRN Temp greater or equal to 38C (100.4F), Mild Pain (1 - 3)  acetaminophen     Tablet .. 650 milliGRAM(s) Oral every 6 hours PRN Mild Pain (1 - 3)  dextrose Oral Gel 15 Gram(s) Oral once PRN Blood Glucose LESS THAN 70 milliGRAM(s)/deciliter  ketorolac   Injectable 15 milliGRAM(s) IV Push every 8 hours PRN Severe Pain (7 - 10)  ondansetron Injectable 4 milliGRAM(s) IV Push every 8 hours PRN Nausea and/or Vomiting  polyethylene glycol 3350 17 Gram(s) Oral daily PRN Constipation      CAPILLARY BLOOD GLUCOSE      POCT Blood Glucose.: 148 mg/dL (09 Jun 2025 11:44)  POCT Blood Glucose.: 170 mg/dL (09 Jun 2025 08:10)  POCT Blood Glucose.: 238 mg/dL (08 Jun 2025 21:53)  POCT Blood Glucose.: 189 mg/dL (08 Jun 2025 16:25)    I&O's Summary    08 Jun 2025 07:01  -  09 Jun 2025 07:00  --------------------------------------------------------  IN: 0 mL / OUT: 750 mL / NET: -750 mL        PHYSICAL EXAM:  Vital Signs Last 24 Hrs  T(C): 36.7 (09 Jun 2025 11:58), Max: 36.9 (08 Jun 2025 23:26)  T(F): 98 (09 Jun 2025 11:58), Max: 98.4 (08 Jun 2025 23:26)  HR: 65 (09 Jun 2025 11:58) (65 - 78)  BP: 133/79 (09 Jun 2025 11:58) (118/62 - 133/79)  BP(mean): --  RR: 18 (09 Jun 2025 11:58) (16 - 20)  SpO2: 95% (09 Jun 2025 11:58) (92% - 95%)    Parameters below as of 08 Jun 2025 16:09  Patient On (Oxygen Delivery Method): room air        CONSTITUTIONAL: NAD, well-developed  RESPIRATORY: Normal respiratory effort; lungs are clear to auscultation bilaterally  CARDIOVASCULAR: Regular rate and rhythm, normal S1 and S2, no murmur/rub/gallop; No lower extremity edema; Peripheral pulses are 2+ bilaterally  ABDOMEN: Nontender to palpation, normoactive bowel sounds, no rebound/guarding; No hepatosplenomegaly  MUSCLOSKELETAL: no clubbing or cyanosis of digits; no joint swelling or tenderness to palpation  PSYCH: A+O to person, place, and time; affect appropriate  SKIN: Ecchymosis of RUE     LABS:                        8.6    12.12 )-----------( 293      ( 09 Jun 2025 07:17 )             28.9     06-09    142  |  108  |  66[H]  ----------------------------<  160[H]  4.9   |  31  |  1.05    Ca    8.6      09 Jun 2025 07:17  Phos  4.2     06-09  Mg     2.8     06-09    TPro  6.5  /  Alb  2.0[L]  /  TBili  0.8  /  DBili  x   /  AST  51[H]  /  ALT  19  /  AlkPhos  87  06-09          Urinalysis Basic - ( 09 Jun 2025 07:17 )    Color: x / Appearance: x / SG: x / pH: x  Gluc: 160 mg/dL / Ketone: x  / Bili: x / Urobili: x   Blood: x / Protein: x / Nitrite: x   Leuk Esterase: x / RBC: x / WBC x   Sq Epi: x / Non Sq Epi: x / Bacteria: x          RADIOLOGY & ADDITIONAL TESTS:  Results Reviewed:   Imaging Personally Reviewed:  Electrocardiogram Personally Reviewed:    COORDINATION OF CARE:  Care Discussed with Consultants/Other Providers [Y/N]:  Prior or Outpatient Records Reviewed [Y/N]:

## 2025-06-09 NOTE — SWALLOW BEDSIDE ASSESSMENT ADULT - COMMENTS
pt seen bedside seated upright on RA, alert and pleasantly confused. Pt head leaning to the left which required repositioning and cueing for pt to hold head upright. Pt responded to questions for assessment with poor accuracy, verbalized her wants/needs and followed simple 1 step directives. Daughter in law present at the bedside.    CXR 6/6/25 IMPRESSION: No focal consolidation seen. Linear scarring versus atelectasis in the bilateral lung bases.    CT head no cont 5/30/25 IMPRESSION: Motion limited. No gross acute intracranial hemorrhage, mass effect, or midline shift.

## 2025-06-09 NOTE — SWALLOW BEDSIDE ASSESSMENT ADULT - SWALLOW EVAL: RECOMMENDED FEEDING/EATING TECHNIQUES
allow for swallow between intakes/alternate food with liquid/check mouth frequently for oral residue/pocketing/crush medication (when feasible)/maintain upright posture during/after eating for 30 mins/oral hygiene/small sips/bites

## 2025-06-09 NOTE — SWALLOW BEDSIDE ASSESSMENT ADULT - SLP PERTINENT HISTORY OF CURRENT PROBLEM
Pt admitted d/t ALY, physical conditioning. Hospital course complicated by RRT 6/1/25 d/t hypotension. Pt then transferred to ICU 6/2/25. Intubated 6/2/25 and extubated 6/6/25. Pt transferred to F 6/8/25.

## 2025-06-09 NOTE — SWALLOW BEDSIDE ASSESSMENT ADULT - ADDITIONAL RECOMMENDATIONS
Short term goals:  1. pt will tolerate recommended textures without s/sx of aspiration.   2. pt/family education regarding oral care /aspiration precautions and will demonstrate understanding and carryover.

## 2025-06-09 NOTE — PROGRESS NOTE ADULT - SUBJECTIVE AND OBJECTIVE BOX
[facility]  INFECTIOUS DISEASES   [address] [place] [address]  Tel: [phone]     Fax: [phone]  ==============================================================================  MD Jason Booth, DO Stephanie Olmedo, CHLOE Pope M.D  ==============================================================================      EMMANUEL GONZALEZ  [id number]  74y ([birth year])      Interval History:    ROS:    [ ] Unobtainable because:  [ ] All other systems negative except as noted    Constitutional: no fever, no chills  Head: no trauma  Eyes: no vision changes, no eye pain  ENT:  no sore throat, no rhinorrhea  Cardiovascular:  no chest pain, no palpitation  Respiratory:  no SOB, no cough  GI:  no abd pain, no vomiting, no diarrhea  urinary: no dysuria, no hematuria, no flank pain  musculoskeletal:  no joint pain, no joint swelling  skin:  no rash  neurology:  no headache, no seizure, no change in mental status  psych: no anxiety, no depression         Allergies  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 (Short breath)  No Known Drug Allergies        ANTIMICROBIALS:  meropenem  IVPB 2000 every 12 hours        Physical Exam:  Vital Signs Last 24 Hrs  T(C): 36.7 (09 Jun 2025 05:38), Max: 36.9 (08 Jun 2025 23:26)  T(F): 98.1 (09 Jun 2025 05:38), Max: 98.4 (08 Jun 2025 23:26)  HR: 72 (09 Jun 2025 05:38) (68 - 78)  BP: 126/57 (09 Jun 2025 05:38) (118/62 - 139/58)  BP(mean): 82 (08 Jun 2025 14:00) (82 - 82)  RR: 18 (09 Jun 2025 05:38) (16 - 24)  SpO2: 92% (09 Jun 2025 05:38) (92% - 95%)    Parameters below as of 08 Jun 2025 16:09  Patient On (Oxygen Delivery Method): room air        06-08-25 @ 07:01  -  06-09-25 @ 07:00  --------------------------------------------------------  IN: 0 mL / OUT: 750 mL / NET: -750 mL      General:    NAD,  non toxic  Head: atraumatic, normocephalic  Eye: normal sclera and conjunctiva  ENT:    no oral lesions, neck supple  Cardio:     regular S1, S2,  no murmur  Respiratory:    clear b/l,    no wheezing  abd:     soft,   BS +,   no tenderness  :   no CVAT,  no suprapubic tenderness,   no  marcus  Musculoskeletal:   no joint swelling,   no edema  vascular: no central lines, +PIV   Skin:    no rash  Neurologic:     no focal deficit  psych: normal affect    WBC Count: 12.12 K/uL (06-09 @ 07:17)  WBC Count: 11.64 K/uL (06-08 @ 06:51)  WBC Count: 11.72 K/uL (06-07 @ 02:42)  WBC Count: 11.19 K/uL (06-06 @ 02:52)  WBC Count: 16.43 K/uL (06-05 @ 02:10)  WBC Count: 17.27 K/uL (06-04 @ 03:05)  WBC Count: 20.05 K/uL (06-03 @ 03:50)                            8.6    12.12 )-----------( 293      ( 09 Jun 2025 07:17 )             28.9       06-09    142  |  108  |  66[H]  ----------------------------<  160[H]  4.9   |  31  |  1.05    Ca    8.6      09 Jun 2025 07:17  Phos  4.2     06-09  Mg     2.8     06-09    TPro  6.5  /  Alb  2.0[L]  /  TBili  0.8  /  DBili  x   /  AST  51[H]  /  ALT  19  /  AlkPhos  87  06-09      Urinalysis Basic - ( 09 Jun 2025 07:17 )    Color: x / Appearance: x / SG: x / pH: x  Gluc: 160 mg/dL / Ketone: x  / Bili: x / Urobili: x   Blood: x / Protein: x / Nitrite: x   Leuk Esterase: x / RBC: x / WBC x   Sq Epi: x / Non Sq Epi: x / Bacteria: x          Creatinine Trend: 1.05<--, 1.07<--, 1.05<--, 1.04<--, 1.13<--, 1.19<--      MICROBIOLOGY:  v  Blood Blood-Peripheral  06-04-25   No growth at 5 days  --  --      Catheterized Catheterized  06-02-25   >100,000 CFU/ml Escherichia coli ESBL  --  Escherichia coli ESBL      Blood Blood-Peripheral  06-01-25   Growth in aerobic and anaerobic bottles: Escherichia coli ESBL  See previous culture [id number]  --    Growth in aerobic bottle: Gram Negative Rods  Growth in anaerobic bottle: Gram Negative Rods      Blood Blood-Peripheral  06-01-25   Growth in aerobic and anaerobic bottles: Escherichia coli ESBL  Direct identification is available within approximately 3-5  hours either by Blood Panel Multiplexed PCR or Direct  MALDI-TOF. Details: [email or web address]  --  Blood Culture PCR  Escherichia coli ESBL                          Procalcitonin: 2.41 (06-05-25 @ 02:10)          RADIOLOGY:   [facility]  INFECTIOUS DISEASES   [address] [place] [address]  Tel: [phone]     Fax: [phone]  ==============================================================================  MD Jason Booth, DO Stephanie Olmedo, CHLOE Pope M.D  ==============================================================================      EMMANUEL GONZALEZ  [id number]  74y ([birth year])      Interval History:    patient seen and examined on med-surg floor today.  sleeping but easily awakens when her name is called  denies any pain anywhere  is on RA  no cough or sob      ROS:    [ ] Unobtainable because:  [ x] All other systems negative except as noted    Constitutional: no fever, no chills  Head: no trauma  Eyes: no vision changes, no eye pain  ENT:  no sore throat, no rhinorrhea  Cardiovascular:  no chest pain, no palpitation  Respiratory:  no SOB, no cough  GI:  no abd pain, no vomiting, no diarrhea  urinary: no dysuria, no hematuria, no flank pain  musculoskeletal:  no joint pain, no joint swelling  skin:  no rash  neurology:  no headache        Allergies  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 (Short breath)  No Known Drug Allergies        ANTIMICROBIALS:  meropenem  IVPB 2000 every 12 hours        Physical Exam:  Vital Signs Last 24 Hrs  T(C): 36.7 (09 Jun 2025 05:38), Max: 36.9 (08 Jun 2025 23:26)  T(F): 98.1 (09 Jun 2025 05:38), Max: 98.4 (08 Jun 2025 23:26)  HR: 72 (09 Jun 2025 05:38) (68 - 78)  BP: 126/57 (09 Jun 2025 05:38) (118/62 - 139/58)  BP(mean): 82 (08 Jun 2025 14:00) (82 - 82)  RR: 18 (09 Jun 2025 05:38) (16 - 24)  SpO2: 92% (09 Jun 2025 05:38) (92% - 95%)    Parameters below as of 08 Jun 2025 16:09  Patient On (Oxygen Delivery Method): room air        06-08-25 @ 07:01  -  06-09-25 @ 07:00  --------------------------------------------------------  IN: 0 mL / OUT: 750 mL / NET: -750 mL    Head: atraumatic, normocephalic  Eyes: normal sclera and conjunctiva  ENT:   no oropharyngeal lesions, supple neck  Cardio: S1,S2 regular   Respiratory:  good  breath sounds heard b/l , no wheezes   abd:   soft, BS +, no distention, no tenderness  Musculoskeletal : right shoulder and arm with echymosis   Skin:  no rash  Neurologic: awake, alert , can follow simple commands and answer some questions  Psych: calm        WBC Count: 12.12 K/uL (06-09 @ 07:17)  WBC Count: 11.64 K/uL (06-08 @ 06:51)  WBC Count: 11.72 K/uL (06-07 @ 02:42)  WBC Count: 11.19 K/uL (06-06 @ 02:52)  WBC Count: 16.43 K/uL (06-05 @ 02:10)  WBC Count: 17.27 K/uL (06-04 @ 03:05)  WBC Count: 20.05 K/uL (06-03 @ 03:50)                            8.6    12.12 )-----------( 293      ( 09 Jun 2025 07:17 )             28.9       06-09    142  |  108  |  66[H]  ----------------------------<  160[H]  4.9   |  31  |  1.05    Ca    8.6      09 Jun 2025 07:17  Phos  4.2     06-09  Mg     2.8     06-09    TPro  6.5  /  Alb  2.0[L]  /  TBili  0.8  /  DBili  x   /  AST  51[H]  /  ALT  19  /  AlkPhos  87  06-09      Urinalysis Basic - ( 09 Jun 2025 07:17 )    Color: x / Appearance: x / SG: x / pH: x  Gluc: 160 mg/dL / Ketone: x  / Bili: x / Urobili: x   Blood: x / Protein: x / Nitrite: x   Leuk Esterase: x / RBC: x / WBC x   Sq Epi: x / Non Sq Epi: x / Bacteria: x          Creatinine Trend: 1.05<--, 1.07<--, 1.05<--, 1.04<--, 1.13<--, 1.19<--      MICROBIOLOGY:    Blood Blood-Peripheral  06-04-25   No growth at 5 days  --  --      Catheterized Catheterized  06-02-25   >100,000 CFU/ml Escherichia coli ESBL  --  Escherichia coli ESBL      Blood Blood-Peripheral  06-01-25   Growth in aerobic and anaerobic bottles: Escherichia coli ESBL  See previous culture [id number]  --    Growth in aerobic bottle: Gram Negative Rods  Growth in anaerobic bottle: Gram Negative Rods      Blood Blood-Peripheral  06-01-25   Growth in aerobic and anaerobic bottles: Escherichia coli ESBL  Direct identification is available within approximately 3-5  hours either by Blood Panel Multiplexed PCR or Direct  MALDI-TOF. Details: [email or web address]  --  Blood Culture PCR  Escherichia coli ESBL                          Procalcitonin: 2.41 (06-05-25 @ 02:10)          RADIOLOGY:

## 2025-06-09 NOTE — SWALLOW BEDSIDE ASSESSMENT ADULT - H & P REVIEW
"Tiffanie Joshi is a 74 year old female with PMHx of HTN, HLD, IDDM2, CAD (s/p PCI), and recent right humerus fracture (placed in sling 3 days ago) who presented to the ED on 5/30/25 for complaints of elevated blood glucose. Patient is Yakut-speaking but declined  services and preferred for daughter-in-law at bedside to translate. As per daughter-in-law, patient fell three days ago and landed on her right shoulder. Went to [facility] at that time and found to have right humerus fracture and placed in sling. Discharged home with outpatient orthopedic surgery follow up. Since then, patient has been refusing to get out of bed and not eating much. Will drink a little juice every now and then. Daughter checked her blood glucose around 5PM and it was > 400 so brought to the ER for further evaluation. Baseline functional status is ambulates with walker and dependent with all ADLs. Lives at home with daughter.Patient is Yakut-speaking but declined  services and preferred for daughter-in-law at bedside to translate. As per daughter-in-law, patient fell three days ago and landed on her right shoulder. Went to [facility] at that time and found to have right humerus fracture and placed in sling. Discharged home with outpatient orthopedic surgery follow up. Since then, patient has been refusing to get out of bed and not eating much. Will drink a little juice every now and then. Daughter checked her blood glucose around 5PM and it was > 400 so brought to the ER for further evaluation. Baseline functional status is ambulates with walker and dependent with all ADLs. Lives at home with daughter."/yes

## 2025-06-10 LAB
ANION GAP SERPL CALC-SCNC: 4 MMOL/L — LOW (ref 5–17)
BUN SERPL-MCNC: 64 MG/DL — HIGH (ref 7–23)
CALCIUM SERPL-MCNC: 8.6 MG/DL — SIGNIFICANT CHANGE UP (ref 8.5–10.1)
CHLORIDE SERPL-SCNC: 109 MMOL/L — HIGH (ref 96–108)
CO2 SERPL-SCNC: 29 MMOL/L — SIGNIFICANT CHANGE UP (ref 22–31)
CREAT SERPL-MCNC: 0.91 MG/DL — SIGNIFICANT CHANGE UP (ref 0.5–1.3)
EGFR: 65 ML/MIN/1.73M2 — SIGNIFICANT CHANGE UP
EGFR: 65 ML/MIN/1.73M2 — SIGNIFICANT CHANGE UP
GLUCOSE BLDC GLUCOMTR-MCNC: 172 MG/DL — HIGH (ref 70–99)
GLUCOSE BLDC GLUCOMTR-MCNC: 181 MG/DL — HIGH (ref 70–99)
GLUCOSE BLDC GLUCOMTR-MCNC: 222 MG/DL — HIGH (ref 70–99)
GLUCOSE BLDC GLUCOMTR-MCNC: 231 MG/DL — HIGH (ref 70–99)
GLUCOSE SERPL-MCNC: 150 MG/DL — HIGH (ref 70–99)
HCT VFR BLD CALC: 26.8 % — LOW (ref 34.5–45)
HGB BLD-MCNC: 8 G/DL — LOW (ref 11.5–15.5)
MAGNESIUM SERPL-MCNC: 2.7 MG/DL — HIGH (ref 1.6–2.6)
MCHC RBC-ENTMCNC: 28.3 PG — SIGNIFICANT CHANGE UP (ref 27–34)
MCHC RBC-ENTMCNC: 29.9 G/DL — LOW (ref 32–36)
MCV RBC AUTO: 94.7 FL — SIGNIFICANT CHANGE UP (ref 80–100)
NRBC BLD AUTO-RTO: 0 /100 WBCS — SIGNIFICANT CHANGE UP (ref 0–0)
PLATELET # BLD AUTO: 323 K/UL — SIGNIFICANT CHANGE UP (ref 150–400)
POTASSIUM SERPL-MCNC: 4.7 MMOL/L — SIGNIFICANT CHANGE UP (ref 3.5–5.3)
POTASSIUM SERPL-SCNC: 4.7 MMOL/L — SIGNIFICANT CHANGE UP (ref 3.5–5.3)
RBC # BLD: 2.83 M/UL — LOW (ref 3.8–5.2)
RBC # FLD: 13.8 % — SIGNIFICANT CHANGE UP (ref 10.3–14.5)
SODIUM SERPL-SCNC: 142 MMOL/L — SIGNIFICANT CHANGE UP (ref 135–145)
WBC # BLD: 13.57 K/UL — HIGH (ref 3.8–10.5)
WBC # FLD AUTO: 13.57 K/UL — HIGH (ref 3.8–10.5)

## 2025-06-10 PROCEDURE — 99233 SBSQ HOSP IP/OBS HIGH 50: CPT

## 2025-06-10 RX ADMIN — Medication 1 APPLICATION(S): at 05:42

## 2025-06-10 RX ADMIN — INSULIN LISPRO 1: 100 INJECTION, SOLUTION INTRAVENOUS; SUBCUTANEOUS at 08:31

## 2025-06-10 RX ADMIN — RANOLAZINE 500 MILLIGRAM(S): 1000 TABLET, FILM COATED, EXTENDED RELEASE ORAL at 18:30

## 2025-06-10 RX ADMIN — INSULIN GLARGINE-YFGN 25 UNIT(S): 100 INJECTION, SOLUTION SUBCUTANEOUS at 21:46

## 2025-06-10 RX ADMIN — GABAPENTIN 300 MILLIGRAM(S): 400 CAPSULE ORAL at 18:30

## 2025-06-10 RX ADMIN — INSULIN LISPRO 2: 100 INJECTION, SOLUTION INTRAVENOUS; SUBCUTANEOUS at 12:09

## 2025-06-10 RX ADMIN — HEPARIN SODIUM 5000 UNIT(S): 1000 INJECTION INTRAVENOUS; SUBCUTANEOUS at 22:28

## 2025-06-10 RX ADMIN — INSULIN LISPRO 2: 100 INJECTION, SOLUTION INTRAVENOUS; SUBCUTANEOUS at 16:24

## 2025-06-10 RX ADMIN — Medication 2 TABLET(S): at 22:28

## 2025-06-10 RX ADMIN — Medication 81 MILLIGRAM(S): at 14:06

## 2025-06-10 RX ADMIN — ATORVASTATIN CALCIUM 40 MILLIGRAM(S): 80 TABLET, FILM COATED ORAL at 22:28

## 2025-06-10 RX ADMIN — MEROPENEM 280 MILLIGRAM(S): 1 INJECTION INTRAVENOUS at 05:43

## 2025-06-10 RX ADMIN — Medication 1 TABLET(S): at 14:06

## 2025-06-10 RX ADMIN — GABAPENTIN 300 MILLIGRAM(S): 400 CAPSULE ORAL at 05:42

## 2025-06-10 RX ADMIN — METOPROLOL SUCCINATE 25 MILLIGRAM(S): 50 TABLET, EXTENDED RELEASE ORAL at 05:43

## 2025-06-10 RX ADMIN — HEPARIN SODIUM 5000 UNIT(S): 1000 INJECTION INTRAVENOUS; SUBCUTANEOUS at 14:26

## 2025-06-10 RX ADMIN — HEPARIN SODIUM 5000 UNIT(S): 1000 INJECTION INTRAVENOUS; SUBCUTANEOUS at 05:43

## 2025-06-10 RX ADMIN — MEROPENEM 280 MILLIGRAM(S): 1 INJECTION INTRAVENOUS at 18:30

## 2025-06-10 RX ADMIN — RANOLAZINE 500 MILLIGRAM(S): 1000 TABLET, FILM COATED, EXTENDED RELEASE ORAL at 05:43

## 2025-06-10 NOTE — OCCUPATIONAL THERAPY INITIAL EVALUATION ADULT - RANGE OF MOTION EXAMINATION
(kyphotic posture)/trunk was WFL (within functional limits)
trunk was WFL (within functional limits)

## 2025-06-10 NOTE — OCCUPATIONAL THERAPY INITIAL EVALUATION ADULT - BED MOBILITY LIMITATIONS, REHAB EVAL
New problem. Push fluids. AZO prn pain. Start empiric antibiotic. Explained by concerns about starting cipro, but choices are limited due to sulfa and PCN allergies. Sending urine for UA, c and s   
decreased ability to use arms for pushing/pulling/decreased ability to use legs for bridging/pushing/impaired ability to control trunk for mobility
decreased ability to use arms for pushing/pulling/decreased ability to use legs for bridging/pushing/impaired ability to control trunk for mobility

## 2025-06-10 NOTE — OCCUPATIONAL THERAPY INITIAL EVALUATION ADULT - ADDITIONAL COMMENTS
As per pt's son Emily Valentin, present at bed side. Pt lives at private house with pt's daughter. No steps to negotiate for entry & exit, bedroom & bathroom on the main level of the house. Pt ambulated with Cane for indoor ambulation and used a rollator for outdoor ambulation. Pt performed ADLs and IADLs c modified [place] utilizing Cane within the home.
PATRICIA gregory 5/31- As per pt's son Emily Valentin, present at bed side. Pt lives at private house with pt's daughter. No steps to negotiate for entry & exit, bedroom & bathroom on the main level of the house. Pt ambulated with Cane for indoor ambulation and used a rollator for outdoor ambulation. Pt performed ADLs and IADLs c modified [place] utilizing Cane within the home.

## 2025-06-10 NOTE — OCCUPATIONAL THERAPY INITIAL EVALUATION ADULT - RANGE OF MOTION EXAMINATION, UPPER EXTREMITY
DNT RUE secondary to NWB restrictions/Left UE Passive ROM was WFL  (within functional limits)
right UE in sling/Left UE Active ROM was WFL (within functional limits)

## 2025-06-10 NOTE — PHYSICAL THERAPY INITIAL EVALUATION ADULT - STRENGTHENING, PT EVAL
Patient will perform all ADLs with good form due to functional strength to all limbs, in 4 weeks.
Improve strength and general endurance to good and be able to perform functional task- bed mobility, transfers and ambulation at a safe level and prevent falls.

## 2025-06-10 NOTE — OCCUPATIONAL THERAPY INITIAL EVALUATION ADULT - GENERAL OBSERVATIONS, REHAB EVAL
Pt was encountered supine in bed; NAD, sling donned to right UE, AXOX1, pt c/o pain s/p right humerus fracture. Pt's son present to provide [place] translation prn. Pt educated on NWB to right UE & compensatory techniques for ADLs.

## 2025-06-10 NOTE — OCCUPATIONAL THERAPY INITIAL EVALUATION ADULT - NSOTDISCHREC_GEN_A_CORE
Sub-acute Rehab
to facilitate ADLs, balance, strength and functional transfers/mobility in order to ensure safe return to prior living environment with decreased risk of falls./Sub-acute Rehab

## 2025-06-10 NOTE — PROGRESS NOTE ADULT - SUBJECTIVE AND OBJECTIVE BOX
PROGRESS NOTE:     Patient is a 74y old  Female who presents with a chief complaint of ALY, physical deconditioning (09 Jun 2025 14:18)      SUBJECTIVE / OVERNIGHT EVENTS: no acute events.     ADDITIONAL REVIEW OF SYSTEMS:    MEDICATIONS  (STANDING):  aspirin  chewable 81 milliGRAM(s) Oral daily  atorvastatin 40 milliGRAM(s) Oral at bedtime  chlorhexidine 2% Cloths 1 Application(s) Topical <User Schedule>  dextrose 50% Injectable 25 Gram(s) IV Push once  gabapentin 300 milliGRAM(s) Oral two times a day  heparin   Injectable 5000 Unit(s) SubCutaneous every 8 hours  insulin glargine Injectable (LANTUS) 25 Unit(s) SubCutaneous at bedtime  insulin lispro (ADMELOG) corrective regimen sliding scale   SubCutaneous three times a day before meals  insulin lispro (ADMELOG) corrective regimen sliding scale   SubCutaneous at bedtime  meropenem  IVPB 2000 milliGRAM(s) IV Intermittent every 12 hours  metoprolol succinate ER 25 milliGRAM(s) Oral daily  multivitamin 1 Tablet(s) Oral daily  ranolazine 500 milliGRAM(s) Oral two times a day  senna 2 Tablet(s) Oral at bedtime    MEDICATIONS  (PRN):  acetaminophen     Tablet .. 650 milliGRAM(s) Oral every 6 hours PRN Temp greater or equal to 38C (100.4F), Mild Pain (1 - 3)  acetaminophen     Tablet .. 650 milliGRAM(s) Oral every 6 hours PRN Mild Pain (1 - 3)  dextrose Oral Gel 15 Gram(s) Oral once PRN Blood Glucose LESS THAN 70 milliGRAM(s)/deciliter  ondansetron Injectable 4 milliGRAM(s) IV Push every 8 hours PRN Nausea and/or Vomiting  polyethylene glycol 3350 17 Gram(s) Oral daily PRN Constipation      CAPILLARY BLOOD GLUCOSE      POCT Blood Glucose.: 231 mg/dL (10 Seamus 2025 11:36)  POCT Blood Glucose.: 181 mg/dL (10 Seamus 2025 07:54)  POCT Blood Glucose.: 198 mg/dL (09 Jun 2025 21:30)  POCT Blood Glucose.: 168 mg/dL (09 Jun 2025 16:30)    I&O's Summary    09 Jun 2025 07:01  -  10 Seamus 2025 07:00  --------------------------------------------------------  IN: 100 mL / OUT: 1150 mL / NET: -1050 mL        PHYSICAL EXAM:  Vital Signs Last 24 Hrs  T(C): 36.8 (10 Seamus 2025 11:03), Max: 36.8 (10 Seamus 2025 11:03)  T(F): 98.3 (10 Seamus 2025 11:03), Max: 98.3 (10 Seamus 2025 11:03)  HR: 63 (10 Seamus 2025 11:03) (62 - 70)  BP: 99/56 (10 Seamus 2025 11:03) (99/56 - 141/80)  BP(mean): --  RR: 19 (10 Seamus 2025 11:03) (17 - 19)  SpO2: 94% (10 Seamus 2025 11:03) (94% - 95%)    Parameters below as of 09 Jun 2025 23:46  Patient On (Oxygen Delivery Method): room air    CONSTITUTIONAL: NAD, well-developed  RESPIRATORY: Normal respiratory effort; lungs are clear to auscultation bilaterally  CARDIOVASCULAR: Regular rate and rhythm, normal S1 and S2, no murmur/rub/gallop; No lower extremity edema; Peripheral pulses are 2+ bilaterally  ABDOMEN: Nontender to palpation, normoactive bowel sounds, no rebound/guarding; No hepatosplenomegaly  MUSCLOSKELETAL: no clubbing or cyanosis of digits; no joint swelling or tenderness to palpation  PSYCH: A+O to person, place, and time; affect appropriate  SKIN: Ecchymosis of RUE     LABS:                        8.0    13.57 )-----------( 323      ( 10 Seamus 2025 08:58 )             26.8     06-10    142  |  109[H]  |  64[H]  ----------------------------<  150[H]  4.7   |  29  |  0.91    Ca    8.6      10 Seamus 2025 08:58  Phos  4.2     06-09  Mg     2.7     06-10    TPro  6.5  /  Alb  2.0[L]  /  TBili  0.8  /  DBili  x   /  AST  51[H]  /  ALT  19  /  AlkPhos  87  06-09          Urinalysis Basic - ( 10 Seamus 2025 08:58 )    Color: x / Appearance: x / SG: x / pH: x  Gluc: 150 mg/dL / Ketone: x  / Bili: x / Urobili: x   Blood: x / Protein: x / Nitrite: x   Leuk Esterase: x / RBC: x / WBC x   Sq Epi: x / Non Sq Epi: x / Bacteria: x          RADIOLOGY & ADDITIONAL TESTS:  Results Reviewed:   Imaging Personally Reviewed:  Electrocardiogram Personally Reviewed:    COORDINATION OF CARE:  Care Discussed with Consultants/Other Providers [Y/N]:  Prior or Outpatient Records Reviewed [Y/N]:

## 2025-06-10 NOTE — OCCUPATIONAL THERAPY INITIAL EVALUATION ADULT - DIAGNOSIS, OT EVAL
M62.81 generalized weakness, decreased ADL management and functional transfers/mobility
M62.81 Generalized weakness, decreased ADLs management and functional transfers/mobility.

## 2025-06-10 NOTE — OCCUPATIONAL THERAPY INITIAL EVALUATION ADULT - BED MOBILITY TRAINING, PT EVAL
Pt will perform bed mobility with Min assist in 2 weeks
Pt will perform bed mobility transfers with [place] within 4 weeks.

## 2025-06-10 NOTE — PHYSICAL THERAPY INITIAL EVALUATION ADULT - PLANNED THERAPY INTERVENTIONS, PT EVAL
bed mobility training/strengthening
LTG 6 weeks/balance training/bed mobility training/strengthening

## 2025-06-10 NOTE — OCCUPATIONAL THERAPY INITIAL EVALUATION ADULT - MD ORDER
Occupational Therapy Evaluate and Treat
Occupational Therapy Re-evaluation due to change in medical status

## 2025-06-10 NOTE — PHYSICAL THERAPY INITIAL EVALUATION ADULT - BED MOBILITY TRAINING, PT EVAL
Independent in bed mobility- supine<>sit, rolling side<>side observing proper body mechanics, proper positioning, body alignment and precautions.
Patient will transfer in and out of bed independently, with good balance and trunk control in 4 weeks.

## 2025-06-10 NOTE — OCCUPATIONAL THERAPY INITIAL EVALUATION ADULT - TRANSFER TRAINING, PT EVAL
Pt will perform transfers with bev walker with supervision in 6 weeks
Pt will perform sit to stand, bed to chair and toilet transfers with [place] within 4 weeks.

## 2025-06-10 NOTE — PHYSICAL THERAPY INITIAL EVALUATION ADULT - RANGE OF MOTION EXAMINATION, REHAB EVAL
R UE NT due to NWB in sling/bilateral upper extremity ROM was WFL (within functional limits)/bilateral lower extremity ROM was WFL (within functional limits)
Left UE ROM was WFL (within functional limits)/bilateral lower extremity ROM was WFL (within functional limits)

## 2025-06-10 NOTE — OCCUPATIONAL THERAPY INITIAL EVALUATION ADULT - IMPAIRMENTS CONTRIBUTING IMPAIRED BED MOBILITY, REHAB EVAL
impaired balance/decreased flexibility/impaired motor control/decreased ROM/decreased strength
impaired balance/decreased ROM/decreased strength

## 2025-06-10 NOTE — OCCUPATIONAL THERAPY INITIAL EVALUATION ADULT - BALANCE TRAINING, PT EVAL
Pt will increase dynamic sitting balance to fair+ to increase performance with ADls in 4 weeks
Pt will show improvement in sit<>stand and static/dynamic standing balance to good/normal within 1-2 weeks in order to improve performance of ADLs and functional transfers.

## 2025-06-10 NOTE — PHYSICAL THERAPY INITIAL EVALUATION ADULT - MANUAL MUSCLE TESTING RESULTS, REHAB EVAL
R UE NT due to NWB status. L UE/ B LE 3-/5/grossly assessed due to
unable to test, pt is lethargic & somnolent

## 2025-06-10 NOTE — OCCUPATIONAL THERAPY INITIAL EVALUATION ADULT - BALANCE DISTURBANCE, SYSTEM IMPAIRMENT CONTRIBUTE, REHAB EVAL
The sheath is inserted into the left radial artery. Access obtained.  
musculoskeletal
musculoskeletal

## 2025-06-10 NOTE — OCCUPATIONAL THERAPY INITIAL EVALUATION ADULT - PERTINENT HX OF CURRENT PROBLEM, REHAB EVAL
Pt is a 74 year old female with PMHx of HTN, HLD, IDDM2, CAD (s/p PCI), and recent right humerus fracture (placed in sling 3 days ago) who presented to the ED on 5/30/25 for complaints of elevated blood glucose. As per daughter-in-law, patient fell three days ago and landed on her right shoulder. Went to [facility] at that time and found to have right humerus fracture and placed in sling. Discharged home with outpatient orthopedic surgery follow up.
EMMANUEL GONZALEZ is a 74y Female with PMHx of HTN, HLD, IDDM2, CAD (s/p PCI in 2020?), and recent right humerus fracture (placed in sling 3 days ago) who presented to the ED on 5/30/25 for complaints of elevated blood glucose.    Pt was seen for OT evaluation on 5/31/25.    On evening of 6/1, spiked temp to 103.2 and became acutely hypotensive despite IVF. RRT was called due to acute obtundation and she was intubated and resuscitated..    Extubated on 6/6

## 2025-06-10 NOTE — OCCUPATIONAL THERAPY INITIAL EVALUATION ADULT - PLANNED THERAPY INTERVENTIONS, OT EVAL
Observation/Bedded Outpatient Requires MD Co-sign  Physical Therapy Plan of Care Note    Therapy Triage Evaluation: OT evaluation is not warranted at this time.  World backwards screen, completed via approved triage process to assess safety, balance, mobility, memory, cognition and functional independence.  Results and recommendations discussed with RN, and and patient/family..   (OT not ordered on this admission, cognitively intact, spouse at home to assist with ADL's prn; no OT needs)    Primary Insurance: [employer] [facility]  Secondary Insurance: N/A    Note sent for required physician co-signature: Yes         Is the patient currently receiving skilled home care services (RN or therapy) No    Diagnosis:   1. Hypotension, unspecified hypotension type    2. Atrial fibrillation with RVR (CMS/[facility])    3. Status post right knee replacement        Therapy Diagnosis: Impaired gait    ASSESSMENT:   PT eval completed on 6LM.  Patient admitted from urgent care where he was evaluated after developing hypotension (BP in 50's) during OP PT.  Patient underwent right TKR 3/7/19 with Dr. Lazo, OP PT continuing.  Medical work up in progress, may be related to meds (Oxycodone).  Patient lives with spouse at home, 4 entry steps plus flight to bedroom; using cane outside, no device inside.  Patient currently demonstrates modified independence with bed mobility, transfers, gait for 375 feet and flight of stairs indicating no additional acute PT needs with goal to d/c home today pending medical clearance. Asymptomatic during session, /72 with HR 97 bpm before and after activity. Recommend OP PT resumption upon d/c for continue TKR rehab.  D/C PT.    Recommendations for Discharge: Home, OP therapy(OP PT currently established)       Co morbidities:   Patient Active Problem List   Diagnosis   • Essential (primary) hypertension   • Diabetes mellitus (CMS/HCC)   • Obesity   • Hyponatremia   • HLD (hyperlipidemia)   • Deformity of 
ADL retraining/balance training/bed mobility training/ROM/strengthening/stretching/transfer training
metatarsal bone of left foot   • Metatarsalgia   • Pain in left foot   • Hammertoe   • Bone hypertrophy   • Pain in joint involving ankle and foot   • Contracture of joint, ankle and foot   • Contracture of tendon sheath   • Late effect of fracture of lower extremities   • Atrial fibrillation (CMS/HCC)   • Valvular heart disease   • Hypersomnia with sleep apnea   • Retinal tear of right eye   • Non-rheumatic mitral regurgitation       Clinical Presentation: Stable and/or uncomplicated characteristics    Precautions:  Precautions  Other Precautions: right TKR 3/7/19 (Dr. Lazo)                      Functional Status: (as of date/time noted)  Bed Mobility:  Supine to Sit: Independent (04/17/19 0930)  Sit to Supine: Independent (04/17/19 0930)    Transfers:  Sit to Stand: Independent (04/17/19 0930)  Stand to Sit: Independent (04/17/19 0930)  Toilet Transfers: Independent (04/17/19 0930)     Ambulation:  Gait  Gait Assistance: Modified Independent (04/17/19 0930)  Assistive Device/: None (04/17/19 0930)  Ambulation Distance (Feet): 375 Feet (04/17/19 0930)  Gait Comments 1: no device (patient declines use of cane), noting increased right foot supination during swing through resulting in lateral border foot placement on initial heel strike/foot placement, slightly increased DWIGHT, fluid gait pattern with step through gait, no LOB noted (04/17/19 0930)         Stairs:  Stairs Mobility  Number of Stairs: 14 (04/17/19 0930)  Stair Management Assistance: Modified Independent (04/17/19 0930)  Stair Management Technique: Two rails;Forwards (04/17/19 0930)  Stairs Mobility Comments: reciprocal ascent, step to descent (04/17/19 0930)    See PT (physical therapy) Flowsheet for full details regarding the PT provided.    Education: On this date, the patient was educated on role of PT, importance of mobility while hospitalized.   The response to education was: Verbalizes understanding    Assistance needed when returning 
home:   Patient is independent with all mobility and selfcares and will return home at same level upon discharge.     Barriers to Discharge: medical    Long Term Treatment Goals:  Met for modified independence with bed mobility, transfers, gait, stairs    Treatment Interventions: Gait training  Amount: 31-60 minutes  Frequency:   1 session (eval and d/c)  Duration: 10 days    Plan for Next Session: continue OP PT        Billing Information:    Timed Procedures:   ,  , $ Gait Trainin-22 mins,  ,  ,  ,  ,  ,  ,  ,  ,  ,      Untimed Procedures:   ,  ,  ,  ,  ,  , $ PT Eval: Low Complexity: 1 Procedure,   ,      Total Treatment Time:   PT Time Spent: 31 minutes    Timed Treatment Minutes:  OBS Patients Only:  PT Timed Code TX Minutes: 15 minutes    The co-signature indicates that the physician certifies the need for PT furnished under this plan of treatment while under his/her care.  
Detail Level: Detailed
Additional Notes: Have you traveled internationally or on a cruise ship in the last 14 days? No \\nHave you traveled out of state within the [place] in the last 14 days? No\\nHave you been in contact with anyone who is suspected of having, being tested, or has tested positive for COVID? No\\nHave you been experiencing any fever or respiratory symptoms? No\\nHave you been experiencing any GI symptoms (diarrhea, vomiting, nausea, stomach pain) in the last week? No
balance training/bed mobility training/strengthening/transfer training

## 2025-06-11 LAB
ALBUMIN SERPL ELPH-MCNC: 2 G/DL — LOW (ref 3.3–5)
ALP SERPL-CCNC: 87 U/L — SIGNIFICANT CHANGE UP (ref 40–120)
ALT FLD-CCNC: 16 U/L — SIGNIFICANT CHANGE UP (ref 12–78)
ANION GAP SERPL CALC-SCNC: 4 MMOL/L — LOW (ref 5–17)
APPEARANCE UR: ABNORMAL
AST SERPL-CCNC: 26 U/L — SIGNIFICANT CHANGE UP (ref 15–37)
BACTERIA # UR AUTO: NEGATIVE /HPF — SIGNIFICANT CHANGE UP
BILIRUB SERPL-MCNC: 0.6 MG/DL — SIGNIFICANT CHANGE UP (ref 0.2–1.2)
BILIRUB UR-MCNC: NEGATIVE — SIGNIFICANT CHANGE UP
BUN SERPL-MCNC: 60 MG/DL — HIGH (ref 7–23)
CALCIUM SERPL-MCNC: 8.4 MG/DL — LOW (ref 8.5–10.1)
CHLORIDE SERPL-SCNC: 105 MMOL/L — SIGNIFICANT CHANGE UP (ref 96–108)
CK MB BLD-MCNC: <1.2 % — SIGNIFICANT CHANGE UP (ref 0–3.5)
CK MB CFR SERPL CALC: <1 NG/ML — SIGNIFICANT CHANGE UP (ref 0.5–3.6)
CK SERPL-CCNC: 80 U/L — SIGNIFICANT CHANGE UP (ref 26–192)
CO2 SERPL-SCNC: 32 MMOL/L — HIGH (ref 22–31)
COLOR SPEC: YELLOW — SIGNIFICANT CHANGE UP
CREAT SERPL-MCNC: 1.02 MG/DL — SIGNIFICANT CHANGE UP (ref 0.5–1.3)
D DIMER BLD IA.RAPID-MCNC: 3552 NG/ML DDU — HIGH
DIFF PNL FLD: NEGATIVE — SIGNIFICANT CHANGE UP
EGFR: 57 ML/MIN/1.73M2 — LOW
EGFR: 57 ML/MIN/1.73M2 — LOW
EPI CELLS # UR: PRESENT
FLUAV AG NPH QL: SIGNIFICANT CHANGE UP
FLUBV AG NPH QL: SIGNIFICANT CHANGE UP
GAS PNL BLDA: SIGNIFICANT CHANGE UP
GAS PNL BLDA: SIGNIFICANT CHANGE UP
GLUCOSE BLDC GLUCOMTR-MCNC: 111 MG/DL — HIGH (ref 70–99)
GLUCOSE BLDC GLUCOMTR-MCNC: 121 MG/DL — HIGH (ref 70–99)
GLUCOSE BLDC GLUCOMTR-MCNC: 137 MG/DL — HIGH (ref 70–99)
GLUCOSE BLDC GLUCOMTR-MCNC: 156 MG/DL — HIGH (ref 70–99)
GLUCOSE BLDC GLUCOMTR-MCNC: 213 MG/DL — HIGH (ref 70–99)
GLUCOSE SERPL-MCNC: 141 MG/DL — HIGH (ref 70–99)
GLUCOSE UR QL: 250 MG/DL
HCT VFR BLD CALC: 28.9 % — LOW (ref 34.5–45)
HGB BLD-MCNC: 8.2 G/DL — LOW (ref 11.5–15.5)
INR BLD: 0.97 RATIO — SIGNIFICANT CHANGE UP (ref 0.85–1.16)
KETONES UR QL: ABNORMAL MG/DL
LACTATE SERPL-SCNC: 1.4 MMOL/L — SIGNIFICANT CHANGE UP (ref 0.7–2)
LEUKOCYTE ESTERASE UR-ACNC: ABNORMAL
MCHC RBC-ENTMCNC: 27.8 PG — SIGNIFICANT CHANGE UP (ref 27–34)
MCHC RBC-ENTMCNC: 28.4 G/DL — LOW (ref 32–36)
MCV RBC AUTO: 98 FL — SIGNIFICANT CHANGE UP (ref 80–100)
NITRITE UR-MCNC: NEGATIVE — SIGNIFICANT CHANGE UP
NRBC BLD AUTO-RTO: 0 /100 WBCS — SIGNIFICANT CHANGE UP (ref 0–0)
PH UR: 6 — SIGNIFICANT CHANGE UP (ref 5–8)
PLATELET # BLD AUTO: 413 K/UL — HIGH (ref 150–400)
POTASSIUM SERPL-MCNC: 4.7 MMOL/L — SIGNIFICANT CHANGE UP (ref 3.5–5.3)
POTASSIUM SERPL-SCNC: 4.7 MMOL/L — SIGNIFICANT CHANGE UP (ref 3.5–5.3)
PROT SERPL-MCNC: 6.4 GM/DL — SIGNIFICANT CHANGE UP (ref 6–8.3)
PROT UR-MCNC: 100 MG/DL
PROTHROM AB SERPL-ACNC: 11.3 SEC — SIGNIFICANT CHANGE UP (ref 9.9–13.4)
RBC # BLD: 2.95 M/UL — LOW (ref 3.8–5.2)
RBC # FLD: 15 % — HIGH (ref 10.3–14.5)
RBC CASTS # UR COMP ASSIST: ABNORMAL /HPF
RSV RNA NPH QL NAA+NON-PROBE: SIGNIFICANT CHANGE UP
SARS-COV-2 RNA SPEC QL NAA+PROBE: SIGNIFICANT CHANGE UP
SODIUM SERPL-SCNC: 141 MMOL/L — SIGNIFICANT CHANGE UP (ref 135–145)
SOURCE RESPIRATORY: SIGNIFICANT CHANGE UP
SP GR SPEC: 1.02 — SIGNIFICANT CHANGE UP (ref 1–1.03)
TROPONIN I, HIGH SENSITIVITY RESULT: 340.8 NG/L — HIGH
UROBILINOGEN FLD QL: 1 MG/DL — SIGNIFICANT CHANGE UP (ref 0.2–1)
WBC # BLD: 14.39 K/UL — HIGH (ref 3.8–10.5)
WBC # FLD AUTO: 14.39 K/UL — HIGH (ref 3.8–10.5)
WBC UR QL: 3 /HPF — SIGNIFICANT CHANGE UP (ref 0–5)

## 2025-06-11 PROCEDURE — 99232 SBSQ HOSP IP/OBS MODERATE 35: CPT

## 2025-06-11 PROCEDURE — 99291 CRITICAL CARE FIRST HOUR: CPT

## 2025-06-11 PROCEDURE — 36410 VNPNXR 3YR/> PHY/QHP DX/THER: CPT

## 2025-06-11 PROCEDURE — 93010 ELECTROCARDIOGRAM REPORT: CPT

## 2025-06-11 PROCEDURE — 71045 X-RAY EXAM CHEST 1 VIEW: CPT | Mod: 26,77

## 2025-06-11 PROCEDURE — 71045 X-RAY EXAM CHEST 1 VIEW: CPT | Mod: 26

## 2025-06-11 PROCEDURE — G0545: CPT

## 2025-06-11 PROCEDURE — 76937 US GUIDE VASCULAR ACCESS: CPT | Mod: 26

## 2025-06-11 PROCEDURE — 99292 CRITICAL CARE ADDL 30 MIN: CPT

## 2025-06-11 RX ORDER — ALBUMIN (HUMAN) 12.5 G/50ML
100 INJECTION, SOLUTION INTRAVENOUS ONCE
Refills: 0 | Status: COMPLETED | OUTPATIENT
Start: 2025-06-11 | End: 2025-06-11

## 2025-06-11 RX ORDER — FUROSEMIDE 10 MG/ML
20 INJECTION INTRAMUSCULAR; INTRAVENOUS ONCE
Refills: 0 | Status: COMPLETED | OUTPATIENT
Start: 2025-06-11 | End: 2025-06-11

## 2025-06-11 RX ORDER — PROPOFOL 10 MG/ML
10 INJECTION, EMULSION INTRAVENOUS
Qty: 1000 | Refills: 0 | Status: DISCONTINUED | OUTPATIENT
Start: 2025-06-11 | End: 2025-06-14

## 2025-06-11 RX ORDER — FENTANYL CITRATE-0.9 % NACL/PF 100MCG/2ML
25 SYRINGE (ML) INTRAVENOUS ONCE
Refills: 0 | Status: DISCONTINUED | OUTPATIENT
Start: 2025-06-11 | End: 2025-06-11

## 2025-06-11 RX ORDER — MAGNESIUM, ALUMINUM HYDROXIDE 200-200 MG
30 TABLET,CHEWABLE ORAL EVERY 6 HOURS
Refills: 0 | Status: DISCONTINUED | OUTPATIENT
Start: 2025-06-11 | End: 2025-07-03

## 2025-06-11 RX ORDER — IPRATROPIUM BROMIDE AND ALBUTEROL SULFATE .5; 2.5 MG/3ML; MG/3ML
3 SOLUTION RESPIRATORY (INHALATION) EVERY 6 HOURS
Refills: 0 | Status: COMPLETED | OUTPATIENT
Start: 2025-06-11 | End: 2025-06-13

## 2025-06-11 RX ORDER — DIAZEPAM 5 MG/1
5 TABLET ORAL ONCE
Refills: 0 | Status: DISCONTINUED | OUTPATIENT
Start: 2025-06-11 | End: 2025-06-11

## 2025-06-11 RX ORDER — PIPERACILLIN-TAZO-DEXTROSE,ISO 3.375G/5
3.38 IV SOLUTION, PIGGYBACK PREMIX FROZEN(ML) INTRAVENOUS ONCE
Refills: 0 | Status: COMPLETED | OUTPATIENT
Start: 2025-06-12 | End: 2025-06-12

## 2025-06-11 RX ORDER — PIPERACILLIN-TAZO-DEXTROSE,ISO 3.375G/5
3.38 IV SOLUTION, PIGGYBACK PREMIX FROZEN(ML) INTRAVENOUS ONCE
Refills: 0 | Status: COMPLETED | OUTPATIENT
Start: 2025-06-11 | End: 2025-06-11

## 2025-06-11 RX ORDER — IPRATROPIUM BROMIDE AND ALBUTEROL SULFATE .5; 2.5 MG/3ML; MG/3ML
3 SOLUTION RESPIRATORY (INHALATION) ONCE
Refills: 0 | Status: COMPLETED | OUTPATIENT
Start: 2025-06-11 | End: 2025-06-11

## 2025-06-11 RX ORDER — NOREPINEPHRINE BITARTRATE 8 MG
0.05 SOLUTION INTRAVENOUS
Qty: 8 | Refills: 0 | Status: DISCONTINUED | OUTPATIENT
Start: 2025-06-11 | End: 2025-06-14

## 2025-06-11 RX ORDER — PIPERACILLIN-TAZO-DEXTROSE,ISO 3.375G/5
3.38 IV SOLUTION, PIGGYBACK PREMIX FROZEN(ML) INTRAVENOUS EVERY 8 HOURS
Refills: 0 | Status: DISCONTINUED | OUTPATIENT
Start: 2025-06-12 | End: 2025-06-17

## 2025-06-11 RX ADMIN — Medication 25 MICROGRAM(S): at 20:26

## 2025-06-11 RX ADMIN — ALBUMIN (HUMAN) 50 MILLILITER(S): 12.5 INJECTION, SOLUTION INTRAVENOUS at 20:27

## 2025-06-11 RX ADMIN — ATORVASTATIN CALCIUM 40 MILLIGRAM(S): 80 TABLET, FILM COATED ORAL at 21:38

## 2025-06-11 RX ADMIN — PROPOFOL 3.89 MICROGRAM(S)/KG/MIN: 10 INJECTION, EMULSION INTRAVENOUS at 21:38

## 2025-06-11 RX ADMIN — IPRATROPIUM BROMIDE AND ALBUTEROL SULFATE 3 MILLILITER(S): .5; 2.5 SOLUTION RESPIRATORY (INHALATION) at 23:18

## 2025-06-11 RX ADMIN — HEPARIN SODIUM 5000 UNIT(S): 1000 INJECTION INTRAVENOUS; SUBCUTANEOUS at 21:38

## 2025-06-11 RX ADMIN — Medication 200 GRAM(S): at 21:38

## 2025-06-11 RX ADMIN — DIAZEPAM 5 MILLIGRAM(S): 5 TABLET ORAL at 18:20

## 2025-06-11 RX ADMIN — RANOLAZINE 500 MILLIGRAM(S): 1000 TABLET, FILM COATED, EXTENDED RELEASE ORAL at 17:05

## 2025-06-11 RX ADMIN — IPRATROPIUM BROMIDE AND ALBUTEROL SULFATE 3 MILLILITER(S): .5; 2.5 SOLUTION RESPIRATORY (INHALATION) at 18:14

## 2025-06-11 RX ADMIN — GABAPENTIN 300 MILLIGRAM(S): 400 CAPSULE ORAL at 17:05

## 2025-06-11 RX ADMIN — NOREPINEPHRINE BITARTRATE 6.08 MICROGRAM(S)/KG/MIN: 8 SOLUTION at 21:38

## 2025-06-11 RX ADMIN — Medication 1 APPLICATION(S): at 20:12

## 2025-06-11 RX ADMIN — Medication 1 TABLET(S): at 12:37

## 2025-06-11 RX ADMIN — PROPOFOL 3.89 MICROGRAM(S)/KG/MIN: 10 INJECTION, EMULSION INTRAVENOUS at 20:56

## 2025-06-11 RX ADMIN — HEPARIN SODIUM 5000 UNIT(S): 1000 INJECTION INTRAVENOUS; SUBCUTANEOUS at 14:28

## 2025-06-11 RX ADMIN — Medication 2 TABLET(S): at 21:40

## 2025-06-11 RX ADMIN — Medication 1 APPLICATION(S): at 06:17

## 2025-06-11 RX ADMIN — Medication 81 MILLIGRAM(S): at 12:37

## 2025-06-11 RX ADMIN — METOPROLOL SUCCINATE 25 MILLIGRAM(S): 50 TABLET, EXTENDED RELEASE ORAL at 06:17

## 2025-06-11 RX ADMIN — Medication 1000 MILLILITER(S): at 17:40

## 2025-06-11 RX ADMIN — INSULIN LISPRO 2: 100 INJECTION, SOLUTION INTRAVENOUS; SUBCUTANEOUS at 12:18

## 2025-06-11 RX ADMIN — Medication 25 MICROGRAM(S): at 21:26

## 2025-06-11 RX ADMIN — HEPARIN SODIUM 5000 UNIT(S): 1000 INJECTION INTRAVENOUS; SUBCUTANEOUS at 06:17

## 2025-06-11 RX ADMIN — GABAPENTIN 300 MILLIGRAM(S): 400 CAPSULE ORAL at 06:16

## 2025-06-11 RX ADMIN — RANOLAZINE 500 MILLIGRAM(S): 1000 TABLET, FILM COATED, EXTENDED RELEASE ORAL at 06:17

## 2025-06-11 NOTE — PROGRESS NOTE ADULT - ASSESSMENT
A/P-   74 year old female with PMHx of HTN, HLD, IDDM2, CAD (s/p PCI), and recent right humerus fracture (placed in sling 3 days ago) who presented to the ED on 5/30/25 for complaints of elevated blood glucose.   As per med history provided by family  patient fell three days ago and landed on her right shoulder. Went to [facility] at that time and found to have right humerus fracture and placed in sling. Discharged home with outpatient orthopedic surgery follow up. Since then, patient has been refusing to get out of bed and not eating much.   In the ED, VSS. Hgb 10.3, sodium 132, potassium 6.3, BUN 70, Cr 2.02, blood glucose 510. VBG 7.29 / 58 / 34 / 26. COVID/influenza/RSV negative. CT head without acute intracranial findings. Received 1L NS bolus, 1L LR bolus, and insulin 20 U IV.  (30 May 2025 23:43)    s/p  RRT last night   for AMS, hypotension, fever of 103  and was started on sepsis w/u given IVF bolus, Abx with CTX and Vanco. Persistently hypotensive and obtunded. Started on levophed infusion. Urgently transferred to ICU.   was also intubated for airway protection    Infectious Disease consultation requested this afternoon 6/2 to help with antibiotic management  for ESBL bacteremia    Intubated   sedated  on pressors for hypotension  afebrile today  yesterday 103 t-max  + leukocytosis of 29K  Blood cx- ESBL GNR by PCR x 2  UA from 5/31-pos for nitrates  urine cx-pending  DUANE  elevated cardiac enzymes    per med records had ESBL e.coli UTI in august and sept 2024 and ID was not consulted     6/3-  remains Intubated and sedated  on pressors for hypotension  temp of 101.5 today  + leukocytosis trending down to 20k  creatinine slightly better today  Blood cx- ESBL GNR by PCR x 2  UA from 5/31-pos for nitrates  urine cx->100K e.coli  DUANE  elevated cardiac enzymes    6/4: seen in ICU earlier, remains intubated, weaning process in progress, on pressors, continues having fevers, T 101 this morning, leukocytosis is better 17.27, Cr better 1.35, UC and BCs grew ESBL E. Coli, repeat BCs are pending, Meropenem IV continued. If not improvement in pt's fevers tomorrow, most likely CT a/p will be obtained.   6/5: remains in ICU, intubated and sedated, on pressors, Temp persistent 100-100.2, WBC better 16.43, Cr normalized, LFTs ok, repeat BCs NGTD, procalcitonin 2.41, TTE performed - Left ventricular regional wall motion abnormalities present. Meropenem IV continued.     6/6-  extubated  awake  afebrile now but early am had temp of 100.6  Leukocytosis almost resolved 11K today  Blood cx- ESBL GNR by PCR x 2  UA from 5/31-pos for nitrates  urine cx->100K e.coli  repeat blood cx- NGTD x 2 from 6/4 6/9-  downgraded to medical floor  Afebrile  Leukocytosis 12 k today  Blood cx- ESBL GNR by PCR x 2  UA from 5/31-pos for nitrates  urine cx->100K e.coli  repeat blood cx- NGTD x 2 from 6/4 6/11: pt is afebrile since 6/6, doing well on RA, leukocytosis was elevated 13.57, Cr ok,  no new cbc, repeat BCs remain with no growth to date, s/p 8 days of IV Meropenem, now observed off abx.     Impression-  septic shock - resolved  E.coli ESBL bacteremia sec to   source   Acute respiratory failure requiring intubation - later  extubated  stercoral colitis   Right humerus fracture with hematoma    Plan-  completed course of IV meropenem for the ESBL bacteremia and sepsis   continue monitoring off abx  trend pt's temperatures and WBC  as per ortho no surgical intervention for right humerus fracture  rest of the medical management as per medicine team    discussed with Dr. Fraga

## 2025-06-11 NOTE — PROGRESS NOTE ADULT - SUBJECTIVE AND OBJECTIVE BOX
PROGRESS NOTE:     Patient is a 74y old  Female who presents with a chief complaint of ALY, physical deconditioning (10 Seamus 2025 12:26)      SUBJECTIVE / OVERNIGHT EVENTS: pt c/o chest pain from CPR and R arm pain. She is also having reflux.     ADDITIONAL REVIEW OF SYSTEMS:    MEDICATIONS  (STANDING):  aspirin  chewable 81 milliGRAM(s) Oral daily  atorvastatin 40 milliGRAM(s) Oral at bedtime  chlorhexidine 2% Cloths 1 Application(s) Topical <User Schedule>  dextrose 50% Injectable 25 Gram(s) IV Push once  gabapentin 300 milliGRAM(s) Oral two times a day  heparin   Injectable 5000 Unit(s) SubCutaneous every 8 hours  insulin glargine Injectable (LANTUS) 25 Unit(s) SubCutaneous at bedtime  insulin lispro (ADMELOG) corrective regimen sliding scale   SubCutaneous at bedtime  insulin lispro (ADMELOG) corrective regimen sliding scale   SubCutaneous three times a day before meals  metoprolol succinate ER 25 milliGRAM(s) Oral daily  multivitamin 1 Tablet(s) Oral daily  ranolazine 500 milliGRAM(s) Oral two times a day  senna 2 Tablet(s) Oral at bedtime    MEDICATIONS  (PRN):  acetaminophen     Tablet .. 650 milliGRAM(s) Oral every 6 hours PRN Temp greater or equal to 38C (100.4F), Mild Pain (1 - 3)  acetaminophen     Tablet .. 650 milliGRAM(s) Oral every 6 hours PRN Mild Pain (1 - 3)  dextrose Oral Gel 15 Gram(s) Oral once PRN Blood Glucose LESS THAN 70 milliGRAM(s)/deciliter  ondansetron Injectable 4 milliGRAM(s) IV Push every 8 hours PRN Nausea and/or Vomiting  polyethylene glycol 3350 17 Gram(s) Oral daily PRN Constipation      CAPILLARY BLOOD GLUCOSE      POCT Blood Glucose.: 213 mg/dL (11 Jun 2025 11:41)  POCT Blood Glucose.: 137 mg/dL (11 Jun 2025 07:52)  POCT Blood Glucose.: 172 mg/dL (10 Seamus 2025 21:30)  POCT Blood Glucose.: 222 mg/dL (10 Seamus 2025 16:03)    I&O's Summary    10 Seamus 2025 07:01  -  11 Jun 2025 07:00  --------------------------------------------------------  IN: 100 mL / OUT: 600 mL / NET: -500 mL        PHYSICAL EXAM:  Vital Signs Last 24 Hrs  T(C): 36.8 (11 Jun 2025 11:27), Max: 36.8 (11 Jun 2025 11:27)  T(F): 98.3 (11 Jun 2025 11:27), Max: 98.3 (11 Jun 2025 11:27)  HR: 98 (11 Jun 2025 11:27) (62 - 98)  BP: 135/75 (11 Jun 2025 11:27) (93/55 - 135/75)  BP(mean): --  RR: 18 (11 Jun 2025 11:27) (17 - 18)  SpO2: 97% (11 Jun 2025 11:27) (92% - 97%)    Parameters below as of 11 Jun 2025 05:00  Patient On (Oxygen Delivery Method): room air    CONSTITUTIONAL: NAD, well-developed  RESPIRATORY: Normal respiratory effort; lungs are clear to auscultation bilaterally, chest tender to palpation   CARDIOVASCULAR: Regular rate and rhythm, normal S1 and S2, no murmur/rub/gallop; trace lower extremity edema; Peripheral pulses are 2+ bilaterally  ABDOMEN: Nontender to palpation, normoactive bowel sounds, no rebound/guarding  MUSCLOSKELETAL: no clubbing or cyanosis of digits; no joint swelling or tenderness to palpation  PSYCH: A+O to person, place, and time; affect appropriate  SKIN: Ecchymosis of RUE     LABS:                        8.0    13.57 )-----------( 323      ( 10 Seamus 2025 08:58 )             26.8     06-10    142  |  109[H]  |  64[H]  ----------------------------<  150[H]  4.7   |  29  |  0.91    Ca    8.6      10 Seamus 2025 08:58  Mg     2.7     06-10            Urinalysis Basic - ( 10 Seamus 2025 08:58 )    Color: x / Appearance: x / SG: x / pH: x  Gluc: 150 mg/dL / Ketone: x  / Bili: x / Urobili: x   Blood: x / Protein: x / Nitrite: x   Leuk Esterase: x / RBC: x / WBC x   Sq Epi: x / Non Sq Epi: x / Bacteria: x          RADIOLOGY & ADDITIONAL TESTS:  Results Reviewed:   Imaging Personally Reviewed:  Electrocardiogram Personally Reviewed:    COORDINATION OF CARE:  Care Discussed with Consultants/Other Providers [Y/N]:  Prior or Outpatient Records Reviewed [Y/N]:   Simple: Patient demonstrates quick and easy understanding/Verbalized Understanding

## 2025-06-11 NOTE — PROGRESS NOTE ADULT - NS ATTEND AMEND GEN_ALL_CORE FT
All labs and cultures and imaging and pertinent chart notes reviewed by me.    case d/w Np Honorio at length and agree with her assessment and plan.    6/11:   pt is afebrile since 6/6, doing well on RA  mild leukocytosis 13.57, Cr ok,     repeat BCs remain with no growth to date x 2      Impression-  septic shock - resolved  E.coli ESBL bacteremia sec to   source   Acute respiratory failure requiring intubation - later  extubated  stercoral colitis   Right humerus fracture with hematoma    Plan-  completed course of IV meropenem for the ESBL bacteremia and sepsis ( 8 days)  off abx starting today  continue monitoring off abx  trend pt's temperatures and WBC  as per ortho no surgical intervention for right humerus fracture  rest of the medical management as per medicine team    Tariq Fraga MD  Infectious Disease Attending    for any questions please do not hesitate to contact me either via teams or by calling [phone]

## 2025-06-11 NOTE — RAPID RESPONSE TEAM SUMMARY - NSOTHERINTERVENTIONSRRT_GEN_ALL_CORE
Intubate  Transfer to ICU  US guided IV
ICU notified.     . Total time spent was > than  35 mins assessing presenting problems of acute illness that poses high probability of life threatening deterioration or end organ damage/dysfunction.  Medical decision making including Initiating plan of care, reviewing data, reviewing radiology, direct patient bedside evaluation and interpretation of vital signs, ordering medication/s and writing this note.
Critical Care time: 110 mins assessing presenting problems of acute illness that poses high probability of life threatening deterioration or end organ damage/dysfunction.  Medical decision making including Initiating plan of care, reviewing data, reviewing radiology, discussing with multidisciplinary team, non inclusive of procedures, discussing goals of care with patient/famil7

## 2025-06-11 NOTE — PROGRESS NOTE ADULT - ASSESSMENT
74-year-old female with hypertension, hyperlipidemia, 2 diabetes, CAD status post multiple stents with a recent right humerus fracture roughly 3 days PTA following a traumatic fall admitted to the medical ICU for altered mental status and hypotension.  Concern for septic shock secondary to colitis found to have ESBL E. coli bacteremia.  Hospital course further complicated by cardiac arrest of unclear origin with ROSC after 10 minutes of CPR.    Septic shock d/t UTI w/ ESBL bacteremia   -s/p pressors in ICU, weaned off   -s/p IVF  -s/p stress dose steroids   -Blood culture 6/1 w/ ESBL Ecoli   -Urine culture 6/2 w/ ESBL Ecoli   -Repeat blood cultures 6/4 NGTD  -completed course of Meropenem 6/10   -ID following     ALY d/t ischemic ATN   -avoid nephrotoxic agents   -Cr improved     Cardiac arrest in setting of sepsis   -ROSC after 10 minutes of CPR  -TTE  shows EF of 55% with regional WMA present, normal RV  -Appreciate cardiology input.     Hyperkalemia secondary to ALY  -s/p medical management   -now resolved     DM2 with hyperglycemia  -Hgb A1c 10.2   -c/w ISS  -c/w Lantus 25units qhs   -monitor fingersticks     Right humerus fracture d/t fall  -RUE sling  -consulted orthopedics, no plan for intervention  -pain control   -PT/OT     HTN  -holding lisinopril 10 mg and HCTZ 12.5 mg  -restarted metoprolol succinate ER 25 mg  -monitor BP    CAD w/ history of PCI  -continue ASA 81 mg, ranolazine  mg BID  -restarted metoprolol succinate ER 25 mg    GERD  -start PPI  -Maalox prn     HSQ  PT recommends [facility] (awaiting accepting facility)     POC d/w son

## 2025-06-11 NOTE — PROGRESS NOTE ADULT - SUBJECTIVE AND OBJECTIVE BOX
EMMANUEL GONZALEZ  MRN-[id number]  74y (1950)    Follow Up:  ESBL E. Coli Urosepsis    Interval History: The pt was seen and examined earlier, not in acute distress, ICU downgrade, pt is awake and alert, pt's daughter is present, pt answers questions appropriately, has no complaints. Pt's appetite is poor, as per pt's daughter.  Pt is afebrile since 6/6, RA, leukocytosis was elevated 13.57 yesterday, no new cbc.      PAST MEDICAL & SURGICAL HISTORY:  HTN (hypertension)      HLD (hyperlipidemia)      CAD S/P percutaneous coronary angioplasty      Insulin dependent type 2 diabetes mellitus      History of heart artery stent          ROS:    [ ] Unobtainable because:  [x ] All other systems negative    Constitutional: no fever, no chills  Head: no trauma  Eyes: no vision changes, no eye pain  ENT:  no sore throat, no rhinorrhea  Cardiovascular:  no chest pain, no palpitation  Respiratory:  no SOB, no cough  GI:  no abd pain, no vomiting, no diarrhea  urinary: no dysuria, no hematuria, no flank pain  musculoskeletal:  no joint pain, no joint swelling  skin:  no rash  neurology:  no headache, no seizure, no change in mental status  psych: no anxiety, no depression         Allergies  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 (Short breath)  No Known Drug Allergies        ANTIMICROBIALS:      OTHER MEDS:  acetaminophen     Tablet .. 650 milliGRAM(s) Oral every 6 hours PRN  acetaminophen     Tablet .. 650 milliGRAM(s) Oral every 6 hours PRN  aluminum hydroxide/magnesium hydroxide/simethicone Suspension 30 milliLiter(s) Oral every 6 hours PRN  aspirin  chewable 81 milliGRAM(s) Oral daily  atorvastatin 40 milliGRAM(s) Oral at bedtime  chlorhexidine 2% Cloths 1 Application(s) Topical <User Schedule>  dextrose 50% Injectable 25 Gram(s) IV Push once  dextrose Oral Gel 15 Gram(s) Oral once PRN  gabapentin 300 milliGRAM(s) Oral two times a day  heparin   Injectable 5000 Unit(s) SubCutaneous every 8 hours  insulin glargine Injectable (LANTUS) 25 Unit(s) SubCutaneous at bedtime  insulin lispro (ADMELOG) corrective regimen sliding scale   SubCutaneous at bedtime  insulin lispro (ADMELOG) corrective regimen sliding scale   SubCutaneous three times a day before meals  metoprolol succinate ER 25 milliGRAM(s) Oral daily  multivitamin 1 Tablet(s) Oral daily  ondansetron Injectable 4 milliGRAM(s) IV Push every 8 hours PRN  pantoprazole    Tablet 40 milliGRAM(s) Oral before breakfast  polyethylene glycol 3350 17 Gram(s) Oral daily PRN  ranolazine 500 milliGRAM(s) Oral two times a day  senna 2 Tablet(s) Oral at bedtime      Vital Signs Last 24 Hrs  T(C): 36.8 (11 Jun 2025 11:27), Max: 36.8 (11 Jun 2025 11:27)  T(F): 98.3 (11 Jun 2025 11:27), Max: 98.3 (11 Jun 2025 11:27)  HR: 98 (11 Jun 2025 11:27) (62 - 98)  BP: 135/75 (11 Jun 2025 11:27) (93/55 - 135/75)  BP(mean): --  RR: 18 (11 Jun 2025 11:27) (17 - 18)  SpO2: 97% (11 Jun 2025 11:27) (92% - 97%)    Parameters below as of 11 Jun 2025 05:00  Patient On (Oxygen Delivery Method): room air        Physical Exam:  Head: atraumatic, normocephalic  Eyes: normal sclera and conjunctiva  ENT:   no oropharyngeal lesions, supple neck  Cardio: S1,S2 regular   Respiratory:  good  breath sounds heard b/l , no wheezes   abd:   soft, BS +, no distention, no tenderness  Musculoskeletal : right shoulder and arm with echymosis   Skin:  no rash  Neurologic: awake, alert , can follow simple commands and answer simple questions  Psych: calm    WBC Count: 13.57 K/uL (06-10 @ 08:58)  WBC Count: 12.12 K/uL (06-09 @ 07:17)  WBC Count: 11.64 K/uL (06-08 @ 06:51)  WBC Count: 11.72 K/uL (06-07 @ 02:42)  WBC Count: 11.19 K/uL (06-06 @ 02:52)  WBC Count: 16.43 K/uL (06-05 @ 02:10)                            8.0    13.57 )-----------( 323      ( 10 Seamus 2025 08:58 )             26.8       06-10    142  |  109[H]  |  64[H]  ----------------------------<  150[H]  4.7   |  29  |  0.91    Ca    8.6      10 Seamus 2025 08:58  Mg     2.7     06-10        Urinalysis Basic - ( 10 Seamus 2025 08:58 )    Color: x / Appearance: x / SG: x / pH: x  Gluc: 150 mg/dL / Ketone: x  / Bili: x / Urobili: x   Blood: x / Protein: x / Nitrite: x   Leuk Esterase: x / RBC: x / WBC x   Sq Epi: x / Non Sq Epi: x / Bacteria: x        Creatinine Trend: 0.91<--, 1.05<--, 1.07<--, 1.05<--, 1.04<--, 1.13<--      MICROBIOLOGY:  v  Blood Blood-Peripheral  06-04-25   No growth at 5 days  --  --      Catheterized Catheterized  06-02-25   >100,000 CFU/ml Escherichia coli ESBL  --  Escherichia coli ESBL      Blood Blood-Peripheral  06-01-25   Growth in aerobic and anaerobic bottles: Escherichia coli ESBL  See previous culture [id number]  --    Growth in aerobic bottle: Gram Negative Rods  Growth in anaerobic bottle: Gram Negative Rods      Blood Blood-Peripheral  06-01-25   Growth in aerobic and anaerobic bottles: Escherichia coli ESBL  Direct identification is available within approximately 3-5  hours either by Blood Panel Multiplexed PCR or Direct  MALDI-TOF. Details: [email or web address]  --  Blood Culture PCR  Escherichia coli ESBL    Procalcitonin: 2.41 (06-05-25 @ 02:10)      RADIOLOGY:     EMMANUEL GONZALEZ  MRN-[id number]  74y (1950)    Follow Up:  ESBL E. Coli Urosepsis    Interval History: The pt was seen and examined earlier, not in acute distress, ICU downgrade, pt is awake and alert, pt's daughter is present, pt answers questions appropriately, has no complaints. Pt's appetite is poor, as per pt's daughter.  Pt is afebrile since 6/6, RA, leukocytosis was elevated 13.57 yesterday, no new cbc.      PAST MEDICAL & SURGICAL HISTORY:  HTN (hypertension)      HLD (hyperlipidemia)      CAD S/P percutaneous coronary angioplasty      Insulin dependent type 2 diabetes mellitus      History of heart artery stent          ROS:    [ ] Unobtainable because:  [x ] All other systems negative    Constitutional: no fever, no chills  Head: no trauma  Eyes: no vision changes, no eye pain  ENT:  no sore throat, no rhinorrhea  Cardiovascular:  no chest pain, no palpitation  Respiratory:  no SOB, no cough  GI:  no abd pain, no vomiting, no diarrhea  urinary: no dysuria, no hematuria, no flank pain  musculoskeletal:  no joint pain, no joint swelling  skin:  no rash  neurology:  no headache, no seizure, no change in mental status  psych: no anxiety, no depression         Allergies  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 (Short breath)  No Known Drug Allergies        ANTIMICROBIALS:      OTHER MEDS:  acetaminophen     Tablet .. 650 milliGRAM(s) Oral every 6 hours PRN  acetaminophen     Tablet .. 650 milliGRAM(s) Oral every 6 hours PRN  aluminum hydroxide/magnesium hydroxide/simethicone Suspension 30 milliLiter(s) Oral every 6 hours PRN  aspirin  chewable 81 milliGRAM(s) Oral daily  atorvastatin 40 milliGRAM(s) Oral at bedtime  chlorhexidine 2% Cloths 1 Application(s) Topical <User Schedule>  dextrose 50% Injectable 25 Gram(s) IV Push once  dextrose Oral Gel 15 Gram(s) Oral once PRN  gabapentin 300 milliGRAM(s) Oral two times a day  heparin   Injectable 5000 Unit(s) SubCutaneous every 8 hours  insulin glargine Injectable (LANTUS) 25 Unit(s) SubCutaneous at bedtime  insulin lispro (ADMELOG) corrective regimen sliding scale   SubCutaneous at bedtime  insulin lispro (ADMELOG) corrective regimen sliding scale   SubCutaneous three times a day before meals  metoprolol succinate ER 25 milliGRAM(s) Oral daily  multivitamin 1 Tablet(s) Oral daily  ondansetron Injectable 4 milliGRAM(s) IV Push every 8 hours PRN  pantoprazole    Tablet 40 milliGRAM(s) Oral before breakfast  polyethylene glycol 3350 17 Gram(s) Oral daily PRN  ranolazine 500 milliGRAM(s) Oral two times a day  senna 2 Tablet(s) Oral at bedtime      Vital Signs Last 24 Hrs  T(C): 36.8 (11 Jun 2025 11:27), Max: 36.8 (11 Jun 2025 11:27)  T(F): 98.3 (11 Jun 2025 11:27), Max: 98.3 (11 Jun 2025 11:27)  HR: 98 (11 Jun 2025 11:27) (62 - 98)  BP: 135/75 (11 Jun 2025 11:27) (93/55 - 135/75)  BP(mean): --  RR: 18 (11 Jun 2025 11:27) (17 - 18)  SpO2: 97% (11 Jun 2025 11:27) (92% - 97%)    Parameters below as of 11 Jun 2025 05:00  Patient On (Oxygen Delivery Method): room air        Physical Exam:  Head: atraumatic, normocephalic  Eyes: normal sclera and conjunctiva  ENT:   no oropharyngeal lesions, supple neck  Cardio: S1,S2 regular   Respiratory:  good  breath sounds heard b/l , no wheezes   abd:   soft, BS +, no distention, no tenderness  Musculoskeletal : right shoulder and arm with echymosis   Skin:  no rash  Neurologic: awake, alert , can follow simple commands and answer simple questions  Psych: calm    WBC Count: 13.57 K/uL (06-10 @ 08:58)  WBC Count: 12.12 K/uL (06-09 @ 07:17)  WBC Count: 11.64 K/uL (06-08 @ 06:51)  WBC Count: 11.72 K/uL (06-07 @ 02:42)  WBC Count: 11.19 K/uL (06-06 @ 02:52)  WBC Count: 16.43 K/uL (06-05 @ 02:10)                            8.0    13.57 )-----------( 323      ( 10 Seamus 2025 08:58 )             26.8       06-10    142  |  109[H]  |  64[H]  ----------------------------<  150[H]  4.7   |  29  |  0.91    Ca    8.6      10 Seamus 2025 08:58  Mg     2.7     06-10        Urinalysis Basic - ( 10 Seamus 2025 08:58 )    Color: x / Appearance: x / SG: x / pH: x  Gluc: 150 mg/dL / Ketone: x  / Bili: x / Urobili: x   Blood: x / Protein: x / Nitrite: x   Leuk Esterase: x / RBC: x / WBC x   Sq Epi: x / Non Sq Epi: x / Bacteria: x        Creatinine Trend: 0.91<--, 1.05<--, 1.07<--, 1.05<--, 1.04<--, 1.13<--      MICROBIOLOGY:    Blood Blood-Peripheral  06-04-25   No growth at 5 days  --  --      Catheterized Catheterized  06-02-25   >100,000 CFU/ml Escherichia coli ESBL  --  Escherichia coli ESBL      Blood Blood-Peripheral  06-01-25   Growth in aerobic and anaerobic bottles: Escherichia coli ESBL  See previous culture [id number]  --    Growth in aerobic bottle: Gram Negative Rods  Growth in anaerobic bottle: Gram Negative Rods      Blood Blood-Peripheral  06-01-25   Growth in aerobic and anaerobic bottles: Escherichia coli ESBL  Direct identification is available within approximately 3-5  hours either by Blood Panel Multiplexed PCR or Direct  MALDI-TOF. Details: [email or web address]  --  Blood Culture PCR  Escherichia coli ESBL    Procalcitonin: 2.41 (06-05-25 @ 02:10)      RADIOLOGY:

## 2025-06-11 NOTE — CONSULT NOTE ADULT - ASSESSMENT
74 year old female with PMHx of HTN, HLD, IDDM2, CAD (s/p PCI), and recent right humerus fracture (placed in sling 3 days ago) who admitted on 5/30/25 for elevated blood glucose. Patient was treated at [facility] for humerus fracture , placed in sling and discharge for outpatient orthopedic surgery follow,  ESBL E.coli bacteremia 2/2 to UTI  and cardiac arrest on this admission - now with AMS possibly 2/2 to seizures?. Patient intubated for acute hypoxic respiratory failure and airway protection admitted to ICU.       # Neuro:  - propofol for sedation   - obtunded on exam at 2nd RRT   - CT head on admission negative; neuro exam intact on 1st RRT evaluation.   - possible seizure at 2nd RRT with period right gaze and unresponsiveness witnessed by medical team   - s/p 5mg Valium administration at RRT   - order EEG     #Resp:  //Acute Hypoxic Respiratory Failure - likely in setting of aspiration from AMS   - mild RT wheeze on exam  - CXR appears wnl    - duonebs Q6H   - chest PT   - FU cxr s/p intubation   - will need post intubation ABG     #CV:  //Sedation Related Vasoplegia - patient became hypotensive s/p administration of valium and etomidate/rocuronium for intubation   - required vasopressors for brief period, turned off once transferred to ICU   - MAP goal >65   - troponin elevated - continue to trend   - family consents for arterial & central line if needed       #GI:  -Diet: NPO   - will need NGT placement     #Renal:  - teresa improved from admission   - s/p 1L bolus at RRT   - lakisha marcus to monitor strict I/Os  - replete lytes as appropriate     #ID:  - finished course of meropenem for ESBL e.coli bacteremia. blood cultures cleared  - will reculture and follow up full infectious work up   - will start on zosyn empirically     #Heme:  - HSQ DVT prophylaxis     #Endo:  - cont to monitor FS Q6H while NPO     Discussed with Dr. Garcia ICU attending.   Family at bedside.

## 2025-06-11 NOTE — RAPID RESPONSE TEAM SUMMARY - NSSITUATIONBACKGROUNDRRT_GEN_ALL_CORE
This is a 75 yo female with pmhx of CAD (s/p pci), recent R humerus fx, admitted initially for hyperglyemia, with hospital course complicated by septic shock 2/2 ESBL bacteremia requiring intubation for airway protection, cardiac arrest on 6/2 with subsequent multipressor shock, NSTEMI, ALY, extubated on 6/6, s/p course of Meropenem This is a 73 yo female with pmhx of CAD (s/p pci), recent R humerus fx, admitted initially for hyperglyemia, with hospital course complicated by septic shock 2/2 ESBL bacteremia requiring intubation for airway protection, cardiac arrest on 6/2 with subsequent multipressor shock, NSTEMI, ALY, extubated on 6/6, s/p course of Meropenem    RRT today for AMS and hypotension, with initial BP 70s/40s, SPO2 83% on 6L. Pt minimally responsive to pain, dyspneic but protecting her airway. Repeat BP improved without intervention 110s/70s, HR in 70s, Temp 98, FS normoglycemic. Etiology of hypoxia initially unclear, bedside POCUS showing B lines in lung bases, trace R sided effusion, good EF. CXR at bedside with blunting of costophrenic angle but no obvious infiltrate. Pt became more awake/ alert, following commands, moving all extremities, no focal neuro deficits, still requiring NRB to maintain oxygenation. ABG performed pO2 62, pco2 68, 7.35. Pt ordered for lasix, duonebs, to be upgraded to tele for continuous pulse ox     Roughly 10 min later, another RRT called for seizure and concern for aspiration. Pt ultimately became hypotensive with MAPS in 50s, persistently hypoxemic and was intubated and transferred to CCU

## 2025-06-11 NOTE — CHART NOTE - NSCHARTNOTEFT_GEN_A_CORE
Assessment: 74 year old female with pmhx of HTN, HLD, IDDM2, CAD (s/p PCI), and recent right humerus fracture (placed in sling 3 days ago) who presented to the ED on 5/30/25 for complaints of elevated blood glucose. Pt admitted with ALY, physical deconditioning and found with septic shock and Cardiac arrest in setting of sepsis.  (6/09) swallow evaluation completed with SLP recommendations of minced & moist/thin liquids.    Pt visited at bedside, family was present at the time of visit. Family reports pt with poor PO intake, states tolerating supplement well. Reports no difficulty chewing/swallowing on minced and moist texture. Family reports pt with constipation. Pt made aware RD remains available.      Factors impacting intake: [ ] none [ ] nausea  [ ] vomiting [ ] diarrhea [ ] constipation  [X]chewing problems [X] swallowing issues  [X] other: Acute illness     Diet Prescription: Diet, Minced and Moist:   Consistent Carbohydrate {No Snacks}  Halal  Supplement Feeding Modality:  Oral  Glucerna Shake Cans or Servings Per Day:  1   Frequency:  Two Times a day (06-10-25 @ 11:49) [Active]    Intake: 26-50% PO Intake per flow sheet     Admission wt: (6/2) 64.8 kg   Current Weight: (6/7 - most recent wt in EMR) 64.6 kg   % Weight Change: N/A   Edema: 1+ Edema:  Generalized, B/L Feet and 3+ Edema: B/L Feet     Pertinent Medications: MEDICATIONS  (STANDING):  aspirin  chewable 81 milliGRAM(s) Oral daily  atorvastatin 40 milliGRAM(s) Oral at bedtime  chlorhexidine 2% Cloths 1 Application(s) Topical <User Schedule>  dextrose 50% Injectable 25 Gram(s) IV Push once  gabapentin 300 milliGRAM(s) Oral two times a day  heparin   Injectable 5000 Unit(s) SubCutaneous every 8 hours  insulin glargine Injectable (LANTUS) 25 Unit(s) SubCutaneous at bedtime  insulin lispro (ADMELOG) corrective regimen sliding scale   SubCutaneous at bedtime  insulin lispro (ADMELOG) corrective regimen sliding scale   SubCutaneous three times a day before meals  metoprolol succinate ER 25 milliGRAM(s) Oral daily  multivitamin 1 Tablet(s) Oral daily  pantoprazole    Tablet 40 milliGRAM(s) Oral before breakfast  ranolazine 500 milliGRAM(s) Oral two times a day  senna 2 Tablet(s) Oral at bedtime    MEDICATIONS  (PRN):  acetaminophen     Tablet .. 650 milliGRAM(s) Oral every 6 hours PRN Temp greater or equal to 38C (100.4F), Mild Pain (1 - 3)  acetaminophen     Tablet .. 650 milliGRAM(s) Oral every 6 hours PRN Mild Pain (1 - 3)  aluminum hydroxide/magnesium hydroxide/simethicone Suspension 30 milliLiter(s) Oral every 6 hours PRN Dyspepsia  dextrose Oral Gel 15 Gram(s) Oral once PRN Blood Glucose LESS THAN 70 milliGRAM(s)/deciliter  ondansetron Injectable 4 milliGRAM(s) IV Push every 8 hours PRN Nausea and/or Vomiting  polyethylene glycol 3350 17 Gram(s) Oral daily PRN Constipation    Pertinent Labs: 06-10 Na142 mmol/L Glu 150 mg/dL[H] K+ 4.7 mmol/L Cr  0.91 mg/dL BUN 64 mg/dL[H] 06-09 Phos 4.2 mg/dL 06-09 Alb 2.0 g/dL[L]     CAPILLARY BLOOD GLUCOSE      POCT Blood Glucose.: 111 mg/dL (11 Jun 2025 16:42)  POCT Blood Glucose.: 213 mg/dL (11 Jun 2025 11:41)  POCT Blood Glucose.: 137 mg/dL (11 Jun 2025 07:52)  POCT Blood Glucose.: 172 mg/dL (10 Seamus 2025 21:30)    Skin: No pressure injuries documented in flow sheets     Estimated Needs:   [X] no change since previous assessment (06/04)  [ ] recalculated:     Previous Nutrition Diagnosis:   No Nutrition diagnosis on initial assessment     Nutrition Diagnosis is [ ] ongoing  [ ] resolved [X] not applicable     New Nutrition Diagnosis:   [X] Inadequate Energy Intake   Etiology: Decreased PO intake in the setting of acute illness   Signs/ Symptoms: 25-50% PO intake since PO diet initiated   Goal: Pt to consume 75% or greater of estimated nutrient needs for duration of hospital course     Interventions:   Recommend  [X] Continue Minced & Moist Texture with thin liquids per SLP recommendations and Halal per pt preference   [X] Recommend increasing supplement to Glucerna x 3/day (provides 660 kcal, 30 g protein)   [X] Continue Multivitamin to optimize micronutrient intake     Monitoring and Evaluation:   [X] PO intake [ x ] Tolerance to diet prescription [ x ] weights [ x ] labs[ x ] follow up per protocol  [ ] other:

## 2025-06-11 NOTE — CONSULT NOTE ADULT - SUBJECTIVE AND OBJECTIVE BOX
CHIEF COMPLAINT: AMS     HPI: 74 year old female with PMHx of HTN, HLD, IDDM2, CAD (s/p PCI), and recent right humerus fracture (placed in sling 3 days ago) who admitted on 5/30/25 for elevated blood glucose. Patient was treated at [facility] for humerus fracture , placed in sling and discharge for outpatient orthopedic surgery follow u. On 6/1 RRT for hypotension , fever 103.2, Patient transferred to ICU and subsequently intubated for airway protection, shock state. 6/2 Cardiac arrest while in CT  - ACLS for 10 minutes with ROSC.   NSTEMI treated with heparin ggt , no intervention   Patient found to be ecoli ESBL bacteremia .   Id consulted started on meropenum   extubated 6/6.   Now stable for transfer to medical floor       PAST MEDICAL & SURGICAL HISTORY:  HTN (hypertension)      HLD (hyperlipidemia)      CAD S/P percutaneous coronary angioplasty      Insulin dependent type 2 diabetes mellitus      History of heart artery stent          FAMILY HISTORY:  FHx: myocardial infarction  Father        SOCIAL HISTORY: unable to assess     Allergies    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 (Short breath)  No Known Drug Allergies    Intolerances        HOME MEDICATIONS:    REVIEW OF SYSTEMS:  Constitutional: [ ] negative [ ] fevers [ ] chills [ ] weight loss [ ] weight gain  HEENT: [ ] negative [ ] dry eyes [ ] eye irritation [ ] postnasal drip [ ] nasal congestion  CV: [ ] negative  [ ] chest pain [ ] orthopnea [ ] palpitations [ ] murmur  Resp: [ ] negative [ ] cough [ ] shortness of breath [ ] dyspnea [ ] wheezing [ ] sputum [ ] hemoptysis  GI: [ ] negative [ ] nausea [ ] vomiting [ ] diarrhea [ ] constipation [ ] abd pain [ ] dysphagia   : [ ] negative [ ] dysuria [ ] nocturia [ ] hematuria [ ] increased urinary frequency  Musculoskeletal: [ ] negative [ ] back pain [ ] myalgias [ ] arthralgias [ ] fracture  Skin: [ ] negative [ ] rash [ ] itch  Neurological: [ ] negative [ ] headache [ ] dizziness [ ] syncope [ ] weakness [ ] numbness  Psychiatric: [ ] negative [ ] anxiety [ ] depression  Endocrine: [ ] negative [ ] diabetes [ ] thyroid problem  Hematologic/Lymphatic: [ ] negative [ ] anemia [ ] bleeding problem  Allergic/Immunologic: [ ] negative [ ] itchy eyes [ ] nasal discharge [ ] hives [ ] angioedema  [x] All other systems negative  [ ] Unable to assess ROS because ________    OBJECTIVE:  ICU Vital Signs Last 24 Hrs  T(C): 36.8 (11 Jun 2025 11:27), Max: 36.8 (11 Jun 2025 11:27)  T(F): 98.3 (11 Jun 2025 11:27), Max: 98.3 (11 Jun 2025 11:27)  HR: 98 (11 Jun 2025 11:27) (62 - 98)  BP: 135/75 (11 Jun 2025 11:27) (93/55 - 135/75)  BP(mean): --  ABP: --  ABP(mean): --  RR: 18 (11 Jun 2025 11:27) (18 - 18)  SpO2: 97% (11 Jun 2025 11:27) (92% - 97%)    O2 Parameters below as of 11 Jun 2025 05:00  Patient On (Oxygen Delivery Method): room air              06-10 @ 07:01  -  06-11 @ 07:00  --------------------------------------------------------  IN: 100 mL / OUT: 600 mL / NET: -500 mL      CAPILLARY BLOOD GLUCOSE      POCT Blood Glucose.: 121 mg/dL (11 Jun 2025 17:28)      PHYSICAL EXAM:  GENERAL: NAD, lying in bed, more awake throughout exam   HEAD:  Atraumatic, normocephalic  EYES: EOMI, PERRLA, conjunctiva and sclera clear  NECK: Supple, trachea midline, no JVD  HEART: Regular rate and rhythm, no murmurs, rubs, or gallops  LUNGS: Unlabored respirations. + mild wheeze RT; LT CTA, no rhonchi no stridor   ABDOMEN: Soft, nontender, nondistended, +BS  EXTREMITIES: 2+ peripheral pulses bilaterally. No clubbing, cyanosis, or edema  NERVOUS SYSTEM:  awake, following commands, responding to pain, moving all extremities, moves LE/UE, no facial droop, pupils equal/reactive  SKIN: + ecchymosis to RT shoulder 2/2 to known humerus fx     HOSPITAL MEDICATIONS:  aspirin  chewable 81 milliGRAM(s) Oral daily  heparin   Injectable 5000 Unit(s) SubCutaneous every 8 hours      furosemide   Injectable 20 milliGRAM(s) IV Push once  metoprolol succinate ER 25 milliGRAM(s) Oral daily  ranolazine 500 milliGRAM(s) Oral two times a day    atorvastatin 40 milliGRAM(s) Oral at bedtime  dextrose 50% Injectable 25 Gram(s) IV Push once  dextrose Oral Gel 15 Gram(s) Oral once PRN  insulin glargine Injectable (LANTUS) 25 Unit(s) SubCutaneous at bedtime  insulin lispro (ADMELOG) corrective regimen sliding scale   SubCutaneous at bedtime  insulin lispro (ADMELOG) corrective regimen sliding scale   SubCutaneous three times a day before meals    albuterol/ipratropium for Nebulization 3 milliLiter(s) Nebulizer every 6 hours  albuterol/ipratropium for Nebulization. 3 milliLiter(s) Nebulizer once    acetaminophen     Tablet .. 650 milliGRAM(s) Oral every 6 hours PRN  acetaminophen     Tablet .. 650 milliGRAM(s) Oral every 6 hours PRN  gabapentin 300 milliGRAM(s) Oral two times a day  ondansetron Injectable 4 milliGRAM(s) IV Push every 8 hours PRN    aluminum hydroxide/magnesium hydroxide/simethicone Suspension 30 milliLiter(s) Oral every 6 hours PRN  pantoprazole    Tablet 40 milliGRAM(s) Oral before breakfast  polyethylene glycol 3350 17 Gram(s) Oral daily PRN  senna 2 Tablet(s) Oral at bedtime        multivitamin 1 Tablet(s) Oral daily  sodium chloride 0.9% Bolus 500 milliLiter(s) IV Bolus once      chlorhexidine 2% Cloths 1 Application(s) Topical <User Schedule>        LABS:                        8.0    13.57 )-----------( 323      ( 10 Seamus 2025 08:58 )             26.8     Hgb Trend: 8.0<--, 8.6<--, 8.5<--, 8.8<--, 8.4<--  06-10    142  |  109[H]  |  64[H]  ----------------------------<  150[H]  4.7   |  29  |  0.91    Ca    8.6      10 Seamus 2025 08:58  Mg     2.7     06-10      Creatinine Trend: 0.91<--, 1.05<--, 1.07<--, 1.05<--, 1.04<--, 1.13<--    Urinalysis Basic - ( 10 Seamus 2025 08:58 )    Color: x / Appearance: x / SG: x / pH: x  Gluc: 150 mg/dL / Ketone: x  / Bili: x / Urobili: x   Blood: x / Protein: x / Nitrite: x   Leuk Esterase: x / RBC: x / WBC x   Sq Epi: x / Non Sq Epi: x / Bacteria: x      Arterial Blood Gas:  06-11 @ 17:57  7.35/68/62/38/91.2/10.3  ABG lactate: --         CHIEF COMPLAINT: AMS     HPI: 74 year old female with PMHx of HTN, HLD, IDDM2, CAD (s/p PCI), and recent right humerus fracture (placed in sling 3 days ago) who admitted on 5/30/25 for elevated blood glucose. Patient was treated at [facility] for humerus fracture , placed in sling and discharge for outpatient orthopedic surgery follow u. On 6/1 RRT for hypotension , fever 103.2, Patient transferred to ICU and subsequently intubated for airway protection, shock state. 6/2 Cardiac arrest while in CT  - ACLS for 10 minutes with ROSC. NSTEMI treated with heparin ggt , no intervention. Patient found to be ecoli ESBL bacteremia . ID consulted started on meropenum extubated 6/6.  Was transferred form ICU to medical floor 6/8. Today RRT for AMS - patient woke up and was following commands ICU originally rejected admission. Now second RRT for AMS (possibly 2/2 to seizure per medical team saw patient with RT gaze and unresponsiveness). Given 5mg valium by medical team. ICU reconsulted - upon 2nd evaluation patient obtunded, Spo2 80% with shallow breaths, soft BP. Patient intubated on medical floor, hypotensive post intubation and started on vasopressors and transferred to ICU.     PAST MEDICAL & SURGICAL HISTORY:  HTN (hypertension)      HLD (hyperlipidemia)      CAD S/P percutaneous coronary angioplasty      Insulin dependent type 2 diabetes mellitus      History of heart artery stent          FAMILY HISTORY:  FHx: myocardial infarction  Father        SOCIAL HISTORY: unable to assess     Allergies    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 (Short breath)  No Known Drug Allergies    Intolerances        HOME MEDICATIONS:    REVIEW OF SYSTEMS:  Constitutional: [ ] negative [ ] fevers [ ] chills [ ] weight loss [ ] weight gain  HEENT: [ ] negative [ ] dry eyes [ ] eye irritation [ ] postnasal drip [ ] nasal congestion  CV: [ ] negative  [ ] chest pain [ ] orthopnea [ ] palpitations [ ] murmur  Resp: [ ] negative [ ] cough [ ] shortness of breath [ ] dyspnea [ ] wheezing [ ] sputum [ ] hemoptysis  GI: [ ] negative [ ] nausea [ ] vomiting [ ] diarrhea [ ] constipation [ ] abd pain [ ] dysphagia   : [ ] negative [ ] dysuria [ ] nocturia [ ] hematuria [ ] increased urinary frequency  Musculoskeletal: [ ] negative [ ] back pain [ ] myalgias [ ] arthralgias [ ] fracture  Skin: [ ] negative [ ] rash [ ] itch  Neurological: [ ] negative [ ] headache [ ] dizziness [ ] syncope [ ] weakness [ ] numbness  Psychiatric: [ ] negative [ ] anxiety [ ] depression  Endocrine: [ ] negative [ ] diabetes [ ] thyroid problem  Hematologic/Lymphatic: [ ] negative [ ] anemia [ ] bleeding problem  Allergic/Immunologic: [ ] negative [ ] itchy eyes [ ] nasal discharge [ ] hives [ ] angioedema  [x ] Unable to assess ROS because AMS     OBJECTIVE:  ICU Vital Signs Last 24 Hrs  T(C): 36.8 (11 Jun 2025 11:27), Max: 36.8 (11 Jun 2025 11:27)  T(F): 98.3 (11 Jun 2025 11:27), Max: 98.3 (11 Jun 2025 11:27)  HR: 98 (11 Jun 2025 11:27) (62 - 98)  BP: 135/75 (11 Jun 2025 11:27) (93/55 - 135/75)  BP(mean): --  ABP: --  ABP(mean): --  RR: 18 (11 Jun 2025 11:27) (18 - 18)  SpO2: 97% (11 Jun 2025 11:27) (92% - 97%)    O2 Parameters below as of 11 Jun 2025 05:00  Patient On (Oxygen Delivery Method): room air              06-10 @ 07:01  -  06-11 @ 07:00  --------------------------------------------------------  IN: 100 mL / OUT: 600 mL / NET: -500 mL      CAPILLARY BLOOD GLUCOSE      POCT Blood Glucose.: 121 mg/dL (11 Jun 2025 17:28)      PHYSICAL EXAM:  GENERAL: NAD, lying in bed, more awake throughout first RRT; 2nd RRT patient unresponsive and obtunded   HEAD:  Atraumatic, normocephalic  EYES: EOMI, PERRLA, conjunctiva and sclera clear  NECK: Supple, trachea midline, no JVD  HEART: Regular rate and rhythm, no murmurs, rubs, or gallops  LUNGS: Unlabored respirations. + mild wheeze RT; LT CTA, no rhonchi no stridor   ABDOMEN: Soft, nontender, nondistended, +BS  EXTREMITIES: 2+ peripheral pulses bilaterally. No clubbing, cyanosis, or edema  NERVOUS SYSTEM:  awake, following commands, responding to pain, moving all extremities, moves LE/UE, no facial droop, pupils equal/reactive -- 1st RRT   - 2nd RRT obtunded   SKIN: + ecchymosis to RT shoulder 2/2 to known humerus fx     HOSPITAL MEDICATIONS:  aspirin  chewable 81 milliGRAM(s) Oral daily  heparin   Injectable 5000 Unit(s) SubCutaneous every 8 hours      furosemide   Injectable 20 milliGRAM(s) IV Push once  metoprolol succinate ER 25 milliGRAM(s) Oral daily  ranolazine 500 milliGRAM(s) Oral two times a day    atorvastatin 40 milliGRAM(s) Oral at bedtime  dextrose 50% Injectable 25 Gram(s) IV Push once  dextrose Oral Gel 15 Gram(s) Oral once PRN  insulin glargine Injectable (LANTUS) 25 Unit(s) SubCutaneous at bedtime  insulin lispro (ADMELOG) corrective regimen sliding scale   SubCutaneous at bedtime  insulin lispro (ADMELOG) corrective regimen sliding scale   SubCutaneous three times a day before meals    albuterol/ipratropium for Nebulization 3 milliLiter(s) Nebulizer every 6 hours  albuterol/ipratropium for Nebulization. 3 milliLiter(s) Nebulizer once    acetaminophen     Tablet .. 650 milliGRAM(s) Oral every 6 hours PRN  acetaminophen     Tablet .. 650 milliGRAM(s) Oral every 6 hours PRN  gabapentin 300 milliGRAM(s) Oral two times a day  ondansetron Injectable 4 milliGRAM(s) IV Push every 8 hours PRN    aluminum hydroxide/magnesium hydroxide/simethicone Suspension 30 milliLiter(s) Oral every 6 hours PRN  pantoprazole    Tablet 40 milliGRAM(s) Oral before breakfast  polyethylene glycol 3350 17 Gram(s) Oral daily PRN  senna 2 Tablet(s) Oral at bedtime        multivitamin 1 Tablet(s) Oral daily  sodium chloride 0.9% Bolus 500 milliLiter(s) IV Bolus once      chlorhexidine 2% Cloths 1 Application(s) Topical <User Schedule>        LABS:                        8.0    13.57 )-----------( 323      ( 10 Seamus 2025 08:58 )             26.8     Hgb Trend: 8.0<--, 8.6<--, 8.5<--, 8.8<--, 8.4<--  06-10    142  |  109[H]  |  64[H]  ----------------------------<  150[H]  4.7   |  29  |  0.91    Ca    8.6      10 Seamus 2025 08:58  Mg     2.7     06-10      Creatinine Trend: 0.91<--, 1.05<--, 1.07<--, 1.05<--, 1.04<--, 1.13<--    Urinalysis Basic - ( 10 Seamus 2025 08:58 )    Color: x / Appearance: x / SG: x / pH: x  Gluc: 150 mg/dL / Ketone: x  / Bili: x / Urobili: x   Blood: x / Protein: x / Nitrite: x   Leuk Esterase: x / RBC: x / WBC x   Sq Epi: x / Non Sq Epi: x / Bacteria: x      Arterial Blood Gas:  06-11 @ 17:57  7.35/68/62/38/91.2/10.3  ABG lactate: --

## 2025-06-11 NOTE — PROVIDER CONTACT NOTE (CRITICAL VALUE NOTIFICATION) - SITUATION
Pt admitted for hyperglysemia. Now RRT 6/11 due to AMS. second RRT called due to seizure.
No new orders at this time.

## 2025-06-11 NOTE — RAPID RESPONSE TEAM SUMMARY - NSSITUATIONBACKGROUNDRRT_GEN_ALL_CORE
RRT #2 Called.  RRT #1 finished a few minutes prior and family came out of room to call provider. On entering room I noticed patient unresponsive, eyes with rightside upward deviation and tongue hanging out the mouth to the left side. Patient did not respond to sternal rub and hypoxic to 51% on 4L NC.

## 2025-06-12 LAB
ABO RH CONFIRMATION: SIGNIFICANT CHANGE UP
ALBUMIN SERPL ELPH-MCNC: 2.1 G/DL — LOW (ref 3.3–5)
ALP SERPL-CCNC: 64 U/L — SIGNIFICANT CHANGE UP (ref 40–120)
ALT FLD-CCNC: 13 U/L — SIGNIFICANT CHANGE UP (ref 12–78)
ANION GAP SERPL CALC-SCNC: 5 MMOL/L — SIGNIFICANT CHANGE UP (ref 5–17)
AST SERPL-CCNC: 21 U/L — SIGNIFICANT CHANGE UP (ref 15–37)
BILIRUB SERPL-MCNC: 0.8 MG/DL — SIGNIFICANT CHANGE UP (ref 0.2–1.2)
BLD GP AB SCN SERPL QL: SIGNIFICANT CHANGE UP
BUN SERPL-MCNC: 57 MG/DL — HIGH (ref 7–23)
CALCIUM SERPL-MCNC: 7.8 MG/DL — LOW (ref 8.5–10.1)
CHLORIDE SERPL-SCNC: 108 MMOL/L — SIGNIFICANT CHANGE UP (ref 96–108)
CO2 SERPL-SCNC: 29 MMOL/L — SIGNIFICANT CHANGE UP (ref 22–31)
CREAT SERPL-MCNC: 1 MG/DL — SIGNIFICANT CHANGE UP (ref 0.5–1.3)
EGFR: 58 ML/MIN/1.73M2 — LOW
EGFR: 58 ML/MIN/1.73M2 — LOW
GLUCOSE BLDC GLUCOMTR-MCNC: 105 MG/DL — HIGH (ref 70–99)
GLUCOSE BLDC GLUCOMTR-MCNC: 109 MG/DL — HIGH (ref 70–99)
GLUCOSE BLDC GLUCOMTR-MCNC: 114 MG/DL — HIGH (ref 70–99)
GLUCOSE BLDC GLUCOMTR-MCNC: 118 MG/DL — HIGH (ref 70–99)
GLUCOSE BLDC GLUCOMTR-MCNC: 128 MG/DL — HIGH (ref 70–99)
GLUCOSE SERPL-MCNC: 119 MG/DL — HIGH (ref 70–99)
GRAM STN FLD: ABNORMAL
HCT VFR BLD CALC: 21.9 % — LOW (ref 34.5–45)
HCT VFR BLD CALC: 22.4 % — LOW (ref 34.5–45)
HGB BLD-MCNC: 6.7 G/DL — CRITICAL LOW (ref 11.5–15.5)
HGB BLD-MCNC: 6.7 G/DL — CRITICAL LOW (ref 11.5–15.5)
MAGNESIUM SERPL-MCNC: 2.5 MG/DL — SIGNIFICANT CHANGE UP (ref 1.6–2.6)
MCHC RBC-ENTMCNC: 28.5 PG — SIGNIFICANT CHANGE UP (ref 27–34)
MCHC RBC-ENTMCNC: 28.5 PG — SIGNIFICANT CHANGE UP (ref 27–34)
MCHC RBC-ENTMCNC: 29.9 G/DL — LOW (ref 32–36)
MCHC RBC-ENTMCNC: 30.6 G/DL — LOW (ref 32–36)
MCV RBC AUTO: 93.2 FL — SIGNIFICANT CHANGE UP (ref 80–100)
MCV RBC AUTO: 95.3 FL — SIGNIFICANT CHANGE UP (ref 80–100)
MRSA PCR RESULT.: SIGNIFICANT CHANGE UP
NRBC BLD AUTO-RTO: 0 /100 WBCS — SIGNIFICANT CHANGE UP (ref 0–0)
NRBC BLD AUTO-RTO: 0 /100 WBCS — SIGNIFICANT CHANGE UP (ref 0–0)
PHOSPHATE SERPL-MCNC: 2.9 MG/DL — SIGNIFICANT CHANGE UP (ref 2.5–4.5)
PLATELET # BLD AUTO: 314 K/UL — SIGNIFICANT CHANGE UP (ref 150–400)
PLATELET # BLD AUTO: 319 K/UL — SIGNIFICANT CHANGE UP (ref 150–400)
POTASSIUM SERPL-MCNC: 4.7 MMOL/L — SIGNIFICANT CHANGE UP (ref 3.5–5.3)
POTASSIUM SERPL-SCNC: 4.7 MMOL/L — SIGNIFICANT CHANGE UP (ref 3.5–5.3)
PROCALCITONIN SERPL-MCNC: 0.12 NG/ML — HIGH (ref 0.02–0.1)
PROT SERPL-MCNC: 5.6 GM/DL — LOW (ref 6–8.3)
RBC # BLD: 2.35 M/UL — LOW (ref 3.8–5.2)
RBC # BLD: 2.35 M/UL — LOW (ref 3.8–5.2)
RBC # FLD: 15 % — HIGH (ref 10.3–14.5)
RBC # FLD: 15 % — HIGH (ref 10.3–14.5)
S AUREUS DNA NOSE QL NAA+PROBE: SIGNIFICANT CHANGE UP
SODIUM SERPL-SCNC: 142 MMOL/L — SIGNIFICANT CHANGE UP (ref 135–145)
SPECIMEN SOURCE: SIGNIFICANT CHANGE UP
TROPONIN I, HIGH SENSITIVITY RESULT: 308.8 NG/L — HIGH
TROPONIN I, HIGH SENSITIVITY RESULT: 312.1 NG/L — HIGH
WBC # BLD: 11.56 K/UL — HIGH (ref 3.8–10.5)
WBC # BLD: 11.73 K/UL — HIGH (ref 3.8–10.5)
WBC # FLD AUTO: 11.56 K/UL — HIGH (ref 3.8–10.5)
WBC # FLD AUTO: 11.73 K/UL — HIGH (ref 3.8–10.5)

## 2025-06-12 PROCEDURE — 99232 SBSQ HOSP IP/OBS MODERATE 35: CPT

## 2025-06-12 PROCEDURE — 71045 X-RAY EXAM CHEST 1 VIEW: CPT | Mod: 26

## 2025-06-12 PROCEDURE — 71275 CT ANGIOGRAPHY CHEST: CPT | Mod: 26

## 2025-06-12 PROCEDURE — G0545: CPT

## 2025-06-12 PROCEDURE — 99291 CRITICAL CARE FIRST HOUR: CPT

## 2025-06-12 PROCEDURE — 95816 EEG AWAKE AND DROWSY: CPT | Mod: 26

## 2025-06-12 PROCEDURE — 70450 CT HEAD/BRAIN W/O DYE: CPT | Mod: 26

## 2025-06-12 RX ORDER — INSULIN LISPRO 100 U/ML
INJECTION, SOLUTION INTRAVENOUS; SUBCUTANEOUS EVERY 6 HOURS
Refills: 0 | Status: DISCONTINUED | OUTPATIENT
Start: 2025-06-12 | End: 2025-07-03

## 2025-06-12 RX ADMIN — IPRATROPIUM BROMIDE AND ALBUTEROL SULFATE 3 MILLILITER(S): .5; 2.5 SOLUTION RESPIRATORY (INHALATION) at 23:39

## 2025-06-12 RX ADMIN — Medication 25 GRAM(S): at 05:12

## 2025-06-12 RX ADMIN — Medication 81 MILLIGRAM(S): at 11:45

## 2025-06-12 RX ADMIN — Medication 25 GRAM(S): at 13:53

## 2025-06-12 RX ADMIN — Medication 15 MILLILITER(S): at 05:57

## 2025-06-12 RX ADMIN — PROPOFOL 3.89 MICROGRAM(S)/KG/MIN: 10 INJECTION, EMULSION INTRAVENOUS at 07:57

## 2025-06-12 RX ADMIN — Medication 1 TABLET(S): at 11:45

## 2025-06-12 RX ADMIN — Medication 15 MILLILITER(S): at 17:30

## 2025-06-12 RX ADMIN — HEPARIN SODIUM 5000 UNIT(S): 1000 INJECTION INTRAVENOUS; SUBCUTANEOUS at 21:31

## 2025-06-12 RX ADMIN — IPRATROPIUM BROMIDE AND ALBUTEROL SULFATE 3 MILLILITER(S): .5; 2.5 SOLUTION RESPIRATORY (INHALATION) at 05:38

## 2025-06-12 RX ADMIN — Medication 40 MILLIGRAM(S): at 05:58

## 2025-06-12 RX ADMIN — RANOLAZINE 500 MILLIGRAM(S): 1000 TABLET, FILM COATED, EXTENDED RELEASE ORAL at 17:30

## 2025-06-12 RX ADMIN — RANOLAZINE 500 MILLIGRAM(S): 1000 TABLET, FILM COATED, EXTENDED RELEASE ORAL at 05:57

## 2025-06-12 RX ADMIN — NOREPINEPHRINE BITARTRATE 6.08 MICROGRAM(S)/KG/MIN: 8 SOLUTION at 07:56

## 2025-06-12 RX ADMIN — PROPOFOL 3.89 MICROGRAM(S)/KG/MIN: 10 INJECTION, EMULSION INTRAVENOUS at 04:27

## 2025-06-12 RX ADMIN — Medication 2 TABLET(S): at 21:31

## 2025-06-12 RX ADMIN — PROPOFOL 3.89 MICROGRAM(S)/KG/MIN: 10 INJECTION, EMULSION INTRAVENOUS at 19:13

## 2025-06-12 RX ADMIN — ATORVASTATIN CALCIUM 40 MILLIGRAM(S): 80 TABLET, FILM COATED ORAL at 21:31

## 2025-06-12 RX ADMIN — Medication 25 GRAM(S): at 01:35

## 2025-06-12 RX ADMIN — IPRATROPIUM BROMIDE AND ALBUTEROL SULFATE 3 MILLILITER(S): .5; 2.5 SOLUTION RESPIRATORY (INHALATION) at 11:45

## 2025-06-12 RX ADMIN — HEPARIN SODIUM 5000 UNIT(S): 1000 INJECTION INTRAVENOUS; SUBCUTANEOUS at 13:53

## 2025-06-12 RX ADMIN — IPRATROPIUM BROMIDE AND ALBUTEROL SULFATE 3 MILLILITER(S): .5; 2.5 SOLUTION RESPIRATORY (INHALATION) at 17:05

## 2025-06-12 RX ADMIN — Medication 25 GRAM(S): at 21:32

## 2025-06-12 NOTE — PROGRESS NOTE ADULT - SUBJECTIVE AND OBJECTIVE BOX
[facility]  INFECTIOUS DISEASES   [address] [place] [address]  Tel: [phone]     Fax: [phone]  ==============================================================================  MD Jason Booth, DO Stephanie Olmedo, CHLOE Pope M.D  ==============================================================================      EMMANUEL GONZALEZ  [id number]  74y ([birth year])      Interval History:    ROS:    [ ] Unobtainable because:  [ ] All other systems negative except as noted    Constitutional: no fever, no chills  Head: no trauma  Eyes: no vision changes, no eye pain  ENT:  no sore throat, no rhinorrhea  Cardiovascular:  no chest pain, no palpitation  Respiratory:  no SOB, no cough  GI:  no abd pain, no vomiting, no diarrhea  urinary: no dysuria, no hematuria, no flank pain  musculoskeletal:  no joint pain, no joint swelling  skin:  no rash  neurology:  no headache, no seizure, no change in mental status  psych: no anxiety, no depression         Allergies  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 (Short breath)  No Known Drug Allergies        ANTIMICROBIALS:  piperacillin/tazobactam IVPB.. 3.375 every 8 hours        Physical Exam:  Vital Signs Last 24 Hrs  T(C): 37.1 (12 Jun 2025 10:00), Max: 37.9 (12 Jun 2025 02:00)  T(F): 98.8 (12 Jun 2025 10:00), Max: 100.2 (12 Jun 2025 02:00)  HR: 66 (12 Jun 2025 10:00) (66 - 104)  BP: 109/51 (12 Jun 2025 10:00) (78/36 - 169/61)  BP(mean): 66 (12 Jun 2025 10:00) (49 - 123)  RR: 12 (12 Jun 2025 10:00) (12 - 21)  SpO2: 100% (12 Jun 2025 10:00) (90% - 100%)    Parameters below as of 12 Jun 2025 07:05  Patient On (Oxygen Delivery Method): ventilator, vt 400 peep 8 RR 16, Fio2 100%        06-11-25 @ 07:01  -  06-12-25 @ 07:00  --------------------------------------------------------  IN: 383.2 mL / OUT: 500 mL / NET: -116.8 mL    06-12-25 @ 07:01  -  06-12-25 @ 10:53  --------------------------------------------------------  IN: 41.6 mL / OUT: 185 mL / NET: -143.4 mL      General:    NAD,  non toxic  Head: atraumatic, normocephalic  Eye: normal sclera and conjunctiva  ENT:    no oral lesions, neck supple  Cardio:     regular S1, S2,  no murmur  Respiratory:    clear b/l,    no wheezing  abd:     soft,   BS +,   no tenderness  :   no CVAT,  no suprapubic tenderness,   no  marcus  Musculoskeletal:   no joint swelling,   no edema  vascular: no central lines, +PIV   Skin:    no rash  Neurologic:     no focal deficit  psych: normal affect    WBC Count: 11.56 K/uL (06-12 @ 05:10)  WBC Count: 11.73 K/uL (06-12 @ 02:50)  WBC Count: 14.39 K/uL (06-11 @ 18:05)  WBC Count: 13.57 K/uL (06-10 @ 08:58)  WBC Count: 12.12 K/uL (06-09 @ 07:17)  WBC Count: 11.64 K/uL (06-08 @ 06:51)  WBC Count: 11.72 K/uL (06-07 @ 02:42)                            6.7    11.56 )-----------( 319      ( 12 Jun 2025 05:10 )             21.9       06-12    142  |  108  |  57[H]  ----------------------------<  119[H]  4.7   |  29  |  1.00    Ca    7.8[L]      12 Jun 2025 02:50  Phos  2.9     06-12  Mg     2.5     06-12    TPro  5.6[L]  /  Alb  2.1[L]  /  TBili  0.8  /  DBili  x   /  AST  21  /  ALT  13  /  AlkPhos  64  06-12      Urinalysis Basic - ( 12 Jun 2025 02:50 )    Color: x / Appearance: x / SG: x / pH: x  Gluc: 119 mg/dL / Ketone: x  / Bili: x / Urobili: x   Blood: x / Protein: x / Nitrite: x   Leuk Esterase: x / RBC: x / WBC x   Sq Epi: x / Non Sq Epi: x / Bacteria: x          Creatinine Trend: 1.00<--, 1.02<--, 0.91<--, 1.05<--, 1.07<--, 1.05<--  Blood Gas Arterial, Lactate: 1.10 mmol/L (06-11-25 @ 19:33)  Lactate, Blood: 1.4 mmol/L (06-11-25 @ 18:05)  Blood Gas Arterial, Lactate: 1.10 mmol/L (06-11-25 @ 17:57)      MICROBIOLOGY:  v  Blood Blood-Peripheral  06-04-25   No growth at 5 days  --  --      Catheterized Catheterized  06-02-25   >100,000 CFU/ml Escherichia coli ESBL  --  Escherichia coli ESBL      Blood Blood-Peripheral  06-01-25   Growth in aerobic and anaerobic bottles: Escherichia coli ESBL  See previous culture [id number]  --    Growth in aerobic bottle: Gram Negative Rods  Growth in anaerobic bottle: Gram Negative Rods      Blood Blood-Peripheral  06-01-25   Growth in aerobic and anaerobic bottles: Escherichia coli ESBL  Direct identification is available within approximately 3-5  hours either by Blood Panel Multiplexed PCR or Direct  MALDI-TOF. Details: [email or web address]  --  Blood Culture PCR  Escherichia coli ESBL                        D-Dimer Assay, Quantitative: 3552 (06-11)      SARS-CoV-2 Result: Keisha (06-11-25 @ 22:58)        RADIOLOGY:   [facility]  INFECTIOUS DISEASES   [address] [place] [address]  Tel: [phone]     Fax: [phone]  ==============================================================================  MD Jason Booth, CHLOE Burns M.D  ==============================================================================      EMMANUEL GONZALEZ  [id number]  74y ([birth year])      Interval History:    Patient seen and examined in CCU today.  she is intubated and currently getting EEG to rule out seizure.    as per chart notes and CCU team patient had 2 RRT last night first AMS and hypotention and then again for ?? seizure and ? aspiration pneumonitis.  was intubated and transferred to CCU.    as per her nurse she is off pressors this morning      ROS:    [ x] Unobtainable because:  unable as she is intubated and sedated        Allergies  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 (Short breath)  No Known Drug Allergies        ANTIMICROBIALS:  piperacillin/tazobactam IVPB.. 3.375 every 8 hours        Physical Exam:  Vital Signs Last 24 Hrs  T(C): 37.1 (12 Jun 2025 10:00), Max: 37.9 (12 Jun 2025 02:00)  T(F): 98.8 (12 Jun 2025 10:00), Max: 100.2 (12 Jun 2025 02:00)  HR: 66 (12 Jun 2025 10:00) (66 - 104)  BP: 109/51 (12 Jun 2025 10:00) (78/36 - 169/61)  BP(mean): 66 (12 Jun 2025 10:00) (49 - 123)  RR: 12 (12 Jun 2025 10:00) (12 - 21)  SpO2: 100% (12 Jun 2025 10:00) (90% - 100%)    Parameters below as of 12 Jun 2025 07:05  Patient On (Oxygen Delivery Method): ventilator, vt 400 peep 8 RR 16, Fio2 100%        06-11-25 @ 07:01  -  06-12-25 @ 07:00  --------------------------------------------------------  IN: 383.2 mL / OUT: 500 mL / NET: -116.8 mL    06-12-25 @ 07:01  -  06-12-25 @ 10:53  --------------------------------------------------------  IN: 41.6 mL / OUT: 185 mL / NET: -143.4 mL      Physical Exam:  GEn- sedated and intubated  Eyes: normal sclera and conjunctiva  ENT:  ET tube present  Cardio: S1,S2 regular   Respiratory: vented breath sounds b/l  abd:   soft, BS +, no distention  Musculoskeletal : right shoulder and arm with echymosis   Skin:  no rash  Neurologic: sedated       WBC Count: 11.56 K/uL (06-12 @ 05:10)  WBC Count: 11.73 K/uL (06-12 @ 02:50)  WBC Count: 14.39 K/uL (06-11 @ 18:05)  WBC Count: 13.57 K/uL (06-10 @ 08:58)  WBC Count: 12.12 K/uL (06-09 @ 07:17)  WBC Count: 11.64 K/uL (06-08 @ 06:51)  WBC Count: 11.72 K/uL (06-07 @ 02:42)                            6.7    11.56 )-----------( 319      ( 12 Jun 2025 05:10 )             21.9       06-12    142  |  108  |  57[H]  ----------------------------<  119[H]  4.7   |  29  |  1.00    Ca    7.8[L]      12 Jun 2025 02:50  Phos  2.9     06-12  Mg     2.5     06-12    TPro  5.6[L]  /  Alb  2.1[L]  /  TBili  0.8  /  DBili  x   /  AST  21  /  ALT  13  /  AlkPhos  64  06-12      Urinalysis Basic - ( 12 Jun 2025 02:50 )    Color: x / Appearance: x / SG: x / pH: x  Gluc: 119 mg/dL / Ketone: x  / Bili: x / Urobili: x   Blood: x / Protein: x / Nitrite: x   Leuk Esterase: x / RBC: x / WBC x   Sq Epi: x / Non Sq Epi: x / Bacteria: x          Creatinine Trend: 1.00<--, 1.02<--, 0.91<--, 1.05<--, 1.07<--, 1.05<--  Blood Gas Arterial, Lactate: 1.10 mmol/L (06-11-25 @ 19:33)  Lactate, Blood: 1.4 mmol/L (06-11-25 @ 18:05)  Blood Gas Arterial, Lactate: 1.10 mmol/L (06-11-25 @ 17:57)      MICROBIOLOGY:    Blood Blood-Peripheral  06-04-25   No growth at 5 days  --  --      Catheterized Catheterized  06-02-25   >100,000 CFU/ml Escherichia coli ESBL  --  Escherichia coli ESBL      Blood Blood-Peripheral  06-01-25   Growth in aerobic and anaerobic bottles: Escherichia coli ESBL  See previous culture [id number]  --    Growth in aerobic bottle: Gram Negative Rods  Growth in anaerobic bottle: Gram Negative Rods      Blood Blood-Peripheral  06-01-25   Growth in aerobic and anaerobic bottles: Escherichia coli ESBL  Direct identification is available within approximately 3-5  hours either by Blood Panel Multiplexed PCR or Direct  MALDI-TOF. Details: [email or web address]  --  Blood Culture PCR  Escherichia coli ESBL      D-Dimer Assay, Quantitative: 3552 (06-11)      SARS-CoV-2 Result: NotDetec (06-11-25 @ 22:58)        RADIOLOGY:    < from: Xray Chest 1 View-PORTABLE IMMEDIATE (Xray Chest 1 View-PORTABLE IMMEDIATE .) (06.12.25 @ 08:00) >    ACC: [id number] EXAM:  XR CHEST PORTABLE IMMED 1V   ORDERED BY: MARINO JULIEN     PROCEDURE DATE:  06/12/2025          INTERPRETATION:  XR CHEST IMMEDIATE dated 6/12/2025 8:00 AM    CLINICAL INFORMATION: Female, 74 years old.  ett withdrawn.    PRIOR STUDIES: 6/11/2025    FINDINGS/  IMPRESSION: The heart size, mediastinal and hilar contours are unchanged   and within normal limits. There is been proximal repositioning of the tip   of the endotracheal tube which now lies approximately 2 cm proximal to   the rosi. There is an enteric tube with the distal tip and side-port   project projecting inferior to the field-of-view and below the   diaphragms. There are bibasilar pleural effusions, left greater than   right. Underlying left lower lobe pneumonia and/or atelectasis cannot be   excluded. Calcified left apical granuloma redemonstrated.    --- End of Report ---      MIGUEL WILLETT MD  This document has been electronically signed. Jun 12 2025 10:51AM    < end of copied text >  < from: CT Angio Chest PE Protocol w/ IV Cont (06.12.25 @ 00:48) >    ACC: [id number] EXAM:  CT ANGIO CHEST PULM ART [facility]   ORDERED BY:   MARINO JULIEN     *** ADDENDUM # 1 ***    Acute/subacute fractures right anterior lateral fourth through sixth ribs    --- End of Report ---    *** END OF ADDENDUM # 1 ***      PROCEDURE DATE:  06/12/2025          INTERPRETATION:  EXAMINATION: CT ANGIO CHEST PULMONARY ARTERY    CLINICAL INDICATION: Dyspnea    TECHNIQUE: CTA of the chest was performed after the administration of 70   cc administered of Omnipaque 350, 30 cc discarded.  MIP images were   reconstructed.    COMPARISON: 1.2.24, 5.30.19.    FINDINGS:    PULMONARY EMBOLISM: No pulmonary embolism..    AIRWAYS AND LUNGS: Endotracheal tube tip mid trachea. Tracheobronchial   secretions, greatest in the left lower lobe.  Patchy and nodular   bilateral lung opacities, predominantly in the left upper lobe. Small   bilateral pleural effusions. Likely collapse left lower lobe. Right   middle lobe subpleural 6 mm nodular opacity is new from prior (5, 91).    MEDIASTINUM AND PLEURA: There are no enlarged mediastinal, hilar or   axillary lymph nodes. The visualized portion of the thyroid gland is   unremarkable. There is no pneumothorax.    HEART AND VESSELS: The heart is normal in size.   There are   atherosclerotic calcifications of the aorta and coronary arteries.  There   is no pericardial effusion.    UPPER ABDOMEN: Images of the upper abdomen demonstrate enteric tube tip   in stomach.    BONES AND SOFT TISSUES: The bones are unremarkable.  The soft tissues are   unremarkable.    TUBES/LINES: None.    IMPRESSION:  No pulmonary embolism..    Tracheobronchial secretions, greatest in the left lower lobe.  Patchy and   nodular bilateral lung opacities may be infectious or inflammatory. Right   middle lobe subpleural 6 mm nodular opacity is new from prior .   Short-term follow-up CT recommended      --- End of Report ---    ***Please see the addendum at the top of this report. It may contain   additional important information or changes.****        RL DE OLIVEIRA MD, MPH  This document has been electronically signed. Jun 12 2025  7:32AM  1st Addendum: RL DE OLIVEIRA MD, MPH  The first addendum was electronically signed on: Jun 12 2025  7:42AM.    < end of copied text >

## 2025-06-12 NOTE — PROGRESS NOTE ADULT - ASSESSMENT
A/P-   74 year old female with PMHx of HTN, HLD, IDDM2, CAD (s/p PCI), and recent right humerus fracture (placed in sling 3 days ago) who presented to the ED on 5/30/25 for complaints of elevated blood glucose.   As per med history provided by family  patient fell three days ago and landed on her right shoulder. Went to [facility] at that time and found to have right humerus fracture and placed in sling. Discharged home with outpatient orthopedic surgery follow up. Since then, patient has been refusing to get out of bed and not eating much.   In the ED, VSS. Hgb 10.3, sodium 132, potassium 6.3, BUN 70, Cr 2.02, blood glucose 510. VBG 7.29 / 58 / 34 / 26. COVID/influenza/RSV negative. CT head without acute intracranial findings. Received 1L NS bolus, 1L LR bolus, and insulin 20 U IV.  (30 May 2025 23:43)    s/p  RRT last night   for AMS, hypotension, fever of 103  and was started on sepsis w/u given IVF bolus, Abx with CTX and Vanco. Persistently hypotensive and obtunded. Started on levophed infusion. Urgently transferred to ICU.   was also intubated for airway protection    Infectious Disease consultation requested this afternoon 6/2 to help with antibiotic management  for ESBL bacteremia    Intubated   sedated  on pressors for hypotension  afebrile today  yesterday 103 t-max  + leukocytosis of 29K  Blood cx- ESBL GNR by PCR x 2  UA from 5/31-pos for nitrates  urine cx-pending  DUANE  elevated cardiac enzymes    per med records had ESBL e.coli UTI in august and sept 2024 and ID was not consulted     6/3-  remains Intubated and sedated  on pressors for hypotension  temp of 101.5 today  + leukocytosis trending down to 20k  creatinine slightly better today  Blood cx- ESBL GNR by PCR x 2  UA from 5/31-pos for nitrates  urine cx->100K e.coli  DUANE  elevated cardiac enzymes    6/4: seen in ICU earlier, remains intubated, weaning process in progress, on pressors, continues having fevers, T 101 this morning, leukocytosis is better 17.27, Cr better 1.35, UC and BCs grew ESBL E. Coli, repeat BCs are pending, Meropenem IV continued. If not improvement in pt's fevers tomorrow, most likely CT a/p will be obtained.   6/5: remains in ICU, intubated and sedated, on pressors, Temp persistent 100-100.2, WBC better 16.43, Cr normalized, LFTs ok, repeat BCs NGTD, procalcitonin 2.41, TTE performed - Left ventricular regional wall motion abnormalities present. Meropenem IV continued.     6/6-  extubated  awake  afebrile now but early am had temp of 100.6  Leukocytosis almost resolved 11K today  Blood cx- ESBL GNR by PCR x 2  UA from 5/31-pos for nitrates  urine cx->100K e.coli  repeat blood cx- NGTD x 2 from 6/4 6/9-  downgraded to medical floor  Afebrile  Leukocytosis 12 k today  Blood cx- ESBL GNR by PCR x 2  UA from 5/31-pos for nitrates  urine cx->100K e.coli  repeat blood cx- NGTD x 2 from 6/4 6/11: pt is afebrile since 6/6, doing well on RA, leukocytosis was elevated 13.57, Cr ok,  no new cbc, repeat BCs remain with no growth to date, s/p 8 days of IV Meropenem, now observed off abx.     6/12-  s/p RRT last night for AMS and hypotention and second RRT later for ? seizure and aspiration  s/p intubation and transferred to CCU  off pressors  intubated  afebrile but had fever of 100.2 at 5 am  leukocytosis has decreased to 11k  repeat blood cx - NGTD x 2  has completed course of meropenem  fo her ESBL bacteremia dn UTI   cxr- b/l pleural effusions L> R  CTA report-  No pulmonary embolism..  Tracheobronchial secretions, greatest in the left lower lobe.  Patchy and   nodular bilateral lung opacities may be infectious or inflammatory. Right   middle lobe subpleural 6 mm nodular opacity is new from prior .   Short-term follow-up CT recommended    Impression-  possible aspiration pneumonitis , also has Left  pleural effusion , intubated   ESBL septic shock -  was treated and had resolved  E.coli ESBL bacteremia sec to   source - treated   Acute respiratory failure requiring intubation - later  extubated  Right humerus fracture with hematoma    Plan-  had completed course of IV meropenem for the ESBL bacteremia and sepsis and repeat blood cx are negative   due to overnight events was placed  on IV zosyn by CCU team to cover for possible asp pneumonia.  no objection to this abx pending further results   would advise to follow up repeat blood cx  and check sputum cx.  trend pt's temperatures and WBC  also seems her Hgb has decreased by almost 2 points and GIB vs bleeding from the right humerus fracture that she had  should be determined.  vent management as per CCT.  rest of the medical management as per CCU team    All labs and imaging and chart notes reviewed.     d/w CCU team.    Tariq Fraga MD  Infectious Disease Attending    for any questions please do not hesitate to contact me either via teams or by calling [phone]

## 2025-06-12 NOTE — PROGRESS NOTE ADULT - SUBJECTIVE AND OBJECTIVE BOX
HPI:  Tiffanie Joshi is a 74 year old female with PMHx of HTN, HLD, IDDM2, CAD (s/p PCI), and recent right humerus fracture (placed in sling 3 days ago) who presented to the ED on 5/30/25 for complaints of elevated blood glucose.    Patient is Kyrgyz-speaking but declined  services and preferred for daughter-in-law at bedside to translate. As per daughter-in-law, patient fell three days ago and landed on her right shoulder. Went to [facility] at that time and found to have right humerus fracture and placed in sling. Discharged home with outpatient orthopedic surgery follow up. Since then, patient has been refusing to get out of bed and not eating much. Will drink a little juice every now and then. Daughter checked her blood glucose around 5PM and it was > 400 so brought to the ER for further evaluation. Baseline functional status is ambulates with walker and dependent with all ADLs. Lives at home with daughter.    In the ED, VSS. Hgb 10.3, sodium 132, potassium 6.3, BUN 70, Cr 2.02, blood glucose 510. VBG 7.29 / 58 / 34 / 26. COVID/influenza/RSV negative. CT head without acute intracranial findings. Received 1L NS bolus, 1L LR bolus, and insulin 20 U IV.  (30 May 2025 23:43)      24 hr events: Multiple RRTs for AMS. Intuabted, transferred to ICU and started on pressors.     ## ROS:  [x ] unable to obtain    ## Vitals  ICU Vital Signs Last 24 Hrs  T(C): 37.1 (12 Jun 2025 07:00), Max: 37.9 (12 Jun 2025 02:00)  T(F): 98.8 (12 Jun 2025 07:00), Max: 100.2 (12 Jun 2025 02:00)  HR: 76 (12 Jun 2025 07:00) (68 - 104)  BP: 117/56 (12 Jun 2025 07:00) (78/36 - 169/61)  BP(mean): 71 (12 Jun 2025 07:00) (49 - 123)  ABP: --  ABP(mean): --  RR: 16 (12 Jun 2025 07:00) (13 - 21)  SpO2: 100% (12 Jun 2025 07:00) (90% - 100%)    O2 Parameters below as of 12 Jun 2025 06:30  Patient On (Oxygen Delivery Method): ventilator            ## Physical Exam:  Gen: Elderly female lying in bed, intubated, NAD  HEENT: NC/AT sclerae anicteric. ET tube in place  Resp: Mechanical breath sounds b/l  CV: S1, S2  Abd: Soft, + BS  Ext: WWP  Neuro: Sedated, unarousable    ## Vent Data  Mode: AC/ CMV (Assist Control/ Continuous Mandatory Ventilation)  RR (machine): 16  TV (machine): 400  FiO2: 100  PEEP: 8  ITime: 0.9  MAP: 12  PIP: 27      ## Labs:  Chem:  06-12    142  |  108  |  57[H]  ----------------------------<  119[H]  4.7   |  29  |  1.00    Ca    7.8[L]      12 Jun 2025 02:50  Phos  2.9     06-12  Mg     2.5     06-12    TPro  5.6[L]  /  Alb  2.1[L]  /  TBili  0.8  /  DBili  x   /  AST  21  /  ALT  13  /  AlkPhos  64  06-12    LIVER FUNCTIONS - ( 12 Jun 2025 02:50 )  Alb: 2.1 g/dL / Pro: 5.6 gm/dL / ALK PHOS: 64 U/L / ALT: 13 U/L / AST: 21 U/L / GGT: x           CBC:                        6.7    11.56 )-----------( 319      ( 12 Jun 2025 05:10 )             21.9     Coags:  PT/INR - ( 11 Jun 2025 18:05 )   PT: 11.3 sec;   INR: 0.97 ratio             Urinalysis Basic - ( 12 Jun 2025 02:50 )    Color: x / Appearance: x / SG: x / pH: x  Gluc: 119 mg/dL / Ketone: x  / Bili: x / Urobili: x   Blood: x / Protein: x / Nitrite: x   Leuk Esterase: x / RBC: x / WBC x   Sq Epi: x / Non Sq Epi: x / Bacteria: x        ## Cardiac  CARDIAC MARKERS ( 11 Jun 2025 18:05 )  x     / x     / x     / x     / <1.0 ng/mL        ## Blood Gas  ABG - ( 11 Jun 2025 19:33 )  pH, Arterial: 7.53  pH, Blood: x     /  pCO2: 35    /  pO2: 91    / HCO3: 29    / Base Excess: 6.4   /  SaO2: 98.8                #I/Os  I&O's Detail    11 Jun 2025 07:01  -  12 Jun 2025 07:00  --------------------------------------------------------  IN:    IV PiggyBack: 250 mL    Norepinephrine: 33.2 mL    Propofol: 100 mL  Total IN: 383.2 mL    OUT:    Indwelling Catheter - Urethral (mL): 500 mL  Total OUT: 500 mL    Total NET: -116.8 mL          ## Imaging:    ## Medications:  MEDICATIONS  (STANDING):  albuterol/ipratropium for Nebulization 3 milliLiter(s) Nebulizer every 6 hours  aspirin  chewable 81 milliGRAM(s) Oral daily  atorvastatin 40 milliGRAM(s) Oral at bedtime  chlorhexidine 0.12% Liquid 15 milliLiter(s) Oral Mucosa every 12 hours  chlorhexidine 2% Cloths 1 Application(s) Topical <User Schedule>  dextrose 50% Injectable 25 Gram(s) IV Push once  heparin   Injectable 5000 Unit(s) SubCutaneous every 8 hours  insulin glargine Injectable (LANTUS) 25 Unit(s) SubCutaneous at bedtime  insulin lispro (ADMELOG) corrective regimen sliding scale   SubCutaneous at bedtime  insulin lispro (ADMELOG) corrective regimen sliding scale   SubCutaneous three times a day before meals  multivitamin 1 Tablet(s) Oral daily  norepinephrine Infusion 0.05 MICROgram(s)/kG/Min (6.08 mL/Hr) IV Continuous <Continuous>  pantoprazole    Tablet 40 milliGRAM(s) Oral before breakfast  piperacillin/tazobactam IVPB.. 3.375 Gram(s) IV Intermittent every 8 hours  propofol Infusion 10 MICROgram(s)/kG/Min (3.89 mL/Hr) IV Continuous <Continuous>  ranolazine 500 milliGRAM(s) Oral two times a day  senna 2 Tablet(s) Oral at bedtime    MEDICATIONS  (PRN):  acetaminophen     Tablet .. 650 milliGRAM(s) Oral every 6 hours PRN Temp greater or equal to 38C (100.4F), Mild Pain (1 - 3)  acetaminophen     Tablet .. 650 milliGRAM(s) Oral every 6 hours PRN Mild Pain (1 - 3)  aluminum hydroxide/magnesium hydroxide/simethicone Suspension 30 milliLiter(s) Oral every 6 hours PRN Dyspepsia  dextrose Oral Gel 15 Gram(s) Oral once PRN Blood Glucose LESS THAN 70 milliGRAM(s)/deciliter  ondansetron Injectable 4 milliGRAM(s) IV Push every 8 hours PRN Nausea and/or Vomiting  polyethylene glycol 3350 17 Gram(s) Oral daily PRN Constipation

## 2025-06-12 NOTE — PROGRESS NOTE ADULT - ASSESSMENT
75 y/o F w/T2DM, CAD and recent humerus fracture initially admitted with hyperglycemia w/hospital course c/b sepsis secondary to E. Coli bacteremia, UTI, NSTEMI, cardiac arrrest, and acute respiratory failure with hypoxia and hypercapnia now back in ICU for AMS concerning for seizure, acute respiratory failure, and hypotension likely secondary to severe sepsis with septic shock +/- sedation. Acute blood loss anemia.      # AMS  # R/O seizures  # Acute respiratory failure  # Hypotension  # Severe sepsis with septic shock  # Cardiac arrest  # NSTEMI    - Sedation as needed goal RASS -1 to 0  - EEG, no AEDs for now  - ASA/Statin  - Titrate pressors as needed goal MAP >= 65  - Broad spectrum abx, follow up cultures  - DVT prophylaxis  - Full code    I have personally provided 35 minutes of attending critical care time excluding procedures.

## 2025-06-12 NOTE — EEG REPORT - NS EEG TEXT BOX
EMMANUEL GONZALEZ [id number]     Study Date: 06-12-25  Duration: 21 min    --------------------------------------------------------------------------------------------------  History:  CC/ HPI Patient is a 74y old  Female who presents with a chief complaint of ALY, physical deconditioning (12 Jun 2025 10:52)    MEDICATIONS  (STANDING):  albuterol/ipratropium for Nebulization 3 milliLiter(s) Nebulizer every 6 hours  aspirin  chewable 81 milliGRAM(s) Oral daily  atorvastatin 40 milliGRAM(s) Oral at bedtime  chlorhexidine 0.12% Liquid 15 milliLiter(s) Oral Mucosa every 12 hours  chlorhexidine 2% Cloths 1 Application(s) Topical <User Schedule>  heparin   Injectable 5000 Unit(s) SubCutaneous every 8 hours  insulin glargine Injectable (LANTUS) 25 Unit(s) SubCutaneous at bedtime  insulin lispro (ADMELOG) corrective regimen sliding scale   SubCutaneous every 6 hours  multivitamin 1 Tablet(s) Oral daily  norepinephrine Infusion 0.05 MICROgram(s)/kG/Min (6.08 mL/Hr) IV Continuous <Continuous>  piperacillin/tazobactam IVPB.. 3.375 Gram(s) IV Intermittent every 8 hours  propofol Infusion 10 MICROgram(s)/kG/Min (3.89 mL/Hr) IV Continuous <Continuous>  ranolazine 500 milliGRAM(s) Oral two times a day  senna 2 Tablet(s) Oral at bedtime    --------------------------------------------------------------------------------------------------  Study Interpretation:    [[[Abbreviation Key:  PDR=alpha rhythm/posterior dominant rhythm. A-P=anterior posterior gradient.  Amplitude: ‘very low’:<20; ‘low’:20-50; ‘medium’:; ‘high’:>200uV.  Persistence for periodic/rhythmic patterns (% of epoch) ‘rare’:<1%; ‘occasional’:1-10%; ‘frequent’:10-50%; ‘abundant’:50-90%; ‘continuous’:>90%.  Persistence for sporadic discharges: ‘rare’:<1/hr; ‘occasional’:1/min-1/hr; ‘frequent’:>1/min; ‘abundant’:>1/10 sec.  GRDA=generalized rhythmic delta activity; FIRDA=frontal intermittent GRDA; LRDA=lateralized rhythmic delta activity; TIRDA=temporal intermittent rhythmic delta activity;  LPD=PLED=lateralized periodic discharges; GPD=generalized periodic discharges; BiPDs=BiPLEDs=bilateral independent periodic epileptiform discharges; SIRPID=stimulus induced rhythmic, periodic, or ictal appearing discharges; BIRDs=brief potentially ictal rhythmic discharges >4 Hz, lasting .5-10s; PFA=paroxysmal bursts of beta/gamma; LVFA=low voltage fast activity.  Modifiers: +F=with fast component; +S=with spike component; +R=with rhythmic component.  S-B=burst suppression pattern.  Max=maximal. N1-drowsy; N2-stage II sleep; N3-slow wave sleep. SSS/BETS=small sharp spikes/benign epileptiform transients of sleep. HV=hyperventilation; PS=photic stimulation]]]    FINDINGS:      Background:  Continuity: Nearly continuous  Symmetry: Symmetric  PDR: No PDR is seen  Reactivity: Present  Voltage: Normal (between 20-150uV)  Anterior Posterior Gradient: Present  Other background findings: Periods of 1 - 3 seconds of discontinuity and diffuse attenuation (< 20 uV) were seen earlier in the recording.  Breach: Absent    Background Slowing:  Generalized slowing: Diffuse irregular delta and theta activity.  Focal slowing: None was present.    State Changes:   -Drowsiness noted with increased slowing, attenuation of fast activity, vertex transients.  -N2 sleep transients were not recorded.    Interictal Findings:  None    Electrographic and Electroclinical seizures:  None    Other Clinical Events:  None    Activation Procedures:   -Hyperventilation was not performed.    -Photic stimulation was performed and did not elicit any abnormalities.       Artifacts:  Intermittent myogenic and movement artifacts were noted.    ECG:  The heart rate on single channel ECG was predominantly between 70 - 80 BPM.    EEG Classification / Summary:  Abnormal EEG study in a comatose patient  -Discontinuity and attenuation, background  -Background slowing, moderate to severe    -----------------------------------------------------------------------------------------------------    Clinical Impression:  Abnormal EEG study  Moderate to severe diffuse/multi-focal cerebral dysfunction, not specific as to etiology.  There were no epileptiform abnormalities or seizures recorded.      This is a preliminary impression still pending attendings attestation.     -------------------------------------------------------------------------------------------------------  EEG reading room: [phone]    After-hours epilepsy service: [phone]    Narciso Melissa DO  Epilepsy Fellow   EMMANUEL GONZALEZ [id number]     Study Date: 06-12-25  Duration: 21 min    --------------------------------------------------------------------------------------------------  History:  CC/ HPI Patient is a 74y old  Female who presents with a chief complaint of ALY, physical deconditioning (12 Jun 2025 10:52)    MEDICATIONS  (STANDING):  albuterol/ipratropium for Nebulization 3 milliLiter(s) Nebulizer every 6 hours  aspirin  chewable 81 milliGRAM(s) Oral daily  atorvastatin 40 milliGRAM(s) Oral at bedtime  chlorhexidine 0.12% Liquid 15 milliLiter(s) Oral Mucosa every 12 hours  chlorhexidine 2% Cloths 1 Application(s) Topical <User Schedule>  heparin   Injectable 5000 Unit(s) SubCutaneous every 8 hours  insulin glargine Injectable (LANTUS) 25 Unit(s) SubCutaneous at bedtime  insulin lispro (ADMELOG) corrective regimen sliding scale   SubCutaneous every 6 hours  multivitamin 1 Tablet(s) Oral daily  norepinephrine Infusion 0.05 MICROgram(s)/kG/Min (6.08 mL/Hr) IV Continuous <Continuous>  piperacillin/tazobactam IVPB.. 3.375 Gram(s) IV Intermittent every 8 hours  propofol Infusion 10 MICROgram(s)/kG/Min (3.89 mL/Hr) IV Continuous <Continuous>  ranolazine 500 milliGRAM(s) Oral two times a day  senna 2 Tablet(s) Oral at bedtime    --------------------------------------------------------------------------------------------------  Study Interpretation:    [[[Abbreviation Key:  PDR=alpha rhythm/posterior dominant rhythm. A-P=anterior posterior gradient.  Amplitude: ‘very low’:<20; ‘low’:20-50; ‘medium’:; ‘high’:>200uV.  Persistence for periodic/rhythmic patterns (% of epoch) ‘rare’:<1%; ‘occasional’:1-10%; ‘frequent’:10-50%; ‘abundant’:50-90%; ‘continuous’:>90%.  Persistence for sporadic discharges: ‘rare’:<1/hr; ‘occasional’:1/min-1/hr; ‘frequent’:>1/min; ‘abundant’:>1/10 sec.  GRDA=generalized rhythmic delta activity; FIRDA=frontal intermittent GRDA; LRDA=lateralized rhythmic delta activity; TIRDA=temporal intermittent rhythmic delta activity;  LPD=PLED=lateralized periodic discharges; GPD=generalized periodic discharges; BiPDs=BiPLEDs=bilateral independent periodic epileptiform discharges; SIRPID=stimulus induced rhythmic, periodic, or ictal appearing discharges; BIRDs=brief potentially ictal rhythmic discharges >4 Hz, lasting .5-10s; PFA=paroxysmal bursts of beta/gamma; LVFA=low voltage fast activity.  Modifiers: +F=with fast component; +S=with spike component; +R=with rhythmic component.  S-B=burst suppression pattern.  Max=maximal. N1-drowsy; N2-stage II sleep; N3-slow wave sleep. SSS/BETS=small sharp spikes/benign epileptiform transients of sleep. HV=hyperventilation; PS=photic stimulation]]]    FINDINGS:      Background:  Continuity: Nearly continuous  Symmetry: Symmetric  PDR: Absent  Reactivity: Possibly present (EEG technologist notes patient responded to deep tactile stimulation, and there is increase in frequency of background activity at this time. Evaluation limited as the time of stimulation is not visible on video)  Voltage: Normal  Anterior Posterior Gradient: Absent  Other background findings: The predominant background in the most wakeful state consists of diffuse alpha, theta, and intermittent beta frequencies with occasional diffuse polymorphic delta. Occasional 1-3-second periods of diffuse attenuation (< 20 uV).  Breach: Absent    Background Slowing:  Generalized slowing: As above  Focal slowing: None.    State Changes:   Intermittent periods with slowing of the background activity.  No stage 2 sleep captured.    Interictal Findings:  None    Electrographic and Electroclinical seizures:  None    Other Clinical Events:  None    Activation Procedures:   -Hyperventilation was not performed.    -Photic stimulation was performed and did not elicit any abnormalities.       Artifacts:  Minimal    Single-lead EKG: Regular rhythm    EEG Classification / Summary:  Abnormal EEG  -Moderate-severe diffuse slowing  -No focal or epileptiform abnormalities  -No electrographic seizures    Clinical Impression:  -Moderate-severe diffuse/multi-focal cerebral dysfunction, not specific as to etiology. Sedating medications may be contributing.  -No epileptiform abnormalities or seizures captured.     -------------------------------------------------------------------------------------------------------  EEG reading room: [phone]  After-hours epilepsy service: [phone]    Narciso Melissa DO  Epilepsy Fellow    Elda Saucedo MD  Attending Physician, [facility] Epilepsy [place]

## 2025-06-13 LAB
ALBUMIN SERPL ELPH-MCNC: 1.8 G/DL — LOW (ref 3.3–5)
ALP SERPL-CCNC: 62 U/L — SIGNIFICANT CHANGE UP (ref 40–120)
ALT FLD-CCNC: 12 U/L — SIGNIFICANT CHANGE UP (ref 12–78)
ANION GAP SERPL CALC-SCNC: 5 MMOL/L — SIGNIFICANT CHANGE UP (ref 5–17)
AST SERPL-CCNC: 24 U/L — SIGNIFICANT CHANGE UP (ref 15–37)
BASOPHILS # BLD AUTO: 0.03 K/UL — SIGNIFICANT CHANGE UP (ref 0–0.2)
BASOPHILS NFR BLD AUTO: 0.2 % — SIGNIFICANT CHANGE UP (ref 0–2)
BILIRUB SERPL-MCNC: 0.8 MG/DL — SIGNIFICANT CHANGE UP (ref 0.2–1.2)
BUN SERPL-MCNC: 45 MG/DL — HIGH (ref 7–23)
CALCIUM SERPL-MCNC: 7.6 MG/DL — LOW (ref 8.5–10.1)
CHLORIDE SERPL-SCNC: 108 MMOL/L — SIGNIFICANT CHANGE UP (ref 96–108)
CO2 SERPL-SCNC: 29 MMOL/L — SIGNIFICANT CHANGE UP (ref 22–31)
CREAT SERPL-MCNC: 1.08 MG/DL — SIGNIFICANT CHANGE UP (ref 0.5–1.3)
CULTURE RESULTS: ABNORMAL
CULTURE RESULTS: SIGNIFICANT CHANGE UP
EGFR: 53 ML/MIN/1.73M2 — LOW
EGFR: 53 ML/MIN/1.73M2 — LOW
EOSINOPHIL # BLD AUTO: 0.25 K/UL — SIGNIFICANT CHANGE UP (ref 0–0.5)
EOSINOPHIL NFR BLD AUTO: 1.9 % — SIGNIFICANT CHANGE UP (ref 0–6)
GLUCOSE BLDC GLUCOMTR-MCNC: 109 MG/DL — HIGH (ref 70–99)
GLUCOSE BLDC GLUCOMTR-MCNC: 114 MG/DL — HIGH (ref 70–99)
GLUCOSE BLDC GLUCOMTR-MCNC: 129 MG/DL — HIGH (ref 70–99)
GLUCOSE BLDC GLUCOMTR-MCNC: 160 MG/DL — HIGH (ref 70–99)
GLUCOSE BLDC GLUCOMTR-MCNC: 165 MG/DL — HIGH (ref 70–99)
GLUCOSE SERPL-MCNC: 106 MG/DL — HIGH (ref 70–99)
GRAM STN FLD: ABNORMAL
GRAM STN FLD: ABNORMAL
HCT VFR BLD CALC: 25.5 % — LOW (ref 34.5–45)
HGB BLD-MCNC: 7.7 G/DL — LOW (ref 11.5–15.5)
IMM GRANULOCYTES NFR BLD AUTO: 0.8 % — SIGNIFICANT CHANGE UP (ref 0–0.9)
LYMPHOCYTES # BLD AUTO: 0.59 K/UL — LOW (ref 1–3.3)
LYMPHOCYTES # BLD AUTO: 4.4 % — LOW (ref 13–44)
MAGNESIUM SERPL-MCNC: 2.5 MG/DL — SIGNIFICANT CHANGE UP (ref 1.6–2.6)
MCHC RBC-ENTMCNC: 27.9 PG — SIGNIFICANT CHANGE UP (ref 27–34)
MCHC RBC-ENTMCNC: 30.2 G/DL — LOW (ref 32–36)
MCV RBC AUTO: 92.4 FL — SIGNIFICANT CHANGE UP (ref 80–100)
MONOCYTES # BLD AUTO: 0.34 K/UL — SIGNIFICANT CHANGE UP (ref 0–0.9)
MONOCYTES NFR BLD AUTO: 2.5 % — SIGNIFICANT CHANGE UP (ref 2–14)
NEUTROPHILS # BLD AUTO: 12.05 K/UL — HIGH (ref 1.8–7.4)
NEUTROPHILS NFR BLD AUTO: 90.2 % — HIGH (ref 43–77)
NRBC BLD AUTO-RTO: 0 /100 WBCS — SIGNIFICANT CHANGE UP (ref 0–0)
PHOSPHATE SERPL-MCNC: 3.2 MG/DL — SIGNIFICANT CHANGE UP (ref 2.5–4.5)
PLATELET # BLD AUTO: 294 K/UL — SIGNIFICANT CHANGE UP (ref 150–400)
POTASSIUM SERPL-MCNC: 3.9 MMOL/L — SIGNIFICANT CHANGE UP (ref 3.5–5.3)
POTASSIUM SERPL-SCNC: 3.9 MMOL/L — SIGNIFICANT CHANGE UP (ref 3.5–5.3)
PROT SERPL-MCNC: 5.5 GM/DL — LOW (ref 6–8.3)
RBC # BLD: 2.76 M/UL — LOW (ref 3.8–5.2)
RBC # FLD: 16.2 % — HIGH (ref 10.3–14.5)
SODIUM SERPL-SCNC: 142 MMOL/L — SIGNIFICANT CHANGE UP (ref 135–145)
SPECIMEN SOURCE: SIGNIFICANT CHANGE UP
WBC # BLD: 13.37 K/UL — HIGH (ref 3.8–10.5)
WBC # FLD AUTO: 13.37 K/UL — HIGH (ref 3.8–10.5)

## 2025-06-13 PROCEDURE — 99232 SBSQ HOSP IP/OBS MODERATE 35: CPT

## 2025-06-13 PROCEDURE — G0545: CPT

## 2025-06-13 PROCEDURE — 99291 CRITICAL CARE FIRST HOUR: CPT

## 2025-06-13 RX ORDER — DEXMEDETOMIDINE HYDROCHLORIDE IN SODIUM CHLORIDE 4 UG/ML
0.4 INJECTION INTRAVENOUS
Qty: 200 | Refills: 0 | Status: DISCONTINUED | OUTPATIENT
Start: 2025-06-13 | End: 2025-06-19

## 2025-06-13 RX ADMIN — HEPARIN SODIUM 5000 UNIT(S): 1000 INJECTION INTRAVENOUS; SUBCUTANEOUS at 05:34

## 2025-06-13 RX ADMIN — INSULIN GLARGINE-YFGN 25 UNIT(S): 100 INJECTION, SOLUTION SUBCUTANEOUS at 21:31

## 2025-06-13 RX ADMIN — Medication 4 MILLILITER(S): at 11:05

## 2025-06-13 RX ADMIN — RANOLAZINE 500 MILLIGRAM(S): 1000 TABLET, FILM COATED, EXTENDED RELEASE ORAL at 05:35

## 2025-06-13 RX ADMIN — HEPARIN SODIUM 5000 UNIT(S): 1000 INJECTION INTRAVENOUS; SUBCUTANEOUS at 21:17

## 2025-06-13 RX ADMIN — ATORVASTATIN CALCIUM 40 MILLIGRAM(S): 80 TABLET, FILM COATED ORAL at 21:17

## 2025-06-13 RX ADMIN — Medication 4 MILLIGRAM(S): at 13:35

## 2025-06-13 RX ADMIN — Medication 1 TABLET(S): at 11:26

## 2025-06-13 RX ADMIN — IPRATROPIUM BROMIDE AND ALBUTEROL SULFATE 3 MILLILITER(S): .5; 2.5 SOLUTION RESPIRATORY (INHALATION) at 17:11

## 2025-06-13 RX ADMIN — Medication 650 MILLIGRAM(S): at 11:26

## 2025-06-13 RX ADMIN — Medication 650 MILLIGRAM(S): at 21:17

## 2025-06-13 RX ADMIN — Medication 81 MILLIGRAM(S): at 11:26

## 2025-06-13 RX ADMIN — IPRATROPIUM BROMIDE AND ALBUTEROL SULFATE 3 MILLILITER(S): .5; 2.5 SOLUTION RESPIRATORY (INHALATION) at 05:17

## 2025-06-13 RX ADMIN — Medication 4 MILLILITER(S): at 17:36

## 2025-06-13 RX ADMIN — Medication 2 TABLET(S): at 21:16

## 2025-06-13 RX ADMIN — DEXMEDETOMIDINE HYDROCHLORIDE IN SODIUM CHLORIDE 7.47 MICROGRAM(S)/KG/HR: 4 INJECTION INTRAVENOUS at 21:17

## 2025-06-13 RX ADMIN — Medication 15 MILLILITER(S): at 05:35

## 2025-06-13 RX ADMIN — Medication 4 MILLIGRAM(S): at 13:01

## 2025-06-13 RX ADMIN — Medication 25 GRAM(S): at 05:34

## 2025-06-13 RX ADMIN — IPRATROPIUM BROMIDE AND ALBUTEROL SULFATE 3 MILLILITER(S): .5; 2.5 SOLUTION RESPIRATORY (INHALATION) at 11:05

## 2025-06-13 RX ADMIN — Medication 15 MILLILITER(S): at 17:41

## 2025-06-13 RX ADMIN — HEPARIN SODIUM 5000 UNIT(S): 1000 INJECTION INTRAVENOUS; SUBCUTANEOUS at 13:18

## 2025-06-13 RX ADMIN — PROPOFOL 3.89 MICROGRAM(S)/KG/MIN: 10 INJECTION, EMULSION INTRAVENOUS at 01:48

## 2025-06-13 RX ADMIN — INSULIN LISPRO 1: 100 INJECTION, SOLUTION INTRAVENOUS; SUBCUTANEOUS at 17:49

## 2025-06-13 RX ADMIN — PROPOFOL 3.89 MICROGRAM(S)/KG/MIN: 10 INJECTION, EMULSION INTRAVENOUS at 08:35

## 2025-06-13 RX ADMIN — Medication 1 APPLICATION(S): at 05:56

## 2025-06-13 RX ADMIN — Medication 650 MILLIGRAM(S): at 12:03

## 2025-06-13 RX ADMIN — Medication 40 MILLIGRAM(S): at 05:45

## 2025-06-13 RX ADMIN — Medication 25 GRAM(S): at 21:16

## 2025-06-13 RX ADMIN — Medication 650 MILLIGRAM(S): at 22:17

## 2025-06-13 RX ADMIN — Medication 25 GRAM(S): at 13:19

## 2025-06-13 NOTE — PROGRESS NOTE ADULT - ASSESSMENT
73 y/o F w/T2DM, CAD and recent humerus fracture initially admitted with hyperglycemia w/hospital course c/b sepsis secondary to E. Coli bacteremia, UTI, NSTEMI, cardiac arrrest, and acute respiratory failure with hypoxia and hypercapnia now back in ICU for AMS concerning for seizure, acute respiratory failure, and hypotension likely secondary to severe sepsis with septic shock +/- sedation. Acute blood loss anemia.      # AMS secondary to sepsis and aspiration, now awake during SBT.   # Acute respiratory failure aspiration, with thick secreation preventing aspiration vent adjusted down to 40%, adding airway clearence.   # Hypotension Resolved septic shock.   # Severe sepsis with septic shock  # Cardiac arrest  # NSTEMI    - Sedation as needed goal RASS -1 to 0  - ASA/Statin  - Titrate pressors as needed goal MAP >= 65  - Broad spectrum abx, follow up cultures  - DVT prophylaxis  - Full code but discussion with daughter once better and ready to extubate, family likely would not want re-intubation or cpr.     I have personally provided 35 minutes of attending critical care time excluding procedures.

## 2025-06-13 NOTE — PROGRESS NOTE ADULT - SUBJECTIVE AND OBJECTIVE BOX
[facility]  INFECTIOUS DISEASES   [address] [place] [address]  Tel: [phone]     Fax: [phone]  ==============================================================================  MD Jason Booth, CHLOE Burns M.D  ==============================================================================      EMMANUEL GONZALEZ  [id number]  74y ([birth year])      Interval History:    Patient seen and examined in CCU.  her family is at her bedside    patient remains intubated, is on 50% FIO2  she does awaken, however, opens her eyes when her name is called.    as per her nurse off pressors    has remained afebrile          ROS:    [x ] Unobtainable because:  as patient is still intubated but she does open her eyes when her name is called        Allergies  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 (Short breath)  No Known Drug Allergies        ANTIMICROBIALS:  piperacillin/tazobactam IVPB.. 3.375 every 8 hours        Physical Exam:  Vital Signs Last 24 Hrs  T(C): 37.2 (13 Jun 2025 12:00), Max: 37.5 (12 Jun 2025 20:00)  T(F): 99 (13 Jun 2025 12:00), Max: 99.5 (12 Jun 2025 20:00)  HR: 74 (13 Jun 2025 12:10) (60 - 78)  BP: 125/63 (13 Jun 2025 12:00) (84/62 - 157/65)  BP(mean): 82 (13 Jun 2025 12:00) (63 - 93)  RR: 13 (13 Jun 2025 12:00) (0 - 20)  SpO2: 100% (13 Jun 2025 12:10) (100% - 100%)    Parameters below as of 13 Jun 2025 12:10  Patient On (Oxygen Delivery Method): ventilator        06-12-25 @ 07:01  -  06-13-25 @ 07:00  --------------------------------------------------------  IN: 427.6 mL / OUT: 1130 mL / NET: -702.4 mL    06-13-25 @ 07:01  -  06-13-25 @ 12:14  --------------------------------------------------------  IN: 71.4 mL / OUT: 140 mL / NET: -68.6 mL      Physical Exam:  GEn- remains intubated  on 50% Fio2  Eyes: normal sclera and conjunctiva  ENT:  ET tube present  Cardio: S1,S2 regular , + edema B/l LE  Respiratory: vented breath sounds b/l  abd:   soft, BS +, no distention, no tenderenss  Musculoskeletal : right shoulder and arm with echymosis   Skin:  no rash  Neurologic: open her eyes when her name is called        WBC Count: 13.37 K/uL (06-13 @ 02:50)  WBC Count: 11.56 K/uL (06-12 @ 05:10)  WBC Count: 11.73 K/uL (06-12 @ 02:50)  WBC Count: 14.39 K/uL (06-11 @ 18:05)  WBC Count: 13.57 K/uL (06-10 @ 08:58)  WBC Count: 12.12 K/uL (06-09 @ 07:17)  WBC Count: 11.64 K/uL (06-08 @ 06:51)                            7.7    13.37 )-----------( 294      ( 13 Jun 2025 02:50 )             25.5       06-13    142  |  108  |  45[H]  ----------------------------<  106[H]  3.9   |  29  |  1.08    Ca    7.6[L]      13 Jun 2025 02:50  Phos  3.2     06-13  Mg     2.5     06-13    TPro  5.5[L]  /  Alb  1.8[L]  /  TBili  0.8  /  DBili  x   /  AST  24  /  ALT  12  /  AlkPhos  62  06-13      Urinalysis Basic - ( 13 Jun 2025 02:50 )    Color: x / Appearance: x / SG: x / pH: x  Gluc: 106 mg/dL / Ketone: x  / Bili: x / Urobili: x   Blood: x / Protein: x / Nitrite: x   Leuk Esterase: x / RBC: x / WBC x   Sq Epi: x / Non Sq Epi: x / Bacteria: x          Creatinine Trend: 1.08<--, 1.00<--, 1.02<--, 0.91<--, 1.05<--, 1.07<--  Blood Gas Arterial, Lactate: 1.10 mmol/L (06-11-25 @ 19:33)  Lactate, Blood: 1.4 mmol/L (06-11-25 @ 18:05)  Blood Gas Arterial, Lactate: 1.10 mmol/L (06-11-25 @ 17:57)      MICROBIOLOGY:    Sputum Sputum  06-12-25   Commensal lina consistent with body site  --    Few Squamous epithelial cells per low power field  Numerous polymorphonuclear leukocytes per low power field  Yeast per oil power field      Blood Blood-Peripheral  06-11-25   No growth at 24 hours  --  --      Blood Blood-Peripheral  06-11-25   No growth at 24 hours  --  --      Blood Blood-Peripheral  06-04-25   No growth at 5 days  --  --      Catheterized Catheterized  06-02-25   >100,000 CFU/ml Escherichia coli ESBL  --  Escherichia coli ESBL      Blood Blood-Peripheral  06-01-25   Growth in aerobic and anaerobic bottles: Escherichia coli ESBL  See previous culture [id number]  --    Growth in aerobic bottle: Gram Negative Rods  Growth in anaerobic bottle: Gram Negative Rods      Blood Blood-Peripheral  06-01-25   Growth in aerobic and anaerobic bottles: Escherichia coli ESBL  Direct identification is available within approximately 3-5  hours either by Blood Panel Multiplexed PCR or Direct  MALDI-TOF. Details: [email or web address]  --  Blood Culture PCR  Escherichia coli ESBL        D-Dimer Assay, Quantitative: 3552 (06-11)    Procalcitonin: 0.12 (06-12-25 @ 02:50)    SARS-CoV-2 Result: NotDetec (06-11-25 @ 22:58)        RADIOLOGY:    < from: Xray Chest 1 View-PORTABLE IMMEDIATE (Xray Chest 1 View-PORTABLE IMMEDIATE .) (06.12.25 @ 08:00) >    ACC: [id number] EXAM:  XR CHEST PORTABLE IMMED 1V   ORDERED BY: MARINO JULIEN     PROCEDURE DATE:  06/12/2025          INTERPRETATION:  XR CHEST IMMEDIATE dated 6/12/2025 8:00 AM    CLINICAL INFORMATION: Female, 74 years old.  ett withdrawn.    PRIOR STUDIES: 6/11/2025    FINDINGS/  IMPRESSION: The heart size, mediastinal and hilar contours are unchanged   and within normal limits. There is been proximal repositioning of the tip   of the endotracheal tube which now lies approximately 2 cm proximal to   the rosi. There is an enteric tube with the distal tip and side-port   project projecting inferior to the field-of-view and below the   diaphragms. There are bibasilar pleural effusions, left greater than   right. Underlying left lower lobe pneumonia and/or atelectasis cannot be   excluded. Calcified left apical granuloma redemonstrated.    --- End of Report ---      MIGUEL WILLETT MD  This document has been electronically signed. Jun 12 2025 10:51AM    < end of copied text >

## 2025-06-13 NOTE — CHART NOTE - NSCHARTNOTEFT_GEN_A_CORE
Assessment:  Pt seen for re-adm to critical care on 06/11, pt is currently on vent, NGT feeding of Glucerna 1.5 ordered x 18 hours today.  Per daughter who was @ bedside, pt had choking episode after being given fluid by family and they were not informed not to give thin fluids.  Pt had good appetite @ home.  Food allergy to shrimp/shellfish reported.  Pt adm c diagnosis of hyperglycemia, hospital course complicated by bacteremia, UTI, NSTEMI, cardiac arrest, acute respiratory failure, hypotension likely secondary to severe sepsis, septic shock, acute blood loss anemia.  Pt is full code.  PMH include HTN, HLD, DM( Per outpatient medication review, pt was on metformin and insulin Aspart/rapid acting insulin,15 units @ bedtime), recent right humerus fracture prior to adm.      Factors impacting intake: [ ] none [ ] nausea  [ ] vomiting [ ] diarrhea [ ] constipation  [ ]chewing problems [ x] swallowing issues; 06/09, swallow evaluation c recommendation for mince and moist, thin liquids  [ x] other: acute illness    Diet Prescription: Diet, NPO with Tube Feed:   Tube Feeding Modality: Nasogastric  Glucerna 1.5 Daryl  Total Volume for 24 Hours (mL): 900  Intermittent  Starting Tube Feed Rate {mL per Hour}: 10  Increase Tube Feed Rate by (mL): 10    Every 4 hours  Until Goal Tube Feed Rate (mL per Hour): 50  Tube Feeding Hours ON: 18  Tube Feeding OFF (Hours): 6  Tube Feed Start Time: 11:00 (06-13-25 @ 09:22)    Intake: Glucerna 1.5, 900 hx=2856 calories, 74 grams protein, 683 ml free water   + additional calories per infused volume propofol infused.  (Pt received 326 ml propofol on 06/1218=145.6 calories).    Current Weight: 06/13, 74.6 kg(daily wt.)  06/11, 74.7 kg(CCU re-adm drug dose wt.), 06/02, 64.8 kg(drug dose wt.), 05/31, 70.4 kg(daily wt., wt. fluctuations c wt. gain of 4.2 kg noted since 05/31.  % Weight Change: 5.9%  06/13, 3+(moderate) edema of arms, legs noted  I/O: 428/1100(-672)    Pertinent Medications: MEDICATIONS  (STANDING):  albuterol/ipratropium for Nebulization 3 milliLiter(s) Nebulizer every 6 hours  aspirin  chewable 81 milliGRAM(s) Oral daily  atorvastatin 40 milliGRAM(s) Oral at bedtime  chlorhexidine 0.12% Liquid 15 milliLiter(s) Oral Mucosa every 12 hours  chlorhexidine 2% Cloths 1 Application(s) Topical <User Schedule>  heparin   Injectable 5000 Unit(s) SubCutaneous every 8 hours  insulin glargine Injectable (LANTUS) 25 Unit(s) SubCutaneous at bedtime  insulin lispro (ADMELOG) corrective regimen sliding scale   SubCutaneous every 6 hours  multivitamin 1 Tablet(s) Oral daily  norepinephrine Infusion 0.05 MICROgram(s)/kG/Min (6.08 mL/Hr) IV Continuous <Continuous>  pantoprazole  Injectable 40 milliGRAM(s) IV Push every 24 hours  piperacillin/tazobactam IVPB.. 3.375 Gram(s) IV Intermittent every 8 hours  propofol Infusion 10 MICROgram(s)/kG/Min (3.89 mL/Hr) IV Continuous <Continuous>  ranolazine 500 milliGRAM(s) Oral two times a day  senna 2 Tablet(s) Oral at bedtime  sodium chloride 3%  Inhalation 4 milliLiter(s) Inhalation every 6 hours    MEDICATIONS  (PRN):  acetaminophen     Tablet .. 650 milliGRAM(s) Oral every 6 hours PRN Temp greater or equal to 38C (100.4F), Mild Pain (1 - 3)  acetaminophen     Tablet .. 650 milliGRAM(s) Oral every 6 hours PRN Mild Pain (1 - 3)  aluminum hydroxide/magnesium hydroxide/simethicone Suspension 30 milliLiter(s) Oral every 6 hours PRN Dyspepsia  ondansetron Injectable 4 milliGRAM(s) IV Push every 8 hours PRN Nausea and/or Vomiting  polyethylene glycol 3350 17 Gram(s) Oral daily PRN Constipation    Pertinent Labs: 06-13 Na142 mmol/L Glu 106 mg/dL[H] K+ 3.9 mmol/L Cr  1.08 mg/dL BUN 45 mg/dL[H] 06-13 Phos 3.2 mg/dL 06-13 Alb 1.8 g/dL[L]06-13 ALT 12 U/L AST 24 U/L Alkaline Phosphatase 62 U/L  05-31-25 @ 05:55 a1c 10.2<H-poor glycemic control>   CAPILLARY BLOOD GLUCOSE      POCT Blood Glucose.: 114 mg/dL (13 Jun 2025 11:24)  POCT Blood Glucose.: 109 mg/dL (13 Jun 2025 05:29)  POCT Blood Glucose.: 105 mg/dL (12 Jun 2025 23:39)  POCT Blood Glucose.: 109 mg/dL (12 Jun 2025 21:28)  POCT Blood Glucose.: 118 mg/dL (12 Jun 2025 17:12)    Skin:   No pressure ulcer noted    Estimated Needs:   [x ] no change since previous assessment(06/04)  [ ] recalculated:     Previous New Nutrition Diagnosis (06/11)  [X] Inadequate Energy Intake   Etiology: Decreased PO intake in the setting of acute illness   Signs/ Symptoms: 25-50% PO intake since PO diet initiated   Goal: Pt to consume 75% or greater of estimated nutrient needs for duration of hospital course   Nutrition Diagnosis is [ ] ongoing  [ ] resolved [ X] not applicable       New Nutrition Diagnosis: [x ] not applicable     Interventions:   Recommend  [ ] Change Diet To:  [ ] Nutrition Supplement  [x ] Nutrition Support; Continue c current enteral feeding regimen  [x ] Other: swallow evaluation post extubation  [x] Endocrine consult as indicated    Monitoring and Evaluation:   [ ] PO intake [ x ] Tolerance to diet prescription [ x ] weights [ x ] labs; Glucose, TG [ x ] follow up per protocol  [ ] other:

## 2025-06-13 NOTE — PROGRESS NOTE ADULT - ASSESSMENT
A/P-   74 year old female with PMHx of HTN, HLD, IDDM2, CAD (s/p PCI), and recent right humerus fracture (placed in sling 3 days ago) who presented to the ED on 5/30/25 for complaints of elevated blood glucose.   As per med history provided by family  patient fell three days ago and landed on her right shoulder. Went to [facility] at that time and found to have right humerus fracture and placed in sling. Discharged home with outpatient orthopedic surgery follow up. Since then, patient has been refusing to get out of bed and not eating much.   In the ED, VSS. Hgb 10.3, sodium 132, potassium 6.3, BUN 70, Cr 2.02, blood glucose 510. VBG 7.29 / 58 / 34 / 26. COVID/influenza/RSV negative. CT head without acute intracranial findings. Received 1L NS bolus, 1L LR bolus, and insulin 20 U IV.  (30 May 2025 23:43)    s/p  RRT last night   for AMS, hypotension, fever of 103  and was started on sepsis w/u given IVF bolus, Abx with CTX and Vanco. Persistently hypotensive and obtunded. Started on levophed infusion. Urgently transferred to ICU.   was also intubated for airway protection    Infectious Disease consultation requested this afternoon 6/2 to help with antibiotic management  for ESBL bacteremia    Intubated   sedated  on pressors for hypotension  afebrile today  yesterday 103 t-max  + leukocytosis of 29K  Blood cx- ESBL GNR by PCR x 2  UA from 5/31-pos for nitrates  urine cx-pending  DUANE  elevated cardiac enzymes    per med records had ESBL e.coli UTI in august and sept 2024 and ID was not consulted     6/3-  remains Intubated and sedated  on pressors for hypotension  temp of 101.5 today  + leukocytosis trending down to 20k  creatinine slightly better today  Blood cx- ESBL GNR by PCR x 2  UA from 5/31-pos for nitrates  urine cx->100K e.coli  DUANE  elevated cardiac enzymes    6/4: seen in ICU earlier, remains intubated, weaning process in progress, on pressors, continues having fevers, T 101 this morning, leukocytosis is better 17.27, Cr better 1.35, UC and BCs grew ESBL E. Coli, repeat BCs are pending, Meropenem IV continued. If not improvement in pt's fevers tomorrow, most likely CT a/p will be obtained.   6/5: remains in ICU, intubated and sedated, on pressors, Temp persistent 100-100.2, WBC better 16.43, Cr normalized, LFTs ok, repeat BCs NGTD, procalcitonin 2.41, TTE performed - Left ventricular regional wall motion abnormalities present. Meropenem IV continued.     6/6-  extubated  awake  afebrile now but early am had temp of 100.6  Leukocytosis almost resolved 11K today  Blood cx- ESBL GNR by PCR x 2  UA from 5/31-pos for nitrates  urine cx->100K e.coli  repeat blood cx- NGTD x 2 from 6/4 6/9-  downgraded to medical floor  Afebrile  Leukocytosis 12 k today  Blood cx- ESBL GNR by PCR x 2  UA from 5/31-pos for nitrates  urine cx->100K e.coli  repeat blood cx- NGTD x 2 from 6/4 6/11: pt is afebrile since 6/6, doing well on RA, leukocytosis was elevated 13.57, Cr ok,  no new cbc, repeat BCs remain with no growth to date, s/p 8 days of IV Meropenem, now observed off abx.     6/12-  s/p RRT last night for AMS and hypotention and second RRT later for ? seizure and aspiration  s/p intubation and transferred to CCU  off pressors  intubated  afebrile but had fever of 100.2 at 5 am  leukocytosis has decreased to 11k  repeat blood cx - NGTD x 2  has completed course of meropenem  fo her ESBL bacteremia dn UTI   cxr- b/l pleural effusions L> R  CTA report-  No pulmonary embolism..  Tracheobronchial secretions, greatest in the left lower lobe.  Patchy and   nodular bilateral lung opacities may be infectious or inflammatory. Right   middle lobe subpleural 6 mm nodular opacity is new from prior .   Short-term follow-up CT recommended    6/13-   remains intubated on 50% Fio2  Afebrile   Minimal leukocytosis of 13K  creatinine- normal value  Repeat blood cx  from 6/4- NGTD x 2  repeat blood cx from 6/11- NGTD x 2  sputum  cx- commensal lina c/w body site  has completed course of meropenem  fo her ESBL bacteremia and UTI - which she cleared  cxr- b/l pleural effusions L> R  CTA report-  No pulmonary embolism..  Tracheobronchial secretions, greatest in the left lower lobe.  Patchy and   nodular bilateral lung opacities may be infectious or inflammatory. Right   middle lobe subpleural 6 mm nodular opacity is new from prior .   Short-term follow-up CT recommended  EEG- reported negative for epilepsy ( pls see full report)      Impression-  possible aspiration pneumonitis , also has Left  pleural effusion , intubated   ESBL septic shock -  was treated and had resolved  E.coli ESBL bacteremia sec to   source - treated   Acute respiratory failure requiring intubation - later  extubated  Right humerus fracture with hematoma    Plan-  had completed course of IV meropenem for the ESBL bacteremia and sepsis and repeat blood cx are negative   can continue with  IV zosyn by CCU team to cover for possible asp pneumonia.day #2  no objection to this abx pending further results   trend pt's temperatures and WBC  vent management as per CCU team.  rest of the medical management as per CCU team    All labs and imaging and chart notes reviewed.     d/w CCU team.    d/w   .    d/w Patient's daughter at bedside and all her questions were answered to the best of my ability.    Tariq Fraga MD  Infectious Disease Attending    for any questions please do not hesitate to contact me either via teams or by calling [phone]

## 2025-06-13 NOTE — PROGRESS NOTE ADULT - SUBJECTIVE AND OBJECTIVE BOX
CHIEF COMPLAINT:Patient is a 74y old  Female who presents with a chief complaint of ALY, physical deconditioning (13 Jun 2025 12:14)        Interval Events:  Followed commands during SBT.  REVIEW OF SYSTEMS:  [ ] All other systems negative  [x ] Unable to assess ROS because _____Sedated___    OBJECTIVE:  ICU Vital Signs Last 24 Hrs  T(C): 37.4 (13 Jun 2025 19:00), Max: 37.5 (12 Jun 2025 20:00)  T(F): 99.3 (13 Jun 2025 19:00), Max: 99.5 (12 Jun 2025 20:00)  HR: 74 (13 Jun 2025 19:00) (60 - 78)  BP: 156/64 (13 Jun 2025 19:00) (84/62 - 157/65)  BP(mean): 90 (13 Jun 2025 19:00) (63 - 110)  ABP: --  ABP(mean): --  RR: 21 (13 Jun 2025 19:00) (8 - 22)  SpO2: 100% (13 Jun 2025 19:00) (100% - 100%)    O2 Parameters below as of 13 Jun 2025 17:53  Patient On (Oxygen Delivery Method): ventilator          Mode: AC/ CMV (Assist Control/ Continuous Mandatory Ventilation), RR (machine): 10, TV (machine): 400, FiO2: 40, PEEP: 8, ITime: 1, MAP: 11, PIP: 23    06-12 @ 07:01 - 06-13 @ 07:00  --------------------------------------------------------  IN: 427.6 mL / OUT: 1130 mL / NET: -702.4 mL    06-13 @ 07:01  -  06-13 @ 19:11  --------------------------------------------------------  IN: 331.4 mL / OUT: 450 mL / NET: -118.6 mL      CAPILLARY BLOOD GLUCOSE      POCT Blood Glucose.: 165 mg/dL (13 Jun 2025 17:40)      PHYSICAL EXAM:  Gen: Elderly female lying in bed, intubated, NAD  HEENT: NC/AT sclerae anicteric. ET tube in place  Resp: Mechanical breath sounds b/l  CV: S1, S2  Abd: Soft, + BS  Ext: WWP  Neuro: Sedated, unarousable    LINES:    HOSPITAL MEDICATIONS:  Standing Meds:  aspirin  chewable 81 milliGRAM(s) Oral daily  atorvastatin 40 milliGRAM(s) Oral at bedtime  chlorhexidine 0.12% Liquid 15 milliLiter(s) Oral Mucosa every 12 hours  chlorhexidine 2% Cloths 1 Application(s) Topical <User Schedule>  heparin   Injectable 5000 Unit(s) SubCutaneous every 8 hours  insulin glargine Injectable (LANTUS) 25 Unit(s) SubCutaneous at bedtime  insulin lispro (ADMELOG) corrective regimen sliding scale   SubCutaneous every 6 hours  multivitamin 1 Tablet(s) Oral daily  norepinephrine Infusion 0.05 MICROgram(s)/kG/Min IV Continuous <Continuous>  pantoprazole  Injectable 40 milliGRAM(s) IV Push every 24 hours  piperacillin/tazobactam IVPB.. 3.375 Gram(s) IV Intermittent every 8 hours  propofol Infusion 10 MICROgram(s)/kG/Min IV Continuous <Continuous>  ranolazine 500 milliGRAM(s) Oral two times a day  senna 2 Tablet(s) Oral at bedtime  sodium chloride 3%  Inhalation 4 milliLiter(s) Inhalation every 6 hours      PRN Meds:  acetaminophen     Tablet .. 650 milliGRAM(s) Oral every 6 hours PRN  acetaminophen     Tablet .. 650 milliGRAM(s) Oral every 6 hours PRN  aluminum hydroxide/magnesium hydroxide/simethicone Suspension 30 milliLiter(s) Oral every 6 hours PRN  ondansetron Injectable 4 milliGRAM(s) IV Push every 8 hours PRN  polyethylene glycol 3350 17 Gram(s) Oral daily PRN      LABS:                        7.7    13.37 )-----------( 294      ( 13 Jun 2025 02:50 )             25.5     Hgb Trend: 7.7<--, 6.7<--, 6.7<--, 8.2<--, 8.0<--  06-13    142  |  108  |  45[H]  ----------------------------<  106[H]  3.9   |  29  |  1.08    Ca    7.6[L]      13 Jun 2025 02:50  Phos  3.2     06-13  Mg     2.5     06-13    TPro  5.5[L]  /  Alb  1.8[L]  /  TBili  0.8  /  DBili  x   /  AST  24  /  ALT  12  /  AlkPhos  62  06-13    Creatinine Trend: 1.08<--, 1.00<--, 1.02<--, 0.91<--, 1.05<--, 1.07<--    Urinalysis Basic - ( 13 Jun 2025 02:50 )    Color: x / Appearance: x / SG: x / pH: x  Gluc: 106 mg/dL / Ketone: x  / Bili: x / Urobili: x   Blood: x / Protein: x / Nitrite: x   Leuk Esterase: x / RBC: x / WBC x   Sq Epi: x / Non Sq Epi: x / Bacteria: x      Arterial Blood Gas:  06-11 @ 19:33  7.53/35/91/29/98.8/6.4  ABG lactate: --        MICROBIOLOGY:     Culture - Sputum (collected 12 Jun 2025 08:00)  Source: Sputum Sputum  Gram Stain (prelim) (13 Jun 2025 07:56):    Few Squamous epithelial cells per low power field    Numerous polymorphonuclear leukocytes per low power field    Yeast per oil power field  Preliminary Report (13 Jun 2025 07:56):    Commensal lina consistent with body site    Culture - Urine (collected 11 Jun 2025 22:47)  Source: Catheterized Catheterized  Final Report (13 Jun 2025 14:59):    <10,000 CFU/mL Normal Urogenital Lina    Culture - Blood (collected 11 Jun 2025 18:05)  Source: Blood Blood-Peripheral  Preliminary Report (13 Jun 2025 01:02):    No growth at 24 hours    Culture - Blood (collected 11 Jun 2025 18:00)  Source: Blood Blood-Peripheral  Preliminary Report (13 Jun 2025 01:02):    No growth at 24 hours      RADIOLOGY:  [ ] Reviewed and interpreted by me    EKG:

## 2025-06-14 LAB
ALBUMIN SERPL ELPH-MCNC: 1.6 G/DL — LOW (ref 3.3–5)
ALP SERPL-CCNC: 91 U/L — SIGNIFICANT CHANGE UP (ref 40–120)
ALT FLD-CCNC: 13 U/L — SIGNIFICANT CHANGE UP (ref 12–78)
ANION GAP SERPL CALC-SCNC: 7 MMOL/L — SIGNIFICANT CHANGE UP (ref 5–17)
AST SERPL-CCNC: 22 U/L — SIGNIFICANT CHANGE UP (ref 15–37)
BASOPHILS # BLD AUTO: 0.01 K/UL — SIGNIFICANT CHANGE UP (ref 0–0.2)
BASOPHILS NFR BLD AUTO: 0.1 % — SIGNIFICANT CHANGE UP (ref 0–2)
BILIRUB SERPL-MCNC: 0.7 MG/DL — SIGNIFICANT CHANGE UP (ref 0.2–1.2)
BUN SERPL-MCNC: 38 MG/DL — HIGH (ref 7–23)
CALCIUM SERPL-MCNC: 7.6 MG/DL — LOW (ref 8.5–10.1)
CHLORIDE SERPL-SCNC: 109 MMOL/L — HIGH (ref 96–108)
CO2 SERPL-SCNC: 28 MMOL/L — SIGNIFICANT CHANGE UP (ref 22–31)
CREAT SERPL-MCNC: 1.11 MG/DL — SIGNIFICANT CHANGE UP (ref 0.5–1.3)
CULTURE RESULTS: ABNORMAL
EGFR: 52 ML/MIN/1.73M2 — LOW
EGFR: 52 ML/MIN/1.73M2 — LOW
EOSINOPHIL # BLD AUTO: 0.3 K/UL — SIGNIFICANT CHANGE UP (ref 0–0.5)
EOSINOPHIL NFR BLD AUTO: 3 % — SIGNIFICANT CHANGE UP (ref 0–6)
GLUCOSE BLDC GLUCOMTR-MCNC: 134 MG/DL — HIGH (ref 70–99)
GLUCOSE BLDC GLUCOMTR-MCNC: 134 MG/DL — HIGH (ref 70–99)
GLUCOSE BLDC GLUCOMTR-MCNC: 147 MG/DL — HIGH (ref 70–99)
GLUCOSE BLDC GLUCOMTR-MCNC: 152 MG/DL — HIGH (ref 70–99)
GLUCOSE BLDC GLUCOMTR-MCNC: 166 MG/DL — HIGH (ref 70–99)
GLUCOSE BLDC GLUCOMTR-MCNC: 91 MG/DL — SIGNIFICANT CHANGE UP (ref 70–99)
GLUCOSE SERPL-MCNC: 127 MG/DL — HIGH (ref 70–99)
GRAM STN FLD: ABNORMAL
HCT VFR BLD CALC: 24.7 % — LOW (ref 34.5–45)
HGB BLD-MCNC: 7.7 G/DL — LOW (ref 11.5–15.5)
IMM GRANULOCYTES NFR BLD AUTO: 0.9 % — SIGNIFICANT CHANGE UP (ref 0–0.9)
LYMPHOCYTES # BLD AUTO: 0.73 K/UL — LOW (ref 1–3.3)
LYMPHOCYTES # BLD AUTO: 7.4 % — LOW (ref 13–44)
MAGNESIUM SERPL-MCNC: 2.4 MG/DL — SIGNIFICANT CHANGE UP (ref 1.6–2.6)
MCHC RBC-ENTMCNC: 29.1 PG — SIGNIFICANT CHANGE UP (ref 27–34)
MCHC RBC-ENTMCNC: 31.2 G/DL — LOW (ref 32–36)
MCV RBC AUTO: 93.2 FL — SIGNIFICANT CHANGE UP (ref 80–100)
MONOCYTES # BLD AUTO: 0.43 K/UL — SIGNIFICANT CHANGE UP (ref 0–0.9)
MONOCYTES NFR BLD AUTO: 4.4 % — SIGNIFICANT CHANGE UP (ref 2–14)
NEUTROPHILS # BLD AUTO: 8.28 K/UL — HIGH (ref 1.8–7.4)
NEUTROPHILS NFR BLD AUTO: 84.2 % — HIGH (ref 43–77)
NRBC BLD AUTO-RTO: 0 /100 WBCS — SIGNIFICANT CHANGE UP (ref 0–0)
PHOSPHATE SERPL-MCNC: 2.6 MG/DL — SIGNIFICANT CHANGE UP (ref 2.5–4.5)
PLATELET # BLD AUTO: 281 K/UL — SIGNIFICANT CHANGE UP (ref 150–400)
POTASSIUM SERPL-MCNC: 3.6 MMOL/L — SIGNIFICANT CHANGE UP (ref 3.5–5.3)
POTASSIUM SERPL-SCNC: 3.6 MMOL/L — SIGNIFICANT CHANGE UP (ref 3.5–5.3)
PROT SERPL-MCNC: 5.6 GM/DL — LOW (ref 6–8.3)
RBC # BLD: 2.65 M/UL — LOW (ref 3.8–5.2)
RBC # FLD: 16.1 % — HIGH (ref 10.3–14.5)
SODIUM SERPL-SCNC: 144 MMOL/L — SIGNIFICANT CHANGE UP (ref 135–145)
SPECIMEN SOURCE: SIGNIFICANT CHANGE UP
WBC # BLD: 9.84 K/UL — SIGNIFICANT CHANGE UP (ref 3.8–10.5)
WBC # FLD AUTO: 9.84 K/UL — SIGNIFICANT CHANGE UP (ref 3.8–10.5)

## 2025-06-14 PROCEDURE — 99291 CRITICAL CARE FIRST HOUR: CPT

## 2025-06-14 PROCEDURE — 93971 EXTREMITY STUDY: CPT | Mod: 26,RT

## 2025-06-14 RX ORDER — FENTANYL CITRATE-0.9 % NACL/PF 100MCG/2ML
50 SYRINGE (ML) INTRAVENOUS ONCE
Refills: 0 | Status: DISCONTINUED | OUTPATIENT
Start: 2025-06-14 | End: 2025-06-14

## 2025-06-14 RX ADMIN — Medication 15 MILLILITER(S): at 05:47

## 2025-06-14 RX ADMIN — Medication 4 MILLILITER(S): at 17:14

## 2025-06-14 RX ADMIN — Medication 4 MILLILITER(S): at 00:09

## 2025-06-14 RX ADMIN — HEPARIN SODIUM 5000 UNIT(S): 1000 INJECTION INTRAVENOUS; SUBCUTANEOUS at 05:47

## 2025-06-14 RX ADMIN — ATORVASTATIN CALCIUM 40 MILLIGRAM(S): 80 TABLET, FILM COATED ORAL at 22:00

## 2025-06-14 RX ADMIN — DEXMEDETOMIDINE HYDROCHLORIDE IN SODIUM CHLORIDE 7.47 MICROGRAM(S)/KG/HR: 4 INJECTION INTRAVENOUS at 07:27

## 2025-06-14 RX ADMIN — DEXMEDETOMIDINE HYDROCHLORIDE IN SODIUM CHLORIDE 7.47 MICROGRAM(S)/KG/HR: 4 INJECTION INTRAVENOUS at 02:17

## 2025-06-14 RX ADMIN — Medication 25 GRAM(S): at 22:00

## 2025-06-14 RX ADMIN — INSULIN GLARGINE-YFGN 25 UNIT(S): 100 INJECTION, SOLUTION SUBCUTANEOUS at 22:01

## 2025-06-14 RX ADMIN — Medication 1 APPLICATION(S): at 05:48

## 2025-06-14 RX ADMIN — Medication 650 MILLIGRAM(S): at 16:31

## 2025-06-14 RX ADMIN — Medication 650 MILLIGRAM(S): at 15:31

## 2025-06-14 RX ADMIN — Medication 15 MILLILITER(S): at 17:16

## 2025-06-14 RX ADMIN — INSULIN LISPRO 1: 100 INJECTION, SOLUTION INTRAVENOUS; SUBCUTANEOUS at 23:32

## 2025-06-14 RX ADMIN — Medication 4 MILLILITER(S): at 06:03

## 2025-06-14 RX ADMIN — Medication 25 GRAM(S): at 05:47

## 2025-06-14 RX ADMIN — HEPARIN SODIUM 5000 UNIT(S): 1000 INJECTION INTRAVENOUS; SUBCUTANEOUS at 13:09

## 2025-06-14 RX ADMIN — DEXMEDETOMIDINE HYDROCHLORIDE IN SODIUM CHLORIDE 7.47 MICROGRAM(S)/KG/HR: 4 INJECTION INTRAVENOUS at 17:20

## 2025-06-14 RX ADMIN — Medication 1 TABLET(S): at 11:25

## 2025-06-14 RX ADMIN — INSULIN LISPRO 1: 100 INJECTION, SOLUTION INTRAVENOUS; SUBCUTANEOUS at 11:23

## 2025-06-14 RX ADMIN — Medication 81 MILLIGRAM(S): at 11:25

## 2025-06-14 RX ADMIN — Medication 4 MILLILITER(S): at 11:31

## 2025-06-14 RX ADMIN — Medication 4 MILLILITER(S): at 23:27

## 2025-06-14 RX ADMIN — Medication 50 MICROGRAM(S): at 23:00

## 2025-06-14 RX ADMIN — DEXMEDETOMIDINE HYDROCHLORIDE IN SODIUM CHLORIDE 7.47 MICROGRAM(S)/KG/HR: 4 INJECTION INTRAVENOUS at 19:20

## 2025-06-14 RX ADMIN — Medication 40 MILLIGRAM(S): at 05:47

## 2025-06-14 RX ADMIN — HEPARIN SODIUM 5000 UNIT(S): 1000 INJECTION INTRAVENOUS; SUBCUTANEOUS at 22:00

## 2025-06-14 RX ADMIN — Medication 2 TABLET(S): at 22:00

## 2025-06-14 RX ADMIN — Medication 50 MICROGRAM(S): at 22:00

## 2025-06-14 RX ADMIN — Medication 25 GRAM(S): at 13:10

## 2025-06-14 NOTE — PROGRESS NOTE ADULT - SUBJECTIVE AND OBJECTIVE BOX
HPI:  Tiffanie Joshi is a 74 year old female with PMHx of HTN, HLD, IDDM2, CAD (s/p PCI), and recent right humerus fracture (placed in sling 3 days ago) who presented to the ED on 25 for complaints of elevated blood glucose.    Patient is Icelandic-speaking but declined  services and preferred for daughter-in-law at bedside to translate. As per daughter-in-law, patient fell three days ago and landed on her right shoulder. Went to [facility] at that time and found to have right humerus fracture and placed in sling. Discharged home with outpatient orthopedic surgery follow up. Since then, patient has been refusing to get out of bed and not eating much. Will drink a little juice every now and then. Daughter checked her blood glucose around 5PM and it was > 400 so brought to the ER for further evaluation. Baseline functional status is ambulates with walker and dependent with all ADLs. Lives at home with daughter.    In the ED, VSS. Hgb 10.3, sodium 132, potassium 6.3, BUN 70, Cr 2.02, blood glucose 510. VBG 7.29 / 58 / 34 / 26. COVID/influenza/RSV negative. CT head without acute intracranial findings. Received 1L NS bolus, 1L LR bolus, and insulin 20 U IV.  (30 May 2025 23:43)      24 hr events:      ## Labs:  CBC:                        7.7    9.84  )-----------( 281      ( 2025 03:55 )             24.7     Chem:      144  |  109[H]  |  38[H]  ----------------------------<  127[H]  3.6   |  28  |  1.11    Ca    7.6[L]      2025 03:55  Phos  2.6       Mg     2.4         TPro  5.6[L]  /  Alb  1.6[L]  /  TBili  0.7  /  DBili  x   /  AST  22  /  ALT  13  /  AlkPhos  91  -14    Coags:    culture blood:  --   @ 10:18            culture sputum:     Commensal lina consistent with body site           culture urine:  --  @ 10:18        ## Imaging:    ## Medications:  piperacillin/tazobactam IVPB.. 3.375 Gram(s) IV Intermittent every 8 hours    ranolazine 500 milliGRAM(s) Oral two times a day    sodium chloride 3%  Inhalation 4 milliLiter(s) Inhalation every 6 hours    atorvastatin 40 milliGRAM(s) Oral at bedtime  insulin glargine Injectable (LANTUS) 25 Unit(s) SubCutaneous at bedtime  insulin lispro (ADMELOG) corrective regimen sliding scale   SubCutaneous every 6 hours    aspirin  chewable 81 milliGRAM(s) Oral daily  heparin   Injectable 5000 Unit(s) SubCutaneous every 8 hours    aluminum hydroxide/magnesium hydroxide/simethicone Suspension 30 milliLiter(s) Oral every 6 hours PRN  pantoprazole  Injectable 40 milliGRAM(s) IV Push every 24 hours  polyethylene glycol 3350 17 Gram(s) Oral daily PRN  senna 2 Tablet(s) Oral at bedtime    acetaminophen     Tablet .. 650 milliGRAM(s) Oral every 6 hours PRN  acetaminophen     Tablet .. 650 milliGRAM(s) Oral every 6 hours PRN  dexMEDEtomidine Infusion 0.4 MICROgram(s)/kG/Hr IV Continuous <Continuous>  ondansetron Injectable 4 milliGRAM(s) IV Push every 8 hours PRN      ## Vitals:  T(C): 37.4 (25 @ 13:00), Max: 38 (25 @ 21:00)  HR: 55 (25 @ 13:00) (55 - 76)  BP: 126/78 (25 @ 13:00) (91/48 - 194/84)  BP(mean): 93 (25 @ 13:00) (61 - 110)  RR: 10 (25 @ 13:00) (8 - 22)  SpO2: 100% (25 @ 13:00) (96% - 100%)  Wt(kg): --  Vent: Mode: AC/ CMV (Assist Control/ Continuous Mandatory Ventilation), RR (machine): 10, RR (patient): 11, TV (machine): 400, FiO2: 40, PEEP: 6, PIP: 34  AB-13 @ 07:01  -   @ 07:00  --------------------------------------------------------  IN: 1111.6 mL / OUT: 1035 mL / NET: 76.6 mL     @ 07:01   @ 13:56  --------------------------------------------------------  IN: 283.6 mL / OUT: 200 mL / NET: 83.6 mL          ## P/E:  Gen: lying comfortably in bed in no apparent distress  Nose: (+) NGT  Mouth: (+) ETT  Lungs: CTA  Heart: RRR  Abd: Soft/+BS/ Non-tender  Ext: No sign edema  Neuro: Responding to verbal stimuli    CENTRAL LINE: [ ] YES [ ] NO  LOCATION:   DATE INSERTED:  REMOVE: [ ] YES [ ] NO      THOMAS: [ ] YES [ ] NO    DATE INSERTED:  REMOVE:  [ ] YES [ ] NO      A-LINE:  [ ] YES [ ] NO  LOCATION:   DATE INSERTED:  REMOVE:  [ ] YES [ ] NO  EXPLAIN:      CODE STATUS: [x ] full code  [ ] DNR  [ ] DNI  [ ] [facility]  Goals of care discussion: [x ] yes  HPI:  Pt is a 73 yo F with h/o CAD (s/p PCI), HTN, HLD, DM and recent R  humerus fracture admitted on 25 for elevated blood glucose. Pt was treated at [facility] for humerus fracture , placed in sling and discharged for outpatient orthopedic surgery follow up. ON  RRT for hypotension , fever 103.2 and pt transferred to ICU and subsequently intubated for airway protection, shock state. On  pt s/p cardiac arrest while in CT  - ACLS for 10 minutes with ROSC. Pt treated for NSTEMI Heparin drip (no intervention). Pt also found to have E. coli  ESBL bacteremia. Pt s/p extubation 2025 with intact MS.  pt transferred to [facility] and on 2025 RRT x2 1) AMS - pt woke up and was following commands 2) AMS (possibly 2 to seizure per medical team saw patient with R gaze and unresponsiveness)' pt given 5mg Valium by medical team. ICU reconsulted - upon 2nd evaluation pt obtunded, Spo2 80% with shallow breaths and borderline low BP. Pt intubated on medical floor, hypotensive post intubation and started on vasopressors and transferred to ICU. The following day pt sent for CTA: No pulmonary embolism. Tracheobronchial secretions, greatest in the left lower lobe. Patchy and nodular bilateral lung opacities may be infectious or inflammatory. Right middle lobe subpleural 6 mm nodular opacity is new from prior. Acute/subacute fractures right anterior lateral fourth through sixth ribs. ICU dx: Septic shock 2 to aspiration PNA      ## Labs:  CBC:                        7.7    9.84  )-----------( 281      ( 2025 03:55 )             24.7     Chem:  -14    144  |  109[H]  |  38[H]  ----------------------------<  127[H]  3.6   |  28  |  1.11    Ca    7.6[L]      2025 03:55  Phos  2.6     06-14  Mg     2.4     06-14    TPro  5.6[L]  /  Alb  1.6[L]  /  TBili  0.7  /  DBili  x   /  AST  22  /  ALT  13  /  AlkPhos  91  06-14    Coags:    culture blood:  --   @ 10:18            culture sputum:     Commensal lina consistent with body site           culture urine:  --  @ 10:18      ## Imaging:    ## Medications:  piperacillin/tazobactam IVPB.. 3.375 Gram(s) IV Intermittent every 8 hours    ranolazine 500 milliGRAM(s) Oral two times a day    sodium chloride 3%  Inhalation 4 milliLiter(s) Inhalation every 6 hours    atorvastatin 40 milliGRAM(s) Oral at bedtime  insulin glargine Injectable (LANTUS) 25 Unit(s) SubCutaneous at bedtime  insulin lispro (ADMELOG) corrective regimen sliding scale   SubCutaneous every 6 hours    aspirin  chewable 81 milliGRAM(s) Oral daily  heparin   Injectable 5000 Unit(s) SubCutaneous every 8 hours    aluminum hydroxide/magnesium hydroxide/simethicone Suspension 30 milliLiter(s) Oral every 6 hours PRN  pantoprazole  Injectable 40 milliGRAM(s) IV Push every 24 hours  polyethylene glycol 3350 17 Gram(s) Oral daily PRN  senna 2 Tablet(s) Oral at bedtime    acetaminophen     Tablet .. 650 milliGRAM(s) Oral every 6 hours PRN  acetaminophen     Tablet .. 650 milliGRAM(s) Oral every 6 hours PRN  dexMEDEtomidine Infusion 0.4 MICROgram(s)/kG/Hr IV Continuous <Continuous>  ondansetron Injectable 4 milliGRAM(s) IV Push every 8 hours PRN      ## Vitals:  T(C): 37.4 (25 @ 13:00), Max: 38 (25 @ 21:00)  HR: 55 (25 @ 13:00) (55 - 76)  BP: 126/78 (25 @ 13:00) (91/48 - 194/84)  BP(mean): 93 (25 @ 13:00) (61 - 110)  RR: 10 (25 @ 13:00) (8 - 22)  SpO2: 100% (25 @ 13:00) (96% - 100%)  Wt(kg): --  Vent: Mode: AC/ CMV (Assist Control/ Continuous Mandatory Ventilation), RR (machine): 10, RR (patient): 11, TV (machine): 400, FiO2: 40, PEEP: 6, PIP: 34  AB-13 @ 07: @ 07:00  --------------------------------------------------------  IN: 1111.6 mL / OUT: 1035 mL / NET: 76.6 mL     @ 07: @ 13:56  --------------------------------------------------------  IN: 283.6 mL / OUT: 200 mL / NET: 83.6 mL      ## P/E:  Gen: lying comfortably in bed in no apparent distress  Nose: (+) NGT  Mouth: (+) ETT  Lungs: CTA  Heart: RRR  Abd: Soft/+BS/ Non-tender  Ext: No sign edema  Neuro: Responding to verbal stimuli    CENTRAL LINE: [ ] YES [ ] NO  LOCATION:   DATE INSERTED:  REMOVE: [ ] YES [ ] NO      THOMAS: [ ] YES [ ] NO    DATE INSERTED:  REMOVE:  [ ] YES [ ] NO      A-LINE:  [ ] YES [ ] NO  LOCATION:   DATE INSERTED:  REMOVE:  [ ] YES [ ] NO  EXPLAIN:      CODE STATUS: [x ] full code  [ ] DNR  [ ] [facility]  [ ] [facility]  Goals of care discussion: [x ] yes  HPI:  Pt is a 73 yo F with h/o CAD (s/p PCI), HTN, HLD, DM and recent R  humerus fracture admitted on 25 for elevated blood glucose. Pt was treated at [facility] for humerus fracture , placed in sling and discharged for outpatient orthopedic surgery follow up. ON  RRT for hypotension , fever 103.2 and pt transferred to ICU and subsequently intubated for airway protection, shock state. On  pt s/p cardiac arrest while in CT  - ACLS for 10 minutes with ROSC. Pt treated for NSTEMI Heparin drip (no intervention). Pt also found to have E. coli  ESBL bacteremia. Pt s/p extubation 2025 with intact MS. 2025 pt transferred to [facility] and on 2025 RRT x2 1) AMS - pt woke up and was following commands 2) AMS (possibly 2 to seizure per medical team saw patient with R gaze and unresponsiveness)' pt given 5mg Valium by medical team. ICU reconsulted - upon 2nd evaluation pt obtunded, Spo2 80% with shallow breaths and borderline low BP. Pt intubated on medical floor, hypotensive post intubation and started on vasopressors and transferred to ICU. The following day pt sent for CTA: No pulmonary embolism. Tracheobronchial secretions, greatest in the left lower lobe. Patchy and nodular bilateral lung opacities may be infectious or inflammatory. Right middle lobe subpleural 6 mm nodular opacity is new from prior. Acute/subacute fractures right anterior lateral fourth through sixth ribs. ICU dx: 1) Septic shock 2 to aspiration PNA 2) 2) Acute resp failure requiring intubation 2 to aspiration PNA        ## Labs:  CBC:                        7.7    9.84  )-----------( 281      ( 2025 03:55 )             24.7     Chem:  06-14    144  |  109[H]  |  38[H]  ----------------------------<  127[H]  3.6   |  28  |  1.11    Ca    7.6[L]      2025 03:55  Phos  2.6     06-14  Mg     2.4     06-14    TPro  5.6[L]  /  Alb  1.6[L]  /  TBili  0.7  /  DBili  x   /  AST  22  /  ALT  13  /  AlkPhos  91      Coags:    culture blood:  --   @ 10:18            culture sputum:     Commensal lina consistent with body site           culture urine:  --  @ 10:18      ## Imaging:    ## Medications:  piperacillin/tazobactam IVPB.. 3.375 Gram(s) IV Intermittent every 8 hours    ranolazine 500 milliGRAM(s) Oral two times a day    sodium chloride 3%  Inhalation 4 milliLiter(s) Inhalation every 6 hours    atorvastatin 40 milliGRAM(s) Oral at bedtime  insulin glargine Injectable (LANTUS) 25 Unit(s) SubCutaneous at bedtime  insulin lispro (ADMELOG) corrective regimen sliding scale   SubCutaneous every 6 hours    aspirin  chewable 81 milliGRAM(s) Oral daily  heparin   Injectable 5000 Unit(s) SubCutaneous every 8 hours    aluminum hydroxide/magnesium hydroxide/simethicone Suspension 30 milliLiter(s) Oral every 6 hours PRN  pantoprazole  Injectable 40 milliGRAM(s) IV Push every 24 hours  polyethylene glycol 3350 17 Gram(s) Oral daily PRN  senna 2 Tablet(s) Oral at bedtime    acetaminophen     Tablet .. 650 milliGRAM(s) Oral every 6 hours PRN  acetaminophen     Tablet .. 650 milliGRAM(s) Oral every 6 hours PRN  dexMEDEtomidine Infusion 0.4 MICROgram(s)/kG/Hr IV Continuous <Continuous>  ondansetron Injectable 4 milliGRAM(s) IV Push every 8 hours PRN      ## Vitals:  T(C): 37.4 (25 @ 13:00), Max: 38 (25 @ 21:00)  HR: 55 (25 @ 13:00) (55 - 76)  BP: 126/78 (25 @ 13:00) (91/48 - 194/84)  BP(mean): 93 (25 @ 13:00) (61 - 110)  RR: 10 (25 @ 13:00) (8 - 22)  SpO2: 100% (25 @ 13:00) (96% - 100%)  Wt(kg): --  Vent: Mode: AC/ CMV (Assist Control/ Continuous Mandatory Ventilation), RR (machine): 10, RR (patient): 11, TV (machine): 400, FiO2: 40, PEEP: 6, PIP: 34  AB-13 @ 07: @ 07:00  --------------------------------------------------------  IN: 1111.6 mL / OUT: 1035 mL / NET: 76.6 mL     @ 07: @ 13:56  --------------------------------------------------------  IN: 283.6 mL / OUT: 200 mL / NET: 83.6 mL      ## P/E:  Gen: lying comfortably in bed in no apparent distress  Nose: (+) NGT  Mouth: (+) ETT  Lungs: CTA  Heart: RRR  Abd: Soft/+BS/ Non-tender  Ext: No sign edema  Neuro: Responding to verbal stimuli    CENTRAL LINE: [ ] YES [ ] NO  LOCATION:   DATE INSERTED:  REMOVE: [ ] YES [ ] NO      THOMAS: [ ] YES [ ] NO    DATE INSERTED:  REMOVE:  [ ] YES [ ] NO      A-LINE:  [ ] YES [ ] NO  LOCATION:   DATE INSERTED:  REMOVE:  [ ] YES [ ] NO  EXPLAIN:      CODE STATUS: [x ] full code  [ ] DNR  [ ] DNI  [ ] [facility]  Goals of care discussion: [x ] yes  HPI:  Pt is a 75 yo F with h/o CAD (s/p PCI), HTN, HLD, DM and recent R  humerus fracture admitted on 25 for elevated blood glucose. Pt was treated at [facility] for humerus fracture , placed in sling and discharged for outpatient orthopedic surgery follow up. ON  RRT for hypotension , fever 103.2 and pt transferred to ICU and subsequently intubated for airway protection, shock state. On  pt s/p cardiac arrest while in CT  - ACLS for 10 minutes with ROSC. Pt treated for NSTEMI Heparin drip (no intervention). Pt also found to have E. coli  ESBL bacteremia. Pt s/p extubation 2025 with intact MS. 2025 pt transferred to [facility] and on 2025 RRT x2 1) AMS - pt woke up and was following commands 2) AMS (possibly 2 to seizure per medical team saw patient with R gaze and unresponsiveness)' pt given 5mg Valium by medical team. ICU reconsulted - upon 2nd evaluation pt obtunded, Spo2 80% with shallow breaths and borderline low BP. Pt intubated on medical floor, hypotensive post intubation and started on vasopressors and transferred to ICU. The following day pt sent for CTA: No pulmonary embolism. Tracheobronchial secretions, greatest in the left lower lobe. Patchy and nodular bilateral lung opacities may be infectious or inflammatory. Right middle lobe subpleural 6 mm nodular opacity is new from prior. Acute/subacute fractures right anterior lateral fourth through sixth ribs. ICU dx: 1) Septic shock 2 to aspiration PNA 2) Acute resp failure requiring intubation 2 to aspiration PNA        ## Labs:  CBC:                        7.7    9.84  )-----------( 281      ( 2025 03:55 )             24.7     Chem:  06-14    144  |  109[H]  |  38[H]  ----------------------------<  127[H]  3.6   |  28  |  1.11    Ca    7.6[L]      2025 03:55  Phos  2.6     06-14  Mg     2.4     06-14    TPro  5.6[L]  /  Alb  1.6[L]  /  TBili  0.7  /  DBili  x   /  AST  22  /  ALT  13  /  AlkPhos  91      Coags:    culture blood:  --   @ 10:18            culture sputum:     Commensal lina consistent with body site           culture urine:  --  @ 10:18      ## Imaging:    ## Medications:  piperacillin/tazobactam IVPB.. 3.375 Gram(s) IV Intermittent every 8 hours    ranolazine 500 milliGRAM(s) Oral two times a day    sodium chloride 3%  Inhalation 4 milliLiter(s) Inhalation every 6 hours    atorvastatin 40 milliGRAM(s) Oral at bedtime  insulin glargine Injectable (LANTUS) 25 Unit(s) SubCutaneous at bedtime  insulin lispro (ADMELOG) corrective regimen sliding scale   SubCutaneous every 6 hours    aspirin  chewable 81 milliGRAM(s) Oral daily  heparin   Injectable 5000 Unit(s) SubCutaneous every 8 hours    aluminum hydroxide/magnesium hydroxide/simethicone Suspension 30 milliLiter(s) Oral every 6 hours PRN  pantoprazole  Injectable 40 milliGRAM(s) IV Push every 24 hours  polyethylene glycol 3350 17 Gram(s) Oral daily PRN  senna 2 Tablet(s) Oral at bedtime    acetaminophen     Tablet .. 650 milliGRAM(s) Oral every 6 hours PRN  acetaminophen     Tablet .. 650 milliGRAM(s) Oral every 6 hours PRN  dexMEDEtomidine Infusion 0.4 MICROgram(s)/kG/Hr IV Continuous <Continuous>  ondansetron Injectable 4 milliGRAM(s) IV Push every 8 hours PRN      ## Vitals:  T(C): 37.4 (25 @ 13:00), Max: 38 (25 @ 21:00)  HR: 55 (25 @ 13:00) (55 - 76)  BP: 126/78 (25 @ 13:00) (91/48 - 194/84)  BP(mean): 93 (25 @ 13:00) (61 - 110)  RR: 10 (25 @ 13:00) (8 - 22)  SpO2: 100% (25 @ 13:00) (96% - 100%)  Wt(kg): --  Vent: Mode: AC/ CMV (Assist Control/ Continuous Mandatory Ventilation), RR (machine): 10, RR (patient): 11, TV (machine): 400, FiO2: 40, PEEP: 6, PIP: 34  AB-13 @ 07: @ 07:00  --------------------------------------------------------  IN: 1111.6 mL / OUT: 1035 mL / NET: 76.6 mL     @ 07: @ 13:56  --------------------------------------------------------  IN: 283.6 mL / OUT: 200 mL / NET: 83.6 mL      ## P/E:  Gen: lying comfortably in bed in no apparent distress  Nose: (+) NGT  Mouth: (+) ETT  Lungs: CTA  Heart: RRR  Abd: Soft/+BS/ Non-tender  Ext: No sign edema  Neuro: Responding to verbal stimuli    CENTRAL LINE: [ ] YES [ ] NO  LOCATION:   DATE INSERTED:  REMOVE: [ ] YES [ ] NO      THOMAS: [ ] YES [ ] NO    DATE INSERTED:  REMOVE:  [ ] YES [ ] NO      A-LINE:  [ ] YES [ ] NO  LOCATION:   DATE INSERTED:  REMOVE:  [ ] YES [ ] NO  EXPLAIN:      CODE STATUS: [x ] full code  [ ] DNR  [ ] DNI  [ ] [facility]  Goals of care discussion: [x ] yes

## 2025-06-14 NOTE — PROGRESS NOTE ADULT - ASSESSMENT
73 y/o F w/T2DM, CAD and recent humerus fracture initially admitted with hyperglycemia w/hospital course c/b sepsis secondary to E. Coli bacteremia, UTI, NSTEMI, cardiac arrrest, and acute respiratory failure with hypoxia and hypercapnia now back in ICU for AMS concerning for seizure, acute respiratory failure, and hypotension likely secondary to severe sepsis with septic shock +/- sedation. Acute blood loss anemia.      # AMS secondary to sepsis and aspiration, now awake during SBT.   # Acute respiratory failure aspiration, with thick secreation preventing aspiration vent adjusted down to 40%, adding airway clearence.   # Hypotension Resolved septic shock.   # Severe sepsis with septic shock  # Cardiac arrest  # NSTEMI      A/P  Resp:  ID:  CVS:  HEME:  FEN:  GI:  Neuro:  Social: Full code but discussion with daughter once better and ready to extubate, family likely would not want re-intubation or cpr.    Pt is a 75 yo F with h/o CAD (s/p PCI), HTN, HLD, DM and recent R  humerus fracture admitted on 5/30/25 for elevated blood glucose. Pt was treated at [facility] for humerus fracture , placed in sling and discharged for outpatient orthopedic surgery follow up. ON 6/1 RRT for hypotension , fever 103.2 and pt transferred to ICU and subsequently intubated for airway protection, shock state. On 6/2 pt s/p cardiac arrest while in CT  - ACLS for 10 minutes with ROSC. Pt treated for NSTEMI Heparin drip (no intervention). Pt also found to have E. coli  ESBL bacteremia. Pt s/p extubation 6/6/2025 with intact MS. ^/8/2025 pt transferred to [facility] and on 6/11/2025 RRT x2 1) AMS - pt woke up and was following commands 2) AMS (possibly 2 to seizure per medical team saw patient with R gaze and unresponsiveness)' pt given 5mg Valium by medical team. ICU reconsulted - upon 2nd evaluation pt obtunded, Spo2 80% with shallow breaths and borderline low BP. Pt intubated on medical floor, hypotensive post intubation and started on vasopressors and transferred to ICU. The following day pt sent for CTA: No pulmonary embolism. Tracheobronchial secretions, greatest in the left lower lobe. Patchy and nodular bilateral lung opacities may be infectious or inflammatory. Right middle lobe subpleural 6 mm nodular opacity is new from prior. Acute/subacute fractures right anterior lateral fourth through sixth ribs. ICU dx: 1) Septic shock 2 to aspiration PNA 2) Acute resp failure r     Resp/Social: Cont daily SBT/ Full code but discussion with daughter once better and ready to extubate, family likely would not want re-intubation or CPR   ID: No new (+) Cx; finish course of empiric Zosyn  CVS: Off Levophed since yesterday am  HEME: DVT prophylaxis with sq Heparin  FEN: Cont enteral feeds  Neuro: No Sz on EEG/ Minimize sedation with Precedex  Social: Full code but discussion with daughter once better and ready to extubate, family likely would not want re-intubation or CPR    Pt is a 73 yo F with h/o CAD (s/p PCI), HTN, HLD, DM and recent R  humerus fracture admitted on 5/30/25 for elevated blood glucose. Pt was treated at [facility] for humerus fracture , placed in sling and discharged for outpatient orthopedic surgery follow up. ON 6/1 RRT for hypotension , fever 103.2 and pt transferred to ICU and subsequently intubated for airway protection, shock state. On 6/2 pt s/p cardiac arrest while in CT  - ACLS for 10 minutes with ROSC. Pt treated for NSTEMI Heparin drip (no intervention). Pt also found to have E. coli  ESBL bacteremia. Pt s/p extubation 6/6/2025 with intact MS. 6/8/2025 pt transferred to [facility] and on 6/11/2025 RRT x2 1) AMS - pt woke up and was following commands 2) AMS (possibly 2 to seizure per medical team saw patient with R gaze and unresponsiveness)' pt given 5mg Valium by medical team. ICU reconsulted - upon 2nd evaluation pt obtunded, Spo2 80% with shallow breaths and borderline low BP. Pt intubated on medical floor, hypotensive post intubation and started on vasopressors and transferred to ICU. The following day pt sent for CTA: No pulmonary embolism. Tracheobronchial secretions, greatest in the left lower lobe. Patchy and nodular bilateral lung opacities may be infectious or inflammatory. Right middle lobe subpleural 6 mm nodular opacity is new from prior. Acute/subacute fractures right anterior lateral fourth through sixth ribs. ICU dx: 1) Septic shock 2 to aspiration PNA 2) 2) Acute resp failure requiring intubation 2 to aspiration PNA       Resp/Social: Cont daily SBT/ Full code but discussion with daughter once better and ready to extubate, family likely would not want re-intubation or CPR   ID: No new (+) Cx; finish course of empiric Zosyn  CVS: Off Levophed since yesterday am  HEME: DVT prophylaxis with sq Heparin  FEN: Cont enteral feeds  Neuro: No Sz on EEG/ Minimize sedation with Precedex  Social: Full code but discussion with daughter once better and ready to extubate, family likely would not want re-intubation or CPR    Pt is a 73 yo F with h/o CAD (s/p PCI), HTN, HLD, DM and recent R  humerus fracture admitted on 5/30/25 for elevated blood glucose. Pt was treated at [facility] for humerus fracture , placed in sling and discharged for outpatient orthopedic surgery follow up. ON 6/1 RRT for hypotension , fever 103.2 and pt transferred to ICU and subsequently intubated for airway protection, shock state. On 6/2 pt s/p cardiac arrest while in CT  - ACLS for 10 minutes with ROSC. Pt treated for NSTEMI Heparin drip (no intervention). Pt also found to have E. coli  ESBL bacteremia. Pt s/p extubation 6/6/2025 with intact MS. 6/8/2025 pt transferred to [facility] and on 6/11/2025 RRT x2 1) AMS - pt woke up and was following commands 2) AMS (possibly 2 to seizure per medical team saw patient with R gaze and unresponsiveness)' pt given 5mg Valium by medical team. ICU reconsulted - upon 2nd evaluation pt obtunded, Spo2 80% with shallow breaths and borderline low BP. Pt intubated on medical floor, hypotensive post intubation and started on vasopressors and transferred to ICU. The following day pt sent for CTA: No pulmonary embolism. Tracheobronchial secretions, greatest in the left lower lobe. Patchy and nodular bilateral lung opacities may be infectious or inflammatory. Right middle lobe subpleural 6 mm nodular opacity is new from prior. Acute/subacute fractures right anterior lateral fourth through sixth ribs. ICU dx: 1) Septic shock 2 to aspiration PNA 2) Acute resp failure requiring intubation 2 to aspiration PNA       Resp/Social: Cont daily SBT/ Full code but discussion with daughter once better and ready to extubate, family likely would not want re-intubation or CPR   ID: No new (+) Cx; finish course of empiric Zosyn  CVS: Off Levophed since yesterday am  HEME: DVT prophylaxis with sq Heparin  FEN: Cont enteral feeds  Neuro: No Sz on EEG/ Minimize sedation with Precedex  Social: Full code but discussion with daughter once better and ready to extubate, family likely would not want re-intubation or CPR    Pt is a 73 yo F with h/o CAD (s/p PCI), HTN, HLD, DM and recent R  humerus fracture admitted on 5/30/25 for elevated blood glucose. Pt was treated at [facility] for humerus fracture , placed in sling and discharged for outpatient orthopedic surgery follow up. ON 6/1 RRT for hypotension , fever 103.2 and pt transferred to ICU and subsequently intubated for airway protection, shock state. On 6/2 pt s/p cardiac arrest while in CT  - ACLS for 10 minutes with ROSC. Pt treated for NSTEMI Heparin drip (no intervention). Pt also found to have E. coli  ESBL bacteremia. Pt s/p extubation 6/6/2025 with intact MS. 6/8/2025 pt transferred to [facility] and on 6/11/2025 RRT x2 1) AMS - pt woke up and was following commands 2) AMS (possibly 2 to seizure per medical team saw patient with R gaze and unresponsiveness)' pt given 5mg Valium by medical team. ICU reconsulted - upon 2nd evaluation pt obtunded, Spo2 80% with shallow breaths and borderline low BP. Pt intubated on medical floor, hypotensive post intubation and started on vasopressors and transferred to ICU. The following day pt sent for CTA: No pulmonary embolism. Tracheobronchial secretions, greatest in the left lower lobe. Patchy and nodular bilateral lung opacities may be infectious or inflammatory. Right middle lobe subpleural 6 mm nodular opacity is new from prior. Acute/subacute fractures right anterior lateral fourth through sixth ribs. ICU dx: 1) Septic shock 2 to aspiration PNA 2) Acute resp failure requiring intubation 2 to aspiration PNA     Resp/Social: Cont daily SBT/ Full code but discussion with daughter once better and ready to extubate, family likely would not want re-intubation or CPR   ID: No new (+) Cx; finish course of empiric Zosyn  CVS: Off Levophed since yesterday am  HEME: DVT prophylaxis with sq Heparin  FEN: Cont enteral feeds  Neuro: No Sz on EEG/ Minimize sedation with Precedex  Social: Full code but discussion with daughter once better and ready to extubate, family likely would not want re-intubation or CPR

## 2025-06-15 LAB
ALBUMIN SERPL ELPH-MCNC: 1.5 G/DL — LOW (ref 3.3–5)
ALP SERPL-CCNC: 121 U/L — HIGH (ref 40–120)
ALT FLD-CCNC: 14 U/L — SIGNIFICANT CHANGE UP (ref 12–78)
ANION GAP SERPL CALC-SCNC: 4 MMOL/L — LOW (ref 5–17)
AST SERPL-CCNC: 24 U/L — SIGNIFICANT CHANGE UP (ref 15–37)
BILIRUB SERPL-MCNC: 0.6 MG/DL — SIGNIFICANT CHANGE UP (ref 0.2–1.2)
BUN SERPL-MCNC: 36 MG/DL — HIGH (ref 7–23)
CALCIUM SERPL-MCNC: 7.7 MG/DL — LOW (ref 8.5–10.1)
CHLORIDE SERPL-SCNC: 110 MMOL/L — HIGH (ref 96–108)
CO2 SERPL-SCNC: 27 MMOL/L — SIGNIFICANT CHANGE UP (ref 22–31)
CREAT SERPL-MCNC: 0.95 MG/DL — SIGNIFICANT CHANGE UP (ref 0.5–1.3)
EGFR: 62 ML/MIN/1.73M2 — SIGNIFICANT CHANGE UP
EGFR: 62 ML/MIN/1.73M2 — SIGNIFICANT CHANGE UP
GLUCOSE BLDC GLUCOMTR-MCNC: 209 MG/DL — HIGH (ref 70–99)
GLUCOSE BLDC GLUCOMTR-MCNC: 242 MG/DL — HIGH (ref 70–99)
GLUCOSE BLDC GLUCOMTR-MCNC: 249 MG/DL — HIGH (ref 70–99)
GLUCOSE BLDC GLUCOMTR-MCNC: 266 MG/DL — HIGH (ref 70–99)
GLUCOSE BLDC GLUCOMTR-MCNC: 273 MG/DL — HIGH (ref 70–99)
GLUCOSE SERPL-MCNC: 194 MG/DL — HIGH (ref 70–99)
HCT VFR BLD CALC: 29.4 % — LOW (ref 34.5–45)
HGB BLD-MCNC: 8.8 G/DL — LOW (ref 11.5–15.5)
MAGNESIUM SERPL-MCNC: 2.4 MG/DL — SIGNIFICANT CHANGE UP (ref 1.6–2.6)
MCHC RBC-ENTMCNC: 28.3 PG — SIGNIFICANT CHANGE UP (ref 27–34)
MCHC RBC-ENTMCNC: 29.9 G/DL — LOW (ref 32–36)
MCV RBC AUTO: 94.5 FL — SIGNIFICANT CHANGE UP (ref 80–100)
NRBC BLD AUTO-RTO: 0 /100 WBCS — SIGNIFICANT CHANGE UP (ref 0–0)
PHOSPHATE SERPL-MCNC: 2.4 MG/DL — LOW (ref 2.5–4.5)
PLATELET # BLD AUTO: 268 K/UL — SIGNIFICANT CHANGE UP (ref 150–400)
POTASSIUM SERPL-MCNC: 4.1 MMOL/L — SIGNIFICANT CHANGE UP (ref 3.5–5.3)
POTASSIUM SERPL-SCNC: 4.1 MMOL/L — SIGNIFICANT CHANGE UP (ref 3.5–5.3)
PROT SERPL-MCNC: 6.2 GM/DL — SIGNIFICANT CHANGE UP (ref 6–8.3)
RBC # BLD: 3.11 M/UL — LOW (ref 3.8–5.2)
RBC # FLD: 16 % — HIGH (ref 10.3–14.5)
SODIUM SERPL-SCNC: 141 MMOL/L — SIGNIFICANT CHANGE UP (ref 135–145)
WBC # BLD: 7.61 K/UL — SIGNIFICANT CHANGE UP (ref 3.8–10.5)
WBC # FLD AUTO: 7.61 K/UL — SIGNIFICANT CHANGE UP (ref 3.8–10.5)

## 2025-06-15 PROCEDURE — 99291 CRITICAL CARE FIRST HOUR: CPT

## 2025-06-15 RX ADMIN — INSULIN LISPRO 2: 100 INJECTION, SOLUTION INTRAVENOUS; SUBCUTANEOUS at 17:01

## 2025-06-15 RX ADMIN — INSULIN LISPRO 2: 100 INJECTION, SOLUTION INTRAVENOUS; SUBCUTANEOUS at 05:31

## 2025-06-15 RX ADMIN — Medication 4 MILLILITER(S): at 05:09

## 2025-06-15 RX ADMIN — HEPARIN SODIUM 5000 UNIT(S): 1000 INJECTION INTRAVENOUS; SUBCUTANEOUS at 05:30

## 2025-06-15 RX ADMIN — Medication 4 MILLILITER(S): at 11:07

## 2025-06-15 RX ADMIN — Medication 2 TABLET(S): at 21:34

## 2025-06-15 RX ADMIN — DEXMEDETOMIDINE HYDROCHLORIDE IN SODIUM CHLORIDE 7.47 MICROGRAM(S)/KG/HR: 4 INJECTION INTRAVENOUS at 10:20

## 2025-06-15 RX ADMIN — Medication 15 MILLILITER(S): at 05:29

## 2025-06-15 RX ADMIN — ATORVASTATIN CALCIUM 40 MILLIGRAM(S): 80 TABLET, FILM COATED ORAL at 21:34

## 2025-06-15 RX ADMIN — Medication 15 MILLILITER(S): at 17:02

## 2025-06-15 RX ADMIN — DEXMEDETOMIDINE HYDROCHLORIDE IN SODIUM CHLORIDE 7.47 MICROGRAM(S)/KG/HR: 4 INJECTION INTRAVENOUS at 19:23

## 2025-06-15 RX ADMIN — Medication 40 MILLIGRAM(S): at 05:30

## 2025-06-15 RX ADMIN — DEXMEDETOMIDINE HYDROCHLORIDE IN SODIUM CHLORIDE 7.47 MICROGRAM(S)/KG/HR: 4 INJECTION INTRAVENOUS at 22:30

## 2025-06-15 RX ADMIN — Medication 650 MILLIGRAM(S): at 05:30

## 2025-06-15 RX ADMIN — DEXMEDETOMIDINE HYDROCHLORIDE IN SODIUM CHLORIDE 7.47 MICROGRAM(S)/KG/HR: 4 INJECTION INTRAVENOUS at 07:17

## 2025-06-15 RX ADMIN — INSULIN LISPRO 3: 100 INJECTION, SOLUTION INTRAVENOUS; SUBCUTANEOUS at 11:30

## 2025-06-15 RX ADMIN — Medication 4 MILLILITER(S): at 17:18

## 2025-06-15 RX ADMIN — HEPARIN SODIUM 5000 UNIT(S): 1000 INJECTION INTRAVENOUS; SUBCUTANEOUS at 13:31

## 2025-06-15 RX ADMIN — DEXMEDETOMIDINE HYDROCHLORIDE IN SODIUM CHLORIDE 7.47 MICROGRAM(S)/KG/HR: 4 INJECTION INTRAVENOUS at 05:29

## 2025-06-15 RX ADMIN — Medication 4 MILLILITER(S): at 23:08

## 2025-06-15 RX ADMIN — Medication 1 APPLICATION(S): at 05:31

## 2025-06-15 RX ADMIN — Medication 25 GRAM(S): at 13:32

## 2025-06-15 RX ADMIN — HEPARIN SODIUM 5000 UNIT(S): 1000 INJECTION INTRAVENOUS; SUBCUTANEOUS at 21:34

## 2025-06-15 RX ADMIN — INSULIN LISPRO 3: 100 INJECTION, SOLUTION INTRAVENOUS; SUBCUTANEOUS at 23:59

## 2025-06-15 RX ADMIN — Medication 25 GRAM(S): at 05:29

## 2025-06-15 RX ADMIN — INSULIN GLARGINE-YFGN 25 UNIT(S): 100 INJECTION, SOLUTION SUBCUTANEOUS at 21:34

## 2025-06-15 RX ADMIN — DEXMEDETOMIDINE HYDROCHLORIDE IN SODIUM CHLORIDE 7.47 MICROGRAM(S)/KG/HR: 4 INJECTION INTRAVENOUS at 00:33

## 2025-06-15 RX ADMIN — Medication 1 TABLET(S): at 11:26

## 2025-06-15 RX ADMIN — Medication 81 MILLIGRAM(S): at 11:26

## 2025-06-15 RX ADMIN — Medication 25 GRAM(S): at 21:34

## 2025-06-15 RX ADMIN — DEXMEDETOMIDINE HYDROCHLORIDE IN SODIUM CHLORIDE 7.47 MICROGRAM(S)/KG/HR: 4 INJECTION INTRAVENOUS at 16:21

## 2025-06-15 RX ADMIN — Medication 650 MILLIGRAM(S): at 06:30

## 2025-06-15 NOTE — PROGRESS NOTE ADULT - ASSESSMENT
Pt is a 73 yo F with h/o CAD (s/p PCI), HTN, HLD, DM and recent R  humerus fracture admitted on 5/30/25 for elevated blood glucose. Pt was treated at [facility] for humerus fracture , placed in sling and discharged for outpatient orthopedic surgery follow up. ON 6/1 RRT for hypotension , fever 103.2 and pt transferred to ICU and subsequently intubated for airway protection, shock state. On 6/2 pt s/p cardiac arrest while in CT  - ACLS for 10 minutes with ROSC. Pt treated for NSTEMI Heparin drip (no intervention). Pt also found to have E. coli  ESBL bacteremia. Pt s/p extubation 6/6/2025 with intact MS. ^/8/2025 pt transferred to [facility] and on 6/11/2025 RRT x2 1) AMS - pt woke up and was following commands 2) AMS (possibly 2 to seizure per medical team saw patient with R gaze and unresponsiveness)' pt given 5mg Valium by medical team. ICU reconsulted - upon 2nd evaluation pt obtunded, Spo2 80% with shallow breaths and borderline low BP. Pt intubated on medical floor, hypotensive post intubation and started on vasopressors and transferred to ICU. The following day pt sent for CTA: No pulmonary embolism. Tracheobronchial secretions, greatest in the left lower lobe. Patchy and nodular bilateral lung opacities may be infectious or inflammatory. Right middle lobe subpleural 6 mm nodular opacity is new from prior. Acute/subacute fractures right anterior lateral fourth through sixth ribs. ICU dx: 1) Septic shock 2 to aspiration PNA 2) Acute resp failure requiring intubation     Resp/Social: Cont daily SBT: requiring high PS/ Full code but discussion with daughter once better and ready to extubate, family likely would not want re-intubation or CPR; this was again confirmed today with daughter at the bedside   ID: No new (+) Cx; finish course of empiric Zosyn (6/19/2025)  CVS: Cont Ranexa  HEME: DVT prophylaxis with sq Heparin  FEN: Cont enteral feeds  Endo: Adjust Lantus and Lispro to FS  Neuro: No Sz on EEG/ Minimize sedation with Precedex         Pt is a 75 yo F with h/o CAD (s/p PCI), HTN, HLD, DM and recent R  humerus fracture admitted on 5/30/25 for elevated blood glucose. Pt was treated at [facility] for humerus fracture , placed in sling and discharged for outpatient orthopedic surgery follow up. ON 6/1 RRT for hypotension , fever 103.2 and pt transferred to ICU and subsequently intubated for airway protection, shock state. On 6/2 pt s/p cardiac arrest while in CT  - ACLS for 10 minutes with ROSC. Pt treated for NSTEMI Heparin drip (no intervention). Pt also found to have E. coli  ESBL bacteremia. Pt s/p extubation 6/6/2025 with intact MS. ^/8/2025 pt transferred to [facility] and on 6/11/2025 RRT x2 1) AMS - pt woke up and was following commands 2) AMS (possibly 2 to seizure per medical team saw patient with R gaze and unresponsiveness)' pt given 5mg Valium by medical team. ICU reconsulted - upon 2nd evaluation pt obtunded, Spo2 80% with shallow breaths and borderline low BP. Pt intubated on medical floor, hypotensive post intubation and started on vasopressors and transferred to ICU. The following day pt sent for CTA: No pulmonary embolism. Tracheobronchial secretions, greatest in the left lower lobe. Patchy and nodular bilateral lung opacities may be infectious or inflammatory. Right middle lobe subpleural 6 mm nodular opacity is new from prior. Acute/subacute fractures right anterior lateral fourth through sixth ribs. ICU dx: 1) Septic shock 2 to aspiration PNA 2) 2) Acute resp failure requiring intubation 2 to aspiration PNA     Resp/Social: Cont daily SBT: requiring high PS/ Full code but discussion with daughter once better and ready to extubate, family likely would not want re-intubation or CPR; this was again confirmed today with daughter at the bedside   ID: No new (+) Cx; finish course of empiric Zosyn (6/19/2025)  CVS: Cont Ranexa  HEME: DVT prophylaxis with sq Heparin  FEN: Cont enteral feeds  Endo: Adjust Lantus and Lispro to FS  Neuro: No Sz on EEG/ Minimize sedation with Precedex

## 2025-06-15 NOTE — PROGRESS NOTE ADULT - SUBJECTIVE AND OBJECTIVE BOX
HPI:  Pt is a 73 yo F with h/o CAD (s/p PCI), HTN, HLD, DM and recent R  humerus fracture admitted on 25 for elevated blood glucose. Pt was treated at [facility] for humerus fracture , placed in sling and discharged for outpatient orthopedic surgery follow up. ON  RRT for hypotension , fever 103.2 and pt transferred to ICU and subsequently intubated for airway protection, shock state. On  pt s/p cardiac arrest while in CT  - ACLS for 10 minutes with ROSC. Pt treated for NSTEMI Heparin drip (no intervention). Pt also found to have E. coli  ESBL bacteremia. Pt s/p extubation 2025 with intact MS.  pt transferred to [facility] and on 2025 RRT x2 1) AMS - pt woke up and was following commands 2) AMS (possibly 2 to seizure per medical team saw patient with R gaze and unresponsiveness)' pt given 5mg Valium by medical team. ICU reconsulted - upon 2nd evaluation pt obtunded, Spo2 80% with shallow breaths and borderline low BP. Pt intubated on medical floor, hypotensive post intubation and started on vasopressors and transferred to ICU. The following day pt sent for CTA: No pulmonary embolism. Tracheobronchial secretions, greatest in the left lower lobe. Patchy and nodular bilateral lung opacities may be infectious or inflammatory. Right middle lobe subpleural 6 mm nodular opacity is new from prior. Acute/subacute fractures right anterior lateral fourth through sixth ribs. ICU dx: 1) Septic shock 2 to aspiration PNA 2) Acute resp failure requiring intubation      ## Labs:  CBC:                        8.8    7.61  )-----------( 268      ( 15 Seamus 2025 03:21 )             29.4     Chem:  06-15    141  |  110[H]  |  36[H]  ----------------------------<  194[H]  4.1   |  27  |  0.95    Ca    7.7[L]      15 Seamus 2025 03:21  Phos  2.4     06-15  Mg     2.4     06-15    TPro  6.2  /  Alb  1.5[L]  /  TBili  0.6  /  DBili  x   /  AST  24  /  ALT  14  /  AlkPhos  121[H]  06-15    Coags:          ## Imaging:    ## Medications:  piperacillin/tazobactam IVPB.. 3.375 Gram(s) IV Intermittent every 8 hours    ranolazine 500 milliGRAM(s) Oral two times a day    sodium chloride 3%  Inhalation 4 milliLiter(s) Inhalation every 6 hours    atorvastatin 40 milliGRAM(s) Oral at bedtime  insulin glargine Injectable (LANTUS) 25 Unit(s) SubCutaneous at bedtime  insulin lispro (ADMELOG) corrective regimen sliding scale   SubCutaneous every 6 hours    aspirin  chewable 81 milliGRAM(s) Oral daily  heparin   Injectable 5000 Unit(s) SubCutaneous every 8 hours    aluminum hydroxide/magnesium hydroxide/simethicone Suspension 30 milliLiter(s) Oral every 6 hours PRN  pantoprazole  Injectable 40 milliGRAM(s) IV Push every 24 hours  polyethylene glycol 3350 17 Gram(s) Oral daily PRN  senna 2 Tablet(s) Oral at bedtime    acetaminophen     Tablet .. 650 milliGRAM(s) Oral every 6 hours PRN  acetaminophen     Tablet .. 650 milliGRAM(s) Oral every 6 hours PRN  dexMEDEtomidine Infusion 0.4 MICROgram(s)/kG/Hr IV Continuous <Continuous>  ondansetron Injectable 4 milliGRAM(s) IV Push every 8 hours PRN      ## Vitals:  T(C): 36.3 (06-15-25 @ 12:00), Max: 37.4 (06-15-25 @ 04:00)  HR: 59 (06-15-25 @ 13:00) (48 - 60)  BP: 107/56 (06-15-25 @ 13:00) (87/51 - 141/63)  BP(mean): 71 (06-15-25 @ 13:00) (62 - 91)  RR: 18 (06-15-25 @ 13:00) (0 - 20)  SpO2: 91% (06-15-25 @ 13:00) (91% - 100%)  Wt(kg): --  Vent: Mode: CPAP with PS, RR (patient): 26, FiO2: 40, PEEP: 6, PS: 15  AB-14 @ 07:01  -  06-15 @ 07:00  --------------------------------------------------------  IN: 1364.9 mL / OUT: 850 mL / NET: 514.9 mL    06-15 @ 07:01  -  06-15 @ 15:11  --------------------------------------------------------  IN: 196.5 mL / OUT: 300 mL / NET: -103.5 mL          ## P/E:  Gen: lying comfortably in bed in no apparent distress  Nose: (+) NGT  Mouth: (+) ETT  Lungs: CTA  Heart: Cory  Abd: Soft/+BS/ Non-tender  Ext:  RUE ecchymotic and in a sling  Neuro: Sedated    CENTRAL LINE: [ ] YES [ ] NO  LOCATION:   DATE INSERTED:  REMOVE: [ ] YES [ ] NO      THOMAS: [ ] YES [ ] NO    DATE INSERTED:  REMOVE:  [ ] YES [ ] NO      A-LINE:  [ ] YES [ ] NO  LOCATION:   DATE INSERTED:  REMOVE:  [ ] YES [ ] NO  EXPLAIN:      CODE STATUS: [x ] full code  [ ] DNR  [ ] DNI  [ ] [facility]  Goals of care discussion: [x ] yes      HPI:  Pt is a 73 yo F with h/o CAD (s/p PCI), HTN, HLD, DM and recent R  humerus fracture admitted on 25 for elevated blood glucose. Pt was treated at [facility] for humerus fracture , placed in sling and discharged for outpatient orthopedic surgery follow up. ON  RRT for hypotension , fever 103.2 and pt transferred to ICU and subsequently intubated for airway protection, shock state. On  pt s/p cardiac arrest while in CT  - ACLS for 10 minutes with ROSC. Pt treated for NSTEMI Heparin drip (no intervention). Pt also found to have E. coli  ESBL bacteremia. Pt s/p extubation 2025 with intact MS. 2025 pt transferred to [facility] and on 2025 RRT x2 1) AMS - pt woke up and was following commands 2) AMS (possibly 2 to seizure per medical team saw patient with R gaze and unresponsiveness)' pt given 5mg Valium by medical team. ICU reconsulted - upon 2nd evaluation pt obtunded, Spo2 80% with shallow breaths and borderline low BP. Pt intubated on medical floor, hypotensive post intubation and started on vasopressors and transferred to ICU. The following day pt sent for CTA: No pulmonary embolism. Tracheobronchial secretions, greatest in the left lower lobe. Patchy and nodular bilateral lung opacities may be infectious or inflammatory. Right middle lobe subpleural 6 mm nodular opacity is new from prior. Acute/subacute fractures right anterior lateral fourth through sixth ribs. ICU dx: 1) Septic shock 2 to aspiration PNA 2) Acute resp failure requiring intubation 2 to aspiration PNA      ## Labs:  CBC:                        8.8    7.61  )-----------( 268      ( 15 Seamus 2025 03:21 )             29.4     Chem:  06-15    141  |  110[H]  |  36[H]  ----------------------------<  194[H]  4.1   |  27  |  0.95    Ca    7.7[L]      15 Seamus 2025 03:21  Phos  2.4     06-15  Mg     2.4     06-15    TPro  6.2  /  Alb  1.5[L]  /  TBili  0.6  /  DBili  x   /  AST  24  /  ALT  14  /  AlkPhos  121[H]  06-15    Coags:          ## Imaging:    ## Medications:  piperacillin/tazobactam IVPB.. 3.375 Gram(s) IV Intermittent every 8 hours    ranolazine 500 milliGRAM(s) Oral two times a day    sodium chloride 3%  Inhalation 4 milliLiter(s) Inhalation every 6 hours    atorvastatin 40 milliGRAM(s) Oral at bedtime  insulin glargine Injectable (LANTUS) 25 Unit(s) SubCutaneous at bedtime  insulin lispro (ADMELOG) corrective regimen sliding scale   SubCutaneous every 6 hours    aspirin  chewable 81 milliGRAM(s) Oral daily  heparin   Injectable 5000 Unit(s) SubCutaneous every 8 hours    aluminum hydroxide/magnesium hydroxide/simethicone Suspension 30 milliLiter(s) Oral every 6 hours PRN  pantoprazole  Injectable 40 milliGRAM(s) IV Push every 24 hours  polyethylene glycol 3350 17 Gram(s) Oral daily PRN  senna 2 Tablet(s) Oral at bedtime    acetaminophen     Tablet .. 650 milliGRAM(s) Oral every 6 hours PRN  acetaminophen     Tablet .. 650 milliGRAM(s) Oral every 6 hours PRN  dexMEDEtomidine Infusion 0.4 MICROgram(s)/kG/Hr IV Continuous <Continuous>  ondansetron Injectable 4 milliGRAM(s) IV Push every 8 hours PRN      ## Vitals:  T(C): 36.3 (06-15-25 @ 12:00), Max: 37.4 (06-15-25 @ 04:00)  HR: 59 (06-15-25 @ 13:00) (48 - 60)  BP: 107/56 (06-15-25 @ 13:00) (87/51 - 141/63)  BP(mean): 71 (06-15-25 @ 13:00) (62 - 91)  RR: 18 (06-15-25 @ 13:00) (0 - 20)  SpO2: 91% (06-15-25 @ 13:00) (91% - 100%)  Wt(kg): --  Vent: Mode: CPAP with PS, RR (patient): 26, FiO2: 40, PEEP: 6, PS: 15  AB-14 @ 07:01  -  06-15 @ 07:00  --------------------------------------------------------  IN: 1364.9 mL / OUT: 850 mL / NET: 514.9 mL    06-15 @ 07:01  -  06-15 @ 15:11  --------------------------------------------------------  IN: 196.5 mL / OUT: 300 mL / NET: -103.5 mL          ## P/E:  Gen: lying comfortably in bed in no apparent distress  Nose: (+) NGT  Mouth: (+) ETT  Lungs: CTA  Heart: Cory  Abd: Soft/+BS/ Non-tender  Ext:  RUE ecchymotic and in a sling  Neuro: Sedated    CENTRAL LINE: [ ] YES [ ] NO  LOCATION:   DATE INSERTED:  REMOVE: [ ] YES [ ] NO      GUZMAN: [ ] YES [ ] NO    DATE INSERTED:  REMOVE:  [ ] YES [ ] NO      A-LINE:  [ ] YES [ ] NO  LOCATION:   DATE INSERTED:  REMOVE:  [ ] YES [ ] NO  EXPLAIN:      CODE STATUS: [x ] full code  [ ] DNR  [ ] DNI  [ ] MOLST  Goals of care discussion: [x ] yes

## 2025-06-16 LAB
ALBUMIN SERPL ELPH-MCNC: 1.5 G/DL — LOW (ref 3.3–5)
ALP SERPL-CCNC: 128 U/L — HIGH (ref 40–120)
ALT FLD-CCNC: 14 U/L — SIGNIFICANT CHANGE UP (ref 12–78)
ANION GAP SERPL CALC-SCNC: 4 MMOL/L — LOW (ref 5–17)
AST SERPL-CCNC: 24 U/L — SIGNIFICANT CHANGE UP (ref 15–37)
BILIRUB SERPL-MCNC: 0.6 MG/DL — SIGNIFICANT CHANGE UP (ref 0.2–1.2)
BUN SERPL-MCNC: 39 MG/DL — HIGH (ref 7–23)
CALCIUM SERPL-MCNC: 7.6 MG/DL — LOW (ref 8.5–10.1)
CHLORIDE SERPL-SCNC: 111 MMOL/L — HIGH (ref 96–108)
CO2 SERPL-SCNC: 26 MMOL/L — SIGNIFICANT CHANGE UP (ref 22–31)
CREAT SERPL-MCNC: 1.06 MG/DL — SIGNIFICANT CHANGE UP (ref 0.5–1.3)
EGFR: 54 ML/MIN/1.73M2 — LOW
EGFR: 54 ML/MIN/1.73M2 — LOW
GLUCOSE BLDC GLUCOMTR-MCNC: 188 MG/DL — HIGH (ref 70–99)
GLUCOSE BLDC GLUCOMTR-MCNC: 210 MG/DL — HIGH (ref 70–99)
GLUCOSE BLDC GLUCOMTR-MCNC: 219 MG/DL — HIGH (ref 70–99)
GLUCOSE BLDC GLUCOMTR-MCNC: 248 MG/DL — HIGH (ref 70–99)
GLUCOSE BLDC GLUCOMTR-MCNC: 251 MG/DL — HIGH (ref 70–99)
GLUCOSE SERPL-MCNC: 226 MG/DL — HIGH (ref 70–99)
HCT VFR BLD CALC: 29 % — LOW (ref 34.5–45)
HGB BLD-MCNC: 8.6 G/DL — LOW (ref 11.5–15.5)
MAGNESIUM SERPL-MCNC: 2.4 MG/DL — SIGNIFICANT CHANGE UP (ref 1.6–2.6)
MCHC RBC-ENTMCNC: 28 PG — SIGNIFICANT CHANGE UP (ref 27–34)
MCHC RBC-ENTMCNC: 29.7 G/DL — LOW (ref 32–36)
MCV RBC AUTO: 94.5 FL — SIGNIFICANT CHANGE UP (ref 80–100)
NRBC BLD AUTO-RTO: 0 /100 WBCS — SIGNIFICANT CHANGE UP (ref 0–0)
PHOSPHATE SERPL-MCNC: 1.9 MG/DL — LOW (ref 2.5–4.5)
PLATELET # BLD AUTO: 270 K/UL — SIGNIFICANT CHANGE UP (ref 150–400)
POTASSIUM SERPL-MCNC: 4.5 MMOL/L — SIGNIFICANT CHANGE UP (ref 3.5–5.3)
POTASSIUM SERPL-SCNC: 4.5 MMOL/L — SIGNIFICANT CHANGE UP (ref 3.5–5.3)
PROT SERPL-MCNC: 6.3 GM/DL — SIGNIFICANT CHANGE UP (ref 6–8.3)
RBC # BLD: 3.07 M/UL — LOW (ref 3.8–5.2)
RBC # FLD: 15.8 % — HIGH (ref 10.3–14.5)
SODIUM SERPL-SCNC: 141 MMOL/L — SIGNIFICANT CHANGE UP (ref 135–145)
WBC # BLD: 6.09 K/UL — SIGNIFICANT CHANGE UP (ref 3.8–10.5)
WBC # FLD AUTO: 6.09 K/UL — SIGNIFICANT CHANGE UP (ref 3.8–10.5)

## 2025-06-16 PROCEDURE — 99291 CRITICAL CARE FIRST HOUR: CPT

## 2025-06-16 PROCEDURE — 99497 ADVNCD CARE PLAN 30 MIN: CPT | Mod: 59

## 2025-06-16 PROCEDURE — G0545: CPT

## 2025-06-16 PROCEDURE — 99232 SBSQ HOSP IP/OBS MODERATE 35: CPT

## 2025-06-16 RX ORDER — POTASSIUM PHOSPHATE, MONOBASIC POTASSIUM PHOSPHATE, DIBASIC INJECTION, 236; 224 MG/ML; MG/ML
30 SOLUTION, CONCENTRATE INTRAVENOUS ONCE
Refills: 0 | Status: COMPLETED | OUTPATIENT
Start: 2025-06-16 | End: 2025-06-16

## 2025-06-16 RX ORDER — INSULIN GLARGINE-YFGN 100 [IU]/ML
30 INJECTION, SOLUTION SUBCUTANEOUS AT BEDTIME
Refills: 0 | Status: DISCONTINUED | OUTPATIENT
Start: 2025-06-16 | End: 2025-07-01

## 2025-06-16 RX ORDER — HYDROMORPHONE/SOD CHLOR,ISO/PF 2 MG/10 ML
0.5 SYRINGE (ML) INJECTION
Refills: 0 | Status: DISCONTINUED | OUTPATIENT
Start: 2025-06-16 | End: 2025-06-16

## 2025-06-16 RX ADMIN — POTASSIUM PHOSPHATE, MONOBASIC POTASSIUM PHOSPHATE, DIBASIC INJECTION, 83.33 MILLIMOLE(S): 236; 224 SOLUTION, CONCENTRATE INTRAVENOUS at 06:18

## 2025-06-16 RX ADMIN — Medication 40 MILLIGRAM(S): at 05:41

## 2025-06-16 RX ADMIN — HEPARIN SODIUM 5000 UNIT(S): 1000 INJECTION INTRAVENOUS; SUBCUTANEOUS at 21:40

## 2025-06-16 RX ADMIN — HEPARIN SODIUM 5000 UNIT(S): 1000 INJECTION INTRAVENOUS; SUBCUTANEOUS at 05:41

## 2025-06-16 RX ADMIN — INSULIN GLARGINE-YFGN 30 UNIT(S): 100 INJECTION, SOLUTION SUBCUTANEOUS at 21:39

## 2025-06-16 RX ADMIN — Medication 4 MILLILITER(S): at 05:36

## 2025-06-16 RX ADMIN — INSULIN LISPRO 2: 100 INJECTION, SOLUTION INTRAVENOUS; SUBCUTANEOUS at 23:29

## 2025-06-16 RX ADMIN — Medication 25 GRAM(S): at 21:40

## 2025-06-16 RX ADMIN — Medication 650 MILLIGRAM(S): at 04:32

## 2025-06-16 RX ADMIN — Medication 81 MILLIGRAM(S): at 11:49

## 2025-06-16 RX ADMIN — INSULIN LISPRO 1: 100 INJECTION, SOLUTION INTRAVENOUS; SUBCUTANEOUS at 17:51

## 2025-06-16 RX ADMIN — Medication 25 GRAM(S): at 13:51

## 2025-06-16 RX ADMIN — Medication 1 TABLET(S): at 11:49

## 2025-06-16 RX ADMIN — Medication 15 MILLILITER(S): at 05:40

## 2025-06-16 RX ADMIN — DEXMEDETOMIDINE HYDROCHLORIDE IN SODIUM CHLORIDE 7.47 MICROGRAM(S)/KG/HR: 4 INJECTION INTRAVENOUS at 04:32

## 2025-06-16 RX ADMIN — DEXMEDETOMIDINE HYDROCHLORIDE IN SODIUM CHLORIDE 7.47 MICROGRAM(S)/KG/HR: 4 INJECTION INTRAVENOUS at 22:09

## 2025-06-16 RX ADMIN — Medication 1 APPLICATION(S): at 05:43

## 2025-06-16 RX ADMIN — DEXMEDETOMIDINE HYDROCHLORIDE IN SODIUM CHLORIDE 7.47 MICROGRAM(S)/KG/HR: 4 INJECTION INTRAVENOUS at 19:35

## 2025-06-16 RX ADMIN — HEPARIN SODIUM 5000 UNIT(S): 1000 INJECTION INTRAVENOUS; SUBCUTANEOUS at 13:51

## 2025-06-16 RX ADMIN — DEXMEDETOMIDINE HYDROCHLORIDE IN SODIUM CHLORIDE 7.47 MICROGRAM(S)/KG/HR: 4 INJECTION INTRAVENOUS at 16:25

## 2025-06-16 RX ADMIN — Medication 650 MILLIGRAM(S): at 05:32

## 2025-06-16 RX ADMIN — INSULIN LISPRO 3: 100 INJECTION, SOLUTION INTRAVENOUS; SUBCUTANEOUS at 11:48

## 2025-06-16 RX ADMIN — Medication 25 GRAM(S): at 05:41

## 2025-06-16 RX ADMIN — INSULIN LISPRO 2: 100 INJECTION, SOLUTION INTRAVENOUS; SUBCUTANEOUS at 05:41

## 2025-06-16 RX ADMIN — DEXMEDETOMIDINE HYDROCHLORIDE IN SODIUM CHLORIDE 7.47 MICROGRAM(S)/KG/HR: 4 INJECTION INTRAVENOUS at 11:49

## 2025-06-16 RX ADMIN — Medication 15 MILLILITER(S): at 17:51

## 2025-06-16 RX ADMIN — ATORVASTATIN CALCIUM 40 MILLIGRAM(S): 80 TABLET, FILM COATED ORAL at 21:41

## 2025-06-16 NOTE — GOALS OF CARE CONVERSATION - ADVANCED CARE PLANNING - CONVERSATION DETAILS
Code status is full code. Would want chest compressions and intubation if needed. Wants all life-sustaining measures. No limitations on care at this time. . Wants daughter, Jessica Joshi ([phone]) to make her medical decisions for her if she were to become unable to do so on her own.
I discussed pt's care with her 2 daughters and son at bedside. I explained that pt has had severe septic shock with ecoli bacteremia complicated by a cardiac arrest and multiorgan failure. Although that did improve, she became very weak and debilitated from her critical illness and was reintubated for aspiration pna and mucous plugging. I explained that over the last few weeks we have attempted to optimize her the best we can with airway clearance and IV abx but she remains tachypnic and with poor VTs on PSV trials each morning. I explained that at this point her continued resp failure is from her debilitated state and poor respiratory reserve. I explained that this isnt something that is likely to get better in a reasonable amount of time, especially at her age. Thus I explained the options of trial of extubation to palliation if fails vs reintubation with trach. I explained what trach entailed.     They understand and endorsed that they don't want to cause her any more suffering if she is unlikely to improve. She wouldn't want to be sustained on life long artificial support. I also explained that CPR in her state if she were to arrest would not return her to her prior functional status, but merely bring her back to her current state of illness. They agreed that they would not want CPR.   They said they are "99% sure that we will remove her from the ventilator without putting her back tomorrow. We are just waiting for one more brother who is arriving tomorrow." They will get back to us tomorrow when the other brother arrives but likely will palliatively extubate tomorrow. Agreed to DNR. KENDRA filed in chart. All questions answered as best I could.

## 2025-06-16 NOTE — PROGRESS NOTE ADULT - SUBJECTIVE AND OBJECTIVE BOX
[facility]  INFECTIOUS DISEASES   [address] [place] [address]  Tel: [phone]     Fax: [phone]  ==============================================================================  MD Jason Booth, DO Stephanie Olmedo, CHLOE Pope M.D  ==============================================================================      EMMANUEL GONZALEZ  [id number]  74y ([birth year])      Interval History:    ROS:    [ ] Unobtainable because:  [ ] All other systems negative except as noted    Constitutional: no fever, no chills  Head: no trauma  Eyes: no vision changes, no eye pain  ENT:  no sore throat, no rhinorrhea  Cardiovascular:  no chest pain, no palpitation  Respiratory:  no SOB, no cough  GI:  no abd pain, no vomiting, no diarrhea  urinary: no dysuria, no hematuria, no flank pain  musculoskeletal:  no joint pain, no joint swelling  skin:  no rash  neurology:  no headache, no seizure, no change in mental status  psych: no anxiety, no depression         Allergies  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/shellfish (Short breath)  No Known Drug Allergies        ANTIMICROBIALS:  piperacillin/tazobactam IVPB.. 3.375 every 8 hours        Physical Exam:  Vital Signs Last 24 Hrs  T(C): 37.2 (16 Jun 2025 10:00), Max: 38.4 (16 Jun 2025 04:00)  T(F): 99 (16 Jun 2025 10:00), Max: 101.1 (16 Jun 2025 04:00)  HR: 57 (16 Jun 2025 10:00) (54 - 65)  BP: 130/57 (16 Jun 2025 10:00) (97/74 - 150/60)  BP(mean): 78 (16 Jun 2025 10:00) (65 - 106)  RR: 14 (16 Jun 2025 10:00) (0 - 36)  SpO2: 100% (16 Jun 2025 10:00) (91% - 100%)    Parameters below as of 16 Jun 2025 08:00  Patient On (Oxygen Delivery Method): ventilator        06-15-25 @ 07:01  -  06-16-25 @ 07:00  --------------------------------------------------------  IN: 1477.2 mL / OUT: 1030 mL / NET: 447.2 mL    06-16-25 @ 07:01  -  06-16-25 @ 10:54  --------------------------------------------------------  IN: 57.5 mL / OUT: 50 mL / NET: 7.5 mL      General:    NAD,  non toxic  Head: atraumatic, normocephalic  Eye: normal sclera and conjunctiva  ENT:    no oral lesions, neck supple  Cardio:     regular S1, S2,  no murmur  Respiratory:    clear b/l,    no wheezing  abd:     soft,   BS +,   no tenderness  :   no CVAT,  no suprapubic tenderness,   no  marcus  Musculoskeletal:   no joint swelling,   no edema  vascular: no central lines, +PIV   Skin:    no rash  Neurologic:     no focal deficit  psych: normal affect    WBC Count: 6.09 K/uL (06-16 @ 03:35)  WBC Count: 7.61 K/uL (06-15 @ 03:21)  WBC Count: 9.84 K/uL (06-14 @ 03:55)  WBC Count: 13.37 K/uL (06-13 @ 02:50)  WBC Count: 11.56 K/uL (06-12 @ 05:10)  WBC Count: 11.73 K/uL (06-12 @ 02:50)  WBC Count: 14.39 K/uL (06-11 @ 18:05)                            8.6    6.09  )-----------( 270      ( 16 Jun 2025 03:35 )             29.0       06-16    141  |  111[H]  |  39[H]  ----------------------------<  226[H]  4.5   |  26  |  1.06    Ca    7.6[L]      16 Jun 2025 03:35  Phos  1.9     06-16  Mg     2.4     06-16    TPro  6.3  /  Alb  1.5[L]  /  TBili  0.6  /  DBili  x   /  AST  24  /  ALT  14  /  AlkPhos  128[H]  06-16      Urinalysis Basic - ( 16 Jun 2025 03:35 )    Color: x / Appearance: x / SG: x / pH: x  Gluc: 226 mg/dL / Ketone: x  / Bili: x / Urobili: x   Blood: x / Protein: x / Nitrite: x   Leuk Esterase: x / RBC: x / WBC x   Sq Epi: x / Non Sq Epi: x / Bacteria: x          Creatinine Trend: 1.06<--, 0.95<--, 1.11<--, 1.08<--, 1.00<--, 1.02<--      MICROBIOLOGY:  v  ET Tube ET Tube  06-13-25   Commensal lina consistent with body site  --    No Squamous epithelial cells per low power field  Numerous polymorphonuclear leukocytes per low power field  Few Yeast per oil power field      Sputum Sputum  06-12-25   Commensal lnia consistent with body site  --    Few Squamous epithelial cells per low power field  Numerous polymorphonuclear leukocytes per low power field  Yeast per oil power field      Catheterized Catheterized  06-11-25   <10,000 CFU/mL Normal Urogenital Lina  --  --      Blood Blood-Peripheral  06-11-25   No growth at 4 days  --  --      Blood Blood-Peripheral  06-11-25   No growth at 4 days  --  --      Blood Blood-Peripheral  06-04-25   No growth at 5 days  --  --      Catheterized Catheterized  06-02-25   >100,000 CFU/ml Escherichia coli ESBL  --  Escherichia coli ESBL      Blood Blood-Peripheral  06-01-25   Growth in aerobic and anaerobic bottles: Escherichia coli ESBL  See previous culture [id number]  --    Growth in aerobic bottle: Gram Negative Rods  Growth in anaerobic bottle: Gram Negative Rods      Blood Blood-Peripheral  06-01-25   Growth in aerobic and anaerobic bottles: Escherichia coli ESBL  Direct identification is available within approximately 3-5  hours either by Blood Panel Multiplexed PCR or Direct  MALDI-TOF. Details: [email or web address]  --  Blood Culture PCR  Escherichia coli ESBL                        D-Dimer Assay, Quantitative: 3552 (06-11)    Procalcitonin: 0.12 (06-12-25 @ 02:50)    SARS-CoV-2 Result: NotDetec (06-11-25 @ 22:58)        RADIOLOGY:   [facility]  INFECTIOUS DISEASES   [address] [place] [address]  Tel: [phone]     Fax: [phone]  ==============================================================================  MD Jason Booth, CHLOE Burns M.D  ==============================================================================      EMMANUEL GONZALEZ  [id number]  74y ([birth year])      Interval History:    Patient seen and examined today in CCU.  her daughter is at her bedside.    Patient remains  intubated and sedated on FIO2 30%  low grade temp this morning  all repeat blood cx are negative    as per CCU team most likely this week  trial of extubation to palliation     patient daughter at bedside and she  also stated same.        ROS:    [x ] Unobtainable because:  unable as patient intubated and sedated        Allergies  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/shellfish (Short breath)  No Known Drug Allergies        ANTIMICROBIALS:  piperacillin/tazobactam IVPB.. 3.375 every 8 hours        Physical Exam:  Vital Signs Last 24 Hrs  T(C): 37.2 (16 Jun 2025 10:00), Max: 38.4 (16 Jun 2025 04:00)  T(F): 99 (16 Jun 2025 10:00), Max: 101.1 (16 Jun 2025 04:00)  HR: 57 (16 Jun 2025 10:00) (54 - 65)  BP: 130/57 (16 Jun 2025 10:00) (97/74 - 150/60)  BP(mean): 78 (16 Jun 2025 10:00) (65 - 106)  RR: 14 (16 Jun 2025 10:00) (0 - 36)  SpO2: 100% (16 Jun 2025 10:00) (91% - 100%)    Parameters below as of 16 Jun 2025 08:00  Patient On (Oxygen Delivery Method): ventilator        06-15-25 @ 07:01  -  06-16-25 @ 07:00  --------------------------------------------------------  IN: 1477.2 mL / OUT: 1030 mL / NET: 447.2 mL    06-16-25 @ 07:01  -  06-16-25 @ 10:54  --------------------------------------------------------  IN: 57.5 mL / OUT: 50 mL / NET: 7.5 mL      GEn- remains intubated  on 30% Fio2  Eyes: normal sclera and conjunctiva  ENT:  ET tube present  Cardio: S1,S2 regular , + edema B/l LE  Respiratory: vented breath sounds b/l  abd:   soft, BS +, no distention  Musculoskeletal : right shoulder and arm with echymosis   Skin:  no rash  Neurologic: sedated      WBC Count: 6.09 K/uL (06-16 @ 03:35)  WBC Count: 7.61 K/uL (06-15 @ 03:21)  WBC Count: 9.84 K/uL (06-14 @ 03:55)  WBC Count: 13.37 K/uL (06-13 @ 02:50)  WBC Count: 11.56 K/uL (06-12 @ 05:10)  WBC Count: 11.73 K/uL (06-12 @ 02:50)  WBC Count: 14.39 K/uL (06-11 @ 18:05)                            8.6    6.09  )-----------( 270      ( 16 Jun 2025 03:35 )             29.0       06-16    141  |  111[H]  |  39[H]  ----------------------------<  226[H]  4.5   |  26  |  1.06    Ca    7.6[L]      16 Jun 2025 03:35  Phos  1.9     06-16  Mg     2.4     06-16    TPro  6.3  /  Alb  1.5[L]  /  TBili  0.6  /  DBili  x   /  AST  24  /  ALT  14  /  AlkPhos  128[H]  06-16      Urinalysis Basic - ( 16 Jun 2025 03:35 )    Color: x / Appearance: x / SG: x / pH: x  Gluc: 226 mg/dL / Ketone: x  / Bili: x / Urobili: x   Blood: x / Protein: x / Nitrite: x   Leuk Esterase: x / RBC: x / WBC x   Sq Epi: x / Non Sq Epi: x / Bacteria: x      Creatinine Trend: 1.06<--, 0.95<--, 1.11<--, 1.08<--, 1.00<--, 1.02<--      MICROBIOLOGY:    ET Tube ET Tube  06-13-25   Commensal lina consistent with body site  --    No Squamous epithelial cells per low power field  Numerous polymorphonuclear leukocytes per low power field  Few Yeast per oil power field      Sputum Sputum  06-12-25   Commensal lina consistent with body site  --    Few Squamous epithelial cells per low power field  Numerous polymorphonuclear leukocytes per low power field  Yeast per oil power field      Catheterized Catheterized  06-11-25   <10,000 CFU/mL Normal Urogenital Lina  --  --      Blood Blood-Peripheral  06-11-25   No growth at 4 days  --  --      Blood Blood-Peripheral  06-11-25   No growth at 4 days  --  --      Blood Blood-Peripheral  06-04-25   No growth at 5 days  --  --      Catheterized Catheterized  06-02-25   >100,000 CFU/ml Escherichia coli ESBL  --  Escherichia coli ESBL      Blood Blood-Peripheral  06-01-25   Growth in aerobic and anaerobic bottles: Escherichia coli ESBL  See previous culture [id number]  --    Growth in aerobic bottle: Gram Negative Rods  Growth in anaerobic bottle: Gram Negative Rods      Blood Blood-Peripheral  06-01-25   Growth in aerobic and anaerobic bottles: Escherichia coli ESBL  Direct identification is available within approximately 3-5  hours either by Blood Panel Multiplexed PCR or Direct  MALDI-TOF. Details: [email or web address]  --  Blood Culture PCR  Escherichia coli ESBL      D-Dimer Assay, Quantitative: 3552 (06-11)    Procalcitonin: 0.12 (06-12-25 @ 02:50)    SARS-CoV-2 Result: NotDetec (06-11-25 @ 22:58)      RADIOLOGY:

## 2025-06-16 NOTE — PROGRESS NOTE ADULT - ASSESSMENT
A/P-   74 year old female with PMHx of HTN, HLD, IDDM2, CAD (s/p PCI), and recent right humerus fracture (placed in sling 3 days ago) who presented to the ED on 5/30/25 for complaints of elevated blood glucose.   As per med history provided by family  patient fell three days ago and landed on her right shoulder. Went to [facility] at that time and found to have right humerus fracture and placed in sling. Discharged home with outpatient orthopedic surgery follow up. Since then, patient has been refusing to get out of bed and not eating much.   In the ED, VSS. Hgb 10.3, sodium 132, potassium 6.3, BUN 70, Cr 2.02, blood glucose 510. VBG 7.29 / 58 / 34 / 26. COVID/influenza/RSV negative. CT head without acute intracranial findings. Received 1L NS bolus, 1L LR bolus, and insulin 20 U IV.  (30 May 2025 23:43)    s/p  RRT last night   for AMS, hypotension, fever of 103  and was started on sepsis w/u given IVF bolus, Abx with CTX and Vanco. Persistently hypotensive and obtunded. Started on levophed infusion. Urgently transferred to ICU.   was also intubated for airway protection    Infectious Disease consultation requested this afternoon 6/2 to help with antibiotic management  for ESBL bacteremia    Intubated   sedated  on pressors for hypotension  afebrile today  yesterday 103 t-max  + leukocytosis of 29K  Blood cx- ESBL GNR by PCR x 2  UA from 5/31-pos for nitrates  urine cx-pending  DUANE  elevated cardiac enzymes    per med records had ESBL e.coli UTI in august and sept 2024 and ID was not consulted     6/3-  remains Intubated and sedated  on pressors for hypotension  temp of 101.5 today  + leukocytosis trending down to 20k  creatinine slightly better today  Blood cx- ESBL GNR by PCR x 2  UA from 5/31-pos for nitrates  urine cx->100K e.coli  DUANE  elevated cardiac enzymes    6/4: seen in ICU earlier, remains intubated, weaning process in progress, on pressors, continues having fevers, T 101 this morning, leukocytosis is better 17.27, Cr better 1.35, UC and BCs grew ESBL E. Coli, repeat BCs are pending, Meropenem IV continued. If not improvement in pt's fevers tomorrow, most likely CT a/p will be obtained.   6/5: remains in ICU, intubated and sedated, on pressors, Temp persistent 100-100.2, WBC better 16.43, Cr normalized, LFTs ok, repeat BCs NGTD, procalcitonin 2.41, TTE performed - Left ventricular regional wall motion abnormalities present. Meropenem IV continued.     6/6-  extubated  awake  afebrile now but early am had temp of 100.6  Leukocytosis almost resolved 11K today  Blood cx- ESBL GNR by PCR x 2  UA from 5/31-pos for nitrates  urine cx->100K e.coli  repeat blood cx- NGTD x 2 from 6/4 6/9-  downgraded to medical floor  Afebrile  Leukocytosis 12 k today  Blood cx- ESBL GNR by PCR x 2  UA from 5/31-pos for nitrates  urine cx->100K e.coli  repeat blood cx- NGTD x 2 from 6/4 6/11: pt is afebrile since 6/6, doing well on RA, leukocytosis was elevated 13.57, Cr ok,  no new cbc, repeat BCs remain with no growth to date, s/p 8 days of IV Meropenem, now observed off abx.     6/12-  s/p RRT last night for AMS and hypotention and second RRT later for ? seizure and aspiration  s/p intubation and transferred to CCU  off pressors  intubated  afebrile but had fever of 100.2 at 5 am  leukocytosis has decreased to 11k  repeat blood cx - NGTD x 2  has completed course of meropenem  fo her ESBL bacteremia dn UTI   cxr- b/l pleural effusions L> R  CTA report-  No pulmonary embolism..  Tracheobronchial secretions, greatest in the left lower lobe.  Patchy and   nodular bilateral lung opacities may be infectious or inflammatory. Right   middle lobe subpleural 6 mm nodular opacity is new from prior .   Short-term follow-up CT recommended    6/13-   remains intubated on 50% Fio2  Afebrile   Minimal leukocytosis of 13K  creatinine- normal value  Repeat blood cx  from 6/4- NGTD x 2  repeat blood cx from 6/11- NGTD x 2  sputum  cx- commensal lina c/w body site  has completed course of meropenem  fo her ESBL bacteremia and UTI - which she cleared  cxr- b/l pleural effusions L> R  CTA report-  No pulmonary embolism..  Tracheobronchial secretions, greatest in the left lower lobe.  Patchy and   nodular bilateral lung opacities may be infectious or inflammatory. Right   middle lobe subpleural 6 mm nodular opacity is new from prior .   Short-term follow-up CT recommended  EEG- reported negative for epilepsy ( pls see full report)    6/16-  remains intubated on 30% Fio2  T-max-100.6 this am  No leukocytosis   creatinine- normal value  Repeat blood cx  from 6/4- NGTD x 2  repeat blood cx from 6/11- NGTD x 2  sputum  cx- commensal lina c/w body site  has completed course of meropenem  for her ESBL bacteremia and UTI - which she cleared  cxr- b/l pleural effusions L> R  CTA report-  No pulmonary embolism..  Tracheobronchial secretions, greatest in the left lower lobe.  Patchy and   nodular bilateral lung opacities may be infectious or inflammatory. Right   middle lobe subpleural 6 mm nodular opacity is new from prior .   Short-term follow-up CT recommended  EEG- reported negative for epilepsy ( pls see full report)        Impression-  possible aspiration pneumonitis , also has Left  pleural effusion , intubated   ESBL septic shock -  was treated and had resolved  E.coli ESBL bacteremia sec to   source - treated   Acute respiratory failure requiring intubation - later  extubated  Right humerus fracture with hematoma    Plan-  had completed course of IV meropenem for the ESBL bacteremia and sepsis and repeat blood cx are negative   can continue with  IV zosyn by CCU team to cover for possible asp pneumonia.day #5  no objection to this abx pending further results   trend pt's temperatures and WBC  vent management as per CCU team, possible extubation this week as per CCU team - GOC discussion per family and CCU team.  rest of the medical management as per CCU team    All labs and imaging and chart notes reviewed.     d/w CCU team.    d/w Patient's daughter at bedside and all her questions were answered to the best of my ability.    Tariq Fraga MD  Infectious Disease Attending    for any questions please do not hesitate to contact me either via teams or by calling [phone]

## 2025-06-16 NOTE — PROGRESS NOTE ADULT - ASSESSMENT
Pt is a 73 yo F with h/o CAD (s/p PCI), HTN, HLD, DM and recent R  humerus fracture admitted on 5/30/25 for elevated blood glucose. Pt was treated at [facility] for humerus fracture , placed in sling and discharged for outpatient orthopedic surgery follow up. ON 6/1 RRT for hypotension , fever 103.2 and pt transferred to ICU and subsequently intubated for airway protection, shock state. On 6/2 pt s/p cardiac arrest while in CT  - ACLS for 10 minutes with ROSC. Pt treated for NSTEMI Heparin drip (no intervention). Pt also found to have E. coli  ESBL bacteremia. Pt s/p extubation 6/6/2025 with intact MS. ^/8/2025 pt transferred to [facility] and on 6/11/2025 RRT x2 1) AMS - pt woke up and was following commands 2) AMS (possibly 2 to seizure per medical team saw patient with R gaze and unresponsiveness)' pt given 5mg Valium by medical team. ICU reconsulted - upon 2nd evaluation pt obtunded, Spo2 80% with shallow breaths and borderline low BP. Pt intubated on medical floor, hypotensive post intubation and started on vasopressors and transferred to ICU. The following day pt sent for CTA: No pulmonary embolism. Tracheobronchial secretions, greatest in the left lower lobe. Patchy and nodular bilateral lung opacities may be infectious or inflammatory. Right middle lobe subpleural 6 mm nodular opacity is new from prior. Acute/subacute fractures right anterior lateral fourth through sixth ribs. ICU dx: 1) Septic shock 2 to aspiration PNA 2) 2) Acute resp failure requiring intubation 2 to aspiration PNA     Resp/Social: Cont daily SBT/ Full code but discussion with daughter once better and ready to extubate, family likely would not want re-intubation or CPR; this was again confirmed 6/15/2025 with daughter at the bedside. Today family at the bedside and considering palliative extubation   ID: No new (+) Cx; finish course of empiric Zosyn (6/19/2025)  CVS: Cont Ranexa  HEME: DVT prophylaxis with sq Heparin  FEN: Cont enteral feeds/ Replace K, Phos and Mg; pt hypokalemic, hypophosphatemic and hypomagnesemic Phos; pt hypophosphatemic    Endo: Adjust Lantus and Lispro to FS  Neuro: No Sz on EEG/ Minimize sedation with Precedex

## 2025-06-16 NOTE — PROGRESS NOTE ADULT - SUBJECTIVE AND OBJECTIVE BOX
HPI:  Pt is a 73 yo F with h/o CAD (s/p PCI), HTN, HLD, DM and recent R  humerus fracture admitted on 25 for elevated blood glucose. Pt was treated at [facility] for humerus fracture , placed in sling and discharged for outpatient orthopedic surgery follow up. ON  RRT for hypotension , fever 103.2 and pt transferred to ICU and subsequently intubated for airway protection, shock state. On  pt s/p cardiac arrest while in CT  - ACLS for 10 minutes with ROSC. Pt treated for NSTEMI Heparin drip (no intervention). Pt also found to have E. coli  ESBL bacteremia. Pt s/p extubation 2025 with intact MS.  pt transferred to [facility] and on 2025 RRT x2 1) AMS - pt woke up and was following commands 2) AMS (possibly 2 to seizure per medical team saw patient with R gaze and unresponsiveness)' pt given 5mg Valium by medical team. ICU reconsulted - upon 2nd evaluation pt obtunded, Spo2 80% with shallow breaths and borderline low BP. Pt intubated on medical floor, hypotensive post intubation and started on vasopressors and transferred to ICU. The following day pt sent for CTA: No pulmonary embolism. Tracheobronchial secretions, greatest in the left lower lobe. Patchy and nodular bilateral lung opacities may be infectious or inflammatory. Right middle lobe subpleural 6 mm nodular opacity is new from prior. Acute/subacute fractures right anterior lateral fourth through sixth ribs. ICU dx: 1) Septic shock 2 to aspiration PNA 2) 2) Acute resp failure requiring intubation 2 to aspiration PNA        ## Labs:  CBC:                        8.6    6.09  )-----------( 270      ( 2025 03:35 )             29.0     Chem:  06-16    141  |  111[H]  |  39[H]  ----------------------------<  226[H]  4.5   |  26  |  1.06    Ca    7.6[L]      2025 03:35  Phos  1.9     06-16  Mg     2.4     06-16    TPro  6.3  /  Alb  1.5[L]  /  TBili  0.6  /  DBili  x   /  AST  24  /  ALT  14  /  AlkPhos  128[H]  -    Coags:          ## Imaging:    ## Medications:  piperacillin/tazobactam IVPB.. 3.375 Gram(s) IV Intermittent every 8 hours    ranolazine 500 milliGRAM(s) Oral two times a day      atorvastatin 40 milliGRAM(s) Oral at bedtime  insulin glargine Injectable (LANTUS) 30 Unit(s) SubCutaneous at bedtime  insulin lispro (ADMELOG) corrective regimen sliding scale   SubCutaneous every 6 hours    aspirin  chewable 81 milliGRAM(s) Oral daily  heparin   Injectable 5000 Unit(s) SubCutaneous every 8 hours    aluminum hydroxide/magnesium hydroxide/simethicone Suspension 30 milliLiter(s) Oral every 6 hours PRN  polyethylene glycol 3350 17 Gram(s) Oral daily PRN  senna 2 Tablet(s) Oral at bedtime    acetaminophen     Tablet .. 650 milliGRAM(s) Oral every 6 hours PRN  acetaminophen     Tablet .. 650 milliGRAM(s) Oral every 6 hours PRN  dexMEDEtomidine Infusion 0.4 MICROgram(s)/kG/Hr IV Continuous <Continuous>  HYDROmorphone  Injectable 0.5 milliGRAM(s) IV Push every 3 hours PRN  ondansetron Injectable 4 milliGRAM(s) IV Push every 8 hours PRN      ## Vitals:  T(C): 37.8 (25 @ 16:00), Max: 38.4 (25 @ 04:00)  HR: 53 (25 @ 16:00) (53 - 65)  BP: 117/53 (25 @ 16:00) (97/74 - 150/60)  BP(mean): 73 (25 @ 16:00) (65 - 106)  RR: 11 (25 @ 16:00) (0 - 36)  SpO2: 98% (25 @ 16:00) (95% - 100%)  Wt(kg): --  Vent: Mode: AC/ CMV (Assist Control/ Continuous Mandatory Ventilation), RR (machine): 10, RR (patient): 14, TV (machine): 400, FiO2: 30, PEEP: 6, PIP: 31  AB-15 @ 07: @ 07:00  --------------------------------------------------------  IN: 1477.2 mL / OUT: 1030 mL / NET: 447.2 mL     @ 07: @ 17:08  --------------------------------------------------------  IN: 250.5 mL / OUT: 325 mL / NET: -74.5 mL          ## P/E:  Gen: lying comfortably in bed in no apparent distress  Nose: (+) NGT  Mouth: (+) ETT  Lungs: CTA  Heart: Cory  Abd: Soft/+BS/ Non-tender  Ext:  RUE ecchymotic and in a sling  Neuro: Responsive to verbal stimulu    CENTRAL LINE: [ ] YES [ ] NO  LOCATION:   DATE INSERTED:  REMOVE: [ ] YES [ ] NO      THOMAS: [ ] YES [ ] NO    DATE INSERTED:  REMOVE:  [ ] YES [ ] NO      A-LINE:  [ ] YES [ ] NO  LOCATION:   DATE INSERTED:  REMOVE:  [ ] YES [ ] NO  EXPLAIN:      CODE STATUS: [ ] full code  [ x] DNR  [ ] DNI  [ ] MOLST  Goals of care discussion: [x ] yes

## 2025-06-16 NOTE — GOALS OF CARE CONVERSATION - ADVANCED CARE PLANNING - MOLST COMPLETED
16-Jun-2025 Azelaic Acid Pregnancy And Lactation Text: This medication is considered safe during pregnancy and breast feeding.

## 2025-06-17 LAB
CULTURE RESULTS: SIGNIFICANT CHANGE UP
CULTURE RESULTS: SIGNIFICANT CHANGE UP
GLUCOSE BLDC GLUCOMTR-MCNC: 126 MG/DL — HIGH (ref 70–99)
GLUCOSE BLDC GLUCOMTR-MCNC: 130 MG/DL — HIGH (ref 70–99)
GLUCOSE BLDC GLUCOMTR-MCNC: 141 MG/DL — HIGH (ref 70–99)
GLUCOSE BLDC GLUCOMTR-MCNC: 235 MG/DL — HIGH (ref 70–99)
GLUCOSE BLDC GLUCOMTR-MCNC: 249 MG/DL — HIGH (ref 70–99)
SPECIMEN SOURCE: SIGNIFICANT CHANGE UP
SPECIMEN SOURCE: SIGNIFICANT CHANGE UP

## 2025-06-17 PROCEDURE — 99291 CRITICAL CARE FIRST HOUR: CPT

## 2025-06-17 RX ORDER — ENOXAPARIN SODIUM 100 MG/ML
40 INJECTION SUBCUTANEOUS EVERY 24 HOURS
Refills: 0 | Status: DISCONTINUED | OUTPATIENT
Start: 2025-06-17 | End: 2025-07-05

## 2025-06-17 RX ADMIN — Medication 325 MILLIGRAM(S): at 11:53

## 2025-06-17 RX ADMIN — ENOXAPARIN SODIUM 40 MILLIGRAM(S): 100 INJECTION SUBCUTANEOUS at 15:03

## 2025-06-17 RX ADMIN — INSULIN LISPRO 2: 100 INJECTION, SOLUTION INTRAVENOUS; SUBCUTANEOUS at 05:26

## 2025-06-17 RX ADMIN — DEXMEDETOMIDINE HYDROCHLORIDE IN SODIUM CHLORIDE 7.47 MICROGRAM(S)/KG/HR: 4 INJECTION INTRAVENOUS at 22:55

## 2025-06-17 RX ADMIN — Medication 15 MILLILITER(S): at 18:00

## 2025-06-17 RX ADMIN — INSULIN LISPRO 2: 100 INJECTION, SOLUTION INTRAVENOUS; SUBCUTANEOUS at 11:52

## 2025-06-17 RX ADMIN — RANOLAZINE 500 MILLIGRAM(S): 1000 TABLET, FILM COATED, EXTENDED RELEASE ORAL at 18:01

## 2025-06-17 RX ADMIN — ATORVASTATIN CALCIUM 40 MILLIGRAM(S): 80 TABLET, FILM COATED ORAL at 21:54

## 2025-06-17 RX ADMIN — Medication 2 TABLET(S): at 23:25

## 2025-06-17 RX ADMIN — Medication 81 MILLIGRAM(S): at 11:52

## 2025-06-17 RX ADMIN — Medication 650 MILLIGRAM(S): at 12:53

## 2025-06-17 RX ADMIN — INSULIN GLARGINE-YFGN 30 UNIT(S): 100 INJECTION, SOLUTION SUBCUTANEOUS at 21:55

## 2025-06-17 RX ADMIN — Medication 1 APPLICATION(S): at 06:42

## 2025-06-17 RX ADMIN — DEXMEDETOMIDINE HYDROCHLORIDE IN SODIUM CHLORIDE 7.47 MICROGRAM(S)/KG/HR: 4 INJECTION INTRAVENOUS at 09:00

## 2025-06-17 RX ADMIN — Medication 1 TABLET(S): at 11:52

## 2025-06-17 RX ADMIN — Medication 25 GRAM(S): at 05:24

## 2025-06-17 RX ADMIN — Medication 15 MILLILITER(S): at 05:25

## 2025-06-17 RX ADMIN — DEXMEDETOMIDINE HYDROCHLORIDE IN SODIUM CHLORIDE 7.47 MICROGRAM(S)/KG/HR: 4 INJECTION INTRAVENOUS at 12:57

## 2025-06-17 RX ADMIN — DEXMEDETOMIDINE HYDROCHLORIDE IN SODIUM CHLORIDE 7.47 MICROGRAM(S)/KG/HR: 4 INJECTION INTRAVENOUS at 19:42

## 2025-06-17 RX ADMIN — HEPARIN SODIUM 5000 UNIT(S): 1000 INJECTION INTRAVENOUS; SUBCUTANEOUS at 05:26

## 2025-06-17 RX ADMIN — DEXMEDETOMIDINE HYDROCHLORIDE IN SODIUM CHLORIDE 7.47 MICROGRAM(S)/KG/HR: 4 INJECTION INTRAVENOUS at 18:01

## 2025-06-17 RX ADMIN — Medication 40 MILLIGRAM(S): at 05:26

## 2025-06-17 NOTE — PROGRESS NOTE ADULT - ASSESSMENT
Pt is a 75 yo F with h/o CAD (s/p PCI), HTN, HLD, DM and recent R  humerus fracture admitted on 5/30/25 for elevated blood glucose. Pt was treated at [facility] for humerus fracture , placed in sling and discharged for outpatient orthopedic surgery follow up. ON 6/1 RRT for hypotension , fever 103.2 and pt transferred to ICU and subsequently intubated for airway protection, shock state. On 6/2 pt s/p cardiac arrest while in CT  - ACLS for 10 minutes with ROSC. Pt treated for NSTEMI Heparin drip (no intervention). Pt also found to have E. coli  ESBL bacteremia. Pt s/p extubation 6/6/2025 with intact MS. ^/8/2025 pt transferred to [facility] and on 6/11/2025 RRT x2 1) AMS - pt woke up and was following commands 2) AMS (possibly 2 to seizure per medical team saw patient with R gaze and unresponsiveness)' pt given 5mg Valium by medical team. ICU reconsulted - upon 2nd evaluation pt obtunded, Spo2 80% with shallow breaths and borderline low BP. Pt intubated on medical floor, hypotensive post intubation and started on vasopressors and transferred to ICU. The following day pt sent for CTA: No pulmonary embolism. Tracheobronchial secretions, greatest in the left lower lobe. Patchy and nodular bilateral lung opacities may be infectious or inflammatory. Right middle lobe subpleural 6 mm nodular opacity is new from prior. Acute/subacute fractures right anterior lateral fourth through sixth ribs. ICU dx: 1) Septic shock 2 to aspiration PNA 2) 2) Acute resp failure requiring intubation 2 to aspiration PNA     Resp/Social: Cont daily SBT; weaned briefly and poorly today/ Full code but discussion with daughter once better and ready to extubate, family likely would not want re-intubation or CPR; this was again confirmed 6/15/2025 with daughter at the bedside. Family considering palliative extubation. Family at bedside and updated  ID: No new (+) Cx; finish course of empiric Zosyn (6/19/2025)  CVS: Cont Ranexa  HEME: DVT prophylaxis with sq Heparin  FEN: Cont enteral feeds   Endo: Adjust Lantus and Lispro to FS  Neuro: No Sz on EEG/ Cont Precedex

## 2025-06-17 NOTE — PROGRESS NOTE ADULT - SUBJECTIVE AND OBJECTIVE BOX
HPI:  Pt is a 73 yo F with h/o CAD (s/p PCI), HTN, HLD, DM and recent R  humerus fracture admitted on 25 for elevated blood glucose. Pt was treated at [facility] for humerus fracture , placed in sling and discharged for outpatient orthopedic surgery follow up. ON  RRT for hypotension , fever 103.2 and pt transferred to ICU and subsequently intubated for airway protection, shock state. On  pt s/p cardiac arrest while in CT  - ACLS for 10 minutes with ROSC. Pt treated for NSTEMI Heparin drip (no intervention). Pt also found to have E. coli  ESBL bacteremia. Pt s/p extubation 2025 with intact MS.  pt transferred to [facility] and on 2025 RRT x2 1) AMS - pt woke up and was following commands 2) AMS (possibly 2 to seizure per medical team saw patient with R gaze and unresponsiveness)' pt given 5mg Valium by medical team. ICU reconsulted - upon 2nd evaluation pt obtunded, Spo2 80% with shallow breaths and borderline low BP. Pt intubated on medical floor, hypotensive post intubation and started on vasopressors and transferred to ICU. The following day pt sent for CTA: No pulmonary embolism. Tracheobronchial secretions, greatest in the left lower lobe. Patchy and nodular bilateral lung opacities may be infectious or inflammatory. Right middle lobe subpleural 6 mm nodular opacity is new from prior. Acute/subacute fractures right anterior lateral fourth through sixth ribs. ICU dx: 1) Septic shock 2 to aspiration PNA 2) 2) Acute resp failure requiring intubation 2 to aspiration PNA      ## Labs:  CBC:                        8.6    6.09  )-----------( 270      ( 2025 03:35 )             29.0     Chem:  06-16    141  |  111[H]  |  39[H]  ----------------------------<  226[H]  4.5   |  26  |  1.06    Ca    7.6[L]      2025 03:35  Phos  1.9     06-16  Mg     2.4     06-16    TPro  6.3  /  Alb  1.5[L]  /  TBili  0.6  /  DBili  x   /  AST  24  /  ALT  14  /  AlkPhos  128[H]  06-16    Coags:          ## Imaging:    ## Medications:    ranolazine 500 milliGRAM(s) Oral two times a day      atorvastatin 40 milliGRAM(s) Oral at bedtime  insulin glargine Injectable (LANTUS) 30 Unit(s) SubCutaneous at bedtime  insulin lispro (ADMELOG) corrective regimen sliding scale   SubCutaneous every 6 hours    aspirin  chewable 81 milliGRAM(s) Oral daily  enoxaparin Injectable 40 milliGRAM(s) SubCutaneous every 24 hours    aluminum hydroxide/magnesium hydroxide/simethicone Suspension 30 milliLiter(s) Oral every 6 hours PRN  pantoprazole   Suspension 40 milliGRAM(s) Oral every 24 hours  polyethylene glycol 3350 17 Gram(s) Oral daily PRN  senna 2 Tablet(s) Oral at bedtime    acetaminophen     Tablet .. 650 milliGRAM(s) Oral every 6 hours PRN  acetaminophen     Tablet .. 650 milliGRAM(s) Oral every 6 hours PRN  dexMEDEtomidine Infusion 0.4 MICROgram(s)/kG/Hr IV Continuous <Continuous>  HYDROmorphone  Injectable 0.5 milliGRAM(s) IV Push every 3 hours PRN  ondansetron Injectable 4 milliGRAM(s) IV Push every 8 hours PRN      ## Vitals:  T(C): 38.3 (25 @ 12:00), Max: 38.3 (25 @ 12:00)  HR: 51 (25 @ 13:00) (51 - 61)  BP: 152/62 (25 @ 13:00) (96/46 - 152/62)  BP(mean): 87 (25 @ 13:00) (62 - 105)  RR: 11 (25 @ 13:00) (10 - 17)  SpO2: 100% (25 @ 13:00) (92% - 100%)  Wt(kg): --  Vent: Mode: AC/ CMV (Assist Control/ Continuous Mandatory Ventilation), RR (machine): 10, RR (patient): 13, TV (machine): 400, FiO2: 30, PEEP: 6, PIP: 27  AB-16 @ 07:01  -   @ 07:00  --------------------------------------------------------  IN: 1378.8 mL / OUT: 1025 mL / NET: 353.8 mL     @ 07:01  -   @ 13:33  --------------------------------------------------------  IN: 462.5 mL / OUT: 290 mL / NET: 172.5 mL          ## P/E:  Gen: lying comfortably in bed in no apparent distress  Nose: (+) NGT  Mouth: (+) ETT  Lungs: CTA  Heart: Cory  Abd: Soft/+BS/ Non-tender  Ext:  RUE ecchymotic and in a sling  Neuro: sedated    CENTRAL LINE: [ ] YES [ ] NO  LOCATION:   DATE INSERTED:  REMOVE: [ ] YES [ ] NO      THOMAS: [ ] YES [ ] NO    DATE INSERTED:  REMOVE:  [ ] YES [ ] NO      A-LINE:  [ ] YES [ ] NO  LOCATION:   DATE INSERTED:  REMOVE:  [ ] YES [ ] NO  EXPLAIN:      CODE STATUS: [ ] full code  [ x] DNR  [ ] DNI  [ ] [facility]  Goals of care discussion: [x ] yes

## 2025-06-17 NOTE — PHARMACOTHERAPY INTERVENTION NOTE - COMMENTS
Code blue called overhead for main CT scan. Pharmacy at bedside with medicine and ICU team to assess patient and manage ACLS medications.     Patient received epinephrine 1mg x5, sodium bicarbonate 50meq x1 amp, and calcium chloride x1. Patient in PEA (narrow complex) during pulse check throughout cardiac arrest and ROSC was eventually achieved. Patient on norepinephrine infusion to maintain MAP >65.
As per pharmacy policy,    Meropenem dose adjusted from 1000mg Q12h to 2000 mg Q12h for ESBL E.coli bacteremia as patient's CrCl is 35 mL/min. 
As per policy, pantoprazole transitioned from IV to suspension formulation as patient is currently tolerating tube feeds and other oral medications. 
Recommended transitioning from heparin to enoxaparin for DVT prophylaxis given CrCl = 43 mL/min.

## 2025-06-17 NOTE — CHART NOTE - NSCHARTNOTEFT_GEN_A_CORE
Chart reviewed and noted events to date. Again failed Spontaneous Breathing Trial today due to poor TV. Palliative extubation are underway but family yet fully decided. Patient remains lightly sedated on precedex. Discussed c ITZEL Johansen. Electing to discontinue PT services at this time due to on-going difficulty with weaning from mechanical ventilation. Please reconsult when status improves.

## 2025-06-18 LAB
ALBUMIN SERPL ELPH-MCNC: 1.6 G/DL — LOW (ref 3.3–5)
ALP SERPL-CCNC: 126 U/L — HIGH (ref 40–120)
ALT FLD-CCNC: 13 U/L — SIGNIFICANT CHANGE UP (ref 12–78)
ANION GAP SERPL CALC-SCNC: 3 MMOL/L — LOW (ref 5–17)
ANISOCYTOSIS BLD QL: SLIGHT — SIGNIFICANT CHANGE UP
AST SERPL-CCNC: 19 U/L — SIGNIFICANT CHANGE UP (ref 15–37)
BASOPHILS # BLD AUTO: 0 K/UL — SIGNIFICANT CHANGE UP (ref 0–0.2)
BASOPHILS NFR BLD AUTO: 0 % — SIGNIFICANT CHANGE UP (ref 0–2)
BILIRUB SERPL-MCNC: 0.4 MG/DL — SIGNIFICANT CHANGE UP (ref 0.2–1.2)
BUN SERPL-MCNC: 33 MG/DL — HIGH (ref 7–23)
CALCIUM SERPL-MCNC: 8.5 MG/DL — SIGNIFICANT CHANGE UP (ref 8.5–10.1)
CHLORIDE SERPL-SCNC: 113 MMOL/L — HIGH (ref 96–108)
CO2 SERPL-SCNC: 28 MMOL/L — SIGNIFICANT CHANGE UP (ref 22–31)
CREAT SERPL-MCNC: 0.85 MG/DL — SIGNIFICANT CHANGE UP (ref 0.5–1.3)
EGFR: 71 ML/MIN/1.73M2 — SIGNIFICANT CHANGE UP
EGFR: 71 ML/MIN/1.73M2 — SIGNIFICANT CHANGE UP
ELLIPTOCYTES BLD QL SMEAR: SLIGHT — SIGNIFICANT CHANGE UP
EOSINOPHIL # BLD AUTO: 0.17 K/UL — SIGNIFICANT CHANGE UP (ref 0–0.5)
EOSINOPHIL NFR BLD AUTO: 3 % — SIGNIFICANT CHANGE UP (ref 0–6)
GLUCOSE BLDC GLUCOMTR-MCNC: 169 MG/DL — HIGH (ref 70–99)
GLUCOSE BLDC GLUCOMTR-MCNC: 174 MG/DL — HIGH (ref 70–99)
GLUCOSE BLDC GLUCOMTR-MCNC: 201 MG/DL — HIGH (ref 70–99)
GLUCOSE BLDC GLUCOMTR-MCNC: 95 MG/DL — SIGNIFICANT CHANGE UP (ref 70–99)
GLUCOSE SERPL-MCNC: 173 MG/DL — HIGH (ref 70–99)
HCT VFR BLD CALC: 29.8 % — LOW (ref 34.5–45)
HGB BLD-MCNC: 8.6 G/DL — LOW (ref 11.5–15.5)
HYPOCHROMIA BLD QL: SLIGHT — SIGNIFICANT CHANGE UP
LG PLATELETS BLD QL AUTO: SLIGHT — SIGNIFICANT CHANGE UP
LYMPHOCYTES # BLD AUTO: 1.21 K/UL — SIGNIFICANT CHANGE UP (ref 1–3.3)
LYMPHOCYTES # BLD AUTO: 22 % — SIGNIFICANT CHANGE UP (ref 13–44)
MACROCYTES BLD QL: SLIGHT — SIGNIFICANT CHANGE UP
MAGNESIUM SERPL-MCNC: 2.2 MG/DL — SIGNIFICANT CHANGE UP (ref 1.6–2.6)
MANUAL SMEAR VERIFICATION: SIGNIFICANT CHANGE UP
MCHC RBC-ENTMCNC: 27.3 PG — SIGNIFICANT CHANGE UP (ref 27–34)
MCHC RBC-ENTMCNC: 28.9 G/DL — LOW (ref 32–36)
MCV RBC AUTO: 94.6 FL — SIGNIFICANT CHANGE UP (ref 80–100)
MICROCYTES BLD QL: SLIGHT — SIGNIFICANT CHANGE UP
MONOCYTES # BLD AUTO: 0.5 K/UL — SIGNIFICANT CHANGE UP (ref 0–0.9)
MONOCYTES NFR BLD AUTO: 9 % — SIGNIFICANT CHANGE UP (ref 2–14)
NEUTROPHILS # BLD AUTO: 3.64 K/UL — SIGNIFICANT CHANGE UP (ref 1.8–7.4)
NEUTROPHILS NFR BLD AUTO: 66 % — SIGNIFICANT CHANGE UP (ref 43–77)
NRBC # BLD: 0 /100 WBCS — SIGNIFICANT CHANGE UP (ref 0–0)
NRBC BLD AUTO-RTO: SIGNIFICANT CHANGE UP /100 WBCS (ref 0–0)
NRBC BLD-RTO: 0 /100 WBCS — SIGNIFICANT CHANGE UP (ref 0–0)
PHOSPHATE SERPL-MCNC: 3.2 MG/DL — SIGNIFICANT CHANGE UP (ref 2.5–4.5)
PLAT MORPH BLD: NORMAL — SIGNIFICANT CHANGE UP
PLATELET # BLD AUTO: 272 K/UL — SIGNIFICANT CHANGE UP (ref 150–400)
POLYCHROMASIA BLD QL SMEAR: SLIGHT — SIGNIFICANT CHANGE UP
POTASSIUM SERPL-MCNC: 4.8 MMOL/L — SIGNIFICANT CHANGE UP (ref 3.5–5.3)
POTASSIUM SERPL-SCNC: 4.8 MMOL/L — SIGNIFICANT CHANGE UP (ref 3.5–5.3)
PROT SERPL-MCNC: 6.8 GM/DL — SIGNIFICANT CHANGE UP (ref 6–8.3)
RBC # BLD: 3.15 M/UL — LOW (ref 3.8–5.2)
RBC # FLD: 15.7 % — HIGH (ref 10.3–14.5)
RBC BLD AUTO: ABNORMAL
SODIUM SERPL-SCNC: 144 MMOL/L — SIGNIFICANT CHANGE UP (ref 135–145)
WBC # BLD: 5.51 K/UL — SIGNIFICANT CHANGE UP (ref 3.8–10.5)
WBC # FLD AUTO: 5.51 K/UL — SIGNIFICANT CHANGE UP (ref 3.8–10.5)

## 2025-06-18 PROCEDURE — 76937 US GUIDE VASCULAR ACCESS: CPT | Mod: 26

## 2025-06-18 PROCEDURE — 36000 PLACE NEEDLE IN VEIN: CPT

## 2025-06-18 PROCEDURE — 36410 VNPNXR 3YR/> PHY/QHP DX/THER: CPT

## 2025-06-18 PROCEDURE — 99291 CRITICAL CARE FIRST HOUR: CPT

## 2025-06-18 RX ADMIN — ENOXAPARIN SODIUM 40 MILLIGRAM(S): 100 INJECTION SUBCUTANEOUS at 11:37

## 2025-06-18 RX ADMIN — INSULIN LISPRO 1: 100 INJECTION, SOLUTION INTRAVENOUS; SUBCUTANEOUS at 11:36

## 2025-06-18 RX ADMIN — DEXMEDETOMIDINE HYDROCHLORIDE IN SODIUM CHLORIDE 7.47 MICROGRAM(S)/KG/HR: 4 INJECTION INTRAVENOUS at 21:12

## 2025-06-18 RX ADMIN — Medication 1 APPLICATION(S): at 05:17

## 2025-06-18 RX ADMIN — Medication 2 TABLET(S): at 21:13

## 2025-06-18 RX ADMIN — INSULIN LISPRO 2: 100 INJECTION, SOLUTION INTRAVENOUS; SUBCUTANEOUS at 05:10

## 2025-06-18 RX ADMIN — INSULIN GLARGINE-YFGN 30 UNIT(S): 100 INJECTION, SOLUTION SUBCUTANEOUS at 23:09

## 2025-06-18 RX ADMIN — Medication 1 TABLET(S): at 11:37

## 2025-06-18 RX ADMIN — DEXMEDETOMIDINE HYDROCHLORIDE IN SODIUM CHLORIDE 7.47 MICROGRAM(S)/KG/HR: 4 INJECTION INTRAVENOUS at 17:35

## 2025-06-18 RX ADMIN — Medication 15 MILLILITER(S): at 05:09

## 2025-06-18 RX ADMIN — Medication 15 MILLILITER(S): at 17:35

## 2025-06-18 RX ADMIN — Medication 81 MILLIGRAM(S): at 11:37

## 2025-06-18 RX ADMIN — DEXMEDETOMIDINE HYDROCHLORIDE IN SODIUM CHLORIDE 7.47 MICROGRAM(S)/KG/HR: 4 INJECTION INTRAVENOUS at 08:25

## 2025-06-18 RX ADMIN — INSULIN LISPRO 1: 100 INJECTION, SOLUTION INTRAVENOUS; SUBCUTANEOUS at 23:09

## 2025-06-18 RX ADMIN — DEXMEDETOMIDINE HYDROCHLORIDE IN SODIUM CHLORIDE 7.47 MICROGRAM(S)/KG/HR: 4 INJECTION INTRAVENOUS at 19:13

## 2025-06-18 RX ADMIN — ATORVASTATIN CALCIUM 40 MILLIGRAM(S): 80 TABLET, FILM COATED ORAL at 21:13

## 2025-06-18 RX ADMIN — Medication 40 MILLIGRAM(S): at 05:09

## 2025-06-18 RX ADMIN — DEXMEDETOMIDINE HYDROCHLORIDE IN SODIUM CHLORIDE 7.47 MICROGRAM(S)/KG/HR: 4 INJECTION INTRAVENOUS at 12:25

## 2025-06-18 RX ADMIN — DEXMEDETOMIDINE HYDROCHLORIDE IN SODIUM CHLORIDE 7.47 MICROGRAM(S)/KG/HR: 4 INJECTION INTRAVENOUS at 03:44

## 2025-06-18 NOTE — PROGRESS NOTE ADULT - SUBJECTIVE AND OBJECTIVE BOX
HPI:  Pt is a 73 yo F with h/o CAD (s/p PCI), HTN, HLD, DM and recent R  humerus fracture admitted on 25 for elevated blood glucose. Pt was treated at [facility] for humerus fracture , placed in sling and discharged for outpatient orthopedic surgery follow up. ON  RRT for hypotension , fever 103.2 and pt transferred to ICU and subsequently intubated for airway protection, shock state. On  pt s/p cardiac arrest while in CT  - ACLS for 10 minutes with ROSC. Pt treated for NSTEMI Heparin drip (no intervention). Pt also found to have E. coli  ESBL bacteremia. Pt s/p extubation 2025 with intact MS.  pt transferred to [facility] and on 2025 RRT x2 1) AMS - pt woke up and was following commands 2) AMS (possibly 2 to seizure per medical team saw patient with R gaze and unresponsiveness)' pt given 5mg Valium by medical team. ICU reconsulted - upon 2nd evaluation pt obtunded, Spo2 80% with shallow breaths and borderline low BP. Pt intubated on medical floor, hypotensive post intubation and started on vasopressors and transferred to ICU. The following day pt sent for CTA: No pulmonary embolism. Tracheobronchial secretions, greatest in the left lower lobe. Patchy and nodular bilateral lung opacities may be infectious or inflammatory. Right middle lobe subpleural 6 mm nodular opacity is new from prior. Acute/subacute fractures right anterior lateral fourth through sixth ribs. ICU dx: 1) Septic shock 2 to aspiration PNA 2) 2) Acute resp failure requiring intubation 2 to aspiration PNA 3) Weaning poorly on MV      ## Labs:  CBC:                        8.6    5.51  )-----------( 272      ( 2025 03:02 )             29.8     Chem:  06-18    144  |  113[H]  |  33[H]  ----------------------------<  173[H]  4.8   |  28  |  0.85    Ca    8.5      2025 03:02  Phos  3.2     06-18  Mg     2.2     06-18    TPro  6.8  /  Alb  1.6[L]  /  TBili  0.4  /  DBili  x   /  AST  19  /  ALT  13  /  AlkPhos  126[H]      Coags:          ## Imaging:    ## Medications:    ranolazine 500 milliGRAM(s) Oral two times a day      atorvastatin 40 milliGRAM(s) Oral at bedtime  insulin glargine Injectable (LANTUS) 30 Unit(s) SubCutaneous at bedtime  insulin lispro (ADMELOG) corrective regimen sliding scale   SubCutaneous every 6 hours    aspirin  chewable 81 milliGRAM(s) Oral daily  enoxaparin Injectable 40 milliGRAM(s) SubCutaneous every 24 hours    aluminum hydroxide/magnesium hydroxide/simethicone Suspension 30 milliLiter(s) Oral every 6 hours PRN  pantoprazole   Suspension 40 milliGRAM(s) Oral every 24 hours  polyethylene glycol 3350 17 Gram(s) Oral daily PRN  senna 2 Tablet(s) Oral at bedtime    acetaminophen     Tablet .. 650 milliGRAM(s) Oral every 6 hours PRN  acetaminophen     Tablet .. 650 milliGRAM(s) Oral every 6 hours PRN  dexMEDEtomidine Infusion 0.4 MICROgram(s)/kG/Hr IV Continuous <Continuous>  HYDROmorphone  Injectable 0.5 milliGRAM(s) IV Push every 3 hours PRN  ondansetron Injectable 4 milliGRAM(s) IV Push every 8 hours PRN      ## Vitals:  T(C): 37.7 (25 @ 12:00), Max: 37.7 (25 @ 12:00)  HR: 57 (25 @ 13:22) (51 - 81)  BP: 137/58 (25 @ 13:22) (118/73 - 158/65)  BP(mean): 82 (25 @ 13:22) (78 - 93)  RR: 22 (25 @ 13:22) (8 - 22)  SpO2: 99% (25 @ 13:22) (95% - 100%)  Wt(kg): --  Vent: Mode: AC/ CMV (Assist Control/ Continuous Mandatory Ventilation), RR (machine): 10, RR (patient): 13, TV (machine): 400, FiO2: 30, PEEP: 6, PIP: 34  AB-17 @ 07: @ 07:00  --------------------------------------------------------  IN: 1439.1 mL / OUT: 1004 mL / NET: 435.1 mL     @ 07: @ 13:39  --------------------------------------------------------  IN: 256 mL / OUT: 260 mL / NET: -4 mL          ## P/E:  Gen: lying comfortably in bed in no apparent distress  Nose: (+) NGT  Mouth: (+) ETT  Lungs: CTA  Heart: Cory  Abd: Soft/+BS/ Non-tender  Ext:  RUE ecchymotic/ edematous and in a sling  Neuro: Lightly sedated but responding to verbal stimuli    CENTRAL LINE: [ ] YES [ ] NO  LOCATION:   DATE INSERTED:  REMOVE: [ ] YES [ ] NO      THOMAS: [ ] YES [ ] NO    DATE INSERTED:  REMOVE:  [ ] YES [ ] NO      A-LINE:  [ ] YES [ ] NO  LOCATION:   DATE INSERTED:  REMOVE:  [ ] YES [ ] NO  EXPLAIN:      CODE STATUS: [ ] full code  [ x] DNR  [ ] [facility]  [ ] [facility]  Goals of care discussion: [x ] yes

## 2025-06-18 NOTE — PROGRESS NOTE ADULT - ASSESSMENT
Pt is a 73 yo F with h/o CAD (s/p PCI), HTN, HLD, DM and recent R  humerus fracture admitted on 5/30/25 for elevated blood glucose. Pt was treated at [facility] for humerus fracture , placed in sling and discharged for outpatient orthopedic surgery follow up. ON 6/1 RRT for hypotension , fever 103.2 and pt transferred to ICU and subsequently intubated for airway protection, shock state. On 6/2 pt s/p cardiac arrest while in CT  - ACLS for 10 minutes with ROSC. Pt treated for NSTEMI Heparin drip (no intervention). Pt also found to have E. coli  ESBL bacteremia. Pt s/p extubation 6/6/2025 with intact MS. ^/8/2025 pt transferred to [facility] and on 6/11/2025 RRT x2 1) AMS - pt woke up and was following commands 2) AMS (possibly 2 to seizure per medical team saw patient with R gaze and unresponsiveness)' pt given 5mg Valium by medical team. ICU reconsulted - upon 2nd evaluation pt obtunded, Spo2 80% with shallow breaths and borderline low BP. Pt intubated on medical floor, hypotensive post intubation and started on vasopressors and transferred to ICU. The following day pt sent for CTA: No pulmonary embolism. Tracheobronchial secretions, greatest in the left lower lobe. Patchy and nodular bilateral lung opacities may be infectious or inflammatory. Right middle lobe subpleural 6 mm nodular opacity is new from prior. Acute/subacute fractures right anterior lateral fourth through sixth ribs. ICU dx: 1) Septic shock 2 to aspiration PNA 2) 2) Acute resp failure requiring intubation 2 to aspiration PNA 3) Weaning poorly on MV     Resp/Social: Cont daily SBT; required to be weaned on PS 15 today/ Full code but discussion with daughter once better and ready to extubate, family likely would not want re-intubation or CPR; this was again confirmed 6/15/2025 with daughter at the bedside. Family considering palliative extubation. Family at bedside and updated  ID: No new (+) Cx; finish course of empiric Zosyn (6/19/2025)  CVS: Cont Ranexa  HEME: DVT prophylaxis with sq Heparin  FEN: Cont enteral feeds   Endo: Adjust Lantus and Lispro to FS  Neuro: Cont light sedation with Precedex

## 2025-06-18 NOTE — PROCEDURE NOTE - NSPROCDETAILS_GEN_ALL_CORE
location identified, draped/prepped, sterile technique used, needle inserted/introduced/positive blood return obtained via catheter/connected to a pressurized flush line/sutured in place/hemostasis with direct pressure, dressing applied/Seldinger technique/all materials/supplies accounted for at end of procedure
guidewire recovered/lumen(s) aspirated and flushed/sterile dressing applied/sterile technique, catheter placed/ultrasound guidance with use of sterile gel and probe cove
verified and done by Ultrasound/location identified, draped/prepped, sterile technique used/blood seen on insertion/dressing applied/flushes easily/secured in place
USG/blood seen on insertion/dressing applied/flushes easily/secured in place
patient pre-oxygenated, tube inserted, placement confirmed
patient pre-oxygenated, tube inserted, placement confirmed
ultrasound guidance

## 2025-06-18 NOTE — PROCEDURE NOTE - NSINDICATIONS_GEN_A_CORE
airway protection/mental status change/respiratory distress/respiratory failure
IV access
repeated administration of blood products/venous access
airway protection/critical patient
emergency venous access
arterial puncture to obtain ABG's/critical patient/monitoring purposes
critical illness/hypertonic/irritant infusion

## 2025-06-19 LAB
ALBUMIN SERPL ELPH-MCNC: 1.6 G/DL — LOW (ref 3.3–5)
ALP SERPL-CCNC: 133 U/L — HIGH (ref 40–120)
ALT FLD-CCNC: 17 U/L — SIGNIFICANT CHANGE UP (ref 12–78)
ANION GAP SERPL CALC-SCNC: 3 MMOL/L — LOW (ref 5–17)
AST SERPL-CCNC: 24 U/L — SIGNIFICANT CHANGE UP (ref 15–37)
BASOPHILS # BLD AUTO: 0.04 K/UL — SIGNIFICANT CHANGE UP (ref 0–0.2)
BASOPHILS NFR BLD AUTO: 0.6 % — SIGNIFICANT CHANGE UP (ref 0–2)
BILIRUB SERPL-MCNC: 0.4 MG/DL — SIGNIFICANT CHANGE UP (ref 0.2–1.2)
BUN SERPL-MCNC: 33 MG/DL — HIGH (ref 7–23)
CALCIUM SERPL-MCNC: 8.9 MG/DL — SIGNIFICANT CHANGE UP (ref 8.5–10.1)
CHLORIDE SERPL-SCNC: 113 MMOL/L — HIGH (ref 96–108)
CO2 SERPL-SCNC: 28 MMOL/L — SIGNIFICANT CHANGE UP (ref 22–31)
CREAT SERPL-MCNC: 0.84 MG/DL — SIGNIFICANT CHANGE UP (ref 0.5–1.3)
EGFR: 72 ML/MIN/1.73M2 — SIGNIFICANT CHANGE UP
EGFR: 72 ML/MIN/1.73M2 — SIGNIFICANT CHANGE UP
EOSINOPHIL # BLD AUTO: 0.17 K/UL — SIGNIFICANT CHANGE UP (ref 0–0.5)
EOSINOPHIL NFR BLD AUTO: 2.4 % — SIGNIFICANT CHANGE UP (ref 0–6)
GLUCOSE BLDC GLUCOMTR-MCNC: 119 MG/DL — HIGH (ref 70–99)
GLUCOSE BLDC GLUCOMTR-MCNC: 142 MG/DL — HIGH (ref 70–99)
GLUCOSE BLDC GLUCOMTR-MCNC: 154 MG/DL — HIGH (ref 70–99)
GLUCOSE BLDC GLUCOMTR-MCNC: 72 MG/DL — SIGNIFICANT CHANGE UP (ref 70–99)
GLUCOSE SERPL-MCNC: 172 MG/DL — HIGH (ref 70–99)
HCT VFR BLD CALC: 30.2 % — LOW (ref 34.5–45)
HGB BLD-MCNC: 8.8 G/DL — LOW (ref 11.5–15.5)
IMM GRANULOCYTES NFR BLD AUTO: 0.7 % — SIGNIFICANT CHANGE UP (ref 0–0.9)
LYMPHOCYTES # BLD AUTO: 1.15 K/UL — SIGNIFICANT CHANGE UP (ref 1–3.3)
LYMPHOCYTES # BLD AUTO: 16.1 % — SIGNIFICANT CHANGE UP (ref 13–44)
MAGNESIUM SERPL-MCNC: 2 MG/DL — SIGNIFICANT CHANGE UP (ref 1.6–2.6)
MCHC RBC-ENTMCNC: 27.8 PG — SIGNIFICANT CHANGE UP (ref 27–34)
MCHC RBC-ENTMCNC: 29.1 G/DL — LOW (ref 32–36)
MCV RBC AUTO: 95.3 FL — SIGNIFICANT CHANGE UP (ref 80–100)
MONOCYTES # BLD AUTO: 0.56 K/UL — SIGNIFICANT CHANGE UP (ref 0–0.9)
MONOCYTES NFR BLD AUTO: 7.8 % — SIGNIFICANT CHANGE UP (ref 2–14)
NEUTROPHILS # BLD AUTO: 5.19 K/UL — SIGNIFICANT CHANGE UP (ref 1.8–7.4)
NEUTROPHILS NFR BLD AUTO: 72.4 % — SIGNIFICANT CHANGE UP (ref 43–77)
NRBC BLD AUTO-RTO: 0 /100 WBCS — SIGNIFICANT CHANGE UP (ref 0–0)
PHOSPHATE SERPL-MCNC: 3.5 MG/DL — SIGNIFICANT CHANGE UP (ref 2.5–4.5)
PLATELET # BLD AUTO: 317 K/UL — SIGNIFICANT CHANGE UP (ref 150–400)
POTASSIUM SERPL-MCNC: 4.7 MMOL/L — SIGNIFICANT CHANGE UP (ref 3.5–5.3)
POTASSIUM SERPL-SCNC: 4.7 MMOL/L — SIGNIFICANT CHANGE UP (ref 3.5–5.3)
PROT SERPL-MCNC: 6.7 GM/DL — SIGNIFICANT CHANGE UP (ref 6–8.3)
RBC # BLD: 3.17 M/UL — LOW (ref 3.8–5.2)
RBC # FLD: 15.8 % — HIGH (ref 10.3–14.5)
SODIUM SERPL-SCNC: 144 MMOL/L — SIGNIFICANT CHANGE UP (ref 135–145)
WBC # BLD: 7.16 K/UL — SIGNIFICANT CHANGE UP (ref 3.8–10.5)
WBC # FLD AUTO: 7.16 K/UL — SIGNIFICANT CHANGE UP (ref 3.8–10.5)

## 2025-06-19 PROCEDURE — 99291 CRITICAL CARE FIRST HOUR: CPT

## 2025-06-19 RX ORDER — DEXMEDETOMIDINE HYDROCHLORIDE IN SODIUM CHLORIDE 4 UG/ML
0.2 INJECTION INTRAVENOUS
Qty: 200 | Refills: 0 | Status: DISCONTINUED | OUTPATIENT
Start: 2025-06-19 | End: 2025-06-27

## 2025-06-19 RX ADMIN — INSULIN LISPRO 1: 100 INJECTION, SOLUTION INTRAVENOUS; SUBCUTANEOUS at 06:04

## 2025-06-19 RX ADMIN — Medication 81 MILLIGRAM(S): at 12:12

## 2025-06-19 RX ADMIN — DEXMEDETOMIDINE HYDROCHLORIDE IN SODIUM CHLORIDE 7.47 MICROGRAM(S)/KG/HR: 4 INJECTION INTRAVENOUS at 02:26

## 2025-06-19 RX ADMIN — DEXMEDETOMIDINE HYDROCHLORIDE IN SODIUM CHLORIDE 7.47 MICROGRAM(S)/KG/HR: 4 INJECTION INTRAVENOUS at 06:04

## 2025-06-19 RX ADMIN — Medication 1 TABLET(S): at 12:12

## 2025-06-19 RX ADMIN — DEXMEDETOMIDINE HYDROCHLORIDE IN SODIUM CHLORIDE 7.47 MICROGRAM(S)/KG/HR: 4 INJECTION INTRAVENOUS at 12:11

## 2025-06-19 RX ADMIN — DEXMEDETOMIDINE HYDROCHLORIDE IN SODIUM CHLORIDE 7.47 MICROGRAM(S)/KG/HR: 4 INJECTION INTRAVENOUS at 15:27

## 2025-06-19 RX ADMIN — Medication 40 MILLIGRAM(S): at 06:04

## 2025-06-19 RX ADMIN — ATORVASTATIN CALCIUM 40 MILLIGRAM(S): 80 TABLET, FILM COATED ORAL at 22:11

## 2025-06-19 RX ADMIN — Medication 650 MILLIGRAM(S): at 15:52

## 2025-06-19 RX ADMIN — RANOLAZINE 500 MILLIGRAM(S): 1000 TABLET, FILM COATED, EXTENDED RELEASE ORAL at 06:04

## 2025-06-19 RX ADMIN — DEXMEDETOMIDINE HYDROCHLORIDE IN SODIUM CHLORIDE 3.74 MICROGRAM(S)/KG/HR: 4 INJECTION INTRAVENOUS at 23:16

## 2025-06-19 RX ADMIN — Medication 15 MILLILITER(S): at 18:41

## 2025-06-19 RX ADMIN — ENOXAPARIN SODIUM 40 MILLIGRAM(S): 100 INJECTION SUBCUTANEOUS at 15:22

## 2025-06-19 RX ADMIN — DEXMEDETOMIDINE HYDROCHLORIDE IN SODIUM CHLORIDE 7.47 MICROGRAM(S)/KG/HR: 4 INJECTION INTRAVENOUS at 18:57

## 2025-06-19 RX ADMIN — DEXMEDETOMIDINE HYDROCHLORIDE IN SODIUM CHLORIDE 3.74 MICROGRAM(S)/KG/HR: 4 INJECTION INTRAVENOUS at 20:00

## 2025-06-19 RX ADMIN — Medication 650 MILLIGRAM(S): at 15:22

## 2025-06-19 RX ADMIN — Medication 15 MILLILITER(S): at 06:03

## 2025-06-19 RX ADMIN — INSULIN GLARGINE-YFGN 30 UNIT(S): 100 INJECTION, SOLUTION SUBCUTANEOUS at 23:35

## 2025-06-19 RX ADMIN — Medication 1 APPLICATION(S): at 06:03

## 2025-06-19 RX ADMIN — Medication 2 TABLET(S): at 22:01

## 2025-06-19 NOTE — PROGRESS NOTE ADULT - ASSESSMENT
Pt is a 75 yo F with h/o CAD (s/p PCI), HTN, HLD, DM and recent R  humerus fracture admitted on 5/30/25 for elevated blood glucose. Pt was treated at [facility] for humerus fracture , placed in sling and discharged for outpatient orthopedic surgery follow up. ON 6/1 RRT for hypotension , fever 103.2 and pt transferred to ICU and subsequently intubated for airway protection, shock state. On 6/2 pt s/p cardiac arrest while in CT  - ACLS for 10 minutes with ROSC. Pt treated for NSTEMI Heparin drip (no intervention). Pt also found to have E. coli  ESBL bacteremia. Pt s/p extubation 6/6/2025 with intact MS. ^/8/2025 pt transferred to [facility] and on 6/11/2025 RRT x2 1) AMS - pt woke up and was following commands 2) AMS (possibly 2 to seizure per medical team saw patient with R gaze and unresponsiveness)' pt given 5mg Valium by medical team. ICU reconsulted - upon 2nd evaluation pt obtunded, Spo2 80% with shallow breaths and borderline low BP. Pt intubated on medical floor, hypotensive post intubation and started on vasopressors and transferred to ICU. The following day pt sent for CTA: No pulmonary embolism. Tracheobronchial secretions, greatest in the left lower lobe. Patchy and nodular bilateral lung opacities may be infectious or inflammatory. Right middle lobe subpleural 6 mm nodular opacity is new from prior. Acute/subacute fractures right anterior lateral fourth through sixth ribs. ICU dx: 1) Septic shock 2 to aspiration PNA 2) 2) Acute resp failure requiring intubation 2 to aspiration PNA 3) Weaning poorly on MV     Resp/Social: Cont daily SBT; pt weaning poorly/ Full code but discussion with daughter once better and ready to extubate, family likely would not want re-intubation or CPR; this was again confirmed 6/15/2025 with daughter at the bedside. Family now questioning palliative extubation.   ID: No new (+) Cx; last day of Abx  CVS: Since Ranexa cannot be given via NGT may need to change to ? Isordil  HEME: DVT prophylaxis with sq Heparin  FEN: Cont enteral feeds   Endo: May need to decrease Lantus and Lispro to FS  Neuro: Cont light sedation with Precedex

## 2025-06-19 NOTE — PROGRESS NOTE ADULT - SUBJECTIVE AND OBJECTIVE BOX
HPI:  Pt is a 75 yo F with h/o CAD (s/p PCI), HTN, HLD, DM and recent R  humerus fracture admitted on 25 for elevated blood glucose. Pt was treated at [facility] for humerus fracture , placed in sling and discharged for outpatient orthopedic surgery follow up. ON  RRT for hypotension , fever 103.2 and pt transferred to ICU and subsequently intubated for airway protection, shock state. On  pt s/p cardiac arrest while in CT  - ACLS for 10 minutes with ROSC. Pt treated for NSTEMI Heparin drip (no intervention). Pt also found to have E. coli  ESBL bacteremia. Pt s/p extubation 2025 with intact MS.  pt transferred to [facility] and on 2025 RRT x2 1) AMS - pt woke up and was following commands 2) AMS (possibly 2 to seizure per medical team saw patient with R gaze and unresponsiveness)' pt given 5mg Valium by medical team. ICU reconsulted - upon 2nd evaluation pt obtunded, Spo2 80% with shallow breaths and borderline low BP. Pt intubated on medical floor, hypotensive post intubation and started on vasopressors and transferred to ICU. The following day pt sent for CTA: No pulmonary embolism. Tracheobronchial secretions, greatest in the left lower lobe. Patchy and nodular bilateral lung opacities may be infectious or inflammatory. Right middle lobe subpleural 6 mm nodular opacity is new from prior. Acute/subacute fractures right anterior lateral fourth through sixth ribs. ICU dx: 1) Septic shock 2 to aspiration PNA 2) 2) Acute resp failure requiring intubation 2 to aspiration PNA 3) Weaning poorly on MV      ## Labs:  CBC:                        8.8    7.16  )-----------( 317      ( 2025 03:54 )             30.2     Chem:  06-19    144  |  113[H]  |  33[H]  ----------------------------<  172[H]  4.7   |  28  |  0.84    Ca    8.9      2025 03:54  Phos  3.5     06-  Mg     2.0     06-19    TPro  6.7  /  Alb  1.6[L]  /  TBili  0.4  /  DBili  x   /  AST  24  /  ALT  17  /  AlkPhos  133[H]  -    Coags:          ## Imaging:    ## Medications:    ranolazine 500 milliGRAM(s) Oral two times a day      atorvastatin 40 milliGRAM(s) Oral at bedtime  insulin glargine Injectable (LANTUS) 30 Unit(s) SubCutaneous at bedtime  insulin lispro (ADMELOG) corrective regimen sliding scale   SubCutaneous every 6 hours    aspirin  chewable 81 milliGRAM(s) Oral daily  enoxaparin Injectable 40 milliGRAM(s) SubCutaneous every 24 hours    aluminum hydroxide/magnesium hydroxide/simethicone Suspension 30 milliLiter(s) Oral every 6 hours PRN  pantoprazole   Suspension 40 milliGRAM(s) Oral every 24 hours  polyethylene glycol 3350 17 Gram(s) Oral daily PRN  senna 2 Tablet(s) Oral at bedtime    acetaminophen     Tablet .. 650 milliGRAM(s) Oral every 6 hours PRN  acetaminophen     Tablet .. 650 milliGRAM(s) Oral every 6 hours PRN  dexMEDEtomidine Infusion 0.2 MICROgram(s)/kG/Hr IV Continuous <Continuous>  ondansetron Injectable 4 milliGRAM(s) IV Push every 8 hours PRN      ## Vitals:  T(C): 38.4 (25 @ 20:00), Max: 38.4 (25 @ 20:00)  HR: 56 (25 @ 21:00) (52 - 74)  BP: 92/53 (25 @ 21:00) (92/53 - 153/62)  BP(mean): 65 (25 @ 21:00) (63 - 108)  RR: 12 (25 @ 21:00) (0 - 21)  SpO2: 99% (25 @ 21:00) (97% - 100%)  Wt(kg): --  Vent: Mode: AC/ CMV (Assist Control/ Continuous Mandatory Ventilation), RR (machine): 10, RR (patient): 10, TV (machine): 400, FiO2: 30, PEEP: 6, PIP: 29  AB-18 @ 07:01  -   @ 07:00  --------------------------------------------------------  IN: 1318.8 mL / OUT: 1125 mL / NET: 193.8 mL     @ 07:01   @ 22:30  --------------------------------------------------------  IN: 668.6 mL / OUT: 400 mL / NET: 268.6 mL          ## P/E:  Gen: lying comfortably in bed in no apparent distress  Nose: (+) NGT  Mouth: (+) ETT  Lungs: Coarse BS  Heart: Cory  Abd: Soft/+BS/ Non-tender  Ext:  RUE ecchymotic/ edematous and in a sling  Neuro: Lightly sedated but responding to verbal stimuli    CENTRAL LINE: [ ] YES [ ] NO  LOCATION:   DATE INSERTED:  REMOVE: [ ] YES [ ] NO      THOMAS: [ ] YES [ ] NO    DATE INSERTED:  REMOVE:  [ ] YES [ ] NO      A-LINE:  [ ] YES [ ] NO  LOCATION:   DATE INSERTED:  REMOVE:  [ ] YES [ ] NO  EXPLAIN:      CODE STATUS: [ ] full code  [ x] DNR  [ ] DNI  [ ] [facility]  Goals of care discussion: [x ] yes

## 2025-06-20 LAB
FLUAV AG NPH QL: SIGNIFICANT CHANGE UP
FLUBV AG NPH QL: SIGNIFICANT CHANGE UP
GLUCOSE BLDC GLUCOMTR-MCNC: 106 MG/DL — HIGH (ref 70–99)
GLUCOSE BLDC GLUCOMTR-MCNC: 158 MG/DL — HIGH (ref 70–99)
GLUCOSE BLDC GLUCOMTR-MCNC: 161 MG/DL — HIGH (ref 70–99)
GLUCOSE BLDC GLUCOMTR-MCNC: 183 MG/DL — HIGH (ref 70–99)
RSV RNA NPH QL NAA+NON-PROBE: SIGNIFICANT CHANGE UP
SARS-COV-2 RNA SPEC QL NAA+PROBE: SIGNIFICANT CHANGE UP
SOURCE RESPIRATORY: SIGNIFICANT CHANGE UP

## 2025-06-20 PROCEDURE — 99223 1ST HOSP IP/OBS HIGH 75: CPT

## 2025-06-20 PROCEDURE — 99232 SBSQ HOSP IP/OBS MODERATE 35: CPT

## 2025-06-20 PROCEDURE — 99222 1ST HOSP IP/OBS MODERATE 55: CPT

## 2025-06-20 PROCEDURE — 99291 CRITICAL CARE FIRST HOUR: CPT

## 2025-06-20 PROCEDURE — 93970 EXTREMITY STUDY: CPT | Mod: 26

## 2025-06-20 PROCEDURE — G0545: CPT

## 2025-06-20 RX ORDER — FUROSEMIDE 10 MG/ML
40 INJECTION INTRAMUSCULAR; INTRAVENOUS DAILY
Refills: 0 | Status: DISCONTINUED | OUTPATIENT
Start: 2025-06-20 | End: 2025-06-22

## 2025-06-20 RX ADMIN — Medication 650 MILLIGRAM(S): at 11:47

## 2025-06-20 RX ADMIN — RANOLAZINE 500 MILLIGRAM(S): 1000 TABLET, FILM COATED, EXTENDED RELEASE ORAL at 05:40

## 2025-06-20 RX ADMIN — INSULIN LISPRO 1: 100 INJECTION, SOLUTION INTRAVENOUS; SUBCUTANEOUS at 05:37

## 2025-06-20 RX ADMIN — FUROSEMIDE 40 MILLIGRAM(S): 10 INJECTION INTRAMUSCULAR; INTRAVENOUS at 10:15

## 2025-06-20 RX ADMIN — Medication 15 MILLILITER(S): at 18:08

## 2025-06-20 RX ADMIN — ENOXAPARIN SODIUM 40 MILLIGRAM(S): 100 INJECTION SUBCUTANEOUS at 14:22

## 2025-06-20 RX ADMIN — INSULIN LISPRO 1: 100 INJECTION, SOLUTION INTRAVENOUS; SUBCUTANEOUS at 23:47

## 2025-06-20 RX ADMIN — Medication 650 MILLIGRAM(S): at 12:47

## 2025-06-20 RX ADMIN — Medication 1 APPLICATION(S): at 04:00

## 2025-06-20 RX ADMIN — INSULIN LISPRO 1: 100 INJECTION, SOLUTION INTRAVENOUS; SUBCUTANEOUS at 11:49

## 2025-06-20 RX ADMIN — Medication 81 MILLIGRAM(S): at 11:44

## 2025-06-20 RX ADMIN — Medication 40 MILLIGRAM(S): at 05:37

## 2025-06-20 RX ADMIN — ATORVASTATIN CALCIUM 40 MILLIGRAM(S): 80 TABLET, FILM COATED ORAL at 23:41

## 2025-06-20 RX ADMIN — Medication 15 MILLILITER(S): at 05:40

## 2025-06-20 RX ADMIN — DEXMEDETOMIDINE HYDROCHLORIDE IN SODIUM CHLORIDE 3.74 MICROGRAM(S)/KG/HR: 4 INJECTION INTRAVENOUS at 23:40

## 2025-06-20 RX ADMIN — DEXMEDETOMIDINE HYDROCHLORIDE IN SODIUM CHLORIDE 3.74 MICROGRAM(S)/KG/HR: 4 INJECTION INTRAVENOUS at 11:48

## 2025-06-20 RX ADMIN — DEXMEDETOMIDINE HYDROCHLORIDE IN SODIUM CHLORIDE 3.74 MICROGRAM(S)/KG/HR: 4 INJECTION INTRAVENOUS at 03:38

## 2025-06-20 RX ADMIN — RANOLAZINE 500 MILLIGRAM(S): 1000 TABLET, FILM COATED, EXTENDED RELEASE ORAL at 18:08

## 2025-06-20 RX ADMIN — DEXMEDETOMIDINE HYDROCHLORIDE IN SODIUM CHLORIDE 3.74 MICROGRAM(S)/KG/HR: 4 INJECTION INTRAVENOUS at 18:08

## 2025-06-20 RX ADMIN — Medication 1 TABLET(S): at 11:48

## 2025-06-20 RX ADMIN — INSULIN GLARGINE-YFGN 30 UNIT(S): 100 INJECTION, SOLUTION SUBCUTANEOUS at 23:45

## 2025-06-20 NOTE — CHART NOTE - NSCHARTNOTEFT_GEN_A_CORE
Assessment:  Pt is on vent, tolerating NGT feeding c Glucerna 1.5 @ goal of 50 ml/hr x 18 hours.  Pt adm c diagnosis of hyperglycemia, hospital course complicated by bacteremia, UTI, NSTEMI, cardiac arrest, acute respiratory failure, aspiration pneumonia, severe sepsis, septic shock, acute blood loss anemia, difficulty weaning pt off vent, plan for trach/PEG noted.  Pt is full code.  PMH include HTN, HLD, DM( Per outpatient medication review, pt was on metformin and insulin Aspart/rapid acting insulin,15 units @ bedtime), recent right humerus fracture prior to adm.        Factors impacting intake: [ ] none [ ] nausea  [ ] vomiting [ ] diarrhea [ ] constipation  [ ]chewing problems [x ] swallowing issues  [ x] other: acute illness, on enteral feeding    Diet Prescription: Diet, NPO with Tube Feed:   Tube Feeding Modality: Nasogastric  Glucerna 1.5 Daryl  Total Volume for 24 Hours (mL): 900  Intermittent  Starting Tube Feed Rate {mL per Hour}: 10  Increase Tube Feed Rate by (mL): 10    Every 4 hours  Until Goal Tube Feed Rate (mL per Hour): 50  Tube Feeding Hours ON: 18  Tube Feeding OFF (Hours): 6  Tube Feed Start Time: 11:00 (06-13-25 @ 09:22)      Intake: Glucerna 1.5, 900 ig=6115 calories, 74 grams protein, 683 ml free water       Current Weight: 06/20, 76.6 kg,  06/13, 74.6 kg(daily wt.)  06/11, 74.7 kg(CCU re-adm drug dose wt.), 06/02, 64.8 kg(drug dose wt.), 05/31, 70.4 kg/daily wt., wt. fluctuations c wt. gain of 6.2 kg noted since 05/31  % Weight Change: 8.8%  06/20, 2+(mild) generalized edema noted  I/O: 1964/800(+1164)    Pertinent Medications: MEDICATIONS  (STANDING):  aspirin  chewable 81 milliGRAM(s) Oral daily  atorvastatin 40 milliGRAM(s) Oral at bedtime  chlorhexidine 0.12% Liquid 15 milliLiter(s) Oral Mucosa every 12 hours  chlorhexidine 2% Cloths 1 Application(s) Topical <User Schedule>  dexMEDEtomidine Infusion 0.2 MICROgram(s)/kG/Hr (3.74 mL/Hr) IV Continuous <Continuous>  enoxaparin Injectable 40 milliGRAM(s) SubCutaneous every 24 hours  furosemide   Injectable 40 milliGRAM(s) IV Push daily  insulin glargine Injectable (LANTUS) 30 Unit(s) SubCutaneous at bedtime  insulin lispro (ADMELOG) corrective regimen sliding scale   SubCutaneous every 6 hours  multivitamin 1 Tablet(s) Oral daily  pantoprazole   Suspension 40 milliGRAM(s) Oral every 24 hours  ranolazine 500 milliGRAM(s) Oral two times a day  senna 2 Tablet(s) Oral at bedtime    MEDICATIONS  (PRN):  acetaminophen     Tablet .. 650 milliGRAM(s) Oral every 6 hours PRN Temp greater or equal to 38C (100.4F), Mild Pain (1 - 3)  acetaminophen     Tablet .. 650 milliGRAM(s) Oral every 6 hours PRN Mild Pain (1 - 3)  aluminum hydroxide/magnesium hydroxide/simethicone Suspension 30 milliLiter(s) Oral every 6 hours PRN Dyspepsia  ondansetron Injectable 4 milliGRAM(s) IV Push every 8 hours PRN Nausea and/or Vomiting  polyethylene glycol 3350 17 Gram(s) Oral daily PRN Constipation    Pertinent Labs: 06-19 Na144 mmol/L Glu 172 mg/dL[H] K+ 4.7 mmol/L Cr  0.84 mg/dL BUN 33 mg/dL[H] 06-19 Phos 3.5 mg/dL 06-19 Alb 1.6 g/dL[L]06-19 ALT 17 U/L AST 24 U/L Alkaline Phosphatase 133 U/L[H]  05-31-25 @ 05:55 a1c 10.2<H>   CAPILLARY BLOOD GLUCOSE      POCT Blood Glucose.: 158 mg/dL (20 Jun 2025 11:38)  POCT Blood Glucose.: 161 mg/dL (20 Jun 2025 05:33)  POCT Blood Glucose.: 119 mg/dL (19 Jun 2025 23:20)  POCT Blood Glucose.: 72 mg/dL (19 Jun 2025 18:40)    Skin:   Pressure injury x 1  06/17, sacrum; suspected deep tissue injury     Estimated Needs:   [x ] no change since previous assessment(06/04), except for protein for pressure injury  IBW: 56.6 kg  Estimated protein needs: 68-79 grams protein(1.2-1.4 gram protein/kg IBW)  [ ] recalculated:     Previous Nutrition Diagnosis:   No diagnosis 06/13    New Nutrition Diagnosis: (06/20)  [ x] Increased Nutrient Needs   Related to: increased demand for nutrient   As evidenced by: pressure injury  Goal: pt to meet >75% protein-energy needs via tolerated route    Interventions:   Recommend  [ ] Change Diet To:  [ ] Nutrition Supplement  [x ] Nutrition Support; continue c current enteral feeding regimen of Glucerna 1.5 @ 50 ml/hr x 18 hours via NGT  [ ] Other:     Monitoring and Evaluation:   [ ] PO intake [ x ] Tolerance to diet prescription [ x ] weights [ x ] labs[ x ] follow up per protocol  [ ] other:

## 2025-06-20 NOTE — PROGRESS NOTE ADULT - ASSESSMENT
A/P-   74 year old female with PMHx of HTN, HLD, IDDM2, CAD (s/p PCI), and recent right humerus fracture (placed in sling 3 days ago) who presented to the ED on 5/30/25 for complaints of elevated blood glucose.   As per med history provided by family  patient fell three days ago and landed on her right shoulder. Went to [facility] at that time and found to have right humerus fracture and placed in sling. Discharged home with outpatient orthopedic surgery follow up. Since then, patient has been refusing to get out of bed and not eating much.   In the ED, VSS. Hgb 10.3, sodium 132, potassium 6.3, BUN 70, Cr 2.02, blood glucose 510. VBG 7.29 / 58 / 34 / 26. COVID/influenza/RSV negative. CT head without acute intracranial findings. Received 1L NS bolus, 1L LR bolus, and insulin 20 U IV.  (30 May 2025 23:43)    s/p  RRT last night   for AMS, hypotension, fever of 103  and was started on sepsis w/u given IVF bolus, Abx with CTX and Vanco. Persistently hypotensive and obtunded. Started on levophed infusion. Urgently transferred to ICU.   was also intubated for airway protection    Infectious Disease consultation requested this afternoon 6/2 to help with antibiotic management  for ESBL bacteremia    Intubated   sedated  on pressors for hypotension  afebrile today  yesterday 103 t-max  + leukocytosis of 29K  Blood cx- ESBL GNR by PCR x 2  UA from 5/31-pos for nitrates  urine cx-pending  DUANE  elevated cardiac enzymes    per med records had ESBL e.coli UTI in august and sept 2024 and ID was not consulted     6/3-  remains Intubated and sedated  on pressors for hypotension  temp of 101.5 today  + leukocytosis trending down to 20k  creatinine slightly better today  Blood cx- ESBL GNR by PCR x 2  UA from 5/31-pos for nitrates  urine cx->100K e.coli  DUANE  elevated cardiac enzymes    6/4: seen in ICU earlier, remains intubated, weaning process in progress, on pressors, continues having fevers, T 101 this morning, leukocytosis is better 17.27, Cr better 1.35, UC and BCs grew ESBL E. Coli, repeat BCs are pending, Meropenem IV continued. If not improvement in pt's fevers tomorrow, most likely CT a/p will be obtained.   6/5: remains in ICU, intubated and sedated, on pressors, Temp persistent 100-100.2, WBC better 16.43, Cr normalized, LFTs ok, repeat BCs NGTD, procalcitonin 2.41, TTE performed - Left ventricular regional wall motion abnormalities present. Meropenem IV continued.     6/6-  extubated  awake  afebrile now but early am had temp of 100.6  Leukocytosis almost resolved 11K today  Blood cx- ESBL GNR by PCR x 2  UA from 5/31-pos for nitrates  urine cx->100K e.coli  repeat blood cx- NGTD x 2 from 6/4 6/9-  downgraded to medical floor  Afebrile  Leukocytosis 12 k today  Blood cx- ESBL GNR by PCR x 2  UA from 5/31-pos for nitrates  urine cx->100K e.coli  repeat blood cx- NGTD x 2 from 6/4 6/11: pt is afebrile since 6/6, doing well on RA, leukocytosis was elevated 13.57, Cr ok,  no new cbc, repeat BCs remain with no growth to date, s/p 8 days of IV Meropenem, now observed off abx.     6/12-  s/p RRT last night for AMS and hypotention and second RRT later for ? seizure and aspiration  s/p intubation and transferred to CCU  off pressors  intubated  afebrile but had fever of 100.2 at 5 am  leukocytosis has decreased to 11k  repeat blood cx - NGTD x 2  has completed course of meropenem  fo her ESBL bacteremia dn UTI   cxr- b/l pleural effusions L> R  CTA report-  No pulmonary embolism..  Tracheobronchial secretions, greatest in the left lower lobe.  Patchy and   nodular bilateral lung opacities may be infectious or inflammatory. Right   middle lobe subpleural 6 mm nodular opacity is new from prior .   Short-term follow-up CT recommended    6/13-   remains intubated on 50% Fio2  Afebrile   Minimal leukocytosis of 13K  creatinine- normal value  Repeat blood cx  from 6/4- NGTD x 2  repeat blood cx from 6/11- NGTD x 2  sputum  cx- commensal lina c/w body site  has completed course of meropenem  fo her ESBL bacteremia and UTI - which she cleared  cxr- b/l pleural effusions L> R  CTA report-  No pulmonary embolism..  Tracheobronchial secretions, greatest in the left lower lobe.  Patchy and   nodular bilateral lung opacities may be infectious or inflammatory. Right   middle lobe subpleural 6 mm nodular opacity is new from prior .   Short-term follow-up CT recommended  EEG- reported negative for epilepsy ( pls see full report)    6/16-  remains intubated on 30% Fio2  T-max-100.6 this am  No leukocytosis   creatinine- normal value  Repeat blood cx  from 6/4- NGTD x 2  repeat blood cx from 6/11- NGTD x 2  sputum  cx- commensal lina c/w body site  has completed course of meropenem  for her ESBL bacteremia and UTI - which she cleared  cxr- b/l pleural effusions L> R  CTA report-  No pulmonary embolism..  Tracheobronchial secretions, greatest in the left lower lobe.  Patchy and   nodular bilateral lung opacities may be infectious or inflammatory. Right   middle lobe subpleural 6 mm nodular opacity is new from prior .   Short-term follow-up CT recommended  EEG- reported negative for epilepsy ( pls see full report)    6/20-  T-max-101  No leukocytosis  Repeat blood cx  from 6/4- NGTD x 2  repeat blood cx from 6/11- NGTD x 2  sputum  cx- commensal lian c/w body site  ET tube cx-commensal lina c/w body site  has completed course of meropenem  for her ESBL bacteremia and UTI - which she cleared  cxr- b/l pleural effusions L> R  completed course of zosyn as well 2 days ago for presumed aspiration pneumonitis      Impression-  possible aspiration pneumonitis , also has Left  pleural effusion , intubated   ESBL septic shock -  was treated and had resolved  E.coli ESBL bacteremia sec to   source - treated   Right humerus fracture with hematoma    Plan-  had completed course of IV meropenem for the ESBL bacteremia and sepsis and repeat blood cx are negative   also completed course of zosyn for ? asp pneumonitis  has been off abx past 2 days   advise to send 2 sets of blood cx and RVP  and another sputum cx in light of the fever  also advise to check LE US r/o dvt as part of fever work up.  vent management as per CCU team  rest of the medical management as per CCU team    All labs and imaging and chart notes reviewed.     d/w CCU team.    d/w Patient's family  at bedside as well.    Tariq Fraga MD  Infectious Disease Attending    for any questions please do not hesitate to contact me either via teams or by calling [phone]

## 2025-06-20 NOTE — PROGRESS NOTE ADULT - ASSESSMENT
74F w/ CAD, HTN, DM. Initially presented w/ hyperglycemia. On 6/1 she was transferred to ICU for septic shock in setting of ESBL E coli UTI requiring intubation. The following day she had cardiac arrest x10 minutes s/p medical mgmt of NSTEMI. She regained mental status and extubated on 6/6 and subsequently transferred to medical floors on 6/8. On 6/11 she had witnessed episodes of aspiration w/ AMS and was eventually intubated for hypoxemia and poor mental status. She was treated for pneumonia. Shock has resolved and her mental status is improved but she has not been weaning well.     #Neuro - awake and alert, follows commands; continue low dose precedex for patient comfort   #CV - HD stable, s/p treatment for NSTEMI; continue asa, ranolazine; lasix to keep net negative   #Pulm - remains intubated on minimal vent settings, however she failed SBT trials - on high support she becomes very tachypneic to 30-40s with low VT; suspect pt is very weak and will not be able to maintain her own respiratory status; spoke w/ son at bedside and they would like to proceed w/ tracheostomy - surgery consult  #ID- s/p treatment for ESBL E coli UTI and then another course of abx for aspiration pneumonia; she spiked fever yesterday evening, if it recurrs we will need to send cultures off   #Renal/metabolic - normal renal function, monitor I/Os and electrolytes  #GI- tolerating TF, continue bowel regimen and PPI  #Heme - chronic anemia, stable  #Endo - continue lantus and ISS coverage  #PPx - lovenox qd  #Dispo- remains intubation unable to wean; prognosis very guarded; DNR/trial of intubation - family would like to proceed with tracheostomy     74F w/ CAD, HTN, DM. Initially presented w/ hyperglycemia. On 6/1 she was transferred to ICU for septic shock in setting of ESBL E coli UTI requiring intubation. The following day she had cardiac arrest x10 minutes s/p medical mgmt of NSTEMI. She regained mental status and extubated on 6/6 and subsequently transferred to medical floors on 6/8. On 6/11 she had witnessed episodes of aspiration w/ AMS and was eventually intubated for hypoxemia and poor mental status. She was treated for pneumonia. Shock has resolved and her mental status is improved but she has not been weaning well.     #Neuro - awake and alert, follows commands; continue low dose precedex for patient comfort   #CV - HD stable, s/p treatment for NSTEMI; continue asa, ranolazine; lasix to keep net negative   #Pulm - remains intubated on minimal vent settings, however she failed SBT trials - on high support she becomes very tachypneic to 30-40s with low VT; suspect pt is very weak and will not be able to maintain her own respiratory status; spoke w/ son at bedside and they would like to proceed w/ tracheostomy - surgery consult  #ID- s/p treatment for ESBL E coli UTI and then another course of abx for aspiration pneumonia; she spiked fever yesterday evening, if it recurrs we will need to send cultures off   #Renal/metabolic - normal renal function, monitor I/Os and electrolytes  #GI- tolerating TF, continue bowel regimen and PPI  #Heme - chronic anemia, stable  #Endo - continue lantus and ISS coverage  #PPx - lovenox qd  #Dispo- remains intubation unable to wean; prognosis very guarded; DNR/trial of intubation - family would like to proceed with tracheostomy    Plan of care discussed w/ pt's son at bedside

## 2025-06-20 NOTE — CONSULT NOTE ADULT - SUBJECTIVE AND OBJECTIVE BOX
Patient is a 74y old  Female who presents with a chief complaint of ALY, physical deconditioning (20 Jun 2025 07:51)      HPI:  Tiffanie Joshi is a 74 year old female with PMHx of HTN, HLD, IDDM2, CAD (s/p PCI), and recent right humerus fracture (placed in sling 3 days ago) who presented to the ED on 5/30/25 for complaints of elevated blood glucose.    Patient is Bulgarian-speaking but declined  services and preferred for daughter-in-law at bedside to translate. As per daughter-in-law, patient fell three days ago and landed on her right shoulder. Went to [facility] at that time and found to have right humerus fracture and placed in sling. Discharged home with outpatient orthopedic surgery follow up. Since then, patient has been refusing to get out of bed and not eating much. Will drink a little juice every now and then. Daughter checked her blood glucose around 5PM and it was > 400 so brought to the ER for further evaluation. Baseline functional status is ambulates with walker and dependent with all ADLs. Lives at home with daughter.    In the ED, VSS. Hgb 10.3, sodium 132, potassium 6.3, BUN 70, Cr 2.02, blood glucose 510. VBG 7.29 / 58 / 34 / 26. COVID/influenza/RSV negative. CT head without acute intracranial findings. Received 1L NS bolus, 1L LR bolus, and insulin 20 U IV.  (30 May 2025 23:43)      interval surgical HPI: 73 yo F pmhx of htn, hld, DM, CAD with stents in ICU intubated since 6/11 2/2 aspiration pna. prior to that had cardiac arrest with ROSC, downgraded, then upgraded back to ICU after aspiration event. diffuclty weaning off vent. surgery consulted for trache/PEG. hx provided by son at bedside and daughter over the phone. Pshx hysterectomy approx 40 years ago       PAST MEDICAL & SURGICAL HISTORY:  HTN (hypertension)      HLD (hyperlipidemia)      CAD S/P percutaneous coronary angioplasty      Insulin dependent type 2 diabetes mellitus      History of heart artery stent          Review of Systems:  Negative except  as above in HPI    MEDICATIONS  (STANDING):  aspirin  chewable 81 milliGRAM(s) Oral daily  atorvastatin 40 milliGRAM(s) Oral at bedtime  chlorhexidine 0.12% Liquid 15 milliLiter(s) Oral Mucosa every 12 hours  chlorhexidine 2% Cloths 1 Application(s) Topical <User Schedule>  dexMEDEtomidine Infusion 0.2 MICROgram(s)/kG/Hr (3.74 mL/Hr) IV Continuous <Continuous>  enoxaparin Injectable 40 milliGRAM(s) SubCutaneous every 24 hours  furosemide   Injectable 40 milliGRAM(s) IV Push daily  insulin glargine Injectable (LANTUS) 30 Unit(s) SubCutaneous at bedtime  insulin lispro (ADMELOG) corrective regimen sliding scale   SubCutaneous every 6 hours  multivitamin 1 Tablet(s) Oral daily  pantoprazole   Suspension 40 milliGRAM(s) Oral every 24 hours  ranolazine 500 milliGRAM(s) Oral two times a day  senna 2 Tablet(s) Oral at bedtime    MEDICATIONS  (PRN):  acetaminophen     Tablet .. 650 milliGRAM(s) Oral every 6 hours PRN Temp greater or equal to 38C (100.4F), Mild Pain (1 - 3)  acetaminophen     Tablet .. 650 milliGRAM(s) Oral every 6 hours PRN Mild Pain (1 - 3)  aluminum hydroxide/magnesium hydroxide/simethicone Suspension 30 milliLiter(s) Oral every 6 hours PRN Dyspepsia  ondansetron Injectable 4 milliGRAM(s) IV Push every 8 hours PRN Nausea and/or Vomiting  polyethylene glycol 3350 17 Gram(s) Oral daily PRN Constipation      Allergies    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/shellfish (Short breath)  No Known Drug Allergies    Intolerances      FAMILY HISTORY:  FHx: myocardial infarction  Father        Vital Signs Last 24 Hrs  T(C): 38.3 (20 Jun 2025 11:13), Max: 38.4 (19 Jun 2025 20:00)  T(F): 101 (20 Jun 2025 11:13), Max: 101.1 (19 Jun 2025 20:00)  HR: 55 (20 Jun 2025 12:00) (52 - 62)  BP: 123/53 (20 Jun 2025 12:00) (92/53 - 152/63)  BP(mean): 75 (20 Jun 2025 12:00) (65 - 108)  RR: 13 (20 Jun 2025 12:00) (0 - 35)  SpO2: 100% (20 Jun 2025 12:25) (98% - 100%)    Parameters below as of 20 Jun 2025 12:25  Patient On (Oxygen Delivery Method): ventilator        Physical Exam:  General:  Appears stated age, well-groomed, no distress  Chest: no respiratory distress, no accessory muscle use. intubated and breathing synchronously with vent   Abdomen:  soft, nondistended        LABS:                        8.8    7.16  )-----------( 317      ( 19 Jun 2025 03:54 )             30.2     06-19    144  |  113[H]  |  33[H]  ----------------------------<  172[H]  4.7   |  28  |  0.84    Ca    8.9      19 Jun 2025 03:54  Phos  3.5     06-19  Mg     2.0     06-19    TPro  6.7  /  Alb  1.6[L]  /  TBili  0.4  /  DBili  x   /  AST  24  /  ALT  17  /  AlkPhos  133[H]  06-19      Urinalysis Basic - ( 19 Jun 2025 03:54 )    Color: x / Appearance: x / SG: x / pH: x  Gluc: 172 mg/dL / Ketone: x  / Bili: x / Urobili: x   Blood: x / Protein: x / Nitrite: x   Leuk Esterase: x / RBC: x / WBC x   Sq Epi: x / Non Sq Epi: x / Bacteria: x        A/P: 73 yo F pmhx of htn, hld, DM, CAD with stents admitted on 5/30/25 for elevated blood glucose.  ON 6/1 RRT for hypotension , fever 103.2 and pt transferred to ICU and subsequently intubated for airway protection, shock state. On 6/2 pt s/p cardiac arrest while in CT  - ACLS for 10 minutes with ROSC. Pt treated for NSTEMI Heparin drip (no intervention). Pt also found to have E. coli  ESBL bacteremia. Pt s/p extubation 6/6/2025 transferred to [facility] on 6/11/25 pt was another RRT 2/2 AMS, intubated brought back to ICU. difficulty weaning vent so ICU consulted for trache/peg consult  family agreeable  -trache/peg planning sometime next week  -continue care per ICU team  -case discussed with Dr Ramires

## 2025-06-20 NOTE — CONSULT NOTE ADULT - NS ATTEND AMEND GEN_ALL_CORE FT
Pt w/ multiple comorbidities, cardiac arrest, repeated asp PNA, unable to be extubated. Trach/PEG planning for next week

## 2025-06-20 NOTE — PROGRESS NOTE ADULT - SUBJECTIVE AND OBJECTIVE BOX
CHIEF COMPLAINT:    Interval Events:    REVIEW OF SYSTEMS:  Constitutional: [ ] fevers [ ] chills [ ] weight loss [ ] weight gain  HEENT: [ ] dry eyes [ ] eye irritation [ ] postnasal drip [ ] nasal congestion  CV: [ ] chest pain [ ] orthopnea [ ] palpitations [ ] murmur  Resp: [ ] cough [ ] shortness of breath [ ] dyspnea [ ] wheezing [ ] sputum [ ] hemoptysis  GI: [ ] nausea [ ] vomiting [ ] diarrhea [ ] constipation [ ] abd pain [ ] dysphagia   : [ ] dysuria [ ] nocturia [ ] hematuria [ ] increased urinary frequency  Musculoskeletal: [ ] back pain [ ] myalgias [ ] arthralgias [ ] fracture  Skin: [ ] rash [ ] itch  Neurological: [ ] headache [ ] dizziness [ ] syncope [ ] weakness [ ] numbness  Hematologic/Lymphatic: [ ] anemia [ ] bleeding problem  Allergic/Immunologic: [ ] itchy eyes [ ] nasal discharge [ ] hives [ ] angioedema  [ ] All other systems negative  [ ] Unable to assess ROS because ________    OBJECTIVE:  ICU Vital Signs Last 24 Hrs  T(C): 37.5 (20 Jun 2025 04:00), Max: 38.4 (19 Jun 2025 20:00)  T(F): 99.5 (20 Jun 2025 04:00), Max: 101.1 (19 Jun 2025 20:00)  HR: 52 (20 Jun 2025 06:50) (52 - 59)  BP: 126/54 (20 Jun 2025 06:00) (92/53 - 153/62)  BP(mean): 74 (20 Jun 2025 06:00) (65 - 108)  ABP: --  ABP(mean): --  RR: 10 (20 Jun 2025 06:50) (0 - 18)  SpO2: 98% (20 Jun 2025 06:50) (98% - 100%)      Mode: AC/ CMV (Assist Control/ Continuous Mandatory Ventilation), RR (machine): 10, TV (machine): 400, FiO2: 30, PEEP: 6, ITime: 1, MAP: 10, PIP: 27    06-19 @ 07:01  -  06-20 @ 07:00  --------------------------------------------------------  IN: 1964.2 mL / OUT: 800 mL / NET: 1164.2 mL      CAPILLARY BLOOD GLUCOSE      POCT Blood Glucose.: 161 mg/dL (20 Jun 2025 05:33)      PHYSICAL EXAM:  General:   HEENT:   Neck:   Respiratory:   Cardiovascular:   Abdomen:   Extremities:   Skin:   Neurological:  Psychiatry:    LINES:    HOSPITAL MEDICATIONS:  MEDICATIONS  (STANDING):  aspirin  chewable 81 milliGRAM(s) Oral daily  atorvastatin 40 milliGRAM(s) Oral at bedtime  chlorhexidine 0.12% Liquid 15 milliLiter(s) Oral Mucosa every 12 hours  chlorhexidine 2% Cloths 1 Application(s) Topical <User Schedule>  dexMEDEtomidine Infusion 0.2 MICROgram(s)/kG/Hr (3.74 mL/Hr) IV Continuous <Continuous>  enoxaparin Injectable 40 milliGRAM(s) SubCutaneous every 24 hours  insulin glargine Injectable (LANTUS) 30 Unit(s) SubCutaneous at bedtime  insulin lispro (ADMELOG) corrective regimen sliding scale   SubCutaneous every 6 hours  multivitamin 1 Tablet(s) Oral daily  pantoprazole   Suspension 40 milliGRAM(s) Oral every 24 hours  ranolazine 500 milliGRAM(s) Oral two times a day  senna 2 Tablet(s) Oral at bedtime    MEDICATIONS  (PRN):  acetaminophen     Tablet .. 650 milliGRAM(s) Oral every 6 hours PRN Temp greater or equal to 38C (100.4F), Mild Pain (1 - 3)  acetaminophen     Tablet .. 650 milliGRAM(s) Oral every 6 hours PRN Mild Pain (1 - 3)  aluminum hydroxide/magnesium hydroxide/simethicone Suspension 30 milliLiter(s) Oral every 6 hours PRN Dyspepsia  ondansetron Injectable 4 milliGRAM(s) IV Push every 8 hours PRN Nausea and/or Vomiting  polyethylene glycol 3350 17 Gram(s) Oral daily PRN Constipation      LABS:                        8.8    7.16  )-----------( 317      ( 19 Jun 2025 03:54 )             30.2     Hgb Trend: 8.8<--, 8.6<--, 8.6<--, 8.8<--, 7.7<--  06-19    144  |  113[H]  |  33[H]  ----------------------------<  172[H]  4.7   |  28  |  0.84    Ca    8.9      19 Jun 2025 03:54  Phos  3.5     06-19  Mg     2.0     06-19    TPro  6.7  /  Alb  1.6[L]  /  TBili  0.4  /  DBili  x   /  AST  24  /  ALT  17  /  AlkPhos  133[H]  06-19      Urinalysis Basic - ( 19 Jun 2025 03:54 )    Color: x / Appearance: x / SG: x / pH: x  Gluc: 172 mg/dL / Ketone: x  / Bili: x / Urobili: x   Blood: x / Protein: x / Nitrite: x   Leuk Esterase: x / RBC: x / WBC x   Sq Epi: x / Non Sq Epi: x / Bacteria: x            MICROBIOLOGY:     RADIOLOGY:  [ ] Reviewed and interpreted by me CHIEF COMPLAINT:    Interval Events:  Tmax 101.1  om PSV 12/6, RR 30-40s    REVIEW OF SYSTEMS:  [ x] Unable to assess ROS because intubated    OBJECTIVE:  ICU Vital Signs Last 24 Hrs  T(C): 37.5 (20 Jun 2025 04:00), Max: 38.4 (19 Jun 2025 20:00)  T(F): 99.5 (20 Jun 2025 04:00), Max: 101.1 (19 Jun 2025 20:00)  HR: 52 (20 Jun 2025 06:50) (52 - 59)  BP: 126/54 (20 Jun 2025 06:00) (92/53 - 153/62)  BP(mean): 74 (20 Jun 2025 06:00) (65 - 108)  ABP: --  ABP(mean): --  RR: 10 (20 Jun 2025 06:50) (0 - 18)  SpO2: 98% (20 Jun 2025 06:50) (98% - 100%)      Mode: AC/ CMV (Assist Control/ Continuous Mandatory Ventilation), RR (machine): 10, TV (machine): 400, FiO2: 30, PEEP: 6, ITime: 1, MAP: 10, PIP: 27    06-19 @ 07:01  -  06-20 @ 07:00  --------------------------------------------------------  IN: 1964.2 mL / OUT: 800 mL / NET: 1164.2 mL      CAPILLARY BLOOD GLUCOSE      POCT Blood Glucose.: 161 mg/dL (20 Jun 2025 05:33)      PHYSICAL EXAM:  General: NAD, non toxic appearing  HEENT: ETT and NGT in place  Neck: supple  Respiratory: mechanical BS  Cardiovascular: s1s2 RRR  Abdomen: soft, non tender, non distended  Extremities: warm, +1 edema  Skin: sacral DTI  Neurological: moves all extremities, follows commands  Psychiatry: unable to assess    LINES:  PIV    HOSPITAL MEDICATIONS:  MEDICATIONS  (STANDING):  aspirin  chewable 81 milliGRAM(s) Oral daily  atorvastatin 40 milliGRAM(s) Oral at bedtime  chlorhexidine 0.12% Liquid 15 milliLiter(s) Oral Mucosa every 12 hours  chlorhexidine 2% Cloths 1 Application(s) Topical <User Schedule>  dexMEDEtomidine Infusion 0.2 MICROgram(s)/kG/Hr (3.74 mL/Hr) IV Continuous <Continuous>  enoxaparin Injectable 40 milliGRAM(s) SubCutaneous every 24 hours  insulin glargine Injectable (LANTUS) 30 Unit(s) SubCutaneous at bedtime  insulin lispro (ADMELOG) corrective regimen sliding scale   SubCutaneous every 6 hours  multivitamin 1 Tablet(s) Oral daily  pantoprazole   Suspension 40 milliGRAM(s) Oral every 24 hours  ranolazine 500 milliGRAM(s) Oral two times a day  senna 2 Tablet(s) Oral at bedtime    MEDICATIONS  (PRN):  acetaminophen     Tablet .. 650 milliGRAM(s) Oral every 6 hours PRN Temp greater or equal to 38C (100.4F), Mild Pain (1 - 3)  acetaminophen     Tablet .. 650 milliGRAM(s) Oral every 6 hours PRN Mild Pain (1 - 3)  aluminum hydroxide/magnesium hydroxide/simethicone Suspension 30 milliLiter(s) Oral every 6 hours PRN Dyspepsia  ondansetron Injectable 4 milliGRAM(s) IV Push every 8 hours PRN Nausea and/or Vomiting  polyethylene glycol 3350 17 Gram(s) Oral daily PRN Constipation      LABS:                        8.8    7.16  )-----------( 317      ( 19 Jun 2025 03:54 )             30.2     Hgb Trend: 8.8<--, 8.6<--, 8.6<--, 8.8<--, 7.7<--  06-19    144  |  113[H]  |  33[H]  ----------------------------<  172[H]  4.7   |  28  |  0.84    Ca    8.9      19 Jun 2025 03:54  Phos  3.5     06-19  Mg     2.0     06-19    TPro  6.7  /  Alb  1.6[L]  /  TBili  0.4  /  DBili  x   /  AST  24  /  ALT  17  /  AlkPhos  133[H]  06-19      Urinalysis Basic - ( 19 Jun 2025 03:54 )    Color: x / Appearance: x / SG: x / pH: x  Gluc: 172 mg/dL / Ketone: x  / Bili: x / Urobili: x   Blood: x / Protein: x / Nitrite: x   Leuk Esterase: x / RBC: x / WBC x   Sq Epi: x / Non Sq Epi: x / Bacteria: x            MICROBIOLOGY:     RADIOLOGY:  [ ] Reviewed and interpreted by me

## 2025-06-20 NOTE — PROGRESS NOTE ADULT - SUBJECTIVE AND OBJECTIVE BOX
[facility]  INFECTIOUS DISEASES   [address] [place] [address]  Tel: [phone]     Fax: [phone]  ==============================================================================  MD Jason Booth, DO Stephanie Olmedo, CHLOE Pope M.D  ==============================================================================      EMMANUEL GONZALEZ  MRN-[id number]  74y ([birth year])      Interval History:    Patient seen and examined today in CCU.    patient is still intubated but is awake and follows simple commands and per nurse failed weaning trial today.    she has been having some temps past 2 days.      ROS:    [x] Unobtainable because:  limites as patient is still intubated but awake and more interactive       Allergies  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/shellfish (Short breath)  No Known Drug Allergies        ANTIMICROBIALS:        Physical Exam:  Vital Signs Last 24 Hrs  T(C): 37.8 (20 Jun 2025 15:00), Max: 38.4 (19 Jun 2025 20:00)  T(F): 100 (20 Jun 2025 15:00), Max: 101.1 (19 Jun 2025 20:00)  HR: 51 (20 Jun 2025 15:00) (51 - 62)  BP: 133/59 (20 Jun 2025 15:00) (92/53 - 152/63)  BP(mean): 81 (20 Jun 2025 15:00) (65 - 88)  RR: 10 (20 Jun 2025 15:00) (0 - 35)  SpO2: 100% (20 Jun 2025 15:00) (98% - 100%)    Parameters below as of 20 Jun 2025 12:25  Patient On (Oxygen Delivery Method): ventilator        06-19-25 @ 07:01  -  06-20-25 @ 07:00  --------------------------------------------------------  IN: 1964.2 mL / OUT: 800 mL / NET: 1164.2 mL    06-20-25 @ 07:01  -  06-20-25 @ 16:14  --------------------------------------------------------  IN: 61.2 mL / OUT: 20 mL / NET: 41.2 mL      General: NAD, non toxic appearing, awake  HEENT: ETT and NGT in place  Neck: supple  Respiratory: mechanical BS heard  Cardiovascular: S1S2 RRR  Abdomen: soft, non tender, non distended  Extremities: warm, +1 edema b/l LE  Neurological: awake, follows commands        WBC Count: 7.16 K/uL (06-19 @ 03:54)  WBC Count: 5.51 K/uL (06-18 @ 03:02)  WBC Count: 6.09 K/uL (06-16 @ 03:35)  WBC Count: 7.61 K/uL (06-15 @ 03:21)  WBC Count: 9.84 K/uL (06-14 @ 03:55)                            8.8    7.16  )-----------( 317      ( 19 Jun 2025 03:54 )             30.2       06-19    144  |  113[H]  |  33[H]  ----------------------------<  172[H]  4.7   |  28  |  0.84    Ca    8.9      19 Jun 2025 03:54  Phos  3.5     06-19  Mg     2.0     06-19    TPro  6.7  /  Alb  1.6[L]  /  TBili  0.4  /  DBili  x   /  AST  24  /  ALT  17  /  AlkPhos  133[H]  06-19      Urinalysis Basic - ( 19 Jun 2025 03:54 )    Color: x / Appearance: x / SG: x / pH: x  Gluc: 172 mg/dL / Ketone: x  / Bili: x / Urobili: x   Blood: x / Protein: x / Nitrite: x   Leuk Esterase: x / RBC: x / WBC x   Sq Epi: x / Non Sq Epi: x / Bacteria: x          Creatinine Trend: 0.84<--, 0.85<--, 1.06<--, 0.95<--, 1.11<--, 1.08<--      MICROBIOLOGY:    ET Tube ET Tube  06-13-25   Commensal lina consistent with body site  --    No Squamous epithelial cells per low power field  Numerous polymorphonuclear leukocytes per low power field  Few Yeast per oil power field      Sputum Sputum  06-12-25   Commensal lina consistent with body site  --    Few Squamous epithelial cells per low power field  Numerous polymorphonuclear leukocytes per low power field  Yeast per oil power field      Catheterized Catheterized  06-11-25   <10,000 CFU/mL Normal Urogenital Lina  --  --      Blood Blood-Peripheral  06-11-25   No growth at 5 days  --  --      Blood Blood-Peripheral  06-11-25   No growth at 5 days  --  --      Blood Blood-Peripheral  06-04-25   No growth at 5 days  --  --      Catheterized Catheterized  06-02-25   >100,000 CFU/ml Escherichia coli ESBL  --  Escherichia coli ESBL      Blood Blood-Peripheral  06-01-25   Growth in aerobic and anaerobic bottles: Escherichia coli ESBL  See previous culture [id number]  --    Growth in aerobic bottle: Gram Negative Rods  Growth in anaerobic bottle: Gram Negative Rods      Blood Blood-Peripheral  06-01-25   Growth in aerobic and anaerobic bottles: Escherichia coli ESBL  Direct identification is available within approximately 3-5  hours either by Blood Panel Multiplexed PCR or Direct  MALDI-TOF. Details: [email or web address]  --  Blood Culture PCR  Escherichia coli ESBL      D-Dimer Assay, Quantitative: 3551 (06-11)      RADIOLOGY:    < from: Xray Chest 1 View-PORTABLE IMMEDIATE (Xray Chest 1 View-PORTABLE IMMEDIATE .) (06.12.25 @ 08:00) >    ACC: [id number] EXAM:  XR CHEST PORTABLE IMMED 1V   ORDERED BY: MARINO JULIEN     PROCEDURE DATE:  06/12/2025          INTERPRETATION:  XR CHEST IMMEDIATE dated 6/12/2025 8:00 AM    CLINICAL INFORMATION: Female, 74 years old.  ett withdrawn.    PRIOR STUDIES: 6/11/2025    FINDINGS/  IMPRESSION: The heart size, mediastinal and hilar contours are unchanged   and within normal limits. There is been proximal repositioning of the tip   of the endotracheal tube which now lies approximately 2 cm proximal to   the rosi. There is an enteric tube with the distal tip and side-port   project projecting inferior to the field-of-view and below the   diaphragms. There are bibasilar pleural effusions, left greater than   right. Underlying left lower lobe pneumonia and/or atelectasis cannot be   excluded. Calcified left apical granuloma redemonstrated.    --- End of Report ---            MIGUEL WILLETT MD  This document has been electronically signed. Jun 12 2025 10:51AM    < end of copied text >

## 2025-06-21 LAB
ALBUMIN SERPL ELPH-MCNC: 1.6 G/DL — LOW (ref 3.3–5)
ALP SERPL-CCNC: 145 U/L — HIGH (ref 40–120)
ALT FLD-CCNC: 13 U/L — SIGNIFICANT CHANGE UP (ref 12–78)
ANION GAP SERPL CALC-SCNC: 3 MMOL/L — LOW (ref 5–17)
AST SERPL-CCNC: 16 U/L — SIGNIFICANT CHANGE UP (ref 15–37)
BASOPHILS # BLD AUTO: 0.05 K/UL — SIGNIFICANT CHANGE UP (ref 0–0.2)
BASOPHILS NFR BLD AUTO: 0.6 % — SIGNIFICANT CHANGE UP (ref 0–2)
BILIRUB SERPL-MCNC: 0.5 MG/DL — SIGNIFICANT CHANGE UP (ref 0.2–1.2)
BUN SERPL-MCNC: 41 MG/DL — HIGH (ref 7–23)
CALCIUM SERPL-MCNC: 8.3 MG/DL — LOW (ref 8.5–10.1)
CHLORIDE SERPL-SCNC: 113 MMOL/L — HIGH (ref 96–108)
CO2 SERPL-SCNC: 30 MMOL/L — SIGNIFICANT CHANGE UP (ref 22–31)
CREAT SERPL-MCNC: 0.9 MG/DL — SIGNIFICANT CHANGE UP (ref 0.5–1.3)
EGFR: 66 ML/MIN/1.73M2 — SIGNIFICANT CHANGE UP
EGFR: 66 ML/MIN/1.73M2 — SIGNIFICANT CHANGE UP
EOSINOPHIL # BLD AUTO: 0.27 K/UL — SIGNIFICANT CHANGE UP (ref 0–0.5)
EOSINOPHIL NFR BLD AUTO: 3.2 % — SIGNIFICANT CHANGE UP (ref 0–6)
GLUCOSE BLDC GLUCOMTR-MCNC: 109 MG/DL — HIGH (ref 70–99)
GLUCOSE BLDC GLUCOMTR-MCNC: 120 MG/DL — HIGH (ref 70–99)
GLUCOSE BLDC GLUCOMTR-MCNC: 139 MG/DL — HIGH (ref 70–99)
GLUCOSE BLDC GLUCOMTR-MCNC: 183 MG/DL — HIGH (ref 70–99)
GLUCOSE BLDC GLUCOMTR-MCNC: 206 MG/DL — HIGH (ref 70–99)
GLUCOSE SERPL-MCNC: 187 MG/DL — HIGH (ref 70–99)
GRAM STN FLD: SIGNIFICANT CHANGE UP
HCT VFR BLD CALC: 29.2 % — LOW (ref 34.5–45)
HGB BLD-MCNC: 8.3 G/DL — LOW (ref 11.5–15.5)
IMM GRANULOCYTES NFR BLD AUTO: 0.8 % — SIGNIFICANT CHANGE UP (ref 0–0.9)
LYMPHOCYTES # BLD AUTO: 0.92 K/UL — LOW (ref 1–3.3)
LYMPHOCYTES # BLD AUTO: 10.9 % — LOW (ref 13–44)
MAGNESIUM SERPL-MCNC: 2.1 MG/DL — SIGNIFICANT CHANGE UP (ref 1.6–2.6)
MCHC RBC-ENTMCNC: 27.4 PG — SIGNIFICANT CHANGE UP (ref 27–34)
MCHC RBC-ENTMCNC: 28.4 G/DL — LOW (ref 32–36)
MCV RBC AUTO: 96.4 FL — SIGNIFICANT CHANGE UP (ref 80–100)
MONOCYTES # BLD AUTO: 0.6 K/UL — SIGNIFICANT CHANGE UP (ref 0–0.9)
MONOCYTES NFR BLD AUTO: 7.1 % — SIGNIFICANT CHANGE UP (ref 2–14)
NEUTROPHILS # BLD AUTO: 6.51 K/UL — SIGNIFICANT CHANGE UP (ref 1.8–7.4)
NEUTROPHILS NFR BLD AUTO: 77.4 % — HIGH (ref 43–77)
NRBC BLD AUTO-RTO: 0 /100 WBCS — SIGNIFICANT CHANGE UP (ref 0–0)
PHOSPHATE SERPL-MCNC: 3.7 MG/DL — SIGNIFICANT CHANGE UP (ref 2.5–4.5)
PLATELET # BLD AUTO: 319 K/UL — SIGNIFICANT CHANGE UP (ref 150–400)
POTASSIUM SERPL-MCNC: 4.8 MMOL/L — SIGNIFICANT CHANGE UP (ref 3.5–5.3)
POTASSIUM SERPL-SCNC: 4.8 MMOL/L — SIGNIFICANT CHANGE UP (ref 3.5–5.3)
PROT SERPL-MCNC: 6.3 GM/DL — SIGNIFICANT CHANGE UP (ref 6–8.3)
RBC # BLD: 3.03 M/UL — LOW (ref 3.8–5.2)
RBC # FLD: 16 % — HIGH (ref 10.3–14.5)
SODIUM SERPL-SCNC: 146 MMOL/L — HIGH (ref 135–145)
SPECIMEN SOURCE: SIGNIFICANT CHANGE UP
WBC # BLD: 8.42 K/UL — SIGNIFICANT CHANGE UP (ref 3.8–10.5)
WBC # FLD AUTO: 8.42 K/UL — SIGNIFICANT CHANGE UP (ref 3.8–10.5)

## 2025-06-21 PROCEDURE — 99291 CRITICAL CARE FIRST HOUR: CPT

## 2025-06-21 RX ORDER — HYDROMORPHONE/SOD CHLOR,ISO/PF 2 MG/10 ML
0.25 SYRINGE (ML) INJECTION EVERY 4 HOURS
Refills: 0 | Status: DISCONTINUED | OUTPATIENT
Start: 2025-06-21 | End: 2025-06-24

## 2025-06-21 RX ORDER — QUETIAPINE FUMARATE 25 MG/1
12.5 TABLET ORAL AT BEDTIME
Refills: 0 | Status: DISCONTINUED | OUTPATIENT
Start: 2025-06-21 | End: 2025-06-26

## 2025-06-21 RX ORDER — MIDAZOLAM IN 0.9 % SOD.CHLORID 1 MG/ML
1 PLASTIC BAG, INJECTION (ML) INTRAVENOUS ONCE
Refills: 0 | Status: DISCONTINUED | OUTPATIENT
Start: 2025-06-21 | End: 2025-06-21

## 2025-06-21 RX ADMIN — Medication 1 TABLET(S): at 12:43

## 2025-06-21 RX ADMIN — DEXMEDETOMIDINE HYDROCHLORIDE IN SODIUM CHLORIDE 3.74 MICROGRAM(S)/KG/HR: 4 INJECTION INTRAVENOUS at 12:44

## 2025-06-21 RX ADMIN — INSULIN LISPRO 2: 100 INJECTION, SOLUTION INTRAVENOUS; SUBCUTANEOUS at 12:47

## 2025-06-21 RX ADMIN — INSULIN LISPRO 1: 100 INJECTION, SOLUTION INTRAVENOUS; SUBCUTANEOUS at 05:59

## 2025-06-21 RX ADMIN — Medication 15 MILLILITER(S): at 05:59

## 2025-06-21 RX ADMIN — ATORVASTATIN CALCIUM 40 MILLIGRAM(S): 80 TABLET, FILM COATED ORAL at 21:25

## 2025-06-21 RX ADMIN — Medication 81 MILLIGRAM(S): at 12:43

## 2025-06-21 RX ADMIN — FUROSEMIDE 40 MILLIGRAM(S): 10 INJECTION INTRAMUSCULAR; INTRAVENOUS at 05:59

## 2025-06-21 RX ADMIN — ENOXAPARIN SODIUM 40 MILLIGRAM(S): 100 INJECTION SUBCUTANEOUS at 14:18

## 2025-06-21 RX ADMIN — Medication 1 MILLIGRAM(S): at 14:18

## 2025-06-21 RX ADMIN — DEXMEDETOMIDINE HYDROCHLORIDE IN SODIUM CHLORIDE 3.74 MICROGRAM(S)/KG/HR: 4 INJECTION INTRAVENOUS at 08:48

## 2025-06-21 RX ADMIN — INSULIN GLARGINE-YFGN 30 UNIT(S): 100 INJECTION, SOLUTION SUBCUTANEOUS at 21:25

## 2025-06-21 RX ADMIN — Medication 2 TABLET(S): at 21:25

## 2025-06-21 RX ADMIN — DEXMEDETOMIDINE HYDROCHLORIDE IN SODIUM CHLORIDE 3.74 MICROGRAM(S)/KG/HR: 4 INJECTION INTRAVENOUS at 18:39

## 2025-06-21 RX ADMIN — QUETIAPINE FUMARATE 12.5 MILLIGRAM(S): 25 TABLET ORAL at 21:25

## 2025-06-21 RX ADMIN — DEXMEDETOMIDINE HYDROCHLORIDE IN SODIUM CHLORIDE 3.74 MICROGRAM(S)/KG/HR: 4 INJECTION INTRAVENOUS at 21:25

## 2025-06-21 RX ADMIN — Medication 1 APPLICATION(S): at 06:23

## 2025-06-21 RX ADMIN — Medication 0.25 MILLIGRAM(S): at 09:40

## 2025-06-21 RX ADMIN — Medication 0.25 MILLIGRAM(S): at 10:00

## 2025-06-21 RX ADMIN — DEXMEDETOMIDINE HYDROCHLORIDE IN SODIUM CHLORIDE 3.74 MICROGRAM(S)/KG/HR: 4 INJECTION INTRAVENOUS at 15:12

## 2025-06-21 RX ADMIN — Medication 40 MILLIGRAM(S): at 05:59

## 2025-06-21 RX ADMIN — RANOLAZINE 500 MILLIGRAM(S): 1000 TABLET, FILM COATED, EXTENDED RELEASE ORAL at 17:46

## 2025-06-21 RX ADMIN — Medication 15 MILLILITER(S): at 17:46

## 2025-06-21 NOTE — PROGRESS NOTE ADULT - SUBJECTIVE AND OBJECTIVE BOX
CHIEF COMPLAINT:    Interval Events:    REVIEW OF SYSTEMS:  Constitutional: [ ] fevers [ ] chills [ ] weight loss [ ] weight gain  HEENT: [ ] dry eyes [ ] eye irritation [ ] postnasal drip [ ] nasal congestion  CV: [ ] chest pain [ ] orthopnea [ ] palpitations [ ] murmur  Resp: [ ] cough [ ] shortness of breath [ ] dyspnea [ ] wheezing [ ] sputum [ ] hemoptysis  GI: [ ] nausea [ ] vomiting [ ] diarrhea [ ] constipation [ ] abd pain [ ] dysphagia   : [ ] dysuria [ ] nocturia [ ] hematuria [ ] increased urinary frequency  Musculoskeletal: [ ] back pain [ ] myalgias [ ] arthralgias [ ] fracture  Skin: [ ] rash [ ] itch  Neurological: [ ] headache [ ] dizziness [ ] syncope [ ] weakness [ ] numbness  Hematologic/Lymphatic: [ ] anemia [ ] bleeding problem  Allergic/Immunologic: [ ] itchy eyes [ ] nasal discharge [ ] hives [ ] angioedema  [ ] All other systems negative  [ ] Unable to assess ROS because ________    OBJECTIVE:  ICU Vital Signs Last 24 Hrs  T(C): 36.7 (21 Jun 2025 06:00), Max: 38.3 (20 Jun 2025 11:13)  T(F): 98.1 (21 Jun 2025 06:00), Max: 101 (20 Jun 2025 11:13)  HR: 62 (21 Jun 2025 07:00) (50 - 62)  BP: 105/55 (21 Jun 2025 07:00) (94/57 - 146/65)  BP(mean): 70 (21 Jun 2025 07:00) (62 - 105)  ABP: --  ABP(mean): --  RR: 29 (21 Jun 2025 07:00) (0 - 35)  SpO2: 99% (21 Jun 2025 07:00) (95% - 100%)    O2 Parameters below as of 20 Jun 2025 20:00  Patient On (Oxygen Delivery Method): ventilator          Mode: AC/ CMV (Assist Control/ Continuous Mandatory Ventilation), RR (machine): 10, TV (machine): 400, FiO2: 30, PEEP: 6, ITime: 1, MAP: 10, PIP: 28    06-20 @ 07:01  -  06-21 @ 07:00  --------------------------------------------------------  IN: 1105.8 mL / OUT: 1070 mL / NET: 35.8 mL      CAPILLARY BLOOD GLUCOSE      POCT Blood Glucose.: 183 mg/dL (21 Jun 2025 05:58)      PHYSICAL EXAM:  General:   HEENT:   Neck:   Respiratory:   Cardiovascular:   Abdomen:   Extremities:   Skin:   Neurological:  Psychiatry:    LINES:    HOSPITAL MEDICATIONS:  MEDICATIONS  (STANDING):  aspirin  chewable 81 milliGRAM(s) Oral daily  atorvastatin 40 milliGRAM(s) Oral at bedtime  chlorhexidine 0.12% Liquid 15 milliLiter(s) Oral Mucosa every 12 hours  chlorhexidine 2% Cloths 1 Application(s) Topical <User Schedule>  dexMEDEtomidine Infusion 0.2 MICROgram(s)/kG/Hr (3.74 mL/Hr) IV Continuous <Continuous>  enoxaparin Injectable 40 milliGRAM(s) SubCutaneous every 24 hours  furosemide   Injectable 40 milliGRAM(s) IV Push daily  insulin glargine Injectable (LANTUS) 30 Unit(s) SubCutaneous at bedtime  insulin lispro (ADMELOG) corrective regimen sliding scale   SubCutaneous every 6 hours  multivitamin 1 Tablet(s) Oral daily  pantoprazole   Suspension 40 milliGRAM(s) Oral every 24 hours  ranolazine 500 milliGRAM(s) Oral two times a day  senna 2 Tablet(s) Oral at bedtime    MEDICATIONS  (PRN):  acetaminophen     Tablet .. 650 milliGRAM(s) Oral every 6 hours PRN Temp greater or equal to 38C (100.4F), Mild Pain (1 - 3)  acetaminophen     Tablet .. 650 milliGRAM(s) Oral every 6 hours PRN Mild Pain (1 - 3)  aluminum hydroxide/magnesium hydroxide/simethicone Suspension 30 milliLiter(s) Oral every 6 hours PRN Dyspepsia  ondansetron Injectable 4 milliGRAM(s) IV Push every 8 hours PRN Nausea and/or Vomiting  polyethylene glycol 3350 17 Gram(s) Oral daily PRN Constipation      LABS:                        8.3    8.42  )-----------( 319      ( 21 Jun 2025 04:06 )             29.2     Hgb Trend: 8.3<--, 8.8<--, 8.6<--, 8.6<--, 8.8<--  06-21    146[H]  |  113[H]  |  41[H]  ----------------------------<  187[H]  4.8   |  30  |  0.90    Ca    8.3[L]      21 Jun 2025 04:06  Phos  3.7     06-21  Mg     2.1     06-21    TPro  6.3  /  Alb  1.6[L]  /  TBili  0.5  /  DBili  x   /  AST  16  /  ALT  13  /  AlkPhos  145[H]  06-21      Urinalysis Basic - ( 21 Jun 2025 04:06 )    Color: x / Appearance: x / SG: x / pH: x  Gluc: 187 mg/dL / Ketone: x  / Bili: x / Urobili: x   Blood: x / Protein: x / Nitrite: x   Leuk Esterase: x / RBC: x / WBC x   Sq Epi: x / Non Sq Epi: x / Bacteria: x            MICROBIOLOGY:     RADIOLOGY:  [ ] Reviewed and interpreted by me CHIEF COMPLAINT:    Interval Events:  no o/n events    REVIEW OF SYSTEMS:  [x ] Unable to assess ROS because intubated/sedated    OBJECTIVE:  ICU Vital Signs Last 24 Hrs  T(C): 36.7 (21 Jun 2025 06:00), Max: 38.3 (20 Jun 2025 11:13)  T(F): 98.1 (21 Jun 2025 06:00), Max: 101 (20 Jun 2025 11:13)  HR: 62 (21 Jun 2025 07:00) (50 - 62)  BP: 105/55 (21 Jun 2025 07:00) (94/57 - 146/65)  BP(mean): 70 (21 Jun 2025 07:00) (62 - 105)  ABP: --  ABP(mean): --  RR: 29 (21 Jun 2025 07:00) (0 - 35)  SpO2: 99% (21 Jun 2025 07:00) (95% - 100%)    O2 Parameters below as of 20 Jun 2025 20:00  Patient On (Oxygen Delivery Method): ventilator          Mode: AC/ CMV (Assist Control/ Continuous Mandatory Ventilation), RR (machine): 10, TV (machine): 400, FiO2: 30, PEEP: 6, ITime: 1, MAP: 10, PIP: 28    06-20 @ 07:01  -  06-21 @ 07:00  --------------------------------------------------------  IN: 1105.8 mL / OUT: 1070 mL / NET: 35.8 mL      CAPILLARY BLOOD GLUCOSE      POCT Blood Glucose.: 183 mg/dL (21 Jun 2025 05:58)      PHYSICAL EXAM:  General: NAD, non toxic appearing  HEENT: ETT and NGT in place  Neck: supple  Respiratory: mechanical BS  Cardiovascular: s1s2 RRR  Abdomen: soft, non tender, non distended  Extremities: warm, diffuse edema  Skin: sacral DTI  Neurological: moves all extremities, follows commands  Psychiatry: unable to assess    LINES:  [facility]    HOSPITAL MEDICATIONS:  MEDICATIONS  (STANDING):  aspirin  chewable 81 milliGRAM(s) Oral daily  atorvastatin 40 milliGRAM(s) Oral at bedtime  chlorhexidine 0.12% Liquid 15 milliLiter(s) Oral Mucosa every 12 hours  chlorhexidine 2% Cloths 1 Application(s) Topical <User Schedule>  dexMEDEtomidine Infusion 0.2 MICROgram(s)/kG/Hr (3.74 mL/Hr) IV Continuous <Continuous>  enoxaparin Injectable 40 milliGRAM(s) SubCutaneous every 24 hours  furosemide   Injectable 40 milliGRAM(s) IV Push daily  insulin glargine Injectable (LANTUS) 30 Unit(s) SubCutaneous at bedtime  insulin lispro (ADMELOG) corrective regimen sliding scale   SubCutaneous every 6 hours  multivitamin 1 Tablet(s) Oral daily  pantoprazole   Suspension 40 milliGRAM(s) Oral every 24 hours  ranolazine 500 milliGRAM(s) Oral two times a day  senna 2 Tablet(s) Oral at bedtime    MEDICATIONS  (PRN):  acetaminophen     Tablet .. 650 milliGRAM(s) Oral every 6 hours PRN Temp greater or equal to 38C (100.4F), Mild Pain (1 - 3)  acetaminophen     Tablet .. 650 milliGRAM(s) Oral every 6 hours PRN Mild Pain (1 - 3)  aluminum hydroxide/magnesium hydroxide/simethicone Suspension 30 milliLiter(s) Oral every 6 hours PRN Dyspepsia  ondansetron Injectable 4 milliGRAM(s) IV Push every 8 hours PRN Nausea and/or Vomiting  polyethylene glycol 3350 17 Gram(s) Oral daily PRN Constipation      LABS:                        8.3    8.42  )-----------( 319      ( 21 Jun 2025 04:06 )             29.2     Hgb Trend: 8.3<--, 8.8<--, 8.6<--, 8.6<--, 8.8<--  06-21    146[H]  |  113[H]  |  41[H]  ----------------------------<  187[H]  4.8   |  30  |  0.90    Ca    8.3[L]      21 Jun 2025 04:06  Phos  3.7     06-21  Mg     2.1     06-21    TPro  6.3  /  Alb  1.6[L]  /  TBili  0.5  /  DBili  x   /  AST  16  /  ALT  13  /  AlkPhos  145[H]  06-21      Urinalysis Basic - ( 21 Jun 2025 04:06 )    Color: x / Appearance: x / SG: x / pH: x  Gluc: 187 mg/dL / Ketone: x  / Bili: x / Urobili: x   Blood: x / Protein: x / Nitrite: x   Leuk Esterase: x / RBC: x / WBC x   Sq Epi: x / Non Sq Epi: x / Bacteria: x            MICROBIOLOGY:     RADIOLOGY:  [ ] Reviewed and interpreted by me

## 2025-06-21 NOTE — ED PROCEDURE NOTE - NS ED PERI NEURO NEG
Pre-application: Motor, sensory, and vascular responses intact in the injured extremity./Post-application: Motor, sensory, and vascular responses intact in the injured extremity./The patient/caregiver verbalized understanding of how to care for the injured extremity with splint
No indicators present

## 2025-06-21 NOTE — PROGRESS NOTE ADULT - ASSESSMENT
74F w/ CAD, HTN, DM. Initially presented w/ hyperglycemia. On 6/1 she was transferred to ICU for septic shock in setting of ESBL E coli UTI requiring intubation. The following day she had cardiac arrest x10 minutes s/p medical mgmt of NSTEMI. She regained mental status and extubated on 6/6 and subsequently transferred to medical floors on 6/8. On 6/11 she had witnessed episodes of aspiration w/ AMS and was eventually intubated for hypoxemia and poor mental status. She was treated for pneumonia. Shock has resolved and her mental status is improved but she has not been weaning well.     #Neuro - awake and alert, follows commands; continue low dose precedex for patient comfort   #CV - HD stable, s/p treatment for NSTEMI; continue asa, ranolazine; lasix to keep net negative   #Pulm - remains intubated on minimal vent settings, however she failed SBT trials - on high support she becomes very tachypneic to 30-40s with low VT; suspect pt is very weak and will not be able to maintain her own respiratory status; spoke w/ son at bedside and they would like to proceed w/ tracheostomy - surgery consulted   #ID- s/p treatment for ESBL E coli UTI and then another course of abx for aspiration pneumonia; cultured for fever yesterday, has not recurred at this time, monitor off abx    #Renal/metabolic - normal renal function, monitor I/Os and electrolytes; start free water for hyperNa; continue lasix, add metolazone for syngery   #GI- tolerating TF, continue bowel regimen and PPI  #Heme - chronic anemia, stable  #Endo - continue lantus and ISS coverage  #PPx - lovenox qd  #Dispo- remains intubated unable to wean; prognosis very guarded; DNR/trial of intubation - family would like to proceed with tracheostomy    Plan of care discussed w/ pt's son at bedside

## 2025-06-22 LAB
-  LEVOFLOXACIN: SIGNIFICANT CHANGE UP
-  MINOCYCLINE: SIGNIFICANT CHANGE UP
-  TRIMETHOPRIM/SULFAMETHOXAZOLE: SIGNIFICANT CHANGE UP
ALBUMIN SERPL ELPH-MCNC: 1.5 G/DL — LOW (ref 3.3–5)
ALP SERPL-CCNC: 128 U/L — HIGH (ref 40–120)
ALT FLD-CCNC: 14 U/L — SIGNIFICANT CHANGE UP (ref 12–78)
ANION GAP SERPL CALC-SCNC: 3 MMOL/L — LOW (ref 5–17)
AST SERPL-CCNC: 16 U/L — SIGNIFICANT CHANGE UP (ref 15–37)
BASOPHILS # BLD AUTO: 0.04 K/UL — SIGNIFICANT CHANGE UP (ref 0–0.2)
BASOPHILS NFR BLD AUTO: 0.6 % — SIGNIFICANT CHANGE UP (ref 0–2)
BILIRUB SERPL-MCNC: 0.4 MG/DL — SIGNIFICANT CHANGE UP (ref 0.2–1.2)
BUN SERPL-MCNC: 43 MG/DL — HIGH (ref 7–23)
CALCIUM SERPL-MCNC: 8.2 MG/DL — LOW (ref 8.5–10.1)
CHLORIDE SERPL-SCNC: 113 MMOL/L — HIGH (ref 96–108)
CO2 SERPL-SCNC: 29 MMOL/L — SIGNIFICANT CHANGE UP (ref 22–31)
CREAT SERPL-MCNC: 0.82 MG/DL — SIGNIFICANT CHANGE UP (ref 0.5–1.3)
CULTURE RESULTS: ABNORMAL
EGFR: 74 ML/MIN/1.73M2 — SIGNIFICANT CHANGE UP
EGFR: 74 ML/MIN/1.73M2 — SIGNIFICANT CHANGE UP
EOSINOPHIL # BLD AUTO: 0.35 K/UL — SIGNIFICANT CHANGE UP (ref 0–0.5)
EOSINOPHIL NFR BLD AUTO: 5.6 % — SIGNIFICANT CHANGE UP (ref 0–6)
GLUCOSE BLDC GLUCOMTR-MCNC: 143 MG/DL — HIGH (ref 70–99)
GLUCOSE BLDC GLUCOMTR-MCNC: 147 MG/DL — HIGH (ref 70–99)
GLUCOSE BLDC GLUCOMTR-MCNC: 155 MG/DL — HIGH (ref 70–99)
GLUCOSE BLDC GLUCOMTR-MCNC: 156 MG/DL — HIGH (ref 70–99)
GLUCOSE BLDC GLUCOMTR-MCNC: 179 MG/DL — HIGH (ref 70–99)
GLUCOSE BLDC GLUCOMTR-MCNC: 64 MG/DL — LOW (ref 70–99)
GLUCOSE BLDC GLUCOMTR-MCNC: 66 MG/DL — LOW (ref 70–99)
GLUCOSE BLDC GLUCOMTR-MCNC: 66 MG/DL — LOW (ref 70–99)
GLUCOSE SERPL-MCNC: 149 MG/DL — HIGH (ref 70–99)
GRAM STN FLD: ABNORMAL
HCT VFR BLD CALC: 27.5 % — LOW (ref 34.5–45)
HGB BLD-MCNC: 7.8 G/DL — LOW (ref 11.5–15.5)
IMM GRANULOCYTES NFR BLD AUTO: 0.8 % — SIGNIFICANT CHANGE UP (ref 0–0.9)
LYMPHOCYTES # BLD AUTO: 1.12 K/UL — SIGNIFICANT CHANGE UP (ref 1–3.3)
LYMPHOCYTES # BLD AUTO: 17.8 % — SIGNIFICANT CHANGE UP (ref 13–44)
MAGNESIUM SERPL-MCNC: 2.1 MG/DL — SIGNIFICANT CHANGE UP (ref 1.6–2.6)
MCHC RBC-ENTMCNC: 27.3 PG — SIGNIFICANT CHANGE UP (ref 27–34)
MCHC RBC-ENTMCNC: 28.4 G/DL — LOW (ref 32–36)
MCV RBC AUTO: 96.2 FL — SIGNIFICANT CHANGE UP (ref 80–100)
METHOD TYPE: SIGNIFICANT CHANGE UP
METHOD TYPE: SIGNIFICANT CHANGE UP
MONOCYTES # BLD AUTO: 0.49 K/UL — SIGNIFICANT CHANGE UP (ref 0–0.9)
MONOCYTES NFR BLD AUTO: 7.8 % — SIGNIFICANT CHANGE UP (ref 2–14)
NEUTROPHILS # BLD AUTO: 4.23 K/UL — SIGNIFICANT CHANGE UP (ref 1.8–7.4)
NEUTROPHILS NFR BLD AUTO: 67.4 % — SIGNIFICANT CHANGE UP (ref 43–77)
NRBC BLD AUTO-RTO: 0 /100 WBCS — SIGNIFICANT CHANGE UP (ref 0–0)
NT-PROBNP SERPL-SCNC: 3467 PG/ML — HIGH (ref 0–450)
ORGANISM # SPEC MICROSCOPIC CNT: ABNORMAL
ORGANISM # SPEC MICROSCOPIC CNT: ABNORMAL
ORGANISM # SPEC MICROSCOPIC CNT: SIGNIFICANT CHANGE UP
PHOSPHATE SERPL-MCNC: 3.3 MG/DL — SIGNIFICANT CHANGE UP (ref 2.5–4.5)
PLATELET # BLD AUTO: 303 K/UL — SIGNIFICANT CHANGE UP (ref 150–400)
POTASSIUM SERPL-MCNC: 4.3 MMOL/L — SIGNIFICANT CHANGE UP (ref 3.5–5.3)
POTASSIUM SERPL-SCNC: 4.3 MMOL/L — SIGNIFICANT CHANGE UP (ref 3.5–5.3)
PROT SERPL-MCNC: 6.3 GM/DL — SIGNIFICANT CHANGE UP (ref 6–8.3)
RBC # BLD: 2.86 M/UL — LOW (ref 3.8–5.2)
RBC # FLD: 16.2 % — HIGH (ref 10.3–14.5)
SODIUM SERPL-SCNC: 145 MMOL/L — SIGNIFICANT CHANGE UP (ref 135–145)
SPECIMEN SOURCE: SIGNIFICANT CHANGE UP
WBC # BLD: 6.28 K/UL — SIGNIFICANT CHANGE UP (ref 3.8–10.5)
WBC # FLD AUTO: 6.28 K/UL — SIGNIFICANT CHANGE UP (ref 3.8–10.5)

## 2025-06-22 PROCEDURE — 99291 CRITICAL CARE FIRST HOUR: CPT

## 2025-06-22 RX ORDER — BUMETANIDE 1 MG/1
1 TABLET ORAL DAILY
Refills: 0 | Status: DISCONTINUED | OUTPATIENT
Start: 2025-06-22 | End: 2025-06-24

## 2025-06-22 RX ORDER — DEXTROSE 50 % IN WATER 50 %
50 SYRINGE (ML) INTRAVENOUS ONCE
Refills: 0 | Status: COMPLETED | OUTPATIENT
Start: 2025-06-22 | End: 2025-06-22

## 2025-06-22 RX ADMIN — INSULIN LISPRO 1: 100 INJECTION, SOLUTION INTRAVENOUS; SUBCUTANEOUS at 11:47

## 2025-06-22 RX ADMIN — Medication 50 MILLILITER(S): at 18:33

## 2025-06-22 RX ADMIN — DEXMEDETOMIDINE HYDROCHLORIDE IN SODIUM CHLORIDE 3.74 MICROGRAM(S)/KG/HR: 4 INJECTION INTRAVENOUS at 19:12

## 2025-06-22 RX ADMIN — DEXMEDETOMIDINE HYDROCHLORIDE IN SODIUM CHLORIDE 3.74 MICROGRAM(S)/KG/HR: 4 INJECTION INTRAVENOUS at 02:02

## 2025-06-22 RX ADMIN — DEXMEDETOMIDINE HYDROCHLORIDE IN SODIUM CHLORIDE 3.74 MICROGRAM(S)/KG/HR: 4 INJECTION INTRAVENOUS at 05:22

## 2025-06-22 RX ADMIN — DEXMEDETOMIDINE HYDROCHLORIDE IN SODIUM CHLORIDE 3.74 MICROGRAM(S)/KG/HR: 4 INJECTION INTRAVENOUS at 07:11

## 2025-06-22 RX ADMIN — RANOLAZINE 500 MILLIGRAM(S): 1000 TABLET, FILM COATED, EXTENDED RELEASE ORAL at 05:20

## 2025-06-22 RX ADMIN — Medication 2 TABLET(S): at 21:40

## 2025-06-22 RX ADMIN — INSULIN LISPRO 1: 100 INJECTION, SOLUTION INTRAVENOUS; SUBCUTANEOUS at 23:19

## 2025-06-22 RX ADMIN — Medication 15 MILLILITER(S): at 05:21

## 2025-06-22 RX ADMIN — DEXMEDETOMIDINE HYDROCHLORIDE IN SODIUM CHLORIDE 3.74 MICROGRAM(S)/KG/HR: 4 INJECTION INTRAVENOUS at 20:47

## 2025-06-22 RX ADMIN — Medication 1 TABLET(S): at 11:38

## 2025-06-22 RX ADMIN — DEXMEDETOMIDINE HYDROCHLORIDE IN SODIUM CHLORIDE 3.74 MICROGRAM(S)/KG/HR: 4 INJECTION INTRAVENOUS at 13:02

## 2025-06-22 RX ADMIN — QUETIAPINE FUMARATE 12.5 MILLIGRAM(S): 25 TABLET ORAL at 21:41

## 2025-06-22 RX ADMIN — ENOXAPARIN SODIUM 40 MILLIGRAM(S): 100 INJECTION SUBCUTANEOUS at 14:41

## 2025-06-22 RX ADMIN — Medication 1 APPLICATION(S): at 05:19

## 2025-06-22 RX ADMIN — RANOLAZINE 500 MILLIGRAM(S): 1000 TABLET, FILM COATED, EXTENDED RELEASE ORAL at 17:15

## 2025-06-22 RX ADMIN — Medication 81 MILLIGRAM(S): at 11:38

## 2025-06-22 RX ADMIN — ATORVASTATIN CALCIUM 40 MILLIGRAM(S): 80 TABLET, FILM COATED ORAL at 21:41

## 2025-06-22 RX ADMIN — DEXMEDETOMIDINE HYDROCHLORIDE IN SODIUM CHLORIDE 3.74 MICROGRAM(S)/KG/HR: 4 INJECTION INTRAVENOUS at 17:18

## 2025-06-22 RX ADMIN — Medication 15 MILLILITER(S): at 17:15

## 2025-06-22 RX ADMIN — FUROSEMIDE 40 MILLIGRAM(S): 10 INJECTION INTRAMUSCULAR; INTRAVENOUS at 05:20

## 2025-06-22 RX ADMIN — Medication 40 MILLIGRAM(S): at 05:20

## 2025-06-22 NOTE — CHART NOTE - NSCHARTNOTEFT_GEN_A_CORE
Patient seen and examined at bedside in CCU. Remains intubated with TF at bedside.   Surgical team planning for tracheostomy creation on Monday 6/23 and PEG placement on Wednesday 6/25.   Please obtain pre-operative labs with TF held at midnight. May hold AM DVT ppx.   Discussed with Dr. Kurtz Patient seen and examined at bedside in CCU. Remains intubated with TF at bedside.   Surgical team planning for tracheostomy creation on Monday 6/23 and PEG placement on Wednesday 6/25.   Please obtain pre-operative labs with TF held at midnight. May hold AM DVT ppx.   Seen and examined with Dr. Kurtz who answered all of the Patient's family's questions to their satisfaction.

## 2025-06-22 NOTE — PROGRESS NOTE ADULT - ASSESSMENT
74F w/ CAD, HTN, DM. Initially presented w/ hyperglycemia. On 6/1 she was transferred to ICU for septic shock in setting of ESBL E coli UTI requiring intubation. The following day she had cardiac arrest x10 minutes s/p medical mgmt of NSTEMI. She regained mental status and extubated on 6/6 and subsequently transferred to medical floors on 6/8. On 6/11 she had witnessed episodes of aspiration w/ AMS and was eventually intubated for hypoxemia and poor mental status. She was treated for pneumonia. Shock has resolved and her mental status is improved but she has not been weaning well.     #Neuro - awake and alert, follows commands; continue low dose precedex for patient comfort   #CV - HD stable, s/p treatment for NSTEMI; continue asa, ranolazine; switch to bumex, continue metolazone   #Pulm - remains intubated on minimal vent settings, however she failed SBT trials - on high support she becomes very tachypneic to 30-40s with low VT; suspect pt is very weak and will not be able to maintain her own respiratory status; family would like to proceed with tracheostomy - surgery consulted, planning for trach at  bedside tomorrow  #ID- s/p treatment for ESBL E coli UTI and then another course of abx for aspiration pneumonia; cultured for fever yesterday, has not recurred at this time, sputum cx growing stenotrophomonas - will defer to ID regarding treatment, monitor off abx for now  #Renal/metabolic - normal renal function, monitor I/Os and electrolytes; continue free water for hyperNa; switch to bumex, continue metolazone for syngery   #GI- tolerating TF, continue bowel regimen and PPI  #Heme - chronic anemia, stable  #Endo - continue lantus and ISS coverage  #PPx - lovenox qd  #Dispo- remains intubated unable to wean; prognosis very guarded; DNR/trial of intubation - tracheostomy planned for tomorrow, PEG planned for wednesday    Plan of care discussed w/ pt's son at bedside

## 2025-06-22 NOTE — PROGRESS NOTE ADULT - SUBJECTIVE AND OBJECTIVE BOX
CHIEF COMPLAINT:    Interval Events:    REVIEW OF SYSTEMS:  Constitutional: [ ] fevers [ ] chills [ ] weight loss [ ] weight gain  HEENT: [ ] dry eyes [ ] eye irritation [ ] postnasal drip [ ] nasal congestion  CV: [ ] chest pain [ ] orthopnea [ ] palpitations [ ] murmur  Resp: [ ] cough [ ] shortness of breath [ ] dyspnea [ ] wheezing [ ] sputum [ ] hemoptysis  GI: [ ] nausea [ ] vomiting [ ] diarrhea [ ] constipation [ ] abd pain [ ] dysphagia   : [ ] dysuria [ ] nocturia [ ] hematuria [ ] increased urinary frequency  Musculoskeletal: [ ] back pain [ ] myalgias [ ] arthralgias [ ] fracture  Skin: [ ] rash [ ] itch  Neurological: [ ] headache [ ] dizziness [ ] syncope [ ] weakness [ ] numbness  Hematologic/Lymphatic: [ ] anemia [ ] bleeding problem  Allergic/Immunologic: [ ] itchy eyes [ ] nasal discharge [ ] hives [ ] angioedema  [ ] All other systems negative  [ ] Unable to assess ROS because ________    OBJECTIVE:  ICU Vital Signs Last 24 Hrs  T(C): 37 (22 Jun 2025 07:40), Max: 37.6 (21 Jun 2025 16:00)  T(F): 98.6 (22 Jun 2025 07:40), Max: 99.7 (21 Jun 2025 16:00)  HR: 51 (22 Jun 2025 07:03) (49 - 80)  BP: 95/49 (22 Jun 2025 07:00) (95/49 - 132/57)  BP(mean): 64 (22 Jun 2025 07:00) (64 - 84)  ABP: --  ABP(mean): --  RR: 11 (22 Jun 2025 07:03) (6 - 20)  SpO2: 100% (22 Jun 2025 07:03) (97% - 100%)    O2 Parameters below as of 22 Jun 2025 06:00  Patient On (Oxygen Delivery Method): ventilator          Mode: AC/ CMV (Assist Control/ Continuous Mandatory Ventilation), RR (machine): 10, TV (machine): 400, FiO2: 30, PEEP: 6, ITime: 0.9, MAP: 10, PIP: 28    06-21 @ 07:01  -  06-22 @ 07:00  --------------------------------------------------------  IN: 1647.4 mL / OUT: 1700 mL / NET: -52.6 mL      CAPILLARY BLOOD GLUCOSE      POCT Blood Glucose.: 147 mg/dL (22 Jun 2025 05:15)      PHYSICAL EXAM:  General:   HEENT:   Neck:   Respiratory:   Cardiovascular:   Abdomen:   Extremities:   Skin:   Neurological:  Psychiatry:    LINES:    HOSPITAL MEDICATIONS:  MEDICATIONS  (STANDING):  aspirin  chewable 81 milliGRAM(s) Oral daily  atorvastatin 40 milliGRAM(s) Oral at bedtime  chlorhexidine 0.12% Liquid 15 milliLiter(s) Oral Mucosa every 12 hours  chlorhexidine 2% Cloths 1 Application(s) Topical <User Schedule>  dexMEDEtomidine Infusion 0.2 MICROgram(s)/kG/Hr (3.74 mL/Hr) IV Continuous <Continuous>  enoxaparin Injectable 40 milliGRAM(s) SubCutaneous every 24 hours  furosemide   Injectable 40 milliGRAM(s) IV Push daily  insulin glargine Injectable (LANTUS) 30 Unit(s) SubCutaneous at bedtime  insulin lispro (ADMELOG) corrective regimen sliding scale   SubCutaneous every 6 hours  metOLazone 5 milliGRAM(s) Oral daily  multivitamin 1 Tablet(s) Oral daily  pantoprazole   Suspension 40 milliGRAM(s) Oral every 24 hours  QUEtiapine 12.5 milliGRAM(s) Oral at bedtime  ranolazine 500 milliGRAM(s) Oral two times a day  senna 2 Tablet(s) Oral at bedtime    MEDICATIONS  (PRN):  acetaminophen     Tablet .. 650 milliGRAM(s) Oral every 6 hours PRN Temp greater or equal to 38C (100.4F), Mild Pain (1 - 3)  acetaminophen     Tablet .. 650 milliGRAM(s) Oral every 6 hours PRN Mild Pain (1 - 3)  aluminum hydroxide/magnesium hydroxide/simethicone Suspension 30 milliLiter(s) Oral every 6 hours PRN Dyspepsia  HYDROmorphone  Injectable 0.25 milliGRAM(s) IV Push every 4 hours PRN Severe Pain (7 - 10)  ondansetron Injectable 4 milliGRAM(s) IV Push every 8 hours PRN Nausea and/or Vomiting  polyethylene glycol 3350 17 Gram(s) Oral daily PRN Constipation      LABS:                        7.8    6.28  )-----------( 303      ( 22 Jun 2025 03:30 )             27.5     Hgb Trend: 7.8<--, 8.3<--, 8.8<--, 8.6<--, 8.6<--  06-22    145  |  113[H]  |  43[H]  ----------------------------<  149[H]  4.3   |  29  |  0.82    Ca    8.2[L]      22 Jun 2025 03:30  Phos  3.3     06-22  Mg     2.1     06-22    TPro  6.3  /  Alb  1.5[L]  /  TBili  0.4  /  DBili  x   /  AST  16  /  ALT  14  /  AlkPhos  128[H]  06-22      Urinalysis Basic - ( 22 Jun 2025 03:30 )    Color: x / Appearance: x / SG: x / pH: x  Gluc: 149 mg/dL / Ketone: x  / Bili: x / Urobili: x   Blood: x / Protein: x / Nitrite: x   Leuk Esterase: x / RBC: x / WBC x   Sq Epi: x / Non Sq Epi: x / Bacteria: x            MICROBIOLOGY:     RADIOLOGY:  [ ] Reviewed and interpreted by me CHIEF COMPLAINT:    Interval Events:  no o/n events  tolerated SBT for a few hours but then became anxious    REVIEW OF SYSTEMS:  [x ] Unable to assess ROS because ________    OBJECTIVE:  ICU Vital Signs Last 24 Hrs  T(C): 37 (22 Jun 2025 07:40), Max: 37.6 (21 Jun 2025 16:00)  T(F): 98.6 (22 Jun 2025 07:40), Max: 99.7 (21 Jun 2025 16:00)  HR: 51 (22 Jun 2025 07:03) (49 - 80)  BP: 95/49 (22 Jun 2025 07:00) (95/49 - 132/57)  BP(mean): 64 (22 Jun 2025 07:00) (64 - 84)  ABP: --  ABP(mean): --  RR: 11 (22 Jun 2025 07:03) (6 - 20)  SpO2: 100% (22 Jun 2025 07:03) (97% - 100%)    O2 Parameters below as of 22 Jun 2025 06:00  Patient On (Oxygen Delivery Method): ventilator          Mode: AC/ CMV (Assist Control/ Continuous Mandatory Ventilation), RR (machine): 10, TV (machine): 400, FiO2: 30, PEEP: 6, ITime: 0.9, MAP: 10, PIP: 28    06-21 @ 07:01  -  06-22 @ 07:00  --------------------------------------------------------  IN: 1647.4 mL / OUT: 1700 mL / NET: -52.6 mL      CAPILLARY BLOOD GLUCOSE      POCT Blood Glucose.: 147 mg/dL (22 Jun 2025 05:15)      PHYSICAL EXAM:  General: NAD, non toxic appearing  HEENT: ETT and NGT in place  Neck: supple  Respiratory: mechanical BS  Cardiovascular: s1s2 RRR  Abdomen: soft, non tender, non distended  Extremities: warm, diffuse edema  Skin: sacral DTI  Neurological: moves all extremities, follows commands  Psychiatry: unable to assess    LINES:  [facility]    HOSPITAL MEDICATIONS:  MEDICATIONS  (STANDING):  aspirin  chewable 81 milliGRAM(s) Oral daily  atorvastatin 40 milliGRAM(s) Oral at bedtime  chlorhexidine 0.12% Liquid 15 milliLiter(s) Oral Mucosa every 12 hours  chlorhexidine 2% Cloths 1 Application(s) Topical <User Schedule>  dexMEDEtomidine Infusion 0.2 MICROgram(s)/kG/Hr (3.74 mL/Hr) IV Continuous <Continuous>  enoxaparin Injectable 40 milliGRAM(s) SubCutaneous every 24 hours  furosemide   Injectable 40 milliGRAM(s) IV Push daily  insulin glargine Injectable (LANTUS) 30 Unit(s) SubCutaneous at bedtime  insulin lispro (ADMELOG) corrective regimen sliding scale   SubCutaneous every 6 hours  metOLazone 5 milliGRAM(s) Oral daily  multivitamin 1 Tablet(s) Oral daily  pantoprazole   Suspension 40 milliGRAM(s) Oral every 24 hours  QUEtiapine 12.5 milliGRAM(s) Oral at bedtime  ranolazine 500 milliGRAM(s) Oral two times a day  senna 2 Tablet(s) Oral at bedtime    MEDICATIONS  (PRN):  acetaminophen     Tablet .. 650 milliGRAM(s) Oral every 6 hours PRN Temp greater or equal to 38C (100.4F), Mild Pain (1 - 3)  acetaminophen     Tablet .. 650 milliGRAM(s) Oral every 6 hours PRN Mild Pain (1 - 3)  aluminum hydroxide/magnesium hydroxide/simethicone Suspension 30 milliLiter(s) Oral every 6 hours PRN Dyspepsia  HYDROmorphone  Injectable 0.25 milliGRAM(s) IV Push every 4 hours PRN Severe Pain (7 - 10)  ondansetron Injectable 4 milliGRAM(s) IV Push every 8 hours PRN Nausea and/or Vomiting  polyethylene glycol 3350 17 Gram(s) Oral daily PRN Constipation      LABS:                        7.8    6.28  )-----------( 303      ( 22 Jun 2025 03:30 )             27.5     Hgb Trend: 7.8<--, 8.3<--, 8.8<--, 8.6<--, 8.6<--  06-22    145  |  113[H]  |  43[H]  ----------------------------<  149[H]  4.3   |  29  |  0.82    Ca    8.2[L]      22 Jun 2025 03:30  Phos  3.3     06-22  Mg     2.1     06-22    TPro  6.3  /  Alb  1.5[L]  /  TBili  0.4  /  DBili  x   /  AST  16  /  ALT  14  /  AlkPhos  128[H]  06-22      Urinalysis Basic - ( 22 Jun 2025 03:30 )    Color: x / Appearance: x / SG: x / pH: x  Gluc: 149 mg/dL / Ketone: x  / Bili: x / Urobili: x   Blood: x / Protein: x / Nitrite: x   Leuk Esterase: x / RBC: x / WBC x   Sq Epi: x / Non Sq Epi: x / Bacteria: x            MICROBIOLOGY:     Culture - Sputum . (06.20.25 @ 17:10)    Gram Stain:   Rare polymorphonuclear leukocytes per low power field  No Squamous epithelial cells per low power field  No organisms seen per oil power field   Specimen Source: ET Tube ET Tube   Culture Results:   Moderate Stenotrophomonas maltophilia  Commensal lina consistent with body site        RADIOLOGY:  [ ] Reviewed and interpreted by me

## 2025-06-23 ENCOUNTER — TRANSCRIPTION ENCOUNTER (OUTPATIENT)
Age: 75
End: 2025-06-23

## 2025-06-23 LAB
ALBUMIN SERPL ELPH-MCNC: 1.7 G/DL — LOW (ref 3.3–5)
ALP SERPL-CCNC: 124 U/L — HIGH (ref 40–120)
ALT FLD-CCNC: 15 U/L — SIGNIFICANT CHANGE UP (ref 12–78)
ANION GAP SERPL CALC-SCNC: 4 MMOL/L — LOW (ref 5–17)
AST SERPL-CCNC: 17 U/L — SIGNIFICANT CHANGE UP (ref 15–37)
BASOPHILS # BLD AUTO: 0.03 K/UL — SIGNIFICANT CHANGE UP (ref 0–0.2)
BASOPHILS NFR BLD AUTO: 0.5 % — SIGNIFICANT CHANGE UP (ref 0–2)
BILIRUB SERPL-MCNC: 0.4 MG/DL — SIGNIFICANT CHANGE UP (ref 0.2–1.2)
BLD GP AB SCN SERPL QL: SIGNIFICANT CHANGE UP
BUN SERPL-MCNC: 43 MG/DL — HIGH (ref 7–23)
CALCIUM SERPL-MCNC: 8.2 MG/DL — LOW (ref 8.5–10.1)
CHLORIDE SERPL-SCNC: 109 MMOL/L — HIGH (ref 96–108)
CO2 SERPL-SCNC: 32 MMOL/L — HIGH (ref 22–31)
CREAT SERPL-MCNC: 0.86 MG/DL — SIGNIFICANT CHANGE UP (ref 0.5–1.3)
EGFR: 70 ML/MIN/1.73M2 — SIGNIFICANT CHANGE UP
EGFR: 70 ML/MIN/1.73M2 — SIGNIFICANT CHANGE UP
EOSINOPHIL # BLD AUTO: 0.33 K/UL — SIGNIFICANT CHANGE UP (ref 0–0.5)
EOSINOPHIL NFR BLD AUTO: 5.2 % — SIGNIFICANT CHANGE UP (ref 0–6)
GLUCOSE BLDC GLUCOMTR-MCNC: 117 MG/DL — HIGH (ref 70–99)
GLUCOSE BLDC GLUCOMTR-MCNC: 137 MG/DL — HIGH (ref 70–99)
GLUCOSE BLDC GLUCOMTR-MCNC: 139 MG/DL — HIGH (ref 70–99)
GLUCOSE BLDC GLUCOMTR-MCNC: 230 MG/DL — HIGH (ref 70–99)
GLUCOSE SERPL-MCNC: 109 MG/DL — HIGH (ref 70–99)
HCT VFR BLD CALC: 27.8 % — LOW (ref 34.5–45)
HGB BLD-MCNC: 8.3 G/DL — LOW (ref 11.5–15.5)
IMM GRANULOCYTES NFR BLD AUTO: 0.8 % — SIGNIFICANT CHANGE UP (ref 0–0.9)
INR BLD: 1.1 RATIO — SIGNIFICANT CHANGE UP (ref 0.85–1.16)
LYMPHOCYTES # BLD AUTO: 1.13 K/UL — SIGNIFICANT CHANGE UP (ref 1–3.3)
LYMPHOCYTES # BLD AUTO: 17.7 % — SIGNIFICANT CHANGE UP (ref 13–44)
MAGNESIUM SERPL-MCNC: 2.2 MG/DL — SIGNIFICANT CHANGE UP (ref 1.6–2.6)
MCHC RBC-ENTMCNC: 28.5 PG — SIGNIFICANT CHANGE UP (ref 27–34)
MCHC RBC-ENTMCNC: 29.9 G/DL — LOW (ref 32–36)
MCV RBC AUTO: 95.5 FL — SIGNIFICANT CHANGE UP (ref 80–100)
MONOCYTES # BLD AUTO: 0.47 K/UL — SIGNIFICANT CHANGE UP (ref 0–0.9)
MONOCYTES NFR BLD AUTO: 7.4 % — SIGNIFICANT CHANGE UP (ref 2–14)
NEUTROPHILS # BLD AUTO: 4.38 K/UL — SIGNIFICANT CHANGE UP (ref 1.8–7.4)
NEUTROPHILS NFR BLD AUTO: 68.4 % — SIGNIFICANT CHANGE UP (ref 43–77)
NRBC BLD AUTO-RTO: 0 /100 WBCS — SIGNIFICANT CHANGE UP (ref 0–0)
PHOSPHATE SERPL-MCNC: 3.5 MG/DL — SIGNIFICANT CHANGE UP (ref 2.5–4.5)
PLATELET # BLD AUTO: 325 K/UL — SIGNIFICANT CHANGE UP (ref 150–400)
POTASSIUM SERPL-MCNC: 4.1 MMOL/L — SIGNIFICANT CHANGE UP (ref 3.5–5.3)
POTASSIUM SERPL-SCNC: 4.1 MMOL/L — SIGNIFICANT CHANGE UP (ref 3.5–5.3)
PROT SERPL-MCNC: 6.6 GM/DL — SIGNIFICANT CHANGE UP (ref 6–8.3)
PROTHROM AB SERPL-ACNC: 12.4 SEC — SIGNIFICANT CHANGE UP (ref 9.9–13.4)
RBC # BLD: 2.91 M/UL — LOW (ref 3.8–5.2)
RBC # FLD: 16.2 % — HIGH (ref 10.3–14.5)
SODIUM SERPL-SCNC: 145 MMOL/L — SIGNIFICANT CHANGE UP (ref 135–145)
WBC # BLD: 6.39 K/UL — SIGNIFICANT CHANGE UP (ref 3.8–10.5)
WBC # FLD AUTO: 6.39 K/UL — SIGNIFICANT CHANGE UP (ref 3.8–10.5)

## 2025-06-23 PROCEDURE — 99232 SBSQ HOSP IP/OBS MODERATE 35: CPT

## 2025-06-23 PROCEDURE — 99231 SBSQ HOSP IP/OBS SF/LOW 25: CPT | Mod: 25

## 2025-06-23 PROCEDURE — G0545: CPT

## 2025-06-23 PROCEDURE — 99233 SBSQ HOSP IP/OBS HIGH 50: CPT | Mod: 57

## 2025-06-23 PROCEDURE — 99291 CRITICAL CARE FIRST HOUR: CPT | Mod: 25

## 2025-06-23 PROCEDURE — 31600 PLANNED TRACHEOSTOMY: CPT

## 2025-06-23 PROCEDURE — 31624 DX BRONCHOSCOPE/LAVAGE: CPT

## 2025-06-23 RX ORDER — SULFAMETHOXAZOLE/TRIMETHOPRIM 800-160 MG
2 TABLET ORAL EVERY 8 HOURS
Refills: 0 | Status: DISCONTINUED | OUTPATIENT
Start: 2025-06-23 | End: 2025-06-23

## 2025-06-23 RX ORDER — ROCURONIUM BROMIDE 10 MG/ML
50 INJECTION, SOLUTION INTRAVENOUS ONCE
Refills: 0 | Status: DISCONTINUED | OUTPATIENT
Start: 2025-06-23 | End: 2025-06-23

## 2025-06-23 RX ORDER — NOREPINEPHRINE BITARTRATE 8 MG
0.05 SOLUTION INTRAVENOUS
Qty: 8 | Refills: 0 | Status: DISCONTINUED | OUTPATIENT
Start: 2025-06-23 | End: 2025-06-23

## 2025-06-23 RX ORDER — SULFAMETHOXAZOLE/TRIMETHOPRIM 800-160 MG
40 TABLET ORAL EVERY 8 HOURS
Refills: 0 | Status: COMPLETED | OUTPATIENT
Start: 2025-06-23 | End: 2025-06-29

## 2025-06-23 RX ORDER — MIDAZOLAM IN 0.9 % SOD.CHLORID 1 MG/ML
4 PLASTIC BAG, INJECTION (ML) INTRAVENOUS ONCE
Refills: 0 | Status: DISCONTINUED | OUTPATIENT
Start: 2025-06-23 | End: 2025-06-23

## 2025-06-23 RX ORDER — FENTANYL CITRATE-0.9 % NACL/PF 100MCG/2ML
50 SYRINGE (ML) INTRAVENOUS ONCE
Refills: 0 | Status: DISCONTINUED | OUTPATIENT
Start: 2025-06-23 | End: 2025-06-23

## 2025-06-23 RX ORDER — PROPOFOL 10 MG/ML
20 INJECTION, EMULSION INTRAVENOUS
Qty: 500 | Refills: 0 | Status: DISCONTINUED | OUTPATIENT
Start: 2025-06-23 | End: 2025-06-23

## 2025-06-23 RX ADMIN — Medication 15 MILLILITER(S): at 17:24

## 2025-06-23 RX ADMIN — Medication 50 MICROGRAM(S): at 11:14

## 2025-06-23 RX ADMIN — Medication 81 MILLIGRAM(S): at 12:34

## 2025-06-23 RX ADMIN — DEXMEDETOMIDINE HYDROCHLORIDE IN SODIUM CHLORIDE 3.74 MICROGRAM(S)/KG/HR: 4 INJECTION INTRAVENOUS at 23:32

## 2025-06-23 RX ADMIN — Medication 40 MILLIGRAM(S): at 05:51

## 2025-06-23 RX ADMIN — DEXMEDETOMIDINE HYDROCHLORIDE IN SODIUM CHLORIDE 3.74 MICROGRAM(S)/KG/HR: 4 INJECTION INTRAVENOUS at 05:51

## 2025-06-23 RX ADMIN — Medication 50 MICROGRAM(S): at 10:14

## 2025-06-23 RX ADMIN — Medication 40 MILLILITER(S): at 13:49

## 2025-06-23 RX ADMIN — ENOXAPARIN SODIUM 40 MILLIGRAM(S): 100 INJECTION SUBCUTANEOUS at 13:49

## 2025-06-23 RX ADMIN — INSULIN LISPRO 2: 100 INJECTION, SOLUTION INTRAVENOUS; SUBCUTANEOUS at 23:05

## 2025-06-23 RX ADMIN — QUETIAPINE FUMARATE 12.5 MILLIGRAM(S): 25 TABLET ORAL at 22:18

## 2025-06-23 RX ADMIN — RANOLAZINE 500 MILLIGRAM(S): 1000 TABLET, FILM COATED, EXTENDED RELEASE ORAL at 17:24

## 2025-06-23 RX ADMIN — Medication 4 MILLIGRAM(S): at 11:55

## 2025-06-23 RX ADMIN — PROPOFOL 8.96 MICROGRAM(S)/KG/MIN: 10 INJECTION, EMULSION INTRAVENOUS at 10:14

## 2025-06-23 RX ADMIN — ATORVASTATIN CALCIUM 40 MILLIGRAM(S): 80 TABLET, FILM COATED ORAL at 22:17

## 2025-06-23 RX ADMIN — Medication 50 MICROGRAM(S): at 12:54

## 2025-06-23 RX ADMIN — Medication 15 MILLILITER(S): at 05:51

## 2025-06-23 RX ADMIN — DEXMEDETOMIDINE HYDROCHLORIDE IN SODIUM CHLORIDE 3.74 MICROGRAM(S)/KG/HR: 4 INJECTION INTRAVENOUS at 09:41

## 2025-06-23 RX ADMIN — RANOLAZINE 500 MILLIGRAM(S): 1000 TABLET, FILM COATED, EXTENDED RELEASE ORAL at 05:53

## 2025-06-23 RX ADMIN — DEXMEDETOMIDINE HYDROCHLORIDE IN SODIUM CHLORIDE 3.74 MICROGRAM(S)/KG/HR: 4 INJECTION INTRAVENOUS at 00:52

## 2025-06-23 RX ADMIN — Medication 50 MICROGRAM(S): at 11:54

## 2025-06-23 RX ADMIN — DEXMEDETOMIDINE HYDROCHLORIDE IN SODIUM CHLORIDE 3.74 MICROGRAM(S)/KG/HR: 4 INJECTION INTRAVENOUS at 17:45

## 2025-06-23 RX ADMIN — BUMETANIDE 1 MILLIGRAM(S): 1 TABLET ORAL at 05:51

## 2025-06-23 RX ADMIN — DEXMEDETOMIDINE HYDROCHLORIDE IN SODIUM CHLORIDE 3.74 MICROGRAM(S)/KG/HR: 4 INJECTION INTRAVENOUS at 19:03

## 2025-06-23 RX ADMIN — Medication 1 APPLICATION(S): at 05:51

## 2025-06-23 RX ADMIN — Medication 650 MILLIGRAM(S): at 23:35

## 2025-06-23 RX ADMIN — INSULIN GLARGINE-YFGN 30 UNIT(S): 100 INJECTION, SOLUTION SUBCUTANEOUS at 22:18

## 2025-06-23 RX ADMIN — Medication 2 TABLET(S): at 22:17

## 2025-06-23 RX ADMIN — Medication 1 TABLET(S): at 12:34

## 2025-06-23 RX ADMIN — Medication 40 MILLILITER(S): at 22:18

## 2025-06-23 NOTE — PROCEDURE NOTE - NSSITEPREP_SKIN_A_CORE
chlorhexidine
Adherence to aseptic technique: hand hygiene prior to donning barriers (gown, gloves), don cap and mask, sterile drape over patient
chlorhexidine
povidone iodine (if allergic to chlorhexidine)
chlorhexidine
chlorhexidine

## 2025-06-23 NOTE — PROGRESS NOTE ADULT - SUBJECTIVE AND OBJECTIVE BOX
EMMANUEL GONZALEZ  MRN-[id number]  74y (1950)    Follow Up:  PNA, respiratory failure, Urosepsis     Interval History: The pt was seen and examined earlier, in CCU, vented via tracheostomy, sedated, on pressors. Pt is afebrile since 6/20, no leukocytosis.     PAST MEDICAL & SURGICAL HISTORY:  HTN (hypertension)      HLD (hyperlipidemia)      CAD S/P percutaneous coronary angioplasty      Insulin dependent type 2 diabetes mellitus      History of heart artery stent          ROS:    [x ] Unobtainable because: sedated, vented, tracheostomy   [ ] All other systems negative    Constitutional: no fever, no chills  Head: no trauma  Eyes: no vision changes, no eye pain  ENT:  no sore throat, no rhinorrhea  Cardiovascular:  no chest pain, no palpitation  Respiratory:  no SOB, no cough  GI:  no abd pain, no vomiting, no diarrhea  urinary: no dysuria, no hematuria, no flank pain  musculoskeletal:  no joint pain, no joint swelling  skin:  no rash  neurology:  no headache, no seizure, no change in mental status  psych: no anxiety, no depression         Allergies  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/shellfish (Short breath)  No Known Drug Allergies        ANTIMICROBIALS:  trimethoprim  40 mG/sulfamethoxazole 200 mG Suspension 40 every 8 hours      OTHER MEDS:  acetaminophen     Tablet .. 650 milliGRAM(s) Oral every 6 hours PRN  acetaminophen     Tablet .. 650 milliGRAM(s) Oral every 6 hours PRN  aluminum hydroxide/magnesium hydroxide/simethicone Suspension 30 milliLiter(s) Oral every 6 hours PRN  aspirin  chewable 81 milliGRAM(s) Oral daily  atorvastatin 40 milliGRAM(s) Oral at bedtime  bumetanide Injectable 1 milliGRAM(s) IV Push daily  chlorhexidine 0.12% Liquid 15 milliLiter(s) Oral Mucosa every 12 hours  chlorhexidine 2% Cloths 1 Application(s) Topical <User Schedule>  dexMEDEtomidine Infusion 0.2 MICROgram(s)/kG/Hr IV Continuous <Continuous>  enoxaparin Injectable 40 milliGRAM(s) SubCutaneous every 24 hours  HYDROmorphone  Injectable 0.25 milliGRAM(s) IV Push every 4 hours PRN  insulin glargine Injectable (LANTUS) 30 Unit(s) SubCutaneous at bedtime  insulin lispro (ADMELOG) corrective regimen sliding scale   SubCutaneous every 6 hours  metOLazone 5 milliGRAM(s) Oral daily  multivitamin 1 Tablet(s) Oral daily  norepinephrine Infusion 0.05 MICROgram(s)/kG/Min IV Continuous <Continuous>  ondansetron Injectable 4 milliGRAM(s) IV Push every 8 hours PRN  pantoprazole   Suspension 40 milliGRAM(s) Oral every 24 hours  polyethylene glycol 3350 17 Gram(s) Oral daily PRN  propofol Infusion 20 MICROgram(s)/kG/Min IV Continuous <Continuous>  QUEtiapine 12.5 milliGRAM(s) Oral at bedtime  ranolazine 500 milliGRAM(s) Oral two times a day  senna 2 Tablet(s) Oral at bedtime      Vital Signs Last 24 Hrs  T(C): 36.7 (23 Jun 2025 11:30), Max: 37.1 (22 Jun 2025 20:00)  T(F): 98 (23 Jun 2025 11:30), Max: 98.8 (22 Jun 2025 20:00)  HR: 54 (23 Jun 2025 13:00) (52 - 89)  BP: 117/57 (23 Jun 2025 13:00) (84/45 - 180/78)  BP(mean): 73 (23 Jun 2025 13:00) (58 - 105)  RR: 16 (23 Jun 2025 13:00) (7 - 22)  SpO2: 100% (23 Jun 2025 13:00) (96% - 100%)    Parameters below as of 22 Jun 2025 20:00  Patient On (Oxygen Delivery Method): ventilator, /RATE10/PEEP6    O2 Concentration (%): 40    Physical Exam:  General: ill appearing  HEENT: mouth is dry, NGT in place   Neck: Tracheostomy in place c/d/i  Respiratory: mechanical BS heard  Cardiovascular: S1S2 RRR  Abdomen: soft, non tender, non distended  Extremities: warm, +1 edema b/l LE  Neurological: not awake or alert, sedated  Psych: unable     WBC Count: 6.39 K/uL (06-23 @ 03:41)  WBC Count: 6.28 K/uL (06-22 @ 03:30)  WBC Count: 8.42 K/uL (06-21 @ 04:06)  WBC Count: 7.16 K/uL (06-19 @ 03:54)  WBC Count: 5.51 K/uL (06-18 @ 03:02)                            8.3    6.39  )-----------( 325      ( 23 Jun 2025 03:41 )             27.8       06-23    145  |  109[H]  |  43[H]  ----------------------------<  109[H]  4.1   |  32[H]  |  0.86    Ca    8.2[L]      23 Jun 2025 03:41  Phos  3.5     06-23  Mg     2.2     06-23    TPro  6.6  /  Alb  1.7[L]  /  TBili  0.4  /  DBili  x   /  AST  17  /  ALT  15  /  AlkPhos  124[H]  06-23      Urinalysis Basic - ( 23 Jun 2025 03:41 )    Color: x / Appearance: x / SG: x / pH: x  Gluc: 109 mg/dL / Ketone: x  / Bili: x / Urobili: x   Blood: x / Protein: x / Nitrite: x   Leuk Esterase: x / RBC: x / WBC x   Sq Epi: x / Non Sq Epi: x / Bacteria: x        Creatinine Trend: 0.86<--, 0.82<--, 0.90<--, 0.84<--, 0.85<--, 1.06<--      MICROBIOLOGY:  v  Blood Blood-Peripheral  06-20-25   No growth at 48 Hours  --  --      Blood Blood-Peripheral  06-20-25   No growth at 48 Hours  --  --      ET Tube ET Tube  06-20-25   Moderate Stenotrophomonas maltophilia  Commensal lina consistent with body site  --  Stenotrophomonas maltophilia      ET Tube ET Tube  06-13-25   Commensal lina consistent with body site  --    No Squamous epithelial cells per low power field  Numerous polymorphonuclear leukocytes per low power field  Few Yeast per oil power field      Sputum Sputum  06-12-25   Commensal lina consistent with body site  --    Few Squamous epithelial cells per low power field  Numerous polymorphonuclear leukocytes per low power field  Yeast per oil power field      Catheterized Catheterized  06-11-25   <10,000 CFU/mL Normal Urogenital Lina  --  --      Blood Blood-Peripheral  06-11-25   No growth at 5 days  --  --      Blood Blood-Peripheral  06-11-25   No growth at 5 days  --  --      Blood Blood-Peripheral  06-04-25   No growth at 5 days  --  --      Catheterized Catheterized  06-02-25   >100,000 CFU/ml Escherichia coli ESBL  --  Escherichia coli ESBL      Blood Blood-Peripheral  06-01-25   Growth in aerobic and anaerobic bottles: Escherichia coli ESBL  See previous culture [id number]  --    Growth in aerobic bottle: Gram Negative Rods  Growth in anaerobic bottle: Gram Negative Rods      Blood Blood-Peripheral  06-01-25   Growth in aerobic and anaerobic bottles: Escherichia coli ESBL  Direct identification is available within approximately 3-5  hours either by Blood Panel Multiplexed PCR or Direct  MALDI-TOF. Details: [email or web address]  --  Blood Culture PCR  Escherichia coli ESBL      D-Dimer Assay, Quantitative: 3552 (06-11)      SARS-CoV-2 Result: NotDetec (06-20-25 @ 17:10)      RADIOLOGY:     EMMANUEL GONZALEZ  MRN-[id number]  74y (1950)    Follow Up:  PNA, respiratory failure, Urosepsis     Interval History: The pt was seen and examined earlier, in CCU, vented via tracheostomy, sedated, on pressors. Pt is afebrile since 6/20, no leukocytosis.     PAST MEDICAL & SURGICAL HISTORY:  HTN (hypertension)      HLD (hyperlipidemia)      CAD S/P percutaneous coronary angioplasty      Insulin dependent type 2 diabetes mellitus      History of heart artery stent          ROS:    [x ] Unobtainable because: sedated, vented, tracheostomy   [ ] All other systems negative    Constitutional: no fever, no chills  Head: no trauma  Eyes: no vision changes, no eye pain  ENT:  no sore throat, no rhinorrhea  Cardiovascular:  no chest pain, no palpitation  Respiratory:  no SOB, no cough  GI:  no abd pain, no vomiting, no diarrhea  urinary: no dysuria, no hematuria, no flank pain  musculoskeletal:  no joint pain, no joint swelling  skin:  no rash  neurology:  no headache, no seizure, no change in mental status  psych: no anxiety, no depression         Allergies  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/shellfish (Short breath)  No Known Drug Allergies        ANTIMICROBIALS:  trimethoprim  40 mG/sulfamethoxazole 200 mG Suspension 40 every 8 hours      OTHER MEDS:  acetaminophen     Tablet .. 650 milliGRAM(s) Oral every 6 hours PRN  acetaminophen     Tablet .. 650 milliGRAM(s) Oral every 6 hours PRN  aluminum hydroxide/magnesium hydroxide/simethicone Suspension 30 milliLiter(s) Oral every 6 hours PRN  aspirin  chewable 81 milliGRAM(s) Oral daily  atorvastatin 40 milliGRAM(s) Oral at bedtime  bumetanide Injectable 1 milliGRAM(s) IV Push daily  chlorhexidine 0.12% Liquid 15 milliLiter(s) Oral Mucosa every 12 hours  chlorhexidine 2% Cloths 1 Application(s) Topical <User Schedule>  dexMEDEtomidine Infusion 0.2 MICROgram(s)/kG/Hr IV Continuous <Continuous>  enoxaparin Injectable 40 milliGRAM(s) SubCutaneous every 24 hours  HYDROmorphone  Injectable 0.25 milliGRAM(s) IV Push every 4 hours PRN  insulin glargine Injectable (LANTUS) 30 Unit(s) SubCutaneous at bedtime  insulin lispro (ADMELOG) corrective regimen sliding scale   SubCutaneous every 6 hours  metOLazone 5 milliGRAM(s) Oral daily  multivitamin 1 Tablet(s) Oral daily  norepinephrine Infusion 0.05 MICROgram(s)/kG/Min IV Continuous <Continuous>  ondansetron Injectable 4 milliGRAM(s) IV Push every 8 hours PRN  pantoprazole   Suspension 40 milliGRAM(s) Oral every 24 hours  polyethylene glycol 3350 17 Gram(s) Oral daily PRN  propofol Infusion 20 MICROgram(s)/kG/Min IV Continuous <Continuous>  QUEtiapine 12.5 milliGRAM(s) Oral at bedtime  ranolazine 500 milliGRAM(s) Oral two times a day  senna 2 Tablet(s) Oral at bedtime      Vital Signs Last 24 Hrs  T(C): 36.7 (23 Jun 2025 11:30), Max: 37.1 (22 Jun 2025 20:00)  T(F): 98 (23 Jun 2025 11:30), Max: 98.8 (22 Jun 2025 20:00)  HR: 54 (23 Jun 2025 13:00) (52 - 89)  BP: 117/57 (23 Jun 2025 13:00) (84/45 - 180/78)  BP(mean): 73 (23 Jun 2025 13:00) (58 - 105)  RR: 16 (23 Jun 2025 13:00) (7 - 22)  SpO2: 100% (23 Jun 2025 13:00) (96% - 100%)    Parameters below as of 22 Jun 2025 20:00  Patient On (Oxygen Delivery Method): ventilator, /RATE10/PEEP6    O2 Concentration (%): 40    Physical Exam:  General: ill appearing  HEENT: mouth is dry, NGT in place   Neck: Tracheostomy in place c/d/i  Respiratory: mechanical BS heard  Cardiovascular: S1S2 RRR  Abdomen: soft, non tender, non distended  Extremities: warm, +1 edema b/l LE  Neurological: not awake or alert, sedated  Psych: unable     WBC Count: 6.39 K/uL (06-23 @ 03:41)  WBC Count: 6.28 K/uL (06-22 @ 03:30)  WBC Count: 8.42 K/uL (06-21 @ 04:06)  WBC Count: 7.16 K/uL (06-19 @ 03:54)  WBC Count: 5.51 K/uL (06-18 @ 03:02)                            8.3    6.39  )-----------( 325      ( 23 Jun 2025 03:41 )             27.8       06-23    145  |  109[H]  |  43[H]  ----------------------------<  109[H]  4.1   |  32[H]  |  0.86    Ca    8.2[L]      23 Jun 2025 03:41  Phos  3.5     06-23  Mg     2.2     06-23    TPro  6.6  /  Alb  1.7[L]  /  TBili  0.4  /  DBili  x   /  AST  17  /  ALT  15  /  AlkPhos  124[H]  06-23      Urinalysis Basic - ( 23 Jun 2025 03:41 )    Color: x / Appearance: x / SG: x / pH: x  Gluc: 109 mg/dL / Ketone: x  / Bili: x / Urobili: x   Blood: x / Protein: x / Nitrite: x   Leuk Esterase: x / RBC: x / WBC x   Sq Epi: x / Non Sq Epi: x / Bacteria: x        Creatinine Trend: 0.86<--, 0.82<--, 0.90<--, 0.84<--, 0.85<--, 1.06<--      MICROBIOLOGY:    Blood Blood-Peripheral  06-20-25   No growth at 48 Hours  --  --      Blood Blood-Peripheral  06-20-25   No growth at 48 Hours  --  --      ET Tube ET Tube  06-20-25   Moderate Stenotrophomonas maltophilia  Commensal lina consistent with body site  --  Stenotrophomonas maltophilia      ET Tube ET Tube  06-13-25   Commensal lina consistent with body site  --    No Squamous epithelial cells per low power field  Numerous polymorphonuclear leukocytes per low power field  Few Yeast per oil power field      Sputum Sputum  06-12-25   Commensal lina consistent with body site  --    Few Squamous epithelial cells per low power field  Numerous polymorphonuclear leukocytes per low power field  Yeast per oil power field      Catheterized Catheterized  06-11-25   <10,000 CFU/mL Normal Urogenital Lina  --  --      Blood Blood-Peripheral  06-11-25   No growth at 5 days  --  --      Blood Blood-Peripheral  06-11-25   No growth at 5 days  --  --      Blood Blood-Peripheral  06-04-25   No growth at 5 days  --  --      Catheterized Catheterized  06-02-25   >100,000 CFU/ml Escherichia coli ESBL  --  Escherichia coli ESBL      Blood Blood-Peripheral  06-01-25   Growth in aerobic and anaerobic bottles: Escherichia coli ESBL  See previous culture [id number]  --    Growth in aerobic bottle: Gram Negative Rods  Growth in anaerobic bottle: Gram Negative Rods      Blood Blood-Peripheral  06-01-25   Growth in aerobic and anaerobic bottles: Escherichia coli ESBL  Direct identification is available within approximately 3-5  hours either by Blood Panel Multiplexed PCR or Direct  MALDI-TOF. Details: [email or web address]  --  Blood Culture PCR  Escherichia coli ESBL      D-Dimer Assay, Quantitative: 3552 (06-11)      SARS-CoV-2 Result: NotDetec (06-20-25 @ 17:10)      RADIOLOGY:

## 2025-06-23 NOTE — PROGRESS NOTE ADULT - SUBJECTIVE AND OBJECTIVE BOX
Patient seen and examined at bedside in CCU, intubated.           Vital Signs Last 24 Hrs  T(F): 98.5 (06-23-25 @ 04:00), Max: 98.8 (06-22-25 @ 20:00)  HR: 54 (06-23-25 @ 08:30)  BP: 93/57 (06-23-25 @ 08:03)  RR: 16 (06-23-25 @ 08:03)  SpO2: 100% (06-23-25 @ 08:30)  Wt(kg): --   CAPILLARY BLOOD GLUCOSE      POCT Blood Glucose.: 117 mg/dL (23 Jun 2025 05:42)      GENERAL: NAD  CHEST/LUNG: Respirations non labored, good inspiratory effort, breathing synchronously with ventilator   HEART: S1S2  HEENT: NCAT  ABDOMEN: Nondistended, + bowel sounds, nontender  EXTREMITIES:  No calf tenderness, no edema    I&O's Detail    22 Jun 2025 07:01  -  23 Jun 2025 07:00  --------------------------------------------------------  IN:    Dexmedetomidine: 296.3 mL    Enteral Tube Flush: 340 mL    Free Water: 250 mL    Glucerna 1.5: 600 mL  Total IN: 1486.3 mL    OUT:    Voided (mL): 2100 mL  Total OUT: 2100 mL    Total NET: -613.7 mL          LABS:                        8.3    6.39  )-----------( 325      ( 23 Jun 2025 03:41 )             27.8     06-23    145  |  109[H]  |  43[H]  ----------------------------<  109[H]  4.1   |  32[H]  |  0.86    Ca    8.2[L]      23 Jun 2025 03:41  Phos  3.5     06-23  Mg     2.2     06-23    TPro  6.6  /  Alb  1.7[L]  /  TBili  0.4  /  DBili  x   /  AST  17  /  ALT  15  /  AlkPhos  124[H]  06-23    PT/INR - ( 23 Jun 2025 03:41 )   PT: 12.4 sec;   INR: 1.10 ratio

## 2025-06-23 NOTE — PROCEDURE NOTE - NSBRONCHPROCDETAILS_GEN_A_CORE_FT
Patient underwent bronchoscopy for inspection during percutaneous tracheostomy placement by surgery. Minimal secretions in ETT and trachea. ETT was pulled back to allow for access to the trachea and directly visualize needle, wire, and percutaneous tracheostomy placement. Correct placement was then confirmed by placing the bronchoscopy through the trache. Patient tolerated procedure well.

## 2025-06-23 NOTE — PROGRESS NOTE ADULT - ASSESSMENT
73 Y/O female with a PMHx of HTN, HLD, DM, CAD with stents admitted on 5/30/25 for elevated blood glucose. ON 6/1 RRT for hypotension, fever 103.2 and transferred to ICU and subsequently intubated for airway protection, shock state. On 6/2 pt s/p cardiac arrest while in CT  - ACLS for 10 minutes with ROSC. Treated for NSTEMI Heparin drip (no intervention). Also found to have E. coli  ESBL bacteremia. S/P extubation 6/6/2025 transferred to [facility] on 6/11/25 there was another RRT 2/2 AMS, intubated brought back to ICU. Tracheostomy at bedside today, see procedure note for details. Will plan for PEG placement on Wednesday.     PLAN:     ** Full plan to follow  73 Y/O female with a PMHx of HTN, HLD, DM, CAD with stents admitted on 5/30/25 for elevated blood glucose. ON 6/1 RRT for hypotension, fever 103.2 and transferred to ICU and subsequently intubated for airway protection, shock state. On 6/2 pt s/p cardiac arrest while in CT  - ACLS for 10 minutes with ROSC. Treated for NSTEMI Heparin drip (no intervention). Also found to have E. coli  ESBL bacteremia. S/P extubation 6/6/2025 transferred to [facility] on 6/11/25 there was another RRT 2/2 AMS, intubated brought back to ICU. Tracheostomy at bedside today, see procedure note for details. Will plan for PEG placement on Wednesday.     PLAN:     - Maintain tracheostomy in place  - PEG planning for Wednesday   - Continue care per CCU   - Discussed with Dr. Ramires

## 2025-06-23 NOTE — PROGRESS NOTE ADULT - SUBJECTIVE AND OBJECTIVE BOX
Post-procedure check    S/P tracheostomy POD#0  Pt seen and examined at bedside in ICU.    Vital Signs Last 24 Hrs  T(F): 99.9 (06-23-25 @ 19:58), Max: 99.9 (06-23-25 @ 19:58)  HR: 60 (06-23-25 @ 22:00)  BP: 124/58 (06-23-25 @ 22:00)  RR: 15 (06-23-25 @ 22:00)  SpO2: 100% (06-23-25 @ 22:00)  POCT Blood Glucose.: 230 mg/dL (23 Jun 2025 22:15)    CONSTITUTIONAL: NAD  HEENT: Tracheostomy in place, functioning well. No bleeding or abnormalities.   RESPIRATORY: Clear to auscultation bilaterally, respirations nonlabored  CARDIOVASCULAR: S1S2, Regular rate and rhythm  GASTROINTESTINAL: Nondistended, soft, nontender  MUSCULOSKELETAL: No calf tenderness, No edema      Assessment: 74F S/P tracheostomy POD#0    Plan:  - tracheostomy care  - PEG planned for Wednesday  - continue current management per ICU

## 2025-06-23 NOTE — PROGRESS NOTE ADULT - NS ATTEND AMEND GEN_ALL_CORE FT
Pt not able to be extubated despite recurrent attempts. Discussed risks, benefits, and alternatives to percutaneous tracheostomy placement w/ family. Informed consent obtained, proceeding w/ procedure at bedside today

## 2025-06-23 NOTE — PROCEDURE NOTE - PROCEDURE DATE TIME, MLM
02-Jun-2025 00:52
PCP: Nano Mancilla MD     Last appt: 11/6/2023    Future Appointments   Date Time Provider Department Center   5/9/2024  8:30 AM Nano Mancilla MD [facility] BS AMB          Requested Prescriptions     Pending Prescriptions Disp Refills    Insulin Syringe-Needle U-100 (BD INSULIN SYRINGE U/F) 31G X 5/16\" 0.5 ML MISC 100 each 5     Sig: USE AS DIRECTED TWICE DAILY      
11-Jun-2025 19:18
23-Jun-2025 11:30
18-Jun-2025 13:11
23-Jun-2025 12:16
02-Jun-2025 02:47
03-Jun-2025
11-Jun-2025 19:31
11-Jun-2025 19:27

## 2025-06-23 NOTE — PROCEDURE NOTE - ADDITIONAL PROCEDURE DETAILS
Patient in supine position with neck in extension. Prepped with povidine-iodine. Midline incision made with bronchoscopy performed by ICU attending with direct visualization of catheter inserted into trachea. Guidewire inserted into catheter. Subsequent dilation performed via Seldinger technique. 7 Porter tracheostomy inserted with 10cc within balloon cuff. Tracheostomy anchored on all 4 sides with 0 prolene sutures. Patient tolerated procedure well.

## 2025-06-23 NOTE — PROGRESS NOTE ADULT - ASSESSMENT
74F PMH CAD, DM2 p/w hyperglycemia, stay c/b ESBL E. coli UTI with septic shock requiring transfer to ICU and intubation for Type 4 respiratory failure c/b cardiac arrest (downtime 10 minutes), with recovery of mental status and extubation on 6/6. Transferred to general medicine on 6/8, but two days afterwards had witnessed episode of aspiration a/w AMS and was reintubated for Type 1 respiratory failure and AMS. Treated for pneumonia, and oxygenation has improved, but patient is significantly deconditioned and is unable to liberate from vent.  - Remains alert and following commands.  - completed meropenem and Zosyn, now Stenotrophomonas in sputum, will transition to Bactrim.  - Sedation with Precedex, pain control with Dilaudid PRN and acetaminophen  - Diuresing with Bumex 1mg daily and metolazone 5mg PO daily, Na 145.  - Patient underwent trache placement by surgery at bedside today, with bronchoscopy by me during procedure.  - DVT ppx: Lovenox

## 2025-06-23 NOTE — PROCEDURE NOTE - NSINFORMCONSENT_GEN_A_CORE
Benefits, risks, and possible complications of procedure explained to patient/caregiver who verbalized understanding and gave written consent.
Benefits, risks, and possible complications of procedure explained to patient/caregiver who verbalized understanding and gave verbal consent.
This was an emergent procedure.
Benefits, risks, and possible complications of procedure explained to patient/caregiver who verbalized understanding and gave written consent.
Benefits, risks, and possible complications of procedure explained to patient/caregiver who verbalized understanding and gave written consent.
This was an emergent procedure.
This was an emergent procedure.

## 2025-06-23 NOTE — PROGRESS NOTE ADULT - ASSESSMENT
A/P-   74 year old female with PMHx of HTN, HLD, IDDM2, CAD (s/p PCI), and recent right humerus fracture (placed in sling 3 days ago) who presented to the ED on 5/30/25 for complaints of elevated blood glucose.   As per med history provided by family  patient fell three days ago and landed on her right shoulder. Went to [facility] at that time and found to have right humerus fracture and placed in sling. Discharged home with outpatient orthopedic surgery follow up. Since then, patient has been refusing to get out of bed and not eating much.   In the ED, VSS. Hgb 10.3, sodium 132, potassium 6.3, BUN 70, Cr 2.02, blood glucose 510. VBG 7.29 / 58 / 34 / 26. COVID/influenza/RSV negative. CT head without acute intracranial findings. Received 1L NS bolus, 1L LR bolus, and insulin 20 U IV.  (30 May 2025 23:43)    s/p  RRT last night   for AMS, hypotension, fever of 103  and was started on sepsis w/u given IVF bolus, Abx with CTX and Vanco. Persistently hypotensive and obtunded. Started on levophed infusion. Urgently transferred to ICU.   was also intubated for airway protection    Infectious Disease consultation requested this afternoon 6/2 to help with antibiotic management  for ESBL bacteremia    Intubated   sedated  on pressors for hypotension  afebrile today  yesterday 103 t-max  + leukocytosis of 29K  Blood cx- ESBL GNR by PCR x 2  UA from 5/31-pos for nitrates  urine cx-pending  DUANE  elevated cardiac enzymes    per med records had ESBL e.coli UTI in august and sept 2024 and ID was not consulted     6/3-  remains Intubated and sedated  on pressors for hypotension  temp of 101.5 today  + leukocytosis trending down to 20k  creatinine slightly better today  Blood cx- ESBL GNR by PCR x 2  UA from 5/31-pos for nitrates  urine cx->100K e.coli  DUANE  elevated cardiac enzymes    6/4: seen in ICU earlier, remains intubated, weaning process in progress, on pressors, continues having fevers, T 101 this morning, leukocytosis is better 17.27, Cr better 1.35, UC and BCs grew ESBL E. Coli, repeat BCs are pending, Meropenem IV continued. If not improvement in pt's fevers tomorrow, most likely CT a/p will be obtained.   6/5: remains in ICU, intubated and sedated, on pressors, Temp persistent 100-100.2, WBC better 16.43, Cr normalized, LFTs ok, repeat BCs NGTD, procalcitonin 2.41, TTE performed - Left ventricular regional wall motion abnormalities present. Meropenem IV continued.     6/6-  extubated  awake  afebrile now but early am had temp of 100.6  Leukocytosis almost resolved 11K today  Blood cx- ESBL GNR by PCR x 2  UA from 5/31-pos for nitrates  urine cx->100K e.coli  repeat blood cx- NGTD x 2 from 6/4 6/9-  downgraded to medical floor  Afebrile  Leukocytosis 12 k today  Blood cx- ESBL GNR by PCR x 2  UA from 5/31-pos for nitrates  urine cx->100K e.coli  repeat blood cx- NGTD x 2 from 6/4 6/11: pt is afebrile since 6/6, doing well on RA, leukocytosis was elevated 13.57, Cr ok,  no new cbc, repeat BCs remain with no growth to date, s/p 8 days of IV Meropenem, now observed off abx.     6/12-  s/p RRT last night for AMS and hypotention and second RRT later for ? seizure and aspiration  s/p intubation and transferred to CCU  off pressors  intubated  afebrile but had fever of 100.2 at 5 am  leukocytosis has decreased to 11k  repeat blood cx - NGTD x 2  has completed course of meropenem  fo her ESBL bacteremia dn UTI   cxr- b/l pleural effusions L> R  CTA report-  No pulmonary embolism..  Tracheobronchial secretions, greatest in the left lower lobe.  Patchy and   nodular bilateral lung opacities may be infectious or inflammatory. Right   middle lobe subpleural 6 mm nodular opacity is new from prior .   Short-term follow-up CT recommended    6/13-   remains intubated on 50% Fio2  Afebrile   Minimal leukocytosis of 13K  creatinine- normal value  Repeat blood cx  from 6/4- NGTD x 2  repeat blood cx from 6/11- NGTD x 2  sputum  cx- commensal lina c/w body site  has completed course of meropenem  fo her ESBL bacteremia and UTI - which she cleared  cxr- b/l pleural effusions L> R  CTA report-  No pulmonary embolism..  Tracheobronchial secretions, greatest in the left lower lobe.  Patchy and   nodular bilateral lung opacities may be infectious or inflammatory. Right   middle lobe subpleural 6 mm nodular opacity is new from prior .   Short-term follow-up CT recommended  EEG- reported negative for epilepsy ( pls see full report)    6/16-  remains intubated on 30% Fio2  T-max-100.6 this am  No leukocytosis   creatinine- normal value  Repeat blood cx  from 6/4- NGTD x 2  repeat blood cx from 6/11- NGTD x 2  sputum  cx- commensal lina c/w body site  has completed course of meropenem  for her ESBL bacteremia and UTI - which she cleared  cxr- b/l pleural effusions L> R  CTA report-  No pulmonary embolism..  Tracheobronchial secretions, greatest in the left lower lobe.  Patchy and   nodular bilateral lung opacities may be infectious or inflammatory. Right   middle lobe subpleural 6 mm nodular opacity is new from prior .   Short-term follow-up CT recommended  EEG- reported negative for epilepsy ( pls see full report)    6/20-  T-max-101  No leukocytosis  Repeat blood cx  from 6/4- NGTD x 2  repeat blood cx from 6/11- NGTD x 2  sputum  cx- commensal lina c/w body site  ET tube cx-commensal lina c/w body site  has completed course of meropenem  for her ESBL bacteremia and UTI - which she cleared  cxr- b/l pleural effusions L> R  completed course of zosyn as well 2 days ago for presumed aspiration pneumonitis    6/23: remains in CCU, no fevers since 6/20, vented via tracheostomy, sedated, on pressors, no leukocytosis, repeat BCs NGTD, sputum culture grew Stenotrophomonas, will add abx, Bactrim via NGT.        Impression-  possible aspiration pneumonitis , also has Left  pleural effusion , intubated   ESBL septic shock -  was treated and had resolved  E.coli ESBL bacteremia sec to   source - treated   Right humerus fracture with hematoma  Stenotrophomonas in sputum     Plan-  start Bactrim solution via NGT to treat Stenotrophomonas - ET tube aspirate   had completed course of IV meropenem for the ESBL bacteremia and sepsis and repeat blood cx are negative   also completed course of zosyn for ? asp pneumonitis  follow all culture data   also advise to check LE US r/o dvt as part of fever work up - no DVT detected   vent management as per CCU team  rest of the medical management as per CCU team    discussed with Dr. Fraga  discussed with CCU team   discussed with pharmacy  A/P-   74 year old female with PMHx of HTN, HLD, IDDM2, CAD (s/p PCI), and recent right humerus fracture (placed in sling 3 days ago) who presented to the ED on 5/30/25 for complaints of elevated blood glucose.   As per med history provided by family  patient fell three days ago and landed on her right shoulder. Went to [facility] at that time and found to have right humerus fracture and placed in sling. Discharged home with outpatient orthopedic surgery follow up. Since then, patient has been refusing to get out of bed and not eating much.   In the ED, VSS. Hgb 10.3, sodium 132, potassium 6.3, BUN 70, Cr 2.02, blood glucose 510. VBG 7.29 / 58 / 34 / 26. COVID/influenza/RSV negative. CT head without acute intracranial findings. Received 1L NS bolus, 1L LR bolus, and insulin 20 U IV.  (30 May 2025 23:43)    s/p  RRT last night   for AMS, hypotension, fever of 103  and was started on sepsis w/u given IVF bolus, Abx with CTX and Vanco. Persistently hypotensive and obtunded. Started on levophed infusion. Urgently transferred to ICU.   was also intubated for airway protection    Infectious Disease consultation requested this afternoon 6/2 to help with antibiotic management  for ESBL bacteremia    Intubated   sedated  on pressors for hypotension  afebrile today  yesterday 103 t-max  + leukocytosis of 29K  Blood cx- ESBL GNR by PCR x 2  UA from 5/31-pos for nitrates  urine cx-pending  DUANE  elevated cardiac enzymes    per med records had ESBL e.coli UTI in august and sept 2024 and ID was not consulted     6/3-  remains Intubated and sedated  on pressors for hypotension  temp of 101.5 today  + leukocytosis trending down to 20k  creatinine slightly better today  Blood cx- ESBL GNR by PCR x 2  UA from 5/31-pos for nitrates  urine cx->100K e.coli  DUANE  elevated cardiac enzymes    6/4: seen in ICU earlier, remains intubated, weaning process in progress, on pressors, continues having fevers, T 101 this morning, leukocytosis is better 17.27, Cr better 1.35, UC and BCs grew ESBL E. Coli, repeat BCs are pending, Meropenem IV continued. If not improvement in pt's fevers tomorrow, most likely CT a/p will be obtained.   6/5: remains in ICU, intubated and sedated, on pressors, Temp persistent 100-100.2, WBC better 16.43, Cr normalized, LFTs ok, repeat BCs NGTD, procalcitonin 2.41, TTE performed - Left ventricular regional wall motion abnormalities present. Meropenem IV continued.     6/6-  extubated  awake  afebrile now but early am had temp of 100.6  Leukocytosis almost resolved 11K today  Blood cx- ESBL GNR by PCR x 2  UA from 5/31-pos for nitrates  urine cx->100K e.coli  repeat blood cx- NGTD x 2 from 6/4 6/9-  downgraded to medical floor  Afebrile  Leukocytosis 12 k today  Blood cx- ESBL GNR by PCR x 2  UA from 5/31-pos for nitrates  urine cx->100K e.coli  repeat blood cx- NGTD x 2 from 6/4 6/11: pt is afebrile since 6/6, doing well on RA, leukocytosis was elevated 13.57, Cr ok,  no new cbc, repeat BCs remain with no growth to date, s/p 8 days of IV Meropenem, now observed off abx.     6/12-  s/p RRT last night for AMS and hypotention and second RRT later for ? seizure and aspiration  s/p intubation and transferred to CCU  off pressors  intubated  afebrile but had fever of 100.2 at 5 am  leukocytosis has decreased to 11k  repeat blood cx - NGTD x 2  has completed course of meropenem  fo her ESBL bacteremia dn UTI   cxr- b/l pleural effusions L> R  CTA report-  No pulmonary embolism..  Tracheobronchial secretions, greatest in the left lower lobe.  Patchy and   nodular bilateral lung opacities may be infectious or inflammatory. Right   middle lobe subpleural 6 mm nodular opacity is new from prior .   Short-term follow-up CT recommended    6/13-   remains intubated on 50% Fio2  Afebrile   Minimal leukocytosis of 13K  creatinine- normal value  Repeat blood cx  from 6/4- NGTD x 2  repeat blood cx from 6/11- NGTD x 2  sputum  cx- commensal lina c/w body site  has completed course of meropenem  fo her ESBL bacteremia and UTI - which she cleared  cxr- b/l pleural effusions L> R  CTA report-  No pulmonary embolism..  Tracheobronchial secretions, greatest in the left lower lobe.  Patchy and   nodular bilateral lung opacities may be infectious or inflammatory. Right   middle lobe subpleural 6 mm nodular opacity is new from prior .   Short-term follow-up CT recommended  EEG- reported negative for epilepsy ( pls see full report)    6/16-  remains intubated on 30% Fio2  T-max-100.6 this am  No leukocytosis   creatinine- normal value  Repeat blood cx  from 6/4- NGTD x 2  repeat blood cx from 6/11- NGTD x 2  sputum  cx- commensal lina c/w body site  has completed course of meropenem  for her ESBL bacteremia and UTI - which she cleared  cxr- b/l pleural effusions L> R  CTA report-  No pulmonary embolism..  Tracheobronchial secretions, greatest in the left lower lobe.  Patchy and   nodular bilateral lung opacities may be infectious or inflammatory. Right   middle lobe subpleural 6 mm nodular opacity is new from prior .   Short-term follow-up CT recommended  EEG- reported negative for epilepsy ( pls see full report)    6/20-  T-max-101  No leukocytosis  Repeat blood cx  from 6/4- NGTD x 2  repeat blood cx from 6/11- NGTD x 2  sputum  cx- commensal lina c/w body site  ET tube cx-commensal lina c/w body site  has completed course of meropenem  for her ESBL bacteremia and UTI - which she cleared  cxr- b/l pleural effusions L> R  completed course of zosyn as well 2 days ago for presumed aspiration pneumonitis    6/23: remains in CCU, no fevers since 6/20, vented via tracheostomy no leukocytosis, repeat BCs NGTD, sputum culture grew Stenotrophomonas, will add abx, Bactrim via NGT.        Impression-  possible aspiration pneumonitis , also has Left  pleural effusion , intubated   ESBL septic shock -  was treated and had resolved  E.coli ESBL bacteremia sec to   source - treated   Right humerus fracture with hematoma  Stenotrophomonas in sputum     Plan-  start Bactrim solution via NGT to treat Stenotrophomonas - ET tube aspirate   had completed course of IV meropenem for the ESBL bacteremia and sepsis and repeat blood cx are negative   also completed course of zosyn for ? asp pneumonitis  follow all culture data   also advise to check LE US r/o dvt as part of fever work up - no DVT detected   vent management as per CCU team  rest of the medical management as per CCU team    discussed with Dr. Fraga  discussed with CCU team   discussed with pharmacy

## 2025-06-23 NOTE — PROGRESS NOTE ADULT - NS ATTEND AMEND GEN_ALL_CORE FT
All labs and cultures and imaging and pertinent chart notes reviewed by me.    case d/w Np Honorio at length and agree with her assessment and plan.    6/23:   remains in CCU  afebrile  since 6/20,  new tracheostomy and is now  vented via tracheostomy,   no leukocytosis  , repeat BCs NGTD  most recent , sputum culture grew Stenotrophomonas, will add abx, Bactrim via NGT.        Impression-  possible aspiration pneumonitis , also has Left  pleural effusion , intubated   ESBL septic shock -  was treated and had resolved  E.coli ESBL bacteremia sec to   source - treated   Right humerus fracture with hematoma  Stenotrophomonas in sputum     Plan-  start Bactrim solution via NGT to treat Stenotrophomonas - ET tube aspirate   had completed course of IV meropenem for the ESBL bacteremia and sepsis and repeat blood cx are negative   also completed course of zosyn for ? asp pneumonitis  follow all culture data   vent management as per CCU team  rest of the medical management as per CCU team    Tariq Fraga MD  Infectious Disease Attending    for any questions please do not hesitate to contact me either via teams or by calling [phone]

## 2025-06-23 NOTE — PROCEDURAL SAFETY CHECKLIST WITH OR WITHOUT SEDATION - NSPOSTDEBRIEFDT_GEN_ALL_CORE
Pediatric Hearing Evaluation    Name:  Alondra Oscar  :  2022  Age:  1 y o  Date of Evaluation: 22     Alondra Oscar was accompanied to today's appointment by his grandmother  Parental concerns include speech delay  He is receiving speech services once a week  History of ear infections is negative  Birth history is unremarkable  Alondra Oscar reportedly passed his  hearing screening bilaterally  Otoscopy  Right: Clear canal, TM reddness noted  Left: Clear canal, TM reddness noted  Tympanometry  Right: Type B - middle ear disorder  Left: Type B - middle ear disorder    Distortion Product Otoacoustic Emissions (DPOAEs)  DNT waiting on healthy ears  Audiogram Results:  DNT waiting on healthy ears  *see attached audiogram    RECOMMENDATIONS:  1 month hearing eval, Return to [facility]  for F/U and Copy to Patient/Caregiver    PATIENT EDUCATION:   Discussed results and recommendations with his grandmother  Questions were addressed and the parent/caregiver(s) was encouraged to contact our department should concerns arise        Megan Garrett   Clinical Audiologist
23-Jun-2025 11:41
03-Jun-2025 13:23

## 2025-06-23 NOTE — PROGRESS NOTE ADULT - SUBJECTIVE AND OBJECTIVE BOX
# CC: Patient is a 74y old  Female who presents with a chief complaint of ALY, physical deconditioning (23 Jun 2025 23:23)      ## HPI:  Tiffanie Joshi is a 74 year old female with PMHx of HTN, HLD, IDDM2, CAD (s/p PCI), and recent right humerus fracture (placed in sling 3 days ago) who presented to the ED on 5/30/25 for complaints of elevated blood glucose.    Patient is Mongolian-speaking but declined  services and preferred for daughter-in-law at bedside to translate. As per daughter-in-law, patient fell three days ago and landed on her right shoulder. Went to [facility] at that time and found to have right humerus fracture and placed in sling. Discharged home with outpatient orthopedic surgery follow up. Since then, patient has been refusing to get out of bed and not eating much. Will drink a little juice every now and then. Daughter checked her blood glucose around 5PM and it was > 400 so brought to the ER for further evaluation. Baseline functional status is ambulates with walker and dependent with all ADLs. Lives at home with daughter.    In the ED, VSS. Hgb 10.3, sodium 132, potassium 6.3, BUN 70, Cr 2.02, blood glucose 510. VBG 7.29 / 58 / 34 / 26. COVID/influenza/RSV negative. CT head without acute intracranial findings. Received 1L NS bolus, 1L LR bolus, and insulin 20 U IV.  (30 May 2025 23:43)      **O/N:**    ## ROS:    ## Labs:  ** CBC: **                        7.4    6.43  )-----------( 344      ( 24 Jun 2025 03:21 )             24.9     ** Chem:  **  06-23    145  |  109[H]  |  43[H]  ----------------------------<  109[H]  4.1   |  32[H]  |  0.86    Ca    8.2[L]      23 Jun 2025 03:41  Phos  3.5     06-23  Mg     2.2     06-23    TPro  6.6  /  Alb  1.7[L]  /  TBili  0.4  /  DBili  x   /  AST  17  /  ALT  15  /  AlkPhos  124[H]  06-23    ** Coags: **  PT/INR - ( 23 Jun 2025 03:41 )   PT: 12.4 sec;   INR: 1.10 ratio             CAPILLARY BLOOD GLUCOSE      POCT Blood Glucose.: 184 mg/dL (24 Jun 2025 05:10)  POCT Blood Glucose.: 230 mg/dL (23 Jun 2025 22:15)  POCT Blood Glucose.: 139 mg/dL (23 Jun 2025 17:21)  POCT Blood Glucose.: 137 mg/dL (23 Jun 2025 11:57)        Culture - Blood (collected 20 Jun 2025 17:45)  Source: Blood Blood-Peripheral  Preliminary Report (24 Jun 2025 01:02):    No growth at 72 Hours    Culture - Blood (collected 20 Jun 2025 17:28)  Source: Blood Blood-Peripheral  Preliminary Report (24 Jun 2025 01:02):    No growth at 72 Hours    Culture - Sputum (collected 20 Jun 2025 17:10)  Source: ET Tube ET Tube  Gram Stain (22 Jun 2025 16:58):    Rare polymorphonuclear leukocytes per low power field    No Squamous epithelial cells per low power field    No organisms seen per oil power field  Final Report (22 Jun 2025 16:58):    Moderate Stenotrophomonas maltophilia    Commensal lina consistent with body site  Organism: Stenotrophomonas maltophilia (22 Jun 2025 16:58)  Organism: Stenotrophomonas maltophilia (22 Jun 2025 16:58)      -  Minocycline: S      Method Type: KB  Organism: Stenotrophomonas maltophilia (22 Jun 2025 16:58)      -  Levofloxacin: S <=0.5      Method Type: BRISEIDA      -  Trimethoprim/Sulfamethoxazole: S <=0.5/9.5    Culture - Sputum (collected 13 Jun 2025 10:18)  Source: ET Tube ET Tube  Gram Stain (14 Jun 2025 23:46):    No Squamous epithelial cells per low power field    Numerous polymorphonuclear leukocytes per low power field    Few Yeast per oil power field  Final Report (14 Jun 2025 23:46):    Commensal lina consistent with body site    Culture - Sputum (collected 12 Jun 2025 08:00)  Source: Sputum Sputum  Gram Stain (13 Jun 2025 22:15):    Few Squamous epithelial cells per low power field    Numerous polymorphonuclear leukocytes per low power field    Yeast per oil power field  Final Report (13 Jun 2025 22:15):    Commensal lina consistent with body site    Culture - Urine (collected 11 Jun 2025 22:47)  Source: Catheterized Catheterized  Final Report (13 Jun 2025 14:59):    <10,000 CFU/mL Normal Urogenital Lina    Culture - Blood (collected 11 Jun 2025 18:05)  Source: Blood Blood-Peripheral  Final Report (17 Jun 2025 01:01):    No growth at 5 days    Culture - Blood (collected 11 Jun 2025 18:00)  Source: Blood Blood-Peripheral  Final Report (17 Jun 2025 01:01):    No growth at 5 days        ## Imaging:    ## Medications:  bumetanide Injectable 1 milliGRAM(s) IV Push daily  metOLazone 5 milliGRAM(s) Oral daily  ranolazine 500 milliGRAM(s) Oral two times a day    trimethoprim  40 mG/sulfamethoxazole 200 mG Suspension 40 milliLiter(s) Oral every 8 hours      acetaminophen     Tablet .. 650 milliGRAM(s) Oral every 6 hours PRN  acetaminophen     Tablet .. 650 milliGRAM(s) Oral every 6 hours PRN  dexMEDEtomidine Infusion 0.2 MICROgram(s)/kG/Hr IV Continuous <Continuous>  HYDROmorphone  Injectable 0.25 milliGRAM(s) IV Push every 4 hours PRN  ondansetron Injectable 4 milliGRAM(s) IV Push every 8 hours PRN  QUEtiapine 12.5 milliGRAM(s) Oral at bedtime      aspirin  chewable 81 milliGRAM(s) Oral daily  enoxaparin Injectable 40 milliGRAM(s) SubCutaneous every 24 hours    aluminum hydroxide/magnesium hydroxide/simethicone Suspension 30 milliLiter(s) Oral every 6 hours PRN  pantoprazole   Suspension 40 milliGRAM(s) Oral every 24 hours  polyethylene glycol 3350 17 Gram(s) Oral daily PRN  senna 2 Tablet(s) Oral at bedtime      atorvastatin 40 milliGRAM(s) Oral at bedtime  insulin glargine Injectable (LANTUS) 30 Unit(s) SubCutaneous at bedtime  insulin lispro (ADMELOG) corrective regimen sliding scale   SubCutaneous every 6 hours          ## O/E:  ICU Vital Signs Last 24 Hrs  T(C): 38.3 (24 Jun 2025 03:58), Max: 38.3 (24 Jun 2025 03:58)  T(F): 101 (24 Jun 2025 03:58), Max: 101 (24 Jun 2025 03:58)  HR: 58 (24 Jun 2025 06:00) (53 - 89)  BP: 98/50 (24 Jun 2025 06:00) (77/43 - 180/78)  BP(mean): 63 (24 Jun 2025 06:00) (54 - 105)  ABP: --  ABP(mean): --  RR: 15 (24 Jun 2025 06:00) (7 - 21)  SpO2: 100% (24 Jun 2025 06:00) (99% - 100%)    O2 Parameters below as of 23 Jun 2025 20:00  Patient On (Oxygen Delivery Method): ventilator, /RATE15/PEEP6    O2 Concentration (%): 40      I&O's Summary    22 Jun 2025 07:01  -  23 Jun 2025 07:00  --------------------------------------------------------  IN: 1486.3 mL / OUT: 2100 mL / NET: -613.7 mL    23 Jun 2025 07:01  -  24 Jun 2025 06:03  --------------------------------------------------------  IN: 1790.6 mL / OUT: 2000 mL / NET: -209.4 mL      Mode: AC/ CMV (Assist Control/ Continuous Mandatory Ventilation), RR (machine): 15, TV (machine): 400, FiO2: 40, PEEP: 6, ITime: 0.9, MAP: 11, PIP: 30  Gen: lying comfortably in bed in no apparent distress  HEENT: PERRL, EOMI  Resp: CTA B/L no c/r/w  CVS: S1S2 no m/r/g  Abd: soft NT/ND +BS  Ext: no c/c/e  Neuro: A&Ox3    ## Code status:  Goals of care discussion: [x] yes [ ] no  [x] full code  [ ] DNR  [ ] DNI  [ ] MOLST # CC: Patient is a 74y old  Female who presents with a chief complaint of ALY, physical deconditioning (23 Jun 2025 23:23)      ## HPI:  Tiffanie Joshi is a 74 year old female with PMHx of HTN, HLD, IDDM2, CAD (s/p PCI), and recent right humerus fracture (placed in sling 3 days ago) who presented to the ED on 5/30/25 for complaints of elevated blood glucose.    Patient is Albanian-speaking but declined  services and preferred for daughter-in-law at bedside to translate. As per daughter-in-law, patient fell three days ago and landed on her right shoulder. Went to [facility] at that time and found to have right humerus fracture and placed in sling. Discharged home with outpatient orthopedic surgery follow up. Since then, patient has been refusing to get out of bed and not eating much. Will drink a little juice every now and then. Daughter checked her blood glucose around 5PM and it was > 400 so brought to the ER for further evaluation. Baseline functional status is ambulates with walker and dependent with all ADLs. Lives at home with daughter.    In the ED, VSS. Hgb 10.3, sodium 132, potassium 6.3, BUN 70, Cr 2.02, blood glucose 510. VBG 7.29 / 58 / 34 / 26. COVID/influenza/RSV negative. CT head without acute intracranial findings. Received 1L NS bolus, 1L LR bolus, and insulin 20 U IV.  (30 May 2025 23:43)      **O/N:**  Remains intubated    ## ROS:  Unobtainable due to intubation    ## Labs:  ** CBC: **                        7.4    6.43  )-----------( 344      ( 24 Jun 2025 03:21 )             24.9     ** Chem:  **  06-23    145  |  109[H]  |  43[H]  ----------------------------<  109[H]  4.1   |  32[H]  |  0.86    Ca    8.2[L]      23 Jun 2025 03:41  Phos  3.5     06-23  Mg     2.2     06-23    TPro  6.6  /  Alb  1.7[L]  /  TBili  0.4  /  DBili  x   /  AST  17  /  ALT  15  /  AlkPhos  124[H]  06-23    ** Coags: **  PT/INR - ( 23 Jun 2025 03:41 )   PT: 12.4 sec;   INR: 1.10 ratio             CAPILLARY BLOOD GLUCOSE      POCT Blood Glucose.: 184 mg/dL (24 Jun 2025 05:10)  POCT Blood Glucose.: 230 mg/dL (23 Jun 2025 22:15)  POCT Blood Glucose.: 139 mg/dL (23 Jun 2025 17:21)  POCT Blood Glucose.: 137 mg/dL (23 Jun 2025 11:57)        Culture - Blood (collected 20 Jun 2025 17:45)  Source: Blood Blood-Peripheral  Preliminary Report (24 Jun 2025 01:02):    No growth at 72 Hours    Culture - Blood (collected 20 Jun 2025 17:28)  Source: Blood Blood-Peripheral  Preliminary Report (24 Jun 2025 01:02):    No growth at 72 Hours    Culture - Sputum (collected 20 Jun 2025 17:10)  Source: ET Tube ET Tube  Gram Stain (22 Jun 2025 16:58):    Rare polymorphonuclear leukocytes per low power field    No Squamous epithelial cells per low power field    No organisms seen per oil power field  Final Report (22 Jun 2025 16:58):    Moderate Stenotrophomonas maltophilia    Commensal lina consistent with body site  Organism: Stenotrophomonas maltophilia (22 Jun 2025 16:58)  Organism: Stenotrophomonas maltophilia (22 Jun 2025 16:58)      -  Minocycline: S      Method Type: KB  Organism: Stenotrophomonas maltophilia (22 Jun 2025 16:58)      -  Levofloxacin: S <=0.5      Method Type: BRISEIDA      -  Trimethoprim/Sulfamethoxazole: S <=0.5/9.5    Culture - Sputum (collected 13 Jun 2025 10:18)  Source: ET Tube ET Tube  Gram Stain (14 Jun 2025 23:46):    No Squamous epithelial cells per low power field    Numerous polymorphonuclear leukocytes per low power field    Few Yeast per oil power field  Final Report (14 Jun 2025 23:46):    Commensal lina consistent with body site    Culture - Sputum (collected 12 Jun 2025 08:00)  Source: Sputum Sputum  Gram Stain (13 Jun 2025 22:15):    Few Squamous epithelial cells per low power field    Numerous polymorphonuclear leukocytes per low power field    Yeast per oil power field  Final Report (13 Jun 2025 22:15):    Commensal lina consistent with body site    Culture - Urine (collected 11 Jun 2025 22:47)  Source: Catheterized Catheterized  Final Report (13 Jun 2025 14:59):    <10,000 CFU/mL Normal Urogenital Lina    Culture - Blood (collected 11 Jun 2025 18:05)  Source: Blood Blood-Peripheral  Final Report (17 Jun 2025 01:01):    No growth at 5 days    Culture - Blood (collected 11 Jun 2025 18:00)  Source: Blood Blood-Peripheral  Final Report (17 Jun 2025 01:01):    No growth at 5 days        ## Imaging:    ## Medications:  bumetanide Injectable 1 milliGRAM(s) IV Push daily  metOLazone 5 milliGRAM(s) Oral daily  ranolazine 500 milliGRAM(s) Oral two times a day    trimethoprim  40 mG/sulfamethoxazole 200 mG Suspension 40 milliLiter(s) Oral every 8 hours      acetaminophen     Tablet .. 650 milliGRAM(s) Oral every 6 hours PRN  acetaminophen     Tablet .. 650 milliGRAM(s) Oral every 6 hours PRN  dexMEDEtomidine Infusion 0.2 MICROgram(s)/kG/Hr IV Continuous <Continuous>  HYDROmorphone  Injectable 0.25 milliGRAM(s) IV Push every 4 hours PRN  ondansetron Injectable 4 milliGRAM(s) IV Push every 8 hours PRN  QUEtiapine 12.5 milliGRAM(s) Oral at bedtime      aspirin  chewable 81 milliGRAM(s) Oral daily  enoxaparin Injectable 40 milliGRAM(s) SubCutaneous every 24 hours    aluminum hydroxide/magnesium hydroxide/simethicone Suspension 30 milliLiter(s) Oral every 6 hours PRN  pantoprazole   Suspension 40 milliGRAM(s) Oral every 24 hours  polyethylene glycol 3350 17 Gram(s) Oral daily PRN  senna 2 Tablet(s) Oral at bedtime      atorvastatin 40 milliGRAM(s) Oral at bedtime  insulin glargine Injectable (LANTUS) 30 Unit(s) SubCutaneous at bedtime  insulin lispro (ADMELOG) corrective regimen sliding scale   SubCutaneous every 6 hours          ## O/E:  ICU Vital Signs Last 24 Hrs  T(C): 38.3 (24 Jun 2025 03:58), Max: 38.3 (24 Jun 2025 03:58)  T(F): 101 (24 Jun 2025 03:58), Max: 101 (24 Jun 2025 03:58)  HR: 58 (24 Jun 2025 06:00) (53 - 89)  BP: 98/50 (24 Jun 2025 06:00) (77/43 - 180/78)  BP(mean): 63 (24 Jun 2025 06:00) (54 - 105)  ABP: --  ABP(mean): --  RR: 15 (24 Jun 2025 06:00) (7 - 21)  SpO2: 100% (24 Jun 2025 06:00) (99% - 100%)    O2 Parameters below as of 23 Jun 2025 20:00  Patient On (Oxygen Delivery Method): ventilator, /RATE15/PEEP6    O2 Concentration (%): 40      I&O's Summary    22 Jun 2025 07:01  -  23 Jun 2025 07:00  --------------------------------------------------------  IN: 1486.3 mL / OUT: 2100 mL / NET: -613.7 mL    23 Jun 2025 07:01  -  24 Jun 2025 06:03  --------------------------------------------------------  IN: 1790.6 mL / OUT: 2000 mL / NET: -209.4 mL      Mode: AC/ CMV (Assist Control/ Continuous Mandatory Ventilation), RR (machine): 15, TV (machine): 400, FiO2: 40, PEEP: 6, ITime: 0.9, MAP: 11, PIP: 30  Gen: orotracheally intubated on full vent  HEENT: PERRL  Resp: mechanical breath sounds B/L  CVS: S1S2 no m/r/g  Abd: soft NT/ND +BS  Ext: no c/c/e  Neuro: alert responsive    ## Code status:  Goals of care discussion: [x] yes [ ] no  [x] full code  [ ] DNR  [ ] DNI  [ ] KENDRA

## 2025-06-24 LAB
ALBUMIN SERPL ELPH-MCNC: 1.6 G/DL — LOW (ref 3.3–5)
ALP SERPL-CCNC: 120 U/L — SIGNIFICANT CHANGE UP (ref 40–120)
ALT FLD-CCNC: 17 U/L — SIGNIFICANT CHANGE UP (ref 12–78)
ANION GAP SERPL CALC-SCNC: 4 MMOL/L — LOW (ref 5–17)
ANISOCYTOSIS BLD QL: SLIGHT — SIGNIFICANT CHANGE UP
AST SERPL-CCNC: 23 U/L — SIGNIFICANT CHANGE UP (ref 15–37)
BASOPHILS # BLD AUTO: 0 K/UL — SIGNIFICANT CHANGE UP (ref 0–0.2)
BASOPHILS NFR BLD AUTO: 0 % — SIGNIFICANT CHANGE UP (ref 0–2)
BILIRUB SERPL-MCNC: 0.8 MG/DL — SIGNIFICANT CHANGE UP (ref 0.2–1.2)
BUN SERPL-MCNC: 47 MG/DL — HIGH (ref 7–23)
CALCIUM SERPL-MCNC: 7.9 MG/DL — LOW (ref 8.5–10.1)
CHLORIDE SERPL-SCNC: 107 MMOL/L — SIGNIFICANT CHANGE UP (ref 96–108)
CO2 SERPL-SCNC: 30 MMOL/L — SIGNIFICANT CHANGE UP (ref 22–31)
CREAT SERPL-MCNC: 1.24 MG/DL — SIGNIFICANT CHANGE UP (ref 0.5–1.3)
EGFR: 45 ML/MIN/1.73M2 — LOW
EGFR: 45 ML/MIN/1.73M2 — LOW
EOSINOPHIL # BLD AUTO: 0.06 K/UL — SIGNIFICANT CHANGE UP (ref 0–0.5)
EOSINOPHIL NFR BLD AUTO: 1 % — SIGNIFICANT CHANGE UP (ref 0–6)
GIANT PLATELETS BLD QL SMEAR: PRESENT — SIGNIFICANT CHANGE UP
GLUCOSE BLDC GLUCOMTR-MCNC: 132 MG/DL — HIGH (ref 70–99)
GLUCOSE BLDC GLUCOMTR-MCNC: 184 MG/DL — HIGH (ref 70–99)
GLUCOSE BLDC GLUCOMTR-MCNC: 191 MG/DL — HIGH (ref 70–99)
GLUCOSE BLDC GLUCOMTR-MCNC: 208 MG/DL — HIGH (ref 70–99)
GLUCOSE SERPL-MCNC: 166 MG/DL — HIGH (ref 70–99)
HCT VFR BLD CALC: 24.9 % — LOW (ref 34.5–45)
HGB BLD-MCNC: 7.4 G/DL — LOW (ref 11.5–15.5)
HYPOSEGMENTATION: PRESENT — SIGNIFICANT CHANGE UP
LYMPHOCYTES # BLD AUTO: 1.29 K/UL — SIGNIFICANT CHANGE UP (ref 1–3.3)
LYMPHOCYTES # BLD AUTO: 20 % — SIGNIFICANT CHANGE UP (ref 13–44)
MACROCYTES BLD QL: SLIGHT — SIGNIFICANT CHANGE UP
MAGNESIUM SERPL-MCNC: 2.3 MG/DL — SIGNIFICANT CHANGE UP (ref 1.6–2.6)
MANUAL SMEAR VERIFICATION: SIGNIFICANT CHANGE UP
MCHC RBC-ENTMCNC: 27.8 PG — SIGNIFICANT CHANGE UP (ref 27–34)
MCHC RBC-ENTMCNC: 29.7 G/DL — LOW (ref 32–36)
MCV RBC AUTO: 93.6 FL — SIGNIFICANT CHANGE UP (ref 80–100)
MONOCYTES # BLD AUTO: 0.51 K/UL — SIGNIFICANT CHANGE UP (ref 0–0.9)
MONOCYTES NFR BLD AUTO: 8 % — SIGNIFICANT CHANGE UP (ref 2–14)
NEUTROPHILS # BLD AUTO: 4.57 K/UL — SIGNIFICANT CHANGE UP (ref 1.8–7.4)
NEUTROPHILS NFR BLD AUTO: 71 % — SIGNIFICANT CHANGE UP (ref 43–77)
NRBC # BLD: 1 /100 WBCS — HIGH (ref 0–0)
NRBC BLD AUTO-RTO: SIGNIFICANT CHANGE UP /100 WBCS (ref 0–0)
NRBC BLD-RTO: 1 /100 WBCS — HIGH (ref 0–0)
PHOSPHATE SERPL-MCNC: 3.5 MG/DL — SIGNIFICANT CHANGE UP (ref 2.5–4.5)
PLAT MORPH BLD: NORMAL — SIGNIFICANT CHANGE UP
PLATELET # BLD AUTO: 344 K/UL — SIGNIFICANT CHANGE UP (ref 150–400)
POLYCHROMASIA BLD QL SMEAR: SLIGHT — SIGNIFICANT CHANGE UP
POTASSIUM SERPL-MCNC: 4.1 MMOL/L — SIGNIFICANT CHANGE UP (ref 3.5–5.3)
POTASSIUM SERPL-SCNC: 4.1 MMOL/L — SIGNIFICANT CHANGE UP (ref 3.5–5.3)
PROT SERPL-MCNC: 6.3 GM/DL — SIGNIFICANT CHANGE UP (ref 6–8.3)
RBC # BLD: 2.66 M/UL — LOW (ref 3.8–5.2)
RBC # FLD: 16.6 % — HIGH (ref 10.3–14.5)
RBC BLD AUTO: NORMAL — SIGNIFICANT CHANGE UP
SMUDGE CELLS # BLD: PRESENT — SIGNIFICANT CHANGE UP
SODIUM SERPL-SCNC: 141 MMOL/L — SIGNIFICANT CHANGE UP (ref 135–145)
WBC # BLD: 6.43 K/UL — SIGNIFICANT CHANGE UP (ref 3.8–10.5)
WBC # FLD AUTO: 6.43 K/UL — SIGNIFICANT CHANGE UP (ref 3.8–10.5)

## 2025-06-24 PROCEDURE — 99291 CRITICAL CARE FIRST HOUR: CPT

## 2025-06-24 RX ORDER — HYDROMORPHONE/SOD CHLOR,ISO/PF 2 MG/10 ML
0.5 SYRINGE (ML) INJECTION EVERY 6 HOURS
Refills: 0 | Status: DISCONTINUED | OUTPATIENT
Start: 2025-06-24 | End: 2025-06-27

## 2025-06-24 RX ORDER — FENTANYL CITRATE-0.9 % NACL/PF 100MCG/2ML
50 SYRINGE (ML) INTRAVENOUS EVERY 4 HOURS
Refills: 0 | Status: DISCONTINUED | OUTPATIENT
Start: 2025-06-24 | End: 2025-06-27

## 2025-06-24 RX ORDER — FENTANYL CITRATE-0.9 % NACL/PF 100MCG/2ML
50 SYRINGE (ML) INTRAVENOUS ONCE
Refills: 0 | Status: DISCONTINUED | OUTPATIENT
Start: 2025-06-24 | End: 2025-06-24

## 2025-06-24 RX ORDER — MIDODRINE HYDROCHLORIDE 5 MG/1
20 TABLET ORAL EVERY 8 HOURS
Refills: 0 | Status: DISCONTINUED | OUTPATIENT
Start: 2025-06-24 | End: 2025-07-05

## 2025-06-24 RX ADMIN — RANOLAZINE 500 MILLIGRAM(S): 1000 TABLET, FILM COATED, EXTENDED RELEASE ORAL at 05:21

## 2025-06-24 RX ADMIN — Medication 50 MICROGRAM(S): at 12:58

## 2025-06-24 RX ADMIN — Medication 0.25 MILLIGRAM(S): at 07:42

## 2025-06-24 RX ADMIN — Medication 15 MILLILITER(S): at 17:11

## 2025-06-24 RX ADMIN — ENOXAPARIN SODIUM 40 MILLIGRAM(S): 100 INJECTION SUBCUTANEOUS at 13:20

## 2025-06-24 RX ADMIN — Medication 0.5 MILLIGRAM(S): at 20:29

## 2025-06-24 RX ADMIN — INSULIN LISPRO 1: 100 INJECTION, SOLUTION INTRAVENOUS; SUBCUTANEOUS at 11:23

## 2025-06-24 RX ADMIN — Medication 1 APPLICATION(S): at 05:21

## 2025-06-24 RX ADMIN — ATORVASTATIN CALCIUM 40 MILLIGRAM(S): 80 TABLET, FILM COATED ORAL at 21:34

## 2025-06-24 RX ADMIN — Medication 40 MILLILITER(S): at 05:22

## 2025-06-24 RX ADMIN — Medication 81 MILLIGRAM(S): at 11:23

## 2025-06-24 RX ADMIN — Medication 1 TABLET(S): at 11:23

## 2025-06-24 RX ADMIN — Medication 650 MILLIGRAM(S): at 19:20

## 2025-06-24 RX ADMIN — Medication 15 MILLILITER(S): at 05:21

## 2025-06-24 RX ADMIN — DEXMEDETOMIDINE HYDROCHLORIDE IN SODIUM CHLORIDE 3.74 MICROGRAM(S)/KG/HR: 4 INJECTION INTRAVENOUS at 15:23

## 2025-06-24 RX ADMIN — Medication 650 MILLIGRAM(S): at 06:36

## 2025-06-24 RX ADMIN — MIDODRINE HYDROCHLORIDE 20 MILLIGRAM(S): 5 TABLET ORAL at 19:22

## 2025-06-24 RX ADMIN — Medication 40 MILLIGRAM(S): at 05:21

## 2025-06-24 RX ADMIN — Medication 40 MILLILITER(S): at 13:20

## 2025-06-24 RX ADMIN — Medication 50 MICROGRAM(S): at 11:58

## 2025-06-24 RX ADMIN — Medication 50 MICROGRAM(S): at 13:20

## 2025-06-24 RX ADMIN — Medication 40 MILLILITER(S): at 21:34

## 2025-06-24 RX ADMIN — Medication 650 MILLIGRAM(S): at 05:36

## 2025-06-24 RX ADMIN — Medication 50 MICROGRAM(S): at 14:20

## 2025-06-24 RX ADMIN — INSULIN LISPRO 2: 100 INJECTION, SOLUTION INTRAVENOUS; SUBCUTANEOUS at 23:07

## 2025-06-24 RX ADMIN — BUMETANIDE 1 MILLIGRAM(S): 1 TABLET ORAL at 05:21

## 2025-06-24 RX ADMIN — Medication 0.25 MILLIGRAM(S): at 08:42

## 2025-06-24 RX ADMIN — DEXMEDETOMIDINE HYDROCHLORIDE IN SODIUM CHLORIDE 3.74 MICROGRAM(S)/KG/HR: 4 INJECTION INTRAVENOUS at 23:09

## 2025-06-24 RX ADMIN — DEXMEDETOMIDINE HYDROCHLORIDE IN SODIUM CHLORIDE 3.74 MICROGRAM(S)/KG/HR: 4 INJECTION INTRAVENOUS at 21:34

## 2025-06-24 RX ADMIN — Medication 650 MILLIGRAM(S): at 00:35

## 2025-06-24 RX ADMIN — Medication 0.5 MILLIGRAM(S): at 19:29

## 2025-06-24 RX ADMIN — QUETIAPINE FUMARATE 12.5 MILLIGRAM(S): 25 TABLET ORAL at 21:34

## 2025-06-24 RX ADMIN — Medication 0.25 MILLIGRAM(S): at 03:51

## 2025-06-24 RX ADMIN — INSULIN LISPRO 1: 100 INJECTION, SOLUTION INTRAVENOUS; SUBCUTANEOUS at 05:20

## 2025-06-24 RX ADMIN — Medication 2 TABLET(S): at 21:34

## 2025-06-24 RX ADMIN — RANOLAZINE 500 MILLIGRAM(S): 1000 TABLET, FILM COATED, EXTENDED RELEASE ORAL at 17:11

## 2025-06-24 RX ADMIN — Medication 650 MILLIGRAM(S): at 18:20

## 2025-06-24 RX ADMIN — Medication 0.25 MILLIGRAM(S): at 02:51

## 2025-06-24 RX ADMIN — Medication 0.5 MILLIGRAM(S): at 23:48

## 2025-06-24 RX ADMIN — DEXMEDETOMIDINE HYDROCHLORIDE IN SODIUM CHLORIDE 3.74 MICROGRAM(S)/KG/HR: 4 INJECTION INTRAVENOUS at 04:12

## 2025-06-24 NOTE — PROGRESS NOTE ADULT - SUBJECTIVE AND OBJECTIVE BOX
# CC: Patient is a 74y old  Female who presents with a chief complaint of ALY, physical deconditioning (23 Jun 2025 23:23)      ## HPI:  Tiffanie Joshi is a 74 year old female with PMHx of HTN, HLD, IDDM2, CAD (s/p PCI), and recent right humerus fracture (placed in sling 3 days ago) who presented to the ED on 5/30/25 for complaints of elevated blood glucose.    Patient is Kinyarwanda-speaking but declined  services and preferred for daughter-in-law at bedside to translate. As per daughter-in-law, patient fell three days ago and landed on her right shoulder. Went to [facility] at that time and found to have right humerus fracture and placed in sling. Discharged home with outpatient orthopedic surgery follow up. Since then, patient has been refusing to get out of bed and not eating much. Will drink a little juice every now and then. Daughter checked her blood glucose around 5PM and it was > 400 so brought to the ER for further evaluation. Baseline functional status is ambulates with walker and dependent with all ADLs. Lives at home with daughter.    In the ED, VSS. Hgb 10.3, sodium 132, potassium 6.3, BUN 70, Cr 2.02, blood glucose 510. VBG 7.29 / 58 / 34 / 26. COVID/influenza/RSV negative. CT head without acute intracranial findings. Received 1L NS bolus, 1L LR bolus, and insulin 20 U IV.  (30 May 2025 23:43)      **O/N:**    ## ROS:    ## Labs:  ** CBC: **                        7.4    6.43  )-----------( 344      ( 24 Jun 2025 03:21 )             24.9     ** Chem:  **  06-24    141  |  107  |  47[H]  ----------------------------<  166[H]  4.1   |  30  |  1.24    Ca    7.9[L]      24 Jun 2025 03:21  Phos  3.5     06-24  Mg     2.3     06-24    TPro  6.3  /  Alb  1.6[L]  /  TBili  0.8  /  DBili  x   /  AST  23  /  ALT  17  /  AlkPhos  120  06-24    ** Coags: **  PT/INR - ( 23 Jun 2025 03:41 )   PT: 12.4 sec;   INR: 1.10 ratio             CAPILLARY BLOOD GLUCOSE      POCT Blood Glucose.: 191 mg/dL (24 Jun 2025 11:20)  POCT Blood Glucose.: 184 mg/dL (24 Jun 2025 05:10)  POCT Blood Glucose.: 230 mg/dL (23 Jun 2025 22:15)  POCT Blood Glucose.: 139 mg/dL (23 Jun 2025 17:21)        Culture - Blood (collected 20 Jun 2025 17:45)  Source: Blood Blood-Peripheral  Preliminary Report (24 Jun 2025 01:02):    No growth at 72 Hours    Culture - Blood (collected 20 Jun 2025 17:28)  Source: Blood Blood-Peripheral  Preliminary Report (24 Jun 2025 01:02):    No growth at 72 Hours    Culture - Sputum (collected 20 Jun 2025 17:10)  Source: ET Tube ET Tube  Gram Stain (22 Jun 2025 16:58):    Rare polymorphonuclear leukocytes per low power field    No Squamous epithelial cells per low power field    No organisms seen per oil power field  Final Report (22 Jun 2025 16:58):    Moderate Stenotrophomonas maltophilia    Commensal lina consistent with body site  Organism: Stenotrophomonas maltophilia (22 Jun 2025 16:58)  Organism: Stenotrophomonas maltophilia (22 Jun 2025 16:58)      -  Minocycline: S      Method Type: KB  Organism: Stenotrophomonas maltophilia (22 Jun 2025 16:58)      -  Levofloxacin: S <=0.5      Method Type: BRISEIDA      -  Trimethoprim/Sulfamethoxazole: S <=0.5/9.5    Culture - Sputum (collected 13 Jun 2025 10:18)  Source: ET Tube ET Tube  Gram Stain (14 Jun 2025 23:46):    No Squamous epithelial cells per low power field    Numerous polymorphonuclear leukocytes per low power field    Few Yeast per oil power field  Final Report (14 Jun 2025 23:46):    Commensal lina consistent with body site    Culture - Sputum (collected 12 Jun 2025 08:00)  Source: Sputum Sputum  Gram Stain (13 Jun 2025 22:15):    Few Squamous epithelial cells per low power field    Numerous polymorphonuclear leukocytes per low power field    Yeast per oil power field  Final Report (13 Jun 2025 22:15):    Commensal lina consistent with body site    Culture - Urine (collected 11 Jun 2025 22:47)  Source: Catheterized Catheterized  Final Report (13 Jun 2025 14:59):    <10,000 CFU/mL Normal Urogenital Lina    Culture - Blood (collected 11 Jun 2025 18:05)  Source: Blood Blood-Peripheral  Final Report (17 Jun 2025 01:01):    No growth at 5 days    Culture - Blood (collected 11 Jun 2025 18:00)  Source: Blood Blood-Peripheral  Final Report (17 Jun 2025 01:01):    No growth at 5 days        ## Imaging:    ## Medications:  metOLazone 5 milliGRAM(s) Oral daily  ranolazine 500 milliGRAM(s) Oral two times a day    trimethoprim  40 mG/sulfamethoxazole 200 mG Suspension 40 milliLiter(s) Oral every 8 hours      acetaminophen     Tablet .. 650 milliGRAM(s) Oral every 6 hours PRN  acetaminophen     Tablet .. 650 milliGRAM(s) Oral every 6 hours PRN  dexMEDEtomidine Infusion 0.2 MICROgram(s)/kG/Hr IV Continuous <Continuous>  fentaNYL    Injectable 50 MICROGram(s) IV Push every 4 hours PRN  ondansetron Injectable 4 milliGRAM(s) IV Push every 8 hours PRN  QUEtiapine 12.5 milliGRAM(s) Oral at bedtime      aspirin  chewable 81 milliGRAM(s) Oral daily  enoxaparin Injectable 40 milliGRAM(s) SubCutaneous every 24 hours    aluminum hydroxide/magnesium hydroxide/simethicone Suspension 30 milliLiter(s) Oral every 6 hours PRN  pantoprazole   Suspension 40 milliGRAM(s) Oral every 24 hours  polyethylene glycol 3350 17 Gram(s) Oral daily PRN  senna 2 Tablet(s) Oral at bedtime      atorvastatin 40 milliGRAM(s) Oral at bedtime  insulin glargine Injectable (LANTUS) 30 Unit(s) SubCutaneous at bedtime  insulin lispro (ADMELOG) corrective regimen sliding scale   SubCutaneous every 6 hours          ## O/E:  ICU Vital Signs Last 24 Hrs  T(C): 36.8 (24 Jun 2025 11:19), Max: 38.3 (24 Jun 2025 03:58)  T(F): 98.3 (24 Jun 2025 11:19), Max: 101 (24 Jun 2025 03:58)  HR: 63 (24 Jun 2025 14:00) (56 - 74)  BP: 97/52 (24 Jun 2025 14:00) (77/43 - 135/60)  BP(mean): 66 (24 Jun 2025 14:00) (54 - 82)  ABP: --  ABP(mean): --  RR: 15 (24 Jun 2025 14:00) (12 - 25)  SpO2: 100% (24 Jun 2025 14:00) (97% - 100%)    O2 Parameters below as of 23 Jun 2025 20:00  Patient On (Oxygen Delivery Method): ventilator, /RATE15/PEEP6    O2 Concentration (%): 40      I&O's Summary    23 Jun 2025 07:01  -  24 Jun 2025 07:00  --------------------------------------------------------  IN: 1846.2 mL / OUT: 2000 mL / NET: -153.8 mL    24 Jun 2025 07:01  -  24 Jun 2025 16:00  --------------------------------------------------------  IN: 469.7 mL / OUT: 0 mL / NET: 469.7 mL      Mode: AC/ CMV (Assist Control/ Continuous Mandatory Ventilation), RR (machine): 15, TV (machine): 400, FiO2: 30, PEEP: 6, ITime: 1, MAP: 10, PIP: 25  Gen: lying comfortably in bed in no apparent distress  HEENT: PERRL, EOMI  Resp: CTA B/L no c/r/w  CVS: S1S2 no m/r/g  Abd: soft NT/ND +BS  Ext: no c/c/e  Neuro: A&Ox3    ## Code status:  Goals of care discussion: [x] yes [ ] no  [x] full code  [ ] DNR  [ ] DNI  [ ] KENDRA # CC: Patient is a 74y old  Female who presents with a chief complaint of ALY, physical deconditioning (23 Jun 2025 23:23)      ## HPI:  Tiffanie Joshi is a 74 year old female with PMHx of HTN, HLD, IDDM2, CAD (s/p PCI), and recent right humerus fracture (placed in sling 3 days ago) who presented to the ED on 5/30/25 for complaints of elevated blood glucose.    Patient is Wolof-speaking but declined  services and preferred for daughter-in-law at bedside to translate. As per daughter-in-law, patient fell three days ago and landed on her right shoulder. Went to [facility] at that time and found to have right humerus fracture and placed in sling. Discharged home with outpatient orthopedic surgery follow up. Since then, patient has been refusing to get out of bed and not eating much. Will drink a little juice every now and then. Daughter checked her blood glucose around 5PM and it was > 400 so brought to the ER for further evaluation. Baseline functional status is ambulates with walker and dependent with all ADLs. Lives at home with daughter.    In the ED, VSS. Hgb 10.3, sodium 132, potassium 6.3, BUN 70, Cr 2.02, blood glucose 510. VBG 7.29 / 58 / 34 / 26. COVID/influenza/RSV negative. CT head without acute intracranial findings. Received 1L NS bolus, 1L LR bolus, and insulin 20 U IV.  (30 May 2025 23:43)      **O/N:**  s/p trache yesterday  Remains on vent    ## ROS:  Unobtainable    ## Labs:  ** CBC: **                        7.4    6.43  )-----------( 344      ( 24 Jun 2025 03:21 )             24.9     ** Chem:  **  06-24    141  |  107  |  47[H]  ----------------------------<  166[H]  4.1   |  30  |  1.24    Ca    7.9[L]      24 Jun 2025 03:21  Phos  3.5     06-24  Mg     2.3     06-24    TPro  6.3  /  Alb  1.6[L]  /  TBili  0.8  /  DBili  x   /  AST  23  /  ALT  17  /  AlkPhos  120  06-24    ** Coags: **  PT/INR - ( 23 Jun 2025 03:41 )   PT: 12.4 sec;   INR: 1.10 ratio             CAPILLARY BLOOD GLUCOSE      POCT Blood Glucose.: 191 mg/dL (24 Jun 2025 11:20)  POCT Blood Glucose.: 184 mg/dL (24 Jun 2025 05:10)  POCT Blood Glucose.: 230 mg/dL (23 Jun 2025 22:15)  POCT Blood Glucose.: 139 mg/dL (23 Jun 2025 17:21)        Culture - Blood (collected 20 Jun 2025 17:45)  Source: Blood Blood-Peripheral  Preliminary Report (24 Jun 2025 01:02):    No growth at 72 Hours    Culture - Blood (collected 20 Jun 2025 17:28)  Source: Blood Blood-Peripheral  Preliminary Report (24 Jun 2025 01:02):    No growth at 72 Hours    Culture - Sputum (collected 20 Jun 2025 17:10)  Source: ET Tube ET Tube  Gram Stain (22 Jun 2025 16:58):    Rare polymorphonuclear leukocytes per low power field    No Squamous epithelial cells per low power field    No organisms seen per oil power field  Final Report (22 Jun 2025 16:58):    Moderate Stenotrophomonas maltophilia    Commensal lina consistent with body site  Organism: Stenotrophomonas maltophilia (22 Jun 2025 16:58)  Organism: Stenotrophomonas maltophilia (22 Jun 2025 16:58)      -  Minocycline: S      Method Type: KB  Organism: Stenotrophomonas maltophilia (22 Jun 2025 16:58)      -  Levofloxacin: S <=0.5      Method Type: BRISEIDA      -  Trimethoprim/Sulfamethoxazole: S <=0.5/9.5    Culture - Sputum (collected 13 Jun 2025 10:18)  Source: ET Tube ET Tube  Gram Stain (14 Jun 2025 23:46):    No Squamous epithelial cells per low power field    Numerous polymorphonuclear leukocytes per low power field    Few Yeast per oil power field  Final Report (14 Jun 2025 23:46):    Commensal lina consistent with body site    Culture - Sputum (collected 12 Jun 2025 08:00)  Source: Sputum Sputum  Gram Stain (13 Jun 2025 22:15):    Few Squamous epithelial cells per low power field    Numerous polymorphonuclear leukocytes per low power field    Yeast per oil power field  Final Report (13 Jun 2025 22:15):    Commensal lina consistent with body site    Culture - Urine (collected 11 Jun 2025 22:47)  Source: Catheterized Catheterized  Final Report (13 Jun 2025 14:59):    <10,000 CFU/mL Normal Urogenital Lina    Culture - Blood (collected 11 Jun 2025 18:05)  Source: Blood Blood-Peripheral  Final Report (17 Jun 2025 01:01):    No growth at 5 days    Culture - Blood (collected 11 Jun 2025 18:00)  Source: Blood Blood-Peripheral  Final Report (17 Jun 2025 01:01):    No growth at 5 days        ## Imaging:    ## Medications:  metOLazone 5 milliGRAM(s) Oral daily  ranolazine 500 milliGRAM(s) Oral two times a day    trimethoprim  40 mG/sulfamethoxazole 200 mG Suspension 40 milliLiter(s) Oral every 8 hours      acetaminophen     Tablet .. 650 milliGRAM(s) Oral every 6 hours PRN  acetaminophen     Tablet .. 650 milliGRAM(s) Oral every 6 hours PRN  dexMEDEtomidine Infusion 0.2 MICROgram(s)/kG/Hr IV Continuous <Continuous>  fentaNYL    Injectable 50 MICROGram(s) IV Push every 4 hours PRN  ondansetron Injectable 4 milliGRAM(s) IV Push every 8 hours PRN  QUEtiapine 12.5 milliGRAM(s) Oral at bedtime      aspirin  chewable 81 milliGRAM(s) Oral daily  enoxaparin Injectable 40 milliGRAM(s) SubCutaneous every 24 hours    aluminum hydroxide/magnesium hydroxide/simethicone Suspension 30 milliLiter(s) Oral every 6 hours PRN  pantoprazole   Suspension 40 milliGRAM(s) Oral every 24 hours  polyethylene glycol 3350 17 Gram(s) Oral daily PRN  senna 2 Tablet(s) Oral at bedtime      atorvastatin 40 milliGRAM(s) Oral at bedtime  insulin glargine Injectable (LANTUS) 30 Unit(s) SubCutaneous at bedtime  insulin lispro (ADMELOG) corrective regimen sliding scale   SubCutaneous every 6 hours          ## O/E:  ICU Vital Signs Last 24 Hrs  T(C): 36.8 (24 Jun 2025 11:19), Max: 38.3 (24 Jun 2025 03:58)  T(F): 98.3 (24 Jun 2025 11:19), Max: 101 (24 Jun 2025 03:58)  HR: 63 (24 Jun 2025 14:00) (56 - 74)  BP: 97/52 (24 Jun 2025 14:00) (77/43 - 135/60)  BP(mean): 66 (24 Jun 2025 14:00) (54 - 82)  ABP: --  ABP(mean): --  RR: 15 (24 Jun 2025 14:00) (12 - 25)  SpO2: 100% (24 Jun 2025 14:00) (97% - 100%)    O2 Parameters below as of 23 Jun 2025 20:00  Patient On (Oxygen Delivery Method): ventilator, /RATE15/PEEP6    O2 Concentration (%): 40      I&O's Summary    23 Jun 2025 07:01  -  24 Jun 2025 07:00  --------------------------------------------------------  IN: 1846.2 mL / OUT: 2000 mL / NET: -153.8 mL    24 Jun 2025 07:01  -  24 Jun 2025 16:00  --------------------------------------------------------  IN: 469.7 mL / OUT: 0 mL / NET: 469.7 mL      Mode: AC/ CMV (Assist Control/ Continuous Mandatory Ventilation), RR (machine): 15, TV (machine): 400, FiO2: 30, PEEP: 6, ITime: 1, MAP: 10, PIP: 25  Gen: trache on full vent  HEENT: PERRL  Resp: mechanical breath sounds B/L  CVS: S1S2 no m/r/g  Abd: soft NT/ND +BS  Ext: no c/c/e  Neuro: occasional episodes of non-focal agitation    ## Code status:  Goals of care discussion: [x] yes [ ] no  [x] full code  [ ] DNR  [ ] DNI  [ ] KENDRA

## 2025-06-24 NOTE — CHART NOTE - NSCHARTNOTEFT_GEN_A_CORE
Patient seen and examined at bedside in CCU5.   Tracheostomy in place, breathing synchronously with ventilator.   Planning for bedside PEG in CCU at 12P. Booked with OR booking.   F/U AM labs.   TF on hold @ [facility].   Discussed with CCU  Discussed with Dr. Ramires

## 2025-06-24 NOTE — PROGRESS NOTE ADULT - ASSESSMENT
74F PMH CAD, DM2 p/w hyperglycemia, stay c/b ESBL E. coli UTI with septic shock requiring transfer to ICU and intubation for Type 4 respiratory failure c/b cardiac arrest (downtime 10 minutes), with recovery of mental status and extubation on 6/6. Transferred to general medicine on 6/8, but two days afterwards had witnessed episode of aspiration a/w AMS and was reintubated for Type 1 respiratory failure and AMS. Treated for pneumonia, and oxygenation has improved, but patient is significantly deconditioned and is unable to liberate from vent.  - s/p trache yesterday, on vent 15/400/30%/+6. Continue weaning attempts, now on 12/6 @30%  - scheduled for PEG tomorrow  - c/w Precedex for anxiolysis + fentanyl pushes for analgesia  - On Bactrim for Stenotrophomonas in sputum  - Diuresing with daily Bumex, Na 141 largely euvolemic will hold for now  - DVT ppx: Lovenox

## 2025-06-25 ENCOUNTER — TRANSCRIPTION ENCOUNTER (OUTPATIENT)
Age: 75
End: 2025-06-25

## 2025-06-25 LAB
ALBUMIN SERPL ELPH-MCNC: 1.7 G/DL — LOW (ref 3.3–5)
ALP SERPL-CCNC: 129 U/L — HIGH (ref 40–120)
ALT FLD-CCNC: 15 U/L — SIGNIFICANT CHANGE UP (ref 12–78)
ANION GAP SERPL CALC-SCNC: 4 MMOL/L — LOW (ref 5–17)
APTT BLD: 29.7 SEC — SIGNIFICANT CHANGE UP (ref 26.1–36.8)
AST SERPL-CCNC: 20 U/L — SIGNIFICANT CHANGE UP (ref 15–37)
BASOPHILS # BLD AUTO: 0.04 K/UL — SIGNIFICANT CHANGE UP (ref 0–0.2)
BASOPHILS NFR BLD AUTO: 0.6 % — SIGNIFICANT CHANGE UP (ref 0–2)
BILIRUB SERPL-MCNC: 0.4 MG/DL — SIGNIFICANT CHANGE UP (ref 0.2–1.2)
BLD GP AB SCN SERPL QL: SIGNIFICANT CHANGE UP
BUN SERPL-MCNC: 50 MG/DL — HIGH (ref 7–23)
CALCIUM SERPL-MCNC: 7.8 MG/DL — LOW (ref 8.5–10.1)
CHLORIDE SERPL-SCNC: 105 MMOL/L — SIGNIFICANT CHANGE UP (ref 96–108)
CO2 SERPL-SCNC: 30 MMOL/L — SIGNIFICANT CHANGE UP (ref 22–31)
CREAT SERPL-MCNC: 1.37 MG/DL — HIGH (ref 0.5–1.3)
EGFR: 40 ML/MIN/1.73M2 — LOW
EGFR: 40 ML/MIN/1.73M2 — LOW
EOSINOPHIL # BLD AUTO: 0.39 K/UL — SIGNIFICANT CHANGE UP (ref 0–0.5)
EOSINOPHIL NFR BLD AUTO: 5.4 % — SIGNIFICANT CHANGE UP (ref 0–6)
GLUCOSE BLDC GLUCOMTR-MCNC: 108 MG/DL — HIGH (ref 70–99)
GLUCOSE BLDC GLUCOMTR-MCNC: 126 MG/DL — HIGH (ref 70–99)
GLUCOSE BLDC GLUCOMTR-MCNC: 133 MG/DL — HIGH (ref 70–99)
GLUCOSE BLDC GLUCOMTR-MCNC: 160 MG/DL — HIGH (ref 70–99)
GLUCOSE SERPL-MCNC: 101 MG/DL — HIGH (ref 70–99)
HCT VFR BLD CALC: 24.1 % — LOW (ref 34.5–45)
HGB BLD-MCNC: 7.5 G/DL — LOW (ref 11.5–15.5)
IMM GRANULOCYTES NFR BLD AUTO: 1.3 % — HIGH (ref 0–0.9)
INR BLD: 1.23 RATIO — HIGH (ref 0.85–1.16)
LYMPHOCYTES # BLD AUTO: 1.67 K/UL — SIGNIFICANT CHANGE UP (ref 1–3.3)
LYMPHOCYTES # BLD AUTO: 23.2 % — SIGNIFICANT CHANGE UP (ref 13–44)
MAGNESIUM SERPL-MCNC: 2.3 MG/DL — SIGNIFICANT CHANGE UP (ref 1.6–2.6)
MCHC RBC-ENTMCNC: 28.1 PG — SIGNIFICANT CHANGE UP (ref 27–34)
MCHC RBC-ENTMCNC: 31.1 G/DL — LOW (ref 32–36)
MCV RBC AUTO: 90.3 FL — SIGNIFICANT CHANGE UP (ref 80–100)
MONOCYTES # BLD AUTO: 0.59 K/UL — SIGNIFICANT CHANGE UP (ref 0–0.9)
MONOCYTES NFR BLD AUTO: 8.2 % — SIGNIFICANT CHANGE UP (ref 2–14)
NEUTROPHILS # BLD AUTO: 4.41 K/UL — SIGNIFICANT CHANGE UP (ref 1.8–7.4)
NEUTROPHILS NFR BLD AUTO: 61.3 % — SIGNIFICANT CHANGE UP (ref 43–77)
NRBC BLD AUTO-RTO: 0 /100 WBCS — SIGNIFICANT CHANGE UP (ref 0–0)
PHOSPHATE SERPL-MCNC: 3.2 MG/DL — SIGNIFICANT CHANGE UP (ref 2.5–4.5)
PLATELET # BLD AUTO: 365 K/UL — SIGNIFICANT CHANGE UP (ref 150–400)
POTASSIUM SERPL-MCNC: 4.1 MMOL/L — SIGNIFICANT CHANGE UP (ref 3.5–5.3)
POTASSIUM SERPL-SCNC: 4.1 MMOL/L — SIGNIFICANT CHANGE UP (ref 3.5–5.3)
PROT SERPL-MCNC: 6.2 GM/DL — SIGNIFICANT CHANGE UP (ref 6–8.3)
PROTHROM AB SERPL-ACNC: 13.9 SEC — HIGH (ref 9.9–13.4)
RBC # BLD: 2.67 M/UL — LOW (ref 3.8–5.2)
RBC # FLD: 16.5 % — HIGH (ref 10.3–14.5)
SODIUM SERPL-SCNC: 139 MMOL/L — SIGNIFICANT CHANGE UP (ref 135–145)
WBC # BLD: 7.19 K/UL — SIGNIFICANT CHANGE UP (ref 3.8–10.5)
WBC # FLD AUTO: 7.19 K/UL — SIGNIFICANT CHANGE UP (ref 3.8–10.5)

## 2025-06-25 PROCEDURE — 43246 EGD PLACE GASTROSTOMY TUBE: CPT

## 2025-06-25 PROCEDURE — 99291 CRITICAL CARE FIRST HOUR: CPT

## 2025-06-25 PROCEDURE — 43246 EGD PLACE GASTROSTOMY TUBE: CPT | Mod: AS

## 2025-06-25 DEVICE — KIT ENDO SAFTEY PEG PULL STD 20 FR ENDOVIVE: Type: IMPLANTABLE DEVICE | Status: FUNCTIONAL

## 2025-06-25 RX ORDER — FENTANYL CITRATE-0.9 % NACL/PF 100MCG/2ML
100 SYRINGE (ML) INTRAVENOUS ONCE
Refills: 0 | Status: DISCONTINUED | OUTPATIENT
Start: 2025-06-25 | End: 2025-06-25

## 2025-06-25 RX ORDER — MIDAZOLAM IN 0.9 % SOD.CHLORID 1 MG/ML
4 PLASTIC BAG, INJECTION (ML) INTRAVENOUS ONCE
Refills: 0 | Status: DISCONTINUED | OUTPATIENT
Start: 2025-06-25 | End: 2025-06-25

## 2025-06-25 RX ORDER — PROPOFOL 10 MG/ML
10 INJECTION, EMULSION INTRAVENOUS
Qty: 1000 | Refills: 0 | Status: DISCONTINUED | OUTPATIENT
Start: 2025-06-25 | End: 2025-06-25

## 2025-06-25 RX ORDER — SODIUM CHLORIDE 9 G/1000ML
1000 INJECTION, SOLUTION INTRAVENOUS ONCE
Refills: 0 | Status: COMPLETED | OUTPATIENT
Start: 2025-06-25 | End: 2025-06-25

## 2025-06-25 RX ORDER — SODIUM CHLORIDE 9 G/1000ML
500 INJECTION, SOLUTION INTRAVENOUS ONCE
Refills: 0 | Status: COMPLETED | OUTPATIENT
Start: 2025-06-25 | End: 2025-06-25

## 2025-06-25 RX ORDER — MIDAZOLAM IN 0.9 % SOD.CHLORID 1 MG/ML
2 PLASTIC BAG, INJECTION (ML) INTRAVENOUS ONCE
Refills: 0 | Status: DISCONTINUED | OUTPATIENT
Start: 2025-06-25 | End: 2025-06-25

## 2025-06-25 RX ADMIN — DEXMEDETOMIDINE HYDROCHLORIDE IN SODIUM CHLORIDE 3.74 MICROGRAM(S)/KG/HR: 4 INJECTION INTRAVENOUS at 19:56

## 2025-06-25 RX ADMIN — Medication 100 MICROGRAM(S): at 12:11

## 2025-06-25 RX ADMIN — Medication 100 MICROGRAM(S): at 12:30

## 2025-06-25 RX ADMIN — SODIUM CHLORIDE 500 MILLILITER(S): 9 INJECTION, SOLUTION INTRAVENOUS at 23:00

## 2025-06-25 RX ADMIN — ENOXAPARIN SODIUM 40 MILLIGRAM(S): 100 INJECTION SUBCUTANEOUS at 17:37

## 2025-06-25 RX ADMIN — Medication 2 MILLIGRAM(S): at 11:32

## 2025-06-25 RX ADMIN — Medication 15 MILLILITER(S): at 05:24

## 2025-06-25 RX ADMIN — QUETIAPINE FUMARATE 12.5 MILLIGRAM(S): 25 TABLET ORAL at 21:51

## 2025-06-25 RX ADMIN — Medication 2 TABLET(S): at 21:49

## 2025-06-25 RX ADMIN — Medication 650 MILLIGRAM(S): at 20:21

## 2025-06-25 RX ADMIN — Medication 650 MILLIGRAM(S): at 21:22

## 2025-06-25 RX ADMIN — ATORVASTATIN CALCIUM 40 MILLIGRAM(S): 80 TABLET, FILM COATED ORAL at 21:50

## 2025-06-25 RX ADMIN — Medication 4 MILLIGRAM(S): at 11:55

## 2025-06-25 RX ADMIN — Medication 0.5 MILLIGRAM(S): at 17:37

## 2025-06-25 RX ADMIN — DEXMEDETOMIDINE HYDROCHLORIDE IN SODIUM CHLORIDE 3.74 MICROGRAM(S)/KG/HR: 4 INJECTION INTRAVENOUS at 05:51

## 2025-06-25 RX ADMIN — Medication 40 MILLILITER(S): at 21:51

## 2025-06-25 RX ADMIN — DEXMEDETOMIDINE HYDROCHLORIDE IN SODIUM CHLORIDE 3.74 MICROGRAM(S)/KG/HR: 4 INJECTION INTRAVENOUS at 11:33

## 2025-06-25 RX ADMIN — Medication 100 MICROGRAM(S): at 11:32

## 2025-06-25 RX ADMIN — SODIUM CHLORIDE 1000 MILLILITER(S): 9 INJECTION, SOLUTION INTRAVENOUS at 11:33

## 2025-06-25 RX ADMIN — Medication 15 MILLILITER(S): at 17:37

## 2025-06-25 RX ADMIN — Medication 0.5 MILLIGRAM(S): at 06:24

## 2025-06-25 RX ADMIN — Medication 1 APPLICATION(S): at 05:24

## 2025-06-25 RX ADMIN — Medication 0.5 MILLIGRAM(S): at 05:24

## 2025-06-25 RX ADMIN — Medication 40 MILLILITER(S): at 17:37

## 2025-06-25 RX ADMIN — Medication 0.5 MILLIGRAM(S): at 00:48

## 2025-06-25 NOTE — BRIEF OPERATIVE NOTE - FIRST ASSIST PARTICIPATION DETAILS - (COMPONENTS OF THE PROCEDURE THAT ASSISTANT PARTICIPATED IN)
[FreeTextEntry1] : 6/26/2023 bilateral lower extremity venous duplex\par Negative for DVT or SVT bilaterally.\par Left–AGSV with reflux measuring 6.3 at the junction and 6.5 mm proximally, greater than 1 second reflux.\par 
I acted within this role throughout the entirety of the procedure performed by the primary surgeon

## 2025-06-25 NOTE — PROGRESS NOTE ADULT - ASSESSMENT
74F PMH CAD, DM2 p/w hyperglycemia, stay c/b ESBL E. coli UTI with septic shock requiring transfer to ICU and intubation for Type 4 respiratory failure c/b cardiac arrest (downtime 10 minutes), with recovery of mental status and extubation on 6/6. Transferred to general medicine on 6/8, but two days afterwards had witnessed episode of aspiration a/w AMS and was reintubated for Type 1 respiratory failure and AMS. Treated for pneumonia, and oxygenation has improved, but patient is significantly deconditioned and is unable to liberate from vent.  - s/p trache yesterday, on vent 15/400/30%/+6. Attempted PS, but patient continues to have episodes of agitation. Was given Versed PRN, but will consider Haldol PRN instead  - scheduled for PEG today  - c/w Precedex for anxiolysis + fentanyl pushes for analgesia  - On Bactrim for Stenotrophomonas in sputum  - Creatinine increasing, urine output 600cc, will c/w diuresis  - DVT ppx: Lovenox

## 2025-06-25 NOTE — PRE-OP CHECKLIST - LAST TOOK
Problem: SLP  Goal: SLP Goal  Description: Short Term Goals:  1. Pt will participate in swallow eval to determine safest diet level.  2. Pt will tolerate mech soft and thin liquid diet with no outward dysphagia signs for adequate PO intake.   Outcome: Ongoing, Progressing   ST eval completed, pt remains confused, eyes open and appropriate for diet downgrade to mech soft and thin liquids for now.    full liquids

## 2025-06-25 NOTE — PROGRESS NOTE ADULT - SUBJECTIVE AND OBJECTIVE BOX
# CC: Patient is a 74y old  Female who presents with a chief complaint of ALY, physical deconditioning (24 Jun 2025 15:59)      ## HPI:  Tiffanie Joshi is a 74 year old female with PMHx of HTN, HLD, IDDM2, CAD (s/p PCI), and recent right humerus fracture (placed in sling 3 days ago) who presented to the ED on 5/30/25 for complaints of elevated blood glucose.    Patient is Portuguese-speaking but declined  services and preferred for daughter-in-law at bedside to translate. As per daughter-in-law, patient fell three days ago and landed on her right shoulder. Went to [facility] at that time and found to have right humerus fracture and placed in sling. Discharged home with outpatient orthopedic surgery follow up. Since then, patient has been refusing to get out of bed and not eating much. Will drink a little juice every now and then. Daughter checked her blood glucose around 5PM and it was > 400 so brought to the ER for further evaluation. Baseline functional status is ambulates with walker and dependent with all ADLs. Lives at home with daughter.    In the ED, VSS. Hgb 10.3, sodium 132, potassium 6.3, BUN 70, Cr 2.02, blood glucose 510. VBG 7.29 / 58 / 34 / 26. COVID/influenza/RSV negative. CT head without acute intracranial findings. Received 1L NS bolus, 1L LR bolus, and insulin 20 U IV.  (30 May 2025 23:43)      **O/N:**    ## ROS:    ## Labs:  ** CBC: **                        7.5    7.19  )-----------( 365      ( 25 Jun 2025 03:30 )             24.1     ** Chem:  **  06-25    139  |  105  |  50[H]  ----------------------------<  101[H]  4.1   |  30  |  1.37[H]    Ca    7.8[L]      25 Jun 2025 03:30  Phos  3.2     06-25  Mg     2.3     06-25    TPro  6.2  /  Alb  1.7[L]  /  TBili  0.4  /  DBili  x   /  AST  20  /  ALT  15  /  AlkPhos  129[H]  06-25    ** Coags: **  PT/INR - ( 25 Jun 2025 03:30 )   PT: 13.9 sec;   INR: 1.23 ratio         PTT - ( 25 Jun 2025 03:30 )  PTT:29.7 sec    CAPILLARY BLOOD GLUCOSE      POCT Blood Glucose.: 160 mg/dL (25 Jun 2025 11:06)  POCT Blood Glucose.: 126 mg/dL (25 Jun 2025 05:18)  POCT Blood Glucose.: 208 mg/dL (24 Jun 2025 22:11)  POCT Blood Glucose.: 132 mg/dL (24 Jun 2025 17:06)        Culture - Blood (collected 20 Jun 2025 17:45)  Source: Blood Blood-Peripheral  Preliminary Report (25 Jun 2025 01:01):    No growth at 4 days    Culture - Blood (collected 20 Jun 2025 17:28)  Source: Blood Blood-Peripheral  Preliminary Report (25 Jun 2025 01:01):    No growth at 4 days    Culture - Sputum (collected 20 Jun 2025 17:10)  Source: ET Tube ET Tube  Gram Stain (22 Jun 2025 16:58):    Rare polymorphonuclear leukocytes per low power field    No Squamous epithelial cells per low power field    No organisms seen per oil power field  Final Report (22 Jun 2025 16:58):    Moderate Stenotrophomonas maltophilia    Commensal lina consistent with body site  Organism: Stenotrophomonas maltophilia (22 Jun 2025 16:58)  Organism: Stenotrophomonas maltophilia (22 Jun 2025 16:58)      -  Minocycline: S      Method Type: KB  Organism: Stenotrophomonas maltophilia (22 Jun 2025 16:58)      -  Levofloxacin: S <=0.5      Method Type: BRISEIDA      -  Trimethoprim/Sulfamethoxazole: S <=0.5/9.5    Culture - Sputum (collected 13 Jun 2025 10:18)  Source: ET Tube ET Tube  Gram Stain (14 Jun 2025 23:46):    No Squamous epithelial cells per low power field    Numerous polymorphonuclear leukocytes per low power field    Few Yeast per oil power field  Final Report (14 Jun 2025 23:46):    Commensal lina consistent with body site    Culture - Sputum (collected 12 Jun 2025 08:00)  Source: Sputum Sputum  Gram Stain (13 Jun 2025 22:15):    Few Squamous epithelial cells per low power field    Numerous polymorphonuclear leukocytes per low power field    Yeast per oil power field  Final Report (13 Jun 2025 22:15):    Commensal lina consistent with body site    Culture - Urine (collected 11 Jun 2025 22:47)  Source: Catheterized Catheterized  Final Report (13 Jun 2025 14:59):    <10,000 CFU/mL Normal Urogenital Lina    Culture - Blood (collected 11 Jun 2025 18:05)  Source: Blood Blood-Peripheral  Final Report (17 Jun 2025 01:01):    No growth at 5 days    Culture - Blood (collected 11 Jun 2025 18:00)  Source: Blood Blood-Peripheral  Final Report (17 Jun 2025 01:01):    No growth at 5 days        ## Imaging:    ## Medications:  metOLazone 5 milliGRAM(s) Oral daily  midodrine 20 milliGRAM(s) Oral every 8 hours  ranolazine 500 milliGRAM(s) Oral two times a day    trimethoprim  40 mG/sulfamethoxazole 200 mG Suspension 40 milliLiter(s) Oral every 8 hours      acetaminophen     Tablet .. 650 milliGRAM(s) Oral every 6 hours PRN  acetaminophen     Tablet .. 650 milliGRAM(s) Oral every 6 hours PRN  dexMEDEtomidine Infusion 0.2 MICROgram(s)/kG/Hr IV Continuous <Continuous>  fentaNYL    Injectable 50 MICROGram(s) IV Push every 4 hours PRN  HYDROmorphone  Injectable 0.5 milliGRAM(s) IV Push every 6 hours  ondansetron Injectable 4 milliGRAM(s) IV Push every 8 hours PRN  QUEtiapine 12.5 milliGRAM(s) Oral at bedtime      aspirin  chewable 81 milliGRAM(s) Oral daily  enoxaparin Injectable 40 milliGRAM(s) SubCutaneous every 24 hours    aluminum hydroxide/magnesium hydroxide/simethicone Suspension 30 milliLiter(s) Oral every 6 hours PRN  pantoprazole   Suspension 40 milliGRAM(s) Oral every 24 hours  polyethylene glycol 3350 17 Gram(s) Oral daily PRN  senna 2 Tablet(s) Oral at bedtime      atorvastatin 40 milliGRAM(s) Oral at bedtime  insulin glargine Injectable (LANTUS) 30 Unit(s) SubCutaneous at bedtime  insulin lispro (ADMELOG) corrective regimen sliding scale   SubCutaneous every 6 hours          ## O/E:  ICU Vital Signs Last 24 Hrs  T(C): 35.8 (25 Jun 2025 12:00), Max: 37.5 (25 Jun 2025 04:00)  T(F): 96.4 (25 Jun 2025 12:00), Max: 99.5 (25 Jun 2025 04:00)  HR: 53 (25 Jun 2025 14:00) (52 - 72)  BP: 129/67 (25 Jun 2025 14:00) (79/43 - 150/63)  BP(mean): 84 (25 Jun 2025 14:00) (54 - 94)  ABP: --  ABP(mean): --  RR: 11 (25 Jun 2025 14:00) (4 - 17)  SpO2: 98% (25 Jun 2025 14:00) (97% - 100%)    O2 Parameters below as of 24 Jun 2025 19:30  Patient On (Oxygen Delivery Method): ventilator, PS 15/6 rate 12    O2 Concentration (%): 30      I&O's Summary    24 Jun 2025 07:01  -  25 Jun 2025 07:00  --------------------------------------------------------  IN: 1631.3 mL / OUT: 650 mL / NET: 981.3 mL    25 Jun 2025 07:01  -  25 Jun 2025 15:57  --------------------------------------------------------  IN: 52.5 mL / OUT: 0 mL / NET: 52.5 mL      Mode: CPAP with PS, FiO2: 30, PEEP: 6, PS: 15, ITime: 1, MAP: 10, PIP: 21  Gen: lying comfortably in bed in no apparent distress  HEENT: PERRL, EOMI  Resp: CTA B/L no c/r/w  CVS: S1S2 no m/r/g  Abd: soft NT/ND +BS  Ext: no c/c/e  Neuro: A&Ox3    ## Code status:  Goals of care discussion: [x] yes [ ] no  [x] full code  [ ] DNR  [ ] DNI  [ ] KENDRA # CC: Patient is a 74y old  Female who presents with a chief complaint of ALY, physical deconditioning (24 Jun 2025 15:59)      ## HPI:  Tiffanie Joshi is a 74 year old female with PMHx of HTN, HLD, IDDM2, CAD (s/p PCI), and recent right humerus fracture (placed in sling 3 days ago) who presented to the ED on 5/30/25 for complaints of elevated blood glucose.    Patient is Syriac-speaking but declined  services and preferred for daughter-in-law at bedside to translate. As per daughter-in-law, patient fell three days ago and landed on her right shoulder. Went to [facility] at that time and found to have right humerus fracture and placed in sling. Discharged home with outpatient orthopedic surgery follow up. Since then, patient has been refusing to get out of bed and not eating much. Will drink a little juice every now and then. Daughter checked her blood glucose around 5PM and it was > 400 so brought to the ER for further evaluation. Baseline functional status is ambulates with walker and dependent with all ADLs. Lives at home with daughter.    In the ED, VSS. Hgb 10.3, sodium 132, potassium 6.3, BUN 70, Cr 2.02, blood glucose 510. VBG 7.29 / 58 / 34 / 26. COVID/influenza/RSV negative. CT head without acute intracranial findings. Received 1L NS bolus, 1L LR bolus, and insulin 20 U IV.  (30 May 2025 23:43)      **O/N:**  Remains on vent    ## ROS:  Unobtainable    ## Labs:  ** CBC: **                        7.5    7.19  )-----------( 365      ( 25 Jun 2025 03:30 )             24.1     ** Chem:  **  06-25    139  |  105  |  50[H]  ----------------------------<  101[H]  4.1   |  30  |  1.37[H]    Ca    7.8[L]      25 Jun 2025 03:30  Phos  3.2     06-25  Mg     2.3     06-25    TPro  6.2  /  Alb  1.7[L]  /  TBili  0.4  /  DBili  x   /  AST  20  /  ALT  15  /  AlkPhos  129[H]  06-25    ** Coags: **  PT/INR - ( 25 Jun 2025 03:30 )   PT: 13.9 sec;   INR: 1.23 ratio         PTT - ( 25 Jun 2025 03:30 )  PTT:29.7 sec    CAPILLARY BLOOD GLUCOSE      POCT Blood Glucose.: 160 mg/dL (25 Jun 2025 11:06)  POCT Blood Glucose.: 126 mg/dL (25 Jun 2025 05:18)  POCT Blood Glucose.: 208 mg/dL (24 Jun 2025 22:11)  POCT Blood Glucose.: 132 mg/dL (24 Jun 2025 17:06)        Culture - Blood (collected 20 Jun 2025 17:45)  Source: Blood Blood-Peripheral  Preliminary Report (25 Jun 2025 01:01):    No growth at 4 days    Culture - Blood (collected 20 Jun 2025 17:28)  Source: Blood Blood-Peripheral  Preliminary Report (25 Jun 2025 01:01):    No growth at 4 days    Culture - Sputum (collected 20 Jun 2025 17:10)  Source: ET Tube ET Tube  Gram Stain (22 Jun 2025 16:58):    Rare polymorphonuclear leukocytes per low power field    No Squamous epithelial cells per low power field    No organisms seen per oil power field  Final Report (22 Jun 2025 16:58):    Moderate Stenotrophomonas maltophilia    Commensal lina consistent with body site  Organism: Stenotrophomonas maltophilia (22 Jun 2025 16:58)  Organism: Stenotrophomonas maltophilia (22 Jun 2025 16:58)      -  Minocycline: S      Method Type: KB  Organism: Stenotrophomonas maltophilia (22 Jun 2025 16:58)      -  Levofloxacin: S <=0.5      Method Type: BRISEIDA      -  Trimethoprim/Sulfamethoxazole: S <=0.5/9.5    Culture - Sputum (collected 13 Jun 2025 10:18)  Source: ET Tube ET Tube  Gram Stain (14 Jun 2025 23:46):    No Squamous epithelial cells per low power field    Numerous polymorphonuclear leukocytes per low power field    Few Yeast per oil power field  Final Report (14 Jun 2025 23:46):    Commensal lina consistent with body site    Culture - Sputum (collected 12 Jun 2025 08:00)  Source: Sputum Sputum  Gram Stain (13 Jun 2025 22:15):    Few Squamous epithelial cells per low power field    Numerous polymorphonuclear leukocytes per low power field    Yeast per oil power field  Final Report (13 Jun 2025 22:15):    Commensal lina consistent with body site    Culture - Urine (collected 11 Jun 2025 22:47)  Source: Catheterized Catheterized  Final Report (13 Jun 2025 14:59):    <10,000 CFU/mL Normal Urogenital Lina    Culture - Blood (collected 11 Jun 2025 18:05)  Source: Blood Blood-Peripheral  Final Report (17 Jun 2025 01:01):    No growth at 5 days    Culture - Blood (collected 11 Jun 2025 18:00)  Source: Blood Blood-Peripheral  Final Report (17 Jun 2025 01:01):    No growth at 5 days        ## Imaging:    ## Medications:  metOLazone 5 milliGRAM(s) Oral daily  midodrine 20 milliGRAM(s) Oral every 8 hours  ranolazine 500 milliGRAM(s) Oral two times a day    trimethoprim  40 mG/sulfamethoxazole 200 mG Suspension 40 milliLiter(s) Oral every 8 hours      acetaminophen     Tablet .. 650 milliGRAM(s) Oral every 6 hours PRN  acetaminophen     Tablet .. 650 milliGRAM(s) Oral every 6 hours PRN  dexMEDEtomidine Infusion 0.2 MICROgram(s)/kG/Hr IV Continuous <Continuous>  fentaNYL    Injectable 50 MICROGram(s) IV Push every 4 hours PRN  HYDROmorphone  Injectable 0.5 milliGRAM(s) IV Push every 6 hours  ondansetron Injectable 4 milliGRAM(s) IV Push every 8 hours PRN  QUEtiapine 12.5 milliGRAM(s) Oral at bedtime      aspirin  chewable 81 milliGRAM(s) Oral daily  enoxaparin Injectable 40 milliGRAM(s) SubCutaneous every 24 hours    aluminum hydroxide/magnesium hydroxide/simethicone Suspension 30 milliLiter(s) Oral every 6 hours PRN  pantoprazole   Suspension 40 milliGRAM(s) Oral every 24 hours  polyethylene glycol 3350 17 Gram(s) Oral daily PRN  senna 2 Tablet(s) Oral at bedtime      atorvastatin 40 milliGRAM(s) Oral at bedtime  insulin glargine Injectable (LANTUS) 30 Unit(s) SubCutaneous at bedtime  insulin lispro (ADMELOG) corrective regimen sliding scale   SubCutaneous every 6 hours          ## O/E:  ICU Vital Signs Last 24 Hrs  T(C): 35.8 (25 Jun 2025 12:00), Max: 37.5 (25 Jun 2025 04:00)  T(F): 96.4 (25 Jun 2025 12:00), Max: 99.5 (25 Jun 2025 04:00)  HR: 53 (25 Jun 2025 14:00) (52 - 72)  BP: 129/67 (25 Jun 2025 14:00) (79/43 - 150/63)  BP(mean): 84 (25 Jun 2025 14:00) (54 - 94)  ABP: --  ABP(mean): --  RR: 11 (25 Jun 2025 14:00) (4 - 17)  SpO2: 98% (25 Jun 2025 14:00) (97% - 100%)    O2 Parameters below as of 24 Jun 2025 19:30  Patient On (Oxygen Delivery Method): ventilator, PS 15/6 rate 12    O2 Concentration (%): 30      I&O's Summary    24 Jun 2025 07:01  -  25 Jun 2025 07:00  --------------------------------------------------------  IN: 1631.3 mL / OUT: 650 mL / NET: 981.3 mL    25 Jun 2025 07:01  -  25 Jun 2025 15:57  --------------------------------------------------------  IN: 52.5 mL / OUT: 0 mL / NET: 52.5 mL      Mode: CPAP with PS, FiO2: 30, PEEP: 6, PS: 15, ITime: 1, MAP: 10, PIP: 21  Gen: trache to vent  HEENT: PERRL  Resp: mechanical breath sounds B/L  CVS: S1S2 no m/r/g  Abd: soft NT/ND +BS  Ext: no c/c/e  Neuro: non-focal agitation    ## Code status:  Goals of care discussion: [x] yes [ ] no  [x] full code  [ ] DNR  [ ] DNI  [ ] KENDRA

## 2025-06-26 LAB
ALBUMIN SERPL ELPH-MCNC: 1.7 G/DL — LOW (ref 3.3–5)
ALP SERPL-CCNC: 101 U/L — SIGNIFICANT CHANGE UP (ref 40–120)
ALT FLD-CCNC: 16 U/L — SIGNIFICANT CHANGE UP (ref 12–78)
ANION GAP SERPL CALC-SCNC: 7 MMOL/L — SIGNIFICANT CHANGE UP (ref 5–17)
AST SERPL-CCNC: 20 U/L — SIGNIFICANT CHANGE UP (ref 15–37)
BASOPHILS # BLD AUTO: 0.04 K/UL — SIGNIFICANT CHANGE UP (ref 0–0.2)
BASOPHILS NFR BLD AUTO: 0.5 % — SIGNIFICANT CHANGE UP (ref 0–2)
BILIRUB SERPL-MCNC: 0.4 MG/DL — SIGNIFICANT CHANGE UP (ref 0.2–1.2)
BLD GP AB SCN SERPL QL: SIGNIFICANT CHANGE UP
BUN SERPL-MCNC: 42 MG/DL — HIGH (ref 7–23)
CALCIUM SERPL-MCNC: 8.1 MG/DL — LOW (ref 8.5–10.1)
CHLORIDE SERPL-SCNC: 107 MMOL/L — SIGNIFICANT CHANGE UP (ref 96–108)
CO2 SERPL-SCNC: 25 MMOL/L — SIGNIFICANT CHANGE UP (ref 22–31)
CREAT SERPL-MCNC: 1.3 MG/DL — SIGNIFICANT CHANGE UP (ref 0.5–1.3)
CULTURE RESULTS: SIGNIFICANT CHANGE UP
CULTURE RESULTS: SIGNIFICANT CHANGE UP
EGFR: 43 ML/MIN/1.73M2 — LOW
EGFR: 43 ML/MIN/1.73M2 — LOW
EOSINOPHIL # BLD AUTO: 0.37 K/UL — SIGNIFICANT CHANGE UP (ref 0–0.5)
EOSINOPHIL NFR BLD AUTO: 4.7 % — SIGNIFICANT CHANGE UP (ref 0–6)
GLUCOSE BLDC GLUCOMTR-MCNC: 101 MG/DL — HIGH (ref 70–99)
GLUCOSE BLDC GLUCOMTR-MCNC: 108 MG/DL — HIGH (ref 70–99)
GLUCOSE BLDC GLUCOMTR-MCNC: 94 MG/DL — SIGNIFICANT CHANGE UP (ref 70–99)
GLUCOSE BLDC GLUCOMTR-MCNC: 99 MG/DL — SIGNIFICANT CHANGE UP (ref 70–99)
GLUCOSE SERPL-MCNC: 94 MG/DL — SIGNIFICANT CHANGE UP (ref 70–99)
HCT VFR BLD CALC: 23.5 % — LOW (ref 34.5–45)
HCT VFR BLD CALC: 24 % — LOW (ref 34.5–45)
HGB BLD-MCNC: 7.1 G/DL — LOW (ref 11.5–15.5)
HGB BLD-MCNC: 7.2 G/DL — LOW (ref 11.5–15.5)
IMM GRANULOCYTES NFR BLD AUTO: 1 % — HIGH (ref 0–0.9)
LYMPHOCYTES # BLD AUTO: 1.53 K/UL — SIGNIFICANT CHANGE UP (ref 1–3.3)
LYMPHOCYTES # BLD AUTO: 19.5 % — SIGNIFICANT CHANGE UP (ref 13–44)
MAGNESIUM SERPL-MCNC: 2.4 MG/DL — SIGNIFICANT CHANGE UP (ref 1.6–2.6)
MCHC RBC-ENTMCNC: 27.3 PG — SIGNIFICANT CHANGE UP (ref 27–34)
MCHC RBC-ENTMCNC: 27.3 PG — SIGNIFICANT CHANGE UP (ref 27–34)
MCHC RBC-ENTMCNC: 30 G/DL — LOW (ref 32–36)
MCHC RBC-ENTMCNC: 30.2 G/DL — LOW (ref 32–36)
MCV RBC AUTO: 90.4 FL — SIGNIFICANT CHANGE UP (ref 80–100)
MCV RBC AUTO: 90.9 FL — SIGNIFICANT CHANGE UP (ref 80–100)
MONOCYTES # BLD AUTO: 0.54 K/UL — SIGNIFICANT CHANGE UP (ref 0–0.9)
MONOCYTES NFR BLD AUTO: 6.9 % — SIGNIFICANT CHANGE UP (ref 2–14)
NEUTROPHILS # BLD AUTO: 5.29 K/UL — SIGNIFICANT CHANGE UP (ref 1.8–7.4)
NEUTROPHILS NFR BLD AUTO: 67.4 % — SIGNIFICANT CHANGE UP (ref 43–77)
NRBC BLD AUTO-RTO: 0 /100 WBCS — SIGNIFICANT CHANGE UP (ref 0–0)
NRBC BLD AUTO-RTO: 0 /100 WBCS — SIGNIFICANT CHANGE UP (ref 0–0)
PHOSPHATE SERPL-MCNC: 3.7 MG/DL — SIGNIFICANT CHANGE UP (ref 2.5–4.5)
PLATELET # BLD AUTO: 386 K/UL — SIGNIFICANT CHANGE UP (ref 150–400)
PLATELET # BLD AUTO: 411 K/UL — HIGH (ref 150–400)
POTASSIUM SERPL-MCNC: 4.2 MMOL/L — SIGNIFICANT CHANGE UP (ref 3.5–5.3)
POTASSIUM SERPL-SCNC: 4.2 MMOL/L — SIGNIFICANT CHANGE UP (ref 3.5–5.3)
PROT SERPL-MCNC: 6.5 GM/DL — SIGNIFICANT CHANGE UP (ref 6–8.3)
RBC # BLD: 2.6 M/UL — LOW (ref 3.8–5.2)
RBC # BLD: 2.64 M/UL — LOW (ref 3.8–5.2)
RBC # FLD: 16.1 % — HIGH (ref 10.3–14.5)
RBC # FLD: 16.5 % — HIGH (ref 10.3–14.5)
SODIUM SERPL-SCNC: 139 MMOL/L — SIGNIFICANT CHANGE UP (ref 135–145)
SPECIMEN SOURCE: SIGNIFICANT CHANGE UP
SPECIMEN SOURCE: SIGNIFICANT CHANGE UP
WBC # BLD: 7.26 K/UL — SIGNIFICANT CHANGE UP (ref 3.8–10.5)
WBC # BLD: 7.85 K/UL — SIGNIFICANT CHANGE UP (ref 3.8–10.5)
WBC # FLD AUTO: 7.26 K/UL — SIGNIFICANT CHANGE UP (ref 3.8–10.5)
WBC # FLD AUTO: 7.85 K/UL — SIGNIFICANT CHANGE UP (ref 3.8–10.5)

## 2025-06-26 PROCEDURE — G0545: CPT

## 2025-06-26 PROCEDURE — 99291 CRITICAL CARE FIRST HOUR: CPT

## 2025-06-26 PROCEDURE — 99232 SBSQ HOSP IP/OBS MODERATE 35: CPT

## 2025-06-26 RX ORDER — MAGNESIUM SULFATE 500 MG/ML
1 SYRINGE (ML) INJECTION ONCE
Refills: 0 | Status: COMPLETED | OUTPATIENT
Start: 2025-06-26 | End: 2025-06-26

## 2025-06-26 RX ORDER — QUETIAPINE FUMARATE 25 MG/1
25 TABLET ORAL AT BEDTIME
Refills: 0 | Status: DISCONTINUED | OUTPATIENT
Start: 2025-06-26 | End: 2025-07-10

## 2025-06-26 RX ADMIN — DEXMEDETOMIDINE HYDROCHLORIDE IN SODIUM CHLORIDE 3.74 MICROGRAM(S)/KG/HR: 4 INJECTION INTRAVENOUS at 18:15

## 2025-06-26 RX ADMIN — Medication 40 MILLILITER(S): at 16:09

## 2025-06-26 RX ADMIN — Medication 81 MILLIGRAM(S): at 12:18

## 2025-06-26 RX ADMIN — Medication 0.5 MILLIGRAM(S): at 12:18

## 2025-06-26 RX ADMIN — Medication 15 MILLILITER(S): at 05:56

## 2025-06-26 RX ADMIN — Medication 0.5 MILLIGRAM(S): at 17:13

## 2025-06-26 RX ADMIN — MIDODRINE HYDROCHLORIDE 20 MILLIGRAM(S): 5 TABLET ORAL at 22:02

## 2025-06-26 RX ADMIN — Medication 40 MILLIGRAM(S): at 05:56

## 2025-06-26 RX ADMIN — Medication 0.5 MILLIGRAM(S): at 23:20

## 2025-06-26 RX ADMIN — Medication 1 APPLICATION(S): at 05:57

## 2025-06-26 RX ADMIN — Medication 0.5 MILLIGRAM(S): at 12:48

## 2025-06-26 RX ADMIN — Medication 0.5 MILLIGRAM(S): at 05:56

## 2025-06-26 RX ADMIN — MIDODRINE HYDROCHLORIDE 20 MILLIGRAM(S): 5 TABLET ORAL at 13:40

## 2025-06-26 RX ADMIN — Medication 1 TABLET(S): at 12:19

## 2025-06-26 RX ADMIN — ENOXAPARIN SODIUM 40 MILLIGRAM(S): 100 INJECTION SUBCUTANEOUS at 13:40

## 2025-06-26 RX ADMIN — Medication 40 MILLIGRAM(S): at 22:01

## 2025-06-26 RX ADMIN — Medication 0.5 MILLIGRAM(S): at 17:43

## 2025-06-26 RX ADMIN — Medication 40 MILLILITER(S): at 05:58

## 2025-06-26 RX ADMIN — Medication 15 MILLILITER(S): at 17:12

## 2025-06-26 RX ADMIN — RANOLAZINE 500 MILLIGRAM(S): 1000 TABLET, FILM COATED, EXTENDED RELEASE ORAL at 17:12

## 2025-06-26 RX ADMIN — Medication 40 MILLILITER(S): at 22:04

## 2025-06-26 RX ADMIN — Medication 100 GRAM(S): at 16:07

## 2025-06-26 RX ADMIN — Medication 0.5 MILLIGRAM(S): at 07:12

## 2025-06-26 RX ADMIN — MIDODRINE HYDROCHLORIDE 20 MILLIGRAM(S): 5 TABLET ORAL at 05:56

## 2025-06-26 RX ADMIN — RANOLAZINE 500 MILLIGRAM(S): 1000 TABLET, FILM COATED, EXTENDED RELEASE ORAL at 05:57

## 2025-06-26 RX ADMIN — ATORVASTATIN CALCIUM 40 MILLIGRAM(S): 80 TABLET, FILM COATED ORAL at 22:02

## 2025-06-26 RX ADMIN — QUETIAPINE FUMARATE 25 MILLIGRAM(S): 25 TABLET ORAL at 22:03

## 2025-06-26 RX ADMIN — DEXMEDETOMIDINE HYDROCHLORIDE IN SODIUM CHLORIDE 3.74 MICROGRAM(S)/KG/HR: 4 INJECTION INTRAVENOUS at 19:15

## 2025-06-26 NOTE — PROGRESS NOTE ADULT - ASSESSMENT
74F PMH CAD, DM2 p/w hyperglycemia, stay c/b ESBL E. coli UTI with septic shock requiring transfer to ICU and intubation for Type 4 respiratory failure c/b cardiac arrest (downtime 10 minutes), with recovery of mental status and extubation on 6/6. Transferred to general medicine on 6/8, but two days afterwards had witnessed episode of aspiration a/w AMS and was reintubated for Type 1 respiratory failure and AMS. Treated for pneumonia, and oxygenation has improved, but patient is significantly deconditioned and is unable to liberate from vent.  - s/p trache on vent 15/400/30%/+6. Attempted PS, but patient continues to have episodes of agitation.   - s/p PEG, resuming tube feeds  - on Precedex for anxiolysis, titrate off and replace with PRN Haldoll and increase Seroquel QHS  - On Bactrim for Stenotrophomonas in sputum  - Creatinine now downtrending, c/w diuresis with thiazide. Na 139.  - DVT ppx: Lovenox

## 2025-06-26 NOTE — PROGRESS NOTE ADULT - SUBJECTIVE AND OBJECTIVE BOX
[facility]  INFECTIOUS DISEASES   [address] [place] [address]  Tel: [phone]     Fax: [phone]  ==============================================================================  MD Jason Booth, DO Stephanie Olmedo, CHLOE Pope M.D  ==============================================================================      EMMANUEL GONZALEZ  [id number]  74y ([birth year])      Interval History:    ROS:    [ ] Unobtainable because:  [ ] All other systems negative except as noted    Constitutional: no fever, no chills  Head: no trauma  Eyes: no vision changes, no eye pain  ENT:  no sore throat, no rhinorrhea  Cardiovascular:  no chest pain, no palpitation  Respiratory:  no SOB, no cough  GI:  no abd pain, no vomiting, no diarrhea  urinary: no dysuria, no hematuria, no flank pain  musculoskeletal:  no joint pain, no joint swelling  skin:  no rash  neurology:  no headache, no seizure, no change in mental status  psych: no anxiety, no depression         Allergies  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/shellfish (Short breath)  No Known Drug Allergies        ANTIMICROBIALS:  trimethoprim  40 mG/sulfamethoxazole 200 mG Suspension 40 every 8 hours        Physical Exam:  Vital Signs Last 24 Hrs  T(C): 36.7 (26 Jun 2025 08:00), Max: 37.8 (25 Jun 2025 23:45)  T(F): 98.1 (26 Jun 2025 08:00), Max: 100 (25 Jun 2025 23:45)  HR: 57 (26 Jun 2025 10:00) (50 - 65)  BP: 131/55 (26 Jun 2025 10:00) (85/56 - 145/70)  BP(mean): 78 (26 Jun 2025 10:00) (50 - 93)  RR: 16 (26 Jun 2025 10:00) (11 - 17)  SpO2: 100% (26 Jun 2025 10:00) (97% - 100%)    Parameters below as of 26 Jun 2025 08:00  Patient On (Oxygen Delivery Method): ventilator, Trach to        06-25-25 @ 07:01  -  06-26-25 @ 07:00  --------------------------------------------------------  IN: 644.9 mL / OUT: 920 mL / NET: -275.1 mL    06-26-25 @ 07:01  -  06-26-25 @ 11:16  --------------------------------------------------------  IN: 11.1 mL / OUT: 0 mL / NET: 11.1 mL      General:    NAD,  non toxic  Head: atraumatic, normocephalic  Eye: normal sclera and conjunctiva  ENT:    no oral lesions, neck supple  Cardio:     regular S1, S2,  no murmur  Respiratory:    clear b/l,    no wheezing  abd:     soft,   BS +,   no tenderness  :   no CVAT,  no suprapubic tenderness,   no  marcus  Musculoskeletal:   no joint swelling,   no edema  vascular: no central lines, +PIV   Skin:    no rash  Neurologic:     no focal deficit  psych: normal affect    WBC Count: 7.85 K/uL (06-26 @ 04:20)  WBC Count: 7.19 K/uL (06-25 @ 03:30)  WBC Count: 6.43 K/uL (06-24 @ 03:21)  WBC Count: 6.39 K/uL (06-23 @ 03:41)  WBC Count: 6.28 K/uL (06-22 @ 03:30)  WBC Count: 8.42 K/uL (06-21 @ 04:06)                            7.2    7.85  )-----------( 386      ( 26 Jun 2025 04:20 )             24.0       06-26    139  |  107  |  42[H]  ----------------------------<  94  4.2   |  25  |  1.30    Ca    8.1[L]      26 Jun 2025 04:20  Phos  3.7     06-26  Mg     2.4     06-26    TPro  6.5  /  Alb  1.7[L]  /  TBili  0.4  /  DBili  x   /  AST  20  /  ALT  16  /  AlkPhos  101  06-26      Urinalysis Basic - ( 26 Jun 2025 04:20 )    Color: x / Appearance: x / SG: x / pH: x  Gluc: 94 mg/dL / Ketone: x  / Bili: x / Urobili: x   Blood: x / Protein: x / Nitrite: x   Leuk Esterase: x / RBC: x / WBC x   Sq Epi: x / Non Sq Epi: x / Bacteria: x          Creatinine Trend: 1.30<--, 1.37<--, 1.24<--, 0.86<--, 0.82<--, 0.90<--      MICROBIOLOGY:  v  Blood Blood-Peripheral  06-20-25   No growth at 5 days  --  --      Blood Blood-Peripheral  06-20-25   No growth at 5 days  --  --      ET Tube ET Tube  06-20-25   Moderate Stenotrophomonas maltophilia  Commensal lina consistent with body site  --  Stenotrophomonas maltophilia      ET Tube ET Tube  06-13-25   Commensal lina consistent with body site  --    No Squamous epithelial cells per low power field  Numerous polymorphonuclear leukocytes per low power field  Few Yeast per oil power field      Sputum Sputum  06-12-25   Commensal lina consistent with body site  --    Few Squamous epithelial cells per low power field  Numerous polymorphonuclear leukocytes per low power field  Yeast per oil power field      Catheterized Catheterized  06-11-25   <10,000 CFU/mL Normal Urogenital Lina  --  --      Blood Blood-Peripheral  06-11-25   No growth at 5 days  --  --      Blood Blood-Peripheral  06-11-25   No growth at 5 days  --  --      Blood Blood-Peripheral  06-04-25   No growth at 5 days  --  --      Catheterized Catheterized  06-02-25   >100,000 CFU/ml Escherichia coli ESBL  --  Escherichia coli ESBL      Blood Blood-Peripheral  06-01-25   Growth in aerobic and anaerobic bottles: Escherichia coli ESBL  See previous culture [id number]  --    Growth in aerobic bottle: Gram Negative Rods  Growth in anaerobic bottle: Gram Negative Rods      Blood Blood-Peripheral  06-01-25   Growth in aerobic and anaerobic bottles: Escherichia coli ESBL  Direct identification is available within approximately 3-5  hours either by Blood Panel Multiplexed PCR or Direct  MALDI-TOF. Details: [email or web address]  --  Blood Culture PCR  Escherichia coli ESBL                                  RADIOLOGY:   [facility]  INFECTIOUS DISEASES   [address] [place] [address]  Tel: [phone]     Fax: [phone]  ==============================================================================  MD Jason Booth, CHLOE Burns M.D  ==============================================================================      EMMANUEL GONZALEZ  [id number]  74y ([birth year])      Interval History:    Patient seen and examined today in CCU.  her daughter at her bedside.    patient is awake and she smiles    she is s/p trache and peg    low grade temp      ROS:    Limited as patient is trached and weak  but denies any pian anywhere with nodding no      Allergies  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/shellfish (Short breath)  No Known Drug Allergies        ANTIMICROBIALS:  trimethoprim  40 mG/sulfamethoxazole 200 mG Suspension 40 every 8 hours        Physical Exam:  Vital Signs Last 24 Hrs  T(C): 36.7 (26 Jun 2025 08:00), Max: 37.8 (25 Jun 2025 23:45)  T(F): 98.1 (26 Jun 2025 08:00), Max: 100 (25 Jun 2025 23:45)  HR: 57 (26 Jun 2025 10:00) (50 - 65)  BP: 131/55 (26 Jun 2025 10:00) (85/56 - 145/70)  BP(mean): 78 (26 Jun 2025 10:00) (50 - 93)  RR: 16 (26 Jun 2025 10:00) (11 - 17)  SpO2: 100% (26 Jun 2025 10:00) (97% - 100%)    Parameters below as of 26 Jun 2025 08:00  Patient On (Oxygen Delivery Method): ventilator, Trach to        06-25-25 @ 07:01  -  06-26-25 @ 07:00  --------------------------------------------------------  IN: 644.9 mL / OUT: 920 mL / NET: -275.1 mL    06-26-25 @ 07:01  -  06-26-25 @ 11:16  --------------------------------------------------------  IN: 11.1 mL / OUT: 0 mL / NET: 11.1 mL      Physical Exam:  General: awake and smiling today  HEENT: kayli, anicteric sclera  Neck: Tracheostomy in place c/d/i, on 30% FIO2  Respiratory: mechanical BS heard b/l  Cardiovascular: S1S2 RRR  Abdomen: soft, non tender, non distended, peg tube present  Extremities: warm, +1 edema b/l LE  Neurological:  awake and smiles and can nod to few questions      WBC Count: 7.85 K/uL (06-26 @ 04:20)  WBC Count: 7.19 K/uL (06-25 @ 03:30)  WBC Count: 6.43 K/uL (06-24 @ 03:21)  WBC Count: 6.39 K/uL (06-23 @ 03:41)  WBC Count: 6.28 K/uL (06-22 @ 03:30)  WBC Count: 8.42 K/uL (06-21 @ 04:06)                            7.2    7.85  )-----------( 386      ( 26 Jun 2025 04:20 )             24.0       06-26    139  |  107  |  42[H]  ----------------------------<  94  4.2   |  25  |  1.30    Ca    8.1[L]      26 Jun 2025 04:20  Phos  3.7     06-26  Mg     2.4     06-26    TPro  6.5  /  Alb  1.7[L]  /  TBili  0.4  /  DBili  x   /  AST  20  /  ALT  16  /  AlkPhos  101  06-26      Urinalysis Basic - ( 26 Jun 2025 04:20 )    Color: x / Appearance: x / SG: x / pH: x  Gluc: 94 mg/dL / Ketone: x  / Bili: x / Urobili: x   Blood: x / Protein: x / Nitrite: x   Leuk Esterase: x / RBC: x / WBC x   Sq Epi: x / Non Sq Epi: x / Bacteria: x    Creatinine Trend: 1.30<--, 1.37<--, 1.24<--, 0.86<--, 0.82<--, 0.90<--      MICROBIOLOGY:    Blood Blood-Peripheral  06-20-25   No growth at 5 days  --  --      Blood Blood-Peripheral  06-20-25   No growth at 5 days  --  --      ET Tube ET Tube  06-20-25   Moderate Stenotrophomonas maltophilia  Commensal lina consistent with body site  --  Stenotrophomonas maltophilia      ET Tube ET Tube  06-13-25   Commensal lina consistent with body site  --    No Squamous epithelial cells per low power field  Numerous polymorphonuclear leukocytes per low power field  Few Yeast per oil power field      Sputum Sputum  06-12-25   Commensal lina consistent with body site  --    Few Squamous epithelial cells per low power field  Numerous polymorphonuclear leukocytes per low power field  Yeast per oil power field      Catheterized Catheterized  06-11-25   <10,000 CFU/mL Normal Urogenital Lina  --  --      Blood Blood-Peripheral  06-11-25   No growth at 5 days  --  --      Blood Blood-Peripheral  06-11-25   No growth at 5 days  --  --      Blood Blood-Peripheral  06-04-25   No growth at 5 days  --  --      Catheterized Catheterized  06-02-25   >100,000 CFU/ml Escherichia coli ESBL  --  Escherichia coli ESBL      Blood Blood-Peripheral  06-01-25   Growth in aerobic and anaerobic bottles: Escherichia coli ESBL  See previous culture [id number]  --    Growth in aerobic bottle: Gram Negative Rods  Growth in anaerobic bottle: Gram Negative Rods      Blood Blood-Peripheral  06-01-25   Growth in aerobic and anaerobic bottles: Escherichia coli ESBL  Direct identification is available within approximately 3-5  hours either by Blood Panel Multiplexed PCR or Direct  MALDI-TOF. Details: [email or web address]  --  Blood Culture PCR  Escherichia coli ESBL    RADIOLOGY:

## 2025-06-26 NOTE — PROGRESS NOTE ADULT - ASSESSMENT
A/P-   74 year old female with PMHx of HTN, HLD, IDDM2, CAD (s/p PCI), and recent right humerus fracture (placed in sling 3 days ago) who presented to the ED on 5/30/25 for complaints of elevated blood glucose.   As per med history provided by family  patient fell three days ago and landed on her right shoulder. Went to [facility] at that time and found to have right humerus fracture and placed in sling. Discharged home with outpatient orthopedic surgery follow up. Since then, patient has been refusing to get out of bed and not eating much.   In the ED, VSS. Hgb 10.3, sodium 132, potassium 6.3, BUN 70, Cr 2.02, blood glucose 510. VBG 7.29 / 58 / 34 / 26. COVID/influenza/RSV negative. CT head without acute intracranial findings. Received 1L NS bolus, 1L LR bolus, and insulin 20 U IV.  (30 May 2025 23:43)    s/p  RRT last night   for AMS, hypotension, fever of 103  and was started on sepsis w/u given IVF bolus, Abx with CTX and Vanco. Persistently hypotensive and obtunded. Started on levophed infusion. Urgently transferred to ICU.   was also intubated for airway protection    Infectious Disease consultation requested this afternoon 6/2 to help with antibiotic management  for ESBL bacteremia    Intubated   sedated  on pressors for hypotension  afebrile today  yesterday 103 t-max  + leukocytosis of 29K  Blood cx- ESBL GNR by PCR x 2  UA from 5/31-pos for nitrates  urine cx-pending  DUANE  elevated cardiac enzymes    per med records had ESBL e.coli UTI in august and sept 2024 and ID was not consulted     6/3-  remains Intubated and sedated  on pressors for hypotension  temp of 101.5 today  + leukocytosis trending down to 20k  creatinine slightly better today  Blood cx- ESBL GNR by PCR x 2  UA from 5/31-pos for nitrates  urine cx->100K e.coli  DUANE  elevated cardiac enzymes    6/4: seen in ICU earlier, remains intubated, weaning process in progress, on pressors, continues having fevers, T 101 this morning, leukocytosis is better 17.27, Cr better 1.35, UC and BCs grew ESBL E. Coli, repeat BCs are pending, Meropenem IV continued. If not improvement in pt's fevers tomorrow, most likely CT a/p will be obtained.   6/5: remains in ICU, intubated and sedated, on pressors, Temp persistent 100-100.2, WBC better 16.43, Cr normalized, LFTs ok, repeat BCs NGTD, procalcitonin 2.41, TTE performed - Left ventricular regional wall motion abnormalities present. Meropenem IV continued.     6/6-  extubated  awake  afebrile now but early am had temp of 100.6  Leukocytosis almost resolved 11K today  Blood cx- ESBL GNR by PCR x 2  UA from 5/31-pos for nitrates  urine cx->100K e.coli  repeat blood cx- NGTD x 2 from 6/4 6/9-  downgraded to medical floor  Afebrile  Leukocytosis 12 k today  Blood cx- ESBL GNR by PCR x 2  UA from 5/31-pos for nitrates  urine cx->100K e.coli  repeat blood cx- NGTD x 2 from 6/4 6/11: pt is afebrile since 6/6, doing well on RA, leukocytosis was elevated 13.57, Cr ok,  no new cbc, repeat BCs remain with no growth to date, s/p 8 days of IV Meropenem, now observed off abx.     6/12-  s/p RRT last night for AMS and hypotention and second RRT later for ? seizure and aspiration  s/p intubation and transferred to CCU  off pressors  intubated  afebrile but had fever of 100.2 at 5 am  leukocytosis has decreased to 11k  repeat blood cx - NGTD x 2  has completed course of meropenem  fo her ESBL bacteremia dn UTI   cxr- b/l pleural effusions L> R  CTA report-  No pulmonary embolism..  Tracheobronchial secretions, greatest in the left lower lobe.  Patchy and   nodular bilateral lung opacities may be infectious or inflammatory. Right   middle lobe subpleural 6 mm nodular opacity is new from prior .   Short-term follow-up CT recommended    6/13-   remains intubated on 50% Fio2  Afebrile   Minimal leukocytosis of 13K  creatinine- normal value  Repeat blood cx  from 6/4- NGTD x 2  repeat blood cx from 6/11- NGTD x 2  sputum  cx- commensal lina c/w body site  has completed course of meropenem  fo her ESBL bacteremia and UTI - which she cleared  cxr- b/l pleural effusions L> R  CTA report-  No pulmonary embolism..  Tracheobronchial secretions, greatest in the left lower lobe.  Patchy and   nodular bilateral lung opacities may be infectious or inflammatory. Right   middle lobe subpleural 6 mm nodular opacity is new from prior .   Short-term follow-up CT recommended  EEG- reported negative for epilepsy ( pls see full report)    6/16-  remains intubated on 30% Fio2  T-max-100.6 this am  No leukocytosis   creatinine- normal value  Repeat blood cx  from 6/4- NGTD x 2  repeat blood cx from 6/11- NGTD x 2  sputum  cx- commensal lina c/w body site  has completed course of meropenem  for her ESBL bacteremia and UTI - which she cleared  cxr- b/l pleural effusions L> R  CTA report-  No pulmonary embolism..  Tracheobronchial secretions, greatest in the left lower lobe.  Patchy and   nodular bilateral lung opacities may be infectious or inflammatory. Right   middle lobe subpleural 6 mm nodular opacity is new from prior .   Short-term follow-up CT recommended  EEG- reported negative for epilepsy ( pls see full report)    6/20-  T-max-101  No leukocytosis  Repeat blood cx  from 6/4- NGTD x 2  repeat blood cx from 6/11- NGTD x 2  sputum  cx- commensal lina c/w body site  ET tube cx-commensal lina c/w body site  has completed course of meropenem  for her ESBL bacteremia and UTI - which she cleared  cxr- b/l pleural effusions L> R  completed course of zosyn as well 2 days ago for presumed aspiration pneumonitis    6/23: remains in CCU, no fevers since 6/20, vented via tracheostomy no leukocytosis, repeat BCs NGTD, sputum culture grew Stenotrophomonas, will add abx, Bactrim via NGT.      6/26-  s/p trache and peg  awake  on 30 FIO2 via trache  low grade temp of 100 yesterday  afebrile today   repeat BCs NGTD  most recent sputum culture grew Stenotrophomonas ( sens to bactrim and levaquin)      Impression-  possible aspiration pneumonitis -treated  ESBL septic shock -  was treated and had resolved  E.coli ESBL bacteremia sec to   source - treated   Right humerus fracture with hematoma  Stenotrophomonas in sputum - on bactrim  s/p trache and peg    Plan-  continue with  Bactrim solution via peg  to treat Stenotrophomonas - ET tube aspirate cx, day #4 , advise 3 more days  had completed course of IV meropenem for the ESBL bacteremia and sepsis and repeat blood cx are negative   also completed course of zosyn for ? asp pneumonitis  vent management as per CCU team  rest of the medical management as per CCU team    All labs and imaging and chart notes reviewed.     All above discussed with patient's daughter and she verbalizes full understanding of all above and agrees with above plan of care.    d/w ccu team    Tariq Fraga MD  Infectious Disease Attending    for any questions please do not hesitate to contact me either via teams or by calling [phone]

## 2025-06-26 NOTE — PROGRESS NOTE ADULT - SUBJECTIVE AND OBJECTIVE BOX
# CC: Patient is a 74y old  Female who presents with a chief complaint of ALY, physical deconditioning (26 Jun 2025 11:16)      ## HPI:  Tiffanie Joshi is a 74 year old female with PMHx of HTN, HLD, IDDM2, CAD (s/p PCI), and recent right humerus fracture (placed in sling 3 days ago) who presented to the ED on 5/30/25 for complaints of elevated blood glucose.    Patient is Vietnamese-speaking but declined  services and preferred for daughter-in-law at bedside to translate. As per daughter-in-law, patient fell three days ago and landed on her right shoulder. Went to [facility] at that time and found to have right humerus fracture and placed in sling. Discharged home with outpatient orthopedic surgery follow up. Since then, patient has been refusing to get out of bed and not eating much. Will drink a little juice every now and then. Daughter checked her blood glucose around 5PM and it was > 400 so brought to the ER for further evaluation. Baseline functional status is ambulates with walker and dependent with all ADLs. Lives at home with daughter.    In the ED, VSS. Hgb 10.3, sodium 132, potassium 6.3, BUN 70, Cr 2.02, blood glucose 510. VBG 7.29 / 58 / 34 / 26. COVID/influenza/RSV negative. CT head without acute intracranial findings. Received 1L NS bolus, 1L LR bolus, and insulin 20 U IV.  (30 May 2025 23:43)      **O/N:**    ## ROS:    ## Labs:  ** CBC: **                        7.2    7.85  )-----------( 386      ( 26 Jun 2025 04:20 )             24.0     ** Chem:  **  06-26    139  |  107  |  42[H]  ----------------------------<  94  4.2   |  25  |  1.30    Ca    8.1[L]      26 Jun 2025 04:20  Phos  3.7     06-26  Mg     2.4     06-26    TPro  6.5  /  Alb  1.7[L]  /  TBili  0.4  /  DBili  x   /  AST  20  /  ALT  16  /  AlkPhos  101  06-26    ** Coags: **  PT/INR - ( 25 Jun 2025 03:30 )   PT: 13.9 sec;   INR: 1.23 ratio         PTT - ( 25 Jun 2025 03:30 )  PTT:29.7 sec    CAPILLARY BLOOD GLUCOSE      POCT Blood Glucose.: 99 mg/dL (26 Jun 2025 11:18)  POCT Blood Glucose.: 101 mg/dL (26 Jun 2025 05:46)  POCT Blood Glucose.: 108 mg/dL (25 Jun 2025 23:42)  POCT Blood Glucose.: 133 mg/dL (25 Jun 2025 17:05)        Culture - Blood (collected 20 Jun 2025 17:45)  Source: Blood Blood-Peripheral  Final Report (26 Jun 2025 01:00):    No growth at 5 days    Culture - Blood (collected 20 Jun 2025 17:28)  Source: Blood Blood-Peripheral  Final Report (26 Jun 2025 01:00):    No growth at 5 days    Culture - Sputum (collected 20 Jun 2025 17:10)  Source: ET Tube ET Tube  Gram Stain (22 Jun 2025 16:58):    Rare polymorphonuclear leukocytes per low power field    No Squamous epithelial cells per low power field    No organisms seen per oil power field  Final Report (22 Jun 2025 16:58):    Moderate Stenotrophomonas maltophilia    Commensal lina consistent with body site  Organism: Stenotrophomonas maltophilia (22 Jun 2025 16:58)  Organism: Stenotrophomonas maltophilia (22 Jun 2025 16:58)      -  Minocycline: S      Method Type: KB  Organism: Stenotrophomonas maltophilia (22 Jun 2025 16:58)      -  Levofloxacin: S <=0.5      Method Type: BRISEIDA      -  Trimethoprim/Sulfamethoxazole: S <=0.5/9.5    Culture - Sputum (collected 13 Jun 2025 10:18)  Source: ET Tube ET Tube  Gram Stain (14 Jun 2025 23:46):    No Squamous epithelial cells per low power field    Numerous polymorphonuclear leukocytes per low power field    Few Yeast per oil power field  Final Report (14 Jun 2025 23:46):    Commensal lina consistent with body site        ## Imaging:    ## Medications:  metOLazone 5 milliGRAM(s) Oral daily  midodrine 20 milliGRAM(s) Oral every 8 hours  ranolazine 500 milliGRAM(s) Oral two times a day    trimethoprim  40 mG/sulfamethoxazole 200 mG Suspension 40 milliLiter(s) Oral every 8 hours      acetaminophen     Tablet .. 650 milliGRAM(s) Oral every 6 hours PRN  acetaminophen     Tablet .. 650 milliGRAM(s) Oral every 6 hours PRN  dexMEDEtomidine Infusion 0.2 MICROgram(s)/kG/Hr IV Continuous <Continuous>  fentaNYL    Injectable 50 MICROGram(s) IV Push every 4 hours PRN  HYDROmorphone  Injectable 0.5 milliGRAM(s) IV Push every 6 hours  ondansetron Injectable 4 milliGRAM(s) IV Push every 8 hours PRN  QUEtiapine 25 milliGRAM(s) Oral at bedtime      aspirin  chewable 81 milliGRAM(s) Oral daily  enoxaparin Injectable 40 milliGRAM(s) SubCutaneous every 24 hours    aluminum hydroxide/magnesium hydroxide/simethicone Suspension 30 milliLiter(s) Oral every 6 hours PRN  pantoprazole   Suspension 40 milliGRAM(s) Oral every 24 hours  polyethylene glycol 3350 17 Gram(s) Oral daily PRN  senna 2 Tablet(s) Oral at bedtime      atorvastatin 40 milliGRAM(s) Oral at bedtime  insulin glargine Injectable (LANTUS) 30 Unit(s) SubCutaneous at bedtime  insulin lispro (ADMELOG) corrective regimen sliding scale   SubCutaneous every 6 hours          ## O/E:  ICU Vital Signs Last 24 Hrs  T(C): 37.3 (26 Jun 2025 11:14), Max: 37.8 (25 Jun 2025 23:45)  T(F): 99.2 (26 Jun 2025 11:14), Max: 100 (25 Jun 2025 23:45)  HR: 56 (26 Jun 2025 13:00) (50 - 65)  BP: 116/58 (26 Jun 2025 13:00) (85/56 - 145/70)  BP(mean): 75 (26 Jun 2025 13:00) (50 - 93)  ABP: --  ABP(mean): --  RR: 15 (26 Jun 2025 13:00) (11 - 17)  SpO2: 100% (26 Jun 2025 13:00) (98% - 100%)    O2 Parameters below as of 26 Jun 2025 12:00  Patient On (Oxygen Delivery Method): ventilator    O2 Concentration (%): 30      I&O's Summary    25 Jun 2025 07:01  -  26 Jun 2025 07:00  --------------------------------------------------------  IN: 644.9 mL / OUT: 920 mL / NET: -275.1 mL    26 Jun 2025 07:01  -  26 Jun 2025 13:32  --------------------------------------------------------  IN: 11.1 mL / OUT: 0 mL / NET: 11.1 mL      Mode: AC/ CMV (Assist Control/ Continuous Mandatory Ventilation), RR (machine): 15, TV (machine): 400, FiO2: 30, PEEP: 6, ITime: 0.9, MAP: 10, PIP: 25  Gen: lying comfortably in bed in no apparent distress  HEENT: PERRL, EOMI  Resp: CTA B/L no c/r/w  CVS: S1S2 no m/r/g  Abd: soft NT/ND +BS  Ext: no c/c/e  Neuro: A&Ox3    ## Code status:  Goals of care discussion: [x] yes [ ] no  [x] full code  [ ] DNR  [ ] DNI  [ ] KENDRA # CC: Patient is a 74y old  Female who presents with a chief complaint of ALY, physical deconditioning (26 Jun 2025 11:16)      ## HPI:  Tiffanie Joshi is a 74 year old female with PMHx of HTN, HLD, IDDM2, CAD (s/p PCI), and recent right humerus fracture (placed in sling 3 days ago) who presented to the ED on 5/30/25 for complaints of elevated blood glucose.    Patient is Kyrgyz-speaking but declined  services and preferred for daughter-in-law at bedside to translate. As per daughter-in-law, patient fell three days ago and landed on her right shoulder. Went to [facility] at that time and found to have right humerus fracture and placed in sling. Discharged home with outpatient orthopedic surgery follow up. Since then, patient has been refusing to get out of bed and not eating much. Will drink a little juice every now and then. Daughter checked her blood glucose around 5PM and it was > 400 so brought to the ER for further evaluation. Baseline functional status is ambulates with walker and dependent with all ADLs. Lives at home with daughter.    In the ED, VSS. Hgb 10.3, sodium 132, potassium 6.3, BUN 70, Cr 2.02, blood glucose 510. VBG 7.29 / 58 / 34 / 26. COVID/influenza/RSV negative. CT head without acute intracranial findings. Received 1L NS bolus, 1L LR bolus, and insulin 20 U IV.  (30 May 2025 23:43)      **O/N:**  Trache to vent    ## ROS:  Unobtainable    ## Labs:  ** CBC: **                        7.2    7.85  )-----------( 386      ( 26 Jun 2025 04:20 )             24.0     ** Chem:  **  06-26    139  |  107  |  42[H]  ----------------------------<  94  4.2   |  25  |  1.30    ### BUN / Creatinine:  06-28 @ 04:18: 34 mg/dL / 1.25 mg/dL  06-27 @ 03:06: 41 mg/dL / 1.31 mg/dL  06-26 @ 04:20: 42 mg/dL / 1.30 mg/dL  06-25 @ 03:30: 50 mg/dL / 1.37 mg/dL  06-24 @ 03:21: 47 mg/dL / 1.24 mg/dL      Ca    8.1[L]      26 Jun 2025 04:20  Phos  3.7     06-26  Mg     2.4     06-26    TPro  6.5  /  Alb  1.7[L]  /  TBili  0.4  /  DBili  x   /  AST  20  /  ALT  16  /  AlkPhos  101  06-26    ** Coags: **  PT/INR - ( 25 Jun 2025 03:30 )   PT: 13.9 sec;   INR: 1.23 ratio         PTT - ( 25 Jun 2025 03:30 )  PTT:29.7 sec    CAPILLARY BLOOD GLUCOSE      POCT Blood Glucose.: 99 mg/dL (26 Jun 2025 11:18)  POCT Blood Glucose.: 101 mg/dL (26 Jun 2025 05:46)  POCT Blood Glucose.: 108 mg/dL (25 Jun 2025 23:42)  POCT Blood Glucose.: 133 mg/dL (25 Jun 2025 17:05)        Culture - Blood (collected 20 Jun 2025 17:45)  Source: Blood Blood-Peripheral  Final Report (26 Jun 2025 01:00):    No growth at 5 days    Culture - Blood (collected 20 Jun 2025 17:28)  Source: Blood Blood-Peripheral  Final Report (26 Jun 2025 01:00):    No growth at 5 days    Culture - Sputum (collected 20 Jun 2025 17:10)  Source: ET Tube ET Tube  Gram Stain (22 Jun 2025 16:58):    Rare polymorphonuclear leukocytes per low power field    No Squamous epithelial cells per low power field    No organisms seen per oil power field  Final Report (22 Jun 2025 16:58):    Moderate Stenotrophomonas maltophilia    Commensal lina consistent with body site  Organism: Stenotrophomonas maltophilia (22 Jun 2025 16:58)  Organism: Stenotrophomonas maltophilia (22 Jun 2025 16:58)      -  Minocycline: S      Method Type: KB  Organism: Stenotrophomonas maltophilia (22 Jun 2025 16:58)      -  Levofloxacin: S <=0.5      Method Type: BRISEIDA      -  Trimethoprim/Sulfamethoxazole: S <=0.5/9.5    Culture - Sputum (collected 13 Jun 2025 10:18)  Source: ET Tube ET Tube  Gram Stain (14 Jun 2025 23:46):    No Squamous epithelial cells per low power field    Numerous polymorphonuclear leukocytes per low power field    Few Yeast per oil power field  Final Report (14 Jun 2025 23:46):    Commensal lina consistent with body site        ## Imaging:    ## Medications:  metOLazone 5 milliGRAM(s) Oral daily  midodrine 20 milliGRAM(s) Oral every 8 hours  ranolazine 500 milliGRAM(s) Oral two times a day    trimethoprim  40 mG/sulfamethoxazole 200 mG Suspension 40 milliLiter(s) Oral every 8 hours      acetaminophen     Tablet .. 650 milliGRAM(s) Oral every 6 hours PRN  acetaminophen     Tablet .. 650 milliGRAM(s) Oral every 6 hours PRN  dexMEDEtomidine Infusion 0.2 MICROgram(s)/kG/Hr IV Continuous <Continuous>  fentaNYL    Injectable 50 MICROGram(s) IV Push every 4 hours PRN  HYDROmorphone  Injectable 0.5 milliGRAM(s) IV Push every 6 hours  ondansetron Injectable 4 milliGRAM(s) IV Push every 8 hours PRN  QUEtiapine 25 milliGRAM(s) Oral at bedtime      aspirin  chewable 81 milliGRAM(s) Oral daily  enoxaparin Injectable 40 milliGRAM(s) SubCutaneous every 24 hours    aluminum hydroxide/magnesium hydroxide/simethicone Suspension 30 milliLiter(s) Oral every 6 hours PRN  pantoprazole   Suspension 40 milliGRAM(s) Oral every 24 hours  polyethylene glycol 3350 17 Gram(s) Oral daily PRN  senna 2 Tablet(s) Oral at bedtime      atorvastatin 40 milliGRAM(s) Oral at bedtime  insulin glargine Injectable (LANTUS) 30 Unit(s) SubCutaneous at bedtime  insulin lispro (ADMELOG) corrective regimen sliding scale   SubCutaneous every 6 hours          ## O/E:  ICU Vital Signs Last 24 Hrs  T(C): 37.3 (26 Jun 2025 11:14), Max: 37.8 (25 Jun 2025 23:45)  T(F): 99.2 (26 Jun 2025 11:14), Max: 100 (25 Jun 2025 23:45)  HR: 56 (26 Jun 2025 13:00) (50 - 65)  BP: 116/58 (26 Jun 2025 13:00) (85/56 - 145/70)  BP(mean): 75 (26 Jun 2025 13:00) (50 - 93)  ABP: --  ABP(mean): --  RR: 15 (26 Jun 2025 13:00) (11 - 17)  SpO2: 100% (26 Jun 2025 13:00) (98% - 100%)    O2 Parameters below as of 26 Jun 2025 12:00  Patient On (Oxygen Delivery Method): ventilator    O2 Concentration (%): 30      I&O's Summary    25 Jun 2025 07:01  -  26 Jun 2025 07:00  --------------------------------------------------------  IN: 644.9 mL / OUT: 920 mL / NET: -275.1 mL    26 Jun 2025 07:01  -  26 Jun 2025 13:32  --------------------------------------------------------  IN: 11.1 mL / OUT: 0 mL / NET: 11.1 mL      Mode: AC/ CMV (Assist Control/ Continuous Mandatory Ventilation), RR (machine): 15, TV (machine): 400, FiO2: 30, PEEP: 6, ITime: 0.9, MAP: 10, PIP: 25  Gen: trache to full vent  HEENT: PERRL  Resp: mechanical breath sounds B/L  CVS: S1S2 no m/r/g  Abd: soft NT/ND +BS  Ext: no c/c/e  Neuro: sedated    ## Code status:  Goals of care discussion: [x] yes [ ] no  [x] full code  [ ] DNR  [ ] DNI  [ ] KENDRA

## 2025-06-26 NOTE — PROGRESS NOTE ADULT - SUBJECTIVE AND OBJECTIVE BOX
Patient seen and examined bedside resting comfortably.  no events overnight       T(F): 98.1 (06-26-25 @ 08:00), Max: 100 (06-25-25 @ 23:45)  HR: 55 (06-26-25 @ 08:00) (50 - 65)  BP: 98/51 (06-26-25 @ 08:00) (85/56 - 145/70)  RR: 17 (06-26-25 @ 08:00) (11 - 17)  SpO2: 99% (06-26-25 @ 08:00) (97% - 100%)  Wt(kg): --  CAPILLARY BLOOD GLUCOSE      POCT Blood Glucose.: 101 mg/dL (26 Jun 2025 05:46)  POCT Blood Glucose.: 108 mg/dL (25 Jun 2025 23:42)  POCT Blood Glucose.: 133 mg/dL (25 Jun 2025 17:05)  POCT Blood Glucose.: 160 mg/dL (25 Jun 2025 11:06)      PHYSICAL EXAM:  General: NAD  Neuro: awake & alert  HEENT: NCAT, EOMI, conjunctiva clear  Lung: trache in place, breathing synchronously on vent  Abdomen: soft, NTND. PEG in place      LABS:                        7.2    7.85  )-----------( 386      ( 26 Jun 2025 04:20 )             24.0     06-26    139  |  107  |  42[H]  ----------------------------<  94  4.2   |  25  |  1.30    Ca    8.1[L]      26 Jun 2025 04:20  Phos  3.7     06-26  Mg     2.4     06-26    TPro  6.5  /  Alb  1.7[L]  /  TBili  0.4  /  DBili  x   /  AST  20  /  ALT  16  /  AlkPhos  101  06-26    PT/INR - ( 25 Jun 2025 03:30 )   PT: 13.9 sec;   INR: 1.23 ratio         PTT - ( 25 Jun 2025 03:30 )  PTT:29.7 sec    I&O:     Culture Results:   No growth at 5 days (06-20 @ 17:45)  Culture Results:   No growth at 5 days (06-20 @ 17:28)  Culture Results:   Moderate Stenotrophomonas maltophilia  Commensal lina consistent with body site *!* (06-20 @ 17:10)    A/P: 73 yo F now s/p bedside tracheostomy on 6/23, and now s/p bedisde PEG placement on 6/25  -start trickle feeds   -continue care per ICU team  *incomplete      Patient seen and examined bedside resting comfortably.  no events overnight       T(F): 98.1 (06-26-25 @ 08:00), Max: 100 (06-25-25 @ 23:45)  HR: 55 (06-26-25 @ 08:00) (50 - 65)  BP: 98/51 (06-26-25 @ 08:00) (85/56 - 145/70)  RR: 17 (06-26-25 @ 08:00) (11 - 17)  SpO2: 99% (06-26-25 @ 08:00) (97% - 100%)  Wt(kg): --  CAPILLARY BLOOD GLUCOSE      POCT Blood Glucose.: 101 mg/dL (26 Jun 2025 05:46)  POCT Blood Glucose.: 108 mg/dL (25 Jun 2025 23:42)  POCT Blood Glucose.: 133 mg/dL (25 Jun 2025 17:05)  POCT Blood Glucose.: 160 mg/dL (25 Jun 2025 11:06)      PHYSICAL EXAM:  General: NAD  Neuro: awake & alert  HEENT: NCAT, EOMI, conjunctiva clear  Lung: trache in place, breathing synchronously on vent  Abdomen: soft, NTND. PEG in place      LABS:                        7.2    7.85  )-----------( 386      ( 26 Jun 2025 04:20 )             24.0     06-26    139  |  107  |  42[H]  ----------------------------<  94  4.2   |  25  |  1.30    Ca    8.1[L]      26 Jun 2025 04:20  Phos  3.7     06-26  Mg     2.4     06-26    TPro  6.5  /  Alb  1.7[L]  /  TBili  0.4  /  DBili  x   /  AST  20  /  ALT  16  /  AlkPhos  101  06-26    PT/INR - ( 25 Jun 2025 03:30 )   PT: 13.9 sec;   INR: 1.23 ratio         PTT - ( 25 Jun 2025 03:30 )  PTT:29.7 sec    I&O:     Culture Results:   No growth at 5 days (06-20 @ 17:45)  Culture Results:   No growth at 5 days (06-20 @ 17:28)  Culture Results:   Moderate Stenotrophomonas maltophilia  Commensal lina consistent with body site *!* (06-20 @ 17:10)    A/P: 73 yo F now s/p bedside tracheostomy on 6/23, and now s/p bedside PEG placement on 6/25  -start trickle feeds, advance as tolerated  -continue care per ICU team  -discussed with Dr Ramires, will sign off, please reconsult prn

## 2025-06-27 LAB
ALBUMIN SERPL ELPH-MCNC: 1.6 G/DL — LOW (ref 3.3–5)
ALP SERPL-CCNC: 100 U/L — SIGNIFICANT CHANGE UP (ref 40–120)
ALT FLD-CCNC: 18 U/L — SIGNIFICANT CHANGE UP (ref 12–78)
ANION GAP SERPL CALC-SCNC: 6 MMOL/L — SIGNIFICANT CHANGE UP (ref 5–17)
AST SERPL-CCNC: 23 U/L — SIGNIFICANT CHANGE UP (ref 15–37)
BILIRUB SERPL-MCNC: 0.5 MG/DL — SIGNIFICANT CHANGE UP (ref 0.2–1.2)
BUN SERPL-MCNC: 41 MG/DL — HIGH (ref 7–23)
CALCIUM SERPL-MCNC: 7.8 MG/DL — LOW (ref 8.5–10.1)
CHLORIDE SERPL-SCNC: 108 MMOL/L — SIGNIFICANT CHANGE UP (ref 96–108)
CO2 SERPL-SCNC: 23 MMOL/L — SIGNIFICANT CHANGE UP (ref 22–31)
CREAT SERPL-MCNC: 1.31 MG/DL — HIGH (ref 0.5–1.3)
EGFR: 42 ML/MIN/1.73M2 — LOW
EGFR: 42 ML/MIN/1.73M2 — LOW
GLUCOSE BLDC GLUCOMTR-MCNC: 107 MG/DL — HIGH (ref 70–99)
GLUCOSE BLDC GLUCOMTR-MCNC: 115 MG/DL — HIGH (ref 70–99)
GLUCOSE BLDC GLUCOMTR-MCNC: 118 MG/DL — HIGH (ref 70–99)
GLUCOSE SERPL-MCNC: 92 MG/DL — SIGNIFICANT CHANGE UP (ref 70–99)
HCT VFR BLD CALC: 22.2 % — LOW (ref 34.5–45)
HCT VFR BLD CALC: 27.6 % — LOW (ref 34.5–45)
HGB BLD-MCNC: 6.9 G/DL — CRITICAL LOW (ref 11.5–15.5)
HGB BLD-MCNC: 8.8 G/DL — LOW (ref 11.5–15.5)
MAGNESIUM SERPL-MCNC: 2.5 MG/DL — SIGNIFICANT CHANGE UP (ref 1.6–2.6)
MCHC RBC-ENTMCNC: 27.9 PG — SIGNIFICANT CHANGE UP (ref 27–34)
MCHC RBC-ENTMCNC: 28.5 PG — SIGNIFICANT CHANGE UP (ref 27–34)
MCHC RBC-ENTMCNC: 31.1 G/DL — LOW (ref 32–36)
MCHC RBC-ENTMCNC: 31.9 G/DL — LOW (ref 32–36)
MCV RBC AUTO: 89.3 FL — SIGNIFICANT CHANGE UP (ref 80–100)
MCV RBC AUTO: 89.9 FL — SIGNIFICANT CHANGE UP (ref 80–100)
NRBC BLD AUTO-RTO: 0 /100 WBCS — SIGNIFICANT CHANGE UP (ref 0–0)
NRBC BLD AUTO-RTO: 0 /100 WBCS — SIGNIFICANT CHANGE UP (ref 0–0)
PHOSPHATE SERPL-MCNC: 4.6 MG/DL — HIGH (ref 2.5–4.5)
PLATELET # BLD AUTO: 413 K/UL — HIGH (ref 150–400)
PLATELET # BLD AUTO: 415 K/UL — HIGH (ref 150–400)
POTASSIUM SERPL-MCNC: 4.8 MMOL/L — SIGNIFICANT CHANGE UP (ref 3.5–5.3)
POTASSIUM SERPL-SCNC: 4.8 MMOL/L — SIGNIFICANT CHANGE UP (ref 3.5–5.3)
PROT SERPL-MCNC: 6.3 GM/DL — SIGNIFICANT CHANGE UP (ref 6–8.3)
RBC # BLD: 2.47 M/UL — LOW (ref 3.8–5.2)
RBC # BLD: 3.09 M/UL — LOW (ref 3.8–5.2)
RBC # FLD: 15.8 % — HIGH (ref 10.3–14.5)
RBC # FLD: 16.1 % — HIGH (ref 10.3–14.5)
SODIUM SERPL-SCNC: 137 MMOL/L — SIGNIFICANT CHANGE UP (ref 135–145)
WBC # BLD: 6.41 K/UL — SIGNIFICANT CHANGE UP (ref 3.8–10.5)
WBC # BLD: 6.77 K/UL — SIGNIFICANT CHANGE UP (ref 3.8–10.5)
WBC # FLD AUTO: 6.41 K/UL — SIGNIFICANT CHANGE UP (ref 3.8–10.5)
WBC # FLD AUTO: 6.77 K/UL — SIGNIFICANT CHANGE UP (ref 3.8–10.5)

## 2025-06-27 PROCEDURE — G0545: CPT

## 2025-06-27 PROCEDURE — 99291 CRITICAL CARE FIRST HOUR: CPT

## 2025-06-27 PROCEDURE — 74018 RADEX ABDOMEN 1 VIEW: CPT | Mod: 26

## 2025-06-27 PROCEDURE — 99232 SBSQ HOSP IP/OBS MODERATE 35: CPT

## 2025-06-27 RX ORDER — METOCLOPRAMIDE HCL 10 MG
5 TABLET ORAL DAILY
Refills: 0 | Status: DISCONTINUED | OUTPATIENT
Start: 2025-06-27 | End: 2025-06-27

## 2025-06-27 RX ORDER — HYDROMORPHONE/SOD CHLOR,ISO/PF 2 MG/10 ML
0.5 SYRINGE (ML) INJECTION
Refills: 0 | Status: DISCONTINUED | OUTPATIENT
Start: 2025-06-27 | End: 2025-06-30

## 2025-06-27 RX ORDER — METOCLOPRAMIDE HCL 10 MG
10 TABLET ORAL ONCE
Refills: 0 | Status: COMPLETED | OUTPATIENT
Start: 2025-06-27 | End: 2025-06-27

## 2025-06-27 RX ORDER — OXYCODONE HYDROCHLORIDE 30 MG/1
5 TABLET ORAL EVERY 6 HOURS
Refills: 0 | Status: DISCONTINUED | OUTPATIENT
Start: 2025-06-27 | End: 2025-06-27

## 2025-06-27 RX ADMIN — Medication 40 MILLILITER(S): at 06:12

## 2025-06-27 RX ADMIN — Medication 15 MILLILITER(S): at 06:11

## 2025-06-27 RX ADMIN — ATORVASTATIN CALCIUM 40 MILLIGRAM(S): 80 TABLET, FILM COATED ORAL at 22:47

## 2025-06-27 RX ADMIN — Medication 0.5 MILLIGRAM(S): at 06:32

## 2025-06-27 RX ADMIN — Medication 40 MILLILITER(S): at 13:52

## 2025-06-27 RX ADMIN — Medication 40 MILLIGRAM(S): at 17:21

## 2025-06-27 RX ADMIN — ENOXAPARIN SODIUM 40 MILLIGRAM(S): 100 INJECTION SUBCUTANEOUS at 13:46

## 2025-06-27 RX ADMIN — Medication 10 MILLIGRAM(S): at 16:43

## 2025-06-27 RX ADMIN — Medication 15 MILLILITER(S): at 17:23

## 2025-06-27 RX ADMIN — Medication 1 TABLET(S): at 11:40

## 2025-06-27 RX ADMIN — Medication 40 MILLIGRAM(S): at 06:12

## 2025-06-27 RX ADMIN — Medication 0.5 MILLIGRAM(S): at 00:20

## 2025-06-27 RX ADMIN — RANOLAZINE 500 MILLIGRAM(S): 1000 TABLET, FILM COATED, EXTENDED RELEASE ORAL at 06:11

## 2025-06-27 RX ADMIN — MIDODRINE HYDROCHLORIDE 20 MILLIGRAM(S): 5 TABLET ORAL at 06:12

## 2025-06-27 RX ADMIN — Medication 0.5 MILLIGRAM(S): at 06:11

## 2025-06-27 RX ADMIN — Medication 40 MILLILITER(S): at 22:47

## 2025-06-27 RX ADMIN — Medication 81 MILLIGRAM(S): at 11:40

## 2025-06-27 RX ADMIN — MIDODRINE HYDROCHLORIDE 20 MILLIGRAM(S): 5 TABLET ORAL at 13:49

## 2025-06-27 RX ADMIN — QUETIAPINE FUMARATE 25 MILLIGRAM(S): 25 TABLET ORAL at 22:47

## 2025-06-27 RX ADMIN — Medication 1 APPLICATION(S): at 05:30

## 2025-06-27 NOTE — PROGRESS NOTE ADULT - ASSESSMENT
A/P-   74 year old female with PMHx of HTN, HLD, IDDM2, CAD (s/p PCI), and recent right humerus fracture (placed in sling 3 days ago) who presented to the ED on 5/30/25 for complaints of elevated blood glucose.   As per med history provided by family  patient fell three days ago and landed on her right shoulder. Went to [facility] at that time and found to have right humerus fracture and placed in sling. Discharged home with outpatient orthopedic surgery follow up. Since then, patient has been refusing to get out of bed and not eating much.   In the ED, VSS. Hgb 10.3, sodium 132, potassium 6.3, BUN 70, Cr 2.02, blood glucose 510. VBG 7.29 / 58 / 34 / 26. COVID/influenza/RSV negative. CT head without acute intracranial findings. Received 1L NS bolus, 1L LR bolus, and insulin 20 U IV.  (30 May 2025 23:43)    s/p  RRT last night   for AMS, hypotension, fever of 103  and was started on sepsis w/u given IVF bolus, Abx with CTX and Vanco. Persistently hypotensive and obtunded. Started on levophed infusion. Urgently transferred to ICU.   was also intubated for airway protection    Infectious Disease consultation requested this afternoon 6/2 to help with antibiotic management  for ESBL bacteremia    Intubated   sedated  on pressors for hypotension  afebrile today  yesterday 103 t-max  + leukocytosis of 29K  Blood cx- ESBL GNR by PCR x 2  UA from 5/31-pos for nitrates  urine cx-pending  DUANE  elevated cardiac enzymes    per med records had ESBL e.coli UTI in august and sept 2024 and ID was not consulted     6/3-  remains Intubated and sedated  on pressors for hypotension  temp of 101.5 today  + leukocytosis trending down to 20k  creatinine slightly better today  Blood cx- ESBL GNR by PCR x 2  UA from 5/31-pos for nitrates  urine cx->100K e.coli  DUANE  elevated cardiac enzymes    6/4: seen in ICU earlier, remains intubated, weaning process in progress, on pressors, continues having fevers, T 101 this morning, leukocytosis is better 17.27, Cr better 1.35, UC and BCs grew ESBL E. Coli, repeat BCs are pending, Meropenem IV continued. If not improvement in pt's fevers tomorrow, most likely CT a/p will be obtained.   6/5: remains in ICU, intubated and sedated, on pressors, Temp persistent 100-100.2, WBC better 16.43, Cr normalized, LFTs ok, repeat BCs NGTD, procalcitonin 2.41, TTE performed - Left ventricular regional wall motion abnormalities present. Meropenem IV continued.     6/6-  extubated  awake  afebrile now but early am had temp of 100.6  Leukocytosis almost resolved 11K today  Blood cx- ESBL GNR by PCR x 2  UA from 5/31-pos for nitrates  urine cx->100K e.coli  repeat blood cx- NGTD x 2 from 6/4 6/9-  downgraded to medical floor  Afebrile  Leukocytosis 12 k today  Blood cx- ESBL GNR by PCR x 2  UA from 5/31-pos for nitrates  urine cx->100K e.coli  repeat blood cx- NGTD x 2 from 6/4 6/11: pt is afebrile since 6/6, doing well on RA, leukocytosis was elevated 13.57, Cr ok,  no new cbc, repeat BCs remain with no growth to date, s/p 8 days of IV Meropenem, now observed off abx.     6/12-  s/p RRT last night for AMS and hypotention and second RRT later for ? seizure and aspiration  s/p intubation and transferred to CCU  off pressors  intubated  afebrile but had fever of 100.2 at 5 am  leukocytosis has decreased to 11k  repeat blood cx - NGTD x 2  has completed course of meropenem  fo her ESBL bacteremia dn UTI   cxr- b/l pleural effusions L> R  CTA report-  No pulmonary embolism..  Tracheobronchial secretions, greatest in the left lower lobe.  Patchy and   nodular bilateral lung opacities may be infectious or inflammatory. Right   middle lobe subpleural 6 mm nodular opacity is new from prior .   Short-term follow-up CT recommended    6/13-   remains intubated on 50% Fio2  Afebrile   Minimal leukocytosis of 13K  creatinine- normal value  Repeat blood cx  from 6/4- NGTD x 2  repeat blood cx from 6/11- NGTD x 2  sputum  cx- commensal lina c/w body site  has completed course of meropenem  fo her ESBL bacteremia and UTI - which she cleared  cxr- b/l pleural effusions L> R  CTA report-  No pulmonary embolism..  Tracheobronchial secretions, greatest in the left lower lobe.  Patchy and   nodular bilateral lung opacities may be infectious or inflammatory. Right   middle lobe subpleural 6 mm nodular opacity is new from prior .   Short-term follow-up CT recommended  EEG- reported negative for epilepsy ( pls see full report)    6/16-  remains intubated on 30% Fio2  T-max-100.6 this am  No leukocytosis   creatinine- normal value  Repeat blood cx  from 6/4- NGTD x 2  repeat blood cx from 6/11- NGTD x 2  sputum  cx- commensal lina c/w body site  has completed course of meropenem  for her ESBL bacteremia and UTI - which she cleared  cxr- b/l pleural effusions L> R  CTA report-  No pulmonary embolism..  Tracheobronchial secretions, greatest in the left lower lobe.  Patchy and   nodular bilateral lung opacities may be infectious or inflammatory. Right   middle lobe subpleural 6 mm nodular opacity is new from prior .   Short-term follow-up CT recommended  EEG- reported negative for epilepsy ( pls see full report)    6/20-  T-max-101  No leukocytosis  Repeat blood cx  from 6/4- NGTD x 2  repeat blood cx from 6/11- NGTD x 2  sputum  cx- commensal lina c/w body site  ET tube cx-commensal lina c/w body site  has completed course of meropenem  for her ESBL bacteremia and UTI - which she cleared  cxr- b/l pleural effusions L> R  completed course of zosyn as well 2 days ago for presumed aspiration pneumonitis    6/23: remains in CCU, no fevers since 6/20, vented via tracheostomy no leukocytosis, repeat BCs NGTD, sputum culture grew Stenotrophomonas, will add abx, Bactrim via NGT.      6/26-  s/p trache and peg  awake  on 30 FIO2 via trache  low grade temp of 100 yesterday  afebrile today   repeat BCs NGTD  most recent sputum culture grew Stenotrophomonas ( sens to bactrim and levaquin)    6/27-  s/p trache and peg  awake, alert  on 30 FIO2 via trache  Afebrile  no leukocytosis   repeat BCs NGTD  most recent sputum culture grew Stenotrophomonas ( sens to bactrim and levaquin)      Impression-  possible aspiration pneumonitis -treated  ESBL septic shock -  was treated and had resolved  E.coli ESBL bacteremia sec to   source - treated   Right humerus fracture with hematoma  Stenotrophomonas in sputum - on bactrim  s/p trache and peg    Plan-  continue with  Bactrim solution via peg  to treat Stenotrophomonas - ET tube aspirate cx, day #5 , advise 2 more days ( to complete total of 7 days)  had completed course of IV meropenem for the ESBL bacteremia and sepsis and repeat blood cx are negative   also completed course of zosyn for ? asp pneumonitis  vent management as per CCU team  rest of the medical management as per CCU team    All labs and imaging and chart notes reviewed.       d/w ccu team    Tariq Fraga MD  Infectious Disease Attending    for any questions please do not hesitate to contact me either via teams or by calling [phone]

## 2025-06-27 NOTE — CHART NOTE - NSCHARTNOTEFT_GEN_A_CORE
Assessment:  Pt seen in CCU unit, asleep, trach-> vent, enteral feeding discontinued for altered GI function, c episode of vomiting this morning, reddish BM last night reported by RN.  Pt s/p trach 06/23, s/p PEG 06/25.  Pt adm c diagnosis of hyperglycemia, hospital course complicated by bacteremia, UTI, NSTEMI, cardiac arrest, acute respiratory failure, aspiration pneumonia, severe sepsis, septic shock, acute blood loss anemia.  Pt is DNR/Intube.  PMH include HTN, HLD, DM( Per outpatient medication review, pt was on metformin and insulin Aspart/rapid acting insulin,15 units @ bedtime), recent right humerus fracture prior to adm.    Factors impacting intake: [ ] none [ ] nausea  [x ] vomiting [ ] diarrhea [ ] constipation  [ ]chewing problems [ ] swallowing issues  [x ] other: acute illness    Diet Prescription: Diet, NPO:   Except Medications (06-27-25 @ 08:54)    Intake: 0%    Current Weight: 06/26, 73.6 kg, 06/25, 74.7 kg(drug dose/header @ present), 05/31, 70.4 kg daily wt./adm wt., c wt fluctuations and gain of 2.9 kg  % Weight Change: 4.1%  06/26, 2+( mild) generalized edema noted  I/O: 866/900(-34)    Pertinent Medications: MEDICATIONS  (STANDING):  aspirin  chewable 81 milliGRAM(s) Oral daily  atorvastatin 40 milliGRAM(s) Oral at bedtime  chlorhexidine 0.12% Liquid 15 milliLiter(s) Oral Mucosa every 12 hours  chlorhexidine 2% Cloths 1 Application(s) Topical <User Schedule>  enoxaparin Injectable 40 milliGRAM(s) SubCutaneous every 24 hours  insulin glargine Injectable (LANTUS) 30 Unit(s) SubCutaneous at bedtime  insulin lispro (ADMELOG) corrective regimen sliding scale   SubCutaneous every 6 hours  midodrine 20 milliGRAM(s) Oral every 8 hours  multivitamin 1 Tablet(s) Oral daily  pantoprazole  Injectable 40 milliGRAM(s) IV Push every 12 hours  QUEtiapine 25 milliGRAM(s) Oral at bedtime  ranolazine 500 milliGRAM(s) Oral two times a day  senna 2 Tablet(s) Oral at bedtime  trimethoprim  40 mG/sulfamethoxazole 200 mG Suspension 40 milliLiter(s) Oral every 8 hours    MEDICATIONS  (PRN):  acetaminophen     Tablet .. 650 milliGRAM(s) Oral every 6 hours PRN Temp greater or equal to 38C (100.4F), Mild Pain (1 - 3)  acetaminophen     Tablet .. 650 milliGRAM(s) Oral every 6 hours PRN Mild Pain (1 - 3)  aluminum hydroxide/magnesium hydroxide/simethicone Suspension 30 milliLiter(s) Oral every 6 hours PRN Dyspepsia  HYDROmorphone  Injectable 0.5 milliGRAM(s) IV Push every 3 hours PRN Severe Pain (7 - 10)  ondansetron Injectable 4 milliGRAM(s) IV Push every 8 hours PRN Nausea and/or Vomiting  oxyCODONE    Solution 5 milliGRAM(s) Oral every 6 hours PRN Moderate Pain (4 - 6)  polyethylene glycol 3350 17 Gram(s) Oral daily PRN Constipation    Pertinent Labs: 06-27 Na137 mmol/L Glu 92 mg/dL K+ 4.8 mmol/L Cr  1.31 mg/dL[H] BUN 41 mg/dL[H] 06-27 Phos 4.6 mg/dL[H] 06-27 Alb 1.6 g/dL[L]06-27 ALT 18 U/L AST 23 U/L Alkaline Phosphatase 100 U/L  06-27, low H/H noted, s/p transfusion   05/31, A1C=10.2% <H>   CAPILLARY BLOOD GLUCOSE      POCT Blood Glucose.: 115 mg/dL (27 Jun 2025 06:29)  POCT Blood Glucose.: 94 mg/dL (26 Jun 2025 23:12)  POCT Blood Glucose.: 108 mg/dL (26 Jun 2025 17:19)  POCT Blood Glucose.: 99 mg/dL (26 Jun 2025 11:18)    Skin:  Pressure injury x 1   1. 06/25, sacrum; suspected deep tissue injury     Estimated Needs:   [x ] no change since previous assessment(06/04), 06/20 recalculated for protein for pressure injury  IBW: 56.6 kg)  Estimated protein needs: 68-79 grams protein(1.2-1.4 gram protein/kg IBW)  [ ] recalculated:     Previous New Nutrition Diagnosis: (06/20)  [ x] Increased Nutrient Needs   Related to: increased demand for nutrient   As evidenced by: pressure injury  Goal: pt to meet >75% protein-energy needs via tolerated route; not met  Nutrition Diagnosis is [x ] ongoing  [ ] resolved [ ] not applicable       New Nutrition Diagnosis: [x ] not applicable     Interventions:   Recommend  [ ] Change Diet To:  [ ] Nutrition Supplement  [x ] Nutrition Support; when enteral feeding is resumed, recommend trial of Glucerna 1.2 @ 10 ml/hr x 18 hours and it tolerated, increase as tolerated by 5 ml q 8 hours to goal rate of 65 ml/hr x 18 hours=1170 ml, 1404 calories, 70 grams protein, 1017 ml free water.  [ x] Other: free water flushes as per medical discretion    Monitoring and Evaluation:   [ ] PO intake [ x ] Tolerance to diet prescription [ x ] weights [ x ] labs; glucose, renal indices[ x ] follow up per protocol  [ ] other:

## 2025-06-27 NOTE — PROGRESS NOTE ADULT - ASSESSMENT
74-year-old female with hypertension, hyperlipidemia, 2 diabetes, CAD status post multiple stents with a recent right humerus fracture roughly 3 days ago following a traumatic fall admitted to the medical ICU for altered mental status and hypotension.  Found to be in septic shock secondary to colitis and UTI found to have ESBL E. coli bacteremia.  Hospital course further complicated by cardiac arrest of unclear origin with ROSC after 10 minutes of CPR. Patient was extubated and transferred to the floors. Patient had RRT for aspiration and AMS and was intubated. Patient unable to wean off the vent, underwent trach/PEG for prolonged weaning.     Plan   Neuro: Patient is awake and alert and following commands.  Still has episodes of apnea while on spontaneous breathing trials.  Will continue to spontaneous breathing trials as tolerated.  Cardiovascular: Patient with a known history of CAD with stents in the past.  Continue with aspirin.  Patient previously had an NSTEMI on admission and was treated with heparin drip.  Was plan for further ischemic evaluation but now on hold given severe deconditioning and requiring tracheostomy and PEG. Continue with Ranexa.   Pulmonary: Patient developed chronic hypoxic respiratory failure most likely due to deconditioning.  Currently on the ventilator via tracheostomy.  Continue with spontaneous breathing trials as tolerated.  Trach care as per routine.  Maintain O2 saturation of 90%  GI: PEG tube placed for prolonged feedings.  Currently trickle feeds.  Will consult nutrition for recommendations.  Monitor for bowel movements.  Renal: Monitor creatinine.  Monitor electrolytes replete as needed.  Strict I's and O's.  ID: Patient on treatment for stenotrophomonas in the sputum culture concerning for tracheitis.  Currently on Bactrim until 6/29  Endo: Patient currently on Lantus 30 units at bedtime along with insulin sliding scale  Heme: Lovenox for DVT prophylaxis  Ethics: Patient is DNR but okay with long-term ventilation

## 2025-06-27 NOTE — PROGRESS NOTE ADULT - SUBJECTIVE AND OBJECTIVE BOX
[facility]  INFECTIOUS DISEASES   [address] [place] [address]  Tel: [phone]     Fax: [phone]  ==============================================================================  MD Jason Booth, CHLOE Burns M.D  ==============================================================================      EMMANUEL GONZALEZ  [id number]  74y ([birth year])      Interval History:    Patient seen and examined today in CCU.  patient is awake, alert and smiles and can follow simple commands    her family member is at bedside    on 30 % FIO2 via trache    afebrile      ROS:    [ ] Unobtainable because:  [ ] All other systems negative except as noted    Constitutional: no fever, no chills  Eyes: no vision changes, no eye pain  Cardiovascular:  no chest pain, no palpitation  Respiratory:  on vent via trache  GI:  no abd pain, no vomiting, no diarrhea  urinary: no dysuria, no hematuria, no flank pain  musculoskeletal:  no joint pain, no joint swelling  skin:  no rash  neurology:  no headache        Allergies  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/shellfish (Short breath)  No Known Drug Allergies        ANTIMICROBIALS:  trimethoprim  40 mG/sulfamethoxazole 200 mG Suspension 40 every 8 hours        Physical Exam:  Vital Signs Last 24 Hrs  T(C): 37.3 (27 Jun 2025 12:00), Max: 37.3 (27 Jun 2025 12:00)  T(F): 99.1 (27 Jun 2025 12:00), Max: 99.1 (27 Jun 2025 12:00)  HR: 64 (27 Jun 2025 13:00) (52 - 64)  BP: 86/76 (27 Jun 2025 13:00) (83/64 - 159/67)  BP(mean): 80 (27 Jun 2025 13:00) (53 - 123)  RR: 18 (27 Jun 2025 13:00) (2 - 23)  SpO2: 100% (27 Jun 2025 13:00) (97% - 100%)    Parameters below as of 27 Jun 2025 13:00  Patient On (Oxygen Delivery Method): ventilator, CPAP        06-26-25 @ 07:01  -  06-27-25 @ 07:00  --------------------------------------------------------  IN: 865.5 mL / OUT: 900 mL / NET: -34.5 mL    06-27-25 @ 07:01  -  06-27-25 @ 13:53  --------------------------------------------------------  IN: 20 mL / OUT: 0 mL / NET: 20 mL      Physical Exam:  General: awake and smiling today  HEENT: kayli, anicteric sclera  Neck: Tracheostomy in place c/d/i, on 30% FIO2  Respiratory: mechanical BS heard b/l  Cardiovascular: S1S2 RRR  Abdomen: soft, non tender, non distended, peg tube present, + BS  Extremities: warm, +1 edema b/l LE  Neurological:  awake and smiles and can nod to few questions and follow simple commands        WBC Count: 6.77 K/uL (06-27 @ 10:00)  WBC Count: 6.41 K/uL (06-27 @ 03:06)  WBC Count: 7.26 K/uL (06-26 @ 20:36)  WBC Count: 7.85 K/uL (06-26 @ 04:20)  WBC Count: 7.19 K/uL (06-25 @ 03:30)  WBC Count: 6.43 K/uL (06-24 @ 03:21)  WBC Count: 6.39 K/uL (06-23 @ 03:41)                            8.8    6.77  )-----------( 415      ( 27 Jun 2025 10:00 )             27.6       06-27    137  |  108  |  41[H]  ----------------------------<  92  4.8   |  23  |  1.31[H]    Ca    7.8[L]      27 Jun 2025 03:06  Phos  4.6     06-27  Mg     2.5     06-27    TPro  6.3  /  Alb  1.6[L]  /  TBili  0.5  /  DBili  x   /  AST  23  /  ALT  18  /  AlkPhos  100  06-27      Urinalysis Basic - ( 27 Jun 2025 03:06 )    Color: x / Appearance: x / SG: x / pH: x  Gluc: 92 mg/dL / Ketone: x  / Bili: x / Urobili: x   Blood: x / Protein: x / Nitrite: x   Leuk Esterase: x / RBC: x / WBC x   Sq Epi: x / Non Sq Epi: x / Bacteria: x          Creatinine Trend: 1.31<--, 1.30<--, 1.37<--, 1.24<--, 0.86<--, 0.82<--      MICROBIOLOGY:    Blood Blood-Peripheral  06-20-25   No growth at 5 days  --  --      Blood Blood-Peripheral  06-20-25   No growth at 5 days  --  --      ET Tube ET Tube  06-20-25   Moderate Stenotrophomonas maltophilia  Commensal lina consistent with body site  --  Stenotrophomonas maltophilia      ET Tube ET Tube  06-13-25   Commensal lina consistent with body site  --    No Squamous epithelial cells per low power field  Numerous polymorphonuclear leukocytes per low power field  Few Yeast per oil power field      Sputum Sputum  06-12-25   Commensal lina consistent with body site  --    Few Squamous epithelial cells per low power field  Numerous polymorphonuclear leukocytes per low power field  Yeast per oil power field      Catheterized Catheterized  06-11-25   <10,000 CFU/mL Normal Urogenital Lina  --  --      Blood Blood-Peripheral  06-11-25   No growth at 5 days  --  --      Blood Blood-Peripheral  06-11-25   No growth at 5 days  --  --      Blood Blood-Peripheral  06-04-25   No growth at 5 days  --  --      Catheterized Catheterized  06-02-25   >100,000 CFU/ml Escherichia coli ESBL  --  Escherichia coli ESBL      Blood Blood-Peripheral  06-01-25   Growth in aerobic and anaerobic bottles: Escherichia coli ESBL  See previous culture [id number]  --    Growth in aerobic bottle: Gram Negative Rods  Growth in anaerobic bottle: Gram Negative Rods      Blood Blood-Peripheral  06-01-25   Growth in aerobic and anaerobic bottles: Escherichia coli ESBL  Direct identification is available within approximately 3-5  hours either by Blood Panel Multiplexed PCR or Direct  MALDI-TOF. Details: [email or web address]  --  Blood Culture PCR  Escherichia coli ESBL        RADIOLOGY:

## 2025-06-27 NOTE — PROGRESS NOTE ADULT - SUBJECTIVE AND OBJECTIVE BOX
CHIEF COMPLAINT:    Interval Events: Patient awake and alert and following commands. The patient complaining of pain around the trach site     REVIEW OF SYSTEMS:    Unable to perform ROS due to tracheostomy       OBJECTIVE:  ICU Vital Signs Last 24 Hrs  T(C): 36.2 (27 Jun 2025 07:47), Max: 37.3 (26 Jun 2025 11:14)  T(F): 97.2 (27 Jun 2025 07:47), Max: 99.2 (26 Jun 2025 11:14)  HR: 63 (27 Jun 2025 10:00) (52 - 63)  BP: 124/83 (27 Jun 2025 10:00) (87/50 - 159/67)  BP(mean): 92 (27 Jun 2025 10:00) (53 - 123)  ABP: --  ABP(mean): --  RR: 16 (27 Jun 2025 10:00) (2 - 23)  SpO2: 100% (27 Jun 2025 10:00) (97% - 100%)    O2 Parameters below as of 27 Jun 2025 10:00  Patient On (Oxygen Delivery Method): ventilator    O2 Concentration (%): 30      Mode: AC/ CMV (Assist Control/ Continuous Mandatory Ventilation), RR (machine): 15, TV (machine): 400, FiO2: 30, PEEP: 6, ITime: 0.9, MAP: 11, PIP: 29    06-26 @ 07:01  -  06-27 @ 07:00  --------------------------------------------------------  IN: 865.5 mL / OUT: 900 mL / NET: -34.5 mL    06-27 @ 07:01  -  06-27 @ 11:07  --------------------------------------------------------  IN: 20 mL / OUT: 0 mL / NET: 20 mL      CAPILLARY BLOOD GLUCOSE      POCT Blood Glucose.: 115 mg/dL (27 Jun 2025 06:29)      PHYSICAL EXAM:  GENERAL: NAD, well-groomed, well-developed  EYES: EOMI, PERRLA, conjunctiva and sclera clear  ENMT: No tonsillar erythema, exudates, or enlargement; Moist mucous membranes, Good dentition, No lesions  NECK: trach in place   CHEST/LUNG: Clear to auscultation bilaterally; No rales, rhonchi, wheezing, or rubs  HEART: Regular rate and rhythm; No murmurs, rubs, or gallops  ABDOMEN: Soft, Nontender, Nondistended; Bowel sounds present  VASCULAR:  2+ Peripheral Pulses, No clubbing, cyanosis, or edema  LYMPH: No lymphadenopathy noted  SKIN: No rashes or lesions  NERVOUS SYSTEM:  Alert & Oriented X3    LINES:    HOSPITAL MEDICATIONS:  aspirin  chewable 81 milliGRAM(s) Oral daily  enoxaparin Injectable 40 milliGRAM(s) SubCutaneous every 24 hours    trimethoprim  40 mG/sulfamethoxazole 200 mG Suspension 40 milliLiter(s) Oral every 8 hours    midodrine 20 milliGRAM(s) Oral every 8 hours  ranolazine 500 milliGRAM(s) Oral two times a day    atorvastatin 40 milliGRAM(s) Oral at bedtime  insulin glargine Injectable (LANTUS) 30 Unit(s) SubCutaneous at bedtime  insulin lispro (ADMELOG) corrective regimen sliding scale   SubCutaneous every 6 hours      acetaminophen     Tablet .. 650 milliGRAM(s) Oral every 6 hours PRN  acetaminophen     Tablet .. 650 milliGRAM(s) Oral every 6 hours PRN  HYDROmorphone  Injectable 0.5 milliGRAM(s) IV Push every 3 hours PRN  ondansetron Injectable 4 milliGRAM(s) IV Push every 8 hours PRN  oxyCODONE    Solution 5 milliGRAM(s) Oral every 6 hours PRN  QUEtiapine 25 milliGRAM(s) Oral at bedtime    aluminum hydroxide/magnesium hydroxide/simethicone Suspension 30 milliLiter(s) Oral every 6 hours PRN  pantoprazole  Injectable 40 milliGRAM(s) IV Push every 12 hours  polyethylene glycol 3350 17 Gram(s) Oral daily PRN  senna 2 Tablet(s) Oral at bedtime        multivitamin 1 Tablet(s) Oral daily      chlorhexidine 0.12% Liquid 15 milliLiter(s) Oral Mucosa every 12 hours  chlorhexidine 2% Cloths 1 Application(s) Topical <User Schedule>        LABS:                        8.8    6.77  )-----------( 415      ( 27 Jun 2025 10:00 )             27.6     Hgb Trend: 8.8<--, 6.9<--, 7.1<--, 7.2<--, 7.5<--  06-27    137  |  108  |  41[H]  ----------------------------<  92  4.8   |  23  |  1.31[H]    Ca    7.8[L]      27 Jun 2025 03:06  Phos  4.6     06-27  Mg     2.5     06-27    TPro  6.3  /  Alb  1.6[L]  /  TBili  0.5  /  DBili  x   /  AST  23  /  ALT  18  /  AlkPhos  100  06-27    Creatinine Trend: 1.31<--, 1.30<--, 1.37<--, 1.24<--, 0.86<--, 0.82<--    Urinalysis Basic - ( 27 Jun 2025 03:06 )    Color: x / Appearance: x / SG: x / pH: x  Gluc: 92 mg/dL / Ketone: x  / Bili: x / Urobili: x   Blood: x / Protein: x / Nitrite: x   Leuk Esterase: x / RBC: x / WBC x   Sq Epi: x / Non Sq Epi: x / Bacteria: x            MICROBIOLOGY:     RADIOLOGY:  [ ] Reviewed and interpreted by me    EKG:

## 2025-06-28 LAB
ALBUMIN SERPL ELPH-MCNC: 1.7 G/DL — LOW (ref 3.3–5)
ALP SERPL-CCNC: 101 U/L — SIGNIFICANT CHANGE UP (ref 40–120)
ALT FLD-CCNC: 17 U/L — SIGNIFICANT CHANGE UP (ref 12–78)
ANION GAP SERPL CALC-SCNC: 8 MMOL/L — SIGNIFICANT CHANGE UP (ref 5–17)
AST SERPL-CCNC: 21 U/L — SIGNIFICANT CHANGE UP (ref 15–37)
BASOPHILS # BLD AUTO: 0.03 K/UL — SIGNIFICANT CHANGE UP (ref 0–0.2)
BASOPHILS # BLD AUTO: 0.04 K/UL — SIGNIFICANT CHANGE UP (ref 0–0.2)
BASOPHILS NFR BLD AUTO: 0.4 % — SIGNIFICANT CHANGE UP (ref 0–2)
BASOPHILS NFR BLD AUTO: 0.5 % — SIGNIFICANT CHANGE UP (ref 0–2)
BILIRUB SERPL-MCNC: 0.4 MG/DL — SIGNIFICANT CHANGE UP (ref 0.2–1.2)
BUN SERPL-MCNC: 34 MG/DL — HIGH (ref 7–23)
CALCIUM SERPL-MCNC: 7.9 MG/DL — LOW (ref 8.5–10.1)
CHLORIDE SERPL-SCNC: 109 MMOL/L — HIGH (ref 96–108)
CO2 SERPL-SCNC: 21 MMOL/L — LOW (ref 22–31)
CREAT SERPL-MCNC: 1.25 MG/DL — SIGNIFICANT CHANGE UP (ref 0.5–1.3)
EGFR: 45 ML/MIN/1.73M2 — LOW
EGFR: 45 ML/MIN/1.73M2 — LOW
EOSINOPHIL # BLD AUTO: 0.07 K/UL — SIGNIFICANT CHANGE UP (ref 0–0.5)
EOSINOPHIL # BLD AUTO: 0.08 K/UL — SIGNIFICANT CHANGE UP (ref 0–0.5)
EOSINOPHIL NFR BLD AUTO: 0.9 % — SIGNIFICANT CHANGE UP (ref 0–6)
EOSINOPHIL NFR BLD AUTO: 1.1 % — SIGNIFICANT CHANGE UP (ref 0–6)
GLUCOSE BLDC GLUCOMTR-MCNC: 100 MG/DL — HIGH (ref 70–99)
GLUCOSE BLDC GLUCOMTR-MCNC: 102 MG/DL — HIGH (ref 70–99)
GLUCOSE BLDC GLUCOMTR-MCNC: 105 MG/DL — HIGH (ref 70–99)
GLUCOSE BLDC GLUCOMTR-MCNC: 108 MG/DL — HIGH (ref 70–99)
GLUCOSE BLDC GLUCOMTR-MCNC: 98 MG/DL — SIGNIFICANT CHANGE UP (ref 70–99)
GLUCOSE SERPL-MCNC: 90 MG/DL — SIGNIFICANT CHANGE UP (ref 70–99)
HCT VFR BLD CALC: 25.7 % — LOW (ref 34.5–45)
HCT VFR BLD CALC: 26.2 % — LOW (ref 34.5–45)
HGB BLD-MCNC: 8 G/DL — LOW (ref 11.5–15.5)
HGB BLD-MCNC: 8.3 G/DL — LOW (ref 11.5–15.5)
IMM GRANULOCYTES NFR BLD AUTO: 1.3 % — HIGH (ref 0–0.9)
IMM GRANULOCYTES NFR BLD AUTO: 1.5 % — HIGH (ref 0–0.9)
LYMPHOCYTES # BLD AUTO: 1.01 K/UL — SIGNIFICANT CHANGE UP (ref 1–3.3)
LYMPHOCYTES # BLD AUTO: 1.54 K/UL — SIGNIFICANT CHANGE UP (ref 1–3.3)
LYMPHOCYTES # BLD AUTO: 13.5 % — SIGNIFICANT CHANGE UP (ref 13–44)
LYMPHOCYTES # BLD AUTO: 20.4 % — SIGNIFICANT CHANGE UP (ref 13–44)
MAGNESIUM SERPL-MCNC: 2.4 MG/DL — SIGNIFICANT CHANGE UP (ref 1.6–2.6)
MCHC RBC-ENTMCNC: 28.2 PG — SIGNIFICANT CHANGE UP (ref 27–34)
MCHC RBC-ENTMCNC: 28.9 PG — SIGNIFICANT CHANGE UP (ref 27–34)
MCHC RBC-ENTMCNC: 31.1 G/DL — LOW (ref 32–36)
MCHC RBC-ENTMCNC: 31.7 G/DL — LOW (ref 32–36)
MCV RBC AUTO: 90.5 FL — SIGNIFICANT CHANGE UP (ref 80–100)
MCV RBC AUTO: 91.3 FL — SIGNIFICANT CHANGE UP (ref 80–100)
MONOCYTES # BLD AUTO: 0.62 K/UL — SIGNIFICANT CHANGE UP (ref 0–0.9)
MONOCYTES # BLD AUTO: 0.7 K/UL — SIGNIFICANT CHANGE UP (ref 0–0.9)
MONOCYTES NFR BLD AUTO: 8.2 % — SIGNIFICANT CHANGE UP (ref 2–14)
MONOCYTES NFR BLD AUTO: 9.4 % — SIGNIFICANT CHANGE UP (ref 2–14)
NEUTROPHILS # BLD AUTO: 5.16 K/UL — SIGNIFICANT CHANGE UP (ref 1.8–7.4)
NEUTROPHILS # BLD AUTO: 5.55 K/UL — SIGNIFICANT CHANGE UP (ref 1.8–7.4)
NEUTROPHILS NFR BLD AUTO: 68.4 % — SIGNIFICANT CHANGE UP (ref 43–77)
NEUTROPHILS NFR BLD AUTO: 74.4 % — SIGNIFICANT CHANGE UP (ref 43–77)
NRBC BLD AUTO-RTO: 0 /100 WBCS — SIGNIFICANT CHANGE UP (ref 0–0)
NRBC BLD AUTO-RTO: 0 /100 WBCS — SIGNIFICANT CHANGE UP (ref 0–0)
PHOSPHATE SERPL-MCNC: 4 MG/DL — SIGNIFICANT CHANGE UP (ref 2.5–4.5)
PLATELET # BLD AUTO: 367 K/UL — SIGNIFICANT CHANGE UP (ref 150–400)
PLATELET # BLD AUTO: 390 K/UL — SIGNIFICANT CHANGE UP (ref 150–400)
POTASSIUM SERPL-MCNC: 4.7 MMOL/L — SIGNIFICANT CHANGE UP (ref 3.5–5.3)
POTASSIUM SERPL-SCNC: 4.7 MMOL/L — SIGNIFICANT CHANGE UP (ref 3.5–5.3)
PROT SERPL-MCNC: 6.4 GM/DL — SIGNIFICANT CHANGE UP (ref 6–8.3)
RBC # BLD: 2.84 M/UL — LOW (ref 3.8–5.2)
RBC # BLD: 2.87 M/UL — LOW (ref 3.8–5.2)
RBC # FLD: 15.8 % — HIGH (ref 10.3–14.5)
RBC # FLD: 15.9 % — HIGH (ref 10.3–14.5)
SODIUM SERPL-SCNC: 138 MMOL/L — SIGNIFICANT CHANGE UP (ref 135–145)
WBC # BLD: 7.47 K/UL — SIGNIFICANT CHANGE UP (ref 3.8–10.5)
WBC # BLD: 7.54 K/UL — SIGNIFICANT CHANGE UP (ref 3.8–10.5)
WBC # FLD AUTO: 7.47 K/UL — SIGNIFICANT CHANGE UP (ref 3.8–10.5)
WBC # FLD AUTO: 7.54 K/UL — SIGNIFICANT CHANGE UP (ref 3.8–10.5)

## 2025-06-28 PROCEDURE — 99291 CRITICAL CARE FIRST HOUR: CPT

## 2025-06-28 RX ORDER — SIMETHICONE 80 MG
80 TABLET,CHEWABLE ORAL DAILY
Refills: 0 | Status: DISCONTINUED | OUTPATIENT
Start: 2025-06-28 | End: 2025-07-03

## 2025-06-28 RX ADMIN — MIDODRINE HYDROCHLORIDE 20 MILLIGRAM(S): 5 TABLET ORAL at 22:34

## 2025-06-28 RX ADMIN — RANOLAZINE 500 MILLIGRAM(S): 1000 TABLET, FILM COATED, EXTENDED RELEASE ORAL at 17:20

## 2025-06-28 RX ADMIN — RANOLAZINE 500 MILLIGRAM(S): 1000 TABLET, FILM COATED, EXTENDED RELEASE ORAL at 06:37

## 2025-06-28 RX ADMIN — Medication 15 MILLILITER(S): at 17:21

## 2025-06-28 RX ADMIN — ENOXAPARIN SODIUM 40 MILLIGRAM(S): 100 INJECTION SUBCUTANEOUS at 14:11

## 2025-06-28 RX ADMIN — Medication 15 MILLILITER(S): at 06:39

## 2025-06-28 RX ADMIN — Medication 1 APPLICATION(S): at 06:37

## 2025-06-28 RX ADMIN — Medication 40 MILLILITER(S): at 06:37

## 2025-06-28 RX ADMIN — Medication 40 MILLILITER(S): at 13:58

## 2025-06-28 RX ADMIN — Medication 40 MILLIGRAM(S): at 17:21

## 2025-06-28 RX ADMIN — Medication 80 MILLIGRAM(S): at 13:57

## 2025-06-28 RX ADMIN — Medication 40 MILLIGRAM(S): at 06:38

## 2025-06-28 RX ADMIN — Medication 650 MILLIGRAM(S): at 23:33

## 2025-06-28 RX ADMIN — QUETIAPINE FUMARATE 25 MILLIGRAM(S): 25 TABLET ORAL at 22:34

## 2025-06-28 RX ADMIN — Medication 1 TABLET(S): at 11:38

## 2025-06-28 RX ADMIN — Medication 650 MILLIGRAM(S): at 22:33

## 2025-06-28 RX ADMIN — Medication 81 MILLIGRAM(S): at 11:38

## 2025-06-28 RX ADMIN — Medication 2 TABLET(S): at 22:34

## 2025-06-28 RX ADMIN — ATORVASTATIN CALCIUM 40 MILLIGRAM(S): 80 TABLET, FILM COATED ORAL at 22:33

## 2025-06-28 RX ADMIN — Medication 40 MILLILITER(S): at 22:34

## 2025-06-28 NOTE — PROGRESS NOTE ADULT - ASSESSMENT
74-year-old female with hypertension, hyperlipidemia, 2 diabetes, CAD status post multiple stents with a recent right humerus fracture roughly 3 days ago following a traumatic fall admitted to the medical ICU for altered mental status and hypotension.  Found to be in septic shock secondary to colitis and UTI found to have ESBL E. coli bacteremia.  Hospital course further complicated by cardiac arrest of unclear origin with ROSC after 10 minutes of CPR. Patient was extubated and transferred to the floors. Patient had RRT for aspiration and AMS and was intubated. Patient unable to wean off the vent, underwent trach/PEG for prolonged weaning.     Plan   Neuro: Patient is awake and alert and following commands.   Will continue to spontaneous breathing trials as tolerated.  Cardiovascular: Patient with a known history of CAD with stents in the past.  Continue with aspirin.  Patient previously had an NSTEMI on admission and was treated with heparin drip.  Was plan for further ischemic evaluation but now on hold given severe deconditioning and requiring tracheostomy and PEG. Continue with Ranexa.   Pulmonary: Patient developed chronic hypoxic respiratory failure most likely due to deconditioning.  Currently on the ventilator via tracheostomy.  Continue with spontaneous breathing trials as tolerated.  Trach care as per routine.  Maintain O2 saturation of 90%  GI: PEG tube placed for prolonged feedings.  Currently trickle feeds.  Monitor for bowel movements. Protonix 40mg IV BID. Start simethacone 80mg once a day for gas   Renal: Monitor creatinine.  Monitor electrolytes replete as needed.  Strict I's and O's.  ID: Patient on treatment for stenotrophomonas in the sputum culture concerning for tracheitis.  Currently on Bactrim until 6/29  Endo: Patient currently on Lantus 30 units at bedtime along with insulin sliding scale  Heme: Lovenox for DVT prophylaxis. Check CBC q12h for anemia   Ethics: Patient is DNR but okay with long-term ventilation

## 2025-06-28 NOTE — PROGRESS NOTE ADULT - SUBJECTIVE AND OBJECTIVE BOX
CHIEF COMPLAINT:    Interval Events: Patient is awake and alert. Small amount of melena     REVIEW OF SYSTEMS:     Unable to perform ROS due to trach       OBJECTIVE:  ICU Vital Signs Last 24 Hrs  T(C): 37.3 (28 Jun 2025 09:00), Max: 37.6 (28 Jun 2025 04:30)  T(F): 99.2 (28 Jun 2025 09:00), Max: 99.7 (28 Jun 2025 04:30)  HR: 72 (28 Jun 2025 10:00) (62 - 80)  BP: 147/63 (28 Jun 2025 10:00) (83/64 - 152/69)  BP(mean): 87 (28 Jun 2025 10:00) (64 - 121)  ABP: --  ABP(mean): --  RR: 18 (28 Jun 2025 10:00) (9 - 49)  SpO2: 100% (28 Jun 2025 10:00) (98% - 100%)    O2 Parameters below as of 28 Jun 2025 10:00  Patient On (Oxygen Delivery Method): ventilator, CPAP    O2 Concentration (%): 30      Mode: CPAP with PS, FiO2: 30, PEEP: 6, PS: 12, MAP: 10    06-27 @ 07:01  -  06-28 @ 07:00  --------------------------------------------------------  IN: 20 mL / OUT: 1050 mL / NET: -1030 mL      CAPILLARY BLOOD GLUCOSE      POCT Blood Glucose.: 102 mg/dL (28 Jun 2025 05:28)      PHYSICAL EXAM:  GENERAL: NAD, well-groomed, well-developed  EYES: EOMI, PERRLA, conjunctiva and sclera clear  ENMT: No tonsillar erythema, exudates, or enlargement; Moist mucous membranes, Good dentition, No lesions  NECK: tracheostomy in place   CHEST/LUNG: Clear to auscultation bilaterally; No rales, rhonchi, wheezing, or rubs  HEART: Regular rate and rhythm; No murmurs, rubs, or gallops  ABDOMEN: Soft, Nontender, Nondistended; Bowel sounds present  VASCULAR:  2+ Peripheral Pulses, No clubbing, cyanosis, or edema  LYMPH: No lymphadenopathy noted  SKIN: No rashes or lesions  NERVOUS SYSTEM:  Alert & Oriented X3, Good concentration    LINES:    HOSPITAL MEDICATIONS:  aspirin  chewable 81 milliGRAM(s) Oral daily  enoxaparin Injectable 40 milliGRAM(s) SubCutaneous every 24 hours    trimethoprim  40 mG/sulfamethoxazole 200 mG Suspension 40 milliLiter(s) Oral every 8 hours    midodrine 20 milliGRAM(s) Oral every 8 hours  ranolazine 500 milliGRAM(s) Oral two times a day    atorvastatin 40 milliGRAM(s) Oral at bedtime  insulin glargine Injectable (LANTUS) 30 Unit(s) SubCutaneous at bedtime  insulin lispro (ADMELOG) corrective regimen sliding scale   SubCutaneous every 6 hours      acetaminophen     Tablet .. 650 milliGRAM(s) Oral every 6 hours PRN  acetaminophen     Tablet .. 650 milliGRAM(s) Oral every 6 hours PRN  HYDROmorphone  Injectable 0.5 milliGRAM(s) IV Push every 3 hours PRN  ondansetron Injectable 4 milliGRAM(s) IV Push every 8 hours PRN  oxyCODONE    Solution 5 milliGRAM(s) Oral every 6 hours PRN  QUEtiapine 25 milliGRAM(s) Oral at bedtime    aluminum hydroxide/magnesium hydroxide/simethicone Suspension 30 milliLiter(s) Oral every 6 hours PRN  pantoprazole  Injectable 40 milliGRAM(s) IV Push every 12 hours  polyethylene glycol 3350 17 Gram(s) Oral daily PRN  senna 2 Tablet(s) Oral at bedtime  simethicone 80 milliGRAM(s) Chew daily        multivitamin 1 Tablet(s) Oral daily      chlorhexidine 0.12% Liquid 15 milliLiter(s) Oral Mucosa every 12 hours  chlorhexidine 2% Cloths 1 Application(s) Topical <User Schedule>        LABS:                        8.0    7.47  )-----------( 367      ( 28 Jun 2025 04:18 )             25.7     Hgb Trend: 8.0<--, 8.8<--, 6.9<--, 7.1<--, 7.2<--  06-28    138  |  109[H]  |  34[H]  ----------------------------<  90  4.7   |  21[L]  |  1.25    Ca    7.9[L]      28 Jun 2025 04:18  Phos  4.0     06-28  Mg     2.4     06-28    TPro  6.4  /  Alb  1.7[L]  /  TBili  0.4  /  DBili  x   /  AST  21  /  ALT  17  /  AlkPhos  101  06-28    Creatinine Trend: 1.25<--, 1.31<--, 1.30<--, 1.37<--, 1.24<--, 0.86<--    Urinalysis Basic - ( 28 Jun 2025 04:18 )    Color: x / Appearance: x / SG: x / pH: x  Gluc: 90 mg/dL / Ketone: x  / Bili: x / Urobili: x   Blood: x / Protein: x / Nitrite: x   Leuk Esterase: x / RBC: x / WBC x   Sq Epi: x / Non Sq Epi: x / Bacteria: x            MICROBIOLOGY:     RADIOLOGY:  [ ] Reviewed and interpreted by me    EKG:

## 2025-06-29 LAB
ALBUMIN SERPL ELPH-MCNC: 1.7 G/DL — LOW (ref 3.3–5)
ALP SERPL-CCNC: 98 U/L — SIGNIFICANT CHANGE UP (ref 40–120)
ALT FLD-CCNC: 14 U/L — SIGNIFICANT CHANGE UP (ref 12–78)
ANION GAP SERPL CALC-SCNC: 8 MMOL/L — SIGNIFICANT CHANGE UP (ref 5–17)
AST SERPL-CCNC: 18 U/L — SIGNIFICANT CHANGE UP (ref 15–37)
BILIRUB SERPL-MCNC: 0.4 MG/DL — SIGNIFICANT CHANGE UP (ref 0.2–1.2)
BUN SERPL-MCNC: 34 MG/DL — HIGH (ref 7–23)
CALCIUM SERPL-MCNC: 7.8 MG/DL — LOW (ref 8.5–10.1)
CHLORIDE SERPL-SCNC: 108 MMOL/L — SIGNIFICANT CHANGE UP (ref 96–108)
CO2 SERPL-SCNC: 21 MMOL/L — LOW (ref 22–31)
CREAT SERPL-MCNC: 1.27 MG/DL — SIGNIFICANT CHANGE UP (ref 0.5–1.3)
EGFR: 44 ML/MIN/1.73M2 — LOW
EGFR: 44 ML/MIN/1.73M2 — LOW
GLUCOSE BLDC GLUCOMTR-MCNC: 105 MG/DL — HIGH (ref 70–99)
GLUCOSE BLDC GLUCOMTR-MCNC: 109 MG/DL — HIGH (ref 70–99)
GLUCOSE BLDC GLUCOMTR-MCNC: 122 MG/DL — HIGH (ref 70–99)
GLUCOSE BLDC GLUCOMTR-MCNC: 125 MG/DL — HIGH (ref 70–99)
GLUCOSE SERPL-MCNC: 96 MG/DL — SIGNIFICANT CHANGE UP (ref 70–99)
HCT VFR BLD CALC: 24.6 % — LOW (ref 34.5–45)
HGB BLD-MCNC: 7.9 G/DL — LOW (ref 11.5–15.5)
MCHC RBC-ENTMCNC: 28.8 PG — SIGNIFICANT CHANGE UP (ref 27–34)
MCHC RBC-ENTMCNC: 32.1 G/DL — SIGNIFICANT CHANGE UP (ref 32–36)
MCV RBC AUTO: 89.8 FL — SIGNIFICANT CHANGE UP (ref 80–100)
NRBC BLD AUTO-RTO: 0 /100 WBCS — SIGNIFICANT CHANGE UP (ref 0–0)
PLATELET # BLD AUTO: 375 K/UL — SIGNIFICANT CHANGE UP (ref 150–400)
POTASSIUM SERPL-MCNC: 4.6 MMOL/L — SIGNIFICANT CHANGE UP (ref 3.5–5.3)
POTASSIUM SERPL-SCNC: 4.6 MMOL/L — SIGNIFICANT CHANGE UP (ref 3.5–5.3)
PROT SERPL-MCNC: 6.3 GM/DL — SIGNIFICANT CHANGE UP (ref 6–8.3)
RBC # BLD: 2.74 M/UL — LOW (ref 3.8–5.2)
RBC # FLD: 15.7 % — HIGH (ref 10.3–14.5)
SODIUM SERPL-SCNC: 137 MMOL/L — SIGNIFICANT CHANGE UP (ref 135–145)
WBC # BLD: 8.46 K/UL — SIGNIFICANT CHANGE UP (ref 3.8–10.5)
WBC # FLD AUTO: 8.46 K/UL — SIGNIFICANT CHANGE UP (ref 3.8–10.5)

## 2025-06-29 PROCEDURE — 99291 CRITICAL CARE FIRST HOUR: CPT

## 2025-06-29 PROCEDURE — 74177 CT ABD & PELVIS W/CONTRAST: CPT | Mod: 26

## 2025-06-29 PROCEDURE — 74018 RADEX ABDOMEN 1 VIEW: CPT | Mod: 26

## 2025-06-29 RX ORDER — DIATRIZOATE MEGLUMINE, SODIUM 66 %-10 %
30 VIAL (ML) INJECTION ONCE
Refills: 0 | Status: COMPLETED | OUTPATIENT
Start: 2025-06-29 | End: 2025-06-29

## 2025-06-29 RX ORDER — METOCLOPRAMIDE HCL 10 MG
10 TABLET ORAL DAILY
Refills: 0 | Status: DISCONTINUED | OUTPATIENT
Start: 2025-06-29 | End: 2025-06-30

## 2025-06-29 RX ADMIN — Medication 80 MILLIGRAM(S): at 11:23

## 2025-06-29 RX ADMIN — Medication 40 MILLILITER(S): at 05:36

## 2025-06-29 RX ADMIN — Medication 1 APPLICATION(S): at 05:35

## 2025-06-29 RX ADMIN — ENOXAPARIN SODIUM 40 MILLIGRAM(S): 100 INJECTION SUBCUTANEOUS at 14:06

## 2025-06-29 RX ADMIN — Medication 15 MILLILITER(S): at 05:35

## 2025-06-29 RX ADMIN — Medication 81 MILLIGRAM(S): at 11:24

## 2025-06-29 RX ADMIN — Medication 40 MILLILITER(S): at 14:06

## 2025-06-29 RX ADMIN — MIDODRINE HYDROCHLORIDE 20 MILLIGRAM(S): 5 TABLET ORAL at 05:35

## 2025-06-29 RX ADMIN — Medication 40 MILLIGRAM(S): at 05:36

## 2025-06-29 RX ADMIN — Medication 1 TABLET(S): at 11:24

## 2025-06-29 RX ADMIN — Medication 15 MILLILITER(S): at 17:23

## 2025-06-29 RX ADMIN — Medication 30 MILLILITER(S): at 16:31

## 2025-06-29 RX ADMIN — RANOLAZINE 500 MILLIGRAM(S): 1000 TABLET, FILM COATED, EXTENDED RELEASE ORAL at 05:35

## 2025-06-29 RX ADMIN — Medication 10 MILLIGRAM(S): at 12:31

## 2025-06-29 RX ADMIN — Medication 40 MILLIGRAM(S): at 17:23

## 2025-06-29 NOTE — PROGRESS NOTE ADULT - CRITICAL CARE ATTENDING COMMENT
Medical management as above, reviewing chart and coordinating care with primary team/staff, as well as reviewing vitals, radiology, medication list, recent labs, and prior records.

## 2025-06-29 NOTE — PROGRESS NOTE ADULT - ASSESSMENT
74-year-old female with hypertension, hyperlipidemia, 2 diabetes, CAD status post multiple stents with a recent right humerus fracture roughly 3 days ago following a traumatic fall admitted to the medical ICU for altered mental status and hypotension.  Found to be in septic shock secondary to colitis and UTI found to have ESBL E. coli bacteremia.  Hospital course further complicated by cardiac arrest of unclear origin with ROSC after 10 minutes of CPR. Patient was extubated and transferred to the floors. Patient had RRT for aspiration and AMS and was intubated. Patient unable to wean off the vent, underwent trach/PEG for prolonged weaning.     Plan   Neuro: Patient is awake and alert and following commands.   Will continue to spontaneous breathing trials as tolerated.  Cardiovascular: Patient with a known history of CAD with stents in the past.  Continue with aspirin.  Patient previously had an NSTEMI on admission and was treated with heparin drip.  Was plan for further ischemic evaluation but now on hold given severe deconditioning and requiring tracheostomy and PEG. Continue with Ranexa.   Pulmonary: Patient developed chronic hypoxic respiratory failure most likely due to deconditioning.  Currently on the ventilator via tracheostomy.  Continue with spontaneous breathing trials as tolerated.  Trach care as per routine.  Maintain O2 saturation of 90%  GI: PEG tube placed for prolonged feedings.  Currently trickle feeds. Will increase to goal rate. Monitor for bowel movements. Protonix 40mg IV BID. c/w simethacone 80mg once a day for gas   Renal: Monitor creatinine.  Monitor electrolytes replete as needed.  Strict I's and O's.  ID: Patient on treatment for stenotrophomonas in the sputum culture concerning for tracheitis.  Currently on Bactrim until 6/29  Endo: Patient currently on Lantus 30 units at bedtime along with insulin sliding scale  Heme: Lovenox for DVT prophylaxis.  Ethics: Patient is DNR but okay with long-term ventilation

## 2025-06-29 NOTE — PROGRESS NOTE ADULT - SUBJECTIVE AND OBJECTIVE BOX
CHIEF COMPLAINT:    Interval Events: Patient is feeling better. Tolerating PS on 12/6    REVIEW OF SYSTEMS:  CONSTITUTIONAL: No fever, chills, night sweats, or fatigue  EYES: No eye pain, visual disturbances, or discharge  ENMT:  No difficulty hearing, tinnitus, vertigo; No sinus or throat pain  NECK: No pain or stiffness  BREASTS: No pain, masses, or nipple discharge  RESPIRATORY: No cough, wheezing, or hemoptysis; No shortness of breath  CARDIOVASCULAR: No chest pain, palpitations, dizziness, or leg swelling  GASTROINTESTINAL: No abdominal or epigastric pain. No nausea, vomiting, or hematemesis; No diarrhea or constipation. No melena or hematochezia.  GENITOURINARY: No dysuria, frequency, hematuria, or incontinence  NEUROLOGICAL: No headaches, memory loss, loss of strength, numbness, or tremors  SKIN: No itching, burning, rashes, or lesions   LYMPH NODES: No enlarged glands  ENDOCRINE: No heat or cold intolerance; No hair loss  MUSCULOSKELETAL: No joint pain or swelling; No muscle, back, or extremity pain  PSYCHIATRIC: No depression, anxiety, mood swings, or difficulty sleeping  HEME/LYMPH: No easy bruising, or bleeding gums  ALLERY AND IMMUNOLOGIC: No hives or eczema          OBJECTIVE:  ICU Vital Signs Last 24 Hrs  T(C): 37.3 (29 Jun 2025 08:00), Max: 37.7 (28 Jun 2025 20:00)  T(F): 99.1 (29 Jun 2025 08:00), Max: 99.9 (28 Jun 2025 20:00)  HR: 65 (29 Jun 2025 09:00) (59 - 76)  BP: 138/55 (29 Jun 2025 09:00) (101/55 - 148/73)  BP(mean): 79 (29 Jun 2025 09:00) (67 - 94)  ABP: --  ABP(mean): --  RR: 15 (29 Jun 2025 09:00) (13 - 38)  SpO2: 99% (29 Jun 2025 09:00) (96% - 100%)    O2 Parameters below as of 29 Jun 2025 08:24  Patient On (Oxygen Delivery Method): PS 12/6          Mode: CPAP with PS, FiO2: 30, PEEP: 6, PS: 12, ITime: 1.5, MAP: 10, PIP: 18    06-28 @ 07:01  -  06-29 @ 07:00  --------------------------------------------------------  IN: 190 mL / OUT: 500 mL / NET: -310 mL    06-29 @ 07:01 - 06-29 @ 11:14  --------------------------------------------------------  IN: 10 mL / OUT: 0 mL / NET: 10 mL      CAPILLARY BLOOD GLUCOSE      POCT Blood Glucose.: 125 mg/dL (29 Jun 2025 11:13)      PHYSICAL EXAM:  GENERAL: NAD, well-groomed, well-developed  EYES: EOMI, PERRLA, conjunctiva and sclera clear  ENMT: Trach in place   NECK: Supple, No JVD, Normal thyroid  CHEST/LUNG: Clear to auscultation bilaterally; No rales, rhonchi, wheezing, or rubs  HEART: Regular rate and rhythm; No murmurs, rubs, or gallops  ABDOMEN: Soft, Nontender, Nondistended; Bowel sounds present  VASCULAR:  2+ Peripheral Pulses, No clubbing, cyanosis, or edema  LYMPH: No lymphadenopathy noted  SKIN: No rashes or lesions  NERVOUS SYSTEM:  Alert & Oriented X3, Good concentration    LINES:    HOSPITAL MEDICATIONS:  aspirin  chewable 81 milliGRAM(s) Oral daily  enoxaparin Injectable 40 milliGRAM(s) SubCutaneous every 24 hours    trimethoprim  40 mG/sulfamethoxazole 200 mG Suspension 40 milliLiter(s) Oral every 8 hours    midodrine 20 milliGRAM(s) Oral every 8 hours  ranolazine 500 milliGRAM(s) Oral two times a day    atorvastatin 40 milliGRAM(s) Oral at bedtime  insulin glargine Injectable (LANTUS) 30 Unit(s) SubCutaneous at bedtime  insulin lispro (ADMELOG) corrective regimen sliding scale   SubCutaneous every 6 hours      acetaminophen     Tablet .. 650 milliGRAM(s) Oral every 6 hours PRN  acetaminophen     Tablet .. 650 milliGRAM(s) Oral every 6 hours PRN  HYDROmorphone  Injectable 0.5 milliGRAM(s) IV Push every 3 hours PRN  ondansetron Injectable 4 milliGRAM(s) IV Push every 8 hours PRN  oxyCODONE    Solution 5 milliGRAM(s) Oral every 6 hours PRN  QUEtiapine 25 milliGRAM(s) Oral at bedtime    aluminum hydroxide/magnesium hydroxide/simethicone Suspension 30 milliLiter(s) Oral every 6 hours PRN  pantoprazole  Injectable 40 milliGRAM(s) IV Push every 12 hours  polyethylene glycol 3350 17 Gram(s) Oral daily PRN  senna 2 Tablet(s) Oral at bedtime  simethicone 80 milliGRAM(s) Chew daily        multivitamin 1 Tablet(s) Oral daily      chlorhexidine 0.12% Liquid 15 milliLiter(s) Oral Mucosa every 12 hours  chlorhexidine 2% Cloths 1 Application(s) Topical <User Schedule>        LABS:                        7.9    8.46  )-----------( 375      ( 29 Jun 2025 04:40 )             24.6     Hgb Trend: 7.9<--, 8.3<--, 8.0<--, 8.8<--, 6.9<--  06-29    137  |  108  |  34[H]  ----------------------------<  96  4.6   |  21[L]  |  1.27    Ca    7.8[L]      29 Jun 2025 04:40  Phos  4.0     06-28  Mg     2.4     06-28    TPro  6.3  /  Alb  1.7[L]  /  TBili  0.4  /  DBili  x   /  AST  18  /  ALT  14  /  AlkPhos  98  06-29    Creatinine Trend: 1.27<--, 1.25<--, 1.31<--, 1.30<--, 1.37<--, 1.24<--    Urinalysis Basic - ( 29 Jun 2025 04:40 )    Color: x / Appearance: x / SG: x / pH: x  Gluc: 96 mg/dL / Ketone: x  / Bili: x / Urobili: x   Blood: x / Protein: x / Nitrite: x   Leuk Esterase: x / RBC: x / WBC x   Sq Epi: x / Non Sq Epi: x / Bacteria: x            MICROBIOLOGY:     RADIOLOGY:  [ ] Reviewed and interpreted by me    EKG:

## 2025-06-30 LAB
ALBUMIN SERPL ELPH-MCNC: 1.8 G/DL — LOW (ref 3.3–5)
ALP SERPL-CCNC: 95 U/L — SIGNIFICANT CHANGE UP (ref 40–120)
ALT FLD-CCNC: 15 U/L — SIGNIFICANT CHANGE UP (ref 12–78)
ANION GAP SERPL CALC-SCNC: 10 MMOL/L — SIGNIFICANT CHANGE UP (ref 5–17)
AST SERPL-CCNC: 17 U/L — SIGNIFICANT CHANGE UP (ref 15–37)
BILIRUB SERPL-MCNC: 0.4 MG/DL — SIGNIFICANT CHANGE UP (ref 0.2–1.2)
BUN SERPL-MCNC: 37 MG/DL — HIGH (ref 7–23)
CALCIUM SERPL-MCNC: 7.8 MG/DL — LOW (ref 8.5–10.1)
CHLORIDE SERPL-SCNC: 108 MMOL/L — SIGNIFICANT CHANGE UP (ref 96–108)
CO2 SERPL-SCNC: 18 MMOL/L — LOW (ref 22–31)
CREAT SERPL-MCNC: 1.27 MG/DL — SIGNIFICANT CHANGE UP (ref 0.5–1.3)
EGFR: 44 ML/MIN/1.73M2 — LOW
EGFR: 44 ML/MIN/1.73M2 — LOW
GLUCOSE BLDC GLUCOMTR-MCNC: 106 MG/DL — HIGH (ref 70–99)
GLUCOSE BLDC GLUCOMTR-MCNC: 113 MG/DL — HIGH (ref 70–99)
GLUCOSE BLDC GLUCOMTR-MCNC: 84 MG/DL — SIGNIFICANT CHANGE UP (ref 70–99)
GLUCOSE BLDC GLUCOMTR-MCNC: 95 MG/DL — SIGNIFICANT CHANGE UP (ref 70–99)
GLUCOSE BLDC GLUCOMTR-MCNC: 99 MG/DL — SIGNIFICANT CHANGE UP (ref 70–99)
GLUCOSE SERPL-MCNC: 92 MG/DL — SIGNIFICANT CHANGE UP (ref 70–99)
HCT VFR BLD CALC: 25.9 % — LOW (ref 34.5–45)
HGB BLD-MCNC: 7.9 G/DL — LOW (ref 11.5–15.5)
MAGNESIUM SERPL-MCNC: 2.4 MG/DL — SIGNIFICANT CHANGE UP (ref 1.6–2.6)
MCHC RBC-ENTMCNC: 28 PG — SIGNIFICANT CHANGE UP (ref 27–34)
MCHC RBC-ENTMCNC: 30.5 G/DL — LOW (ref 32–36)
MCV RBC AUTO: 91.8 FL — SIGNIFICANT CHANGE UP (ref 80–100)
NRBC BLD AUTO-RTO: 0 /100 WBCS — SIGNIFICANT CHANGE UP (ref 0–0)
PHOSPHATE SERPL-MCNC: 3.3 MG/DL — SIGNIFICANT CHANGE UP (ref 2.5–4.5)
PLATELET # BLD AUTO: 401 K/UL — HIGH (ref 150–400)
POTASSIUM SERPL-MCNC: 4.2 MMOL/L — SIGNIFICANT CHANGE UP (ref 3.5–5.3)
POTASSIUM SERPL-SCNC: 4.2 MMOL/L — SIGNIFICANT CHANGE UP (ref 3.5–5.3)
PROT SERPL-MCNC: 6.4 GM/DL — SIGNIFICANT CHANGE UP (ref 6–8.3)
RBC # BLD: 2.82 M/UL — LOW (ref 3.8–5.2)
RBC # FLD: 15.8 % — HIGH (ref 10.3–14.5)
SODIUM SERPL-SCNC: 136 MMOL/L — SIGNIFICANT CHANGE UP (ref 135–145)
WBC # BLD: 6.93 K/UL — SIGNIFICANT CHANGE UP (ref 3.8–10.5)
WBC # FLD AUTO: 6.93 K/UL — SIGNIFICANT CHANGE UP (ref 3.8–10.5)

## 2025-06-30 PROCEDURE — 99233 SBSQ HOSP IP/OBS HIGH 50: CPT | Mod: 25

## 2025-06-30 PROCEDURE — G0545: CPT

## 2025-06-30 PROCEDURE — 99232 SBSQ HOSP IP/OBS MODERATE 35: CPT

## 2025-06-30 RX ORDER — BISACODYL 5 MG
10 TABLET, DELAYED RELEASE (ENTERIC COATED) ORAL DAILY
Refills: 0 | Status: DISCONTINUED | OUTPATIENT
Start: 2025-06-30 | End: 2025-07-10

## 2025-06-30 RX ADMIN — INSULIN GLARGINE-YFGN 30 UNIT(S): 100 INJECTION, SOLUTION SUBCUTANEOUS at 21:07

## 2025-06-30 RX ADMIN — Medication 40 MILLIGRAM(S): at 05:57

## 2025-06-30 RX ADMIN — Medication 80 MILLIGRAM(S): at 11:38

## 2025-06-30 RX ADMIN — RANOLAZINE 500 MILLIGRAM(S): 1000 TABLET, FILM COATED, EXTENDED RELEASE ORAL at 17:42

## 2025-06-30 RX ADMIN — ENOXAPARIN SODIUM 40 MILLIGRAM(S): 100 INJECTION SUBCUTANEOUS at 13:41

## 2025-06-30 RX ADMIN — Medication 10 MILLIGRAM(S): at 10:39

## 2025-06-30 RX ADMIN — MIDODRINE HYDROCHLORIDE 20 MILLIGRAM(S): 5 TABLET ORAL at 21:07

## 2025-06-30 RX ADMIN — ATORVASTATIN CALCIUM 40 MILLIGRAM(S): 80 TABLET, FILM COATED ORAL at 21:07

## 2025-06-30 RX ADMIN — Medication 1 APPLICATION(S): at 05:56

## 2025-06-30 RX ADMIN — Medication 15 MILLILITER(S): at 05:56

## 2025-06-30 RX ADMIN — QUETIAPINE FUMARATE 25 MILLIGRAM(S): 25 TABLET ORAL at 21:08

## 2025-06-30 RX ADMIN — Medication 650 MILLIGRAM(S): at 12:30

## 2025-06-30 RX ADMIN — Medication 15 MILLILITER(S): at 17:42

## 2025-06-30 RX ADMIN — Medication 1 TABLET(S): at 11:38

## 2025-06-30 RX ADMIN — Medication 40 MILLIGRAM(S): at 17:42

## 2025-06-30 RX ADMIN — Medication 650 MILLIGRAM(S): at 11:39

## 2025-06-30 RX ADMIN — Medication 81 MILLIGRAM(S): at 11:38

## 2025-06-30 NOTE — PROGRESS NOTE ADULT - SUBJECTIVE AND OBJECTIVE BOX
[facility]  INFECTIOUS DISEASES   [address] [place] [address]  Tel: [phone]     Fax: [phone]  ==============================================================================  MD Jason Booth, DO Stephanie Olmedo, CHLOE Pope M.D  ==============================================================================      EMMANUEL GONZALEZ  [id number]  74y ([birth year])      Interval History:    ROS:    [ ] Unobtainable because:  [ ] All other systems negative except as noted    Constitutional: no fever, no chills  Head: no trauma  Eyes: no vision changes, no eye pain  ENT:  no sore throat, no rhinorrhea  Cardiovascular:  no chest pain, no palpitation  Respiratory:  no SOB, no cough  GI:  no abd pain, no vomiting, no diarrhea  urinary: no dysuria, no hematuria, no flank pain  musculoskeletal:  no joint pain, no joint swelling  skin:  no rash  neurology:  no headache, no seizure, no change in mental status  psych: no anxiety, no depression         Allergies  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/shellfish (Short breath)  No Known Drug Allergies        ANTIMICROBIALS:        Physical Exam:  Vital Signs Last 24 Hrs  T(C): 37.3 (30 Jun 2025 08:00), Max: 37.3 (30 Jun 2025 05:00)  T(F): 99.2 (30 Jun 2025 08:00), Max: 99.2 (30 Jun 2025 08:00)  HR: 76 (30 Jun 2025 10:00) (59 - 78)  BP: 133/58 (30 Jun 2025 10:00) (99/64 - 162/60)  BP(mean): 80 (30 Jun 2025 10:00) (67 - 125)  RR: 10 (30 Jun 2025 10:00) (0 - 19)  SpO2: 99% (30 Jun 2025 10:00) (94% - 100%)    Parameters below as of 30 Jun 2025 08:25  Patient On (Oxygen Delivery Method): ventilator, PS 15/6    O2 Concentration (%): 30 06-29-25 @ 07:01  -  06-30-25 @ 07:00  --------------------------------------------------------  IN: 260 mL / OUT: 550 mL / NET: -290 mL      General:    NAD,  non toxic  Head: atraumatic, normocephalic  Eye: normal sclera and conjunctiva  ENT:    no oral lesions, neck supple  Cardio:     regular S1, S2,  no murmur  Respiratory:    clear b/l,    no wheezing  abd:     soft,   BS +,   no tenderness  :   no CVAT,  no suprapubic tenderness,   no  marcus  Musculoskeletal:   no joint swelling,   no edema  vascular: no central lines, +PIV   Skin:    no rash  Neurologic:     no focal deficit  psych: normal affect    WBC Count: 6.93 K/uL (06-30 @ 02:57)  WBC Count: 8.46 K/uL (06-29 @ 04:40)  WBC Count: 7.54 K/uL (06-28 @ 14:00)  WBC Count: 7.47 K/uL (06-28 @ 04:18)  WBC Count: 6.77 K/uL (06-27 @ 10:00)  WBC Count: 6.41 K/uL (06-27 @ 03:06)  WBC Count: 7.26 K/uL (06-26 @ 20:36)                            7.9    6.93  )-----------( 401      ( 30 Jun 2025 02:57 )             25.9       06-30    136  |  108  |  37[H]  ----------------------------<  92  4.2   |  18[L]  |  1.27    Ca    7.8[L]      30 Jun 2025 02:57  Phos  3.3     06-30  Mg     2.4     06-30    TPro  6.4  /  Alb  1.8[L]  /  TBili  0.4  /  DBili  x   /  AST  17  /  ALT  15  /  AlkPhos  95  06-30      Urinalysis Basic - ( 30 Jun 2025 02:57 )    Color: x / Appearance: x / SG: x / pH: x  Gluc: 92 mg/dL / Ketone: x  / Bili: x / Urobili: x   Blood: x / Protein: x / Nitrite: x   Leuk Esterase: x / RBC: x / WBC x   Sq Epi: x / Non Sq Epi: x / Bacteria: x          Creatinine Trend: 1.27<--, 1.27<--, 1.25<--, 1.31<--, 1.30<--, 1.37<--      MICROBIOLOGY:  v  Blood Blood-Peripheral  06-20-25   No growth at 5 days  --  --      Blood Blood-Peripheral  06-20-25   No growth at 5 days  --  --      ET Tube ET Tube  06-20-25   Moderate Stenotrophomonas maltophilia  Commensal lina consistent with body site  --  Stenotrophomonas maltophilia      ET Tube ET Tube  06-13-25   Commensal lina consistent with body site  --    No Squamous epithelial cells per low power field  Numerous polymorphonuclear leukocytes per low power field  Few Yeast per oil power field      Sputum Sputum  06-12-25   Commensal lina consistent with body site  --    Few Squamous epithelial cells per low power field  Numerous polymorphonuclear leukocytes per low power field  Yeast per oil power field      Catheterized Catheterized  06-11-25   <10,000 CFU/mL Normal Urogenital Lina  --  --      Blood Blood-Peripheral  06-11-25   No growth at 5 days  --  --      Blood Blood-Peripheral  06-11-25   No growth at 5 days  --  --      Blood Blood-Peripheral  06-04-25   No growth at 5 days  --  --      Catheterized Catheterized  06-02-25   >100,000 CFU/ml Escherichia coli ESBL  --  Escherichia coli ESBL      Blood Blood-Peripheral  06-01-25   Growth in aerobic and anaerobic bottles: Escherichia coli ESBL  See previous culture [id number]  --    Growth in aerobic bottle: Gram Negative Rods  Growth in anaerobic bottle: Gram Negative Rods      Blood Blood-Peripheral  06-01-25   Growth in aerobic and anaerobic bottles: Escherichia coli ESBL  Direct identification is available within approximately 3-5  hours either by Blood Panel Multiplexed PCR or Direct  MALDI-TOF. Details: [email or web address]  --  Blood Culture PCR  Escherichia coli ESBL                                  RADIOLOGY:   [facility]  INFECTIOUS DISEASES   [address] [place] [address]  Tel: [phone]     Fax: [phone]  ==============================================================================  MD Jason Booth, DO Stephanie Olmedo, CHLOE Pope M.D  ==============================================================================      EMMANUEL GONZALEZ  [id number]  74y ([birth year])      Interval History:    Patient seen and examined today in CCU.  awake, alert  smiles and follows commands    afebrile  on vent via trache    as per nurse patient had episode  of vomiting yesterday and Ct was done and per ct report- no obstruction , but reported proctitis    d/w ICU doc Melud and per him patient has lots of stool and will be given medication to help with BM more        ROS:    [ ] Unobtainable because:  [x ] All other systems negative except as noted    Constitutional: no fever, no chills  Eyes: no vision changes, no eye pain  Cardiovascular:  no chest pain, no palpitation  Respiratory: on the vent  GI:  no abd pain, no vomiting, no diarrhea  urinary: no dysuria, no hematuria, no flank pain  musculoskeletal:  no joint pain, no joint swelling  skin:  no rash  neurology:  no headache        Allergies  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/shellfish (Short breath)  No Known Drug Allergies        ANTIMICROBIALS:        Physical Exam:  Vital Signs Last 24 Hrs  T(C): 37.3 (30 Jun 2025 08:00), Max: 37.3 (30 Jun 2025 05:00)  T(F): 99.2 (30 Jun 2025 08:00), Max: 99.2 (30 Jun 2025 08:00)  HR: 76 (30 Jun 2025 10:00) (59 - 78)  BP: 133/58 (30 Jun 2025 10:00) (99/64 - 162/60)  BP(mean): 80 (30 Jun 2025 10:00) (67 - 125)  RR: 10 (30 Jun 2025 10:00) (0 - 19)  SpO2: 99% (30 Jun 2025 10:00) (94% - 100%)    Parameters below as of 30 Jun 2025 08:25  Patient On (Oxygen Delivery Method): ventilator, PS 15/6    O2 Concentration (%): 30 06-29-25 @ 07:01  -  06-30-25 @ 07:00  --------------------------------------------------------  IN: 260 mL / OUT: 550 mL / NET: -290 mL      Physical Exam:  General: awake and alert  HEENT: kayli, anicteric sclera  Neck: Tracheostomy in place c/d/i, on 30% FIO2  Respiratory: mechanical BS heard b/l  Cardiovascular: S1S2 RRR  Abdomen: soft, non tender, non distended, peg tube present, + BS  Extremities: warm, +1 edema b/l LE  Neurological:  awake . alert ,and smiles and can nod to few questions and follow simple commands      WBC Count: 6.93 K/uL (06-30 @ 02:57)  WBC Count: 8.46 K/uL (06-29 @ 04:40)  WBC Count: 7.54 K/uL (06-28 @ 14:00)  WBC Count: 7.47 K/uL (06-28 @ 04:18)  WBC Count: 6.77 K/uL (06-27 @ 10:00)  WBC Count: 6.41 K/uL (06-27 @ 03:06)  WBC Count: 7.26 K/uL (06-26 @ 20:36)                            7.9    6.93  )-----------( 401      ( 30 Jun 2025 02:57 )             25.9       06-30    136  |  108  |  37[H]  ----------------------------<  92  4.2   |  18[L]  |  1.27    Ca    7.8[L]      30 Jun 2025 02:57  Phos  3.3     06-30  Mg     2.4     06-30    TPro  6.4  /  Alb  1.8[L]  /  TBili  0.4  /  DBili  x   /  AST  17  /  ALT  15  /  AlkPhos  95  06-30      Urinalysis Basic - ( 30 Jun 2025 02:57 )    Color: x / Appearance: x / SG: x / pH: x  Gluc: 92 mg/dL / Ketone: x  / Bili: x / Urobili: x   Blood: x / Protein: x / Nitrite: x   Leuk Esterase: x / RBC: x / WBC x   Sq Epi: x / Non Sq Epi: x / Bacteria: x          Creatinine Trend: 1.27<--, 1.27<--, 1.25<--, 1.31<--, 1.30<--, 1.37<--      MICROBIOLOGY:    Blood Blood-Peripheral  06-20-25   No growth at 5 days  --  --      Blood Blood-Peripheral  06-20-25   No growth at 5 days  --  --      ET Tube ET Tube  06-20-25   Moderate Stenotrophomonas maltophilia  Commensal lina consistent with body site  --  Stenotrophomonas maltophilia      ET Tube ET Tube  06-13-25   Commensal lina consistent with body site  --    No Squamous epithelial cells per low power field  Numerous polymorphonuclear leukocytes per low power field  Few Yeast per oil power field      Sputum Sputum  06-12-25   Commensal lina consistent with body site  --    Few Squamous epithelial cells per low power field  Numerous polymorphonuclear leukocytes per low power field  Yeast per oil power field      Catheterized Catheterized  06-11-25   <10,000 CFU/mL Normal Urogenital Lina  --  --      Blood Blood-Peripheral  06-11-25   No growth at 5 days  --  --      Blood Blood-Peripheral  06-11-25   No growth at 5 days  --  --      Blood Blood-Peripheral  06-04-25   No growth at 5 days  --  --      Catheterized Catheterized  06-02-25   >100,000 CFU/ml Escherichia coli ESBL  --  Escherichia coli ESBL      Blood Blood-Peripheral  06-01-25   Growth in aerobic and anaerobic bottles: Escherichia coli ESBL  See previous culture [id number]  --    Growth in aerobic bottle: Gram Negative Rods  Growth in anaerobic bottle: Gram Negative Rods      Blood Blood-Peripheral  06-01-25   Growth in aerobic and anaerobic bottles: Escherichia coli ESBL  Direct identification is available within approximately 3-5  hours either by Blood Panel Multiplexed PCR or Direct  MALDI-TOF. Details: [email or web address]  --  Blood Culture PCR  Escherichia coli ESBL        RADIOLOGY:    < from: CT Abdomen and Pelvis w/ Oral Cont and w/ IV Cont (06.29.25 @ 18:34) >    ACC: [id number] EXAM:  CT ABDOMEN AND PELVIS OC IC   ORDERED BY: ARIELLA ODOM     PROCEDURE DATE:  06/29/2025          INTERPRETATION:  CLINICAL INFORMATION: Vomiting    COMPARISON: 6/1/2025.    CONTRAST/COMPLICATIONS:  IV Contrast: Omnipaque 350 90 cc administered   10 cc discarded  Oral Contrast: NONE.    PROCEDURE:  CT of the Abdomen and Pelvis was performed.  Sagittal and coronal reformats were performed.    FINDINGS:  LOWER CHEST: Small bilateral pleural effusions. Basilar atelectasis. Left   lower lobe nodular opacities, possibly infectious or inflammatory.   Coronary artery and mitral annular calcifications.    LIVER: Subcentimeter hypodensity, too small to further characterize.  BILE DUCTS: Normal caliber.  GALLBLADDER: Distended.  SPLEEN: Within normal limits.  PANCREAS: Prominent duct.  ADRENALS: Within normal limits.  KIDNEYS/URETERS: Cysts and too small to further characterize   hypodensities. No hydronephrosis. Nonobstructive left intrarenal calculus.    BLADDER: Within normal limits.  REPRODUCTIVE ORGANS: Hysterectomy.    BOWEL: Gastrostomy tube. No bowel obstruction. Appendix is within normal   limits. Rectal wall thickening. Moderate colonic stool burden.  PERITONEUM/RETROPERITONEUM: Trace pelvic fluid.  VESSELS: Atherosclerotic changes.  LYMPH NODES: No lymphadenopathy.  ABDOMINAL WALL: Postsurgical changes.  BONES: Degenerative changes. Chronic rib fractures.    IMPRESSION:    No bowel obstruction.  Proctitis.    --- End of Report ---        KADE BAIRES; Attending Radiologist  This document has been electronically signed. Jun 30 2025  8:55AM    < end of copied text >

## 2025-06-30 NOTE — PROGRESS NOTE ADULT - ASSESSMENT
A/P-   74 year old female with PMHx of HTN, HLD, IDDM2, CAD (s/p PCI), and recent right humerus fracture (placed in sling 3 days ago) who presented to the ED on 5/30/25 for complaints of elevated blood glucose.   As per med history provided by family  patient fell three days ago and landed on her right shoulder. Went to [facility] at that time and found to have right humerus fracture and placed in sling. Discharged home with outpatient orthopedic surgery follow up. Since then, patient has been refusing to get out of bed and not eating much.   In the ED, VSS. Hgb 10.3, sodium 132, potassium 6.3, BUN 70, Cr 2.02, blood glucose 510. VBG 7.29 / 58 / 34 / 26. COVID/influenza/RSV negative. CT head without acute intracranial findings. Received 1L NS bolus, 1L LR bolus, and insulin 20 U IV.  (30 May 2025 23:43)    s/p  RRT last night   for AMS, hypotension, fever of 103  and was started on sepsis w/u given IVF bolus, Abx with CTX and Vanco. Persistently hypotensive and obtunded. Started on levophed infusion. Urgently transferred to ICU.   was also intubated for airway protection    Infectious Disease consultation requested this afternoon 6/2 to help with antibiotic management  for ESBL bacteremia    Intubated   sedated  on pressors for hypotension  afebrile today  yesterday 103 t-max  + leukocytosis of 29K  Blood cx- ESBL GNR by PCR x 2  UA from 5/31-pos for nitrates  urine cx-pending  DUANE  elevated cardiac enzymes    per med records had ESBL e.coli UTI in august and sept 2024 and ID was not consulted     6/3-  remains Intubated and sedated  on pressors for hypotension  temp of 101.5 today  + leukocytosis trending down to 20k  creatinine slightly better today  Blood cx- ESBL GNR by PCR x 2  UA from 5/31-pos for nitrates  urine cx->100K e.coli  DUANE  elevated cardiac enzymes    6/4: seen in ICU earlier, remains intubated, weaning process in progress, on pressors, continues having fevers, T 101 this morning, leukocytosis is better 17.27, Cr better 1.35, UC and BCs grew ESBL E. Coli, repeat BCs are pending, Meropenem IV continued. If not improvement in pt's fevers tomorrow, most likely CT a/p will be obtained.   6/5: remains in ICU, intubated and sedated, on pressors, Temp persistent 100-100.2, WBC better 16.43, Cr normalized, LFTs ok, repeat BCs NGTD, procalcitonin 2.41, TTE performed - Left ventricular regional wall motion abnormalities present. Meropenem IV continued.     6/6-  extubated  awake  afebrile now but early am had temp of 100.6  Leukocytosis almost resolved 11K today  Blood cx- ESBL GNR by PCR x 2  UA from 5/31-pos for nitrates  urine cx->100K e.coli  repeat blood cx- NGTD x 2 from 6/4 6/9-  downgraded to medical floor  Afebrile  Leukocytosis 12 k today  Blood cx- ESBL GNR by PCR x 2  UA from 5/31-pos for nitrates  urine cx->100K e.coli  repeat blood cx- NGTD x 2 from 6/4 6/11: pt is afebrile since 6/6, doing well on RA, leukocytosis was elevated 13.57, Cr ok,  no new cbc, repeat BCs remain with no growth to date, s/p 8 days of IV Meropenem, now observed off abx.     6/12-  s/p RRT last night for AMS and hypotention and second RRT later for ? seizure and aspiration  s/p intubation and transferred to CCU  off pressors  intubated  afebrile but had fever of 100.2 at 5 am  leukocytosis has decreased to 11k  repeat blood cx - NGTD x 2  has completed course of meropenem  fo her ESBL bacteremia dn UTI   cxr- b/l pleural effusions L> R  CTA report-  No pulmonary embolism..  Tracheobronchial secretions, greatest in the left lower lobe.  Patchy and   nodular bilateral lung opacities may be infectious or inflammatory. Right   middle lobe subpleural 6 mm nodular opacity is new from prior .   Short-term follow-up CT recommended    6/13-   remains intubated on 50% Fio2  Afebrile   Minimal leukocytosis of 13K  creatinine- normal value  Repeat blood cx  from 6/4- NGTD x 2  repeat blood cx from 6/11- NGTD x 2  sputum  cx- commensal lina c/w body site  has completed course of meropenem  fo her ESBL bacteremia and UTI - which she cleared  cxr- b/l pleural effusions L> R  CTA report-  No pulmonary embolism..  Tracheobronchial secretions, greatest in the left lower lobe.  Patchy and   nodular bilateral lung opacities may be infectious or inflammatory. Right   middle lobe subpleural 6 mm nodular opacity is new from prior .   Short-term follow-up CT recommended  EEG- reported negative for epilepsy ( pls see full report)    6/16-  remains intubated on 30% Fio2  T-max-100.6 this am  No leukocytosis   creatinine- normal value  Repeat blood cx  from 6/4- NGTD x 2  repeat blood cx from 6/11- NGTD x 2  sputum  cx- commensal lina c/w body site  has completed course of meropenem  for her ESBL bacteremia and UTI - which she cleared  cxr- b/l pleural effusions L> R  CTA report-  No pulmonary embolism..  Tracheobronchial secretions, greatest in the left lower lobe.  Patchy and   nodular bilateral lung opacities may be infectious or inflammatory. Right   middle lobe subpleural 6 mm nodular opacity is new from prior .   Short-term follow-up CT recommended  EEG- reported negative for epilepsy ( pls see full report)    6/20-  T-max-101  No leukocytosis  Repeat blood cx  from 6/4- NGTD x 2  repeat blood cx from 6/11- NGTD x 2  sputum  cx- commensal lina c/w body site  ET tube cx-commensal lina c/w body site  has completed course of meropenem  for her ESBL bacteremia and UTI - which she cleared  cxr- b/l pleural effusions L> R  completed course of zosyn as well 2 days ago for presumed aspiration pneumonitis    6/23: remains in CCU, no fevers since 6/20, vented via tracheostomy no leukocytosis, repeat BCs NGTD, sputum culture grew Stenotrophomonas, will add abx, Bactrim via NGT.      6/26-  s/p trache and peg  awake  on 30 FIO2 via trache  low grade temp of 100 yesterday  afebrile today   repeat BCs NGTD  most recent sputum culture grew Stenotrophomonas ( sens to bactrim and levaquin)    6/27-  s/p trache and peg  awake, alert  on 30 FIO2 via trache  Afebrile  no leukocytosis   repeat BCs NGTD  most recent sputum culture grew Stenotrophomonas ( sens to bactrim and levaquin)    6/30-  s/p trache and peg  awake, alert  on 30 FIO2 via trache  Afebrile  no leukocytosis  repeat BCs NGTD  most recent sputum culture grew Stenotrophomonas ( sens to bactrim and levaquin)  CT abd/pelvis reported 6/29-No bowel obstruction.  Proctitis.    Impression-  possible aspiration pneumonitis -treated  ESBL septic shock -  was treated and had resolved  E.coli ESBL bacteremia sec to   source - treated   Right humerus fracture with hematoma  Stenotrophomonas in sputum - on bactrim  s/p trache and peg    Plan-  completed 7 days of  Bactrim solution via peg  to treat Stenotrophomonas - ET tube aspirate  on 6/29  observe off abx   had completed course of IV meropenem for the ESBL bacteremia and sepsis and repeat blood cx are negative   also completed course of zosyn for ? asp pneumonitis  vent management as per CCU team  BM management  and the reported proctitis in CT report to be managed by CCU team and also may benefit from GI evaluation  rest of the medical management as per CCU team    All labs and imaging and chart notes reviewed.       d/w ccu team    Tariq Fraga MD  Infectious Disease Attending    for any questions please do not hesitate to contact me either via teams or by calling [phone]

## 2025-06-30 NOTE — PROGRESS NOTE ADULT - SUBJECTIVE AND OBJECTIVE BOX
CHIEF COMPLAINT:Patient is a 74y old  Female who presents with a chief complaint of ALY, physical deconditioning (29 Jun 2025 11:14)        Interval Events:    REVIEW OF SYSTEMS:  Constitutional: [ ] negative [ ] fevers [ ] chills [ ] weight loss [ ] weight gain  HEENT: [ ] negative [ ] dry eyes [ ] eye irritation [ ] postnasal drip [ ] nasal congestion  CV: [ ] negative  [ ] chest pain [ ] orthopnea [ ] palpitations [ ] murmur  Resp: [ ] negative [ ] cough [ ] shortness of breath [ ] dyspnea [ ] wheezing [ ] sputum [ ] hemoptysis  GI: [ ] negative [ ] nausea [ ] vomiting [ ] diarrhea [ ] constipation [ ] abd pain [ ] dysphagia   : [ ] negative [ ] dysuria [ ] nocturia [ ] hematuria [ ] increased urinary frequency  Musculoskeletal: [ ] negative [ ] back pain [ ] myalgias [ ] arthralgias [ ] fracture  Skin: [ ] negative [ ] rash [ ] itch  Neurological: [ ] negative [ ] headache [ ] dizziness [ ] syncope [ ] weakness [ ] numbness  Psychiatric: [ ] negative [ ] anxiety [ ] depression  Endocrine: [ ] negative [ ] diabetes [ ] thyroid problem  Hematologic/Lymphatic: [ ] negative [ ] anemia [ ] bleeding problem  Allergic/Immunologic: [ ] negative [ ] itchy eyes [ ] nasal discharge [ ] hives [ ] angioedema  [ ] All other systems negative  [ ] Unable to assess ROS because ________    OBJECTIVE:  ICU Vital Signs Last 24 Hrs  T(C): 37.3 (30 Jun 2025 08:00), Max: 37.3 (30 Jun 2025 05:00)  T(F): 99.2 (30 Jun 2025 08:00), Max: 99.2 (30 Jun 2025 08:00)  HR: 78 (30 Jun 2025 08:00) (59 - 78)  BP: 132/74 (30 Jun 2025 08:00) (99/64 - 162/60)  BP(mean): 91 (30 Jun 2025 08:00) (67 - 125)  ABP: --  ABP(mean): --  RR: 15 (30 Jun 2025 08:00) (0 - 24)  SpO2: 100% (30 Jun 2025 08:00) (94% - 100%)    O2 Parameters below as of 29 Jun 2025 19:09  Patient On (Oxygen Delivery Method): ventilator, Tv 400,peep 6,ac 15.    O2 Concentration (%): 30      Mode: AC/ CMV (Assist Control/ Continuous Mandatory Ventilation), RR (machine): 15, TV (machine): 400, FiO2: 30, PEEP: 6, ITime: 1, MAP: 10, PIP: 27    06-29 @ 07:01  -  06-30 @ 07:00  --------------------------------------------------------  IN: 260 mL / OUT: 550 mL / NET: -290 mL      CAPILLARY BLOOD GLUCOSE      POCT Blood Glucose.: 106 mg/dL (30 Jun 2025 05:48)      PHYSICAL EXAM:  General:   HEENT:   Lymph Nodes:  Neck:   Respiratory:   Cardiovascular:   Abdomen:   Extremities:   Skin:   Neurological:  Psychiatry:    LINES:    HOSPITAL MEDICATIONS:  Standing Meds:  aspirin  chewable 81 milliGRAM(s) Oral daily  atorvastatin 40 milliGRAM(s) Oral at bedtime  chlorhexidine 0.12% Liquid 15 milliLiter(s) Oral Mucosa every 12 hours  chlorhexidine 2% Cloths 1 Application(s) Topical <User Schedule>  enoxaparin Injectable 40 milliGRAM(s) SubCutaneous every 24 hours  insulin glargine Injectable (LANTUS) 30 Unit(s) SubCutaneous at bedtime  insulin lispro (ADMELOG) corrective regimen sliding scale   SubCutaneous every 6 hours  metoclopramide Injectable 10 milliGRAM(s) IV Push daily  midodrine 20 milliGRAM(s) Oral every 8 hours  multivitamin 1 Tablet(s) Oral daily  pantoprazole  Injectable 40 milliGRAM(s) IV Push every 12 hours  QUEtiapine 25 milliGRAM(s) Oral at bedtime  ranolazine 500 milliGRAM(s) Oral two times a day  senna 2 Tablet(s) Oral at bedtime  simethicone 80 milliGRAM(s) Chew daily      PRN Meds:  acetaminophen     Tablet .. 650 milliGRAM(s) Oral every 6 hours PRN  acetaminophen     Tablet .. 650 milliGRAM(s) Oral every 6 hours PRN  aluminum hydroxide/magnesium hydroxide/simethicone Suspension 30 milliLiter(s) Oral every 6 hours PRN  HYDROmorphone  Injectable 0.5 milliGRAM(s) IV Push every 3 hours PRN  ondansetron Injectable 4 milliGRAM(s) IV Push every 8 hours PRN  oxyCODONE    Solution 5 milliGRAM(s) Oral every 6 hours PRN  polyethylene glycol 3350 17 Gram(s) Oral daily PRN      LABS:                        7.9    6.93  )-----------( 401      ( 30 Jun 2025 02:57 )             25.9     Hgb Trend: 7.9<--, 7.9<--, 8.3<--, 8.0<--, 8.8<--  06-30    136  |  108  |  37[H]  ----------------------------<  92  4.2   |  18[L]  |  1.27    Ca    7.8[L]      30 Jun 2025 02:57  Phos  3.3     06-30  Mg     2.4     06-30    TPro  6.4  /  Alb  1.8[L]  /  TBili  0.4  /  DBili  x   /  AST  17  /  ALT  15  /  AlkPhos  95  06-30    Creatinine Trend: 1.27<--, 1.27<--, 1.25<--, 1.31<--, 1.30<--, 1.37<--    Urinalysis Basic - ( 30 Jun 2025 02:57 )    Color: x / Appearance: x / SG: x / pH: x  Gluc: 92 mg/dL / Ketone: x  / Bili: x / Urobili: x   Blood: x / Protein: x / Nitrite: x   Leuk Esterase: x / RBC: x / WBC x   Sq Epi: x / Non Sq Epi: x / Bacteria: x            MICROBIOLOGY:     RADIOLOGY:  [ ] Reviewed and interpreted by me    EKG:   CHIEF COMPLAINT:Patient is a 74y old  Female who presents with a chief complaint of ALY, physical deconditioning (29 Jun 2025 11:14)        Interval Events:  No events    REVIEW OF SYSTEMS:  Denies pain, SOB.   [x ] All other systems negative  [ ] Unable to assess ROS because ________    OBJECTIVE:  ICU Vital Signs Last 24 Hrs  T(C): 37.3 (30 Jun 2025 08:00), Max: 37.3 (30 Jun 2025 05:00)  T(F): 99.2 (30 Jun 2025 08:00), Max: 99.2 (30 Jun 2025 08:00)  HR: 78 (30 Jun 2025 08:00) (59 - 78)  BP: 132/74 (30 Jun 2025 08:00) (99/64 - 162/60)  BP(mean): 91 (30 Jun 2025 08:00) (67 - 125)  ABP: --  ABP(mean): --  RR: 15 (30 Jun 2025 08:00) (0 - 24)  SpO2: 100% (30 Jun 2025 08:00) (94% - 100%)    O2 Parameters below as of 29 Jun 2025 19:09  Patient On (Oxygen Delivery Method): ventilator, Tv 400,peep 6,ac 15.    O2 Concentration (%): 30      Mode: AC/ CMV (Assist Control/ Continuous Mandatory Ventilation), RR (machine): 15, TV (machine): 400, FiO2: 30, PEEP: 6, ITime: 1, MAP: 10, PIP: 27    06-29 @ 07:01  -  06-30 @ 07:00  --------------------------------------------------------  IN: 260 mL / OUT: 550 mL / NET: -290 mL      CAPILLARY BLOOD GLUCOSE      POCT Blood Glucose.: 106 mg/dL (30 Jun 2025 05:48)      PHYSICAL EXAM:  Gen: trache to full vent  HEENT: PERRL  Resp: mechanical breath sounds B/L  CVS: S1S2 no m/r/g  Abd: soft NT/ND +BS  Ext: no c/c/e  Neuro: awake alert very weak but moving all 4. following comands, able to communicate with head shakes.    LINES:    HOSPITAL MEDICATIONS:  Standing Meds:  aspirin  chewable 81 milliGRAM(s) Oral daily  atorvastatin 40 milliGRAM(s) Oral at bedtime  chlorhexidine 0.12% Liquid 15 milliLiter(s) Oral Mucosa every 12 hours  chlorhexidine 2% Cloths 1 Application(s) Topical <User Schedule>  enoxaparin Injectable 40 milliGRAM(s) SubCutaneous every 24 hours  insulin glargine Injectable (LANTUS) 30 Unit(s) SubCutaneous at bedtime  insulin lispro (ADMELOG) corrective regimen sliding scale   SubCutaneous every 6 hours  metoclopramide Injectable 10 milliGRAM(s) IV Push daily  midodrine 20 milliGRAM(s) Oral every 8 hours  multivitamin 1 Tablet(s) Oral daily  pantoprazole  Injectable 40 milliGRAM(s) IV Push every 12 hours  QUEtiapine 25 milliGRAM(s) Oral at bedtime  ranolazine 500 milliGRAM(s) Oral two times a day  senna 2 Tablet(s) Oral at bedtime  simethicone 80 milliGRAM(s) Chew daily      PRN Meds:  acetaminophen     Tablet .. 650 milliGRAM(s) Oral every 6 hours PRN  acetaminophen     Tablet .. 650 milliGRAM(s) Oral every 6 hours PRN  aluminum hydroxide/magnesium hydroxide/simethicone Suspension 30 milliLiter(s) Oral every 6 hours PRN  HYDROmorphone  Injectable 0.5 milliGRAM(s) IV Push every 3 hours PRN  ondansetron Injectable 4 milliGRAM(s) IV Push every 8 hours PRN  oxyCODONE    Solution 5 milliGRAM(s) Oral every 6 hours PRN  polyethylene glycol 3350 17 Gram(s) Oral daily PRN      LABS:                        7.9    6.93  )-----------( 401      ( 30 Jun 2025 02:57 )             25.9     Hgb Trend: 7.9<--, 7.9<--, 8.3<--, 8.0<--, 8.8<--  06-30    136  |  108  |  37[H]  ----------------------------<  92  4.2   |  18[L]  |  1.27    Ca    7.8[L]      30 Jun 2025 02:57  Phos  3.3     06-30  Mg     2.4     06-30    TPro  6.4  /  Alb  1.8[L]  /  TBili  0.4  /  DBili  x   /  AST  17  /  ALT  15  /  AlkPhos  95  06-30    Creatinine Trend: 1.27<--, 1.27<--, 1.25<--, 1.31<--, 1.30<--, 1.37<--    Urinalysis Basic - ( 30 Jun 2025 02:57 )    Color: x / Appearance: x / SG: x / pH: x  Gluc: 92 mg/dL / Ketone: x  / Bili: x / Urobili: x   Blood: x / Protein: x / Nitrite: x   Leuk Esterase: x / RBC: x / WBC x   Sq Epi: x / Non Sq Epi: x / Bacteria: x            MICROBIOLOGY:     RADIOLOGY:  [ ] Reviewed and interpreted by me    EKG:

## 2025-06-30 NOTE — PROGRESS NOTE ADULT - ASSESSMENT
74-year-old female with hypertension, hyperlipidemia, 2 diabetes, CAD status post multiple stents with a recent right humerus fracture roughly 3 days ago following a traumatic fall admitted to the medical ICU for altered mental status and hypotension.  Found to be in septic shock secondary to colitis and UTI found to have ESBL E. coli bacteremia.  Hospital course further complicated by cardiac arrest of unclear origin with ROSC after 10 minutes of CPR. Patient was extubated and transferred to the floors. Patient had RRT for aspiration and AMS and was intubated. Patient unable to wean off the vent, underwent trach/PEG for prolonged weaning.     Plan   Neuro: Patient is awake and alert and following commands.   Will continue to spontaneous breathing trials as tolerated.  Cardiovascular: Patient with a known history of CAD with stents in the past.  Continue with aspirin.  Patient previously had an NSTEMI on admission and was treated with heparin drip.  Was plan for further ischemic evaluation but now on hold given severe deconditioning and requiring tracheostomy and PEG. Continue with Ranexa.   Pulmonary: Patient developed chronic hypoxic respiratory failure most likely due to deconditioning.  Currently on the ventilator via tracheostomy.  Continue with spontaneous breathing trials as tolerated.  Trach care as per routine.  Maintain O2 saturation of 90%  PS 12/6 will wean down as tolerated trial of 8/6 today around the clock  GI: PEG tube placed for prolonged feedings.  Currently trickle feeds. Will increase to goal rate. Monitor for bowel movements. Protonix 40mg IV BID. c/w simethacone 80mg once a day for gas   Renal: Monitor creatinine.  Monitor electrolytes replete as needed.  Strict I's and O's.  ID: Patient on treatment for stenotrophomonas in the sputum culture concerning for tracheitis. completed Bactrim 6/29  Endo: Patient currently on Lantus 30 units at bedtime along with insulin sliding scale  Heme: Lovenox for DVT prophylaxis.  Ethics: Patient is DNR but okay with long-term ventilation  D/c planing downgrade to floor.  74-year-old female with hypertension, hyperlipidemia, 2 diabetes, CAD status post multiple stents with a recent right humerus fracture roughly 3 days ago following a traumatic fall admitted to the medical ICU for altered mental status and hypotension.  Found to be in septic shock secondary to colitis and UTI found to have ESBL E. coli bacteremia.  Hospital course further complicated by cardiac arrest of unclear origin with ROSC after 10 minutes of CPR. Patient was extubated and transferred to the floors. Patient had RRT for aspiration and AMS and was intubated. Patient unable to wean off the vent, underwent trach/PEG for prolonged weaning.     Plan   Neuro: Patient is awake and alert and following commands.   Will continue to spontaneous breathing trials as tolerated.  Cardiovascular: Patient with a known history of CAD with stents in the past.  Continue with aspirin.  Patient previously had an NSTEMI on admission and was treated with heparin drip.  Was plan for further ischemic evaluation but now on hold given severe deconditioning and requiring tracheostomy and PEG. Continue with Ranexa.   Pulmonary: Patient developed chronic hypoxic respiratory failure most likely due to deconditioning.  Currently on the ventilator via tracheostomy.  Continue with spontaneous breathing trials as tolerated.  Trach care as per routine.  Maintain O2 saturation of 90%  PS 12/6 will wean down as tolerated   GI: PEG tube placed for prolonged feedings.  Not tolerateing feeds, has stool around fecal ball w/o true obstruction if does not have large BM with enema may need disimpaction,  Protonix 40mg IV BID. c/w simethacone 80mg once a day for gas   Renal: Monitor creatinine.  Monitor electrolytes replete as needed.  Strict I's and O's.  ID: Patient on treatment for stenotrophomonas in the sputum culture concerning for tracheitis. completed Bactrim 6/29  Endo: Patient currently on Lantus 30 units at bedtime along with insulin sliding scale  Heme: Lovenox for DVT prophylaxis.  Ethics: Patient is DNR but okay with long-term ventilation  D/c planing downgrade to floor.

## 2025-07-01 LAB
ALBUMIN SERPL ELPH-MCNC: 1.9 G/DL — LOW (ref 3.3–5)
ALP SERPL-CCNC: 106 U/L — SIGNIFICANT CHANGE UP (ref 40–120)
ALT FLD-CCNC: 14 U/L — SIGNIFICANT CHANGE UP (ref 12–78)
ANION GAP SERPL CALC-SCNC: 10 MMOL/L — SIGNIFICANT CHANGE UP (ref 5–17)
AST SERPL-CCNC: 18 U/L — SIGNIFICANT CHANGE UP (ref 15–37)
BILIRUB SERPL-MCNC: 0.5 MG/DL — SIGNIFICANT CHANGE UP (ref 0.2–1.2)
BUN SERPL-MCNC: 37 MG/DL — HIGH (ref 7–23)
CALCIUM SERPL-MCNC: 8.6 MG/DL — SIGNIFICANT CHANGE UP (ref 8.5–10.1)
CHLORIDE SERPL-SCNC: 106 MMOL/L — SIGNIFICANT CHANGE UP (ref 96–108)
CO2 SERPL-SCNC: 18 MMOL/L — LOW (ref 22–31)
CREAT SERPL-MCNC: 1.3 MG/DL — SIGNIFICANT CHANGE UP (ref 0.5–1.3)
EGFR: 43 ML/MIN/1.73M2 — LOW
EGFR: 43 ML/MIN/1.73M2 — LOW
GLUCOSE BLDC GLUCOMTR-MCNC: 102 MG/DL — HIGH (ref 70–99)
GLUCOSE BLDC GLUCOMTR-MCNC: 107 MG/DL — HIGH (ref 70–99)
GLUCOSE BLDC GLUCOMTR-MCNC: 183 MG/DL — HIGH (ref 70–99)
GLUCOSE BLDC GLUCOMTR-MCNC: 198 MG/DL — HIGH (ref 70–99)
GLUCOSE BLDC GLUCOMTR-MCNC: 63 MG/DL — LOW (ref 70–99)
GLUCOSE BLDC GLUCOMTR-MCNC: 65 MG/DL — LOW (ref 70–99)
GLUCOSE BLDC GLUCOMTR-MCNC: 86 MG/DL — SIGNIFICANT CHANGE UP (ref 70–99)
GLUCOSE BLDC GLUCOMTR-MCNC: 93 MG/DL — SIGNIFICANT CHANGE UP (ref 70–99)
GLUCOSE SERPL-MCNC: 45 MG/DL — CRITICAL LOW (ref 70–99)
HCT VFR BLD CALC: 26.8 % — LOW (ref 34.5–45)
HGB BLD-MCNC: 8.4 G/DL — LOW (ref 11.5–15.5)
MAGNESIUM SERPL-MCNC: 2.4 MG/DL — SIGNIFICANT CHANGE UP (ref 1.6–2.6)
MCHC RBC-ENTMCNC: 28.9 PG — SIGNIFICANT CHANGE UP (ref 27–34)
MCHC RBC-ENTMCNC: 31.3 G/DL — LOW (ref 32–36)
MCV RBC AUTO: 92.1 FL — SIGNIFICANT CHANGE UP (ref 80–100)
NRBC BLD AUTO-RTO: 0 /100 WBCS — SIGNIFICANT CHANGE UP (ref 0–0)
PHOSPHATE SERPL-MCNC: 3.3 MG/DL — SIGNIFICANT CHANGE UP (ref 2.5–4.5)
PLATELET # BLD AUTO: 438 K/UL — HIGH (ref 150–400)
POTASSIUM SERPL-MCNC: 4.3 MMOL/L — SIGNIFICANT CHANGE UP (ref 3.5–5.3)
POTASSIUM SERPL-SCNC: 4.3 MMOL/L — SIGNIFICANT CHANGE UP (ref 3.5–5.3)
PROT SERPL-MCNC: 7 GM/DL — SIGNIFICANT CHANGE UP (ref 6–8.3)
RBC # BLD: 2.91 M/UL — LOW (ref 3.8–5.2)
RBC # FLD: 15.9 % — HIGH (ref 10.3–14.5)
SODIUM SERPL-SCNC: 134 MMOL/L — LOW (ref 135–145)
WBC # BLD: 7.51 K/UL — SIGNIFICANT CHANGE UP (ref 3.8–10.5)
WBC # FLD AUTO: 7.51 K/UL — SIGNIFICANT CHANGE UP (ref 3.8–10.5)

## 2025-07-01 PROCEDURE — 99233 SBSQ HOSP IP/OBS HIGH 50: CPT

## 2025-07-01 RX ORDER — DEXTROSE 50 % IN WATER 50 %
50 SYRINGE (ML) INTRAVENOUS ONCE
Refills: 0 | Status: COMPLETED | OUTPATIENT
Start: 2025-07-01 | End: 2025-07-01

## 2025-07-01 RX ORDER — INSULIN GLARGINE-YFGN 100 [IU]/ML
20 INJECTION, SOLUTION SUBCUTANEOUS AT BEDTIME
Refills: 0 | Status: DISCONTINUED | OUTPATIENT
Start: 2025-07-01 | End: 2025-07-01

## 2025-07-01 RX ORDER — DEXTROSE 50 % IN WATER 50 %
12.5 SYRINGE (ML) INTRAVENOUS ONCE
Refills: 0 | Status: DISCONTINUED | OUTPATIENT
Start: 2025-07-01 | End: 2025-07-01

## 2025-07-01 RX ORDER — INSULIN GLARGINE-YFGN 100 [IU]/ML
20 INJECTION, SOLUTION SUBCUTANEOUS AT BEDTIME
Refills: 0 | Status: DISCONTINUED | OUTPATIENT
Start: 2025-07-01 | End: 2025-07-02

## 2025-07-01 RX ORDER — GLUCAGON 3 MG/1
1 POWDER NASAL ONCE
Refills: 0 | Status: DISCONTINUED | OUTPATIENT
Start: 2025-07-01 | End: 2025-07-01

## 2025-07-01 RX ORDER — DEXTROSE 50 % IN WATER 50 %
25 SYRINGE (ML) INTRAVENOUS ONCE
Refills: 0 | Status: DISCONTINUED | OUTPATIENT
Start: 2025-07-01 | End: 2025-07-01

## 2025-07-01 RX ORDER — SODIUM CHLORIDE 9 G/1000ML
1000 INJECTION, SOLUTION INTRAVENOUS
Refills: 0 | Status: DISCONTINUED | OUTPATIENT
Start: 2025-07-01 | End: 2025-07-01

## 2025-07-01 RX ADMIN — Medication 80 MILLIGRAM(S): at 11:38

## 2025-07-01 RX ADMIN — MIDODRINE HYDROCHLORIDE 20 MILLIGRAM(S): 5 TABLET ORAL at 21:22

## 2025-07-01 RX ADMIN — INSULIN GLARGINE-YFGN 20 UNIT(S): 100 INJECTION, SOLUTION SUBCUTANEOUS at 22:31

## 2025-07-01 RX ADMIN — Medication 50 MILLILITER(S): at 04:36

## 2025-07-01 RX ADMIN — QUETIAPINE FUMARATE 25 MILLIGRAM(S): 25 TABLET ORAL at 21:25

## 2025-07-01 RX ADMIN — Medication 40 MILLIGRAM(S): at 06:29

## 2025-07-01 RX ADMIN — Medication 2 TABLET(S): at 21:22

## 2025-07-01 RX ADMIN — Medication 650 MILLIGRAM(S): at 12:40

## 2025-07-01 RX ADMIN — Medication 1 TABLET(S): at 11:38

## 2025-07-01 RX ADMIN — RANOLAZINE 500 MILLIGRAM(S): 1000 TABLET, FILM COATED, EXTENDED RELEASE ORAL at 06:29

## 2025-07-01 RX ADMIN — Medication 1 APPLICATION(S): at 13:49

## 2025-07-01 RX ADMIN — MIDODRINE HYDROCHLORIDE 20 MILLIGRAM(S): 5 TABLET ORAL at 13:50

## 2025-07-01 RX ADMIN — Medication 650 MILLIGRAM(S): at 11:40

## 2025-07-01 RX ADMIN — Medication 81 MILLIGRAM(S): at 11:38

## 2025-07-01 RX ADMIN — RANOLAZINE 500 MILLIGRAM(S): 1000 TABLET, FILM COATED, EXTENDED RELEASE ORAL at 17:05

## 2025-07-01 RX ADMIN — Medication 15 MILLILITER(S): at 06:29

## 2025-07-01 RX ADMIN — Medication 40 MILLIGRAM(S): at 17:05

## 2025-07-01 RX ADMIN — Medication 50 MILLILITER(S): at 11:41

## 2025-07-01 RX ADMIN — ATORVASTATIN CALCIUM 40 MILLIGRAM(S): 80 TABLET, FILM COATED ORAL at 21:22

## 2025-07-01 RX ADMIN — Medication 15 MILLILITER(S): at 17:05

## 2025-07-01 NOTE — PROGRESS NOTE ADULT - ASSESSMENT
74-year-old female with hypertension, hyperlipidemia, 2 diabetes, CAD status post multiple stents with a recent right humerus fracture roughly 3 days ago following a traumatic fall admitted to the medical ICU for altered mental status and hypotension.  Found to be in septic shock secondary to colitis and UTI found to have ESBL E. coli bacteremia.  Hospital course further complicated by cardiac arrest of unclear origin with ROSC after 10 minutes of CPR. Patient was extubated and transferred to the floors. Patient had RRT for aspiration and AMS and was intubated. Patient unable to wean off the vent, underwent trach/PEG for prolonged weaning.     Plan   Neuro: Patient is awake and alert and following commands.   Will continue to spontaneous breathing trials as tolerated.  Cardiovascular: Patient with a known history of CAD with stents in the past.  Continue with aspirin.  Patient previously had an NSTEMI on admission and was treated with heparin drip.  Was plan for further ischemic evaluation but now on hold given severe deconditioning and requiring tracheostomy and PEG. Continue with Ranexa.   Pulmonary: Patient developed chronic hypoxic respiratory failure most likely due to deconditioning.  Currently on the ventilator via tracheostomy.  Continue with spontaneous breathing trials as tolerated.  Trach care as per routine.  Maintain O2 saturation of 90%  PS 12/6 will wean down as tolerated   GI: PEG tube placed for prolonged feedings.  Not tolerateing feeds, has stool around fecal ball w/o true obstruction if does not have large BM with enema may need disimpaction,  Protonix 40mg IV BID. c/w simethacone 80mg once a day for gas   Renal: Monitor creatinine.  Monitor electrolytes replete as needed.  Strict I's and O's.  ID: Patient on treatment for stenotrophomonas in the sputum culture concerning for tracheitis. completed Bactrim 6/29  Endo: Patient currently on Lantus 30 units at bedtime along with insulin sliding scale  Heme: Lovenox for DVT prophylaxis.  Ethics: Patient is DNR but okay with long-term ventilation  D/c planing downgrade to floor.  74-year-old female with hypertension, hyperlipidemia, 2 diabetes, CAD status post multiple stents with a recent right humerus fracture roughly 3 days ago following a traumatic fall admitted to the medical ICU for altered mental status and hypotension.  Found to be in septic shock secondary to colitis and UTI found to have ESBL E. coli bacteremia.  Hospital course further complicated by cardiac arrest of unclear origin with ROSC after 10 minutes of CPR. Patient was extubated and transferred to the floors. Patient had RRT for aspiration and AMS and was intubated. Patient unable to wean off the vent, underwent trach/PEG for prolonged weaning.     Plan   Neuro: Patient is awake and alert and following commands.   Will continue to spontaneous breathing trials as tolerated.  Cardiovascular: Patient with a known history of CAD with stents in the past.  Continue with aspirin.  Patient previously had an NSTEMI on admission and was treated with heparin drip.  Was plan for further ischemic evaluation but now on hold given severe deconditioning and requiring tracheostomy and PEG. Continue with Ranexa.   Pulmonary: Patient developed chronic hypoxic respiratory failure most likely due to deconditioning.  Currently on the ventilator via tracheostomy.  Continue with spontaneous breathing trials as tolerated.  Trach care as per routine.  Maintain O2 saturation of 90%  PS 12/6 will wean down as tolerated keep on PS overnight.   GI: PEG tube placed for prolonged feedings. Issues tolerating feeds but nbo s/p enema had large BMS. Now tolerating 10cc/h feeds increas to 20 today  Renal: Monitor creatinine.  Monitor electrolytes replete as needed.  Strict I's and O's.  ID: Patient on treatment for stenotrophomonas in the sputum culture concerning for tracheitis. completed Bactrim 6/29  Endo: Hypoglycemia due to low feeds rate will lower lnatus to 20. increase feeds to 20. monitor.   Heme: Lovenox for DVT prophylaxis.  Ethics: Patient is DNR but okay with long-term ventilation  D/c planing downgrade to floor.

## 2025-07-01 NOTE — PROGRESS NOTE ADULT - SUBJECTIVE AND OBJECTIVE BOX
CHIEF COMPLAINT:Patient is a 74y old  Female who presents with a chief complaint of ALY, physical deconditioning (30 Jun 2025 11:14)        Interval Events:  Hypoglycemia over night, multiple Bms.  REVIEW OF SYSTEMS:  Pt is hungry.  [x] All other systems negative  [ ] Unable to assess ROS because ________    OBJECTIVE:  ICU Vital Signs Last 24 Hrs  T(C): 36.8 (01 Jul 2025 08:00), Max: 37.3 (30 Jun 2025 23:12)  T(F): 98.2 (01 Jul 2025 08:00), Max: 99.2 (30 Jun 2025 23:12)  HR: 66 (01 Jul 2025 08:00) (65 - 88)  BP: 123/68 (01 Jul 2025 08:00) (112/96 - 149/59)  BP(mean): 84 (01 Jul 2025 08:00) (70 - 112)  ABP: --  ABP(mean): --  RR: 12 (01 Jul 2025 08:00) (0 - 32)  SpO2: 100% (01 Jul 2025 08:00) (96% - 100%)    O2 Parameters below as of 01 Jul 2025 03:00  Patient On (Oxygen Delivery Method): ventilator    O2 Concentration (%): 30      Mode: AC/ CMV (Assist Control/ Continuous Mandatory Ventilation), RR (machine): 15, TV (machine): 400, FiO2: 30, PEEP: 6, ITime: 0.9, MAP: 10, PIP: 20    06-30 @ 07:01 - 07-01 @ 07:00  --------------------------------------------------------  IN: 720 mL / OUT: 725 mL / NET: -5 mL    07-01 @ 07:01 - 07-01 @ 08:13  --------------------------------------------------------  IN: 10 mL / OUT: 0 mL / NET: 10 mL      CAPILLARY BLOOD GLUCOSE      POCT Blood Glucose.: 86 mg/dL (01 Jul 2025 06:25)      PHYSICAL EXAM:  General:   HEENT:   Lymph Nodes:  Neck:   Respiratory:   Cardiovascular:   Abdomen:   Extremities:   Skin:   Neurological:  Psychiatry:    LINES:    HOSPITAL MEDICATIONS:  Standing Meds:  aspirin  chewable 81 milliGRAM(s) Oral daily  atorvastatin 40 milliGRAM(s) Oral at bedtime  chlorhexidine 0.12% Liquid 15 milliLiter(s) Oral Mucosa every 12 hours  chlorhexidine 2% Cloths 1 Application(s) Topical <User Schedule>  enoxaparin Injectable 40 milliGRAM(s) SubCutaneous every 24 hours  insulin glargine Injectable (LANTUS) 30 Unit(s) SubCutaneous at bedtime  insulin lispro (ADMELOG) corrective regimen sliding scale   SubCutaneous every 6 hours  midodrine 20 milliGRAM(s) Oral every 8 hours  multivitamin 1 Tablet(s) Oral daily  pantoprazole  Injectable 40 milliGRAM(s) IV Push every 12 hours  QUEtiapine 25 milliGRAM(s) Oral at bedtime  ranolazine 500 milliGRAM(s) Oral two times a day  senna 2 Tablet(s) Oral at bedtime  simethicone 80 milliGRAM(s) Chew daily      PRN Meds:  acetaminophen     Tablet .. 650 milliGRAM(s) Oral every 6 hours PRN  acetaminophen     Tablet .. 650 milliGRAM(s) Oral every 6 hours PRN  aluminum hydroxide/magnesium hydroxide/simethicone Suspension 30 milliLiter(s) Oral every 6 hours PRN  bisacodyl Suppository 10 milliGRAM(s) Rectal daily PRN  ondansetron Injectable 4 milliGRAM(s) IV Push every 8 hours PRN  polyethylene glycol 3350 17 Gram(s) Oral daily PRN      LABS:                        8.4    7.51  )-----------( 438      ( 01 Jul 2025 03:38 )             26.8     Hgb Trend: 8.4<--, 7.9<--, 7.9<--, 8.3<--, 8.0<--  07-01    134[L]  |  106  |  37[H]  ----------------------------<  45[LL]  4.3   |  18[L]  |  1.30    Ca    8.6      01 Jul 2025 03:38  Phos  3.3     07-01  Mg     2.4     07-01    TPro  7.0  /  Alb  1.9[L]  /  TBili  0.5  /  DBili  x   /  AST  18  /  ALT  14  /  AlkPhos  106  07-01    Creatinine Trend: 1.30<--, 1.27<--, 1.27<--, 1.25<--, 1.31<--, 1.30<--    Urinalysis Basic - ( 01 Jul 2025 03:38 )    Color: x / Appearance: x / SG: x / pH: x  Gluc: 45 mg/dL / Ketone: x  / Bili: x / Urobili: x   Blood: x / Protein: x / Nitrite: x   Leuk Esterase: x / RBC: x / WBC x   Sq Epi: x / Non Sq Epi: x / Bacteria: x            MICROBIOLOGY:     RADIOLOGY:  [ ] Reviewed and interpreted by me    EKG:   CHIEF COMPLAINT:Patient is a 74y old  Female who presents with a chief complaint of ALY, physical deconditioning (30 Jun 2025 11:14)        Interval Events:  Hypoglycemia over night, multiple Bms.  REVIEW OF SYSTEMS:  Pt is hungry.  [x] All other systems negative  [ ] Unable to assess ROS because ________    OBJECTIVE:  ICU Vital Signs Last 24 Hrs  T(C): 36.8 (01 Jul 2025 08:00), Max: 37.3 (30 Jun 2025 23:12)  T(F): 98.2 (01 Jul 2025 08:00), Max: 99.2 (30 Jun 2025 23:12)  HR: 66 (01 Jul 2025 08:00) (65 - 88)  BP: 123/68 (01 Jul 2025 08:00) (112/96 - 149/59)  BP(mean): 84 (01 Jul 2025 08:00) (70 - 112)  ABP: --  ABP(mean): --  RR: 12 (01 Jul 2025 08:00) (0 - 32)  SpO2: 100% (01 Jul 2025 08:00) (96% - 100%)    O2 Parameters below as of 01 Jul 2025 03:00  Patient On (Oxygen Delivery Method): ventilator    O2 Concentration (%): 30      Mode: AC/ CMV (Assist Control/ Continuous Mandatory Ventilation), RR (machine): 15, TV (machine): 400, FiO2: 30, PEEP: 6, ITime: 0.9, MAP: 10, PIP: 20    06-30 @ 07:01 - 07-01 @ 07:00  --------------------------------------------------------  IN: 720 mL / OUT: 725 mL / NET: -5 mL    07-01 @ 07:01 - 07-01 @ 08:13  --------------------------------------------------------  IN: 10 mL / OUT: 0 mL / NET: 10 mL      CAPILLARY BLOOD GLUCOSE      POCT Blood Glucose.: 86 mg/dL (01 Jul 2025 06:25)      PHYSICAL EXAM:  Gen: trache to full vent  HEENT: PERRL  Resp: mechanical breath sounds B/L  CVS: S1S2 no m/r/g  Abd: soft NT/ND +BS  Ext: no c/c/e  Neuro: awake alert very weak but moving all 4. following comands, able to communicate with head shakes.    LINES:    HOSPITAL MEDICATIONS:  Standing Meds:  aspirin  chewable 81 milliGRAM(s) Oral daily  atorvastatin 40 milliGRAM(s) Oral at bedtime  chlorhexidine 0.12% Liquid 15 milliLiter(s) Oral Mucosa every 12 hours  chlorhexidine 2% Cloths 1 Application(s) Topical <User Schedule>  enoxaparin Injectable 40 milliGRAM(s) SubCutaneous every 24 hours  insulin glargine Injectable (LANTUS) 30 Unit(s) SubCutaneous at bedtime  insulin lispro (ADMELOG) corrective regimen sliding scale   SubCutaneous every 6 hours  midodrine 20 milliGRAM(s) Oral every 8 hours  multivitamin 1 Tablet(s) Oral daily  pantoprazole  Injectable 40 milliGRAM(s) IV Push every 12 hours  QUEtiapine 25 milliGRAM(s) Oral at bedtime  ranolazine 500 milliGRAM(s) Oral two times a day  senna 2 Tablet(s) Oral at bedtime  simethicone 80 milliGRAM(s) Chew daily      PRN Meds:  acetaminophen     Tablet .. 650 milliGRAM(s) Oral every 6 hours PRN  acetaminophen     Tablet .. 650 milliGRAM(s) Oral every 6 hours PRN  aluminum hydroxide/magnesium hydroxide/simethicone Suspension 30 milliLiter(s) Oral every 6 hours PRN  bisacodyl Suppository 10 milliGRAM(s) Rectal daily PRN  ondansetron Injectable 4 milliGRAM(s) IV Push every 8 hours PRN  polyethylene glycol 3350 17 Gram(s) Oral daily PRN      LABS:                        8.4    7.51  )-----------( 438      ( 01 Jul 2025 03:38 )             26.8     Hgb Trend: 8.4<--, 7.9<--, 7.9<--, 8.3<--, 8.0<--  07-01    134[L]  |  106  |  37[H]  ----------------------------<  45[LL]  4.3   |  18[L]  |  1.30    Ca    8.6      01 Jul 2025 03:38  Phos  3.3     07-01  Mg     2.4     07-01    TPro  7.0  /  Alb  1.9[L]  /  TBili  0.5  /  DBili  x   /  AST  18  /  ALT  14  /  AlkPhos  106  07-01    Creatinine Trend: 1.30<--, 1.27<--, 1.27<--, 1.25<--, 1.31<--, 1.30<--    Urinalysis Basic - ( 01 Jul 2025 03:38 )    Color: x / Appearance: x / SG: x / pH: x  Gluc: 45 mg/dL / Ketone: x  / Bili: x / Urobili: x   Blood: x / Protein: x / Nitrite: x   Leuk Esterase: x / RBC: x / WBC x   Sq Epi: x / Non Sq Epi: x / Bacteria: x            MICROBIOLOGY:     RADIOLOGY:  [ ] Reviewed and interpreted by me    EKG:

## 2025-07-01 NOTE — CHART NOTE - NSCHARTNOTEFT_GEN_A_CORE
MICU DOWN GRADE NOTE    Patient is a 74y old  Female who presents with a chief complaint of ALY, physical deconditioning (01 Jul 2025 08:13)    HPI: 74-year-old female with hypertension, hyperlipidemia, 2 diabetes, CAD status post multiple stents with a recent right humerus fracture roughly 3 days ago following a traumatic fall admitted to the medical ICU for altered mental status and hypotension.  Found to be in septic shock secondary to colitis and UTI found to have ESBL E. coli bacteremia.  Hospital course further complicated by cardiac arrest of unclear origin with ROSC after 10 minutes of CPR. Patient was extubated and transferred to the floors. Patient had RRT for aspiration and AMS and was intubated. Patient unable to wean off the vent, underwent trach/PEG for prolonged weaning.     INTERVAL HPI/OVERNIGHT EVENTS:   In ICU, pt s/p trach 6/23, peg 6/25. Pt agitated on vent, requiring precedex gtt for several days, increased seroquel to 25 mg, able to wean off precedex. On 6/29, pt had abdominal distention and abd Xray concerning for ileus vs SBO. CTA abd/pelvis negative for SBO, likely ileus w/ large stool burden. s/p enema, pt had multiple large BMs , tolerated trickle feeds via PEG tube x1 day. Advancing tube feeds. Pt is now more awake and alert, tolerating pressure support 12/6. Attempt to wean from vent.       REVIEW OF SYSTEMS:  Denies pain, SOB, CP, chills   [x ] All other systems negative  [ ] Unable to assess ROS because ________    MEDICATIONS:  acetaminophen     Tablet .. 650 milliGRAM(s) Oral every 6 hours PRN  acetaminophen     Tablet .. 650 milliGRAM(s) Oral every 6 hours PRN  aluminum hydroxide/magnesium hydroxide/simethicone Suspension 30 milliLiter(s) Oral every 6 hours PRN  aspirin  chewable 81 milliGRAM(s) Oral daily  atorvastatin 40 milliGRAM(s) Oral at bedtime  bisacodyl Suppository 10 milliGRAM(s) Rectal daily PRN  chlorhexidine 0.12% Liquid 15 milliLiter(s) Oral Mucosa every 12 hours  chlorhexidine 2% Cloths 1 Application(s) Topical <User Schedule>  enoxaparin Injectable 40 milliGRAM(s) SubCutaneous every 24 hours  insulin glargine Injectable (LANTUS) 20 Unit(s) SubCutaneous at bedtime  insulin lispro (ADMELOG) corrective regimen sliding scale   SubCutaneous every 6 hours  midodrine 20 milliGRAM(s) Oral every 8 hours  multivitamin 1 Tablet(s) Oral daily  ondansetron Injectable 4 milliGRAM(s) IV Push every 8 hours PRN  pantoprazole  Injectable 40 milliGRAM(s) IV Push every 12 hours  polyethylene glycol 3350 17 Gram(s) Oral daily PRN  QUEtiapine 25 milliGRAM(s) Oral at bedtime  ranolazine 500 milliGRAM(s) Oral two times a day  senna 2 Tablet(s) Oral at bedtime  simethicone 80 milliGRAM(s) Chew daily      T(C): 36.8 (07-01-25 @ 08:00), Max: 37.3 (06-30-25 @ 23:12)  HR: 73 (07-01-25 @ 10:00) (65 - 88)  BP: 134/79 (07-01-25 @ 10:00) (109/94 - 149/59)  RR: 15 (07-01-25 @ 10:00) (0 - 32)  SpO2: 100% (07-01-25 @ 10:00) (96% - 100%)  Wt(kg): --Vital Signs Last 24 Hrs  T(C): 36.8 (01 Jul 2025 08:00), Max: 37.3 (30 Jun 2025 23:12)  T(F): 98.2 (01 Jul 2025 08:00), Max: 99.2 (30 Jun 2025 23:12)  HR: 73 (01 Jul 2025 10:00) (65 - 88)  BP: 134/79 (01 Jul 2025 10:00) (109/94 - 149/59)  BP(mean): 95 (01 Jul 2025 10:00) (70 - 112)  RR: 15 (01 Jul 2025 10:00) (0 - 32)  SpO2: 100% (01 Jul 2025 10:00) (96% - 100%)    Parameters below as of 01 Jul 2025 03:00  Patient On (Oxygen Delivery Method): ventilator    O2 Concentration (%): 30    PHYSICAL EXAM:  GENERAL: NAD, well-groomed, well-developed  HEAD:  Atraumatic, Normocephalic  EYES: EOMI, PERRLA, conjunctiva and sclera clear  ENMT:  Moist mucous membranes  NECK: Supple, No JVD,  CHEST/LUNG: Clear to auscultation bilaterally; No rales, rhonchi, wheezing, or rubs  HEART: Regular rate and rhythm; No murmurs, rubs, or gallops  ABDOMEN: Soft, Nontender, Nondistended; Bowel sounds present  NEURO: Alert & Oriented X3  EXTREMITIES: No LE edema, no calf tenderness  LYMPH: No lymphadenopathy noted  SKIN: No rashes or lesions    Consultant(s) Notes Reviewed:  [x ] YES  [ ] NO  Care Discussed with Consultants/Other Providers [ x] YES  [ ] NO    LABS:                        8.4    7.51  )-----------( 438      ( 01 Jul 2025 03:38 )             26.8     07-01    134[L]  |  106  |  37[H]  ----------------------------<  45[LL]  4.3   |  18[L]  |  1.30    Ca    8.6      01 Jul 2025 03:38  Phos  3.3     07-01  Mg     2.4     07-01    TPro  7.0  /  Alb  1.9[L]  /  TBili  0.5  /  DBili  x   /  AST  18  /  ALT  14  /  AlkPhos  106  07-01      Urinalysis Basic - ( 01 Jul 2025 03:38 )    Color: x / Appearance: x / SG: x / pH: x  Gluc: 45 mg/dL / Ketone: x  / Bili: x / Urobili: x   Blood: x / Protein: x / Nitrite: x   Leuk Esterase: x / RBC: x / WBC x   Sq Epi: x / Non Sq Epi: x / Bacteria: x      CAPILLARY BLOOD GLUCOSE      POCT Blood Glucose.: 86 mg/dL (01 Jul 2025 06:25)  POCT Blood Glucose.: 198 mg/dL (01 Jul 2025 04:52)  POCT Blood Glucose.: 84 mg/dL (30 Jun 2025 23:04)  POCT Blood Glucose.: 99 mg/dL (30 Jun 2025 21:06)  POCT Blood Glucose.: 95 mg/dL (30 Jun 2025 17:38)        Urinalysis Basic - ( 01 Jul 2025 03:38 )    Color: x / Appearance: x / SG: x / pH: x  Gluc: 45 mg/dL / Ketone: x  / Bili: x / Urobili: x   Blood: x / Protein: x / Nitrite: x   Leuk Esterase: x / RBC: x / WBC x   Sq Epi: x / Non Sq Epi: x / Bacteria: x        RADIOLOGY & ADDITIONAL TESTS:    Imaging Personally Reviewed:  [x ] YES  [ ] NO    To follow up:  [ ] Vent management per pulmonary, Dr. Hassan to follow  [ ] Recent ileus, monitor for BMs. Trial of increased tube feeds today to rate of 20 mL/hr. Increase to 50mL/hour if tolerated  [ ] Decreased lantus 30 -> 20 while on trickle feeds, increase as tube feeds uptitrated   [ ] NSTEMI on admission, will need ischemic eval when more stable   [ ] Dispo planning, if able to wean off vent

## 2025-07-02 LAB
ALBUMIN SERPL ELPH-MCNC: 1.8 G/DL — LOW (ref 3.3–5)
ALP SERPL-CCNC: 112 U/L — SIGNIFICANT CHANGE UP (ref 40–120)
ALT FLD-CCNC: 14 U/L — SIGNIFICANT CHANGE UP (ref 12–78)
ANION GAP SERPL CALC-SCNC: 6 MMOL/L — SIGNIFICANT CHANGE UP (ref 5–17)
AST SERPL-CCNC: 18 U/L — SIGNIFICANT CHANGE UP (ref 15–37)
BILIRUB SERPL-MCNC: 0.5 MG/DL — SIGNIFICANT CHANGE UP (ref 0.2–1.2)
BLD GP AB SCN SERPL QL: SIGNIFICANT CHANGE UP
BUN SERPL-MCNC: 43 MG/DL — HIGH (ref 7–23)
CALCIUM SERPL-MCNC: 8.1 MG/DL — LOW (ref 8.5–10.1)
CHLORIDE SERPL-SCNC: 106 MMOL/L — SIGNIFICANT CHANGE UP (ref 96–108)
CO2 SERPL-SCNC: 20 MMOL/L — LOW (ref 22–31)
CREAT SERPL-MCNC: 1.25 MG/DL — SIGNIFICANT CHANGE UP (ref 0.5–1.3)
EGFR: 45 ML/MIN/1.73M2 — LOW
EGFR: 45 ML/MIN/1.73M2 — LOW
GLUCOSE BLDC GLUCOMTR-MCNC: 65 MG/DL — LOW (ref 70–99)
GLUCOSE BLDC GLUCOMTR-MCNC: 80 MG/DL — SIGNIFICANT CHANGE UP (ref 70–99)
GLUCOSE BLDC GLUCOMTR-MCNC: 81 MG/DL — SIGNIFICANT CHANGE UP (ref 70–99)
GLUCOSE BLDC GLUCOMTR-MCNC: 95 MG/DL — SIGNIFICANT CHANGE UP (ref 70–99)
GLUCOSE SERPL-MCNC: 82 MG/DL — SIGNIFICANT CHANGE UP (ref 70–99)
HCT VFR BLD CALC: 25.1 % — LOW (ref 34.5–45)
HCT VFR BLD CALC: 26.4 % — LOW (ref 34.5–45)
HGB BLD-MCNC: 7.9 G/DL — LOW (ref 11.5–15.5)
HGB BLD-MCNC: 8 G/DL — LOW (ref 11.5–15.5)
MAGNESIUM SERPL-MCNC: 2.4 MG/DL — SIGNIFICANT CHANGE UP (ref 1.6–2.6)
MCHC RBC-ENTMCNC: 28.3 PG — SIGNIFICANT CHANGE UP (ref 27–34)
MCHC RBC-ENTMCNC: 29.3 PG — SIGNIFICANT CHANGE UP (ref 27–34)
MCHC RBC-ENTMCNC: 30.3 G/DL — LOW (ref 32–36)
MCHC RBC-ENTMCNC: 31.5 G/DL — LOW (ref 32–36)
MCV RBC AUTO: 93 FL — SIGNIFICANT CHANGE UP (ref 80–100)
MCV RBC AUTO: 93.3 FL — SIGNIFICANT CHANGE UP (ref 80–100)
NRBC BLD AUTO-RTO: 0 /100 WBCS — SIGNIFICANT CHANGE UP (ref 0–0)
NRBC BLD AUTO-RTO: 0 /100 WBCS — SIGNIFICANT CHANGE UP (ref 0–0)
PHOSPHATE SERPL-MCNC: 2.8 MG/DL — SIGNIFICANT CHANGE UP (ref 2.5–4.5)
PLATELET # BLD AUTO: 381 K/UL — SIGNIFICANT CHANGE UP (ref 150–400)
PLATELET # BLD AUTO: 390 K/UL — SIGNIFICANT CHANGE UP (ref 150–400)
POTASSIUM SERPL-MCNC: 4.8 MMOL/L — SIGNIFICANT CHANGE UP (ref 3.5–5.3)
POTASSIUM SERPL-SCNC: 4.8 MMOL/L — SIGNIFICANT CHANGE UP (ref 3.5–5.3)
PROT SERPL-MCNC: 6.3 GM/DL — SIGNIFICANT CHANGE UP (ref 6–8.3)
RBC # BLD: 2.7 M/UL — LOW (ref 3.8–5.2)
RBC # BLD: 2.83 M/UL — LOW (ref 3.8–5.2)
RBC # FLD: 15.9 % — HIGH (ref 10.3–14.5)
RBC # FLD: 15.9 % — HIGH (ref 10.3–14.5)
SODIUM SERPL-SCNC: 132 MMOL/L — LOW (ref 135–145)
WBC # BLD: 7.08 K/UL — SIGNIFICANT CHANGE UP (ref 3.8–10.5)
WBC # BLD: 8.74 K/UL — SIGNIFICANT CHANGE UP (ref 3.8–10.5)
WBC # FLD AUTO: 7.08 K/UL — SIGNIFICANT CHANGE UP (ref 3.8–10.5)
WBC # FLD AUTO: 8.74 K/UL — SIGNIFICANT CHANGE UP (ref 3.8–10.5)

## 2025-07-02 PROCEDURE — 99233 SBSQ HOSP IP/OBS HIGH 50: CPT

## 2025-07-02 PROCEDURE — 99223 1ST HOSP IP/OBS HIGH 75: CPT

## 2025-07-02 RX ORDER — INSULIN GLARGINE-YFGN 100 [IU]/ML
14 INJECTION, SOLUTION SUBCUTANEOUS AT BEDTIME
Refills: 0 | Status: DISCONTINUED | OUTPATIENT
Start: 2025-07-02 | End: 2025-07-02

## 2025-07-02 RX ORDER — DEXTROSE 50 % IN WATER 50 %
25 SYRINGE (ML) INTRAVENOUS
Refills: 0 | Status: DISCONTINUED | OUTPATIENT
Start: 2025-07-02 | End: 2025-07-03

## 2025-07-02 RX ORDER — INSULIN GLARGINE-YFGN 100 [IU]/ML
10 INJECTION, SOLUTION SUBCUTANEOUS AT BEDTIME
Refills: 0 | Status: DISCONTINUED | OUTPATIENT
Start: 2025-07-02 | End: 2025-07-03

## 2025-07-02 RX ADMIN — ATORVASTATIN CALCIUM 40 MILLIGRAM(S): 80 TABLET, FILM COATED ORAL at 22:42

## 2025-07-02 RX ADMIN — QUETIAPINE FUMARATE 25 MILLIGRAM(S): 25 TABLET ORAL at 22:43

## 2025-07-02 RX ADMIN — Medication 40 MILLIGRAM(S): at 17:07

## 2025-07-02 RX ADMIN — Medication 81 MILLIGRAM(S): at 12:01

## 2025-07-02 RX ADMIN — MIDODRINE HYDROCHLORIDE 20 MILLIGRAM(S): 5 TABLET ORAL at 22:42

## 2025-07-02 RX ADMIN — Medication 15 MILLILITER(S): at 17:07

## 2025-07-02 RX ADMIN — Medication 80 MILLIGRAM(S): at 12:00

## 2025-07-02 RX ADMIN — Medication 40 MILLIGRAM(S): at 05:43

## 2025-07-02 RX ADMIN — Medication 1 TABLET(S): at 12:00

## 2025-07-02 RX ADMIN — Medication 15 MILLILITER(S): at 05:42

## 2025-07-02 RX ADMIN — MIDODRINE HYDROCHLORIDE 20 MILLIGRAM(S): 5 TABLET ORAL at 05:42

## 2025-07-02 RX ADMIN — MIDODRINE HYDROCHLORIDE 20 MILLIGRAM(S): 5 TABLET ORAL at 13:27

## 2025-07-02 RX ADMIN — Medication 1 APPLICATION(S): at 05:43

## 2025-07-02 RX ADMIN — RANOLAZINE 500 MILLIGRAM(S): 1000 TABLET, FILM COATED, EXTENDED RELEASE ORAL at 17:07

## 2025-07-02 RX ADMIN — RANOLAZINE 500 MILLIGRAM(S): 1000 TABLET, FILM COATED, EXTENDED RELEASE ORAL at 05:42

## 2025-07-02 RX ADMIN — ENOXAPARIN SODIUM 40 MILLIGRAM(S): 100 INJECTION SUBCUTANEOUS at 17:07

## 2025-07-02 NOTE — PROGRESS NOTE ADULT - SUBJECTIVE AND OBJECTIVE BOX
PROGRESS NOTE:     Patient is a 74y old  Female who presents with a chief complaint of ALY, physical deconditioning (02 Jul 2025 11:49)      SUBJECTIVE / OVERNIGHT EVENTS: Pt w/ BRBPR this AM. As per son, patient is not expressing any complaints.     ADDITIONAL REVIEW OF SYSTEMS:    MEDICATIONS  (STANDING):  aspirin  chewable 81 milliGRAM(s) Oral daily  atorvastatin 40 milliGRAM(s) Oral at bedtime  chlorhexidine 0.12% Liquid 15 milliLiter(s) Oral Mucosa every 12 hours  chlorhexidine 2% Cloths 1 Application(s) Topical <User Schedule>  enoxaparin Injectable 40 milliGRAM(s) SubCutaneous every 24 hours  insulin glargine Injectable (LANTUS) 14 Unit(s) SubCutaneous at bedtime  insulin lispro (ADMELOG) corrective regimen sliding scale   SubCutaneous every 6 hours  midodrine 20 milliGRAM(s) Oral every 8 hours  multivitamin 1 Tablet(s) Oral daily  pantoprazole  Injectable 40 milliGRAM(s) IV Push every 12 hours  QUEtiapine 25 milliGRAM(s) Oral at bedtime  ranolazine 500 milliGRAM(s) Oral two times a day  senna 2 Tablet(s) Oral at bedtime  simethicone 80 milliGRAM(s) Chew daily    MEDICATIONS  (PRN):  acetaminophen     Tablet .. 650 milliGRAM(s) Oral every 6 hours PRN Temp greater or equal to 38C (100.4F), Mild Pain (1 - 3)  acetaminophen     Tablet .. 650 milliGRAM(s) Oral every 6 hours PRN Mild Pain (1 - 3)  aluminum hydroxide/magnesium hydroxide/simethicone Suspension 30 milliLiter(s) Oral every 6 hours PRN Dyspepsia  bisacodyl Suppository 10 milliGRAM(s) Rectal daily PRN Constipation  ondansetron Injectable 4 milliGRAM(s) IV Push every 8 hours PRN Nausea and/or Vomiting  polyethylene glycol 3350 17 Gram(s) Oral daily PRN Constipation      CAPILLARY BLOOD GLUCOSE      POCT Blood Glucose.: 81 mg/dL (02 Jul 2025 11:26)  POCT Blood Glucose.: 95 mg/dL (02 Jul 2025 06:08)  POCT Blood Glucose.: 102 mg/dL (01 Jul 2025 23:49)  POCT Blood Glucose.: 107 mg/dL (01 Jul 2025 22:28)  POCT Blood Glucose.: 93 mg/dL (01 Jul 2025 17:01)    I&O's Summary    01 Jul 2025 07:01  -  02 Jul 2025 07:00  --------------------------------------------------------  IN: 980 mL / OUT: 1400 mL / NET: -420 mL    02 Jul 2025 07:01  -  02 Jul 2025 14:12  --------------------------------------------------------  IN: 220 mL / OUT: 0 mL / NET: 220 mL        PHYSICAL EXAM:  Vital Signs Last 24 Hrs  T(C): 36.7 (02 Jul 2025 07:34), Max: 37.5 (02 Jul 2025 04:30)  T(F): 98 (02 Jul 2025 07:34), Max: 99.5 (02 Jul 2025 04:30)  HR: 57 (02 Jul 2025 12:40) (56 - 73)  BP: 108/52 (02 Jul 2025 12:00) (105/59 - 144/82)  BP(mean): 70 (02 Jul 2025 12:00) (64 - 97)  RR: 24 (02 Jul 2025 12:40) (9 - 30)  SpO2: 100% (02 Jul 2025 12:40) (93% - 100%)    Parameters below as of 02 Jul 2025 04:00  Patient On (Oxygen Delivery Method): trach to vent    O2 Concentration (%): 30    CONSTITUTIONAL: NAD  NECK: +Trach on vent   RESPIRATORY: Normal respiratory effort; lungs are clear to auscultation bilaterally  CARDIOVASCULAR: Regular rate and rhythm, normal S1 and S2, no murmur/rub/gallop; No lower extremity edema; Peripheral pulses are 2+ bilaterally  ABDOMEN: Nontender to palpation, normoactive bowel sounds, no rebound/guarding; +PEG   MUSCLOSKELETAL: no clubbing or cyanosis of digits; no joint swelling or tenderness to palpation  NEURO: Awake and alert, follows commands, moves extremities       LABS:                        7.9    8.74  )-----------( 390      ( 02 Jul 2025 10:00 )             25.1     07-02    132[L]  |  106  |  43[H]  ----------------------------<  82  4.8   |  20[L]  |  1.25    Ca    8.1[L]      02 Jul 2025 04:00  Phos  2.8     07-02  Mg     2.4     07-02    TPro  6.3  /  Alb  1.8[L]  /  TBili  0.5  /  DBili  x   /  AST  18  /  ALT  14  /  AlkPhos  112  07-02          Urinalysis Basic - ( 02 Jul 2025 04:00 )    Color: x / Appearance: x / SG: x / pH: x  Gluc: 82 mg/dL / Ketone: x  / Bili: x / Urobili: x   Blood: x / Protein: x / Nitrite: x   Leuk Esterase: x / RBC: x / WBC x   Sq Epi: x / Non Sq Epi: x / Bacteria: x          RADIOLOGY & ADDITIONAL TESTS:  Results Reviewed:   Imaging Personally Reviewed:  Electrocardiogram Personally Reviewed:    COORDINATION OF CARE:  Care Discussed with Consultants/Other Providers [Y/N]:  Prior or Outpatient Records Reviewed [Y/N]:

## 2025-07-02 NOTE — CONSULT NOTE ADULT - SUBJECTIVE AND OBJECTIVE BOX
HPI:  Tiffanie Joshi is a 74 year old female with PMHx of HTN, HLD, IDDM2, CAD (s/p PCI), and recent right humerus fracture (placed in sling 3 days ago) who presented to the ED on 5/30/25 for complaints of elevated blood glucose.    Patient is Uzbek-speaking but declined  services and preferred for daughter-in-law at bedside to translate. As per daughter-in-law, patient fell three days ago and landed on her right shoulder. Went to [facility] at that time and found to have right humerus fracture and placed in sling. Discharged home with outpatient orthopedic surgery follow up. Since then, patient has been refusing to get out of bed and not eating much. Will drink a little juice every now and then. Daughter checked her blood glucose around 5PM and it was > 400 so brought to the ER for further evaluation. Baseline functional status is ambulates with walker and dependent with all ADLs. Lives at home with daughter.    In the ED, VSS. Hgb 10.3, sodium 132, potassium 6.3, BUN 70, Cr 2.02, blood glucose 510. VBG 7.29 / 58 / 34 / 26. COVID/influenza/RSV negative. CT head without acute intracranial findings. Received 1L NS bolus, 1L LR bolus, and insulin 20 U IV.  (30 May 2025 23:43)      PAST MEDICAL & SURGICAL HISTORY:  HTN (hypertension)      HLD (hyperlipidemia)      CAD S/P percutaneous coronary angioplasty      Insulin dependent type 2 diabetes mellitus      History of heart artery stent          FAMILY HISTORY:  FHx: myocardial infarction  Father        SOCIAL HISTORY:  Smoking: __ packs x ___ years  EtOH Use:  Marital Status:  Occupation:  Exposures:  Recent Travel:    Allergies    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/shellfish (Short breath)  No Known Drug Allergies    Intolerances        HOME MEDICATIONS:    REVIEW OF SYSTEMS:  unable to obtain, nonverbal with tracheostomy.       OBJECTIVE:  Vital Signs Last 24 Hrs  T(C): 36.7 (02 Jul 2025 07:34), Max: 37.5 (02 Jul 2025 04:30)  T(F): 98 (02 Jul 2025 07:34), Max: 99.5 (02 Jul 2025 04:30)  HR: 56 (02 Jul 2025 08:13) (56 - 73)  BP: 112/42 (02 Jul 2025 08:00) (90/44 - 144/82)  BP(mean): 64 (02 Jul 2025 08:00) (55 - 97)  ABP: --  ABP(mean): --  RR: 19 (02 Jul 2025 08:00) (9 - 30)  SpO2: 100% (02 Jul 2025 08:13) (93% - 100%)    O2 Parameters below as of 02 Jul 2025 04:00  Patient On (Oxygen Delivery Method): trach to vent    O2 Concentration (%): 30      Mode: CPAP with PS, FiO2: 30, PEEP: 6, PS: 13, ITime: 1, MAP: 10, PIP: 19    07-01 @ 07:01  -  07-02 @ 07:00  --------------------------------------------------------  IN: 980 mL / OUT: 1400 mL / NET: -420 mL    07-02 @ 07:01  -  07-02 @ 11:50  --------------------------------------------------------  IN: 20 mL / OUT: 0 mL / NET: 20 mL      CAPILLARY BLOOD GLUCOSE      POCT Blood Glucose.: 81 mg/dL (02 Jul 2025 11:26)      PHYSICAL EXAM:  Gen: trache to full vent  HEENT: PERRL  Resp: mechanical breath sounds B/L  CVS: S1S2 no m/r/g  Abd: soft NT/ND +BS  Ext: no c/c/e  Neuro: awake alert very weak but moving all 4. following comands, able to communicate with head shakes.    HOSPITAL MEDICATIONS:  MEDICATIONS  (STANDING):  aspirin  chewable 81 milliGRAM(s) Oral daily  atorvastatin 40 milliGRAM(s) Oral at bedtime  chlorhexidine 0.12% Liquid 15 milliLiter(s) Oral Mucosa every 12 hours  chlorhexidine 2% Cloths 1 Application(s) Topical <User Schedule>  enoxaparin Injectable 40 milliGRAM(s) SubCutaneous every 24 hours  insulin glargine Injectable (LANTUS) 14 Unit(s) SubCutaneous at bedtime  insulin lispro (ADMELOG) corrective regimen sliding scale   SubCutaneous every 6 hours  midodrine 20 milliGRAM(s) Oral every 8 hours  multivitamin 1 Tablet(s) Oral daily  pantoprazole  Injectable 40 milliGRAM(s) IV Push every 12 hours  QUEtiapine 25 milliGRAM(s) Oral at bedtime  ranolazine 500 milliGRAM(s) Oral two times a day  senna 2 Tablet(s) Oral at bedtime  simethicone 80 milliGRAM(s) Chew daily    MEDICATIONS  (PRN):  acetaminophen     Tablet .. 650 milliGRAM(s) Oral every 6 hours PRN Temp greater or equal to 38C (100.4F), Mild Pain (1 - 3)  acetaminophen     Tablet .. 650 milliGRAM(s) Oral every 6 hours PRN Mild Pain (1 - 3)  aluminum hydroxide/magnesium hydroxide/simethicone Suspension 30 milliLiter(s) Oral every 6 hours PRN Dyspepsia  bisacodyl Suppository 10 milliGRAM(s) Rectal daily PRN Constipation  ondansetron Injectable 4 milliGRAM(s) IV Push every 8 hours PRN Nausea and/or Vomiting  polyethylene glycol 3350 17 Gram(s) Oral daily PRN Constipation      LABS:                        7.9    8.74  )-----------( 390      ( 02 Jul 2025 10:00 )             25.1     07-02    132[L]  |  106  |  43[H]  ----------------------------<  82  4.8   |  20[L]  |  1.25    Ca    8.1[L]      02 Jul 2025 04:00  Phos  2.8     07-02  Mg     2.4     07-02    TPro  6.3  /  Alb  1.8[L]  /  TBili  0.5  /  DBili  x   /  AST  18  /  ALT  14  /  AlkPhos  112  07-02      Urinalysis Basic - ( 02 Jul 2025 04:00 )    Color: x / Appearance: x / SG: x / pH: x  Gluc: 82 mg/dL / Ketone: x  / Bili: x / Urobili: x   Blood: x / Protein: x / Nitrite: x   Leuk Esterase: x / RBC: x / WBC x   Sq Epi: x / Non Sq Epi: x / Bacteria: x      MICROBIOLOGY:     Culture - Blood (06.20.25 @ 17:45)    Specimen Source: Blood Blood-Peripheral   Culture Results:   No growth at 5 days    Culture - Blood (06.20.25 @ 17:28)    Specimen Source: Blood Blood-Peripheral   Culture Results:   No growth at 5 days    Culture - Sputum . (06.20.25 @ 17:10)    -  Minocycline: S   -  Trimethoprim/Sulfamethoxazole: S <=0.5/9.5   Gram Stain:   Rare polymorphonuclear leukocytes per low power field  No Squamous epithelial cells per low power field  No organisms seen per oil power field   -  Levofloxacin: S <=0.5   Specimen Source: ET Tube ET Tube   Culture Results:   Moderate Stenotrophomonas maltophilia  Commensal lina consistent with body site   Organism Identification: Stenotrophomonas maltophilia   Organism: Stenotrophomonas maltophilia   Organism: Stenotrophomonas maltophilia   Method Type: KB   Method Type: BRISEIDA    S. pneumoniae &amp; L. pneumophila Ag, Urine (06.02.25 @ 04:30)    Streptococcus pneumoniae antigen: Negative:   Legionella pneumophila antigen: Negative:      MRSA/MSSA PCR (06.11.25 @ 22:58)    MRSA PCR Result.: NotDetec:    Staph aureus PCR Result: NotDetec        RADIOLOGY:  [x ] Reviewed     < from: CT Abdomen and Pelvis w/ Oral Cont and w/ IV Cont (06.29.25 @ 18:34) >  IMPRESSION:    No bowel obstruction.  Proctitis.    --- End of Report ---    < end of copied text >      < from: Xray Chest 1 View-PORTABLE IMMEDIATE (Xray Chest 1 View-PORTABLE IMMEDIATE .) (06.12.25 @ 08:00) >  FINDINGS/  IMPRESSION: The heart size, mediastinal and hilar contours are unchanged   and within normal limits. There is been proximal repositioning of the tip   of the endotracheal tube which now lies approximately 2 cm proximal to   the rosi. There is an enteric tube with the distal tip and side-port   project projecting inferior to the field-of-view and below the   diaphragms. There are bibasilar pleural effusions, left greater than   right. Underlying left lower lobe pneumonia and/or atelectasis cannot be   excluded. Calcified left apical granuloma redemonstrated.    --- End of Report ---    < end of copied text >        < from: CT Angio Chest PE Protocol w/ IV Cont (06.12.25 @ 00:48) >  FINDINGS:    PULMONARY EMBOLISM: No pulmonary embolism..    AIRWAYS AND LUNGS: Endotracheal tube tip mid trachea. Tracheobronchial   secretions, greatest in the left lower lobe.  Patchy and nodular   bilateral lung opacities, predominantly in the left upper lobe. Small   bilateral pleural effusions. Likely collapse left lower lobe. Right   middle lobe subpleural 6 mm nodular opacity is new from prior (5, 91).    MEDIASTINUM AND PLEURA: There are no enlarged mediastinal, hilar or   axillary lymph nodes. The visualized portion of the thyroid gland is   unremarkable. There is no pneumothorax.    HEART AND VESSELS: The heart is normal in size.   There are   atherosclerotic calcifications of the aorta and coronary arteries.  There   is no pericardial effusion.    UPPER ABDOMEN: Images of the upper abdomen demonstrate enteric tube tip   in stomach.    BONES AND SOFT TISSUES: The bones are unremarkable.  The soft tissues are   unremarkable.    TUBES/LINES: None.    IMPRESSION:  No pulmonary embolism..    Tracheobronchial secretions, greatest in the left lower lobe.  Patchy and   nodular bilateral lung opacities may be infectious or inflammatory. Right   middle lobe subpleural 6 mm nodular opacity is new from prior .   Short-term follow-up CT recommended      --- End of Report ---    < end of copied text >             HPI:  Tiffanie Joshi is a 74 year old female with PMHx of HTN, HLD, IDDM2, CAD (s/p PCI), and recent right humerus fracture (placed in sling 3 days ago) who presented to the ED on 5/30/25 for complaints of elevated blood glucose.    Patient is Uzbek-speaking but declined  services and preferred for daughter-in-law at bedside to translate. As per daughter-in-law, patient fell three days ago and landed on her right shoulder. Went to [facility] at that time and found to have right humerus fracture and placed in sling. Discharged home with outpatient orthopedic surgery follow up. Since then, patient has been refusing to get out of bed and not eating much. Will drink a little juice every now and then. Daughter checked her blood glucose around 5PM and it was > 400 so brought to the ER for further evaluation. Baseline functional status is ambulates with walker and dependent with all ADLs. Lives at home with daughter.    In the ED, VSS. Hgb 10.3, sodium 132, potassium 6.3, BUN 70, Cr 2.02, blood glucose 510. VBG 7.29 / 58 / 34 / 26. COVID/influenza/RSV negative. CT head without acute intracranial findings. Received 1L NS bolus, 1L LR bolus, and insulin 20 U IV.  (30 May 2025 23:43)      PAST MEDICAL & SURGICAL HISTORY:  HTN (hypertension)      HLD (hyperlipidemia)      CAD S/P percutaneous coronary angioplasty      Insulin dependent type 2 diabetes mellitus      History of heart artery stent          FAMILY HISTORY:  FHx: myocardial infarction  Father        Allergies    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/shellfish (Short breath)  No Known Drug Allergies    Intolerances        HOME MEDICATIONS:    REVIEW OF SYSTEMS:  unable to obtain, nonverbal with tracheostomy.       OBJECTIVE:  Vital Signs Last 24 Hrs  T(C): 36.7 (02 Jul 2025 07:34), Max: 37.5 (02 Jul 2025 04:30)  T(F): 98 (02 Jul 2025 07:34), Max: 99.5 (02 Jul 2025 04:30)  HR: 56 (02 Jul 2025 08:13) (56 - 73)  BP: 112/42 (02 Jul 2025 08:00) (90/44 - 144/82)  BP(mean): 64 (02 Jul 2025 08:00) (55 - 97)  ABP: --  ABP(mean): --  RR: 19 (02 Jul 2025 08:00) (9 - 30)  SpO2: 100% (02 Jul 2025 08:13) (93% - 100%)    O2 Parameters below as of 02 Jul 2025 04:00  Patient On (Oxygen Delivery Method): trach to vent    O2 Concentration (%): 30      Mode: CPAP with PS, FiO2: 30, PEEP: 6, PS: 13, ITime: 1, MAP: 10, PIP: 19    07-01 @ 07:01  -  07-02 @ 07:00  --------------------------------------------------------  IN: 980 mL / OUT: 1400 mL / NET: -420 mL    07-02 @ 07:01 - 07-02 @ 11:50  --------------------------------------------------------  IN: 20 mL / OUT: 0 mL / NET: 20 mL      CAPILLARY BLOOD GLUCOSE      POCT Blood Glucose.: 81 mg/dL (02 Jul 2025 11:26)      PHYSICAL EXAM:  Gen: trache to full vent  HEENT: PERRL  Resp: mechanical breath sounds B/L  CVS: S1S2 no m/r/g  Abd: soft NT/ND +BS  Ext: no c/c/e  Neuro: awake alert very weak but moving all 4. following comands, able to communicate with head shakes.    HOSPITAL MEDICATIONS:  MEDICATIONS  (STANDING):  aspirin  chewable 81 milliGRAM(s) Oral daily  atorvastatin 40 milliGRAM(s) Oral at bedtime  chlorhexidine 0.12% Liquid 15 milliLiter(s) Oral Mucosa every 12 hours  chlorhexidine 2% Cloths 1 Application(s) Topical <User Schedule>  enoxaparin Injectable 40 milliGRAM(s) SubCutaneous every 24 hours  insulin glargine Injectable (LANTUS) 14 Unit(s) SubCutaneous at bedtime  insulin lispro (ADMELOG) corrective regimen sliding scale   SubCutaneous every 6 hours  midodrine 20 milliGRAM(s) Oral every 8 hours  multivitamin 1 Tablet(s) Oral daily  pantoprazole  Injectable 40 milliGRAM(s) IV Push every 12 hours  QUEtiapine 25 milliGRAM(s) Oral at bedtime  ranolazine 500 milliGRAM(s) Oral two times a day  senna 2 Tablet(s) Oral at bedtime  simethicone 80 milliGRAM(s) Chew daily    MEDICATIONS  (PRN):  acetaminophen     Tablet .. 650 milliGRAM(s) Oral every 6 hours PRN Temp greater or equal to 38C (100.4F), Mild Pain (1 - 3)  acetaminophen     Tablet .. 650 milliGRAM(s) Oral every 6 hours PRN Mild Pain (1 - 3)  aluminum hydroxide/magnesium hydroxide/simethicone Suspension 30 milliLiter(s) Oral every 6 hours PRN Dyspepsia  bisacodyl Suppository 10 milliGRAM(s) Rectal daily PRN Constipation  ondansetron Injectable 4 milliGRAM(s) IV Push every 8 hours PRN Nausea and/or Vomiting  polyethylene glycol 3350 17 Gram(s) Oral daily PRN Constipation      LABS:                        7.9    8.74  )-----------( 390      ( 02 Jul 2025 10:00 )             25.1     07-02    132[L]  |  106  |  43[H]  ----------------------------<  82  4.8   |  20[L]  |  1.25    Ca    8.1[L]      02 Jul 2025 04:00  Phos  2.8     07-02  Mg     2.4     07-02    TPro  6.3  /  Alb  1.8[L]  /  TBili  0.5  /  DBili  x   /  AST  18  /  ALT  14  /  AlkPhos  112  07-02      Urinalysis Basic - ( 02 Jul 2025 04:00 )    Color: x / Appearance: x / SG: x / pH: x  Gluc: 82 mg/dL / Ketone: x  / Bili: x / Urobili: x   Blood: x / Protein: x / Nitrite: x   Leuk Esterase: x / RBC: x / WBC x   Sq Epi: x / Non Sq Epi: x / Bacteria: x      MICROBIOLOGY:     Culture - Blood (06.20.25 @ 17:45)    Specimen Source: Blood Blood-Peripheral   Culture Results:   No growth at 5 days    Culture - Blood (06.20.25 @ 17:28)    Specimen Source: Blood Blood-Peripheral   Culture Results:   No growth at 5 days    Culture - Sputum . (06.20.25 @ 17:10)    -  Minocycline: S   -  Trimethoprim/Sulfamethoxazole: S <=0.5/9.5   Gram Stain:   Rare polymorphonuclear leukocytes per low power field  No Squamous epithelial cells per low power field  No organisms seen per oil power field   -  Levofloxacin: S <=0.5   Specimen Source: ET Tube ET Tube   Culture Results:   Moderate Stenotrophomonas maltophilia  Commensal lina consistent with body site   Organism Identification: Stenotrophomonas maltophilia   Organism: Stenotrophomonas maltophilia   Organism: Stenotrophomonas maltophilia   Method Type: AIMEE   Method Type: BRISEIDA    S. pneumoniae &amp; L. pneumophila Ag, Urine (06.02.25 @ 04:30)    Streptococcus pneumoniae antigen: Negative:   Legionella pneumophila antigen: Negative:      MRSA/MSSA PCR (06.11.25 @ 22:58)    MRSA PCR Result.: NotDetec:    Staph aureus PCR Result: NotDetec        RADIOLOGY:  [x ] Reviewed     < from: CT Abdomen and Pelvis w/ Oral Cont and w/ IV Cont (06.29.25 @ 18:34) >  IMPRESSION:    No bowel obstruction.  Proctitis.    --- End of Report ---    < end of copied text >      < from: Xray Chest 1 View-PORTABLE IMMEDIATE (Xray Chest 1 View-PORTABLE IMMEDIATE .) (06.12.25 @ 08:00) >  FINDINGS/  IMPRESSION: The heart size, mediastinal and hilar contours are unchanged   and within normal limits. There is been proximal repositioning of the tip   of the endotracheal tube which now lies approximately 2 cm proximal to   the rosi. There is an enteric tube with the distal tip and side-port   project projecting inferior to the field-of-view and below the   diaphragms. There are bibasilar pleural effusions, left greater than   right. Underlying left lower lobe pneumonia and/or atelectasis cannot be   excluded. Calcified left apical granuloma redemonstrated.    --- End of Report ---    < end of copied text >        < from: CT Angio Chest PE Protocol w/ IV Cont (06.12.25 @ 00:48) >  FINDINGS:    PULMONARY EMBOLISM: No pulmonary embolism..    AIRWAYS AND LUNGS: Endotracheal tube tip mid trachea. Tracheobronchial   secretions, greatest in the left lower lobe.  Patchy and nodular   bilateral lung opacities, predominantly in the left upper lobe. Small   bilateral pleural effusions. Likely collapse left lower lobe. Right   middle lobe subpleural 6 mm nodular opacity is new from prior (5, 91).    MEDIASTINUM AND PLEURA: There are no enlarged mediastinal, hilar or   axillary lymph nodes. The visualized portion of the thyroid gland is   unremarkable. There is no pneumothorax.    HEART AND VESSELS: The heart is normal in size.   There are   atherosclerotic calcifications of the aorta and coronary arteries.  There   is no pericardial effusion.    UPPER ABDOMEN: Images of the upper abdomen demonstrate enteric tube tip   in stomach.    BONES AND SOFT TISSUES: The bones are unremarkable.  The soft tissues are   unremarkable.    TUBES/LINES: None.    IMPRESSION:  No pulmonary embolism..    Tracheobronchial secretions, greatest in the left lower lobe.  Patchy and   nodular bilateral lung opacities may be infectious or inflammatory. Right   middle lobe subpleural 6 mm nodular opacity is new from prior .   Short-term follow-up CT recommended      --- End of Report ---    < end of copied text >             HPI:  Tiffanie Joshi is a 74 year old female with PMHx of HTN, HLD, IDDM2, CAD (s/p PCI), and recent right humerus fracture (placed in sling 3 days ago) who presented to the ED on 5/30/25 for complaints of elevated blood glucose.    Patient is Thai-speaking but declined  services and preferred for daughter-in-law at bedside to translate. As per daughter-in-law, patient fell three days ago and landed on her right shoulder. Went to [facility] at that time and found to have right humerus fracture and placed in sling. Discharged home with outpatient orthopedic surgery follow up. Since then, patient has been refusing to get out of bed and not eating much. Will drink a little juice every now and then. Daughter checked her blood glucose around 5PM and it was > 400 so brought to the ER for further evaluation. Baseline functional status is ambulates with walker and dependent with all ADLs. Lives at home with daughter.    In the ED, VSS. Hgb 10.3, sodium 132, potassium 6.3, BUN 70, Cr 2.02, blood glucose 510. VBG 7.29 / 58 / 34 / 26. COVID/influenza/RSV negative. CT head without acute intracranial findings. Received 1L NS bolus, 1L LR bolus, and insulin 20 U IV.  (30 May 2025 23:43)      PAST MEDICAL & SURGICAL HISTORY:  HTN (hypertension)      HLD (hyperlipidemia)      CAD S/P percutaneous coronary angioplasty      Insulin dependent type 2 diabetes mellitus      History of heart artery stent          FAMILY HISTORY:  FHx: myocardial infarction  Father        Allergies    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/shellfish (Short breath)  No Known Drug Allergies        REVIEW OF SYSTEMS:  unable to obtain, nonverbal with tracheostomy.       OBJECTIVE:  Vital Signs Last 24 Hrs  T(C): 36.7 (02 Jul 2025 07:34), Max: 37.5 (02 Jul 2025 04:30)  T(F): 98 (02 Jul 2025 07:34), Max: 99.5 (02 Jul 2025 04:30)  HR: 56 (02 Jul 2025 08:13) (56 - 73)  BP: 112/42 (02 Jul 2025 08:00) (90/44 - 144/82)  BP(mean): 64 (02 Jul 2025 08:00) (55 - 97)  RR: 19 (02 Jul 2025 08:00) (9 - 30)  SpO2: 100% (02 Jul 2025 08:13) (93% - 100%)    O2 Parameters below as of 02 Jul 2025 04:00  Patient On (Oxygen Delivery Method): trach to vent    O2 Concentration (%): 30      Mode: CPAP with PS, FiO2: 30, PEEP: 6, PS: 13, ITime: 1, MAP: 10, PIP: 19      07-01 @ 07:01 - 07-02 @ 07:00  --------------------------------------------------------  IN: 980 mL / OUT: 1400 mL / NET: -420 mL    07-02 @ 07:01 - 07-02 @ 11:50  --------------------------------------------------------  IN: 20 mL / OUT: 0 mL / NET: 20 mL      CAPILLARY BLOOD GLUCOSE  POCT Blood Glucose.: 81 mg/dL (02 Jul 2025 11:26)      PHYSICAL EXAM:  Gen: trache to full vent  HEENT: PERRL, trach in place  Resp: mechanical breath sounds B/L  CVS: S1S2 no m/r/g  Abd: soft NT/ND +BS + PEG  Ext: no c/c/e  Neuro: awake alert very weak but moving all 4. following comands, able to communicate with head shakes.    HOSPITAL MEDICATIONS:  MEDICATIONS  (STANDING):  aspirin  chewable 81 milliGRAM(s) Oral daily  atorvastatin 40 milliGRAM(s) Oral at bedtime  chlorhexidine 0.12% Liquid 15 milliLiter(s) Oral Mucosa every 12 hours  chlorhexidine 2% Cloths 1 Application(s) Topical <User Schedule>  enoxaparin Injectable 40 milliGRAM(s) SubCutaneous every 24 hours  insulin glargine Injectable (LANTUS) 14 Unit(s) SubCutaneous at bedtime  insulin lispro (ADMELOG) corrective regimen sliding scale   SubCutaneous every 6 hours  midodrine 20 milliGRAM(s) Oral every 8 hours  multivitamin 1 Tablet(s) Oral daily  pantoprazole  Injectable 40 milliGRAM(s) IV Push every 12 hours  QUEtiapine 25 milliGRAM(s) Oral at bedtime  ranolazine 500 milliGRAM(s) Oral two times a day  senna 2 Tablet(s) Oral at bedtime  simethicone 80 milliGRAM(s) Chew daily    MEDICATIONS  (PRN):  acetaminophen     Tablet .. 650 milliGRAM(s) Oral every 6 hours PRN Temp greater or equal to 38C (100.4F), Mild Pain (1 - 3)  acetaminophen     Tablet .. 650 milliGRAM(s) Oral every 6 hours PRN Mild Pain (1 - 3)  aluminum hydroxide/magnesium hydroxide/simethicone Suspension 30 milliLiter(s) Oral every 6 hours PRN Dyspepsia  bisacodyl Suppository 10 milliGRAM(s) Rectal daily PRN Constipation  ondansetron Injectable 4 milliGRAM(s) IV Push every 8 hours PRN Nausea and/or Vomiting  polyethylene glycol 3350 17 Gram(s) Oral daily PRN Constipation      LABS:                        7.9    8.74  )-----------( 390      ( 02 Jul 2025 10:00 )             25.1     07-02    132[L]  |  106  |  43[H]  ----------------------------<  82  4.8   |  20[L]  |  1.25    Ca    8.1[L]      02 Jul 2025 04:00  Phos  2.8     07-02  Mg     2.4     07-02    TPro  6.3  /  Alb  1.8[L]  /  TBili  0.5  /  DBili  x   /  AST  18  /  ALT  14  /  AlkPhos  112  07-02            MICROBIOLOGY:     Culture - Blood (06.20.25 @ 17:45)    Specimen Source: Blood Blood-Peripheral   Culture Results:   No growth at 5 days    Culture - Blood (06.20.25 @ 17:28)    Specimen Source: Blood Blood-Peripheral   Culture Results:   No growth at 5 days    Culture - Sputum . (06.20.25 @ 17:10)    -  Minocycline: S   -  Trimethoprim/Sulfamethoxazole: S <=0.5/9.5   Gram Stain:   Rare polymorphonuclear leukocytes per low power field  No Squamous epithelial cells per low power field  No organisms seen per oil power field   -  Levofloxacin: S <=0.5   Specimen Source: ET Tube ET Tube   Culture Results:   Moderate Stenotrophomonas maltophilia  Commensal lina consistent with body site   Organism Identification: Stenotrophomonas maltophilia   Organism: Stenotrophomonas maltophilia   Organism: Stenotrophomonas maltophilia   Method Type: KB   Method Type: BRISEIDA    S. pneumoniae &amp; L. pneumophila Ag, Urine (06.02.25 @ 04:30)    Streptococcus pneumoniae antigen: Negative:   Legionella pneumophila antigen: Negative:      MRSA/MSSA PCR (06.11.25 @ 22:58)    MRSA PCR Result.: NotDetec:    Staph aureus PCR Result: NotDetec        RADIOLOGY:  [x ] Reviewed     < from: CT Abdomen and Pelvis w/ Oral Cont and w/ IV Cont (06.29.25 @ 18:34) >  IMPRESSION:    No bowel obstruction.  Proctitis.    -------------------      < from: Xray Chest 1 View-PORTABLE IMMEDIATE (Xray Chest 1 View-PORTABLE IMMEDIATE .) (06.12.25 @ 08:00) >  FINDINGS/  IMPRESSION: The heart size, mediastinal and hilar contours are unchanged   and within normal limits. There is been proximal repositioning of the tip   of the endotracheal tube which now lies approximately 2 cm proximal to   the rosi. There is an enteric tube with the distal tip and side-port   project projecting inferior to the field-of-view and below the   diaphragms. There are bibasilar pleural effusions, left greater than   right. Underlying left lower lobe pneumonia and/or atelectasis cannot be   excluded. Calcified left apical granuloma redemonstrated.    ------------------        < from: CT Angio Chest PE Protocol w/ IV Cont (06.12.25 @ 00:48) >  FINDINGS:    PULMONARY EMBOLISM: No pulmonary embolism..    AIRWAYS AND LUNGS: Endotracheal tube tip mid trachea. Tracheobronchial   secretions, greatest in the left lower lobe.  Patchy and nodular   bilateral lung opacities, predominantly in the left upper lobe. Small   bilateral pleural effusions. Likely collapse left lower lobe. Right   middle lobe subpleural 6 mm nodular opacity is new from prior (5, 91).    MEDIASTINUM AND PLEURA: There are no enlarged mediastinal, hilar or   axillary lymph nodes. The visualized portion of the thyroid gland is   unremarkable. There is no pneumothorax.    HEART AND VESSELS: The heart is normal in size.   There are   atherosclerotic calcifications of the aorta and coronary arteries.  There   is no pericardial effusion.    UPPER ABDOMEN: Images of the upper abdomen demonstrate enteric tube tip   in stomach.    BONES AND SOFT TISSUES: The bones are unremarkable.  The soft tissues are   unremarkable.    TUBES/LINES: None.    IMPRESSION:  No pulmonary embolism..    Tracheobronchial secretions, greatest in the left lower lobe.  Patchy and   nodular bilateral lung opacities may be infectious or inflammatory. Right   middle lobe subpleural 6 mm nodular opacity is new from prior .   Short-term follow-up CT recommended

## 2025-07-02 NOTE — CONSULT NOTE ADULT - NS ATTEND AMEND GEN_ALL_CORE FT
pt seen and examined at bedside with NP    pt currently trached to vent   weaning on PS 13/6 today  arousable, nods to yes/no questions  smiles      74F Albanian speaking PMH HTN, HLD, CAD s/p stents, DM2, recent fall 3 days prior to admission with R humerus fracture with arm placed in sling at [facility] with outpatient ortho follow up presents from home 5/30/25 for noted hyperglycemia BS > 400 with decreased PO intake and lethargy. Admitted to medical service for DM with hyperglycemia and ALY. s/p RRT 6/1 for hypotension, fever 103.2 and obtundation. Transferred to ICU for septic shock. Placed on pressors. Intubated for airway protection. Found to have ESBL e-coli UTI and bacteremia with leukocytosis WBC 32 (treated with meropenem). Sent for CT imaging 6/2 with 10 min cardiac arrest while in CT. Neurologically intact post arrest. Treated for NSTEMI with heparin drip. Extubated 6/6 and transferred to medical service 6/8/25. RRT 6/11 for AMS and hypotension with hypotension (SBP 70) resolved without intervention. Had 2nd RRT shortly after 1st for seizure like activity, obtundation, recurrent hypotension, hypoxia, aspiration (family gave juice to patient, similar colored juice content suctioned from pt's airways) intubated and transferred to ICU 6/11/25. CTA chest no pulmonary embolism but +tracheobronchial secretions, greatest in the left lower lobe with patchy and nodular bilateral lung opacities. Acute/subacute fractures right anterior lateral fourth through sixth ribs. Treated for septic shock secondary to aspiration PNA. EEG negative for seizures. Developed fevers with sputum cx 6/20 with Stenotrophomonas. Failed multiple weaning trials. s/p trach 6/23/25. s/p PEG 6/25/25. Downgraded to medical service 7/1/25. Pulmonary consulted for respiratory failure and vent management. pt seen and examined at bedside with NP    pt currently trached to vent   weaning on PS 13/6 today  arousable, nods to yes/no questions  smiles  on PEG feeds  downgraded from ICU 7/1        74F Welsh speaking PMH HTN, HLD, CAD s/p stents, DM2, recent fall 3 days prior to admission with R humerus fracture with arm placed in sling at [facility] with outpatient ortho follow up presents from home 5/30/25 for noted hyperglycemia BS > 400 with decreased PO intake and lethargy. Admitted to medical service for DM with hyperglycemia and ALY. s/p RRT 6/1 for hypotension, fever 103.2 and obtundation. Transferred to ICU for septic shock. Placed on pressors. Intubated for airway protection. Found to have ESBL e-coli UTI and bacteremia with leukocytosis WBC 32 (treated with meropenem). Sent for CT imaging 6/2 with 10 min cardiac arrest while in CT. Neurologically intact post arrest. Treated for NSTEMI with heparin drip. Extubated 6/6 and transferred to medical service 6/8/25. RRT 6/11 for AMS and hypotension with hypotension (SBP 70) resolved without intervention. Had 2nd RRT shortly after 1st for seizure like activity, obtundation, recurrent hypotension, hypoxia, aspiration (family gave juice to patient, similar colored juice content suctioned from pt's airways) intubated and transferred to ICU 6/11/25. CTA chest no pulmonary embolism but +tracheobronchial secretions, greatest in the left lower lobe with patchy and nodular bilateral lung opacities. Acute/subacute fractures right anterior lateral fourth through sixth ribs. Treated for septic shock secondary to aspiration PNA. EEG negative for seizures. Developed fevers with sputum cx 6/20 with Stenotrophomonas. Failed multiple weaning trials. s/p trach 6/23/25. s/p PEG 6/25/25. Downgraded to medical service 7/1/25. Pulmonary consulted for respiratory failure and vent management.    DX: cardiac arrest, acute hypoxic/hypercarbic respiratory failure requiring intubation, aspiration PNA, Stenotrophomonas PNA, ESBL e-coli UTI and bacteremia, severe gram negative sepsis with septic shock, NSTEMI, acute metabolic encephalopathy, rib fractures, R humerus fracture    - s/p intubation x2 during this admission  - failed multiple weaning trials  - now s/p trach 6/23/25  - mechanical vent support   - cont daily weaning as tolerates  - cont local trach care and tracheal suctioning as needed  - s/p course of meropenem for ESBL e-coli bacteremia and UTI  - s/p course of zosyn for aspiration PNA  - s/p course of bactrim for Stenotrophomonas in sputum cx 6/20  - being monitored off antibx at present time  - on midodrine for BP support, wean as tolerates  - on asa and statin therapy for CAD  - neurologically intact post arrest but with significant generalized deconditioning  - PEG feeds  - DVT ppx  - remainder of care per primary team Nasal Root Units/Cc: 4

## 2025-07-02 NOTE — CONSULT NOTE ADULT - ASSESSMENT
74-year-old female with hypertension, hyperlipidemia, 2 diabetes, CAD status post multiple stents with a recent right humerus fracture roughly 3 days ago following a traumatic fall admitted to the medical ICU for altered mental status and hypotension.  Found to be in septic shock secondary to colitis and UTI found to have ESBL E. coli bacteremia.  Hospital course further complicated by cardiac arrest of unclear origin with ROSC after 10 minutes of CPR. Patient was extubated and transferred to the floors. Patient had RRT for aspiration and AMS and was intubated. Patient unable to wean off the vent, underwent trach/PEG for prolonged weaning. Pulmonary consulted after ICU Transfer.  74-year-old female with hypertension, hyperlipidemia, 2 diabetes, CAD status post multiple stents with a recent right humerus fracture roughly 3 days ago following a traumatic fall admitted to the medical ICU for altered mental status and hypotension.  Found to be in septic shock secondary to colitis and UTI found to have ESBL E. coli bacteremia.  Hospital course further complicated by cardiac arrest of unclear origin with ROSC after 10 minutes of CPR. Patient was extubated and transferred to the floors. Patient had RRT for aspiration and AMS and was intubated. Patient unable to wean off the vent, underwent trach/PEG for prolonged weaning. Pulmonary consulted after ICU Transfer.     Reccs:    - Clinical course with septic shock 2/5 ESBL e Coli Bacteremia subsequent cardiac arrest extubated then with aspiration event reintubated and unable to wean 2/2 physical debilitation.   - Now S/P tracheostomy 6/23/25  - Tolerating PS 13/6 during daytime hours   - had previously completed course of IV meropenem for ESBL bacteremia and then Zosyn for aspiration PNA/ pneumonitis. Repeat blood cx are negative   - completed 7 days of Bactrim for Stenotrophomonas   - ID following reccs appreciated: observe off abx for now  -    74-year-old female with hypertension, hyperlipidemia, 2 diabetes, CAD status post multiple stents with a recent right humerus fracture roughly 3 days ago following a traumatic fall admitted to the medical ICU for altered mental status and hypotension.  Found to be in septic shock secondary to colitis and UTI found to have ESBL E. coli bacteremia.  Hospital course further complicated by cardiac arrest of unclear origin with ROSC after 10 minutes of CPR. Patient was extubated and transferred to the floors. Patient had RRT for aspiration and AMS and was intubated. Patient unable to wean off the vent, underwent trach/PEG for prolonged weaning. Pulmonary consulted after ICU Transfer.     Reccs:    - Clinical course with septic shock 2/5 ESBL e Coli Bacteremia subsequent cardiac arrest extubated then with aspiration event reintubated and unable to wean 2/2 physical debilitation.   - Now S/P tracheostomy 6/23/25  - Tolerating PS 13/6 during daytime hours   - C/W daily weaning as tolerates   - had previously completed course of IV meropenem for ESBL bacteremia and then Zosyn for aspiration PNA/ pneumonitis. Repeat blood cx are negative   - completed 7 days of Bactrim for Stenotrophomonas   - ID following reccs appreciated: observe off abx for now  - LTAC/ dispo planing  - Rest of care as per primary team   - Discussed with Dr Hassan.    74-year-old female with hypertension, hyperlipidemia, 2 diabetes, CAD status post multiple stents with a recent right humerus fracture roughly 3 days ago following a traumatic fall admitted to the medical ICU for altered mental status and hypotension.  Found to be in septic shock secondary to colitis and UTI found to have ESBL E. coli bacteremia.  Hospital course further complicated by cardiac arrest of unclear origin with ROSC after 10 minutes of CPR. Patient was extubated and transferred to the floors. Patient had RRT for aspiration and AMS and was reintubated. Patient unable to wean off the vent, underwent trach/PEG for prolonged weaning. Pulmonary consulted after ICU Transfer.     Reccs:    - Clinical course with septic shock 2/5 ESBL e Coli Bacteremia subsequent cardiac arrest extubated then with aspiration event reintubated and unable to wean 2/2 physical debilitation.   - Now S/P tracheostomy 6/23/25  - Tolerating PS 13/6 during daytime hours   - C/W daily weaning as tolerates   - had previously completed course of IV meropenem for ESBL bacteremia and then Zosyn for aspiration PNA/ pneumonitis. Repeat blood cx are negative   - completed 7 days of Bactrim for Stenotrophomonas   - ID following reccs appreciated: observe off abx for now  - LTAC/ dispo planing  - Rest of care as per primary team   - Discussed with Dr Hassan.

## 2025-07-02 NOTE — PROGRESS NOTE ADULT - ASSESSMENT
74-year-old female with hypertension, hyperlipidemia, 2 diabetes, CAD status post multiple stents with a recent right humerus fracture roughly 3 days PTA following a traumatic fall admitted to the medical ICU for altered mental status and hypotension.  Concern for septic shock secondary to colitis found to have ESBL E. coli bacteremia.  Hospital course further complicated by cardiac arrest of unclear origin with ROSC after 10 minutes of CPR. Hospital course further c/b aspiration w/ hypoxia requiring intubation.     Acute hypoxic respiratory failure d/t aspiration pneumonia   -s/p RRT for hypoxia on 6/11 and intubated   -CTA chest 6/12: no pulmonary embolism, tracheobronchial secretions, greatest in the left lower lobe. Patchy and nodular bilateral lung opacities  -completed course of Zosyn   -Developed chronic hypoxic respiratory failure likely due to deconditioning.    -Currently on the ventilator via tracheostomy  -Continue spontaneous breathing trials as tolerated  -Pulmonary following     Tracheitis   -sputum culture grew Stenotrophomonas   -completed course of Bactrim 6/29    BRBPR   -having episodes of BRBPR, possibly d/t hemorrhoids    -CT A/P 6/29 w/ moderate stool burden, no bowel obstruction, and +proctitis.  -c/w bowel regimen   -empiric PPI   -monitor CBC, hgb stable     CAD w/ NSTEMI   -s/p heparin gtt   -continue ASA 81 mg, ranolazine  mg BID, Lipitor 40mg qhs   -holding metoprolol succinate d/t low BP  -Plan was for eventual ischemic evaluation but now on hold given severe deconditioning     Septic shock on admission d/t UTI w/ ESBL bacteremia   -s/p pressors in ICU, weaned off   -s/p IVF and stress dose steroids   -Blood culture 6/1 w/ ESBL Ecoli   -Urine culture 6/2 w/ ESBL Ecoli   -Repeat blood cultures 6/4 NGTD  -completed course of Meropenem 6/10     Hypotension  -holding lisinopril 10 mg, HCTZ 12.5 mg, and Toprol   -c/w Midodrine   -monitor BP    ALY d/t ischemic ATN   -avoid nephrotoxic agents   -Cr improved, continue to monitor     Cardiac arrest in setting of sepsis   -ROSC after 10 minutes of CPR  -TTE  shows EF of 55% with regional WMA present, normal RV  -Appreciate cardiology input.     Hyperkalemia secondary to ALY  -resolved   -s/p medical management     DM2   Hypoglycemia d/t low feeds rate   -c/w ISS  -decrease Lantus to 14 units qhs   -increase feeds as tolerated   -monitor fingersticks     Right humerus fracture d/t fall  -consulted orthopedics, no plan for intervention  -pain control   -PT/OT     Dysphagia  -PEG tube placed   -Issues tolerating feeds, improved s/p enema and had large BMS  -CT A/P 6/29 negative for obstruction   -c/w tube feeds as tolerated     Constipation   -s/p enema w/ large BM's   -c/w bowel regimen     Patient is DNR but okay with long-term ventilation  PT had recommended rehab    POC d/w son    74-year-old female with hypertension, hyperlipidemia, 2 diabetes, CAD status post multiple stents with a recent right humerus fracture roughly 3 days PTA following a traumatic fall admitted to the medical ICU for altered mental status and hypotension.  Concern for septic shock secondary to colitis found to have ESBL E. coli bacteremia.  Hospital course further complicated by cardiac arrest of unclear origin with ROSC after 10 minutes of CPR. Hospital course further c/b aspiration w/ hypoxia requiring intubation.     Acute hypoxic respiratory failure d/t aspiration pneumonia   -s/p RRT for hypoxia on 6/11 and intubated   -CTA chest 6/12: no pulmonary embolism, tracheobronchial secretions, greatest in the left lower lobe. Patchy and nodular bilateral lung opacities  -completed course of Zosyn   -Developed chronic hypoxic respiratory failure likely due to deconditioning.    -Currently on the ventilator via tracheostomy  -Continue spontaneous breathing trials as tolerated  -Pulmonary following     Tracheitis   -sputum culture grew Stenotrophomonas   -completed course of Bactrim 6/29    BRBPR   -having episodes of BRBPR, possibly d/t hemorrhoids    -CT A/P 6/29 w/ moderate stool burden, no bowel obstruction, and +proctitis.  -c/w bowel regimen   -empiric PPI   -monitor CBC, hgb stable     CAD w/ NSTEMI   -s/p heparin gtt   -continue ASA 81 mg, ranolazine  mg BID, Lipitor 40mg qhs   -holding metoprolol succinate d/t low BP  -Plan was for eventual ischemic evaluation but now on hold given severe deconditioning     Septic shock on admission d/t UTI w/ ESBL bacteremia   -s/p pressors in ICU, weaned off   -s/p IVF and stress dose steroids   -Blood culture 6/1 w/ ESBL Ecoli   -Urine culture 6/2 w/ ESBL Ecoli   -Repeat blood cultures 6/4 NGTD  -completed course of Meropenem 6/10     Hypotension  -holding lisinopril 10 mg, HCTZ 12.5 mg, and Toprol   -c/w Midodrine   -monitor BP    ALY d/t ischemic ATN   -avoid nephrotoxic agents   -Cr improved, continue to monitor     Cardiac arrest in setting of sepsis   -ROSC after 10 minutes of CPR  -TTE  shows EF of 55% with regional WMA present, normal RV  -Appreciate cardiology input.     Hyperkalemia secondary to ALY  -resolved   -s/p medical management     DM2   Hypoglycemia d/t low feeds rate   -c/w ISS  -decrease Lantus to 14 units qhs   -increase feeds as tolerated   -monitor fingersticks     Right humerus fracture d/t fall  -consulted orthopedics, no plan for intervention  -pain control   -PT/OT     Dysphagia  -PEG tube placed   -Issues tolerating feeds, improved s/p enema and having large BMs  -CT A/P 6/29 negative for obstruction   -tolerating tube feeds at 20 cc/hr, will increase to 30 cc/hr     Constipation   -s/p enema w/ large BM's   -c/w bowel regimen     Patient is DNR but okay with long-term ventilation  PT had recommended rehab    POC d/w son    74-year-old female with hypertension, hyperlipidemia, 2 diabetes, CAD status post multiple stents with a recent right humerus fracture roughly 3 days PTA following a traumatic fall admitted to the medical ICU for altered mental status and hypotension.  Concern for septic shock secondary to colitis found to have ESBL E. coli bacteremia.  Hospital course further complicated by cardiac arrest of unclear origin with ROSC after 10 minutes of CPR. Hospital course further c/b aspiration w/ hypoxia requiring intubation.     Acute hypoxic respiratory failure d/t aspiration pneumonia   -s/p RRT for hypoxia on 6/11 and intubated   -CTA chest 6/12: no pulmonary embolism, tracheobronchial secretions, greatest in the left lower lobe. Patchy and nodular bilateral lung opacities  -completed course of Zosyn   -Developed chronic hypoxic respiratory failure likely due to deconditioning.    -Currently on the ventilator via tracheostomy  -Continue spontaneous breathing trials as tolerated  -Pulmonary following     Tracheitis   -sputum culture grew Stenotrophomonas   -completed course of Bactrim 6/29    BRBPR   -having episodes of BRBPR, possibly d/t hemorrhoids    -CT A/P 6/29 w/ moderate stool burden, no bowel obstruction, and +proctitis.  -c/w bowel regimen   -empiric PPI   -monitor CBC, hgb stable     CAD w/ NSTEMI   -s/p heparin gtt   -continue ASA 81 mg, ranolazine  mg BID, Lipitor 40mg qhs   -holding metoprolol succinate d/t low BP  -Plan was for eventual ischemic evaluation but now on hold given severe deconditioning     Septic shock on admission d/t UTI w/ ESBL bacteremia   -s/p pressors in ICU, weaned off   -s/p IVF and stress dose steroids   -Blood culture 6/1 w/ ESBL Ecoli   -Urine culture 6/2 w/ ESBL Ecoli   -Repeat blood cultures 6/4 NGTD  -completed course of Meropenem 6/10     Hypotension  -holding lisinopril 10 mg, HCTZ 12.5 mg, and Toprol   -c/w Midodrine   -monitor BP    ALY d/t ischemic ATN   -avoid nephrotoxic agents   -Cr improved, continue to monitor     Cardiac arrest in setting of sepsis   -ROSC after 10 minutes of CPR  -TTE  shows EF of 55% with regional WMA present, normal RV  -Appreciate cardiology input.     Hyperkalemia secondary to ALY  -resolved   -s/p medical management     DM2   Hypoglycemia d/t low feeds rate   -c/w ISS  -decrease Lantus to 14 units qhs   -increase feeds as tolerated   -monitor fingersticks     Right humerus fracture d/t fall  -consulted orthopedics, no plan for intervention  -pain control   -PT/OT     Dysphagia  -PEG tube placed   -Issues tolerating feeds, improved s/p enema and having large BMs  -CT A/P 6/29 negative for obstruction   -tolerating tube feeds at 20 cc/hr, will increase to 30 cc/hr     Constipation   -s/p enema w/ large BM's   -c/w bowel regimen     Urinary retention   -serial bladder scans   -straight cath PRN    Patient is DNR but okay with long-term ventilation  PT had recommended rehab    POC d/w son

## 2025-07-03 LAB
ALBUMIN SERPL ELPH-MCNC: 1.7 G/DL — LOW (ref 3.3–5)
ALP SERPL-CCNC: 116 U/L — SIGNIFICANT CHANGE UP (ref 40–120)
ALT FLD-CCNC: 13 U/L — SIGNIFICANT CHANGE UP (ref 12–78)
ANION GAP SERPL CALC-SCNC: 8 MMOL/L — SIGNIFICANT CHANGE UP (ref 5–17)
AST SERPL-CCNC: 14 U/L — LOW (ref 15–37)
BILIRUB SERPL-MCNC: 0.4 MG/DL — SIGNIFICANT CHANGE UP (ref 0.2–1.2)
BUN SERPL-MCNC: 44 MG/DL — HIGH (ref 7–23)
CALCIUM SERPL-MCNC: 8 MG/DL — LOW (ref 8.5–10.1)
CHLORIDE SERPL-SCNC: 104 MMOL/L — SIGNIFICANT CHANGE UP (ref 96–108)
CO2 SERPL-SCNC: 20 MMOL/L — LOW (ref 22–31)
CREAT SERPL-MCNC: 1.32 MG/DL — HIGH (ref 0.5–1.3)
EGFR: 42 ML/MIN/1.73M2 — LOW
EGFR: 42 ML/MIN/1.73M2 — LOW
GLUCOSE BLDC GLUCOMTR-MCNC: 108 MG/DL — HIGH (ref 70–99)
GLUCOSE BLDC GLUCOMTR-MCNC: 119 MG/DL — HIGH (ref 70–99)
GLUCOSE BLDC GLUCOMTR-MCNC: 122 MG/DL — HIGH (ref 70–99)
GLUCOSE BLDC GLUCOMTR-MCNC: 129 MG/DL — HIGH (ref 70–99)
GLUCOSE BLDC GLUCOMTR-MCNC: 212 MG/DL — HIGH (ref 70–99)
GLUCOSE SERPL-MCNC: 99 MG/DL — SIGNIFICANT CHANGE UP (ref 70–99)
HCT VFR BLD CALC: 25.6 % — LOW (ref 34.5–45)
HGB BLD-MCNC: 7.8 G/DL — LOW (ref 11.5–15.5)
MCHC RBC-ENTMCNC: 28.4 PG — SIGNIFICANT CHANGE UP (ref 27–34)
MCHC RBC-ENTMCNC: 30.5 G/DL — LOW (ref 32–36)
MCV RBC AUTO: 93.1 FL — SIGNIFICANT CHANGE UP (ref 80–100)
NRBC BLD AUTO-RTO: 0 /100 WBCS — SIGNIFICANT CHANGE UP (ref 0–0)
PLATELET # BLD AUTO: 412 K/UL — HIGH (ref 150–400)
POTASSIUM SERPL-MCNC: 4.9 MMOL/L — SIGNIFICANT CHANGE UP (ref 3.5–5.3)
POTASSIUM SERPL-SCNC: 4.9 MMOL/L — SIGNIFICANT CHANGE UP (ref 3.5–5.3)
PROT SERPL-MCNC: 6.3 GM/DL — SIGNIFICANT CHANGE UP (ref 6–8.3)
RBC # BLD: 2.75 M/UL — LOW (ref 3.8–5.2)
RBC # FLD: 16.1 % — HIGH (ref 10.3–14.5)
SODIUM SERPL-SCNC: 132 MMOL/L — LOW (ref 135–145)
WBC # BLD: 7.41 K/UL — SIGNIFICANT CHANGE UP (ref 3.8–10.5)
WBC # FLD AUTO: 7.41 K/UL — SIGNIFICANT CHANGE UP (ref 3.8–10.5)

## 2025-07-03 PROCEDURE — 93010 ELECTROCARDIOGRAM REPORT: CPT

## 2025-07-03 PROCEDURE — 71045 X-RAY EXAM CHEST 1 VIEW: CPT | Mod: 26

## 2025-07-03 PROCEDURE — G0545: CPT

## 2025-07-03 PROCEDURE — 99232 SBSQ HOSP IP/OBS MODERATE 35: CPT

## 2025-07-03 PROCEDURE — 99233 SBSQ HOSP IP/OBS HIGH 50: CPT

## 2025-07-03 RX ORDER — INSULIN LISPRO 100 U/ML
INJECTION, SOLUTION INTRAVENOUS; SUBCUTANEOUS
Refills: 0 | Status: DISCONTINUED | OUTPATIENT
Start: 2025-07-03 | End: 2025-07-10

## 2025-07-03 RX ORDER — GLUCAGON 3 MG/1
1 POWDER NASAL ONCE
Refills: 0 | Status: DISCONTINUED | OUTPATIENT
Start: 2025-07-03 | End: 2025-07-10

## 2025-07-03 RX ORDER — DEXTROSE 50 % IN WATER 50 %
12.5 SYRINGE (ML) INTRAVENOUS ONCE
Refills: 0 | Status: DISCONTINUED | OUTPATIENT
Start: 2025-07-03 | End: 2025-07-10

## 2025-07-03 RX ORDER — DEXTROSE 50 % IN WATER 50 %
25 SYRINGE (ML) INTRAVENOUS ONCE
Refills: 0 | Status: DISCONTINUED | OUTPATIENT
Start: 2025-07-03 | End: 2025-07-10

## 2025-07-03 RX ORDER — ONDANSETRON HCL/PF 4 MG/2 ML
4 VIAL (ML) INJECTION EVERY 8 HOURS
Refills: 0 | Status: DISCONTINUED | OUTPATIENT
Start: 2025-07-03 | End: 2025-07-10

## 2025-07-03 RX ORDER — SODIUM CHLORIDE 9 G/1000ML
1000 INJECTION, SOLUTION INTRAVENOUS
Refills: 0 | Status: DISCONTINUED | OUTPATIENT
Start: 2025-07-03 | End: 2025-07-10

## 2025-07-03 RX ORDER — ONDANSETRON HCL/PF 4 MG/2 ML
8 VIAL (ML) INJECTION ONCE
Refills: 0 | Status: DISCONTINUED | OUTPATIENT
Start: 2025-07-03 | End: 2025-07-03

## 2025-07-03 RX ORDER — DEXTROSE 50 % IN WATER 50 %
15 SYRINGE (ML) INTRAVENOUS ONCE
Refills: 0 | Status: DISCONTINUED | OUTPATIENT
Start: 2025-07-03 | End: 2025-07-10

## 2025-07-03 RX ADMIN — Medication 40 MILLIGRAM(S): at 06:39

## 2025-07-03 RX ADMIN — QUETIAPINE FUMARATE 25 MILLIGRAM(S): 25 TABLET ORAL at 21:58

## 2025-07-03 RX ADMIN — Medication 15 MILLILITER(S): at 05:44

## 2025-07-03 RX ADMIN — MIDODRINE HYDROCHLORIDE 20 MILLIGRAM(S): 5 TABLET ORAL at 06:41

## 2025-07-03 RX ADMIN — MIDODRINE HYDROCHLORIDE 20 MILLIGRAM(S): 5 TABLET ORAL at 13:45

## 2025-07-03 RX ADMIN — Medication 81 MILLIGRAM(S): at 12:02

## 2025-07-03 RX ADMIN — RANOLAZINE 500 MILLIGRAM(S): 1000 TABLET, FILM COATED, EXTENDED RELEASE ORAL at 06:41

## 2025-07-03 RX ADMIN — Medication 1 APPLICATION(S): at 05:43

## 2025-07-03 RX ADMIN — ATORVASTATIN CALCIUM 40 MILLIGRAM(S): 80 TABLET, FILM COATED ORAL at 21:58

## 2025-07-03 RX ADMIN — Medication 15 MILLILITER(S): at 17:55

## 2025-07-03 RX ADMIN — ENOXAPARIN SODIUM 40 MILLIGRAM(S): 100 INJECTION SUBCUTANEOUS at 13:43

## 2025-07-03 RX ADMIN — Medication 40 MILLIGRAM(S): at 12:12

## 2025-07-03 RX ADMIN — Medication 25 GRAM(S): at 00:07

## 2025-07-03 RX ADMIN — RANOLAZINE 500 MILLIGRAM(S): 1000 TABLET, FILM COATED, EXTENDED RELEASE ORAL at 17:55

## 2025-07-03 RX ADMIN — MIDODRINE HYDROCHLORIDE 20 MILLIGRAM(S): 5 TABLET ORAL at 21:58

## 2025-07-03 NOTE — CHART NOTE - NSCHARTNOTEFT_GEN_A_CORE
74-year-old female c PMHx that includes HTN, HLD, T2DM, CAD c multiple stents s/p recent fall (3 days PTA); tx at [facility]SH for right humerus fx & arm placed in sling (no plan for surgical intervention at this time); admitted to the ICU for AMS, hyperglycemia, and hypotension.  Pt found c septic shock 2/2 colitis + UTI c ESBL E. coli bacteremia.  Hospital course complicated by cardiac arrest of unclear origin; pt intubated for airway protection; successfully extubated on 6/6. On 6/11, RRT x 2 called,1st time for aspiration and AMS; 2nd for unresponsiveness; pt intubated again & placed on vent; as pt being unable to wean off the vent, trach placed (6/23) & PEG (6/25) for long-term breathing & nutrition support.. On 6/29, hospital course further complicated by abdominal distention; X-ray concerning for ileus vs SBO; CTA abd/pelvis negative for SBO, likely ileus w/ large stool burden; s/p enema, pt had multiple large BMs .  Pt transferred to telemetry floor on 7/1.     Factors impacting intake: [ ] none [ ] nausea  [ ] vomiting [ ] diarrhea [ ] constipation  [ ]chewing problems [ ] swallowing issues  [ ] other:     Diet Prescription: Diet, NPO with Tube Feed:   Tube Feeding Modality: Gastrostomy  Glucerna 1.2 Daryl  Total Volume for 24 Hours (mL): 540  Continuous  Starting Tube Feed Rate {mL per Hour}: 10  Increase Tube Feed Rate by (mL): 10     Every hour  Until Goal Tube Feed Rate (mL per Hour): 30  Tube Feed Duration (in Hours): 18  Tube Feed Start Time: 17:00 (07-02-25 @ 14:49)    Intake:     Current Weight:   % Weight Change    Pertinent Medications: MEDICATIONS  (STANDING):  aspirin  chewable 81 milliGRAM(s) Oral daily  atorvastatin 40 milliGRAM(s) Oral at bedtime  chlorhexidine 0.12% Liquid 15 milliLiter(s) Oral Mucosa every 12 hours  chlorhexidine 2% Cloths 1 Application(s) Topical <User Schedule>  enoxaparin Injectable 40 milliGRAM(s) SubCutaneous every 24 hours  insulin glargine Injectable (LANTUS) 10 Unit(s) SubCutaneous at bedtime  insulin lispro (ADMELOG) corrective regimen sliding scale   SubCutaneous every 6 hours  midodrine 20 milliGRAM(s) Oral every 8 hours  multivitamin 1 Tablet(s) Oral daily  pantoprazole  Injectable 40 milliGRAM(s) IV Push every 12 hours  QUEtiapine 25 milliGRAM(s) Oral at bedtime  ranolazine 500 milliGRAM(s) Oral two times a day  senna 2 Tablet(s) Oral at bedtime  simethicone 80 milliGRAM(s) Chew daily    MEDICATIONS  (PRN):  acetaminophen     Tablet .. 650 milliGRAM(s) Oral every 6 hours PRN Temp greater or equal to 38C (100.4F), Mild Pain (1 - 3)  acetaminophen     Tablet .. 650 milliGRAM(s) Oral every 6 hours PRN Mild Pain (1 - 3)  aluminum hydroxide/magnesium hydroxide/simethicone Suspension 30 milliLiter(s) Oral every 6 hours PRN Dyspepsia  bisacodyl Suppository 10 milliGRAM(s) Rectal daily PRN Constipation  dextrose 50% Injectable 25 Gram(s) IV Push every 15 minutes PRN hypoglycemia  ondansetron Injectable 4 milliGRAM(s) IV Push every 8 hours PRN Nausea and/or Vomiting  polyethylene glycol 3350 17 Gram(s) Oral daily PRN Constipation    Pertinent Labs: 07-02 Na132 mmol/L[L] Glu 82 mg/dL K+ 4.8 mmol/L Cr  1.25 mg/dL BUN 43 mg/dL[H] 07-02 Phos 2.8 mg/dL 07-02 Alb 1.8 g/dL[L]      Skin:     Estimated Needs:   [ ] no change since previous assessment  [ ] recalculated:     Previous Nutrition Diagnosis:   [ ] Inadequate Energy Intake [ ]Inadequate Oral Intake [ ] Excessive Energy Intake   [ ] Underweight [ ] Increased Nutrient Needs [ ] Overweight/Obesity   [ ] Altered GI Function [ ] Unintended Weight Loss [ ] Food & Nutrition Related Knowledge Deficit [ ] Malnutrition     Nutrition Diagnosis is [ ] ongoing  [ ] resolved [ ] not applicable     New Nutrition Diagnosis: [ ] not applicable      Interventions:   Recommend  [ ] Change Diet To:  [ ] Nutrition Supplement  [ ] Nutrition Support  [ ] Other:     Monitoring and Evaluation:   [ ] PO intake [ x ] Tolerance to diet prescription [ x ] weights [ x ] labs[ x ] follow up per protocol  [ ] other: 74-year-old female c PMHx that includes HTN, HLD, T2DM, CAD c multiple stents s/p recent fall (3 days PTA); tx at OSH for right humerus fx & arm placed in sling (no plan for surgical intervention at this time); admitted to the ICU for AMS, hyperglycemia, and hypotension.  Pt found c septic shock 2/2 colitis + UTI c ESBL E. coli bacteremia.  Hospital course complicated by cardiac arrest of unclear origin; pt intubated for airway protection; successfully extubated on 6/6. On 6/11, RRT x 2 called,1st time for aspiration and AMS; 2nd for unresponsiveness; pt intubated again & placed on vent; as pt being unable to wean off the vent, trach placed (6/23) & PEG (6/25) for long-term breathing & nutrition support.. On 6/29, hospital course further complicated by abdominal distention; X-ray concerning for ileus vs SBO; CTA abd/pelvis negative for SBO, likely ileus w/ large stool burden; s/p enema, pt had multiple large BMs .  Pt transferred to telemetry floor on 7/1. Currently DNR/Intubate status.     Factors impacting intake: [ ] none [ ] nausea  [ ] vomiting [ ] diarrhea [ ] constipation  [ ]chewing problems [ ] swallowing issues  [ ] other:     Diet Prescription: Diet, NPO with Tube Feed:   Tube Feeding Modality: Gastrostomy  Glucerna 1.2 Daryl  Total Volume for 24 Hours (mL): 540  Continuous  Starting Tube Feed Rate {mL per Hour}: 10  Increase Tube Feed Rate by (mL): 10     Every hour  Until Goal Tube Feed Rate (mL per Hour): 30  Tube Feed Duration (in Hours): 18  Tube Feed Start Time: 17:00 (07-02-25 @ 14:49)    Intake:     Current Weight:   % Weight Change    Pertinent Medications: MEDICATIONS  (STANDING):  aspirin  chewable 81 milliGRAM(s) Oral daily  atorvastatin 40 milliGRAM(s) Oral at bedtime  chlorhexidine 0.12% Liquid 15 milliLiter(s) Oral Mucosa every 12 hours  chlorhexidine 2% Cloths 1 Application(s) Topical <User Schedule>  enoxaparin Injectable 40 milliGRAM(s) SubCutaneous every 24 hours  insulin glargine Injectable (LANTUS) 10 Unit(s) SubCutaneous at bedtime  insulin lispro (ADMELOG) corrective regimen sliding scale   SubCutaneous every 6 hours  midodrine 20 milliGRAM(s) Oral every 8 hours  multivitamin 1 Tablet(s) Oral daily  pantoprazole  Injectable 40 milliGRAM(s) IV Push every 12 hours  QUEtiapine 25 milliGRAM(s) Oral at bedtime  ranolazine 500 milliGRAM(s) Oral two times a day  senna 2 Tablet(s) Oral at bedtime  simethicone 80 milliGRAM(s) Chew daily    MEDICATIONS  (PRN):  acetaminophen     Tablet .. 650 milliGRAM(s) Oral every 6 hours PRN Temp greater or equal to 38C (100.4F), Mild Pain (1 - 3)  acetaminophen     Tablet .. 650 milliGRAM(s) Oral every 6 hours PRN Mild Pain (1 - 3)  aluminum hydroxide/magnesium hydroxide/simethicone Suspension 30 milliLiter(s) Oral every 6 hours PRN Dyspepsia  bisacodyl Suppository 10 milliGRAM(s) Rectal daily PRN Constipation  dextrose 50% Injectable 25 Gram(s) IV Push every 15 minutes PRN hypoglycemia  ondansetron Injectable 4 milliGRAM(s) IV Push every 8 hours PRN Nausea and/or Vomiting  polyethylene glycol 3350 17 Gram(s) Oral daily PRN Constipation    Pertinent Labs: 07-02 Na132 mmol/L[L] Glu 82 mg/dL K+ 4.8 mmol/L Cr  1.25 mg/dL BUN 43 mg/dL[H] 07-02 Phos 2.8 mg/dL 07-02 Alb 1.8 g/dL[L]      Skin:     Estimated Needs:   [ ] no change since previous assessment  [ ] recalculated:     Previous Nutrition Diagnosis:   [ ] Inadequate Energy Intake [ ]Inadequate Oral Intake [ ] Excessive Energy Intake   [ ] Underweight [ ] Increased Nutrient Needs [ ] Overweight/Obesity   [ ] Altered GI Function [ ] Unintended Weight Loss [ ] Food & Nutrition Related Knowledge Deficit [ ] Malnutrition     Nutrition Diagnosis is [ ] ongoing  [ ] resolved [ ] not applicable     New Nutrition Diagnosis: [ ] not applicable      Interventions:   Recommend  [ ] Change Diet To:  [ ] Nutrition Supplement  [ ] Nutrition Support  [ ] Other:     Monitoring and Evaluation:   [ ] PO intake [ x ] Tolerance to diet prescription [ x ] weights [ x ] labs[ x ] follow up per protocol  [ ] other: 74-year-old female c PMHx that includes HTN, HLD, T2DM, CAD c multiple stents s/p recent fall (3 days PTA); tx at OSH for right humerus fx & arm placed in sling (no plan for surgical intervention at this time); admitted to the ICU for AMS, hyperglycemia, and hypotension.  Pt found c septic shock 2/2 colitis + UTI c ESBL E. coli bacteremia.  Hospital course complicated by cardiac arrest of unclear origin; pt intubated for airway protection; successfully extubated on 6/6. On 6/11, RRT x 2 called,1st time for aspiration and AMS; 2nd for unresponsiveness; pt intubated again & placed on vent; as pt being unable to wean off the vent, trach placed (6/23) & PEG (6/25) for long-term breathing & nutrition support.. On 6/29, hospital course further complicated by abdominal distention; X-ray concerning for ileus vs SBO; CTA abd/pelvis negative for SBO, likely ileus w/ large stool burden; s/p enema, pt had multiple large BMs; RN presently reports pt c multiple loose BMs; RD to change TF formula .  Pt transferred to telemetry floor on 7/1. Currently DNR/Intubate status.     Factors impacting intake: [ ] none [ ] nausea  [ ] vomiting [X ] diarrhea [ ] constipation  [ ]chewing problems [ ] swallowing issues  [X ] other: chew, swallowing difficulty; on vent support    Diet Prescription: Diet, NPO with Tube Feed:   Tube Feeding Modality: Gastrostomy  Glucerna 1.2 Daryl  Total Volume for 24 Hours (mL): 540  Continuous  Starting Tube Feed Rate {mL per Hour}: 10  Increase Tube Feed Rate by (mL): 10     Every hour  Until Goal Tube Feed Rate (mL per Hour): 30  Tube Feed Duration (in Hours): 18  Tube Feed Start Time: 17:00 (07-02-25 @ 14:49)    Intake:  TF currently @ goal rate of 30 ml/hr (provides 549 ml volume, 648 kcal, 30 g protein, 437 ml H2O) - not meeting pt calculated daily energy/protein needs    Current Weight:   71.2 kg (7/2); admission wt 70.4 kg (5/31); pt noted c 1+ generalized edema on both dates  % Weight Change:  1.1% wt gain x 1 month    Pertinent Medications: MEDICATIONS  (STANDING):  aspirin  chewable 81 milliGRAM(s) Oral daily  atorvastatin 40 milliGRAM(s) Oral at bedtime  chlorhexidine 0.12% Liquid 15 milliLiter(s) Oral Mucosa every 12 hours  chlorhexidine 2% Cloths 1 Application(s) Topical <User Schedule>  enoxaparin Injectable 40 milliGRAM(s) SubCutaneous every 24 hours  insulin glargine Injectable (LANTUS) 10 Unit(s) SubCutaneous at bedtime  insulin lispro (ADMELOG) corrective regimen sliding scale   SubCutaneous every 6 hours  midodrine 20 milliGRAM(s) Oral every 8 hours  multivitamin 1 Tablet(s) Oral daily  pantoprazole  Injectable 40 milliGRAM(s) IV Push every 12 hours  QUEtiapine 25 milliGRAM(s) Oral at bedtime  ranolazine 500 milliGRAM(s) Oral two times a day  senna 2 Tablet(s) Oral at bedtime  simethicone 80 milliGRAM(s) Chew daily    MEDICATIONS  (PRN):  acetaminophen     Tablet .. 650 milliGRAM(s) Oral every 6 hours PRN Temp greater or equal to 38C (100.4F), Mild Pain (1 - 3)  acetaminophen     Tablet .. 650 milliGRAM(s) Oral every 6 hours PRN Mild Pain (1 - 3)  aluminum hydroxide/magnesium hydroxide/simethicone Suspension 30 milliLiter(s) Oral every 6 hours PRN Dyspepsia  bisacodyl Suppository 10 milliGRAM(s) Rectal daily PRN Constipation  dextrose 50% Injectable 25 Gram(s) IV Push every 15 minutes PRN hypoglycemia  ondansetron Injectable 4 milliGRAM(s) IV Push every 8 hours PRN Nausea and/or Vomiting  polyethylene glycol 3350 17 Gram(s) Oral daily PRN Constipation    Pertinent Labs: 07-02 Na132 mmol/L[L] Glu 82 mg/dL K+ 4.8 mmol/L Cr  1.25 mg/dL BUN 43 mg/dL[H] 07-02 Phos 2.8 mg/dL 07-02 Alb 1.8 g/dL[L]  05-31 A1c 10.2%; 07-02 POCT:  95, 81, 80, 65    Skin:   Suspected DTI noted on sacrum as of 6/17; stage II pressure ulcers noted on sacrum as of 6/28    Estimated Needs:   [X ] no change since previous assessment kcal & fluid needs (7341-3503; 25-30 kcal/ml per kg) as calculated on 6/4; protein needs (68-79; 1.2-1.4 g/kg) as calculated on 6/27  [ ] recalculated:     Previous New Nutrition Diagnosis: (06/20)  [ x] Increased Nutrient Needs   Related to: increased demand for nutrient   As evidenced by: pressure injury  Goal: pt to meet >75% protein-energy needs via tolerated route; not met    Nutrition Diagnosis is [x ] ongoing  [ ] resolved [ ] not applicable     New Nutrition Diagnosis: [x ] not applicable         Interventions:     Recommend  [ ] Change Diet To:  [ ] Nutrition Supplement  [X ] Nutrition Support:  change TF formula to [employer] Peptide 1.5 to goal rate of 40 ml/hr x 24 hrs (provides 960 ml volume, 1476 kcal, 71 g protein, 672 ml H2O); initiate at 20 ml/hr & increase by 5 ml q 8 hrs until goal rate of 40 ml/hr is reached  [ ] Other:     Monitoring and Evaluation:   [ ] PO intake [ x ] Tolerance to diet prescription [ x ] weights [ x ] labs[ x ] follow up per protocol  [ ] other:

## 2025-07-03 NOTE — PROGRESS NOTE ADULT - NS ATTEND AMEND GEN_ALL_CORE FT
pt seen and examined at bedside    failed weaning this AM due to apnea  weaned again this afternoon on PS 15/6  afebrile  trached to vent    74F Macedonian speaking PMH HTN, HLD, CAD s/p stents, DM2, recent fall 3 days prior to admission with R humerus fracture with arm placed in sling at [facility] with outpatient ortho follow up presents from home 5/30/25 for noted hyperglycemia BS > 400 with decreased PO intake and lethargy. Admitted to medical service for DM with hyperglycemia and ALY. s/p RRT 6/1 for hypotension, fever 103.2 and obtundation. Transferred to ICU for septic shock. Placed on pressors. Intubated for airway protection. Found to have ESBL e-coli UTI and bacteremia with leukocytosis WBC 32 (treated with meropenem). Sent for CT imaging 6/2 with 10 min cardiac arrest while in CT. Neurologically intact post arrest. Treated for NSTEMI with heparin drip. Extubated 6/6 and transferred to medical service 6/8/25. RRT 6/11 for AMS and hypotension with hypotension (SBP 70) resolved without intervention. Had 2nd RRT shortly after 1st for seizure like activity, obtundation, recurrent hypotension, hypoxia, aspiration (family gave juice to patient, similar colored juice content suctioned from pt's airways) intubated and transferred to ICU 6/11/25. CTA chest no pulmonary embolism but +tracheobronchial secretions, greatest in the left lower lobe with patchy and nodular bilateral lung opacities. Acute/subacute fractures right anterior lateral fourth through sixth ribs. Treated for septic shock secondary to aspiration PNA. EEG negative for seizures. Developed fevers with sputum cx 6/20 with Stenotrophomonas. Failed multiple weaning trials. s/p trach 6/23/25. s/p PEG 6/25/25. Downgraded to medical service 7/1/25. Pulmonary consulted for respiratory failure and vent management.    DX: cardiac arrest, acute hypoxic/hypercarbic respiratory failure requiring intubation, aspiration PNA, Stenotrophomonas PNA, ESBL e-coli UTI and bacteremia, severe gram negative sepsis with septic shock, NSTEMI, acute metabolic encephalopathy, rib fractures, R humerus fracture    - s/p intubation x2 during this admission  - failed multiple weaning trials  - now s/p trach 6/23/25  - mechanical vent support   - cont daily weaning as tolerates  - cont local trach care and tracheal suctioning as needed  - s/p course of meropenem for ESBL e-coli bacteremia and UTI  - s/p course of zosyn for aspiration PNA  - s/p course of bactrim for Stenotrophomonas in sputum cx 6/20  - being monitored off antibx at present time  - on midodrine for BP support, wean as tolerates  - on asa and statin therapy for CAD  - neurologically intact post arrest but with significant generalized deconditioning  - PEG feeds  - DVT ppx  - remainder of care per primary team  - d/w respiratory therapist   - will likely need LTAC placement  - family at bedside, updated

## 2025-07-03 NOTE — PROGRESS NOTE ADULT - NS ATTEND OPT1A GEN_ALL_CORE
Medical decision making
History/Exam/Medical decision making
History/Medical decision making
Medical decision making
Medical decision making

## 2025-07-03 NOTE — PROGRESS NOTE ADULT - SUBJECTIVE AND OBJECTIVE BOX
EMMANUEL GONZALEZ  MRN-[id number]  74y (1950)    Follow Up:  respiratory failure, s/p treatment for ESBL urosepsis, s/p treatment for PNA    Interval History: The pt was seen and examined earlier, not in acute distress, pt is vented via tracheostomy, not interactive, lethargic, pt's son is present. Pt is afebrile, no leukocytosis.     PAST MEDICAL & SURGICAL HISTORY:  HTN (hypertension)      HLD (hyperlipidemia)      CAD S/P percutaneous coronary angioplasty      Insulin dependent type 2 diabetes mellitus      History of heart artery stent          ROS:    [x ] Unobtainable because: vented via tracheostomy   [ ] All other systems negative    Constitutional: no fever, no chills  Head: no trauma  Eyes: no vision changes, no eye pain  ENT:  no sore throat, no rhinorrhea  Cardiovascular:  no chest pain, no palpitation  Respiratory:  no SOB, no cough  GI:  no abd pain, no vomiting, no diarrhea  urinary: no dysuria, no hematuria, no flank pain  musculoskeletal:  no joint pain, no joint swelling  skin:  no rash  neurology:  no headache, no seizure, no change in mental status  psych: no anxiety, no depression         Allergies  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/shellfish (Short breath)  No Known Drug Allergies        ANTIMICROBIALS:      OTHER MEDS:  acetaminophen     Tablet .. 650 milliGRAM(s) Oral every 6 hours PRN  acetaminophen     Tablet .. 650 milliGRAM(s) Oral every 6 hours PRN  aspirin  chewable 81 milliGRAM(s) Oral daily  atorvastatin 40 milliGRAM(s) Oral at bedtime  bisacodyl Suppository 10 milliGRAM(s) Rectal daily PRN  chlorhexidine 0.12% Liquid 15 milliLiter(s) Oral Mucosa every 12 hours  chlorhexidine 2% Cloths 1 Application(s) Topical <User Schedule>  dextrose 5%. 1000 milliLiter(s) IV Continuous <Continuous>  dextrose 5%. 1000 milliLiter(s) IV Continuous <Continuous>  dextrose 50% Injectable 25 Gram(s) IV Push once  dextrose 50% Injectable 12.5 Gram(s) IV Push once  dextrose 50% Injectable 25 Gram(s) IV Push once  dextrose Oral Gel 15 Gram(s) Oral once PRN  enoxaparin Injectable 40 milliGRAM(s) SubCutaneous every 24 hours  glucagon  Injectable 1 milliGRAM(s) IntraMuscular once  insulin lispro (ADMELOG) corrective regimen sliding scale   SubCutaneous three times a day before meals  midodrine 20 milliGRAM(s) Oral every 8 hours  pantoprazole   Suspension 40 milliGRAM(s) Oral daily  polyethylene glycol 3350 17 Gram(s) Oral daily PRN  QUEtiapine 25 milliGRAM(s) Oral at bedtime  ranolazine 500 milliGRAM(s) Oral two times a day  senna 2 Tablet(s) Oral at bedtime      Vital Signs Last 24 Hrs  T(C): 36.3 (03 Jul 2025 11:18), Max: 37.3 (03 Jul 2025 00:13)  T(F): 97.3 (03 Jul 2025 11:18), Max: 99.2 (03 Jul 2025 00:13)  HR: 61 (03 Jul 2025 13:41) (57 - 80)  BP: 128/77 (03 Jul 2025 13:41) (106/45 - 138/107)  BP(mean): 72 (02 Jul 2025 18:45) (72 - 116)  RR: 20 (03 Jul 2025 13:41) (20 - 24)  SpO2: 100% (03 Jul 2025 13:41) (94% - 100%)    Parameters below as of 03 Jul 2025 13:41  Patient On (Oxygen Delivery Method): ventilator        Physical Exam:  General: NAD, lethargic   HEENT: opens eyes briefly, anicteric   Neck: Tracheostomy in place - site not infected   Respiratory: mechanical BS heard b/l  Cardiovascular: S1S2 RRR  Abdomen: soft, non tender, non distended, peg tube present, + BS  Extremities: warm, +1 edema b/l LE  Neurological:  not awake or alert, not interactive  Psych: unable  skin: fragile, warm, PIV ok    WBC Count: 7.41 K/uL (07-03 @ 08:38)  WBC Count: 8.74 K/uL (07-02 @ 10:00)  WBC Count: 7.08 K/uL (07-02 @ 04:00)  WBC Count: 7.51 K/uL (07-01 @ 03:38)  WBC Count: 6.93 K/uL (06-30 @ 02:57)  WBC Count: 8.46 K/uL (06-29 @ 04:40)  WBC Count: 7.54 K/uL (06-28 @ 14:00)                            7.8    7.41  )-----------( 412      ( 03 Jul 2025 08:38 )             25.6       07-03    132[L]  |  104  |  44[H]  ----------------------------<  99  4.9   |  20[L]  |  1.32[H]    Ca    8.0[L]      03 Jul 2025 08:38  Phos  2.8     07-02  Mg     2.4     07-02    TPro  6.3  /  Alb  1.7[L]  /  TBili  0.4  /  DBili  x   /  AST  14[L]  /  ALT  13  /  AlkPhos  116  07-03      Urinalysis Basic - ( 03 Jul 2025 08:38 )    Color: x / Appearance: x / SG: x / pH: x  Gluc: 99 mg/dL / Ketone: x  / Bili: x / Urobili: x   Blood: x / Protein: x / Nitrite: x   Leuk Esterase: x / RBC: x / WBC x   Sq Epi: x / Non Sq Epi: x / Bacteria: x        Creatinine Trend: 1.32<--, 1.25<--, 1.30<--, 1.27<--, 1.27<--, 1.25<--      MICROBIOLOGY:  v  Blood Blood-Peripheral  06-20-25   No growth at 5 days  --  --      Blood Blood-Peripheral  06-20-25   No growth at 5 days  --  --      ET Tube ET Tube  06-20-25   Moderate Stenotrophomonas maltophilia  Commensal lina consistent with body site  --  Stenotrophomonas maltophilia      ET Tube ET Tube  06-13-25   Commensal lina consistent with body site  --    No Squamous epithelial cells per low power field  Numerous polymorphonuclear leukocytes per low power field  Few Yeast per oil power field      Sputum Sputum  06-12-25   Commensal lina consistent with body site  --    Few Squamous epithelial cells per low power field  Numerous polymorphonuclear leukocytes per low power field  Yeast per oil power field      Catheterized Catheterized  06-11-25   <10,000 CFU/mL Normal Urogenital Lina  --  --      Blood Blood-Peripheral  06-11-25   No growth at 5 days  --  --      Blood Blood-Peripheral  06-11-25   No growth at 5 days  --  --      Blood Blood-Peripheral  06-04-25   No growth at 5 days  --  --    RADIOLOGY:     EMMANUEL GONZALEZ  MRN-[id number]  74y (1950)    Follow Up:  respiratory failure, s/p treatment for ESBL urosepsis, s/p treatment for PNA    Interval History: The pt was seen and examined earlier, not in acute distress, pt is vented via tracheostomy, not interactive, lethargic, pt's son is present. Pt is afebrile, no leukocytosis.     PAST MEDICAL & SURGICAL HISTORY:  HTN (hypertension)      HLD (hyperlipidemia)      CAD S/P percutaneous coronary angioplasty      Insulin dependent type 2 diabetes mellitus      History of heart artery stent          ROS:    [x ] Unobtainable because: vented via tracheostomy   [ ] All other systems negative    Constitutional: no fever, no chills  Head: no trauma  Eyes: no vision changes, no eye pain  ENT:  no sore throat, no rhinorrhea  Cardiovascular:  no chest pain, no palpitation  Respiratory:  no SOB, no cough  GI:  no abd pain, no vomiting, no diarrhea  urinary: no dysuria, no hematuria, no flank pain  musculoskeletal:  no joint pain, no joint swelling  skin:  no rash  neurology:  no headache, no seizure, no change in mental status  psych: no anxiety, no depression         Allergies  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/shellfish (Short breath)  No Known Drug Allergies        ANTIMICROBIALS:      OTHER MEDS:  acetaminophen     Tablet .. 650 milliGRAM(s) Oral every 6 hours PRN  acetaminophen     Tablet .. 650 milliGRAM(s) Oral every 6 hours PRN  aspirin  chewable 81 milliGRAM(s) Oral daily  atorvastatin 40 milliGRAM(s) Oral at bedtime  bisacodyl Suppository 10 milliGRAM(s) Rectal daily PRN  chlorhexidine 0.12% Liquid 15 milliLiter(s) Oral Mucosa every 12 hours  chlorhexidine 2% Cloths 1 Application(s) Topical <User Schedule>  dextrose 5%. 1000 milliLiter(s) IV Continuous <Continuous>  dextrose 5%. 1000 milliLiter(s) IV Continuous <Continuous>  dextrose 50% Injectable 25 Gram(s) IV Push once  dextrose 50% Injectable 12.5 Gram(s) IV Push once  dextrose 50% Injectable 25 Gram(s) IV Push once  dextrose Oral Gel 15 Gram(s) Oral once PRN  enoxaparin Injectable 40 milliGRAM(s) SubCutaneous every 24 hours  glucagon  Injectable 1 milliGRAM(s) IntraMuscular once  insulin lispro (ADMELOG) corrective regimen sliding scale   SubCutaneous three times a day before meals  midodrine 20 milliGRAM(s) Oral every 8 hours  pantoprazole   Suspension 40 milliGRAM(s) Oral daily  polyethylene glycol 3350 17 Gram(s) Oral daily PRN  QUEtiapine 25 milliGRAM(s) Oral at bedtime  ranolazine 500 milliGRAM(s) Oral two times a day  senna 2 Tablet(s) Oral at bedtime      Vital Signs Last 24 Hrs  T(C): 36.3 (03 Jul 2025 11:18), Max: 37.3 (03 Jul 2025 00:13)  T(F): 97.3 (03 Jul 2025 11:18), Max: 99.2 (03 Jul 2025 00:13)  HR: 61 (03 Jul 2025 13:41) (57 - 80)  BP: 128/77 (03 Jul 2025 13:41) (106/45 - 138/107)  BP(mean): 72 (02 Jul 2025 18:45) (72 - 116)  RR: 20 (03 Jul 2025 13:41) (20 - 24)  SpO2: 100% (03 Jul 2025 13:41) (94% - 100%)    Parameters below as of 03 Jul 2025 13:41  Patient On (Oxygen Delivery Method): ventilator        Physical Exam:  General: NAD, lethargic   HEENT: opens eyes briefly, anicteric   Neck: Tracheostomy in place - site not infected   Respiratory: mechanical BS heard b/l  Cardiovascular: S1S2 RRR  Abdomen: soft, non tender, non distended, peg tube present, + BS  Extremities: warm, +1 edema b/l LE  Neurological:  not awake or alert, not interactive  Psych: unable  skin: fragile, warm, PIV ok    WBC Count: 7.41 K/uL (07-03 @ 08:38)  WBC Count: 8.74 K/uL (07-02 @ 10:00)  WBC Count: 7.08 K/uL (07-02 @ 04:00)  WBC Count: 7.51 K/uL (07-01 @ 03:38)  WBC Count: 6.93 K/uL (06-30 @ 02:57)  WBC Count: 8.46 K/uL (06-29 @ 04:40)  WBC Count: 7.54 K/uL (06-28 @ 14:00)                            7.8    7.41  )-----------( 412      ( 03 Jul 2025 08:38 )             25.6       07-03    132[L]  |  104  |  44[H]  ----------------------------<  99  4.9   |  20[L]  |  1.32[H]    Ca    8.0[L]      03 Jul 2025 08:38  Phos  2.8     07-02  Mg     2.4     07-02    TPro  6.3  /  Alb  1.7[L]  /  TBili  0.4  /  DBili  x   /  AST  14[L]  /  ALT  13  /  AlkPhos  116  07-03      Urinalysis Basic - ( 03 Jul 2025 08:38 )    Color: x / Appearance: x / SG: x / pH: x  Gluc: 99 mg/dL / Ketone: x  / Bili: x / Urobili: x   Blood: x / Protein: x / Nitrite: x   Leuk Esterase: x / RBC: x / WBC x   Sq Epi: x / Non Sq Epi: x / Bacteria: x        Creatinine Trend: 1.32<--, 1.25<--, 1.30<--, 1.27<--, 1.27<--, 1.25<--      MICROBIOLOGY:    Blood Blood-Peripheral  06-20-25   No growth at 5 days  --  --      Blood Blood-Peripheral  06-20-25   No growth at 5 days  --  --      ET Tube ET Tube  06-20-25   Moderate Stenotrophomonas maltophilia  Commensal lina consistent with body site  --  Stenotrophomonas maltophilia      ET Tube ET Tube  06-13-25   Commensal lina consistent with body site  --    No Squamous epithelial cells per low power field  Numerous polymorphonuclear leukocytes per low power field  Few Yeast per oil power field      Sputum Sputum  06-12-25   Commensal lina consistent with body site  --    Few Squamous epithelial cells per low power field  Numerous polymorphonuclear leukocytes per low power field  Yeast per oil power field      Catheterized Catheterized  06-11-25   <10,000 CFU/mL Normal Urogenital Lina  --  --      Blood Blood-Peripheral  06-11-25   No growth at 5 days  --  --      Blood Blood-Peripheral  06-11-25   No growth at 5 days  --  --      Blood Blood-Peripheral  06-04-25   No growth at 5 days  --  --    RADIOLOGY:

## 2025-07-03 NOTE — PROGRESS NOTE ADULT - SUBJECTIVE AND OBJECTIVE BOX
date of service: 07-03-25 @ 11:11  [facility]  REVIEW OF SYSTEMS:  CONSTITUTIONAL: No fever,  no  weight loss  ENT:  No  tinnitus,   no   vertigo  NECK: No pain or stiffness  RESPIRATORY: No cough, wheezing, chills or hemoptysis;    No Shortness of Breath  CARDIOVASCULAR: No chest pain, palpitations, dizziness  GASTROINTESTINAL: No abdominal or epigastric pain. No nausea, vomiting, or hematemesis; No diarrhea  No melena or hematochezia.  GENITOURINARY: No dysuria, frequency, hematuria, or incontinence  NEUROLOGICAL: No headaches  SKIN: No itching,  no   rash  LYMPH Nodes: No enlarged glands  ENDOCRINE: No heat or cold intolerance  MUSCULOSKELETAL: No joint pain or swelling  PSYCHIATRIC: No depression, anxiety  HEME/LYMPH: No easy bruising, or bleeding gums  ALLERGY AND IMMUNOLOGIC: No hives or eczema	    MEDICATIONS  (STANDING):  aspirin  chewable 81 milliGRAM(s) Oral daily  atorvastatin 40 milliGRAM(s) Oral at bedtime  chlorhexidine 0.12% Liquid 15 milliLiter(s) Oral Mucosa every 12 hours  chlorhexidine 2% Cloths 1 Application(s) Topical <User Schedule>  enoxaparin Injectable 40 milliGRAM(s) SubCutaneous every 24 hours  insulin glargine Injectable (LANTUS) 10 Unit(s) SubCutaneous at bedtime  insulin lispro (ADMELOG) corrective regimen sliding scale   SubCutaneous every 6 hours  midodrine 20 milliGRAM(s) Oral every 8 hours  multivitamin 1 Tablet(s) Oral daily  pantoprazole  Injectable 40 milliGRAM(s) IV Push every 12 hours  QUEtiapine 25 milliGRAM(s) Oral at bedtime  ranolazine 500 milliGRAM(s) Oral two times a day  senna 2 Tablet(s) Oral at bedtime  simethicone 80 milliGRAM(s) Chew daily    MEDICATIONS  (PRN):  acetaminophen     Tablet .. 650 milliGRAM(s) Oral every 6 hours PRN Temp greater or equal to 38C (100.4F), Mild Pain (1 - 3)  acetaminophen     Tablet .. 650 milliGRAM(s) Oral every 6 hours PRN Mild Pain (1 - 3)  aluminum hydroxide/magnesium hydroxide/simethicone Suspension 30 milliLiter(s) Oral every 6 hours PRN Dyspepsia  bisacodyl Suppository 10 milliGRAM(s) Rectal daily PRN Constipation  dextrose 50% Injectable 25 Gram(s) IV Push every 15 minutes PRN hypoglycemia  ondansetron Injectable 4 milliGRAM(s) IV Push every 8 hours PRN Nausea and/or Vomiting  polyethylene glycol 3350 17 Gram(s) Oral daily PRN Constipation      Vital Signs Last 24 Hrs  T(C): 37.1 (03 Jul 2025 05:02), Max: 37.3 (03 Jul 2025 00:13)  T(F): 98.7 (03 Jul 2025 05:02), Max: 99.2 (03 Jul 2025 00:13)  HR: 61 (03 Jul 2025 08:08) (57 - 80)  BP: 121/73 (03 Jul 2025 05:02) (108/52 - 138/107)  BP(mean): 72 (02 Jul 2025 18:45) (70 - 116)  RR: 21 (03 Jul 2025 05:02) (20 - 27)  SpO2: 100% (03 Jul 2025 08:08) (97% - 100%)    Parameters below as of 03 Jul 2025 00:13  Patient On (Oxygen Delivery Method): ventilator      CAPILLARY BLOOD GLUCOSE      POCT Blood Glucose.: 108 mg/dL (03 Jul 2025 05:52)  POCT Blood Glucose.: 212 mg/dL (03 Jul 2025 00:22)  POCT Blood Glucose.: 65 mg/dL (02 Jul 2025 23:42)  POCT Blood Glucose.: 80 mg/dL (02 Jul 2025 17:26)  POCT Blood Glucose.: 81 mg/dL (02 Jul 2025 11:26)    I&O's Summary    02 Jul 2025 07:01  -  03 Jul 2025 07:00  --------------------------------------------------------  IN: 400 mL / OUT: 1430 mL / NET: -1030 mL          Appearance: Normal	  HEENT:   Normal oral mucosa, PERRL, EOMI	  Lymphatic: No lymphadenopathy  Cardiovascular: Normal S1 S2, No JVD  Respiratory: Lungs clear to auscultation	  Gastrointestinal:  Soft, Non-tender, + BS	  Skin: No rash, No ecchymoses	  Extremities:   be  dbound    trach/  peg    LABS:                        7.8    7.41  )-----------( 412      ( 03 Jul 2025 08:38 )             25.6     07-03    132[L]  |  104  |  44[H]  ----------------------------<  99  4.9   |  20[L]  |  1.32[H]    Ca    8.0[L]      03 Jul 2025 08:38  Phos  2.8     07-02  Mg     2.4     07-02    TPro  6.3  /  Alb  1.7[L]  /  TBili  0.4  /  DBili  x   /  AST  14[L]  /  ALT  13  /  AlkPhos  116  07-03          Urinalysis Basic - ( 03 Jul 2025 08:38 )    Color: x / Appearance: x / SG: x / pH: x  Gluc: 99 mg/dL / Ketone: x  / Bili: x / Urobili: x   Blood: x / Protein: x / Nitrite: x   Leuk Esterase: x / RBC: x / WBC x   Sq Epi: x / Non Sq Epi: x / Bacteria: x                      Consultant(s) Notes Reviewed:      Care Discussed with Consultants/Other Providers:

## 2025-07-03 NOTE — PROGRESS NOTE ADULT - ASSESSMENT
A/P-   74 year old female with PMHx of HTN, HLD, IDDM2, CAD (s/p PCI), and recent right humerus fracture (placed in sling 3 days ago) who presented to the ED on 5/30/25 for complaints of elevated blood glucose.   As per med history provided by family  patient fell three days ago and landed on her right shoulder. Went to [facility] at that time and found to have right humerus fracture and placed in sling. Discharged home with outpatient orthopedic surgery follow up. Since then, patient has been refusing to get out of bed and not eating much.   In the ED, VSS. Hgb 10.3, sodium 132, potassium 6.3, BUN 70, Cr 2.02, blood glucose 510. VBG 7.29 / 58 / 34 / 26. COVID/influenza/RSV negative. CT head without acute intracranial findings. Received 1L NS bolus, 1L LR bolus, and insulin 20 U IV.  (30 May 2025 23:43)    s/p  RRT last night   for AMS, hypotension, fever of 103  and was started on sepsis w/u given IVF bolus, Abx with CTX and Vanco. Persistently hypotensive and obtunded. Started on levophed infusion. Urgently transferred to ICU.   was also intubated for airway protection    Infectious Disease consultation requested this afternoon 6/2 to help with antibiotic management  for ESBL bacteremia    Intubated   sedated  on pressors for hypotension  afebrile today  yesterday 103 t-max  + leukocytosis of 29K  Blood cx- ESBL GNR by PCR x 2  UA from 5/31-pos for nitrates  urine cx-pending  DUANE  elevated cardiac enzymes    per med records had ESBL e.coli UTI in august and sept 2024 and ID was not consulted     6/3-  remains Intubated and sedated  on pressors for hypotension  temp of 101.5 today  + leukocytosis trending down to 20k  creatinine slightly better today  Blood cx- ESBL GNR by PCR x 2  UA from 5/31-pos for nitrates  urine cx->100K e.coli  DUANE  elevated cardiac enzymes    6/4: seen in ICU earlier, remains intubated, weaning process in progress, on pressors, continues having fevers, T 101 this morning, leukocytosis is better 17.27, Cr better 1.35, UC and BCs grew ESBL E. Coli, repeat BCs are pending, Meropenem IV continued. If not improvement in pt's fevers tomorrow, most likely CT a/p will be obtained.   6/5: remains in ICU, intubated and sedated, on pressors, Temp persistent 100-100.2, WBC better 16.43, Cr normalized, LFTs ok, repeat BCs NGTD, procalcitonin 2.41, TTE performed - Left ventricular regional wall motion abnormalities present. Meropenem IV continued.     6/6-  extubated  awake  afebrile now but early am had temp of 100.6  Leukocytosis almost resolved 11K today  Blood cx- ESBL GNR by PCR x 2  UA from 5/31-pos for nitrates  urine cx->100K e.coli  repeat blood cx- NGTD x 2 from 6/4 6/9-  downgraded to medical floor  Afebrile  Leukocytosis 12 k today  Blood cx- ESBL GNR by PCR x 2  UA from 5/31-pos for nitrates  urine cx->100K e.coli  repeat blood cx- NGTD x 2 from 6/4 6/11: pt is afebrile since 6/6, doing well on RA, leukocytosis was elevated 13.57, Cr ok,  no new cbc, repeat BCs remain with no growth to date, s/p 8 days of IV Meropenem, now observed off abx.     6/12-  s/p RRT last night for AMS and hypotention and second RRT later for ? seizure and aspiration  s/p intubation and transferred to CCU  off pressors  intubated  afebrile but had fever of 100.2 at 5 am  leukocytosis has decreased to 11k  repeat blood cx - NGTD x 2  has completed course of meropenem  fo her ESBL bacteremia dn UTI   cxr- b/l pleural effusions L> R  CTA report-  No pulmonary embolism..  Tracheobronchial secretions, greatest in the left lower lobe.  Patchy and   nodular bilateral lung opacities may be infectious or inflammatory. Right   middle lobe subpleural 6 mm nodular opacity is new from prior .   Short-term follow-up CT recommended    6/13-   remains intubated on 50% Fio2  Afebrile   Minimal leukocytosis of 13K  creatinine- normal value  Repeat blood cx  from 6/4- NGTD x 2  repeat blood cx from 6/11- NGTD x 2  sputum  cx- commensal lina c/w body site  has completed course of meropenem  fo her ESBL bacteremia and UTI - which she cleared  cxr- b/l pleural effusions L> R  CTA report-  No pulmonary embolism..  Tracheobronchial secretions, greatest in the left lower lobe.  Patchy and   nodular bilateral lung opacities may be infectious or inflammatory. Right   middle lobe subpleural 6 mm nodular opacity is new from prior .   Short-term follow-up CT recommended  EEG- reported negative for epilepsy ( pls see full report)    6/16-  remains intubated on 30% Fio2  T-max-100.6 this am  No leukocytosis   creatinine- normal value  Repeat blood cx  from 6/4- NGTD x 2  repeat blood cx from 6/11- NGTD x 2  sputum  cx- commensal lina c/w body site  has completed course of meropenem  for her ESBL bacteremia and UTI - which she cleared  cxr- b/l pleural effusions L> R  CTA report-  No pulmonary embolism..  Tracheobronchial secretions, greatest in the left lower lobe.  Patchy and   nodular bilateral lung opacities may be infectious or inflammatory. Right   middle lobe subpleural 6 mm nodular opacity is new from prior .   Short-term follow-up CT recommended  EEG- reported negative for epilepsy ( pls see full report)    6/20-  T-max-101  No leukocytosis  Repeat blood cx  from 6/4- NGTD x 2  repeat blood cx from 6/11- NGTD x 2  sputum  cx- commensal lina c/w body site  ET tube cx-commensal lina c/w body site  has completed course of meropenem  for her ESBL bacteremia and UTI - which she cleared  cxr- b/l pleural effusions L> R  completed course of zosyn as well 2 days ago for presumed aspiration pneumonitis    6/23: remains in CCU, no fevers since 6/20, vented via tracheostomy no leukocytosis, repeat BCs NGTD, sputum culture grew Stenotrophomonas, will add abx, Bactrim via NGT.      6/26-  s/p trache and peg  awake  on 30 FIO2 via trache  low grade temp of 100 yesterday  afebrile today   repeat BCs NGTD  most recent sputum culture grew Stenotrophomonas ( sens to bactrim and levaquin)    6/27-  s/p trache and peg  awake, alert  on 30 FIO2 via trache  Afebrile  no leukocytosis   repeat BCs NGTD  most recent sputum culture grew Stenotrophomonas ( sens to bactrim and levaquin)    6/30-  s/p trache and peg  awake, alert  on 30 FIO2 via trache  Afebrile  no leukocytosis  repeat BCs NGTD  most recent sputum culture grew Stenotrophomonas ( sens to bactrim and levaquin)  CT abd/pelvis reported 6/29-No bowel obstruction.  Proctitis.    7/3: downgraded from ICU, no fever, vented via tracheostomy, PEG in place, no leukocytosis, Cr elevated 1.32, no new culture data, pt is off abx.     Impression-  possible aspiration pneumonitis -treated  ESBL septic shock -  was treated and had resolved  E.coli ESBL bacteremia sec to   source - treated   Right humerus fracture with hematoma  Stenotrophomonas in sputum - on bactrim  s/p trache and peg    Plan-  continue monitoring off abx   completed 7 days of  Bactrim solution via peg  to treat Stenotrophomonas - ET tube aspirate  on 6/29  had completed course of IV meropenem for the ESBL bacteremia and sepsis and repeat blood cx are negative   also completed course of zosyn for ? asp pneumonitis  vent management as per pulmonology and medicine   consider palliative care consult   rest of the medical management as per CCU team    discussed with Dr. Fraga A/P-   74 year old female with PMHx of HTN, HLD, IDDM2, CAD (s/p PCI), and recent right humerus fracture (placed in sling 3 days ago) who presented to the ED on 5/30/25 for complaints of elevated blood glucose.   As per med history provided by family  patient fell three days ago and landed on her right shoulder. Went to [facility] at that time and found to have right humerus fracture and placed in sling. Discharged home with outpatient orthopedic surgery follow up. Since then, patient has been refusing to get out of bed and not eating much.   In the ED, VSS. Hgb 10.3, sodium 132, potassium 6.3, BUN 70, Cr 2.02, blood glucose 510. VBG 7.29 / 58 / 34 / 26. COVID/influenza/RSV negative. CT head without acute intracranial findings. Received 1L NS bolus, 1L LR bolus, and insulin 20 U IV.  (30 May 2025 23:43)    s/p  RRT last night   for AMS, hypotension, fever of 103  and was started on sepsis w/u given IVF bolus, Abx with CTX and Vanco. Persistently hypotensive and obtunded. Started on levophed infusion. Urgently transferred to ICU.   was also intubated for airway protection    Infectious Disease consultation requested this afternoon 6/2 to help with antibiotic management  for ESBL bacteremia    Intubated   sedated  on pressors for hypotension  afebrile today  yesterday 103 t-max  + leukocytosis of 29K  Blood cx- ESBL GNR by PCR x 2  UA from 5/31-pos for nitrates  urine cx-pending  DUANE  elevated cardiac enzymes    per med records had ESBL e.coli UTI in august and sept 2024 and ID was not consulted     6/3-  remains Intubated and sedated  on pressors for hypotension  temp of 101.5 today  + leukocytosis trending down to 20k  creatinine slightly better today  Blood cx- ESBL GNR by PCR x 2  UA from 5/31-pos for nitrates  urine cx->100K e.coli  DUANE  elevated cardiac enzymes    6/4: seen in ICU earlier, remains intubated, weaning process in progress, on pressors, continues having fevers, T 101 this morning, leukocytosis is better 17.27, Cr better 1.35, UC and BCs grew ESBL E. Coli, repeat BCs are pending, Meropenem IV continued. If not improvement in pt's fevers tomorrow, most likely CT a/p will be obtained.   6/5: remains in ICU, intubated and sedated, on pressors, Temp persistent 100-100.2, WBC better 16.43, Cr normalized, LFTs ok, repeat BCs NGTD, procalcitonin 2.41, TTE performed - Left ventricular regional wall motion abnormalities present. Meropenem IV continued.     6/6-  extubated  awake  afebrile now but early am had temp of 100.6  Leukocytosis almost resolved 11K today  Blood cx- ESBL GNR by PCR x 2  UA from 5/31-pos for nitrates  urine cx->100K e.coli  repeat blood cx- NGTD x 2 from 6/4 6/9-  downgraded to medical floor  Afebrile  Leukocytosis 12 k today  Blood cx- ESBL GNR by PCR x 2  UA from 5/31-pos for nitrates  urine cx->100K e.coli  repeat blood cx- NGTD x 2 from 6/4 6/11: pt is afebrile since 6/6, doing well on RA, leukocytosis was elevated 13.57, Cr ok,  no new cbc, repeat BCs remain with no growth to date, s/p 8 days of IV Meropenem, now observed off abx.     6/12-  s/p RRT last night for AMS and hypotention and second RRT later for ? seizure and aspiration  s/p intubation and transferred to CCU  off pressors  intubated  afebrile but had fever of 100.2 at 5 am  leukocytosis has decreased to 11k  repeat blood cx - NGTD x 2  has completed course of meropenem  fo her ESBL bacteremia dn UTI   cxr- b/l pleural effusions L> R  CTA report-  No pulmonary embolism..  Tracheobronchial secretions, greatest in the left lower lobe.  Patchy and   nodular bilateral lung opacities may be infectious or inflammatory. Right   middle lobe subpleural 6 mm nodular opacity is new from prior .   Short-term follow-up CT recommended    6/13-   remains intubated on 50% Fio2  Afebrile   Minimal leukocytosis of 13K  creatinine- normal value  Repeat blood cx  from 6/4- NGTD x 2  repeat blood cx from 6/11- NGTD x 2  sputum  cx- commensal lina c/w body site  has completed course of meropenem  fo her ESBL bacteremia and UTI - which she cleared  cxr- b/l pleural effusions L> R  CTA report-  No pulmonary embolism..  Tracheobronchial secretions, greatest in the left lower lobe.  Patchy and   nodular bilateral lung opacities may be infectious or inflammatory. Right   middle lobe subpleural 6 mm nodular opacity is new from prior .   Short-term follow-up CT recommended  EEG- reported negative for epilepsy ( pls see full report)    6/16-  remains intubated on 30% Fio2  T-max-100.6 this am  No leukocytosis   creatinine- normal value  Repeat blood cx  from 6/4- NGTD x 2  repeat blood cx from 6/11- NGTD x 2  sputum  cx- commensal lina c/w body site  has completed course of meropenem  for her ESBL bacteremia and UTI - which she cleared  cxr- b/l pleural effusions L> R  CTA report-  No pulmonary embolism..  Tracheobronchial secretions, greatest in the left lower lobe.  Patchy and   nodular bilateral lung opacities may be infectious or inflammatory. Right   middle lobe subpleural 6 mm nodular opacity is new from prior .   Short-term follow-up CT recommended  EEG- reported negative for epilepsy ( pls see full report)    6/20-  T-max-101  No leukocytosis  Repeat blood cx  from 6/4- NGTD x 2  repeat blood cx from 6/11- NGTD x 2  sputum  cx- commensal lina c/w body site  ET tube cx-commensal lina c/w body site  has completed course of meropenem  for her ESBL bacteremia and UTI - which she cleared  cxr- b/l pleural effusions L> R  completed course of zosyn as well 2 days ago for presumed aspiration pneumonitis    6/23: remains in CCU, no fevers since 6/20, vented via tracheostomy no leukocytosis, repeat BCs NGTD, sputum culture grew Stenotrophomonas, will add abx, Bactrim via NGT.      6/26-  s/p trache and peg  awake  on 30 FIO2 via trache  low grade temp of 100 yesterday  afebrile today   repeat BCs NGTD  most recent sputum culture grew Stenotrophomonas ( sens to bactrim and levaquin)    6/27-  s/p trache and peg  awake, alert  on 30 FIO2 via trache  Afebrile  no leukocytosis   repeat BCs NGTD  most recent sputum culture grew Stenotrophomonas ( sens to bactrim and levaquin)    6/30-  s/p trache and peg  awake, alert  on 30 FIO2 via trache  Afebrile  no leukocytosis  repeat BCs NGTD  most recent sputum culture grew Stenotrophomonas ( sens to bactrim and levaquin)  CT abd/pelvis reported 6/29-No bowel obstruction.  Proctitis.    7/3: downgraded from ICU, no fever, vented via tracheostomy, PEG in place, no leukocytosis, Cr elevated 1.32, no new culture data, pt is off abx.     Impression-  possible aspiration pneumonitis -treated  ESBL septic shock -  was treated and had resolved  E.coli ESBL bacteremia sec to   source - treated   Right humerus fracture with hematoma  Stenotrophomonas in sputum - on bactrim  s/p trache and peg    Plan-  continue monitoring off abx   completed 7 days of  Bactrim solution via peg  to treat Stenotrophomonas - ET tube aspirate  on 6/29  had completed course of IV meropenem for the ESBL bacteremia and sepsis and repeat blood cx are negative   also completed course of zosyn for ? asp pneumonitis  vent management as per pulmonology and medicine   rest of the medical management as per medicine team.    discussed with Dr. Fraga

## 2025-07-03 NOTE — PROGRESS NOTE ADULT - ASSESSMENT
74-year-old female with hypertension, hyperlipidemia, 2 diabetes, CAD status post multiple stents with a recent right humerus fracture roughly 3 days ago following a traumatic fall admitted to the medical ICU for altered mental status and hypotension.  Found to be in septic shock secondary to colitis and UTI found to have ESBL E. coli bacteremia.  Hospital course further complicated by cardiac arrest of unclear origin with ROSC after 10 minutes of CPR. Patient was extubated and transferred to the floors. Patient had RRT for aspiration and AMS and was intubated. Patient unable to wean off the vent, underwent trach/PEG for prolonged weaning. Pulmonary consulted after ICU Transfer.     Dx: acute respiratory failure , ventilator dependent     6/23 s/p Trach 7 portex   6/25 PEG     Reccs:    - Clinical course with septic shock 2/5 ESBL e Coli Bacteremia subsequent cardiac arrest extubated then with aspiration event reintubated and unable to wean 2/2 physical debilitation.   - Now S/P tracheostomy 6/23/25  - Tolerating PS 13/6 during daytime hours   - C/W daily weaning as tolerates   - had previously completed course of IV meropenem for ESBL bacteremia and then Zosyn for aspiration PNA/ pneumonitis. Repeat blood cx are negative   - completed 7 days of Bactrim for Stenotrophomonas   - ID following reccs appreciated: observe off abx for now  - LTAC/ dispo planing  - Rest of care as per primary team   - Discussed with Dr Hassan.  74-year-old female with hypertension, hyperlipidemia, 2 diabetes, CAD status post multiple stents with a recent right humerus fracture roughly 3 days ago following a traumatic fall admitted to the medical ICU for altered mental status and hypotension.  Found to be in septic shock secondary to colitis and UTI found to have ESBL E. coli bacteremia.  Hospital course further complicated by cardiac arrest of unclear origin with ROSC after 10 minutes of CPR. Patient was extubated and transferred to the floors. Patient had RRT for aspiration and AMS and was intubated. Patient unable to wean off the vent, underwent trach/PEG for prolonged weaning. Pulmonary consulted after ICU Transfer.     Dx: acute respiratory failure , ventilator dependent     6/23 s/p Trach 7 portex   6/25 PEG     Reccs:    - Clinical course with septic shock 2/5 ESBL e Coli Bacteremia subsequent cardiac arrest extubated then with aspiration event reintubated and unable to wean 2/2 physical debilitation.   - Now S/P tracheostomy 6/23/25  - intermittently tolerates PS - if too tired does not wean well   - placed on 15/6 30%   - o2 saturation remains 100%   - C/W daily weaning as tolerates   - had previously completed course of IV meropenem for ESBL bacteremia and then Zosyn for aspiration PNA/ pneumonitis. Repeat blood cx are negative   - completed 7 days of Bactrim for Stenotrophomonas   - ID following reccs appreciated: observe off abx for now  - LTAC/ dispo planing  - Rest of care as per primary team   - Discussed with Dr Hassan.

## 2025-07-03 NOTE — PROGRESS NOTE ADULT - NS ATTEND AMEND GEN_ALL_CORE FT
All labs and cultures and imaging and pertinent chart notes reviewed by me.    case d/w Np Honorio at length and agree with her assessment and plan.    7/3: downgraded from ICU,   Afebrile  no leukocytosis  vented via tracheostomy, PEG in place    Impression-  possible aspiration pneumonitis -treated  ESBL septic shock -  was treated and had resolved  E.coli ESBL bacteremia sec to   source - treated   Right humerus fracture with hematoma  Stenotrophomonas in sputum - on bactrim  s/p trache and peg    Plan-  continue monitoring off abx   completed 7 days of  Bactrim solution via peg  to treat Stenotrophomonas - ET tube aspirate  on 6/29  had completed course of IV meropenem for the ESBL bacteremia and sepsis and repeat blood cx are negative   also completed course of zosyn for ? asp pneumonitis  vent management as per pulmonology and medicine   rest of the management as per medicine team.    Tariq Fraga MD  Infectious Disease Attending    for any questions please do not hesitate to contact me either via teams or by calling [phone]

## 2025-07-03 NOTE — PROGRESS NOTE ADULT - SUBJECTIVE AND OBJECTIVE BOX
INTERVAL  Events:    - transferred to medical service       6/23 s/p Trach 7 portex   6/25 PEG     OBJECTIVE:  ICU Vital Signs Last 24 Hrs  T(C): 37.1 (03 Jul 2025 05:02), Max: 37.3 (03 Jul 2025 00:13)  T(F): 98.7 (03 Jul 2025 05:02), Max: 99.2 (03 Jul 2025 00:13)  HR: 64 (03 Jul 2025 05:19) (57 - 80)  BP: 121/73 (03 Jul 2025 05:02) (108/52 - 138/107)  BP(mean): 72 (02 Jul 2025 18:45) (70 - 116)  RR: 21 (03 Jul 2025 05:02) (20 - 27)  SpO2: 100% (03 Jul 2025 05:19) (97% - 100%)    O2 Parameters below as of 03 Jul 2025 00:13  Patient On (Oxygen Delivery Method): ventilator    Mode: AC/ CMV (Assist Control/ Continuous Mandatory Ventilation), RR (machine): 15, TV (machine): 400, FiO2: 30, PEEP: 6, ITime: 1, MAP: 12, PIP: 30    07-02 @ 07:01  -  07-03 @ 07:00  --------------------------------------------------------  IN: 400 mL / OUT: 1430 mL / NET: -1030 mL      CAPILLARY BLOOD GLUCOSE  POCT Blood Glucose.: 108 mg/dL (03 Jul 2025 05:52)      PHYSICAL EXAM:  Gen: trache to full vent  HEENT: PERRL  Resp: mechanical breath sounds B/L  CVS: S1S2 no m/r/g  Abd: soft NT/ND +BS  Ext: no c/c/e  Neuro: awake alert very weak but moving all 4. following comands, able to communicate with head shakes.      HOSPITAL MEDICATIONS:  aspirin  chewable 81 milliGRAM(s) Oral daily  enoxaparin Injectable 40 milliGRAM(s) SubCutaneous every 24 hours  midodrine 20 milliGRAM(s) Oral every 8 hours  ranolazine 500 milliGRAM(s) Oral two times a day  atorvastatin 40 milliGRAM(s) Oral at bedtim  dextrose 50% Injectable 25 Gram(s) IV Push every 15 minutes PRN  insulin glargine Injectable (LANTUS) 10 Unit(s) SubCutaneous at bedtime  insulin lispro (ADMELOG) corrective regimen sliding scale   SubCutaneous every 6 hours  acetaminophen     Tablet .. 650 milliGRAM(s) Oral every 6 hours PRN  acetaminophen     Tablet .. 650 milliGRAM(s) Oral every 6 hours PRN  ondansetron Injectable 4 milliGRAM(s) IV Push every 8 hours PRN  QUEtiapine 25 milliGRAM(s) Oral at bedtime  aluminum hydroxide/magnesium hydroxide/simethicone Suspension 30 milliLiter(s) Oral every 6 hours PRN  bisacodyl Suppository 10 milliGRAM(s) Rectal daily PRN  pantoprazole  Injectable 40 milliGRAM(s) IV Push every 12 hours  polyethylene glycol 3350 17 Gram(s) Oral daily PRN  senna 2 Tablet(s) Oral at bedtime  simethicone 80 milliGRAM(s) Chew daily  multivitamin 1 Tablet(s) Oral deisy  chlorhexidine 0.12% Liquid 15 milliLiter(s) Oral Mucosa every 12 hours  chlorhexidine 2% Cloths 1 Application(s) Topical <User Schedule>      LABS:                        7.8    7.41  )-----------( 412      ( 03 Jul 2025 08:38 )             25.6     07-03    132[L]  |  104  |  44[H]  ----------------------------<  99  4.9   |  20[L]  |  1.32[H]    Ca    8.0[L]      03 Jul 2025 08:38  Phos  2.8     07-02  Mg     2.4     07-02    TPro  6.3  /  Alb  1.7[L]  /  TBili  0.4  /  DBili  x   /  AST  14[L]  /  ALT  13  /  AlkPhos  116  07-03              MICROBIOLOGY:   Culture Results:   No growth at 5 days (06-20-25 @ 17:45)  Culture Results:   No growth at 5 days (06-20-25 @ 17:28)  Culture Results:   Moderate Stenotrophomonas maltophilia  Commensal milli consistent with body site (06-20-25 @ 17:10)  Culture Results:   Commensal milli consistent with body site (06-13-25 @ 10:18)  Culture Results:   Commensal milli consistent with body site (06-12-25 @ 08:00)  Culture Results:   <10,000 CFU/mL Normal Urogenital Milli (06-11-25 @ 22:47)  Culture Results:   No growth at 5 days (06-11-25 @ 18:05)  Culture Results:   No growth at 5 days (06-11-25 @ 18:00)    LIVER FUNCTIONS - ( 03 Jul 2025 08:38 )  Alb: 1.7 g/dL / Pro: 6.3 gm/dL / ALK PHOS: 116 U/L / ALT: 13 U/L / AST: 14 U/L / GGT: x             Urinalysis Basic - ( 03 Jul 2025 08:38 )    Color: x / Appearance: x / SG: x / pH: x  Gluc: 99 mg/dL / Ketone: x  / Bili: x / Urobili: x   Blood: x / Protein: x / Nitrite: x   Leuk Esterase: x / RBC: x / WBC x   Sq Epi: x / Non Sq Epi: x / Bacteria: x      MRSA PCR Result.: NotDetec (06-11-25 @ 22:58)    RADIOLOGY:  < from: CT Abdomen and Pelvis w/ Oral Cont and w/ IV Cont (06.29.25 @ 18:34) >  FINDINGS:  LOWER CHEST: Small bilateral pleural effusions. Basilar atelectasis. Left   lower lobe nodular opacities, possibly infectious or inflammatory.   Coronary artery and mitral annular calcifications.    LIVER: Subcentimeter hypodensity, too small to further characterize.  BILE DUCTS: Normal caliber.  GALLBLADDER: Distended.  SPLEEN: Within normal limits.  PANCREAS: Prominent duct.  ADRENALS: Within normal limits.  KIDNEYS/URETERS: Cysts and too small to further characterize   hypodensities. No hydronephrosis. Nonobstructive left intrarenal calculus.    BLADDER: Within normal limits.  REPRODUCTIVE ORGANS: Hysterectomy.    BOWEL: Gastrostomy tube. No bowel obstruction. Appendix is within normal   limits. Rectal wall thickening. Moderate colonic stool burden.  PERITONEUM/RETROPERITONEUM: Trace pelvic fluid.  VESSELS: Atherosclerotic changes.  LYMPH NODES: No lymphadenopathy.  ABDOMINAL WALL: Postsurgical changes.  BONES: Degenerative changes. Chronic rib fractures.    IMPRESSION:    No bowel obstruction.  Proctitis.    < end of copied text >        EKG/ECHO:    CONCLUSIONS:     1. Technically difficult image quality.   2. Left ventricular cavity is normal in size. Left ventricular wall   thickness is normal. Left ventricular systolic function is normal with an   ejection fraction visually estimated at 50 to 55 %. Regional wall motion   abnormalities present.   3. Mid inferolateral segment, mid anterolateral segment, and apical   lateral segment are abnormal.   4. The left ventricular diastolic function is indeterminate, with normal   left ventricular filling pressure.   5. Normal right ventricular cavity size and normal right ventricular   systolic function.   6. Left atrium is normal in size.   7. The right atrium is normal in size.   8. Mild mitral regurgitation.   9. Mild tricuspid regurgitation.  10. There is mild calcification of themitral valve annulus.  11. Trace pulmonic regurgitation.  12. Estimated pulmonary artery systolic pressure is 33 mmHg, consistent   with normal pulmonary artery pressure.   INTERVAL  Events:    - transferred to medical service   - placed on PS 15/6      6/23 s/p Trach 7 portex   6/25 PEG     OBJECTIVE:  ICU Vital Signs Last 24 Hrs  T(C): 37.1 (03 Jul 2025 05:02), Max: 37.3 (03 Jul 2025 00:13)  T(F): 98.7 (03 Jul 2025 05:02), Max: 99.2 (03 Jul 2025 00:13)  HR: 64 (03 Jul 2025 05:19) (57 - 80)  BP: 121/73 (03 Jul 2025 05:02) (108/52 - 138/107)  BP(mean): 72 (02 Jul 2025 18:45) (70 - 116)  RR: 21 (03 Jul 2025 05:02) (20 - 27)  SpO2: 100% (03 Jul 2025 05:19) (97% - 100%)    O2 Parameters below as of 03 Jul 2025 00:13  Patient On (Oxygen Delivery Method): ventilator    Mode: AC/ CMV (Assist Control/ Continuous Mandatory Ventilation), RR (machine): 15, TV (machine): 400, FiO2: 30, PEEP: 6, ITime: 1, MAP: 12, PIP: 30    07-02 @ 07:01  -  07-03 @ 07:00  --------------------------------------------------------  IN: 400 mL / OUT: 1430 mL / NET: -1030 mL      CAPILLARY BLOOD GLUCOSE  POCT Blood Glucose.: 108 mg/dL (03 Jul 2025 05:52)      PHYSICAL EXAM:  Gen: trache to full vent  HEENT: PERRL  Resp: mechanical breath sounds B/L, scant secretions   CVS: S1S2 no m/r/g  Abd: soft NT/ND +BS  Ext: no c/c/e  Neuro: awake alert very weak but moving all 4. following comands, able to communicate with head shakes.      HOSPITAL MEDICATIONS:  aspirin  chewable 81 milliGRAM(s) Oral daily  enoxaparin Injectable 40 milliGRAM(s) SubCutaneous every 24 hours  midodrine 20 milliGRAM(s) Oral every 8 hours  ranolazine 500 milliGRAM(s) Oral two times a day  atorvastatin 40 milliGRAM(s) Oral at bedtim  dextrose 50% Injectable 25 Gram(s) IV Push every 15 minutes PRN  insulin glargine Injectable (LANTUS) 10 Unit(s) SubCutaneous at bedtime  insulin lispro (ADMELOG) corrective regimen sliding scale   SubCutaneous every 6 hours  acetaminophen     Tablet .. 650 milliGRAM(s) Oral every 6 hours PRN  acetaminophen     Tablet .. 650 milliGRAM(s) Oral every 6 hours PRN  ondansetron Injectable 4 milliGRAM(s) IV Push every 8 hours PRN  QUEtiapine 25 milliGRAM(s) Oral at bedtime  aluminum hydroxide/magnesium hydroxide/simethicone Suspension 30 milliLiter(s) Oral every 6 hours PRN  bisacodyl Suppository 10 milliGRAM(s) Rectal daily PRN  pantoprazole  Injectable 40 milliGRAM(s) IV Push every 12 hours  polyethylene glycol 3350 17 Gram(s) Oral daily PRN  senna 2 Tablet(s) Oral at bedtime  simethicone 80 milliGRAM(s) Chew daily  multivitamin 1 Tablet(s) Oral deisy  chlorhexidine 0.12% Liquid 15 milliLiter(s) Oral Mucosa every 12 hours  chlorhexidine 2% Cloths 1 Application(s) Topical <User Schedule>      LABS:                        7.8    7.41  )-----------( 412      ( 03 Jul 2025 08:38 )             25.6     07-03    132[L]  |  104  |  44[H]  ----------------------------<  99  4.9   |  20[L]  |  1.32[H]    Ca    8.0[L]      03 Jul 2025 08:38  Phos  2.8     07-02  Mg     2.4     07-02    TPro  6.3  /  Alb  1.7[L]  /  TBili  0.4  /  DBili  x   /  AST  14[L]  /  ALT  13  /  AlkPhos  116  07-03              MICROBIOLOGY:   Culture Results:   No growth at 5 days (06-20-25 @ 17:45)  Culture Results:   No growth at 5 days (06-20-25 @ 17:28)  Culture Results:   Moderate Stenotrophomonas maltophilia  Commensal milli consistent with body site (06-20-25 @ 17:10)  Culture Results:   Commensal milli consistent with body site (06-13-25 @ 10:18)  Culture Results:   Commensal milli consistent with body site (06-12-25 @ 08:00)  Culture Results:   <10,000 CFU/mL Normal Urogenital Milli (06-11-25 @ 22:47)  Culture Results:   No growth at 5 days (06-11-25 @ 18:05)  Culture Results:   No growth at 5 days (06-11-25 @ 18:00)    LIVER FUNCTIONS - ( 03 Jul 2025 08:38 )  Alb: 1.7 g/dL / Pro: 6.3 gm/dL / ALK PHOS: 116 U/L / ALT: 13 U/L / AST: 14 U/L / GGT: x             Urinalysis Basic - ( 03 Jul 2025 08:38 )    Color: x / Appearance: x / SG: x / pH: x  Gluc: 99 mg/dL / Ketone: x  / Bili: x / Urobili: x   Blood: x / Protein: x / Nitrite: x   Leuk Esterase: x / RBC: x / WBC x   Sq Epi: x / Non Sq Epi: x / Bacteria: x      MRSA PCR Result.: NotDetec (06-11-25 @ 22:58)    RADIOLOGY:  < from: CT Abdomen and Pelvis w/ Oral Cont and w/ IV Cont (06.29.25 @ 18:34) >  FINDINGS:  LOWER CHEST: Small bilateral pleural effusions. Basilar atelectasis. Left   lower lobe nodular opacities, possibly infectious or inflammatory.   Coronary artery and mitral annular calcifications.    LIVER: Subcentimeter hypodensity, too small to further characterize.  BILE DUCTS: Normal caliber.  GALLBLADDER: Distended.  SPLEEN: Within normal limits.  PANCREAS: Prominent duct.  ADRENALS: Within normal limits.  KIDNEYS/URETERS: Cysts and too small to further characterize   hypodensities. No hydronephrosis. Nonobstructive left intrarenal calculus.    BLADDER: Within normal limits.  REPRODUCTIVE ORGANS: Hysterectomy.    BOWEL: Gastrostomy tube. No bowel obstruction. Appendix is within normal   limits. Rectal wall thickening. Moderate colonic stool burden.  PERITONEUM/RETROPERITONEUM: Trace pelvic fluid.  VESSELS: Atherosclerotic changes.  LYMPH NODES: No lymphadenopathy.  ABDOMINAL WALL: Postsurgical changes.  BONES: Degenerative changes. Chronic rib fractures.    IMPRESSION:    No bowel obstruction.  Proctitis.    < end of copied text >        EKG/ECHO:    CONCLUSIONS:     1. Technically difficult image quality.   2. Left ventricular cavity is normal in size. Left ventricular wall   thickness is normal. Left ventricular systolic function is normal with an   ejection fraction visually estimated at 50 to 55 %. Regional wall motion   abnormalities present.   3. Mid inferolateral segment, mid anterolateral segment, and apical   lateral segment are abnormal.   4. The left ventricular diastolic function is indeterminate, with normal   left ventricular filling pressure.   5. Normal right ventricular cavity size and normal right ventricular   systolic function.   6. Left atrium is normal in size.   7. The right atrium is normal in size.   8. Mild mitral regurgitation.   9. Mild tricuspid regurgitation.  10. There is mild calcification of themitral valve annulus.  11. Trace pulmonic regurgitation.  12. Estimated pulmonary artery systolic pressure is 33 mmHg, consistent   with normal pulmonary artery pressure.   INTERVAL  Events:    - transferred to medical service   - placed on PS 15/6    6/23 s/p Trach 7 portex   6/25 PEG     OBJECTIVE:  ICU Vital Signs Last 24 Hrs  T(C): 37.1 (03 Jul 2025 05:02), Max: 37.3 (03 Jul 2025 00:13)  T(F): 98.7 (03 Jul 2025 05:02), Max: 99.2 (03 Jul 2025 00:13)  HR: 64 (03 Jul 2025 05:19) (57 - 80)  BP: 121/73 (03 Jul 2025 05:02) (108/52 - 138/107)  BP(mean): 72 (02 Jul 2025 18:45) (70 - 116)  RR: 21 (03 Jul 2025 05:02) (20 - 27)  SpO2: 100% (03 Jul 2025 05:19) (97% - 100%)    O2 Parameters below as of 03 Jul 2025 00:13  Patient On (Oxygen Delivery Method): ventilator    Mode: AC/ CMV (Assist Control/ Continuous Mandatory Ventilation), RR (machine): 15, TV (machine): 400, FiO2: 30, PEEP: 6, ITime: 1, MAP: 12, PIP: 30    07-02 @ 07:01  -  07-03 @ 07:00  --------------------------------------------------------  IN: 400 mL / OUT: 1430 mL / NET: -1030 mL      CAPILLARY BLOOD GLUCOSE  POCT Blood Glucose.: 108 mg/dL (03 Jul 2025 05:52)      PHYSICAL EXAM:  Gen: trache to full vent  HEENT: PERRL  Resp: mechanical breath sounds B/L, scant secretions   CVS: S1S2 no m/r/g  Abd: soft NT/ND +BS  Ext: no c/c/e  Neuro: awake alert very weak but moving all 4. following comands, able to communicate with head shakes.      HOSPITAL MEDICATIONS:  aspirin  chewable 81 milliGRAM(s) Oral daily  enoxaparin Injectable 40 milliGRAM(s) SubCutaneous every 24 hours  midodrine 20 milliGRAM(s) Oral every 8 hours  ranolazine 500 milliGRAM(s) Oral two times a day  atorvastatin 40 milliGRAM(s) Oral at bedtim  dextrose 50% Injectable 25 Gram(s) IV Push every 15 minutes PRN  insulin glargine Injectable (LANTUS) 10 Unit(s) SubCutaneous at bedtime  insulin lispro (ADMELOG) corrective regimen sliding scale   SubCutaneous every 6 hours  acetaminophen     Tablet .. 650 milliGRAM(s) Oral every 6 hours PRN  acetaminophen     Tablet .. 650 milliGRAM(s) Oral every 6 hours PRN  ondansetron Injectable 4 milliGRAM(s) IV Push every 8 hours PRN  QUEtiapine 25 milliGRAM(s) Oral at bedtime  aluminum hydroxide/magnesium hydroxide/simethicone Suspension 30 milliLiter(s) Oral every 6 hours PRN  bisacodyl Suppository 10 milliGRAM(s) Rectal daily PRN  pantoprazole  Injectable 40 milliGRAM(s) IV Push every 12 hours  polyethylene glycol 3350 17 Gram(s) Oral daily PRN  senna 2 Tablet(s) Oral at bedtime  simethicone 80 milliGRAM(s) Chew daily  multivitamin 1 Tablet(s) Oral deisy  chlorhexidine 0.12% Liquid 15 milliLiter(s) Oral Mucosa every 12 hours  chlorhexidine 2% Cloths 1 Application(s) Topical <User Schedule>      LABS:                        7.8    7.41  )-----------( 412      ( 03 Jul 2025 08:38 )             25.6     07-03    132[L]  |  104  |  44[H]  ----------------------------<  99  4.9   |  20[L]  |  1.32[H]    Ca    8.0[L]      03 Jul 2025 08:38  Phos  2.8     07-02  Mg     2.4     07-02    TPro  6.3  /  Alb  1.7[L]  /  TBili  0.4  /  DBili  x   /  AST  14[L]  /  ALT  13  /  AlkPhos  116  07-03              MICROBIOLOGY:   Culture Results:   No growth at 5 days (06-20-25 @ 17:45)  Culture Results:   No growth at 5 days (06-20-25 @ 17:28)  Culture Results:   Moderate Stenotrophomonas maltophilia  Commensal milli consistent with body site (06-20-25 @ 17:10)  Culture Results:   Commensal milli consistent with body site (06-13-25 @ 10:18)  Culture Results:   Commensal milli consistent with body site (06-12-25 @ 08:00)  Culture Results:   <10,000 CFU/mL Normal Urogenital Milli (06-11-25 @ 22:47)  Culture Results:   No growth at 5 days (06-11-25 @ 18:05)  Culture Results:   No growth at 5 days (06-11-25 @ 18:00)    LIVER FUNCTIONS - ( 03 Jul 2025 08:38 )  Alb: 1.7 g/dL / Pro: 6.3 gm/dL / ALK PHOS: 116 U/L / ALT: 13 U/L / AST: 14 U/L / GGT: x             Urinalysis Basic - ( 03 Jul 2025 08:38 )    Color: x / Appearance: x / SG: x / pH: x  Gluc: 99 mg/dL / Ketone: x  / Bili: x / Urobili: x   Blood: x / Protein: x / Nitrite: x   Leuk Esterase: x / RBC: x / WBC x   Sq Epi: x / Non Sq Epi: x / Bacteria: x      MRSA PCR Result.: NotDetec (06-11-25 @ 22:58)    RADIOLOGY:  < from: CT Abdomen and Pelvis w/ Oral Cont and w/ IV Cont (06.29.25 @ 18:34) >  FINDINGS:  LOWER CHEST: Small bilateral pleural effusions. Basilar atelectasis. Left   lower lobe nodular opacities, possibly infectious or inflammatory.   Coronary artery and mitral annular calcifications.    LIVER: Subcentimeter hypodensity, too small to further characterize.  BILE DUCTS: Normal caliber.  GALLBLADDER: Distended.  SPLEEN: Within normal limits.  PANCREAS: Prominent duct.  ADRENALS: Within normal limits.  KIDNEYS/URETERS: Cysts and too small to further characterize   hypodensities. No hydronephrosis. Nonobstructive left intrarenal calculus.    BLADDER: Within normal limits.  REPRODUCTIVE ORGANS: Hysterectomy.    BOWEL: Gastrostomy tube. No bowel obstruction. Appendix is within normal   limits. Rectal wall thickening. Moderate colonic stool burden.  PERITONEUM/RETROPERITONEUM: Trace pelvic fluid.  VESSELS: Atherosclerotic changes.  LYMPH NODES: No lymphadenopathy.  ABDOMINAL WALL: Postsurgical changes.  BONES: Degenerative changes. Chronic rib fractures.    IMPRESSION:    No bowel obstruction.  Proctitis.    < end of copied text >    EKG/ECHO:    CONCLUSIONS:     1. Technically difficult image quality.   2. Left ventricular cavity is normal in size. Left ventricular wall   thickness is normal. Left ventricular systolic function is normal with an   ejection fraction visually estimated at 50 to 55 %. Regional wall motion   abnormalities present.   3. Mid inferolateral segment, mid anterolateral segment, and apical   lateral segment are abnormal.   4. The left ventricular diastolic function is indeterminate, with normal   left ventricular filling pressure.   5. Normal right ventricular cavity size and normal right ventricular   systolic function.   6. Left atrium is normal in size.   7. The right atrium is normal in size.   8. Mild mitral regurgitation.   9. Mild tricuspid regurgitation.  10. There is mild calcification of themitral valve annulus.  11. Trace pulmonic regurgitation.  12. Estimated pulmonary artery systolic pressure is 33 mmHg, consistent   with normal pulmonary artery pressure.   INTERVAL  Events:    - transferred to medical service   - placed on PS 15/6    6/23 s/p Trach 7 portex   6/25 PEG     OBJECTIVE:  Vital Signs Last 24 Hrs  T(C): 37.1 (03 Jul 2025 05:02), Max: 37.3 (03 Jul 2025 00:13)  T(F): 98.7 (03 Jul 2025 05:02), Max: 99.2 (03 Jul 2025 00:13)  HR: 64 (03 Jul 2025 05:19) (57 - 80)  BP: 121/73 (03 Jul 2025 05:02) (108/52 - 138/107)  BP(mean): 72 (02 Jul 2025 18:45) (70 - 116)  RR: 21 (03 Jul 2025 05:02) (20 - 27)  SpO2: 100% (03 Jul 2025 05:19) (97% - 100%)    O2 Parameters below as of 03 Jul 2025 00:13  Patient On (Oxygen Delivery Method): ventilator    Mode: AC/ CMV (Assist Control/ Continuous Mandatory Ventilation), RR (machine): 15, TV (machine): 400, FiO2: 30, PEEP: 6, ITime: 1, MAP: 12, PIP: 30    07-02 @ 07:01  -  07-03 @ 07:00  --------------------------------------------------------  IN: 400 mL / OUT: 1430 mL / NET: -1030 mL      CAPILLARY BLOOD GLUCOSE  POCT Blood Glucose.: 108 mg/dL (03 Jul 2025 05:52)      PHYSICAL EXAM:  Gen: trache to full vent  HEENT: PERRL  Resp: mechanical breath sounds B/L, scant secretions with suctioning  CVS: S1S2 no m/r/g  Abd: soft NT/ND +BS  Ext: no c/c/e, R upper arm ecchymosis   Neuro: awake alert very weak but moving all 4. following comands, able to communicate with head shakes.      HOSPITAL MEDICATIONS:  aspirin  chewable 81 milliGRAM(s) Oral daily  enoxaparin Injectable 40 milliGRAM(s) SubCutaneous every 24 hours  midodrine 20 milliGRAM(s) Oral every 8 hours  ranolazine 500 milliGRAM(s) Oral two times a day  atorvastatin 40 milliGRAM(s) Oral at bedtim  dextrose 50% Injectable 25 Gram(s) IV Push every 15 minutes PRN  insulin glargine Injectable (LANTUS) 10 Unit(s) SubCutaneous at bedtime  insulin lispro (ADMELOG) corrective regimen sliding scale   SubCutaneous every 6 hours  acetaminophen     Tablet .. 650 milliGRAM(s) Oral every 6 hours PRN  acetaminophen     Tablet .. 650 milliGRAM(s) Oral every 6 hours PRN  ondansetron Injectable 4 milliGRAM(s) IV Push every 8 hours PRN  QUEtiapine 25 milliGRAM(s) Oral at bedtime  aluminum hydroxide/magnesium hydroxide/simethicone Suspension 30 milliLiter(s) Oral every 6 hours PRN  bisacodyl Suppository 10 milliGRAM(s) Rectal daily PRN  pantoprazole  Injectable 40 milliGRAM(s) IV Push every 12 hours  polyethylene glycol 3350 17 Gram(s) Oral daily PRN  senna 2 Tablet(s) Oral at bedtime  simethicone 80 milliGRAM(s) Chew daily  multivitamin 1 Tablet(s) Oral deisy  chlorhexidine 0.12% Liquid 15 milliLiter(s) Oral Mucosa every 12 hours  chlorhexidine 2% Cloths 1 Application(s) Topical <User Schedule>      LABS:                        7.8    7.41  )-----------( 412      ( 03 Jul 2025 08:38 )             25.6     07-03    132[L]  |  104  |  44[H]  ----------------------------<  99  4.9   |  20[L]  |  1.32[H]    Ca    8.0[L]      03 Jul 2025 08:38  Phos  2.8     07-02  Mg     2.4     07-02    TPro  6.3  /  Alb  1.7[L]  /  TBili  0.4  /  DBili  x   /  AST  14[L]  /  ALT  13  /  AlkPhos  116  07-03              MICROBIOLOGY:   Culture - Sputum . (06.20.25 @ 17:10)    -  Minocycline: S   -  Trimethoprim/Sulfamethoxazole: S <=0.5/9.5   Gram Stain:   Rare polymorphonuclear leukocytes per low power field  No Squamous epithelial cells per low power field  No organisms seen per oil power field   -  Levofloxacin: S <=0.5   Specimen Source: ET Tube ET Tube   Culture Results:   Moderate Stenotrophomonas maltophilia  Commensal lina consistent with body site   Organism Identification: Stenotrophomonas maltophilia   Organism: Stenotrophomonas maltophilia   Organism: Stenotrophomonas maltophilia   Method Type: AIMEE   Method Type: BRISEIDA    Culture - Blood (06.20.25 @ 17:45)    Specimen Source: Blood Blood-Peripheral   Culture Results: No growth at 5 days              MRSA PCR Result.: NotDetec (06-11-25 @ 22:58)    RADIOLOGY:  < from: CT Abdomen and Pelvis w/ Oral Cont and w/ IV Cont (06.29.25 @ 18:34) >  FINDINGS:  LOWER CHEST: Small bilateral pleural effusions. Basilar atelectasis. Left   lower lobe nodular opacities, possibly infectious or inflammatory.   Coronary artery and mitral annular calcifications.    LIVER: Subcentimeter hypodensity, too small to further characterize.  BILE DUCTS: Normal caliber.  GALLBLADDER: Distended.  SPLEEN: Within normal limits.  PANCREAS: Prominent duct.  ADRENALS: Within normal limits.  KIDNEYS/URETERS: Cysts and too small to further characterize   hypodensities. No hydronephrosis. Nonobstructive left intrarenal calculus.    BLADDER: Within normal limits.  REPRODUCTIVE ORGANS: Hysterectomy.    BOWEL: Gastrostomy tube. No bowel obstruction. Appendix is within normal   limits. Rectal wall thickening. Moderate colonic stool burden.  PERITONEUM/RETROPERITONEUM: Trace pelvic fluid.  VESSELS: Atherosclerotic changes.  LYMPH NODES: No lymphadenopathy.  ABDOMINAL WALL: Postsurgical changes.  BONES: Degenerative changes. Chronic rib fractures.    IMPRESSION:    No bowel obstruction.  Proctitis.        EKG/ECHO:    CONCLUSIONS:     1. Technically difficult image quality.   2. Left ventricular cavity is normal in size. Left ventricular wall   thickness is normal. Left ventricular systolic function is normal with an   ejection fraction visually estimated at 50 to 55 %. Regional wall motion   abnormalities present.   3. Mid inferolateral segment, mid anterolateral segment, and apical   lateral segment are abnormal.   4. The left ventricular diastolic function is indeterminate, with normal   left ventricular filling pressure.   5. Normal right ventricular cavity size and normal right ventricular   systolic function.   6. Left atrium is normal in size.   7. The right atrium is normal in size.   8. Mild mitral regurgitation.   9. Mild tricuspid regurgitation.  10. There is mild calcification of themitral valve annulus.  11. Trace pulmonic regurgitation.  12. Estimated pulmonary artery systolic pressure is 33 mmHg, consistent   with normal pulmonary artery pressure.

## 2025-07-03 NOTE — PROGRESS NOTE ADULT - ASSESSMENT
74-yr  f      HTN/ HLD, , 2 diabetes, CAD s/P  multiple stents ,  recent right humerus fX,  3 days PTA following a traumatic fall    WAS admitted to the medical ICU for altered mental status and hypotension.   S/P  septic shock secondary to colitis f/ ESBL E. coli bacteremia.     Hospital course  complicated by cardiac arrest of unclear origin with ROSC after 10 minutes of CPR.   AND c/b aspiration w/ hypoxia requiring intubation.     Acute hypoxic respiratory ,  aspiration pneumonia , s/p RRT for hypoxia on 6/11 and intubated   -CTA chest 6/12: no pulmonary embolism, tracheobronchial secretions, greatest in the left lower lobe. Patchy and nodular bilateral lung opacities     completed  Zosyn    chronic hypoxic respiratory failure likely due to deconditioning.     on   ventilator via tracheostomy    Pulmonary following .  vent management  as  pe r pulm  Tracheitis ,  sputum   cx, tenotrophomonas .  completed  Bactrim 6/29    had  BRBPR,  from  hemorrhoids    CT A/P 6/29 w/ moderate stool burden, no bowel obstruction, and +proctitis.   CAD w/ NSTEMI      on  asa    ranolazine  mg BID, Lipitor 40mg qhs      holding metoprolol succinate d/t low BP  ALY d/t ischemic ATN       s/p   cardiac   arrest,/ -TTE    EF of 55% with regional WMA present, normal RV     seen by card/  not  an ideal candidate    for  ischemic  w/p.  med  rx   DM2    on lantus   Right humerus fracture   s/p fall.  consulted orthopedics, no plan for intervention    has   ecchymoses.  right  arm,  shoulder   Dysphagia,  has  PEG tube     Patient is DNR but okay with long-term ventilation  PT had recommended rehab  son at  bedside    encounter  time, 35  minutes   74-yr  f      HTN/ HLD, , 2 diabetes, CAD s/P  multiple stents ,  recent right humerus fX,  3 days PTA following a traumatic fall    WAS admitted to the medical ICU for altered mental status and hypotension.   S/P  septic shock secondary to colitis f/ ESBL E. coli bacteremia.     Hospital course  complicated by cardiac arrest of unclear origin with ROSC after 10 minutes of CPR.   AND c/b aspiration w/ hypoxia requiring intubation.     Acute hypoxic respiratory ,  aspiration pneumonia , s/p RRT for hypoxia on 6/11 and intubated   -CTA chest 6/12: no pulmonary embolism, tracheobronchial secretions, greatest in the left lower lobe. Patchy and nodular bilateral lung opacities     completed  Zosyn    chronic hypoxic respiratory failure likely due to deconditioning.     on   ventilator via tracheostomy    Pulmonary following .  vent management  as  pe r pulm  Tracheitis ,  sputum   cx, tenotrophomonas .  completed  Bactrim 6/29    had  BRBPR,  from  hemorrhoids    CT A/P 6/29 w/ moderate stool burden, no bowel obstruction, and +proctitis.   CAD w/ NSTEMI      on  asa    ranolazine  mg BID, Lipitor 40mg qhs      holding metoprolol succinate d/t low BP  ALY d/t ischemic ATN       s/p   cardiac   arrest,/ -TTE    EF of 55% with regional WMA present, normal RV     seen by card/  not  an ideal candidate    for  ischemic  w/p.  med  rx   DM2     stopped   lantus.  follow   fs   Right humerus fracture   s/p fall.  consulted orthopedics, no plan for intervention    has   ecchymoses.  right  arm,  shoulder   cc/  anemia, hb  7 range    Dysphagia,  has  PEG tube     Patient is DNR but okay with long-term ventilation  PT had recommended rehab  son at  bedside    encounter  time, 35  minutes

## 2025-07-04 LAB
GLUCOSE BLDC GLUCOMTR-MCNC: 106 MG/DL — HIGH (ref 70–99)
GLUCOSE BLDC GLUCOMTR-MCNC: 124 MG/DL — HIGH (ref 70–99)
GLUCOSE BLDC GLUCOMTR-MCNC: 136 MG/DL — HIGH (ref 70–99)
GLUCOSE BLDC GLUCOMTR-MCNC: 149 MG/DL — HIGH (ref 70–99)
GLUCOSE BLDC GLUCOMTR-MCNC: 160 MG/DL — HIGH (ref 70–99)
HCT VFR BLD CALC: 22.5 % — LOW (ref 34.5–45)
HGB BLD-MCNC: 6.8 G/DL — CRITICAL LOW (ref 11.5–15.5)
MCHC RBC-ENTMCNC: 28.1 PG — SIGNIFICANT CHANGE UP (ref 27–34)
MCHC RBC-ENTMCNC: 30.2 G/DL — LOW (ref 32–36)
MCV RBC AUTO: 93 FL — SIGNIFICANT CHANGE UP (ref 80–100)
NRBC BLD AUTO-RTO: 0 /100 WBCS — SIGNIFICANT CHANGE UP (ref 0–0)
PLATELET # BLD AUTO: 418 K/UL — HIGH (ref 150–400)
RBC # BLD: 2.42 M/UL — LOW (ref 3.8–5.2)
RBC # FLD: 16.2 % — HIGH (ref 10.3–14.5)
WBC # BLD: 7.46 K/UL — SIGNIFICANT CHANGE UP (ref 3.8–10.5)
WBC # FLD AUTO: 7.46 K/UL — SIGNIFICANT CHANGE UP (ref 3.8–10.5)

## 2025-07-04 PROCEDURE — 99232 SBSQ HOSP IP/OBS MODERATE 35: CPT

## 2025-07-04 RX ADMIN — Medication 15 MILLILITER(S): at 06:21

## 2025-07-04 RX ADMIN — QUETIAPINE FUMARATE 25 MILLIGRAM(S): 25 TABLET ORAL at 22:36

## 2025-07-04 RX ADMIN — Medication 1 APPLICATION(S): at 06:19

## 2025-07-04 RX ADMIN — Medication 15 MILLILITER(S): at 17:39

## 2025-07-04 RX ADMIN — Medication 81 MILLIGRAM(S): at 11:58

## 2025-07-04 RX ADMIN — RANOLAZINE 500 MILLIGRAM(S): 1000 TABLET, FILM COATED, EXTENDED RELEASE ORAL at 17:39

## 2025-07-04 RX ADMIN — ENOXAPARIN SODIUM 40 MILLIGRAM(S): 100 INJECTION SUBCUTANEOUS at 13:47

## 2025-07-04 RX ADMIN — Medication 40 MILLIGRAM(S): at 11:58

## 2025-07-04 RX ADMIN — MIDODRINE HYDROCHLORIDE 20 MILLIGRAM(S): 5 TABLET ORAL at 06:18

## 2025-07-04 RX ADMIN — MIDODRINE HYDROCHLORIDE 20 MILLIGRAM(S): 5 TABLET ORAL at 22:36

## 2025-07-04 RX ADMIN — MIDODRINE HYDROCHLORIDE 20 MILLIGRAM(S): 5 TABLET ORAL at 13:48

## 2025-07-04 RX ADMIN — ATORVASTATIN CALCIUM 40 MILLIGRAM(S): 80 TABLET, FILM COATED ORAL at 22:35

## 2025-07-04 NOTE — PROVIDER CONTACT NOTE (CRITICAL VALUE NOTIFICATION) - NAME OF MD/NP/PA/DO NOTIFIED:
Janeen Strauss, NP
SANDI Richards
SANDI Hidalgo
CHLOE Crews
SANDI Jacobs
CHLOE Salter
CHLOE gallagher
SANDI Hidalgo
SANDI KATHLEEN
giorgio brock

## 2025-07-04 NOTE — PROGRESS NOTE ADULT - SUBJECTIVE AND OBJECTIVE BOX
PROGRESS NOTE:     Patient is a 74y old  Female who presents with a chief complaint of ALY, physical deconditioning (03 Jul 2025 15:56)      SUBJECTIVE / OVERNIGHT EVENTS:No acute overnight events. Patient awake, nonconversant, in no acute distress.         MEDICATIONS  (STANDING):  aspirin  chewable 81 milliGRAM(s) Oral daily  atorvastatin 40 milliGRAM(s) Oral at bedtime  chlorhexidine 0.12% Liquid 15 milliLiter(s) Oral Mucosa every 12 hours  chlorhexidine 2% Cloths 1 Application(s) Topical <User Schedule>  dextrose 5%. 1000 milliLiter(s) (50 mL/Hr) IV Continuous <Continuous>  dextrose 5%. 1000 milliLiter(s) (100 mL/Hr) IV Continuous <Continuous>  dextrose 50% Injectable 25 Gram(s) IV Push once  dextrose 50% Injectable 12.5 Gram(s) IV Push once  dextrose 50% Injectable 25 Gram(s) IV Push once  enoxaparin Injectable 40 milliGRAM(s) SubCutaneous every 24 hours  glucagon  Injectable 1 milliGRAM(s) IntraMuscular once  insulin lispro (ADMELOG) corrective regimen sliding scale   SubCutaneous three times a day before meals  midodrine 20 milliGRAM(s) Oral every 8 hours  pantoprazole   Suspension 40 milliGRAM(s) Oral daily  QUEtiapine 25 milliGRAM(s) Oral at bedtime  ranolazine 500 milliGRAM(s) Oral two times a day  senna 2 Tablet(s) Oral at bedtime    MEDICATIONS  (PRN):  acetaminophen     Tablet .. 650 milliGRAM(s) Oral every 6 hours PRN Temp greater or equal to 38C (100.4F), Mild Pain (1 - 3)  acetaminophen     Tablet .. 650 milliGRAM(s) Oral every 6 hours PRN Mild Pain (1 - 3)  bisacodyl Suppository 10 milliGRAM(s) Rectal daily PRN Constipation  dextrose Oral Gel 15 Gram(s) Oral once PRN Blood Glucose LESS THAN 70 milliGRAM(s)/deciliter  ondansetron Injectable 4 milliGRAM(s) IV Push every 8 hours PRN Nausea and/or Vomiting/ gagging  polyethylene glycol 3350 17 Gram(s) Oral daily PRN Constipation      CAPILLARY BLOOD GLUCOSE      POCT Blood Glucose.: 149 mg/dL (04 Jul 2025 16:51)  POCT Blood Glucose.: 136 mg/dL (04 Jul 2025 11:40)  POCT Blood Glucose.: 124 mg/dL (04 Jul 2025 08:39)  POCT Blood Glucose.: 106 mg/dL (04 Jul 2025 04:30)  POCT Blood Glucose.: 129 mg/dL (03 Jul 2025 21:56)    I&O's Summary    03 Jul 2025 07:01  -  04 Jul 2025 07:00  --------------------------------------------------------  IN: 0 mL / OUT: 500 mL / NET: -500 mL        PHYSICAL EXAM:  Vital Signs Last 24 Hrs  T(C): 37.6 (04 Jul 2025 11:00), Max: 37.6 (04 Jul 2025 11:00)  T(F): 99.6 (04 Jul 2025 11:00), Max: 99.6 (04 Jul 2025 11:00)  HR: 65 (04 Jul 2025 13:57) (60 - 89)  BP: 127/74 (04 Jul 2025 13:57) (106/62 - 137/69)  BP(mean): --  RR: 18 (04 Jul 2025 11:00) (18 - 18)  SpO2: 100% (04 Jul 2025 13:57) (98% - 100%)    Parameters below as of 04 Jul 2025 13:57  Patient On (Oxygen Delivery Method): Vent/Trach      General : NAD  Neuro: A&Ox0, eyes open  Cardiovascular: Normal S1 S2, No JVD  Respiratory: : Course breath sounds bilaterally.   Gastrointestinal:  Soft, Non-tender, + BS	. PEG  Skin: No rash, No ecchymoses	    trach/  peg      LABS:                        7.8    7.41  )-----------( 412      ( 03 Jul 2025 08:38 )             25.6     07-03    132[L]  |  104  |  44[H]  ----------------------------<  99  4.9   |  20[L]  |  1.32[H]    Ca    8.0[L]      03 Jul 2025 08:38    TPro  6.3  /  Alb  1.7[L]  /  TBili  0.4  /  DBili  x   /  AST  14[L]  /  ALT  13  /  AlkPhos  116  07-03          Urinalysis Basic - ( 03 Jul 2025 08:38 )    Color: x / Appearance: x / SG: x / pH: x  Gluc: 99 mg/dL / Ketone: x  / Bili: x / Urobili: x   Blood: x / Protein: x / Nitrite: x   Leuk Esterase: x / RBC: x / WBC x   Sq Epi: x / Non Sq Epi: x / Bacteria: x          RADIOLOGY & ADDITIONAL TESTS:  Results Reviewed:   Imaging Personally Reviewed:  Electrocardiogram Personally Reviewed:    COORDINATION OF CARE:  Care Discussed with Consultants/Other Providers [Y/N]:  Prior or Outpatient Records Reviewed [Y/N]:

## 2025-07-04 NOTE — PROVIDER CONTACT NOTE (CRITICAL VALUE NOTIFICATION) - TEST AND RESULT REPORTED:
Potassium 6.4
GLUCOSE 45
Hemoglobin 6.7
Lactate 2.8 , [id number] 3927.6
Trop 4451.4
hemoglobin 6.7
blood culture send 6/1/25 preliminary report  both anaerobic and aerobic bottle gram negative rods
hemoglobin 6.8
Hgb/ Hct 6.9/ 22.2
Troponin 340.8

## 2025-07-04 NOTE — PROVIDER CONTACT NOTE (CRITICAL VALUE NOTIFICATION) - ACTION/TREATMENT ORDERED:
transfusion to take place once blood is ready
primary RN and MD notified
pt to receive PRBC as ordered
New orders noted
Orders pending.
As above

## 2025-07-04 NOTE — PROVIDER CONTACT NOTE (CRITICAL VALUE NOTIFICATION) - PERSON GIVING RESULT:
Nadja Bray/malathi
Suzanne Garcia
LENORA BUSTOS
Guerita VAZQUEZ
Liyah Porter
Renate Chan
Suzanne Garcia, Lab
carlos gray
Monik Rivera, lab
Evert Forbes

## 2025-07-04 NOTE — PROVIDER CONTACT NOTE (CRITICAL VALUE NOTIFICATION) - RECOMMENDATIONS
1 unit of prbc will be ordered
1 amp D50
ICU team at bedside, Intubation initiated for airway protection.

## 2025-07-04 NOTE — PROGRESS NOTE ADULT - ASSESSMENT
74- YO Woman with HTN, HLD,  2 diabetes, CAD s/P  multiple stents , recent right humerus fX,  3 days PTA following a traumatic fall WAS admitted to the medical ICU admitted to the medical ICU for altered mental status and hypotension, Found to be in septic shock secondary to colitis and UTI found to have ESBL E. coli bacteremia.  Hospital course further complicated by cardiac arrest of unclear origin with ROSC after 10 minutes of CPR. Patient was extubated and transferred to the floors. Patient had RRT for aspiration and AMS and was intubated. Patient unable to wean off the vent, underwent trach/PEG for prolonged weaning.     #Acute hypoxic respiratory 2/2 suspect  aspiration pneumonia/  Tracheitis   -was intubated, now trach'd to vent, with trials of pressure support, but reportedly gets tired.    -CTA chest 6/12: no pulmonary embolism, tracheobronchial secretions, greatest in the left lower lobe. Patchy and nodular bilateral lung opacities   -completed  Zosyn    - sputum   cx, tenotrophomonas .  completed  Bactrim 6/29   -chronic hypoxic respiratory failure likely due to deconditioning.     -f/u Pulm recs Tracheitis ,     #  BRBPR,   - thought to be from  hemorrhoids    hgb 7-8  CTM  Transfuse to keep hgb >7  active t/s    CAD w/ NSTEMI , thought to be demand from sepsis.   -S/p hep gtt, continue asa and statin  TTE   LV EF of 55% with regional WMA    -on  asa , ranolazine  mg BID, Lipitor 40mg qhs   -c/w medical management as tolerated.  ALY d/t ischemic ATN     # cardiac   arrest, unclear what rhyth, sound like related to hypoxia?  -guarded prognosis.     #  DM2   CISS  FS goal 120-180  stopped   lantus    #Right humerus fracture   s/p fall.    -appreciate  orthopedic input      # Dysphagia,  has  PEG tube, c/w feeds    Patient is DNR , but on the trach/vent  PT had recommended rehab, pan for LTC

## 2025-07-05 LAB
ALBUMIN SERPL ELPH-MCNC: 1.6 G/DL — LOW (ref 3.3–5)
ALP SERPL-CCNC: 107 U/L — SIGNIFICANT CHANGE UP (ref 40–120)
ALT FLD-CCNC: 14 U/L — SIGNIFICANT CHANGE UP (ref 12–78)
ANION GAP SERPL CALC-SCNC: 8 MMOL/L — SIGNIFICANT CHANGE UP (ref 5–17)
AST SERPL-CCNC: 18 U/L — SIGNIFICANT CHANGE UP (ref 15–37)
BILIRUB SERPL-MCNC: 0.6 MG/DL — SIGNIFICANT CHANGE UP (ref 0.2–1.2)
BLD GP AB SCN SERPL QL: SIGNIFICANT CHANGE UP
BUN SERPL-MCNC: 52 MG/DL — HIGH (ref 7–23)
CALCIUM SERPL-MCNC: 7.6 MG/DL — LOW (ref 8.5–10.1)
CHLORIDE SERPL-SCNC: 105 MMOL/L — SIGNIFICANT CHANGE UP (ref 96–108)
CO2 SERPL-SCNC: 19 MMOL/L — LOW (ref 22–31)
CREAT SERPL-MCNC: 1.11 MG/DL — SIGNIFICANT CHANGE UP (ref 0.5–1.3)
EGFR: 52 ML/MIN/1.73M2 — LOW
EGFR: 52 ML/MIN/1.73M2 — LOW
GLUCOSE BLDC GLUCOMTR-MCNC: 174 MG/DL — HIGH (ref 70–99)
GLUCOSE BLDC GLUCOMTR-MCNC: 175 MG/DL — HIGH (ref 70–99)
GLUCOSE BLDC GLUCOMTR-MCNC: 181 MG/DL — HIGH (ref 70–99)
GLUCOSE BLDC GLUCOMTR-MCNC: 196 MG/DL — HIGH (ref 70–99)
GLUCOSE SERPL-MCNC: 158 MG/DL — HIGH (ref 70–99)
HCT VFR BLD CALC: 25.2 % — LOW (ref 34.5–45)
HCT VFR BLD CALC: 27.5 % — LOW (ref 34.5–45)
HGB BLD-MCNC: 8 G/DL — LOW (ref 11.5–15.5)
HGB BLD-MCNC: 8.9 G/DL — LOW (ref 11.5–15.5)
MAGNESIUM SERPL-MCNC: 2.5 MG/DL — SIGNIFICANT CHANGE UP (ref 1.6–2.6)
MCHC RBC-ENTMCNC: 28.6 PG — SIGNIFICANT CHANGE UP (ref 27–34)
MCHC RBC-ENTMCNC: 28.7 PG — SIGNIFICANT CHANGE UP (ref 27–34)
MCHC RBC-ENTMCNC: 31.7 G/DL — LOW (ref 32–36)
MCHC RBC-ENTMCNC: 32.4 G/DL — SIGNIFICANT CHANGE UP (ref 32–36)
MCV RBC AUTO: 88.4 FL — SIGNIFICANT CHANGE UP (ref 80–100)
MCV RBC AUTO: 90.3 FL — SIGNIFICANT CHANGE UP (ref 80–100)
NRBC BLD AUTO-RTO: 0 /100 WBCS — SIGNIFICANT CHANGE UP (ref 0–0)
NRBC BLD AUTO-RTO: 0 /100 WBCS — SIGNIFICANT CHANGE UP (ref 0–0)
OB PNL STL: POSITIVE
PLATELET # BLD AUTO: 342 K/UL — SIGNIFICANT CHANGE UP (ref 150–400)
PLATELET # BLD AUTO: 350 K/UL — SIGNIFICANT CHANGE UP (ref 150–400)
POTASSIUM SERPL-MCNC: 4.2 MMOL/L — SIGNIFICANT CHANGE UP (ref 3.5–5.3)
POTASSIUM SERPL-SCNC: 4.2 MMOL/L — SIGNIFICANT CHANGE UP (ref 3.5–5.3)
PROT SERPL-MCNC: 5.8 GM/DL — LOW (ref 6–8.3)
RBC # BLD: 2.79 M/UL — LOW (ref 3.8–5.2)
RBC # BLD: 3.11 M/UL — LOW (ref 3.8–5.2)
RBC # FLD: 16.5 % — HIGH (ref 10.3–14.5)
RBC # FLD: 17.2 % — HIGH (ref 10.3–14.5)
SODIUM SERPL-SCNC: 132 MMOL/L — LOW (ref 135–145)
WBC # BLD: 6.15 K/UL — SIGNIFICANT CHANGE UP (ref 3.8–10.5)
WBC # BLD: 7.43 K/UL — SIGNIFICANT CHANGE UP (ref 3.8–10.5)
WBC # FLD AUTO: 6.15 K/UL — SIGNIFICANT CHANGE UP (ref 3.8–10.5)
WBC # FLD AUTO: 7.43 K/UL — SIGNIFICANT CHANGE UP (ref 3.8–10.5)

## 2025-07-05 PROCEDURE — 99232 SBSQ HOSP IP/OBS MODERATE 35: CPT

## 2025-07-05 PROCEDURE — 99223 1ST HOSP IP/OBS HIGH 75: CPT

## 2025-07-05 RX ORDER — MIDODRINE HYDROCHLORIDE 5 MG/1
10 TABLET ORAL EVERY 8 HOURS
Refills: 0 | Status: DISCONTINUED | OUTPATIENT
Start: 2025-07-05 | End: 2025-07-06

## 2025-07-05 RX ADMIN — QUETIAPINE FUMARATE 25 MILLIGRAM(S): 25 TABLET ORAL at 21:40

## 2025-07-05 RX ADMIN — Medication 650 MILLIGRAM(S): at 21:39

## 2025-07-05 RX ADMIN — MIDODRINE HYDROCHLORIDE 10 MILLIGRAM(S): 5 TABLET ORAL at 21:40

## 2025-07-05 RX ADMIN — Medication 650 MILLIGRAM(S): at 22:42

## 2025-07-05 RX ADMIN — ATORVASTATIN CALCIUM 40 MILLIGRAM(S): 80 TABLET, FILM COATED ORAL at 21:40

## 2025-07-05 RX ADMIN — MIDODRINE HYDROCHLORIDE 20 MILLIGRAM(S): 5 TABLET ORAL at 05:09

## 2025-07-05 RX ADMIN — INSULIN LISPRO 1: 100 INJECTION, SOLUTION INTRAVENOUS; SUBCUTANEOUS at 08:25

## 2025-07-05 RX ADMIN — Medication 15 MILLILITER(S): at 16:40

## 2025-07-05 RX ADMIN — RANOLAZINE 500 MILLIGRAM(S): 1000 TABLET, FILM COATED, EXTENDED RELEASE ORAL at 05:10

## 2025-07-05 RX ADMIN — MIDODRINE HYDROCHLORIDE 10 MILLIGRAM(S): 5 TABLET ORAL at 16:43

## 2025-07-05 RX ADMIN — Medication 15 MILLILITER(S): at 05:09

## 2025-07-05 RX ADMIN — Medication 1 APPLICATION(S): at 05:09

## 2025-07-05 RX ADMIN — RANOLAZINE 500 MILLIGRAM(S): 1000 TABLET, FILM COATED, EXTENDED RELEASE ORAL at 17:11

## 2025-07-05 RX ADMIN — Medication 40 MILLIGRAM(S): at 16:41

## 2025-07-05 RX ADMIN — Medication 81 MILLIGRAM(S): at 16:41

## 2025-07-05 NOTE — PROGRESS NOTE ADULT - ASSESSMENT
74- YO Woman with HTN, HLD,  2 diabetes, CAD s/P  multiple stents , recent right humerus fX,  3 days PTA following a traumatic fall WAS admitted to the medical ICU admitted to the medical ICU for altered mental status and hypotension, Found to be in septic shock secondary to colitis and UTI found to have ESBL E. coli bacteremia.  Hospital course further complicated by cardiac arrest of unclear origin with ROSC after 10 minutes of CPR. Patient was extubated and transferred to the floors. Patient had RRT for aspiration and AMS and was intubated. Patient unable to wean off the vent, underwent trach/PEG for prolonged weaning.     #Acute hypoxic respiratory 2/2 suspect  aspiration pneumonia/  Tracheitis   -was intubated, now trach'd to vent, with trials of pressure support, but reportedly gets tired.    -CTA chest 6/12: no pulmonary embolism, tracheobronchial secretions, greatest in the left lower lobe. Patchy and nodular bilateral lung opacities   -completed  Zosyn    - sputum   cx, tenotrophomonas .  completed  Bactrim 6/29   -chronic hypoxic respiratory failure likely due to deconditioning.     -f/u Pulm recs Tracheitis ,       #  BRBPR,   - thought to be from  hemorrhoids    -Dark content around the peg site and concern for possible dark stools, will continue to monitor, if further concern will reach out to GI and start PPI  hgb was 7-8  -6.8 on 7/4, given 1UPRBC  --f/u repeat   CTM  Transfuse to keep hgb >7  active t/s    CAD w/ NSTEMI , thought to be demand from sepsis.   -S/p hep gtt, continue asa and statin  TTE   LV EF of 55% with regional WMA    -on  asa  , ranolazine  mg BID, Lipitor 40mg qhs   -c/w medical management as tolerated.  -weaning midodrine  ALY d/t ischemic ATN     # cardiac   arrest, unclear what rhythm, sound like related to hypoxia?  -guarded prognosis.     #  DM2   CISS  FS goal 120-180  stopped   lantus    #Right humerus fracture   s/p fall.    -appreciate  orthopedic input      # Dysphagia,  has  PEG tube, c/w feeds    Patient is DNR , but on the trach/vent  PT had recommended rehab, pan for LTC

## 2025-07-05 NOTE — CONSULT NOTE ADULT - SUBJECTIVE AND OBJECTIVE BOX
Patient is a 74y old  Female who presents with a chief complaint of ALY, physical deconditioning     HPI:  Tiffanie Joshi is a 74 year old female with PMHx of HTN, HLD, IDDM2, CAD (s/p PCI), and recent right humerus fracture who presented to the ED on 5/30/25 for complaints of elevated blood glucose.    Patient is Bulgarian-speaking but declined  services and preferred for daughter-in-law at bedside to translate. As per daughter-in-law, patient fell three days ago and landed on her right shoulder. Went to [facility] at that time and found to have right humerus fracture and placed in sling. Discharged home with outpatient orthopedic surgery follow up. Since then, patient has been refusing to get out of bed and not eating much. Will drink a little juice every now and then. Daughter checked her blood glucose around 5PM and it was > 400 so brought to the ER for further evaluation. Baseline functional status is ambulates with walker and dependent with all ADLs. Lives at home with daughter.    In the ED, VSS. Hgb 10.3, sodium 132, potassium 6.3, BUN 70, Cr 2.02, blood glucose 510. VBG 7.29 / 58 / 34 / 26. COVID/influenza/RSV negative. CT head without acute intracranial findings. Received 1L NS bolus, 1L LR bolus, and insulin 20 U IV.    Patient found to have sepsis secondary to colitis and UTI and admitted to the ICU.  Hospital course c/b cardiac arrest with ROSC after 10 minutes of CPR.  Patient then had RRT for aspiration and was intubated.  Patient was unable to be weaned and was trached on 6/23 and had surgical PEG tube on 6/25.  She was transferred to floors on 7/1/25.  She has been noted to have episodic BRBPR, CT w/ moderate stool burden and proctitis.  Yesterday, patient was noted to have Hb of 6.8 and transfused 1 unit PBRCs, this AM Hb improved to 8.0.  Son at bedside reports that patient had blood on her gown this AM and was noted to have red blood at her gastrostomy site.  Per the patient's nurse, she had a BM with brown stool mixed with red blood.       PAST MEDICAL & SURGICAL HISTORY:  HTN (hypertension)      HLD (hyperlipidemia)      CAD S/P percutaneous coronary angioplasty      Insulin dependent type 2 diabetes mellitus      History of heart artery stent          Allergies    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/shellfish (Short breath)  No Known Drug Allergies    Intolerances        MEDICATIONS  (STANDING):  aspirin  chewable 81 milliGRAM(s) Oral daily  atorvastatin 40 milliGRAM(s) Oral at bedtime  chlorhexidine 0.12% Liquid 15 milliLiter(s) Oral Mucosa every 12 hours  chlorhexidine 2% Cloths 1 Application(s) Topical <User Schedule>  dextrose 5%. 1000 milliLiter(s) (100 mL/Hr) IV Continuous <Continuous>  dextrose 5%. 1000 milliLiter(s) (50 mL/Hr) IV Continuous <Continuous>  dextrose 50% Injectable 25 Gram(s) IV Push once  dextrose 50% Injectable 12.5 Gram(s) IV Push once  dextrose 50% Injectable 25 Gram(s) IV Push once  glucagon  Injectable 1 milliGRAM(s) IntraMuscular once  insulin lispro (ADMELOG) corrective regimen sliding scale   SubCutaneous three times a day before meals  midodrine 10 milliGRAM(s) Oral every 8 hours  pantoprazole  Injectable 40 milliGRAM(s) IV Push every 12 hours  QUEtiapine 25 milliGRAM(s) Oral at bedtime  ranolazine 500 milliGRAM(s) Oral two times a day  senna 2 Tablet(s) Oral at bedtime    MEDICATIONS  (PRN):  acetaminophen     Tablet .. 650 milliGRAM(s) Oral every 6 hours PRN Temp greater or equal to 38C (100.4F), Mild Pain (1 - 3)  acetaminophen     Tablet .. 650 milliGRAM(s) Oral every 6 hours PRN Mild Pain (1 - 3)  bisacodyl Suppository 10 milliGRAM(s) Rectal daily PRN Constipation  dextrose Oral Gel 15 Gram(s) Oral once PRN Blood Glucose LESS THAN 70 milliGRAM(s)/deciliter  ondansetron Injectable 4 milliGRAM(s) IV Push every 8 hours PRN Nausea and/or Vomiting/ gagging  polyethylene glycol 3350 17 Gram(s) Oral daily PRN Constipation      FAMILY HISTORY:  FHx: myocardial infarction  Father        Social History:  lives at home with daughter (30 May 2025 23:43)        Review of Systems:  Per chart review as patient unable to speak      Vital Signs Last 24 Hrs  T(C): 37.4 (05 Jul 2025 10:55), Max: 37.4 (05 Jul 2025 10:55)  T(F): 99.4 (05 Jul 2025 10:55), Max: 99.4 (05 Jul 2025 10:55)  HR: 63 (05 Jul 2025 12:39) (60 - 633)  BP: 119/55 (05 Jul 2025 10:55) (119/55 - 134/81)  BP(mean): --  RR: 18 (05 Jul 2025 10:55) (18 - 18)  SpO2: 98% (05 Jul 2025 12:39) (97% - 100%)    Parameters below as of 05 Jul 2025 04:52  Patient On (Oxygen Delivery Method): ventilator          PHYSICAL EXAM:  Constitutional: awake  HEENT: anicteric, EOMI  Neck: trach  Cardiovascular: S1 and S2, RRR, no M  Respiratory: CTA and P  Gastrointestinal: BS+, soft, + PEG, no active bleeding noted, dressing with evidence of tube feeds, bumper noted at 5 cm.  Extremities: No peripheral edema, neg clubbing, cyanosis  Vascular: 2+ peripheral pulses  Neurological: awake no focal deficits  Psychiatric: unable to assess  Skin: No rashes, normal turgor      LABS:                        8.0    6.15  )-----------( 350      ( 05 Jul 2025 05:45 )             25.2     07-05    132[L]  |  105  |  52[H]  ----------------------------<  158[H]  4.2   |  19[L]  |  1.11    Ca    7.6[L]      05 Jul 2025 05:45  Mg     2.5     07-05    TPro  5.8[L]  /  Alb  1.6[L]  /  TBili  0.6  /  DBili  x   /  AST  18  /  ALT  14  /  AlkPhos  107  07-05      Urinalysis Basic - ( 05 Jul 2025 05:45 )    Color: x / Appearance: x / SG: x / pH: x  Gluc: 158 mg/dL / Ketone: x  / Bili: x / Urobili: x   Blood: x / Protein: x / Nitrite: x   Leuk Esterase: x / RBC: x / WBC x   Sq Epi: x / Non Sq Epi: x / Bacteria: x      LIVER FUNCTIONS - ( 05 Jul 2025 05:45 )  Alb: 1.6 g/dL / Pro: 5.8 gm/dL / ALK PHOS: 107 U/L / ALT: 14 U/L / AST: 18 U/L / GGT: x             RADIOLOGY & ADDITIONAL TESTS:  < from: CT Abdomen and Pelvis w/ Oral Cont and w/ IV Cont (06.29.25 @ 18:34) >  IMPRESSION:    No bowel obstruction.  Proctitis.    --- End of Report ---            KADE BAIRES; Attending Radiologist  This document has been electronically signed. Jun 30 2025  8:55AM    < end of copied text >

## 2025-07-05 NOTE — CONSULT NOTE ADULT - CONSULT REQUESTED DATE/TIME
02-Jul-2025 11:50
02-Jun-2025 01:01
02-Jun-2025 11:28
11-Jun-2025 18:15
02-Jun-2025 10:55
20-Jun-2025 14:26
02-Jun-2025 15:44
03-Jun-2025 21:54
05-Jul-2025 16:31

## 2025-07-05 NOTE — CONSULT NOTE ADULT - ASSESSMENT
73 yo Female h/o HTN, HLD,  Type 2 DM CAD s/p multiple stents, recent right humerus fX,  3 days PTA following a traumatic fall admitted to the MICU for altered mental status and hypotension, Found to be in septic shock secondary to colitis and UTI found to have ESBL E. coli bacteremia.  Hospital course further complicated by cardiac arrest of unclear origin with ROSC after 10 minutes of CPR. Patient was extubated and transferred to the floors. Patient had RRT for aspiration and AMS and was intubated. Patient unable to wean off the vent, underwent trach/PEG for prolonged weaning.  GI consulted for possible bleeding from gastrostomy site, BRBPR.    1.  Possible bleeding from gastrostomy site.  PEG placed 6/25/25.  May have gastric ulcer, buried bumper, esophagitis, or gastritis.  No bleeding visualized currently with feeds noted in tube.  2.  BRBPR with CT showing proctitis, moderate stool burden.  May be due to infectious etiology, sterocoral colitis, ischemia.  3.  Acute blood loss anemia.    Recs:  - Monitor Hb and transfuse to maintain Hb >7.  - PPI BID.  - Hold feeds today.  May use PEG for medications.  - Miralax daily via PEG, hold for diarrhea.  - Given comorbidities, would attempt to manage patient conservatively.

## 2025-07-05 NOTE — CONSULT NOTE ADULT - PROVIDER SPECIALTY LIST ADULT
Gastroenterology
Nephrology
Pulmonology
Surgery
Critical Care
Infectious Disease
Orthopedics
Critical Care
Cardiology

## 2025-07-05 NOTE — CONSULT NOTE ADULT - CONSULT REASON
Elevated troponin
trache/peg
AMS
previous prox humerus fx treated non op
ALY hyperkalemia
ESBL bacteremia
GI bleed
Obtunded
Hypoxemic/hypercapnic respiratory failure S/P intubation and tracheostomy

## 2025-07-05 NOTE — PROGRESS NOTE ADULT - SUBJECTIVE AND OBJECTIVE BOX
PROGRESS NOTE:     Patient is a 74y old  Female who presents with a chief complaint of ALY, physical deconditioning (04 Jul 2025 18:10)      SUBJECTIVE / OVERNIGHT EVENTS:No acute overnight events.   MEDICATIONS  (STANDING):  aspirin  chewable 81 milliGRAM(s) Oral daily  atorvastatin 40 milliGRAM(s) Oral at bedtime  chlorhexidine 0.12% Liquid 15 milliLiter(s) Oral Mucosa every 12 hours  chlorhexidine 2% Cloths 1 Application(s) Topical <User Schedule>  dextrose 5%. 1000 milliLiter(s) (50 mL/Hr) IV Continuous <Continuous>  dextrose 5%. 1000 milliLiter(s) (100 mL/Hr) IV Continuous <Continuous>  dextrose 50% Injectable 25 Gram(s) IV Push once  dextrose 50% Injectable 12.5 Gram(s) IV Push once  dextrose 50% Injectable 25 Gram(s) IV Push once  enoxaparin Injectable 40 milliGRAM(s) SubCutaneous every 24 hours  glucagon  Injectable 1 milliGRAM(s) IntraMuscular once  insulin lispro (ADMELOG) corrective regimen sliding scale   SubCutaneous three times a day before meals  midodrine 20 milliGRAM(s) Oral every 8 hours  pantoprazole   Suspension 40 milliGRAM(s) Oral daily  QUEtiapine 25 milliGRAM(s) Oral at bedtime  ranolazine 500 milliGRAM(s) Oral two times a day  senna 2 Tablet(s) Oral at bedtime    MEDICATIONS  (PRN):  acetaminophen     Tablet .. 650 milliGRAM(s) Oral every 6 hours PRN Temp greater or equal to 38C (100.4F), Mild Pain (1 - 3)  acetaminophen     Tablet .. 650 milliGRAM(s) Oral every 6 hours PRN Mild Pain (1 - 3)  bisacodyl Suppository 10 milliGRAM(s) Rectal daily PRN Constipation  dextrose Oral Gel 15 Gram(s) Oral once PRN Blood Glucose LESS THAN 70 milliGRAM(s)/deciliter  ondansetron Injectable 4 milliGRAM(s) IV Push every 8 hours PRN Nausea and/or Vomiting/ gagging  polyethylene glycol 3350 17 Gram(s) Oral daily PRN Constipation      CAPILLARY BLOOD GLUCOSE      POCT Blood Glucose.: 160 mg/dL (04 Jul 2025 21:36)  POCT Blood Glucose.: 149 mg/dL (04 Jul 2025 16:51)  POCT Blood Glucose.: 136 mg/dL (04 Jul 2025 11:40)  POCT Blood Glucose.: 124 mg/dL (04 Jul 2025 08:39)    I&O's Summary    04 Jul 2025 07:01  -  05 Jul 2025 07:00  --------------------------------------------------------  IN: 0 mL / OUT: 853 mL / NET: -853 mL        PHYSICAL EXAM:  Vital Signs Last 24 Hrs  T(C): 36.9 (05 Jul 2025 04:52), Max: 37.6 (04 Jul 2025 11:00)  T(F): 98.5 (05 Jul 2025 04:52), Max: 99.6 (04 Jul 2025 11:00)  HR: 71 (05 Jul 2025 04:52) (62 - 80)  BP: 127/83 (05 Jul 2025 04:52) (124/78 - 134/81)  BP(mean): --  RR: 18 (05 Jul 2025 04:52) (18 - 18)  SpO2: 100% (05 Jul 2025 04:52) (99% - 100%)    Parameters below as of 05 Jul 2025 04:52  Patient On (Oxygen Delivery Method): ventilator        General : NAD  Neuro: A&Ox0, eyes open  Cardiovascular: Normal S1 S2, No JVD  Respiratory: : Course breath sounds bilaterally.   Gastrointestinal:  Soft, Non-tender, + BS	. PEG  Skin: No rash, No ecchymoses	    trach/  peg    LABS:                        6.8    7.46  )-----------( 418      ( 04 Jul 2025 21:15 )             22.5     07-03    132[L]  |  104  |  44[H]  ----------------------------<  99  4.9   |  20[L]  |  1.32[H]    Ca    8.0[L]      03 Jul 2025 08:38    TPro  6.3  /  Alb  1.7[L]  /  TBili  0.4  /  DBili  x   /  AST  14[L]  /  ALT  13  /  AlkPhos  116  07-03          Urinalysis Basic - ( 03 Jul 2025 08:38 )    Color: x / Appearance: x / SG: x / pH: x  Gluc: 99 mg/dL / Ketone: x  / Bili: x / Urobili: x   Blood: x / Protein: x / Nitrite: x   Leuk Esterase: x / RBC: x / WBC x   Sq Epi: x / Non Sq Epi: x / Bacteria: x          RADIOLOGY & ADDITIONAL TESTS:  Results Reviewed:   Imaging Personally Reviewed:  Electrocardiogram Personally Reviewed:    COORDINATION OF CARE:  Care Discussed with Consultants/Other Providers [Y/N]:  Prior or Outpatient Records Reviewed [Y/N]:

## 2025-07-06 LAB
ANION GAP SERPL CALC-SCNC: 10 MMOL/L — SIGNIFICANT CHANGE UP (ref 5–17)
BUN SERPL-MCNC: 54 MG/DL — HIGH (ref 7–23)
CALCIUM SERPL-MCNC: 7.1 MG/DL — LOW (ref 8.5–10.1)
CHLORIDE SERPL-SCNC: 107 MMOL/L — SIGNIFICANT CHANGE UP (ref 96–108)
CO2 SERPL-SCNC: 18 MMOL/L — LOW (ref 22–31)
CREAT SERPL-MCNC: 1.05 MG/DL — SIGNIFICANT CHANGE UP (ref 0.5–1.3)
EGFR: 55 ML/MIN/1.73M2 — LOW
EGFR: 55 ML/MIN/1.73M2 — LOW
GLUCOSE BLDC GLUCOMTR-MCNC: 124 MG/DL — HIGH (ref 70–99)
GLUCOSE BLDC GLUCOMTR-MCNC: 130 MG/DL — HIGH (ref 70–99)
GLUCOSE BLDC GLUCOMTR-MCNC: 184 MG/DL — HIGH (ref 70–99)
GLUCOSE SERPL-MCNC: 138 MG/DL — HIGH (ref 70–99)
HCT VFR BLD CALC: 23.3 % — LOW (ref 34.5–45)
HGB BLD-MCNC: 7.5 G/DL — LOW (ref 11.5–15.5)
MCHC RBC-ENTMCNC: 28.8 PG — SIGNIFICANT CHANGE UP (ref 27–34)
MCHC RBC-ENTMCNC: 32.2 G/DL — SIGNIFICANT CHANGE UP (ref 32–36)
MCV RBC AUTO: 89.6 FL — SIGNIFICANT CHANGE UP (ref 80–100)
NRBC BLD AUTO-RTO: 0 /100 WBCS — SIGNIFICANT CHANGE UP (ref 0–0)
PLATELET # BLD AUTO: 311 K/UL — SIGNIFICANT CHANGE UP (ref 150–400)
POTASSIUM SERPL-MCNC: 3.6 MMOL/L — SIGNIFICANT CHANGE UP (ref 3.5–5.3)
POTASSIUM SERPL-SCNC: 3.6 MMOL/L — SIGNIFICANT CHANGE UP (ref 3.5–5.3)
RBC # BLD: 2.6 M/UL — LOW (ref 3.8–5.2)
RBC # FLD: 17.3 % — HIGH (ref 10.3–14.5)
SODIUM SERPL-SCNC: 135 MMOL/L — SIGNIFICANT CHANGE UP (ref 135–145)
WBC # BLD: 7.3 K/UL — SIGNIFICANT CHANGE UP (ref 3.8–10.5)
WBC # FLD AUTO: 7.3 K/UL — SIGNIFICANT CHANGE UP (ref 3.8–10.5)

## 2025-07-06 PROCEDURE — 99232 SBSQ HOSP IP/OBS MODERATE 35: CPT

## 2025-07-06 PROCEDURE — 99233 SBSQ HOSP IP/OBS HIGH 50: CPT

## 2025-07-06 RX ADMIN — RANOLAZINE 500 MILLIGRAM(S): 1000 TABLET, FILM COATED, EXTENDED RELEASE ORAL at 05:24

## 2025-07-06 RX ADMIN — Medication 15 MILLILITER(S): at 05:24

## 2025-07-06 RX ADMIN — INSULIN LISPRO 1: 100 INJECTION, SOLUTION INTRAVENOUS; SUBCUTANEOUS at 08:31

## 2025-07-06 RX ADMIN — Medication 40 MILLIGRAM(S): at 17:38

## 2025-07-06 RX ADMIN — ATORVASTATIN CALCIUM 40 MILLIGRAM(S): 80 TABLET, FILM COATED ORAL at 21:54

## 2025-07-06 RX ADMIN — Medication 81 MILLIGRAM(S): at 13:04

## 2025-07-06 RX ADMIN — Medication 40 MILLIGRAM(S): at 05:24

## 2025-07-06 RX ADMIN — QUETIAPINE FUMARATE 25 MILLIGRAM(S): 25 TABLET ORAL at 21:55

## 2025-07-06 RX ADMIN — Medication 1 APPLICATION(S): at 05:26

## 2025-07-06 RX ADMIN — Medication 15 MILLILITER(S): at 17:37

## 2025-07-06 RX ADMIN — RANOLAZINE 500 MILLIGRAM(S): 1000 TABLET, FILM COATED, EXTENDED RELEASE ORAL at 17:38

## 2025-07-06 RX ADMIN — MIDODRINE HYDROCHLORIDE 10 MILLIGRAM(S): 5 TABLET ORAL at 05:25

## 2025-07-06 NOTE — PROGRESS NOTE ADULT - SUBJECTIVE AND OBJECTIVE BOX
PROGRESS NOTE:     Patient is a 74y old  Female who presents with a chief complaint of ALY, physical deconditioning (05 Jul 2025 16:31)      SUBJECTIVE / OVERNIGHT EVENTS:No acute overnight events. yesterday, patient noted to have melena and blood around peg site. GI consulted.         MEDICATIONS  (STANDING):  aspirin  chewable 81 milliGRAM(s) Oral daily  atorvastatin 40 milliGRAM(s) Oral at bedtime  chlorhexidine 0.12% Liquid 15 milliLiter(s) Oral Mucosa every 12 hours  chlorhexidine 2% Cloths 1 Application(s) Topical <User Schedule>  dextrose 5%. 1000 milliLiter(s) (50 mL/Hr) IV Continuous <Continuous>  dextrose 5%. 1000 milliLiter(s) (100 mL/Hr) IV Continuous <Continuous>  dextrose 50% Injectable 25 Gram(s) IV Push once  dextrose 50% Injectable 12.5 Gram(s) IV Push once  dextrose 50% Injectable 25 Gram(s) IV Push once  glucagon  Injectable 1 milliGRAM(s) IntraMuscular once  insulin lispro (ADMELOG) corrective regimen sliding scale   SubCutaneous three times a day before meals  midodrine 10 milliGRAM(s) Oral every 8 hours  pantoprazole  Injectable 40 milliGRAM(s) IV Push every 12 hours  QUEtiapine 25 milliGRAM(s) Oral at bedtime  ranolazine 500 milliGRAM(s) Oral two times a day  senna 2 Tablet(s) Oral at bedtime    MEDICATIONS  (PRN):  acetaminophen     Tablet .. 650 milliGRAM(s) Oral every 6 hours PRN Temp greater or equal to 38C (100.4F), Mild Pain (1 - 3)  acetaminophen     Tablet .. 650 milliGRAM(s) Oral every 6 hours PRN Mild Pain (1 - 3)  bisacodyl Suppository 10 milliGRAM(s) Rectal daily PRN Constipation  dextrose Oral Gel 15 Gram(s) Oral once PRN Blood Glucose LESS THAN 70 milliGRAM(s)/deciliter  ondansetron Injectable 4 milliGRAM(s) IV Push every 8 hours PRN Nausea and/or Vomiting/ gagging  polyethylene glycol 3350 17 Gram(s) Oral daily PRN Constipation      CAPILLARY BLOOD GLUCOSE      POCT Blood Glucose.: 174 mg/dL (05 Jul 2025 21:09)  POCT Blood Glucose.: 181 mg/dL (05 Jul 2025 16:17)  POCT Blood Glucose.: 175 mg/dL (05 Jul 2025 11:47)  POCT Blood Glucose.: 196 mg/dL (05 Jul 2025 08:11)    I&O's Summary    05 Jul 2025 07:01  -  06 Jul 2025 07:00  --------------------------------------------------------  IN: 0 mL / OUT: 1600 mL / NET: -1600 mL        PHYSICAL EXAM:  Vital Signs Last 24 Hrs  T(C): 36.8 (06 Jul 2025 04:00), Max: 37.4 (05 Jul 2025 10:55)  T(F): 98.2 (06 Jul 2025 04:00), Max: 99.4 (05 Jul 2025 10:55)  HR: 78 (06 Jul 2025 04:00) (60 - 78)  BP: 139/80 (06 Jul 2025 04:00) (114/62 - 139/80)  BP(mean): --  RR: 18 (06 Jul 2025 04:00) (18 - 18)  SpO2: 100% (06 Jul 2025 04:00) (97% - 100%)    Parameters below as of 06 Jul 2025 04:00  Patient On (Oxygen Delivery Method): ventilator        General : NAD  Neuro: A&Ox0, eyes open  Cardiovascular: Normal S1 S2, No JVD  Respiratory: : Course breath sounds bilaterally.   Gastrointestinal:  Soft, Non-tender, + BS	. PEG  Skin: No rash, No ecchymoses	    trach/  peg      LABS:                        7.5    7.30  )-----------( 311      ( 06 Jul 2025 06:20 )             23.3     07-05    132[L]  |  105  |  52[H]  ----------------------------<  158[H]  4.2   |  19[L]  |  1.11    Ca    7.6[L]      05 Jul 2025 05:45  Mg     2.5     07-05    TPro  5.8[L]  /  Alb  1.6[L]  /  TBili  0.6  /  DBili  x   /  AST  18  /  ALT  14  /  AlkPhos  107  07-05          Urinalysis Basic - ( 05 Jul 2025 05:45 )    Color: x / Appearance: x / SG: x / pH: x  Gluc: 158 mg/dL / Ketone: x  / Bili: x / Urobili: x   Blood: x / Protein: x / Nitrite: x   Leuk Esterase: x / RBC: x / WBC x   Sq Epi: x / Non Sq Epi: x / Bacteria: x          RADIOLOGY & ADDITIONAL TESTS:  Results Reviewed:   Imaging Personally Reviewed:  Electrocardiogram Personally Reviewed:    COORDINATION OF CARE:  Care Discussed with Consultants/Other Providers [Y/N]:  Prior or Outpatient Records Reviewed [Y/N]:

## 2025-07-06 NOTE — PROGRESS NOTE ADULT - SUBJECTIVE AND OBJECTIVE BOX
Chief Complaint:  Patient is a 74y old  Female who presents with a chief complaint of ALY, physical deconditioning (06 Jul 2025 07:54)      Interval Events:   Hb 7.5 this AM  Has small amount of red blood when she moves her bowels  No blood noted on dressing today  PEG tube flushed with water at bedside, no blood seen on gastric lavage    Allergies:  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/shellfish (Short breath)  No Known Drug Allergies      Hospital Medications:  acetaminophen     Tablet .. 650 milliGRAM(s) Oral every 6 hours PRN  acetaminophen     Tablet .. 650 milliGRAM(s) Oral every 6 hours PRN  aspirin  chewable 81 milliGRAM(s) Oral daily  atorvastatin 40 milliGRAM(s) Oral at bedtime  bisacodyl Suppository 10 milliGRAM(s) Rectal daily PRN  chlorhexidine 0.12% Liquid 15 milliLiter(s) Oral Mucosa every 12 hours  chlorhexidine 2% Cloths 1 Application(s) Topical <User Schedule>  dextrose 5%. 1000 milliLiter(s) IV Continuous <Continuous>  dextrose 5%. 1000 milliLiter(s) IV Continuous <Continuous>  dextrose 50% Injectable 25 Gram(s) IV Push once  dextrose 50% Injectable 12.5 Gram(s) IV Push once  dextrose 50% Injectable 25 Gram(s) IV Push once  dextrose Oral Gel 15 Gram(s) Oral once PRN  glucagon  Injectable 1 milliGRAM(s) IntraMuscular once  insulin lispro (ADMELOG) corrective regimen sliding scale   SubCutaneous three times a day before meals  ondansetron Injectable 4 milliGRAM(s) IV Push every 8 hours PRN  pantoprazole  Injectable 40 milliGRAM(s) IV Push every 12 hours  polyethylene glycol 3350 17 Gram(s) Oral daily PRN  QUEtiapine 25 milliGRAM(s) Oral at bedtime  ranolazine 500 milliGRAM(s) Oral two times a day  senna 2 Tablet(s) Oral at bedtime      PMHX/PSHX:  Type 2 diabetes mellitus without complication, without long-term current use of insulin    Coronary artery disease with other form of angina pectoris    Hypertension    Asthma    Aortic stenosis    Mitral regurgitation    HTN (hypertension)    HLD (hyperlipidemia)    CAD S/P percutaneous coronary angioplasty    Insulin dependent type 2 diabetes mellitus    No significant past surgical history    History of heart artery stent          ROS:   General:  No fevers, chills  Eyes:  Good vision  ENT:  No odynophagia, dysphagia  CV:  No pain, palpitations  Resp:  No dyspnea, cough, tachypnea, wheezing  GI:  See HPI  Muscle:  No pain, weakness  Skin:  No rash, edema    Vital Signs:  Vital Signs Last 24 Hrs  T(C): 36.3 (06 Jul 2025 10:55), Max: 36.9 (05 Jul 2025 23:00)  T(F): 97.4 (06 Jul 2025 10:55), Max: 98.4 (05 Jul 2025 23:00)  HR: 60 (06 Jul 2025 16:54) (58 - 78)  BP: 137/71 (06 Jul 2025 16:54) (104/61 - 139/80)  BP(mean): --  RR: 18 (06 Jul 2025 16:54) (18 - 20)  SpO2: 100% (06 Jul 2025 16:54) (100% - 100%)    Parameters below as of 06 Jul 2025 16:54  Patient On (Oxygen Delivery Method): ventilator      Daily     Daily     PHYSICAL EXAM:   GENERAL:  chronically ill-appearing  HEENT: conjunctivae clear  CHEST:  trach  HEART:  Regular rhythm  ABDOMEN:  soft, PEG bumper loosened to 5.5 cm, spins freely, no bleeding noted  EXTREMITIES:  no edema  SKIN:  Dry/warm  NEURO:  Alert      LABS:                        7.5    7.30  )-----------( 311      ( 06 Jul 2025 06:20 )             23.3     07-06    135  |  107  |  54[H]  ----------------------------<  138[H]  3.6   |  18[L]  |  1.05    Ca    7.1[L]      06 Jul 2025 06:20  Mg     2.5     07-05    TPro  5.8[L]  /  Alb  1.6[L]  /  TBili  0.6  /  DBili  x   /  AST  18  /  ALT  14  /  AlkPhos  107  07-05    LIVER FUNCTIONS - ( 05 Jul 2025 05:45 )  Alb: 1.6 g/dL / Pro: 5.8 gm/dL / ALK PHOS: 107 U/L / ALT: 14 U/L / AST: 18 U/L / GGT: x             Urinalysis Basic - ( 06 Jul 2025 06:20 )    Color: x / Appearance: x / SG: x / pH: x  Gluc: 138 mg/dL / Ketone: x  / Bili: x / Urobili: x   Blood: x / Protein: x / Nitrite: x   Leuk Esterase: x / RBC: x / WBC x   Sq Epi: x / Non Sq Epi: x / Bacteria: x          Imaging:

## 2025-07-06 NOTE — PROGRESS NOTE ADULT - ASSESSMENT
73 yo Female h/o HTN, HLD,  Type 2 DM CAD s/p multiple stents, recent right humerus fX,  3 days PTA following a traumatic fall admitted to the MICU for altered mental status and hypotension, Found to be in septic shock secondary to colitis and UTI found to have ESBL E. coli bacteremia.  Hospital course further complicated by cardiac arrest of unclear origin with ROSC after 10 minutes of CPR. Patient was extubated and transferred to the floors. Patient had RRT for aspiration and AMS and was intubated. Patient unable to wean off the vent, underwent trach/PEG for prolonged weaning.  GI consulted for possible bleeding from gastrostomy site, BRBPR.    1.  Possible bleeding from gastrostomy site.  PEG placed 6/25/25.  May have gastric ulcer, buried bumper, esophagitis, or gastritis.  No bleeding visualized currently with feeds noted in tube.  2.  BRBPR with CT showing proctitis, moderate stool burden.  May be due to infectious etiology, sterocoral colitis, ischemia.  3.  Acute blood loss anemia.    Recs:  - Monitor Hb and transfuse to maintain Hb >7.  - PPI BID.  - No bleeding noted with gastric lavage today.  Would resume feeds via PEG tube  - Miralax daily via PEG, hold for diarrhea.  - Given comorbidities, would attempt to manage patient conservatively.

## 2025-07-06 NOTE — PROGRESS NOTE ADULT - ASSESSMENT
74- YO Woman with HTN, HLD,  2 diabetes, CAD s/P  multiple stents , recent right humerus fX,  3 days PTA following a traumatic fall WAS admitted to the medical ICU admitted to the medical ICU for altered mental status and hypotension, Found to be in septic shock secondary to colitis and UTI found to have ESBL E. coli bacteremia.  Hospital course further complicated by cardiac arrest of unclear origin with ROSC after 10 minutes of CPR. Patient was extubated and transferred to the floors. Patient had RRT for aspiration and AMS and was intubated. Patient unable to wean off the vent, underwent trach/PEG for prolonged weaning.   Course further c/b concern for GIB on 7/5    #Acute hypoxic respiratory 2/2 suspect  aspiration pneumonia/  Tracheitis   -was intubated, now trach'd to vent, with trials of pressure support, but reportedly gets tired.    -CTA chest 6/12: no pulmonary embolism, tracheobronchial secretions, greatest in the left lower lobe. Patchy and nodular bilateral lung opacities   -completed  Zosyn    - sputum   cx, tenotrophomonas .  completed  Bactrim 6/29   -chronic hypoxic respiratory failure likely due to deconditioning.     -f/u Pulm recs Tracheitis ,       #  Acute blood loss anemia,   - Possible bleeding from gastrostomy site.  PEG placed 6/25/25.  May have gastric ulcer, buried bumper, esophagitis, or gastritis.  - possible also hemorrhoids    -Dark content around the peg site and concern for possible dark stools, will continue to monitor, if further concern will reach out to GI and start PPI  hgb was 7-8  -6.8 on 7/4, given 1UPRBC->8->8.9->7.5  CTM  Transfuse to keep hgb >7  active t/s  -Appreciate GI input    CAD w/ NSTEMI , thought to be demand from sepsis.   -S/p hep gtt, continue asa and statin  TTE   LV EF of 55% with regional WMA    -on  asa  , ranolazine  mg BID, Lipitor 40mg qhs   -c/w medical management as tolerated.  -weaned off midodrine  ALY d/t ischemic ATN     # cardiac   arrest, unclear what rhythm, sound like related to hypoxia?  -guarded prognosis.     #  DM2   CISS  FS goal 120-180  stopped   lantus    #Right humerus fracture   s/p fall.    -appreciate  orthopedic input      # Dysphagia,  has  PEG tube, c/w feeds (on hold as concern for GIB)    #Hold DVT PPx as concern for GIB    Patient is DNR , but on the trach/vent  PT had recommended rehab, pan for LTC

## 2025-07-07 LAB
ANION GAP SERPL CALC-SCNC: 10 MMOL/L — SIGNIFICANT CHANGE UP (ref 5–17)
BUN SERPL-MCNC: 49 MG/DL — HIGH (ref 7–23)
CALCIUM SERPL-MCNC: 7.5 MG/DL — LOW (ref 8.5–10.1)
CHLORIDE SERPL-SCNC: 107 MMOL/L — SIGNIFICANT CHANGE UP (ref 96–108)
CO2 SERPL-SCNC: 18 MMOL/L — LOW (ref 22–31)
CREAT SERPL-MCNC: 0.91 MG/DL — SIGNIFICANT CHANGE UP (ref 0.5–1.3)
EGFR: 65 ML/MIN/1.73M2 — SIGNIFICANT CHANGE UP
EGFR: 65 ML/MIN/1.73M2 — SIGNIFICANT CHANGE UP
GLUCOSE BLDC GLUCOMTR-MCNC: 152 MG/DL — HIGH (ref 70–99)
GLUCOSE BLDC GLUCOMTR-MCNC: 171 MG/DL — HIGH (ref 70–99)
GLUCOSE BLDC GLUCOMTR-MCNC: 192 MG/DL — HIGH (ref 70–99)
GLUCOSE SERPL-MCNC: 143 MG/DL — HIGH (ref 70–99)
HCT VFR BLD CALC: 24.8 % — LOW (ref 34.5–45)
HGB BLD-MCNC: 7.8 G/DL — LOW (ref 11.5–15.5)
MCHC RBC-ENTMCNC: 28.3 PG — SIGNIFICANT CHANGE UP (ref 27–34)
MCHC RBC-ENTMCNC: 31.5 G/DL — LOW (ref 32–36)
MCV RBC AUTO: 89.9 FL — SIGNIFICANT CHANGE UP (ref 80–100)
NRBC BLD AUTO-RTO: 0 /100 WBCS — SIGNIFICANT CHANGE UP (ref 0–0)
PLATELET # BLD AUTO: 352 K/UL — SIGNIFICANT CHANGE UP (ref 150–400)
POTASSIUM SERPL-MCNC: 3.3 MMOL/L — LOW (ref 3.5–5.3)
POTASSIUM SERPL-SCNC: 3.3 MMOL/L — LOW (ref 3.5–5.3)
RBC # BLD: 2.76 M/UL — LOW (ref 3.8–5.2)
RBC # FLD: 17.3 % — HIGH (ref 10.3–14.5)
SODIUM SERPL-SCNC: 135 MMOL/L — SIGNIFICANT CHANGE UP (ref 135–145)
WBC # BLD: 6.86 K/UL — SIGNIFICANT CHANGE UP (ref 3.8–10.5)
WBC # FLD AUTO: 6.86 K/UL — SIGNIFICANT CHANGE UP (ref 3.8–10.5)

## 2025-07-07 PROCEDURE — 99233 SBSQ HOSP IP/OBS HIGH 50: CPT | Mod: 25

## 2025-07-07 RX ADMIN — Medication 81 MILLIGRAM(S): at 11:35

## 2025-07-07 RX ADMIN — ATORVASTATIN CALCIUM 40 MILLIGRAM(S): 80 TABLET, FILM COATED ORAL at 22:39

## 2025-07-07 RX ADMIN — INSULIN LISPRO 1: 100 INJECTION, SOLUTION INTRAVENOUS; SUBCUTANEOUS at 08:44

## 2025-07-07 RX ADMIN — INSULIN LISPRO 1: 100 INJECTION, SOLUTION INTRAVENOUS; SUBCUTANEOUS at 11:35

## 2025-07-07 RX ADMIN — Medication 40 MILLIEQUIVALENT(S): at 14:14

## 2025-07-07 RX ADMIN — Medication 650 MILLIGRAM(S): at 23:10

## 2025-07-07 RX ADMIN — Medication 15 MILLILITER(S): at 05:37

## 2025-07-07 RX ADMIN — Medication 650 MILLIGRAM(S): at 22:38

## 2025-07-07 RX ADMIN — Medication 40 MILLIGRAM(S): at 17:23

## 2025-07-07 RX ADMIN — Medication 40 MILLIGRAM(S): at 05:37

## 2025-07-07 RX ADMIN — Medication 15 MILLILITER(S): at 17:23

## 2025-07-07 RX ADMIN — Medication 1 APPLICATION(S): at 05:37

## 2025-07-07 RX ADMIN — RANOLAZINE 500 MILLIGRAM(S): 1000 TABLET, FILM COATED, EXTENDED RELEASE ORAL at 05:37

## 2025-07-07 RX ADMIN — QUETIAPINE FUMARATE 25 MILLIGRAM(S): 25 TABLET ORAL at 22:39

## 2025-07-07 RX ADMIN — RANOLAZINE 500 MILLIGRAM(S): 1000 TABLET, FILM COATED, EXTENDED RELEASE ORAL at 17:23

## 2025-07-07 RX ADMIN — INSULIN LISPRO 1: 100 INJECTION, SOLUTION INTRAVENOUS; SUBCUTANEOUS at 17:24

## 2025-07-07 NOTE — PROGRESS NOTE ADULT - SUBJECTIVE AND OBJECTIVE BOX
Interval Events: NAEO. trach in place.     REVIEW OF SYSTEMS:  [x ] Unable to assess ROS    OBJECTIVE:  ICU Vital Signs Last 24 Hrs  T(C): 36.5 (07 Jul 2025 04:21), Max: 36.8 (06 Jul 2025 23:48)  T(F): 97.7 (07 Jul 2025 04:21), Max: 98.2 (06 Jul 2025 23:48)  HR: 65 (07 Jul 2025 04:21) (58 - 69)  BP: 103/58 (07 Jul 2025 04:21) (103/58 - 137/71)  BP(mean): --  ABP: --  ABP(mean): --  RR: 18 (07 Jul 2025 04:21) (18 - 20)  SpO2: 98% (07 Jul 2025 04:21) (97% - 100%)    O2 Parameters below as of 07 Jul 2025 04:21  Patient On (Oxygen Delivery Method): ventilator          Mode: AC/ CMV (Assist Control/ Continuous Mandatory Ventilation), RR (machine): 15, TV (machine): 400, FiO2: 30, PEEP: 6, ITime: 1, MAP: 12, PIP: 29    07-06 @ 07:01  -  07-07 @ 07:00  --------------------------------------------------------  IN: 0 mL / OUT: 1800 mL / NET: -1800 mL      CAPILLARY BLOOD GLUCOSE      POCT Blood Glucose.: 124 mg/dL (06 Jul 2025 16:33)      PHYSICAL EXAM:  Gen: trache to full vent  HEENT: PERRL  Resp: mechanical breath sounds B/L, scant secretions with suctioning  CVS: S1S2 no m/r/g  Abd: soft NT/ND +BS  Ext: no c/c/e, R upper arm ecchymosis   Neuro: awake alert very weak but moving all 4. following comands, able to communicate with head shakes.      HOSPITAL MEDICATIONS:  aspirin  chewable 81 milliGRAM(s) Oral daily      ranolazine 500 milliGRAM(s) Oral two times a day    atorvastatin 40 milliGRAM(s) Oral at bedtime  dextrose 50% Injectable 25 Gram(s) IV Push once  dextrose 50% Injectable 12.5 Gram(s) IV Push once  dextrose 50% Injectable 25 Gram(s) IV Push once  dextrose Oral Gel 15 Gram(s) Oral once PRN  glucagon  Injectable 1 milliGRAM(s) IntraMuscular once  insulin lispro (ADMELOG) corrective regimen sliding scale   SubCutaneous three times a day before meals      acetaminophen     Tablet .. 650 milliGRAM(s) Oral every 6 hours PRN  acetaminophen     Tablet .. 650 milliGRAM(s) Oral every 6 hours PRN  ondansetron Injectable 4 milliGRAM(s) IV Push every 8 hours PRN  QUEtiapine 25 milliGRAM(s) Oral at bedtime    bisacodyl Suppository 10 milliGRAM(s) Rectal daily PRN  pantoprazole  Injectable 40 milliGRAM(s) IV Push every 12 hours  polyethylene glycol 3350 17 Gram(s) Oral daily PRN  senna 2 Tablet(s) Oral at bedtime        dextrose 5%. 1000 milliLiter(s) IV Continuous <Continuous>  dextrose 5%. 1000 milliLiter(s) IV Continuous <Continuous>      chlorhexidine 0.12% Liquid 15 milliLiter(s) Oral Mucosa every 12 hours  chlorhexidine 2% Cloths 1 Application(s) Topical <User Schedule>        LABS:                        7.8    6.86  )-----------( 352      ( 07 Jul 2025 06:30 )             24.8     Hgb Trend: 7.8<--, 7.5<--, 8.9<--, 8.0<--, 6.8<--  07-06    135  |  107  |  54[H]  ----------------------------<  138[H]  3.6   |  18[L]  |  1.05    Ca    7.1[L]      06 Jul 2025 06:20      Creatinine Trend: 1.05<--, 1.11<--, 1.32<--, 1.25<--, 1.30<--, 1.27<--    Urinalysis Basic - ( 06 Jul 2025 06:20 )    Color: x / Appearance: x / SG: x / pH: x  Gluc: 138 mg/dL / Ketone: x  / Bili: x / Urobili: x   Blood: x / Protein: x / Nitrite: x   Leuk Esterase: x / RBC: x / WBC x   Sq Epi: x / Non Sq Epi: x / Bacteria: x           Interval Events: NAEO. trach in place. placed on PSV 15/6    REVIEW OF SYSTEMS:  [x ] Unable to assess ROS    OBJECTIVE:  ICU Vital Signs Last 24 Hrs  T(C): 36.5 (07 Jul 2025 04:21), Max: 36.8 (06 Jul 2025 23:48)  T(F): 97.7 (07 Jul 2025 04:21), Max: 98.2 (06 Jul 2025 23:48)  HR: 65 (07 Jul 2025 04:21) (58 - 69)  BP: 103/58 (07 Jul 2025 04:21) (103/58 - 137/71)  BP(mean): --  ABP: --  ABP(mean): --  RR: 18 (07 Jul 2025 04:21) (18 - 20)  SpO2: 98% (07 Jul 2025 04:21) (97% - 100%)    O2 Parameters below as of 07 Jul 2025 04:21  Patient On (Oxygen Delivery Method): ventilator          Mode: AC/ CMV (Assist Control/ Continuous Mandatory Ventilation), RR (machine): 15, TV (machine): 400, FiO2: 30, PEEP: 6, ITime: 1, MAP: 12, PIP: 29    07-06 @ 07:01  -  07-07 @ 07:00  --------------------------------------------------------  IN: 0 mL / OUT: 1800 mL / NET: -1800 mL      CAPILLARY BLOOD GLUCOSE      POCT Blood Glucose.: 124 mg/dL (06 Jul 2025 16:33)      PHYSICAL EXAM:  Gen: trache to full vent  HEENT: PERRL  Resp: mechanical breath sounds B/L, scant secretions with suctioning  CVS: S1S2 no m/r/g  Abd: soft NT/ND +BS  Ext: no c/c/e, R upper arm ecchymosis   Neuro: awake alert very weak but moving all 4. following comands, able to communicate with head shakes.      HOSPITAL MEDICATIONS:  aspirin  chewable 81 milliGRAM(s) Oral daily      ranolazine 500 milliGRAM(s) Oral two times a day    atorvastatin 40 milliGRAM(s) Oral at bedtime  dextrose 50% Injectable 25 Gram(s) IV Push once  dextrose 50% Injectable 12.5 Gram(s) IV Push once  dextrose 50% Injectable 25 Gram(s) IV Push once  dextrose Oral Gel 15 Gram(s) Oral once PRN  glucagon  Injectable 1 milliGRAM(s) IntraMuscular once  insulin lispro (ADMELOG) corrective regimen sliding scale   SubCutaneous three times a day before meals      acetaminophen     Tablet .. 650 milliGRAM(s) Oral every 6 hours PRN  acetaminophen     Tablet .. 650 milliGRAM(s) Oral every 6 hours PRN  ondansetron Injectable 4 milliGRAM(s) IV Push every 8 hours PRN  QUEtiapine 25 milliGRAM(s) Oral at bedtime    bisacodyl Suppository 10 milliGRAM(s) Rectal daily PRN  pantoprazole  Injectable 40 milliGRAM(s) IV Push every 12 hours  polyethylene glycol 3350 17 Gram(s) Oral daily PRN  senna 2 Tablet(s) Oral at bedtime        dextrose 5%. 1000 milliLiter(s) IV Continuous <Continuous>  dextrose 5%. 1000 milliLiter(s) IV Continuous <Continuous>      chlorhexidine 0.12% Liquid 15 milliLiter(s) Oral Mucosa every 12 hours  chlorhexidine 2% Cloths 1 Application(s) Topical <User Schedule>        LABS:                        7.8    6.86  )-----------( 352      ( 07 Jul 2025 06:30 )             24.8     Hgb Trend: 7.8<--, 7.5<--, 8.9<--, 8.0<--, 6.8<--  07-06    135  |  107  |  54[H]  ----------------------------<  138[H]  3.6   |  18[L]  |  1.05    Ca    7.1[L]      06 Jul 2025 06:20      Creatinine Trend: 1.05<--, 1.11<--, 1.32<--, 1.25<--, 1.30<--, 1.27<--    Urinalysis Basic - ( 06 Jul 2025 06:20 )    Color: x / Appearance: x / SG: x / pH: x  Gluc: 138 mg/dL / Ketone: x  / Bili: x / Urobili: x   Blood: x / Protein: x / Nitrite: x   Leuk Esterase: x / RBC: x / WBC x   Sq Epi: x / Non Sq Epi: x / Bacteria: x           Interval Events: NAEO. trach in place. placed on PSV 15/6    REVIEW OF SYSTEMS:  [x ] Unable to assess ROS    OBJECTIVE:  Vital Signs Last 24 Hrs  T(C): 36.5 (07 Jul 2025 04:21), Max: 36.8 (06 Jul 2025 23:48)  T(F): 97.7 (07 Jul 2025 04:21), Max: 98.2 (06 Jul 2025 23:48)  HR: 65 (07 Jul 2025 04:21) (58 - 69)  BP: 103/58 (07 Jul 2025 04:21) (103/58 - 137/71)  RR: 18 (07 Jul 2025 04:21) (18 - 20)  SpO2: 98% (07 Jul 2025 04:21) (97% - 100%)    O2 Parameters below as of 07 Jul 2025 04:21  Patient On (Oxygen Delivery Method): ventilator          Mode: AC/ CMV (Assist Control/ Continuous Mandatory Ventilation), RR (machine): 15, TV (machine): 400, FiO2: 30, PEEP: 6, ITime: 1, MAP: 12, PIP: 29    07-06 @ 07:01  -  07-07 @ 07:00  --------------------------------------------------------  IN: 0 mL / OUT: 1800 mL / NET: -1800 mL      CAPILLARY BLOOD GLUCOSE  POCT Blood Glucose.: 124 mg/dL (06 Jul 2025 16:33)      PHYSICAL EXAM:  Gen: trache to full vent  HEENT: PERRL  Resp: mechanical breath sounds B/L, scant secretions with suctioning  CVS: S1S2 no m/r/g  Abd: soft NT/ND +BS  Ext: no c/c/e, R upper arm ecchymosis   Neuro: arousable. following commands. able to communicate with head shakes.      HOSPITAL MEDICATIONS:  aspirin  chewable 81 milliGRAM(s) Oral daily      ranolazine 500 milliGRAM(s) Oral two times a day    atorvastatin 40 milliGRAM(s) Oral at bedtime  dextrose 50% Injectable 25 Gram(s) IV Push once  dextrose 50% Injectable 12.5 Gram(s) IV Push once  dextrose 50% Injectable 25 Gram(s) IV Push once  dextrose Oral Gel 15 Gram(s) Oral once PRN  glucagon  Injectable 1 milliGRAM(s) IntraMuscular once  insulin lispro (ADMELOG) corrective regimen sliding scale   SubCutaneous three times a day before meals      acetaminophen     Tablet .. 650 milliGRAM(s) Oral every 6 hours PRN  acetaminophen     Tablet .. 650 milliGRAM(s) Oral every 6 hours PRN  ondansetron Injectable 4 milliGRAM(s) IV Push every 8 hours PRN  QUEtiapine 25 milliGRAM(s) Oral at bedtime    bisacodyl Suppository 10 milliGRAM(s) Rectal daily PRN  pantoprazole  Injectable 40 milliGRAM(s) IV Push every 12 hours  polyethylene glycol 3350 17 Gram(s) Oral daily PRN  senna 2 Tablet(s) Oral at bedtime        dextrose 5%. 1000 milliLiter(s) IV Continuous <Continuous>  dextrose 5%. 1000 milliLiter(s) IV Continuous <Continuous>      chlorhexidine 0.12% Liquid 15 milliLiter(s) Oral Mucosa every 12 hours  chlorhexidine 2% Cloths 1 Application(s) Topical <User Schedule>        LABS:                        7.8    6.86  )-----------( 352      ( 07 Jul 2025 06:30 )             24.8     Hgb Trend: 7.8<--, 7.5<--, 8.9<--, 8.0<--, 6.8<--  07-06    135  |  107  |  54[H]  ----------------------------<  138[H]  3.6   |  18[L]  |  1.05    Ca    7.1[L]      06 Jul 2025 06:20      Creatinine Trend: 1.05<--, 1.11<--, 1.32<--, 1.25<--, 1.30<--, 1.27<--    MICRO  Culture - Blood (06.20.25 @ 17:45)    Specimen Source: Blood Blood-Peripheral   Culture Results:   No growth at 5 days    Culture - Sputum . (06.20.25 @ 17:10)    -  Minocycline: S   Gram Stain:   Rare polymorphonuclear leukocytes per low power field  No Squamous epithelial cells per low power field  No organisms seen per oil power field   -  Levofloxacin: S <=0.5   -  Trimethoprim/Sulfamethoxazole: S <=0.5/9.5   Specimen Source: ET Tube ET Tube   Culture Results:   Moderate Stenotrophomonas maltophilia  Commensal lina consistent with body site   Organism Identification: Stenotrophomonas maltophilia   Organism: Stenotrophomonas maltophilia   Organism: Stenotrophomonas maltophilia   Method Type: KB   Method Type: BRISEIDA      RADIOLOGY  < from: Xray Chest 1 View- PORTABLE-Urgent (Xray Chest 1 View- PORTABLE-Urgent .) (07.03.25 @ 23:35) >  IMPRESSION: Tracheostomy tube in place.   No radiographic evidence of active chest disease..

## 2025-07-07 NOTE — PROGRESS NOTE ADULT - NS ATTEND AMEND GEN_ALL_CORE FT
pt seen and examined at bedside with PA    remains trached to vent FIO2: 30%  comfortable in bed  tracheal suctioning with mild return of white secretions  placed on SBT today with PS 15 over 6  afebrile  no acute events overnight  on tube feeds via PEG, abdomen soft      74F Azeri speaking PMH HTN, HLD, CAD s/p stents, DM2, recent fall 3 days prior to admission with R humerus fracture with arm placed in sling at [facility] with outpatient ortho follow up presents from home 5/30/25 for noted hyperglycemia BS > 400 with decreased PO intake and lethargy. Admitted to medical service for DM with hyperglycemia and ALY. s/p RRT 6/1 for hypotension, fever 103.2 and obtundation. Transferred to ICU for septic shock. Placed on pressors. Intubated for airway protection. Found to have ESBL e-coli UTI and bacteremia with leukocytosis WBC 32 (treated with meropenem). Sent for CT imaging 6/2 with 10 min cardiac arrest while in CT. Neurologically intact post arrest. Treated for NSTEMI with heparin drip. Extubated 6/6 and transferred to medical service 6/8/25. RRT 6/11 for AMS and hypotension with hypotension (SBP 70) resolved without intervention. Had 2nd RRT shortly after 1st for seizure like activity, obtundation, recurrent hypotension, hypoxia, aspiration (family gave juice to patient, similar colored juice content suctioned from pt's airways) intubated and transferred to ICU 6/11/25. CTA chest no pulmonary embolism but +tracheobronchial secretions, greatest in the left lower lobe with patchy and nodular bilateral lung opacities. Acute/subacute fractures right anterior lateral fourth through sixth ribs. Treated for septic shock secondary to aspiration PNA. EEG negative for seizures. Developed fevers with sputum cx 6/20 with Stenotrophomonas. Failed multiple weaning trials. s/p trach 6/23/25. s/p PEG 6/25/25. Downgraded to medical service 7/1/25. Pulmonary consulted for respiratory failure and vent management.    DX: cardiac arrest, acute hypoxic/hypercarbic respiratory failure requiring intubation, aspiration PNA, Stenotrophomonas PNA, ESBL e-coli UTI and bacteremia, severe gram negative sepsis with septic shock, NSTEMI, acute metabolic encephalopathy, rib fractures, R humerus fracture    - s/p intubation x2 during this admission  - failed multiple weaning trials  - s/p trach 6/23/25  - cont mechanical vent support with daily weaning as tolerates  - cont local trach care and tracheal suctioning as needed  - s/p course of meropenem for ESBL e-coli bacteremia and UTI  - s/p course of zosyn for aspiration PNA  - s/p course of bactrim for Stenotrophomonas in sputum cx 6/20  - being monitored off antibx at present time  - on midodrine for BP support, wean as tolerates  - on asa and statin therapy for CAD  - neurologically intact post arrest but with significant generalized deconditioning  - PEG feeds  - DVT ppx  - remainder of care per primary team  - d/w respiratory therapist   - will need vent facility placement  - DNR

## 2025-07-07 NOTE — PROGRESS NOTE ADULT - SUBJECTIVE AND OBJECTIVE BOX
PROGRESS NOTE:     Patient is a 74y old  Female who presents with a chief complaint of ALY, physical deconditioning (05 Jul 2025 16:31)      SUBJECTIVE / OVERNIGHT EVENTS:No acute overnight events. yesterday, patient noted to have melena and blood around peg site. GI consulted.       T(C): 36.6 (07-07-25 @ 10:59), Max: 36.6 (07-07-25 @ 10:59)  HR: 61 (07-07-25 @ 10:59) (61 - 70)  BP: 95/51 (07-07-25 @ 10:59) (95/51 - 103/58)  RR: 19 (07-07-25 @ 10:59) (18 - 19)  SpO2: 100% (07-07-25 @ 10:59) (98% - 100%)    MEDICATIONS  (STANDING):  aspirin  chewable 81 milliGRAM(s) Oral daily  atorvastatin 40 milliGRAM(s) Oral at bedtime  chlorhexidine 0.12% Liquid 15 milliLiter(s) Oral Mucosa every 12 hours  chlorhexidine 2% Cloths 1 Application(s) Topical <User Schedule>  dextrose 5%. 1000 milliLiter(s) (50 mL/Hr) IV Continuous <Continuous>  dextrose 5%. 1000 milliLiter(s) (100 mL/Hr) IV Continuous <Continuous>  dextrose 50% Injectable 25 Gram(s) IV Push once  dextrose 50% Injectable 12.5 Gram(s) IV Push once  dextrose 50% Injectable 25 Gram(s) IV Push once  glucagon  Injectable 1 milliGRAM(s) IntraMuscular once  insulin lispro (ADMELOG) corrective regimen sliding scale   SubCutaneous three times a day before meals  pantoprazole  Injectable 40 milliGRAM(s) IV Push every 12 hours  potassium chloride   Powder 40 milliEquivalent(s) Oral once  QUEtiapine 25 milliGRAM(s) Oral at bedtime  ranolazine 500 milliGRAM(s) Oral two times a day  senna 2 Tablet(s) Oral at bedtime    MEDICATIONS  (PRN):  acetaminophen     Tablet .. 650 milliGRAM(s) Oral every 6 hours PRN Temp greater or equal to 38C (100.4F), Mild Pain (1 - 3)  acetaminophen     Tablet .. 650 milliGRAM(s) Oral every 6 hours PRN Mild Pain (1 - 3)  bisacodyl Suppository 10 milliGRAM(s) Rectal daily PRN Constipation  dextrose Oral Gel 15 Gram(s) Oral once PRN Blood Glucose LESS THAN 70 milliGRAM(s)/deciliter  ondansetron Injectable 4 milliGRAM(s) IV Push every 8 hours PRN Nausea and/or Vomiting/ gagging  polyethylene glycol 3350 17 Gram(s) Oral daily PRN Constipation      General : NAD  Neuro: A&Ox0, eyes open  Cardiovascular: Normal S1 S2, No JVD  Respiratory: : Course breath sounds bilaterally.   Gastrointestinal:  Soft, Non-tender, + BS	. PEG  Skin: No rash, No ecchymoses	    trach/  peg                            7.8    6.86  )-----------( 352      ( 07 Jul 2025 06:30 )             24.8               135|107|49<143  3.3|18|0.91  7.5,--,--  07-07 @ 06:30  Color: x / Appearance: x / SG: x / pH: x  Gluc: 158 mg/dL / Ketone: x  / Bili: x / Urobili: x   Blood: x / Protein: x / Nitrite: x   Leuk Esterase: x / RBC: x / WBC x   Sq Epi: x / Non Sq Epi: x / Bacteria: x        CAPILLARY BLOOD GLUCOSE      POCT Blood Glucose.: 152 mg/dL (07 Jul 2025 11:29)  POCT Blood Glucose.: 192 mg/dL (07 Jul 2025 08:05)  POCT Blood Glucose.: 124 mg/dL (06 Jul 2025 16:33)

## 2025-07-07 NOTE — PROGRESS NOTE ADULT - ASSESSMENT
74-year-old female with hypertension, hyperlipidemia, 2 diabetes, CAD status post multiple stents with a recent right humerus fracture roughly 3 days ago following a traumatic fall admitted to the medical ICU for altered mental status and hypotension.  Found to be in septic shock secondary to colitis and UTI found to have ESBL E. coli bacteremia.  Hospital course further complicated by cardiac arrest of unclear origin with ROSC after 10 minutes of CPR. Patient was extubated and transferred to the floors. Patient had RRT for aspiration and AMS and was intubated. Patient unable to wean off the vent, underwent trach/PEG for prolonged weaning. Pulmonary consulted after ICU Transfer.     Clinical course with septic shock 2/5 ESBL e Coli Bacteremia subsequent cardiac arrest extubated then with aspiration event reintubated and unable to wean 2/2 physical debilitation. Now S/P tracheostomy 6/23/25  Dx: acute respiratory failure , ventilator dependent     6/23 s/p Trach 7 portex   6/25 PEG     Reccs:  - intermittently tolerates PS   - placed on 15/6 30%   - o2 saturation remains 100%   - C/W daily weaning as tolerates   - ID following reccs appreciated: observe off abx for now  - LTAC/ dispo planing  - Rest of care as per primary team     Discussed with Dr Hassan.

## 2025-07-08 LAB
GLUCOSE BLDC GLUCOMTR-MCNC: 216 MG/DL — HIGH (ref 70–99)
GLUCOSE BLDC GLUCOMTR-MCNC: 228 MG/DL — HIGH (ref 70–99)
GLUCOSE BLDC GLUCOMTR-MCNC: 250 MG/DL — HIGH (ref 70–99)
GLUCOSE BLDC GLUCOMTR-MCNC: 291 MG/DL — HIGH (ref 70–99)

## 2025-07-08 PROCEDURE — 99233 SBSQ HOSP IP/OBS HIGH 50: CPT | Mod: 25

## 2025-07-08 PROCEDURE — 93971 EXTREMITY STUDY: CPT | Mod: 26,LT

## 2025-07-08 RX ADMIN — INSULIN LISPRO 2: 100 INJECTION, SOLUTION INTRAVENOUS; SUBCUTANEOUS at 11:18

## 2025-07-08 RX ADMIN — INSULIN LISPRO 3: 100 INJECTION, SOLUTION INTRAVENOUS; SUBCUTANEOUS at 07:52

## 2025-07-08 RX ADMIN — Medication 81 MILLIGRAM(S): at 11:18

## 2025-07-08 RX ADMIN — Medication 650 MILLIGRAM(S): at 05:45

## 2025-07-08 RX ADMIN — Medication 1 APPLICATION(S): at 05:45

## 2025-07-08 RX ADMIN — Medication 650 MILLIGRAM(S): at 06:20

## 2025-07-08 RX ADMIN — Medication 15 MILLILITER(S): at 05:45

## 2025-07-08 RX ADMIN — Medication 15 MILLILITER(S): at 17:01

## 2025-07-08 RX ADMIN — ATORVASTATIN CALCIUM 40 MILLIGRAM(S): 80 TABLET, FILM COATED ORAL at 21:11

## 2025-07-08 RX ADMIN — Medication 40 MILLIGRAM(S): at 17:01

## 2025-07-08 RX ADMIN — INSULIN LISPRO 2: 100 INJECTION, SOLUTION INTRAVENOUS; SUBCUTANEOUS at 17:01

## 2025-07-08 RX ADMIN — Medication 2 TABLET(S): at 21:11

## 2025-07-08 RX ADMIN — QUETIAPINE FUMARATE 25 MILLIGRAM(S): 25 TABLET ORAL at 21:12

## 2025-07-08 RX ADMIN — Medication 40 MILLIGRAM(S): at 05:45

## 2025-07-08 NOTE — PROGRESS NOTE ADULT - ASSESSMENT
74-year-old female with hypertension, hyperlipidemia, 2 diabetes, CAD status post multiple stents with a recent right humerus fracture roughly 3 days ago following a traumatic fall admitted to the medical ICU for altered mental status and hypotension.  Found to be in septic shock secondary to colitis and UTI found to have ESBL E. coli bacteremia.  Hospital course further complicated by cardiac arrest of unclear origin with ROSC after 10 minutes of CPR. Patient was extubated and transferred to the floors. Patient had RRT for aspiration and AMS and was intubated. Patient unable to wean off the vent, underwent trach/PEG for prolonged weaning. Pulmonary consulted after ICU Transfer.     Clinical course with septic shock 2/5 ESBL e Coli Bacteremia subsequent cardiac arrest extubated then with aspiration event reintubated and unable to wean 2/2 physical debilitation. Now S/P tracheostomy 6/23/25  Dx: acute respiratory failure , ventilator dependent     6/23 s/p Trach 7 portex   6/25 PEG     Reccs:  - intermittently tolerates PS    - C/W daily weaning as tolerates   - ID following reccs appreciated: observe off abx for now  - LTAC/ dispo planing  - Rest of care as per primary team     note incomplete 74-year-old female with hypertension, hyperlipidemia, 2 diabetes, CAD status post multiple stents with a recent right humerus fracture roughly 3 days ago following a traumatic fall admitted to the medical ICU for altered mental status and hypotension.  Found to be in septic shock secondary to colitis and UTI found to have ESBL E. coli bacteremia.  Hospital course further complicated by cardiac arrest of unclear origin with ROSC after 10 minutes of CPR. Patient was extubated and transferred to the floors. Patient had RRT for aspiration and AMS and was intubated. Patient unable to wean off the vent, underwent trach/PEG for prolonged weaning. Pulmonary consulted after ICU Transfer.     Clinical course with septic shock 2/5 ESBL e Coli Bacteremia subsequent cardiac arrest extubated then with aspiration event reintubated and unable to wean 2/2 physical debilitation. Now S/P tracheostomy 6/23/25  Dx: acute respiratory failure , ventilator dependent     6/23 s/p Trach 7 portex   6/25 PEG     Reccs:  - intermittently tolerates PS    - C/W daily weaning as tolerates   - ID following reccs appreciated: observe off abx for now  - LTAC/ dispo planing  - Rest of care as per primary team      74-year-old female with hypertension, hyperlipidemia, 2 diabetes, CAD status post multiple stents with a recent right humerus fracture roughly 3 days ago following a traumatic fall admitted to the medical ICU for altered mental status and hypotension.  Found to be in septic shock secondary to colitis and UTI found to have ESBL E. coli bacteremia.  Hospital course further complicated by cardiac arrest of unclear origin with ROSC after 10 minutes of CPR. Patient was extubated and transferred to the floors. Patient had RRT for aspiration and AMS and was intubated. Patient unable to wean off the vent, underwent trach/PEG for prolonged weaning. Pulmonary consulted after ICU Transfer.     Clinical course with septic shock 2/5 ESBL e Coli Bacteremia subsequent cardiac arrest extubated then with aspiration event reintubated and unable to wean 2/2 physical debilitation. Now S/P tracheostomy 6/23/25  Dx: acute respiratory failure , ventilator dependent     6/23 s/p Trach 7 portex   6/25 PEG     Reccs:  - intermittently tolerates PS    - C/W daily weaning as tolerates   - ID following reccs appreciated: observe off abx for now  - LTAC/ dispo planing  - Rest of care as per primary team     d/w dr erickson

## 2025-07-08 NOTE — CHART NOTE - NSCHARTNOTEFT_GEN_A_CORE
Called to evaluate prolene sutures securing trach in place  On exam, trach in place with prolene sutures securing trach. Mechanically ventilated  Peg in place with tube feeds running at 40  As discussed with Dr. Denys Ramires, prolene sutures to stay in place for now

## 2025-07-08 NOTE — PROGRESS NOTE ADULT - NS ATTEND AMEND GEN_ALL_CORE FT
pt seen and examined at bedside with PA    on FIO2: 30% oxygenating well  failed wean early AM yesterday due to apnea  reweaned again later in afternoon on PS 15/6 and tolerated wean for a few hours approx 4 hrs  placed back on full vent support overnight  comfortable in bed, trached to vent  afebrile  no acute events overnight  generalized weakness and debility      74F [place] speaking PMH HTN, HLD, CAD s/p stents, DM2, recent fall 3 days prior to admission with R humerus fracture with arm placed in sling at [facility] with outpatient ortho follow up presents from home 5/30/25 for noted hyperglycemia BS > 400 with decreased PO intake and lethargy. Admitted to medical service for DM with hyperglycemia and ALY. s/p RRT 6/1 for hypotension, fever 103.2 and obtundation. Transferred to ICU for septic shock. Placed on pressors. Intubated for airway protection. Found to have ESBL e-coli UTI and bacteremia with leukocytosis WBC 32 (treated with meropenem). Sent for CT imaging 6/2 with 10 min cardiac arrest while in CT. Neurologically intact post arrest. Treated for NSTEMI with heparin drip. Extubated 6/6 and transferred to medical service 6/8/25. RRT 6/11 for AMS and hypotension with hypotension (SBP 70) resolved without intervention. Had 2nd RRT shortly after 1st for seizure like activity, obtundation, recurrent hypotension, hypoxia, aspiration (family gave juice to patient, similar colored juice content suctioned from pt's airways) intubated and transferred to ICU 6/11/25. CTA chest no pulmonary embolism but +tracheobronchial secretions, greatest in the left lower lobe with patchy and nodular bilateral lung opacities. Acute/subacute fractures right anterior lateral fourth through sixth ribs. Treated for septic shock secondary to aspiration PNA. EEG negative for seizures. Developed fevers with sputum cx 6/20 with Stenotrophomonas. Failed multiple weaning trials. s/p trach 6/23/25. s/p PEG 6/25/25. Downgraded to medical service 7/1/25. Pulmonary consulted for respiratory failure and vent management.    DX: cardiac arrest, acute hypoxic/hypercarbic respiratory failure requiring intubation, aspiration PNA, Stenotrophomonas PNA, ESBL e-coli UTI and bacteremia, severe gram negative sepsis with septic shock, NSTEMI, acute metabolic encephalopathy, rib fractures, R humerus fracture    - remains vent dependent at present time  - tolerating intermittent spontaneous breathing trials, but requires high pressure support of 15  - failing wean due to apnea and low TVs  - cont daily weaning as tolerates  - cont local trach care and tracheal suctioning as needed  - s/p course of meropenem for ESBL e-coli bacteremia and UTI  - s/p course of zosyn for aspiration PNA  - s/p course of bactrim for Stenotrophomonas in sputum cx 6/20  - being monitored off antibx currently  - weaned off midodrine yesterday, monitor BP   - on asa and statin therapy for CAD  - neurologically intact post arrest but with significant generalized deconditioning  - PEG feeds  - DVT ppx  - remainder of care per primary team  - will need vent facility placement  - DNR pt seen and examined at bedside with PA    on FIO2: 30% oxygenating well  failed wean early AM yesterday due to apnea  reweaned again later in afternoon on PS 15/6 and tolerated wean for a few hours approx 4 hrs  placed back on full vent support overnight  placed on weaning trial again today at PS 15/6  comfortable in bed, trached to vent  afebrile  no acute events overnight  generalized weakness and debility  awake and smiles/nods to simple questions      74F Indonesian speaking PMH HTN, HLD, CAD s/p stents, DM2, recent fall 3 days prior to admission with R humerus fracture with arm placed in sling at [facility] with outpatient ortho follow up presents from home 5/30/25 for noted hyperglycemia BS > 400 with decreased PO intake and lethargy. Admitted to medical service for DM with hyperglycemia and ALY. s/p RRT 6/1 for hypotension, fever 103.2 and obtundation. Transferred to ICU for septic shock. Placed on pressors. Intubated for airway protection. Found to have ESBL e-coli UTI and bacteremia with leukocytosis WBC 32 (treated with meropenem). Sent for CT imaging 6/2 with 10 min cardiac arrest while in CT. Neurologically intact post arrest. Treated for NSTEMI with heparin drip. Extubated 6/6 and transferred to medical service 6/8/25. RRT 6/11 for AMS and hypotension with hypotension (SBP 70) resolved without intervention. Had 2nd RRT shortly after 1st for seizure like activity, obtundation, recurrent hypotension, hypoxia, aspiration (family gave juice to patient, similar colored juice content suctioned from pt's airways) intubated and transferred to ICU 6/11/25. CTA chest no pulmonary embolism but +tracheobronchial secretions, greatest in the left lower lobe with patchy and nodular bilateral lung opacities. Acute/subacute fractures right anterior lateral fourth through sixth ribs. Treated for septic shock secondary to aspiration PNA. EEG negative for seizures. Developed fevers with sputum cx 6/20 with Stenotrophomonas. Failed multiple weaning trials. s/p trach 6/23/25. s/p PEG 6/25/25. Downgraded to medical service 7/1/25. Pulmonary consulted for respiratory failure and vent management. Hospital course with anemia, GI bleed with melena and noted blood around PEG site. Anemic on 7/4 with Hb 6.8 s/p 1 unit pRBC. Stool occult blood +. Seen by GI, placed on PPI twice daily.     DX: cardiac arrest, acute hypoxic/hypercarbic respiratory failure requiring intubation, aspiration PNA, Stenotrophomonas PNA, ESBL e-coli UTI and bacteremia, severe gram negative sepsis with septic shock, NSTEMI, upper GI bleed with anemia requiring pRBC transfusion, acute metabolic encephalopathy, rib fractures, R humerus fracture    - remains vent dependent at present time  - tolerating intermittent spontaneous breathing trials, but requires high pressure support of 15  - failing wean due to apnea and low TVs  - cont daily weaning as tolerates  - cont local trach care and tracheal suctioning as needed  - s/p course of meropenem for ESBL e-coli bacteremia and UTI  - s/p course of zosyn for aspiration PNA  - s/p course of bactrim for Stenotrophomonas in sputum cx 6/20  - being monitored off antibx currently  - weaned off midodrine yesterday, monitor BP   - on asa and statin therapy for CAD  - neurologically intact post arrest but with significant generalized deconditioning  - PEG feeds, cont PPI. No further GI bleed noted. management per GI and primary team  - DVT ppx  - remainder of care per primary team  - will need vent facility placement  - DNR  - family inquiring how long pt will be on mechanical ventilation. at present time she is vent dependent and not tolerating weaning. trach to vent status may be prolonged and indefinite.   - d/w bedside nurse  - d/w respiratory therapist

## 2025-07-08 NOTE — PROGRESS NOTE ADULT - SUBJECTIVE AND OBJECTIVE BOX
INTERVAL HPI/OVERNIGHT EVENTS: no acute events overnight     SUBJECTIVE: Patient seen and examined at bedside.     ROS: unable to assess     VITAL SIGNS:  ICU Vital Signs Last 24 Hrs  T(C): 37 (08 Jul 2025 04:29), Max: 37.6 (07 Jul 2025 17:39)  T(F): 98.6 (08 Jul 2025 04:29), Max: 99.6 (07 Jul 2025 17:39)  HR: 75 (08 Jul 2025 05:37) (61 - 81)  BP: 100/68 (08 Jul 2025 04:29) (95/51 - 130/69)  BP(mean): --  ABP: --  ABP(mean): --  RR: 18 (08 Jul 2025 04:29) (14 - 19)  SpO2: 99% (08 Jul 2025 05:37) (99% - 100%)    O2 Parameters below as of 08 Jul 2025 04:29  Patient On (Oxygen Delivery Method): ventilator          Mode: AC/ CMV (Assist Control/ Continuous Mandatory Ventilation), RR (machine): 15, TV (machine): 400, FiO2: 30, PEEP: 6, ITime: 1, MAP: 16, PIP: 25      07-07 @ 07:01  -  07-08 @ 07:00  --------------------------------------------------------  IN: 0 mL / OUT: 900 mL / NET: -900 mL      CAPILLARY BLOOD GLUCOSE      POCT Blood Glucose.: 228 mg/dL (08 Jul 2025 01:35)      ECG: reviewed.    PHYSICAL EXAM:    Gen: trache to full vent  HEENT: PERRL  Resp: mechanical breath sounds B/L, scant secretions with suctioning  CVS: S1S2 no m/r/g  Abd: soft NT/ND +BS  Ext: no c/c/e, R upper arm ecchymosis   Neuro: arousable. following some commands. able to communicate with head shakes.    MEDICATIONS:  MEDICATIONS  (STANDING):  aspirin  chewable 81 milliGRAM(s) Oral daily  atorvastatin 40 milliGRAM(s) Oral at bedtime  chlorhexidine 0.12% Liquid 15 milliLiter(s) Oral Mucosa every 12 hours  chlorhexidine 2% Cloths 1 Application(s) Topical <User Schedule>  dextrose 5%. 1000 milliLiter(s) (50 mL/Hr) IV Continuous <Continuous>  dextrose 5%. 1000 milliLiter(s) (100 mL/Hr) IV Continuous <Continuous>  dextrose 50% Injectable 25 Gram(s) IV Push once  dextrose 50% Injectable 12.5 Gram(s) IV Push once  dextrose 50% Injectable 25 Gram(s) IV Push once  glucagon  Injectable 1 milliGRAM(s) IntraMuscular once  insulin lispro (ADMELOG) corrective regimen sliding scale   SubCutaneous three times a day before meals  pantoprazole  Injectable 40 milliGRAM(s) IV Push every 12 hours  QUEtiapine 25 milliGRAM(s) Oral at bedtime  ranolazine 500 milliGRAM(s) Oral two times a day  senna 2 Tablet(s) Oral at bedtime    MEDICATIONS  (PRN):  acetaminophen     Tablet .. 650 milliGRAM(s) Oral every 6 hours PRN Temp greater or equal to 38C (100.4F), Mild Pain (1 - 3)  acetaminophen     Tablet .. 650 milliGRAM(s) Oral every 6 hours PRN Mild Pain (1 - 3)  bisacodyl Suppository 10 milliGRAM(s) Rectal daily PRN Constipation  dextrose Oral Gel 15 Gram(s) Oral once PRN Blood Glucose LESS THAN 70 milliGRAM(s)/deciliter  ondansetron Injectable 4 milliGRAM(s) IV Push every 8 hours PRN Nausea and/or Vomiting/ gagging  polyethylene glycol 3350 17 Gram(s) Oral daily PRN Constipation      ALLERGIES:  Allergies    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/shellfish (Short breath)  No Known Drug Allergies    Intolerances        LABS:                        7.8    6.86  )-----------( 352      ( 07 Jul 2025 06:30 )             24.8     07-07    135  |  107  |  49[H]  ----------------------------<  143[H]  3.3[L]   |  18[L]  |  0.91    Ca    7.5[L]      07 Jul 2025 06:30        Urinalysis Basic - ( 07 Jul 2025 06:30 )    Color: x / Appearance: x / SG: x / pH: x  Gluc: 143 mg/dL / Ketone: x  / Bili: x / Urobili: x   Blood: x / Protein: x / Nitrite: x   Leuk Esterase: x / RBC: x / WBC x   Sq Epi: x / Non Sq Epi: x / Bacteria: x      ABG:      vBG:    Micro:    Culture - Blood (collected 06-20-25 @ 17:45)  Source: Blood Blood-Peripheral  Final Report (06-26-25 @ 01:00):    No growth at 5 days    Culture - Blood (collected 06-20-25 @ 17:28)  Source: Blood Blood-Peripheral  Final Report (06-26-25 @ 01:00):    No growth at 5 days    Culture - Blood (collected 06-11-25 @ 18:05)  Source: Blood Blood-Peripheral  Final Report (06-17-25 @ 01:01):    No growth at 5 days    Culture - Blood (collected 06-11-25 @ 18:00)  Source: Blood Blood-Peripheral  Final Report (06-17-25 @ 01:01):    No growth at 5 days        Culture - Sputum (collected 06-20-25 @ 17:10)  Source: ET Tube ET Tube  Gram Stain (06-22-25 @ 16:58):    Rare polymorphonuclear leukocytes per low power field    No Squamous epithelial cells per low power field    No organisms seen per oil power field  Final Report (06-22-25 @ 16:58):    Moderate Stenotrophomonas maltophilia    Commensal lina consistent with body site  Organism: Stenotrophomonas maltophilia (06-22-25 @ 16:58)  Organism: Stenotrophomonas maltophilia (06-22-25 @ 16:58)      -  Minocycline: S      Method Type:   Organism: Stenotrophomonas maltophilia (06-22-25 @ 16:58)      -  Levofloxacin: S <=0.5      Method Type: BRISEIDA      -  Trimethoprim/Sulfamethoxazole: S <=0.5/9.5    Culture - Sputum (collected 06-13-25 @ 10:18)  Source: ET Tube ET Tube  Gram Stain (06-14-25 @ 23:46):    No Squamous epithelial cells per low power field    Numerous polymorphonuclear leukocytes per low power field    Few Yeast per oil power field  Final Report (06-14-25 @ 23:46):    Commensal lina consistent with body site    Culture - Sputum (collected 06-12-25 @ 08:00)  Source: Sputum Sputum  Gram Stain (06-13-25 @ 22:15):    Few Squamous epithelial cells per low power field    Numerous polymorphonuclear leukocytes per low power field    Yeast per oil power field  Final Report (06-13-25 @ 22:15):    Commensal lina consistent with body site        RADIOLOGY & ADDITIONAL TESTS: Reviewed.   INTERVAL HPI/OVERNIGHT EVENTS: no acute events overnight     SUBJECTIVE: Patient seen and examined at bedside.     ROS: unable to assess     VITAL SIGNS:  Vital Signs Last 24 Hrs  T(C): 37 (08 Jul 2025 04:29), Max: 37.6 (07 Jul 2025 17:39)  T(F): 98.6 (08 Jul 2025 04:29), Max: 99.6 (07 Jul 2025 17:39)  HR: 75 (08 Jul 2025 05:37) (61 - 81)  BP: 100/68 (08 Jul 2025 04:29) (95/51 - 130/69)  RR: 18 (08 Jul 2025 04:29) (14 - 19)  SpO2: 99% (08 Jul 2025 05:37) (99% - 100%)    O2 Parameters below as of 08 Jul 2025 04:29  Patient On (Oxygen Delivery Method): ventilator          Mode: AC/ CMV (Assist Control/ Continuous Mandatory Ventilation), RR (machine): 15, TV (machine): 400, FiO2: 30, PEEP: 6, ITime: 1, MAP: 16, PIP: 25      07-07 @ 07:01  -  07-08 @ 07:00  --------------------------------------------------------  IN: 0 mL / OUT: 900 mL / NET: -900 mL      CAPILLARY BLOOD GLUCOSE  POCT Blood Glucose.: 228 mg/dL (08 Jul 2025 01:35)          PHYSICAL EXAM:  Gen: trache to full vent  HEENT: PERRL + trach (8 portex)  Resp: mechanical breath sounds B/L, scant secretions with suctioning  CVS: S1S2 no m/r/g  Abd: soft NT/ND +BS + PEG  Ext: no c/c/e, R upper arm ecchymosis   Neuro: arousable. following some commands. able to communicate with head shakes.      MEDICATIONS:  MEDICATIONS  (STANDING):  aspirin  chewable 81 milliGRAM(s) Oral daily  atorvastatin 40 milliGRAM(s) Oral at bedtime  chlorhexidine 0.12% Liquid 15 milliLiter(s) Oral Mucosa every 12 hours  chlorhexidine 2% Cloths 1 Application(s) Topical <User Schedule>  dextrose 5%. 1000 milliLiter(s) (50 mL/Hr) IV Continuous <Continuous>  dextrose 5%. 1000 milliLiter(s) (100 mL/Hr) IV Continuous <Continuous>  dextrose 50% Injectable 25 Gram(s) IV Push once  dextrose 50% Injectable 12.5 Gram(s) IV Push once  dextrose 50% Injectable 25 Gram(s) IV Push once  glucagon  Injectable 1 milliGRAM(s) IntraMuscular once  insulin lispro (ADMELOG) corrective regimen sliding scale   SubCutaneous three times a day before meals  pantoprazole  Injectable 40 milliGRAM(s) IV Push every 12 hours  QUEtiapine 25 milliGRAM(s) Oral at bedtime  ranolazine 500 milliGRAM(s) Oral two times a day  senna 2 Tablet(s) Oral at bedtime    MEDICATIONS  (PRN):  acetaminophen     Tablet .. 650 milliGRAM(s) Oral every 6 hours PRN Temp greater or equal to 38C (100.4F), Mild Pain (1 - 3)  acetaminophen     Tablet .. 650 milliGRAM(s) Oral every 6 hours PRN Mild Pain (1 - 3)  bisacodyl Suppository 10 milliGRAM(s) Rectal daily PRN Constipation  dextrose Oral Gel 15 Gram(s) Oral once PRN Blood Glucose LESS THAN 70 milliGRAM(s)/deciliter  ondansetron Injectable 4 milliGRAM(s) IV Push every 8 hours PRN Nausea and/or Vomiting/ gagging  polyethylene glycol 3350 17 Gram(s) Oral daily PRN Constipation        Allergies  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/shellfish (Short breath)  No Known Drug Allergies            LABS:                                     7.8    6.86  )-----------( 352      ( 07 Jul 2025 06:30 )             24.8     07-07    135  |  107  |  49[H]  ----------------------------<  143[H]  3.3[L]   |  18[L]  |  0.91    Ca    7.5[L]      07 Jul 2025 06:30          Micro:    Culture - Blood (collected 06-20-25 @ 17:45)  Source: Blood Blood-Peripheral  Final Report (06-26-25 @ 01:00):    No growth at 5 days    Culture - Blood (collected 06-20-25 @ 17:28)  Source: Blood Blood-Peripheral  Final Report (06-26-25 @ 01:00):    No growth at 5 days    Culture - Blood (collected 06-11-25 @ 18:05)  Source: Blood Blood-Peripheral  Final Report (06-17-25 @ 01:01):    No growth at 5 days    Culture - Blood (collected 06-11-25 @ 18:00)  Source: Blood Blood-Peripheral  Final Report (06-17-25 @ 01:01):    No growth at 5 days        Culture - Sputum (collected 06-20-25 @ 17:10)  Source: ET Tube ET Tube  Gram Stain (06-22-25 @ 16:58):    Rare polymorphonuclear leukocytes per low power field    No Squamous epithelial cells per low power field    No organisms seen per oil power field  Final Report (06-22-25 @ 16:58):    Moderate Stenotrophomonas maltophilia    Commensal lina consistent with body site  Organism: Stenotrophomonas maltophilia (06-22-25 @ 16:58)  Organism: Stenotrophomonas maltophilia (06-22-25 @ 16:58)      -  Minocycline: S      Method Type: KB  Organism: Stenotrophomonas maltophilia (06-22-25 @ 16:58)      -  Levofloxacin: S <=0.5      Method Type: BRISEIDA      -  Trimethoprim/Sulfamethoxazole: S <=0.5/9.5    Culture - Sputum (collected 06-13-25 @ 10:18)  Source: ET Tube ET Tube  Gram Stain (06-14-25 @ 23:46):    No Squamous epithelial cells per low power field    Numerous polymorphonuclear leukocytes per low power field    Few Yeast per oil power field  Final Report (06-14-25 @ 23:46):    Commensal lina consistent with body site    Culture - Sputum (collected 06-12-25 @ 08:00)  Source: Sputum Sputum  Gram Stain (06-13-25 @ 22:15):    Few Squamous epithelial cells per low power field    Numerous polymorphonuclear leukocytes per low power field    Yeast per oil power field  Final Report (06-13-25 @ 22:15):    Commensal lina consistent with body site        RADIOLOGY & ADDITIONAL TESTS: Reviewed.  < from: Xray Chest 1 View- PORTABLE-Urgent (Xray Chest 1 View- PORTABLE-Urgent .) (07.03.25 @ 23:35) >  IMPRESSION: Tracheostomy tube in place.   No radiographic evidence of active chest disease..         INTERVAL HPI/OVERNIGHT EVENTS: no acute events overnight     SUBJECTIVE: Patient seen and examined at bedside. Pt on full support 30% fio2    ROS: unable to assess     VITAL SIGNS:  Vital Signs Last 24 Hrs  T(C): 37 (08 Jul 2025 04:29), Max: 37.6 (07 Jul 2025 17:39)  T(F): 98.6 (08 Jul 2025 04:29), Max: 99.6 (07 Jul 2025 17:39)  HR: 75 (08 Jul 2025 05:37) (61 - 81)  BP: 100/68 (08 Jul 2025 04:29) (95/51 - 130/69)  RR: 18 (08 Jul 2025 04:29) (14 - 19)  SpO2: 99% (08 Jul 2025 05:37) (99% - 100%)    O2 Parameters below as of 08 Jul 2025 04:29  Patient On (Oxygen Delivery Method): ventilator          Mode: AC/ CMV (Assist Control/ Continuous Mandatory Ventilation), RR (machine): 15, TV (machine): 400, FiO2: 30, PEEP: 6, ITime: 1, MAP: 16, PIP: 25      07-07 @ 07:01  -  07-08 @ 07:00  --------------------------------------------------------  IN: 0 mL / OUT: 900 mL / NET: -900 mL      CAPILLARY BLOOD GLUCOSE  POCT Blood Glucose.: 228 mg/dL (08 Jul 2025 01:35)          PHYSICAL EXAM:  Gen: trache to full vent  HEENT: PERRL + trach (7 portex)  Resp: mechanical breath sounds B/L  CVS: S1S2 no m/r/g  Abd: soft NT/ND +BS + PEG  Ext: no c/c/e, R upper arm ecchymosis   Neuro: arousable. following some commands. able to communicate with head shakes.      MEDICATIONS:  MEDICATIONS  (STANDING):  aspirin  chewable 81 milliGRAM(s) Oral daily  atorvastatin 40 milliGRAM(s) Oral at bedtime  chlorhexidine 0.12% Liquid 15 milliLiter(s) Oral Mucosa every 12 hours  chlorhexidine 2% Cloths 1 Application(s) Topical <User Schedule>  dextrose 5%. 1000 milliLiter(s) (50 mL/Hr) IV Continuous <Continuous>  dextrose 5%. 1000 milliLiter(s) (100 mL/Hr) IV Continuous <Continuous>  dextrose 50% Injectable 25 Gram(s) IV Push once  dextrose 50% Injectable 12.5 Gram(s) IV Push once  dextrose 50% Injectable 25 Gram(s) IV Push once  glucagon  Injectable 1 milliGRAM(s) IntraMuscular once  insulin lispro (ADMELOG) corrective regimen sliding scale   SubCutaneous three times a day before meals  pantoprazole  Injectable 40 milliGRAM(s) IV Push every 12 hours  QUEtiapine 25 milliGRAM(s) Oral at bedtime  ranolazine 500 milliGRAM(s) Oral two times a day  senna 2 Tablet(s) Oral at bedtime    MEDICATIONS  (PRN):  acetaminophen     Tablet .. 650 milliGRAM(s) Oral every 6 hours PRN Temp greater or equal to 38C (100.4F), Mild Pain (1 - 3)  acetaminophen     Tablet .. 650 milliGRAM(s) Oral every 6 hours PRN Mild Pain (1 - 3)  bisacodyl Suppository 10 milliGRAM(s) Rectal daily PRN Constipation  dextrose Oral Gel 15 Gram(s) Oral once PRN Blood Glucose LESS THAN 70 milliGRAM(s)/deciliter  ondansetron Injectable 4 milliGRAM(s) IV Push every 8 hours PRN Nausea and/or Vomiting/ gagging  polyethylene glycol 3350 17 Gram(s) Oral daily PRN Constipation        Allergies  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/shellfish (Short breath)  No Known Drug Allergies            LABS:                                     7.8    6.86  )-----------( 352      ( 07 Jul 2025 06:30 )             24.8     07-07    135  |  107  |  49[H]  ----------------------------<  143[H]  3.3[L]   |  18[L]  |  0.91    Ca    7.5[L]      07 Jul 2025 06:30          Micro:    Culture - Blood (collected 06-20-25 @ 17:45)  Source: Blood Blood-Peripheral  Final Report (06-26-25 @ 01:00):    No growth at 5 days    Culture - Blood (collected 06-20-25 @ 17:28)  Source: Blood Blood-Peripheral  Final Report (06-26-25 @ 01:00):    No growth at 5 days    Culture - Blood (collected 06-11-25 @ 18:05)  Source: Blood Blood-Peripheral  Final Report (06-17-25 @ 01:01):    No growth at 5 days    Culture - Blood (collected 06-11-25 @ 18:00)  Source: Blood Blood-Peripheral  Final Report (06-17-25 @ 01:01):    No growth at 5 days        Culture - Sputum (collected 06-20-25 @ 17:10)  Source: ET Tube ET Tube  Gram Stain (06-22-25 @ 16:58):    Rare polymorphonuclear leukocytes per low power field    No Squamous epithelial cells per low power field    No organisms seen per oil power field  Final Report (06-22-25 @ 16:58):    Moderate Stenotrophomonas maltophilia    Commensal lina consistent with body site  Organism: Stenotrophomonas maltophilia (06-22-25 @ 16:58)  Organism: Stenotrophomonas maltophilia (06-22-25 @ 16:58)      -  Minocycline: S      Method Type: KB  Organism: Stenotrophomonas maltophilia (06-22-25 @ 16:58)      -  Levofloxacin: S <=0.5      Method Type: BRISEIDA      -  Trimethoprim/Sulfamethoxazole: S <=0.5/9.5    Culture - Sputum (collected 06-13-25 @ 10:18)  Source: ET Tube ET Tube  Gram Stain (06-14-25 @ 23:46):    No Squamous epithelial cells per low power field    Numerous polymorphonuclear leukocytes per low power field    Few Yeast per oil power field  Final Report (06-14-25 @ 23:46):    Commensal lina consistent with body site    Culture - Sputum (collected 06-12-25 @ 08:00)  Source: Sputum Sputum  Gram Stain (06-13-25 @ 22:15):    Few Squamous epithelial cells per low power field    Numerous polymorphonuclear leukocytes per low power field    Yeast per oil power field  Final Report (06-13-25 @ 22:15):    Commensal lina consistent with body site        RADIOLOGY & ADDITIONAL TESTS: Reviewed.  < from: Xray Chest 1 View- PORTABLE-Urgent (Xray Chest 1 View- PORTABLE-Urgent .) (07.03.25 @ 23:35) >  IMPRESSION: Tracheostomy tube in place.   No radiographic evidence of active chest disease..

## 2025-07-09 LAB
ANION GAP SERPL CALC-SCNC: 12 MMOL/L — SIGNIFICANT CHANGE UP (ref 5–17)
BUN SERPL-MCNC: 66 MG/DL — HIGH (ref 7–23)
CALCIUM SERPL-MCNC: 7.7 MG/DL — LOW (ref 8.5–10.1)
CHLORIDE SERPL-SCNC: 109 MMOL/L — HIGH (ref 96–108)
CO2 SERPL-SCNC: 16 MMOL/L — LOW (ref 22–31)
CREAT SERPL-MCNC: 1.08 MG/DL — SIGNIFICANT CHANGE UP (ref 0.5–1.3)
EGFR: 53 ML/MIN/1.73M2 — LOW
EGFR: 53 ML/MIN/1.73M2 — LOW
GLUCOSE BLDC GLUCOMTR-MCNC: 166 MG/DL — HIGH (ref 70–99)
GLUCOSE BLDC GLUCOMTR-MCNC: 176 MG/DL — HIGH (ref 70–99)
GLUCOSE BLDC GLUCOMTR-MCNC: 222 MG/DL — HIGH (ref 70–99)
GLUCOSE BLDC GLUCOMTR-MCNC: 294 MG/DL — HIGH (ref 70–99)
GLUCOSE BLDC GLUCOMTR-MCNC: 315 MG/DL — HIGH (ref 70–99)
GLUCOSE SERPL-MCNC: 172 MG/DL — HIGH (ref 70–99)
HCT VFR BLD CALC: 24.3 % — LOW (ref 34.5–45)
HGB BLD-MCNC: 7.4 G/DL — LOW (ref 11.5–15.5)
MAGNESIUM SERPL-MCNC: 2.7 MG/DL — HIGH (ref 1.6–2.6)
MCHC RBC-ENTMCNC: 28.1 PG — SIGNIFICANT CHANGE UP (ref 27–34)
MCHC RBC-ENTMCNC: 30.5 G/DL — LOW (ref 32–36)
MCV RBC AUTO: 92.4 FL — SIGNIFICANT CHANGE UP (ref 80–100)
NRBC BLD AUTO-RTO: 0 /100 WBCS — SIGNIFICANT CHANGE UP (ref 0–0)
PHOSPHATE SERPL-MCNC: 2.7 MG/DL — SIGNIFICANT CHANGE UP (ref 2.5–4.5)
PLATELET # BLD AUTO: 362 K/UL — SIGNIFICANT CHANGE UP (ref 150–400)
POTASSIUM SERPL-MCNC: 3.7 MMOL/L — SIGNIFICANT CHANGE UP (ref 3.5–5.3)
POTASSIUM SERPL-SCNC: 3.7 MMOL/L — SIGNIFICANT CHANGE UP (ref 3.5–5.3)
RBC # BLD: 2.63 M/UL — LOW (ref 3.8–5.2)
RBC # FLD: 17.1 % — HIGH (ref 10.3–14.5)
SODIUM SERPL-SCNC: 137 MMOL/L — SIGNIFICANT CHANGE UP (ref 135–145)
WBC # BLD: 8.41 K/UL — SIGNIFICANT CHANGE UP (ref 3.8–10.5)
WBC # FLD AUTO: 8.41 K/UL — SIGNIFICANT CHANGE UP (ref 3.8–10.5)

## 2025-07-09 PROCEDURE — 99291 CRITICAL CARE FIRST HOUR: CPT

## 2025-07-09 PROCEDURE — 99233 SBSQ HOSP IP/OBS HIGH 50: CPT | Mod: 25

## 2025-07-09 PROCEDURE — 99233 SBSQ HOSP IP/OBS HIGH 50: CPT

## 2025-07-09 RX ORDER — NYSTATIN 100000 [USP'U]/G
1 CREAM TOPICAL
Refills: 0 | Status: DISCONTINUED | OUTPATIENT
Start: 2025-07-09 | End: 2025-07-10

## 2025-07-09 RX ADMIN — Medication 40 MILLIGRAM(S): at 17:52

## 2025-07-09 RX ADMIN — INSULIN LISPRO 4: 100 INJECTION, SOLUTION INTRAVENOUS; SUBCUTANEOUS at 08:17

## 2025-07-09 RX ADMIN — RANOLAZINE 500 MILLIGRAM(S): 1000 TABLET, FILM COATED, EXTENDED RELEASE ORAL at 06:54

## 2025-07-09 RX ADMIN — INSULIN LISPRO 1: 100 INJECTION, SOLUTION INTRAVENOUS; SUBCUTANEOUS at 12:51

## 2025-07-09 RX ADMIN — NYSTATIN 1 APPLICATION(S): 100000 CREAM TOPICAL at 17:54

## 2025-07-09 RX ADMIN — Medication 40 MILLIGRAM(S): at 06:55

## 2025-07-09 RX ADMIN — Medication 81 MILLIGRAM(S): at 12:48

## 2025-07-09 RX ADMIN — RANOLAZINE 500 MILLIGRAM(S): 1000 TABLET, FILM COATED, EXTENDED RELEASE ORAL at 17:53

## 2025-07-09 RX ADMIN — Medication 15 MILLILITER(S): at 17:53

## 2025-07-09 RX ADMIN — Medication 1 APPLICATION(S): at 06:54

## 2025-07-09 RX ADMIN — QUETIAPINE FUMARATE 25 MILLIGRAM(S): 25 TABLET ORAL at 22:54

## 2025-07-09 RX ADMIN — Medication 15 MILLILITER(S): at 06:54

## 2025-07-09 RX ADMIN — ATORVASTATIN CALCIUM 40 MILLIGRAM(S): 80 TABLET, FILM COATED ORAL at 22:54

## 2025-07-09 RX ADMIN — INSULIN LISPRO 2: 100 INJECTION, SOLUTION INTRAVENOUS; SUBCUTANEOUS at 17:58

## 2025-07-09 NOTE — PHYSICAL THERAPY INITIAL EVALUATION ADULT - PERTINENT HX OF CURRENT PROBLEM, REHAB EVAL
As per H& P notes, pt is a 74 year old female with PMHx of HTN, HLD, IDDM2, CAD (s/p PCI), and recent right humerus fracture (placed in sling 3 days ago) who presented to the ED on 5/30/25 for complaints of elevated blood glucose. Patient is Serbian-speaking but declined  services and preferred for daughter-in-law at bedside to translate. As per daughter-in-law, patient fell three days ago and landed on her right shoulder. Went to [facility] at that time and found to have right humerus fracture and placed in sling. Discharged home with outpatient orthopedic surgery follow up.
74-year-old female with hypertension, hyperlipidemia, 2 diabetes, CAD status post multiple stents with a recent right humerus fracture roughly 3 days ago following a traumatic fall admitted to the medical ICU for altered mental status and hypotension.  Found to be in septic shock secondary to colitis and UTI found to have ESBL E. coli bacteremia.  Hospital course further complicated by cardiac arrest of unclear origin with ROSC after 10 minutes of CPR. Patient was extubated and transferred to the floors. Patient had RRT for aspiration and AMS and was intubated. Patient unable to wean off the vent, underwent trach/PEG for prolonged weaning. Pulmonary consulted after ICU Transfer.
EMMANUEL GONZALEZ is a 74y Female with PMHx of HTN, HLD, IDDM2, CAD (s/p PCI in 2020?), and recent right humerus fracture (placed in sling 3 days ago) who presented to the ED on 5/30/25 for complaints of elevated blood glucose.   Initial PT evaluation 5/31/25. On evening of 6/1, spiked temp to 103.2 and became acutely hypotensive despite IVF. RRT was called due to acute obtundation and she was intubated and resuscitated. Extubated on 6/6.

## 2025-07-09 NOTE — PROVIDER CONTACT NOTE (CHANGE IN STATUS NOTIFICATION) - SITUATION
Pt recently extubated on NC with Kendall sump in place, began to vomit from mouth.
RRT called for patient due to patient in respiratory distress
Patient desaturated to 70% on room air, also noticed to be shivering, afebrile at this time, 6L NC applied, and currently saturating @100% on 6L NC.
Pt is lethargic, have a temp of 103.2 F,

## 2025-07-09 NOTE — PROGRESS NOTE ADULT - SUBJECTIVE AND OBJECTIVE BOX
Interval Events: this morning RRT for desaturation event while being turned; Fio2 increased from 30% to 100% by RRT team and suctioning performed by RRT team with improvement in Spo2 to 100%.     REVIEW OF SYSTEMS:  [ x] Unable to assess ROS     OBJECTIVE:  ICU Vital Signs Last 24 Hrs  T(C): 37.5 (09 Jul 2025 04:39), Max: 37.5 (09 Jul 2025 00:00)  T(F): 99.5 (09 Jul 2025 04:39), Max: 99.5 (09 Jul 2025 00:00)  HR: 74 (09 Jul 2025 06:55) (65 - 134)  BP: 102/68 (09 Jul 2025 06:55) (86/50 - 136/78)  BP(mean): --  ABP: --  ABP(mean): --  RR: 15 (09 Jul 2025 06:55) (15 - 25)  SpO2: 100% (09 Jul 2025 06:55) (99% - 100%)    O2 Parameters below as of 09 Jul 2025 06:55  Patient On (Oxygen Delivery Method): trach to vent          Mode: AC/ CMV (Assist Control/ Continuous Mandatory Ventilation), RR (machine): 15, TV (machine): 400, FiO2: 40, PEEP: 6, ITime: 0.9, MAP: 9, PIP: 21    07-08 @ 07:01  -  07-09 @ 07:00  --------------------------------------------------------  IN: 780 mL / OUT: 550 mL / NET: 230 mL      CAPILLARY BLOOD GLUCOSE      POCT Blood Glucose.: 294 mg/dL (09 Jul 2025 05:22)      PHYSICAL EXAM:  Gen: trache to full vent  HEENT: PERRL + trach (7 portex)  Resp: mechanical breath sounds B/L  CVS: S1S2 no m/r/g  Abd: soft NT/ND +BS + PEG  Ext: no c/c/e, R upper arm ecchymosis   Neuro: arousable. following some commands. able to communicate with head shakes.      HOSPITAL MEDICATIONS:  aspirin  chewable 81 milliGRAM(s) Oral daily      ranolazine 500 milliGRAM(s) Oral two times a day    atorvastatin 40 milliGRAM(s) Oral at bedtime  dextrose 50% Injectable 25 Gram(s) IV Push once  dextrose 50% Injectable 12.5 Gram(s) IV Push once  dextrose 50% Injectable 25 Gram(s) IV Push once  dextrose Oral Gel 15 Gram(s) Oral once PRN  glucagon  Injectable 1 milliGRAM(s) IntraMuscular once  insulin lispro (ADMELOG) corrective regimen sliding scale   SubCutaneous three times a day before meals      acetaminophen     Tablet .. 650 milliGRAM(s) Oral every 6 hours PRN  acetaminophen     Tablet .. 650 milliGRAM(s) Oral every 6 hours PRN  ondansetron Injectable 4 milliGRAM(s) IV Push every 8 hours PRN  QUEtiapine 25 milliGRAM(s) Oral at bedtime    bisacodyl Suppository 10 milliGRAM(s) Rectal daily PRN  pantoprazole  Injectable 40 milliGRAM(s) IV Push every 12 hours  polyethylene glycol 3350 17 Gram(s) Oral daily PRN  senna 2 Tablet(s) Oral at bedtime        dextrose 5%. 1000 milliLiter(s) IV Continuous <Continuous>  dextrose 5%. 1000 milliLiter(s) IV Continuous <Continuous>      chlorhexidine 0.12% Liquid 15 milliLiter(s) Oral Mucosa every 12 hours  chlorhexidine 2% Cloths 1 Application(s) Topical <User Schedule>        LABS:    Hgb Trend: 7.8<--, 7.5<--, 8.9<--, 8.0<--, 6.8<--        Creatinine Trend: 0.91<--, 1.05<--, 1.11<--, 1.32<--, 1.25<--, 1.30<--               Interval Events: this morning RRT for desaturation event while being turned; Fio2 increased from 30% to 100% by RRT team and suctioning performed by RRT team with improvement in Spo2 to 100%. Today on 40% with stable Spo2. appears comfortable.     REVIEW OF SYSTEMS:  [ x] Unable to assess ROS     OBJECTIVE:  ICU Vital Signs Last 24 Hrs  T(C): 37.5 (09 Jul 2025 04:39), Max: 37.5 (09 Jul 2025 00:00)  T(F): 99.5 (09 Jul 2025 04:39), Max: 99.5 (09 Jul 2025 00:00)  HR: 74 (09 Jul 2025 06:55) (65 - 134)  BP: 102/68 (09 Jul 2025 06:55) (86/50 - 136/78)  BP(mean): --  ABP: --  ABP(mean): --  RR: 15 (09 Jul 2025 06:55) (15 - 25)  SpO2: 100% (09 Jul 2025 06:55) (99% - 100%)    O2 Parameters below as of 09 Jul 2025 06:55  Patient On (Oxygen Delivery Method): trach to vent          Mode: AC/ CMV (Assist Control/ Continuous Mandatory Ventilation), RR (machine): 15, TV (machine): 400, FiO2: 40, PEEP: 6, ITime: 0.9, MAP: 9, PIP: 21    07-08 @ 07:01  -  07-09 @ 07:00  --------------------------------------------------------  IN: 780 mL / OUT: 550 mL / NET: 230 mL      CAPILLARY BLOOD GLUCOSE      POCT Blood Glucose.: 294 mg/dL (09 Jul 2025 05:22)      PHYSICAL EXAM:  Gen: trache to full vent  HEENT: PERRL + trach (7 portex)  Resp: mechanical breath sounds B/L  CVS: S1S2 no m/r/g  Abd: soft NT/ND +BS + PEG  Ext: no c/c/e, R upper arm ecchymosis   Neuro: arousable. following some commands. able to communicate with head shakes.      HOSPITAL MEDICATIONS:  aspirin  chewable 81 milliGRAM(s) Oral daily      ranolazine 500 milliGRAM(s) Oral two times a day    atorvastatin 40 milliGRAM(s) Oral at bedtime  dextrose 50% Injectable 25 Gram(s) IV Push once  dextrose 50% Injectable 12.5 Gram(s) IV Push once  dextrose 50% Injectable 25 Gram(s) IV Push once  dextrose Oral Gel 15 Gram(s) Oral once PRN  glucagon  Injectable 1 milliGRAM(s) IntraMuscular once  insulin lispro (ADMELOG) corrective regimen sliding scale   SubCutaneous three times a day before meals      acetaminophen     Tablet .. 650 milliGRAM(s) Oral every 6 hours PRN  acetaminophen     Tablet .. 650 milliGRAM(s) Oral every 6 hours PRN  ondansetron Injectable 4 milliGRAM(s) IV Push every 8 hours PRN  QUEtiapine 25 milliGRAM(s) Oral at bedtime    bisacodyl Suppository 10 milliGRAM(s) Rectal daily PRN  pantoprazole  Injectable 40 milliGRAM(s) IV Push every 12 hours  polyethylene glycol 3350 17 Gram(s) Oral daily PRN  senna 2 Tablet(s) Oral at bedtime        dextrose 5%. 1000 milliLiter(s) IV Continuous <Continuous>  dextrose 5%. 1000 milliLiter(s) IV Continuous <Continuous>      chlorhexidine 0.12% Liquid 15 milliLiter(s) Oral Mucosa every 12 hours  chlorhexidine 2% Cloths 1 Application(s) Topical <User Schedule>        LABS:    Hgb Trend: 7.8<--, 7.5<--, 8.9<--, 8.0<--, 6.8<--        Creatinine Trend: 0.91<--, 1.05<--, 1.11<--, 1.32<--, 1.25<--, 1.30<--               Interval Events: this morning RRT for desaturation event while being turned; Fio2 increased from 30% to 100% by RRT team and suctioning performed by RRT team with improvement in Spo2 to 100%. Today on 40% with stable Spo2. appears comfortable.     REVIEW OF SYSTEMS:  [ x] Unable to assess ROS     OBJECTIVE:  Vital Signs Last 24 Hrs  T(C): 37.5 (09 Jul 2025 04:39), Max: 37.5 (09 Jul 2025 00:00)  T(F): 99.5 (09 Jul 2025 04:39), Max: 99.5 (09 Jul 2025 00:00)  HR: 74 (09 Jul 2025 06:55) (65 - 134)  BP: 102/68 (09 Jul 2025 06:55) (86/50 - 136/78)  RR: 15 (09 Jul 2025 06:55) (15 - 25)  SpO2: 100% (09 Jul 2025 06:55) (99% - 100%)    O2 Parameters below as of 09 Jul 2025 06:55  Patient On (Oxygen Delivery Method): trach to vent          Mode: AC/ CMV (Assist Control/ Continuous Mandatory Ventilation), RR (machine): 15, TV (machine): 400, FiO2: 40, PEEP: 6, ITime: 0.9, MAP: 9, PIP: 21    07-08 @ 07:01  -  07-09 @ 07:00  --------------------------------------------------------  IN: 780 mL / OUT: 550 mL / NET: 230 mL      CAPILLARY BLOOD GLUCOSE  POCT Blood Glucose.: 294 mg/dL (09 Jul 2025 05:22)      PHYSICAL EXAM:  Gen: trache to full vent  HEENT: PERRL + trach (7 portex)  Resp: mechanical breath sounds B/L  CVS: S1S2 no m/r/g  Abd: soft NT/ND +BS + PEG  Ext: no c/c/e, R upper arm ecchymosis   Neuro: arousable. following some commands. able to communicate with head shakes.      HOSPITAL MEDICATIONS:  aspirin  chewable 81 milliGRAM(s) Oral daily      ranolazine 500 milliGRAM(s) Oral two times a day    atorvastatin 40 milliGRAM(s) Oral at bedtime  dextrose 50% Injectable 25 Gram(s) IV Push once  dextrose 50% Injectable 12.5 Gram(s) IV Push once  dextrose 50% Injectable 25 Gram(s) IV Push once  dextrose Oral Gel 15 Gram(s) Oral once PRN  glucagon  Injectable 1 milliGRAM(s) IntraMuscular once  insulin lispro (ADMELOG) corrective regimen sliding scale   SubCutaneous three times a day before meals      acetaminophen     Tablet .. 650 milliGRAM(s) Oral every 6 hours PRN  acetaminophen     Tablet .. 650 milliGRAM(s) Oral every 6 hours PRN  ondansetron Injectable 4 milliGRAM(s) IV Push every 8 hours PRN  QUEtiapine 25 milliGRAM(s) Oral at bedtime    bisacodyl Suppository 10 milliGRAM(s) Rectal daily PRN  pantoprazole  Injectable 40 milliGRAM(s) IV Push every 12 hours  polyethylene glycol 3350 17 Gram(s) Oral daily PRN  senna 2 Tablet(s) Oral at bedtime        dextrose 5%. 1000 milliLiter(s) IV Continuous <Continuous>  dextrose 5%. 1000 milliLiter(s) IV Continuous <Continuous>      chlorhexidine 0.12% Liquid 15 milliLiter(s) Oral Mucosa every 12 hours  chlorhexidine 2% Cloths 1 Application(s) Topical <User Schedule>        LABS:                        7.4    8.41  )-----------( 362      ( 09 Jul 2025 10:45 )             24.3     07-09    137  |  109[H]  |  66[H]  ----------------------------<  172[H]  3.7   |  16[L]  |  1.08    Ca    7.7[L]      09 Jul 2025 10:45  Phos  2.7     07-09  Mg     2.7     07-09      RADIOLOGY  < from: Xray Chest 1 View- PORTABLE-Urgent (Xray Chest 1 View- PORTABLE-Urgent .) (07.03.25 @ 23:35) >  IMPRESSION: Tracheostomy tube in place.   No radiographic evidence of active chest disease..  --------------  < from: US Duplex Venous Upper Ext Ltd, Left (07.08.25 @ 12:19) >  Central internal jugular and innominate veins obscured by overlying   tubing.  The subclavian, axillary and brachial veins are patent and compressible   where applicable.  The basilic vein (superficial vein) is patent and   without thrombus.  The cephalic vein (superficial vein) is patent and   without thrombus.    Doppler examination shows normal spontaneous and phasic flow.    IMPRESSION:  No evidence of left upper extremity deep venous thrombosis (not evaluated   central internal jugular/innominate veins).

## 2025-07-09 NOTE — PROGRESS NOTE ADULT - ASSESSMENT
74-year-old female with hypertension, hyperlipidemia, 2 diabetes, CAD status post multiple stents with a recent right humerus fracture roughly 3 days ago following a traumatic fall admitted to the medical ICU for altered mental status and hypotension.  Found to be in septic shock secondary to colitis and UTI found to have ESBL E. coli bacteremia.  Hospital course further complicated by cardiac arrest of unclear origin with ROSC after 10 minutes of CPR. Patient was extubated and transferred to the floors. Patient had RRT for aspiration and AMS and was intubated. Patient unable to wean off the vent, underwent trach/PEG for prolonged weaning. Pulmonary consulted after ICU Transfer.     Clinical course with septic shock 2/5 ESBL e Coli Bacteremia subsequent cardiac arrest extubated then with aspiration event reintubated and unable to wean 2/2 physical debilitation. Now S/P tracheostomy 6/23/25  Dx: acute respiratory failure , ventilator dependent     6/23 s/p Trach 7 portex   6/25 PEG     Reccs:  - intermittently tolerates PS    - C/W daily weaning as tolerates   - ID following reccs appreciated: observe off abx for now  - LTAC/ dispo planing  - Rest of care as per primary team     d/w dr erickson      74-year-old female with hypertension, hyperlipidemia, 2 diabetes, CAD status post multiple stents with a recent right humerus fracture roughly 3 days ago following a traumatic fall admitted to the medical ICU for altered mental status and hypotension.  Found to be in septic shock secondary to colitis and UTI found to have ESBL E. coli bacteremia.  Hospital course further complicated by cardiac arrest of unclear origin with ROSC after 10 minutes of CPR. Patient was extubated and transferred to the floors. Patient had RRT for aspiration and AMS and was intubated. Patient unable to wean off the vent, underwent trach/PEG for prolonged weaning. Pulmonary consulted after ICU Transfer.     Clinical course with septic shock 2/5 ESBL e Coli Bacteremia subsequent cardiac arrest extubated then with aspiration event reintubated and unable to wean 2/2 physical debilitation. Now S/P tracheostomy 6/23/25  Dx: acute respiratory failure , ventilator dependent     6/23 s/p Trach 7 portex   6/25 PEG     Reccs:  - 40% Fio2 today with stable Spo2.   - intermittently tolerates PS    - C/W daily weaning as tolerates   - ID following reccs appreciated: observe off abx for now  - LTAC/ dispo planing  - Rest of care as per primary team     d/w dr erickson

## 2025-07-09 NOTE — PHYSICAL THERAPY INITIAL EVALUATION ADULT - PHYSICAL ASSIST/NONPHYSICAL ASSIST, REHAB EVAL
set-up required/verbal cues/2 person assist
verbal cues/nonverbal cues (demo/gestures)/2 person assist
2 person assist

## 2025-07-09 NOTE — PROVIDER CONTACT NOTE (CHANGE IN STATUS NOTIFICATION) - ACTION/TREATMENT ORDERED:
Respiratory changed FIO2 from 30% to 100%. Patient responded well Respiratory changed FIO2 from 30% to 62%. Patient responded well, saturating SAO2 100% Respiratory changed FIO2 from 30% to 60%. Patient responded well, saturating SAO2 100%

## 2025-07-09 NOTE — PHYSICAL THERAPY INITIAL EVALUATION ADULT - ADDITIONAL COMMENTS
Per PT Eval 5/31/25: As per Pt's son Emily Valentin, present at bed side, pt lives at private house with pt's daughter, No steps to negotiate for entry & exit, bed room & bathroom at the main level of the house, reports to be previously independent in all mobility with cane for indoor mobility and used a rollator for outdoor mobility.
Per PT Eval 5/31/25: As per Pt's son Emily Valentin, present at bed side, pt lives at private house with pt's daughter, No steps to negotiate for entry & exit, bed room & bathroom at the main level of the house, reports to be previously independent in all mobility with cane for indoor mobility and used a rollator for outdoor mobility.
As per Pt's son Emily Valentin, present at bed side, pt lives at private house with pt's daughter, No steps to negotiate for entry & exit, bed room & bathroom at the main level of the house, reports to be previously independent in all mobility with cane for indooor mobility and used a rollator for outdoor mobility.

## 2025-07-09 NOTE — PROGRESS NOTE ADULT - SUBJECTIVE AND OBJECTIVE BOX
PROGRESS NOTE:     Patient is a 74y old  Female who presents with a chief complaint of ALY, physical deconditioning (05 Jul 2025 16:31)      SUBJECTIVE / OVERNIGHT EVENTS: continues to copius amounts of stool and melena       T(C): 36.6 (07-07-25 @ 10:59), Max: 36.6 (07-07-25 @ 10:59)  HR: 61 (07-07-25 @ 10:59) (61 - 70)  BP: 95/51 (07-07-25 @ 10:59) (95/51 - 103/58)  RR: 19 (07-07-25 @ 10:59) (18 - 19)  SpO2: 100% (07-07-25 @ 10:59) (98% - 100%)    MEDICATIONS  (STANDING):  MEDICATIONS  (STANDING):  aspirin  chewable 81 milliGRAM(s) Oral daily  atorvastatin 40 milliGRAM(s) Oral at bedtime  chlorhexidine 0.12% Liquid 15 milliLiter(s) Oral Mucosa every 12 hours  chlorhexidine 2% Cloths 1 Application(s) Topical <User Schedule>  dextrose 5%. 1000 milliLiter(s) (50 mL/Hr) IV Continuous <Continuous>  dextrose 5%. 1000 milliLiter(s) (100 mL/Hr) IV Continuous <Continuous>  dextrose 50% Injectable 25 Gram(s) IV Push once  dextrose 50% Injectable 12.5 Gram(s) IV Push once  dextrose 50% Injectable 25 Gram(s) IV Push once  glucagon  Injectable 1 milliGRAM(s) IntraMuscular once  insulin lispro (ADMELOG) corrective regimen sliding scale   SubCutaneous three times a day before meals  nystatin Powder 1 Application(s) Topical two times a day  pantoprazole  Injectable 40 milliGRAM(s) IV Push every 12 hours  QUEtiapine 25 milliGRAM(s) Oral at bedtime  ranolazine 500 milliGRAM(s) Oral two times a day  senna 2 Tablet(s) Oral at bedtime    MEDICATIONS  (PRN):  acetaminophen     Tablet .. 650 milliGRAM(s) Oral every 6 hours PRN Temp greater or equal to 38C (100.4F), Mild Pain (1 - 3)  acetaminophen     Tablet .. 650 milliGRAM(s) Oral every 6 hours PRN Mild Pain (1 - 3)  bisacodyl Suppository 10 milliGRAM(s) Rectal daily PRN Constipation  dextrose Oral Gel 15 Gram(s) Oral once PRN Blood Glucose LESS THAN 70 milliGRAM(s)/deciliter  ondansetron Injectable 4 milliGRAM(s) IV Push every 8 hours PRN Nausea and/or Vomiting/ gagging  polyethylene glycol 3350 17 Gram(s) Oral daily PRN Constipation      General : NAD  Neuro: A&Ox0, eyes open  Cardiovascular: Normal S1 S2, No JVD  Respiratory: : Course breath sounds bilaterally.   Gastrointestinal:  Soft, Non-tender, + BS	. PEG  Skin: No rash, No ecchymoses	    trach/  peg                            7.8    6.86  )-----------( 352      ( 07 Jul 2025 06:30 )             24.8               135|107|49<143  3.3|18|0.91  7.5,--,--  07-07 @ 06:30  Color: x / Appearance: x / SG: x / pH: x  Gluc: 158 mg/dL / Ketone: x  / Bili: x / Urobili: x   Blood: x / Protein: x / Nitrite: x   Leuk Esterase: x / RBC: x / WBC x   Sq Epi: x / Non Sq Epi: x / Bacteria: x        CAPILLARY BLOOD GLUCOSE      POCT Blood Glucose.: 152 mg/dL (07 Jul 2025 11:29)  POCT Blood Glucose.: 192 mg/dL (07 Jul 2025 08:05)  POCT Blood Glucose.: 124 mg/dL (06 Jul 2025 16:33)

## 2025-07-09 NOTE — PHYSICAL THERAPY INITIAL EVALUATION ADULT - CRITERIA FOR SKILLED THERAPEUTIC INTERVENTIONS
rehab potential/anticipated discharge recommendation
impairments found/functional limitations in following categories/risk reduction/prevention/therapy frequency/predicted duration of therapy intervention/anticipated equipment needs at discharge/anticipated discharge recommendation

## 2025-07-09 NOTE — PHYSICAL THERAPY INITIAL EVALUATION ADULT - IMPAIRMENTS CONTRIBUTING IMPAIRED BED MOBILITY, REHAB EVAL
impaired balance/cognition/impaired coordination/decreased flexibility/impaired motor control/abnormal muscle tone/decreased strength
cognition/narrow base of support/impaired postural control/decreased strength
impaired balance/decreased flexibility/decreased ROM/decreased strength

## 2025-07-09 NOTE — PHYSICAL THERAPY INITIAL EVALUATION ADULT - REHAB POTENTIAL, PT EVAL
fair, will monitor progress closely
fair, will monitor progress closely
unable to follow commands at this time

## 2025-07-09 NOTE — PROGRESS NOTE ADULT - NS ATTEND AMEND GEN_ALL_CORE FT
pt seen and examined at bedside with PA    had been oxygenating well on FIO2: 30% last several days  RRT early this AM when pt was being cleaned for hypoxia to the 30s and hypotension SBP 80s  FIO2 increased to 100% with improvement in O2 sat  BP repeated and SBP 130s without intervention  FIO2: weaned from 100% back to 30% today  lung exam clear with mechanical breath sounds  tracheal suctioning with minimal white secretions  failed wean due to apnea yesterday morning and reweaned on PS 15 for a few hours yesterday afternoon  comfortable in bed, trached to vent  afebrile      74F Lithuanian speaking PMH HTN, HLD, CAD s/p stents, DM2, recent fall 3 days prior to admission with R humerus fracture with arm placed in sling at [facility] with outpatient ortho follow up presents from home 5/30/25 for noted hyperglycemia BS > 400 with decreased PO intake and lethargy. Admitted to medical service for DM with hyperglycemia and ALY. s/p RRT 6/1 for hypotension, fever 103.2 and obtundation. Transferred to ICU for septic shock. Placed on pressors. Intubated for airway protection. Found to have ESBL e-coli UTI and bacteremia with leukocytosis WBC 32 (treated with meropenem). Sent for CT imaging 6/2 with 10 min cardiac arrest while in CT. Neurologically intact post arrest. Treated for NSTEMI with heparin drip. Extubated 6/6 and transferred to medical service 6/8/25. RRT 6/11 for AMS and hypotension with hypotension (SBP 70) resolved without intervention. Had 2nd RRT shortly after 1st for seizure like activity, obtundation, recurrent hypotension, hypoxia, aspiration (family gave juice to patient, similar colored juice content suctioned from pt's airways) intubated and transferred to ICU 6/11/25. CTA chest no pulmonary embolism but +tracheobronchial secretions, greatest in the left lower lobe with patchy and nodular bilateral lung opacities. Acute/subacute fractures right anterior lateral fourth through sixth ribs. Treated for septic shock secondary to aspiration PNA. EEG negative for seizures. Developed fevers with sputum cx 6/20 with Stenotrophomonas. Failed multiple weaning trials. s/p trach 6/23/25. s/p PEG 6/25/25. Downgraded to medical service 7/1/25. Pulmonary consulted for respiratory failure and vent management. Hospital course with anemia, GI bleed with melena and noted blood around PEG site. Anemic on 7/4 with Hb 6.8 s/p 1 unit pRBC. Stool occult blood +. Seen by GI, placed on PPI twice daily.     DX: cardiac arrest, acute hypoxic/hypercarbic respiratory failure requiring intubation, aspiration PNA, Stenotrophomonas PNA, ESBL e-coli UTI and bacteremia, severe gram negative sepsis with septic shock, NSTEMI, upper GI bleed with anemia requiring pRBC transfusion, acute metabolic encephalopathy, rib fractures, R humerus fracture    - remains vent dependent at present time  - tolerating intermittent spontaneous breathing trials, but requires high pressure support of 15  - failing wean due to apnea and low TVs  - cont daily weaning as tolerates  - cont local trach care and tracheal suctioning as needed  - had desaturation event during cleaning early today, required increased FIO2 of 100% now weaned back down to 30% FIO2 and oxygenating well at 100%  - s/p course of meropenem for ESBL e-coli bacteremia and UTI  - s/p course of zosyn for aspiration PNA  - s/p course of bactrim for Stenotrophomonas in sputum cx 6/20  - being monitored off antibx currently  - weaned off midodrine, monitor BP   - on asa and statin therapy for CAD  - neurologically intact post arrest but with significant generalized deconditioning  - PEG feeds, cont PPI. No further GI bleed noted. management per GI and primary team  - DVT ppx  - remainder of care per primary team  - will need vent facility placement. family inquired how long pt will be on mechanical ventilation. at present time she is vent dependent and not tolerating weaning. trach to vent status may be prolonged and possibly indefinite.   - DNR  - d/w respiratory therapist  - family at bedside updated

## 2025-07-09 NOTE — PROVIDER CONTACT NOTE (CHANGE IN STATUS NOTIFICATION) - ASSESSMENT
Patient alert and confused, afebrile, saturating 100% on 6L NC, shivering noted. FS of 209 mg/dl
The fax number is [phone]  
Pt still following commands as per daughter at bedside, no changes in LOC, o2 sat appropriate after interventions.
Pt tachycardic, hypotensive

## 2025-07-09 NOTE — RAPID RESPONSE TEAM SUMMARY - NSADDTLFINDINGSRRT_GEN_ALL_CORE
----- Message from Sherie Dumont sent at 4/26/2018 10:35 AM CDT -----  Contact: Self  Patient needs to speak to the nurse to see if she received the paperwork that was emailed to Nurse     Please call back [phone]  
RRT called for resp distress and desaturation while cleaning patient   BP 86/50  SPO2 as low as 35% trach to vent FiO2 30% , patient A&Ox0 baseline mentation   Respiratory at bedside. Patient suctioned, FiO2 increased to 100%   SPO2 improved to 100% /78  sinus tach on tele  Dr Cooper at bedside 
At 23:31 hrs RRT was called for hypotension with SBP of 62  CBC, BMP, Lactate, Ammonia level, Mag & Phosp was ordered

## 2025-07-09 NOTE — RAPID RESPONSE TEAM SUMMARY - NSSITUATIONBACKGROUNDRRT_GEN_ALL_CORE
INTERNAL MEDICINE PHYSICIAN DISCHARGE SUMMARY    Patient ID:  Harmeet Jacobs  [id number]  54 year old  1967  ADMIT DATE  7/5/2022  DISCHARGE DATE  7/8/2022  ADMITTING PHYSICIAN  Fidel Lim MD  Discharge Physician:  Hannah Vergara MD  ADMISSION DIAGNOSES  Kidney stone [N20.0]  BMI 45.0-49.9, adult (CMS/formerly [facility]) [Z68.42]  Urinary tract infection without hematuria, site unspecified [N39.0]  DISCHARGE DIAGNOSES  #Large left renal staghorn calculi with complicated UTI with sepsis and E. Coli bacteremia  #Type 2 diabetes mellitus  #Essential hypertension  #GERD  #Morbid obesity BMI 45-50 with NELIDA intolerant of CPAP  #Mild intermittent asthma on p.r.n. albuterol  #Depression/anxiety without any meds  ADMISSION CONDITION  poor  DISCHARGE CONDITION  stable   HOSPITAL COURSE  Harmeet Jacobs is a 54 year old female  with multiple comorbidities including but not limited to hypertension, type 2 diabetes mellitus and NELIDA who was recently diagnosed with left renal staghorn calculi who was in the process of undergoing multi staged procedure to clear the stone burden and was scheduled to undergo second-stage percutaneous nephrolithotomy a day after admission initially presented to ED with a chief complaints of fever, nausea, vomiting and abdominal pain. She also complained dysuria.  At ED, found to be tachycardic.  She was empirically started on IV Rocephin.  Urology consulted and she underwent left ureteral stent exchange and left retrograde pyelogram 7/6/22.  Blood culture positive for Escherichia coli.  Urine culture also positive for Escherichia coli which was sensitive to Cipro.  She is allergic to penicillin.  After discussion with pharmacy, patient prescribed 2 weeks of Bactrim.  Urology okay for her to be discharged.  Patient to resume her glipizide for her type 2 diabetes mellitus on discharge.  Patient's other chronic medical conditions including her hypertension were stable and continued PTA.   Patient reports  feeling better today and denies flank pain, fever, nausea or vomiting. She still feels tired but awake and communicating.  Review Of System: 10 Point review of system is done, Pertinent positives are listed all other systems were reviewed and are negative.   Discussed with her daughter before discharge with agreeable with the plan.  Urology is planning percutaneous nephrolithotomy on 08/10/22.  Urology recommended to repeat urine culture after she completed antibiotics and to be addressed by her PCP.   Patient discharged in stable condition after all her and her daughter questions and concerns addressed.  Patient to follow with her PCP and Urology as an outpatient.     Consults:   IP Consult Orders (From admission, onward)             Start     Ordered    07/05/22 2017  Inpatient consult to Urology  ONE TIME        Provider:  Fidel Lim MD    07/05/22 2016                Diagnostic Studies: as below    XR Abdomen AP Kub    Result Date: 7/5/2022  XR ABDOMEN 1 VW KUB SUPINE CLINICAL INDICATION:  abdominal pain with stent COMPARISON:  Prior abdominal radiographs, most recent dated 6/17/2022, prior CT scans. TECHNIQUE: Supine view of the abdomen was obtained. FINDINGS: Left-sided double-J ureteral stent appears unchanged in position.  Multiple stone fragments are again demonstrated in the left pelvicalyceal system. These do not appear significantly changed.  A couple tiny radiopaque densities are demonstrated more inferiorly within the left kidney, unchanged.  The abdominal gas pattern is nonobstructive. No gas filled, dilated loops of large or small bowel are evident. No pneumatosis. A moderate amount of colonic stool is present. No unusual abdominal calcifications are evident. Multiple surgical clips are again demonstrated in the right upper quadrant. There is lucency along the right flank compatible with fat.  This is been demonstrated previously.  Curvilinear lucency projecting at the level of the inferior  hepatic lobe favored to represent gas trapped beneath a skin fold.  A skinfold is present at this level on prior CT.     IMPRESSION: 1.  Stable positioning of the left-sided double-J ureteral stent when compared with 6/17/2022.  Multiple calculi are present in the left pelvicalyceal system that do not appear significantly changed. 2.  Right-sided lucency is believed to be secondary to flank fat stripe and gas trapped under a skin fold at the level of the inferior right hepatic lobe.  If there is clinical concern for pneumatosis dedicated abdominal series or an evaluation with CT may be considered.     CT Urogram w Contrast    Result Date: 7/5/2022  CT UROGRAM W CONTRAST CLINICAL INDICATION:  fever and stent rule out perinephric abscess. TECHNIQUE: Contrast-enhanced scans were obtained following administration of 100 cc of Omnipaque 300 IV.  Sagittal and coronal reformats. COMPARISON: Abdominal radiographs dated 7/5/2022, CT of the abdomen and pelvis dated 6/9/2022 FINDINGS: Mild strandy atelectatic-appearing changes are demonstrated at the lung bases.  No pleural effusion.  Small sliding-type hiatal hernia. Scattered tiny calcifications are demonstrated associated with the liver, compatible with granulomas.  The gallbladder is surgically absent.  No significant biliary ductal dilation. The spleen, pancreas and adrenal glands appear unremarkable. Left-sided double-J ureteral stent in place residual calculi within the left pelvicalyceal system and demonstrated on radiographs not optimally evaluated due to excreted contrast within the collecting system.  It appears that no unenhanced scans were obtained. Near complete resolution of subcapsular fluid/hematoma since prior CT period there may be a sliver of subcapsular fluid remaining (7:49).  Left kidney remains enlarged with perinephric stranding.  Left kidney is smaller than on prior exam.  Couple metallic densities are demonstrated projecting at the lower pole of the  left kidney, unchanged.  Mild left-sided perinephric fluid, present previously. Left pelvicalyceal system does not appear significantly dilated. Right kidney appears unremarkable. Small fat-containing periumbilical hernia.  Tiny fat-containing umbilical hernia. The bowel is normal in caliber.  Colonic diverticulosis. Subcentimeter retroperitoneal lymph nodes are demonstrated.  No pathologically enlarged lymph nodes are identified. The uterus is surgically absent.  Urinary bladder appears unremarkable. No acute osseous findings.  Multilevel degenerative changes in the spine.     IMPRESSION: 1.  Left-sided perinephric hematoma present on CT dated 6/9/2022 is either resolved or nearly completely resolved.  Left kidney has decreased in size in the interim.  It remains enlarged relative to the right which may be due to inflammation.  No circumscribed abscess evident.  Residual calculi within the left pelvicalyceal system are not optimally evaluated due to obscuration by excreted contrast.  No unenhanced scans are available in PACS. 2.  Small containing umbilical hernia and adjacent periumbilical hernia. 3.  Additional ancillary findings as above.     SURGERIES  Procedure(s) (LRB):  CYSTOSCOPY (N/A)  URETER STENT EXCHANGE (N/A)    Discharge Exam:  Vitals:    07/08/22 0800   BP: (!) 150/70   Pulse: 71   Resp: 16   Temp: 98.5 °F (36.9 °C)      Constitutional: Obese, no acute distress, non-toxic appearance  HENT: Atraumatic, normocephalic  Respiratory: No respiratory distress, normal breath sounds  Cardiovascular: Normal rate, normal rhythm, no murmurs, no gallops, no rubs  GI: Soft, obese, normal bowel sounds, non-tender  : No CVA tenderness   Skin: Well hydrated, no rash  Extremities: No clubbing, cyanosis, nor edema. Warm and well perfused. Pedal pulses 2+.   Neurology: A&ox3. CN 2-12 grossly intact, normal sensation  and no focal deficits noted    Disposition: Home    Patient Instructions:     Activity: activity as  tolerated    Diet: resume prior diet    Wound Care: none needed    Follow-up with Dr Patty Richards MD in 1 weeks after discharge.    Consults:     Follow up with urology as directed by the physician. If the appointment was not made then call for scheduling the appointment, phone numbers are provided, and clear directions in layman's terms are given to the patient. Same instructions also printed out in the AVS.     Face to face encounter was conducted on day of discharge; the above findings and plan of care discussed at length with the patient. All questions answered to patient and her family's satisfaction.  Patient demonstrated understanding and is in agreement with plan. Total time spent discharging the patient was >30 minutes of which greater than 50% was spent in counseling and coordinating care.    The discharge instructions were also discussed with the staff nurse who will reiterate the plan to patient and go over changes in medications as discussed.    LACE(10 or more):High  Total Score: 15           3 LACE Length of Stay    3 LACE Acute Admission    5 LACE Charlson Comorbidity Index Evalution    4 LACE Visits to ED Previous 6 Months          Signed:    Hannah Vergara MD   7/8/2022   [facility] Hospitalist           74- YO Woman with HTN, HLD,  2 diabetes, CAD s/P  multiple stents , recent right humerus fX,  3 days PTA following a traumatic fall WAS admitted to the medical ICU admitted to the medical ICU for altered mental status and hypotension, Found to be in septic shock secondary to colitis and UTI found to have ESBL E. coli bacteremia.  Hospital course further complicated by cardiac arrest of unclear origin with ROSC after 10 minutes of CPR. Patient was extubated and transferred to the floors. Patient had RRT for aspiration and AMS and was intubated. Patient unable to wean off the vent, underwent trach/PEG for prolonged weaning.   Course further c/b concern for GIB on 7/5

## 2025-07-09 NOTE — PHYSICAL THERAPY INITIAL EVALUATION ADULT - GENERAL OBSERVATIONS, REHAB EVAL
Pt found semi supine in bed in NAD, +hep lock, +PEG, +trach to vent, +tele, +UE restraints / mittens, non-verbal / unable to follow commands, seen for WC Eval, with ITZEL Aguirre at bedside.
Pt found semisupine in bed, R UE NWB in sling, A&Ox2 ( self and place). Son present at bedside and provided Romansh translation PRN when Language Line  offered to pt. Reviewed and maintained R UE NWB throughout session.
Chart reviewed, Pt found in semireclining in bed, pt vitals stable, + SCDs, + prima fit, + hep lock, + Rt UE NWB, + Rt arm sling,  OTR Susan present with PT, Pt's son Emily Valentin, present at bed side provided Amharic interpretation

## 2025-07-09 NOTE — PHYSICAL THERAPY INITIAL EVALUATION ADULT - NSPTDISCHREC_GEN_A_CORE
Sub Acute Rehab to further improve balance, strength, gait and endurance/Sub-acute Rehab
Sub-acute Rehab
Wound Care RN/No skilled PT needs

## 2025-07-09 NOTE — PROGRESS NOTE ADULT - ASSESSMENT
74- YO Woman with HTN, HLD,  2 diabetes, CAD s/P  multiple stents , recent right humerus fX,  3 days PTA following a traumatic fall WAS admitted to the medical ICU admitted to the medical ICU for altered mental status and hypotension, Found to be in septic shock secondary to colitis and UTI found to have ESBL E. coli bacteremia.  Hospital course further complicated by cardiac arrest of unclear origin with ROSC after 10 minutes of CPR. Patient was extubated and transferred to the floors. Patient had RRT for aspiration and AMS and was intubated. Patient unable to wean off the vent, underwent trach/PEG for prolonged weaning.   Course further c/b concern for GIB on 7/9         patient is nearing discharge to a facility discussed with daughter about benefits of a vent capable facility like [facility] she is unsure if she wants to go to current aceepting facility     #Acute hypoxic respiratory 2/2 suspect  aspiration pneumonia/  Tracheitis   -was intubated, now trach'd to vent, with trials of pressure support, but reportedly gets tired.    -CTA chest 6/12: no pulmonary embolism, tracheobronchial secretions, greatest in the left lower lobe. Patchy and nodular bilateral lung opacities   -completed  Zosyn    - sputum   cx, tenotrophomonas .  completed  Bactrim 6/29   -chronic hypoxic respiratory failure likely due to deconditioning.     -f/u Pulm recs Tracheitis ,       #  Acute blood loss anemia,   - Possible bleeding from gastrostomy site.  PEG placed 6/25/25.  May have gastric ulcer, buried bumper, esophagitis, or gastritis.  - possible also hemorrhoids    -Dark content around the peg site and concern for possible dark stools, will continue to monitor, if further concern will reach out to GI and start PPI  hgb was 7-8  -6.8 on 7/4, given 1UPRBC->8->8.9->7.5  CTM  Transfuse to keep hgb >7  active t/s  -Appreciate GI input    CAD w/ NSTEMI , thought to be demand from sepsis.   -S/p hep gtt, continue asa and statin  TTE   LV EF of 55% with regional WMA    -on  asa  , ranolazine  mg BID, Lipitor 40mg qhs   -c/w medical management as tolerated.  -weaned off midodrine  ALY d/t ischemic ATN     # cardiac   arrest, unclear what rhythm, sound like related to hypoxia?  -guarded prognosis.     #  DM2   CISS  FS goal 120-180  stopped   lantus    #Right humerus fracture   s/p fall.    -appreciate  orthopedic input      # Dysphagia,  has  PEG tube, c/w feeds (on hold as concern for GIB)    #Hold DVT PPx as concern for GIB    Patient is DNR , but on the trach/vent  PT had recommended rehab, pan for LTC

## 2025-07-09 NOTE — PROVIDER CONTACT NOTE (CHANGE IN STATUS NOTIFICATION) - BACKGROUND
Pt is admitted with hyperglycemia.
Trach to ventilator
Patient alert and confused, Maori speaking admitted for hyperglycemia and constipated for 10 days.

## 2025-07-09 NOTE — PHYSICAL THERAPY INITIAL EVALUATION ADULT - FOLLOWS COMMANDS/ANSWERS QUESTIONS, REHAB EVAL
unable to follow multi-step instructions
unable to follow commands
able to follow single-step instructions

## 2025-07-09 NOTE — PHYSICAL THERAPY INITIAL EVALUATION ADULT - PRECAUTIONS/LIMITATIONS, REHAB EVAL
aspiration precautions/cardiac precautions/fall precautions/oxygen therapy device and L/min
fall precautions
fall precautions

## 2025-07-10 ENCOUNTER — TRANSCRIPTION ENCOUNTER (OUTPATIENT)
Age: 75
End: 2025-07-10

## 2025-07-10 VITALS — HEART RATE: 75 BPM | OXYGEN SATURATION: 100 %

## 2025-07-10 LAB
ANION GAP SERPL CALC-SCNC: 8 MMOL/L — SIGNIFICANT CHANGE UP (ref 5–17)
BUN SERPL-MCNC: 68 MG/DL — HIGH (ref 7–23)
CALCIUM SERPL-MCNC: 8.4 MG/DL — LOW (ref 8.5–10.1)
CHLORIDE SERPL-SCNC: 110 MMOL/L — HIGH (ref 96–108)
CO2 SERPL-SCNC: 19 MMOL/L — LOW (ref 22–31)
CREAT SERPL-MCNC: 0.93 MG/DL — SIGNIFICANT CHANGE UP (ref 0.5–1.3)
EGFR: 64 ML/MIN/1.73M2 — SIGNIFICANT CHANGE UP
EGFR: 64 ML/MIN/1.73M2 — SIGNIFICANT CHANGE UP
GLUCOSE BLDC GLUCOMTR-MCNC: 220 MG/DL — HIGH (ref 70–99)
GLUCOSE BLDC GLUCOMTR-MCNC: 227 MG/DL — HIGH (ref 70–99)
GLUCOSE BLDC GLUCOMTR-MCNC: 257 MG/DL — HIGH (ref 70–99)
GLUCOSE SERPL-MCNC: 188 MG/DL — HIGH (ref 70–99)
HCT VFR BLD CALC: 25.3 % — LOW (ref 34.5–45)
HGB BLD-MCNC: 7.9 G/DL — LOW (ref 11.5–15.5)
MAGNESIUM SERPL-MCNC: 2.5 MG/DL — SIGNIFICANT CHANGE UP (ref 1.6–2.6)
MCHC RBC-ENTMCNC: 28.7 PG — SIGNIFICANT CHANGE UP (ref 27–34)
MCHC RBC-ENTMCNC: 31.2 G/DL — LOW (ref 32–36)
MCV RBC AUTO: 92 FL — SIGNIFICANT CHANGE UP (ref 80–100)
NRBC BLD AUTO-RTO: 0 /100 WBCS — SIGNIFICANT CHANGE UP (ref 0–0)
PHOSPHATE SERPL-MCNC: 3.5 MG/DL — SIGNIFICANT CHANGE UP (ref 2.5–4.5)
PLATELET # BLD AUTO: 356 K/UL — SIGNIFICANT CHANGE UP (ref 150–400)
POTASSIUM SERPL-MCNC: 3.9 MMOL/L — SIGNIFICANT CHANGE UP (ref 3.5–5.3)
POTASSIUM SERPL-SCNC: 3.9 MMOL/L — SIGNIFICANT CHANGE UP (ref 3.5–5.3)
RBC # BLD: 2.75 M/UL — LOW (ref 3.8–5.2)
RBC # FLD: 17.2 % — HIGH (ref 10.3–14.5)
SODIUM SERPL-SCNC: 137 MMOL/L — SIGNIFICANT CHANGE UP (ref 135–145)
WBC # BLD: 6.78 K/UL — SIGNIFICANT CHANGE UP (ref 3.8–10.5)
WBC # FLD AUTO: 6.78 K/UL — SIGNIFICANT CHANGE UP (ref 3.8–10.5)

## 2025-07-10 PROCEDURE — 99239 HOSP IP/OBS DSCHRG MGMT >30: CPT

## 2025-07-10 PROCEDURE — 99232 SBSQ HOSP IP/OBS MODERATE 35: CPT | Mod: 25

## 2025-07-10 RX ORDER — ACETAMINOPHEN 500 MG/5ML
2 LIQUID (ML) ORAL
Qty: 0 | Refills: 0 | DISCHARGE
Start: 2025-07-10

## 2025-07-10 RX ORDER — INSULIN LISPRO 100 U/ML
5 INJECTION, SOLUTION INTRAVENOUS; SUBCUTANEOUS
Qty: 0 | Refills: 0 | DISCHARGE
Start: 2025-07-10

## 2025-07-10 RX ORDER — QUETIAPINE FUMARATE 25 MG/1
1 TABLET ORAL
Qty: 0 | Refills: 0 | DISCHARGE
Start: 2025-07-10

## 2025-07-10 RX ORDER — RANOLAZINE 1000 MG/1
1 TABLET, FILM COATED, EXTENDED RELEASE ORAL
Qty: 0 | Refills: 0 | DISCHARGE
Start: 2025-07-10

## 2025-07-10 RX ORDER — NYSTATIN 100000 [USP'U]/G
1 CREAM TOPICAL
Qty: 0 | Refills: 0 | DISCHARGE
Start: 2025-07-10

## 2025-07-10 RX ORDER — ASPIRIN 325 MG
1 TABLET ORAL
Qty: 0 | Refills: 0 | DISCHARGE
Start: 2025-07-10

## 2025-07-10 RX ORDER — OXYCODONE HYDROCHLORIDE 30 MG/1
1 TABLET ORAL
Refills: 0 | DISCHARGE

## 2025-07-10 RX ADMIN — Medication 1 APPLICATION(S): at 06:07

## 2025-07-10 RX ADMIN — NYSTATIN 1 APPLICATION(S): 100000 CREAM TOPICAL at 17:18

## 2025-07-10 RX ADMIN — Medication 15 MILLILITER(S): at 06:07

## 2025-07-10 RX ADMIN — Medication 40 MILLIGRAM(S): at 06:07

## 2025-07-10 RX ADMIN — RANOLAZINE 500 MILLIGRAM(S): 1000 TABLET, FILM COATED, EXTENDED RELEASE ORAL at 06:07

## 2025-07-10 RX ADMIN — NYSTATIN 1 APPLICATION(S): 100000 CREAM TOPICAL at 06:07

## 2025-07-10 RX ADMIN — RANOLAZINE 500 MILLIGRAM(S): 1000 TABLET, FILM COATED, EXTENDED RELEASE ORAL at 17:18

## 2025-07-10 RX ADMIN — Medication 81 MILLIGRAM(S): at 13:19

## 2025-07-10 RX ADMIN — INSULIN LISPRO 2: 100 INJECTION, SOLUTION INTRAVENOUS; SUBCUTANEOUS at 17:18

## 2025-07-10 RX ADMIN — Medication 40 MILLIGRAM(S): at 17:17

## 2025-07-10 RX ADMIN — Medication 15 MILLILITER(S): at 17:17

## 2025-07-10 NOTE — PROGRESS NOTE ADULT - NS ATTEND OPT1 GEN_ALL_CORE
I independently performed the documented:
I attest my time as attending is greater than 50% of the total combined time spent on qualifying patient care activities by the PA/NP and attending.
I attest my time as attending is greater than 50% of the total combined time spent on qualifying patient care activities by the PA/NP and attending.
I independently performed the documented:
I attest my time as attending is greater than 50% of the total combined time spent on qualifying patient care activities by the PA/NP and attending.
I independently performed the documented:
I attest my time as attending is greater than 50% of the total combined time spent on qualifying patient care activities by the PA/NP and attending.
I attest my time as attending is greater than 50% of the total combined time spent on qualifying patient care activities by the PA/NP and attending.
I independently performed the documented:
I independently performed the documented:

## 2025-07-10 NOTE — PROGRESS NOTE ADULT - NS ATTEND AMEND GEN_ALL_CORE FT
pt seen and examined at bedside with NP    no further hypoxic event  oxygenating well on FIO2: 30% with O2 sat 100%  weaned to FIO2: 25% today  failed wean this AM placed on SBT with PS 12/6 this afternoon and tolerated 2 hours of weaning  afebrile  lung exam clear with mechanical breath sounds  comfortable in bed, trached to vent  awake, smiles, follows simple commands (squeezes hands)      74F Belarusian speaking PMH HTN, HLD, CAD s/p stents, DM2, recent fall 3 days prior to admission with R humerus fracture with arm placed in sling at [facility] with outpatient ortho follow up presents from home 5/30/25 for noted hyperglycemia BS > 400 with decreased PO intake and lethargy. Admitted to medical service for DM with hyperglycemia and ALY. s/p RRT 6/1 for hypotension, fever 103.2 and obtundation. Transferred to ICU for septic shock. Placed on pressors. Intubated for airway protection. Found to have ESBL e-coli UTI and bacteremia with leukocytosis WBC 32 (treated with meropenem). Sent for CT imaging 6/2 with 10 min cardiac arrest while in CT. Neurologically intact post arrest. Treated for NSTEMI with heparin drip. Extubated 6/6 and transferred to medical service 6/8/25. RRT 6/11 for AMS and hypotension with hypotension (SBP 70) resolved without intervention. Had 2nd RRT shortly after 1st for seizure like activity, obtundation, recurrent hypotension, hypoxia, aspiration (family gave juice to patient, similar colored juice content suctioned from pt's airways) intubated and transferred to ICU 6/11/25. CTA chest no pulmonary embolism but +tracheobronchial secretions, greatest in the left lower lobe with patchy and nodular bilateral lung opacities. Acute/subacute fractures right anterior lateral fourth through sixth ribs. Treated for septic shock secondary to aspiration PNA. EEG negative for seizures. Developed fevers with sputum cx 6/20 with Stenotrophomonas. Failed multiple weaning trials. s/p trach 6/23/25. s/p PEG 6/25/25. Downgraded to medical service 7/1/25. Pulmonary consulted for respiratory failure and vent management. Hospital course with anemia, GI bleed with melena and noted blood around PEG site. Anemic on 7/4 with Hb 6.8 s/p 1 unit pRBC. Stool occult blood +. Seen by GI, placed on PPI twice daily.     DX: cardiac arrest, acute hypoxic/hypercarbic respiratory failure requiring intubation, aspiration PNA, Stenotrophomonas PNA, ESBL e-coli UTI and bacteremia, severe gram negative sepsis with septic shock, NSTEMI, upper GI bleed with anemia requiring pRBC transfusion, acute metabolic encephalopathy, rib fractures, R humerus fracture    - remains vent dependent at present time  - tolerating intermittent spontaneous breathing trials, but requires higher pressure support   - failing wean due to apnea and low TVs  - cont daily weaning as tolerates  - cont local trach care and tracheal suctioning as needed  - FIO2 weaned to 25%  - s/p course of meropenem for ESBL e-coli bacteremia and UTI  - s/p course of zosyn for aspiration PNA  - s/p course of bactrim for Stenotrophomonas in sputum cx 6/20  - being monitored off antibx currently  - weaned off midodrine  - on asa and statin therapy for CAD  - neurologically intact post arrest but with significant generalized deconditioning  - cont PEG feeds, cont PPI. No further GI bleed noted. management per GI and primary team  - DVT ppx  - remainder of care per primary team  - d/c planning for vent facility: [facility]  - DNR  - d/w respiratory therapist  - son at bedside, updated

## 2025-07-10 NOTE — PROGRESS NOTE ADULT - SUBJECTIVE AND OBJECTIVE BOX
PROGRESS NOTE:     Patient is a 74y old  Female who presents with a chief complaint of ALY, physical deconditioning (09 Jul 2025 15:35)          SUBJECTIVE & OBJECTIVE:   Pt seen and examined at bedside in AM. diarrhea resolved after tube feeds were stopped  no overnight events.       REVIEW OF SYSTEMS: remaining ROS negative     PHYSICAL EXAM:  VITALS:  Vital Signs Last 24 Hrs  T(C): 36.7 (10 Jul 2025 11:11), Max: 37 (09 Jul 2025 17:16)  T(F): 98 (10 Jul 2025 11:11), Max: 98.6 (09 Jul 2025 17:16)  HR: 71 (10 Jul 2025 11:11) (68 - 92)  BP: 96/61 (10 Jul 2025 11:11) (96/61 - 111/66)  BP(mean): --  RR: 99 (10 Jul 2025 11:11) (18 - 99)  SpO2: 100% (10 Jul 2025 09:43) (97% - 100%)    Parameters below as of 10 Jul 2025 04:30  Patient On (Oxygen Delivery Method): room air          GENERAL: NAD,  no increased WOB  HEAD:  Atraumatic, Normocephalic  EYES: EOMI, conjunctiva and sclera clear  ENMT: Moist mucous membranes  NECK: Supple, No JVD, trach in place  NERVOUS SYSTEM:  Alert   CHEST/LUNG: Clear to auscultation bilaterally; No rales, rhonchi, wheezing  HEART: Regular rate and rhythm  ABDOMEN: Soft, Nontender, Nondistended; Bowel sounds present. +PEG  EXTREMITIES:  No clubbing, cyanosis      MEDICATIONS  (STANDING):  aspirin  chewable 81 milliGRAM(s) Oral daily  atorvastatin 40 milliGRAM(s) Oral at bedtime  chlorhexidine 0.12% Liquid 15 milliLiter(s) Oral Mucosa every 12 hours  chlorhexidine 2% Cloths 1 Application(s) Topical <User Schedule>  dextrose 5%. 1000 milliLiter(s) (50 mL/Hr) IV Continuous <Continuous>  dextrose 5%. 1000 milliLiter(s) (100 mL/Hr) IV Continuous <Continuous>  dextrose 50% Injectable 25 Gram(s) IV Push once  dextrose 50% Injectable 12.5 Gram(s) IV Push once  dextrose 50% Injectable 25 Gram(s) IV Push once  glucagon  Injectable 1 milliGRAM(s) IntraMuscular once  insulin lispro (ADMELOG) corrective regimen sliding scale   SubCutaneous three times a day before meals  nystatin Powder 1 Application(s) Topical two times a day  pantoprazole  Injectable 40 milliGRAM(s) IV Push every 12 hours  QUEtiapine 25 milliGRAM(s) Oral at bedtime  ranolazine 500 milliGRAM(s) Oral two times a day  senna 2 Tablet(s) Oral at bedtime    MEDICATIONS  (PRN):  acetaminophen     Tablet .. 650 milliGRAM(s) Oral every 6 hours PRN Temp greater or equal to 38C (100.4F), Mild Pain (1 - 3)  acetaminophen     Tablet .. 650 milliGRAM(s) Oral every 6 hours PRN Mild Pain (1 - 3)  bisacodyl Suppository 10 milliGRAM(s) Rectal daily PRN Constipation  dextrose Oral Gel 15 Gram(s) Oral once PRN Blood Glucose LESS THAN 70 milliGRAM(s)/deciliter  ondansetron Injectable 4 milliGRAM(s) IV Push every 8 hours PRN Nausea and/or Vomiting/ gagging  polyethylene glycol 3350 17 Gram(s) Oral daily PRN Constipation      Allergies    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/shellfish (Short breath)  No Known Drug Allergies    Intolerances              LABS:                           7.9    6.78  )-----------( 356      ( 10 Jul 2025 08:02 )             25.3     07-10    137  |  110[H]  |  68[H]  ----------------------------<  188[H]  3.9   |  19[L]  |  0.93    Ca    8.4[L]      10 Jul 2025 08:02  Phos  3.5     07-10  Mg     2.5     07-10        Urinalysis Basic - ( 10 Jul 2025 08:02 )    Color: x / Appearance: x / SG: x / pH: x  Gluc: 188 mg/dL / Ketone: x  / Bili: x / Urobili: x   Blood: x / Protein: x / Nitrite: x   Leuk Esterase: x / RBC: x / WBC x   Sq Epi: x / Non Sq Epi: x / Bacteria: x      CAPILLARY BLOOD GLUCOSE      POCT Blood Glucose.: 257 mg/dL (10 Jul 2025 11:17)  POCT Blood Glucose.: 220 mg/dL (10 Jul 2025 05:53)  POCT Blood Glucose.: 176 mg/dL (09 Jul 2025 23:56)  POCT Blood Glucose.: 222 mg/dL (09 Jul 2025 17:57)  POCT Blood Glucose.: 166 mg/dL (09 Jul 2025 12:20)                RECENT CULTURES:          RADIOLOGY & ADDITIONAL TESTS:

## 2025-07-10 NOTE — DISCHARGE NOTE PROVIDER - INSTRUCTIONS
katefarms  Vital AF 1.2 starting at 20 mL/ hr, increasing by 10 mL Q8hrs until a goal rate of 50 mL x 24 hrs (provides 1440 kcal, 90 g protein, 973 mL of free water)

## 2025-07-10 NOTE — DISCHARGE NOTE PROVIDER - NSDCFUADDAPPT_GEN_ALL_CORE_FT
Call to make an appointment with PCP, pulmonologist and cardiologist for follow up and review hospital course

## 2025-07-10 NOTE — DISCHARGE NOTE NURSING/CASE MANAGEMENT/SOCIAL WORK - FINANCIAL ASSISTANCE
[facility] provides services at a reduced cost to those who are determined to be eligible through [facility]’s financial assistance program. Information regarding [facility]’s financial assistance program can be found by going to [email or web address] or by calling [phone].

## 2025-07-10 NOTE — CHART NOTE - NSCHARTNOTESELECT_GEN_ALL_CORE
[facility] 
Event Note
Medicine NP/Event Note
Nutrition Services
Surgery/Event Note
Event Note
ICU Downgrade/Transfer Note
Nutrition Services
POCUS/Event Note
Screen
to medicine/Transfer Note
Event Note

## 2025-07-10 NOTE — PROGRESS NOTE ADULT - ASSESSMENT
74-year-old female with hypertension, hyperlipidemia, 2 diabetes, CAD status post multiple stents with a recent right humerus fracture roughly 3 days ago following a traumatic fall admitted to the medical ICU for altered mental status and hypotension.  Found to be in septic shock secondary to colitis and UTI found to have ESBL E. coli bacteremia.  Hospital course further complicated by cardiac arrest of unclear origin with ROSC after 10 minutes of CPR. Patient was extubated and transferred to the floors. Patient had RRT for aspiration and AMS and was intubated. Patient unable to wean off the vent, underwent trach/PEG for prolonged weaning. Pulmonary consulted after ICU Transfer.     Clinical course with septic shock 2/5 ESBL e Coli Bacteremia subsequent cardiac arrest extubated then with aspiration event reintubated and unable to wean 2/2 physical debilitation. Now S/P tracheostomy 6/23/25  Dx: acute respiratory failure , ventilator dependent     6/23 s/p Trach 7 portex   6/25 PEG     Reccs:  - 40% Fio2 today with stable Spo2.   - intermittently tolerates PS    - C/W daily weaning as tolerates   - ID following reccs appreciated: observe off abx for now  - LTAC/ dispo planing  - Rest of care as per primary team     d/w dr erickson

## 2025-07-10 NOTE — PROGRESS NOTE ADULT - PROVIDER SPECIALTY LIST ADULT
Critical Care
Hospitalist
Infectious Disease
Internal Medicine
Pulmonology
Surgery
Surgery
Cardiology
Critical Care
Hospitalist
Hospitalist
Infectious Disease
Internal Medicine
Internal Medicine
Nephrology
Pulmonology
Surgery
Cardiology
Critical Care
Hospitalist
Hospitalist
Infectious Disease
Internal Medicine
Nephrology
Nephrology
Pulmonology
Critical Care
Gastroenterology
Hospitalist
Internal Medicine
Pulmonology
Pulmonology
Critical Care

## 2025-07-10 NOTE — DISCHARGE NOTE PROVIDER - DISCHARGE DATE
1 month hx of L ant thigh pain  No current back pain  If pain/ paresthesia persists, will send for EMG left lower extremity ( order given)    Patient will observe for now 10-Jul-2025

## 2025-07-10 NOTE — PROGRESS NOTE ADULT - TIME BILLING
review of chart, blood work, vitals, medications, imaging, direct patient care. Time not inclusive of procedures performed on same day.
Lab test review, Radiology Review, Vitals review, Consultant review and discussion, Physical examination, IDR, Assessment and plan; Plan discussion with patient and family
Lab test review, Radiology Review, Vitals review, Consultant review and discussion, Physical examination, IDR, Assessment and plan; Plan discussion with patient and family
review of chart, blood work, vitals, medications, imaging, direct patient care. Time not inclusive of procedures performed on same day.

## 2025-07-10 NOTE — CHART NOTE - NSCHARTNOTEFT_GEN_A_CORE
Assessment: 74-year-old female with PMHx that includes HTN, HLD, T2DM, CAD c multiple stents s/p recent fall (3 days PTA); tx at OSH for right humerus fx & arm placed in sling (no plan for surgical intervention at this time); admitted to the ICU for AMS, hyperglycemia, and hypotension.  Pt found with septic shock 2/2 colitis + UTI c ESBL E. coli bacteremia.  Hospital course complicated by cardiac arrest of unclear origin; pt intubated for airway protection; successfully extubated on 6/6. On 6/11, RRT x 2 called,1st time for aspiration and AMS; 2nd for unresponsiveness; pt intubated again & placed on vent; as pt being unable to wean off the vent, trach placed (6/23) & PEG (6/25) for long-term breathing & nutrition support. On 6/29, hospital course further complicated by abdominal distention; X-ray concerning for ileus vs SBO; CTA abd/pelvis negative for SBO, likely ileus w/ large stool burden; s/p enema, pt had multiple large BMs; RN presently reports pt c multiple loose BMs; RD to change TF formula (changed to [employer] Peptide 1.5).  Pt transferred to telemetry floor on 7/1. Currently DNR/Intubate status.     Pt medically stable for d/c to LTC, however nursing states pt still has diarrhea with [employer] Peptide 1.5. States diarrhea stopped when TF held. RD to change formula.    Factors impacting intake: [ ] none [ ] nausea  [ ] vomiting [X] diarrhea [ ] constipation  [ ]chewing problems [ ] swallowing issues  [ ] other:     Diet Prescription: Diet, NPO (07-09-25 @ 16:52)    Intake: TF not hanging / infusing at the time of visit. Pt currently NPO due to diarrhea     Admission wt: (06/02) 64.8 kg   Current Weight: (07/09) 69.4 kg   % Weight Change: ~7 % wt gain x 1 month - Most likely due to fluid shifts, pt noted with edema throughout admission   Edema: (07/09) 1+ Edema: R arm     Pertinent Medications: MEDICATIONS  (STANDING):  aspirin  chewable 81 milliGRAM(s) Oral daily  atorvastatin 40 milliGRAM(s) Oral at bedtime  chlorhexidine 0.12% Liquid 15 milliLiter(s) Oral Mucosa every 12 hours  chlorhexidine 2% Cloths 1 Application(s) Topical <User Schedule>  dextrose 5%. 1000 milliLiter(s) (50 mL/Hr) IV Continuous <Continuous>  dextrose 5%. 1000 milliLiter(s) (100 mL/Hr) IV Continuous <Continuous>  dextrose 50% Injectable 25 Gram(s) IV Push once  dextrose 50% Injectable 12.5 Gram(s) IV Push once  dextrose 50% Injectable 25 Gram(s) IV Push once  glucagon  Injectable 1 milliGRAM(s) IntraMuscular once  insulin lispro (ADMELOG) corrective regimen sliding scale   SubCutaneous three times a day before meals  nystatin Powder 1 Application(s) Topical two times a day  pantoprazole  Injectable 40 milliGRAM(s) IV Push every 12 hours  QUEtiapine 25 milliGRAM(s) Oral at bedtime  ranolazine 500 milliGRAM(s) Oral two times a day  senna 2 Tablet(s) Oral at bedtime    MEDICATIONS  (PRN):  acetaminophen     Tablet .. 650 milliGRAM(s) Oral every 6 hours PRN Temp greater or equal to 38C (100.4F), Mild Pain (1 - 3)  acetaminophen     Tablet .. 650 milliGRAM(s) Oral every 6 hours PRN Mild Pain (1 - 3)  bisacodyl Suppository 10 milliGRAM(s) Rectal daily PRN Constipation  dextrose Oral Gel 15 Gram(s) Oral once PRN Blood Glucose LESS THAN 70 milliGRAM(s)/deciliter  ondansetron Injectable 4 milliGRAM(s) IV Push every 8 hours PRN Nausea and/or Vomiting/ gagging  polyethylene glycol 3350 17 Gram(s) Oral daily PRN Constipation    Pertinent Labs: 07-10 Na137 mmol/L Glu 188 mg/dL[H] K+ 3.9 mmol/L Cr  0.93 mg/dL BUN 68 mg/dL[H] 07-10 Phos 3.5 mg/dL 07-05 Alb 1.6 g/dL[L]     CAPILLARY BLOOD GLUCOSE      POCT Blood Glucose.: 257 mg/dL (10 Jul 2025 11:17)  POCT Blood Glucose.: 220 mg/dL (10 Jul 2025 05:53)  POCT Blood Glucose.: 176 mg/dL (09 Jul 2025 23:56)  POCT Blood Glucose.: 222 mg/dL (09 Jul 2025 17:57)  POCT Blood Glucose.: 166 mg/dL (09 Jul 2025 12:20)    Skin:   Pressure Injury1: DTI, Sacral Spine   Pressure Injury 2: Stage 2, Sacrum     Estimated Needs:   [X] no change since previous assessment:  kcal & fluid needs (9736-5587; 25-30 kcal/ml per kg) as calculated on 6/4; protein needs (68-79; 1.2-1.4 g/kg) as calculated on   [ ] recalculated:     Previous Nutrition Diagnosis:   [X] Increased Nutrient Needs   Etiology: Related to: increased demand for nutrient   Signs/ Symptoms: As evidenced by: pressure injury  Goal: pt to meet >75% protein-energy needs via tolerated route (not met)    Nutrition Diagnosis is [X] ongoing  [ ] resolved [ ] not applicable     New Nutrition Diagnosis: [ ] not applicable       Interventions:   Recommend  [X] Diarrhea Noted, change TF formula to Vital AF 1.2 starting at 20 mL/ hr, increasing by 10 mL Q8hrs until a goal rate of 50 mL x 24 hrs (provides 1440 kcal, 90 g protein, 973 mL of free water)   --> If no IVF fluid ordered/ infusing, recommend free water flushes of 115 mL of free water Q6hrs to provide an additional 460 mL of free water for a total TF regimen of 1433 mL of free water   [X] Monitor glucose and adjust insulin regimen per MD   [X] If diarrhea persists, consider adding imodium rx per MD discretion   --> Consider adding probiotic to replenish healthy gut bacteria     Monitoring and Evaluation:   [ ] PO intake [ x ] Tolerance to diet prescription [ x ] weights [ x ] labs[ x ] follow up per protocol  [ ] other: Assessment: 74-year-old female with PMHx that includes HTN, HLD, T2DM, CAD c multiple stents s/p recent fall (3 days PTA); tx at OSH for right humerus fx & arm placed in sling (no plan for surgical intervention at this time); admitted to the ICU for AMS, hyperglycemia, and hypotension.  Pt found with septic shock 2/2 colitis + UTI c ESBL E. coli bacteremia.  Hospital course complicated by cardiac arrest of unclear origin; pt intubated for airway protection; successfully extubated on 6/6. On 6/11, RRT x 2 called,1st time for aspiration and AMS; 2nd for unresponsiveness; pt intubated again & placed on vent; as pt being unable to wean off the vent, trach placed (6/23) & PEG (6/25) for long-term breathing & nutrition support. On 6/29, hospital course further complicated by abdominal distention; X-ray concerning for ileus vs SBO; CTA abd/pelvis negative for SBO, likely ileus w/ large stool burden; s/p enema, pt had multiple large BMs; RN presently reports pt with multiple loose BMs; RD to change TF formula (changed to [employer] Peptide 1.5).  Pt transferred to telemetry floor on 7/1. Currently DNR/Intubate status. TF held on 07/05 for possible GI bleed, pt evaluated by GI no bleeding noted.    Pt medically stable for d/c to LTC, however nursing states pt still has diarrhea with [employer] Peptide 1.5. States diarrhea stopped when TF held. RD to change formula.    Factors impacting intake: [ ] none [ ] nausea  [ ] vomiting [X] diarrhea [ ] constipation  [ ]chewing problems [ ] swallowing issues  [ ] other:     Diet Prescription: Diet, NPO (07-09-25 @ 16:52)    Intake: TF not hanging / infusing at the time of visit. Pt currently NPO due to diarrhea     Admission wt: (06/02) 64.8 kg   Current Weight: (07/09) 69.4 kg   % Weight Change: ~7 % wt gain x 1 month - Most likely due to fluid shifts, pt noted with edema throughout admission   Edema: (07/09) 1+ Edema: R arm     Pertinent Medications: MEDICATIONS  (STANDING):  aspirin  chewable 81 milliGRAM(s) Oral daily  atorvastatin 40 milliGRAM(s) Oral at bedtime  chlorhexidine 0.12% Liquid 15 milliLiter(s) Oral Mucosa every 12 hours  chlorhexidine 2% Cloths 1 Application(s) Topical <User Schedule>  dextrose 5%. 1000 milliLiter(s) (50 mL/Hr) IV Continuous <Continuous>  dextrose 5%. 1000 milliLiter(s) (100 mL/Hr) IV Continuous <Continuous>  dextrose 50% Injectable 25 Gram(s) IV Push once  dextrose 50% Injectable 12.5 Gram(s) IV Push once  dextrose 50% Injectable 25 Gram(s) IV Push once  glucagon  Injectable 1 milliGRAM(s) IntraMuscular once  insulin lispro (ADMELOG) corrective regimen sliding scale   SubCutaneous three times a day before meals  nystatin Powder 1 Application(s) Topical two times a day  pantoprazole  Injectable 40 milliGRAM(s) IV Push every 12 hours  QUEtiapine 25 milliGRAM(s) Oral at bedtime  ranolazine 500 milliGRAM(s) Oral two times a day  senna 2 Tablet(s) Oral at bedtime    MEDICATIONS  (PRN):  acetaminophen     Tablet .. 650 milliGRAM(s) Oral every 6 hours PRN Temp greater or equal to 38C (100.4F), Mild Pain (1 - 3)  acetaminophen     Tablet .. 650 milliGRAM(s) Oral every 6 hours PRN Mild Pain (1 - 3)  bisacodyl Suppository 10 milliGRAM(s) Rectal daily PRN Constipation  dextrose Oral Gel 15 Gram(s) Oral once PRN Blood Glucose LESS THAN 70 milliGRAM(s)/deciliter  ondansetron Injectable 4 milliGRAM(s) IV Push every 8 hours PRN Nausea and/or Vomiting/ gagging  polyethylene glycol 3350 17 Gram(s) Oral daily PRN Constipation    Pertinent Labs: 07-10 Na137 mmol/L Glu 188 mg/dL[H] K+ 3.9 mmol/L Cr  0.93 mg/dL BUN 68 mg/dL[H] 07-10 Phos 3.5 mg/dL 07-05 Alb 1.6 g/dL[L]     CAPILLARY BLOOD GLUCOSE      POCT Blood Glucose.: 257 mg/dL (10 Jul 2025 11:17)  POCT Blood Glucose.: 220 mg/dL (10 Jul 2025 05:53)  POCT Blood Glucose.: 176 mg/dL (09 Jul 2025 23:56)  POCT Blood Glucose.: 222 mg/dL (09 Jul 2025 17:57)  POCT Blood Glucose.: 166 mg/dL (09 Jul 2025 12:20)    Skin:   Pressure Injury1: DTI, Sacral Spine   Pressure Injury 2: Stage 2, Sacrum     Estimated Needs:   [X] no change since previous assessment:  kcal & fluid needs (9488-8828; 25-30 kcal/ml per kg) as calculated on 6/4; protein needs (68-79; 1.2-1.4 g/kg) as calculated on   [ ] recalculated:     Previous Nutrition Diagnosis:   [X] Increased Nutrient Needs   Etiology: Related to: increased demand for nutrient   Signs/ Symptoms: As evidenced by: pressure injury  Goal: pt to meet >75% protein-energy needs via tolerated route (not met)    Nutrition Diagnosis is [X] ongoing  [ ] resolved [ ] not applicable     New Nutrition Diagnosis: [ ] not applicable       Interventions:   Recommend  [X] Diarrhea Noted, change TF formula to Vital AF 1.2 starting at 20 mL/ hr, increasing by 10 mL Q8hrs until a goal rate of 50 mL x 24 hrs (provides 1440 kcal, 90 g protein, 973 mL of free water)   --> If no IVF fluid ordered/ infusing, recommend free water flushes of 115 mL of free water Q6hrs to provide an additional 460 mL of free water for a total TF regimen of 1433 mL of free water   [X] Monitor glucose and adjust insulin regimen per MD   [X] If diarrhea persists, consider adding imodium rx per MD discretion   --> Consider adding probiotic to replenish healthy gut bacteria     Monitoring and Evaluation:   [ ] PO intake [ x ] Tolerance to diet prescription [ x ] weights [ x ] labs[ x ] follow up per protocol  [ ] other:

## 2025-07-10 NOTE — PROGRESS NOTE ADULT - ASSESSMENT
74- YO Woman with HTN, HLD,  2 diabetes, CAD s/P  multiple stents , recent right humerus fX,  3 days PTA following a traumatic fall WAS admitted to the medical ICU admitted to the medical ICU for altered mental status and hypotension, Found to be in septic shock secondary to colitis and UTI found to have ESBL E. coli bacteremia.  Hospital course further complicated by cardiac arrest of unclear origin with ROSC after 10 minutes of CPR. Patient was extubated and transferred to the floors. Patient had RRT for aspiration and AMS and was intubated. Patient unable to wean off the vent, underwent trach/PEG for prolonged weaning.   Course further c/b concern for GIB on 7/9         Acute hypoxic respiratory 2/2 suspect  aspiration pneumonia/  Tracheitis   -was intubated, now trach'd to vent, with trials of pressure support, but reportedly gets tired.    -CTA chest 6/12: no pulmonary embolism, tracheobronchial secretions, greatest in the left lower lobe. Patchy and nodular bilateral lung opacities  - s/p course of meropenem for ESBL e-coli bacteremia and UTI  - s/p course of zosyn for aspiration PNA  - s/p course of bactrim for Stenotrophomonas in sputum cx 6/20  - being monitored off antibx currently  - weaned off midodrine, monitor BP   -chronic hypoxic respiratory failure likely due to deconditioning.     -f/u Pulm recs: - cont daily weaning as tolerates  - cont local trach care and tracheal suctioning as needed    Acute blood loss anemia, stable   - Possible bleeding from gastrostomy site.  PEG placed 6/25/25.  May have gastric ulcer, buried bumper, esophagitis, or gastritis.  - possible also hemorrhoids    - No further GI bleed noted.  - s/p pRBC transfusion  - CTM  - Transfuse to keep hgb >7  - C/w PEG feeds, PPI  - outpt GI follow up    CAD w/ NSTEMI , thought to be demand from sepsis.   - S/p hep gtt, continue asa and statin  - TTE   LV EF of 55% with regional WMA   - on  asa  , ranolazine  mg BID, Lipitor 40mg qhs   - c/w medical management as tolerated.  - weaned off midodrine    ALY d/t ischemic ATN   - avoid nephrotoxic meds    cardiac   arrest, unclear what rhythm, sound like related to hypoxia?  -guarded prognosis.     DM2   - CISS  - FS goal 120-180  - stopped   lantus    Right humerus fracture   s/p fall.    -appreciate  orthopedic input      # Dysphagia,  has  PEG tube, c/w feeds. dietician to follow    #Hold DVT PPx as concern for GIB    Patient is DNR , but on the trach/vent  PT had recommended rehab, plan for LTC

## 2025-07-10 NOTE — PROGRESS NOTE ADULT - REASON FOR ADMISSION
ALY, physical deconditioning

## 2025-07-10 NOTE — PROGRESS NOTE ADULT - SUBJECTIVE AND OBJECTIVE BOX
INTERVAL  Events:    - afebrile     OBJECTIVE:  ICU Vital Signs Last 24 Hrs  T(C): 36.7 (10 Jul 2025 11:11), Max: 37 (09 Jul 2025 17:16)  T(F): 98 (10 Jul 2025 11:11), Max: 98.6 (09 Jul 2025 17:16)  HR: 78 (10 Jul 2025 12:09) (68 - 92)  BP: 96/61 (10 Jul 2025 11:11) (96/61 - 111/66)  RR: 99 (10 Jul 2025 11:11) (18 - 99)  SpO2: 100% (10 Jul 2025 12:09) (97% - 100%)    O2 Parameters below as of 10 Jul 2025 04:30  Patient On (Oxygen Delivery Method): room air      Mode: AC/ CMV (Assist Control/ Continuous Mandatory Ventilation), RR (machine): 15, TV (machine): 400, FiO2: 25, PEEP: 6, ITime: 0.9, MAP: 11, PIP: 31    07-09 @ 07:01  -  07-10 @ 07:00  --------------------------------------------------------  IN: 0 mL / OUT: 1450 mL / NET: -1450 mL      CAPILLARY BLOOD GLUCOSE      POCT Blood Glucose.: 257 mg/dL (10 Jul 2025 11:17)      PHYSICAL EXAM:  PHYSICAL EXAM:  Gen: trache to full vent  HEENT: PERRL + trach (7 portex)  Resp: mechanical breath sounds B/L  CVS: S1S2 no m/r/g  Abd: soft NT/ND +BS + PEG  Ext: no c/c/e, R upper arm ecchymosis   Neuro: arousable. following some commands. able to communicate with head shakes.      HOSPITAL MEDICATIONS:  aspirin  chewable 81 milliGRAM(s) Oral daily  ranolazine 500 milliGRAM(s) Oral two times a day  atorvastatin 40 milliGRAM(s) Oral at bedtime  dextrose 50% Injectable 25 Gram(s) IV Push once  dextrose 50% Injectable 12.5 Gram(s) IV Push once  dextrose 50% Injectable 25 Gram(s) IV Push once  dextrose Oral Gel 15 Gram(s) Oral once PRN  glucagon  Injectable 1 milliGRAM(s) IntraMuscular once  insulin lispro (ADMELOG) corrective regimen sliding scale   SubCutaneous three times a day before meals  acetaminophen     Tablet .. 650 milliGRAM(s) Oral every 6 hours PRN  acetaminophen     Tablet .. 650 milliGRAM(s) Oral every 6 hours PRN  ondansetron Injectable 4 milliGRAM(s) IV Push every 8 hours PRN  QUEtiapine 25 milliGRAM(s) Oral at bedtime    bisacodyl Suppository 10 milliGRAM(s) Rectal daily PRN  pantoprazole  Injectable 40 milliGRAM(s) IV Push every 12 hours  polyethylene glycol 3350 17 Gram(s) Oral daily PRN  senna 2 Tablet(s) Oral at bedtime  dextrose 5%. 1000 milliLiter(s) IV Continuous <Continuous>  dextrose 5%. 1000 milliLiter(s) IV Continuous <Continuous>  chlorhexidine 0.12% Liquid 15 milliLiter(s) Oral Mucosa every 12 hours  chlorhexidine 2% Cloths 1 Application(s) Topical <User Schedule>  nystatin Powder 1 Application(s) Topical two times a day        LABS:                        7.9    6.78  )-----------( 356      ( 10 Jul 2025 08:02 )             25.3     07-10    137  |  110[H]  |  68[H]  ----------------------------<  188[H]  3.9   |  19[L]  |  0.93    Ca    8.4[L]      10 Jul 2025 08:02  Phos  3.5     07-10  Mg     2.5     07-10      MICROBIOLOGY:   Culture Results:   No growth at 5 days (06-20-25 @ 17:45)  Culture Results:   No growth at 5 days (06-20-25 @ 17:28)  Culture Results:   Moderate Stenotrophomonas maltophilia  Commensal lina consistent with body site (06-20-25 @ 17:10)        Urinalysis Basic - ( 10 Jul 2025 08:02 )    Color: x / Appearance: x / SG: x / pH: x  Gluc: 188 mg/dL / Ketone: x  / Bili: x / Urobili: x   Blood: x / Protein: x / Nitrite: x   Leuk Esterase: x / RBC: x / WBC x   Sq Epi: x / Non Sq Epi: x / Bacteria: x              RADIOLOGY:        EKG/ECHO:   INTERVAL  Events:    - afebrile     OBJECTIVE:  Vital Signs Last 24 Hrs  T(C): 36.7 (10 Jul 2025 11:11), Max: 37 (09 Jul 2025 17:16)  T(F): 98 (10 Jul 2025 11:11), Max: 98.6 (09 Jul 2025 17:16)  HR: 78 (10 Jul 2025 12:09) (68 - 92)  BP: 96/61 (10 Jul 2025 11:11) (96/61 - 111/66)  RR: 99 (10 Jul 2025 11:11) (18 - 99)  SpO2: 100% (10 Jul 2025 12:09) (97% - 100%)    O2 Parameters below as of 10 Jul 2025 04:30  Patient On (Oxygen Delivery Method): room air      Mode: AC/ CMV (Assist Control/ Continuous Mandatory Ventilation), RR (machine): 15, TV (machine): 400, FiO2: 25, PEEP: 6, ITime: 0.9, MAP: 11, PIP: 31      07-09 @ 07:01  -  07-10 @ 07:00  --------------------------------------------------------  IN: 0 mL / OUT: 1450 mL / NET: -1450 mL      CAPILLARY BLOOD GLUCOSE  POCT Blood Glucose.: 257 mg/dL (10 Jul 2025 11:17)        PHYSICAL EXAM:  Gen: trache to full vent  HEENT: PERRL + trach (7 portex)  Resp: mechanical breath sounds B/L  CVS: S1S2 no m/r/g  Abd: soft NT/ND +BS + PEG  Ext: no c/c/e, R upper arm ecchymosis   Neuro: arousable. following some commands. able to communicate with head shakes/nodds      HOSPITAL MEDICATIONS:  aspirin  chewable 81 milliGRAM(s) Oral daily  ranolazine 500 milliGRAM(s) Oral two times a day  atorvastatin 40 milliGRAM(s) Oral at bedtime  dextrose 50% Injectable 25 Gram(s) IV Push once  dextrose 50% Injectable 12.5 Gram(s) IV Push once  dextrose 50% Injectable 25 Gram(s) IV Push once  dextrose Oral Gel 15 Gram(s) Oral once PRN  glucagon  Injectable 1 milliGRAM(s) IntraMuscular once  insulin lispro (ADMELOG) corrective regimen sliding scale   SubCutaneous three times a day before meals  acetaminophen     Tablet .. 650 milliGRAM(s) Oral every 6 hours PRN  acetaminophen     Tablet .. 650 milliGRAM(s) Oral every 6 hours PRN  ondansetron Injectable 4 milliGRAM(s) IV Push every 8 hours PRN  QUEtiapine 25 milliGRAM(s) Oral at bedtime    bisacodyl Suppository 10 milliGRAM(s) Rectal daily PRN  pantoprazole  Injectable 40 milliGRAM(s) IV Push every 12 hours  polyethylene glycol 3350 17 Gram(s) Oral daily PRN  senna 2 Tablet(s) Oral at bedtime  dextrose 5%. 1000 milliLiter(s) IV Continuous <Continuous>  dextrose 5%. 1000 milliLiter(s) IV Continuous <Continuous>  chlorhexidine 0.12% Liquid 15 milliLiter(s) Oral Mucosa every 12 hours  chlorhexidine 2% Cloths 1 Application(s) Topical <User Schedule>  nystatin Powder 1 Application(s) Topical two times a day        LABS:                        7.9    6.78  )-----------( 356      ( 10 Jul 2025 08:02 )             25.3     07-10    137  |  110[H]  |  68[H]  ----------------------------<  188[H]  3.9   |  19[L]  |  0.93    Ca    8.4[L]      10 Jul 2025 08:02  Phos  3.5     07-10  Mg     2.5     07-10      MICROBIOLOGY:   Culture - Blood (06.20.25 @ 17:45)    Specimen Source: Blood Blood-Peripheral   Culture Results: No growth at 5 days      Culture - Blood (06.20.25 @ 17:28)    Specimen Source: Blood Blood-Peripheral   Culture Results: No growth at 5 days        Culture - Sputum . (06.20.25 @ 17:10)   -  Trimethoprim/Sulfamethoxazole: S <=0.5/9.5   -  Minocycline: S   -  Levofloxacin: S <=0.5   Specimen Source: ET Tube ET Tube   Culture Results:   Moderate Stenotrophomonas maltophilia  Commensal lina consistent with body site             RADIOLOGY:  < from: Xray Chest 1 View- PORTABLE-Urgent (Xray Chest 1 View- PORTABLE-Urgent .) (07.03.25 @ 23:35) >  IMPRESSION: Tracheostomy tube in place.   No radiographic evidence of active chest disease.

## 2025-07-10 NOTE — DISCHARGE NOTE PROVIDER - NSDCMRMEDTOKEN_GEN_ALL_CORE_FT
acetaminophen 325 mg oral tablet: 2 tab(s) orally every 6 hours As needed Temp greater or equal to 38C (100.4F), Mild Pain (1 - 3)  acetaminophen 325 mg oral tablet: 2 tab(s) orally every 6 hours As needed Mild Pain (1 - 3)  aspirin 81 mg oral tablet, chewable: 1 tab(s) orally once a day  atorvastatin 40 mg oral tablet: 1 tab(s) orally once a day  insulin lispro 100 units/mL injectable solution: 5 unit(s) injectable 4 times a day as needed for hyperglycemia insulin sliding scale  Multiple Vitamins oral tablet: 1 tab(s) orally once a day  nystatin 100,000 units/g topical powder: 1 Apply topically to affected area 2 times a day  pantoprazole 40 mg intravenous injection: 40 milligram(s) intravenous every 12 hours  QUEtiapine 25 mg oral tablet: 1 tab(s) orally once a day (at bedtime)  ranolazine 500 mg oral tablet, extended release: 1 tab(s) orally 2 times a day

## 2025-07-10 NOTE — DISCHARGE NOTE NURSING/CASE MANAGEMENT/SOCIAL WORK - NSFLUVACAGEDISCH_IMM_ALL_CORE
Nonproliferative Diabetic Retinopathy Counseling:  I have discussed with the patient the importance of controlling blood glucose to minimize the risk of progressing retinal complications from diabetes. I explained the importance of annual dilated eye exams. Return for follow-up as scheduled. Adult

## 2025-07-10 NOTE — DISCHARGE NOTE NURSING/CASE MANAGEMENT/SOCIAL WORK - PATIENT PORTAL LINK FT
You can access the FollowMyHealth Patient Portal offered by [facility] by registering at the following website: [email or web address]. By joining [facility]’s FollowMyHealth portal, you will also be able to view your health information using other applications (apps) compatible with our system.

## 2025-07-10 NOTE — DISCHARGE NOTE PROVIDER - HOSPITAL COURSE
HPI:  Tiffanie Joshi is a 74 year old female with PMHx of HTN, HLD, IDDM2, CAD (s/p PCI), and recent right humerus fracture (placed in sling 3 days ago) who presented to the ED on 5/30/25 for complaints of elevated blood glucose.    Patient is Sinhala-speaking but declined  services and preferred for daughter-in-law at bedside to translate. As per daughter-in-law, patient fell three days ago and landed on her right shoulder. Went to [facility] at that time and found to have right humerus fracture and placed in sling. Discharged home with outpatient orthopedic surgery follow up. Since then, patient has been refusing to get out of bed and not eating much. Will drink a little juice every now and then. Daughter checked her blood glucose around 5PM and it was > 400 so brought to the ER for further evaluation. Baseline functional status is ambulates with walker and dependent with all ADLs. Lives at home with daughter.    In the ED, VSS. Hgb 10.3, sodium 132, potassium 6.3, BUN 70, Cr 2.02, blood glucose 510. VBG 7.29 / 58 / 34 / 26. COVID/influenza/RSV negative. CT head without acute intracranial findings. Received 1L NS bolus, 1L LR bolus, and insulin 20 U IV.  (30 May 2025 23:43)      Hospital Course:    4- YO Woman with HTN, HLD,  2 diabetes, CAD s/P  multiple stents , recent right humerus fX,  3 days PTA following a traumatic fall WAS admitted to the medical ICU admitted to the medical ICU for altered mental status and hypotension, Found to be in septic shock secondary to colitis and UTI found to have ESBL E. coli bacteremia.  Hospital course further complicated by cardiac arrest of unclear origin with ROSC after 10 minutes of CPR. Patient was extubated and transferred to the floors. Patient had RRT for aspiration and AMS and was intubated. Patient unable to wean off the vent, underwent trach/PEG for prolonged weaning.         Acute hypoxic respiratory 2/2 suspect  aspiration pneumonia/  Tracheitis   -was intubated, now trach'd to vent, with trials of pressure support, but reportedly gets tired.    -CTA chest 6/12: no pulmonary embolism, tracheobronchial secretions, greatest in the left lower lobe. Patchy and nodular bilateral lung opacities  - s/p course of meropenem for ESBL e-coli bacteremia and UTI  - s/p course of zosyn for aspiration PNA  - s/p course of bactrim for Stenotrophomonas in sputum cx 6/20  - being monitored off antibx currently  - weaned off midodrine, monitor BP   -chronic hypoxic respiratory failure likely due to deconditioning.     -f/u Pulm recs: - cont daily weaning as tolerates  - cont local trach care and tracheal suctioning as needed    Acute blood loss anemia, stable   - Possible bleeding from gastrostomy site.  PEG placed 6/25/25.  May have gastric ulcer, buried bumper, esophagitis, or gastritis.  - possible also hemorrhoids    - No further GI bleed noted.  - s/p pRBC transfusion  - CTM  - Transfuse to keep hgb >7  - C/w PEG feeds, PPI  - outpt GI follow up    CAD w/ NSTEMI , thought to be demand from sepsis.   - S/p hep gtt, continue asa and statin  - TTE   LV EF of 55% with regional WMA   - on  asa  , ranolazine  mg BID, Lipitor 40mg qhs   - c/w medical management as tolerated.  - weaned off midodrine    ALY d/t ischemic ATN   - avoid nephrotoxic meds    cardiac   arrest, unclear what rhythm, sound like related to hypoxia?  -guarded prognosis.     DM2   - CISS  - FS goal 120-180  - stopped   lantus    Right humerus fracture   s/p fall.    -appreciate  orthopedic input      # Dysphagia,  has  PEG tube, c/w feeds. dietician to follow    #Hold DVT PPx as concern for GIB    Patient is DNR , but on the trach/vent  PT had recommended rehab, plan for LTC

## 2025-07-10 NOTE — DISCHARGE NOTE PROVIDER - NSDCCPCAREPLAN_GEN_ALL_CORE_FT
PRINCIPAL DISCHARGE DIAGNOSIS  Diagnosis: Acute hypoxic respiratory failure  Assessment and Plan of Treatment:     
None

## 2025-07-15 DIAGNOSIS — Z16.12 EXTENDED SPECTRUM BETA LACTAMASE (ESBL) RESISTANCE: ICD-10-CM

## 2025-07-15 DIAGNOSIS — I46.9 CARDIAC ARREST, CAUSE UNSPECIFIED: ICD-10-CM

## 2025-07-15 DIAGNOSIS — R56.9 UNSPECIFIED CONVULSIONS: ICD-10-CM

## 2025-07-15 DIAGNOSIS — E87.1 HYPO-OSMOLALITY AND HYPONATREMIA: ICD-10-CM

## 2025-07-15 DIAGNOSIS — J90 PLEURAL EFFUSION, NOT ELSEWHERE CLASSIFIED: ICD-10-CM

## 2025-07-15 DIAGNOSIS — D64.9 ANEMIA, UNSPECIFIED: ICD-10-CM

## 2025-07-15 DIAGNOSIS — I25.118 ATHEROSCLEROTIC HEART DISEASE OF NATIVE CORONARY ARTERY WITH OTHER FORMS OF ANGINA PECTORIS: ICD-10-CM

## 2025-07-15 DIAGNOSIS — I95.2 HYPOTENSION DUE TO DRUGS: ICD-10-CM

## 2025-07-15 DIAGNOSIS — A41.9 SEPSIS, UNSPECIFIED ORGANISM: ICD-10-CM

## 2025-07-15 DIAGNOSIS — Z79.899 OTHER LONG TERM (CURRENT) DRUG THERAPY: ICD-10-CM

## 2025-07-15 DIAGNOSIS — Z91.81 HISTORY OF FALLING: ICD-10-CM

## 2025-07-15 DIAGNOSIS — N17.0 ACUTE KIDNEY FAILURE WITH TUBULAR NECROSIS: ICD-10-CM

## 2025-07-15 DIAGNOSIS — J45.909 UNSPECIFIED ASTHMA, UNCOMPLICATED: ICD-10-CM

## 2025-07-15 DIAGNOSIS — K52.9 NONINFECTIVE GASTROENTERITIS AND COLITIS, UNSPECIFIED: ICD-10-CM

## 2025-07-15 DIAGNOSIS — N39.0 URINARY TRACT INFECTION, SITE NOT SPECIFIED: ICD-10-CM

## 2025-07-15 DIAGNOSIS — I10 ESSENTIAL (PRIMARY) HYPERTENSION: ICD-10-CM

## 2025-07-15 DIAGNOSIS — R74.8 ABNORMAL LEVELS OF OTHER SERUM ENZYMES: ICD-10-CM

## 2025-07-15 DIAGNOSIS — R53.81 OTHER MALAISE: ICD-10-CM

## 2025-07-15 DIAGNOSIS — J96.01 ACUTE RESPIRATORY FAILURE WITH HYPOXIA: ICD-10-CM

## 2025-07-15 DIAGNOSIS — D62 ACUTE POSTHEMORRHAGIC ANEMIA: ICD-10-CM

## 2025-07-15 DIAGNOSIS — R65.21 SEVERE SEPSIS WITH SEPTIC SHOCK: ICD-10-CM

## 2025-07-15 DIAGNOSIS — Z79.84 LONG TERM (CURRENT) USE OF ORAL HYPOGLYCEMIC DRUGS: ICD-10-CM

## 2025-07-15 DIAGNOSIS — D72.829 ELEVATED WHITE BLOOD CELL COUNT, UNSPECIFIED: ICD-10-CM

## 2025-07-15 DIAGNOSIS — Z95.5 PRESENCE OF CORONARY ANGIOPLASTY IMPLANT AND GRAFT: ICD-10-CM

## 2025-07-15 DIAGNOSIS — R13.10 DYSPHAGIA, UNSPECIFIED: ICD-10-CM

## 2025-07-15 DIAGNOSIS — E87.5 HYPERKALEMIA: ICD-10-CM

## 2025-07-15 DIAGNOSIS — E11.65 TYPE 2 DIABETES MELLITUS WITH HYPERGLYCEMIA: ICD-10-CM
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
## 2025-07-15 DIAGNOSIS — I21.A1 MYOCARDIAL INFARCTION TYPE 2: ICD-10-CM
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
## 2025-07-15 DIAGNOSIS — J22 UNSPECIFIED ACUTE LOWER RESPIRATORY INFECTION: ICD-10-CM

## 2025-07-15 DIAGNOSIS — K59.00 CONSTIPATION, UNSPECIFIED: ICD-10-CM

## 2025-07-15 DIAGNOSIS — S42.301D UNSPECIFIED FRACTURE OF SHAFT OF HUMERUS, RIGHT ARM, SUBSEQUENT ENCOUNTER FOR FRACTURE WITH ROUTINE HEALING: ICD-10-CM

## 2025-07-15 DIAGNOSIS — Z79.82 LONG TERM (CURRENT) USE OF ASPIRIN: ICD-10-CM

## 2025-07-15 DIAGNOSIS — R41.82 ALTERED MENTAL STATUS, UNSPECIFIED: ICD-10-CM

## 2025-07-15 DIAGNOSIS — B96.20 UNSPECIFIED ESCHERICHIA COLI [E. COLI] AS THE CAUSE OF DISEASES CLASSIFIED ELSEWHERE: ICD-10-CM
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
## 2025-07-15 DIAGNOSIS — J69.0 PNEUMONITIS DUE TO INHALATION OF FOOD AND VOMIT: ICD-10-CM

## 2025-08-08 ENCOUNTER — INPATIENT (INPATIENT)
Facility: HOSPITAL | Age: 75
LOS: 20 days | Discharge: NURSING, REHAB OR LONG-TERM CARE | DRG: 204 | End: 2025-08-29
Attending: STUDENT IN AN ORGANIZED HEALTH CARE EDUCATION/TRAINING PROGRAM | Admitting: STUDENT IN AN ORGANIZED HEALTH CARE EDUCATION/TRAINING PROGRAM
Payer: MEDICAID

## 2025-08-08 VITALS — HEIGHT: 62.01 IN

## 2025-08-08 DIAGNOSIS — R06.89 OTHER ABNORMALITIES OF BREATHING: ICD-10-CM

## 2025-08-08 DIAGNOSIS — J39.8 OTHER SPECIFIED DISEASES OF UPPER RESPIRATORY TRACT: ICD-10-CM

## 2025-08-08 DIAGNOSIS — Z95.5 PRESENCE OF CORONARY ANGIOPLASTY IMPLANT AND GRAFT: Chronic | ICD-10-CM

## 2025-08-08 PROBLEM — E11.9 TYPE 2 DIABETES MELLITUS WITHOUT COMPLICATIONS: Chronic | Status: ACTIVE | Noted: 2025-05-31

## 2025-08-08 PROBLEM — E78.5 HYPERLIPIDEMIA, UNSPECIFIED: Chronic | Status: ACTIVE | Noted: 2025-05-31

## 2025-08-08 PROBLEM — I25.10 ATHEROSCLEROTIC HEART DISEASE OF NATIVE CORONARY ARTERY WITHOUT ANGINA PECTORIS: Chronic | Status: ACTIVE | Noted: 2025-05-31

## 2025-08-08 PROBLEM — I10 ESSENTIAL (PRIMARY) HYPERTENSION: Chronic | Status: ACTIVE | Noted: 2025-05-31

## 2025-08-08 LAB
ALBUMIN SERPL ELPH-MCNC: 1.8 G/DL — LOW (ref 3.3–5)
ALBUMIN SERPL ELPH-MCNC: 2 G/DL — LOW (ref 3.3–5)
ALP SERPL-CCNC: 137 U/L — HIGH (ref 40–120)
ALP SERPL-CCNC: 145 U/L — HIGH (ref 40–120)
ALT FLD-CCNC: 12 U/L — SIGNIFICANT CHANGE UP (ref 10–45)
ALT FLD-CCNC: 14 U/L — SIGNIFICANT CHANGE UP (ref 10–45)
ANION GAP SERPL CALC-SCNC: 13 MMOL/L — SIGNIFICANT CHANGE UP (ref 5–17)
ANION GAP SERPL CALC-SCNC: 16 MMOL/L — SIGNIFICANT CHANGE UP (ref 5–17)
ANISOCYTOSIS BLD QL: ABNORMAL
APPEARANCE UR: ABNORMAL
APTT BLD: 28.4 SEC — SIGNIFICANT CHANGE UP (ref 26.1–36.8)
APTT BLD: 37.9 SEC — HIGH (ref 26.1–36.8)
AST SERPL-CCNC: 17 U/L — SIGNIFICANT CHANGE UP (ref 10–40)
AST SERPL-CCNC: 21 U/L — SIGNIFICANT CHANGE UP (ref 10–40)
BACTERIA # UR AUTO: ABNORMAL /HPF
BASOPHILS # BLD AUTO: 0.02 K/UL — SIGNIFICANT CHANGE UP (ref 0–0.2)
BASOPHILS # BLD AUTO: 0.04 K/UL — SIGNIFICANT CHANGE UP (ref 0–0.2)
BASOPHILS # BLD MANUAL: 0 K/UL — SIGNIFICANT CHANGE UP (ref 0–0.2)
BASOPHILS NFR BLD AUTO: 0.2 % — SIGNIFICANT CHANGE UP (ref 0–2)
BASOPHILS NFR BLD AUTO: 0.4 % — SIGNIFICANT CHANGE UP (ref 0–2)
BASOPHILS NFR BLD MANUAL: 0 % — SIGNIFICANT CHANGE UP (ref 0–2)
BILIRUB SERPL-MCNC: 0.2 MG/DL — SIGNIFICANT CHANGE UP (ref 0.2–1.2)
BILIRUB SERPL-MCNC: 0.2 MG/DL — SIGNIFICANT CHANGE UP (ref 0.2–1.2)
BILIRUB UR-MCNC: NEGATIVE — SIGNIFICANT CHANGE UP
BUN SERPL-MCNC: 89 MG/DL — HIGH (ref 7–23)
BUN SERPL-MCNC: 89 MG/DL — HIGH (ref 7–23)
BURR CELLS BLD QL SMEAR: SLIGHT — SIGNIFICANT CHANGE UP
CALCIUM SERPL-MCNC: 8.1 MG/DL — LOW (ref 8.4–10.5)
CALCIUM SERPL-MCNC: 8.2 MG/DL — LOW (ref 8.4–10.5)
CAST: 58 /LPF — HIGH (ref 0–4)
CHLORIDE SERPL-SCNC: 117 MMOL/L — HIGH (ref 96–108)
CHLORIDE SERPL-SCNC: 119 MMOL/L — HIGH (ref 96–108)
CO2 SERPL-SCNC: 18 MMOL/L — LOW (ref 22–31)
CO2 SERPL-SCNC: 19 MMOL/L — LOW (ref 22–31)
COLOR SPEC: SIGNIFICANT CHANGE UP
CREAT SERPL-MCNC: 1.8 MG/DL — HIGH (ref 0.5–1.3)
CREAT SERPL-MCNC: 1.86 MG/DL — HIGH (ref 0.5–1.3)
CRP SERPL-MCNC: 207 MG/L — HIGH (ref 0–4)
DACRYOCYTES BLD QL SMEAR: SLIGHT — SIGNIFICANT CHANGE UP
DIFF PNL FLD: ABNORMAL
EGFR: 28 ML/MIN/1.73M2 — LOW
EGFR: 28 ML/MIN/1.73M2 — LOW
EGFR: 29 ML/MIN/1.73M2 — LOW
EGFR: 29 ML/MIN/1.73M2 — LOW
EOSINOPHIL # BLD AUTO: 0.09 K/UL — SIGNIFICANT CHANGE UP (ref 0–0.5)
EOSINOPHIL # BLD AUTO: 0.1 K/UL — SIGNIFICANT CHANGE UP (ref 0–0.5)
EOSINOPHIL # BLD MANUAL: 0 K/UL — SIGNIFICANT CHANGE UP (ref 0–0.5)
EOSINOPHIL NFR BLD AUTO: 0.9 % — SIGNIFICANT CHANGE UP (ref 0–6)
EOSINOPHIL NFR BLD AUTO: 1.1 % — SIGNIFICANT CHANGE UP (ref 0–6)
EOSINOPHIL NFR BLD MANUAL: 0 % — SIGNIFICANT CHANGE UP (ref 0–6)
FLUAV AG NPH QL: SIGNIFICANT CHANGE UP
FLUBV AG NPH QL: SIGNIFICANT CHANGE UP
GAS PNL BLDA: SIGNIFICANT CHANGE UP
GAS PNL BLDV: SIGNIFICANT CHANGE UP
GLUCOSE SERPL-MCNC: 164 MG/DL — HIGH (ref 70–99)
GLUCOSE SERPL-MCNC: 230 MG/DL — HIGH (ref 70–99)
GLUCOSE UR QL: NEGATIVE MG/DL — SIGNIFICANT CHANGE UP
HCT VFR BLD CALC: 30.6 % — LOW (ref 34.5–45)
HCT VFR BLD CALC: 32.3 % — LOW (ref 34.5–45)
HGB BLD-MCNC: 8.8 G/DL — LOW (ref 11.5–15.5)
HGB BLD-MCNC: 9.4 G/DL — LOW (ref 11.5–15.5)
IMM GRANULOCYTES # BLD AUTO: 0.06 K/UL — SIGNIFICANT CHANGE UP (ref 0–0.07)
IMM GRANULOCYTES # BLD AUTO: 0.07 K/UL — SIGNIFICANT CHANGE UP (ref 0–0.07)
IMM GRANULOCYTES NFR BLD AUTO: 0.7 % — SIGNIFICANT CHANGE UP (ref 0–0.9)
IMM GRANULOCYTES NFR BLD AUTO: 0.7 % — SIGNIFICANT CHANGE UP (ref 0–0.9)
INR BLD: 0.95 RATIO — SIGNIFICANT CHANGE UP (ref 0.85–1.16)
INR BLD: 1.03 RATIO — SIGNIFICANT CHANGE UP (ref 0.85–1.16)
KETONES UR QL: ABNORMAL MG/DL
LEUKOCYTE ESTERASE UR-ACNC: ABNORMAL
LYMPHOCYTES # BLD AUTO: 0.74 K/UL — LOW (ref 1–3.3)
LYMPHOCYTES # BLD AUTO: 0.81 K/UL — LOW (ref 1–3.3)
LYMPHOCYTES # BLD MANUAL: 0.14 K/UL — LOW (ref 1–3.3)
LYMPHOCYTES NFR BLD AUTO: 7.6 % — LOW (ref 13–44)
LYMPHOCYTES NFR BLD AUTO: 9.2 % — LOW (ref 13–44)
LYMPHOCYTES NFR BLD MANUAL: 1.7 % — LOW (ref 13–44)
MACROCYTES BLD QL: SLIGHT — SIGNIFICANT CHANGE UP
MAGNESIUM SERPL-MCNC: 2.3 MG/DL — SIGNIFICANT CHANGE UP (ref 1.6–2.6)
MANUAL NEUTROPHIL BANDS #: 0.14 K/UL — SIGNIFICANT CHANGE UP (ref 0–0.84)
MCHC RBC-ENTMCNC: 28.4 PG — SIGNIFICANT CHANGE UP (ref 27–34)
MCHC RBC-ENTMCNC: 28.7 PG — SIGNIFICANT CHANGE UP (ref 27–34)
MCHC RBC-ENTMCNC: 28.8 G/DL — LOW (ref 32–36)
MCHC RBC-ENTMCNC: 29.1 G/DL — LOW (ref 32–36)
MCV RBC AUTO: 98.5 FL — SIGNIFICANT CHANGE UP (ref 80–100)
MCV RBC AUTO: 98.7 FL — SIGNIFICANT CHANGE UP (ref 80–100)
MICROCYTES BLD QL: SLIGHT — SIGNIFICANT CHANGE UP
MONOCYTES # BLD AUTO: 0.15 K/UL — SIGNIFICANT CHANGE UP (ref 0–0.9)
MONOCYTES # BLD AUTO: 0.16 K/UL — SIGNIFICANT CHANGE UP (ref 0–0.9)
MONOCYTES # BLD MANUAL: 0.07 K/UL — SIGNIFICANT CHANGE UP (ref 0–0.9)
MONOCYTES NFR BLD AUTO: 1.4 % — LOW (ref 2–14)
MONOCYTES NFR BLD AUTO: 2 % — SIGNIFICANT CHANGE UP (ref 2–14)
MONOCYTES NFR BLD MANUAL: 0.9 % — LOW (ref 2–14)
NEUTROPHILS # BLD AUTO: 7 K/UL — SIGNIFICANT CHANGE UP (ref 1.8–7.4)
NEUTROPHILS # BLD AUTO: 9.49 K/UL — HIGH (ref 1.8–7.4)
NEUTROPHILS # BLD MANUAL: 7.72 K/UL — HIGH (ref 1.8–7.4)
NEUTROPHILS NFR BLD AUTO: 86.8 % — HIGH (ref 43–77)
NEUTROPHILS NFR BLD AUTO: 89 % — HIGH (ref 43–77)
NEUTROPHILS NFR BLD MANUAL: 95.7 % — HIGH (ref 43–77)
NEUTS BAND # BLD: 1.7 % — SIGNIFICANT CHANGE UP (ref 0–8)
NEUTS BAND NFR BLD: 1.7 % — SIGNIFICANT CHANGE UP (ref 0–8)
NITRITE UR-MCNC: NEGATIVE — SIGNIFICANT CHANGE UP
NRBC # BLD AUTO: 0.03 K/UL — HIGH (ref 0–0)
NRBC # BLD AUTO: 0.04 K/UL — HIGH (ref 0–0)
NRBC # FLD: 0.03 K/UL — HIGH (ref 0–0)
NRBC # FLD: 0.04 K/UL — HIGH (ref 0–0)
NRBC BLD AUTO-RTO: 0 /100 WBCS — SIGNIFICANT CHANGE UP (ref 0–0)
NRBC BLD AUTO-RTO: 0 /100 WBCS — SIGNIFICANT CHANGE UP (ref 0–0)
OVALOCYTES BLD QL SMEAR: SLIGHT — SIGNIFICANT CHANGE UP
PH UR: 5 — SIGNIFICANT CHANGE UP (ref 5–8)
PHOSPHATE SERPL-MCNC: 4.4 MG/DL — SIGNIFICANT CHANGE UP (ref 2.5–4.5)
PLAT MORPH BLD: NORMAL — SIGNIFICANT CHANGE UP
PLATELET # BLD AUTO: 117 K/UL — LOW (ref 150–400)
PLATELET # BLD AUTO: 143 K/UL — LOW (ref 150–400)
PMV BLD: 11.6 FL — SIGNIFICANT CHANGE UP (ref 7–13)
PMV BLD: 11.8 FL — SIGNIFICANT CHANGE UP (ref 7–13)
POTASSIUM SERPL-MCNC: 3.3 MMOL/L — LOW (ref 3.5–5.3)
POTASSIUM SERPL-MCNC: 3.6 MMOL/L — SIGNIFICANT CHANGE UP (ref 3.5–5.3)
POTASSIUM SERPL-SCNC: 3.3 MMOL/L — LOW (ref 3.5–5.3)
POTASSIUM SERPL-SCNC: 3.6 MMOL/L — SIGNIFICANT CHANGE UP (ref 3.5–5.3)
PROT SERPL-MCNC: 5.2 G/DL — LOW (ref 6–8.3)
PROT SERPL-MCNC: 5.6 G/DL — LOW (ref 6–8.3)
PROT UR-MCNC: 30 MG/DL
PROTHROM AB SERPL-ACNC: 10.8 SEC — SIGNIFICANT CHANGE UP (ref 9.9–13.4)
PROTHROM AB SERPL-ACNC: 11.7 SEC — SIGNIFICANT CHANGE UP (ref 9.9–13.4)
RAPID RVP RESULT: SIGNIFICANT CHANGE UP
RBC # BLD: 3.1 M/UL — LOW (ref 3.8–5.2)
RBC # BLD: 3.28 M/UL — LOW (ref 3.8–5.2)
RBC # FLD: 22.6 % — HIGH (ref 10.3–14.5)
RBC # FLD: 22.9 % — HIGH (ref 10.3–14.5)
RBC BLD AUTO: ABNORMAL
RBC CASTS # UR COMP ASSIST: 444 /HPF — HIGH (ref 0–4)
REVIEW: SIGNIFICANT CHANGE UP
RSV RNA NPH QL NAA+NON-PROBE: SIGNIFICANT CHANGE UP
SARS-COV-2 RNA SPEC QL NAA+PROBE: SIGNIFICANT CHANGE UP
SARS-COV-2 RNA SPEC QL NAA+PROBE: SIGNIFICANT CHANGE UP
SODIUM SERPL-SCNC: 151 MMOL/L — HIGH (ref 135–145)
SODIUM SERPL-SCNC: 151 MMOL/L — HIGH (ref 135–145)
SOURCE RESPIRATORY: SIGNIFICANT CHANGE UP
SP GR SPEC: 1.02 — SIGNIFICANT CHANGE UP (ref 1–1.03)
SQUAMOUS # UR AUTO: 4 /HPF — SIGNIFICANT CHANGE UP (ref 0–5)
UROBILINOGEN FLD QL: 1 MG/DL — SIGNIFICANT CHANGE UP (ref 0.2–1)
WBC # BLD: 10.66 K/UL — HIGH (ref 3.8–10.5)
WBC # BLD: 8.07 K/UL — SIGNIFICANT CHANGE UP (ref 3.8–10.5)
WBC # FLD AUTO: 10.66 K/UL — HIGH (ref 3.8–10.5)
WBC # FLD AUTO: 8.07 K/UL — SIGNIFICANT CHANGE UP (ref 3.8–10.5)
WBC UR QL: 208 /HPF — HIGH (ref 0–5)
YEAST-LIKE CELLS: PRESENT

## 2025-08-08 PROCEDURE — 99285 EMERGENCY DEPT VISIT HI MDM: CPT

## 2025-08-08 PROCEDURE — 85018 HEMOGLOBIN: CPT

## 2025-08-08 PROCEDURE — 87040 BLOOD CULTURE FOR BACTERIA: CPT

## 2025-08-08 PROCEDURE — 84132 ASSAY OF SERUM POTASSIUM: CPT

## 2025-08-08 PROCEDURE — 84100 ASSAY OF PHOSPHORUS: CPT

## 2025-08-08 PROCEDURE — 31645 BRNCHSC W/THER ASPIR 1ST: CPT | Mod: GC

## 2025-08-08 PROCEDURE — 93010 ELECTROCARDIOGRAM REPORT: CPT

## 2025-08-08 PROCEDURE — 94002 VENT MGMT INPAT INIT DAY: CPT

## 2025-08-08 PROCEDURE — 71045 X-RAY EXAM CHEST 1 VIEW: CPT | Mod: 26,77

## 2025-08-08 PROCEDURE — 0225U NFCT DS DNA&RNA 21 SARSCOV2: CPT

## 2025-08-08 PROCEDURE — 81001 URINALYSIS AUTO W/SCOPE: CPT

## 2025-08-08 PROCEDURE — 83605 ASSAY OF LACTIC ACID: CPT

## 2025-08-08 PROCEDURE — 84295 ASSAY OF SERUM SODIUM: CPT

## 2025-08-08 PROCEDURE — 71045 X-RAY EXAM CHEST 1 VIEW: CPT | Mod: 26

## 2025-08-08 PROCEDURE — 85610 PROTHROMBIN TIME: CPT

## 2025-08-08 PROCEDURE — 82947 ASSAY GLUCOSE BLOOD QUANT: CPT

## 2025-08-08 PROCEDURE — 82803 BLOOD GASES ANY COMBINATION: CPT

## 2025-08-08 PROCEDURE — 85014 HEMATOCRIT: CPT

## 2025-08-08 PROCEDURE — 87637 SARSCOV2&INF A&B&RSV AMP PRB: CPT

## 2025-08-08 PROCEDURE — 99291 CRITICAL CARE FIRST HOUR: CPT | Mod: GC,25

## 2025-08-08 PROCEDURE — 87086 URINE CULTURE/COLONY COUNT: CPT

## 2025-08-08 PROCEDURE — 82435 ASSAY OF BLOOD CHLORIDE: CPT

## 2025-08-08 PROCEDURE — 83735 ASSAY OF MAGNESIUM: CPT

## 2025-08-08 PROCEDURE — 94640 AIRWAY INHALATION TREATMENT: CPT

## 2025-08-08 PROCEDURE — 85025 COMPLETE CBC W/AUTO DIFF WBC: CPT

## 2025-08-08 PROCEDURE — 73110 X-RAY EXAM OF WRIST: CPT | Mod: 26,RT

## 2025-08-08 PROCEDURE — 71045 X-RAY EXAM CHEST 1 VIEW: CPT

## 2025-08-08 PROCEDURE — 82330 ASSAY OF CALCIUM: CPT

## 2025-08-08 PROCEDURE — 73110 X-RAY EXAM OF WRIST: CPT

## 2025-08-08 PROCEDURE — 80053 COMPREHEN METABOLIC PANEL: CPT

## 2025-08-08 PROCEDURE — 85730 THROMBOPLASTIN TIME PARTIAL: CPT

## 2025-08-08 PROCEDURE — 73130 X-RAY EXAM OF HAND: CPT

## 2025-08-08 PROCEDURE — 73130 X-RAY EXAM OF HAND: CPT | Mod: 26,RT

## 2025-08-08 PROCEDURE — 86140 C-REACTIVE PROTEIN: CPT

## 2025-08-08 RX ORDER — SODIUM CHLORIDE 9 G/1000ML
1000 INJECTION, SOLUTION INTRAVENOUS
Refills: 0 | Status: DISCONTINUED | OUTPATIENT
Start: 2025-08-08 | End: 2025-08-08

## 2025-08-08 RX ORDER — MIDAZOLAM IN 0.9 % SOD.CHLORID 1 MG/ML
2 PLASTIC BAG, INJECTION (ML) INTRAVENOUS ONCE
Refills: 0 | Status: DISCONTINUED | OUTPATIENT
Start: 2025-08-08 | End: 2025-08-08

## 2025-08-08 RX ORDER — MEROPENEM 1 G/30ML
1000 INJECTION INTRAVENOUS EVERY 12 HOURS
Refills: 0 | Status: DISCONTINUED | OUTPATIENT
Start: 2025-08-08 | End: 2025-08-11

## 2025-08-08 RX ORDER — ERTAPENEM SODIUM 1 G/1
1000 INJECTION, POWDER, LYOPHILIZED, FOR SOLUTION INTRAMUSCULAR; INTRAVENOUS EVERY 24 HOURS
Refills: 0 | Status: DISCONTINUED | OUTPATIENT
Start: 2025-08-08 | End: 2025-08-08

## 2025-08-08 RX ORDER — DEXTROSE 50 % IN WATER 50 %
25 SYRINGE (ML) INTRAVENOUS ONCE
Refills: 0 | Status: DISCONTINUED | OUTPATIENT
Start: 2025-08-08 | End: 2025-08-08

## 2025-08-08 RX ORDER — VANCOMYCIN HCL IN 5 % DEXTROSE 1.5G/250ML
1000 PLASTIC BAG, INJECTION (ML) INTRAVENOUS ONCE
Refills: 0 | Status: COMPLETED | OUTPATIENT
Start: 2025-08-08 | End: 2025-08-08

## 2025-08-08 RX ORDER — VANCOMYCIN HCL IN 5 % DEXTROSE 1.5G/250ML
1000 PLASTIC BAG, INJECTION (ML) INTRAVENOUS ONCE
Refills: 0 | Status: DISCONTINUED | OUTPATIENT
Start: 2025-08-08 | End: 2025-08-08

## 2025-08-08 RX ORDER — DEXTROSE 50 % IN WATER 50 %
12.5 SYRINGE (ML) INTRAVENOUS ONCE
Refills: 0 | Status: DISCONTINUED | OUTPATIENT
Start: 2025-08-08 | End: 2025-08-08

## 2025-08-08 RX ORDER — HEPARIN SODIUM 1000 [USP'U]/ML
5000 INJECTION INTRAVENOUS; SUBCUTANEOUS EVERY 12 HOURS
Refills: 0 | Status: DISCONTINUED | OUTPATIENT
Start: 2025-08-08 | End: 2025-08-09

## 2025-08-08 RX ORDER — GLUCAGON 3 MG/1
1 POWDER NASAL ONCE
Refills: 0 | Status: DISCONTINUED | OUTPATIENT
Start: 2025-08-08 | End: 2025-08-08

## 2025-08-08 RX ORDER — MEROPENEM 1 G/30ML
1000 INJECTION INTRAVENOUS EVERY 8 HOURS
Refills: 0 | Status: DISCONTINUED | OUTPATIENT
Start: 2025-08-08 | End: 2025-08-08

## 2025-08-08 RX ORDER — MEROPENEM 1 G/30ML
1000 INJECTION INTRAVENOUS EVERY 24 HOURS
Refills: 0 | Status: DISCONTINUED | OUTPATIENT
Start: 2025-08-08 | End: 2025-08-08

## 2025-08-08 RX ORDER — AZITHROMYCIN 250 MG
500 CAPSULE ORAL EVERY 24 HOURS
Refills: 0 | Status: DISCONTINUED | OUTPATIENT
Start: 2025-08-08 | End: 2025-08-10

## 2025-08-08 RX ORDER — INSULIN LISPRO 100 U/ML
INJECTION, SOLUTION INTRAVENOUS; SUBCUTANEOUS
Refills: 0 | Status: DISCONTINUED | OUTPATIENT
Start: 2025-08-08 | End: 2025-08-08

## 2025-08-08 RX ORDER — PIPERACILLIN-TAZO-DEXTROSE,ISO 3.375G/5
3.38 IV SOLUTION, PIGGYBACK PREMIX FROZEN(ML) INTRAVENOUS ONCE
Refills: 0 | Status: COMPLETED | OUTPATIENT
Start: 2025-08-08 | End: 2025-08-08

## 2025-08-08 RX ORDER — SODIUM CHLORIDE 9 G/1000ML
1000 INJECTION, SOLUTION INTRAVENOUS ONCE
Refills: 0 | Status: COMPLETED | OUTPATIENT
Start: 2025-08-08 | End: 2025-08-08

## 2025-08-08 RX ORDER — ASPIRIN 325 MG
81 TABLET ORAL DAILY
Refills: 0 | Status: DISCONTINUED | OUTPATIENT
Start: 2025-08-08 | End: 2025-08-29

## 2025-08-08 RX ORDER — DEXTROSE 50 % IN WATER 50 %
15 SYRINGE (ML) INTRAVENOUS ONCE
Refills: 0 | Status: DISCONTINUED | OUTPATIENT
Start: 2025-08-08 | End: 2025-08-08

## 2025-08-08 RX ORDER — SODIUM CHLORIDE 9 G/1000ML
1000 INJECTION, SOLUTION INTRAVENOUS
Refills: 0 | Status: DISCONTINUED | OUTPATIENT
Start: 2025-08-08 | End: 2025-08-09

## 2025-08-08 RX ORDER — INSULIN LISPRO 100 U/ML
INJECTION, SOLUTION INTRAVENOUS; SUBCUTANEOUS EVERY 6 HOURS
Refills: 0 | Status: DISCONTINUED | OUTPATIENT
Start: 2025-08-08 | End: 2025-08-11

## 2025-08-08 RX ORDER — NOREPINEPHRINE BITARTRATE 8 MG
0.05 SOLUTION INTRAVENOUS
Qty: 8 | Refills: 0 | Status: DISCONTINUED | OUTPATIENT
Start: 2025-08-08 | End: 2025-08-12

## 2025-08-08 RX ADMIN — Medication 2 MILLIGRAM(S): at 18:11

## 2025-08-08 RX ADMIN — SODIUM CHLORIDE 125 MILLILITER(S): 9 INJECTION, SOLUTION INTRAVENOUS at 21:19

## 2025-08-08 RX ADMIN — Medication 200 GRAM(S): at 14:51

## 2025-08-08 RX ADMIN — NOREPINEPHRINE BITARTRATE 6.64 MICROGRAM(S)/KG/MIN: 8 SOLUTION at 20:28

## 2025-08-08 RX ADMIN — Medication 20 MILLIEQUIVALENT(S): at 21:19

## 2025-08-08 RX ADMIN — SODIUM CHLORIDE 1000 MILLILITER(S): 9 INJECTION, SOLUTION INTRAVENOUS at 20:28

## 2025-08-08 RX ADMIN — Medication 250 MILLIGRAM(S): at 16:16

## 2025-08-08 RX ADMIN — Medication 250 MILLIGRAM(S): at 21:20

## 2025-08-08 RX ADMIN — Medication 3 MILLILITER(S): at 23:32

## 2025-08-09 LAB
ALBUMIN SERPL ELPH-MCNC: 1.9 G/DL — LOW (ref 3.3–5)
ALP SERPL-CCNC: 140 U/L — HIGH (ref 40–120)
ALT FLD-CCNC: 14 U/L — SIGNIFICANT CHANGE UP (ref 10–45)
AMMONIA BLD-MCNC: 69 UMOL/L — HIGH (ref 11–55)
ANION GAP SERPL CALC-SCNC: 15 MMOL/L — SIGNIFICANT CHANGE UP (ref 5–17)
APTT BLD: 35 SEC — SIGNIFICANT CHANGE UP (ref 26.1–36.8)
AST SERPL-CCNC: 18 U/L — SIGNIFICANT CHANGE UP (ref 10–40)
BASOPHILS # BLD AUTO: 0.04 K/UL — SIGNIFICANT CHANGE UP (ref 0–0.2)
BASOPHILS NFR BLD AUTO: 0.3 % — SIGNIFICANT CHANGE UP (ref 0–2)
BILIRUB SERPL-MCNC: 0.2 MG/DL — SIGNIFICANT CHANGE UP (ref 0.2–1.2)
BUN SERPL-MCNC: 86 MG/DL — HIGH (ref 7–23)
CALCIUM SERPL-MCNC: 8.1 MG/DL — LOW (ref 8.4–10.5)
CHLORIDE SERPL-SCNC: 116 MMOL/L — HIGH (ref 96–108)
CO2 SERPL-SCNC: 18 MMOL/L — LOW (ref 22–31)
CREAT SERPL-MCNC: 1.77 MG/DL — HIGH (ref 0.5–1.3)
EGFR: 29 ML/MIN/1.73M2 — LOW
EGFR: 29 ML/MIN/1.73M2 — LOW
EOSINOPHIL # BLD AUTO: 0.06 K/UL — SIGNIFICANT CHANGE UP (ref 0–0.5)
EOSINOPHIL NFR BLD AUTO: 0.5 % — SIGNIFICANT CHANGE UP (ref 0–6)
GLUCOSE BLDC GLUCOMTR-MCNC: 142 MG/DL — HIGH (ref 70–99)
GLUCOSE BLDC GLUCOMTR-MCNC: 169 MG/DL — HIGH (ref 70–99)
GLUCOSE BLDC GLUCOMTR-MCNC: 96 MG/DL — SIGNIFICANT CHANGE UP (ref 70–99)
GLUCOSE SERPL-MCNC: 226 MG/DL — HIGH (ref 70–99)
HCT VFR BLD CALC: 30.9 % — LOW (ref 34.5–45)
HGB BLD-MCNC: 9.1 G/DL — LOW (ref 11.5–15.5)
IMM GRANULOCYTES # BLD AUTO: 0.09 K/UL — HIGH (ref 0–0.07)
IMM GRANULOCYTES NFR BLD AUTO: 0.8 % — SIGNIFICANT CHANGE UP (ref 0–0.9)
INR BLD: 1.02 RATIO — SIGNIFICANT CHANGE UP (ref 0.85–1.16)
LEGIONELLA AG UR QL: NEGATIVE — SIGNIFICANT CHANGE UP
LYMPHOCYTES # BLD AUTO: 0.73 K/UL — LOW (ref 1–3.3)
LYMPHOCYTES NFR BLD AUTO: 6.3 % — LOW (ref 13–44)
MAGNESIUM SERPL-MCNC: 2.2 MG/DL — SIGNIFICANT CHANGE UP (ref 1.6–2.6)
MCHC RBC-ENTMCNC: 28.4 PG — SIGNIFICANT CHANGE UP (ref 27–34)
MCHC RBC-ENTMCNC: 29.4 G/DL — LOW (ref 32–36)
MCV RBC AUTO: 96.6 FL — SIGNIFICANT CHANGE UP (ref 80–100)
MONOCYTES # BLD AUTO: 0.14 K/UL — SIGNIFICANT CHANGE UP (ref 0–0.9)
MONOCYTES NFR BLD AUTO: 1.2 % — LOW (ref 2–14)
MRSA PCR RESULT.: SIGNIFICANT CHANGE UP
NEUTROPHILS # BLD AUTO: 10.5 K/UL — HIGH (ref 1.8–7.4)
NEUTROPHILS NFR BLD AUTO: 90.9 % — HIGH (ref 43–77)
NRBC # BLD AUTO: 0.06 K/UL — HIGH (ref 0–0)
NRBC # FLD: 0.06 K/UL — HIGH (ref 0–0)
NRBC BLD AUTO-RTO: 0 /100 WBCS — SIGNIFICANT CHANGE UP (ref 0–0)
PHOSPHATE SERPL-MCNC: 4.1 MG/DL — SIGNIFICANT CHANGE UP (ref 2.5–4.5)
PLATELET # BLD AUTO: 143 K/UL — LOW (ref 150–400)
PMV BLD: 11.7 FL — SIGNIFICANT CHANGE UP (ref 7–13)
POTASSIUM SERPL-MCNC: 4.1 MMOL/L — SIGNIFICANT CHANGE UP (ref 3.5–5.3)
POTASSIUM SERPL-SCNC: 4.1 MMOL/L — SIGNIFICANT CHANGE UP (ref 3.5–5.3)
PROT SERPL-MCNC: 5.4 G/DL — LOW (ref 6–8.3)
PROTHROM AB SERPL-ACNC: 11.6 SEC — SIGNIFICANT CHANGE UP (ref 9.9–13.4)
RBC # BLD: 3.2 M/UL — LOW (ref 3.8–5.2)
RBC # FLD: 22.3 % — HIGH (ref 10.3–14.5)
S AUREUS DNA NOSE QL NAA+PROBE: SIGNIFICANT CHANGE UP
S PNEUM AG UR QL: NEGATIVE — SIGNIFICANT CHANGE UP
SODIUM SERPL-SCNC: 149 MMOL/L — HIGH (ref 135–145)
VANCOMYCIN TROUGH SERPL-MCNC: 9.9 UG/ML — LOW (ref 10–20)
WBC # BLD: 11.56 K/UL — HIGH (ref 3.8–10.5)
WBC # FLD AUTO: 11.56 K/UL — HIGH (ref 3.8–10.5)

## 2025-08-09 PROCEDURE — 81001 URINALYSIS AUTO W/SCOPE: CPT

## 2025-08-09 PROCEDURE — 80202 ASSAY OF VANCOMYCIN: CPT

## 2025-08-09 PROCEDURE — 82140 ASSAY OF AMMONIA: CPT

## 2025-08-09 PROCEDURE — 87899 AGENT NOS ASSAY W/OPTIC: CPT

## 2025-08-09 PROCEDURE — 87040 BLOOD CULTURE FOR BACTERIA: CPT

## 2025-08-09 PROCEDURE — 93005 ELECTROCARDIOGRAM TRACING: CPT

## 2025-08-09 PROCEDURE — 94640 AIRWAY INHALATION TREATMENT: CPT

## 2025-08-09 PROCEDURE — 94002 VENT MGMT INPAT INIT DAY: CPT

## 2025-08-09 PROCEDURE — 73130 X-RAY EXAM OF HAND: CPT

## 2025-08-09 PROCEDURE — 82962 GLUCOSE BLOOD TEST: CPT

## 2025-08-09 PROCEDURE — 83735 ASSAY OF MAGNESIUM: CPT

## 2025-08-09 PROCEDURE — 86140 C-REACTIVE PROTEIN: CPT

## 2025-08-09 PROCEDURE — 93010 ELECTROCARDIOGRAM REPORT: CPT

## 2025-08-09 PROCEDURE — 85025 COMPLETE CBC W/AUTO DIFF WBC: CPT

## 2025-08-09 PROCEDURE — 82947 ASSAY GLUCOSE BLOOD QUANT: CPT

## 2025-08-09 PROCEDURE — 84132 ASSAY OF SERUM POTASSIUM: CPT

## 2025-08-09 PROCEDURE — 94003 VENT MGMT INPAT SUBQ DAY: CPT

## 2025-08-09 PROCEDURE — 85610 PROTHROMBIN TIME: CPT

## 2025-08-09 PROCEDURE — 0225U NFCT DS DNA&RNA 21 SARSCOV2: CPT

## 2025-08-09 PROCEDURE — 99291 CRITICAL CARE FIRST HOUR: CPT | Mod: GC

## 2025-08-09 PROCEDURE — 87086 URINE CULTURE/COLONY COUNT: CPT

## 2025-08-09 PROCEDURE — 73110 X-RAY EXAM OF WRIST: CPT

## 2025-08-09 PROCEDURE — 83605 ASSAY OF LACTIC ACID: CPT

## 2025-08-09 PROCEDURE — 82330 ASSAY OF CALCIUM: CPT

## 2025-08-09 PROCEDURE — 87640 STAPH A DNA AMP PROBE: CPT

## 2025-08-09 PROCEDURE — 87637 SARSCOV2&INF A&B&RSV AMP PRB: CPT

## 2025-08-09 PROCEDURE — 71045 X-RAY EXAM CHEST 1 VIEW: CPT

## 2025-08-09 PROCEDURE — 85018 HEMOGLOBIN: CPT

## 2025-08-09 PROCEDURE — 84100 ASSAY OF PHOSPHORUS: CPT

## 2025-08-09 PROCEDURE — 80053 COMPREHEN METABOLIC PANEL: CPT

## 2025-08-09 PROCEDURE — 85014 HEMATOCRIT: CPT

## 2025-08-09 PROCEDURE — 87641 MR-STAPH DNA AMP PROBE: CPT

## 2025-08-09 PROCEDURE — 84295 ASSAY OF SERUM SODIUM: CPT

## 2025-08-09 PROCEDURE — 82435 ASSAY OF BLOOD CHLORIDE: CPT

## 2025-08-09 PROCEDURE — 82803 BLOOD GASES ANY COMBINATION: CPT

## 2025-08-09 PROCEDURE — 85730 THROMBOPLASTIN TIME PARTIAL: CPT

## 2025-08-09 RX ORDER — HEPARIN SODIUM 1000 [USP'U]/ML
5000 INJECTION INTRAVENOUS; SUBCUTANEOUS EVERY 8 HOURS
Refills: 0 | Status: DISCONTINUED | OUTPATIENT
Start: 2025-08-09 | End: 2025-08-14

## 2025-08-09 RX ORDER — POLYETHYLENE GLYCOL 3350 17 G/17G
17 POWDER, FOR SOLUTION ORAL
Refills: 0 | Status: DISCONTINUED | OUTPATIENT
Start: 2025-08-09 | End: 2025-08-12

## 2025-08-09 RX ORDER — ACETAMINOPHEN 500 MG/5ML
650 LIQUID (ML) ORAL ONCE
Refills: 0 | Status: COMPLETED | OUTPATIENT
Start: 2025-08-09 | End: 2025-08-09

## 2025-08-09 RX ORDER — B1/B2/B3/B5/B6/B12/VIT C/FOLIC 500-0.5 MG
1 TABLET ORAL DAILY
Refills: 0 | Status: DISCONTINUED | OUTPATIENT
Start: 2025-08-09 | End: 2025-08-12

## 2025-08-09 RX ORDER — VANCOMYCIN HCL IN 5 % DEXTROSE 1.5G/250ML
750 PLASTIC BAG, INJECTION (ML) INTRAVENOUS EVERY 24 HOURS
Refills: 0 | Status: DISCONTINUED | OUTPATIENT
Start: 2025-08-09 | End: 2025-08-09

## 2025-08-09 RX ORDER — VANCOMYCIN HCL IN 5 % DEXTROSE 1.5G/250ML
1000 PLASTIC BAG, INJECTION (ML) INTRAVENOUS EVERY 24 HOURS
Refills: 0 | Status: DISCONTINUED | OUTPATIENT
Start: 2025-08-10 | End: 2025-08-10

## 2025-08-09 RX ORDER — VANCOMYCIN HCL IN 5 % DEXTROSE 1.5G/250ML
1000 PLASTIC BAG, INJECTION (ML) INTRAVENOUS ONCE
Refills: 0 | Status: DISCONTINUED | OUTPATIENT
Start: 2025-08-09 | End: 2025-08-09

## 2025-08-09 RX ORDER — MIDODRINE HYDROCHLORIDE 5 MG/1
20 TABLET ORAL ONCE
Refills: 0 | Status: COMPLETED | OUTPATIENT
Start: 2025-08-09 | End: 2025-08-09

## 2025-08-09 RX ORDER — VANCOMYCIN HCL IN 5 % DEXTROSE 1.5G/250ML
1000 PLASTIC BAG, INJECTION (ML) INTRAVENOUS EVERY 24 HOURS
Refills: 0 | Status: DISCONTINUED | OUTPATIENT
Start: 2025-08-09 | End: 2025-08-09

## 2025-08-09 RX ORDER — BUMETANIDE 1 MG/1
4 TABLET ORAL ONCE
Refills: 0 | Status: COMPLETED | OUTPATIENT
Start: 2025-08-09 | End: 2025-08-09

## 2025-08-09 RX ORDER — MIDODRINE HYDROCHLORIDE 5 MG/1
20 TABLET ORAL THREE TIMES A DAY
Refills: 0 | Status: DISCONTINUED | OUTPATIENT
Start: 2025-08-09 | End: 2025-08-09

## 2025-08-09 RX ORDER — MIDODRINE HYDROCHLORIDE 5 MG/1
20 TABLET ORAL EVERY 8 HOURS
Refills: 0 | Status: DISCONTINUED | OUTPATIENT
Start: 2025-08-09 | End: 2025-08-09

## 2025-08-09 RX ORDER — MIDODRINE HYDROCHLORIDE 5 MG/1
30 TABLET ORAL EVERY 8 HOURS
Refills: 0 | Status: DISCONTINUED | OUTPATIENT
Start: 2025-08-09 | End: 2025-08-28

## 2025-08-09 RX ADMIN — Medication 3 MILLILITER(S): at 05:13

## 2025-08-09 RX ADMIN — Medication 1 APPLICATION(S): at 05:40

## 2025-08-09 RX ADMIN — MEROPENEM 100 MILLIGRAM(S): 1 INJECTION INTRAVENOUS at 05:17

## 2025-08-09 RX ADMIN — Medication 650 MILLIGRAM(S): at 04:00

## 2025-08-09 RX ADMIN — BUMETANIDE 132 MILLIGRAM(S): 1 TABLET ORAL at 05:17

## 2025-08-09 RX ADMIN — Medication 40 MILLIGRAM(S): at 05:18

## 2025-08-09 RX ADMIN — HEPARIN SODIUM 5000 UNIT(S): 1000 INJECTION INTRAVENOUS; SUBCUTANEOUS at 21:09

## 2025-08-09 RX ADMIN — Medication 3 MILLILITER(S): at 11:16

## 2025-08-09 RX ADMIN — MEROPENEM 100 MILLIGRAM(S): 1 INJECTION INTRAVENOUS at 17:05

## 2025-08-09 RX ADMIN — MIDODRINE HYDROCHLORIDE 30 MILLIGRAM(S): 5 TABLET ORAL at 21:09

## 2025-08-09 RX ADMIN — Medication 3 MILLILITER(S): at 23:42

## 2025-08-09 RX ADMIN — Medication 40 MILLIGRAM(S): at 17:05

## 2025-08-09 RX ADMIN — Medication 15 MILLILITER(S): at 05:17

## 2025-08-09 RX ADMIN — Medication 15 MILLILITER(S): at 17:05

## 2025-08-09 RX ADMIN — HEPARIN SODIUM 5000 UNIT(S): 1000 INJECTION INTRAVENOUS; SUBCUTANEOUS at 13:27

## 2025-08-09 RX ADMIN — Medication 3 MILLILITER(S): at 17:08

## 2025-08-09 RX ADMIN — Medication 650 MILLIGRAM(S): at 02:59

## 2025-08-09 RX ADMIN — Medication 250 MILLIGRAM(S): at 21:09

## 2025-08-09 RX ADMIN — HEPARIN SODIUM 5000 UNIT(S): 1000 INJECTION INTRAVENOUS; SUBCUTANEOUS at 05:18

## 2025-08-09 RX ADMIN — Medication 250 MILLIGRAM(S): at 17:05

## 2025-08-09 RX ADMIN — INSULIN LISPRO 1: 100 INJECTION, SOLUTION INTRAVENOUS; SUBCUTANEOUS at 11:27

## 2025-08-09 RX ADMIN — Medication 81 MILLIGRAM(S): at 11:26

## 2025-08-09 RX ADMIN — MIDODRINE HYDROCHLORIDE 30 MILLIGRAM(S): 5 TABLET ORAL at 13:27

## 2025-08-09 RX ADMIN — INSULIN LISPRO 2: 100 INJECTION, SOLUTION INTRAVENOUS; SUBCUTANEOUS at 00:40

## 2025-08-09 RX ADMIN — MIDODRINE HYDROCHLORIDE 20 MILLIGRAM(S): 5 TABLET ORAL at 05:17

## 2025-08-09 RX ADMIN — POLYETHYLENE GLYCOL 3350 17 GRAM(S): 17 POWDER, FOR SOLUTION ORAL at 17:05

## 2025-08-09 RX ADMIN — NOREPINEPHRINE BITARTRATE 6.64 MICROGRAM(S)/KG/MIN: 8 SOLUTION at 11:28

## 2025-08-10 DIAGNOSIS — N17.9 ACUTE KIDNEY FAILURE, UNSPECIFIED: ICD-10-CM

## 2025-08-10 LAB
ALBUMIN SERPL ELPH-MCNC: 1.5 G/DL — LOW (ref 3.3–5)
ALP SERPL-CCNC: 119 U/L — SIGNIFICANT CHANGE UP (ref 40–120)
ALT FLD-CCNC: 14 U/L — SIGNIFICANT CHANGE UP (ref 10–45)
ANION GAP SERPL CALC-SCNC: 18 MMOL/L — HIGH (ref 5–17)
APPEARANCE UR: ABNORMAL
APTT BLD: 40.7 SEC — HIGH (ref 26.1–36.8)
AST SERPL-CCNC: 19 U/L — SIGNIFICANT CHANGE UP (ref 10–40)
BACTERIA # UR AUTO: NEGATIVE /HPF — SIGNIFICANT CHANGE UP
BASOPHILS # BLD AUTO: 0.01 K/UL — SIGNIFICANT CHANGE UP (ref 0–0.2)
BASOPHILS NFR BLD AUTO: 0.1 % — SIGNIFICANT CHANGE UP (ref 0–2)
BILIRUB SERPL-MCNC: 0.3 MG/DL — SIGNIFICANT CHANGE UP (ref 0.2–1.2)
BILIRUB UR-MCNC: NEGATIVE — SIGNIFICANT CHANGE UP
BUN SERPL-MCNC: 90 MG/DL — HIGH (ref 7–23)
CALCIUM SERPL-MCNC: 7.6 MG/DL — LOW (ref 8.4–10.5)
CAST: 10 /LPF — HIGH (ref 0–4)
CHLORIDE SERPL-SCNC: 113 MMOL/L — HIGH (ref 96–108)
CO2 SERPL-SCNC: 15 MMOL/L — LOW (ref 22–31)
COLOR SPEC: YELLOW — SIGNIFICANT CHANGE UP
CREAT ?TM UR-MCNC: 33 MG/DL — SIGNIFICANT CHANGE UP
CREAT SERPL-MCNC: 1.84 MG/DL — HIGH (ref 0.5–1.3)
DIFF PNL FLD: NEGATIVE — SIGNIFICANT CHANGE UP
EGFR: 28 ML/MIN/1.73M2 — LOW
EGFR: 28 ML/MIN/1.73M2 — LOW
EOSINOPHIL # BLD AUTO: 0.01 K/UL — SIGNIFICANT CHANGE UP (ref 0–0.5)
EOSINOPHIL NFR BLD AUTO: 0.1 % — SIGNIFICANT CHANGE UP (ref 0–6)
GAS PNL BLDA: SIGNIFICANT CHANGE UP
GLUCOSE BLDC GLUCOMTR-MCNC: 160 MG/DL — HIGH (ref 70–99)
GLUCOSE BLDC GLUCOMTR-MCNC: 184 MG/DL — HIGH (ref 70–99)
GLUCOSE BLDC GLUCOMTR-MCNC: 211 MG/DL — HIGH (ref 70–99)
GLUCOSE SERPL-MCNC: 194 MG/DL — HIGH (ref 70–99)
GLUCOSE UR QL: NEGATIVE MG/DL — SIGNIFICANT CHANGE UP
HCT VFR BLD CALC: 27 % — LOW (ref 34.5–45)
HGB BLD-MCNC: 8.4 G/DL — LOW (ref 11.5–15.5)
IMM GRANULOCYTES # BLD AUTO: 0.1 K/UL — HIGH (ref 0–0.07)
IMM GRANULOCYTES NFR BLD AUTO: 1 % — HIGH (ref 0–0.9)
INR BLD: 1.48 RATIO — HIGH (ref 0.85–1.16)
KETONES UR QL: NEGATIVE MG/DL — SIGNIFICANT CHANGE UP
LEUKOCYTE ESTERASE UR-ACNC: ABNORMAL
LYMPHOCYTES # BLD AUTO: 1.55 K/UL — SIGNIFICANT CHANGE UP (ref 1–3.3)
LYMPHOCYTES NFR BLD AUTO: 16.2 % — SIGNIFICANT CHANGE UP (ref 13–44)
MAGNESIUM SERPL-MCNC: 1.9 MG/DL — SIGNIFICANT CHANGE UP (ref 1.6–2.6)
MCHC RBC-ENTMCNC: 28.5 PG — SIGNIFICANT CHANGE UP (ref 27–34)
MCHC RBC-ENTMCNC: 31.1 G/DL — LOW (ref 32–36)
MCV RBC AUTO: 91.5 FL — SIGNIFICANT CHANGE UP (ref 80–100)
MONOCYTES # BLD AUTO: 0.19 K/UL — SIGNIFICANT CHANGE UP (ref 0–0.9)
MONOCYTES NFR BLD AUTO: 2 % — SIGNIFICANT CHANGE UP (ref 2–14)
NEUTROPHILS # BLD AUTO: 7.7 K/UL — HIGH (ref 1.8–7.4)
NEUTROPHILS NFR BLD AUTO: 80.6 % — HIGH (ref 43–77)
NITRITE UR-MCNC: NEGATIVE — SIGNIFICANT CHANGE UP
NRBC # BLD AUTO: 0.08 K/UL — HIGH (ref 0–0)
NRBC # FLD: 0.08 K/UL — HIGH (ref 0–0)
NRBC BLD AUTO-RTO: 0 /100 WBCS — SIGNIFICANT CHANGE UP (ref 0–0)
OSMOLALITY UR: 371 MOS/KG — SIGNIFICANT CHANGE UP (ref 300–900)
PH UR: 5 — SIGNIFICANT CHANGE UP (ref 5–8)
PHOSPHATE SERPL-MCNC: 4 MG/DL — SIGNIFICANT CHANGE UP (ref 2.5–4.5)
PLATELET # BLD AUTO: 134 K/UL — LOW (ref 150–400)
PMV BLD: 12.3 FL — SIGNIFICANT CHANGE UP (ref 7–13)
POTASSIUM SERPL-MCNC: 4 MMOL/L — SIGNIFICANT CHANGE UP (ref 3.5–5.3)
POTASSIUM SERPL-SCNC: 4 MMOL/L — SIGNIFICANT CHANGE UP (ref 3.5–5.3)
POTASSIUM UR-SCNC: 32 MMOL/L — SIGNIFICANT CHANGE UP
PROT ?TM UR-MCNC: 35 MG/DL — HIGH (ref 0–12)
PROT SERPL-MCNC: 5 G/DL — LOW (ref 6–8.3)
PROT UR-MCNC: 30 MG/DL
PROT/CREAT UR-RTO: 1.1 RATIO — HIGH (ref 0–0.2)
PROTHROM AB SERPL-ACNC: 17 SEC — HIGH (ref 9.9–13.4)
RBC # BLD: 2.95 M/UL — LOW (ref 3.8–5.2)
RBC # FLD: 21.4 % — HIGH (ref 10.3–14.5)
RBC CASTS # UR COMP ASSIST: 50 /HPF — HIGH (ref 0–4)
REVIEW: SIGNIFICANT CHANGE UP
SODIUM SERPL-SCNC: 146 MMOL/L — HIGH (ref 135–145)
SODIUM UR-SCNC: 20 MMOL/L — SIGNIFICANT CHANGE UP
SP GR SPEC: 1.02 — SIGNIFICANT CHANGE UP (ref 1–1.03)
SQUAMOUS # UR AUTO: 0 /HPF — SIGNIFICANT CHANGE UP (ref 0–5)
UROBILINOGEN FLD QL: 0.2 MG/DL — SIGNIFICANT CHANGE UP (ref 0.2–1)
WBC # BLD: 9.56 K/UL — SIGNIFICANT CHANGE UP (ref 3.8–10.5)
WBC # FLD AUTO: 9.56 K/UL — SIGNIFICANT CHANGE UP (ref 3.8–10.5)
WBC UR QL: 148 /HPF — HIGH (ref 0–5)
YEAST-LIKE CELLS: PRESENT

## 2025-08-10 PROCEDURE — 71260 CT THORAX DX C+: CPT | Mod: 26

## 2025-08-10 PROCEDURE — 74177 CT ABD & PELVIS W/CONTRAST: CPT | Mod: 26

## 2025-08-10 PROCEDURE — 70450 CT HEAD/BRAIN W/O DYE: CPT | Mod: 26

## 2025-08-10 PROCEDURE — 93971 EXTREMITY STUDY: CPT | Mod: 26,RT

## 2025-08-10 PROCEDURE — 99291 CRITICAL CARE FIRST HOUR: CPT | Mod: GC

## 2025-08-10 PROCEDURE — 76705 ECHO EXAM OF ABDOMEN: CPT | Mod: 26,GC

## 2025-08-10 PROCEDURE — 99222 1ST HOSP IP/OBS MODERATE 55: CPT | Mod: GC

## 2025-08-10 RX ORDER — HYDROMORPHONE/SOD CHLOR,ISO/PF 2 MG/10 ML
1 SYRINGE (ML) INJECTION ONCE
Refills: 0 | Status: DISCONTINUED | OUTPATIENT
Start: 2025-08-10 | End: 2025-08-10

## 2025-08-10 RX ORDER — DROXIDOPA 300 MG/1
100 CAPSULE ORAL THREE TIMES A DAY
Refills: 0 | Status: DISCONTINUED | OUTPATIENT
Start: 2025-08-10 | End: 2025-08-11

## 2025-08-10 RX ORDER — LINEZOLID 2 MG/ML
600 INJECTION, SOLUTION INTRAVENOUS ONCE
Refills: 0 | Status: COMPLETED | OUTPATIENT
Start: 2025-08-10 | End: 2025-08-10

## 2025-08-10 RX ORDER — VANCOMYCIN HCL IN 5 % DEXTROSE 1.5G/250ML
1000 PLASTIC BAG, INJECTION (ML) INTRAVENOUS EVERY 24 HOURS
Refills: 0 | Status: DISCONTINUED | OUTPATIENT
Start: 2025-08-10 | End: 2025-08-10

## 2025-08-10 RX ORDER — LINEZOLID 2 MG/ML
INJECTION, SOLUTION INTRAVENOUS
Refills: 0 | Status: DISCONTINUED | OUTPATIENT
Start: 2025-08-10 | End: 2025-08-11

## 2025-08-10 RX ORDER — LINEZOLID 2 MG/ML
600 INJECTION, SOLUTION INTRAVENOUS EVERY 12 HOURS
Refills: 0 | Status: DISCONTINUED | OUTPATIENT
Start: 2025-08-10 | End: 2025-08-11

## 2025-08-10 RX ORDER — DROXIDOPA 300 MG/1
100 CAPSULE ORAL THREE TIMES A DAY
Refills: 0 | Status: DISCONTINUED | OUTPATIENT
Start: 2025-08-10 | End: 2025-08-10

## 2025-08-10 RX ORDER — BISACODYL 5 MG
10 TABLET, DELAYED RELEASE (ENTERIC COATED) ORAL ONCE
Refills: 0 | Status: COMPLETED | OUTPATIENT
Start: 2025-08-10 | End: 2025-08-10

## 2025-08-10 RX ADMIN — Medication 81 MILLIGRAM(S): at 11:25

## 2025-08-10 RX ADMIN — Medication 40 MILLIGRAM(S): at 05:31

## 2025-08-10 RX ADMIN — Medication 3 MILLILITER(S): at 11:05

## 2025-08-10 RX ADMIN — MIDODRINE HYDROCHLORIDE 30 MILLIGRAM(S): 5 TABLET ORAL at 14:35

## 2025-08-10 RX ADMIN — INSULIN LISPRO 1: 100 INJECTION, SOLUTION INTRAVENOUS; SUBCUTANEOUS at 05:30

## 2025-08-10 RX ADMIN — DROXIDOPA 100 MILLIGRAM(S): 300 CAPSULE ORAL at 05:38

## 2025-08-10 RX ADMIN — MIDODRINE HYDROCHLORIDE 30 MILLIGRAM(S): 5 TABLET ORAL at 05:27

## 2025-08-10 RX ADMIN — DROXIDOPA 100 MILLIGRAM(S): 300 CAPSULE ORAL at 11:25

## 2025-08-10 RX ADMIN — Medication 15 MILLILITER(S): at 17:08

## 2025-08-10 RX ADMIN — MIDODRINE HYDROCHLORIDE 30 MILLIGRAM(S): 5 TABLET ORAL at 22:08

## 2025-08-10 RX ADMIN — POLYETHYLENE GLYCOL 3350 17 GRAM(S): 17 POWDER, FOR SOLUTION ORAL at 05:30

## 2025-08-10 RX ADMIN — HEPARIN SODIUM 5000 UNIT(S): 1000 INJECTION INTRAVENOUS; SUBCUTANEOUS at 22:08

## 2025-08-10 RX ADMIN — Medication 1 MILLIGRAM(S): at 02:15

## 2025-08-10 RX ADMIN — NOREPINEPHRINE BITARTRATE 6.64 MICROGRAM(S)/KG/MIN: 8 SOLUTION at 00:43

## 2025-08-10 RX ADMIN — HEPARIN SODIUM 5000 UNIT(S): 1000 INJECTION INTRAVENOUS; SUBCUTANEOUS at 05:37

## 2025-08-10 RX ADMIN — INSULIN LISPRO 1: 100 INJECTION, SOLUTION INTRAVENOUS; SUBCUTANEOUS at 00:39

## 2025-08-10 RX ADMIN — Medication 1 TABLET(S): at 11:25

## 2025-08-10 RX ADMIN — Medication 3 MILLILITER(S): at 17:22

## 2025-08-10 RX ADMIN — Medication 3 MILLILITER(S): at 05:51

## 2025-08-10 RX ADMIN — Medication 40 MILLIGRAM(S): at 17:08

## 2025-08-10 RX ADMIN — NOREPINEPHRINE BITARTRATE 6.64 MICROGRAM(S)/KG/MIN: 8 SOLUTION at 19:06

## 2025-08-10 RX ADMIN — HEPARIN SODIUM 5000 UNIT(S): 1000 INJECTION INTRAVENOUS; SUBCUTANEOUS at 14:36

## 2025-08-10 RX ADMIN — Medication 15 MILLILITER(S): at 05:30

## 2025-08-10 RX ADMIN — MEROPENEM 100 MILLIGRAM(S): 1 INJECTION INTRAVENOUS at 05:27

## 2025-08-10 RX ADMIN — Medication 1 APPLICATION(S): at 05:37

## 2025-08-10 RX ADMIN — Medication 1 MILLIGRAM(S): at 03:00

## 2025-08-10 RX ADMIN — INSULIN LISPRO 1: 100 INJECTION, SOLUTION INTRAVENOUS; SUBCUTANEOUS at 17:09

## 2025-08-10 RX ADMIN — Medication 3 MILLILITER(S): at 23:33

## 2025-08-10 RX ADMIN — LINEZOLID 300 MILLIGRAM(S): 2 INJECTION, SOLUTION INTRAVENOUS at 09:09

## 2025-08-10 RX ADMIN — Medication 500 MILLIGRAM(S): at 11:25

## 2025-08-10 RX ADMIN — MEROPENEM 100 MILLIGRAM(S): 1 INJECTION INTRAVENOUS at 17:09

## 2025-08-10 RX ADMIN — INSULIN LISPRO 2: 100 INJECTION, SOLUTION INTRAVENOUS; SUBCUTANEOUS at 11:24

## 2025-08-10 RX ADMIN — DROXIDOPA 100 MILLIGRAM(S): 300 CAPSULE ORAL at 17:08

## 2025-08-10 RX ADMIN — LINEZOLID 300 MILLIGRAM(S): 2 INJECTION, SOLUTION INTRAVENOUS at 21:50

## 2025-08-11 ENCOUNTER — RESULT REVIEW (OUTPATIENT)
Age: 75
End: 2025-08-11

## 2025-08-11 LAB
-  AMPICILLIN: SIGNIFICANT CHANGE UP
-  CIPROFLOXACIN: SIGNIFICANT CHANGE UP
-  LEVOFLOXACIN: SIGNIFICANT CHANGE UP
-  NITROFURANTOIN: SIGNIFICANT CHANGE UP
-  TETRACYCLINE: SIGNIFICANT CHANGE UP
-  VANCOMYCIN: SIGNIFICANT CHANGE UP
ALBUMIN SERPL ELPH-MCNC: 1.4 G/DL — LOW (ref 3.3–5)
ALP SERPL-CCNC: 156 U/L — HIGH (ref 40–120)
ALT FLD-CCNC: 13 U/L — SIGNIFICANT CHANGE UP (ref 10–45)
ANION GAP SERPL CALC-SCNC: 20 MMOL/L — HIGH (ref 5–17)
APTT BLD: 73.9 SEC — HIGH (ref 26.1–36.8)
AST SERPL-CCNC: 18 U/L — SIGNIFICANT CHANGE UP (ref 10–40)
BASOPHILS # BLD AUTO: 0.01 K/UL — SIGNIFICANT CHANGE UP (ref 0–0.2)
BASOPHILS NFR BLD AUTO: 0.1 % — SIGNIFICANT CHANGE UP (ref 0–2)
BILIRUB SERPL-MCNC: 0.5 MG/DL — SIGNIFICANT CHANGE UP (ref 0.2–1.2)
BUN SERPL-MCNC: 89 MG/DL — HIGH (ref 7–23)
CALCIUM SERPL-MCNC: 8 MG/DL — LOW (ref 8.4–10.5)
CHLORIDE SERPL-SCNC: 111 MMOL/L — HIGH (ref 96–108)
CK SERPL-CCNC: 28 U/L — SIGNIFICANT CHANGE UP (ref 25–170)
CO2 SERPL-SCNC: 15 MMOL/L — LOW (ref 22–31)
CREAT SERPL-MCNC: 2.05 MG/DL — HIGH (ref 0.5–1.3)
CULTURE RESULTS: ABNORMAL
EGFR: 25 ML/MIN/1.73M2 — LOW
EGFR: 25 ML/MIN/1.73M2 — LOW
EOSINOPHIL # BLD AUTO: 0.05 K/UL — SIGNIFICANT CHANGE UP (ref 0–0.5)
EOSINOPHIL NFR BLD AUTO: 0.4 % — SIGNIFICANT CHANGE UP (ref 0–6)
GAS PNL BLDA: SIGNIFICANT CHANGE UP
GAS PNL BLDV: SIGNIFICANT CHANGE UP
GAS PNL BLDV: SIGNIFICANT CHANGE UP
GLUCOSE BLDC GLUCOMTR-MCNC: 253 MG/DL — HIGH (ref 70–99)
GLUCOSE BLDC GLUCOMTR-MCNC: 278 MG/DL — HIGH (ref 70–99)
GLUCOSE BLDC GLUCOMTR-MCNC: 280 MG/DL — HIGH (ref 70–99)
GLUCOSE BLDC GLUCOMTR-MCNC: 299 MG/DL — HIGH (ref 70–99)
GLUCOSE BLDC GLUCOMTR-MCNC: 82 MG/DL — SIGNIFICANT CHANGE UP (ref 70–99)
GLUCOSE SERPL-MCNC: 258 MG/DL — HIGH (ref 70–99)
HCT VFR BLD CALC: 29.4 % — LOW (ref 34.5–45)
HGB BLD-MCNC: 8.9 G/DL — LOW (ref 11.5–15.5)
IMM GRANULOCYTES # BLD AUTO: 0.14 K/UL — HIGH (ref 0–0.07)
IMM GRANULOCYTES NFR BLD AUTO: 1.2 % — HIGH (ref 0–0.9)
INR BLD: 1.36 RATIO — HIGH (ref 0.85–1.16)
LYMPHOCYTES # BLD AUTO: 3.23 K/UL — SIGNIFICANT CHANGE UP (ref 1–3.3)
LYMPHOCYTES NFR BLD AUTO: 27.3 % — SIGNIFICANT CHANGE UP (ref 13–44)
MAGNESIUM SERPL-MCNC: 2.1 MG/DL — SIGNIFICANT CHANGE UP (ref 1.6–2.6)
MCHC RBC-ENTMCNC: 28.3 PG — SIGNIFICANT CHANGE UP (ref 27–34)
MCHC RBC-ENTMCNC: 30.3 G/DL — LOW (ref 32–36)
MCV RBC AUTO: 93.6 FL — SIGNIFICANT CHANGE UP (ref 80–100)
METHOD TYPE: SIGNIFICANT CHANGE UP
MONOCYTES # BLD AUTO: 0.36 K/UL — SIGNIFICANT CHANGE UP (ref 0–0.9)
MONOCYTES NFR BLD AUTO: 3 % — SIGNIFICANT CHANGE UP (ref 2–14)
NEUTROPHILS # BLD AUTO: 8.06 K/UL — HIGH (ref 1.8–7.4)
NEUTROPHILS NFR BLD AUTO: 68 % — SIGNIFICANT CHANGE UP (ref 43–77)
NRBC # BLD AUTO: 0.06 K/UL — HIGH (ref 0–0)
NRBC # FLD: 0.06 K/UL — HIGH (ref 0–0)
NRBC BLD AUTO-RTO: 0 /100 WBCS — SIGNIFICANT CHANGE UP (ref 0–0)
ORGANISM # SPEC MICROSCOPIC CNT: ABNORMAL
ORGANISM # SPEC MICROSCOPIC CNT: ABNORMAL
PHOSPHATE SERPL-MCNC: 4.5 MG/DL — SIGNIFICANT CHANGE UP (ref 2.5–4.5)
PLATELET # BLD AUTO: 155 K/UL — SIGNIFICANT CHANGE UP (ref 150–400)
PMV BLD: 11.9 FL — SIGNIFICANT CHANGE UP (ref 7–13)
POTASSIUM SERPL-MCNC: 3.8 MMOL/L — SIGNIFICANT CHANGE UP (ref 3.5–5.3)
POTASSIUM SERPL-SCNC: 3.8 MMOL/L — SIGNIFICANT CHANGE UP (ref 3.5–5.3)
PROT SERPL-MCNC: 5.3 G/DL — LOW (ref 6–8.3)
PROTHROM AB SERPL-ACNC: 15.5 SEC — HIGH (ref 9.9–13.4)
RBC # BLD: 3.14 M/UL — LOW (ref 3.8–5.2)
RBC # FLD: 21.8 % — HIGH (ref 10.3–14.5)
SODIUM SERPL-SCNC: 146 MMOL/L — HIGH (ref 135–145)
SPECIMEN SOURCE: SIGNIFICANT CHANGE UP
UUN UR-MCNC: 539 MG/DL — SIGNIFICANT CHANGE UP
WBC # BLD: 11.85 K/UL — HIGH (ref 3.8–10.5)
WBC # FLD AUTO: 11.85 K/UL — HIGH (ref 3.8–10.5)

## 2025-08-11 PROCEDURE — 84100 ASSAY OF PHOSPHORUS: CPT

## 2025-08-11 PROCEDURE — 87641 MR-STAPH DNA AMP PROBE: CPT

## 2025-08-11 PROCEDURE — 84540 ASSAY OF URINE/UREA-N: CPT

## 2025-08-11 PROCEDURE — 94640 AIRWAY INHALATION TREATMENT: CPT

## 2025-08-11 PROCEDURE — 99291 CRITICAL CARE FIRST HOUR: CPT | Mod: GC

## 2025-08-11 PROCEDURE — 73130 X-RAY EXAM OF HAND: CPT

## 2025-08-11 PROCEDURE — 82570 ASSAY OF URINE CREATININE: CPT

## 2025-08-11 PROCEDURE — 82435 ASSAY OF BLOOD CHLORIDE: CPT

## 2025-08-11 PROCEDURE — 87086 URINE CULTURE/COLONY COUNT: CPT

## 2025-08-11 PROCEDURE — 94003 VENT MGMT INPAT SUBQ DAY: CPT

## 2025-08-11 PROCEDURE — 86140 C-REACTIVE PROTEIN: CPT

## 2025-08-11 PROCEDURE — 87640 STAPH A DNA AMP PROBE: CPT

## 2025-08-11 PROCEDURE — 84156 ASSAY OF PROTEIN URINE: CPT

## 2025-08-11 PROCEDURE — 84133 ASSAY OF URINE POTASSIUM: CPT

## 2025-08-11 PROCEDURE — 71045 X-RAY EXAM CHEST 1 VIEW: CPT

## 2025-08-11 PROCEDURE — 0225U NFCT DS DNA&RNA 21 SARSCOV2: CPT

## 2025-08-11 PROCEDURE — 84295 ASSAY OF SERUM SODIUM: CPT

## 2025-08-11 PROCEDURE — 82962 GLUCOSE BLOOD TEST: CPT

## 2025-08-11 PROCEDURE — 99232 SBSQ HOSP IP/OBS MODERATE 35: CPT | Mod: GC

## 2025-08-11 PROCEDURE — 86900 BLOOD TYPING SEROLOGIC ABO: CPT

## 2025-08-11 PROCEDURE — 87040 BLOOD CULTURE FOR BACTERIA: CPT

## 2025-08-11 PROCEDURE — 74177 CT ABD & PELVIS W/CONTRAST: CPT

## 2025-08-11 PROCEDURE — 82140 ASSAY OF AMMONIA: CPT

## 2025-08-11 PROCEDURE — 73110 X-RAY EXAM OF WRIST: CPT

## 2025-08-11 PROCEDURE — 80202 ASSAY OF VANCOMYCIN: CPT

## 2025-08-11 PROCEDURE — 83735 ASSAY OF MAGNESIUM: CPT

## 2025-08-11 PROCEDURE — 93005 ELECTROCARDIOGRAM TRACING: CPT

## 2025-08-11 PROCEDURE — 87077 CULTURE AEROBIC IDENTIFY: CPT

## 2025-08-11 PROCEDURE — 82550 ASSAY OF CK (CPK): CPT

## 2025-08-11 PROCEDURE — 76700 US EXAM ABDOM COMPLETE: CPT | Mod: 26

## 2025-08-11 PROCEDURE — 85025 COMPLETE CBC W/AUTO DIFF WBC: CPT

## 2025-08-11 PROCEDURE — 84132 ASSAY OF SERUM POTASSIUM: CPT

## 2025-08-11 PROCEDURE — 82330 ASSAY OF CALCIUM: CPT

## 2025-08-11 PROCEDURE — 82947 ASSAY GLUCOSE BLOOD QUANT: CPT

## 2025-08-11 PROCEDURE — 85018 HEMOGLOBIN: CPT

## 2025-08-11 PROCEDURE — 85610 PROTHROMBIN TIME: CPT

## 2025-08-11 PROCEDURE — 82803 BLOOD GASES ANY COMBINATION: CPT

## 2025-08-11 PROCEDURE — 76604 US EXAM CHEST: CPT | Mod: 26,GC

## 2025-08-11 PROCEDURE — 83605 ASSAY OF LACTIC ACID: CPT

## 2025-08-11 PROCEDURE — 81001 URINALYSIS AUTO W/SCOPE: CPT

## 2025-08-11 PROCEDURE — 85014 HEMATOCRIT: CPT

## 2025-08-11 PROCEDURE — 76700 US EXAM ABDOM COMPLETE: CPT

## 2025-08-11 PROCEDURE — 87186 SC STD MICRODIL/AGAR DIL: CPT

## 2025-08-11 PROCEDURE — 70450 CT HEAD/BRAIN W/O DYE: CPT

## 2025-08-11 PROCEDURE — 93306 TTE W/DOPPLER COMPLETE: CPT | Mod: 26

## 2025-08-11 PROCEDURE — 94002 VENT MGMT INPAT INIT DAY: CPT

## 2025-08-11 PROCEDURE — 83935 ASSAY OF URINE OSMOLALITY: CPT

## 2025-08-11 PROCEDURE — 87899 AGENT NOS ASSAY W/OPTIC: CPT

## 2025-08-11 PROCEDURE — 85730 THROMBOPLASTIN TIME PARTIAL: CPT

## 2025-08-11 PROCEDURE — 80053 COMPREHEN METABOLIC PANEL: CPT

## 2025-08-11 PROCEDURE — 71260 CT THORAX DX C+: CPT

## 2025-08-11 PROCEDURE — 93308 TTE F-UP OR LMTD: CPT | Mod: 26,GC

## 2025-08-11 PROCEDURE — 86850 RBC ANTIBODY SCREEN: CPT

## 2025-08-11 PROCEDURE — 87637 SARSCOV2&INF A&B&RSV AMP PRB: CPT

## 2025-08-11 PROCEDURE — 93971 EXTREMITY STUDY: CPT

## 2025-08-11 PROCEDURE — 86901 BLOOD TYPING SEROLOGIC RH(D): CPT

## 2025-08-11 PROCEDURE — 84300 ASSAY OF URINE SODIUM: CPT

## 2025-08-11 RX ORDER — PIPERACILLIN-TAZO-DEXTROSE,ISO 3.375G/5
4.5 IV SOLUTION, PIGGYBACK PREMIX FROZEN(ML) INTRAVENOUS EVERY 8 HOURS
Refills: 0 | Status: DISCONTINUED | OUTPATIENT
Start: 2025-08-11 | End: 2025-08-11

## 2025-08-11 RX ORDER — ACETAMINOPHEN 500 MG/5ML
650 LIQUID (ML) ORAL EVERY 6 HOURS
Refills: 0 | Status: DISCONTINUED | OUTPATIENT
Start: 2025-08-11 | End: 2025-08-29

## 2025-08-11 RX ORDER — PIPERACILLIN-TAZO-DEXTROSE,ISO 3.375G/5
3.38 IV SOLUTION, PIGGYBACK PREMIX FROZEN(ML) INTRAVENOUS EVERY 12 HOURS
Refills: 0 | Status: DISCONTINUED | OUTPATIENT
Start: 2025-08-11 | End: 2025-08-13

## 2025-08-11 RX ORDER — VANCOMYCIN HCL IN 5 % DEXTROSE 1.5G/250ML
1000 PLASTIC BAG, INJECTION (ML) INTRAVENOUS ONCE
Refills: 0 | Status: COMPLETED | OUTPATIENT
Start: 2025-08-11 | End: 2025-08-11

## 2025-08-11 RX ORDER — PIPERACILLIN-TAZO-DEXTROSE,ISO 3.375G/5
4.5 IV SOLUTION, PIGGYBACK PREMIX FROZEN(ML) INTRAVENOUS ONCE
Refills: 0 | Status: DISCONTINUED | OUTPATIENT
Start: 2025-08-11 | End: 2025-08-11

## 2025-08-11 RX ORDER — INSULIN LISPRO 100 U/ML
INJECTION, SOLUTION INTRAVENOUS; SUBCUTANEOUS EVERY 6 HOURS
Refills: 0 | Status: DISCONTINUED | OUTPATIENT
Start: 2025-08-11 | End: 2025-08-29

## 2025-08-11 RX ORDER — DROXIDOPA 300 MG/1
200 CAPSULE ORAL EVERY 8 HOURS
Refills: 0 | Status: DISCONTINUED | OUTPATIENT
Start: 2025-08-11 | End: 2025-08-12

## 2025-08-11 RX ADMIN — Medication 1 TABLET(S): at 11:13

## 2025-08-11 RX ADMIN — Medication 40 MILLIGRAM(S): at 17:20

## 2025-08-11 RX ADMIN — INSULIN LISPRO 3: 100 INJECTION, SOLUTION INTRAVENOUS; SUBCUTANEOUS at 06:17

## 2025-08-11 RX ADMIN — Medication 20 MILLIEQUIVALENT(S): at 03:42

## 2025-08-11 RX ADMIN — MEROPENEM 100 MILLIGRAM(S): 1 INJECTION INTRAVENOUS at 06:17

## 2025-08-11 RX ADMIN — Medication 81 MILLIGRAM(S): at 11:13

## 2025-08-11 RX ADMIN — DROXIDOPA 100 MILLIGRAM(S): 300 CAPSULE ORAL at 11:12

## 2025-08-11 RX ADMIN — INSULIN LISPRO 6: 100 INJECTION, SOLUTION INTRAVENOUS; SUBCUTANEOUS at 12:10

## 2025-08-11 RX ADMIN — Medication 15 MILLILITER(S): at 06:17

## 2025-08-11 RX ADMIN — Medication 15 MILLILITER(S): at 17:19

## 2025-08-11 RX ADMIN — DROXIDOPA 200 MILLIGRAM(S): 300 CAPSULE ORAL at 16:09

## 2025-08-11 RX ADMIN — DROXIDOPA 200 MILLIGRAM(S): 300 CAPSULE ORAL at 21:47

## 2025-08-11 RX ADMIN — Medication 25 GRAM(S): at 17:20

## 2025-08-11 RX ADMIN — Medication 650 MILLIGRAM(S): at 13:10

## 2025-08-11 RX ADMIN — Medication 500 MILLIGRAM(S): at 11:13

## 2025-08-11 RX ADMIN — Medication 3 MILLILITER(S): at 17:47

## 2025-08-11 RX ADMIN — Medication 3 MILLILITER(S): at 11:02

## 2025-08-11 RX ADMIN — HEPARIN SODIUM 5000 UNIT(S): 1000 INJECTION INTRAVENOUS; SUBCUTANEOUS at 13:31

## 2025-08-11 RX ADMIN — Medication 650 MILLIGRAM(S): at 03:00

## 2025-08-11 RX ADMIN — INSULIN LISPRO 3: 100 INJECTION, SOLUTION INTRAVENOUS; SUBCUTANEOUS at 01:26

## 2025-08-11 RX ADMIN — Medication 650 MILLIGRAM(S): at 02:13

## 2025-08-11 RX ADMIN — DROXIDOPA 100 MILLIGRAM(S): 300 CAPSULE ORAL at 06:16

## 2025-08-11 RX ADMIN — Medication 1000 MILLILITER(S): at 14:10

## 2025-08-11 RX ADMIN — MIDODRINE HYDROCHLORIDE 30 MILLIGRAM(S): 5 TABLET ORAL at 13:31

## 2025-08-11 RX ADMIN — MIDODRINE HYDROCHLORIDE 30 MILLIGRAM(S): 5 TABLET ORAL at 21:47

## 2025-08-11 RX ADMIN — Medication 250 MILLIGRAM(S): at 11:12

## 2025-08-11 RX ADMIN — MIDODRINE HYDROCHLORIDE 30 MILLIGRAM(S): 5 TABLET ORAL at 06:16

## 2025-08-11 RX ADMIN — Medication 40 MILLIGRAM(S): at 06:17

## 2025-08-11 RX ADMIN — NOREPINEPHRINE BITARTRATE 6.64 MICROGRAM(S)/KG/MIN: 8 SOLUTION at 21:49

## 2025-08-11 RX ADMIN — Medication 3 MILLILITER(S): at 05:25

## 2025-08-11 RX ADMIN — HEPARIN SODIUM 5000 UNIT(S): 1000 INJECTION INTRAVENOUS; SUBCUTANEOUS at 21:47

## 2025-08-11 RX ADMIN — HEPARIN SODIUM 5000 UNIT(S): 1000 INJECTION INTRAVENOUS; SUBCUTANEOUS at 06:16

## 2025-08-11 RX ADMIN — Medication 650 MILLIGRAM(S): at 11:25

## 2025-08-11 RX ADMIN — Medication 1 APPLICATION(S): at 06:17

## 2025-08-12 LAB
ALBUMIN SERPL ELPH-MCNC: 1.4 G/DL — LOW (ref 3.3–5)
ALBUMIN SERPL ELPH-MCNC: 1.4 G/DL — LOW (ref 3.3–5)
ALP SERPL-CCNC: 132 U/L — HIGH (ref 40–120)
ALP SERPL-CCNC: 142 U/L — HIGH (ref 40–120)
ALT FLD-CCNC: 13 U/L — SIGNIFICANT CHANGE UP (ref 10–45)
ALT FLD-CCNC: 15 U/L — SIGNIFICANT CHANGE UP (ref 10–45)
ANION GAP SERPL CALC-SCNC: 16 MMOL/L — SIGNIFICANT CHANGE UP (ref 5–17)
ANION GAP SERPL CALC-SCNC: 18 MMOL/L — HIGH (ref 5–17)
ANISOCYTOSIS BLD QL: ABNORMAL
APTT BLD: 129.1 SEC — CRITICAL HIGH (ref 26.1–36.8)
APTT BLD: 88.3 SEC — HIGH (ref 26.1–36.8)
AST SERPL-CCNC: 24 U/L — SIGNIFICANT CHANGE UP (ref 10–40)
AST SERPL-CCNC: 28 U/L — SIGNIFICANT CHANGE UP (ref 10–40)
BASOPHILS # BLD AUTO: 0.01 K/UL — SIGNIFICANT CHANGE UP (ref 0–0.2)
BASOPHILS # BLD AUTO: 0.01 K/UL — SIGNIFICANT CHANGE UP (ref 0–0.2)
BASOPHILS # BLD MANUAL: 0 K/UL — SIGNIFICANT CHANGE UP (ref 0–0.2)
BASOPHILS NFR BLD AUTO: 0.1 % — SIGNIFICANT CHANGE UP (ref 0–2)
BASOPHILS NFR BLD AUTO: 0.1 % — SIGNIFICANT CHANGE UP (ref 0–2)
BASOPHILS NFR BLD MANUAL: 0 % — SIGNIFICANT CHANGE UP (ref 0–2)
BILIRUB SERPL-MCNC: 0.4 MG/DL — SIGNIFICANT CHANGE UP (ref 0.2–1.2)
BILIRUB SERPL-MCNC: 0.4 MG/DL — SIGNIFICANT CHANGE UP (ref 0.2–1.2)
BUN SERPL-MCNC: 82 MG/DL — HIGH (ref 7–23)
BUN SERPL-MCNC: 86 MG/DL — HIGH (ref 7–23)
CALCIUM SERPL-MCNC: 7.8 MG/DL — LOW (ref 8.4–10.5)
CALCIUM SERPL-MCNC: 7.9 MG/DL — LOW (ref 8.4–10.5)
CHLORIDE SERPL-SCNC: 113 MMOL/L — HIGH (ref 96–108)
CHLORIDE SERPL-SCNC: 113 MMOL/L — HIGH (ref 96–108)
CO2 SERPL-SCNC: 15 MMOL/L — LOW (ref 22–31)
CO2 SERPL-SCNC: 15 MMOL/L — LOW (ref 22–31)
CREAT SERPL-MCNC: 1.98 MG/DL — HIGH (ref 0.5–1.3)
CREAT SERPL-MCNC: 2.11 MG/DL — HIGH (ref 0.5–1.3)
EGFR: 24 ML/MIN/1.73M2 — LOW
EGFR: 24 ML/MIN/1.73M2 — LOW
EGFR: 26 ML/MIN/1.73M2 — LOW
EGFR: 26 ML/MIN/1.73M2 — LOW
EOSINOPHIL # BLD AUTO: 0.03 K/UL — SIGNIFICANT CHANGE UP (ref 0–0.5)
EOSINOPHIL # BLD AUTO: 0.04 K/UL — SIGNIFICANT CHANGE UP (ref 0–0.5)
EOSINOPHIL # BLD MANUAL: 0 K/UL — SIGNIFICANT CHANGE UP (ref 0–0.5)
EOSINOPHIL NFR BLD AUTO: 0.4 % — SIGNIFICANT CHANGE UP (ref 0–6)
EOSINOPHIL NFR BLD AUTO: 0.5 % — SIGNIFICANT CHANGE UP (ref 0–6)
EOSINOPHIL NFR BLD MANUAL: 0 % — SIGNIFICANT CHANGE UP (ref 0–6)
GAS PNL BLDA: SIGNIFICANT CHANGE UP
GAS PNL BLDV: SIGNIFICANT CHANGE UP
GLUCOSE BLDC GLUCOMTR-MCNC: 102 MG/DL — HIGH (ref 70–99)
GLUCOSE BLDC GLUCOMTR-MCNC: 175 MG/DL — HIGH (ref 70–99)
GLUCOSE SERPL-MCNC: 126 MG/DL — HIGH (ref 70–99)
GLUCOSE SERPL-MCNC: 166 MG/DL — HIGH (ref 70–99)
HCT VFR BLD CALC: 24.8 % — LOW (ref 34.5–45)
HCT VFR BLD CALC: 26.5 % — LOW (ref 34.5–45)
HGB BLD-MCNC: 7.5 G/DL — LOW (ref 11.5–15.5)
HGB BLD-MCNC: 7.9 G/DL — LOW (ref 11.5–15.5)
IMM GRANULOCYTES # BLD AUTO: 0.13 K/UL — HIGH (ref 0–0.07)
IMM GRANULOCYTES # BLD AUTO: 0.16 K/UL — HIGH (ref 0–0.07)
IMM GRANULOCYTES NFR BLD AUTO: 1.6 % — HIGH (ref 0–0.9)
IMM GRANULOCYTES NFR BLD AUTO: 2.4 % — HIGH (ref 0–0.9)
INR BLD: 1.42 RATIO — HIGH (ref 0.85–1.16)
INR BLD: 1.43 RATIO — HIGH (ref 0.85–1.16)
LYMPHOCYTES # BLD AUTO: 0.67 K/UL — LOW (ref 1–3.3)
LYMPHOCYTES # BLD AUTO: 1.02 K/UL — SIGNIFICANT CHANGE UP (ref 1–3.3)
LYMPHOCYTES # BLD MANUAL: 0.17 K/UL — LOW (ref 1–3.3)
LYMPHOCYTES NFR BLD AUTO: 10 % — LOW (ref 13–44)
LYMPHOCYTES NFR BLD AUTO: 12.6 % — LOW (ref 13–44)
LYMPHOCYTES NFR BLD MANUAL: 2.6 % — LOW (ref 13–44)
MACROCYTES BLD QL: SLIGHT — SIGNIFICANT CHANGE UP
MAGNESIUM SERPL-MCNC: 2 MG/DL — SIGNIFICANT CHANGE UP (ref 1.6–2.6)
MAGNESIUM SERPL-MCNC: 2 MG/DL — SIGNIFICANT CHANGE UP (ref 1.6–2.6)
MCHC RBC-ENTMCNC: 28.4 PG — SIGNIFICANT CHANGE UP (ref 27–34)
MCHC RBC-ENTMCNC: 28.7 PG — SIGNIFICANT CHANGE UP (ref 27–34)
MCHC RBC-ENTMCNC: 29.8 G/DL — LOW (ref 32–36)
MCHC RBC-ENTMCNC: 30.2 G/DL — LOW (ref 32–36)
MCV RBC AUTO: 95 FL — SIGNIFICANT CHANGE UP (ref 80–100)
MCV RBC AUTO: 95.3 FL — SIGNIFICANT CHANGE UP (ref 80–100)
MICROCYTES BLD QL: SLIGHT — SIGNIFICANT CHANGE UP
MONOCYTES # BLD AUTO: 0.19 K/UL — SIGNIFICANT CHANGE UP (ref 0–0.9)
MONOCYTES # BLD AUTO: 0.21 K/UL — SIGNIFICANT CHANGE UP (ref 0–0.9)
MONOCYTES # BLD MANUAL: 0.06 K/UL — SIGNIFICANT CHANGE UP (ref 0–0.9)
MONOCYTES NFR BLD AUTO: 2.6 % — SIGNIFICANT CHANGE UP (ref 2–14)
MONOCYTES NFR BLD AUTO: 2.8 % — SIGNIFICANT CHANGE UP (ref 2–14)
MONOCYTES NFR BLD MANUAL: 0.9 % — LOW (ref 2–14)
NEUTROPHILS # BLD AUTO: 5.64 K/UL — SIGNIFICANT CHANGE UP (ref 1.8–7.4)
NEUTROPHILS # BLD AUTO: 6.68 K/UL — SIGNIFICANT CHANGE UP (ref 1.8–7.4)
NEUTROPHILS # BLD MANUAL: 6.47 K/UL — SIGNIFICANT CHANGE UP (ref 1.8–7.4)
NEUTROPHILS NFR BLD AUTO: 82.6 % — HIGH (ref 43–77)
NEUTROPHILS NFR BLD AUTO: 84.3 % — HIGH (ref 43–77)
NEUTROPHILS NFR BLD MANUAL: 96.5 % — HIGH (ref 43–77)
NRBC # BLD AUTO: 0.07 K/UL — HIGH (ref 0–0)
NRBC # BLD AUTO: 0.08 K/UL — HIGH (ref 0–0)
NRBC # FLD: 0.07 K/UL — HIGH (ref 0–0)
NRBC # FLD: 0.08 K/UL — HIGH (ref 0–0)
NRBC BLD AUTO-RTO: 0 /100 WBCS — SIGNIFICANT CHANGE UP (ref 0–0)
NRBC BLD AUTO-RTO: 1 /100 WBCS — HIGH (ref 0–0)
PHOSPHATE SERPL-MCNC: 4.9 MG/DL — HIGH (ref 2.5–4.5)
PHOSPHATE SERPL-MCNC: 5 MG/DL — HIGH (ref 2.5–4.5)
PLAT MORPH BLD: NORMAL — SIGNIFICANT CHANGE UP
PLATELET # BLD AUTO: 107 K/UL — LOW (ref 150–400)
PLATELET # BLD AUTO: 127 K/UL — LOW (ref 150–400)
PMV BLD: 11.6 FL — SIGNIFICANT CHANGE UP (ref 7–13)
PMV BLD: 12.4 FL — SIGNIFICANT CHANGE UP (ref 7–13)
POIKILOCYTOSIS BLD QL AUTO: SLIGHT — SIGNIFICANT CHANGE UP
POTASSIUM SERPL-MCNC: 3.7 MMOL/L — SIGNIFICANT CHANGE UP (ref 3.5–5.3)
POTASSIUM SERPL-MCNC: 4.1 MMOL/L — SIGNIFICANT CHANGE UP (ref 3.5–5.3)
POTASSIUM SERPL-SCNC: 3.7 MMOL/L — SIGNIFICANT CHANGE UP (ref 3.5–5.3)
POTASSIUM SERPL-SCNC: 4.1 MMOL/L — SIGNIFICANT CHANGE UP (ref 3.5–5.3)
PROT SERPL-MCNC: 5 G/DL — LOW (ref 6–8.3)
PROT SERPL-MCNC: 5.1 G/DL — LOW (ref 6–8.3)
PROTHROM AB SERPL-ACNC: 16.3 SEC — HIGH (ref 9.9–13.4)
PROTHROM AB SERPL-ACNC: 16.3 SEC — HIGH (ref 9.9–13.4)
RBC # BLD: 2.61 M/UL — LOW (ref 3.8–5.2)
RBC # BLD: 2.78 M/UL — LOW (ref 3.8–5.2)
RBC # FLD: 21.6 % — HIGH (ref 10.3–14.5)
RBC # FLD: 21.7 % — HIGH (ref 10.3–14.5)
RBC BLD AUTO: ABNORMAL
SCHISTOCYTES BLD QL AUTO: SLIGHT — SIGNIFICANT CHANGE UP
SODIUM SERPL-SCNC: 144 MMOL/L — SIGNIFICANT CHANGE UP (ref 135–145)
SODIUM SERPL-SCNC: 146 MMOL/L — HIGH (ref 135–145)
VANCOMYCIN TROUGH SERPL-MCNC: 26.2 UG/ML — CRITICAL HIGH (ref 10–20)
WBC # BLD: 6.7 K/UL — SIGNIFICANT CHANGE UP (ref 3.8–10.5)
WBC # BLD: 8.09 K/UL — SIGNIFICANT CHANGE UP (ref 3.8–10.5)
WBC # FLD AUTO: 6.7 K/UL — SIGNIFICANT CHANGE UP (ref 3.8–10.5)
WBC # FLD AUTO: 8.09 K/UL — SIGNIFICANT CHANGE UP (ref 3.8–10.5)

## 2025-08-12 PROCEDURE — 93971 EXTREMITY STUDY: CPT

## 2025-08-12 PROCEDURE — 83935 ASSAY OF URINE OSMOLALITY: CPT

## 2025-08-12 PROCEDURE — 85025 COMPLETE CBC W/AUTO DIFF WBC: CPT

## 2025-08-12 PROCEDURE — 99222 1ST HOSP IP/OBS MODERATE 55: CPT

## 2025-08-12 PROCEDURE — 82947 ASSAY GLUCOSE BLOOD QUANT: CPT

## 2025-08-12 PROCEDURE — 94002 VENT MGMT INPAT INIT DAY: CPT

## 2025-08-12 PROCEDURE — 86901 BLOOD TYPING SEROLOGIC RH(D): CPT

## 2025-08-12 PROCEDURE — 85610 PROTHROMBIN TIME: CPT

## 2025-08-12 PROCEDURE — 84540 ASSAY OF URINE/UREA-N: CPT

## 2025-08-12 PROCEDURE — 93971 EXTREMITY STUDY: CPT | Mod: 26,LT

## 2025-08-12 PROCEDURE — C8929: CPT

## 2025-08-12 PROCEDURE — 71045 X-RAY EXAM CHEST 1 VIEW: CPT

## 2025-08-12 PROCEDURE — 84300 ASSAY OF URINE SODIUM: CPT

## 2025-08-12 PROCEDURE — 87077 CULTURE AEROBIC IDENTIFY: CPT

## 2025-08-12 PROCEDURE — 73110 X-RAY EXAM OF WRIST: CPT

## 2025-08-12 PROCEDURE — 82803 BLOOD GASES ANY COMBINATION: CPT

## 2025-08-12 PROCEDURE — 87899 AGENT NOS ASSAY W/OPTIC: CPT

## 2025-08-12 PROCEDURE — 94003 VENT MGMT INPAT SUBQ DAY: CPT

## 2025-08-12 PROCEDURE — 82550 ASSAY OF CK (CPK): CPT

## 2025-08-12 PROCEDURE — 0225U NFCT DS DNA&RNA 21 SARSCOV2: CPT

## 2025-08-12 PROCEDURE — 76700 US EXAM ABDOM COMPLETE: CPT

## 2025-08-12 PROCEDURE — 87186 SC STD MICRODIL/AGAR DIL: CPT

## 2025-08-12 PROCEDURE — 87040 BLOOD CULTURE FOR BACTERIA: CPT

## 2025-08-12 PROCEDURE — 80053 COMPREHEN METABOLIC PANEL: CPT

## 2025-08-12 PROCEDURE — 82962 GLUCOSE BLOOD TEST: CPT

## 2025-08-12 PROCEDURE — 99291 CRITICAL CARE FIRST HOUR: CPT | Mod: GC

## 2025-08-12 PROCEDURE — 74177 CT ABD & PELVIS W/CONTRAST: CPT

## 2025-08-12 PROCEDURE — 76604 US EXAM CHEST: CPT | Mod: 26,GC

## 2025-08-12 PROCEDURE — 83605 ASSAY OF LACTIC ACID: CPT

## 2025-08-12 PROCEDURE — 70450 CT HEAD/BRAIN W/O DYE: CPT

## 2025-08-12 PROCEDURE — 71260 CT THORAX DX C+: CPT

## 2025-08-12 PROCEDURE — 86140 C-REACTIVE PROTEIN: CPT

## 2025-08-12 PROCEDURE — 87641 MR-STAPH DNA AMP PROBE: CPT

## 2025-08-12 PROCEDURE — 87637 SARSCOV2&INF A&B&RSV AMP PRB: CPT

## 2025-08-12 PROCEDURE — 94640 AIRWAY INHALATION TREATMENT: CPT

## 2025-08-12 PROCEDURE — 93005 ELECTROCARDIOGRAM TRACING: CPT

## 2025-08-12 PROCEDURE — 86850 RBC ANTIBODY SCREEN: CPT

## 2025-08-12 PROCEDURE — 99232 SBSQ HOSP IP/OBS MODERATE 35: CPT | Mod: GC

## 2025-08-12 PROCEDURE — 82330 ASSAY OF CALCIUM: CPT

## 2025-08-12 PROCEDURE — 73130 X-RAY EXAM OF HAND: CPT

## 2025-08-12 PROCEDURE — 84100 ASSAY OF PHOSPHORUS: CPT

## 2025-08-12 PROCEDURE — 84295 ASSAY OF SERUM SODIUM: CPT

## 2025-08-12 PROCEDURE — 87640 STAPH A DNA AMP PROBE: CPT

## 2025-08-12 PROCEDURE — 85018 HEMOGLOBIN: CPT

## 2025-08-12 PROCEDURE — 82140 ASSAY OF AMMONIA: CPT

## 2025-08-12 PROCEDURE — 82435 ASSAY OF BLOOD CHLORIDE: CPT

## 2025-08-12 PROCEDURE — 84156 ASSAY OF PROTEIN URINE: CPT

## 2025-08-12 PROCEDURE — 85730 THROMBOPLASTIN TIME PARTIAL: CPT

## 2025-08-12 PROCEDURE — 87086 URINE CULTURE/COLONY COUNT: CPT

## 2025-08-12 PROCEDURE — 84132 ASSAY OF SERUM POTASSIUM: CPT

## 2025-08-12 PROCEDURE — 81001 URINALYSIS AUTO W/SCOPE: CPT

## 2025-08-12 PROCEDURE — 80202 ASSAY OF VANCOMYCIN: CPT

## 2025-08-12 PROCEDURE — 82570 ASSAY OF URINE CREATININE: CPT

## 2025-08-12 PROCEDURE — 93308 TTE F-UP OR LMTD: CPT | Mod: 26,GC

## 2025-08-12 PROCEDURE — 86900 BLOOD TYPING SEROLOGIC ABO: CPT

## 2025-08-12 PROCEDURE — 83735 ASSAY OF MAGNESIUM: CPT

## 2025-08-12 PROCEDURE — 84133 ASSAY OF URINE POTASSIUM: CPT

## 2025-08-12 PROCEDURE — 85014 HEMATOCRIT: CPT

## 2025-08-12 RX ORDER — B1/B2/B3/B5/B6/B12/VIT C/FOLIC 500-0.5 MG
1 TABLET ORAL DAILY
Refills: 0 | Status: DISCONTINUED | OUTPATIENT
Start: 2025-08-12 | End: 2025-08-29

## 2025-08-12 RX ORDER — SODIUM CHLORIDE 9 G/1000ML
1000 INJECTION, SOLUTION INTRAVENOUS ONCE
Refills: 0 | Status: COMPLETED | OUTPATIENT
Start: 2025-08-12 | End: 2025-08-12

## 2025-08-12 RX ORDER — POVIDONE-IODINE 7.5 %
1 SOLUTION, NON-ORAL TOPICAL DAILY
Refills: 0 | Status: DISCONTINUED | OUTPATIENT
Start: 2025-08-12 | End: 2025-08-29

## 2025-08-12 RX ORDER — DROXIDOPA 300 MG/1
200 CAPSULE ORAL EVERY 8 HOURS
Refills: 0 | Status: DISCONTINUED | OUTPATIENT
Start: 2025-08-12 | End: 2025-08-25

## 2025-08-12 RX ADMIN — Medication 1 TABLET(S): at 12:31

## 2025-08-12 RX ADMIN — DROXIDOPA 200 MILLIGRAM(S): 300 CAPSULE ORAL at 14:42

## 2025-08-12 RX ADMIN — DROXIDOPA 200 MILLIGRAM(S): 300 CAPSULE ORAL at 22:56

## 2025-08-12 RX ADMIN — Medication 15 MILLILITER(S): at 05:02

## 2025-08-12 RX ADMIN — SODIUM CHLORIDE 1000 MILLILITER(S): 9 INJECTION, SOLUTION INTRAVENOUS at 10:00

## 2025-08-12 RX ADMIN — Medication 40 MILLIGRAM(S): at 05:02

## 2025-08-12 RX ADMIN — MIDODRINE HYDROCHLORIDE 30 MILLIGRAM(S): 5 TABLET ORAL at 05:02

## 2025-08-12 RX ADMIN — Medication 81 MILLIGRAM(S): at 12:31

## 2025-08-12 RX ADMIN — Medication 40 MILLIGRAM(S): at 17:01

## 2025-08-12 RX ADMIN — Medication 25 GRAM(S): at 05:02

## 2025-08-12 RX ADMIN — HEPARIN SODIUM 5000 UNIT(S): 1000 INJECTION INTRAVENOUS; SUBCUTANEOUS at 14:42

## 2025-08-12 RX ADMIN — Medication 500 MILLIGRAM(S): at 12:31

## 2025-08-12 RX ADMIN — DROXIDOPA 200 MILLIGRAM(S): 300 CAPSULE ORAL at 05:02

## 2025-08-12 RX ADMIN — INSULIN LISPRO 2: 100 INJECTION, SOLUTION INTRAVENOUS; SUBCUTANEOUS at 05:09

## 2025-08-12 RX ADMIN — HEPARIN SODIUM 5000 UNIT(S): 1000 INJECTION INTRAVENOUS; SUBCUTANEOUS at 22:56

## 2025-08-12 RX ADMIN — INSULIN LISPRO 2: 100 INJECTION, SOLUTION INTRAVENOUS; SUBCUTANEOUS at 12:33

## 2025-08-12 RX ADMIN — Medication 15 MILLILITER(S): at 17:01

## 2025-08-12 RX ADMIN — MIDODRINE HYDROCHLORIDE 30 MILLIGRAM(S): 5 TABLET ORAL at 13:02

## 2025-08-12 RX ADMIN — Medication 25 GRAM(S): at 17:02

## 2025-08-12 RX ADMIN — Medication 1 APPLICATION(S): at 05:03

## 2025-08-12 RX ADMIN — HEPARIN SODIUM 5000 UNIT(S): 1000 INJECTION INTRAVENOUS; SUBCUTANEOUS at 05:02

## 2025-08-12 RX ADMIN — MIDODRINE HYDROCHLORIDE 30 MILLIGRAM(S): 5 TABLET ORAL at 22:56

## 2025-08-13 DIAGNOSIS — I80.9 PHLEBITIS AND THROMBOPHLEBITIS OF UNSPECIFIED SITE: ICD-10-CM

## 2025-08-13 DIAGNOSIS — T14.8XXA OTHER INJURY OF UNSPECIFIED BODY REGION, INITIAL ENCOUNTER: ICD-10-CM

## 2025-08-13 DIAGNOSIS — Z71.89 OTHER SPECIFIED COUNSELING: ICD-10-CM

## 2025-08-13 DIAGNOSIS — I25.10 ATHEROSCLEROTIC HEART DISEASE OF NATIVE CORONARY ARTERY WITHOUT ANGINA PECTORIS: ICD-10-CM

## 2025-08-13 DIAGNOSIS — R19.7 DIARRHEA, UNSPECIFIED: ICD-10-CM

## 2025-08-13 DIAGNOSIS — D68.59 OTHER PRIMARY THROMBOPHILIA: ICD-10-CM

## 2025-08-13 DIAGNOSIS — J96.11 CHRONIC RESPIRATORY FAILURE WITH HYPOXIA: ICD-10-CM

## 2025-08-13 DIAGNOSIS — D69.6 THROMBOCYTOPENIA, UNSPECIFIED: ICD-10-CM

## 2025-08-13 DIAGNOSIS — I95.9 HYPOTENSION, UNSPECIFIED: ICD-10-CM

## 2025-08-13 DIAGNOSIS — B99.9 UNSPECIFIED INFECTIOUS DISEASE: ICD-10-CM

## 2025-08-13 LAB
BASOPHILS # BLD AUTO: 0.01 K/UL — SIGNIFICANT CHANGE UP (ref 0–0.2)
BASOPHILS NFR BLD AUTO: 0.1 % — SIGNIFICANT CHANGE UP (ref 0–2)
CULTURE RESULTS: SIGNIFICANT CHANGE UP
CULTURE RESULTS: SIGNIFICANT CHANGE UP
EOSINOPHIL # BLD AUTO: 0.03 K/UL — SIGNIFICANT CHANGE UP (ref 0–0.5)
EOSINOPHIL NFR BLD AUTO: 0.4 % — SIGNIFICANT CHANGE UP (ref 0–6)
GAS PNL BLDA: SIGNIFICANT CHANGE UP
GLUCOSE BLDC GLUCOMTR-MCNC: 116 MG/DL — HIGH (ref 70–99)
GLUCOSE BLDC GLUCOMTR-MCNC: 155 MG/DL — HIGH (ref 70–99)
GLUCOSE BLDC GLUCOMTR-MCNC: 165 MG/DL — HIGH (ref 70–99)
GLUCOSE BLDC GLUCOMTR-MCNC: 65 MG/DL — LOW (ref 70–99)
GLUCOSE BLDC GLUCOMTR-MCNC: 83 MG/DL — SIGNIFICANT CHANGE UP (ref 70–99)
HCT VFR BLD CALC: 26.2 % — LOW (ref 34.5–45)
HGB BLD-MCNC: 7.9 G/DL — LOW (ref 11.5–15.5)
IMM GRANULOCYTES # BLD AUTO: 0.1 K/UL — HIGH (ref 0–0.07)
IMM GRANULOCYTES NFR BLD AUTO: 1.3 % — HIGH (ref 0–0.9)
INR BLD: 1.31 RATIO — HIGH (ref 0.85–1.16)
LYMPHOCYTES # BLD AUTO: 0.91 K/UL — LOW (ref 1–3.3)
LYMPHOCYTES NFR BLD AUTO: 12.1 % — LOW (ref 13–44)
MAGNESIUM SERPL-MCNC: 2 MG/DL — SIGNIFICANT CHANGE UP (ref 1.6–2.6)
MCHC RBC-ENTMCNC: 28.9 PG — SIGNIFICANT CHANGE UP (ref 27–34)
MCHC RBC-ENTMCNC: 30.2 G/DL — LOW (ref 32–36)
MCV RBC AUTO: 96 FL — SIGNIFICANT CHANGE UP (ref 80–100)
MONOCYTES # BLD AUTO: 0.22 K/UL — SIGNIFICANT CHANGE UP (ref 0–0.9)
MONOCYTES NFR BLD AUTO: 2.9 % — SIGNIFICANT CHANGE UP (ref 2–14)
NEUTROPHILS # BLD AUTO: 6.27 K/UL — SIGNIFICANT CHANGE UP (ref 1.8–7.4)
NEUTROPHILS NFR BLD AUTO: 83.2 % — HIGH (ref 43–77)
NRBC # BLD AUTO: 0.05 K/UL — HIGH (ref 0–0)
NRBC # FLD: 0.05 K/UL — HIGH (ref 0–0)
NRBC BLD AUTO-RTO: 0 /100 WBCS — SIGNIFICANT CHANGE UP (ref 0–0)
PHOSPHATE SERPL-MCNC: 5 MG/DL — HIGH (ref 2.5–4.5)
PLATELET # BLD AUTO: 109 K/UL — LOW (ref 150–400)
PMV BLD: 12 FL — SIGNIFICANT CHANGE UP (ref 7–13)
PROTHROM AB SERPL-ACNC: 14.9 SEC — HIGH (ref 9.9–13.4)
RBC # BLD: 2.73 M/UL — LOW (ref 3.8–5.2)
RBC # FLD: 21.6 % — HIGH (ref 10.3–14.5)
SPECIMEN SOURCE: SIGNIFICANT CHANGE UP
SPECIMEN SOURCE: SIGNIFICANT CHANGE UP
WBC # BLD: 7.54 K/UL — SIGNIFICANT CHANGE UP (ref 3.8–10.5)
WBC # FLD AUTO: 7.54 K/UL — SIGNIFICANT CHANGE UP (ref 3.8–10.5)

## 2025-08-13 PROCEDURE — 87086 URINE CULTURE/COLONY COUNT: CPT

## 2025-08-13 PROCEDURE — 73110 X-RAY EXAM OF WRIST: CPT

## 2025-08-13 PROCEDURE — 85018 HEMOGLOBIN: CPT

## 2025-08-13 PROCEDURE — 82570 ASSAY OF URINE CREATININE: CPT

## 2025-08-13 PROCEDURE — 82947 ASSAY GLUCOSE BLOOD QUANT: CPT

## 2025-08-13 PROCEDURE — 85610 PROTHROMBIN TIME: CPT

## 2025-08-13 PROCEDURE — 80053 COMPREHEN METABOLIC PANEL: CPT

## 2025-08-13 PROCEDURE — 84300 ASSAY OF URINE SODIUM: CPT

## 2025-08-13 PROCEDURE — 84540 ASSAY OF URINE/UREA-N: CPT

## 2025-08-13 PROCEDURE — P9047: CPT

## 2025-08-13 PROCEDURE — 87077 CULTURE AEROBIC IDENTIFY: CPT

## 2025-08-13 PROCEDURE — 87186 SC STD MICRODIL/AGAR DIL: CPT

## 2025-08-13 PROCEDURE — 84156 ASSAY OF PROTEIN URINE: CPT

## 2025-08-13 PROCEDURE — 86850 RBC ANTIBODY SCREEN: CPT

## 2025-08-13 PROCEDURE — 84295 ASSAY OF SERUM SODIUM: CPT

## 2025-08-13 PROCEDURE — 74177 CT ABD & PELVIS W/CONTRAST: CPT

## 2025-08-13 PROCEDURE — 83935 ASSAY OF URINE OSMOLALITY: CPT

## 2025-08-13 PROCEDURE — 86901 BLOOD TYPING SEROLOGIC RH(D): CPT

## 2025-08-13 PROCEDURE — 85014 HEMATOCRIT: CPT

## 2025-08-13 PROCEDURE — 76700 US EXAM ABDOM COMPLETE: CPT

## 2025-08-13 PROCEDURE — 85730 THROMBOPLASTIN TIME PARTIAL: CPT

## 2025-08-13 PROCEDURE — 82330 ASSAY OF CALCIUM: CPT

## 2025-08-13 PROCEDURE — 85025 COMPLETE CBC W/AUTO DIFF WBC: CPT

## 2025-08-13 PROCEDURE — 80202 ASSAY OF VANCOMYCIN: CPT

## 2025-08-13 PROCEDURE — 97166 OT EVAL MOD COMPLEX 45 MIN: CPT

## 2025-08-13 PROCEDURE — 82803 BLOOD GASES ANY COMBINATION: CPT

## 2025-08-13 PROCEDURE — 99232 SBSQ HOSP IP/OBS MODERATE 35: CPT | Mod: GC

## 2025-08-13 PROCEDURE — 71045 X-RAY EXAM CHEST 1 VIEW: CPT

## 2025-08-13 PROCEDURE — 81001 URINALYSIS AUTO W/SCOPE: CPT

## 2025-08-13 PROCEDURE — 93005 ELECTROCARDIOGRAM TRACING: CPT

## 2025-08-13 PROCEDURE — 82550 ASSAY OF CK (CPK): CPT

## 2025-08-13 PROCEDURE — 70450 CT HEAD/BRAIN W/O DYE: CPT

## 2025-08-13 PROCEDURE — 94640 AIRWAY INHALATION TREATMENT: CPT

## 2025-08-13 PROCEDURE — 86900 BLOOD TYPING SEROLOGIC ABO: CPT

## 2025-08-13 PROCEDURE — 0225U NFCT DS DNA&RNA 21 SARSCOV2: CPT

## 2025-08-13 PROCEDURE — 84100 ASSAY OF PHOSPHORUS: CPT

## 2025-08-13 PROCEDURE — 84133 ASSAY OF URINE POTASSIUM: CPT

## 2025-08-13 PROCEDURE — 87040 BLOOD CULTURE FOR BACTERIA: CPT

## 2025-08-13 PROCEDURE — 87637 SARSCOV2&INF A&B&RSV AMP PRB: CPT

## 2025-08-13 PROCEDURE — 94003 VENT MGMT INPAT SUBQ DAY: CPT

## 2025-08-13 PROCEDURE — 82435 ASSAY OF BLOOD CHLORIDE: CPT

## 2025-08-13 PROCEDURE — 71260 CT THORAX DX C+: CPT

## 2025-08-13 PROCEDURE — 97161 PT EVAL LOW COMPLEX 20 MIN: CPT

## 2025-08-13 PROCEDURE — 87641 MR-STAPH DNA AMP PROBE: CPT

## 2025-08-13 PROCEDURE — 83605 ASSAY OF LACTIC ACID: CPT

## 2025-08-13 PROCEDURE — 83735 ASSAY OF MAGNESIUM: CPT

## 2025-08-13 PROCEDURE — 86140 C-REACTIVE PROTEIN: CPT

## 2025-08-13 PROCEDURE — C8929: CPT

## 2025-08-13 PROCEDURE — 82140 ASSAY OF AMMONIA: CPT

## 2025-08-13 PROCEDURE — 87640 STAPH A DNA AMP PROBE: CPT

## 2025-08-13 PROCEDURE — 93971 EXTREMITY STUDY: CPT

## 2025-08-13 PROCEDURE — 73130 X-RAY EXAM OF HAND: CPT

## 2025-08-13 PROCEDURE — 87899 AGENT NOS ASSAY W/OPTIC: CPT

## 2025-08-13 PROCEDURE — 94002 VENT MGMT INPAT INIT DAY: CPT

## 2025-08-13 PROCEDURE — 84132 ASSAY OF SERUM POTASSIUM: CPT

## 2025-08-13 PROCEDURE — 82962 GLUCOSE BLOOD TEST: CPT

## 2025-08-13 RX ORDER — ALBUMIN (HUMAN) 12.5 G/50ML
50 INJECTION, SOLUTION INTRAVENOUS EVERY 8 HOURS
Refills: 0 | Status: COMPLETED | OUTPATIENT
Start: 2025-08-13 | End: 2025-08-15

## 2025-08-13 RX ORDER — PIPERACILLIN-TAZO-DEXTROSE,ISO 3.375G/5
3.38 IV SOLUTION, PIGGYBACK PREMIX FROZEN(ML) INTRAVENOUS EVERY 8 HOURS
Refills: 0 | Status: COMPLETED | OUTPATIENT
Start: 2025-08-13 | End: 2025-08-16

## 2025-08-13 RX ADMIN — HEPARIN SODIUM 5000 UNIT(S): 1000 INJECTION INTRAVENOUS; SUBCUTANEOUS at 15:41

## 2025-08-13 RX ADMIN — Medication 15 MILLILITER(S): at 05:19

## 2025-08-13 RX ADMIN — DROXIDOPA 200 MILLIGRAM(S): 300 CAPSULE ORAL at 15:35

## 2025-08-13 RX ADMIN — MIDODRINE HYDROCHLORIDE 30 MILLIGRAM(S): 5 TABLET ORAL at 15:40

## 2025-08-13 RX ADMIN — INSULIN LISPRO 2: 100 INJECTION, SOLUTION INTRAVENOUS; SUBCUTANEOUS at 05:34

## 2025-08-13 RX ADMIN — Medication 40 MILLIGRAM(S): at 18:00

## 2025-08-13 RX ADMIN — ALBUMIN (HUMAN) 50 MILLILITER(S): 12.5 INJECTION, SOLUTION INTRAVENOUS at 21:31

## 2025-08-13 RX ADMIN — Medication 1 TABLET(S): at 11:36

## 2025-08-13 RX ADMIN — Medication 25 GRAM(S): at 15:39

## 2025-08-13 RX ADMIN — MIDODRINE HYDROCHLORIDE 30 MILLIGRAM(S): 5 TABLET ORAL at 21:30

## 2025-08-13 RX ADMIN — Medication 25 GRAM(S): at 05:20

## 2025-08-13 RX ADMIN — Medication 25 GRAM(S): at 21:52

## 2025-08-13 RX ADMIN — Medication 81 MILLIGRAM(S): at 11:36

## 2025-08-13 RX ADMIN — MIDODRINE HYDROCHLORIDE 30 MILLIGRAM(S): 5 TABLET ORAL at 05:20

## 2025-08-13 RX ADMIN — Medication 650 MILLIGRAM(S): at 22:03

## 2025-08-13 RX ADMIN — HEPARIN SODIUM 5000 UNIT(S): 1000 INJECTION INTRAVENOUS; SUBCUTANEOUS at 05:20

## 2025-08-13 RX ADMIN — Medication 1 APPLICATION(S): at 05:20

## 2025-08-13 RX ADMIN — Medication 650 MILLIGRAM(S): at 21:33

## 2025-08-13 RX ADMIN — Medication 40 MILLIGRAM(S): at 05:19

## 2025-08-13 RX ADMIN — Medication 1 APPLICATION(S): at 17:59

## 2025-08-13 RX ADMIN — Medication 15 MILLILITER(S): at 17:59

## 2025-08-13 RX ADMIN — DROXIDOPA 200 MILLIGRAM(S): 300 CAPSULE ORAL at 21:30

## 2025-08-13 RX ADMIN — Medication 1 APPLICATION(S): at 11:40

## 2025-08-13 RX ADMIN — DROXIDOPA 200 MILLIGRAM(S): 300 CAPSULE ORAL at 05:20

## 2025-08-13 RX ADMIN — INSULIN LISPRO 2: 100 INJECTION, SOLUTION INTRAVENOUS; SUBCUTANEOUS at 11:39

## 2025-08-13 RX ADMIN — Medication 500 MILLIGRAM(S): at 11:36

## 2025-08-13 RX ADMIN — HEPARIN SODIUM 5000 UNIT(S): 1000 INJECTION INTRAVENOUS; SUBCUTANEOUS at 21:31

## 2025-08-14 LAB
50/50 COAG PANEL: SIGNIFICANT CHANGE UP
ALBUMIN SERPL ELPH-MCNC: 2.2 G/DL — LOW (ref 3.3–5)
ALP SERPL-CCNC: 138 U/L — HIGH (ref 40–120)
ALT FLD-CCNC: 10 U/L — SIGNIFICANT CHANGE UP (ref 10–45)
ANION GAP SERPL CALC-SCNC: 21 MMOL/L — HIGH (ref 5–17)
APTT 50/50 2HOUR INCUB: SIGNIFICANT CHANGE UP (ref 26.1–37.8)
APTT BLD: 36.6 SEC — SIGNIFICANT CHANGE UP (ref 26.1–36.8)
APTT BLD: 39.3 SEC — HIGH (ref 26.1–36.8)
APTT BLD: 39.9 SEC — HIGH (ref 26.1–36.8)
APTT BLD: SIGNIFICANT CHANGE UP (ref 26.1–37.8)
AST SERPL-CCNC: 18 U/L — SIGNIFICANT CHANGE UP (ref 10–40)
BASOPHILS # BLD AUTO: 0.01 K/UL — SIGNIFICANT CHANGE UP (ref 0–0.2)
BASOPHILS NFR BLD AUTO: 0.1 % — SIGNIFICANT CHANGE UP (ref 0–2)
BILIRUB SERPL-MCNC: 0.6 MG/DL — SIGNIFICANT CHANGE UP (ref 0.2–1.2)
BUN SERPL-MCNC: 78 MG/DL — HIGH (ref 7–23)
CALCIUM SERPL-MCNC: 8.2 MG/DL — LOW (ref 8.4–10.5)
CHLORIDE SERPL-SCNC: 108 MMOL/L — SIGNIFICANT CHANGE UP (ref 96–108)
CO2 SERPL-SCNC: 13 MMOL/L — LOW (ref 22–31)
CREAT SERPL-MCNC: 2.02 MG/DL — HIGH (ref 0.5–1.3)
EGFR: 25 ML/MIN/1.73M2 — LOW
EGFR: 25 ML/MIN/1.73M2 — LOW
EOSINOPHIL # BLD AUTO: 0.02 K/UL — SIGNIFICANT CHANGE UP (ref 0–0.5)
EOSINOPHIL NFR BLD AUTO: 0.2 % — SIGNIFICANT CHANGE UP (ref 0–6)
FIBRINOGEN PPP-MCNC: 315 MG/DL — SIGNIFICANT CHANGE UP (ref 200–445)
GLUCOSE BLDC GLUCOMTR-MCNC: 135 MG/DL — HIGH (ref 70–99)
GLUCOSE BLDC GLUCOMTR-MCNC: 210 MG/DL — HIGH (ref 70–99)
GLUCOSE BLDC GLUCOMTR-MCNC: 83 MG/DL — SIGNIFICANT CHANGE UP (ref 70–99)
GLUCOSE BLDC GLUCOMTR-MCNC: 85 MG/DL — SIGNIFICANT CHANGE UP (ref 70–99)
GLUCOSE SERPL-MCNC: 153 MG/DL — HIGH (ref 70–99)
HCT VFR BLD CALC: 24.5 % — LOW (ref 34.5–45)
HGB BLD-MCNC: 7.1 G/DL — LOW (ref 11.5–15.5)
IMM GRANULOCYTES # BLD AUTO: 0.15 K/UL — HIGH (ref 0–0.07)
IMM GRANULOCYTES NFR BLD AUTO: 1.7 % — HIGH (ref 0–0.9)
IMMATURE PLATELET FRACTION #: 4.4 K/UL — LOW (ref 4.7–11.1)
IMMATURE PLATELET FRACTION %: 4.6 % — SIGNIFICANT CHANGE UP (ref 1.6–4.9)
INR BLD: 1.14 RATIO — SIGNIFICANT CHANGE UP (ref 0.85–1.16)
INR BLD: 1.17 RATIO — HIGH (ref 0.85–1.16)
LYMPHOCYTES # BLD AUTO: 1.02 K/UL — SIGNIFICANT CHANGE UP (ref 1–3.3)
LYMPHOCYTES NFR BLD AUTO: 11.4 % — LOW (ref 13–44)
MAGNESIUM SERPL-MCNC: 2.1 MG/DL — SIGNIFICANT CHANGE UP (ref 1.6–2.6)
MCHC RBC-ENTMCNC: 29 G/DL — LOW (ref 32–36)
MCHC RBC-ENTMCNC: 29.5 PG — SIGNIFICANT CHANGE UP (ref 27–34)
MCV RBC AUTO: 101.7 FL — HIGH (ref 80–100)
MONOCYTES # BLD AUTO: 0.19 K/UL — SIGNIFICANT CHANGE UP (ref 0–0.9)
MONOCYTES NFR BLD AUTO: 2.1 % — SIGNIFICANT CHANGE UP (ref 2–14)
NEUTROPHILS # BLD AUTO: 7.54 K/UL — HIGH (ref 1.8–7.4)
NEUTROPHILS NFR BLD AUTO: 84.5 % — HIGH (ref 43–77)
NRBC # BLD AUTO: 0.05 K/UL — HIGH (ref 0–0)
NRBC # FLD: 0.05 K/UL — HIGH (ref 0–0)
NRBC BLD AUTO-RTO: 0 /100 WBCS — SIGNIFICANT CHANGE UP (ref 0–0)
PAT CTL 2H: SIGNIFICANT CHANGE UP (ref 26.1–37.8)
PHOSPHATE SERPL-MCNC: 5.6 MG/DL — HIGH (ref 2.5–4.5)
PLATELET # BLD AUTO: 95 K/UL — LOW (ref 150–400)
PMV BLD: 11.9 FL — SIGNIFICANT CHANGE UP (ref 7–13)
POTASSIUM SERPL-MCNC: 3.5 MMOL/L — SIGNIFICANT CHANGE UP (ref 3.5–5.3)
POTASSIUM SERPL-SCNC: 3.5 MMOL/L — SIGNIFICANT CHANGE UP (ref 3.5–5.3)
PREALB SERPL-MCNC: 6 MG/DL — LOW (ref 20–40)
PROT SERPL-MCNC: 5.6 G/DL — LOW (ref 6–8.3)
PROTHROM AB SERPL-ACNC: 13 SEC — SIGNIFICANT CHANGE UP (ref 9.9–13.4)
PROTHROM AB SERPL-ACNC: 13.3 SEC — SIGNIFICANT CHANGE UP (ref 9.9–13.4)
RBC # BLD: 2.41 M/UL — LOW (ref 3.8–5.2)
RBC # FLD: 21.7 % — HIGH (ref 10.3–14.5)
SODIUM SERPL-SCNC: 142 MMOL/L — SIGNIFICANT CHANGE UP (ref 135–145)
THROMBIN TIME: 20.2 SEC — HIGH (ref 11–16.4)
WBC # BLD: 8.93 K/UL — SIGNIFICANT CHANGE UP (ref 3.8–10.5)
WBC # FLD AUTO: 8.93 K/UL — SIGNIFICANT CHANGE UP (ref 3.8–10.5)

## 2025-08-14 PROCEDURE — 99232 SBSQ HOSP IP/OBS MODERATE 35: CPT | Mod: GC

## 2025-08-14 PROCEDURE — 99233 SBSQ HOSP IP/OBS HIGH 50: CPT

## 2025-08-14 RX ORDER — PSYLLIUM SEED (WITH DEXTROSE)
1 POWDER (GRAM) ORAL
Refills: 0 | Status: DISCONTINUED | OUTPATIENT
Start: 2025-08-14 | End: 2025-08-22

## 2025-08-14 RX ORDER — LOPERAMIDE HCL 1 MG/7.5ML
2 SOLUTION ORAL
Refills: 0 | Status: COMPLETED | OUTPATIENT
Start: 2025-08-14 | End: 2025-08-16

## 2025-08-14 RX ORDER — TRAMADOL HYDROCHLORIDE 50 MG/1
25 TABLET, FILM COATED ORAL ONCE
Refills: 0 | Status: DISCONTINUED | OUTPATIENT
Start: 2025-08-14 | End: 2025-08-14

## 2025-08-14 RX ORDER — LACTOBACILLUS ACIDOPHILUS/PECT 75 MM-100
1 CAPSULE ORAL EVERY 12 HOURS
Refills: 0 | Status: DISCONTINUED | OUTPATIENT
Start: 2025-08-15 | End: 2025-08-29

## 2025-08-14 RX ORDER — LOPERAMIDE HCL 1 MG/7.5ML
4 SOLUTION ORAL ONCE
Refills: 0 | Status: COMPLETED | OUTPATIENT
Start: 2025-08-14 | End: 2025-08-14

## 2025-08-14 RX ORDER — HEPARIN SODIUM 1000 [USP'U]/ML
5000 INJECTION INTRAVENOUS; SUBCUTANEOUS EVERY 12 HOURS
Refills: 0 | Status: DISCONTINUED | OUTPATIENT
Start: 2025-08-14 | End: 2025-08-16

## 2025-08-14 RX ADMIN — Medication 1 APPLICATION(S): at 13:17

## 2025-08-14 RX ADMIN — MIDODRINE HYDROCHLORIDE 30 MILLIGRAM(S): 5 TABLET ORAL at 21:53

## 2025-08-14 RX ADMIN — ALBUMIN (HUMAN) 50 MILLILITER(S): 12.5 INJECTION, SOLUTION INTRAVENOUS at 05:00

## 2025-08-14 RX ADMIN — Medication 81 MILLIGRAM(S): at 12:05

## 2025-08-14 RX ADMIN — DROXIDOPA 200 MILLIGRAM(S): 300 CAPSULE ORAL at 21:53

## 2025-08-14 RX ADMIN — Medication 25 GRAM(S): at 14:18

## 2025-08-14 RX ADMIN — ALBUMIN (HUMAN) 50 MILLILITER(S): 12.5 INJECTION, SOLUTION INTRAVENOUS at 13:16

## 2025-08-14 RX ADMIN — Medication 25 GRAM(S): at 22:49

## 2025-08-14 RX ADMIN — DROXIDOPA 200 MILLIGRAM(S): 300 CAPSULE ORAL at 05:03

## 2025-08-14 RX ADMIN — Medication 1 TABLET(S): at 12:06

## 2025-08-14 RX ADMIN — LOPERAMIDE HCL 4 MILLIGRAM(S): 1 SOLUTION ORAL at 12:05

## 2025-08-14 RX ADMIN — Medication 15 MILLILITER(S): at 05:02

## 2025-08-14 RX ADMIN — Medication 40 MILLIGRAM(S): at 17:57

## 2025-08-14 RX ADMIN — MIDODRINE HYDROCHLORIDE 30 MILLIGRAM(S): 5 TABLET ORAL at 05:01

## 2025-08-14 RX ADMIN — MIDODRINE HYDROCHLORIDE 30 MILLIGRAM(S): 5 TABLET ORAL at 13:19

## 2025-08-14 RX ADMIN — Medication 25 GRAM(S): at 06:11

## 2025-08-14 RX ADMIN — TRAMADOL HYDROCHLORIDE 25 MILLIGRAM(S): 50 TABLET, FILM COATED ORAL at 15:28

## 2025-08-14 RX ADMIN — Medication 15 MILLILITER(S): at 17:57

## 2025-08-14 RX ADMIN — INSULIN LISPRO 4: 100 INJECTION, SOLUTION INTRAVENOUS; SUBCUTANEOUS at 18:11

## 2025-08-14 RX ADMIN — Medication 500 MILLIGRAM(S): at 12:05

## 2025-08-14 RX ADMIN — DROXIDOPA 200 MILLIGRAM(S): 300 CAPSULE ORAL at 13:15

## 2025-08-14 RX ADMIN — TRAMADOL HYDROCHLORIDE 25 MILLIGRAM(S): 50 TABLET, FILM COATED ORAL at 15:58

## 2025-08-14 RX ADMIN — Medication 1 APPLICATION(S): at 05:22

## 2025-08-14 RX ADMIN — LOPERAMIDE HCL 2 MILLIGRAM(S): 1 SOLUTION ORAL at 17:57

## 2025-08-14 RX ADMIN — ALBUMIN (HUMAN) 50 MILLILITER(S): 12.5 INJECTION, SOLUTION INTRAVENOUS at 21:48

## 2025-08-14 RX ADMIN — Medication 1 PACKET(S): at 17:58

## 2025-08-14 RX ADMIN — Medication 40 MILLIGRAM(S): at 05:02

## 2025-08-14 RX ADMIN — HEPARIN SODIUM 5000 UNIT(S): 1000 INJECTION INTRAVENOUS; SUBCUTANEOUS at 17:58

## 2025-08-14 RX ADMIN — HEPARIN SODIUM 5000 UNIT(S): 1000 INJECTION INTRAVENOUS; SUBCUTANEOUS at 05:02

## 2025-08-15 LAB
ALBUMIN SERPL ELPH-MCNC: 2.1 G/DL — LOW (ref 3.3–5)
ALP SERPL-CCNC: 130 U/L — HIGH (ref 40–120)
ALT FLD-CCNC: 10 U/L — SIGNIFICANT CHANGE UP (ref 10–45)
ANION GAP SERPL CALC-SCNC: 20 MMOL/L — HIGH (ref 5–17)
AST SERPL-CCNC: 15 U/L — SIGNIFICANT CHANGE UP (ref 10–40)
BILIRUB SERPL-MCNC: 0.5 MG/DL — SIGNIFICANT CHANGE UP (ref 0.2–1.2)
BUN SERPL-MCNC: 78 MG/DL — HIGH (ref 7–23)
CALCIUM SERPL-MCNC: 8.1 MG/DL — LOW (ref 8.4–10.5)
CHLORIDE SERPL-SCNC: 111 MMOL/L — HIGH (ref 96–108)
CO2 SERPL-SCNC: 13 MMOL/L — LOW (ref 22–31)
CREAT SERPL-MCNC: 2.04 MG/DL — HIGH (ref 0.5–1.3)
EGFR: 25 ML/MIN/1.73M2 — LOW
EGFR: 25 ML/MIN/1.73M2 — LOW
GI PCR PANEL: SIGNIFICANT CHANGE UP
GLUCOSE BLDC GLUCOMTR-MCNC: 143 MG/DL — HIGH (ref 70–99)
GLUCOSE BLDC GLUCOMTR-MCNC: 166 MG/DL — HIGH (ref 70–99)
GLUCOSE BLDC GLUCOMTR-MCNC: 182 MG/DL — HIGH (ref 70–99)
GLUCOSE BLDC GLUCOMTR-MCNC: 218 MG/DL — HIGH (ref 70–99)
GLUCOSE BLDC GLUCOMTR-MCNC: 66 MG/DL — LOW (ref 70–99)
GLUCOSE SERPL-MCNC: 226 MG/DL — HIGH (ref 70–99)
HCT VFR BLD CALC: 21.7 % — LOW (ref 34.5–45)
HGB BLD-MCNC: 6.3 G/DL — CRITICAL LOW (ref 11.5–15.5)
IMMATURE PLATELET FRACTION #: 4.2 K/UL — LOW (ref 4.7–11.1)
IMMATURE PLATELET FRACTION %: 5.4 % — HIGH (ref 1.6–4.9)
MAGNESIUM SERPL-MCNC: 1.9 MG/DL — SIGNIFICANT CHANGE UP (ref 1.6–2.6)
MCHC RBC-ENTMCNC: 28.8 PG — SIGNIFICANT CHANGE UP (ref 27–34)
MCHC RBC-ENTMCNC: 29 G/DL — LOW (ref 32–36)
MCV RBC AUTO: 99.1 FL — SIGNIFICANT CHANGE UP (ref 80–100)
NRBC # BLD AUTO: 0.05 K/UL — HIGH (ref 0–0)
NRBC # FLD: 0.05 K/UL — HIGH (ref 0–0)
NRBC BLD AUTO-RTO: 0 /100 WBCS — SIGNIFICANT CHANGE UP (ref 0–0)
PHOSPHATE SERPL-MCNC: 5.8 MG/DL — HIGH (ref 2.5–4.5)
PLATELET # BLD AUTO: 80 K/UL — LOW (ref 150–400)
PMV BLD: 12 FL — SIGNIFICANT CHANGE UP (ref 7–13)
POTASSIUM SERPL-MCNC: 3.4 MMOL/L — LOW (ref 3.5–5.3)
POTASSIUM SERPL-SCNC: 3.4 MMOL/L — LOW (ref 3.5–5.3)
PROT SERPL-MCNC: 5.1 G/DL — LOW (ref 6–8.3)
RBC # BLD: 2.19 M/UL — LOW (ref 3.8–5.2)
RBC # FLD: 21.5 % — HIGH (ref 10.3–14.5)
SODIUM SERPL-SCNC: 144 MMOL/L — SIGNIFICANT CHANGE UP (ref 135–145)
WBC # BLD: 8.88 K/UL — SIGNIFICANT CHANGE UP (ref 3.8–10.5)
WBC # FLD AUTO: 8.88 K/UL — SIGNIFICANT CHANGE UP (ref 3.8–10.5)

## 2025-08-15 PROCEDURE — 99232 SBSQ HOSP IP/OBS MODERATE 35: CPT

## 2025-08-15 PROCEDURE — 99233 SBSQ HOSP IP/OBS HIGH 50: CPT

## 2025-08-15 RX ADMIN — Medication 650 MILLIGRAM(S): at 01:53

## 2025-08-15 RX ADMIN — ALBUMIN (HUMAN) 50 MILLILITER(S): 12.5 INJECTION, SOLUTION INTRAVENOUS at 05:55

## 2025-08-15 RX ADMIN — LOPERAMIDE HCL 2 MILLIGRAM(S): 1 SOLUTION ORAL at 05:58

## 2025-08-15 RX ADMIN — MIDODRINE HYDROCHLORIDE 30 MILLIGRAM(S): 5 TABLET ORAL at 06:04

## 2025-08-15 RX ADMIN — Medication 1 PACKET(S): at 05:59

## 2025-08-15 RX ADMIN — Medication 81 MILLIGRAM(S): at 12:06

## 2025-08-15 RX ADMIN — Medication 500 MILLIGRAM(S): at 12:07

## 2025-08-15 RX ADMIN — Medication 15 MILLILITER(S): at 06:06

## 2025-08-15 RX ADMIN — Medication 1 APPLICATION(S): at 06:06

## 2025-08-15 RX ADMIN — Medication 25 GRAM(S): at 06:06

## 2025-08-15 RX ADMIN — Medication 1 APPLICATION(S): at 11:57

## 2025-08-15 RX ADMIN — HEPARIN SODIUM 5000 UNIT(S): 1000 INJECTION INTRAVENOUS; SUBCUTANEOUS at 05:57

## 2025-08-15 RX ADMIN — MIDODRINE HYDROCHLORIDE 30 MILLIGRAM(S): 5 TABLET ORAL at 22:57

## 2025-08-15 RX ADMIN — Medication 40 MILLIGRAM(S): at 17:42

## 2025-08-15 RX ADMIN — DROXIDOPA 200 MILLIGRAM(S): 300 CAPSULE ORAL at 05:58

## 2025-08-15 RX ADMIN — DROXIDOPA 200 MILLIGRAM(S): 300 CAPSULE ORAL at 22:57

## 2025-08-15 RX ADMIN — DROXIDOPA 200 MILLIGRAM(S): 300 CAPSULE ORAL at 14:01

## 2025-08-15 RX ADMIN — Medication 1 PACKET(S): at 22:57

## 2025-08-15 RX ADMIN — HEPARIN SODIUM 5000 UNIT(S): 1000 INJECTION INTRAVENOUS; SUBCUTANEOUS at 17:41

## 2025-08-15 RX ADMIN — Medication 1 TABLET(S): at 12:06

## 2025-08-15 RX ADMIN — INSULIN LISPRO 2: 100 INJECTION, SOLUTION INTRAVENOUS; SUBCUTANEOUS at 05:57

## 2025-08-15 RX ADMIN — Medication 40 MILLIGRAM(S): at 06:56

## 2025-08-15 RX ADMIN — Medication 1 TABLET(S): at 05:59

## 2025-08-15 RX ADMIN — MIDODRINE HYDROCHLORIDE 30 MILLIGRAM(S): 5 TABLET ORAL at 14:01

## 2025-08-15 RX ADMIN — Medication 25 GRAM(S): at 14:01

## 2025-08-15 RX ADMIN — INSULIN LISPRO 2: 100 INJECTION, SOLUTION INTRAVENOUS; SUBCUTANEOUS at 18:27

## 2025-08-15 RX ADMIN — Medication 650 MILLIGRAM(S): at 02:23

## 2025-08-15 RX ADMIN — ALBUMIN (HUMAN) 50 MILLILITER(S): 12.5 INJECTION, SOLUTION INTRAVENOUS at 14:03

## 2025-08-16 LAB
ALBUMIN SERPL ELPH-MCNC: 2.2 G/DL — LOW (ref 3.3–5)
ALP SERPL-CCNC: 145 U/L — HIGH (ref 40–120)
ALT FLD-CCNC: 10 U/L — SIGNIFICANT CHANGE UP (ref 10–45)
ANION GAP SERPL CALC-SCNC: 20 MMOL/L — HIGH (ref 5–17)
AST SERPL-CCNC: 14 U/L — SIGNIFICANT CHANGE UP (ref 10–40)
BILIRUB SERPL-MCNC: 0.6 MG/DL — SIGNIFICANT CHANGE UP (ref 0.2–1.2)
BLD GP AB SCN SERPL QL: NEGATIVE — SIGNIFICANT CHANGE UP
BUN SERPL-MCNC: 81 MG/DL — HIGH (ref 7–23)
CALCIUM SERPL-MCNC: 8 MG/DL — LOW (ref 8.4–10.5)
CHLORIDE SERPL-SCNC: 109 MMOL/L — HIGH (ref 96–108)
CO2 SERPL-SCNC: 14 MMOL/L — LOW (ref 22–31)
CREAT SERPL-MCNC: 1.9 MG/DL — HIGH (ref 0.5–1.3)
EGFR: 27 ML/MIN/1.73M2 — LOW
EGFR: 27 ML/MIN/1.73M2 — LOW
GLUCOSE BLDC GLUCOMTR-MCNC: 112 MG/DL — HIGH (ref 70–99)
GLUCOSE BLDC GLUCOMTR-MCNC: 149 MG/DL — HIGH (ref 70–99)
GLUCOSE BLDC GLUCOMTR-MCNC: 165 MG/DL — HIGH (ref 70–99)
GLUCOSE BLDC GLUCOMTR-MCNC: 206 MG/DL — HIGH (ref 70–99)
GLUCOSE BLDC GLUCOMTR-MCNC: 65 MG/DL — LOW (ref 70–99)
GLUCOSE SERPL-MCNC: 253 MG/DL — HIGH (ref 70–99)
HCT VFR BLD CALC: 24.4 % — LOW (ref 34.5–45)
HEPARIN-PF4 AB RESULT: 0.6 U/ML — SIGNIFICANT CHANGE UP (ref 0–0.9)
HGB BLD-MCNC: 7.1 G/DL — LOW (ref 11.5–15.5)
IMMATURE PLATELET FRACTION #: 4 K/UL — LOW (ref 4.7–11.1)
IMMATURE PLATELET FRACTION %: 5.3 % — HIGH (ref 1.6–4.9)
MAGNESIUM SERPL-MCNC: 1.8 MG/DL — SIGNIFICANT CHANGE UP (ref 1.6–2.6)
MCHC RBC-ENTMCNC: 28.2 PG — SIGNIFICANT CHANGE UP (ref 27–34)
MCHC RBC-ENTMCNC: 29.1 G/DL — LOW (ref 32–36)
MCV RBC AUTO: 96.8 FL — SIGNIFICANT CHANGE UP (ref 80–100)
NRBC # BLD AUTO: 0.04 K/UL — HIGH (ref 0–0)
NRBC # FLD: 0.04 K/UL — HIGH (ref 0–0)
NRBC BLD AUTO-RTO: 0 /100 WBCS — SIGNIFICANT CHANGE UP (ref 0–0)
PF4 HEPARIN CMPLX AB SER-ACNC: NEGATIVE — SIGNIFICANT CHANGE UP
PHOSPHATE SERPL-MCNC: 5.1 MG/DL — HIGH (ref 2.5–4.5)
PLATELET # BLD AUTO: 75 K/UL — LOW (ref 150–400)
PMV BLD: 12 FL — SIGNIFICANT CHANGE UP (ref 7–13)
POTASSIUM SERPL-MCNC: 2.5 MMOL/L — CRITICAL LOW (ref 3.5–5.3)
POTASSIUM SERPL-SCNC: 2.5 MMOL/L — CRITICAL LOW (ref 3.5–5.3)
PROT SERPL-MCNC: 5.3 G/DL — LOW (ref 6–8.3)
RBC # BLD: 2.52 M/UL — LOW (ref 3.8–5.2)
RBC # FLD: 21.2 % — HIGH (ref 10.3–14.5)
RH IG SCN BLD-IMP: NEGATIVE — SIGNIFICANT CHANGE UP
SODIUM SERPL-SCNC: 143 MMOL/L — SIGNIFICANT CHANGE UP (ref 135–145)
WBC # BLD: 9.89 K/UL — SIGNIFICANT CHANGE UP (ref 3.8–10.5)
WBC # FLD AUTO: 9.89 K/UL — SIGNIFICANT CHANGE UP (ref 3.8–10.5)

## 2025-08-16 PROCEDURE — 99233 SBSQ HOSP IP/OBS HIGH 50: CPT

## 2025-08-16 RX ORDER — LOPERAMIDE HCL 1 MG/7.5ML
4 SOLUTION ORAL EVERY 12 HOURS
Refills: 0 | Status: COMPLETED | OUTPATIENT
Start: 2025-08-16 | End: 2025-08-18

## 2025-08-16 RX ORDER — ENOXAPARIN SODIUM 100 MG/ML
30 INJECTION SUBCUTANEOUS
Refills: 0 | Status: DISCONTINUED | OUTPATIENT
Start: 2025-08-16 | End: 2025-08-29

## 2025-08-16 RX ADMIN — Medication 1 TABLET(S): at 00:32

## 2025-08-16 RX ADMIN — Medication 15 MILLILITER(S): at 06:00

## 2025-08-16 RX ADMIN — Medication 500 MILLIGRAM(S): at 12:06

## 2025-08-16 RX ADMIN — LOPERAMIDE HCL 4 MILLIGRAM(S): 1 SOLUTION ORAL at 17:25

## 2025-08-16 RX ADMIN — Medication 1 TABLET(S): at 17:24

## 2025-08-16 RX ADMIN — Medication 40 MILLIGRAM(S): at 05:58

## 2025-08-16 RX ADMIN — Medication 1 PACKET(S): at 17:25

## 2025-08-16 RX ADMIN — LOPERAMIDE HCL 2 MILLIGRAM(S): 1 SOLUTION ORAL at 00:32

## 2025-08-16 RX ADMIN — Medication 40 MILLIGRAM(S): at 17:31

## 2025-08-16 RX ADMIN — Medication 1 APPLICATION(S): at 06:03

## 2025-08-16 RX ADMIN — DROXIDOPA 200 MILLIGRAM(S): 300 CAPSULE ORAL at 13:57

## 2025-08-16 RX ADMIN — Medication 81 MILLIGRAM(S): at 12:06

## 2025-08-16 RX ADMIN — MIDODRINE HYDROCHLORIDE 30 MILLIGRAM(S): 5 TABLET ORAL at 22:15

## 2025-08-16 RX ADMIN — Medication 1 TABLET(S): at 12:06

## 2025-08-16 RX ADMIN — Medication 1 PACKET(S): at 06:01

## 2025-08-16 RX ADMIN — DROXIDOPA 200 MILLIGRAM(S): 300 CAPSULE ORAL at 06:02

## 2025-08-16 RX ADMIN — Medication 40 MILLIEQUIVALENT(S): at 12:23

## 2025-08-16 RX ADMIN — INSULIN LISPRO 4: 100 INJECTION, SOLUTION INTRAVENOUS; SUBCUTANEOUS at 00:07

## 2025-08-16 RX ADMIN — Medication 25 GRAM(S): at 00:30

## 2025-08-16 RX ADMIN — Medication 15 MILLILITER(S): at 00:32

## 2025-08-16 RX ADMIN — DROXIDOPA 200 MILLIGRAM(S): 300 CAPSULE ORAL at 22:16

## 2025-08-16 RX ADMIN — Medication 100 MILLIEQUIVALENT(S): at 12:06

## 2025-08-16 RX ADMIN — Medication 1 APPLICATION(S): at 11:58

## 2025-08-16 RX ADMIN — Medication 1 TABLET(S): at 06:03

## 2025-08-16 RX ADMIN — MIDODRINE HYDROCHLORIDE 30 MILLIGRAM(S): 5 TABLET ORAL at 13:56

## 2025-08-16 RX ADMIN — MIDODRINE HYDROCHLORIDE 30 MILLIGRAM(S): 5 TABLET ORAL at 05:59

## 2025-08-16 RX ADMIN — Medication 15 MILLILITER(S): at 17:25

## 2025-08-16 RX ADMIN — Medication 40 MILLIEQUIVALENT(S): at 22:14

## 2025-08-16 RX ADMIN — Medication 25 GRAM(S): at 05:58

## 2025-08-16 RX ADMIN — ENOXAPARIN SODIUM 30 MILLIGRAM(S): 100 INJECTION SUBCUTANEOUS at 22:16

## 2025-08-16 RX ADMIN — Medication 40 MILLIEQUIVALENT(S): at 15:22

## 2025-08-16 RX ADMIN — INSULIN LISPRO 4: 100 INJECTION, SOLUTION INTRAVENOUS; SUBCUTANEOUS at 17:29

## 2025-08-16 RX ADMIN — INSULIN LISPRO 2: 100 INJECTION, SOLUTION INTRAVENOUS; SUBCUTANEOUS at 12:20

## 2025-08-16 RX ADMIN — LOPERAMIDE HCL 2 MILLIGRAM(S): 1 SOLUTION ORAL at 06:00

## 2025-08-17 LAB
ALBUMIN SERPL ELPH-MCNC: 2.4 G/DL — LOW (ref 3.3–5)
ALP SERPL-CCNC: 177 U/L — HIGH (ref 40–120)
ALT FLD-CCNC: 12 U/L — SIGNIFICANT CHANGE UP (ref 10–45)
ANION GAP SERPL CALC-SCNC: 18 MMOL/L — HIGH (ref 5–17)
AST SERPL-CCNC: 17 U/L — SIGNIFICANT CHANGE UP (ref 10–40)
BILIRUB SERPL-MCNC: 0.6 MG/DL — SIGNIFICANT CHANGE UP (ref 0.2–1.2)
BUN SERPL-MCNC: 74 MG/DL — HIGH (ref 7–23)
CALCIUM SERPL-MCNC: 8.3 MG/DL — LOW (ref 8.4–10.5)
CHLORIDE SERPL-SCNC: 113 MMOL/L — HIGH (ref 96–108)
CO2 SERPL-SCNC: 13 MMOL/L — LOW (ref 22–31)
CREAT SERPL-MCNC: 1.87 MG/DL — HIGH (ref 0.5–1.3)
EGFR: 27 ML/MIN/1.73M2 — LOW
EGFR: 27 ML/MIN/1.73M2 — LOW
GLUCOSE BLDC GLUCOMTR-MCNC: 104 MG/DL — HIGH (ref 70–99)
GLUCOSE BLDC GLUCOMTR-MCNC: 184 MG/DL — HIGH (ref 70–99)
GLUCOSE BLDC GLUCOMTR-MCNC: 187 MG/DL — HIGH (ref 70–99)
GLUCOSE BLDC GLUCOMTR-MCNC: 200 MG/DL — HIGH (ref 70–99)
GLUCOSE SERPL-MCNC: 218 MG/DL — HIGH (ref 70–99)
HCT VFR BLD CALC: 27.2 % — LOW (ref 34.5–45)
HGB BLD-MCNC: 8.4 G/DL — LOW (ref 11.5–15.5)
IMMATURE PLATELET FRACTION #: 5.1 K/UL — SIGNIFICANT CHANGE UP (ref 4.7–11.1)
IMMATURE PLATELET FRACTION %: 5.2 % — HIGH (ref 1.6–4.9)
MCHC RBC-ENTMCNC: 29.8 PG — SIGNIFICANT CHANGE UP (ref 27–34)
MCHC RBC-ENTMCNC: 30.9 G/DL — LOW (ref 32–36)
MCV RBC AUTO: 96.5 FL — SIGNIFICANT CHANGE UP (ref 80–100)
NRBC # BLD AUTO: 0.03 K/UL — HIGH (ref 0–0)
NRBC # FLD: 0.03 K/UL — HIGH (ref 0–0)
NRBC BLD AUTO-RTO: 0 /100 WBCS — SIGNIFICANT CHANGE UP (ref 0–0)
PLATELET # BLD AUTO: 98 K/UL — LOW (ref 150–400)
PMV BLD: 12.2 FL — SIGNIFICANT CHANGE UP (ref 7–13)
POTASSIUM SERPL-MCNC: 3 MMOL/L — LOW (ref 3.5–5.3)
POTASSIUM SERPL-SCNC: 3 MMOL/L — LOW (ref 3.5–5.3)
PROT SERPL-MCNC: 5.9 G/DL — LOW (ref 6–8.3)
RBC # BLD: 2.82 M/UL — LOW (ref 3.8–5.2)
RBC # FLD: 23.4 % — HIGH (ref 10.3–14.5)
SODIUM SERPL-SCNC: 144 MMOL/L — SIGNIFICANT CHANGE UP (ref 135–145)
WBC # BLD: 12.47 K/UL — HIGH (ref 3.8–10.5)
WBC # FLD AUTO: 12.47 K/UL — HIGH (ref 3.8–10.5)

## 2025-08-17 PROCEDURE — 99233 SBSQ HOSP IP/OBS HIGH 50: CPT

## 2025-08-17 RX ORDER — B1/B2/B3/B5/B6/B12/VIT C/FOLIC 500-0.5 MG
1 TABLET ORAL DAILY
Refills: 0 | Status: DISCONTINUED | OUTPATIENT
Start: 2025-08-17 | End: 2025-08-29

## 2025-08-17 RX ADMIN — Medication 1 TABLET(S): at 13:30

## 2025-08-17 RX ADMIN — Medication 1 TABLET(S): at 06:19

## 2025-08-17 RX ADMIN — DROXIDOPA 200 MILLIGRAM(S): 300 CAPSULE ORAL at 13:29

## 2025-08-17 RX ADMIN — Medication 40 MILLIEQUIVALENT(S): at 21:52

## 2025-08-17 RX ADMIN — Medication 15 MILLILITER(S): at 06:19

## 2025-08-17 RX ADMIN — LOPERAMIDE HCL 4 MILLIGRAM(S): 1 SOLUTION ORAL at 17:34

## 2025-08-17 RX ADMIN — INSULIN LISPRO 2: 100 INJECTION, SOLUTION INTRAVENOUS; SUBCUTANEOUS at 12:35

## 2025-08-17 RX ADMIN — Medication 4 GRAM(S): at 21:50

## 2025-08-17 RX ADMIN — Medication 4 GRAM(S): at 13:30

## 2025-08-17 RX ADMIN — Medication 81 MILLIGRAM(S): at 12:36

## 2025-08-17 RX ADMIN — Medication 40 MILLIGRAM(S): at 17:34

## 2025-08-17 RX ADMIN — LOPERAMIDE HCL 4 MILLIGRAM(S): 1 SOLUTION ORAL at 06:18

## 2025-08-17 RX ADMIN — INSULIN LISPRO 2: 100 INJECTION, SOLUTION INTRAVENOUS; SUBCUTANEOUS at 17:46

## 2025-08-17 RX ADMIN — DROXIDOPA 200 MILLIGRAM(S): 300 CAPSULE ORAL at 06:19

## 2025-08-17 RX ADMIN — MIDODRINE HYDROCHLORIDE 30 MILLIGRAM(S): 5 TABLET ORAL at 13:29

## 2025-08-17 RX ADMIN — Medication 1 TABLET(S): at 12:36

## 2025-08-17 RX ADMIN — Medication 1 TABLET(S): at 17:34

## 2025-08-17 RX ADMIN — Medication 500 MILLIGRAM(S): at 12:36

## 2025-08-17 RX ADMIN — MIDODRINE HYDROCHLORIDE 30 MILLIGRAM(S): 5 TABLET ORAL at 21:51

## 2025-08-17 RX ADMIN — MIDODRINE HYDROCHLORIDE 30 MILLIGRAM(S): 5 TABLET ORAL at 06:18

## 2025-08-17 RX ADMIN — Medication 1 APPLICATION(S): at 06:20

## 2025-08-17 RX ADMIN — Medication 15 MILLILITER(S): at 17:34

## 2025-08-17 RX ADMIN — Medication 1 PACKET(S): at 06:19

## 2025-08-17 RX ADMIN — ENOXAPARIN SODIUM 30 MILLIGRAM(S): 100 INJECTION SUBCUTANEOUS at 21:50

## 2025-08-17 RX ADMIN — INSULIN LISPRO 2: 100 INJECTION, SOLUTION INTRAVENOUS; SUBCUTANEOUS at 06:17

## 2025-08-17 RX ADMIN — Medication 1 PACKET(S): at 19:01

## 2025-08-17 RX ADMIN — Medication 40 MILLIEQUIVALENT(S): at 19:41

## 2025-08-17 RX ADMIN — DROXIDOPA 200 MILLIGRAM(S): 300 CAPSULE ORAL at 21:52

## 2025-08-17 RX ADMIN — Medication 40 MILLIGRAM(S): at 06:19

## 2025-08-17 RX ADMIN — Medication 1 APPLICATION(S): at 12:36

## 2025-08-18 LAB
ALBUMIN SERPL ELPH-MCNC: 1.9 G/DL — LOW (ref 3.3–5)
ALP SERPL-CCNC: 122 U/L — HIGH (ref 40–120)
ALT FLD-CCNC: 10 U/L — SIGNIFICANT CHANGE UP (ref 10–45)
ANION GAP SERPL CALC-SCNC: 15 MMOL/L — SIGNIFICANT CHANGE UP (ref 5–17)
AST SERPL-CCNC: 16 U/L — SIGNIFICANT CHANGE UP (ref 10–40)
BILIRUB SERPL-MCNC: 0.3 MG/DL — SIGNIFICANT CHANGE UP (ref 0.2–1.2)
BUN SERPL-MCNC: 68 MG/DL — HIGH (ref 7–23)
CALCIUM SERPL-MCNC: 7.7 MG/DL — LOW (ref 8.4–10.5)
CHLORIDE SERPL-SCNC: 115 MMOL/L — HIGH (ref 96–108)
CO2 SERPL-SCNC: 13 MMOL/L — LOW (ref 22–31)
CREAT ?TM UR-MCNC: 35 MG/DL — SIGNIFICANT CHANGE UP
CREAT SERPL-MCNC: 1.81 MG/DL — HIGH (ref 0.5–1.3)
EGFR: 28 ML/MIN/1.73M2 — LOW
EGFR: 28 ML/MIN/1.73M2 — LOW
GLUCOSE BLDC GLUCOMTR-MCNC: 168 MG/DL — HIGH (ref 70–99)
GLUCOSE BLDC GLUCOMTR-MCNC: 180 MG/DL — HIGH (ref 70–99)
GLUCOSE BLDC GLUCOMTR-MCNC: 185 MG/DL — HIGH (ref 70–99)
GLUCOSE BLDC GLUCOMTR-MCNC: 204 MG/DL — HIGH (ref 70–99)
GLUCOSE BLDC GLUCOMTR-MCNC: 94 MG/DL — SIGNIFICANT CHANGE UP (ref 70–99)
GLUCOSE SERPL-MCNC: 97 MG/DL — SIGNIFICANT CHANGE UP (ref 70–99)
HCT VFR BLD CALC: 21.1 % — LOW (ref 34.5–45)
HGB BLD-MCNC: 6.3 G/DL — CRITICAL LOW (ref 11.5–15.5)
MAGNESIUM SERPL-MCNC: 1.6 MG/DL — SIGNIFICANT CHANGE UP (ref 1.6–2.6)
MCHC RBC-ENTMCNC: 28.8 PG — SIGNIFICANT CHANGE UP (ref 27–34)
MCHC RBC-ENTMCNC: 29.9 G/DL — LOW (ref 32–36)
MCV RBC AUTO: 96.3 FL — SIGNIFICANT CHANGE UP (ref 80–100)
NRBC # BLD AUTO: 0.03 K/UL — HIGH (ref 0–0)
NRBC # FLD: 0.03 K/UL — HIGH (ref 0–0)
NRBC BLD AUTO-RTO: 0 /100 WBCS — SIGNIFICANT CHANGE UP (ref 0–0)
OSMOLALITY UR: 445 MOSM/KG — SIGNIFICANT CHANGE UP (ref 50–1200)
PHOSPHATE SERPL-MCNC: 4.1 MG/DL — SIGNIFICANT CHANGE UP (ref 2.5–4.5)
PLATELET # BLD AUTO: 107 K/UL — LOW (ref 150–400)
PMV BLD: 11.9 FL — SIGNIFICANT CHANGE UP (ref 7–13)
POTASSIUM SERPL-MCNC: 2.5 MMOL/L — CRITICAL LOW (ref 3.5–5.3)
POTASSIUM SERPL-SCNC: 2.5 MMOL/L — CRITICAL LOW (ref 3.5–5.3)
PROT ?TM UR-MCNC: 49 MG/DL — HIGH (ref 0–12)
PROT SERPL-MCNC: 4.7 G/DL — LOW (ref 6–8.3)
RBC # BLD: 2.19 M/UL — LOW (ref 3.8–5.2)
RBC # FLD: 24 % — HIGH (ref 10.3–14.5)
SODIUM SERPL-SCNC: 143 MMOL/L — SIGNIFICANT CHANGE UP (ref 135–145)
SODIUM UR-SCNC: 30 MMOL/L — SIGNIFICANT CHANGE UP
WBC # BLD: 11.62 K/UL — HIGH (ref 3.8–10.5)
WBC # FLD AUTO: 11.62 K/UL — HIGH (ref 3.8–10.5)

## 2025-08-18 PROCEDURE — 99232 SBSQ HOSP IP/OBS MODERATE 35: CPT

## 2025-08-18 PROCEDURE — 99233 SBSQ HOSP IP/OBS HIGH 50: CPT

## 2025-08-18 RX ORDER — MAGNESIUM SULFATE 500 MG/ML
2 SYRINGE (ML) INJECTION ONCE
Refills: 0 | Status: COMPLETED | OUTPATIENT
Start: 2025-08-18 | End: 2025-08-18

## 2025-08-18 RX ORDER — SODIUM CHLORIDE 9 G/1000ML
1000 INJECTION, SOLUTION INTRAVENOUS
Refills: 0 | Status: DISCONTINUED | OUTPATIENT
Start: 2025-08-18 | End: 2025-08-19

## 2025-08-18 RX ADMIN — INSULIN LISPRO 0: 100 INJECTION, SOLUTION INTRAVENOUS; SUBCUTANEOUS at 17:35

## 2025-08-18 RX ADMIN — Medication 4 GRAM(S): at 22:48

## 2025-08-18 RX ADMIN — Medication 40 MILLIGRAM(S): at 05:55

## 2025-08-18 RX ADMIN — Medication 1 PACKET(S): at 17:25

## 2025-08-18 RX ADMIN — Medication 40 MILLIEQUIVALENT(S): at 19:15

## 2025-08-18 RX ADMIN — Medication 1 TABLET(S): at 05:54

## 2025-08-18 RX ADMIN — Medication 15 MILLILITER(S): at 17:29

## 2025-08-18 RX ADMIN — INSULIN LISPRO 2: 100 INJECTION, SOLUTION INTRAVENOUS; SUBCUTANEOUS at 23:36

## 2025-08-18 RX ADMIN — DROXIDOPA 200 MILLIGRAM(S): 300 CAPSULE ORAL at 05:55

## 2025-08-18 RX ADMIN — Medication 1 TABLET(S): at 11:57

## 2025-08-18 RX ADMIN — DROXIDOPA 200 MILLIGRAM(S): 300 CAPSULE ORAL at 22:50

## 2025-08-18 RX ADMIN — Medication 1 APPLICATION(S): at 11:59

## 2025-08-18 RX ADMIN — INSULIN LISPRO 4: 100 INJECTION, SOLUTION INTRAVENOUS; SUBCUTANEOUS at 13:14

## 2025-08-18 RX ADMIN — Medication 81 MILLIGRAM(S): at 11:57

## 2025-08-18 RX ADMIN — ENOXAPARIN SODIUM 30 MILLIGRAM(S): 100 INJECTION SUBCUTANEOUS at 22:51

## 2025-08-18 RX ADMIN — LOPERAMIDE HCL 4 MILLIGRAM(S): 1 SOLUTION ORAL at 05:56

## 2025-08-18 RX ADMIN — Medication 15 MILLILITER(S): at 05:55

## 2025-08-18 RX ADMIN — SODIUM CHLORIDE 70 MILLILITER(S): 9 INJECTION, SOLUTION INTRAVENOUS at 17:29

## 2025-08-18 RX ADMIN — Medication 40 MILLIGRAM(S): at 17:26

## 2025-08-18 RX ADMIN — Medication 1 TABLET(S): at 11:59

## 2025-08-18 RX ADMIN — Medication 500 MILLIGRAM(S): at 11:58

## 2025-08-18 RX ADMIN — Medication 1 APPLICATION(S): at 05:56

## 2025-08-18 RX ADMIN — MIDODRINE HYDROCHLORIDE 30 MILLIGRAM(S): 5 TABLET ORAL at 05:54

## 2025-08-18 RX ADMIN — Medication 40 MILLIEQUIVALENT(S): at 22:51

## 2025-08-18 RX ADMIN — Medication 1 PACKET(S): at 05:56

## 2025-08-18 RX ADMIN — Medication 4 GRAM(S): at 11:56

## 2025-08-18 RX ADMIN — DROXIDOPA 200 MILLIGRAM(S): 300 CAPSULE ORAL at 13:15

## 2025-08-18 RX ADMIN — INSULIN LISPRO 2: 100 INJECTION, SOLUTION INTRAVENOUS; SUBCUTANEOUS at 05:50

## 2025-08-18 RX ADMIN — MIDODRINE HYDROCHLORIDE 30 MILLIGRAM(S): 5 TABLET ORAL at 22:49

## 2025-08-18 RX ADMIN — Medication 1 TABLET(S): at 17:25

## 2025-08-18 RX ADMIN — MIDODRINE HYDROCHLORIDE 30 MILLIGRAM(S): 5 TABLET ORAL at 13:15

## 2025-08-19 LAB
ALBUMIN SERPL ELPH-MCNC: 1.9 G/DL — LOW (ref 3.3–5)
ALP SERPL-CCNC: 141 U/L — HIGH (ref 40–120)
ALT FLD-CCNC: 10 U/L — SIGNIFICANT CHANGE UP (ref 10–45)
ANION GAP SERPL CALC-SCNC: 17 MMOL/L — SIGNIFICANT CHANGE UP (ref 5–17)
AST SERPL-CCNC: 15 U/L — SIGNIFICANT CHANGE UP (ref 10–40)
BILIRUB SERPL-MCNC: 0.6 MG/DL — SIGNIFICANT CHANGE UP (ref 0.2–1.2)
BLD GP AB SCN SERPL QL: NEGATIVE — SIGNIFICANT CHANGE UP
BUN SERPL-MCNC: 62 MG/DL — HIGH (ref 7–23)
CALCIUM SERPL-MCNC: 7.8 MG/DL — LOW (ref 8.4–10.5)
CHLORIDE SERPL-SCNC: 112 MMOL/L — HIGH (ref 96–108)
CO2 SERPL-SCNC: 12 MMOL/L — LOW (ref 22–31)
CREAT SERPL-MCNC: 1.71 MG/DL — HIGH (ref 0.5–1.3)
EGFR: 30 ML/MIN/1.73M2 — LOW
EGFR: 30 ML/MIN/1.73M2 — LOW
GAS PNL BLDA: SIGNIFICANT CHANGE UP
GLUCOSE BLDC GLUCOMTR-MCNC: 166 MG/DL — HIGH (ref 70–99)
GLUCOSE BLDC GLUCOMTR-MCNC: 168 MG/DL — HIGH (ref 70–99)
GLUCOSE BLDC GLUCOMTR-MCNC: 207 MG/DL — HIGH (ref 70–99)
GLUCOSE BLDC GLUCOMTR-MCNC: 248 MG/DL — HIGH (ref 70–99)
GLUCOSE SERPL-MCNC: 215 MG/DL — HIGH (ref 70–99)
HCT VFR BLD CALC: 27.5 % — LOW (ref 34.5–45)
HGB BLD-MCNC: 8.6 G/DL — LOW (ref 11.5–15.5)
MAGNESIUM SERPL-MCNC: 2 MG/DL — SIGNIFICANT CHANGE UP (ref 1.6–2.6)
MCHC RBC-ENTMCNC: 28.7 PG — SIGNIFICANT CHANGE UP (ref 27–34)
MCHC RBC-ENTMCNC: 31.3 G/DL — LOW (ref 32–36)
MCV RBC AUTO: 91.7 FL — SIGNIFICANT CHANGE UP (ref 80–100)
NRBC # BLD AUTO: 0.03 K/UL — HIGH (ref 0–0)
NRBC # FLD: 0.03 K/UL — HIGH (ref 0–0)
NRBC BLD AUTO-RTO: 0 /100 WBCS — SIGNIFICANT CHANGE UP (ref 0–0)
PHOSPHATE SERPL-MCNC: 3.6 MG/DL — SIGNIFICANT CHANGE UP (ref 2.5–4.5)
PLATELET # BLD AUTO: 118 K/UL — LOW (ref 150–400)
PMV BLD: 11.4 FL — SIGNIFICANT CHANGE UP (ref 7–13)
POTASSIUM SERPL-MCNC: 3 MMOL/L — LOW (ref 3.5–5.3)
POTASSIUM SERPL-SCNC: 3 MMOL/L — LOW (ref 3.5–5.3)
PROT SERPL-MCNC: 5.1 G/DL — LOW (ref 6–8.3)
RBC # BLD: 3 M/UL — LOW (ref 3.8–5.2)
RBC # FLD: 22.8 % — HIGH (ref 10.3–14.5)
RH IG SCN BLD-IMP: NEGATIVE — SIGNIFICANT CHANGE UP
SODIUM SERPL-SCNC: 141 MMOL/L — SIGNIFICANT CHANGE UP (ref 135–145)
WBC # BLD: 9.97 K/UL — SIGNIFICANT CHANGE UP (ref 3.8–10.5)
WBC # FLD AUTO: 9.97 K/UL — SIGNIFICANT CHANGE UP (ref 3.8–10.5)

## 2025-08-19 PROCEDURE — 99233 SBSQ HOSP IP/OBS HIGH 50: CPT

## 2025-08-19 PROCEDURE — 99232 SBSQ HOSP IP/OBS MODERATE 35: CPT

## 2025-08-19 RX ORDER — SODIUM CHLORIDE 9 G/1000ML
1000 INJECTION, SOLUTION INTRAVENOUS
Refills: 0 | Status: DISCONTINUED | OUTPATIENT
Start: 2025-08-19 | End: 2025-08-21

## 2025-08-19 RX ORDER — ALBUMIN (HUMAN) 12.5 G/50ML
50 INJECTION, SOLUTION INTRAVENOUS EVERY 6 HOURS
Refills: 0 | Status: COMPLETED | OUTPATIENT
Start: 2025-08-19 | End: 2025-08-20

## 2025-08-19 RX ORDER — SPIRONOLACTONE 25 MG
50 TABLET ORAL ONCE
Refills: 0 | Status: COMPLETED | OUTPATIENT
Start: 2025-08-19 | End: 2025-08-19

## 2025-08-19 RX ADMIN — Medication 15 MILLILITER(S): at 05:16

## 2025-08-19 RX ADMIN — DROXIDOPA 200 MILLIGRAM(S): 300 CAPSULE ORAL at 21:23

## 2025-08-19 RX ADMIN — MIDODRINE HYDROCHLORIDE 30 MILLIGRAM(S): 5 TABLET ORAL at 21:23

## 2025-08-19 RX ADMIN — Medication 40 MILLIEQUIVALENT(S): at 19:20

## 2025-08-19 RX ADMIN — INSULIN LISPRO 2: 100 INJECTION, SOLUTION INTRAVENOUS; SUBCUTANEOUS at 17:56

## 2025-08-19 RX ADMIN — Medication 4 GRAM(S): at 11:02

## 2025-08-19 RX ADMIN — Medication 1 PACKET(S): at 05:16

## 2025-08-19 RX ADMIN — Medication 40 MILLIGRAM(S): at 18:38

## 2025-08-19 RX ADMIN — Medication 1 PACKET(S): at 18:39

## 2025-08-19 RX ADMIN — ENOXAPARIN SODIUM 30 MILLIGRAM(S): 100 INJECTION SUBCUTANEOUS at 21:22

## 2025-08-19 RX ADMIN — ALBUMIN (HUMAN) 50 MILLILITER(S): 12.5 INJECTION, SOLUTION INTRAVENOUS at 13:57

## 2025-08-19 RX ADMIN — Medication 1 TABLET(S): at 12:25

## 2025-08-19 RX ADMIN — Medication 1 TABLET(S): at 18:38

## 2025-08-19 RX ADMIN — INSULIN LISPRO 4: 100 INJECTION, SOLUTION INTRAVENOUS; SUBCUTANEOUS at 06:03

## 2025-08-19 RX ADMIN — Medication 1 APPLICATION(S): at 12:28

## 2025-08-19 RX ADMIN — ALBUMIN (HUMAN) 50 MILLILITER(S): 12.5 INJECTION, SOLUTION INTRAVENOUS at 18:39

## 2025-08-19 RX ADMIN — Medication 40 MILLIGRAM(S): at 05:14

## 2025-08-19 RX ADMIN — Medication 15 MILLILITER(S): at 18:20

## 2025-08-19 RX ADMIN — Medication 650 MILLIGRAM(S): at 09:26

## 2025-08-19 RX ADMIN — DROXIDOPA 200 MILLIGRAM(S): 300 CAPSULE ORAL at 13:46

## 2025-08-19 RX ADMIN — SODIUM CHLORIDE 70 MILLILITER(S): 9 INJECTION, SOLUTION INTRAVENOUS at 13:47

## 2025-08-19 RX ADMIN — Medication 40 MILLIEQUIVALENT(S): at 12:27

## 2025-08-19 RX ADMIN — Medication 50 MILLIGRAM(S): at 16:21

## 2025-08-19 RX ADMIN — MIDODRINE HYDROCHLORIDE 30 MILLIGRAM(S): 5 TABLET ORAL at 13:46

## 2025-08-19 RX ADMIN — DROXIDOPA 200 MILLIGRAM(S): 300 CAPSULE ORAL at 05:15

## 2025-08-19 RX ADMIN — Medication 40 MILLIEQUIVALENT(S): at 06:52

## 2025-08-19 RX ADMIN — Medication 1 TABLET(S): at 05:16

## 2025-08-19 RX ADMIN — Medication 81 MILLIGRAM(S): at 12:25

## 2025-08-19 RX ADMIN — INSULIN LISPRO 4: 100 INJECTION, SOLUTION INTRAVENOUS; SUBCUTANEOUS at 12:15

## 2025-08-19 RX ADMIN — Medication 25 GRAM(S): at 00:12

## 2025-08-19 RX ADMIN — Medication 1 APPLICATION(S): at 05:15

## 2025-08-19 RX ADMIN — INSULIN LISPRO 2: 100 INJECTION, SOLUTION INTRAVENOUS; SUBCUTANEOUS at 23:41

## 2025-08-19 RX ADMIN — Medication 500 MILLIGRAM(S): at 12:25

## 2025-08-19 RX ADMIN — MIDODRINE HYDROCHLORIDE 30 MILLIGRAM(S): 5 TABLET ORAL at 05:14

## 2025-08-20 ENCOUNTER — TRANSCRIPTION ENCOUNTER (OUTPATIENT)
Age: 75
End: 2025-08-20

## 2025-08-20 LAB
ALBUMIN SERPL ELPH-MCNC: 2.4 G/DL — LOW (ref 3.3–5)
ALP SERPL-CCNC: 130 U/L — HIGH (ref 40–120)
ALT FLD-CCNC: 9 U/L — LOW (ref 10–45)
ANION GAP SERPL CALC-SCNC: 11 MMOL/L — SIGNIFICANT CHANGE UP (ref 5–17)
AST SERPL-CCNC: 13 U/L — SIGNIFICANT CHANGE UP (ref 10–40)
BILIRUB SERPL-MCNC: 0.5 MG/DL — SIGNIFICANT CHANGE UP (ref 0.2–1.2)
BUN SERPL-MCNC: 57 MG/DL — HIGH (ref 7–23)
CALCIUM SERPL-MCNC: 7.8 MG/DL — LOW (ref 8.4–10.5)
CHLORIDE SERPL-SCNC: 113 MMOL/L — HIGH (ref 96–108)
CO2 SERPL-SCNC: 16 MMOL/L — LOW (ref 22–31)
CREAT SERPL-MCNC: 1.5 MG/DL — HIGH (ref 0.5–1.3)
EGFR: 36 ML/MIN/1.73M2 — LOW
EGFR: 36 ML/MIN/1.73M2 — LOW
GLUCOSE BLDC GLUCOMTR-MCNC: 176 MG/DL — HIGH (ref 70–99)
GLUCOSE BLDC GLUCOMTR-MCNC: 194 MG/DL — HIGH (ref 70–99)
GLUCOSE BLDC GLUCOMTR-MCNC: 200 MG/DL — HIGH (ref 70–99)
GLUCOSE BLDC GLUCOMTR-MCNC: 251 MG/DL — HIGH (ref 70–99)
GLUCOSE SERPL-MCNC: 190 MG/DL — HIGH (ref 70–99)
HCT VFR BLD CALC: 25.2 % — LOW (ref 34.5–45)
HGB BLD-MCNC: 8.1 G/DL — LOW (ref 11.5–15.5)
MAGNESIUM SERPL-MCNC: 1.9 MG/DL — SIGNIFICANT CHANGE UP (ref 1.6–2.6)
MCHC RBC-ENTMCNC: 29.9 PG — SIGNIFICANT CHANGE UP (ref 27–34)
MCHC RBC-ENTMCNC: 32.1 G/DL — SIGNIFICANT CHANGE UP (ref 32–36)
MCV RBC AUTO: 93 FL — SIGNIFICANT CHANGE UP (ref 80–100)
NRBC # BLD AUTO: 0 K/UL — SIGNIFICANT CHANGE UP (ref 0–0)
NRBC # FLD: 0 K/UL — SIGNIFICANT CHANGE UP (ref 0–0)
NRBC BLD AUTO-RTO: 0 /100 WBCS — SIGNIFICANT CHANGE UP (ref 0–0)
PHOSPHATE SERPL-MCNC: 3 MG/DL — SIGNIFICANT CHANGE UP (ref 2.5–4.5)
PLATELET # BLD AUTO: 114 K/UL — LOW (ref 150–400)
PMV BLD: 11.8 FL — SIGNIFICANT CHANGE UP (ref 7–13)
POTASSIUM SERPL-MCNC: 3.7 MMOL/L — SIGNIFICANT CHANGE UP (ref 3.5–5.3)
POTASSIUM SERPL-SCNC: 3.7 MMOL/L — SIGNIFICANT CHANGE UP (ref 3.5–5.3)
PROT SERPL-MCNC: 5 G/DL — LOW (ref 6–8.3)
RBC # BLD: 2.71 M/UL — LOW (ref 3.8–5.2)
RBC # FLD: 23.9 % — HIGH (ref 10.3–14.5)
SODIUM SERPL-SCNC: 140 MMOL/L — SIGNIFICANT CHANGE UP (ref 135–145)
WBC # BLD: 6.12 K/UL — SIGNIFICANT CHANGE UP (ref 3.8–10.5)
WBC # FLD AUTO: 6.12 K/UL — SIGNIFICANT CHANGE UP (ref 3.8–10.5)

## 2025-08-20 PROCEDURE — 99233 SBSQ HOSP IP/OBS HIGH 50: CPT

## 2025-08-20 PROCEDURE — 99232 SBSQ HOSP IP/OBS MODERATE 35: CPT

## 2025-08-20 RX ORDER — FUROSEMIDE 10 MG/ML
40 INJECTION INTRAMUSCULAR; INTRAVENOUS ONCE
Refills: 0 | Status: DISCONTINUED | OUTPATIENT
Start: 2025-08-20 | End: 2025-08-20

## 2025-08-20 RX ORDER — FUROSEMIDE 10 MG/ML
40 INJECTION INTRAMUSCULAR; INTRAVENOUS ONCE
Refills: 0 | Status: COMPLETED | OUTPATIENT
Start: 2025-08-20 | End: 2025-08-20

## 2025-08-20 RX ADMIN — SODIUM CHLORIDE 70 MILLILITER(S): 9 INJECTION, SOLUTION INTRAVENOUS at 22:36

## 2025-08-20 RX ADMIN — INSULIN LISPRO 6: 100 INJECTION, SOLUTION INTRAVENOUS; SUBCUTANEOUS at 12:05

## 2025-08-20 RX ADMIN — Medication 1 APPLICATION(S): at 11:21

## 2025-08-20 RX ADMIN — Medication 40 MILLIGRAM(S): at 17:05

## 2025-08-20 RX ADMIN — Medication 15 MILLILITER(S): at 05:28

## 2025-08-20 RX ADMIN — Medication 1 PACKET(S): at 17:05

## 2025-08-20 RX ADMIN — MIDODRINE HYDROCHLORIDE 30 MILLIGRAM(S): 5 TABLET ORAL at 05:29

## 2025-08-20 RX ADMIN — Medication 1 TABLET(S): at 11:13

## 2025-08-20 RX ADMIN — Medication 1 TABLET(S): at 17:05

## 2025-08-20 RX ADMIN — INSULIN LISPRO 2: 100 INJECTION, SOLUTION INTRAVENOUS; SUBCUTANEOUS at 17:56

## 2025-08-20 RX ADMIN — ENOXAPARIN SODIUM 30 MILLIGRAM(S): 100 INJECTION SUBCUTANEOUS at 22:33

## 2025-08-20 RX ADMIN — INSULIN LISPRO 2: 100 INJECTION, SOLUTION INTRAVENOUS; SUBCUTANEOUS at 05:28

## 2025-08-20 RX ADMIN — Medication 1 APPLICATION(S): at 05:30

## 2025-08-20 RX ADMIN — Medication 40 MILLIEQUIVALENT(S): at 13:27

## 2025-08-20 RX ADMIN — FUROSEMIDE 40 MILLIGRAM(S): 10 INJECTION INTRAMUSCULAR; INTRAVENOUS at 12:06

## 2025-08-20 RX ADMIN — SODIUM CHLORIDE 70 MILLILITER(S): 9 INJECTION, SOLUTION INTRAVENOUS at 05:28

## 2025-08-20 RX ADMIN — Medication 40 MILLIEQUIVALENT(S): at 11:13

## 2025-08-20 RX ADMIN — MIDODRINE HYDROCHLORIDE 30 MILLIGRAM(S): 5 TABLET ORAL at 22:26

## 2025-08-20 RX ADMIN — Medication 81 MILLIGRAM(S): at 11:13

## 2025-08-20 RX ADMIN — Medication 40 MILLIGRAM(S): at 05:30

## 2025-08-20 RX ADMIN — Medication 15 MILLILITER(S): at 17:07

## 2025-08-20 RX ADMIN — ALBUMIN (HUMAN) 50 MILLILITER(S): 12.5 INJECTION, SOLUTION INTRAVENOUS at 00:07

## 2025-08-20 RX ADMIN — Medication 500 MILLIGRAM(S): at 11:13

## 2025-08-20 RX ADMIN — ALBUMIN (HUMAN) 50 MILLILITER(S): 12.5 INJECTION, SOLUTION INTRAVENOUS at 05:26

## 2025-08-20 RX ADMIN — Medication 1 TABLET(S): at 05:29

## 2025-08-20 RX ADMIN — Medication 1 PACKET(S): at 05:30

## 2025-08-20 RX ADMIN — DROXIDOPA 200 MILLIGRAM(S): 300 CAPSULE ORAL at 05:29

## 2025-08-20 RX ADMIN — DROXIDOPA 200 MILLIGRAM(S): 300 CAPSULE ORAL at 13:27

## 2025-08-20 RX ADMIN — DROXIDOPA 200 MILLIGRAM(S): 300 CAPSULE ORAL at 22:26

## 2025-08-20 RX ADMIN — MIDODRINE HYDROCHLORIDE 30 MILLIGRAM(S): 5 TABLET ORAL at 13:27

## 2025-08-21 LAB
ALBUMIN SERPL ELPH-MCNC: 2.3 G/DL — LOW (ref 3.3–5)
ALP SERPL-CCNC: 167 U/L — HIGH (ref 40–120)
ALT FLD-CCNC: 9 U/L — LOW (ref 10–45)
ANION GAP SERPL CALC-SCNC: 12 MMOL/L — SIGNIFICANT CHANGE UP (ref 5–17)
AST SERPL-CCNC: 26 U/L — SIGNIFICANT CHANGE UP (ref 10–40)
BILIRUB SERPL-MCNC: 0.5 MG/DL — SIGNIFICANT CHANGE UP (ref 0.2–1.2)
BUN SERPL-MCNC: 49 MG/DL — HIGH (ref 7–23)
CALCIUM SERPL-MCNC: 7.8 MG/DL — LOW (ref 8.4–10.5)
CHLORIDE SERPL-SCNC: 108 MMOL/L — SIGNIFICANT CHANGE UP (ref 96–108)
CO2 SERPL-SCNC: 18 MMOL/L — LOW (ref 22–31)
CREAT SERPL-MCNC: 1.33 MG/DL — HIGH (ref 0.5–1.3)
EGFR: 41 ML/MIN/1.73M2 — LOW
EGFR: 41 ML/MIN/1.73M2 — LOW
GLUCOSE BLDC GLUCOMTR-MCNC: 139 MG/DL — HIGH (ref 70–99)
GLUCOSE BLDC GLUCOMTR-MCNC: 175 MG/DL — HIGH (ref 70–99)
GLUCOSE BLDC GLUCOMTR-MCNC: 189 MG/DL — HIGH (ref 70–99)
GLUCOSE BLDC GLUCOMTR-MCNC: 216 MG/DL — HIGH (ref 70–99)
GLUCOSE SERPL-MCNC: 148 MG/DL — HIGH (ref 70–99)
HCT VFR BLD CALC: 29.4 % — LOW (ref 34.5–45)
HGB BLD-MCNC: 9.1 G/DL — LOW (ref 11.5–15.5)
MAGNESIUM SERPL-MCNC: 1.7 MG/DL — SIGNIFICANT CHANGE UP (ref 1.6–2.6)
MCHC RBC-ENTMCNC: 29 PG — SIGNIFICANT CHANGE UP (ref 27–34)
MCHC RBC-ENTMCNC: 31 G/DL — LOW (ref 32–36)
MCV RBC AUTO: 93.6 FL — SIGNIFICANT CHANGE UP (ref 80–100)
NRBC # BLD AUTO: 0.03 K/UL — HIGH (ref 0–0)
NRBC # FLD: 0.03 K/UL — HIGH (ref 0–0)
NRBC BLD AUTO-RTO: 0 /100 WBCS — SIGNIFICANT CHANGE UP (ref 0–0)
PHOSPHATE SERPL-MCNC: 2.8 MG/DL — SIGNIFICANT CHANGE UP (ref 2.5–4.5)
PLATELET # BLD AUTO: 134 K/UL — LOW (ref 150–400)
PMV BLD: 11 FL — SIGNIFICANT CHANGE UP (ref 7–13)
POTASSIUM SERPL-MCNC: 4.7 MMOL/L — SIGNIFICANT CHANGE UP (ref 3.5–5.3)
POTASSIUM SERPL-SCNC: 4.7 MMOL/L — SIGNIFICANT CHANGE UP (ref 3.5–5.3)
PROT SERPL-MCNC: 5.3 G/DL — LOW (ref 6–8.3)
RBC # BLD: 3.14 M/UL — LOW (ref 3.8–5.2)
RBC # FLD: 24.5 % — HIGH (ref 10.3–14.5)
SODIUM SERPL-SCNC: 138 MMOL/L — SIGNIFICANT CHANGE UP (ref 135–145)
WBC # BLD: 6.19 K/UL — SIGNIFICANT CHANGE UP (ref 3.8–10.5)
WBC # FLD AUTO: 6.19 K/UL — SIGNIFICANT CHANGE UP (ref 3.8–10.5)

## 2025-08-21 PROCEDURE — 99233 SBSQ HOSP IP/OBS HIGH 50: CPT

## 2025-08-21 PROCEDURE — 99232 SBSQ HOSP IP/OBS MODERATE 35: CPT

## 2025-08-21 RX ORDER — FUROSEMIDE 10 MG/ML
40 INJECTION INTRAMUSCULAR; INTRAVENOUS ONCE
Refills: 0 | Status: COMPLETED | OUTPATIENT
Start: 2025-08-21 | End: 2025-08-21

## 2025-08-21 RX ORDER — MAGNESIUM SULFATE 500 MG/ML
1 SYRINGE (ML) INJECTION ONCE
Refills: 0 | Status: DISCONTINUED | OUTPATIENT
Start: 2025-08-21 | End: 2025-08-21

## 2025-08-21 RX ORDER — SODIUM BICARBONATE 1 MEQ/ML
650 SYRINGE (ML) INTRAVENOUS THREE TIMES A DAY
Refills: 0 | Status: DISCONTINUED | OUTPATIENT
Start: 2025-08-21 | End: 2025-08-29

## 2025-08-21 RX ORDER — ALBUMIN (HUMAN) 12.5 G/50ML
50 INJECTION, SOLUTION INTRAVENOUS ONCE
Refills: 0 | Status: COMPLETED | OUTPATIENT
Start: 2025-08-21 | End: 2025-08-21

## 2025-08-21 RX ORDER — MAGNESIUM SULFATE 500 MG/ML
2 SYRINGE (ML) INJECTION ONCE
Refills: 0 | Status: COMPLETED | OUTPATIENT
Start: 2025-08-21 | End: 2025-08-21

## 2025-08-21 RX ADMIN — DROXIDOPA 200 MILLIGRAM(S): 300 CAPSULE ORAL at 17:06

## 2025-08-21 RX ADMIN — Medication 40 MILLIGRAM(S): at 18:04

## 2025-08-21 RX ADMIN — DROXIDOPA 200 MILLIGRAM(S): 300 CAPSULE ORAL at 05:55

## 2025-08-21 RX ADMIN — MIDODRINE HYDROCHLORIDE 30 MILLIGRAM(S): 5 TABLET ORAL at 21:08

## 2025-08-21 RX ADMIN — ENOXAPARIN SODIUM 30 MILLIGRAM(S): 100 INJECTION SUBCUTANEOUS at 21:12

## 2025-08-21 RX ADMIN — Medication 1 TABLET(S): at 12:10

## 2025-08-21 RX ADMIN — ALBUMIN (HUMAN) 50 MILLILITER(S): 12.5 INJECTION, SOLUTION INTRAVENOUS at 19:44

## 2025-08-21 RX ADMIN — Medication 1 APPLICATION(S): at 05:55

## 2025-08-21 RX ADMIN — Medication 500 MILLIGRAM(S): at 12:11

## 2025-08-21 RX ADMIN — MIDODRINE HYDROCHLORIDE 30 MILLIGRAM(S): 5 TABLET ORAL at 17:06

## 2025-08-21 RX ADMIN — MIDODRINE HYDROCHLORIDE 30 MILLIGRAM(S): 5 TABLET ORAL at 05:54

## 2025-08-21 RX ADMIN — INSULIN LISPRO 2: 100 INJECTION, SOLUTION INTRAVENOUS; SUBCUTANEOUS at 00:01

## 2025-08-21 RX ADMIN — Medication 15 MILLILITER(S): at 05:54

## 2025-08-21 RX ADMIN — Medication 1 TABLET(S): at 18:05

## 2025-08-21 RX ADMIN — Medication 40 MILLIGRAM(S): at 05:54

## 2025-08-21 RX ADMIN — FUROSEMIDE 40 MILLIGRAM(S): 10 INJECTION INTRAMUSCULAR; INTRAVENOUS at 21:45

## 2025-08-21 RX ADMIN — Medication 15 MILLILITER(S): at 18:04

## 2025-08-21 RX ADMIN — INSULIN LISPRO 4: 100 INJECTION, SOLUTION INTRAVENOUS; SUBCUTANEOUS at 05:53

## 2025-08-21 RX ADMIN — Medication 1 APPLICATION(S): at 23:58

## 2025-08-21 RX ADMIN — Medication 25 GRAM(S): at 21:07

## 2025-08-21 RX ADMIN — INSULIN LISPRO 2: 100 INJECTION, SOLUTION INTRAVENOUS; SUBCUTANEOUS at 18:04

## 2025-08-21 RX ADMIN — Medication 81 MILLIGRAM(S): at 12:10

## 2025-08-21 RX ADMIN — Medication 1 PACKET(S): at 05:54

## 2025-08-21 RX ADMIN — INSULIN LISPRO 2: 100 INJECTION, SOLUTION INTRAVENOUS; SUBCUTANEOUS at 12:11

## 2025-08-21 RX ADMIN — Medication 1 PACKET(S): at 18:45

## 2025-08-21 RX ADMIN — Medication 1 TABLET(S): at 05:54

## 2025-08-21 RX ADMIN — Medication 650 MILLIGRAM(S): at 21:13

## 2025-08-21 RX ADMIN — DROXIDOPA 200 MILLIGRAM(S): 300 CAPSULE ORAL at 21:11

## 2025-08-21 RX ADMIN — Medication 650 MILLIGRAM(S): at 17:06

## 2025-08-21 RX ADMIN — Medication 20 MILLIEQUIVALENT(S): at 21:07

## 2025-08-22 LAB
ALBUMIN SERPL ELPH-MCNC: 2.7 G/DL — LOW (ref 3.3–5)
ALP SERPL-CCNC: 188 U/L — HIGH (ref 40–120)
ALT FLD-CCNC: 10 U/L — SIGNIFICANT CHANGE UP (ref 10–45)
ANION GAP SERPL CALC-SCNC: 13 MMOL/L — SIGNIFICANT CHANGE UP (ref 5–17)
AST SERPL-CCNC: 28 U/L — SIGNIFICANT CHANGE UP (ref 10–40)
BILIRUB SERPL-MCNC: 0.5 MG/DL — SIGNIFICANT CHANGE UP (ref 0.2–1.2)
BLD GP AB SCN SERPL QL: NEGATIVE — SIGNIFICANT CHANGE UP
BUN SERPL-MCNC: 48 MG/DL — HIGH (ref 7–23)
CALCIUM SERPL-MCNC: 8 MG/DL — LOW (ref 8.4–10.5)
CHLORIDE SERPL-SCNC: 106 MMOL/L — SIGNIFICANT CHANGE UP (ref 96–108)
CO2 SERPL-SCNC: 18 MMOL/L — LOW (ref 22–31)
CREAT SERPL-MCNC: 1.17 MG/DL — SIGNIFICANT CHANGE UP (ref 0.5–1.3)
EGFR: 48 ML/MIN/1.73M2 — LOW
EGFR: 48 ML/MIN/1.73M2 — LOW
GLUCOSE BLDC GLUCOMTR-MCNC: 144 MG/DL — HIGH (ref 70–99)
GLUCOSE BLDC GLUCOMTR-MCNC: 147 MG/DL — HIGH (ref 70–99)
GLUCOSE BLDC GLUCOMTR-MCNC: 185 MG/DL — HIGH (ref 70–99)
GLUCOSE BLDC GLUCOMTR-MCNC: 192 MG/DL — HIGH (ref 70–99)
GLUCOSE BLDC GLUCOMTR-MCNC: 228 MG/DL — HIGH (ref 70–99)
GLUCOSE SERPL-MCNC: 180 MG/DL — HIGH (ref 70–99)
MAGNESIUM SERPL-MCNC: 2.1 MG/DL — SIGNIFICANT CHANGE UP (ref 1.6–2.6)
PHOSPHATE SERPL-MCNC: 2.8 MG/DL — SIGNIFICANT CHANGE UP (ref 2.5–4.5)
POTASSIUM SERPL-MCNC: 4.4 MMOL/L — SIGNIFICANT CHANGE UP (ref 3.5–5.3)
POTASSIUM SERPL-SCNC: 4.4 MMOL/L — SIGNIFICANT CHANGE UP (ref 3.5–5.3)
PROT SERPL-MCNC: 5.5 G/DL — LOW (ref 6–8.3)
RH IG SCN BLD-IMP: NEGATIVE — SIGNIFICANT CHANGE UP
SODIUM SERPL-SCNC: 137 MMOL/L — SIGNIFICANT CHANGE UP (ref 135–145)

## 2025-08-22 PROCEDURE — 99232 SBSQ HOSP IP/OBS MODERATE 35: CPT

## 2025-08-22 PROCEDURE — 99222 1ST HOSP IP/OBS MODERATE 55: CPT | Mod: GC

## 2025-08-22 PROCEDURE — 99233 SBSQ HOSP IP/OBS HIGH 50: CPT

## 2025-08-22 RX ORDER — ALBUMIN (HUMAN) 12.5 G/50ML
50 INJECTION, SOLUTION INTRAVENOUS ONCE
Refills: 0 | Status: COMPLETED | OUTPATIENT
Start: 2025-08-22 | End: 2025-08-22

## 2025-08-22 RX ORDER — FUROSEMIDE 10 MG/ML
40 INJECTION INTRAMUSCULAR; INTRAVENOUS ONCE
Refills: 0 | Status: COMPLETED | OUTPATIENT
Start: 2025-08-22 | End: 2025-08-22

## 2025-08-22 RX ADMIN — INSULIN LISPRO 2: 100 INJECTION, SOLUTION INTRAVENOUS; SUBCUTANEOUS at 23:48

## 2025-08-22 RX ADMIN — Medication 15 MILLILITER(S): at 21:51

## 2025-08-22 RX ADMIN — DROXIDOPA 200 MILLIGRAM(S): 300 CAPSULE ORAL at 13:40

## 2025-08-22 RX ADMIN — MIDODRINE HYDROCHLORIDE 30 MILLIGRAM(S): 5 TABLET ORAL at 05:19

## 2025-08-22 RX ADMIN — Medication 1 TABLET(S): at 12:32

## 2025-08-22 RX ADMIN — INSULIN LISPRO 4: 100 INJECTION, SOLUTION INTRAVENOUS; SUBCUTANEOUS at 05:32

## 2025-08-22 RX ADMIN — ALBUMIN (HUMAN) 50 MILLILITER(S): 12.5 INJECTION, SOLUTION INTRAVENOUS at 15:21

## 2025-08-22 RX ADMIN — Medication 1 TABLET(S): at 05:20

## 2025-08-22 RX ADMIN — Medication 650 MILLIGRAM(S): at 05:19

## 2025-08-22 RX ADMIN — Medication 500 MILLIGRAM(S): at 12:32

## 2025-08-22 RX ADMIN — INSULIN LISPRO 2: 100 INJECTION, SOLUTION INTRAVENOUS; SUBCUTANEOUS at 12:33

## 2025-08-22 RX ADMIN — Medication 650 MILLIGRAM(S): at 13:40

## 2025-08-22 RX ADMIN — MIDODRINE HYDROCHLORIDE 30 MILLIGRAM(S): 5 TABLET ORAL at 13:40

## 2025-08-22 RX ADMIN — Medication 81 MILLIGRAM(S): at 12:32

## 2025-08-22 RX ADMIN — Medication 20 MILLIEQUIVALENT(S): at 15:21

## 2025-08-22 RX ADMIN — Medication 1 TABLET(S): at 17:20

## 2025-08-22 RX ADMIN — DROXIDOPA 200 MILLIGRAM(S): 300 CAPSULE ORAL at 21:51

## 2025-08-22 RX ADMIN — Medication 40 MILLIGRAM(S): at 05:21

## 2025-08-22 RX ADMIN — DROXIDOPA 200 MILLIGRAM(S): 300 CAPSULE ORAL at 05:20

## 2025-08-22 RX ADMIN — Medication 1 PACKET(S): at 05:20

## 2025-08-22 RX ADMIN — ENOXAPARIN SODIUM 30 MILLIGRAM(S): 100 INJECTION SUBCUTANEOUS at 21:50

## 2025-08-22 RX ADMIN — Medication 1 APPLICATION(S): at 05:22

## 2025-08-22 RX ADMIN — Medication 15 MILLILITER(S): at 05:20

## 2025-08-22 RX ADMIN — MIDODRINE HYDROCHLORIDE 30 MILLIGRAM(S): 5 TABLET ORAL at 21:50

## 2025-08-22 RX ADMIN — FUROSEMIDE 40 MILLIGRAM(S): 10 INJECTION INTRAMUSCULAR; INTRAVENOUS at 17:03

## 2025-08-22 RX ADMIN — Medication 650 MILLIGRAM(S): at 21:51

## 2025-08-23 LAB
GLUCOSE BLDC GLUCOMTR-MCNC: 111 MG/DL — HIGH (ref 70–99)
GLUCOSE BLDC GLUCOMTR-MCNC: 111 MG/DL — HIGH (ref 70–99)
GLUCOSE BLDC GLUCOMTR-MCNC: 163 MG/DL — HIGH (ref 70–99)
GLUCOSE BLDC GLUCOMTR-MCNC: 217 MG/DL — HIGH (ref 70–99)
GLUCOSE BLDC GLUCOMTR-MCNC: 40 MG/DL — CRITICAL LOW (ref 70–99)
GLUCOSE BLDC GLUCOMTR-MCNC: 46 MG/DL — CRITICAL LOW (ref 70–99)
OSMOLALITY STL: 507 MOSM/KG — SIGNIFICANT CHANGE UP
PH STL: 4.7 — LOW (ref 5.6–14)

## 2025-08-23 PROCEDURE — 99233 SBSQ HOSP IP/OBS HIGH 50: CPT

## 2025-08-23 PROCEDURE — 93010 ELECTROCARDIOGRAM REPORT: CPT

## 2025-08-23 RX ORDER — DEXTROSE 50 % IN WATER 50 %
30 SYRINGE (ML) INTRAVENOUS ONCE
Refills: 0 | Status: COMPLETED | OUTPATIENT
Start: 2025-08-23 | End: 2025-08-23

## 2025-08-23 RX ORDER — SODIUM CHLORIDE 9 G/1000ML
1000 INJECTION, SOLUTION INTRAVENOUS
Refills: 0 | Status: DISCONTINUED | OUTPATIENT
Start: 2025-08-23 | End: 2025-08-25

## 2025-08-23 RX ADMIN — INSULIN LISPRO 4: 100 INJECTION, SOLUTION INTRAVENOUS; SUBCUTANEOUS at 11:35

## 2025-08-23 RX ADMIN — Medication 1 TABLET(S): at 11:36

## 2025-08-23 RX ADMIN — Medication 40 MILLIGRAM(S): at 11:36

## 2025-08-23 RX ADMIN — SODIUM CHLORIDE 50 MILLILITER(S): 9 INJECTION, SOLUTION INTRAVENOUS at 17:55

## 2025-08-23 RX ADMIN — DROXIDOPA 200 MILLIGRAM(S): 300 CAPSULE ORAL at 05:20

## 2025-08-23 RX ADMIN — MIDODRINE HYDROCHLORIDE 30 MILLIGRAM(S): 5 TABLET ORAL at 05:20

## 2025-08-23 RX ADMIN — MIDODRINE HYDROCHLORIDE 30 MILLIGRAM(S): 5 TABLET ORAL at 13:31

## 2025-08-23 RX ADMIN — Medication 15 MILLILITER(S): at 17:57

## 2025-08-23 RX ADMIN — DROXIDOPA 200 MILLIGRAM(S): 300 CAPSULE ORAL at 13:31

## 2025-08-23 RX ADMIN — Medication 1 APPLICATION(S): at 11:37

## 2025-08-23 RX ADMIN — DROXIDOPA 200 MILLIGRAM(S): 300 CAPSULE ORAL at 22:26

## 2025-08-23 RX ADMIN — Medication 650 MILLIGRAM(S): at 12:05

## 2025-08-23 RX ADMIN — Medication 1 TABLET(S): at 17:56

## 2025-08-23 RX ADMIN — Medication 30 MILLILITER(S): at 17:52

## 2025-08-23 RX ADMIN — Medication 15 MILLILITER(S): at 05:20

## 2025-08-23 RX ADMIN — Medication 500 MILLIGRAM(S): at 11:36

## 2025-08-23 RX ADMIN — MIDODRINE HYDROCHLORIDE 30 MILLIGRAM(S): 5 TABLET ORAL at 22:26

## 2025-08-23 RX ADMIN — Medication 81 MILLIGRAM(S): at 11:36

## 2025-08-23 RX ADMIN — Medication 1 APPLICATION(S): at 05:27

## 2025-08-23 RX ADMIN — Medication 1 TABLET(S): at 05:20

## 2025-08-23 RX ADMIN — ENOXAPARIN SODIUM 30 MILLIGRAM(S): 100 INJECTION SUBCUTANEOUS at 22:26

## 2025-08-23 RX ADMIN — INSULIN LISPRO 2: 100 INJECTION, SOLUTION INTRAVENOUS; SUBCUTANEOUS at 06:11

## 2025-08-23 RX ADMIN — Medication 650 MILLIGRAM(S): at 05:20

## 2025-08-23 RX ADMIN — Medication 650 MILLIGRAM(S): at 22:26

## 2025-08-23 RX ADMIN — Medication 650 MILLIGRAM(S): at 13:32

## 2025-08-24 LAB
ALBUMIN SERPL ELPH-MCNC: 2.2 G/DL — LOW (ref 3.3–5)
ALP SERPL-CCNC: 130 U/L — HIGH (ref 40–120)
ALT FLD-CCNC: 6 U/L — LOW (ref 10–45)
ANION GAP SERPL CALC-SCNC: 16 MMOL/L — SIGNIFICANT CHANGE UP (ref 5–17)
AST SERPL-CCNC: 14 U/L — SIGNIFICANT CHANGE UP (ref 10–40)
BILIRUB SERPL-MCNC: 0.6 MG/DL — SIGNIFICANT CHANGE UP (ref 0.2–1.2)
BUN SERPL-MCNC: 49 MG/DL — HIGH (ref 7–23)
CALCIUM SERPL-MCNC: 8.2 MG/DL — LOW (ref 8.4–10.5)
CHLORIDE SERPL-SCNC: 102 MMOL/L — SIGNIFICANT CHANGE UP (ref 96–108)
CHLORIDE STL-SCNC: <20 MMOL/L — SIGNIFICANT CHANGE UP
CO2 SERPL-SCNC: 18 MMOL/L — LOW (ref 22–31)
CREAT SERPL-MCNC: 1.07 MG/DL — SIGNIFICANT CHANGE UP (ref 0.5–1.3)
EGFR: 54 ML/MIN/1.73M2 — LOW
EGFR: 54 ML/MIN/1.73M2 — LOW
GLUCOSE BLDC GLUCOMTR-MCNC: 135 MG/DL — HIGH (ref 70–99)
GLUCOSE BLDC GLUCOMTR-MCNC: 149 MG/DL — HIGH (ref 70–99)
GLUCOSE BLDC GLUCOMTR-MCNC: 151 MG/DL — HIGH (ref 70–99)
GLUCOSE BLDC GLUCOMTR-MCNC: 158 MG/DL — HIGH (ref 70–99)
GLUCOSE SERPL-MCNC: 132 MG/DL — HIGH (ref 70–99)
HCT VFR BLD CALC: 26.9 % — LOW (ref 34.5–45)
HGB BLD-MCNC: 8.6 G/DL — LOW (ref 11.5–15.5)
MAGNESIUM SERPL-MCNC: 1.8 MG/DL — SIGNIFICANT CHANGE UP (ref 1.6–2.6)
MCHC RBC-ENTMCNC: 30.2 PG — SIGNIFICANT CHANGE UP (ref 27–34)
MCHC RBC-ENTMCNC: 32 G/DL — SIGNIFICANT CHANGE UP (ref 32–36)
MCV RBC AUTO: 94.4 FL — SIGNIFICANT CHANGE UP (ref 80–100)
NRBC # BLD AUTO: 0 K/UL — SIGNIFICANT CHANGE UP (ref 0–0)
NRBC # FLD: 0 K/UL — SIGNIFICANT CHANGE UP (ref 0–0)
NRBC BLD AUTO-RTO: 0 /100 WBCS — SIGNIFICANT CHANGE UP (ref 0–0)
PHOSPHATE SERPL-MCNC: 3.2 MG/DL — SIGNIFICANT CHANGE UP (ref 2.5–4.5)
PLATELET # BLD AUTO: 157 K/UL — SIGNIFICANT CHANGE UP (ref 150–400)
PMV BLD: 11.6 FL — SIGNIFICANT CHANGE UP (ref 7–13)
POTASSIUM SERPL-MCNC: 3.2 MMOL/L — LOW (ref 3.5–5.3)
POTASSIUM SERPL-SCNC: 3.2 MMOL/L — LOW (ref 3.5–5.3)
POTASSIUM STL-SCNC: 57.8 MMOL/L — SIGNIFICANT CHANGE UP
PROT SERPL-MCNC: 5 G/DL — LOW (ref 6–8.3)
RBC # BLD: 2.85 M/UL — LOW (ref 3.8–5.2)
RBC # FLD: 24.4 % — HIGH (ref 10.3–14.5)
SODIUM SERPL-SCNC: 136 MMOL/L — SIGNIFICANT CHANGE UP (ref 135–145)
SODIUM STL-SCNC: 25 MMOL/L — SIGNIFICANT CHANGE UP
WBC # BLD: 3.75 K/UL — LOW (ref 3.8–10.5)
WBC # FLD AUTO: 3.75 K/UL — LOW (ref 3.8–10.5)

## 2025-08-24 PROCEDURE — 99233 SBSQ HOSP IP/OBS HIGH 50: CPT

## 2025-08-24 RX ORDER — DIPHENOXYLATE HYDROCHLORIDE AND ATROPINE SULFATE .025; 2.5 MG/1; MG/1
1 TABLET ORAL
Refills: 0 | Status: DISCONTINUED | OUTPATIENT
Start: 2025-08-24 | End: 2025-08-26

## 2025-08-24 RX ORDER — MAGNESIUM SULFATE 500 MG/ML
1 SYRINGE (ML) INJECTION ONCE
Refills: 0 | Status: COMPLETED | OUTPATIENT
Start: 2025-08-24 | End: 2025-08-24

## 2025-08-24 RX ADMIN — Medication 1 TABLET(S): at 17:04

## 2025-08-24 RX ADMIN — Medication 650 MILLIGRAM(S): at 11:26

## 2025-08-24 RX ADMIN — ENOXAPARIN SODIUM 30 MILLIGRAM(S): 100 INJECTION SUBCUTANEOUS at 21:20

## 2025-08-24 RX ADMIN — Medication 15 MILLILITER(S): at 17:05

## 2025-08-24 RX ADMIN — Medication 1 TABLET(S): at 11:23

## 2025-08-24 RX ADMIN — MIDODRINE HYDROCHLORIDE 30 MILLIGRAM(S): 5 TABLET ORAL at 14:14

## 2025-08-24 RX ADMIN — Medication 40 MILLIGRAM(S): at 11:23

## 2025-08-24 RX ADMIN — Medication 650 MILLIGRAM(S): at 14:14

## 2025-08-24 RX ADMIN — Medication 650 MILLIGRAM(S): at 21:20

## 2025-08-24 RX ADMIN — INSULIN LISPRO 2: 100 INJECTION, SOLUTION INTRAVENOUS; SUBCUTANEOUS at 23:36

## 2025-08-24 RX ADMIN — MIDODRINE HYDROCHLORIDE 30 MILLIGRAM(S): 5 TABLET ORAL at 05:16

## 2025-08-24 RX ADMIN — Medication 1 TABLET(S): at 11:24

## 2025-08-24 RX ADMIN — Medication 1 APPLICATION(S): at 05:45

## 2025-08-24 RX ADMIN — Medication 81 MILLIGRAM(S): at 11:23

## 2025-08-24 RX ADMIN — Medication 500 MILLIGRAM(S): at 11:24

## 2025-08-24 RX ADMIN — DROXIDOPA 200 MILLIGRAM(S): 300 CAPSULE ORAL at 14:14

## 2025-08-24 RX ADMIN — Medication 15 MILLILITER(S): at 05:17

## 2025-08-24 RX ADMIN — Medication 1 APPLICATION(S): at 11:24

## 2025-08-24 RX ADMIN — DROXIDOPA 200 MILLIGRAM(S): 300 CAPSULE ORAL at 21:20

## 2025-08-24 RX ADMIN — DROXIDOPA 200 MILLIGRAM(S): 300 CAPSULE ORAL at 05:17

## 2025-08-24 RX ADMIN — MIDODRINE HYDROCHLORIDE 30 MILLIGRAM(S): 5 TABLET ORAL at 21:20

## 2025-08-24 RX ADMIN — Medication 650 MILLIGRAM(S): at 05:17

## 2025-08-24 RX ADMIN — Medication 1 TABLET(S): at 05:16

## 2025-08-24 RX ADMIN — Medication 20 MILLIEQUIVALENT(S): at 17:04

## 2025-08-24 RX ADMIN — INSULIN LISPRO 2: 100 INJECTION, SOLUTION INTRAVENOUS; SUBCUTANEOUS at 11:22

## 2025-08-24 RX ADMIN — Medication 100 GRAM(S): at 17:05

## 2025-08-24 RX ADMIN — Medication 20 MILLIEQUIVALENT(S): at 18:34

## 2025-08-25 LAB
ALBUMIN SERPL ELPH-MCNC: 2.6 G/DL — LOW (ref 3.3–5)
ALP SERPL-CCNC: 158 U/L — HIGH (ref 40–120)
ALT FLD-CCNC: 10 U/L — SIGNIFICANT CHANGE UP (ref 10–45)
ANION GAP SERPL CALC-SCNC: 14 MMOL/L — SIGNIFICANT CHANGE UP (ref 5–17)
AST SERPL-CCNC: 15 U/L — SIGNIFICANT CHANGE UP (ref 10–40)
BILIRUB SERPL-MCNC: 0.6 MG/DL — SIGNIFICANT CHANGE UP (ref 0.2–1.2)
BLD GP AB SCN SERPL QL: NEGATIVE — SIGNIFICANT CHANGE UP
BUN SERPL-MCNC: 42 MG/DL — HIGH (ref 7–23)
CALCIUM SERPL-MCNC: 8.4 MG/DL — SIGNIFICANT CHANGE UP (ref 8.4–10.5)
CHLORIDE SERPL-SCNC: 102 MMOL/L — SIGNIFICANT CHANGE UP (ref 96–108)
CO2 SERPL-SCNC: 19 MMOL/L — LOW (ref 22–31)
CREAT SERPL-MCNC: 0.93 MG/DL — SIGNIFICANT CHANGE UP (ref 0.5–1.3)
EGFR: 63 ML/MIN/1.73M2 — SIGNIFICANT CHANGE UP
EGFR: 63 ML/MIN/1.73M2 — SIGNIFICANT CHANGE UP
GLUCOSE BLDC GLUCOMTR-MCNC: 155 MG/DL — HIGH (ref 70–99)
GLUCOSE BLDC GLUCOMTR-MCNC: 182 MG/DL — HIGH (ref 70–99)
GLUCOSE BLDC GLUCOMTR-MCNC: 233 MG/DL — HIGH (ref 70–99)
GLUCOSE BLDC GLUCOMTR-MCNC: 93 MG/DL — SIGNIFICANT CHANGE UP (ref 70–99)
GLUCOSE SERPL-MCNC: 204 MG/DL — HIGH (ref 70–99)
HCT VFR BLD CALC: 30 % — LOW (ref 34.5–45)
HGB BLD-MCNC: 9.3 G/DL — LOW (ref 11.5–15.5)
MAGNESIUM SERPL-MCNC: 2 MG/DL — SIGNIFICANT CHANGE UP (ref 1.6–2.6)
MCHC RBC-ENTMCNC: 30.2 PG — SIGNIFICANT CHANGE UP (ref 27–34)
MCHC RBC-ENTMCNC: 31 G/DL — LOW (ref 32–36)
MCV RBC AUTO: 97.4 FL — SIGNIFICANT CHANGE UP (ref 80–100)
NRBC # BLD AUTO: 0 K/UL — SIGNIFICANT CHANGE UP (ref 0–0)
NRBC # FLD: 0 K/UL — SIGNIFICANT CHANGE UP (ref 0–0)
NRBC BLD AUTO-RTO: 0 /100 WBCS — SIGNIFICANT CHANGE UP (ref 0–0)
PHOSPHATE SERPL-MCNC: 3.4 MG/DL — SIGNIFICANT CHANGE UP (ref 2.5–4.5)
PLATELET # BLD AUTO: 214 K/UL — SIGNIFICANT CHANGE UP (ref 150–400)
PMV BLD: 10.8 FL — SIGNIFICANT CHANGE UP (ref 7–13)
POTASSIUM SERPL-MCNC: 3.9 MMOL/L — SIGNIFICANT CHANGE UP (ref 3.5–5.3)
POTASSIUM SERPL-SCNC: 3.9 MMOL/L — SIGNIFICANT CHANGE UP (ref 3.5–5.3)
PROT SERPL-MCNC: 5.6 G/DL — LOW (ref 6–8.3)
RBC # BLD: 3.08 M/UL — LOW (ref 3.8–5.2)
RBC # FLD: 24.6 % — HIGH (ref 10.3–14.5)
RH IG SCN BLD-IMP: NEGATIVE — SIGNIFICANT CHANGE UP
SODIUM SERPL-SCNC: 135 MMOL/L — SIGNIFICANT CHANGE UP (ref 135–145)
WBC # BLD: 5.1 K/UL — SIGNIFICANT CHANGE UP (ref 3.8–10.5)
WBC # FLD AUTO: 5.1 K/UL — SIGNIFICANT CHANGE UP (ref 3.8–10.5)

## 2025-08-25 PROCEDURE — 99233 SBSQ HOSP IP/OBS HIGH 50: CPT

## 2025-08-25 PROCEDURE — 99232 SBSQ HOSP IP/OBS MODERATE 35: CPT

## 2025-08-25 RX ORDER — DROXIDOPA 300 MG/1
100 CAPSULE ORAL EVERY 8 HOURS
Refills: 0 | Status: DISCONTINUED | OUTPATIENT
Start: 2025-08-25 | End: 2025-08-26

## 2025-08-25 RX ADMIN — Medication 1 TABLET(S): at 12:49

## 2025-08-25 RX ADMIN — Medication 15 MILLILITER(S): at 17:52

## 2025-08-25 RX ADMIN — MIDODRINE HYDROCHLORIDE 30 MILLIGRAM(S): 5 TABLET ORAL at 05:16

## 2025-08-25 RX ADMIN — INSULIN LISPRO 4: 100 INJECTION, SOLUTION INTRAVENOUS; SUBCUTANEOUS at 11:37

## 2025-08-25 RX ADMIN — Medication 650 MILLIGRAM(S): at 05:16

## 2025-08-25 RX ADMIN — INSULIN LISPRO 2: 100 INJECTION, SOLUTION INTRAVENOUS; SUBCUTANEOUS at 06:13

## 2025-08-25 RX ADMIN — MIDODRINE HYDROCHLORIDE 30 MILLIGRAM(S): 5 TABLET ORAL at 22:03

## 2025-08-25 RX ADMIN — INSULIN LISPRO 2: 100 INJECTION, SOLUTION INTRAVENOUS; SUBCUTANEOUS at 17:53

## 2025-08-25 RX ADMIN — MIDODRINE HYDROCHLORIDE 30 MILLIGRAM(S): 5 TABLET ORAL at 13:04

## 2025-08-25 RX ADMIN — Medication 40 MILLIGRAM(S): at 12:49

## 2025-08-25 RX ADMIN — Medication 15 MILLILITER(S): at 05:16

## 2025-08-25 RX ADMIN — DROXIDOPA 100 MILLIGRAM(S): 300 CAPSULE ORAL at 22:02

## 2025-08-25 RX ADMIN — Medication 500 MILLIGRAM(S): at 12:49

## 2025-08-25 RX ADMIN — Medication 650 MILLIGRAM(S): at 14:49

## 2025-08-25 RX ADMIN — DIPHENOXYLATE HYDROCHLORIDE AND ATROPINE SULFATE 1 TABLET(S): .025; 2.5 TABLET ORAL at 05:16

## 2025-08-25 RX ADMIN — Medication 1 APPLICATION(S): at 12:50

## 2025-08-25 RX ADMIN — Medication 1 APPLICATION(S): at 05:17

## 2025-08-25 RX ADMIN — Medication 1 TABLET(S): at 17:53

## 2025-08-25 RX ADMIN — Medication 81 MILLIGRAM(S): at 12:49

## 2025-08-25 RX ADMIN — DROXIDOPA 100 MILLIGRAM(S): 300 CAPSULE ORAL at 13:01

## 2025-08-25 RX ADMIN — Medication 650 MILLIGRAM(S): at 22:04

## 2025-08-25 RX ADMIN — ENOXAPARIN SODIUM 30 MILLIGRAM(S): 100 INJECTION SUBCUTANEOUS at 22:05

## 2025-08-25 RX ADMIN — Medication 1 TABLET(S): at 05:16

## 2025-08-25 RX ADMIN — DROXIDOPA 200 MILLIGRAM(S): 300 CAPSULE ORAL at 05:16

## 2025-08-26 LAB
ALBUMIN SERPL ELPH-MCNC: 2.2 G/DL — LOW (ref 3.3–5)
ALP SERPL-CCNC: 181 U/L — HIGH (ref 40–120)
ALT FLD-CCNC: 8 U/L — LOW (ref 10–45)
ANION GAP SERPL CALC-SCNC: 11 MMOL/L — SIGNIFICANT CHANGE UP (ref 5–17)
AST SERPL-CCNC: 15 U/L — SIGNIFICANT CHANGE UP (ref 10–40)
BILIRUB SERPL-MCNC: 0.5 MG/DL — SIGNIFICANT CHANGE UP (ref 0.2–1.2)
BUN SERPL-MCNC: 49 MG/DL — HIGH (ref 7–23)
CALCIUM SERPL-MCNC: 8.1 MG/DL — LOW (ref 8.4–10.5)
CHLORIDE SERPL-SCNC: 105 MMOL/L — SIGNIFICANT CHANGE UP (ref 96–108)
CO2 SERPL-SCNC: 19 MMOL/L — LOW (ref 22–31)
CREAT SERPL-MCNC: 0.88 MG/DL — SIGNIFICANT CHANGE UP (ref 0.5–1.3)
EGFR: 68 ML/MIN/1.73M2 — SIGNIFICANT CHANGE UP
EGFR: 68 ML/MIN/1.73M2 — SIGNIFICANT CHANGE UP
GLUCOSE BLDC GLUCOMTR-MCNC: 102 MG/DL — HIGH (ref 70–99)
GLUCOSE BLDC GLUCOMTR-MCNC: 124 MG/DL — HIGH (ref 70–99)
GLUCOSE BLDC GLUCOMTR-MCNC: 153 MG/DL — HIGH (ref 70–99)
GLUCOSE BLDC GLUCOMTR-MCNC: 201 MG/DL — HIGH (ref 70–99)
GLUCOSE SERPL-MCNC: 166 MG/DL — HIGH (ref 70–99)
HCT VFR BLD CALC: 28.1 % — LOW (ref 34.5–45)
HGB BLD-MCNC: 8.6 G/DL — LOW (ref 11.5–15.5)
MAGNESIUM SERPL-MCNC: 1.9 MG/DL — SIGNIFICANT CHANGE UP (ref 1.6–2.6)
MCHC RBC-ENTMCNC: 29.8 PG — SIGNIFICANT CHANGE UP (ref 27–34)
MCHC RBC-ENTMCNC: 30.6 G/DL — LOW (ref 32–36)
MCV RBC AUTO: 97.2 FL — SIGNIFICANT CHANGE UP (ref 80–100)
NRBC # BLD AUTO: 0 K/UL — SIGNIFICANT CHANGE UP (ref 0–0)
NRBC # FLD: 0 K/UL — SIGNIFICANT CHANGE UP (ref 0–0)
NRBC BLD AUTO-RTO: 0 /100 WBCS — SIGNIFICANT CHANGE UP (ref 0–0)
PHOSPHATE SERPL-MCNC: 3.1 MG/DL — SIGNIFICANT CHANGE UP (ref 2.5–4.5)
PLATELET # BLD AUTO: 215 K/UL — SIGNIFICANT CHANGE UP (ref 150–400)
PMV BLD: 10.5 FL — SIGNIFICANT CHANGE UP (ref 7–13)
POTASSIUM SERPL-MCNC: 4 MMOL/L — SIGNIFICANT CHANGE UP (ref 3.5–5.3)
POTASSIUM SERPL-SCNC: 4 MMOL/L — SIGNIFICANT CHANGE UP (ref 3.5–5.3)
PROT SERPL-MCNC: 5.4 G/DL — LOW (ref 6–8.3)
RBC # BLD: 2.89 M/UL — LOW (ref 3.8–5.2)
RBC # FLD: 23.9 % — HIGH (ref 10.3–14.5)
SODIUM SERPL-SCNC: 135 MMOL/L — SIGNIFICANT CHANGE UP (ref 135–145)
WBC # BLD: 5.44 K/UL — SIGNIFICANT CHANGE UP (ref 3.8–10.5)
WBC # FLD AUTO: 5.44 K/UL — SIGNIFICANT CHANGE UP (ref 3.8–10.5)

## 2025-08-26 PROCEDURE — 99233 SBSQ HOSP IP/OBS HIGH 50: CPT

## 2025-08-26 PROCEDURE — 99232 SBSQ HOSP IP/OBS MODERATE 35: CPT

## 2025-08-26 RX ORDER — LOPERAMIDE HCL 1 MG/7.5ML
2 SOLUTION ORAL
Refills: 0 | Status: DISCONTINUED | OUTPATIENT
Start: 2025-08-26 | End: 2025-08-29

## 2025-08-26 RX ORDER — LOPERAMIDE HCL 1 MG/7.5ML
2 SOLUTION ORAL
Refills: 0 | Status: DISCONTINUED | OUTPATIENT
Start: 2025-08-26 | End: 2025-08-26

## 2025-08-26 RX ADMIN — MIDODRINE HYDROCHLORIDE 30 MILLIGRAM(S): 5 TABLET ORAL at 05:23

## 2025-08-26 RX ADMIN — Medication 650 MILLIGRAM(S): at 13:00

## 2025-08-26 RX ADMIN — Medication 1 TABLET(S): at 11:53

## 2025-08-26 RX ADMIN — Medication 15 MILLILITER(S): at 05:23

## 2025-08-26 RX ADMIN — Medication 1 TABLET(S): at 11:52

## 2025-08-26 RX ADMIN — Medication 40 MILLIGRAM(S): at 11:53

## 2025-08-26 RX ADMIN — Medication 1 APPLICATION(S): at 11:53

## 2025-08-26 RX ADMIN — Medication 1 TABLET(S): at 05:23

## 2025-08-26 RX ADMIN — DROXIDOPA 100 MILLIGRAM(S): 300 CAPSULE ORAL at 05:23

## 2025-08-26 RX ADMIN — Medication 1 APPLICATION(S): at 05:23

## 2025-08-26 RX ADMIN — Medication 500 MILLIGRAM(S): at 11:52

## 2025-08-26 RX ADMIN — MIDODRINE HYDROCHLORIDE 30 MILLIGRAM(S): 5 TABLET ORAL at 13:56

## 2025-08-26 RX ADMIN — INSULIN LISPRO 2: 100 INJECTION, SOLUTION INTRAVENOUS; SUBCUTANEOUS at 17:26

## 2025-08-26 RX ADMIN — MIDODRINE HYDROCHLORIDE 30 MILLIGRAM(S): 5 TABLET ORAL at 22:16

## 2025-08-26 RX ADMIN — DIPHENOXYLATE HYDROCHLORIDE AND ATROPINE SULFATE 1 TABLET(S): .025; 2.5 TABLET ORAL at 11:56

## 2025-08-26 RX ADMIN — INSULIN LISPRO 4: 100 INJECTION, SOLUTION INTRAVENOUS; SUBCUTANEOUS at 11:54

## 2025-08-26 RX ADMIN — Medication 650 MILLIGRAM(S): at 22:20

## 2025-08-26 RX ADMIN — Medication 650 MILLIGRAM(S): at 05:23

## 2025-08-26 RX ADMIN — Medication 650 MILLIGRAM(S): at 13:57

## 2025-08-26 RX ADMIN — Medication 15 MILLILITER(S): at 17:15

## 2025-08-26 RX ADMIN — Medication 81 MILLIGRAM(S): at 11:53

## 2025-08-26 RX ADMIN — Medication 650 MILLIGRAM(S): at 12:05

## 2025-08-26 RX ADMIN — Medication 1 TABLET(S): at 17:15

## 2025-08-26 RX ADMIN — DROXIDOPA 100 MILLIGRAM(S): 300 CAPSULE ORAL at 13:56

## 2025-08-26 RX ADMIN — ENOXAPARIN SODIUM 30 MILLIGRAM(S): 100 INJECTION SUBCUTANEOUS at 22:13

## 2025-08-27 LAB
ALBUMIN SERPL ELPH-MCNC: 2.2 G/DL — LOW (ref 3.3–5)
ALP SERPL-CCNC: 176 U/L — HIGH (ref 40–120)
ALT FLD-CCNC: 10 U/L — SIGNIFICANT CHANGE UP (ref 10–45)
ANION GAP SERPL CALC-SCNC: 10 MMOL/L — SIGNIFICANT CHANGE UP (ref 5–17)
AST SERPL-CCNC: 13 U/L — SIGNIFICANT CHANGE UP (ref 10–40)
BILIRUB SERPL-MCNC: 0.5 MG/DL — SIGNIFICANT CHANGE UP (ref 0.2–1.2)
BUN SERPL-MCNC: 49 MG/DL — HIGH (ref 7–23)
CALCIUM SERPL-MCNC: 8.5 MG/DL — SIGNIFICANT CHANGE UP (ref 8.4–10.5)
CHLORIDE SERPL-SCNC: 105 MMOL/L — SIGNIFICANT CHANGE UP (ref 96–108)
CO2 SERPL-SCNC: 21 MMOL/L — LOW (ref 22–31)
CREAT SERPL-MCNC: 0.91 MG/DL — SIGNIFICANT CHANGE UP (ref 0.5–1.3)
EGFR: 65 ML/MIN/1.73M2 — SIGNIFICANT CHANGE UP
EGFR: 65 ML/MIN/1.73M2 — SIGNIFICANT CHANGE UP
GLUCOSE BLDC GLUCOMTR-MCNC: 102 MG/DL — HIGH (ref 70–99)
GLUCOSE BLDC GLUCOMTR-MCNC: 151 MG/DL — HIGH (ref 70–99)
GLUCOSE BLDC GLUCOMTR-MCNC: 164 MG/DL — HIGH (ref 70–99)
GLUCOSE BLDC GLUCOMTR-MCNC: 69 MG/DL — LOW (ref 70–99)
GLUCOSE BLDC GLUCOMTR-MCNC: 72 MG/DL — SIGNIFICANT CHANGE UP (ref 70–99)
GLUCOSE BLDC GLUCOMTR-MCNC: 84 MG/DL — SIGNIFICANT CHANGE UP (ref 70–99)
GLUCOSE SERPL-MCNC: 162 MG/DL — HIGH (ref 70–99)
HCT VFR BLD CALC: 29.1 % — LOW (ref 34.5–45)
HGB BLD-MCNC: 9 G/DL — LOW (ref 11.5–15.5)
MAGNESIUM SERPL-MCNC: 1.9 MG/DL — SIGNIFICANT CHANGE UP (ref 1.6–2.6)
MCHC RBC-ENTMCNC: 30.2 PG — SIGNIFICANT CHANGE UP (ref 27–34)
MCHC RBC-ENTMCNC: 30.9 G/DL — LOW (ref 32–36)
MCV RBC AUTO: 97.7 FL — SIGNIFICANT CHANGE UP (ref 80–100)
NRBC # BLD AUTO: 0 K/UL — SIGNIFICANT CHANGE UP (ref 0–0)
NRBC # FLD: 0 K/UL — SIGNIFICANT CHANGE UP (ref 0–0)
NRBC BLD AUTO-RTO: 0 /100 WBCS — SIGNIFICANT CHANGE UP (ref 0–0)
PHOSPHATE SERPL-MCNC: 3.2 MG/DL — SIGNIFICANT CHANGE UP (ref 2.5–4.5)
PLATELET # BLD AUTO: 208 K/UL — SIGNIFICANT CHANGE UP (ref 150–400)
PMV BLD: 10.5 FL — SIGNIFICANT CHANGE UP (ref 7–13)
POTASSIUM SERPL-MCNC: 4.2 MMOL/L — SIGNIFICANT CHANGE UP (ref 3.5–5.3)
POTASSIUM SERPL-SCNC: 4.2 MMOL/L — SIGNIFICANT CHANGE UP (ref 3.5–5.3)
PROT SERPL-MCNC: 5.4 G/DL — LOW (ref 6–8.3)
RBC # BLD: 2.98 M/UL — LOW (ref 3.8–5.2)
RBC # FLD: 23.5 % — HIGH (ref 10.3–14.5)
SODIUM SERPL-SCNC: 136 MMOL/L — SIGNIFICANT CHANGE UP (ref 135–145)
WBC # BLD: 6.29 K/UL — SIGNIFICANT CHANGE UP (ref 3.8–10.5)
WBC # FLD AUTO: 6.29 K/UL — SIGNIFICANT CHANGE UP (ref 3.8–10.5)

## 2025-08-27 PROCEDURE — 99233 SBSQ HOSP IP/OBS HIGH 50: CPT

## 2025-08-27 RX ADMIN — Medication 1 TABLET(S): at 05:34

## 2025-08-27 RX ADMIN — Medication 1 TABLET(S): at 13:31

## 2025-08-27 RX ADMIN — Medication 40 MILLIGRAM(S): at 13:31

## 2025-08-27 RX ADMIN — INSULIN LISPRO 2: 100 INJECTION, SOLUTION INTRAVENOUS; SUBCUTANEOUS at 17:20

## 2025-08-27 RX ADMIN — Medication 650 MILLIGRAM(S): at 05:37

## 2025-08-27 RX ADMIN — MIDODRINE HYDROCHLORIDE 30 MILLIGRAM(S): 5 TABLET ORAL at 13:32

## 2025-08-27 RX ADMIN — MIDODRINE HYDROCHLORIDE 30 MILLIGRAM(S): 5 TABLET ORAL at 21:50

## 2025-08-27 RX ADMIN — Medication 650 MILLIGRAM(S): at 13:32

## 2025-08-27 RX ADMIN — Medication 15 MILLILITER(S): at 17:10

## 2025-08-27 RX ADMIN — MIDODRINE HYDROCHLORIDE 30 MILLIGRAM(S): 5 TABLET ORAL at 05:34

## 2025-08-27 RX ADMIN — Medication 1 APPLICATION(S): at 13:30

## 2025-08-27 RX ADMIN — LOPERAMIDE HCL 2 MILLIGRAM(S): 1 SOLUTION ORAL at 17:12

## 2025-08-27 RX ADMIN — Medication 15 MILLILITER(S): at 05:38

## 2025-08-27 RX ADMIN — Medication 1 APPLICATION(S): at 05:38

## 2025-08-27 RX ADMIN — INSULIN LISPRO 2: 100 INJECTION, SOLUTION INTRAVENOUS; SUBCUTANEOUS at 05:36

## 2025-08-27 RX ADMIN — Medication 650 MILLIGRAM(S): at 21:50

## 2025-08-27 RX ADMIN — Medication 1 TABLET(S): at 17:11

## 2025-08-27 RX ADMIN — ENOXAPARIN SODIUM 30 MILLIGRAM(S): 100 INJECTION SUBCUTANEOUS at 21:51

## 2025-08-27 RX ADMIN — LOPERAMIDE HCL 2 MILLIGRAM(S): 1 SOLUTION ORAL at 05:34

## 2025-08-27 RX ADMIN — Medication 500 MILLIGRAM(S): at 13:31

## 2025-08-27 RX ADMIN — Medication 81 MILLIGRAM(S): at 13:29

## 2025-08-28 LAB
ALBUMIN SERPL ELPH-MCNC: 2.2 G/DL — LOW (ref 3.3–5)
ALP SERPL-CCNC: 174 U/L — HIGH (ref 40–120)
ALT FLD-CCNC: 10 U/L — SIGNIFICANT CHANGE UP (ref 10–45)
ANION GAP SERPL CALC-SCNC: 14 MMOL/L — SIGNIFICANT CHANGE UP (ref 5–17)
AST SERPL-CCNC: 16 U/L — SIGNIFICANT CHANGE UP (ref 10–40)
BILIRUB SERPL-MCNC: 0.5 MG/DL — SIGNIFICANT CHANGE UP (ref 0.2–1.2)
BLD GP AB SCN SERPL QL: NEGATIVE — SIGNIFICANT CHANGE UP
BUN SERPL-MCNC: 46 MG/DL — HIGH (ref 7–23)
CALCIUM SERPL-MCNC: 8.3 MG/DL — LOW (ref 8.4–10.5)
CHLORIDE SERPL-SCNC: 107 MMOL/L — SIGNIFICANT CHANGE UP (ref 96–108)
CO2 SERPL-SCNC: 19 MMOL/L — LOW (ref 22–31)
CREAT SERPL-MCNC: 0.87 MG/DL — SIGNIFICANT CHANGE UP (ref 0.5–1.3)
EGFR: 69 ML/MIN/1.73M2 — SIGNIFICANT CHANGE UP
EGFR: 69 ML/MIN/1.73M2 — SIGNIFICANT CHANGE UP
GLUCOSE BLDC GLUCOMTR-MCNC: 127 MG/DL — HIGH (ref 70–99)
GLUCOSE BLDC GLUCOMTR-MCNC: 150 MG/DL — HIGH (ref 70–99)
GLUCOSE BLDC GLUCOMTR-MCNC: 168 MG/DL — HIGH (ref 70–99)
GLUCOSE BLDC GLUCOMTR-MCNC: 191 MG/DL — HIGH (ref 70–99)
GLUCOSE SERPL-MCNC: 147 MG/DL — HIGH (ref 70–99)
HCT VFR BLD CALC: 28.8 % — LOW (ref 34.5–45)
HGB BLD-MCNC: 8.5 G/DL — LOW (ref 11.5–15.5)
MAGNESIUM SERPL-MCNC: 1.9 MG/DL — SIGNIFICANT CHANGE UP (ref 1.6–2.6)
MCHC RBC-ENTMCNC: 29.5 G/DL — LOW (ref 32–36)
MCHC RBC-ENTMCNC: 29.8 PG — SIGNIFICANT CHANGE UP (ref 27–34)
MCV RBC AUTO: 101.1 FL — HIGH (ref 80–100)
NRBC # BLD AUTO: 0 K/UL — SIGNIFICANT CHANGE UP (ref 0–0)
NRBC # FLD: 0 K/UL — SIGNIFICANT CHANGE UP (ref 0–0)
NRBC BLD AUTO-RTO: 0 /100 WBCS — SIGNIFICANT CHANGE UP (ref 0–0)
PHOSPHATE SERPL-MCNC: 3.2 MG/DL — SIGNIFICANT CHANGE UP (ref 2.5–4.5)
PLATELET # BLD AUTO: 234 K/UL — SIGNIFICANT CHANGE UP (ref 150–400)
PMV BLD: 10.9 FL — SIGNIFICANT CHANGE UP (ref 7–13)
POTASSIUM SERPL-MCNC: 4 MMOL/L — SIGNIFICANT CHANGE UP (ref 3.5–5.3)
POTASSIUM SERPL-SCNC: 4 MMOL/L — SIGNIFICANT CHANGE UP (ref 3.5–5.3)
PROT SERPL-MCNC: 5.4 G/DL — LOW (ref 6–8.3)
RBC # BLD: 2.85 M/UL — LOW (ref 3.8–5.2)
RBC # FLD: 23.9 % — HIGH (ref 10.3–14.5)
RH IG SCN BLD-IMP: NEGATIVE — SIGNIFICANT CHANGE UP
SODIUM SERPL-SCNC: 140 MMOL/L — SIGNIFICANT CHANGE UP (ref 135–145)
WBC # BLD: 5.92 K/UL — SIGNIFICANT CHANGE UP (ref 3.8–10.5)
WBC # FLD AUTO: 5.92 K/UL — SIGNIFICANT CHANGE UP (ref 3.8–10.5)

## 2025-08-28 PROCEDURE — 99233 SBSQ HOSP IP/OBS HIGH 50: CPT

## 2025-08-28 RX ORDER — LOPERAMIDE HCL 1 MG/7.5ML
10 SOLUTION ORAL
Qty: 0 | Refills: 0 | DISCHARGE
Start: 2025-08-28

## 2025-08-28 RX ORDER — MIDODRINE HYDROCHLORIDE 5 MG/1
3 TABLET ORAL
Qty: 0 | Refills: 0 | DISCHARGE
Start: 2025-08-28

## 2025-08-28 RX ORDER — POVIDONE-IODINE 7.5 %
1 SOLUTION, NON-ORAL TOPICAL
Qty: 0 | Refills: 0 | DISCHARGE
Start: 2025-08-28

## 2025-08-28 RX ORDER — LACTOBACILLUS ACIDOPHILUS/PECT 75 MM-100
1 CAPSULE ORAL
Qty: 0 | Refills: 0 | DISCHARGE
Start: 2025-08-28

## 2025-08-28 RX ORDER — SODIUM BICARBONATE 1 MEQ/ML
1 SYRINGE (ML) INTRAVENOUS
Qty: 0 | Refills: 0 | DISCHARGE
Start: 2025-08-28

## 2025-08-28 RX ORDER — ENOXAPARIN SODIUM 100 MG/ML
30 INJECTION SUBCUTANEOUS
Qty: 0 | Refills: 0 | DISCHARGE
Start: 2025-08-28

## 2025-08-28 RX ORDER — MIDODRINE HYDROCHLORIDE 5 MG/1
15 TABLET ORAL EVERY 8 HOURS
Refills: 0 | Status: DISCONTINUED | OUTPATIENT
Start: 2025-08-28 | End: 2025-08-29

## 2025-08-28 RX ADMIN — Medication 15 MILLILITER(S): at 05:18

## 2025-08-28 RX ADMIN — Medication 1 TABLET(S): at 11:54

## 2025-08-28 RX ADMIN — MIDODRINE HYDROCHLORIDE 15 MILLIGRAM(S): 5 TABLET ORAL at 21:49

## 2025-08-28 RX ADMIN — MIDODRINE HYDROCHLORIDE 30 MILLIGRAM(S): 5 TABLET ORAL at 05:18

## 2025-08-28 RX ADMIN — INSULIN LISPRO 2: 100 INJECTION, SOLUTION INTRAVENOUS; SUBCUTANEOUS at 23:56

## 2025-08-28 RX ADMIN — ENOXAPARIN SODIUM 30 MILLIGRAM(S): 100 INJECTION SUBCUTANEOUS at 22:42

## 2025-08-28 RX ADMIN — Medication 40 MILLIGRAM(S): at 11:53

## 2025-08-28 RX ADMIN — Medication 650 MILLIGRAM(S): at 05:18

## 2025-08-28 RX ADMIN — Medication 650 MILLIGRAM(S): at 21:49

## 2025-08-28 RX ADMIN — Medication 650 MILLIGRAM(S): at 14:35

## 2025-08-28 RX ADMIN — Medication 1 TABLET(S): at 11:53

## 2025-08-28 RX ADMIN — Medication 1 APPLICATION(S): at 05:18

## 2025-08-28 RX ADMIN — Medication 1 TABLET(S): at 18:15

## 2025-08-28 RX ADMIN — Medication 15 MILLILITER(S): at 18:15

## 2025-08-28 RX ADMIN — INSULIN LISPRO 2: 100 INJECTION, SOLUTION INTRAVENOUS; SUBCUTANEOUS at 11:54

## 2025-08-28 RX ADMIN — Medication 81 MILLIGRAM(S): at 11:54

## 2025-08-28 RX ADMIN — Medication 1 TABLET(S): at 05:19

## 2025-08-28 RX ADMIN — Medication 1 APPLICATION(S): at 14:00

## 2025-08-28 RX ADMIN — Medication 500 MILLIGRAM(S): at 11:53

## 2025-08-28 RX ADMIN — MIDODRINE HYDROCHLORIDE 15 MILLIGRAM(S): 5 TABLET ORAL at 14:35

## 2025-08-29 ENCOUNTER — TRANSCRIPTION ENCOUNTER (OUTPATIENT)
Age: 75
End: 2025-08-29

## 2025-08-29 VITALS
DIASTOLIC BLOOD PRESSURE: 88 MMHG | SYSTOLIC BLOOD PRESSURE: 150 MMHG | HEART RATE: 77 BPM | OXYGEN SATURATION: 100 % | TEMPERATURE: 98 F | RESPIRATION RATE: 20 BRPM

## 2025-08-29 LAB
ALBUMIN SERPL ELPH-MCNC: 2.1 G/DL — LOW (ref 3.3–5)
ALP SERPL-CCNC: 177 U/L — HIGH (ref 40–120)
ALT FLD-CCNC: 10 U/L — SIGNIFICANT CHANGE UP (ref 10–45)
ANION GAP SERPL CALC-SCNC: 13 MMOL/L — SIGNIFICANT CHANGE UP (ref 5–17)
AST SERPL-CCNC: 15 U/L — SIGNIFICANT CHANGE UP (ref 10–40)
BILIRUB SERPL-MCNC: 0.5 MG/DL — SIGNIFICANT CHANGE UP (ref 0.2–1.2)
BUN SERPL-MCNC: 40 MG/DL — HIGH (ref 7–23)
CALCIUM SERPL-MCNC: 8.3 MG/DL — LOW (ref 8.4–10.5)
CHLORIDE SERPL-SCNC: 109 MMOL/L — HIGH (ref 96–108)
CO2 SERPL-SCNC: 19 MMOL/L — LOW (ref 22–31)
CREAT SERPL-MCNC: 0.79 MG/DL — SIGNIFICANT CHANGE UP (ref 0.5–1.3)
EGFR: 77 ML/MIN/1.73M2 — SIGNIFICANT CHANGE UP
EGFR: 77 ML/MIN/1.73M2 — SIGNIFICANT CHANGE UP
FAT STL QN: SIGNIFICANT CHANGE UP
FAT STL QN: SIGNIFICANT CHANGE UP
GLUCOSE BLDC GLUCOMTR-MCNC: 114 MG/DL — HIGH (ref 70–99)
GLUCOSE BLDC GLUCOMTR-MCNC: 174 MG/DL — HIGH (ref 70–99)
GLUCOSE SERPL-MCNC: 188 MG/DL — HIGH (ref 70–99)
HCT VFR BLD CALC: 27 % — LOW (ref 34.5–45)
HGB BLD-MCNC: 8.1 G/DL — LOW (ref 11.5–15.5)
MAGNESIUM SERPL-MCNC: 1.9 MG/DL — SIGNIFICANT CHANGE UP (ref 1.6–2.6)
MCHC RBC-ENTMCNC: 30 G/DL — LOW (ref 32–36)
MCHC RBC-ENTMCNC: 30 PG — SIGNIFICANT CHANGE UP (ref 27–34)
MCV RBC AUTO: 100 FL — SIGNIFICANT CHANGE UP (ref 80–100)
NRBC # BLD AUTO: 0 K/UL — SIGNIFICANT CHANGE UP (ref 0–0)
NRBC # FLD: 0 K/UL — SIGNIFICANT CHANGE UP (ref 0–0)
NRBC BLD AUTO-RTO: 0 /100 WBCS — SIGNIFICANT CHANGE UP (ref 0–0)
PHOSPHATE SERPL-MCNC: 2.8 MG/DL — SIGNIFICANT CHANGE UP (ref 2.5–4.5)
PLATELET # BLD AUTO: 205 K/UL — SIGNIFICANT CHANGE UP (ref 150–400)
PMV BLD: 10.4 FL — SIGNIFICANT CHANGE UP (ref 7–13)
POTASSIUM SERPL-MCNC: 3.6 MMOL/L — SIGNIFICANT CHANGE UP (ref 3.5–5.3)
POTASSIUM SERPL-SCNC: 3.6 MMOL/L — SIGNIFICANT CHANGE UP (ref 3.5–5.3)
PROT SERPL-MCNC: 5.4 G/DL — LOW (ref 6–8.3)
RBC # BLD: 2.7 M/UL — LOW (ref 3.8–5.2)
RBC # FLD: 23.8 % — HIGH (ref 10.3–14.5)
SODIUM SERPL-SCNC: 141 MMOL/L — SIGNIFICANT CHANGE UP (ref 135–145)
WBC # BLD: 5.5 K/UL — SIGNIFICANT CHANGE UP (ref 3.8–10.5)
WBC # FLD AUTO: 5.5 K/UL — SIGNIFICANT CHANGE UP (ref 3.8–10.5)

## 2025-08-29 PROCEDURE — 85014 HEMATOCRIT: CPT

## 2025-08-29 PROCEDURE — 84133 ASSAY OF URINE POTASSIUM: CPT

## 2025-08-29 PROCEDURE — 83935 ASSAY OF URINE OSMOLALITY: CPT

## 2025-08-29 PROCEDURE — P9047: CPT

## 2025-08-29 PROCEDURE — 73110 X-RAY EXAM OF WRIST: CPT

## 2025-08-29 PROCEDURE — 94003 VENT MGMT INPAT SUBQ DAY: CPT

## 2025-08-29 PROCEDURE — 86140 C-REACTIVE PROTEIN: CPT

## 2025-08-29 PROCEDURE — 85025 COMPLETE CBC W/AUTO DIFF WBC: CPT

## 2025-08-29 PROCEDURE — 97166 OT EVAL MOD COMPLEX 45 MIN: CPT

## 2025-08-29 PROCEDURE — C8929: CPT

## 2025-08-29 PROCEDURE — 82140 ASSAY OF AMMONIA: CPT

## 2025-08-29 PROCEDURE — 85610 PROTHROMBIN TIME: CPT

## 2025-08-29 PROCEDURE — 94799 UNLISTED PULMONARY SVC/PX: CPT

## 2025-08-29 PROCEDURE — 85027 COMPLETE CBC AUTOMATED: CPT

## 2025-08-29 PROCEDURE — 87640 STAPH A DNA AMP PROBE: CPT

## 2025-08-29 PROCEDURE — 84132 ASSAY OF SERUM POTASSIUM: CPT

## 2025-08-29 PROCEDURE — 82550 ASSAY OF CK (CPK): CPT

## 2025-08-29 PROCEDURE — 71260 CT THORAX DX C+: CPT

## 2025-08-29 PROCEDURE — 86022 PLATELET ANTIBODIES: CPT

## 2025-08-29 PROCEDURE — 87077 CULTURE AEROBIC IDENTIFY: CPT

## 2025-08-29 PROCEDURE — 84156 ASSAY OF PROTEIN URINE: CPT

## 2025-08-29 PROCEDURE — 87899 AGENT NOS ASSAY W/OPTIC: CPT

## 2025-08-29 PROCEDURE — 82803 BLOOD GASES ANY COMBINATION: CPT

## 2025-08-29 PROCEDURE — 82435 ASSAY OF BLOOD CHLORIDE: CPT

## 2025-08-29 PROCEDURE — 74177 CT ABD & PELVIS W/CONTRAST: CPT

## 2025-08-29 PROCEDURE — 82330 ASSAY OF CALCIUM: CPT

## 2025-08-29 PROCEDURE — 87641 MR-STAPH DNA AMP PROBE: CPT

## 2025-08-29 PROCEDURE — 87086 URINE CULTURE/COLONY COUNT: CPT

## 2025-08-29 PROCEDURE — 87507 IADNA-DNA/RNA PROBE TQ 12-25: CPT

## 2025-08-29 PROCEDURE — 86901 BLOOD TYPING SEROLOGIC RH(D): CPT

## 2025-08-29 PROCEDURE — 84302 ASSAY OF SWEAT SODIUM: CPT

## 2025-08-29 PROCEDURE — 85732 THROMBOPLASTIN TIME PARTIAL: CPT

## 2025-08-29 PROCEDURE — 83986 ASSAY PH BODY FLUID NOS: CPT

## 2025-08-29 PROCEDURE — 94640 AIRWAY INHALATION TREATMENT: CPT

## 2025-08-29 PROCEDURE — 97161 PT EVAL LOW COMPLEX 20 MIN: CPT

## 2025-08-29 PROCEDURE — 93971 EXTREMITY STUDY: CPT

## 2025-08-29 PROCEDURE — 85670 THROMBIN TIME PLASMA: CPT

## 2025-08-29 PROCEDURE — 84134 ASSAY OF PREALBUMIN: CPT

## 2025-08-29 PROCEDURE — C1751: CPT

## 2025-08-29 PROCEDURE — 85384 FIBRINOGEN ACTIVITY: CPT

## 2025-08-29 PROCEDURE — 71045 X-RAY EXAM CHEST 1 VIEW: CPT

## 2025-08-29 PROCEDURE — 76700 US EXAM ABDOM COMPLETE: CPT

## 2025-08-29 PROCEDURE — 36415 COLL VENOUS BLD VENIPUNCTURE: CPT

## 2025-08-29 PROCEDURE — 73130 X-RAY EXAM OF HAND: CPT

## 2025-08-29 PROCEDURE — 94002 VENT MGMT INPAT INIT DAY: CPT

## 2025-08-29 PROCEDURE — 84540 ASSAY OF URINE/UREA-N: CPT

## 2025-08-29 PROCEDURE — 86985 SPLIT BLOOD OR PRODUCTS: CPT

## 2025-08-29 PROCEDURE — 83735 ASSAY OF MAGNESIUM: CPT

## 2025-08-29 PROCEDURE — 85018 HEMOGLOBIN: CPT

## 2025-08-29 PROCEDURE — 97530 THERAPEUTIC ACTIVITIES: CPT

## 2025-08-29 PROCEDURE — 84999 UNLISTED CHEMISTRY PROCEDURE: CPT

## 2025-08-29 PROCEDURE — 87637 SARSCOV2&INF A&B&RSV AMP PRB: CPT

## 2025-08-29 PROCEDURE — 99239 HOSP IP/OBS DSCHRG MGMT >30: CPT

## 2025-08-29 PROCEDURE — 84100 ASSAY OF PHOSPHORUS: CPT

## 2025-08-29 PROCEDURE — 82438 ASSAY OTHER FLUID CHLORIDES: CPT

## 2025-08-29 PROCEDURE — 82570 ASSAY OF URINE CREATININE: CPT

## 2025-08-29 PROCEDURE — 97110 THERAPEUTIC EXERCISES: CPT

## 2025-08-29 PROCEDURE — 84300 ASSAY OF URINE SODIUM: CPT

## 2025-08-29 PROCEDURE — 85730 THROMBOPLASTIN TIME PARTIAL: CPT

## 2025-08-29 PROCEDURE — 80202 ASSAY OF VANCOMYCIN: CPT

## 2025-08-29 PROCEDURE — 36569 INSJ PICC 5 YR+ W/O IMAGING: CPT

## 2025-08-29 PROCEDURE — 97129 THER IVNTJ 1ST 15 MIN: CPT

## 2025-08-29 PROCEDURE — P9011: CPT

## 2025-08-29 PROCEDURE — 86923 COMPATIBILITY TEST ELECTRIC: CPT

## 2025-08-29 PROCEDURE — 93005 ELECTROCARDIOGRAM TRACING: CPT

## 2025-08-29 PROCEDURE — 0225U NFCT DS DNA&RNA 21 SARSCOV2: CPT

## 2025-08-29 PROCEDURE — 82962 GLUCOSE BLOOD TEST: CPT

## 2025-08-29 PROCEDURE — 82947 ASSAY GLUCOSE BLOOD QUANT: CPT

## 2025-08-29 PROCEDURE — 80053 COMPREHEN METABOLIC PANEL: CPT

## 2025-08-29 PROCEDURE — 86850 RBC ANTIBODY SCREEN: CPT

## 2025-08-29 PROCEDURE — 81001 URINALYSIS AUTO W/SCOPE: CPT

## 2025-08-29 PROCEDURE — 83605 ASSAY OF LACTIC ACID: CPT

## 2025-08-29 PROCEDURE — 84295 ASSAY OF SERUM SODIUM: CPT

## 2025-08-29 PROCEDURE — 82705 FATS/LIPIDS FECES QUAL: CPT

## 2025-08-29 PROCEDURE — 87040 BLOOD CULTURE FOR BACTERIA: CPT

## 2025-08-29 PROCEDURE — 86900 BLOOD TYPING SEROLOGIC ABO: CPT

## 2025-08-29 PROCEDURE — 87186 SC STD MICRODIL/AGAR DIL: CPT

## 2025-08-29 PROCEDURE — 70450 CT HEAD/BRAIN W/O DYE: CPT

## 2025-08-29 PROCEDURE — P9016: CPT

## 2025-08-29 RX ADMIN — Medication 650 MILLIGRAM(S): at 05:33

## 2025-08-29 RX ADMIN — MIDODRINE HYDROCHLORIDE 15 MILLIGRAM(S): 5 TABLET ORAL at 05:33

## 2025-08-29 RX ADMIN — Medication 15 MILLILITER(S): at 05:33

## 2025-08-29 RX ADMIN — Medication 1 TABLET(S): at 05:33

## 2025-08-29 RX ADMIN — Medication 1 APPLICATION(S): at 05:33

## 2025-08-29 RX ADMIN — INSULIN LISPRO 2: 100 INJECTION, SOLUTION INTRAVENOUS; SUBCUTANEOUS at 05:39

## (undated) DEVICE — BITE BLOCK ADULT 20 X 27MM (GREEN)

## (undated) DEVICE — ADAPTER ENDO CHNL SINGLE USE

## (undated) DEVICE — TUBING ERBE CO2 OLYMPUS CONNECTOR

## (undated) DEVICE — Device

## (undated) DEVICE — TUBING HYBRID CO2

## (undated) DEVICE — KIT ENDO PROCEDURE CUST W/VLV